# Patient Record
Sex: MALE | Race: WHITE | NOT HISPANIC OR LATINO | Employment: FULL TIME | URBAN - NONMETROPOLITAN AREA
[De-identification: names, ages, dates, MRNs, and addresses within clinical notes are randomized per-mention and may not be internally consistent; named-entity substitution may affect disease eponyms.]

---

## 2017-01-23 ENCOUNTER — OFFICE VISIT (OUTPATIENT)
Dept: GASTROENTEROLOGY | Facility: CLINIC | Age: 82
End: 2017-01-23

## 2017-01-23 VITALS
TEMPERATURE: 97.4 F | HEIGHT: 69 IN | SYSTOLIC BLOOD PRESSURE: 136 MMHG | HEART RATE: 74 BPM | WEIGHT: 220 LBS | DIASTOLIC BLOOD PRESSURE: 74 MMHG | BODY MASS INDEX: 32.58 KG/M2

## 2017-01-23 DIAGNOSIS — K26.9 MULTIPLE DUODENAL ULCERS: Primary | ICD-10-CM

## 2017-01-23 PROCEDURE — 99214 OFFICE O/P EST MOD 30 MIN: CPT | Performed by: INTERNAL MEDICINE

## 2017-01-23 NOTE — LETTER
January 26, 2017     Jeffrey Owen MD  [address] [place] [address] [address]  [place] [address]    Patient: Eron Escalante   YOB: 1934   Date of Visit: 1/23/2017       Dear Dr. Lexie MD:    Eron Escalante was in my office today. Below is a copy of my note.    If you have questions, please do not hesitate to call me. I look forward to following Eron along with you.         Sincerely,        Joshua Woodward, DO        CC: Frantz Zamora MD    Chief Complaint   Patient presents with   • GI Problem     was in hospital had endo by dr. chisholm 12-14-16 then 12-16-16 had endo by dr. woodward       Subjective     HPI    Treated at [facility] in December 2016.  Evaluated by GI for complaints of dysphagia and GERD. He was found to have duodenal ulcer with hemorrhage upon EGD 12/14/16.  Blood transfusion required while in hospital.   Repeat EGD on 12/16/16 showed duodenal ulcers that were not bleeding.    Hx of taking ASA and Coumadin (hx of Afib/CVA).  Pt continues to take ASA.  Currently prescribed Protonix.   Pt reports improvement in symptoms.  Iron transfusion last week at Dr Randolph office. Hgb currently stable.  No signs BRBPR or melena stools.  Currently denies dysphagia.        Past Medical History   Diagnosis Date   • A-fib 11/15/2016   • Anemia      gets shots every few months to build up blood. sees dr randolph.   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   • Arthritis    • Bradycardia 11/15/2016   • CAD in native artery 11/15/2016   • Chest pain 11/15/2016   • CHF (congestive heart failure)    • Chronic kidney disease    • COPD (chronic obstructive pulmonary disease)    • Heart murmur    • HTN (hypertension) 11/15/2016   • Hyperlipidemia    • Myocardial infarction    • Sleep apnea    • Stroke    • Type 2 diabetes mellitus 11/15/2016   • Ulcer of abdomen wall        Past Surgical History   Procedure Laterality Date   • Back surgery       x2   • Replacement total knee Left      2002   • Cardiac  catheterization Left      1/2010   • Coronary artery bypass graft       2/2006 w/PTCA & KIMMY    • Appendectomy     • Cholecystectomy     • Carpal tunnel release Bilateral    • Eye surgery Left      x 2   • Shoulder rotator cuff repair Bilateral    • Peripheral arterial stent graft     • Coronary stent placement     • Joint replacement     • Endoscopy N/A 12/14/2016     Procedure: ESOPHAGOGASTRODUODENOSCOPY WITH ANESTHESIA;  Surgeon: Isabel Dexter MD;  Location: [facility] ENDOSCOPY;  Service:    • Endoscopy N/A 12/16/2016     Procedure: ESOPHAGOGASTRODUODENOSCOPY WITH ANESTHESIA;  Surgeon: Joshua Rich DO;  Location: [facility] ENDOSCOPY;  Service:        Outpatient Prescriptions Marked as Taking for the 1/23/17 encounter (Office Visit) with Joshua Rich DO   Medication Sig Dispense Refill   • allopurinol (ZYLOPRIM) 300 MG tablet Take 300 mg by mouth Daily.     • aspirin 81 MG chewable tablet Chew 1 tablet Daily. 81 tablet 11   • colchicine 0.6 MG tablet Take 1 tablet by mouth 2 (Two) Times a Day As Needed for muscle/joint pain. 30 tablet 2   • doxazosin (CARDURA) 1 MG tablet Take 1 mg by mouth Every Night.     • Folic Acid-B2-B6-B12-C-Choline (FOLIC ACID XTRA PO) Take 1 capsule by mouth Daily.     • furosemide (LASIX) 40 MG tablet Take 40 mg by mouth Daily.     • glimepiride (AMARYL) 4 MG tablet 4 mg 2 (Two) Times a Day. Needs pm dose     • HYDROcodone-acetaminophen (NORCO) 7.5-325 MG per tablet 1 tablet Every 6 (Six) Hours As Needed.     • Insulin Glargine (LANTUS FOR OPTICLIK SC) Inject 15 Units under the skin Every Night. Takes only if evening blood sugar is greater than 120     • Lancets (ONETOUCH ULTRASOFT) lancets      • mupirocin (BACTROBAN) 2 % ointment Apply topically daily Indications: Apply at dressing changes Apply topically 3 times daily.     • Olmesartan-Amlodipine-HCTZ 40-10-12.5 MG tablet Take by mouth daily     • ONE TOUCH ULTRA TEST test strip      • pantoprazole (PROTONIX) 40 MG EC tablet Take  1 tablet by mouth 2 (Two) Times a Day. 60 tablet 5   • potassium chloride (K-DUR,KLOR-CON) 10 MEQ CR tablet Take 10 mEq by mouth 2 (Two) Times a Day. Has not had pm dose but potassium level is elevated.     • pravastatin (PRAVACHOL) 40 MG tablet Take 40 mg by mouth daily     • promethazine (PHENERGAN) 12.5 MG tablet Take 12.5 mg by mouth as needed for Nausea     • Pyridoxine HCl (VITAMIN B6) 200 MG tablet Take  by mouth.     • rOPINIRole (REQUIP) 2 MG tablet Take 2 mg by mouth Every Night. Needs pm dose     • sennosides-docusate sodium (SENOKOT-S) 8.6-50 MG tablet Take 2 tablets by mouth 2 (Two) Times a Day As Needed for constipation. 45 tablet 2   • vitamin C (ASCORBIC ACID) 500 MG tablet Take 500 mg by mouth Daily.         Allergies   Allergen Reactions   • Latex    • Penicillins    • Adhesive Tape Rash       Social History     Social History   • Marital status:      Spouse name: N/A   • Number of children: N/A   • Years of education: N/A     Occupational History   • Not on file.     Social History Main Topics   • Smoking status: Never Smoker   • Smokeless tobacco: Never Used   • Alcohol use No   • Drug use: No   • Sexual activity: No     Other Topics Concern   • Not on file     Social History Narrative   • No narrative on file       Family History   Problem Relation Age of Onset   • Coronary artery disease Father    • Heart disease Father    • Coronary artery disease Brother    • Heart attack Brother    • Cancer Mother    • No Known Problems Sister    • Heart disease Son    • Cancer Sister    • Cancer Sister        Review of Systems   Constitutional: Negative for fatigue, fever and unexpected weight change.   HENT: Negative for hearing loss, sore throat and voice change.    Eyes: Negative for visual disturbance.   Respiratory: Negative for cough, shortness of breath and wheezing.    Cardiovascular: Negative for chest pain and palpitations.   Gastrointestinal: Negative for abdominal pain, blood in stool and  "vomiting.   Endocrine: Negative for polydipsia and polyuria.   Genitourinary: Negative for difficulty urinating, dysuria, hematuria and urgency.   Musculoskeletal: Negative for joint swelling and myalgias.   Skin: Negative for color change, rash and wound.   Neurological: Negative for dizziness, tremors, seizures and syncope.   Hematological: Does not bruise/bleed easily.   Psychiatric/Behavioral: Negative for agitation and confusion. The patient is not nervous/anxious.        Objective     Vitals:    01/23/17 1504   BP: 136/74   Pulse: 74   Temp: 97.4 °F (36.3 °C)   Weight: 220 lb (99.8 kg)   Height: 69\" (175.3 cm)     Body mass index is 32.49 kg/(m^2).    Physical Exam   Constitutional: He is oriented to person, place, and time. He appears well-developed and well-nourished.   HENT:   Head: Normocephalic and atraumatic.   Eyes:   Pink, Nonicteric   Neck:   Global Assessment- supple. No JVD or lymphadenopathy   Cardiovascular: Normal rate, regular rhythm and normal heart sounds.  Exam reveals no gallop and no friction rub.    No murmur heard.  Pulmonary/Chest: Effort normal and breath sounds normal. No respiratory distress. He has no wheezes. He has no rales.   Inspection: Movements-Symmetrical   Abdominal: Soft. Bowel sounds are normal. He exhibits no distension and no mass. There is no tenderness. There is no rebound and no guarding.   Neurological: He is alert and oriented to person, place, and time.   General Exam-Deemed a reliable historian, able to converse without difficulty and Able to move all extremities without difficulty       Imaging Results (most recent)     None          Assessment/Plan     Eron was seen today for gi problem.    Diagnoses and all orders for this visit:    Multiple duodenal ulcers    Will discuss cessation of ASA with Dr Owen   EGD in 2-3 months after discussion with Dr Owen  Cont Protonix    * Surgery not found *    There are no Patient Instructions on file for this visit.    "

## 2017-01-23 NOTE — PROGRESS NOTES
Chief Complaint   Patient presents with   • GI Problem     was in hospital had endo by dr. chisholm 12-14-16 then 12-16-16 had endo by dr. woodward       Subjective     HPI    Treated at [facility] in December 2016.  Evaluated by GI for complaints of dysphagia and GERD. He was found to have duodenal ulcer with hemorrhage upon EGD 12/14/16.  Blood transfusion required while in hospital.   Repeat EGD on 12/16/16 showed duodenal ulcers that were not bleeding.    Hx of taking ASA and Coumadin (hx of Afib/CVA).  Pt continues to take ASA.  Currently prescribed Protonix.   Pt reports improvement in symptoms.  Iron transfusion last week at Dr Randolph office. Hgb currently stable.  No signs BRBPR or melena stools.  Currently denies dysphagia.        Past Medical History   Diagnosis Date   • A-fib 11/15/2016   • Anemia      gets shots every few months to build up blood. sees dr randolph.   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   • Arthritis    • Bradycardia 11/15/2016   • CAD in native artery 11/15/2016   • Chest pain 11/15/2016   • CHF (congestive heart failure)    • Chronic kidney disease    • COPD (chronic obstructive pulmonary disease)    • Heart murmur    • HTN (hypertension) 11/15/2016   • Hyperlipidemia    • Myocardial infarction    • Sleep apnea    • Stroke    • Type 2 diabetes mellitus 11/15/2016   • Ulcer of abdomen wall        Past Surgical History   Procedure Laterality Date   • Back surgery       x2   • Replacement total knee Left      2002   • Cardiac catheterization Left      1/2010   • Coronary artery bypass graft       2/2006 w/PTCA & KIMMY    • Appendectomy     • Cholecystectomy     • Carpal tunnel release Bilateral    • Eye surgery Left      x 2   • Shoulder rotator cuff repair Bilateral    • Peripheral arterial stent graft     • Coronary stent placement     • Joint replacement     • Endoscopy N/A 12/14/2016     Procedure: ESOPHAGOGASTRODUODENOSCOPY WITH ANESTHESIA;  Surgeon: Isabel Chisholm MD;  Location: [facility]  ENDOSCOPY;  Service:    • Endoscopy N/A 12/16/2016     Procedure: ESOPHAGOGASTRODUODENOSCOPY WITH ANESTHESIA;  Surgeon: Joshua Rich DO;  Location: [facility] ENDOSCOPY;  Service:        Outpatient Prescriptions Marked as Taking for the 1/23/17 encounter (Office Visit) with Joshua Rich DO   Medication Sig Dispense Refill   • allopurinol (ZYLOPRIM) 300 MG tablet Take 300 mg by mouth Daily.     • aspirin 81 MG chewable tablet Chew 1 tablet Daily. 81 tablet 11   • colchicine 0.6 MG tablet Take 1 tablet by mouth 2 (Two) Times a Day As Needed for muscle/joint pain. 30 tablet 2   • doxazosin (CARDURA) 1 MG tablet Take 1 mg by mouth Every Night.     • Folic Acid-B2-B6-B12-C-Choline (FOLIC ACID XTRA PO) Take 1 capsule by mouth Daily.     • furosemide (LASIX) 40 MG tablet Take 40 mg by mouth Daily.     • glimepiride (AMARYL) 4 MG tablet 4 mg 2 (Two) Times a Day. Needs pm dose     • HYDROcodone-acetaminophen (NORCO) 7.5-325 MG per tablet 1 tablet Every 6 (Six) Hours As Needed.     • Insulin Glargine (LANTUS FOR OPTICLIK SC) Inject 15 Units under the skin Every Night. Takes only if evening blood sugar is greater than 120     • Lancets (ONETOUCH ULTRASOFT) lancets      • mupirocin (BACTROBAN) 2 % ointment Apply topically daily Indications: Apply at dressing changes Apply topically 3 times daily.     • Olmesartan-Amlodipine-HCTZ 40-10-12.5 MG tablet Take by mouth daily     • ONE TOUCH ULTRA TEST test strip      • pantoprazole (PROTONIX) 40 MG EC tablet Take 1 tablet by mouth 2 (Two) Times a Day. 60 tablet 5   • potassium chloride (K-DUR,KLOR-CON) 10 MEQ CR tablet Take 10 mEq by mouth 2 (Two) Times a Day. Has not had pm dose but potassium level is elevated.     • pravastatin (PRAVACHOL) 40 MG tablet Take 40 mg by mouth daily     • promethazine (PHENERGAN) 12.5 MG tablet Take 12.5 mg by mouth as needed for Nausea     • Pyridoxine HCl (VITAMIN B6) 200 MG tablet Take  by mouth.     • rOPINIRole (REQUIP) 2 MG tablet Take 2 mg  by mouth Every Night. Needs pm dose     • sennosides-docusate sodium (SENOKOT-S) 8.6-50 MG tablet Take 2 tablets by mouth 2 (Two) Times a Day As Needed for constipation. 45 tablet 2   • vitamin C (ASCORBIC ACID) 500 MG tablet Take 500 mg by mouth Daily.         Allergies   Allergen Reactions   • Latex    • Penicillins    • Adhesive Tape Rash       Social History     Social History   • Marital status:      Spouse name: N/A   • Number of children: N/A   • Years of education: N/A     Occupational History   • Not on file.     Social History Main Topics   • Smoking status: Never Smoker   • Smokeless tobacco: Never Used   • Alcohol use No   • Drug use: No   • Sexual activity: No     Other Topics Concern   • Not on file     Social History Narrative   • No narrative on file       Family History   Problem Relation Age of Onset   • Coronary artery disease Father    • Heart disease Father    • Coronary artery disease Brother    • Heart attack Brother    • Cancer Mother    • No Known Problems Sister    • Heart disease Son    • Cancer Sister    • Cancer Sister        Review of Systems   Constitutional: Negative for fatigue, fever and unexpected weight change.   HENT: Negative for hearing loss, sore throat and voice change.    Eyes: Negative for visual disturbance.   Respiratory: Negative for cough, shortness of breath and wheezing.    Cardiovascular: Negative for chest pain and palpitations.   Gastrointestinal: Negative for abdominal pain, blood in stool and vomiting.   Endocrine: Negative for polydipsia and polyuria.   Genitourinary: Negative for difficulty urinating, dysuria, hematuria and urgency.   Musculoskeletal: Negative for joint swelling and myalgias.   Skin: Negative for color change, rash and wound.   Neurological: Negative for dizziness, tremors, seizures and syncope.   Hematological: Does not bruise/bleed easily.   Psychiatric/Behavioral: Negative for agitation and confusion. The patient is not  "nervous/anxious.        Objective     Vitals:    01/23/17 1504   BP: 136/74   Pulse: 74   Temp: 97.4 °F (36.3 °C)   Weight: 220 lb (99.8 kg)   Height: 69\" (175.3 cm)     Body mass index is 32.49 kg/(m^2).    Physical Exam   Constitutional: He is oriented to person, place, and time. He appears well-developed and well-nourished.   HENT:   Head: Normocephalic and atraumatic.   Eyes:   Pink, Nonicteric   Neck:   Global Assessment- supple. No JVD or lymphadenopathy   Cardiovascular: Normal rate, regular rhythm and normal heart sounds.  Exam reveals no gallop and no friction rub.    No murmur heard.  Pulmonary/Chest: Effort normal and breath sounds normal. No respiratory distress. He has no wheezes. He has no rales.   Inspection: Movements-Symmetrical   Abdominal: Soft. Bowel sounds are normal. He exhibits no distension and no mass. There is no tenderness. There is no rebound and no guarding.   Neurological: He is alert and oriented to person, place, and time.   General Exam-Deemed a reliable historian, able to converse without difficulty and Able to move all extremities without difficulty       Imaging Results (most recent)     None          Assessment/Plan     Eron was seen today for gi problem.    Diagnoses and all orders for this visit:    Multiple duodenal ulcers    Will discuss cessation of ASA with Dr Owen   EGD in 2-3 months after discussion with Dr Owen  Cont Protonix    * Surgery not found *    There are no Patient Instructions on file for this visit.    "

## 2017-01-23 NOTE — MR AVS SNAPSHOT
Eron Escalante   1/23/2017 2:45 PM   Office Visit    Dept Phone:  [phone]   Encounter #:  [id number]    Provider:  Joshua Rich DO   Department:  [facility] GASTROENTEROLOGY                Your Full Care Plan              Your Updated Medication List          This list is accurate as of: 1/23/17  3:38 PM.  Always use your most recent med list.                allopurinol 300 MG tablet   Commonly known as:  ZYLOPRIM       aspirin 81 MG chewable tablet   Chew 1 tablet Daily.       colchicine 0.6 MG tablet   Take 1 tablet by mouth 2 (Two) Times a Day As Needed for muscle/joint pain.       doxazosin 1 MG tablet   Commonly known as:  CARDURA       FOLIC ACID XTRA PO       furosemide 40 MG tablet   Commonly known as:  LASIX       glimepiride 4 MG tablet   Commonly known as:  AMARYL       HYDROcodone-acetaminophen 7.5-325 MG per tablet   Commonly known as:  NORCO       LANTUS FOR OPTICLIK SC       mupirocin 2 % ointment   Commonly known as:  BACTROBAN       Olmesartan-Amlodipine-HCTZ 40-10-12.5 MG tablet       ONE TOUCH ULTRA TEST test strip   Generic drug:  glucose blood       onetouch ultrasoft lancets       pantoprazole 40 MG EC tablet   Commonly known as:  PROTONIX   Take 1 tablet by mouth 2 (Two) Times a Day.       potassium chloride 10 MEQ CR tablet   Commonly known as:  K-DUR,KLOR-CON       pravastatin 40 MG tablet   Commonly known as:  PRAVACHOL       promethazine 12.5 MG tablet   Commonly known as:  PHENERGAN       rOPINIRole 2 MG tablet   Commonly known as:  REQUIP       sennosides-docusate sodium 8.6-50 MG tablet   Commonly known as:  SENOKOT-S   Take 2 tablets by mouth 2 (Two) Times a Day As Needed for constipation.       Vitamin B6 200 MG tablet       vitamin C 500 MG tablet   Commonly known as:  ASCORBIC ACID               You Were Diagnosed With        Codes Comments    Multiple duodenal ulcers    -  Primary ICD-10-CM: K29.81  ICD-9-CM: 535.61        "  Instructions     None    Patient Instructions History      Upcoming Appointments     Visit Type Date Time Department    OFFICE VISIT 2017  2:45 PM [facility] GASTRO PAD    FOLLOW UP 3/13/2017  2:30 PM [facility] PAD      MyChart Signup     [facility] [employer] allows you to send messages to your doctor, view your test results, renew your prescriptions, schedule appointments, and more. To sign up, go to [employer] and click on the Sign Up Now link in the New User? box. Enter your [employer] Activation Code exactly as it appears below along with the last four digits of your Social Security Number and your Date of Birth () to complete the sign-up process. If you do not sign up before the expiration date, you must request a new code.    [employer] Activation Code: [email or web address]  Expires: 2017  3:37 PM    If you have questions, you can email [email or web address] or call [phone] to talk to our [employer] staff. Remember, [employer] is NOT to be used for urgent needs. For medical emergencies, dial 911.               Other Info from Your Visit           Your Appointments     Mar 13, 2017  2:30 PM CDT   Follow Up with Frantz Zamora MD   [facility] HEART GROUP (--)    [address] [place] [id number] [phone]           Arrive 15 minutes prior to appointment.              Allergies     Latex      Penicillins      Adhesive Tape  Rash      Reason for Visit     GI Problem was in hospital had endo by dr. chisholm 16 then 16 had endo by dr. woodward      Vital Signs     Blood Pressure Pulse Temperature Height Weight Body Mass Index    136/74 74 97.4 °F (36.3 °C) 69\" (175.3 cm) 220 lb (99.8 kg) 32.49 kg/m2    Smoking Status                   Never Smoker           Problems and Diagnoses Noted     Multiple duodenal ulcers    -  Primary        "

## 2017-01-31 ENCOUNTER — TELEPHONE (OUTPATIENT)
Dept: CARDIOLOGY | Facility: CLINIC | Age: 82
End: 2017-01-31

## 2017-02-02 ENCOUNTER — TELEPHONE (OUTPATIENT)
Dept: GASTROENTEROLOGY | Facility: CLINIC | Age: 82
End: 2017-02-02

## 2017-02-02 DIAGNOSIS — K26.9 DUODENAL ULCER: Primary | ICD-10-CM

## 2017-02-02 NOTE — TELEPHONE ENCOUNTER
Attempt to call pt, no answer, message left  He needs to stop ASA at this time per Dr Rich  He will need EGD 2 months, order has been placed

## 2017-02-03 ENCOUNTER — TELEPHONE (OUTPATIENT)
Dept: GASTROENTEROLOGY | Facility: CLINIC | Age: 82
End: 2017-02-03

## 2017-02-03 NOTE — TELEPHONE ENCOUNTER
Spoke with pt on phone  Instructed him to stop ASA per Dr Rich order  He will need EGD 2 mo off ASA  Order has been placed  Please call pt to schedule    ty    ad

## 2017-02-07 ENCOUNTER — TELEPHONE (OUTPATIENT)
Dept: CARDIOLOGY | Facility: CLINIC | Age: 82
End: 2017-02-07

## 2017-02-26 ENCOUNTER — TELEPHONE (OUTPATIENT)
Dept: GASTROENTEROLOGY | Facility: CLINIC | Age: 82
End: 2017-02-26

## 2017-02-26 NOTE — TELEPHONE ENCOUNTER
Please confirm/schedule EGD for April 2017    I have previously spoken with him on phone and instructed him to stop ASA  EGD will need to be performed 2 month after cessation of ASA    lindsay rivas

## 2017-03-07 ENCOUNTER — TELEPHONE (OUTPATIENT)
Dept: GASTROENTEROLOGY | Facility: CLINIC | Age: 82
End: 2017-03-07

## 2017-03-07 NOTE — TELEPHONE ENCOUNTER
EGD scheduled for Monday 4/10/17  He has stopped ASA and denies use of further blood thinners  Case Request as been entered.  Please make sure all appropriate steps are completed prior to scope    ty

## 2017-03-13 ENCOUNTER — OFFICE VISIT (OUTPATIENT)
Dept: CARDIOLOGY | Facility: CLINIC | Age: 82
End: 2017-03-13

## 2017-03-13 VITALS
BODY MASS INDEX: 44.17 KG/M2 | HEIGHT: 60 IN | DIASTOLIC BLOOD PRESSURE: 64 MMHG | WEIGHT: 225 LBS | SYSTOLIC BLOOD PRESSURE: 150 MMHG | OXYGEN SATURATION: 100 % | HEART RATE: 51 BPM

## 2017-03-13 DIAGNOSIS — I10 ESSENTIAL HYPERTENSION: ICD-10-CM

## 2017-03-13 DIAGNOSIS — R00.1 BRADYCARDIA: ICD-10-CM

## 2017-03-13 DIAGNOSIS — I25.10 CAD IN NATIVE ARTERY: ICD-10-CM

## 2017-03-13 DIAGNOSIS — K26.4 DUODENAL ULCER WITH HEMORRHAGE: ICD-10-CM

## 2017-03-13 DIAGNOSIS — I48.20 CHRONIC ATRIAL FIBRILLATION (HCC): Primary | ICD-10-CM

## 2017-03-13 PROCEDURE — 99214 OFFICE O/P EST MOD 30 MIN: CPT | Performed by: INTERNAL MEDICINE

## 2017-03-13 NOTE — TELEPHONE ENCOUNTER
Have talked to patient he is still off aspirin no other changes he will have his end  4-10-17 at 6:30.

## 2017-04-07 ENCOUNTER — ANESTHESIA EVENT (OUTPATIENT)
Dept: GASTROENTEROLOGY | Facility: HOSPITAL | Age: 82
End: 2017-04-07

## 2017-04-10 ENCOUNTER — HOSPITAL ENCOUNTER (OUTPATIENT)
Facility: HOSPITAL | Age: 82
Setting detail: HOSPITAL OUTPATIENT SURGERY
Discharge: HOME | End: 2017-04-10
Attending: INTERNAL MEDICINE | Admitting: INTERNAL MEDICINE

## 2017-04-10 ENCOUNTER — ANESTHESIA (OUTPATIENT)
Dept: GASTROENTEROLOGY | Facility: HOSPITAL | Age: 82
End: 2017-04-10

## 2017-04-10 VITALS
BODY MASS INDEX: 33.03 KG/M2 | HEART RATE: 49 BPM | TEMPERATURE: 97.2 F | OXYGEN SATURATION: 100 % | RESPIRATION RATE: 17 BRPM | WEIGHT: 223 LBS | SYSTOLIC BLOOD PRESSURE: 137 MMHG | HEIGHT: 69 IN | DIASTOLIC BLOOD PRESSURE: 57 MMHG

## 2017-04-10 DIAGNOSIS — K26.9 DUODENAL ULCER: ICD-10-CM

## 2017-04-10 LAB — GLUCOSE BLDC GLUCOMTR-MCNC: 120 MG/DL (ref 70–130)

## 2017-04-10 PROCEDURE — 43239 EGD BIOPSY SINGLE/MULTIPLE: CPT | Performed by: INTERNAL MEDICINE

## 2017-04-10 PROCEDURE — 25010000002 PROPOFOL 10 MG/ML EMULSION: Performed by: NURSE ANESTHETIST, CERTIFIED REGISTERED

## 2017-04-10 PROCEDURE — 87081 CULTURE SCREEN ONLY: CPT | Performed by: INTERNAL MEDICINE

## 2017-04-10 PROCEDURE — 82962 GLUCOSE BLOOD TEST: CPT

## 2017-04-10 RX ORDER — SODIUM CHLORIDE 0.9 % (FLUSH) 0.9 %
1-10 SYRINGE (ML) INJECTION AS NEEDED
Status: DISCONTINUED | OUTPATIENT
Start: 2017-04-10 | End: 2017-04-10 | Stop reason: HOSPADM

## 2017-04-10 RX ORDER — PROPOFOL 10 MG/ML
VIAL (ML) INTRAVENOUS AS NEEDED
Status: DISCONTINUED | OUTPATIENT
Start: 2017-04-10 | End: 2017-04-10 | Stop reason: SURG

## 2017-04-10 RX ORDER — LIDOCAINE HYDROCHLORIDE 20 MG/ML
INJECTION, SOLUTION INFILTRATION; PERINEURAL AS NEEDED
Status: DISCONTINUED | OUTPATIENT
Start: 2017-04-10 | End: 2017-04-10 | Stop reason: SURG

## 2017-04-10 RX ORDER — SODIUM CHLORIDE 9 MG/ML
100 INJECTION, SOLUTION INTRAVENOUS CONTINUOUS
Status: DISCONTINUED | OUTPATIENT
Start: 2017-04-10 | End: 2017-04-10 | Stop reason: HOSPADM

## 2017-04-10 RX ADMIN — PROPOFOL 150 MG: 10 INJECTION, EMULSION INTRAVENOUS at 08:25

## 2017-04-10 RX ADMIN — LIDOCAINE HYDROCHLORIDE 0.5 ML: 10 INJECTION, SOLUTION EPIDURAL; INFILTRATION; INTRACAUDAL; PERINEURAL at 06:59

## 2017-04-10 RX ADMIN — SODIUM CHLORIDE 100 ML/HR: 9 INJECTION, SOLUTION INTRAVENOUS at 06:58

## 2017-04-10 RX ADMIN — LIDOCAINE HYDROCHLORIDE 50 MG: 20 INJECTION, SOLUTION INFILTRATION; PERINEURAL at 08:25

## 2017-04-10 RX ADMIN — SODIUM CHLORIDE: 9 INJECTION, SOLUTION INTRAVENOUS at 08:25

## 2017-04-10 NOTE — PLAN OF CARE
Problem: Patient Care Overview (Adult)  Goal: Plan of Care Review  Outcome: Outcome(s) achieved Date Met:  04/10/17    04/10/17 0847   Patient Care Overview   Progress progress toward functional goals as expected   Outcome Evaluation   Outcome Summary/Follow up Plan d/c criteria met   Coping/Psychosocial Response Interventions   Plan Of Care Reviewed With patient;spouse

## 2017-04-10 NOTE — PLAN OF CARE
Problem: GI Endoscopy (Adult)  Goal: Signs and Symptoms of Listed Potential Problems Will be Absent or Manageable (GI Endoscopy)  Outcome: Outcome(s) achieved Date Met:  04/10/17

## 2017-04-10 NOTE — H&P
[facility] Gastroenterology  Pre Procedure History & Physical    Chief Complaint:   DU    Subjective     HPI:   DU    Past Medical History:   Past Medical History:   Diagnosis Date   • A-fib 11/15/2016   • Anemia     gets shots every few months to build up blood. sees dr adam.   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   • Arthritis    • Bradycardia 11/15/2016   • CAD in native artery 11/15/2016   • Chest pain 11/15/2016   • CHF (congestive heart failure)    • Chronic kidney disease    • COPD (chronic obstructive pulmonary disease)    • Heart murmur    • HTN (hypertension) 11/15/2016   • Hyperlipidemia    • Myocardial infarction    • Sleep apnea    • Stroke    • Type 2 diabetes mellitus 11/15/2016   • Ulcer of abdomen wall        Past Surgical History:  [unfilled]    Family History:  Family History   Problem Relation Age of Onset   • Coronary artery disease Father    • Heart disease Father    • Coronary artery disease Brother    • Heart attack Brother    • Cancer Mother    • No Known Problems Sister    • Heart disease Son    • Cancer Sister    • Cancer Sister        Social History:   reports that he has never smoked. He has never used smokeless tobacco. He reports that he does not drink alcohol or use illicit drugs.    Medications:   Prior to Admission medications    Medication Sig Start Date End Date Taking? Authorizing Provider   allopurinol (ZYLOPRIM) 300 MG tablet Take 300 mg by mouth Daily. 11/20/16  Yes Historical Provider, MD   colchicine 0.6 MG tablet Take 1 tablet by mouth 2 (Two) Times a Day As Needed for muscle/joint pain. 12/22/16  Yes Jeffrey Owen MD   doxazosin (CARDURA) 1 MG tablet Take 1 mg by mouth Every Night.   Yes Historical Provider, MD   Folic Acid-B2-B6-B12-C-Choline (FOLIC ACID XTRA PO) Take 1 capsule by mouth Daily.   Yes Historical Provider, MD   furosemide (LASIX) 40 MG tablet Take 40 mg by mouth Daily.   Yes Historical Provider, MD   glimepiride (AMARYL) 4 MG tablet 4 mg 2 (Two)  "Times a Day. Needs pm dose 8/23/16  Yes Historical Provider, MD   Insulin Glargine (LANTUS FOR OPTICLIK SC) Inject 15 Units under the skin Every Night. Takes only if evening blood sugar is greater than 120   Yes Historical Provider, MD   Olmesartan-Amlodipine-HCTZ 40-10-12.5 MG tablet Take by mouth daily   Yes Historical Provider, MD   pantoprazole (PROTONIX) 40 MG EC tablet Take 1 tablet by mouth 2 (Two) Times a Day. 12/22/16  Yes Jeffrey Owen MD   potassium chloride (K-DUR,KLOR-CON) 10 MEQ CR tablet Take 10 mEq by mouth 2 (Two) Times a Day. Has not had pm dose but potassium level is elevated.   Yes Historical Provider, MD   pravastatin (PRAVACHOL) 40 MG tablet Take 40 mg by mouth daily   Yes Historical Provider, MD   promethazine (PHENERGAN) 12.5 MG tablet Take 12.5 mg by mouth as needed for Nausea   Yes Historical Provider, MD   Pyridoxine HCl (VITAMIN B6) 200 MG tablet Take  by mouth.   Yes Historical Provider, MD   sennosides-docusate sodium (SENOKOT-S) 8.6-50 MG tablet Take 2 tablets by mouth 2 (Two) Times a Day As Needed for constipation. 12/22/16  Yes Jeffrey Owen MD   vitamin C (ASCORBIC ACID) 500 MG tablet Take 500 mg by mouth Daily.   Yes Historical Provider, MD   HYDROcodone-acetaminophen (NORCO) 7.5-325 MG per tablet 1 tablet Every 6 (Six) Hours As Needed.    Historical Provider, MD   Lancets (ONETOUCH ULTRASOFT) lancets  11/28/16   Historical Provider, MD   mupirocin (BACTROBAN) 2 % ointment Apply topically daily Indications: Apply at dressing changes Apply topically 3 times daily.    Historical Provider, MD   ONE TOUCH ULTRA TEST test strip  11/28/16   Historical Provider, MD   rOPINIRole (REQUIP) 2 MG tablet Take 2 mg by mouth Every Night. Needs pm dose    Historical Provider, MD       Allergies:  Penicillins and Adhesive tape    Objective     Blood pressure 141/81, pulse 54, temperature 97.2 °F (36.2 °C), temperature source Temporal Artery , resp. rate 20, height 69\" (175.3 cm), weight 223 " lb (101 kg), SpO2 100 %.    Physical Exam   Constitutional: Pt is oriented to person, place, and in no distress.   HENT: Mouth/Throat: Oropharynx is clear.   Cardiovascular: Normal rate, regular rhythm.    Pulmonary/Chest: Effort normal. No respiratory distress. No  wheezes.   Abdominal: Soft. Non-distended.  Skin: Skin is warm and dry.   Psychiatric: Mood, memory, affect and judgment appear normal.     Assessment/Plan     Diagnosis:  Du    Anticipated Surgical Procedure:  EGD    The risks, benefits, and alternatives of this procedure have been discussed with the patient or the responsible party- the patient understands and agrees to proceed.

## 2017-04-10 NOTE — ANESTHESIA PREPROCEDURE EVALUATION
Anesthesia Evaluation     Patient summary reviewed and Nursing notes reviewed   no history of anesthetic complications:  NPO Status: > 8 hours   Airway   Mallampati: I  TM distance: <3 FB  Neck ROM: full  no difficulty expected  Dental - normal exam     Pulmonary - normal exam   (+) COPD, sleep apnea on CPAP,   Cardiovascular - normal exam    (+) hypertension, valvular problems/murmurs (mild as) MS, past MI , CAD, CABG, cardiac stents more than 12 months ago dysrhythmias Atrial Fib, CHF,     ROS comment: Echo 12/2016- ef 60-65%, mild as    Neuro/Psych  (+) CVA,    GI/Hepatic/Renal/Endo    (+)  GERD, chronic renal disease CRI, diabetes mellitus type 2,   (-) hepatitis, liver disease    Musculoskeletal     Abdominal  - normal exam    Bowel sounds: normal.   Substance History      OB/GYN          Other   (+) arthritis                                 Anesthesia Plan    ASA 3     general     intravenous induction   Anesthetic plan and risks discussed with patient.

## 2017-04-10 NOTE — ANESTHESIA POSTPROCEDURE EVALUATION
Patient: Eron Escalante    Procedure Summary     Date Anesthesia Start Anesthesia Stop Room / Location    04/10/17 0825 0835 [facility] ENDOSCOPY 5 / [facility] ENDOSCOPY       Procedure Diagnosis Surgeon Provider    ESOPHAGOGASTRODUODENOSCOPY WITH ANESTHESIA (N/A Esophagus) Duodenal ulcer  (Duodenal ulcer [K26.9]) DO Elpidio Lewis CRNA          Anesthesia Type: general  Last vitals  BP      Temp      Pulse     Resp      SpO2        Post Anesthesia Care and Evaluation    Patient location during evaluation: PACU  Patient participation: complete - patient participated  Level of consciousness: awake and awake and alert  Pain score: 0  Pain management: adequate  Airway patency: patent  Anesthetic complications: No anesthetic complications    Cardiovascular status: acceptable and stable  Respiratory status: acceptable and unassisted  Hydration status: acceptable

## 2017-04-10 NOTE — PLAN OF CARE
Problem: Patient Care Overview (Adult)  Goal: Plan of Care Review    04/10/17 0836   Patient Care Overview   Progress improving   Outcome Evaluation   Outcome Summary/Follow up Plan tolerated procedure well         Problem: GI Endoscopy (Adult)  Goal: Signs and Symptoms of Listed Potential Problems Will be Absent or Manageable (GI Endoscopy)  Outcome: Ongoing (interventions implemented as appropriate)    04/10/17 0836   GI Endoscopy   Problems Assessed (GI Endoscopy) all   Problems Present (GI Endoscopy) none

## 2017-04-11 LAB — UREASE TISS QL: NEGATIVE

## 2017-04-28 ENCOUNTER — HOSPITAL ENCOUNTER (EMERGENCY)
Facility: HOSPITAL | Age: 82
Discharge: HOME | End: 2017-04-28
Attending: EMERGENCY MEDICINE | Admitting: EMERGENCY MEDICINE

## 2017-04-28 VITALS
TEMPERATURE: 98.2 F | HEART RATE: 60 BPM | HEIGHT: 69 IN | RESPIRATION RATE: 16 BRPM | BODY MASS INDEX: 33.18 KG/M2 | SYSTOLIC BLOOD PRESSURE: 131 MMHG | DIASTOLIC BLOOD PRESSURE: 57 MMHG | OXYGEN SATURATION: 99 % | WEIGHT: 224 LBS

## 2017-04-28 DIAGNOSIS — S01.21XA NASAL LACERATION, INITIAL ENCOUNTER: Primary | ICD-10-CM

## 2017-04-28 PROCEDURE — 99283 EMERGENCY DEPT VISIT LOW MDM: CPT

## 2017-04-28 RX ADMIN — Medication 0.5 ML: at 06:58

## 2017-05-26 ENCOUNTER — TELEPHONE (OUTPATIENT)
Dept: GASTROENTEROLOGY | Facility: CLINIC | Age: 82
End: 2017-05-26

## 2017-06-16 ENCOUNTER — TELEPHONE (OUTPATIENT)
Dept: CARDIOLOGY | Facility: CLINIC | Age: 82
End: 2017-06-16

## 2017-06-19 NOTE — TELEPHONE ENCOUNTER
He is low to intermediate risk for surgery from a cardiac standpoint.  He had a recent negative nuclear stress test in 2016.  No further cardiac testing is indicated prior to surgery.

## 2017-06-26 ENCOUNTER — APPOINTMENT (OUTPATIENT)
Dept: GENERAL RADIOLOGY | Facility: HOSPITAL | Age: 82
End: 2017-06-26

## 2017-06-26 ENCOUNTER — HOSPITAL ENCOUNTER (INPATIENT)
Facility: HOSPITAL | Age: 82
LOS: 3 days | Discharge: HOME | End: 2017-06-29
Attending: EMERGENCY MEDICINE | Admitting: FAMILY MEDICINE

## 2017-06-26 ENCOUNTER — APPOINTMENT (OUTPATIENT)
Dept: CT IMAGING | Facility: HOSPITAL | Age: 82
End: 2017-06-26

## 2017-06-26 ENCOUNTER — APPOINTMENT (OUTPATIENT)
Dept: ULTRASOUND IMAGING | Facility: HOSPITAL | Age: 82
End: 2017-06-26

## 2017-06-26 DIAGNOSIS — M87.00 AVN (AVASCULAR NECROSIS OF BONE) (HCC): ICD-10-CM

## 2017-06-26 DIAGNOSIS — Z74.09 IMPAIRED MOBILITY: ICD-10-CM

## 2017-06-26 DIAGNOSIS — R29.898 WEAKNESS OF EXTREMITY: Primary | ICD-10-CM

## 2017-06-26 DIAGNOSIS — R51.9 HEADACHE, UNSPECIFIED HEADACHE TYPE: ICD-10-CM

## 2017-06-26 LAB
ALBUMIN SERPL-MCNC: 3.9 G/DL (ref 3.5–5)
ALBUMIN/GLOB SERPL: 1.3 G/DL (ref 1.1–2.5)
ALP SERPL-CCNC: 77 U/L (ref 24–120)
ALT SERPL W P-5'-P-CCNC: 41 U/L (ref 0–54)
ANION GAP SERPL CALCULATED.3IONS-SCNC: 11 MMOL/L (ref 4–13)
APTT PPP: 29.5 SECONDS (ref 24.1–34.8)
AST SERPL-CCNC: 32 U/L (ref 7–45)
BASOPHILS # BLD AUTO: 0.02 10*3/MM3 (ref 0–0.2)
BASOPHILS NFR BLD AUTO: 0.3 % (ref 0–2)
BILIRUB SERPL-MCNC: 0.7 MG/DL (ref 0.1–1)
BUN BLD-MCNC: 47 MG/DL (ref 5–21)
BUN/CREAT SERPL: 28.5 (ref 7–25)
CALCIUM SPEC-SCNC: 9.9 MG/DL (ref 8.4–10.4)
CHLORIDE SERPL-SCNC: 105 MMOL/L (ref 98–110)
CO2 SERPL-SCNC: 22 MMOL/L (ref 24–31)
CREAT BLD-MCNC: 1.65 MG/DL (ref 0.5–1.4)
CRP SERPL-MCNC: 1.9 MG/DL (ref 0–0.99)
DEPRECATED RDW RBC AUTO: 49.3 FL (ref 40–54)
EOSINOPHIL # BLD AUTO: 0.21 10*3/MM3 (ref 0–0.7)
EOSINOPHIL NFR BLD AUTO: 3.3 % (ref 0–4)
ERYTHROCYTE [DISTWIDTH] IN BLOOD BY AUTOMATED COUNT: 13.6 % (ref 12–15)
ERYTHROCYTE [SEDIMENTATION RATE] IN BLOOD: 23 MM/HR (ref 0–15)
GFR SERPL CREATININE-BSD FRML MDRD: 40 ML/MIN/1.73
GLOBULIN UR ELPH-MCNC: 2.9 GM/DL
GLUCOSE BLD-MCNC: 80 MG/DL (ref 70–100)
GLUCOSE BLDC GLUCOMTR-MCNC: 170 MG/DL (ref 70–130)
HCT VFR BLD AUTO: 33.4 % (ref 40–52)
HGB BLD-MCNC: 11.1 G/DL (ref 14–18)
IMM GRANULOCYTES # BLD: 0.01 10*3/MM3 (ref 0–0.03)
IMM GRANULOCYTES NFR BLD: 0.2 % (ref 0–5)
INR PPP: 0.98 (ref 0.91–1.09)
LYMPHOCYTES # BLD AUTO: 1.65 10*3/MM3 (ref 0.72–4.86)
LYMPHOCYTES NFR BLD AUTO: 26.2 % (ref 15–45)
MCH RBC QN AUTO: 33 PG (ref 28–32)
MCHC RBC AUTO-ENTMCNC: 33.2 G/DL (ref 33–36)
MCV RBC AUTO: 99.4 FL (ref 82–95)
MONOCYTES # BLD AUTO: 0.7 10*3/MM3 (ref 0.19–1.3)
MONOCYTES NFR BLD AUTO: 11.1 % (ref 4–12)
NEUTROPHILS # BLD AUTO: 3.71 10*3/MM3 (ref 1.87–8.4)
NEUTROPHILS NFR BLD AUTO: 58.9 % (ref 39–78)
PLATELET # BLD AUTO: 190 10*3/MM3 (ref 130–400)
PMV BLD AUTO: 10.6 FL (ref 6–12)
POTASSIUM BLD-SCNC: 4.6 MMOL/L (ref 3.5–5.3)
PROT SERPL-MCNC: 6.8 G/DL (ref 6.3–8.7)
PROTHROMBIN TIME: 13.3 SECONDS (ref 11.9–14.6)
RBC # BLD AUTO: 3.36 10*6/MM3 (ref 4.8–5.9)
SODIUM BLD-SCNC: 138 MMOL/L (ref 135–145)
WBC NRBC COR # BLD: 6.3 10*3/MM3 (ref 4.8–10.8)

## 2017-06-26 PROCEDURE — 70450 CT HEAD/BRAIN W/O DYE: CPT

## 2017-06-26 PROCEDURE — 85730 THROMBOPLASTIN TIME PARTIAL: CPT | Performed by: EMERGENCY MEDICINE

## 2017-06-26 PROCEDURE — 25010000002 HYDROMORPHONE PER 4 MG: Performed by: EMERGENCY MEDICINE

## 2017-06-26 PROCEDURE — 73502 X-RAY EXAM HIP UNI 2-3 VIEWS: CPT

## 2017-06-26 PROCEDURE — G0378 HOSPITAL OBSERVATION PER HR: HCPCS

## 2017-06-26 PROCEDURE — 93971 EXTREMITY STUDY: CPT | Performed by: SURGERY

## 2017-06-26 PROCEDURE — 86140 C-REACTIVE PROTEIN: CPT | Performed by: EMERGENCY MEDICINE

## 2017-06-26 PROCEDURE — 25010000002 METHYLPREDNISOLONE PER 125 MG: Performed by: NURSE PRACTITIONER

## 2017-06-26 PROCEDURE — 85025 COMPLETE CBC W/AUTO DIFF WBC: CPT | Performed by: EMERGENCY MEDICINE

## 2017-06-26 PROCEDURE — 63710000001 INSULIN DETEMIR PER 5 UNITS: Performed by: NURSE PRACTITIONER

## 2017-06-26 PROCEDURE — 85651 RBC SED RATE NONAUTOMATED: CPT | Performed by: EMERGENCY MEDICINE

## 2017-06-26 PROCEDURE — 25010000002 ENOXAPARIN PER 10 MG: Performed by: NURSE PRACTITIONER

## 2017-06-26 PROCEDURE — 94799 UNLISTED PULMONARY SVC/PX: CPT

## 2017-06-26 PROCEDURE — 80053 COMPREHEN METABOLIC PANEL: CPT | Performed by: EMERGENCY MEDICINE

## 2017-06-26 PROCEDURE — 99285 EMERGENCY DEPT VISIT HI MDM: CPT

## 2017-06-26 PROCEDURE — 85610 PROTHROMBIN TIME: CPT | Performed by: EMERGENCY MEDICINE

## 2017-06-26 PROCEDURE — 82962 GLUCOSE BLOOD TEST: CPT

## 2017-06-26 PROCEDURE — 25010000002 ONDANSETRON PER 1 MG: Performed by: EMERGENCY MEDICINE

## 2017-06-26 PROCEDURE — 93971 EXTREMITY STUDY: CPT

## 2017-06-26 RX ORDER — GABAPENTIN 100 MG/1
100 CAPSULE ORAL EVERY 12 HOURS SCHEDULED
Status: DISCONTINUED | OUTPATIENT
Start: 2017-06-26 | End: 2017-06-28

## 2017-06-26 RX ORDER — TRAMADOL HYDROCHLORIDE 50 MG/1
50 TABLET ORAL 3 TIMES DAILY PRN
COMMUNITY
End: 2017-06-29 | Stop reason: HOSPADM

## 2017-06-26 RX ORDER — COLCHICINE 0.6 MG/1
0.6 TABLET ORAL DAILY PRN
COMMUNITY

## 2017-06-26 RX ORDER — ROPINIROLE 1 MG/1
1 TABLET, FILM COATED ORAL NIGHTLY
Status: DISCONTINUED | OUTPATIENT
Start: 2017-06-26 | End: 2017-06-29 | Stop reason: HOSPADM

## 2017-06-26 RX ORDER — ONDANSETRON 2 MG/ML
4 INJECTION INTRAMUSCULAR; INTRAVENOUS ONCE
Status: COMPLETED | OUTPATIENT
Start: 2017-06-26 | End: 2017-06-26

## 2017-06-26 RX ORDER — SODIUM CHLORIDE 0.9 % (FLUSH) 0.9 %
1-10 SYRINGE (ML) INJECTION AS NEEDED
Status: DISCONTINUED | OUTPATIENT
Start: 2017-06-26 | End: 2017-06-29 | Stop reason: HOSPADM

## 2017-06-26 RX ORDER — GLIPIZIDE 10 MG/1
10 TABLET ORAL
Status: DISCONTINUED | OUTPATIENT
Start: 2017-06-27 | End: 2017-06-26 | Stop reason: SDUPTHER

## 2017-06-26 RX ORDER — PRAVASTATIN SODIUM 40 MG
40 TABLET ORAL NIGHTLY
Status: ON HOLD | COMMUNITY
End: 2020-01-01

## 2017-06-26 RX ORDER — DOCUSATE SODIUM 100 MG/1
100 CAPSULE, LIQUID FILLED ORAL 2 TIMES DAILY
Status: DISCONTINUED | OUTPATIENT
Start: 2017-06-26 | End: 2017-06-29 | Stop reason: HOSPADM

## 2017-06-26 RX ORDER — ASPIRIN 81 MG/1
81 TABLET, CHEWABLE ORAL DAILY
Status: DISCONTINUED | OUTPATIENT
Start: 2017-06-26 | End: 2017-06-29 | Stop reason: HOSPADM

## 2017-06-26 RX ORDER — HYDROCODONE BITARTRATE AND ACETAMINOPHEN 7.5; 325 MG/1; MG/1
1 TABLET ORAL EVERY 4 HOURS PRN
Status: DISCONTINUED | OUTPATIENT
Start: 2017-06-26 | End: 2017-06-28

## 2017-06-26 RX ORDER — PROMETHAZINE HYDROCHLORIDE 12.5 MG/1
12.5 TABLET ORAL EVERY 6 HOURS PRN
COMMUNITY
End: 2017-06-29 | Stop reason: HOSPADM

## 2017-06-26 RX ORDER — METHYLPREDNISOLONE SODIUM SUCCINATE 125 MG/2ML
60 INJECTION, POWDER, LYOPHILIZED, FOR SOLUTION INTRAMUSCULAR; INTRAVENOUS EVERY 8 HOURS
Status: DISCONTINUED | OUTPATIENT
Start: 2017-06-26 | End: 2017-06-27

## 2017-06-26 RX ORDER — ATORVASTATIN CALCIUM 10 MG/1
10 TABLET, FILM COATED ORAL NIGHTLY
Status: DISCONTINUED | OUTPATIENT
Start: 2017-06-26 | End: 2017-06-29 | Stop reason: HOSPADM

## 2017-06-26 RX ORDER — ONDANSETRON 4 MG/1
4 TABLET, FILM COATED ORAL EVERY 6 HOURS PRN
Status: DISCONTINUED | OUTPATIENT
Start: 2017-06-26 | End: 2017-06-29 | Stop reason: HOSPADM

## 2017-06-26 RX ORDER — SENNA AND DOCUSATE SODIUM 50; 8.6 MG/1; MG/1
2 TABLET, FILM COATED ORAL 2 TIMES DAILY PRN
Status: DISCONTINUED | OUTPATIENT
Start: 2017-06-26 | End: 2017-06-29 | Stop reason: HOSPADM

## 2017-06-26 RX ORDER — AMLODIPINE BESYLATE 10 MG/1
10 TABLET ORAL
Status: DISCONTINUED | OUTPATIENT
Start: 2017-06-26 | End: 2017-06-29 | Stop reason: HOSPADM

## 2017-06-26 RX ORDER — PREDNISOLONE ACETATE 10 MG/ML
1 SUSPENSION/ DROPS OPHTHALMIC
Status: ON HOLD | COMMUNITY
End: 2020-01-01

## 2017-06-26 RX ORDER — ALLOPURINOL 300 MG/1
300 TABLET ORAL DAILY
Status: DISCONTINUED | OUTPATIENT
Start: 2017-06-26 | End: 2017-06-29 | Stop reason: HOSPADM

## 2017-06-26 RX ORDER — CARVEDILOL 6.25 MG/1
12.5 TABLET ORAL 2 TIMES DAILY WITH MEALS
Status: DISCONTINUED | OUTPATIENT
Start: 2017-06-26 | End: 2017-06-29 | Stop reason: HOSPADM

## 2017-06-26 RX ORDER — ONDANSETRON 2 MG/ML
4 INJECTION INTRAMUSCULAR; INTRAVENOUS EVERY 6 HOURS PRN
Status: DISCONTINUED | OUTPATIENT
Start: 2017-06-26 | End: 2017-06-29 | Stop reason: HOSPADM

## 2017-06-26 RX ORDER — ONDANSETRON 4 MG/1
4 TABLET, ORALLY DISINTEGRATING ORAL EVERY 6 HOURS PRN
Status: DISCONTINUED | OUTPATIENT
Start: 2017-06-26 | End: 2017-06-29 | Stop reason: HOSPADM

## 2017-06-26 RX ORDER — ASPIRIN 81 MG/1
81 TABLET, CHEWABLE ORAL DAILY
COMMUNITY

## 2017-06-26 RX ORDER — GLIMEPIRIDE 2 MG/1
4 TABLET ORAL 2 TIMES DAILY WITH MEALS
Status: DISCONTINUED | OUTPATIENT
Start: 2017-06-26 | End: 2017-06-26

## 2017-06-26 RX ORDER — CARVEDILOL 12.5 MG/1
12.5 TABLET ORAL 2 TIMES DAILY WITH MEALS
Status: ON HOLD | COMMUNITY
End: 2020-01-01

## 2017-06-26 RX ORDER — SODIUM CHLORIDE 9 MG/ML
75 INJECTION, SOLUTION INTRAVENOUS CONTINUOUS
Status: DISCONTINUED | OUTPATIENT
Start: 2017-06-26 | End: 2017-06-29

## 2017-06-26 RX ORDER — PANTOPRAZOLE SODIUM 40 MG/1
40 TABLET, DELAYED RELEASE ORAL EVERY 12 HOURS SCHEDULED
Status: DISCONTINUED | OUTPATIENT
Start: 2017-06-26 | End: 2017-06-29 | Stop reason: HOSPADM

## 2017-06-26 RX ORDER — ROPINIROLE 1 MG/1
1 TABLET, FILM COATED ORAL NIGHTLY
COMMUNITY

## 2017-06-26 RX ORDER — OLMESARTAN MEDOXOMIL, AMLODIPINE AND HYDROCHLOROTHIAZIDE TABLET 40/10/25 MG 40; 10; 25 MG/1; MG/1; MG/1
1 TABLET ORAL DAILY
COMMUNITY
End: 2017-06-29 | Stop reason: HOSPADM

## 2017-06-26 RX ORDER — HYDRALAZINE HYDROCHLORIDE 20 MG/ML
10 INJECTION INTRAMUSCULAR; INTRAVENOUS EVERY 6 HOURS PRN
Status: DISCONTINUED | OUTPATIENT
Start: 2017-06-26 | End: 2017-06-29 | Stop reason: HOSPADM

## 2017-06-26 RX ORDER — TRAMADOL HYDROCHLORIDE 50 MG/1
50 TABLET ORAL 3 TIMES DAILY PRN
Status: DISCONTINUED | OUTPATIENT
Start: 2017-06-26 | End: 2017-06-28

## 2017-06-26 RX ORDER — PANTOPRAZOLE SODIUM 40 MG/1
40 TABLET, DELAYED RELEASE ORAL EVERY 12 HOURS
COMMUNITY

## 2017-06-26 RX ADMIN — ONDANSETRON 4 MG: 2 INJECTION INTRAMUSCULAR; INTRAVENOUS at 10:59

## 2017-06-26 RX ADMIN — ROPINIROLE 1 MG: 1 TABLET, FILM COATED ORAL at 21:17

## 2017-06-26 RX ADMIN — INSULIN DETEMIR 15 UNITS: 100 INJECTION, SOLUTION SUBCUTANEOUS at 21:17

## 2017-06-26 RX ADMIN — ENOXAPARIN SODIUM 30 MG: 30 INJECTION SUBCUTANEOUS at 15:11

## 2017-06-26 RX ADMIN — ALLOPURINOL 300 MG: 300 TABLET ORAL at 16:21

## 2017-06-26 RX ADMIN — ASPIRIN 81 MG 81 MG: 81 TABLET ORAL at 15:10

## 2017-06-26 RX ADMIN — HYDROMORPHONE HYDROCHLORIDE 0.5 MG: 1 INJECTION, SOLUTION INTRAMUSCULAR; INTRAVENOUS; SUBCUTANEOUS at 10:59

## 2017-06-26 RX ADMIN — CARVEDILOL 12.5 MG: 6.25 TABLET, FILM COATED ORAL at 16:22

## 2017-06-26 RX ADMIN — AMLODIPINE BESYLATE 10 MG: 10 TABLET ORAL at 17:55

## 2017-06-26 RX ADMIN — ATORVASTATIN CALCIUM 10 MG: 10 TABLET, FILM COATED ORAL at 21:18

## 2017-06-26 RX ADMIN — SODIUM CHLORIDE 75 ML/HR: 9 INJECTION, SOLUTION INTRAVENOUS at 15:11

## 2017-06-26 RX ADMIN — SODIUM CHLORIDE 75 ML/HR: 9 INJECTION, SOLUTION INTRAVENOUS at 15:10

## 2017-06-26 RX ADMIN — GABAPENTIN 100 MG: 100 CAPSULE ORAL at 21:18

## 2017-06-26 RX ADMIN — METHYLPREDNISOLONE SODIUM SUCCINATE 60 MG: 125 INJECTION, POWDER, FOR SOLUTION INTRAMUSCULAR; INTRAVENOUS at 15:11

## 2017-06-26 RX ADMIN — DOCUSATE SODIUM 100 MG: 100 CAPSULE ORAL at 16:23

## 2017-06-26 RX ADMIN — METHYLPREDNISOLONE SODIUM SUCCINATE 60 MG: 125 INJECTION, POWDER, FOR SOLUTION INTRAMUSCULAR; INTRAVENOUS at 23:11

## 2017-06-26 RX ADMIN — SODIUM CHLORIDE 75 ML/HR: 9 INJECTION, SOLUTION INTRAVENOUS at 17:58

## 2017-06-26 RX ADMIN — HYDROCODONE BITARTRATE AND ACETAMINOPHEN 1 TABLET: 7.5; 325 TABLET ORAL at 15:11

## 2017-06-26 RX ADMIN — PANTOPRAZOLE SODIUM 40 MG: 40 TABLET, DELAYED RELEASE ORAL at 21:18

## 2017-06-27 PROBLEM — M51.36 DDD (DEGENERATIVE DISC DISEASE), LUMBAR: Status: ACTIVE | Noted: 2017-06-27

## 2017-06-27 PROBLEM — M48.062 LUMBAR STENOSIS WITH NEUROGENIC CLAUDICATION: Status: ACTIVE | Noted: 2017-06-27

## 2017-06-27 LAB
ANION GAP SERPL CALCULATED.3IONS-SCNC: 9 MMOL/L (ref 4–13)
BASOPHILS # BLD AUTO: 0 10*3/MM3 (ref 0–0.2)
BASOPHILS NFR BLD AUTO: 0 % (ref 0–2)
BUN BLD-MCNC: 43 MG/DL (ref 5–21)
BUN/CREAT SERPL: 28.7 (ref 7–25)
CALCIUM SPEC-SCNC: 9.6 MG/DL (ref 8.4–10.4)
CHLORIDE SERPL-SCNC: 106 MMOL/L (ref 98–110)
CO2 SERPL-SCNC: 24 MMOL/L (ref 24–31)
CREAT BLD-MCNC: 1.5 MG/DL (ref 0.5–1.4)
DEPRECATED RDW RBC AUTO: 48.1 FL (ref 40–54)
EOSINOPHIL # BLD AUTO: 0 10*3/MM3 (ref 0–0.7)
EOSINOPHIL NFR BLD AUTO: 0 % (ref 0–4)
ERYTHROCYTE [DISTWIDTH] IN BLOOD BY AUTOMATED COUNT: 13.3 % (ref 12–15)
GFR SERPL CREATININE-BSD FRML MDRD: 45 ML/MIN/1.73
GLUCOSE BLD-MCNC: 266 MG/DL (ref 70–100)
GLUCOSE BLDC GLUCOMTR-MCNC: 238 MG/DL (ref 70–130)
GLUCOSE BLDC GLUCOMTR-MCNC: 239 MG/DL (ref 70–130)
GLUCOSE BLDC GLUCOMTR-MCNC: 252 MG/DL (ref 70–130)
GLUCOSE BLDC GLUCOMTR-MCNC: 267 MG/DL (ref 70–130)
HCT VFR BLD AUTO: 34.7 % (ref 40–52)
HGB BLD-MCNC: 11.5 G/DL (ref 14–18)
IMM GRANULOCYTES # BLD: 0.01 10*3/MM3 (ref 0–0.03)
IMM GRANULOCYTES NFR BLD: 0.2 % (ref 0–5)
LYMPHOCYTES # BLD AUTO: 0.54 10*3/MM3 (ref 0.72–4.86)
LYMPHOCYTES NFR BLD AUTO: 8.7 % (ref 15–45)
MCH RBC QN AUTO: 33 PG (ref 28–32)
MCHC RBC AUTO-ENTMCNC: 33.1 G/DL (ref 33–36)
MCV RBC AUTO: 99.4 FL (ref 82–95)
MONOCYTES # BLD AUTO: 0.03 10*3/MM3 (ref 0.19–1.3)
MONOCYTES NFR BLD AUTO: 0.5 % (ref 4–12)
NEUTROPHILS # BLD AUTO: 5.62 10*3/MM3 (ref 1.87–8.4)
NEUTROPHILS NFR BLD AUTO: 90.6 % (ref 39–78)
PLATELET # BLD AUTO: 195 10*3/MM3 (ref 130–400)
PMV BLD AUTO: 10.9 FL (ref 6–12)
POTASSIUM BLD-SCNC: 5.2 MMOL/L (ref 3.5–5.3)
RBC # BLD AUTO: 3.49 10*6/MM3 (ref 4.8–5.9)
SODIUM BLD-SCNC: 139 MMOL/L (ref 135–145)
WBC NRBC COR # BLD: 6.2 10*3/MM3 (ref 4.8–10.8)

## 2017-06-27 PROCEDURE — G0378 HOSPITAL OBSERVATION PER HR: HCPCS

## 2017-06-27 PROCEDURE — 97116 GAIT TRAINING THERAPY: CPT

## 2017-06-27 PROCEDURE — 82962 GLUCOSE BLOOD TEST: CPT

## 2017-06-27 PROCEDURE — 97162 PT EVAL MOD COMPLEX 30 MIN: CPT

## 2017-06-27 PROCEDURE — 25010000002 ENOXAPARIN PER 10 MG: Performed by: INTERNAL MEDICINE

## 2017-06-27 PROCEDURE — 80048 BASIC METABOLIC PNL TOTAL CA: CPT | Performed by: NURSE PRACTITIONER

## 2017-06-27 PROCEDURE — 25010000002 METHYLPREDNISOLONE PER 125 MG: Performed by: NURSE PRACTITIONER

## 2017-06-27 PROCEDURE — 25010000002 METHYLPREDNISOLONE PER 40 MG: Performed by: INTERNAL MEDICINE

## 2017-06-27 PROCEDURE — 85025 COMPLETE CBC W/AUTO DIFF WBC: CPT | Performed by: NURSE PRACTITIONER

## 2017-06-27 PROCEDURE — G8978 MOBILITY CURRENT STATUS: HCPCS

## 2017-06-27 PROCEDURE — G8979 MOBILITY GOAL STATUS: HCPCS

## 2017-06-27 PROCEDURE — 63710000001 INSULIN LISPRO (HUMAN) PER 5 UNITS: Performed by: INTERNAL MEDICINE

## 2017-06-27 RX ORDER — METHYLPREDNISOLONE SODIUM SUCCINATE 40 MG/ML
40 INJECTION, POWDER, LYOPHILIZED, FOR SOLUTION INTRAMUSCULAR; INTRAVENOUS EVERY 8 HOURS
Status: DISCONTINUED | OUTPATIENT
Start: 2017-06-27 | End: 2017-06-29

## 2017-06-27 RX ORDER — NICOTINE POLACRILEX 4 MG
15 LOZENGE BUCCAL
Status: DISCONTINUED | OUTPATIENT
Start: 2017-06-27 | End: 2017-06-29 | Stop reason: HOSPADM

## 2017-06-27 RX ORDER — DEXTROSE MONOHYDRATE 25 G/50ML
25 INJECTION, SOLUTION INTRAVENOUS
Status: DISCONTINUED | OUTPATIENT
Start: 2017-06-27 | End: 2017-06-29 | Stop reason: HOSPADM

## 2017-06-27 RX ADMIN — METHYLPREDNISOLONE SODIUM SUCCINATE 40 MG: 125 INJECTION, POWDER, FOR SOLUTION INTRAMUSCULAR; INTRAVENOUS at 16:20

## 2017-06-27 RX ADMIN — ENOXAPARIN SODIUM 40 MG: 40 INJECTION SUBCUTANEOUS at 16:02

## 2017-06-27 RX ADMIN — DOCUSATE SODIUM 100 MG: 100 CAPSULE ORAL at 08:31

## 2017-06-27 RX ADMIN — TRAMADOL HYDROCHLORIDE 50 MG: 50 TABLET, COATED ORAL at 06:48

## 2017-06-27 RX ADMIN — HYDROCODONE BITARTRATE AND ACETAMINOPHEN 1 TABLET: 7.5; 325 TABLET ORAL at 16:00

## 2017-06-27 RX ADMIN — INSULIN DETEMIR 15 UNITS: 100 INJECTION, SOLUTION SUBCUTANEOUS at 22:37

## 2017-06-27 RX ADMIN — ROPINIROLE 1 MG: 1 TABLET, FILM COATED ORAL at 21:29

## 2017-06-27 RX ADMIN — ALLOPURINOL 300 MG: 300 TABLET ORAL at 08:31

## 2017-06-27 RX ADMIN — INSULIN LISPRO 6 UNITS: 100 INJECTION, SOLUTION INTRAVENOUS; SUBCUTANEOUS at 17:34

## 2017-06-27 RX ADMIN — INSULIN LISPRO 4 UNITS: 100 INJECTION, SOLUTION INTRAVENOUS; SUBCUTANEOUS at 21:50

## 2017-06-27 RX ADMIN — SODIUM CHLORIDE 75 ML/HR: 9 INJECTION, SOLUTION INTRAVENOUS at 15:59

## 2017-06-27 RX ADMIN — GABAPENTIN 100 MG: 100 CAPSULE ORAL at 21:29

## 2017-06-27 RX ADMIN — AMLODIPINE BESYLATE 10 MG: 10 TABLET ORAL at 08:31

## 2017-06-27 RX ADMIN — INSULIN LISPRO 4 UNITS: 100 INJECTION, SOLUTION INTRAVENOUS; SUBCUTANEOUS at 09:33

## 2017-06-27 RX ADMIN — PANTOPRAZOLE SODIUM 40 MG: 40 TABLET, DELAYED RELEASE ORAL at 08:31

## 2017-06-27 RX ADMIN — DOCUSATE SODIUM 100 MG: 100 CAPSULE ORAL at 17:34

## 2017-06-27 RX ADMIN — CARVEDILOL 12.5 MG: 6.25 TABLET, FILM COATED ORAL at 17:34

## 2017-06-27 RX ADMIN — GABAPENTIN 100 MG: 100 CAPSULE ORAL at 08:31

## 2017-06-27 RX ADMIN — CARVEDILOL 12.5 MG: 6.25 TABLET, FILM COATED ORAL at 08:31

## 2017-06-27 RX ADMIN — ATORVASTATIN CALCIUM 10 MG: 10 TABLET, FILM COATED ORAL at 21:29

## 2017-06-27 RX ADMIN — SODIUM CHLORIDE 75 ML/HR: 9 INJECTION, SOLUTION INTRAVENOUS at 02:55

## 2017-06-27 RX ADMIN — INSULIN LISPRO 6 UNITS: 100 INJECTION, SOLUTION INTRAVENOUS; SUBCUTANEOUS at 13:20

## 2017-06-27 RX ADMIN — PANTOPRAZOLE SODIUM 40 MG: 40 TABLET, DELAYED RELEASE ORAL at 21:29

## 2017-06-27 RX ADMIN — ASPIRIN 81 MG 81 MG: 81 TABLET ORAL at 08:31

## 2017-06-27 RX ADMIN — METHYLPREDNISOLONE SODIUM SUCCINATE 60 MG: 125 INJECTION, POWDER, FOR SOLUTION INTRAMUSCULAR; INTRAVENOUS at 08:31

## 2017-06-28 LAB
ANION GAP SERPL CALCULATED.3IONS-SCNC: 9 MMOL/L (ref 4–13)
BACTERIA UR QL AUTO: ABNORMAL /HPF
BILIRUB UR QL STRIP: NEGATIVE
BUN BLD-MCNC: 46 MG/DL (ref 5–21)
BUN/CREAT SERPL: 29.9 (ref 7–25)
CALCIUM SPEC-SCNC: 9.4 MG/DL (ref 8.4–10.4)
CHLORIDE SERPL-SCNC: 107 MMOL/L (ref 98–110)
CLARITY UR: CLEAR
CO2 SERPL-SCNC: 22 MMOL/L (ref 24–31)
COLOR UR: YELLOW
CREAT BLD-MCNC: 1.54 MG/DL (ref 0.5–1.4)
GFR SERPL CREATININE-BSD FRML MDRD: 43 ML/MIN/1.73
GLUCOSE BLD-MCNC: 198 MG/DL (ref 70–100)
GLUCOSE BLDC GLUCOMTR-MCNC: 205 MG/DL (ref 70–130)
GLUCOSE BLDC GLUCOMTR-MCNC: 220 MG/DL (ref 70–130)
GLUCOSE BLDC GLUCOMTR-MCNC: 266 MG/DL (ref 70–130)
GLUCOSE BLDC GLUCOMTR-MCNC: 328 MG/DL (ref 70–130)
GLUCOSE UR STRIP-MCNC: ABNORMAL MG/DL
HGB UR QL STRIP.AUTO: NEGATIVE
HYALINE CASTS UR QL AUTO: ABNORMAL /LPF
KETONES UR QL STRIP: NEGATIVE
LEUKOCYTE ESTERASE UR QL STRIP.AUTO: ABNORMAL
NITRITE UR QL STRIP: NEGATIVE
PH UR STRIP.AUTO: <=5 [PH] (ref 5–8)
POTASSIUM BLD-SCNC: 4.7 MMOL/L (ref 3.5–5.3)
PROT UR QL STRIP: NEGATIVE
RBC # UR: ABNORMAL /HPF
REF LAB TEST METHOD: ABNORMAL
SODIUM BLD-SCNC: 138 MMOL/L (ref 135–145)
SP GR UR STRIP: 1.02 (ref 1–1.03)
SQUAMOUS #/AREA URNS HPF: ABNORMAL /HPF
UROBILINOGEN UR QL STRIP: ABNORMAL
WBC UR QL AUTO: ABNORMAL /HPF

## 2017-06-28 PROCEDURE — 81001 URINALYSIS AUTO W/SCOPE: CPT | Performed by: FAMILY MEDICINE

## 2017-06-28 PROCEDURE — G0378 HOSPITAL OBSERVATION PER HR: HCPCS

## 2017-06-28 PROCEDURE — 97110 THERAPEUTIC EXERCISES: CPT

## 2017-06-28 PROCEDURE — 25010000002 METHYLPREDNISOLONE PER 40 MG: Performed by: INTERNAL MEDICINE

## 2017-06-28 PROCEDURE — 80048 BASIC METABOLIC PNL TOTAL CA: CPT | Performed by: INTERNAL MEDICINE

## 2017-06-28 PROCEDURE — 97116 GAIT TRAINING THERAPY: CPT

## 2017-06-28 PROCEDURE — 25010000002 METHYLPREDNISOLONE PER 125 MG: Performed by: INTERNAL MEDICINE

## 2017-06-28 PROCEDURE — 82962 GLUCOSE BLOOD TEST: CPT

## 2017-06-28 PROCEDURE — 25010000002 ENOXAPARIN PER 10 MG: Performed by: INTERNAL MEDICINE

## 2017-06-28 RX ORDER — GABAPENTIN 100 MG/1
200 CAPSULE ORAL EVERY 12 HOURS SCHEDULED
Status: DISCONTINUED | OUTPATIENT
Start: 2017-06-28 | End: 2017-06-29 | Stop reason: HOSPADM

## 2017-06-28 RX ORDER — OXYCODONE AND ACETAMINOPHEN 7.5; 325 MG/1; MG/1
1 TABLET ORAL EVERY 4 HOURS PRN
Status: DISCONTINUED | OUTPATIENT
Start: 2017-06-28 | End: 2017-06-29 | Stop reason: HOSPADM

## 2017-06-28 RX ORDER — TAMSULOSIN HYDROCHLORIDE 0.4 MG/1
0.4 CAPSULE ORAL DAILY
Status: DISCONTINUED | OUTPATIENT
Start: 2017-06-28 | End: 2017-06-29 | Stop reason: HOSPADM

## 2017-06-28 RX ADMIN — ENOXAPARIN SODIUM 40 MG: 40 INJECTION SUBCUTANEOUS at 15:24

## 2017-06-28 RX ADMIN — ASPIRIN 81 MG 81 MG: 81 TABLET ORAL at 09:11

## 2017-06-28 RX ADMIN — OXYCODONE HYDROCHLORIDE AND ACETAMINOPHEN 1 TABLET: 7.5; 325 TABLET ORAL at 15:24

## 2017-06-28 RX ADMIN — SODIUM CHLORIDE 75 ML/HR: 9 INJECTION, SOLUTION INTRAVENOUS at 18:18

## 2017-06-28 RX ADMIN — PANTOPRAZOLE SODIUM 40 MG: 40 TABLET, DELAYED RELEASE ORAL at 09:11

## 2017-06-28 RX ADMIN — INSULIN LISPRO 4 UNITS: 100 INJECTION, SOLUTION INTRAVENOUS; SUBCUTANEOUS at 21:15

## 2017-06-28 RX ADMIN — INSULIN LISPRO 6 UNITS: 100 INJECTION, SOLUTION INTRAVENOUS; SUBCUTANEOUS at 18:01

## 2017-06-28 RX ADMIN — INSULIN DETEMIR 15 UNITS: 100 INJECTION, SOLUTION SUBCUTANEOUS at 21:17

## 2017-06-28 RX ADMIN — PANTOPRAZOLE SODIUM 40 MG: 40 TABLET, DELAYED RELEASE ORAL at 21:11

## 2017-06-28 RX ADMIN — DOCUSATE SODIUM -SENNOSIDES 2 TABLET: 50; 8.6 TABLET, COATED ORAL at 22:16

## 2017-06-28 RX ADMIN — METHYLPREDNISOLONE SODIUM SUCCINATE 40 MG: 125 INJECTION, POWDER, FOR SOLUTION INTRAMUSCULAR; INTRAVENOUS at 18:01

## 2017-06-28 RX ADMIN — SODIUM CHLORIDE 75 ML/HR: 9 INJECTION, SOLUTION INTRAVENOUS at 04:45

## 2017-06-28 RX ADMIN — INSULIN LISPRO 7 UNITS: 100 INJECTION, SOLUTION INTRAVENOUS; SUBCUTANEOUS at 13:38

## 2017-06-28 RX ADMIN — CARVEDILOL 12.5 MG: 6.25 TABLET, FILM COATED ORAL at 09:11

## 2017-06-28 RX ADMIN — ROPINIROLE 1 MG: 1 TABLET, FILM COATED ORAL at 21:11

## 2017-06-28 RX ADMIN — ATORVASTATIN CALCIUM 10 MG: 10 TABLET, FILM COATED ORAL at 21:11

## 2017-06-28 RX ADMIN — INSULIN LISPRO 4 UNITS: 100 INJECTION, SOLUTION INTRAVENOUS; SUBCUTANEOUS at 09:11

## 2017-06-28 RX ADMIN — ALLOPURINOL 300 MG: 300 TABLET ORAL at 09:11

## 2017-06-28 RX ADMIN — AMLODIPINE BESYLATE 10 MG: 10 TABLET ORAL at 09:11

## 2017-06-28 RX ADMIN — METHYLPREDNISOLONE SODIUM SUCCINATE 40 MG: 125 INJECTION, POWDER, FOR SOLUTION INTRAMUSCULAR; INTRAVENOUS at 09:11

## 2017-06-28 RX ADMIN — METHYLPREDNISOLONE SODIUM SUCCINATE 40 MG: 125 INJECTION, POWDER, FOR SOLUTION INTRAMUSCULAR; INTRAVENOUS at 02:27

## 2017-06-28 RX ADMIN — DOCUSATE SODIUM 100 MG: 100 CAPSULE ORAL at 18:01

## 2017-06-28 RX ADMIN — DOCUSATE SODIUM 100 MG: 100 CAPSULE ORAL at 09:11

## 2017-06-28 RX ADMIN — TAMSULOSIN HYDROCHLORIDE 0.4 MG: 0.4 CAPSULE ORAL at 18:01

## 2017-06-29 VITALS
WEIGHT: 209.2 LBS | SYSTOLIC BLOOD PRESSURE: 168 MMHG | DIASTOLIC BLOOD PRESSURE: 52 MMHG | OXYGEN SATURATION: 98 % | RESPIRATION RATE: 19 BRPM | HEIGHT: 66 IN | BODY MASS INDEX: 33.62 KG/M2 | HEART RATE: 55 BPM | TEMPERATURE: 98 F

## 2017-06-29 LAB
ANION GAP SERPL CALCULATED.3IONS-SCNC: 9 MMOL/L (ref 4–13)
BUN BLD-MCNC: 43 MG/DL (ref 5–21)
BUN/CREAT SERPL: 29.7 (ref 7–25)
CALCIUM SPEC-SCNC: 9.4 MG/DL (ref 8.4–10.4)
CHLORIDE SERPL-SCNC: 109 MMOL/L (ref 98–110)
CO2 SERPL-SCNC: 21 MMOL/L (ref 24–31)
CREAT BLD-MCNC: 1.45 MG/DL (ref 0.5–1.4)
GFR SERPL CREATININE-BSD FRML MDRD: 47 ML/MIN/1.73
GLUCOSE BLD-MCNC: 227 MG/DL (ref 70–100)
GLUCOSE BLDC GLUCOMTR-MCNC: 184 MG/DL (ref 70–130)
GLUCOSE BLDC GLUCOMTR-MCNC: 252 MG/DL (ref 70–130)
POTASSIUM BLD-SCNC: 4.5 MMOL/L (ref 3.5–5.3)
SODIUM BLD-SCNC: 139 MMOL/L (ref 135–145)

## 2017-06-29 PROCEDURE — 97116 GAIT TRAINING THERAPY: CPT

## 2017-06-29 PROCEDURE — 25010000002 METHYLPREDNISOLONE PER 40 MG: Performed by: INTERNAL MEDICINE

## 2017-06-29 PROCEDURE — 80048 BASIC METABOLIC PNL TOTAL CA: CPT | Performed by: FAMILY MEDICINE

## 2017-06-29 PROCEDURE — 25010000002 ONDANSETRON PER 1 MG: Performed by: NURSE PRACTITIONER

## 2017-06-29 PROCEDURE — 82962 GLUCOSE BLOOD TEST: CPT

## 2017-06-29 RX ORDER — PREDNISONE 10 MG/1
10 TABLET ORAL DAILY
Qty: 30 TABLET | Refills: 0 | Status: SHIPPED | OUTPATIENT
Start: 2017-06-29 | End: 2019-03-20

## 2017-06-29 RX ORDER — AMLODIPINE BESYLATE 10 MG/1
10 TABLET ORAL
Qty: 30 TABLET | Refills: 1 | Status: ON HOLD | OUTPATIENT
Start: 2017-06-29 | End: 2020-01-01

## 2017-06-29 RX ORDER — ONDANSETRON 4 MG/1
4 TABLET, FILM COATED ORAL EVERY 8 HOURS PRN
Qty: 15 TABLET | Refills: 0 | Status: ON HOLD | OUTPATIENT
Start: 2017-06-29 | End: 2020-01-01

## 2017-06-29 RX ORDER — OXYCODONE AND ACETAMINOPHEN 7.5; 325 MG/1; MG/1
1 TABLET ORAL EVERY 4 HOURS PRN
Qty: 20 TABLET | Refills: 0 | Status: SHIPPED | OUTPATIENT
Start: 2017-06-29 | End: 2017-07-08

## 2017-06-29 RX ORDER — FAMOTIDINE 20 MG/1
20 TABLET, FILM COATED ORAL DAILY
Status: DISCONTINUED | OUTPATIENT
Start: 2017-06-29 | End: 2017-06-29 | Stop reason: HOSPADM

## 2017-06-29 RX ORDER — GABAPENTIN 100 MG/1
200 CAPSULE ORAL EVERY 12 HOURS SCHEDULED
Qty: 40 CAPSULE | Refills: 0 | Status: ON HOLD | OUTPATIENT
Start: 2017-06-29 | End: 2020-01-01

## 2017-06-29 RX ORDER — PREDNISONE 20 MG/1
20 TABLET ORAL 2 TIMES DAILY WITH MEALS
Status: DISCONTINUED | OUTPATIENT
Start: 2017-06-29 | End: 2017-06-29 | Stop reason: HOSPADM

## 2017-06-29 RX ORDER — TAMSULOSIN HYDROCHLORIDE 0.4 MG/1
0.4 CAPSULE ORAL DAILY
Qty: 30 CAPSULE | Refills: 0 | Status: SHIPPED | OUTPATIENT
Start: 2017-06-29 | End: 2017-06-29 | Stop reason: HOSPADM

## 2017-06-29 RX ADMIN — OXYCODONE HYDROCHLORIDE AND ACETAMINOPHEN 1 TABLET: 7.5; 325 TABLET ORAL at 12:24

## 2017-06-29 RX ADMIN — INSULIN LISPRO 2 UNITS: 100 INJECTION, SOLUTION INTRAVENOUS; SUBCUTANEOUS at 08:24

## 2017-06-29 RX ADMIN — METHYLPREDNISOLONE SODIUM SUCCINATE 40 MG: 125 INJECTION, POWDER, FOR SOLUTION INTRAMUSCULAR; INTRAVENOUS at 02:17

## 2017-06-29 RX ADMIN — CARVEDILOL 12.5 MG: 6.25 TABLET, FILM COATED ORAL at 11:45

## 2017-06-29 RX ADMIN — PANTOPRAZOLE SODIUM 40 MG: 40 TABLET, DELAYED RELEASE ORAL at 08:23

## 2017-06-29 RX ADMIN — FAMOTIDINE 20 MG: 20 TABLET, FILM COATED ORAL at 13:42

## 2017-06-29 RX ADMIN — ONDANSETRON 4 MG: 2 INJECTION INTRAMUSCULAR; INTRAVENOUS at 10:16

## 2017-06-29 RX ADMIN — GABAPENTIN 200 MG: 100 CAPSULE ORAL at 08:23

## 2017-06-29 RX ADMIN — TAMSULOSIN HYDROCHLORIDE 0.4 MG: 0.4 CAPSULE ORAL at 08:24

## 2017-06-29 RX ADMIN — METHYLPREDNISOLONE SODIUM SUCCINATE 40 MG: 125 INJECTION, POWDER, FOR SOLUTION INTRAMUSCULAR; INTRAVENOUS at 08:58

## 2017-06-29 RX ADMIN — DOCUSATE SODIUM 100 MG: 100 CAPSULE ORAL at 10:16

## 2017-06-29 RX ADMIN — ALLOPURINOL 300 MG: 300 TABLET ORAL at 10:15

## 2017-06-29 RX ADMIN — INSULIN LISPRO 6 UNITS: 100 INJECTION, SOLUTION INTRAVENOUS; SUBCUTANEOUS at 11:45

## 2017-06-29 RX ADMIN — SODIUM CHLORIDE 75 ML/HR: 9 INJECTION, SOLUTION INTRAVENOUS at 08:20

## 2017-06-29 RX ADMIN — AMLODIPINE BESYLATE 10 MG: 10 TABLET ORAL at 10:15

## 2017-06-29 RX ADMIN — DOCUSATE SODIUM -SENNOSIDES 2 TABLET: 50; 8.6 TABLET, COATED ORAL at 13:42

## 2017-06-29 RX ADMIN — ASPIRIN 81 MG 81 MG: 81 TABLET ORAL at 10:15

## 2017-06-29 RX ADMIN — OXYCODONE HYDROCHLORIDE AND ACETAMINOPHEN 1 TABLET: 7.5; 325 TABLET ORAL at 08:19

## 2017-07-01 ENCOUNTER — HOSPITAL ENCOUNTER (EMERGENCY)
Facility: HOSPITAL | Age: 82
Discharge: HOME | End: 2017-07-01
Attending: EMERGENCY MEDICINE | Admitting: EMERGENCY MEDICINE

## 2017-07-01 ENCOUNTER — APPOINTMENT (OUTPATIENT)
Dept: GENERAL RADIOLOGY | Facility: HOSPITAL | Age: 82
End: 2017-07-01

## 2017-07-01 VITALS
RESPIRATION RATE: 16 BRPM | DIASTOLIC BLOOD PRESSURE: 65 MMHG | SYSTOLIC BLOOD PRESSURE: 170 MMHG | WEIGHT: 215 LBS | OXYGEN SATURATION: 98 % | HEIGHT: 70 IN | TEMPERATURE: 98 F | HEART RATE: 65 BPM | BODY MASS INDEX: 30.78 KG/M2

## 2017-07-01 DIAGNOSIS — K59.00 CONSTIPATION, UNSPECIFIED CONSTIPATION TYPE: Primary | ICD-10-CM

## 2017-07-01 LAB
ALBUMIN SERPL-MCNC: 3.3 G/DL (ref 3.5–5)
ALBUMIN/GLOB SERPL: 1.2 G/DL (ref 1.1–2.5)
ALP SERPL-CCNC: 67 U/L (ref 24–120)
ALT SERPL W P-5'-P-CCNC: 50 U/L (ref 0–54)
AMYLASE SERPL-CCNC: 78 U/L (ref 30–110)
ANION GAP SERPL CALCULATED.3IONS-SCNC: 8 MMOL/L (ref 4–13)
AST SERPL-CCNC: 38 U/L (ref 7–45)
BASOPHILS # BLD AUTO: 0.01 10*3/MM3 (ref 0–0.2)
BASOPHILS NFR BLD AUTO: 0.1 % (ref 0–2)
BILIRUB SERPL-MCNC: 0.5 MG/DL (ref 0.1–1)
BUN BLD-MCNC: 49 MG/DL (ref 5–21)
BUN/CREAT SERPL: 29.9 (ref 7–25)
CALCIUM SPEC-SCNC: 9.4 MG/DL (ref 8.4–10.4)
CHLORIDE SERPL-SCNC: 110 MMOL/L (ref 98–110)
CO2 SERPL-SCNC: 24 MMOL/L (ref 24–31)
CREAT BLD-MCNC: 1.64 MG/DL (ref 0.5–1.4)
DEPRECATED RDW RBC AUTO: 49 FL (ref 40–54)
EOSINOPHIL # BLD AUTO: 0.06 10*3/MM3 (ref 0–0.7)
EOSINOPHIL NFR BLD AUTO: 0.7 % (ref 0–4)
ERYTHROCYTE [DISTWIDTH] IN BLOOD BY AUTOMATED COUNT: 13.5 % (ref 12–15)
GFR SERPL CREATININE-BSD FRML MDRD: 40 ML/MIN/1.73
GLOBULIN UR ELPH-MCNC: 2.7 GM/DL
GLUCOSE BLD-MCNC: 146 MG/DL (ref 70–100)
HCT VFR BLD AUTO: 32.8 % (ref 40–52)
HGB BLD-MCNC: 10.7 G/DL (ref 14–18)
HOLD SPECIMEN: NORMAL
HOLD SPECIMEN: NORMAL
IMM GRANULOCYTES # BLD: 0.05 10*3/MM3 (ref 0–0.03)
IMM GRANULOCYTES NFR BLD: 0.6 % (ref 0–5)
LIPASE SERPL-CCNC: 110 U/L (ref 23–203)
LYMPHOCYTES # BLD AUTO: 2.43 10*3/MM3 (ref 0.72–4.86)
LYMPHOCYTES NFR BLD AUTO: 30 % (ref 15–45)
MCH RBC QN AUTO: 32.5 PG (ref 28–32)
MCHC RBC AUTO-ENTMCNC: 32.6 G/DL (ref 33–36)
MCV RBC AUTO: 99.7 FL (ref 82–95)
MONOCYTES # BLD AUTO: 0.87 10*3/MM3 (ref 0.19–1.3)
MONOCYTES NFR BLD AUTO: 10.7 % (ref 4–12)
NEUTROPHILS # BLD AUTO: 4.68 10*3/MM3 (ref 1.87–8.4)
NEUTROPHILS NFR BLD AUTO: 57.9 % (ref 39–78)
PLATELET # BLD AUTO: 172 10*3/MM3 (ref 130–400)
PMV BLD AUTO: 11 FL (ref 6–12)
POTASSIUM BLD-SCNC: 4.4 MMOL/L (ref 3.5–5.3)
PROT SERPL-MCNC: 6 G/DL (ref 6.3–8.7)
RBC # BLD AUTO: 3.29 10*6/MM3 (ref 4.8–5.9)
SODIUM BLD-SCNC: 142 MMOL/L (ref 135–145)
WBC NRBC COR # BLD: 8.1 10*3/MM3 (ref 4.8–10.8)
WHOLE BLOOD HOLD SPECIMEN: NORMAL
WHOLE BLOOD HOLD SPECIMEN: NORMAL

## 2017-07-01 PROCEDURE — 80053 COMPREHEN METABOLIC PANEL: CPT | Performed by: EMERGENCY MEDICINE

## 2017-07-01 PROCEDURE — 74020 HC XR ABDOMEN FLAT & UPRIGHT: CPT

## 2017-07-01 PROCEDURE — 83690 ASSAY OF LIPASE: CPT | Performed by: EMERGENCY MEDICINE

## 2017-07-01 PROCEDURE — 99283 EMERGENCY DEPT VISIT LOW MDM: CPT

## 2017-07-01 PROCEDURE — 82150 ASSAY OF AMYLASE: CPT | Performed by: EMERGENCY MEDICINE

## 2017-07-01 PROCEDURE — 85025 COMPLETE CBC W/AUTO DIFF WBC: CPT | Performed by: EMERGENCY MEDICINE

## 2017-07-01 RX ORDER — SODIUM CHLORIDE 0.9 % (FLUSH) 0.9 %
10 SYRINGE (ML) INJECTION AS NEEDED
Status: DISCONTINUED | OUTPATIENT
Start: 2017-07-01 | End: 2017-07-01 | Stop reason: HOSPADM

## 2017-07-01 RX ORDER — SODIUM PHOSPHATE, DIBASIC AND SODIUM PHOSPHATE, MONOBASIC 7; 19 G/133ML; G/133ML
1 ENEMA RECTAL ONCE
Status: COMPLETED | OUTPATIENT
Start: 2017-07-01 | End: 2017-07-01

## 2017-07-01 RX ADMIN — SODIUM PHOSPHATE, DIBASIC AND SODIUM PHOSPHATE, MONOBASIC 1 ENEMA: 7; 19 ENEMA RECTAL at 03:15

## 2017-07-01 NOTE — ED PROVIDER NOTES
Subjective   HPI Comments: Patient complaining of constipation.  He is admitted here to the hospital.  Since that time he states that he has been told to have a bowel movement.  His attempted use over-the-counter medications.  He has had no relief from those medicines.  He has not had any nausea or vomiting associated with the constipation.  He does confirm sharp abdominal pain.      History provided by:  Patient      Review of Systems   Constitutional: Negative for activity change, appetite change, chills, diaphoresis, fatigue and fever.   HENT: Negative for congestion, ear pain, nosebleeds, postnasal drip and sinus pressure.    Eyes: Negative for photophobia and pain.   Respiratory: Negative for cough, chest tightness, shortness of breath and wheezing.    Cardiovascular: Negative for chest pain.   Gastrointestinal: Positive for abdominal pain and constipation. Negative for blood in stool, diarrhea, nausea and vomiting.   Endocrine: Negative for cold intolerance and heat intolerance.   Genitourinary: Negative for difficulty urinating, dysuria and flank pain.   Musculoskeletal: Negative for arthralgias, back pain, neck pain and neck stiffness.   Skin: Negative for color change and rash.   Neurological: Negative for dizziness, weakness and headaches.   Hematological: Negative for adenopathy. Does not bruise/bleed easily.   Psychiatric/Behavioral: Negative for confusion and sleep disturbance. The patient is not nervous/anxious.        Past Medical History:   Diagnosis Date   • A-fib 11/15/2016   • Anemia     gets shots every few months to build up blood. sees dr adam.   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   • Arthritis    • Bradycardia 11/15/2016   • CAD in native artery 11/15/2016   • Chest pain 11/15/2016   • CHF (congestive heart failure)    • Chronic kidney disease    • COPD (chronic obstructive pulmonary disease)    • DDD (degenerative disc disease), lumbar 6/27/2017   • Heart murmur    • HTN  (hypertension) 11/15/2016   • Hyperlipidemia    • Lumbar stenosis with neurogenic claudication 6/27/2017   • Myocardial infarction    • Sleep apnea    • Stroke    • Type 2 diabetes mellitus 11/15/2016   • Ulcer of abdomen wall        Allergies   Allergen Reactions   • Penicillins Swelling   • Adhesive Tape Rash       Past Surgical History:   Procedure Laterality Date   • APPENDECTOMY     • BACK SURGERY      x2   • CARDIAC CATHETERIZATION Left     1/2010   • CARPAL TUNNEL RELEASE Bilateral    • CHOLECYSTECTOMY     • CORONARY ARTERY BYPASS GRAFT      2/2006 w/PTCA & KIMMY    • CORONARY STENT PLACEMENT     • ENDOSCOPY N/A 12/14/2016    Procedure: ESOPHAGOGASTRODUODENOSCOPY WITH ANESTHESIA;  Surgeon: Isabel Dexter MD;  Location: [facility] ENDOSCOPY;  Service:    • ENDOSCOPY N/A 12/16/2016    Procedure: ESOPHAGOGASTRODUODENOSCOPY WITH ANESTHESIA;  Surgeon: Joshua Rich DO;  Location: [facility] ENDOSCOPY;  Service:    • ENDOSCOPY N/A 4/10/2017    Procedure: ESOPHAGOGASTRODUODENOSCOPY WITH ANESTHESIA;  Surgeon: Joshua Rich DO;  Location: [facility] ENDOSCOPY;  Service:    • EYE SURGERY Left     x 2   • JOINT REPLACEMENT     • PERIPHERAL ARTERIAL STENT GRAFT     • REPLACEMENT TOTAL KNEE Left     2002   • SHOULDER ROTATOR CUFF REPAIR Bilateral        Family History   Problem Relation Age of Onset   • Coronary artery disease Father    • Heart disease Father    • Coronary artery disease Brother    • Heart attack Brother    • Cancer Mother    • No Known Problems Sister    • Heart disease Son    • Cancer Sister    • Cancer Sister        Social History     Social History   • Marital status:      Spouse name: N/A   • Number of children: N/A   • Years of education: N/A     Social History Main Topics   • Smoking status: Never Smoker   • Smokeless tobacco: Never Used   • Alcohol use No   • Drug use: No   • Sexual activity: No     Other Topics Concern   • None     Social History Narrative           Objective   Physical Exam    Constitutional: He is oriented to person, place, and time. He appears well-developed and well-nourished. No distress.   HENT:   Head: Normocephalic and atraumatic.   Mouth/Throat: Oropharynx is clear and moist. No oropharyngeal exudate.   Eyes: Conjunctivae and EOM are normal. Pupils are equal, round, and reactive to light.   Neck: Normal range of motion. Neck supple. No JVD present.   Cardiovascular: Normal rate, regular rhythm and normal heart sounds.  Exam reveals no friction rub.    No murmur heard.  Pulmonary/Chest: Effort normal and breath sounds normal. He has no wheezes. He has no rales.   Abdominal: Soft. Bowel sounds are normal. He exhibits no distension. There is no tenderness. There is no rebound and no guarding.   Musculoskeletal: Normal range of motion. He exhibits no edema or tenderness.   Neurological: He is alert and oriented to person, place, and time. No cranial nerve deficit.   Skin: Skin is warm and dry. No rash noted.   Psychiatric: He has a normal mood and affect. His behavior is normal. Judgment and thought content normal.   Nursing note and vitals reviewed.      Procedures         ED Course  ED Course                  MDM  Number of Diagnoses or Management Options  Constipation, unspecified constipation type: new and requires workup  Diagnosis management comments: It was given a fleets enema which has helped with the constipation.  I will give him a prescription for GoLYTELY as well.  He was told to follow up with his PCP.  Discussed the lab work with him.  His renal function is essentially baseline when compared to his prior visit.  Patient instructed to return to the ED if he has any further issues or new complaints.       Amount and/or Complexity of Data Reviewed  Clinical lab tests: ordered and reviewed  Independent visualization of images, tracings, or specimens: yes    Risk of Complications, Morbidity, and/or Mortality  Presenting problems: moderate  Diagnostic procedures:  moderate  Management options: moderate    Patient Progress  Patient progress: stable      Final diagnoses:   Constipation, unspecified constipation type            Iain Herrera MD  07/01/17 0354

## 2017-07-01 NOTE — ED NOTES
After sitting on bedside commode pt was able to have better results. Abd pain improved. Awaiting d/c instructions.      Guillaume Noble RN  07/01/17 7875

## 2017-07-06 ENCOUNTER — HOSPITAL ENCOUNTER (OUTPATIENT)
Dept: PREADMISSION TESTING | Age: 82
Discharge: HOME | End: 2017-07-06
Payer: COMMERCIAL

## 2017-07-06 ENCOUNTER — HOSPITAL ENCOUNTER (OUTPATIENT)
Dept: GENERAL RADIOLOGY | Age: 82
Discharge: HOME | End: 2017-07-06
Payer: COMMERCIAL

## 2017-07-06 VITALS — HEIGHT: 70 IN | BODY MASS INDEX: 28.92 KG/M2 | WEIGHT: 202 LBS

## 2017-07-06 LAB
ALBUMIN SERPL-MCNC: 3.6 G/DL (ref 3.5–5.2)
ALP BLD-CCNC: 63 U/L (ref 40–130)
ALT SERPL-CCNC: 28 U/L (ref 5–41)
ANION GAP SERPL CALCULATED.3IONS-SCNC: 12 MMOL/L (ref 7–19)
APTT: 25.7 SEC (ref 26–36.2)
AST SERPL-CCNC: 21 U/L (ref 5–40)
BACTERIA: NEGATIVE /HPF
BASOPHILS ABSOLUTE: 0 K/UL (ref 0–0.2)
BASOPHILS RELATIVE PERCENT: 0 % (ref 0–1)
BILIRUB SERPL-MCNC: 0.4 MG/DL (ref 0.2–1.2)
BILIRUBIN URINE: NEGATIVE
BLOOD, URINE: NEGATIVE
BUN BLDV-MCNC: 42 MG/DL (ref 8–23)
CALCIUM SERPL-MCNC: 9.4 MG/DL (ref 8.8–10.2)
CHLORIDE BLD-SCNC: 100 MMOL/L (ref 98–111)
CLARITY: CLEAR
CO2: 24 MMOL/L (ref 22–29)
COLOR: YELLOW
CREAT SERPL-MCNC: 1.5 MG/DL (ref 0.5–1.2)
EOSINOPHILS ABSOLUTE: 0.1 K/UL (ref 0–0.6)
EOSINOPHILS RELATIVE PERCENT: 1.5 % (ref 0–5)
EPITHELIAL CELLS, UA: 1 /HPF (ref 0–5)
GFR NON-AFRICAN AMERICAN: 45
GLUCOSE BLD-MCNC: 173 MG/DL (ref 74–109)
GLUCOSE URINE: NEGATIVE MG/DL
HCT VFR BLD CALC: 34 % (ref 42–52)
HEMOGLOBIN: 11.3 G/DL (ref 14–18)
HYALINE CASTS: 6 /HPF (ref 0–8)
INR BLD: 1.06 (ref 0.88–1.18)
KETONES, URINE: NEGATIVE MG/DL
LEUKOCYTE ESTERASE, URINE: ABNORMAL
LYMPHOCYTES ABSOLUTE: 2.6 K/UL (ref 1.1–4.5)
LYMPHOCYTES RELATIVE PERCENT: 31.1 % (ref 20–40)
MCH RBC QN AUTO: 33.7 PG (ref 27–31)
MCHC RBC AUTO-ENTMCNC: 33.2 G/DL (ref 33–37)
MCV RBC AUTO: 101.5 FL (ref 80–94)
MONOCYTES ABSOLUTE: 0.8 K/UL (ref 0–0.9)
MONOCYTES RELATIVE PERCENT: 9.9 % (ref 0–10)
NEUTROPHILS ABSOLUTE: 4.8 K/UL (ref 1.5–7.5)
NEUTROPHILS RELATIVE PERCENT: 57.1 % (ref 50–65)
NITRITE, URINE: NEGATIVE
PDW BLD-RTO: 12.9 % (ref 11.5–14.5)
PH UA: 5
PLATELET # BLD: 158 K/UL (ref 130–400)
PMV BLD AUTO: 11 FL (ref 9.4–12.4)
POTASSIUM SERPL-SCNC: 4 MMOL/L (ref 3.5–5)
PROTEIN UA: NEGATIVE MG/DL
PROTHROMBIN TIME: 13.7 SEC (ref 12–14.6)
RBC # BLD: 3.35 M/UL (ref 4.7–6.1)
RBC UA: 0 /HPF (ref 0–4)
SODIUM BLD-SCNC: 136 MMOL/L (ref 136–145)
SPECIFIC GRAVITY UA: 1.01
TOTAL PROTEIN: 6.3 G/DL (ref 6.6–8.7)
UROBILINOGEN, URINE: 0.2 E.U./DL
WBC # BLD: 8.5 K/UL (ref 4.8–10.8)
WBC UA: 5 /HPF (ref 0–5)

## 2017-07-06 PROCEDURE — 80053 COMPREHEN METABOLIC PANEL: CPT

## 2017-07-06 PROCEDURE — 85730 THROMBOPLASTIN TIME PARTIAL: CPT

## 2017-07-06 PROCEDURE — 71020 XR CHEST STANDARD TWO VW: CPT

## 2017-07-06 PROCEDURE — 85025 COMPLETE CBC W/AUTO DIFF WBC: CPT

## 2017-07-06 PROCEDURE — 87086 URINE CULTURE/COLONY COUNT: CPT

## 2017-07-06 PROCEDURE — 81001 URINALYSIS AUTO W/SCOPE: CPT

## 2017-07-06 PROCEDURE — 85610 PROTHROMBIN TIME: CPT

## 2017-07-06 PROCEDURE — 93005 ELECTROCARDIOGRAM TRACING: CPT

## 2017-07-08 LAB — URINE CULTURE, ROUTINE: NORMAL

## 2017-07-10 LAB
EKG P AXIS: 27 DEGREES
EKG P-R INTERVAL: 178 MS
EKG Q-T INTERVAL: 458 MS
EKG QRS DURATION: 98 MS
EKG QTC CALCULATION (BAZETT): 436 MS
EKG T AXIS: 50 DEGREES

## 2017-07-11 ENCOUNTER — ANESTHESIA EVENT (OUTPATIENT)
Dept: OPERATING ROOM | Age: 82
End: 2017-07-11
Payer: COMMERCIAL

## 2017-07-11 ENCOUNTER — ANESTHESIA (OUTPATIENT)
Dept: OPERATING ROOM | Age: 82
End: 2017-07-11
Payer: COMMERCIAL

## 2017-07-11 ENCOUNTER — HOSPITAL ENCOUNTER (OUTPATIENT)
Age: 82
Setting detail: OBSERVATION
Discharge: NURSING, REHAB OR LONG-TERM CARE | End: 2017-07-13
Payer: COMMERCIAL

## 2017-07-11 ENCOUNTER — APPOINTMENT (OUTPATIENT)
Dept: GENERAL RADIOLOGY | Age: 82
End: 2017-07-11
Payer: COMMERCIAL

## 2017-07-11 VITALS
RESPIRATION RATE: 15 BRPM | SYSTOLIC BLOOD PRESSURE: 139 MMHG | TEMPERATURE: 97 F | OXYGEN SATURATION: 100 % | DIASTOLIC BLOOD PRESSURE: 53 MMHG

## 2017-07-11 PROBLEM — M51.36 DDD (DEGENERATIVE DISC DISEASE), LUMBAR: Status: ACTIVE | Noted: 2017-07-11

## 2017-07-11 PROBLEM — M48.062 LUMBAR STENOSIS WITH NEUROGENIC CLAUDICATION: Status: ACTIVE | Noted: 2017-07-11

## 2017-07-11 LAB
ABO/RH: NORMAL
ANTIBODY SCREEN: NORMAL
GLUCOSE BLD-MCNC: 107 MG/DL (ref 70–99)
GLUCOSE BLD-MCNC: 172 MG/DL (ref 70–99)
PERFORMED ON: ABNORMAL
PERFORMED ON: ABNORMAL

## 2017-07-11 PROCEDURE — 2500000003 HC RX 250 WO HCPCS

## 2017-07-11 PROCEDURE — 86850 RBC ANTIBODY SCREEN: CPT

## 2017-07-11 PROCEDURE — 3600000015 HC SURGERY LEVEL 5 ADDTL 15MIN

## 2017-07-11 PROCEDURE — 6360000002 HC RX W HCPCS: Performed by: PHYSICIAN ASSISTANT

## 2017-07-11 PROCEDURE — 94762 N-INVAS EAR/PLS OXIMTRY CONT: CPT

## 2017-07-11 PROCEDURE — 2580000003 HC RX 258: Performed by: ANESTHESIOLOGY

## 2017-07-11 PROCEDURE — 6370000000 HC RX 637 (ALT 250 FOR IP): Performed by: FAMILY MEDICINE

## 2017-07-11 PROCEDURE — 96375 TX/PRO/DX INJ NEW DRUG ADDON: CPT

## 2017-07-11 PROCEDURE — 86900 BLOOD TYPING SEROLOGIC ABO: CPT

## 2017-07-11 PROCEDURE — 3700000001 HC ADD 15 MINUTES (ANESTHESIA)

## 2017-07-11 PROCEDURE — 1210000000 HC MED SURG R&B

## 2017-07-11 PROCEDURE — 82948 REAGENT STRIP/BLOOD GLUCOSE: CPT

## 2017-07-11 PROCEDURE — 6360000002 HC RX W HCPCS

## 2017-07-11 PROCEDURE — G0378 HOSPITAL OBSERVATION PER HR: HCPCS

## 2017-07-11 PROCEDURE — 7100000001 HC PACU RECOVERY - ADDTL 15 MIN

## 2017-07-11 PROCEDURE — 96365 THER/PROPH/DIAG IV INF INIT: CPT

## 2017-07-11 PROCEDURE — 3209999900 FLUORO FOR SURGICAL PROCEDURES

## 2017-07-11 PROCEDURE — 3600000005 HC SURGERY LEVEL 5 BASE

## 2017-07-11 PROCEDURE — 2580000003 HC RX 258

## 2017-07-11 PROCEDURE — 94664 DEMO&/EVAL PT USE INHALER: CPT

## 2017-07-11 PROCEDURE — 2720000001 HC MISC SURG SUPPLY STERILE $51-500

## 2017-07-11 PROCEDURE — C1729 CATH, DRAINAGE: HCPCS

## 2017-07-11 PROCEDURE — C1713 ANCHOR/SCREW BN/BN,TIS/BN: HCPCS

## 2017-07-11 PROCEDURE — 36415 COLL VENOUS BLD VENIPUNCTURE: CPT

## 2017-07-11 PROCEDURE — 2700000000 HC OXYGEN THERAPY PER DAY

## 2017-07-11 PROCEDURE — 96376 TX/PRO/DX INJ SAME DRUG ADON: CPT

## 2017-07-11 PROCEDURE — 7100000000 HC PACU RECOVERY - FIRST 15 MIN

## 2017-07-11 PROCEDURE — 2500000003 HC RX 250 WO HCPCS: Performed by: NURSE ANESTHETIST, CERTIFIED REGISTERED

## 2017-07-11 PROCEDURE — 3700000000 HC ANESTHESIA ATTENDED CARE

## 2017-07-11 PROCEDURE — 2500000003 HC RX 250 WO HCPCS: Performed by: PHYSICIAN ASSISTANT

## 2017-07-11 PROCEDURE — 2580000003 HC RX 258: Performed by: PHYSICIAN ASSISTANT

## 2017-07-11 PROCEDURE — 6360000002 HC RX W HCPCS: Performed by: NURSE ANESTHETIST, CERTIFIED REGISTERED

## 2017-07-11 PROCEDURE — 6370000000 HC RX 637 (ALT 250 FOR IP): Performed by: PHYSICIAN ASSISTANT

## 2017-07-11 PROCEDURE — 86901 BLOOD TYPING SEROLOGIC RH(D): CPT

## 2017-07-11 DEVICE — DEMINERALIZED BONE MATRIX (DBM) IN A LIPID CARRIER
Type: IMPLANTABLE DEVICE | Site: SPINE LUMBAR | Status: FUNCTIONAL
Brand: STAGRAFT DBM PUTTY

## 2017-07-11 DEVICE — AGENT HEMSTAT 8ML FLX TIP MTRX + DISP SURGIFLO: Type: IMPLANTABLE DEVICE | Status: FUNCTIONAL

## 2017-07-11 RX ORDER — DEXTROSE MONOHYDRATE 50 MG/ML
100 INJECTION, SOLUTION INTRAVENOUS PRN
Status: DISCONTINUED | OUTPATIENT
Start: 2017-07-11 | End: 2017-07-13 | Stop reason: HOSPADM

## 2017-07-11 RX ORDER — LABETALOL HYDROCHLORIDE 5 MG/ML
5 INJECTION, SOLUTION INTRAVENOUS EVERY 10 MIN PRN
Status: DISCONTINUED | OUTPATIENT
Start: 2017-07-11 | End: 2017-07-11 | Stop reason: HOSPADM

## 2017-07-11 RX ORDER — ENALAPRILAT 2.5 MG/2ML
1.25 INJECTION INTRAVENOUS
Status: DISCONTINUED | OUTPATIENT
Start: 2017-07-11 | End: 2017-07-11 | Stop reason: HOSPADM

## 2017-07-11 RX ORDER — SODIUM CHLORIDE 0.9 % (FLUSH) 0.9 %
10 SYRINGE (ML) INJECTION PRN
Status: DISCONTINUED | OUTPATIENT
Start: 2017-07-11 | End: 2017-07-11 | Stop reason: HOSPADM

## 2017-07-11 RX ORDER — SODIUM CHLORIDE, SODIUM LACTATE, POTASSIUM CHLORIDE, CALCIUM CHLORIDE 600; 310; 30; 20 MG/100ML; MG/100ML; MG/100ML; MG/100ML
INJECTION, SOLUTION INTRAVENOUS CONTINUOUS
Status: DISCONTINUED | OUTPATIENT
Start: 2017-07-11 | End: 2017-07-11

## 2017-07-11 RX ORDER — LIDOCAINE HYDROCHLORIDE 10 MG/ML
1 INJECTION, SOLUTION EPIDURAL; INFILTRATION; INTRACAUDAL; PERINEURAL
Status: DISCONTINUED | OUTPATIENT
Start: 2017-07-11 | End: 2017-07-11 | Stop reason: HOSPADM

## 2017-07-11 RX ORDER — ONDANSETRON 4 MG/1
4 TABLET, ORALLY DISINTEGRATING ORAL EVERY 8 HOURS PRN
COMMUNITY

## 2017-07-11 RX ORDER — LIDOCAINE HYDROCHLORIDE 10 MG/ML
1 INJECTION, SOLUTION EPIDURAL; INFILTRATION; INTRACAUDAL; PERINEURAL ONCE
Status: COMPLETED | OUTPATIENT
Start: 2017-07-11 | End: 2017-07-11

## 2017-07-11 RX ORDER — PANTOPRAZOLE SODIUM 40 MG/1
40 TABLET, DELAYED RELEASE ORAL DAILY
Status: DISCONTINUED | OUTPATIENT
Start: 2017-07-11 | End: 2017-07-13 | Stop reason: HOSPADM

## 2017-07-11 RX ORDER — METOCLOPRAMIDE HYDROCHLORIDE 5 MG/ML
10 INJECTION INTRAMUSCULAR; INTRAVENOUS
Status: DISCONTINUED | OUTPATIENT
Start: 2017-07-11 | End: 2017-07-11 | Stop reason: HOSPADM

## 2017-07-11 RX ORDER — ALLOPURINOL 300 MG/1
600 TABLET ORAL DAILY
COMMUNITY

## 2017-07-11 RX ORDER — GABAPENTIN 100 MG/1
100 CAPSULE ORAL DAILY
Status: ON HOLD | COMMUNITY
End: 2017-07-21 | Stop reason: HOSPADM

## 2017-07-11 RX ORDER — ONDANSETRON 2 MG/ML
INJECTION INTRAMUSCULAR; INTRAVENOUS PRN
Status: DISCONTINUED | OUTPATIENT
Start: 2017-07-11 | End: 2017-07-11 | Stop reason: SDUPTHER

## 2017-07-11 RX ORDER — FENTANYL CITRATE 50 UG/ML
50 INJECTION, SOLUTION INTRAMUSCULAR; INTRAVENOUS
Status: DISCONTINUED | OUTPATIENT
Start: 2017-07-11 | End: 2017-07-11 | Stop reason: HOSPADM

## 2017-07-11 RX ORDER — PREDNISONE 10 MG/1
10 TABLET ORAL DAILY
Status: ON HOLD | COMMUNITY
End: 2017-07-21 | Stop reason: HOSPADM

## 2017-07-11 RX ORDER — MORPHINE SULFATE 4 MG/ML
2 INJECTION, SOLUTION INTRAMUSCULAR; INTRAVENOUS EVERY 5 MIN PRN
Status: DISCONTINUED | OUTPATIENT
Start: 2017-07-11 | End: 2017-07-11 | Stop reason: HOSPADM

## 2017-07-11 RX ORDER — DIPHENOXYLATE HYDROCHLORIDE AND ATROPINE SULFATE 2.5; .025 MG/1; MG/1
1 TABLET ORAL 4 TIMES DAILY PRN
COMMUNITY

## 2017-07-11 RX ORDER — SUCCINYLCHOLINE CHLORIDE 20 MG/ML
INJECTION INTRAMUSCULAR; INTRAVENOUS PRN
Status: DISCONTINUED | OUTPATIENT
Start: 2017-07-11 | End: 2017-07-11 | Stop reason: SDUPTHER

## 2017-07-11 RX ORDER — FUROSEMIDE 40 MG/1
40 TABLET ORAL 2 TIMES DAILY
Status: DISCONTINUED | OUTPATIENT
Start: 2017-07-11 | End: 2017-07-13 | Stop reason: HOSPADM

## 2017-07-11 RX ORDER — MORPHINE SULFATE 4 MG/ML
4 INJECTION, SOLUTION INTRAMUSCULAR; INTRAVENOUS EVERY 5 MIN PRN
Status: DISCONTINUED | OUTPATIENT
Start: 2017-07-11 | End: 2017-07-11 | Stop reason: HOSPADM

## 2017-07-11 RX ORDER — SODIUM CHLORIDE 9 MG/ML
INJECTION, SOLUTION INTRAVENOUS CONTINUOUS
Status: DISCONTINUED | OUTPATIENT
Start: 2017-07-11 | End: 2017-07-13 | Stop reason: HOSPADM

## 2017-07-11 RX ORDER — DEXTROSE MONOHYDRATE 25 G/50ML
12.5 INJECTION, SOLUTION INTRAVENOUS PRN
Status: DISCONTINUED | OUTPATIENT
Start: 2017-07-11 | End: 2017-07-13 | Stop reason: HOSPADM

## 2017-07-11 RX ORDER — PRAVASTATIN SODIUM 20 MG
40 TABLET ORAL DAILY
Status: DISCONTINUED | OUTPATIENT
Start: 2017-07-11 | End: 2017-07-13 | Stop reason: HOSPADM

## 2017-07-11 RX ORDER — INSULIN GLARGINE 100 [IU]/ML
15 INJECTION, SOLUTION SUBCUTANEOUS NIGHTLY
Status: DISCONTINUED | OUTPATIENT
Start: 2017-07-11 | End: 2017-07-13 | Stop reason: HOSPADM

## 2017-07-11 RX ORDER — OXYCODONE HYDROCHLORIDE AND ACETAMINOPHEN 5; 325 MG/1; MG/1
2 TABLET ORAL EVERY 4 HOURS PRN
Status: DISCONTINUED | OUTPATIENT
Start: 2017-07-11 | End: 2017-07-13 | Stop reason: HOSPADM

## 2017-07-11 RX ORDER — SODIUM CHLORIDE 0.9 % (FLUSH) 0.9 %
10 SYRINGE (ML) INJECTION PRN
Status: DISCONTINUED | OUTPATIENT
Start: 2017-07-11 | End: 2017-07-13 | Stop reason: HOSPADM

## 2017-07-11 RX ORDER — LIDOCAINE HYDROCHLORIDE 10 MG/ML
INJECTION, SOLUTION INFILTRATION; PERINEURAL PRN
Status: DISCONTINUED | OUTPATIENT
Start: 2017-07-11 | End: 2017-07-11 | Stop reason: SDUPTHER

## 2017-07-11 RX ORDER — GLIMEPIRIDE 2 MG/1
4 TABLET ORAL 2 TIMES DAILY WITH MEALS
Status: DISCONTINUED | OUTPATIENT
Start: 2017-07-11 | End: 2017-07-13 | Stop reason: HOSPADM

## 2017-07-11 RX ORDER — SUFENTANIL CITRATE 50 UG/ML
INJECTION EPIDURAL; INTRAVENOUS PRN
Status: DISCONTINUED | OUTPATIENT
Start: 2017-07-11 | End: 2017-07-11 | Stop reason: SDUPTHER

## 2017-07-11 RX ORDER — DOCUSATE SODIUM 100 MG/1
100 CAPSULE, LIQUID FILLED ORAL DAILY
Status: DISCONTINUED | OUTPATIENT
Start: 2017-07-11 | End: 2017-07-13 | Stop reason: HOSPADM

## 2017-07-11 RX ORDER — LOSARTAN POTASSIUM 100 MG/1
100 TABLET ORAL DAILY
Status: DISCONTINUED | OUTPATIENT
Start: 2017-07-11 | End: 2017-07-13 | Stop reason: HOSPADM

## 2017-07-11 RX ORDER — PROBENECID 500 MG/1
500 TABLET, FILM COATED ORAL 2 TIMES DAILY
COMMUNITY
End: 2018-10-20

## 2017-07-11 RX ORDER — CARVEDILOL 6.25 MG/1
12.5 TABLET ORAL 2 TIMES DAILY WITH MEALS
Status: DISCONTINUED | OUTPATIENT
Start: 2017-07-11 | End: 2017-07-13 | Stop reason: HOSPADM

## 2017-07-11 RX ORDER — TAMSULOSIN HYDROCHLORIDE 0.4 MG/1
0.4 CAPSULE ORAL DAILY
Status: ON HOLD | COMMUNITY
End: 2017-07-21 | Stop reason: HOSPADM

## 2017-07-11 RX ORDER — HYDROCHLOROTHIAZIDE 25 MG/1
12.5 TABLET ORAL DAILY
Status: DISCONTINUED | OUTPATIENT
Start: 2017-07-11 | End: 2017-07-13 | Stop reason: HOSPADM

## 2017-07-11 RX ORDER — ONDANSETRON 2 MG/ML
4 INJECTION INTRAMUSCULAR; INTRAVENOUS EVERY 6 HOURS PRN
Status: DISCONTINUED | OUTPATIENT
Start: 2017-07-11 | End: 2017-07-13 | Stop reason: HOSPADM

## 2017-07-11 RX ORDER — SODIUM CHLORIDE 0.9 % (FLUSH) 0.9 %
10 SYRINGE (ML) INJECTION EVERY 12 HOURS SCHEDULED
Status: DISCONTINUED | OUTPATIENT
Start: 2017-07-11 | End: 2017-07-11 | Stop reason: HOSPADM

## 2017-07-11 RX ORDER — POLYETHYLENE GLYCOL 3350 17 G/17G
17 POWDER, FOR SOLUTION ORAL DAILY PRN
Status: DISCONTINUED | OUTPATIENT
Start: 2017-07-11 | End: 2017-07-13 | Stop reason: HOSPADM

## 2017-07-11 RX ORDER — PROPOFOL 10 MG/ML
INJECTION, EMULSION INTRAVENOUS PRN
Status: DISCONTINUED | OUTPATIENT
Start: 2017-07-11 | End: 2017-07-11 | Stop reason: SDUPTHER

## 2017-07-11 RX ORDER — PREDNISOLONE ACETATE 10 MG/ML
1 SUSPENSION/ DROPS OPHTHALMIC 2 TIMES DAILY
COMMUNITY

## 2017-07-11 RX ORDER — OXYCODONE HYDROCHLORIDE AND ACETAMINOPHEN 5; 325 MG/1; MG/1
1 TABLET ORAL EVERY 4 HOURS PRN
Status: DISCONTINUED | OUTPATIENT
Start: 2017-07-11 | End: 2017-07-13 | Stop reason: HOSPADM

## 2017-07-11 RX ORDER — OLMESARTAN MEDOXOMIL / AMLODIPINE BESYLATE / HYDROCHLOROTHIAZIDE 40; 10; 12.5 MG/1; MG/1; MG/1
1 TABLET, FILM COATED ORAL DAILY
Status: DISCONTINUED | OUTPATIENT
Start: 2017-07-11 | End: 2017-07-11

## 2017-07-11 RX ORDER — PROMETHAZINE HYDROCHLORIDE 25 MG/ML
6.25 INJECTION, SOLUTION INTRAMUSCULAR; INTRAVENOUS
Status: DISCONTINUED | OUTPATIENT
Start: 2017-07-11 | End: 2017-07-11 | Stop reason: HOSPADM

## 2017-07-11 RX ORDER — MIDAZOLAM HYDROCHLORIDE 1 MG/ML
2 INJECTION INTRAMUSCULAR; INTRAVENOUS
Status: DISCONTINUED | OUTPATIENT
Start: 2017-07-11 | End: 2017-07-11 | Stop reason: HOSPADM

## 2017-07-11 RX ORDER — SODIUM CHLORIDE 0.9 % (FLUSH) 0.9 %
10 SYRINGE (ML) INJECTION ONCE
Status: DISCONTINUED | OUTPATIENT
Start: 2017-07-11 | End: 2017-07-11

## 2017-07-11 RX ORDER — PROMETHAZINE HYDROCHLORIDE 12.5 MG/1
12.5 TABLET ORAL PRN
Status: DISCONTINUED | OUTPATIENT
Start: 2017-07-11 | End: 2017-07-13 | Stop reason: HOSPADM

## 2017-07-11 RX ORDER — AMLODIPINE BESYLATE 10 MG/1
10 TABLET ORAL DAILY
Status: ON HOLD | COMMUNITY
End: 2017-07-21 | Stop reason: HOSPADM

## 2017-07-11 RX ORDER — AMLODIPINE BESYLATE 10 MG/1
10 TABLET ORAL DAILY
Status: DISCONTINUED | OUTPATIENT
Start: 2017-07-11 | End: 2017-07-13 | Stop reason: HOSPADM

## 2017-07-11 RX ORDER — SENNA AND DOCUSATE SODIUM 50; 8.6 MG/1; MG/1
2 TABLET, FILM COATED ORAL DAILY
COMMUNITY

## 2017-07-11 RX ORDER — FENTANYL CITRATE 50 UG/ML
25 INJECTION, SOLUTION INTRAMUSCULAR; INTRAVENOUS
Status: DISCONTINUED | OUTPATIENT
Start: 2017-07-11 | End: 2017-07-11 | Stop reason: HOSPADM

## 2017-07-11 RX ORDER — DIPHENHYDRAMINE HYDROCHLORIDE 50 MG/ML
12.5 INJECTION INTRAMUSCULAR; INTRAVENOUS
Status: DISCONTINUED | OUTPATIENT
Start: 2017-07-11 | End: 2017-07-11 | Stop reason: HOSPADM

## 2017-07-11 RX ORDER — ACETAMINOPHEN 325 MG/1
650 TABLET ORAL EVERY 4 HOURS PRN
Status: DISCONTINUED | OUTPATIENT
Start: 2017-07-11 | End: 2017-07-13 | Stop reason: HOSPADM

## 2017-07-11 RX ORDER — DOXAZOSIN 2 MG/1
1 TABLET ORAL NIGHTLY
Status: DISCONTINUED | OUTPATIENT
Start: 2017-07-11 | End: 2017-07-13 | Stop reason: HOSPADM

## 2017-07-11 RX ORDER — POTASSIUM CHLORIDE 20 MEQ/1
20 TABLET, EXTENDED RELEASE ORAL 2 TIMES DAILY
Status: DISCONTINUED | OUTPATIENT
Start: 2017-07-11 | End: 2017-07-12

## 2017-07-11 RX ORDER — MEPERIDINE HYDROCHLORIDE 50 MG/ML
12.5 INJECTION INTRAMUSCULAR; INTRAVENOUS; SUBCUTANEOUS EVERY 5 MIN PRN
Status: DISCONTINUED | OUTPATIENT
Start: 2017-07-11 | End: 2017-07-11 | Stop reason: HOSPADM

## 2017-07-11 RX ORDER — CLINDAMYCIN PHOSPHATE 900 MG/50ML
900 INJECTION INTRAVENOUS EVERY 8 HOURS
Status: COMPLETED | OUTPATIENT
Start: 2017-07-11 | End: 2017-07-12

## 2017-07-11 RX ORDER — HYDRALAZINE HYDROCHLORIDE 20 MG/ML
5 INJECTION INTRAMUSCULAR; INTRAVENOUS EVERY 10 MIN PRN
Status: DISCONTINUED | OUTPATIENT
Start: 2017-07-11 | End: 2017-07-11 | Stop reason: HOSPADM

## 2017-07-11 RX ORDER — SODIUM CHLORIDE 0.9 % (FLUSH) 0.9 %
10 SYRINGE (ML) INJECTION EVERY 12 HOURS SCHEDULED
Status: DISCONTINUED | OUTPATIENT
Start: 2017-07-11 | End: 2017-07-13 | Stop reason: HOSPADM

## 2017-07-11 RX ORDER — EPHEDRINE SULFATE 50 MG/ML
INJECTION, SOLUTION INTRAVENOUS PRN
Status: DISCONTINUED | OUTPATIENT
Start: 2017-07-11 | End: 2017-07-11 | Stop reason: SDUPTHER

## 2017-07-11 RX ORDER — CLINDAMYCIN PHOSPHATE 900 MG/50ML
900 INJECTION INTRAVENOUS EVERY 8 HOURS
Status: DISCONTINUED | OUTPATIENT
Start: 2017-07-11 | End: 2017-07-11

## 2017-07-11 RX ORDER — NICOTINE POLACRILEX 4 MG
15 LOZENGE BUCCAL PRN
Status: DISCONTINUED | OUTPATIENT
Start: 2017-07-11 | End: 2017-07-13 | Stop reason: HOSPADM

## 2017-07-11 RX ORDER — MECLIZINE HYDROCHLORIDE 25 MG/1
25 TABLET ORAL 4 TIMES DAILY PRN
COMMUNITY

## 2017-07-11 RX ADMIN — ONDANSETRON HYDROCHLORIDE 4 MG: 2 INJECTION, SOLUTION INTRAVENOUS at 11:44

## 2017-07-11 RX ADMIN — SODIUM CHLORIDE, SODIUM LACTATE, POTASSIUM CHLORIDE, AND CALCIUM CHLORIDE: 600; 310; 30; 20 INJECTION, SOLUTION INTRAVENOUS at 12:07

## 2017-07-11 RX ADMIN — EPHEDRINE SULFATE 10 MG: 50 INJECTION, SOLUTION INTRAMUSCULAR; INTRAVENOUS; SUBCUTANEOUS at 12:00

## 2017-07-11 RX ADMIN — GLIMEPIRIDE 4 MG: 2 TABLET ORAL at 18:05

## 2017-07-11 RX ADMIN — SODIUM CHLORIDE, SODIUM LACTATE, POTASSIUM CHLORIDE, AND CALCIUM CHLORIDE: 600; 310; 30; 20 INJECTION, SOLUTION INTRAVENOUS at 08:29

## 2017-07-11 RX ADMIN — LIDOCAINE HYDROCHLORIDE 1 ML: 10 INJECTION, SOLUTION EPIDURAL; INFILTRATION; INTRACAUDAL; PERINEURAL at 08:29

## 2017-07-11 RX ADMIN — HYDROMORPHONE HYDROCHLORIDE 0.5 MG: 1 INJECTION, SOLUTION INTRAMUSCULAR; INTRAVENOUS; SUBCUTANEOUS at 17:58

## 2017-07-11 RX ADMIN — Medication 10 ML: at 21:07

## 2017-07-11 RX ADMIN — PROPOFOL 70 MG: 10 INJECTION, EMULSION INTRAVENOUS at 11:28

## 2017-07-11 RX ADMIN — SODIUM CHLORIDE, SODIUM LACTATE, POTASSIUM CHLORIDE, AND CALCIUM CHLORIDE: 600; 310; 30; 20 INJECTION, SOLUTION INTRAVENOUS at 11:25

## 2017-07-11 RX ADMIN — PRAVASTATIN SODIUM 40 MG: 20 TABLET ORAL at 18:06

## 2017-07-11 RX ADMIN — PANTOPRAZOLE SODIUM 40 MG: 40 TABLET, DELAYED RELEASE ORAL at 18:05

## 2017-07-11 RX ADMIN — POTASSIUM CHLORIDE 20 MEQ: 20 TABLET, EXTENDED RELEASE ORAL at 20:24

## 2017-07-11 RX ADMIN — LIDOCAINE HYDROCHLORIDE 50 MG: 10 INJECTION, SOLUTION INFILTRATION; PERINEURAL at 11:28

## 2017-07-11 RX ADMIN — SUFENTANIL CITRATE 30 MCG: 50 INJECTION EPIDURAL; INTRAVENOUS at 11:28

## 2017-07-11 RX ADMIN — SUCCINYLCHOLINE CHLORIDE 100 MG: 20 INJECTION, SOLUTION INTRAMUSCULAR; INTRAVENOUS; PARENTERAL at 11:28

## 2017-07-11 RX ADMIN — EPHEDRINE SULFATE 10 MG: 50 INJECTION, SOLUTION INTRAMUSCULAR; INTRAVENOUS; SUBCUTANEOUS at 11:45

## 2017-07-11 RX ADMIN — INSULIN GLARGINE 15 UNITS: 100 INJECTION, SOLUTION SUBCUTANEOUS at 21:11

## 2017-07-11 RX ADMIN — CLINDAMYCIN PHOSPHATE 900 MG: 900 INJECTION INTRAVENOUS at 11:34

## 2017-07-11 RX ADMIN — DOXAZOSIN 1 MG: 2 TABLET ORAL at 20:25

## 2017-07-11 RX ADMIN — HYDROMORPHONE HYDROCHLORIDE 0.5 MG: 1 INJECTION, SOLUTION INTRAMUSCULAR; INTRAVENOUS; SUBCUTANEOUS at 14:25

## 2017-07-11 RX ADMIN — OXYCODONE HYDROCHLORIDE AND ACETAMINOPHEN 2 TABLET: 5; 325 TABLET ORAL at 20:24

## 2017-07-11 RX ADMIN — CLINDAMYCIN PHOSPHATE 900 MG: 18 INJECTION, SOLUTION INTRAVENOUS at 21:05

## 2017-07-11 ASSESSMENT — PAIN SCALES - GENERAL
PAINLEVEL_OUTOF10: 7
PAINLEVEL_OUTOF10: 0
PAINLEVEL_OUTOF10: 3
PAINLEVEL_OUTOF10: 0
PAINLEVEL_OUTOF10: 8
PAINLEVEL_OUTOF10: 0
PAINLEVEL_OUTOF10: 0
PAINLEVEL_OUTOF10: 7
PAINLEVEL_OUTOF10: 0
PAINLEVEL_OUTOF10: 3

## 2017-07-12 LAB
ANION GAP SERPL CALCULATED.3IONS-SCNC: 10 MMOL/L (ref 7–19)
BUN BLDV-MCNC: 23 MG/DL (ref 8–23)
CALCIUM SERPL-MCNC: 8.7 MG/DL (ref 8.8–10.2)
CHLORIDE BLD-SCNC: 103 MMOL/L (ref 98–111)
CO2: 24 MMOL/L (ref 22–29)
CREAT SERPL-MCNC: 1.5 MG/DL (ref 0.5–1.2)
GFR NON-AFRICAN AMERICAN: 45
GLUCOSE BLD-MCNC: 110 MG/DL (ref 70–99)
GLUCOSE BLD-MCNC: 153 MG/DL (ref 70–99)
GLUCOSE BLD-MCNC: 166 MG/DL (ref 74–109)
GLUCOSE BLD-MCNC: 190 MG/DL (ref 70–99)
GLUCOSE BLD-MCNC: 230 MG/DL (ref 70–99)
HCT VFR BLD CALC: 28.7 % (ref 42–52)
HEMOGLOBIN: 9.7 G/DL (ref 14–18)
MCH RBC QN AUTO: 34.8 PG (ref 27–31)
MCHC RBC AUTO-ENTMCNC: 33.8 G/DL (ref 33–37)
MCV RBC AUTO: 102.9 FL (ref 80–94)
PDW BLD-RTO: 13 % (ref 11.5–14.5)
PERFORMED ON: ABNORMAL
PLATELET # BLD: 133 K/UL (ref 130–400)
PMV BLD AUTO: 10.8 FL (ref 9.4–12.4)
POTASSIUM SERPL-SCNC: 5.3 MMOL/L (ref 3.5–5)
RBC # BLD: 2.79 M/UL (ref 4.7–6.1)
SODIUM BLD-SCNC: 137 MMOL/L (ref 136–145)
WBC # BLD: 7.8 K/UL (ref 4.8–10.8)

## 2017-07-12 PROCEDURE — 2580000003 HC RX 258: Performed by: PHYSICIAN ASSISTANT

## 2017-07-12 PROCEDURE — 36415 COLL VENOUS BLD VENIPUNCTURE: CPT

## 2017-07-12 PROCEDURE — 6370000000 HC RX 637 (ALT 250 FOR IP): Performed by: PHYSICIAN ASSISTANT

## 2017-07-12 PROCEDURE — G0378 HOSPITAL OBSERVATION PER HR: HCPCS

## 2017-07-12 PROCEDURE — 94762 N-INVAS EAR/PLS OXIMTRY CONT: CPT

## 2017-07-12 PROCEDURE — 2500000003 HC RX 250 WO HCPCS: Performed by: PHYSICIAN ASSISTANT

## 2017-07-12 PROCEDURE — G8987 SELF CARE CURRENT STATUS: HCPCS

## 2017-07-12 PROCEDURE — 97166 OT EVAL MOD COMPLEX 45 MIN: CPT

## 2017-07-12 PROCEDURE — 80048 BASIC METABOLIC PNL TOTAL CA: CPT

## 2017-07-12 PROCEDURE — G8978 MOBILITY CURRENT STATUS: HCPCS

## 2017-07-12 PROCEDURE — 6370000000 HC RX 637 (ALT 250 FOR IP)

## 2017-07-12 PROCEDURE — 2700000000 HC OXYGEN THERAPY PER DAY

## 2017-07-12 PROCEDURE — G8979 MOBILITY GOAL STATUS: HCPCS

## 2017-07-12 PROCEDURE — 82948 REAGENT STRIP/BLOOD GLUCOSE: CPT

## 2017-07-12 PROCEDURE — 85027 COMPLETE CBC AUTOMATED: CPT

## 2017-07-12 PROCEDURE — G8988 SELF CARE GOAL STATUS: HCPCS

## 2017-07-12 PROCEDURE — 96366 THER/PROPH/DIAG IV INF ADDON: CPT

## 2017-07-12 PROCEDURE — 97161 PT EVAL LOW COMPLEX 20 MIN: CPT

## 2017-07-12 PROCEDURE — 6370000000 HC RX 637 (ALT 250 FOR IP): Performed by: FAMILY MEDICINE

## 2017-07-12 RX ORDER — POTASSIUM CHLORIDE 20 MEQ/1
20 TABLET, EXTENDED RELEASE ORAL DAILY
Status: DISCONTINUED | OUTPATIENT
Start: 2017-07-12 | End: 2017-07-13 | Stop reason: HOSPADM

## 2017-07-12 RX ADMIN — CLINDAMYCIN PHOSPHATE 900 MG: 18 INJECTION, SOLUTION INTRAVENOUS at 04:12

## 2017-07-12 RX ADMIN — OXYCODONE HYDROCHLORIDE AND ACETAMINOPHEN 2 TABLET: 5; 325 TABLET ORAL at 02:35

## 2017-07-12 RX ADMIN — DOCUSATE SODIUM 100 MG: 100 CAPSULE, LIQUID FILLED ORAL at 09:24

## 2017-07-12 RX ADMIN — GLIMEPIRIDE 4 MG: 2 TABLET ORAL at 09:23

## 2017-07-12 RX ADMIN — SODIUM CHLORIDE: 9 INJECTION, SOLUTION INTRAVENOUS at 21:52

## 2017-07-12 RX ADMIN — FUROSEMIDE 40 MG: 40 TABLET ORAL at 09:22

## 2017-07-12 RX ADMIN — POLYETHYLENE GLYCOL 3350 17 G: 17 POWDER, FOR SOLUTION ORAL at 17:48

## 2017-07-12 RX ADMIN — GLIMEPIRIDE 4 MG: 2 TABLET ORAL at 17:15

## 2017-07-12 RX ADMIN — CARVEDILOL 12.5 MG: 6.25 TABLET, FILM COATED ORAL at 17:15

## 2017-07-12 RX ADMIN — PRAVASTATIN SODIUM 40 MG: 20 TABLET ORAL at 09:23

## 2017-07-12 RX ADMIN — POTASSIUM CHLORIDE 20 MEQ: 20 TABLET, EXTENDED RELEASE ORAL at 09:23

## 2017-07-12 RX ADMIN — CARVEDILOL 12.5 MG: 6.25 TABLET, FILM COATED ORAL at 09:22

## 2017-07-12 RX ADMIN — HYDROCHLOROTHIAZIDE 12.5 MG: 25 TABLET ORAL at 09:23

## 2017-07-12 RX ADMIN — Medication 10 ML: at 09:27

## 2017-07-12 RX ADMIN — LOSARTAN POTASSIUM 100 MG: 100 TABLET ORAL at 09:22

## 2017-07-12 RX ADMIN — FUROSEMIDE 40 MG: 40 TABLET ORAL at 17:15

## 2017-07-12 RX ADMIN — MUPIROCIN: 20 OINTMENT TOPICAL at 09:26

## 2017-07-12 RX ADMIN — AMLODIPINE BESYLATE 10 MG: 10 TABLET ORAL at 09:22

## 2017-07-12 RX ADMIN — PANTOPRAZOLE SODIUM 40 MG: 40 TABLET, DELAYED RELEASE ORAL at 09:23

## 2017-07-12 RX ADMIN — DOXAZOSIN 1 MG: 2 TABLET ORAL at 21:53

## 2017-07-12 ASSESSMENT — PAIN DESCRIPTION - PAIN TYPE: TYPE: SURGICAL PAIN

## 2017-07-12 ASSESSMENT — PAIN SCALES - GENERAL
PAINLEVEL_OUTOF10: 4
PAINLEVEL_OUTOF10: 7

## 2017-07-12 ASSESSMENT — PAIN DESCRIPTION - LOCATION: LOCATION: BACK

## 2017-07-13 ENCOUNTER — HOSPITAL ENCOUNTER (INPATIENT)
Age: 82
LOS: 9 days | Discharge: HOME HEALTH | DRG: 949 | End: 2017-07-22
Attending: PSYCHIATRY & NEUROLOGY | Admitting: PSYCHIATRY & NEUROLOGY
Payer: COMMERCIAL

## 2017-07-13 VITALS
RESPIRATION RATE: 18 BRPM | TEMPERATURE: 98.3 F | DIASTOLIC BLOOD PRESSURE: 67 MMHG | SYSTOLIC BLOOD PRESSURE: 108 MMHG | OXYGEN SATURATION: 99 % | HEART RATE: 50 BPM

## 2017-07-13 DIAGNOSIS — M48.062 LUMBAR STENOSIS WITH NEUROGENIC CLAUDICATION: ICD-10-CM

## 2017-07-13 DIAGNOSIS — L97.522 NON-PRESSURE CHRONIC ULCER OF OTHER PART OF LEFT FOOT WITH FAT LAYER EXPOSED (HCC): ICD-10-CM

## 2017-07-13 DIAGNOSIS — E11.51 TYPE II DIABETES MELLITUS WITH PERIPHERAL CIRCULATORY DISORDER (HCC): ICD-10-CM

## 2017-07-13 DIAGNOSIS — R53.1 WEAKNESS: Primary | ICD-10-CM

## 2017-07-13 PROBLEM — Z98.890 S/P LAMINECTOMY: Status: ACTIVE | Noted: 2017-07-13

## 2017-07-13 PROBLEM — G47.33 OBSTRUCTIVE SLEEP APNEA SYNDROME: Status: ACTIVE | Noted: 2017-07-13

## 2017-07-13 LAB
ALBUMIN SERPL-MCNC: 2.7 G/DL (ref 3.5–5.2)
ALP BLD-CCNC: 57 U/L (ref 40–130)
ALT SERPL-CCNC: 28 U/L (ref 5–41)
ANION GAP SERPL CALCULATED.3IONS-SCNC: 11 MMOL/L (ref 7–19)
ANION GAP SERPL CALCULATED.3IONS-SCNC: 13 MMOL/L (ref 7–19)
AST SERPL-CCNC: 32 U/L (ref 5–40)
BASOPHILS ABSOLUTE: 0 K/UL (ref 0–0.2)
BASOPHILS RELATIVE PERCENT: 0.1 % (ref 0–1)
BILIRUB SERPL-MCNC: 0.5 MG/DL (ref 0.2–1.2)
BILIRUBIN URINE: NEGATIVE
BLOOD, URINE: NEGATIVE
BUN BLDV-MCNC: 25 MG/DL (ref 8–23)
BUN BLDV-MCNC: 26 MG/DL (ref 8–23)
CALCIUM SERPL-MCNC: 8.2 MG/DL (ref 8.8–10.2)
CALCIUM SERPL-MCNC: 8.6 MG/DL (ref 8.8–10.2)
CHLORIDE BLD-SCNC: 103 MMOL/L (ref 98–111)
CHLORIDE BLD-SCNC: 105 MMOL/L (ref 98–111)
CLARITY: CLEAR
CO2: 20 MMOL/L (ref 22–29)
CO2: 21 MMOL/L (ref 22–29)
COLOR: YELLOW
CREAT SERPL-MCNC: 1.5 MG/DL (ref 0.5–1.2)
CREAT SERPL-MCNC: 1.6 MG/DL (ref 0.5–1.2)
EOSINOPHILS ABSOLUTE: 0.1 K/UL (ref 0–0.6)
EOSINOPHILS RELATIVE PERCENT: 0.8 % (ref 0–5)
FERRITIN: 835.2 NG/ML (ref 30–400)
FOLATE: >20 NG/ML (ref 4.5–32.2)
GFR NON-AFRICAN AMERICAN: 42
GFR NON-AFRICAN AMERICAN: 45
GLUCOSE BLD-MCNC: 134 MG/DL (ref 70–99)
GLUCOSE BLD-MCNC: 136 MG/DL (ref 70–99)
GLUCOSE BLD-MCNC: 138 MG/DL (ref 74–109)
GLUCOSE BLD-MCNC: 165 MG/DL (ref 70–99)
GLUCOSE BLD-MCNC: 175 MG/DL (ref 74–109)
GLUCOSE BLD-MCNC: 197 MG/DL (ref 70–99)
GLUCOSE URINE: NEGATIVE MG/DL
HCT VFR BLD CALC: 27.1 % (ref 42–52)
HEMOGLOBIN: 8.7 G/DL (ref 14–18)
IRON SATURATION: 6 % (ref 14–50)
IRON: 9 UG/DL (ref 59–158)
KETONES, URINE: NEGATIVE MG/DL
LEUKOCYTE ESTERASE, URINE: NEGATIVE
LYMPHOCYTES ABSOLUTE: 1 K/UL (ref 1.1–4.5)
LYMPHOCYTES RELATIVE PERCENT: 10.8 % (ref 20–40)
MCH RBC QN AUTO: 34 PG (ref 27–31)
MCHC RBC AUTO-ENTMCNC: 32.1 G/DL (ref 33–37)
MCV RBC AUTO: 105.9 FL (ref 80–94)
MONOCYTES ABSOLUTE: 1 K/UL (ref 0–0.9)
MONOCYTES RELATIVE PERCENT: 10.8 % (ref 0–10)
NEUTROPHILS ABSOLUTE: 6.9 K/UL (ref 1.5–7.5)
NEUTROPHILS RELATIVE PERCENT: 76.9 % (ref 50–65)
NITRITE, URINE: NEGATIVE
PDW BLD-RTO: 13 % (ref 11.5–14.5)
PERFORMED ON: ABNORMAL
PH UA: 5
PLATELET # BLD: 125 K/UL (ref 130–400)
PMV BLD AUTO: 10.9 FL (ref 9.4–12.4)
POTASSIUM SERPL-SCNC: 4.4 MMOL/L (ref 3.5–5)
POTASSIUM SERPL-SCNC: 4.7 MMOL/L (ref 3.5–5)
PREALBUMIN: 15 MG/DL (ref 20–40)
PROTEIN UA: NEGATIVE MG/DL
RBC # BLD: 2.56 M/UL (ref 4.7–6.1)
SODIUM BLD-SCNC: 136 MMOL/L (ref 136–145)
SODIUM BLD-SCNC: 137 MMOL/L (ref 136–145)
SPECIFIC GRAVITY UA: 1.01
T4 FREE: 1 NG/ML (ref 0.9–1.7)
TOTAL IRON BINDING CAPACITY: 158 UG/DL (ref 250–400)
TOTAL PROTEIN: 5.3 G/DL (ref 6.6–8.7)
TSH SERPL DL<=0.05 MIU/L-ACNC: 0.6 UIU/ML (ref 0.27–4.2)
UROBILINOGEN, URINE: 1 E.U./DL
VITAMIN B-12: 572 PG/ML (ref 211–946)
WBC # BLD: 8.9 K/UL (ref 4.8–10.8)

## 2017-07-13 PROCEDURE — 81003 URINALYSIS AUTO W/O SCOPE: CPT

## 2017-07-13 PROCEDURE — 6370000000 HC RX 637 (ALT 250 FOR IP)

## 2017-07-13 PROCEDURE — 80053 COMPREHEN METABOLIC PANEL: CPT

## 2017-07-13 PROCEDURE — 94762 N-INVAS EAR/PLS OXIMTRY CONT: CPT

## 2017-07-13 PROCEDURE — 82607 VITAMIN B-12: CPT

## 2017-07-13 PROCEDURE — 83540 ASSAY OF IRON: CPT

## 2017-07-13 PROCEDURE — G0378 HOSPITAL OBSERVATION PER HR: HCPCS

## 2017-07-13 PROCEDURE — 6370000000 HC RX 637 (ALT 250 FOR IP): Performed by: PHYSICIAN ASSISTANT

## 2017-07-13 PROCEDURE — 1180000000 HC REHAB R&B

## 2017-07-13 PROCEDURE — 84134 ASSAY OF PREALBUMIN: CPT

## 2017-07-13 PROCEDURE — 6370000000 HC RX 637 (ALT 250 FOR IP): Performed by: FAMILY MEDICINE

## 2017-07-13 PROCEDURE — 84439 ASSAY OF FREE THYROXINE: CPT

## 2017-07-13 PROCEDURE — 82746 ASSAY OF FOLIC ACID SERUM: CPT

## 2017-07-13 PROCEDURE — 83550 IRON BINDING TEST: CPT

## 2017-07-13 PROCEDURE — 85025 COMPLETE CBC W/AUTO DIFF WBC: CPT

## 2017-07-13 PROCEDURE — 82728 ASSAY OF FERRITIN: CPT

## 2017-07-13 PROCEDURE — 87086 URINE CULTURE/COLONY COUNT: CPT

## 2017-07-13 PROCEDURE — 82948 REAGENT STRIP/BLOOD GLUCOSE: CPT

## 2017-07-13 PROCEDURE — 94664 DEMO&/EVAL PT USE INHALER: CPT

## 2017-07-13 PROCEDURE — 84443 ASSAY THYROID STIM HORMONE: CPT

## 2017-07-13 PROCEDURE — 36415 COLL VENOUS BLD VENIPUNCTURE: CPT

## 2017-07-13 RX ORDER — POLYETHYLENE GLYCOL 3350 17 G/17G
17 POWDER, FOR SOLUTION ORAL DAILY PRN
Status: DISCONTINUED | OUTPATIENT
Start: 2017-07-13 | End: 2017-07-22 | Stop reason: HOSPADM

## 2017-07-13 RX ORDER — ACETAMINOPHEN 325 MG/1
650 TABLET ORAL EVERY 4 HOURS PRN
Status: DISCONTINUED | OUTPATIENT
Start: 2017-07-13 | End: 2017-07-13 | Stop reason: SDUPTHER

## 2017-07-13 RX ORDER — OXYCODONE HYDROCHLORIDE AND ACETAMINOPHEN 5; 325 MG/1; MG/1
1 TABLET ORAL EVERY 4 HOURS PRN
Status: DISCONTINUED | OUTPATIENT
Start: 2017-07-13 | End: 2017-07-22 | Stop reason: HOSPADM

## 2017-07-13 RX ORDER — PRAVASTATIN SODIUM 20 MG
40 TABLET ORAL DAILY
Status: DISCONTINUED | OUTPATIENT
Start: 2017-07-14 | End: 2017-07-22 | Stop reason: HOSPADM

## 2017-07-13 RX ORDER — HYDROCHLOROTHIAZIDE 25 MG/1
12.5 TABLET ORAL DAILY
Status: CANCELLED | OUTPATIENT
Start: 2017-07-14

## 2017-07-13 RX ORDER — CARVEDILOL 6.25 MG/1
12.5 TABLET ORAL 2 TIMES DAILY WITH MEALS
Status: CANCELLED | OUTPATIENT
Start: 2017-07-13

## 2017-07-13 RX ORDER — OXYCODONE HYDROCHLORIDE AND ACETAMINOPHEN 5; 325 MG/1; MG/1
2 TABLET ORAL EVERY 4 HOURS PRN
Status: DISCONTINUED | OUTPATIENT
Start: 2017-07-13 | End: 2017-07-22 | Stop reason: HOSPADM

## 2017-07-13 RX ORDER — PRAVASTATIN SODIUM 20 MG
40 TABLET ORAL DAILY
Status: CANCELLED | OUTPATIENT
Start: 2017-07-14

## 2017-07-13 RX ORDER — NICOTINE POLACRILEX 4 MG
15 LOZENGE BUCCAL PRN
Status: DISCONTINUED | OUTPATIENT
Start: 2017-07-13 | End: 2017-07-22 | Stop reason: HOSPADM

## 2017-07-13 RX ORDER — ACETAMINOPHEN 325 MG/1
650 TABLET ORAL EVERY 4 HOURS PRN
Status: CANCELLED | OUTPATIENT
Start: 2017-07-13

## 2017-07-13 RX ORDER — DOXAZOSIN 2 MG/1
1 TABLET ORAL NIGHTLY
Status: CANCELLED | OUTPATIENT
Start: 2017-07-13

## 2017-07-13 RX ORDER — INSULIN GLARGINE 100 [IU]/ML
15 INJECTION, SOLUTION SUBCUTANEOUS NIGHTLY
Status: CANCELLED | OUTPATIENT
Start: 2017-07-13

## 2017-07-13 RX ORDER — DEXTROSE MONOHYDRATE 50 MG/ML
100 INJECTION, SOLUTION INTRAVENOUS PRN
Status: DISCONTINUED | OUTPATIENT
Start: 2017-07-13 | End: 2017-07-22 | Stop reason: HOSPADM

## 2017-07-13 RX ORDER — POTASSIUM CHLORIDE 20 MEQ/1
20 TABLET, EXTENDED RELEASE ORAL DAILY
Status: CANCELLED | OUTPATIENT
Start: 2017-07-14

## 2017-07-13 RX ORDER — OXYCODONE HYDROCHLORIDE AND ACETAMINOPHEN 5; 325 MG/1; MG/1
2 TABLET ORAL EVERY 4 HOURS PRN
Status: CANCELLED | OUTPATIENT
Start: 2017-07-13

## 2017-07-13 RX ORDER — DEXTROSE MONOHYDRATE 25 G/50ML
12.5 INJECTION, SOLUTION INTRAVENOUS PRN
Status: CANCELLED | OUTPATIENT
Start: 2017-07-13

## 2017-07-13 RX ORDER — PANTOPRAZOLE SODIUM 40 MG/1
40 TABLET, DELAYED RELEASE ORAL DAILY
Status: DISCONTINUED | OUTPATIENT
Start: 2017-07-14 | End: 2017-07-22 | Stop reason: HOSPADM

## 2017-07-13 RX ORDER — LOSARTAN POTASSIUM 100 MG/1
100 TABLET ORAL DAILY
Status: CANCELLED | OUTPATIENT
Start: 2017-07-14

## 2017-07-13 RX ORDER — PROMETHAZINE HYDROCHLORIDE 12.5 MG/1
12.5 TABLET ORAL PRN
Status: CANCELLED | OUTPATIENT
Start: 2017-07-13

## 2017-07-13 RX ORDER — DOCUSATE SODIUM 100 MG/1
100 CAPSULE, LIQUID FILLED ORAL 2 TIMES DAILY PRN
Status: DISCONTINUED | OUTPATIENT
Start: 2017-07-13 | End: 2017-07-22 | Stop reason: HOSPADM

## 2017-07-13 RX ORDER — DEXTROSE MONOHYDRATE 25 G/50ML
12.5 INJECTION, SOLUTION INTRAVENOUS PRN
Status: DISCONTINUED | OUTPATIENT
Start: 2017-07-13 | End: 2017-07-22 | Stop reason: HOSPADM

## 2017-07-13 RX ORDER — INSULIN GLARGINE 100 [IU]/ML
15 INJECTION, SOLUTION SUBCUTANEOUS NIGHTLY
Status: DISCONTINUED | OUTPATIENT
Start: 2017-07-13 | End: 2017-07-17

## 2017-07-13 RX ORDER — ASCORBIC ACID 500 MG
500 TABLET ORAL DAILY
Status: DISCONTINUED | OUTPATIENT
Start: 2017-07-13 | End: 2017-07-17

## 2017-07-13 RX ORDER — POLYETHYLENE GLYCOL 3350 17 G/17G
17 POWDER, FOR SOLUTION ORAL DAILY PRN
Status: CANCELLED | OUTPATIENT
Start: 2017-07-13

## 2017-07-13 RX ORDER — ACETAMINOPHEN 325 MG/1
650 TABLET ORAL EVERY 4 HOURS PRN
Status: DISCONTINUED | OUTPATIENT
Start: 2017-07-13 | End: 2017-07-22 | Stop reason: HOSPADM

## 2017-07-13 RX ORDER — DOCUSATE SODIUM 100 MG/1
100 CAPSULE, LIQUID FILLED ORAL DAILY
Status: DISCONTINUED | OUTPATIENT
Start: 2017-07-14 | End: 2017-07-14

## 2017-07-13 RX ORDER — FUROSEMIDE 40 MG/1
40 TABLET ORAL 2 TIMES DAILY
Status: CANCELLED | OUTPATIENT
Start: 2017-07-13

## 2017-07-13 RX ORDER — HYDROCHLOROTHIAZIDE 12.5 MG/1
12.5 CAPSULE, GELATIN COATED ORAL DAILY
Status: DISCONTINUED | OUTPATIENT
Start: 2017-07-14 | End: 2017-07-22 | Stop reason: HOSPADM

## 2017-07-13 RX ORDER — POTASSIUM CHLORIDE 20 MEQ/1
20 TABLET, EXTENDED RELEASE ORAL DAILY
Status: DISCONTINUED | OUTPATIENT
Start: 2017-07-14 | End: 2017-07-20

## 2017-07-13 RX ORDER — AMLODIPINE BESYLATE 10 MG/1
10 TABLET ORAL DAILY
Status: CANCELLED | OUTPATIENT
Start: 2017-07-14

## 2017-07-13 RX ORDER — AMLODIPINE BESYLATE 10 MG/1
10 TABLET ORAL DAILY
Status: DISCONTINUED | OUTPATIENT
Start: 2017-07-14 | End: 2017-07-22 | Stop reason: HOSPADM

## 2017-07-13 RX ORDER — CARVEDILOL 12.5 MG/1
12.5 TABLET ORAL 2 TIMES DAILY WITH MEALS
Status: DISCONTINUED | OUTPATIENT
Start: 2017-07-13 | End: 2017-07-22 | Stop reason: HOSPADM

## 2017-07-13 RX ORDER — LOSARTAN POTASSIUM 100 MG/1
100 TABLET ORAL DAILY
Status: DISCONTINUED | OUTPATIENT
Start: 2017-07-14 | End: 2017-07-22 | Stop reason: HOSPADM

## 2017-07-13 RX ORDER — OXYCODONE HYDROCHLORIDE AND ACETAMINOPHEN 5; 325 MG/1; MG/1
1 TABLET ORAL EVERY 4 HOURS PRN
Status: CANCELLED | OUTPATIENT
Start: 2017-07-13

## 2017-07-13 RX ORDER — DOXAZOSIN MESYLATE 1 MG/1
1 TABLET ORAL NIGHTLY
Status: DISCONTINUED | OUTPATIENT
Start: 2017-07-13 | End: 2017-07-22 | Stop reason: HOSPADM

## 2017-07-13 RX ORDER — DEXTROSE MONOHYDRATE 50 MG/ML
100 INJECTION, SOLUTION INTRAVENOUS PRN
Status: CANCELLED | OUTPATIENT
Start: 2017-07-13

## 2017-07-13 RX ORDER — PROMETHAZINE HYDROCHLORIDE 12.5 MG/1
12.5 TABLET ORAL PRN
Status: DISCONTINUED | OUTPATIENT
Start: 2017-07-13 | End: 2017-07-22 | Stop reason: HOSPADM

## 2017-07-13 RX ORDER — FUROSEMIDE 40 MG/1
40 TABLET ORAL 2 TIMES DAILY
Status: DISCONTINUED | OUTPATIENT
Start: 2017-07-13 | End: 2017-07-20

## 2017-07-13 RX ORDER — DOCUSATE SODIUM 100 MG/1
100 CAPSULE, LIQUID FILLED ORAL DAILY
Status: CANCELLED | OUTPATIENT
Start: 2017-07-14

## 2017-07-13 RX ORDER — PANTOPRAZOLE SODIUM 40 MG/1
40 TABLET, DELAYED RELEASE ORAL DAILY
Status: CANCELLED | OUTPATIENT
Start: 2017-07-14

## 2017-07-13 RX ORDER — NICOTINE POLACRILEX 4 MG
15 LOZENGE BUCCAL PRN
Status: CANCELLED | OUTPATIENT
Start: 2017-07-13

## 2017-07-13 RX ORDER — ASCORBIC ACID 500 MG
500 TABLET ORAL DAILY
Status: CANCELLED | OUTPATIENT
Start: 2017-07-13

## 2017-07-13 RX ORDER — BISACODYL 10 MG
10 SUPPOSITORY, RECTAL RECTAL DAILY PRN
Status: DISCONTINUED | OUTPATIENT
Start: 2017-07-13 | End: 2017-07-22 | Stop reason: HOSPADM

## 2017-07-13 RX ORDER — GLIMEPIRIDE 2 MG/1
4 TABLET ORAL 2 TIMES DAILY WITH MEALS
Status: CANCELLED | OUTPATIENT
Start: 2017-07-13

## 2017-07-13 RX ORDER — GLIMEPIRIDE 4 MG/1
4 TABLET ORAL 2 TIMES DAILY WITH MEALS
Status: DISCONTINUED | OUTPATIENT
Start: 2017-07-13 | End: 2017-07-22 | Stop reason: HOSPADM

## 2017-07-13 RX ADMIN — PANTOPRAZOLE SODIUM 40 MG: 40 TABLET, DELAYED RELEASE ORAL at 08:04

## 2017-07-13 RX ADMIN — GLIMEPIRIDE 4 MG: 4 TABLET ORAL at 18:11

## 2017-07-13 RX ADMIN — PRAVASTATIN SODIUM 40 MG: 20 TABLET ORAL at 08:03

## 2017-07-13 RX ADMIN — CARVEDILOL 12.5 MG: 12.5 TABLET, FILM COATED ORAL at 18:11

## 2017-07-13 RX ADMIN — GLIMEPIRIDE 4 MG: 2 TABLET ORAL at 08:04

## 2017-07-13 RX ADMIN — INSULIN GLARGINE 15 UNITS: 100 INJECTION, SOLUTION SUBCUTANEOUS at 20:07

## 2017-07-13 RX ADMIN — HYDROCHLOROTHIAZIDE 12.5 MG: 25 TABLET ORAL at 08:03

## 2017-07-13 RX ADMIN — CARVEDILOL 12.5 MG: 6.25 TABLET, FILM COATED ORAL at 08:03

## 2017-07-13 RX ADMIN — DOCUSATE SODIUM 100 MG: 100 CAPSULE, LIQUID FILLED ORAL at 08:03

## 2017-07-13 RX ADMIN — OXYCODONE HYDROCHLORIDE AND ACETAMINOPHEN 500 MG: 500 TABLET ORAL at 15:46

## 2017-07-13 RX ADMIN — DOXAZOSIN 1 MG: 1 TABLET ORAL at 20:07

## 2017-07-13 RX ADMIN — FUROSEMIDE 40 MG: 40 TABLET ORAL at 08:03

## 2017-07-13 RX ADMIN — AMLODIPINE BESYLATE 10 MG: 10 TABLET ORAL at 08:04

## 2017-07-13 RX ADMIN — POTASSIUM CHLORIDE 20 MEQ: 20 TABLET, EXTENDED RELEASE ORAL at 08:03

## 2017-07-13 RX ADMIN — LOSARTAN POTASSIUM 100 MG: 100 TABLET ORAL at 08:04

## 2017-07-13 RX ADMIN — MUPIROCIN: 20 OINTMENT TOPICAL at 08:22

## 2017-07-14 LAB
GLUCOSE BLD-MCNC: 100 MG/DL (ref 70–99)
GLUCOSE BLD-MCNC: 159 MG/DL (ref 70–99)
GLUCOSE BLD-MCNC: 184 MG/DL (ref 70–99)
GLUCOSE BLD-MCNC: 215 MG/DL (ref 70–99)
PERFORMED ON: ABNORMAL

## 2017-07-14 PROCEDURE — 6370000000 HC RX 637 (ALT 250 FOR IP): Performed by: FAMILY MEDICINE

## 2017-07-14 PROCEDURE — 97535 SELF CARE MNGMENT TRAINING: CPT

## 2017-07-14 PROCEDURE — 6370000000 HC RX 637 (ALT 250 FOR IP): Performed by: PHYSICIAN ASSISTANT

## 2017-07-14 PROCEDURE — 82948 REAGENT STRIP/BLOOD GLUCOSE: CPT

## 2017-07-14 PROCEDURE — 1180000000 HC REHAB R&B

## 2017-07-14 PROCEDURE — 97116 GAIT TRAINING THERAPY: CPT

## 2017-07-14 PROCEDURE — 99223 1ST HOSP IP/OBS HIGH 75: CPT | Performed by: PSYCHIATRY & NEUROLOGY

## 2017-07-14 PROCEDURE — 97166 OT EVAL MOD COMPLEX 45 MIN: CPT

## 2017-07-14 PROCEDURE — 97161 PT EVAL LOW COMPLEX 20 MIN: CPT

## 2017-07-14 PROCEDURE — 97110 THERAPEUTIC EXERCISES: CPT

## 2017-07-14 PROCEDURE — 6360000002 HC RX W HCPCS: Performed by: PHYSICIAN ASSISTANT

## 2017-07-14 PROCEDURE — 97530 THERAPEUTIC ACTIVITIES: CPT

## 2017-07-14 RX ORDER — SENNA AND DOCUSATE SODIUM 50; 8.6 MG/1; MG/1
2 TABLET, FILM COATED ORAL 2 TIMES DAILY
Status: DISCONTINUED | OUTPATIENT
Start: 2017-07-14 | End: 2017-07-22 | Stop reason: HOSPADM

## 2017-07-14 RX ADMIN — INSULIN GLARGINE 15 UNITS: 100 INJECTION, SOLUTION SUBCUTANEOUS at 20:19

## 2017-07-14 RX ADMIN — GLIMEPIRIDE 4 MG: 4 TABLET ORAL at 08:01

## 2017-07-14 RX ADMIN — PRAVASTATIN SODIUM 40 MG: 20 TABLET ORAL at 08:02

## 2017-07-14 RX ADMIN — OXYCODONE HYDROCHLORIDE AND ACETAMINOPHEN 500 MG: 500 TABLET ORAL at 08:02

## 2017-07-14 RX ADMIN — AMLODIPINE BESYLATE 10 MG: 10 TABLET ORAL at 08:02

## 2017-07-14 RX ADMIN — GLIMEPIRIDE 4 MG: 4 TABLET ORAL at 17:10

## 2017-07-14 RX ADMIN — POTASSIUM CHLORIDE 20 MEQ: 20 TABLET, EXTENDED RELEASE ORAL at 08:02

## 2017-07-14 RX ADMIN — ENOXAPARIN SODIUM 30 MG: 30 INJECTION SUBCUTANEOUS at 08:02

## 2017-07-14 RX ADMIN — PANTOPRAZOLE SODIUM 40 MG: 40 TABLET, DELAYED RELEASE ORAL at 08:02

## 2017-07-14 RX ADMIN — CARVEDILOL 12.5 MG: 12.5 TABLET, FILM COATED ORAL at 08:01

## 2017-07-14 RX ADMIN — STANDARDIZED SENNA CONCENTRATE AND DOCUSATE SODIUM 2 TABLET: 8.6; 5 TABLET, FILM COATED ORAL at 17:10

## 2017-07-14 RX ADMIN — MUPIROCIN: 20 OINTMENT TOPICAL at 08:02

## 2017-07-14 RX ADMIN — OXYCODONE HYDROCHLORIDE AND ACETAMINOPHEN 1 TABLET: 5; 325 TABLET ORAL at 13:29

## 2017-07-14 RX ADMIN — DOXAZOSIN 1 MG: 1 TABLET ORAL at 20:19

## 2017-07-14 RX ADMIN — STANDARDIZED SENNA CONCENTRATE AND DOCUSATE SODIUM 2 TABLET: 8.6; 5 TABLET, FILM COATED ORAL at 08:48

## 2017-07-14 RX ADMIN — HYDROCHLOROTHIAZIDE 12.5 MG: 12.5 CAPSULE ORAL at 08:01

## 2017-07-14 RX ADMIN — FUROSEMIDE 40 MG: 40 TABLET ORAL at 08:02

## 2017-07-14 RX ADMIN — CARVEDILOL 12.5 MG: 12.5 TABLET, FILM COATED ORAL at 17:10

## 2017-07-14 RX ADMIN — DOCUSATE SODIUM 100 MG: 100 CAPSULE, LIQUID FILLED ORAL at 08:02

## 2017-07-14 RX ADMIN — LOSARTAN POTASSIUM 100 MG: 100 TABLET ORAL at 08:02

## 2017-07-14 ASSESSMENT — PAIN DESCRIPTION - FREQUENCY
FREQUENCY: CONTINUOUS

## 2017-07-14 ASSESSMENT — PAIN SCALES - GENERAL
PAINLEVEL_OUTOF10: 6
PAINLEVEL_OUTOF10: 6
PAINLEVEL_OUTOF10: 8
PAINLEVEL_OUTOF10: 6
PAINLEVEL_OUTOF10: 7

## 2017-07-14 ASSESSMENT — PAIN SCALES - WONG BAKER: WONGBAKER_NUMERICALRESPONSE: 0

## 2017-07-14 ASSESSMENT — PAIN DESCRIPTION - LOCATION
LOCATION: BACK

## 2017-07-14 ASSESSMENT — PAIN DESCRIPTION - PAIN TYPE
TYPE: SURGICAL PAIN

## 2017-07-14 ASSESSMENT — PAIN DESCRIPTION - DESCRIPTORS
DESCRIPTORS: THROBBING

## 2017-07-14 ASSESSMENT — PAIN DESCRIPTION - ORIENTATION
ORIENTATION: LOWER;MID

## 2017-07-15 LAB
GLUCOSE BLD-MCNC: 165 MG/DL (ref 70–99)
GLUCOSE BLD-MCNC: 180 MG/DL (ref 70–99)
GLUCOSE BLD-MCNC: 239 MG/DL (ref 70–99)
GLUCOSE BLD-MCNC: 70 MG/DL (ref 70–99)
PERFORMED ON: ABNORMAL
PERFORMED ON: NORMAL
URINE CULTURE, ROUTINE: NORMAL

## 2017-07-15 PROCEDURE — 6360000002 HC RX W HCPCS: Performed by: PHYSICIAN ASSISTANT

## 2017-07-15 PROCEDURE — 82948 REAGENT STRIP/BLOOD GLUCOSE: CPT

## 2017-07-15 PROCEDURE — 97116 GAIT TRAINING THERAPY: CPT

## 2017-07-15 PROCEDURE — 6370000000 HC RX 637 (ALT 250 FOR IP): Performed by: PHYSICIAN ASSISTANT

## 2017-07-15 PROCEDURE — 97530 THERAPEUTIC ACTIVITIES: CPT

## 2017-07-15 PROCEDURE — 1180000000 HC REHAB R&B

## 2017-07-15 PROCEDURE — 6370000000 HC RX 637 (ALT 250 FOR IP): Performed by: FAMILY MEDICINE

## 2017-07-15 PROCEDURE — 99232 SBSQ HOSP IP/OBS MODERATE 35: CPT | Performed by: PSYCHIATRY & NEUROLOGY

## 2017-07-15 PROCEDURE — 97535 SELF CARE MNGMENT TRAINING: CPT

## 2017-07-15 PROCEDURE — 97110 THERAPEUTIC EXERCISES: CPT

## 2017-07-15 RX ADMIN — ENOXAPARIN SODIUM 30 MG: 30 INJECTION SUBCUTANEOUS at 08:33

## 2017-07-15 RX ADMIN — CARVEDILOL 12.5 MG: 12.5 TABLET, FILM COATED ORAL at 08:27

## 2017-07-15 RX ADMIN — OXYCODONE HYDROCHLORIDE AND ACETAMINOPHEN 500 MG: 500 TABLET ORAL at 08:28

## 2017-07-15 RX ADMIN — OXYCODONE HYDROCHLORIDE AND ACETAMINOPHEN 1 TABLET: 5; 325 TABLET ORAL at 06:18

## 2017-07-15 RX ADMIN — AMLODIPINE BESYLATE 10 MG: 10 TABLET ORAL at 08:28

## 2017-07-15 RX ADMIN — INSULIN GLARGINE 15 UNITS: 100 INJECTION, SOLUTION SUBCUTANEOUS at 21:12

## 2017-07-15 RX ADMIN — DOXAZOSIN 1 MG: 1 TABLET ORAL at 21:11

## 2017-07-15 RX ADMIN — PANTOPRAZOLE SODIUM 40 MG: 40 TABLET, DELAYED RELEASE ORAL at 08:27

## 2017-07-15 RX ADMIN — FUROSEMIDE 40 MG: 40 TABLET ORAL at 17:10

## 2017-07-15 RX ADMIN — STANDARDIZED SENNA CONCENTRATE AND DOCUSATE SODIUM 2 TABLET: 8.6; 5 TABLET, FILM COATED ORAL at 08:28

## 2017-07-15 RX ADMIN — FUROSEMIDE 40 MG: 40 TABLET ORAL at 08:27

## 2017-07-15 RX ADMIN — CARVEDILOL 12.5 MG: 12.5 TABLET, FILM COATED ORAL at 17:10

## 2017-07-15 RX ADMIN — POTASSIUM CHLORIDE 20 MEQ: 20 TABLET, EXTENDED RELEASE ORAL at 08:28

## 2017-07-15 RX ADMIN — GLIMEPIRIDE 4 MG: 4 TABLET ORAL at 17:13

## 2017-07-15 RX ADMIN — LOSARTAN POTASSIUM 100 MG: 100 TABLET ORAL at 08:28

## 2017-07-15 RX ADMIN — PRAVASTATIN SODIUM 40 MG: 20 TABLET ORAL at 08:28

## 2017-07-15 RX ADMIN — STANDARDIZED SENNA CONCENTRATE AND DOCUSATE SODIUM 2 TABLET: 8.6; 5 TABLET, FILM COATED ORAL at 17:10

## 2017-07-15 RX ADMIN — GLIMEPIRIDE 4 MG: 4 TABLET ORAL at 08:27

## 2017-07-15 RX ADMIN — OXYCODONE HYDROCHLORIDE AND ACETAMINOPHEN 1 TABLET: 5; 325 TABLET ORAL at 13:18

## 2017-07-15 RX ADMIN — MUPIROCIN: 20 OINTMENT TOPICAL at 08:41

## 2017-07-15 RX ADMIN — HYDROCHLOROTHIAZIDE 12.5 MG: 12.5 CAPSULE ORAL at 08:28

## 2017-07-15 ASSESSMENT — PAIN DESCRIPTION - FREQUENCY: FREQUENCY: CONTINUOUS

## 2017-07-15 ASSESSMENT — PAIN DESCRIPTION - PAIN TYPE: TYPE: SURGICAL PAIN

## 2017-07-15 ASSESSMENT — PAIN DESCRIPTION - DESCRIPTORS: DESCRIPTORS: SORE

## 2017-07-15 ASSESSMENT — PAIN DESCRIPTION - LOCATION: LOCATION: BACK

## 2017-07-15 ASSESSMENT — PAIN SCALES - GENERAL
PAINLEVEL_OUTOF10: 6
PAINLEVEL_OUTOF10: 8
PAINLEVEL_OUTOF10: 0
PAINLEVEL_OUTOF10: 7
PAINLEVEL_OUTOF10: 0
PAINLEVEL_OUTOF10: 9

## 2017-07-15 ASSESSMENT — PAIN DESCRIPTION - ORIENTATION: ORIENTATION: LOWER;MID

## 2017-07-16 LAB
GLUCOSE BLD-MCNC: 160 MG/DL (ref 70–99)
GLUCOSE BLD-MCNC: 173 MG/DL (ref 70–99)
GLUCOSE BLD-MCNC: 183 MG/DL (ref 70–99)
GLUCOSE BLD-MCNC: 82 MG/DL (ref 70–99)
PERFORMED ON: ABNORMAL
PERFORMED ON: NORMAL

## 2017-07-16 PROCEDURE — 6370000000 HC RX 637 (ALT 250 FOR IP): Performed by: FAMILY MEDICINE

## 2017-07-16 PROCEDURE — 99232 SBSQ HOSP IP/OBS MODERATE 35: CPT | Performed by: PSYCHIATRY & NEUROLOGY

## 2017-07-16 PROCEDURE — 6360000002 HC RX W HCPCS: Performed by: PHYSICIAN ASSISTANT

## 2017-07-16 PROCEDURE — 82948 REAGENT STRIP/BLOOD GLUCOSE: CPT

## 2017-07-16 PROCEDURE — 6370000000 HC RX 637 (ALT 250 FOR IP): Performed by: PHYSICIAN ASSISTANT

## 2017-07-16 PROCEDURE — 1180000000 HC REHAB R&B

## 2017-07-16 RX ADMIN — STANDARDIZED SENNA CONCENTRATE AND DOCUSATE SODIUM 2 TABLET: 8.6; 5 TABLET, FILM COATED ORAL at 09:07

## 2017-07-16 RX ADMIN — OXYCODONE HYDROCHLORIDE AND ACETAMINOPHEN 500 MG: 500 TABLET ORAL at 09:05

## 2017-07-16 RX ADMIN — GLIMEPIRIDE 4 MG: 4 TABLET ORAL at 17:40

## 2017-07-16 RX ADMIN — HYDROCHLOROTHIAZIDE 12.5 MG: 12.5 CAPSULE ORAL at 09:03

## 2017-07-16 RX ADMIN — GLIMEPIRIDE 4 MG: 4 TABLET ORAL at 09:07

## 2017-07-16 RX ADMIN — OXYCODONE HYDROCHLORIDE AND ACETAMINOPHEN 1 TABLET: 5; 325 TABLET ORAL at 19:06

## 2017-07-16 RX ADMIN — FUROSEMIDE 40 MG: 40 TABLET ORAL at 09:07

## 2017-07-16 RX ADMIN — POTASSIUM CHLORIDE 20 MEQ: 20 TABLET, EXTENDED RELEASE ORAL at 09:07

## 2017-07-16 RX ADMIN — STANDARDIZED SENNA CONCENTRATE AND DOCUSATE SODIUM 2 TABLET: 8.6; 5 TABLET, FILM COATED ORAL at 17:40

## 2017-07-16 RX ADMIN — AMLODIPINE BESYLATE 10 MG: 10 TABLET ORAL at 09:05

## 2017-07-16 RX ADMIN — PRAVASTATIN SODIUM 40 MG: 20 TABLET ORAL at 09:07

## 2017-07-16 RX ADMIN — LOSARTAN POTASSIUM 100 MG: 100 TABLET ORAL at 09:07

## 2017-07-16 RX ADMIN — FUROSEMIDE 40 MG: 40 TABLET ORAL at 17:40

## 2017-07-16 RX ADMIN — PANTOPRAZOLE SODIUM 40 MG: 40 TABLET, DELAYED RELEASE ORAL at 09:07

## 2017-07-16 RX ADMIN — MUPIROCIN: 20 OINTMENT TOPICAL at 09:06

## 2017-07-16 RX ADMIN — DOXAZOSIN 1 MG: 1 TABLET ORAL at 21:09

## 2017-07-16 RX ADMIN — ENOXAPARIN SODIUM 30 MG: 30 INJECTION SUBCUTANEOUS at 09:07

## 2017-07-16 RX ADMIN — CARVEDILOL 12.5 MG: 12.5 TABLET, FILM COATED ORAL at 09:05

## 2017-07-16 RX ADMIN — CARVEDILOL 12.5 MG: 12.5 TABLET, FILM COATED ORAL at 17:40

## 2017-07-16 RX ADMIN — OXYCODONE HYDROCHLORIDE AND ACETAMINOPHEN 1 TABLET: 5; 325 TABLET ORAL at 09:05

## 2017-07-16 RX ADMIN — INSULIN GLARGINE 15 UNITS: 100 INJECTION, SOLUTION SUBCUTANEOUS at 21:10

## 2017-07-16 ASSESSMENT — PAIN SCALES - GENERAL
PAINLEVEL_OUTOF10: 8
PAINLEVEL_OUTOF10: 9
PAINLEVEL_OUTOF10: 0
PAINLEVEL_OUTOF10: 6

## 2017-07-17 LAB
ALBUMIN SERPL-MCNC: 2.7 G/DL (ref 3.5–5.2)
ALP BLD-CCNC: 52 U/L (ref 40–130)
ALT SERPL-CCNC: 21 U/L (ref 5–41)
ANION GAP SERPL CALCULATED.3IONS-SCNC: 10 MMOL/L (ref 7–19)
AST SERPL-CCNC: 18 U/L (ref 5–40)
BASOPHILS ABSOLUTE: 0 K/UL (ref 0–0.2)
BASOPHILS RELATIVE PERCENT: 0.2 % (ref 0–1)
BILIRUB SERPL-MCNC: 0.3 MG/DL (ref 0.2–1.2)
BUN BLDV-MCNC: 43 MG/DL (ref 8–23)
CALCIUM SERPL-MCNC: 9.1 MG/DL (ref 8.8–10.2)
CHLORIDE BLD-SCNC: 97 MMOL/L (ref 98–111)
CO2: 29 MMOL/L (ref 22–29)
CREAT SERPL-MCNC: 1.4 MG/DL (ref 0.5–1.2)
EOSINOPHILS ABSOLUTE: 0.2 K/UL (ref 0–0.6)
EOSINOPHILS RELATIVE PERCENT: 4.5 % (ref 0–5)
GFR NON-AFRICAN AMERICAN: 48
GLUCOSE BLD-MCNC: 157 MG/DL (ref 70–99)
GLUCOSE BLD-MCNC: 166 MG/DL (ref 70–99)
GLUCOSE BLD-MCNC: 254 MG/DL (ref 70–99)
GLUCOSE BLD-MCNC: 52 MG/DL (ref 70–99)
GLUCOSE BLD-MCNC: 57 MG/DL (ref 74–109)
GLUCOSE BLD-MCNC: 98 MG/DL (ref 70–99)
HCT VFR BLD CALC: 26.7 % (ref 42–52)
HEMOGLOBIN: 8.8 G/DL (ref 14–18)
LYMPHOCYTES ABSOLUTE: 1.3 K/UL (ref 1.1–4.5)
LYMPHOCYTES RELATIVE PERCENT: 27.7 % (ref 20–40)
MCH RBC QN AUTO: 33.7 PG (ref 27–31)
MCHC RBC AUTO-ENTMCNC: 33 G/DL (ref 33–37)
MCV RBC AUTO: 102.3 FL (ref 80–94)
MONOCYTES ABSOLUTE: 0.6 K/UL (ref 0–0.9)
MONOCYTES RELATIVE PERCENT: 12.2 % (ref 0–10)
NEUTROPHILS ABSOLUTE: 2.6 K/UL (ref 1.5–7.5)
NEUTROPHILS RELATIVE PERCENT: 55 % (ref 50–65)
PDW BLD-RTO: 12.3 % (ref 11.5–14.5)
PERFORMED ON: ABNORMAL
PERFORMED ON: NORMAL
PLATELET # BLD: 199 K/UL (ref 130–400)
PMV BLD AUTO: 10.3 FL (ref 9.4–12.4)
POTASSIUM SERPL-SCNC: 4.3 MMOL/L (ref 3.5–5)
RBC # BLD: 2.61 M/UL (ref 4.7–6.1)
SODIUM BLD-SCNC: 136 MMOL/L (ref 136–145)
TOTAL PROTEIN: 5.7 G/DL (ref 6.6–8.7)
WBC # BLD: 4.7 K/UL (ref 4.8–10.8)

## 2017-07-17 PROCEDURE — 80053 COMPREHEN METABOLIC PANEL: CPT

## 2017-07-17 PROCEDURE — 97110 THERAPEUTIC EXERCISES: CPT

## 2017-07-17 PROCEDURE — 97535 SELF CARE MNGMENT TRAINING: CPT

## 2017-07-17 PROCEDURE — 6370000000 HC RX 637 (ALT 250 FOR IP): Performed by: PSYCHIATRY & NEUROLOGY

## 2017-07-17 PROCEDURE — 85025 COMPLETE CBC W/AUTO DIFF WBC: CPT

## 2017-07-17 PROCEDURE — 99232 SBSQ HOSP IP/OBS MODERATE 35: CPT | Performed by: PSYCHIATRY & NEUROLOGY

## 2017-07-17 PROCEDURE — 6370000000 HC RX 637 (ALT 250 FOR IP): Performed by: FAMILY MEDICINE

## 2017-07-17 PROCEDURE — 6360000002 HC RX W HCPCS

## 2017-07-17 PROCEDURE — 1180000000 HC REHAB R&B

## 2017-07-17 PROCEDURE — 97530 THERAPEUTIC ACTIVITIES: CPT

## 2017-07-17 PROCEDURE — 36415 COLL VENOUS BLD VENIPUNCTURE: CPT

## 2017-07-17 PROCEDURE — 97116 GAIT TRAINING THERAPY: CPT

## 2017-07-17 PROCEDURE — 82948 REAGENT STRIP/BLOOD GLUCOSE: CPT

## 2017-07-17 PROCEDURE — 6370000000 HC RX 637 (ALT 250 FOR IP): Performed by: PHYSICIAN ASSISTANT

## 2017-07-17 RX ORDER — INSULIN GLARGINE 100 [IU]/ML
10 INJECTION, SOLUTION SUBCUTANEOUS NIGHTLY
Status: DISCONTINUED | OUTPATIENT
Start: 2017-07-17 | End: 2017-07-22 | Stop reason: HOSPADM

## 2017-07-17 RX ADMIN — PRAVASTATIN SODIUM 40 MG: 20 TABLET ORAL at 08:02

## 2017-07-17 RX ADMIN — Medication 1 TABLET: at 10:41

## 2017-07-17 RX ADMIN — GLIMEPIRIDE 4 MG: 4 TABLET ORAL at 17:16

## 2017-07-17 RX ADMIN — GLIMEPIRIDE 4 MG: 4 TABLET ORAL at 08:02

## 2017-07-17 RX ADMIN — INSULIN GLARGINE 10 UNITS: 100 INJECTION, SOLUTION SUBCUTANEOUS at 20:33

## 2017-07-17 RX ADMIN — PANTOPRAZOLE SODIUM 40 MG: 40 TABLET, DELAYED RELEASE ORAL at 08:02

## 2017-07-17 RX ADMIN — MUPIROCIN: 20 OINTMENT TOPICAL at 08:05

## 2017-07-17 RX ADMIN — CARVEDILOL 12.5 MG: 12.5 TABLET, FILM COATED ORAL at 17:16

## 2017-07-17 RX ADMIN — STANDARDIZED SENNA CONCENTRATE AND DOCUSATE SODIUM 2 TABLET: 8.6; 5 TABLET, FILM COATED ORAL at 16:16

## 2017-07-17 RX ADMIN — HYDROCHLOROTHIAZIDE 12.5 MG: 12.5 CAPSULE ORAL at 08:02

## 2017-07-17 RX ADMIN — LOSARTAN POTASSIUM 100 MG: 100 TABLET ORAL at 08:02

## 2017-07-17 RX ADMIN — DOXAZOSIN 1 MG: 1 TABLET ORAL at 20:03

## 2017-07-17 RX ADMIN — STANDARDIZED SENNA CONCENTRATE AND DOCUSATE SODIUM 2 TABLET: 8.6; 5 TABLET, FILM COATED ORAL at 08:02

## 2017-07-17 RX ADMIN — FUROSEMIDE 40 MG: 40 TABLET ORAL at 08:02

## 2017-07-17 RX ADMIN — OXYCODONE HYDROCHLORIDE AND ACETAMINOPHEN 1 TABLET: 5; 325 TABLET ORAL at 08:09

## 2017-07-17 RX ADMIN — AMLODIPINE BESYLATE 10 MG: 10 TABLET ORAL at 08:02

## 2017-07-17 RX ADMIN — POTASSIUM CHLORIDE 20 MEQ: 20 TABLET, EXTENDED RELEASE ORAL at 08:03

## 2017-07-17 RX ADMIN — FUROSEMIDE 40 MG: 40 TABLET ORAL at 17:16

## 2017-07-17 RX ADMIN — ACETAMINOPHEN 650 MG: 325 TABLET, FILM COATED ORAL at 06:06

## 2017-07-17 RX ADMIN — CARVEDILOL 12.5 MG: 12.5 TABLET, FILM COATED ORAL at 08:03

## 2017-07-17 RX ADMIN — OXYCODONE HYDROCHLORIDE AND ACETAMINOPHEN 1 TABLET: 5; 325 TABLET ORAL at 14:30

## 2017-07-17 RX ADMIN — ENOXAPARIN SODIUM 40 MG: 40 INJECTION SUBCUTANEOUS at 08:03

## 2017-07-17 ASSESSMENT — PAIN SCALES - GENERAL
PAINLEVEL_OUTOF10: 8
PAINLEVEL_OUTOF10: 8
PAINLEVEL_OUTOF10: 7
PAINLEVEL_OUTOF10: 8
PAINLEVEL_OUTOF10: 6
PAINLEVEL_OUTOF10: 0
PAINLEVEL_OUTOF10: 8
PAINLEVEL_OUTOF10: 0

## 2017-07-17 ASSESSMENT — PAIN DESCRIPTION - DESCRIPTORS
DESCRIPTORS: SORE
DESCRIPTORS: ACHING

## 2017-07-17 ASSESSMENT — PAIN DESCRIPTION - LOCATION
LOCATION: BACK
LOCATION: BACK
LOCATION: ABDOMEN;BACK
LOCATION: BACK

## 2017-07-17 ASSESSMENT — PAIN DESCRIPTION - FREQUENCY
FREQUENCY: CONTINUOUS
FREQUENCY: CONTINUOUS

## 2017-07-17 ASSESSMENT — PAIN DESCRIPTION - ORIENTATION
ORIENTATION: LOWER
ORIENTATION: LOWER
ORIENTATION: LOWER;MID

## 2017-07-17 ASSESSMENT — PAIN DESCRIPTION - PAIN TYPE
TYPE: SURGICAL PAIN
TYPE: SURGICAL PAIN

## 2017-07-18 LAB
GLUCOSE BLD-MCNC: 131 MG/DL (ref 70–99)
GLUCOSE BLD-MCNC: 148 MG/DL (ref 70–99)
GLUCOSE BLD-MCNC: 192 MG/DL (ref 70–99)
GLUCOSE BLD-MCNC: 73 MG/DL (ref 70–99)
PERFORMED ON: ABNORMAL
PERFORMED ON: NORMAL

## 2017-07-18 PROCEDURE — 97116 GAIT TRAINING THERAPY: CPT

## 2017-07-18 PROCEDURE — 1180000000 HC REHAB R&B

## 2017-07-18 PROCEDURE — 6370000000 HC RX 637 (ALT 250 FOR IP): Performed by: FAMILY MEDICINE

## 2017-07-18 PROCEDURE — 82948 REAGENT STRIP/BLOOD GLUCOSE: CPT

## 2017-07-18 PROCEDURE — 99233 SBSQ HOSP IP/OBS HIGH 50: CPT | Performed by: PSYCHIATRY & NEUROLOGY

## 2017-07-18 PROCEDURE — 6360000002 HC RX W HCPCS

## 2017-07-18 PROCEDURE — 6370000000 HC RX 637 (ALT 250 FOR IP): Performed by: PHYSICIAN ASSISTANT

## 2017-07-18 PROCEDURE — 97110 THERAPEUTIC EXERCISES: CPT

## 2017-07-18 PROCEDURE — 97535 SELF CARE MNGMENT TRAINING: CPT

## 2017-07-18 RX ADMIN — PANTOPRAZOLE SODIUM 40 MG: 40 TABLET, DELAYED RELEASE ORAL at 07:29

## 2017-07-18 RX ADMIN — CARVEDILOL 12.5 MG: 12.5 TABLET, FILM COATED ORAL at 07:29

## 2017-07-18 RX ADMIN — MUPIROCIN: 20 OINTMENT TOPICAL at 07:27

## 2017-07-18 RX ADMIN — STANDARDIZED SENNA CONCENTRATE AND DOCUSATE SODIUM 2 TABLET: 8.6; 5 TABLET, FILM COATED ORAL at 07:28

## 2017-07-18 RX ADMIN — CARVEDILOL 12.5 MG: 12.5 TABLET, FILM COATED ORAL at 17:37

## 2017-07-18 RX ADMIN — PRAVASTATIN SODIUM 40 MG: 20 TABLET ORAL at 07:28

## 2017-07-18 RX ADMIN — Medication 1 TABLET: at 07:28

## 2017-07-18 RX ADMIN — POTASSIUM CHLORIDE 20 MEQ: 20 TABLET, EXTENDED RELEASE ORAL at 07:29

## 2017-07-18 RX ADMIN — GLIMEPIRIDE 4 MG: 4 TABLET ORAL at 17:38

## 2017-07-18 RX ADMIN — FUROSEMIDE 40 MG: 40 TABLET ORAL at 07:28

## 2017-07-18 RX ADMIN — OXYCODONE HYDROCHLORIDE AND ACETAMINOPHEN 2 TABLET: 5; 325 TABLET ORAL at 20:24

## 2017-07-18 RX ADMIN — LOSARTAN POTASSIUM 100 MG: 100 TABLET ORAL at 07:29

## 2017-07-18 RX ADMIN — AMLODIPINE BESYLATE 10 MG: 10 TABLET ORAL at 07:29

## 2017-07-18 RX ADMIN — DOXAZOSIN 1 MG: 1 TABLET ORAL at 20:24

## 2017-07-18 RX ADMIN — OXYCODONE HYDROCHLORIDE AND ACETAMINOPHEN 1 TABLET: 5; 325 TABLET ORAL at 07:27

## 2017-07-18 RX ADMIN — HYDROCHLOROTHIAZIDE 12.5 MG: 12.5 CAPSULE ORAL at 07:29

## 2017-07-18 RX ADMIN — GLIMEPIRIDE 4 MG: 4 TABLET ORAL at 07:29

## 2017-07-18 RX ADMIN — INSULIN GLARGINE 10 UNITS: 100 INJECTION, SOLUTION SUBCUTANEOUS at 20:25

## 2017-07-18 RX ADMIN — OXYCODONE HYDROCHLORIDE AND ACETAMINOPHEN 1 TABLET: 5; 325 TABLET ORAL at 13:06

## 2017-07-18 RX ADMIN — ENOXAPARIN SODIUM 40 MG: 40 INJECTION SUBCUTANEOUS at 07:27

## 2017-07-18 RX ADMIN — STANDARDIZED SENNA CONCENTRATE AND DOCUSATE SODIUM 2 TABLET: 8.6; 5 TABLET, FILM COATED ORAL at 17:37

## 2017-07-18 ASSESSMENT — PAIN SCALES - GENERAL
PAINLEVEL_OUTOF10: 8
PAINLEVEL_OUTOF10: 1
PAINLEVEL_OUTOF10: 2
PAINLEVEL_OUTOF10: 7
PAINLEVEL_OUTOF10: 8

## 2017-07-18 ASSESSMENT — PAIN DESCRIPTION - ORIENTATION: ORIENTATION: LOWER

## 2017-07-18 ASSESSMENT — PAIN DESCRIPTION - DESCRIPTORS: DESCRIPTORS: ACHING

## 2017-07-18 ASSESSMENT — PAIN DESCRIPTION - PAIN TYPE: TYPE: SURGICAL PAIN

## 2017-07-18 ASSESSMENT — PAIN DESCRIPTION - LOCATION
LOCATION: BACK

## 2017-07-18 ASSESSMENT — PAIN DESCRIPTION - FREQUENCY: FREQUENCY: CONTINUOUS

## 2017-07-19 LAB
GLUCOSE BLD-MCNC: 112 MG/DL (ref 70–99)
GLUCOSE BLD-MCNC: 121 MG/DL (ref 70–99)
GLUCOSE BLD-MCNC: 217 MG/DL (ref 70–99)
GLUCOSE BLD-MCNC: 78 MG/DL (ref 70–99)
PERFORMED ON: ABNORMAL
PERFORMED ON: NORMAL

## 2017-07-19 PROCEDURE — 97116 GAIT TRAINING THERAPY: CPT

## 2017-07-19 PROCEDURE — 99232 SBSQ HOSP IP/OBS MODERATE 35: CPT | Performed by: PSYCHIATRY & NEUROLOGY

## 2017-07-19 PROCEDURE — 97110 THERAPEUTIC EXERCISES: CPT

## 2017-07-19 PROCEDURE — 97530 THERAPEUTIC ACTIVITIES: CPT

## 2017-07-19 PROCEDURE — 6370000000 HC RX 637 (ALT 250 FOR IP): Performed by: PHYSICIAN ASSISTANT

## 2017-07-19 PROCEDURE — 1180000000 HC REHAB R&B

## 2017-07-19 PROCEDURE — 6360000002 HC RX W HCPCS

## 2017-07-19 PROCEDURE — 6370000000 HC RX 637 (ALT 250 FOR IP): Performed by: FAMILY MEDICINE

## 2017-07-19 PROCEDURE — 6360000002 HC RX W HCPCS: Performed by: PHYSICIAN ASSISTANT

## 2017-07-19 PROCEDURE — 82948 REAGENT STRIP/BLOOD GLUCOSE: CPT

## 2017-07-19 PROCEDURE — 97535 SELF CARE MNGMENT TRAINING: CPT

## 2017-07-19 RX ADMIN — CARVEDILOL 12.5 MG: 12.5 TABLET, FILM COATED ORAL at 17:49

## 2017-07-19 RX ADMIN — Medication 1 TABLET: at 07:17

## 2017-07-19 RX ADMIN — FUROSEMIDE 40 MG: 40 TABLET ORAL at 07:17

## 2017-07-19 RX ADMIN — PRAVASTATIN SODIUM 40 MG: 20 TABLET ORAL at 07:13

## 2017-07-19 RX ADMIN — ENOXAPARIN SODIUM 40 MG: 40 INJECTION SUBCUTANEOUS at 07:12

## 2017-07-19 RX ADMIN — STANDARDIZED SENNA CONCENTRATE AND DOCUSATE SODIUM 2 TABLET: 8.6; 5 TABLET, FILM COATED ORAL at 07:13

## 2017-07-19 RX ADMIN — CARVEDILOL 12.5 MG: 12.5 TABLET, FILM COATED ORAL at 07:17

## 2017-07-19 RX ADMIN — POTASSIUM CHLORIDE 20 MEQ: 20 TABLET, EXTENDED RELEASE ORAL at 07:15

## 2017-07-19 RX ADMIN — MUPIROCIN: 20 OINTMENT TOPICAL at 07:17

## 2017-07-19 RX ADMIN — STANDARDIZED SENNA CONCENTRATE AND DOCUSATE SODIUM 2 TABLET: 8.6; 5 TABLET, FILM COATED ORAL at 17:49

## 2017-07-19 RX ADMIN — PANTOPRAZOLE SODIUM 40 MG: 40 TABLET, DELAYED RELEASE ORAL at 07:15

## 2017-07-19 RX ADMIN — AMLODIPINE BESYLATE 10 MG: 10 TABLET ORAL at 07:15

## 2017-07-19 RX ADMIN — PROMETHAZINE HYDROCHLORIDE 12.5 MG: 12.5 TABLET ORAL at 09:49

## 2017-07-19 RX ADMIN — FUROSEMIDE 40 MG: 40 TABLET ORAL at 17:49

## 2017-07-19 RX ADMIN — OXYCODONE HYDROCHLORIDE AND ACETAMINOPHEN 1 TABLET: 5; 325 TABLET ORAL at 19:27

## 2017-07-19 RX ADMIN — HYDROCHLOROTHIAZIDE 12.5 MG: 12.5 CAPSULE ORAL at 07:13

## 2017-07-19 RX ADMIN — INSULIN GLARGINE 10 UNITS: 100 INJECTION, SOLUTION SUBCUTANEOUS at 20:25

## 2017-07-19 RX ADMIN — DOXAZOSIN 1 MG: 1 TABLET ORAL at 20:25

## 2017-07-19 RX ADMIN — GLIMEPIRIDE 4 MG: 4 TABLET ORAL at 17:49

## 2017-07-19 RX ADMIN — GLIMEPIRIDE 4 MG: 4 TABLET ORAL at 07:17

## 2017-07-19 RX ADMIN — LOSARTAN POTASSIUM 100 MG: 100 TABLET ORAL at 07:15

## 2017-07-19 RX ADMIN — OXYCODONE HYDROCHLORIDE AND ACETAMINOPHEN 1 TABLET: 5; 325 TABLET ORAL at 08:40

## 2017-07-19 ASSESSMENT — PAIN SCALES - GENERAL
PAINLEVEL_OUTOF10: 5
PAINLEVEL_OUTOF10: 10
PAINLEVEL_OUTOF10: 0
PAINLEVEL_OUTOF10: 1
PAINLEVEL_OUTOF10: 9

## 2017-07-19 ASSESSMENT — PAIN DESCRIPTION - ORIENTATION: ORIENTATION: LOWER

## 2017-07-19 ASSESSMENT — PAIN DESCRIPTION - LOCATION: LOCATION: BACK

## 2017-07-20 LAB
ALBUMIN SERPL-MCNC: 2.9 G/DL (ref 3.5–5.2)
ALP BLD-CCNC: 58 U/L (ref 40–130)
ALT SERPL-CCNC: 26 U/L (ref 5–41)
ANION GAP SERPL CALCULATED.3IONS-SCNC: 12 MMOL/L (ref 7–19)
AST SERPL-CCNC: 25 U/L (ref 5–40)
BASOPHILS ABSOLUTE: 0 K/UL (ref 0–0.2)
BASOPHILS RELATIVE PERCENT: 0.4 % (ref 0–1)
BILIRUB SERPL-MCNC: 0.3 MG/DL (ref 0.2–1.2)
BUN BLDV-MCNC: 64 MG/DL (ref 8–23)
CALCIUM SERPL-MCNC: 9.5 MG/DL (ref 8.8–10.2)
CHLORIDE BLD-SCNC: 98 MMOL/L (ref 98–111)
CO2: 28 MMOL/L (ref 22–29)
CREAT SERPL-MCNC: 1.8 MG/DL (ref 0.5–1.2)
EOSINOPHILS ABSOLUTE: 0.2 K/UL (ref 0–0.6)
EOSINOPHILS RELATIVE PERCENT: 5 % (ref 0–5)
GFR NON-AFRICAN AMERICAN: 36
GLUCOSE BLD-MCNC: 164 MG/DL (ref 70–99)
GLUCOSE BLD-MCNC: 72 MG/DL (ref 70–99)
GLUCOSE BLD-MCNC: 83 MG/DL (ref 74–109)
HCT VFR BLD CALC: 28.1 % (ref 42–52)
HEMOGLOBIN: 9.4 G/DL (ref 14–18)
LYMPHOCYTES ABSOLUTE: 1.7 K/UL (ref 1.1–4.5)
LYMPHOCYTES RELATIVE PERCENT: 36.2 % (ref 20–40)
MCH RBC QN AUTO: 34.3 PG (ref 27–31)
MCHC RBC AUTO-ENTMCNC: 33.5 G/DL (ref 33–37)
MCV RBC AUTO: 102.6 FL (ref 80–94)
MONOCYTES ABSOLUTE: 0.6 K/UL (ref 0–0.9)
MONOCYTES RELATIVE PERCENT: 12.8 % (ref 0–10)
NEUTROPHILS ABSOLUTE: 2.2 K/UL (ref 1.5–7.5)
NEUTROPHILS RELATIVE PERCENT: 45.2 % (ref 50–65)
PDW BLD-RTO: 12.3 % (ref 11.5–14.5)
PERFORMED ON: ABNORMAL
PERFORMED ON: NORMAL
PLATELET # BLD: 335 K/UL (ref 130–400)
PMV BLD AUTO: 10.1 FL (ref 9.4–12.4)
POTASSIUM SERPL-SCNC: 5.3 MMOL/L (ref 3.5–5)
RBC # BLD: 2.74 M/UL (ref 4.7–6.1)
SODIUM BLD-SCNC: 138 MMOL/L (ref 136–145)
TOTAL PROTEIN: 6.2 G/DL (ref 6.6–8.7)
WBC # BLD: 4.8 K/UL (ref 4.8–10.8)

## 2017-07-20 PROCEDURE — 82948 REAGENT STRIP/BLOOD GLUCOSE: CPT

## 2017-07-20 PROCEDURE — 6370000000 HC RX 637 (ALT 250 FOR IP): Performed by: FAMILY MEDICINE

## 2017-07-20 PROCEDURE — 80053 COMPREHEN METABOLIC PANEL: CPT

## 2017-07-20 PROCEDURE — 97116 GAIT TRAINING THERAPY: CPT

## 2017-07-20 PROCEDURE — 85025 COMPLETE CBC W/AUTO DIFF WBC: CPT

## 2017-07-20 PROCEDURE — 99232 SBSQ HOSP IP/OBS MODERATE 35: CPT | Performed by: PSYCHIATRY & NEUROLOGY

## 2017-07-20 PROCEDURE — 6370000000 HC RX 637 (ALT 250 FOR IP): Performed by: PHYSICIAN ASSISTANT

## 2017-07-20 PROCEDURE — 1180000000 HC REHAB R&B

## 2017-07-20 PROCEDURE — 36415 COLL VENOUS BLD VENIPUNCTURE: CPT

## 2017-07-20 PROCEDURE — 6370000000 HC RX 637 (ALT 250 FOR IP): Performed by: PSYCHIATRY & NEUROLOGY

## 2017-07-20 PROCEDURE — 6360000002 HC RX W HCPCS

## 2017-07-20 PROCEDURE — 97530 THERAPEUTIC ACTIVITIES: CPT

## 2017-07-20 RX ORDER — FUROSEMIDE 40 MG/1
40 TABLET ORAL DAILY
Status: DISCONTINUED | OUTPATIENT
Start: 2017-07-21 | End: 2017-07-21

## 2017-07-20 RX ADMIN — PRAVASTATIN SODIUM 40 MG: 20 TABLET ORAL at 08:04

## 2017-07-20 RX ADMIN — INSULIN GLARGINE 10 UNITS: 100 INJECTION, SOLUTION SUBCUTANEOUS at 20:33

## 2017-07-20 RX ADMIN — HYDROCHLOROTHIAZIDE 12.5 MG: 12.5 CAPSULE ORAL at 08:05

## 2017-07-20 RX ADMIN — ACETAMINOPHEN 650 MG: 325 TABLET, FILM COATED ORAL at 11:11

## 2017-07-20 RX ADMIN — LOSARTAN POTASSIUM 100 MG: 100 TABLET ORAL at 08:05

## 2017-07-20 RX ADMIN — POTASSIUM CHLORIDE 20 MEQ: 20 TABLET, EXTENDED RELEASE ORAL at 08:05

## 2017-07-20 RX ADMIN — MAGESIUM CITRATE 296 ML: 1.75 LIQUID ORAL at 20:33

## 2017-07-20 RX ADMIN — AMLODIPINE BESYLATE 10 MG: 10 TABLET ORAL at 08:04

## 2017-07-20 RX ADMIN — MUPIROCIN: 20 OINTMENT TOPICAL at 11:05

## 2017-07-20 RX ADMIN — CARVEDILOL 12.5 MG: 12.5 TABLET, FILM COATED ORAL at 08:05

## 2017-07-20 RX ADMIN — ENOXAPARIN SODIUM 40 MG: 40 INJECTION SUBCUTANEOUS at 08:04

## 2017-07-20 RX ADMIN — CARVEDILOL 12.5 MG: 12.5 TABLET, FILM COATED ORAL at 20:32

## 2017-07-20 RX ADMIN — OXYCODONE HYDROCHLORIDE AND ACETAMINOPHEN 2 TABLET: 5; 325 TABLET ORAL at 07:50

## 2017-07-20 RX ADMIN — PANTOPRAZOLE SODIUM 40 MG: 40 TABLET, DELAYED RELEASE ORAL at 08:05

## 2017-07-20 RX ADMIN — STANDARDIZED SENNA CONCENTRATE AND DOCUSATE SODIUM 2 TABLET: 8.6; 5 TABLET, FILM COATED ORAL at 08:05

## 2017-07-20 RX ADMIN — GLIMEPIRIDE 4 MG: 4 TABLET ORAL at 20:32

## 2017-07-20 RX ADMIN — FUROSEMIDE 40 MG: 40 TABLET ORAL at 08:04

## 2017-07-20 RX ADMIN — Medication 1 TABLET: at 08:05

## 2017-07-20 RX ADMIN — GLIMEPIRIDE 4 MG: 4 TABLET ORAL at 08:05

## 2017-07-20 RX ADMIN — OXYCODONE HYDROCHLORIDE AND ACETAMINOPHEN 1 TABLET: 5; 325 TABLET ORAL at 20:32

## 2017-07-20 RX ADMIN — DOXAZOSIN 1 MG: 1 TABLET ORAL at 20:32

## 2017-07-20 ASSESSMENT — PAIN SCALES - GENERAL
PAINLEVEL_OUTOF10: 7
PAINLEVEL_OUTOF10: 6
PAINLEVEL_OUTOF10: 6
PAINLEVEL_OUTOF10: 9

## 2017-07-21 LAB
ANION GAP SERPL CALCULATED.3IONS-SCNC: 10 MMOL/L (ref 7–19)
BUN BLDV-MCNC: 67 MG/DL (ref 8–23)
CALCIUM SERPL-MCNC: 9.5 MG/DL (ref 8.8–10.2)
CHLORIDE BLD-SCNC: 98 MMOL/L (ref 98–111)
CO2: 28 MMOL/L (ref 22–29)
CREAT SERPL-MCNC: 2 MG/DL (ref 0.5–1.2)
GFR NON-AFRICAN AMERICAN: 32
GLUCOSE BLD-MCNC: 103 MG/DL (ref 70–99)
GLUCOSE BLD-MCNC: 132 MG/DL (ref 70–99)
GLUCOSE BLD-MCNC: 141 MG/DL (ref 70–99)
GLUCOSE BLD-MCNC: 152 MG/DL (ref 70–99)
GLUCOSE BLD-MCNC: 40 MG/DL (ref 70–99)
GLUCOSE BLD-MCNC: 40 MG/DL (ref 74–109)
PERFORMED ON: ABNORMAL
POTASSIUM SERPL-SCNC: 5 MMOL/L (ref 3.5–5)
SODIUM BLD-SCNC: 136 MMOL/L (ref 136–145)

## 2017-07-21 PROCEDURE — 97116 GAIT TRAINING THERAPY: CPT

## 2017-07-21 PROCEDURE — 1180000000 HC REHAB R&B

## 2017-07-21 PROCEDURE — 97530 THERAPEUTIC ACTIVITIES: CPT

## 2017-07-21 PROCEDURE — 97110 THERAPEUTIC EXERCISES: CPT

## 2017-07-21 PROCEDURE — 6370000000 HC RX 637 (ALT 250 FOR IP): Performed by: FAMILY MEDICINE

## 2017-07-21 PROCEDURE — 6360000002 HC RX W HCPCS

## 2017-07-21 PROCEDURE — 99232 SBSQ HOSP IP/OBS MODERATE 35: CPT | Performed by: PSYCHIATRY & NEUROLOGY

## 2017-07-21 PROCEDURE — 80048 BASIC METABOLIC PNL TOTAL CA: CPT

## 2017-07-21 PROCEDURE — 36415 COLL VENOUS BLD VENIPUNCTURE: CPT

## 2017-07-21 PROCEDURE — 97535 SELF CARE MNGMENT TRAINING: CPT

## 2017-07-21 PROCEDURE — 2580000003 HC RX 258: Performed by: FAMILY MEDICINE

## 2017-07-21 PROCEDURE — 6370000000 HC RX 637 (ALT 250 FOR IP): Performed by: PHYSICIAN ASSISTANT

## 2017-07-21 PROCEDURE — 82948 REAGENT STRIP/BLOOD GLUCOSE: CPT

## 2017-07-21 RX ORDER — OXYCODONE HYDROCHLORIDE AND ACETAMINOPHEN 5; 325 MG/1; MG/1
1 TABLET ORAL EVERY 4 HOURS PRN
Qty: 180 TABLET | Refills: 0 | Status: SHIPPED | OUTPATIENT
Start: 2017-07-21 | End: 2017-08-20

## 2017-07-21 RX ORDER — SODIUM CHLORIDE 9 MG/ML
INJECTION, SOLUTION INTRAVENOUS CONTINUOUS
Status: ACTIVE | OUTPATIENT
Start: 2017-07-21 | End: 2017-07-21

## 2017-07-21 RX ADMIN — INSULIN GLARGINE 10 UNITS: 100 INJECTION, SOLUTION SUBCUTANEOUS at 20:59

## 2017-07-21 RX ADMIN — AMLODIPINE BESYLATE 10 MG: 10 TABLET ORAL at 07:19

## 2017-07-21 RX ADMIN — LOSARTAN POTASSIUM 100 MG: 100 TABLET ORAL at 07:19

## 2017-07-21 RX ADMIN — Medication 1 TABLET: at 07:19

## 2017-07-21 RX ADMIN — CARVEDILOL 12.5 MG: 12.5 TABLET, FILM COATED ORAL at 17:00

## 2017-07-21 RX ADMIN — MUPIROCIN: 20 OINTMENT TOPICAL at 07:18

## 2017-07-21 RX ADMIN — STANDARDIZED SENNA CONCENTRATE AND DOCUSATE SODIUM 2 TABLET: 8.6; 5 TABLET, FILM COATED ORAL at 07:18

## 2017-07-21 RX ADMIN — HYDROCHLOROTHIAZIDE 12.5 MG: 12.5 CAPSULE ORAL at 07:19

## 2017-07-21 RX ADMIN — ENOXAPARIN SODIUM 40 MG: 40 INJECTION SUBCUTANEOUS at 07:18

## 2017-07-21 RX ADMIN — CARVEDILOL 12.5 MG: 12.5 TABLET, FILM COATED ORAL at 07:19

## 2017-07-21 RX ADMIN — GLIMEPIRIDE 4 MG: 4 TABLET ORAL at 07:19

## 2017-07-21 RX ADMIN — DOXAZOSIN 1 MG: 1 TABLET ORAL at 21:00

## 2017-07-21 RX ADMIN — GLIMEPIRIDE 4 MG: 4 TABLET ORAL at 17:00

## 2017-07-21 RX ADMIN — PANTOPRAZOLE SODIUM 40 MG: 40 TABLET, DELAYED RELEASE ORAL at 07:19

## 2017-07-21 RX ADMIN — STANDARDIZED SENNA CONCENTRATE AND DOCUSATE SODIUM 2 TABLET: 8.6; 5 TABLET, FILM COATED ORAL at 16:59

## 2017-07-21 RX ADMIN — MAGESIUM CITRATE 296 ML: 1.75 LIQUID ORAL at 16:59

## 2017-07-21 RX ADMIN — SODIUM CHLORIDE: 9 INJECTION, SOLUTION INTRAVENOUS at 09:49

## 2017-07-21 RX ADMIN — OXYCODONE HYDROCHLORIDE AND ACETAMINOPHEN 1 TABLET: 5; 325 TABLET ORAL at 14:51

## 2017-07-21 RX ADMIN — OXYCODONE HYDROCHLORIDE AND ACETAMINOPHEN 1 TABLET: 5; 325 TABLET ORAL at 21:00

## 2017-07-21 RX ADMIN — PRAVASTATIN SODIUM 40 MG: 20 TABLET ORAL at 07:19

## 2017-07-21 ASSESSMENT — PAIN DESCRIPTION - LOCATION
LOCATION: HIP
LOCATION: BACK
LOCATION: BACK

## 2017-07-21 ASSESSMENT — PAIN SCALES - GENERAL
PAINLEVEL_OUTOF10: 0
PAINLEVEL_OUTOF10: 4
PAINLEVEL_OUTOF10: 3
PAINLEVEL_OUTOF10: 4
PAINLEVEL_OUTOF10: 7
PAINLEVEL_OUTOF10: 3
PAINLEVEL_OUTOF10: 4
PAINLEVEL_OUTOF10: 0
PAINLEVEL_OUTOF10: 0
PAINLEVEL_OUTOF10: 4
PAINLEVEL_OUTOF10: 5

## 2017-07-21 ASSESSMENT — PAIN DESCRIPTION - ORIENTATION
ORIENTATION: MID
ORIENTATION: MID
ORIENTATION: RIGHT

## 2017-07-21 ASSESSMENT — PAIN DESCRIPTION - PAIN TYPE
TYPE: ACUTE PAIN

## 2017-07-21 ASSESSMENT — PAIN DESCRIPTION - DESCRIPTORS
DESCRIPTORS: ACHING

## 2017-07-21 ASSESSMENT — PAIN DESCRIPTION - FREQUENCY
FREQUENCY: INTERMITTENT
FREQUENCY: INTERMITTENT

## 2017-07-22 VITALS
WEIGHT: 208.5 LBS | HEIGHT: 70 IN | TEMPERATURE: 98 F | DIASTOLIC BLOOD PRESSURE: 60 MMHG | OXYGEN SATURATION: 97 % | BODY MASS INDEX: 29.85 KG/M2 | RESPIRATION RATE: 16 BRPM | HEART RATE: 58 BPM | SYSTOLIC BLOOD PRESSURE: 130 MMHG

## 2017-07-22 LAB
ANION GAP SERPL CALCULATED.3IONS-SCNC: 9 MMOL/L (ref 7–19)
BASOPHILS ABSOLUTE: 0 K/UL (ref 0–0.2)
BASOPHILS RELATIVE PERCENT: 0.2 % (ref 0–1)
BUN BLDV-MCNC: 59 MG/DL (ref 8–23)
CALCIUM SERPL-MCNC: 9.5 MG/DL (ref 8.8–10.2)
CHLORIDE BLD-SCNC: 100 MMOL/L (ref 98–111)
CO2: 29 MMOL/L (ref 22–29)
CREAT SERPL-MCNC: 1.7 MG/DL (ref 0.5–1.2)
EOSINOPHILS ABSOLUTE: 0.3 K/UL (ref 0–0.6)
EOSINOPHILS RELATIVE PERCENT: 8.1 % (ref 0–5)
GFR NON-AFRICAN AMERICAN: 39
GLUCOSE BLD-MCNC: 46 MG/DL (ref 70–99)
GLUCOSE BLD-MCNC: 48 MG/DL (ref 74–109)
GLUCOSE BLD-MCNC: 91 MG/DL (ref 70–99)
HCT VFR BLD CALC: 27.4 % (ref 42–52)
HEMOGLOBIN: 8.9 G/DL (ref 14–18)
LYMPHOCYTES ABSOLUTE: 1.5 K/UL (ref 1.1–4.5)
LYMPHOCYTES RELATIVE PERCENT: 36.4 % (ref 20–40)
MCH RBC QN AUTO: 33.6 PG (ref 27–31)
MCHC RBC AUTO-ENTMCNC: 32.5 G/DL (ref 33–37)
MCV RBC AUTO: 103.4 FL (ref 80–94)
MONOCYTES ABSOLUTE: 0.7 K/UL (ref 0–0.9)
MONOCYTES RELATIVE PERCENT: 16 % (ref 0–10)
NEUTROPHILS ABSOLUTE: 1.6 K/UL (ref 1.5–7.5)
NEUTROPHILS RELATIVE PERCENT: 39.1 % (ref 50–65)
PDW BLD-RTO: 12.4 % (ref 11.5–14.5)
PERFORMED ON: ABNORMAL
PERFORMED ON: NORMAL
PLATELET # BLD: 334 K/UL (ref 130–400)
PMV BLD AUTO: 9.5 FL (ref 9.4–12.4)
POTASSIUM SERPL-SCNC: 5.7 MMOL/L (ref 3.5–5)
RBC # BLD: 2.65 M/UL (ref 4.7–6.1)
SODIUM BLD-SCNC: 138 MMOL/L (ref 136–145)
WBC # BLD: 4.2 K/UL (ref 4.8–10.8)

## 2017-07-22 PROCEDURE — 36415 COLL VENOUS BLD VENIPUNCTURE: CPT

## 2017-07-22 PROCEDURE — 6370000000 HC RX 637 (ALT 250 FOR IP): Performed by: PSYCHIATRY & NEUROLOGY

## 2017-07-22 PROCEDURE — 6370000000 HC RX 637 (ALT 250 FOR IP): Performed by: PHYSICIAN ASSISTANT

## 2017-07-22 PROCEDURE — 85025 COMPLETE CBC W/AUTO DIFF WBC: CPT

## 2017-07-22 PROCEDURE — 6360000002 HC RX W HCPCS

## 2017-07-22 PROCEDURE — 82948 REAGENT STRIP/BLOOD GLUCOSE: CPT

## 2017-07-22 PROCEDURE — 6370000000 HC RX 637 (ALT 250 FOR IP): Performed by: FAMILY MEDICINE

## 2017-07-22 PROCEDURE — 80048 BASIC METABOLIC PNL TOTAL CA: CPT

## 2017-07-22 RX ORDER — SODIUM POLYSTYRENE SULFONATE 15 G/60ML
15 SUSPENSION ORAL; RECTAL ONCE
Status: COMPLETED | OUTPATIENT
Start: 2017-07-22 | End: 2017-07-22

## 2017-07-22 RX ADMIN — STANDARDIZED SENNA CONCENTRATE AND DOCUSATE SODIUM 2 TABLET: 8.6; 5 TABLET, FILM COATED ORAL at 07:28

## 2017-07-22 RX ADMIN — Medication 1 TABLET: at 07:29

## 2017-07-22 RX ADMIN — CARVEDILOL 12.5 MG: 12.5 TABLET, FILM COATED ORAL at 07:29

## 2017-07-22 RX ADMIN — HYDROCHLOROTHIAZIDE 12.5 MG: 12.5 CAPSULE ORAL at 07:28

## 2017-07-22 RX ADMIN — PRAVASTATIN SODIUM 40 MG: 20 TABLET ORAL at 07:28

## 2017-07-22 RX ADMIN — GLIMEPIRIDE 4 MG: 4 TABLET ORAL at 07:29

## 2017-07-22 RX ADMIN — LOSARTAN POTASSIUM 100 MG: 100 TABLET ORAL at 07:29

## 2017-07-22 RX ADMIN — AMLODIPINE BESYLATE 10 MG: 10 TABLET ORAL at 07:29

## 2017-07-22 RX ADMIN — PANTOPRAZOLE SODIUM 40 MG: 40 TABLET, DELAYED RELEASE ORAL at 07:29

## 2017-07-22 RX ADMIN — ENOXAPARIN SODIUM 40 MG: 40 INJECTION SUBCUTANEOUS at 07:28

## 2017-07-22 RX ADMIN — MUPIROCIN: 20 OINTMENT TOPICAL at 07:28

## 2017-07-22 RX ADMIN — SODIUM POLYSTYRENE SULFONATE 15 G: 15 SUSPENSION ORAL; RECTAL at 07:57

## 2017-07-22 ASSESSMENT — PAIN SCALES - GENERAL
PAINLEVEL_OUTOF10: 0
PAINLEVEL_OUTOF10: 0

## 2017-07-27 ENCOUNTER — OFFICE VISIT (OUTPATIENT)
Dept: GASTROENTEROLOGY | Facility: CLINIC | Age: 82
End: 2017-07-27

## 2017-07-27 VITALS
WEIGHT: 202 LBS | BODY MASS INDEX: 28.92 KG/M2 | HEIGHT: 70 IN | HEART RATE: 76 BPM | DIASTOLIC BLOOD PRESSURE: 66 MMHG | SYSTOLIC BLOOD PRESSURE: 160 MMHG | TEMPERATURE: 97.6 F

## 2017-07-27 DIAGNOSIS — R19.4 CHANGE IN BOWEL HABIT: Primary | ICD-10-CM

## 2017-07-27 DIAGNOSIS — K59.00 CONSTIPATION, UNSPECIFIED CONSTIPATION TYPE: ICD-10-CM

## 2017-07-27 DIAGNOSIS — R13.10 DYSPHAGIA, UNSPECIFIED TYPE: ICD-10-CM

## 2017-07-27 PROCEDURE — 99214 OFFICE O/P EST MOD 30 MIN: CPT | Performed by: NURSE PRACTITIONER

## 2017-07-27 NOTE — PROGRESS NOTES
Chief Complaint   Patient presents with   • Constipation     constipated bad has been in hospital had back releaaed last saturday been a week since last bm       Subjective     HPI    Pt hospitalized at [facility]  2 weeks ago for back surgery.  He presents to office today c/o no BM x 1 week.  This is a change for patient.  He has not taken anything to relieve bowels. Prior to hospitalization bowels described as moving without difficulty.  No n/v. No BRBPR.  He has lower abdominal cramping that started in the past week.  No alleviating factors.  He continues to experience difficulty swallowing.  He is only able to swallow small amounts of solid food at a time and tolerate small amounts of liquid at a time.  This has been occurring for many months according to pt.     CScope (Dr Rich) 2013 with diverticulosis noted  Endoscopy (Dr Rich) 4/2017 healed duodenal ulcer, EGD prn      Past Medical History:   Diagnosis Date   • A-fib 11/15/2016   • Anemia     gets shots every few months to build up blood. sees dr adam.   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   • Arthritis    • Bradycardia 11/15/2016   • CAD in native artery 11/15/2016   • Chest pain 11/15/2016   • CHF (congestive heart failure)    • Chronic kidney disease    • COPD (chronic obstructive pulmonary disease)    • DDD (degenerative disc disease), lumbar 6/27/2017   • Heart murmur    • HTN (hypertension) 11/15/2016   • Hyperlipidemia    • Lumbar stenosis with neurogenic claudication 6/27/2017   • Myocardial infarction    • Sleep apnea    • Stroke    • Type 2 diabetes mellitus 11/15/2016   • Ulcer of abdomen wall        Past Surgical History:   Procedure Laterality Date   • APPENDECTOMY     • BACK SURGERY      x2   • CARDIAC CATHETERIZATION Left     1/2010   • CARPAL TUNNEL RELEASE Bilateral    • CHOLECYSTECTOMY     • CORONARY ARTERY BYPASS GRAFT      2/2006 w/PTCA & KIMMY    • CORONARY STENT PLACEMENT     • ENDOSCOPY N/A 12/14/2016    Procedure:  ESOPHAGOGASTRODUODENOSCOPY WITH ANESTHESIA;  Surgeon: Isabel Dexter MD;  Location: [facility] ENDOSCOPY;  Service:    • ENDOSCOPY N/A 12/16/2016    Procedure: ESOPHAGOGASTRODUODENOSCOPY WITH ANESTHESIA;  Surgeon: Joshua Rich DO;  Location: [facility] ENDOSCOPY;  Service:    • ENDOSCOPY N/A 4/10/2017    Procedure: ESOPHAGOGASTRODUODENOSCOPY WITH ANESTHESIA;  Surgeon: Joshua Rich DO;  Location: [facility] ENDOSCOPY;  Service:    • EYE SURGERY Left     x 2   • JOINT REPLACEMENT     • PERIPHERAL ARTERIAL STENT GRAFT     • REPLACEMENT TOTAL KNEE Left     2002   • SHOULDER ROTATOR CUFF REPAIR Bilateral        Outpatient Prescriptions Marked as Taking for the 7/27/17 encounter (Office Visit) with ANJANA Wyatt   Medication Sig Dispense Refill   • allopurinol (ZYLOPRIM) 300 MG tablet Take 300 mg by mouth Daily.     • amLODIPine (NORVASC) 10 MG tablet Take 1 tablet by mouth Daily. 30 tablet 1   • aspirin 81 MG chewable tablet Chew 81 mg Daily.     • carvedilol (COREG) 12.5 MG tablet Take 12.5 mg by mouth 2 (Two) Times a Day With Meals.     • colchicine 0.6 MG tablet Take 0.6 mg by mouth Daily As Needed (gout flare-up).     • doxazosin (CARDURA) 1 MG tablet Take 1 mg by mouth Every Night.     • gabapentin (NEURONTIN) 100 MG capsule Take 2 capsules by mouth Every 12 (Twelve) Hours. 40 capsule 0   • glimepiride (AMARYL) 4 MG tablet Take 4 mg by mouth Every 12 (Twelve) Hours. Needs pm dose     • Insulin Glargine (LANTUS SOLOSTAR) 100 UNIT/ML injection pen Inject 15 Units under the skin At Night As Needed (BG >120).     • ondansetron (ZOFRAN) 4 MG tablet Take 1 tablet by mouth Every 8 (Eight) Hours As Needed for Nausea or Vomiting. 15 tablet 0   • pantoprazole (PROTONIX) 40 MG EC tablet Take 40 mg by mouth Every 12 (Twelve) Hours.     • potassium chloride (K-DUR,KLOR-CON) 10 MEQ CR tablet Take 10 mEq by mouth Every 12 (Twelve) Hours.     • pravastatin (PRAVACHOL) 40 MG tablet Take 40 mg by mouth Every Night.     •  prednisoLONE acetate (PRED FORTE) 1 % ophthalmic suspension Administer 1 drop into the left eye Every 2 (Two) Hours While Awake.     • predniSONE (DELTASONE) 10 MG tablet Take 1 tablet by mouth Daily. Take 4 tabs daily for 3 days, 3 tabs for 3 days, 2 tabs for 3 days and 1 tab for 3 days then stop 30 tablet 0   • Pyridoxine HCl (VITAMIN B6) 200 MG tablet Take 1 tablet by mouth Daily.     • rOPINIRole (REQUIP) 1 MG tablet Take 1 mg by mouth Every Night. Take 1 hour before bedtime.     • sennosides-docusate sodium (SENOKOT-S) 8.6-50 MG tablet Take 2 tablets by mouth 2 (Two) Times a Day As Needed for constipation. 45 tablet 2   • vitamin C (ASCORBIC ACID) 500 MG tablet Take 500 mg by mouth Daily.         Allergies   Allergen Reactions   • Penicillins Swelling   • Adhesive Tape Rash       Social History     Social History   • Marital status:      Spouse name: N/A   • Number of children: N/A   • Years of education: N/A     Occupational History   • Not on file.     Social History Main Topics   • Smoking status: Never Smoker   • Smokeless tobacco: Never Used   • Alcohol use No   • Drug use: No   • Sexual activity: No     Other Topics Concern   • Not on file     Social History Narrative       Family History   Problem Relation Age of Onset   • Coronary artery disease Father    • Heart disease Father    • Coronary artery disease Brother    • Heart attack Brother    • Cancer Mother    • No Known Problems Sister    • Heart disease Son    • Cancer Sister    • Cancer Sister        Review of Systems   Constitutional: Negative for fatigue, fever and unexpected weight change.   HENT: Negative for hearing loss, sore throat and voice change.    Eyes: Negative for visual disturbance.   Respiratory: Negative for cough, shortness of breath and wheezing.    Cardiovascular: Negative for chest pain and palpitations.   Gastrointestinal: Positive for constipation. Negative for abdominal pain, blood in stool and vomiting.   Endocrine:  "Negative for polydipsia and polyuria.   Genitourinary: Negative for difficulty urinating, dysuria, hematuria and urgency.   Musculoskeletal: Negative for joint swelling and myalgias.   Skin: Negative for color change, rash and wound.   Neurological: Negative for dizziness, tremors, seizures and syncope.   Hematological: Does not bruise/bleed easily.   Psychiatric/Behavioral: Negative for agitation and confusion. The patient is not nervous/anxious.        Objective     Vitals:    07/27/17 0952   BP: 160/66   Pulse: 76   Temp: 97.6 °F (36.4 °C)   Weight: 202 lb (91.6 kg)   Height: 70\" (177.8 cm)     Body mass index is 28.98 kg/(m^2).    Physical Exam   Constitutional: He is oriented to person, place, and time. He appears well-developed and well-nourished.   HENT:   Head: Normocephalic and atraumatic.   Eyes:   Pink, Nonicteric   Neck:   Global Assessment- supple. No JVD or lymphadenopathy   Cardiovascular: Normal rate, regular rhythm and normal heart sounds.  Exam reveals no gallop and no friction rub.    No murmur heard.  Pulmonary/Chest: Effort normal and breath sounds normal. No respiratory distress. He has no wheezes. He has no rales.   Inspection: Movements-Symmetrical   Abdominal: Soft. Bowel sounds are normal. He exhibits no distension and no mass. There is no tenderness. There is no rebound and no guarding.   Neurological: He is alert and oriented to person, place, and time.   General Exam-Deemed a reliable historian, able to converse without difficulty and Able to move all extremities without difficulty       Imaging Results (most recent)     None          Assessment/Plan     Eron was seen today for constipation.    Diagnoses and all orders for this visit:    Change in bowel habit  -     Case Request; Standing  -     Implement Anesthesia Orders Day of Procedure; Standing  -     Obtain Informed Consent; Standing  -     polyethylene glycol (GoLYTELY) 236 G solution; Take 3,785 mL by mouth 1 (One) Time for 1 " dose. Take as directed  -     Case Request    Constipation, unspecified constipation type      COLONOSCOPY WITH ANESTHESIA (N/A)     Increase water consumption, Miralax 2-3 times daily  Explained constipation could be r/t decrease physical activity/recent surgery  No plans on EGD at this time as he had c/o dysphagia during previous EGD, previous duodenal ulcer has healed, EGD prn per report from 4/2017    All risks, benefits, alternatives, and indications of colonoscopy procedure have been discussed with the patient. Risks to include perforation of the colon requiring possible surgery or colostomy, risk of bleeding from biopsies or removal of colon tissue, possibility of missing a colon polyp or cancer, or adverse drug reaction.  Benefits to include the diagnosis and management of disease of the colon and rectum. Alternatives to include barium enema, radiographic evaluation, lab testing or no intervention. Pt verbalizes understanding and agrees to proceed with procedure.      There are no Patient Instructions on file for this visit.

## 2017-07-28 ENCOUNTER — TELEPHONE (OUTPATIENT)
Dept: GASTROENTEROLOGY | Facility: CLINIC | Age: 82
End: 2017-07-28

## 2017-07-31 ENCOUNTER — TELEPHONE (OUTPATIENT)
Dept: GASTROENTEROLOGY | Facility: CLINIC | Age: 82
End: 2017-07-31

## 2017-07-31 DIAGNOSIS — R19.4 CHANGE IN BOWEL HABITS: Primary | ICD-10-CM

## 2017-07-31 NOTE — TELEPHONE ENCOUNTER
Spoke with pt on phone  He continues to experience difficulty with constipation  Miralax has not provided relief  3 bottles of Mag Citrate without relief.  He denies significant abdominal pain, no N/V    I have him scheduled for colon at this time

## 2017-07-31 NOTE — TELEPHONE ENCOUNTER
Called pt informed him that Dr Rich wanted him to have some x rays done in the am and then have an office visit with rob at 12:30 pt voiced understanding and stated that was fine

## 2017-08-01 ENCOUNTER — HOSPITAL ENCOUNTER (OUTPATIENT)
Dept: GENERAL RADIOLOGY | Facility: HOSPITAL | Age: 82
Discharge: HOME | End: 2017-08-01
Attending: INTERNAL MEDICINE | Admitting: INTERNAL MEDICINE

## 2017-08-01 ENCOUNTER — OFFICE VISIT (OUTPATIENT)
Dept: GASTROENTEROLOGY | Facility: CLINIC | Age: 82
End: 2017-08-01

## 2017-08-01 VITALS
SYSTOLIC BLOOD PRESSURE: 155 MMHG | BODY MASS INDEX: 28.92 KG/M2 | HEIGHT: 70 IN | HEART RATE: 62 BPM | WEIGHT: 202 LBS | TEMPERATURE: 98 F | DIASTOLIC BLOOD PRESSURE: 68 MMHG

## 2017-08-01 DIAGNOSIS — R19.4 CHANGE IN BOWEL HABITS: ICD-10-CM

## 2017-08-01 DIAGNOSIS — K59.00 CONSTIPATION, UNSPECIFIED CONSTIPATION TYPE: Primary | ICD-10-CM

## 2017-08-01 PROCEDURE — 74022 RADEX COMPL AQT ABD SERIES: CPT

## 2017-08-01 PROCEDURE — 99213 OFFICE O/P EST LOW 20 MIN: CPT | Performed by: NURSE PRACTITIONER

## 2017-08-01 NOTE — PROGRESS NOTES
Chief Complaint   Patient presents with   • Constipation     bad constipation       Subjective     HPI    Not having adequate BM despite use of Miralax or Mag Citrate.  No bleeding.  Currently recovering from back surgery apx 3 weeks ago.  Continues to experience some lower abdominal pain.  No N/V    Past Medical History:   Diagnosis Date   • A-fib 11/15/2016   • Anemia     gets shots every few months to build up blood. sees dr adam.   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   • Arthritis    • Bradycardia 11/15/2016   • CAD in native artery 11/15/2016   • Chest pain 11/15/2016   • CHF (congestive heart failure)    • Chronic kidney disease    • COPD (chronic obstructive pulmonary disease)    • DDD (degenerative disc disease), lumbar 6/27/2017   • Heart murmur    • HTN (hypertension) 11/15/2016   • Hyperlipidemia    • Lumbar stenosis with neurogenic claudication 6/27/2017   • Myocardial infarction    • Sleep apnea    • Stroke    • Type 2 diabetes mellitus 11/15/2016   • Ulcer of abdomen wall        Past Surgical History:   Procedure Laterality Date   • APPENDECTOMY     • BACK SURGERY      x2   • CARDIAC CATHETERIZATION Left     1/2010   • CARPAL TUNNEL RELEASE Bilateral    • CHOLECYSTECTOMY     • CORONARY ARTERY BYPASS GRAFT      2/2006 w/PTCA & KIMMY    • CORONARY STENT PLACEMENT     • ENDOSCOPY N/A 12/14/2016    Procedure: ESOPHAGOGASTRODUODENOSCOPY WITH ANESTHESIA;  Surgeon: Isabel Dexter MD;  Location: [facility] ENDOSCOPY;  Service:    • ENDOSCOPY N/A 12/16/2016    Procedure: ESOPHAGOGASTRODUODENOSCOPY WITH ANESTHESIA;  Surgeon: Joshua Rich DO;  Location: [facility] ENDOSCOPY;  Service:    • ENDOSCOPY N/A 4/10/2017    Procedure: ESOPHAGOGASTRODUODENOSCOPY WITH ANESTHESIA;  Surgeon: Joshua Rich DO;  Location: [facility] ENDOSCOPY;  Service:    • EYE SURGERY Left     x 2   • JOINT REPLACEMENT     • PERIPHERAL ARTERIAL STENT GRAFT     • REPLACEMENT TOTAL KNEE Left     2002   • SHOULDER ROTATOR CUFF REPAIR  Bilateral        Outpatient Prescriptions Marked as Taking for the 8/1/17 encounter (Office Visit) with ANJANA Wyatt   Medication Sig Dispense Refill   • allopurinol (ZYLOPRIM) 300 MG tablet Take 300 mg by mouth Daily.     • amLODIPine (NORVASC) 10 MG tablet Take 1 tablet by mouth Daily. 30 tablet 1   • aspirin 81 MG chewable tablet Chew 81 mg Daily.     • carvedilol (COREG) 12.5 MG tablet Take 12.5 mg by mouth 2 (Two) Times a Day With Meals.     • colchicine 0.6 MG tablet Take 0.6 mg by mouth Daily As Needed (gout flare-up).     • doxazosin (CARDURA) 1 MG tablet Take 1 mg by mouth Every Night.     • gabapentin (NEURONTIN) 100 MG capsule Take 2 capsules by mouth Every 12 (Twelve) Hours. 40 capsule 0   • glimepiride (AMARYL) 4 MG tablet Take 4 mg by mouth Every 12 (Twelve) Hours. Needs pm dose     • Insulin Glargine (LANTUS SOLOSTAR) 100 UNIT/ML injection pen Inject 15 Units under the skin At Night As Needed (BG >120).     • ondansetron (ZOFRAN) 4 MG tablet Take 1 tablet by mouth Every 8 (Eight) Hours As Needed for Nausea or Vomiting. 15 tablet 0   • pantoprazole (PROTONIX) 40 MG EC tablet Take 40 mg by mouth Every 12 (Twelve) Hours.     • potassium chloride (K-DUR,KLOR-CON) 10 MEQ CR tablet Take 10 mEq by mouth Every 12 (Twelve) Hours.     • pravastatin (PRAVACHOL) 40 MG tablet Take 40 mg by mouth Every Night.     • prednisoLONE acetate (PRED FORTE) 1 % ophthalmic suspension Administer 1 drop into the left eye Every 2 (Two) Hours While Awake.     • Pyridoxine HCl (VITAMIN B6) 200 MG tablet Take 1 tablet by mouth Daily.     • rOPINIRole (REQUIP) 1 MG tablet Take 1 mg by mouth Every Night. Take 1 hour before bedtime.     • sennosides-docusate sodium (SENOKOT-S) 8.6-50 MG tablet Take 2 tablets by mouth 2 (Two) Times a Day As Needed for constipation. 45 tablet 2   • vitamin C (ASCORBIC ACID) 500 MG tablet Take 500 mg by mouth Daily.         Allergies   Allergen Reactions   • Penicillins Swelling   •  "Adhesive Tape Rash       Social History     Social History   • Marital status:      Spouse name: N/A   • Number of children: N/A   • Years of education: N/A     Occupational History   • Not on file.     Social History Main Topics   • Smoking status: Never Smoker   • Smokeless tobacco: Never Used   • Alcohol use No   • Drug use: No   • Sexual activity: No     Other Topics Concern   • Not on file     Social History Narrative       Family History   Problem Relation Age of Onset   • Coronary artery disease Father    • Heart disease Father    • Coronary artery disease Brother    • Heart attack Brother    • Cancer Mother    • No Known Problems Sister    • Heart disease Son    • Cancer Sister    • Cancer Sister        Review of Systems   Constitutional: Negative for fatigue, fever and unexpected weight change.   HENT: Negative for hearing loss, sore throat and voice change.    Eyes: Negative for visual disturbance.   Respiratory: Negative for cough, shortness of breath and wheezing.    Cardiovascular: Negative for chest pain and palpitations.   Gastrointestinal: Positive for abdominal pain and constipation. Negative for blood in stool and vomiting.   Endocrine: Negative for polydipsia and polyuria.   Genitourinary: Negative for difficulty urinating, dysuria, hematuria and urgency.   Musculoskeletal: Negative for joint swelling and myalgias.   Skin: Negative for color change, rash and wound.   Neurological: Negative for dizziness, tremors, seizures and syncope.   Hematological: Does not bruise/bleed easily.   Psychiatric/Behavioral: Negative for agitation and confusion. The patient is not nervous/anxious.        Objective     Vitals:    08/01/17 1233   BP: 155/68   Pulse: 62   Temp: 98 °F (36.7 °C)   Weight: 202 lb (91.6 kg)   Height: 70\" (177.8 cm)     Body mass index is 28.98 kg/(m^2).    Physical Exam   Constitutional: He is oriented to person, place, and time. He appears well-developed and well-nourished.   HENT: "   Head: Normocephalic and atraumatic.   Eyes:   Pink, Nonicteric   Neck:   Global Assessment- supple. No JVD or lymphadenopathy   Cardiovascular: Normal rate, regular rhythm and normal heart sounds.  Exam reveals no gallop and no friction rub.    No murmur heard.  Pulmonary/Chest: Effort normal and breath sounds normal. No respiratory distress. He has no wheezes. He has no rales.   Inspection: Movements-Symmetrical   Abdominal: Soft. Bowel sounds are normal. He exhibits no distension and no mass. There is no tenderness. There is no rebound and no guarding.   Neurological: He is alert and oriented to person, place, and time.   General Exam-Deemed a reliable historian, able to converse without difficulty and Able to move all extremities without difficulty       Imaging Results (most recent)     None          Assessment/Plan     Eron was seen today for constipation.    Diagnoses and all orders for this visit:    Constipation, unspecified constipation type    Miralax until bowels start moving  No colonoscopy at this time, will plan on colonoscopy once healed from back surgery  Xray film reviewed with pt    * Surgery not found *    There are no Patient Instructions on file for this visit.

## 2017-09-05 PROBLEM — R19.4 CHANGE IN BOWEL HABIT: Status: ACTIVE | Noted: 2017-09-05

## 2017-09-07 ENCOUNTER — ANESTHESIA (OUTPATIENT)
Dept: GASTROENTEROLOGY | Facility: HOSPITAL | Age: 82
End: 2017-09-07

## 2017-09-07 ENCOUNTER — HOSPITAL ENCOUNTER (OUTPATIENT)
Facility: HOSPITAL | Age: 82
Setting detail: HOSPITAL OUTPATIENT SURGERY
Discharge: HOME | End: 2017-09-07
Attending: INTERNAL MEDICINE | Admitting: INTERNAL MEDICINE

## 2017-09-07 ENCOUNTER — ANESTHESIA EVENT (OUTPATIENT)
Dept: GASTROENTEROLOGY | Facility: HOSPITAL | Age: 82
End: 2017-09-07

## 2017-09-07 VITALS
DIASTOLIC BLOOD PRESSURE: 54 MMHG | HEART RATE: 63 BPM | HEIGHT: 67 IN | SYSTOLIC BLOOD PRESSURE: 141 MMHG | TEMPERATURE: 97.1 F | OXYGEN SATURATION: 100 % | RESPIRATION RATE: 16 BRPM

## 2017-09-07 DIAGNOSIS — R19.4 CHANGE IN BOWEL HABIT: ICD-10-CM

## 2017-09-07 LAB — GLUCOSE BLDC GLUCOMTR-MCNC: 136 MG/DL (ref 70–130)

## 2017-09-07 PROCEDURE — 45378 DIAGNOSTIC COLONOSCOPY: CPT | Performed by: INTERNAL MEDICINE

## 2017-09-07 PROCEDURE — 82962 GLUCOSE BLOOD TEST: CPT

## 2017-09-07 PROCEDURE — 25010000002 PROPOFOL 10 MG/ML EMULSION: Performed by: NURSE ANESTHETIST, CERTIFIED REGISTERED

## 2017-09-07 RX ORDER — METOPROLOL TARTRATE 5 MG/5ML
2 INJECTION INTRAVENOUS ONCE AS NEEDED
Status: COMPLETED | OUTPATIENT
Start: 2017-09-07 | End: 2017-09-07

## 2017-09-07 RX ORDER — SODIUM CHLORIDE 0.9 % (FLUSH) 0.9 %
3 SYRINGE (ML) INJECTION AS NEEDED
Status: DISCONTINUED | OUTPATIENT
Start: 2017-09-07 | End: 2017-09-07 | Stop reason: HOSPADM

## 2017-09-07 RX ORDER — SODIUM CHLORIDE 9 MG/ML
500 INJECTION, SOLUTION INTRAVENOUS CONTINUOUS PRN
Status: DISCONTINUED | OUTPATIENT
Start: 2017-09-07 | End: 2017-09-07 | Stop reason: HOSPADM

## 2017-09-07 RX ORDER — LIDOCAINE HYDROCHLORIDE 20 MG/ML
INJECTION, SOLUTION INFILTRATION; PERINEURAL AS NEEDED
Status: DISCONTINUED | OUTPATIENT
Start: 2017-09-07 | End: 2017-09-07 | Stop reason: SURG

## 2017-09-07 RX ORDER — PROPOFOL 10 MG/ML
VIAL (ML) INTRAVENOUS AS NEEDED
Status: DISCONTINUED | OUTPATIENT
Start: 2017-09-07 | End: 2017-09-07 | Stop reason: SURG

## 2017-09-07 RX ADMIN — LIDOCAINE HYDROCHLORIDE 0.5 ML: 10 INJECTION, SOLUTION EPIDURAL; INFILTRATION; INTRACAUDAL; PERINEURAL at 07:58

## 2017-09-07 RX ADMIN — PROPOFOL 50 MG: 10 INJECTION, EMULSION INTRAVENOUS at 09:20

## 2017-09-07 RX ADMIN — PROPOFOL 100 MG: 10 INJECTION, EMULSION INTRAVENOUS at 09:11

## 2017-09-07 RX ADMIN — PROPOFOL 50 MG: 10 INJECTION, EMULSION INTRAVENOUS at 09:23

## 2017-09-07 RX ADMIN — PROPOFOL 50 MG: 10 INJECTION, EMULSION INTRAVENOUS at 09:18

## 2017-09-07 RX ADMIN — PROPOFOL 50 MG: 10 INJECTION, EMULSION INTRAVENOUS at 09:15

## 2017-09-07 RX ADMIN — LIDOCAINE HYDROCHLORIDE 20 MG: 20 INJECTION, SOLUTION INFILTRATION; PERINEURAL at 09:11

## 2017-09-07 RX ADMIN — SODIUM CHLORIDE 500 ML: 9 INJECTION, SOLUTION INTRAVENOUS at 07:58

## 2017-09-07 RX ADMIN — METOPROLOL TARTRATE 2 MG: 5 INJECTION, SOLUTION INTRAVENOUS at 08:16

## 2017-09-07 NOTE — PLAN OF CARE
Problem: Patient Care Overview (Adult)  Goal: Plan of Care Review  Outcome: Ongoing (interventions implemented as appropriate)    09/07/17 0915   Patient Care Overview   Progress improving   Outcome Evaluation   Outcome Summary/Follow up Plan pt tolerating procedure well         Problem: GI Endoscopy (Adult)  Goal: Signs and Symptoms of Listed Potential Problems Will be Absent or Manageable (GI Endoscopy)  Outcome: Ongoing (interventions implemented as appropriate)    09/07/17 0915   GI Endoscopy   Problems Assessed (GI Endoscopy) all   Problems Present (GI Endoscopy) none

## 2017-09-07 NOTE — ANESTHESIA PREPROCEDURE EVALUATION
Anesthesia Evaluation     Patient summary reviewed and Nursing notes reviewed   no history of anesthetic complications:         Airway   Mallampati: I  TM distance: <3 FB  Neck ROM: full  no difficulty expected  Dental - normal exam         Pulmonary - normal exam   (+) COPD, sleep apnea on CPAP,   Cardiovascular - normal exam    Patient on routine beta blocker and Beta blocker not taken-may be given intraoperatively    (+) hypertension, valvular problems/murmurs (mild as) MS, past MI , CAD, CABG (20 years ago), cardiac stents more than 12 months ago dysrhythmias Atrial Fib, CHF,     ROS comment: Echo 12/2016- ef 60-65%, mild as    Will give IV metoprolol prior to OR    Neuro/Psych  (+) CVA,    GI/Hepatic/Renal/Endo    (+)  GERD, renal disease CRI, diabetes mellitus type 2,   (-) hepatitis, liver disease    Musculoskeletal     Abdominal    Substance History      OB/GYN          Other   (+) arthritis                                       Anesthesia Plan    ASA 3     general   total IV anesthesia  intravenous induction   Anesthetic plan and risks discussed with patient.

## 2017-09-07 NOTE — PLAN OF CARE
Problem: Patient Care Overview (Adult)  Goal: Adult Individualization and Mutuality  Outcome: Ongoing (interventions implemented as appropriate)    09/07/17 0731   Individualization   Patient Specific Preferences none   Patient Specific Goals none   Patient Specific Interventions none   Mutuality/Individual Preferences   What Anxieties, Fears or Concerns Do You Have About Your Health or Care? none   What Questions Do You Have About Your Health or Care? none   What Information Would Help Us Give You More Personalized Care? none

## 2017-09-07 NOTE — PLAN OF CARE
Problem: GI Endoscopy (Adult)  Goal: Signs and Symptoms of Listed Potential Problems Will be Absent or Manageable (GI Endoscopy)  Outcome: Outcome(s) achieved Date Met:  09/07/17

## 2017-09-07 NOTE — H&P
[facility] Gastroenterology  Pre Procedure History & Physical    Chief Complaint:   Constipation    Subjective     HPI:   Change in bowel habits    Past Medical History:   Past Medical History:   Diagnosis Date   • A-fib 11/15/2016   • Anemia     gets shots every few months to build up blood. sees dr adam.   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   • Arthritis    • Bradycardia 11/15/2016   • CAD in native artery 11/15/2016   • Chest pain 11/15/2016   • CHF (congestive heart failure)    • Chronic kidney disease    • COPD (chronic obstructive pulmonary disease)    • DDD (degenerative disc disease), lumbar 6/27/2017   • Heart murmur    • HTN (hypertension) 11/15/2016   • Hyperlipidemia    • Lumbar stenosis with neurogenic claudication 6/27/2017   • Myocardial infarction    • Sleep apnea    • Stroke    • Type 2 diabetes mellitus 11/15/2016   • Ulcer of abdomen wall        Past Surgical History:  [unfilled]    Family History:  Family History   Problem Relation Age of Onset   • Coronary artery disease Father    • Heart disease Father    • Coronary artery disease Brother    • Heart attack Brother    • Cancer Mother    • No Known Problems Sister    • Heart disease Son    • Cancer Sister    • Cancer Sister        Social History:   reports that he has never smoked. He has never used smokeless tobacco. He reports that he does not drink alcohol or use illicit drugs.    Medications:   Prior to Admission medications    Medication Sig Start Date End Date Taking? Authorizing Provider   allopurinol (ZYLOPRIM) 300 MG tablet Take 300 mg by mouth Daily. 11/20/16  Yes Historical Provider, MD   amLODIPine (NORVASC) 10 MG tablet Take 1 tablet by mouth Daily. 6/29/17  Yes Maryam Medina MD   aspirin 81 MG chewable tablet Chew 81 mg Daily.   Yes Historical Provider, MD   carvedilol (COREG) 12.5 MG tablet Take 12.5 mg by mouth 2 (Two) Times a Day With Meals.   Yes Historical Provider, MD   colchicine 0.6 MG tablet Take 0.6 mg by  mouth Daily As Needed (gout flare-up).   Yes Historical Provider, MD   doxazosin (CARDURA) 1 MG tablet Take 1 mg by mouth Every Night.   Yes Historical Provider, MD   glimepiride (AMARYL) 4 MG tablet Take 4 mg by mouth Every 12 (Twelve) Hours. Needs pm dose 8/23/16  Yes Historical Provider, MD   ondansetron (ZOFRAN) 4 MG tablet Take 1 tablet by mouth Every 8 (Eight) Hours As Needed for Nausea or Vomiting. 6/29/17  Yes Mayram Medina MD   pantoprazole (PROTONIX) 40 MG EC tablet Take 40 mg by mouth Every 12 (Twelve) Hours.   Yes Historical Provider, MD   potassium chloride (K-DUR,KLOR-CON) 10 MEQ CR tablet Take 10 mEq by mouth Every 12 (Twelve) Hours.   Yes Historical Provider, MD   pravastatin (PRAVACHOL) 40 MG tablet Take 40 mg by mouth Every Night.   Yes Historical Provider, MD   prednisoLONE acetate (PRED FORTE) 1 % ophthalmic suspension Administer 1 drop into the left eye Every 2 (Two) Hours While Awake.   Yes Historical Provider, MD   Pyridoxine HCl (VITAMIN B6) 200 MG tablet Take 1 tablet by mouth Daily.   Yes Historical Provider, MD   rOPINIRole (REQUIP) 1 MG tablet Take 1 mg by mouth Every Night. Take 1 hour before bedtime.   Yes Historical Provider, MD   sennosides-docusate sodium (SENOKOT-S) 8.6-50 MG tablet Take 2 tablets by mouth 2 (Two) Times a Day As Needed for constipation. 12/22/16  Yes Jeffrey Owen MD   vitamin C (ASCORBIC ACID) 500 MG tablet Take 500 mg by mouth Daily.   Yes Historical Provider, MD   gabapentin (NEURONTIN) 100 MG capsule Take 2 capsules by mouth Every 12 (Twelve) Hours. 6/29/17   Maryam Medina MD   Insulin Glargine (LANTUS SOLOSTAR) 100 UNIT/ML injection pen Inject 15 Units under the skin At Night As Needed (BG >120).    Historical Provider, MD   predniSONE (DELTASONE) 10 MG tablet Take 1 tablet by mouth Daily. Take 4 tabs daily for 3 days, 3 tabs for 3 days, 2 tabs for 3 days and 1 tab for 3 days then stop 6/29/17   Maryam Medina MD       Allergies:  Penicillins  "and Adhesive tape    Objective     Blood pressure 151/67, pulse 54, temperature 97.1 °F (36.2 °C), temperature source Temporal Artery , resp. rate 16, height 67\" (170.2 cm), SpO2 99 %.    Physical Exam   Constitutional: Pt is oriented to person, place, and in no distress.   HENT: Mouth/Throat: Oropharynx is clear.   Cardiovascular: Normal rate, regular rhythm.    Pulmonary/Chest: Effort normal. No respiratory distress. No  wheezes.   Abdominal: Soft. Non-distended.  Skin: Skin is warm and dry.   Psychiatric: Mood, memory, affect and judgment appear normal.     Assessment/Plan     Diagnosis:  constipation    Anticipated Surgical Procedure:  C-scope    The risks, benefits, and alternatives of this procedure have been discussed with the patient or the responsible party- the patient understands and agrees to proceed.        "

## 2017-09-07 NOTE — PLAN OF CARE
Problem: Patient Care Overview (Adult)  Goal: Plan of Care Review  Outcome: Outcome(s) achieved Date Met:  09/07/17 09/07/17 0930   Patient Care Overview   Progress improving   Outcome Evaluation   Outcome Summary/Follow up Plan D/C CRITERIA MET   Coping/Psychosocial Response Interventions   Plan Of Care Reviewed With patient

## 2017-09-07 NOTE — ANESTHESIA POSTPROCEDURE EVALUATION
"Patient: Eron Escalante    Procedure Summary     Date Anesthesia Start Anesthesia Stop Room / Location    09/07/17 0908 0924  [facility] / [facility]       Procedure Diagnosis Surgeon Provider    COLONOSCOPY WITH ANESTHESIA (N/A ) Change in bowel habit  (Change in bowel habit [R19.4]) DO Bia Lewis CRNA          Anesthesia Type: general  Last vitals  BP        Temp        Pulse       Resp        SpO2          Post Anesthesia Care and Evaluation    Patient location during evaluation: PHASE II  Patient participation: complete - patient participated  Level of consciousness: awake and alert  Pain score: 0  Pain management: adequate  Airway patency: patent  Anesthetic complications: No anesthetic complications  PONV Status: none  Cardiovascular status: acceptable, hemodynamically stable and stable  Respiratory status: acceptable and room air  Hydration status: acceptable    Comments: Blood pressure 151/67, pulse 54, temperature 97.1 °F (36.2 °C), temperature source Temporal Artery , resp. rate 16, height 67\" (170.2 cm), SpO2 99 %.        "

## 2017-10-03 PROBLEM — R19.8 ALTERED BOWEL FUNCTION: Status: ACTIVE | Noted: 2017-09-05

## 2017-10-22 ENCOUNTER — APPOINTMENT (OUTPATIENT)
Dept: CT IMAGING | Age: 82
End: 2017-10-22
Payer: COMMERCIAL

## 2017-10-22 ENCOUNTER — APPOINTMENT (OUTPATIENT)
Dept: GENERAL RADIOLOGY | Age: 82
End: 2017-10-22
Payer: COMMERCIAL

## 2017-10-22 ENCOUNTER — HOSPITAL ENCOUNTER (EMERGENCY)
Age: 82
Discharge: HOME | End: 2017-10-22
Attending: EMERGENCY MEDICINE
Payer: COMMERCIAL

## 2017-10-22 VITALS
BODY MASS INDEX: 29.62 KG/M2 | WEIGHT: 200 LBS | HEIGHT: 69 IN | SYSTOLIC BLOOD PRESSURE: 155 MMHG | HEART RATE: 64 BPM | DIASTOLIC BLOOD PRESSURE: 67 MMHG | OXYGEN SATURATION: 97 % | RESPIRATION RATE: 14 BRPM | TEMPERATURE: 98.3 F

## 2017-10-22 DIAGNOSIS — S20.211A CONTUSION OF RIB ON RIGHT SIDE, INITIAL ENCOUNTER: ICD-10-CM

## 2017-10-22 DIAGNOSIS — S30.0XXA CONTUSION OF PELVIS, INITIAL ENCOUNTER: ICD-10-CM

## 2017-10-22 DIAGNOSIS — S20.229A CONTUSION OF BACK, UNSPECIFIED LATERALITY, INITIAL ENCOUNTER: ICD-10-CM
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
## 2017-10-22 PROCEDURE — 99283 EMERGENCY DEPT VISIT LOW MDM: CPT | Performed by: EMERGENCY MEDICINE

## 2017-10-22 PROCEDURE — 6370000000 HC RX 637 (ALT 250 FOR IP): Performed by: EMERGENCY MEDICINE

## 2017-10-22 PROCEDURE — 71101 X-RAY EXAM UNILAT RIBS/CHEST: CPT

## 2017-10-22 PROCEDURE — 72128 CT CHEST SPINE W/O DYE: CPT

## 2017-10-22 PROCEDURE — 72170 X-RAY EXAM OF PELVIS: CPT

## 2017-10-22 PROCEDURE — 99283 EMERGENCY DEPT VISIT LOW MDM: CPT

## 2017-10-22 RX ORDER — ACETAMINOPHEN 325 MG/1
650 TABLET ORAL ONCE
Status: COMPLETED | OUTPATIENT
Start: 2017-10-22 | End: 2017-10-22

## 2017-10-22 RX ORDER — CLONIDINE HYDROCHLORIDE 0.1 MG/1
0.2 TABLET ORAL ONCE
Status: COMPLETED | OUTPATIENT
Start: 2017-10-22 | End: 2017-10-22

## 2017-10-22 RX ADMIN — CLONIDINE HYDROCHLORIDE 0.2 MG: 0.1 TABLET ORAL at 19:05

## 2017-10-22 RX ADMIN — ACETAMINOPHEN 650 MG: 325 TABLET, FILM COATED ORAL at 19:05

## 2017-10-22 ASSESSMENT — PAIN SCALES - GENERAL
PAINLEVEL_OUTOF10: 4
PAINLEVEL_OUTOF10: 10

## 2017-10-22 ASSESSMENT — PAIN DESCRIPTION - ORIENTATION: ORIENTATION: RIGHT

## 2017-10-22 ASSESSMENT — PAIN DESCRIPTION - DESCRIPTORS: DESCRIPTORS: ACHING;SHARP

## 2017-10-22 ASSESSMENT — PAIN DESCRIPTION - LOCATION: LOCATION: HIP;CHEST;BACK

## 2017-10-22 NOTE — ED NOTES
Bed: 06  Expected date:   Expected time:   Means of arrival:   Comments:     Misa Laird, FERNANDO  97/87/78 [id number]

## 2017-10-22 NOTE — ED PROVIDER NOTES
DOESN'T USE MACHINE    TIA (transient ischemic attack)          SURGICAL HISTORY       Past Surgical History:   Procedure Laterality Date    APPENDECTOMY      BACK SURGERY  1980    x 3    CARDIAC SURGERY  1990    Bypass x 3    CARPAL TUNNEL RELEASE Bilateral 1980    wrist and elbows    CHOLECYSTECTOMY  2000    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 2    JOINT REPLACEMENT Left 1990    JOINT REPLACEMENT Right 1995    LAMINECTOMY N/A 7/11/2017    LUMBAR LAMINECTOMY POSTERIOR  L23 L34 performed by Ramírez Mcgill MD at [address] Left 1990    ROTATOR CUFF REPAIR Right 2010    TUMOR REMOVAL Left 1960    foot    TURP  2000    VASCULAR SURGERY Left 7/15/2015 [facility] [address]    Aortoiliofemoral a'gram with bilateral lower extremity runoff; select views 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; a'plasty of left anterior tibial artery and dorsalis pedis artery with 2.5x300 vascutrak balloon and then with 3x220 nati balloon; completion a'gram         CURRENT MEDICATIONS       Discharge Medication List as of 10/23/2017 10:24 AM      CONTINUE these medications which have NOT CHANGED    Details   ferrous sulfate-C-folic acid (FOLITAB) 028-876-8.0 MG TBCR per extended release tablet Take 1 tablet by mouth daily, Disp-30 tablet, R-0Normal      prednisoLONE acetate (PRED FORTE) 1 % ophthalmic suspension Place 1 drop into the left eye every hour as needed (for dry eye)Historical Med      probenecid (BENEMID) 500 MG tablet Take 500 mg by mouth 2 times dailyHistorical Med      allopurinol (ZYLOPRIM) 300 MG tablet Take 300 mg by mouth dailyHistorical Med      sennosides-docusate sodium (SENOKOT-S) 8.6-50 MG tablet Take 2 tablets by mouth dailyHistorical Med      insulin glargine (LANTUS SOLOSTAR) 100 UNIT/ML injection pen Inject 15 Units into the skin nightlyHistorical Med      lomustine (CEENU) 10 MG capsule Take by mouth SCREENINGS             PHYSICAL EXAM    (up to 7 for level 4, 8 or more for level 5)     ED Triage Vitals [10/22/17 1743]   BP Temp Temp Source Pulse Resp SpO2 Height Weight   (!) 193/74 98.3 °F (36.8 °C) Tympanic (!) 49 14 99 % 5' 9\" (1.753 m) 200 lb (90.7 kg)       Physical Exam   Constitutional: He is oriented to person, place, and time. He appears well-developed and well-nourished. No distress. HENT:   Head: Normocephalic and atraumatic. Mouth/Throat: Oropharynx is clear and moist.   Neck: Normal range of motion. C-spine NT   Cardiovascular: Normal rate, regular rhythm and normal heart sounds. Pulmonary/Chest: Effort normal and breath sounds normal.   Abdominal: Soft. There is no tenderness. Musculoskeletal:   Tmod right mid chest wall, no bruising, no crepitus. Tmod ~ T6-T10. Decreased ROM, ? Chronic. Neurological: He is alert and oriented to person, place, and time. No cranial nerve deficit. Skin: Skin is warm and dry. He is not diaphoretic. Psychiatric: He has a normal mood and affect. Nursing note and vitals reviewed. DIAGNOSTIC RESULTS     EKG: All EKG's are interpreted by the Emergency Department Physician who either signs or Co-signs this chart in the absence of a cardiologist.        RADIOLOGY:   Non-plain film images such as CT, Ultrasound and MRI are read by the radiologist. Plain radiographic images are visualized and preliminarily interpreted by the emergency physician with the below findings:        Interpretation per the Radiologist below, if available at the time of this note:    XR RIBS RIGHT INCLUDE CHEST (MIN 3 VIEWS)   Final Result   1. No acute cardiopulmonary process. 2. No right rib fracture. Signed by Dr Yehuda Jeter on 10/22/2017 8:12 PM      XR Pelvis Limited   Final Result   1. No fracture/acute bony abnormality. Signed by Dr Yehuda Jeter on 10/22/2017 8:10 PM      CT Thoracic Spine WO Contrast   Final Result   1.  No CT evidence of acute bony injury to the thoracic spine. Signed by Dr Andrey Lira on 10/22/2017 8:09 PM            ED BEDSIDE ULTRASOUND:   Performed by ED Physician - none    LABS:  Labs Reviewed - No data to display    All other labs were within normal range or not returned as of this dictation. EMERGENCY DEPARTMENT COURSE and DIFFERENTIAL DIAGNOSIS/MDM:   Vitals:    Vitals:    10/22/17 1903 10/22/17 2000 10/22/17 2029 10/22/17 2032   BP: (!) 189/83 (!) 160/61  (!) 155/67   Pulse:       Resp:       Temp:       TempSrc:       SpO2: 99% 97% 97%    Weight:       Height:               MDM    CRITICAL CARE TIME   Total Critical Care time was 0 minutes, excluding separately reportable procedures. There was a high probability of clinically significant/life threatening deterioration in the patient's condition which required my urgent intervention. CONSULTS:  None    PROCEDURES:  Unless otherwise noted below, none     Procedures    FINAL IMPRESSION      1. Fall from ladder, initial encounter    2. Contusion of rib on right side, initial encounter    3. Contusion of back, unspecified laterality, initial encounter    4.  Contusion of pelvis, initial encounter          DISPOSITION/PLAN   DISPOSITION     PATIENT REFERRED TO:  Cas Torres MD  [address] [place] [address] [id number]  [address] [employer] [address] [id number]            DISCHARGE MEDICATIONS:  Discharge Medication List as of 10/23/2017 10:24 AM             (Please note that portions of this note were completed with a voice recognition program.  Efforts were made to edit the dictations but occasionally words are mis-transcribed.)    Connie Kaminski MD (electronically signed)  Attending Emergency Physician        Connie Kaminski MD  10/23/17 [id number]

## 2017-10-23 NOTE — ED PROVIDER NOTES
[facility] EMERGENCY DEPT  eMERGENCY dEPARTMENT eNCOUnter      Pt Name: Eri Cruz  MRN: [id number]  [place] 1934  Date of evaluation: 10/22/2017  Provider: Xiomara Escalante MD    CHIEF COMPLAINT       Chief Complaint   Patient presents with    Fall     from 10 ft ladder. states right rib pain and right sided mid back pain.  Hip Injury     right         PHYSICAL EXAM    (up to 7 for level 4, 8 or more for level 5)     ED Triage Vitals [10/22/17 1743]   BP Temp Temp Source Pulse Resp SpO2 Height Weight   (!) 193/74 98.3 °F (36.8 °C) Tympanic (!) 49 14 99 % 5' 9\" (1.753 m) 200 lb (90.7 kg)       Physical Exam    DIAGNOSTIC RESULTS     EKG: All EKG's are interpreted by the Emergency Department Physician who either signs or Co-signs this chart in the absence of a cardiologist.        RADIOLOGY:   Non-plain film images such as CT, Ultrasound and MRI are read by the radiologist. Plain radiographic images are visualized and preliminarily interpreted by the emergency physician with the below findings:    I reviewed the x-rays and x-ray reports. No obvious fractures. XR RIBS RIGHT INCLUDE CHEST (MIN 3 VIEWS)   Final Result   1. No acute cardiopulmonary process. 2. No right rib fracture. Signed by Dr John Mendoza on 10/22/2017 8:12 PM      XR Pelvis Limited   Final Result   1. No fracture/acute bony abnormality. Signed by Dr John Mendoza on 10/22/2017 8:10 PM      CT Thoracic Spine WO Contrast   Final Result   1. No CT evidence of acute bony injury to the thoracic spine. Signed by Dr John Mendoza on 10/22/2017 8:09 PM              LABS:  Labs Reviewed - No data to display    All other labs were within normal range or not returned as of this dictation.     EMERGENCY DEPARTMENT COURSE and DIFFERENTIAL DIAGNOSIS/MDM:   Vitals:    Vitals:    10/22/17 1903 10/22/17 2000 10/22/17 2029 10/22/17 2032   BP: (!) 189/83 (!) 160/61  (!) 155/67   Pulse:       Resp:       Temp:       TempSrc:       SpO2: 99% 97% 97%

## 2017-10-23 NOTE — ED NOTES
Patient resting comfortably at this time with no s/s of distress. Pain level is at an 8 at time of tylenol administration. Patient's call light is in reach, awaiting scans.      [facility], [address] [place] [address]  10/22/17 1375

## 2017-11-20 LAB
ALBUMIN SERPL-MCNC: 3.5 G/DL (ref 3.5–5.2)
ANION GAP SERPL CALCULATED.3IONS-SCNC: 12 MMOL/L (ref 7–19)
BASOPHILS ABSOLUTE: 0 K/UL (ref 0–0.2)
BASOPHILS RELATIVE PERCENT: 0.3 % (ref 0–1)
BUN BLDV-MCNC: 31 MG/DL (ref 8–23)
CALCIUM SERPL-MCNC: 9.4 MG/DL (ref 8.8–10.2)
CHLORIDE BLD-SCNC: 102 MMOL/L (ref 98–111)
CO2: 28 MMOL/L (ref 22–29)
CREAT SERPL-MCNC: 1.7 MG/DL (ref 0.5–1.2)
CREATININE URINE: 42.8 MG/DL (ref 4.2–622)
EOSINOPHILS ABSOLUTE: 0.3 K/UL (ref 0–0.6)
EOSINOPHILS RELATIVE PERCENT: 4.5 % (ref 0–5)
GFR NON-AFRICAN AMERICAN: 39
GLUCOSE BLD-MCNC: 168 MG/DL (ref 74–109)
HCT VFR BLD CALC: 34.1 % (ref 42–52)
HEMOGLOBIN: 11 G/DL (ref 14–18)
LYMPHOCYTES ABSOLUTE: 1.2 K/UL (ref 1.1–4.5)
LYMPHOCYTES RELATIVE PERCENT: 20.5 % (ref 20–40)
MCH RBC QN AUTO: 32.7 PG (ref 27–31)
MCHC RBC AUTO-ENTMCNC: 32.3 G/DL (ref 33–37)
MCV RBC AUTO: 101.5 FL (ref 80–94)
MONOCYTES ABSOLUTE: 0.6 K/UL (ref 0–0.9)
MONOCYTES RELATIVE PERCENT: 9.9 % (ref 0–10)
NEUTROPHILS ABSOLUTE: 3.9 K/UL (ref 1.5–7.5)
NEUTROPHILS RELATIVE PERCENT: 64.6 % (ref 50–65)
PARATHYROID HORMONE INTACT: 90.2 PG/ML (ref 15–65)
PDW BLD-RTO: 13.1 % (ref 11.5–14.5)
PHOSPHORUS: 3.2 MG/DL (ref 2.5–4.5)
PLATELET # BLD: 186 K/UL (ref 130–400)
PMV BLD AUTO: 11 FL (ref 9.4–12.4)
POTASSIUM SERPL-SCNC: 4.7 MMOL/L (ref 3.5–5)
PROTEIN PROTEIN: 8 MG/DL (ref 15–45)
RBC # BLD: 3.36 M/UL (ref 4.7–6.1)
SODIUM BLD-SCNC: 142 MMOL/L (ref 136–145)
URIC ACID, SERUM: 5.4 MG/DL (ref 3.4–7)
WBC # BLD: 6 K/UL (ref 4.8–10.8)

## 2017-11-25 ENCOUNTER — APPOINTMENT (OUTPATIENT)
Dept: GENERAL RADIOLOGY | Age: 82
End: 2017-11-25
Payer: COMMERCIAL

## 2017-11-25 ENCOUNTER — APPOINTMENT (OUTPATIENT)
Dept: CT IMAGING | Age: 82
End: 2017-11-25
Payer: COMMERCIAL

## 2017-11-25 ENCOUNTER — HOSPITAL ENCOUNTER (EMERGENCY)
Age: 82
Discharge: HOME | End: 2017-11-25
Payer: COMMERCIAL

## 2017-11-25 VITALS
OXYGEN SATURATION: 94 % | SYSTOLIC BLOOD PRESSURE: 170 MMHG | BODY MASS INDEX: 30.01 KG/M2 | HEIGHT: 68 IN | HEART RATE: 50 BPM | DIASTOLIC BLOOD PRESSURE: 70 MMHG | RESPIRATION RATE: 16 BRPM | TEMPERATURE: 98.2 F | WEIGHT: 198 LBS

## 2017-11-25 DIAGNOSIS — I10 ESSENTIAL HYPERTENSION: ICD-10-CM

## 2017-11-25 DIAGNOSIS — R53.83 OTHER FATIGUE: ICD-10-CM

## 2017-11-25 DIAGNOSIS — R04.2 HEMOPTYSIS: Primary | ICD-10-CM

## 2017-11-25 LAB
ALBUMIN SERPL-MCNC: 3.3 G/DL (ref 3.5–5.2)
ALP BLD-CCNC: 81 U/L (ref 40–130)
ALT SERPL-CCNC: 13 U/L (ref 5–41)
ANION GAP SERPL CALCULATED.3IONS-SCNC: 11 MMOL/L (ref 7–19)
AST SERPL-CCNC: 17 U/L (ref 5–40)
BACTERIA: NEGATIVE /HPF
BASOPHILS ABSOLUTE: 0 K/UL (ref 0–0.2)
BASOPHILS RELATIVE PERCENT: 0.8 % (ref 0–1)
BILIRUB SERPL-MCNC: 0.3 MG/DL (ref 0.2–1.2)
BILIRUBIN URINE: NEGATIVE
BLOOD, URINE: NEGATIVE
BUN BLDV-MCNC: 38 MG/DL (ref 8–23)
CALCIUM SERPL-MCNC: 9.2 MG/DL (ref 8.8–10.2)
CHLORIDE BLD-SCNC: 107 MMOL/L (ref 98–111)
CLARITY: CLEAR
CO2: 25 MMOL/L (ref 22–29)
COLOR: YELLOW
CREAT SERPL-MCNC: 1.4 MG/DL (ref 0.5–1.2)
D DIMER: 2.16 UG/ML FEU (ref 0–0.48)
EKG P AXIS: 18 DEGREES
EKG P AXIS: 26 DEGREES
EKG P-R INTERVAL: 166 MS
EKG P-R INTERVAL: 192 MS
EKG Q-T INTERVAL: 462 MS
EKG Q-T INTERVAL: 508 MS
EKG QRS DURATION: 100 MS
EKG QRS DURATION: 102 MS
EKG QTC CALCULATION (BAZETT): 447 MS
EKG QTC CALCULATION (BAZETT): 501 MS
EKG T AXIS: 43 DEGREES
EKG T AXIS: 52 DEGREES
EOSINOPHILS ABSOLUTE: 0.2 K/UL (ref 0–0.6)
EOSINOPHILS RELATIVE PERCENT: 4.4 % (ref 0–5)
EPITHELIAL CELLS, UA: 3 /HPF (ref 0–5)
GFR NON-AFRICAN AMERICAN: 48
GLUCOSE BLD-MCNC: 104 MG/DL (ref 74–109)
GLUCOSE URINE: NEGATIVE MG/DL
HCT VFR BLD CALC: 31.2 % (ref 42–52)
HEMOGLOBIN: 10.3 G/DL (ref 14–18)
HYALINE CASTS: 2 /HPF (ref 0–8)
INR BLD: 1.11 (ref 0.88–1.18)
KETONES, URINE: NEGATIVE MG/DL
LEUKOCYTE ESTERASE, URINE: ABNORMAL
LYMPHOCYTES ABSOLUTE: 1.5 K/UL (ref 1.1–4.5)
LYMPHOCYTES RELATIVE PERCENT: 27.9 % (ref 20–40)
MCH RBC QN AUTO: 32.5 PG (ref 27–31)
MCHC RBC AUTO-ENTMCNC: 33 G/DL (ref 33–37)
MCV RBC AUTO: 98.4 FL (ref 80–94)
MONOCYTES ABSOLUTE: 0.7 K/UL (ref 0–0.9)
MONOCYTES RELATIVE PERCENT: 13.1 % (ref 0–10)
NEUTROPHILS ABSOLUTE: 2.8 K/UL (ref 1.5–7.5)
NEUTROPHILS RELATIVE PERCENT: 53.8 % (ref 50–65)
NITRITE, URINE: NEGATIVE
PDW BLD-RTO: 13.1 % (ref 11.5–14.5)
PERFORMED ON: NORMAL
PH UA: 6.5
PLATELET # BLD: 167 K/UL (ref 130–400)
PMV BLD AUTO: 10 FL (ref 9.4–12.4)
POC TROPONIN I: 0.01 NG/ML (ref 0–0.08)
POTASSIUM SERPL-SCNC: 4 MMOL/L (ref 3.5–5)
PROTEIN UA: ABNORMAL MG/DL
PROTHROMBIN TIME: 14.2 SEC (ref 12–14.6)
RBC # BLD: 3.17 M/UL (ref 4.7–6.1)
RBC UA: 0 /HPF (ref 0–4)
SODIUM BLD-SCNC: 143 MMOL/L (ref 136–145)
SPECIFIC GRAVITY UA: 1.02
TOTAL PROTEIN: 5.8 G/DL (ref 6.6–8.7)
TROPONIN: 0.06 NG/ML (ref 0–0.03)
UROBILINOGEN, URINE: 0.2 E.U./DL
WBC # BLD: 5.3 K/UL (ref 4.8–10.8)
WBC UA: 3 /HPF (ref 0–5)

## 2017-11-25 PROCEDURE — C9113 INJ PANTOPRAZOLE SODIUM, VIA: HCPCS | Performed by: PHYSICIAN ASSISTANT

## 2017-11-25 PROCEDURE — 36415 COLL VENOUS BLD VENIPUNCTURE: CPT

## 2017-11-25 PROCEDURE — 81001 URINALYSIS AUTO W/SCOPE: CPT

## 2017-11-25 PROCEDURE — 70450 CT HEAD/BRAIN W/O DYE: CPT

## 2017-11-25 PROCEDURE — 84484 ASSAY OF TROPONIN QUANT: CPT

## 2017-11-25 PROCEDURE — 6360000002 HC RX W HCPCS: Performed by: PHYSICIAN ASSISTANT

## 2017-11-25 PROCEDURE — 85025 COMPLETE CBC W/AUTO DIFF WBC: CPT

## 2017-11-25 PROCEDURE — 99283 EMERGENCY DEPT VISIT LOW MDM: CPT | Performed by: PHYSICIAN ASSISTANT

## 2017-11-25 PROCEDURE — 93005 ELECTROCARDIOGRAM TRACING: CPT

## 2017-11-25 PROCEDURE — 80053 COMPREHEN METABOLIC PANEL: CPT

## 2017-11-25 PROCEDURE — 71010 XR CHEST PORTABLE: CPT

## 2017-11-25 PROCEDURE — 85379 FIBRIN DEGRADATION QUANT: CPT

## 2017-11-25 PROCEDURE — 87086 URINE CULTURE/COLONY COUNT: CPT

## 2017-11-25 PROCEDURE — 71275 CT ANGIOGRAPHY CHEST: CPT

## 2017-11-25 PROCEDURE — 6360000004 HC RX CONTRAST MEDICATION: Performed by: PHYSICIAN ASSISTANT

## 2017-11-25 PROCEDURE — 2580000003 HC RX 258: Performed by: PHYSICIAN ASSISTANT

## 2017-11-25 PROCEDURE — 85610 PROTHROMBIN TIME: CPT

## 2017-11-25 PROCEDURE — 96374 THER/PROPH/DIAG INJ IV PUSH: CPT

## 2017-11-25 PROCEDURE — 99284 EMERGENCY DEPT VISIT MOD MDM: CPT

## 2017-11-25 RX ORDER — 0.9 % SODIUM CHLORIDE 0.9 %
1000 INTRAVENOUS SOLUTION INTRAVENOUS ONCE
Status: COMPLETED | OUTPATIENT
Start: 2017-11-25 | End: 2017-11-25

## 2017-11-25 RX ORDER — PANTOPRAZOLE SODIUM 40 MG/10ML
80 INJECTION, POWDER, LYOPHILIZED, FOR SOLUTION INTRAVENOUS ONCE
Status: COMPLETED | OUTPATIENT
Start: 2017-11-25 | End: 2017-11-25

## 2017-11-25 RX ORDER — PANTOPRAZOLE SODIUM 40 MG/1
40 TABLET, DELAYED RELEASE ORAL DAILY
Qty: 30 TABLET | Refills: 0 | Status: ON HOLD | OUTPATIENT
Start: 2017-11-25 | End: 2018-06-01

## 2017-11-25 RX ADMIN — IOPAMIDOL 90 ML: 755 INJECTION, SOLUTION INTRAVENOUS at 15:07

## 2017-11-25 RX ADMIN — SODIUM CHLORIDE 1000 ML: 9 INJECTION, SOLUTION INTRAVENOUS at 13:24

## 2017-11-25 RX ADMIN — PANTOPRAZOLE SODIUM 80 MG: 40 INJECTION, POWDER, FOR SOLUTION INTRAVENOUS at 13:24

## 2017-11-25 NOTE — ED PROVIDER NOTES
(transient ischemic attack)          SURGICAL HISTORY       Past Surgical History:   Procedure Laterality Date    APPENDECTOMY      BACK SURGERY  1980    x 3    CARDIAC SURGERY  1990    Bypass x 3    CARPAL TUNNEL RELEASE Bilateral 1980    wrist and elbows    CHOLECYSTECTOMY  2000    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 2    JOINT REPLACEMENT Left 1990    JOINT REPLACEMENT Right 1995    LAMINECTOMY N/A 7/11/2017    LUMBAR LAMINECTOMY POSTERIOR  L23 L34 performed by Mariaelena Watters MD at [address] Left 1990    ROTATOR CUFF REPAIR Right 2010    TUMOR REMOVAL Left 1960    foot    TURP  2000    VASCULAR SURGERY Left 7/15/2015 [facility] & [address]    Aortoiliofemoral a'gram with bilateral lower extremity runoff; select views 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; a'plasty of left anterior tibial artery and dorsalis pedis artery with 2.5x300 vascutrak balloon and then with 3x220 nati balloon; completion a'gram         CURRENT MEDICATIONS       Discharge Medication List as of 11/25/2017  4:57 PM      CONTINUE these medications which have NOT CHANGED    Details   ferrous sulfate-C-folic acid (FOLITAB) 036770-6.6 MG TBCR per extended release tablet Take 1 tablet by mouth daily, Disp-30 tablet, R-0Normal      prednisoLONE acetate (PRED FORTE) 1 % ophthalmic suspension Place 1 drop into the left eye every hour as needed (for dry eye)Historical Med      probenecid (BENEMID) 500 MG tablet Take 500 mg by mouth 2 times dailyHistorical Med      allopurinol (ZYLOPRIM) 300 MG tablet Take 300 mg by mouth dailyHistorical Med      sennosides-docusate sodium (SENOKOT-S) 8.6-50 MG tablet Take 2 tablets by mouth dailyHistorical Med      insulin glargine (LANTUS SOLOSTAR) 100 UNIT/ML injection pen Inject 15 Units into the skin nightlyHistorical Med      lomustine (CEENU) 10 MG capsule Take by mouth onceHistorical Med LABS:  Labs Reviewed   CBC WITH AUTO DIFFERENTIAL - Abnormal; Notable for the following:        Result Value    RBC 3.17 (*)     Hemoglobin 10.3 (*)     Hematocrit 31.2 (*)     MCV 98.4 (*)     MCH 32.5 (*)     Monocytes % 13.1 (*)     All other components within normal limits   COMPREHENSIVE METABOLIC PANEL - Abnormal; Notable for the following:     BUN 38 (*)     CREATININE 1.4 (*)     GFR Non- 48 (*)     Total Protein 5.8 (*)     Alb 3.3 (*)     All other components within normal limits   URINALYSIS - Abnormal; Notable for the following:     Protein, UA TRACE (*)     Leukocyte Esterase, Urine TRACE (*)     All other components within normal limits   D-DIMER, QUANTITATIVE - Abnormal; Notable for the following:     D-Dimer, Quant 2.16 (*)     All other components within normal limits   TROPONIN - Abnormal; Notable for the following:     Troponin 0.06 (*)     All other components within normal limits   URINE CULTURE    Narrative:     ORDER#: [id number]                          ORDERED BY: Jacky Maurer  SOURCE: Urine Clean Catch                  COLLECTED:  11/25/17 13:38  ANTIBIOTICS AT JIA.:                      RECEIVED :  11/25/17 13:41   PROTIME-INR   MICROSCOPIC URINALYSIS   POCT TROPONIN   POCT VENOUS   POCT TROPONIN       All other labs were within normal range or not returned as of this dictation. EMERGENCY DEPARTMENT COURSE and DIFFERENTIAL DIAGNOSIS/MDM:   Vitals:    Vitals:    11/25/17 1403 11/25/17 1433 11/25/17 1502 11/25/17 1625   BP: (!) 178/69 (!) 164/57 (!) 196/71 (!) 170/70   Pulse: (!) 47 50     Resp:    16   Temp:    98.2 °F (36.8 °C)   TempSrc:    Oral   SpO2: 98% (!) 85% 94% 94%   Weight:       Height:               MDM  Number of Diagnoses or Management Options  Essential hypertension:   Hemoptysis:   Other fatigue:   Diagnosis management comments: Discussed patient's case with Dr. Earl Miller who interpreted EKG ventricular bigeminy, rate of 64.   CTA of the chest was

## 2017-11-27 LAB — URINE CULTURE, ROUTINE: NORMAL

## 2018-03-04 ENCOUNTER — HOSPITAL ENCOUNTER (INPATIENT)
Age: 83
LOS: 4 days | Discharge: HOME | DRG: 293 | End: 2018-03-09
Attending: FAMILY MEDICINE | Admitting: FAMILY MEDICINE
Payer: COMMERCIAL

## 2018-03-04 ENCOUNTER — APPOINTMENT (OUTPATIENT)
Dept: CT IMAGING | Age: 83
DRG: 293 | End: 2018-03-04
Payer: COMMERCIAL

## 2018-03-04 DIAGNOSIS — I50.9 ACUTE ON CHRONIC CONGESTIVE HEART FAILURE, UNSPECIFIED CONGESTIVE HEART FAILURE TYPE: Primary | ICD-10-CM

## 2018-03-04 DIAGNOSIS — R10.30 LOWER ABDOMINAL PAIN: ICD-10-CM

## 2018-03-04 DIAGNOSIS — I10 ESSENTIAL HYPERTENSION: ICD-10-CM

## 2018-03-04 LAB
BACTERIA: NEGATIVE /HPF
BASOPHILS ABSOLUTE: 0 K/UL (ref 0–0.2)
BASOPHILS RELATIVE PERCENT: 0.6 % (ref 0–1)
BILIRUBIN URINE: NEGATIVE
BLOOD, URINE: ABNORMAL
CLARITY: CLEAR
COLOR: YELLOW
EOSINOPHILS ABSOLUTE: 0.1 K/UL (ref 0–0.6)
EOSINOPHILS RELATIVE PERCENT: 2.4 % (ref 0–5)
EPITHELIAL CELLS, UA: 3 /HPF (ref 0–5)
GLUCOSE URINE: NEGATIVE MG/DL
HCT VFR BLD CALC: 29.8 % (ref 42–52)
HEMOGLOBIN: 9.8 G/DL (ref 14–18)
HYALINE CASTS: 5 /HPF (ref 0–8)
KETONES, URINE: NEGATIVE MG/DL
LEUKOCYTE ESTERASE, URINE: NEGATIVE
LYMPHOCYTES ABSOLUTE: 1.3 K/UL (ref 1.1–4.5)
LYMPHOCYTES RELATIVE PERCENT: 23.2 % (ref 20–40)
MCH RBC QN AUTO: 33.4 PG (ref 27–31)
MCHC RBC AUTO-ENTMCNC: 32.9 G/DL (ref 33–37)
MCV RBC AUTO: 101.7 FL (ref 80–94)
MONOCYTES ABSOLUTE: 0.5 K/UL (ref 0–0.9)
MONOCYTES RELATIVE PERCENT: 9.2 % (ref 0–10)
NEUTROPHILS ABSOLUTE: 3.5 K/UL (ref 1.5–7.5)
NEUTROPHILS RELATIVE PERCENT: 64.4 % (ref 50–65)
NITRITE, URINE: NEGATIVE
PDW BLD-RTO: 14.2 % (ref 11.5–14.5)
PH UA: 6
PLATELET # BLD: 176 K/UL (ref 130–400)
PMV BLD AUTO: 9.7 FL (ref 9.4–12.4)
PROTEIN UA: 100 MG/DL
RBC # BLD: 2.93 M/UL (ref 4.7–6.1)
RBC UA: 1 /HPF (ref 0–4)
SPECIFIC GRAVITY UA: 1.02
URINE REFLEX TO CULTURE: ABNORMAL
UROBILINOGEN, URINE: 1 E.U./DL
WBC # BLD: 5.4 K/UL (ref 4.8–10.8)
WBC UA: 9 /HPF (ref 0–5)

## 2018-03-04 PROCEDURE — 99285 EMERGENCY DEPT VISIT HI MDM: CPT

## 2018-03-04 PROCEDURE — 81001 URINALYSIS AUTO W/SCOPE: CPT

## 2018-03-04 PROCEDURE — 74176 CT ABD & PELVIS W/O CONTRAST: CPT

## 2018-03-05 ENCOUNTER — APPOINTMENT (OUTPATIENT)
Dept: GENERAL RADIOLOGY | Age: 83
DRG: 293 | End: 2018-03-05
Payer: COMMERCIAL

## 2018-03-05 PROBLEM — I50.43 CHF (CONGESTIVE HEART FAILURE), NYHA CLASS I, ACUTE ON CHRONIC, COMBINED (HCC): Status: ACTIVE | Noted: 2018-03-05

## 2018-03-05 LAB
ALBUMIN SERPL-MCNC: 3.6 G/DL (ref 3.5–5.2)
ALP BLD-CCNC: 84 U/L (ref 40–130)
ALT SERPL-CCNC: 25 U/L (ref 5–41)
AMYLASE: 51 U/L (ref 28–100)
ANION GAP SERPL CALCULATED.3IONS-SCNC: 11 MMOL/L (ref 7–19)
ANION GAP SERPL CALCULATED.3IONS-SCNC: 13 MMOL/L (ref 7–19)
AST SERPL-CCNC: 28 U/L (ref 5–40)
BILIRUB SERPL-MCNC: 0.4 MG/DL (ref 0.2–1.2)
BUN BLDV-MCNC: 27 MG/DL (ref 8–23)
BUN BLDV-MCNC: 29 MG/DL (ref 8–23)
CALCIUM SERPL-MCNC: 8.7 MG/DL (ref 8.8–10.2)
CALCIUM SERPL-MCNC: 9.2 MG/DL (ref 8.8–10.2)
CHLORIDE BLD-SCNC: 104 MMOL/L (ref 98–111)
CHLORIDE BLD-SCNC: 106 MMOL/L (ref 98–111)
CO2: 24 MMOL/L (ref 22–29)
CO2: 26 MMOL/L (ref 22–29)
CREAT SERPL-MCNC: 1.2 MG/DL (ref 0.5–1.2)
CREAT SERPL-MCNC: 1.2 MG/DL (ref 0.5–1.2)
GFR NON-AFRICAN AMERICAN: 58
GFR NON-AFRICAN AMERICAN: 58
GLUCOSE BLD-MCNC: 126 MG/DL (ref 70–99)
GLUCOSE BLD-MCNC: 154 MG/DL (ref 74–109)
GLUCOSE BLD-MCNC: 161 MG/DL (ref 74–109)
LIPASE: 30 U/L (ref 13–60)
LV EF: 45 %
LVEF MODALITY: NORMAL
MAGNESIUM: 1.9 MG/DL (ref 1.6–2.4)
PERFORMED ON: ABNORMAL
POTASSIUM SERPL-SCNC: 3.7 MMOL/L (ref 3.5–5)
POTASSIUM SERPL-SCNC: 3.7 MMOL/L (ref 3.5–5)
PRO-BNP: 6542 PG/ML (ref 0–1800)
SODIUM BLD-SCNC: 141 MMOL/L (ref 136–145)
SODIUM BLD-SCNC: 143 MMOL/L (ref 136–145)
TOTAL PROTEIN: 6.1 G/DL (ref 6.6–8.7)

## 2018-03-05 PROCEDURE — 1210000000 HC MED SURG R&B

## 2018-03-05 PROCEDURE — 83690 ASSAY OF LIPASE: CPT

## 2018-03-05 PROCEDURE — 6370000000 HC RX 637 (ALT 250 FOR IP): Performed by: FAMILY MEDICINE

## 2018-03-05 PROCEDURE — 71045 X-RAY EXAM CHEST 1 VIEW: CPT

## 2018-03-05 PROCEDURE — 96374 THER/PROPH/DIAG INJ IV PUSH: CPT

## 2018-03-05 PROCEDURE — 85025 COMPLETE CBC W/AUTO DIFF WBC: CPT

## 2018-03-05 PROCEDURE — 80053 COMPREHEN METABOLIC PANEL: CPT

## 2018-03-05 PROCEDURE — 6360000002 HC RX W HCPCS: Performed by: FAMILY MEDICINE

## 2018-03-05 PROCEDURE — 99285 EMERGENCY DEPT VISIT HI MDM: CPT | Performed by: FAMILY MEDICINE

## 2018-03-05 PROCEDURE — 93306 TTE W/DOPPLER COMPLETE: CPT

## 2018-03-05 PROCEDURE — 82948 REAGENT STRIP/BLOOD GLUCOSE: CPT

## 2018-03-05 PROCEDURE — 36415 COLL VENOUS BLD VENIPUNCTURE: CPT

## 2018-03-05 PROCEDURE — 83880 ASSAY OF NATRIURETIC PEPTIDE: CPT

## 2018-03-05 PROCEDURE — 82150 ASSAY OF AMYLASE: CPT

## 2018-03-05 PROCEDURE — 83735 ASSAY OF MAGNESIUM: CPT

## 2018-03-05 PROCEDURE — 2580000003 HC RX 258: Performed by: FAMILY MEDICINE

## 2018-03-05 RX ORDER — MECLIZINE HYDROCHLORIDE 25 MG/1
25 TABLET ORAL EVERY 6 HOURS SCHEDULED
Status: DISCONTINUED | OUTPATIENT
Start: 2018-03-05 | End: 2018-03-09 | Stop reason: HOSPADM

## 2018-03-05 RX ORDER — PROBENECID 500 MG/1
500 TABLET, FILM COATED ORAL 2 TIMES DAILY
Status: DISCONTINUED | OUTPATIENT
Start: 2018-03-05 | End: 2018-03-09 | Stop reason: HOSPADM

## 2018-03-05 RX ORDER — DEXTROSE MONOHYDRATE 25 G/50ML
12.5 INJECTION, SOLUTION INTRAVENOUS PRN
Status: DISCONTINUED | OUTPATIENT
Start: 2018-03-05 | End: 2018-03-09 | Stop reason: HOSPADM

## 2018-03-05 RX ORDER — ONDANSETRON 4 MG/1
4 TABLET, ORALLY DISINTEGRATING ORAL EVERY 8 HOURS PRN
Status: DISCONTINUED | OUTPATIENT
Start: 2018-03-05 | End: 2018-03-09 | Stop reason: HOSPADM

## 2018-03-05 RX ORDER — DIPHENOXYLATE HYDROCHLORIDE AND ATROPINE SULFATE 2.5; .025 MG/1; MG/1
1 TABLET ORAL 4 TIMES DAILY PRN
Status: DISCONTINUED | OUTPATIENT
Start: 2018-03-05 | End: 2018-03-09 | Stop reason: HOSPADM

## 2018-03-05 RX ORDER — GLIMEPIRIDE 1 MG/1
4 TABLET ORAL 2 TIMES DAILY
Status: DISCONTINUED | OUTPATIENT
Start: 2018-03-05 | End: 2018-03-08

## 2018-03-05 RX ORDER — NICOTINE POLACRILEX 4 MG
15 LOZENGE BUCCAL PRN
Status: DISCONTINUED | OUTPATIENT
Start: 2018-03-05 | End: 2018-03-09 | Stop reason: HOSPADM

## 2018-03-05 RX ORDER — ALLOPURINOL 300 MG/1
300 TABLET ORAL DAILY
Status: DISCONTINUED | OUTPATIENT
Start: 2018-03-05 | End: 2018-03-09 | Stop reason: HOSPADM

## 2018-03-05 RX ORDER — FUROSEMIDE 10 MG/ML
40 INJECTION INTRAMUSCULAR; INTRAVENOUS 2 TIMES DAILY
Status: DISCONTINUED | OUTPATIENT
Start: 2018-03-05 | End: 2018-03-07

## 2018-03-05 RX ORDER — ACETAMINOPHEN 325 MG/1
650 TABLET ORAL EVERY 4 HOURS PRN
Status: DISCONTINUED | OUTPATIENT
Start: 2018-03-05 | End: 2018-03-09 | Stop reason: HOSPADM

## 2018-03-05 RX ORDER — INSULIN GLARGINE 100 [IU]/ML
15 INJECTION, SOLUTION SUBCUTANEOUS NIGHTLY
Status: DISCONTINUED | OUTPATIENT
Start: 2018-03-05 | End: 2018-03-08

## 2018-03-05 RX ORDER — LISINOPRIL 20 MG/1
20 TABLET ORAL DAILY
Status: DISCONTINUED | OUTPATIENT
Start: 2018-03-05 | End: 2018-03-06

## 2018-03-05 RX ORDER — PANTOPRAZOLE SODIUM 40 MG/1
40 TABLET, DELAYED RELEASE ORAL DAILY
Status: DISCONTINUED | OUTPATIENT
Start: 2018-03-05 | End: 2018-03-09 | Stop reason: HOSPADM

## 2018-03-05 RX ORDER — CLONIDINE HYDROCHLORIDE 0.1 MG/1
0.1 TABLET ORAL EVERY 6 HOURS PRN
Status: DISCONTINUED | OUTPATIENT
Start: 2018-03-05 | End: 2018-03-09 | Stop reason: HOSPADM

## 2018-03-05 RX ORDER — PREDNISOLONE ACETATE 10 MG/ML
1 SUSPENSION/ DROPS OPHTHALMIC
Status: DISCONTINUED | OUTPATIENT
Start: 2018-03-05 | End: 2018-03-09 | Stop reason: HOSPADM

## 2018-03-05 RX ORDER — SODIUM CHLORIDE 0.9 % (FLUSH) 0.9 %
10 SYRINGE (ML) INJECTION PRN
Status: DISCONTINUED | OUTPATIENT
Start: 2018-03-05 | End: 2018-03-09 | Stop reason: HOSPADM

## 2018-03-05 RX ORDER — CARVEDILOL 12.5 MG/1
12.5 TABLET ORAL 2 TIMES DAILY WITH MEALS
Status: DISCONTINUED | OUTPATIENT
Start: 2018-03-05 | End: 2018-03-07

## 2018-03-05 RX ORDER — DEXTROSE MONOHYDRATE 50 MG/ML
100 INJECTION, SOLUTION INTRAVENOUS PRN
Status: DISCONTINUED | OUTPATIENT
Start: 2018-03-05 | End: 2018-03-09 | Stop reason: HOSPADM

## 2018-03-05 RX ORDER — SODIUM CHLORIDE 0.9 % (FLUSH) 0.9 %
10 SYRINGE (ML) INJECTION EVERY 12 HOURS SCHEDULED
Status: DISCONTINUED | OUTPATIENT
Start: 2018-03-05 | End: 2018-03-09 | Stop reason: HOSPADM

## 2018-03-05 RX ORDER — DOXAZOSIN MESYLATE 1 MG/1
1 TABLET ORAL NIGHTLY
Status: DISCONTINUED | OUTPATIENT
Start: 2018-03-05 | End: 2018-03-07

## 2018-03-05 RX ORDER — PRAVASTATIN SODIUM 20 MG
40 TABLET ORAL DAILY
Status: DISCONTINUED | OUTPATIENT
Start: 2018-03-05 | End: 2018-03-09 | Stop reason: HOSPADM

## 2018-03-05 RX ORDER — FUROSEMIDE 10 MG/ML
40 INJECTION INTRAMUSCULAR; INTRAVENOUS ONCE
Status: COMPLETED | OUTPATIENT
Start: 2018-03-05 | End: 2018-03-05

## 2018-03-05 RX ORDER — SENNA AND DOCUSATE SODIUM 50; 8.6 MG/1; MG/1
2 TABLET, FILM COATED ORAL DAILY
Status: DISCONTINUED | OUTPATIENT
Start: 2018-03-05 | End: 2018-03-09 | Stop reason: HOSPADM

## 2018-03-05 RX ORDER — LISINOPRIL 5 MG/1
5 TABLET ORAL DAILY
Status: DISCONTINUED | OUTPATIENT
Start: 2018-03-05 | End: 2018-03-05

## 2018-03-05 RX ADMIN — DOXAZOSIN 1 MG: 1 TABLET ORAL at 21:21

## 2018-03-05 RX ADMIN — DOCUSATE SODIUM AND SENNOSIDES 2 TABLET: 8.6; 5 TABLET, FILM COATED ORAL at 08:02

## 2018-03-05 RX ADMIN — ENOXAPARIN SODIUM 40 MG: 100 INJECTION SUBCUTANEOUS at 08:01

## 2018-03-05 RX ADMIN — MECLIZINE HYDROCHLORIDE 25 MG: 25 TABLET ORAL at 18:26

## 2018-03-05 RX ADMIN — ALLOPURINOL 300 MG: 300 TABLET ORAL at 08:02

## 2018-03-05 RX ADMIN — LISINOPRIL 20 MG: 20 TABLET ORAL at 08:02

## 2018-03-05 RX ADMIN — CARVEDILOL 12.5 MG: 12.5 TABLET, FILM COATED ORAL at 18:26

## 2018-03-05 RX ADMIN — PRAVASTATIN SODIUM 40 MG: 20 TABLET ORAL at 08:02

## 2018-03-05 RX ADMIN — FUROSEMIDE 40 MG: 10 INJECTION, SOLUTION INTRAMUSCULAR; INTRAVENOUS at 08:03

## 2018-03-05 RX ADMIN — CHOLECALCIFEROL CAP 125 MCG (5000 UNIT) 5000 UNITS: 125 CAP at 08:02

## 2018-03-05 RX ADMIN — CHOLECALCIFEROL CAP 125 MCG (5000 UNIT) 5000 UNITS: 125 CAP at 21:21

## 2018-03-05 RX ADMIN — MECLIZINE HYDROCHLORIDE 25 MG: 25 TABLET ORAL at 08:10

## 2018-03-05 RX ADMIN — MECLIZINE HYDROCHLORIDE 25 MG: 25 TABLET ORAL at 13:59

## 2018-03-05 RX ADMIN — FUROSEMIDE 40 MG: 10 INJECTION, SOLUTION INTRAMUSCULAR; INTRAVENOUS at 00:29

## 2018-03-05 RX ADMIN — FUROSEMIDE 40 MG: 10 INJECTION, SOLUTION INTRAMUSCULAR; INTRAVENOUS at 18:26

## 2018-03-05 RX ADMIN — ACETAMINOPHEN 650 MG: 325 TABLET, FILM COATED ORAL at 08:03

## 2018-03-05 RX ADMIN — PANTOPRAZOLE SODIUM 40 MG: 40 TABLET, DELAYED RELEASE ORAL at 08:02

## 2018-03-05 RX ADMIN — GLIMEPIRIDE 4 MG: 1 TABLET ORAL at 21:21

## 2018-03-05 RX ADMIN — Medication 10 ML: at 08:10

## 2018-03-05 RX ADMIN — CARVEDILOL 12.5 MG: 12.5 TABLET, FILM COATED ORAL at 08:02

## 2018-03-05 RX ADMIN — GLIMEPIRIDE 4 MG: 1 TABLET ORAL at 08:01

## 2018-03-05 ASSESSMENT — ENCOUNTER SYMPTOMS
BACK PAIN: 0
WHEEZING: 0
SHORTNESS OF BREATH: 1
ABDOMINAL DISTENTION: 0
CONSTIPATION: 0
DIARRHEA: 0
NAUSEA: 0
SORE THROAT: 0
ABDOMINAL PAIN: 0
COUGH: 0
PHOTOPHOBIA: 0

## 2018-03-05 ASSESSMENT — PAIN SCALES - GENERAL
PAINLEVEL_OUTOF10: 7
PAINLEVEL_OUTOF10: 2
PAINLEVEL_OUTOF10: 0

## 2018-03-05 ASSESSMENT — PAIN DESCRIPTION - LOCATION: LOCATION: OTHER (COMMENT)

## 2018-03-05 ASSESSMENT — PAIN DESCRIPTION - PAIN TYPE: TYPE: CHRONIC PAIN

## 2018-03-05 NOTE — ED PROVIDER NOTES
tobacco: Never Used    Alcohol use No    Drug use: No    Sexual activity: Not Currently     Other Topics Concern    None     Social History Narrative    None       SCREENINGS             PHYSICAL EXAM    (up to 7 for level 4, 8 or more for level 5)     ED Triage Vitals [03/04/18 2150]   BP Temp Temp Source Pulse Resp SpO2 Height Weight   (!) 214/75 97.9 °F (36.6 °C) Oral 102 -- 97 % 5' 10\" (1.778 m) 200 lb (90.7 kg)       Physical Exam   Constitutional: He is oriented to person, place, and time. He appears well-developed and well-nourished. HENT:   Head: Normocephalic. Right Ear: External ear normal.   Eyes: Pupils are equal, round, and reactive to light. Neck: Normal range of motion. Neck supple. Cardiovascular: Normal rate and normal heart sounds. Pulmonary/Chest: Effort normal. He has no wheezes. He has rales. Abdominal: Soft. Bowel sounds are normal. He exhibits no distension. There is tenderness in the suprapubic area. There is no rebound and no guarding. Musculoskeletal: Normal range of motion. He exhibits no edema or tenderness. Right foot: There is swelling. Left foot: There is swelling. Neurological: He is alert and oriented to person, place, and time. Skin: Skin is warm and dry. No rash noted. No erythema. No pallor.    Psychiatric: His behavior is normal.       DIAGNOSTIC RESULTS     EKG: All EKG's are interpreted by the Emergency Department Physician who either signs or Co-signs this chart in the absence of a cardiologist.        RADIOLOGY:   Non-plain film images such as CT, Ultrasound and MRI are read by the radiologist. Plain radiographic images are visualized and preliminarily interpreted by the emergency physician with the below findings:      I see no acute disease on the CT of the abdomen specifically the bladder is decompressed    CT ABDOMEN PELVIS WO CONTRAST Additional Contrast? None    (Results Pending)   XR CHEST PORTABLE    (Results Pending)

## 2018-03-05 NOTE — H&P
started. CT of the abdomen and pelvis did not apparently  show anything significant. I do not have the actual report in the chart  yet. Chest x-ray did show some chronic inflammatory lung changes. ASSESSMENT:  1. Acute on chronic systolic congestive heart failure. 2.  Hypertension with poor control of hypertension at this point. 3. H/O peptic Ulcer Disease with GI bleeding. No current symptoms or signs of bleeding. 3.  Diabetes with neuropathy and nephropathy. 4.  Ischemic heart disease. PLAN: At this point, we will diurese him, we will get a 2-D echo and I  will follow up on the report of the CAT scan of the abdomen.  I will carefully use the low dose lovenox for DVT prophylaxis, as he has bled from the PUD in the past.        Bradly Ingram MD    D: 03/05/2018 8:38:46       T: 03/05/2018 8:43:15     [email or web address]  Job#: [id number]     Doc#: [id number]    CC:

## 2018-03-06 LAB
ANION GAP SERPL CALCULATED.3IONS-SCNC: 14 MMOL/L (ref 7–19)
BUN BLDV-MCNC: 25 MG/DL (ref 8–23)
CALCIUM SERPL-MCNC: 8.6 MG/DL (ref 8.8–10.2)
CHLORIDE BLD-SCNC: 103 MMOL/L (ref 98–111)
CO2: 26 MMOL/L (ref 22–29)
CREAT SERPL-MCNC: 1.3 MG/DL (ref 0.5–1.2)
GFR NON-AFRICAN AMERICAN: 53
GLUCOSE BLD-MCNC: 88 MG/DL (ref 70–99)
GLUCOSE BLD-MCNC: 94 MG/DL (ref 74–109)
MAGNESIUM: 2 MG/DL (ref 1.6–2.4)
PERFORMED ON: NORMAL
POTASSIUM SERPL-SCNC: 3.8 MMOL/L (ref 3.5–5)
PRO-BNP: 5032 PG/ML (ref 0–1800)
SODIUM BLD-SCNC: 143 MMOL/L (ref 136–145)

## 2018-03-06 PROCEDURE — 83735 ASSAY OF MAGNESIUM: CPT

## 2018-03-06 PROCEDURE — 83880 ASSAY OF NATRIURETIC PEPTIDE: CPT

## 2018-03-06 PROCEDURE — 6370000000 HC RX 637 (ALT 250 FOR IP): Performed by: FAMILY MEDICINE

## 2018-03-06 PROCEDURE — 36415 COLL VENOUS BLD VENIPUNCTURE: CPT

## 2018-03-06 PROCEDURE — 6360000002 HC RX W HCPCS: Performed by: FAMILY MEDICINE

## 2018-03-06 PROCEDURE — 82948 REAGENT STRIP/BLOOD GLUCOSE: CPT

## 2018-03-06 PROCEDURE — 1210000000 HC MED SURG R&B

## 2018-03-06 PROCEDURE — 2580000003 HC RX 258: Performed by: FAMILY MEDICINE

## 2018-03-06 PROCEDURE — 80048 BASIC METABOLIC PNL TOTAL CA: CPT

## 2018-03-06 PROCEDURE — 93971 EXTREMITY STUDY: CPT

## 2018-03-06 RX ORDER — HYDROCODONE BITARTRATE AND ACETAMINOPHEN 7.5; 325 MG/1; MG/1
1 TABLET ORAL EVERY 6 HOURS PRN
Status: DISCONTINUED | OUTPATIENT
Start: 2018-03-06 | End: 2018-03-09 | Stop reason: HOSPADM

## 2018-03-06 RX ORDER — LISINOPRIL 20 MG/1
20 TABLET ORAL 2 TIMES DAILY
Status: DISCONTINUED | OUTPATIENT
Start: 2018-03-06 | End: 2018-03-07

## 2018-03-06 RX ADMIN — ALLOPURINOL 300 MG: 300 TABLET ORAL at 10:33

## 2018-03-06 RX ADMIN — CARVEDILOL 12.5 MG: 12.5 TABLET, FILM COATED ORAL at 10:33

## 2018-03-06 RX ADMIN — DOCUSATE SODIUM AND SENNOSIDES 2 TABLET: 8.6; 5 TABLET, FILM COATED ORAL at 10:33

## 2018-03-06 RX ADMIN — CARVEDILOL 12.5 MG: 12.5 TABLET, FILM COATED ORAL at 17:50

## 2018-03-06 RX ADMIN — FUROSEMIDE 40 MG: 10 INJECTION, SOLUTION INTRAMUSCULAR; INTRAVENOUS at 17:50

## 2018-03-06 RX ADMIN — Medication 10 ML: at 10:35

## 2018-03-06 RX ADMIN — DOXAZOSIN 1 MG: 1 TABLET ORAL at 21:04

## 2018-03-06 RX ADMIN — LISINOPRIL 20 MG: 20 TABLET ORAL at 10:33

## 2018-03-06 RX ADMIN — PANTOPRAZOLE SODIUM 40 MG: 40 TABLET, DELAYED RELEASE ORAL at 10:33

## 2018-03-06 RX ADMIN — CHOLECALCIFEROL CAP 125 MCG (5000 UNIT) 5000 UNITS: 125 CAP at 10:38

## 2018-03-06 RX ADMIN — GLIMEPIRIDE 4 MG: 1 TABLET ORAL at 10:34

## 2018-03-06 RX ADMIN — LISINOPRIL 20 MG: 20 TABLET ORAL at 21:04

## 2018-03-06 RX ADMIN — FUROSEMIDE 40 MG: 10 INJECTION, SOLUTION INTRAMUSCULAR; INTRAVENOUS at 10:34

## 2018-03-06 RX ADMIN — Medication 10 ML: at 21:09

## 2018-03-06 RX ADMIN — PRAVASTATIN SODIUM 40 MG: 20 TABLET ORAL at 10:44

## 2018-03-06 RX ADMIN — MECLIZINE HYDROCHLORIDE 25 MG: 25 TABLET ORAL at 10:33

## 2018-03-06 RX ADMIN — ENOXAPARIN SODIUM 40 MG: 100 INJECTION SUBCUTANEOUS at 10:32

## 2018-03-06 RX ADMIN — MECLIZINE HYDROCHLORIDE 25 MG: 25 TABLET ORAL at 01:07

## 2018-03-06 RX ADMIN — MECLIZINE HYDROCHLORIDE 25 MG: 25 TABLET ORAL at 05:39

## 2018-03-06 RX ADMIN — CHOLECALCIFEROL CAP 125 MCG (5000 UNIT) 5000 UNITS: 125 CAP at 21:04

## 2018-03-06 RX ADMIN — MECLIZINE HYDROCHLORIDE 25 MG: 25 TABLET ORAL at 17:50

## 2018-03-06 ASSESSMENT — PAIN SCALES - GENERAL
PAINLEVEL_OUTOF10: 0
PAINLEVEL_OUTOF10: 0

## 2018-03-06 NOTE — PROGRESS NOTES
Vascular lab preliminary. Right leg venous scan completed. No evidence for DVT, SVT, or reflux noted at this time. Final report pending.

## 2018-03-07 PROBLEM — I25.10 CAD (CORONARY ARTERY DISEASE): Status: ACTIVE | Noted: 2018-03-07

## 2018-03-07 LAB
ANION GAP SERPL CALCULATED.3IONS-SCNC: 8 MMOL/L (ref 7–19)
BASOPHILS ABSOLUTE: 0 K/UL (ref 0–0.2)
BASOPHILS RELATIVE PERCENT: 0.4 % (ref 0–1)
BUN BLDV-MCNC: 26 MG/DL (ref 8–23)
CALCIUM SERPL-MCNC: 9.1 MG/DL (ref 8.8–10.2)
CHLORIDE BLD-SCNC: 103 MMOL/L (ref 98–111)
CO2: 31 MMOL/L (ref 22–29)
CREAT SERPL-MCNC: 1.6 MG/DL (ref 0.5–1.2)
EOSINOPHILS ABSOLUTE: 0.3 K/UL (ref 0–0.6)
EOSINOPHILS RELATIVE PERCENT: 5.2 % (ref 0–5)
GFR NON-AFRICAN AMERICAN: 41
GLUCOSE BLD-MCNC: 101 MG/DL (ref 70–99)
GLUCOSE BLD-MCNC: 121 MG/DL (ref 70–99)
GLUCOSE BLD-MCNC: 189 MG/DL (ref 70–99)
GLUCOSE BLD-MCNC: 193 MG/DL (ref 70–99)
GLUCOSE BLD-MCNC: 84 MG/DL (ref 74–109)
GLUCOSE BLD-MCNC: 89 MG/DL (ref 70–99)
HCT VFR BLD CALC: 29.5 % (ref 42–52)
HEMOGLOBIN: 9.5 G/DL (ref 14–18)
LYMPHOCYTES ABSOLUTE: 1.3 K/UL (ref 1.1–4.5)
LYMPHOCYTES RELATIVE PERCENT: 23.8 % (ref 20–40)
MAGNESIUM: 2.2 MG/DL (ref 1.6–2.4)
MCH RBC QN AUTO: 32.6 PG (ref 27–31)
MCHC RBC AUTO-ENTMCNC: 32.2 G/DL (ref 33–37)
MCV RBC AUTO: 101.4 FL (ref 80–94)
MONOCYTES ABSOLUTE: 0.8 K/UL (ref 0–0.9)
MONOCYTES RELATIVE PERCENT: 13.8 % (ref 0–10)
NEUTROPHILS ABSOLUTE: 3.1 K/UL (ref 1.5–7.5)
NEUTROPHILS RELATIVE PERCENT: 56.6 % (ref 50–65)
PDW BLD-RTO: 13.9 % (ref 11.5–14.5)
PERFORMED ON: ABNORMAL
PERFORMED ON: NORMAL
PLATELET # BLD: 194 K/UL (ref 130–400)
PMV BLD AUTO: 10.6 FL (ref 9.4–12.4)
POTASSIUM SERPL-SCNC: 4 MMOL/L (ref 3.5–5)
RBC # BLD: 2.91 M/UL (ref 4.7–6.1)
SODIUM BLD-SCNC: 142 MMOL/L (ref 136–145)
WBC # BLD: 5.4 K/UL (ref 4.8–10.8)

## 2018-03-07 PROCEDURE — 83735 ASSAY OF MAGNESIUM: CPT

## 2018-03-07 PROCEDURE — 1210000000 HC MED SURG R&B

## 2018-03-07 PROCEDURE — 36415 COLL VENOUS BLD VENIPUNCTURE: CPT

## 2018-03-07 PROCEDURE — 82948 REAGENT STRIP/BLOOD GLUCOSE: CPT

## 2018-03-07 PROCEDURE — 99223 1ST HOSP IP/OBS HIGH 75: CPT | Performed by: INTERNAL MEDICINE

## 2018-03-07 PROCEDURE — 2580000003 HC RX 258: Performed by: FAMILY MEDICINE

## 2018-03-07 PROCEDURE — 6360000002 HC RX W HCPCS: Performed by: FAMILY MEDICINE

## 2018-03-07 PROCEDURE — 6370000000 HC RX 637 (ALT 250 FOR IP): Performed by: FAMILY MEDICINE

## 2018-03-07 PROCEDURE — 80048 BASIC METABOLIC PNL TOTAL CA: CPT

## 2018-03-07 PROCEDURE — 85025 COMPLETE CBC W/AUTO DIFF WBC: CPT

## 2018-03-07 RX ORDER — FUROSEMIDE 40 MG/1
40 TABLET ORAL DAILY
Status: DISCONTINUED | OUTPATIENT
Start: 2018-03-07 | End: 2018-03-07

## 2018-03-07 RX ORDER — CARVEDILOL 6.25 MG/1
6.25 TABLET ORAL 2 TIMES DAILY WITH MEALS
Status: DISCONTINUED | OUTPATIENT
Start: 2018-03-08 | End: 2018-03-09 | Stop reason: HOSPADM

## 2018-03-07 RX ORDER — AMLODIPINE BESYLATE 5 MG/1
5 TABLET ORAL DAILY
Status: DISCONTINUED | OUTPATIENT
Start: 2018-03-07 | End: 2018-03-09 | Stop reason: HOSPADM

## 2018-03-07 RX ORDER — SPIRONOLACTONE 25 MG/1
25 TABLET ORAL DAILY
Status: DISCONTINUED | OUTPATIENT
Start: 2018-03-07 | End: 2018-03-07

## 2018-03-07 RX ORDER — DOXAZOSIN 2 MG/1
2 TABLET ORAL EVERY 12 HOURS SCHEDULED
Status: DISCONTINUED | OUTPATIENT
Start: 2018-03-07 | End: 2018-03-09 | Stop reason: HOSPADM

## 2018-03-07 RX ORDER — DOXAZOSIN MESYLATE 1 MG/1
1 TABLET ORAL EVERY 12 HOURS SCHEDULED
Status: DISCONTINUED | OUTPATIENT
Start: 2018-03-07 | End: 2018-03-07

## 2018-03-07 RX ORDER — LISINOPRIL 20 MG/1
20 TABLET ORAL 2 TIMES DAILY
Status: DISCONTINUED | OUTPATIENT
Start: 2018-03-07 | End: 2018-03-09 | Stop reason: HOSPADM

## 2018-03-07 RX ADMIN — CARVEDILOL 12.5 MG: 12.5 TABLET, FILM COATED ORAL at 08:26

## 2018-03-07 RX ADMIN — PRAVASTATIN SODIUM 40 MG: 20 TABLET ORAL at 08:26

## 2018-03-07 RX ADMIN — FUROSEMIDE 40 MG: 40 TABLET ORAL at 08:41

## 2018-03-07 RX ADMIN — DOCUSATE SODIUM AND SENNOSIDES 2 TABLET: 8.6; 5 TABLET, FILM COATED ORAL at 08:26

## 2018-03-07 RX ADMIN — GLIMEPIRIDE 4 MG: 1 TABLET ORAL at 21:22

## 2018-03-07 RX ADMIN — DOXAZOSIN 1 MG: 1 TABLET ORAL at 08:27

## 2018-03-07 RX ADMIN — Medication 10 ML: at 21:22

## 2018-03-07 RX ADMIN — CHOLECALCIFEROL CAP 125 MCG (5000 UNIT) 5000 UNITS: 125 CAP at 08:28

## 2018-03-07 RX ADMIN — LISINOPRIL 20 MG: 20 TABLET ORAL at 21:23

## 2018-03-07 RX ADMIN — MECLIZINE HYDROCHLORIDE 25 MG: 25 TABLET ORAL at 08:27

## 2018-03-07 RX ADMIN — CHOLECALCIFEROL CAP 125 MCG (5000 UNIT) 5000 UNITS: 125 CAP at 21:23

## 2018-03-07 RX ADMIN — ALLOPURINOL 300 MG: 300 TABLET ORAL at 08:25

## 2018-03-07 RX ADMIN — GLIMEPIRIDE 4 MG: 1 TABLET ORAL at 08:28

## 2018-03-07 RX ADMIN — AMLODIPINE BESYLATE 5 MG: 5 TABLET ORAL at 18:15

## 2018-03-07 RX ADMIN — MECLIZINE HYDROCHLORIDE 25 MG: 25 TABLET ORAL at 00:25

## 2018-03-07 RX ADMIN — LISINOPRIL 20 MG: 20 TABLET ORAL at 08:27

## 2018-03-07 RX ADMIN — PANTOPRAZOLE SODIUM 40 MG: 40 TABLET, DELAYED RELEASE ORAL at 08:27

## 2018-03-07 RX ADMIN — CARVEDILOL 12.5 MG: 12.5 TABLET, FILM COATED ORAL at 17:35

## 2018-03-07 RX ADMIN — CLONIDINE HYDROCHLORIDE 0.1 MG: 0.1 TABLET ORAL at 12:18

## 2018-03-07 RX ADMIN — Medication 10 ML: at 08:29

## 2018-03-07 RX ADMIN — ENOXAPARIN SODIUM 40 MG: 100 INJECTION SUBCUTANEOUS at 08:28

## 2018-03-07 RX ADMIN — SPIRONOLACTONE 25 MG: 25 TABLET, FILM COATED ORAL at 08:27

## 2018-03-07 RX ADMIN — DOXAZOSIN 2 MG: 2 TABLET ORAL at 21:23

## 2018-03-07 RX ADMIN — INSULIN GLARGINE 15 UNITS: 100 INJECTION, SOLUTION SUBCUTANEOUS at 21:24

## 2018-03-07 NOTE — PROGRESS NOTES
Tele called at 1130 stated pt heart rate dropped in the 40's. Pt was sleeping, woke up and talked to him HR in mid 50's. Ambulated pt to restroom, when finished he stated he was lightheaded and having lower abdominal pain. Tele called again, HR dropped to 39 but came back up quickly to low 50's. He is resting in bed at this time, states he is not dizzy anymore. He is running sinus tanya at 50. /62, clonidine given, will continue to monitor. Dr. Calvin Daugherty notified, spoke with Barber Conroy.  Electronically signed by Joseline Ray RN on 3/7/2018 at 12:31 PM

## 2018-03-08 PROBLEM — R93.41 ABNORMAL CT SCAN, BLADDER: Status: ACTIVE | Noted: 2018-03-08

## 2018-03-08 PROBLEM — N28.1 BILATERAL RENAL CYSTS: Status: ACTIVE | Noted: 2018-03-08

## 2018-03-08 PROBLEM — R33.9 RETENTION, URINE: Status: ACTIVE | Noted: 2018-03-08

## 2018-03-08 LAB
ANION GAP SERPL CALCULATED.3IONS-SCNC: 12 MMOL/L (ref 7–19)
BUN BLDV-MCNC: 30 MG/DL (ref 8–23)
CALCIUM SERPL-MCNC: 9 MG/DL (ref 8.8–10.2)
CHLORIDE BLD-SCNC: 102 MMOL/L (ref 98–111)
CO2: 28 MMOL/L (ref 22–29)
CREAT SERPL-MCNC: 1.4 MG/DL (ref 0.5–1.2)
GFR NON-AFRICAN AMERICAN: 48
GLUCOSE BLD-MCNC: 134 MG/DL (ref 70–99)
GLUCOSE BLD-MCNC: 137 MG/DL (ref 70–99)
GLUCOSE BLD-MCNC: 147 MG/DL (ref 70–99)
GLUCOSE BLD-MCNC: 42 MG/DL (ref 70–99)
GLUCOSE BLD-MCNC: 46 MG/DL (ref 74–109)
GLUCOSE BLD-MCNC: 84 MG/DL (ref 70–99)
MAGNESIUM: 2.1 MG/DL (ref 1.6–2.4)
PERFORMED ON: ABNORMAL
PERFORMED ON: NORMAL
POTASSIUM SERPL-SCNC: 4.1 MMOL/L (ref 3.5–5)
PRO-BNP: 2005 PG/ML (ref 0–1800)
SODIUM BLD-SCNC: 142 MMOL/L (ref 136–145)

## 2018-03-08 PROCEDURE — 83735 ASSAY OF MAGNESIUM: CPT

## 2018-03-08 PROCEDURE — 82948 REAGENT STRIP/BLOOD GLUCOSE: CPT

## 2018-03-08 PROCEDURE — 99232 SBSQ HOSP IP/OBS MODERATE 35: CPT | Performed by: INTERNAL MEDICINE

## 2018-03-08 PROCEDURE — 1210000000 HC MED SURG R&B

## 2018-03-08 PROCEDURE — 83880 ASSAY OF NATRIURETIC PEPTIDE: CPT

## 2018-03-08 PROCEDURE — 36415 COLL VENOUS BLD VENIPUNCTURE: CPT

## 2018-03-08 PROCEDURE — 6360000002 HC RX W HCPCS: Performed by: FAMILY MEDICINE

## 2018-03-08 PROCEDURE — 6370000000 HC RX 637 (ALT 250 FOR IP): Performed by: FAMILY MEDICINE

## 2018-03-08 PROCEDURE — 2580000003 HC RX 258: Performed by: FAMILY MEDICINE

## 2018-03-08 PROCEDURE — 80048 BASIC METABOLIC PNL TOTAL CA: CPT

## 2018-03-08 PROCEDURE — 99221 1ST HOSP IP/OBS SF/LOW 40: CPT | Performed by: PHYSICIAN ASSISTANT

## 2018-03-08 RX ORDER — INSULIN GLARGINE 100 [IU]/ML
10 INJECTION, SOLUTION SUBCUTANEOUS NIGHTLY
Status: DISCONTINUED | OUTPATIENT
Start: 2018-03-08 | End: 2018-03-09

## 2018-03-08 RX ADMIN — PRAVASTATIN SODIUM 40 MG: 20 TABLET ORAL at 09:00

## 2018-03-08 RX ADMIN — CARVEDILOL 6.25 MG: 6.25 TABLET, FILM COATED ORAL at 18:05

## 2018-03-08 RX ADMIN — LISINOPRIL 20 MG: 20 TABLET ORAL at 20:33

## 2018-03-08 RX ADMIN — PANTOPRAZOLE SODIUM 40 MG: 40 TABLET, DELAYED RELEASE ORAL at 09:00

## 2018-03-08 RX ADMIN — LISINOPRIL 20 MG: 20 TABLET ORAL at 09:00

## 2018-03-08 RX ADMIN — DOXAZOSIN 2 MG: 2 TABLET ORAL at 21:42

## 2018-03-08 RX ADMIN — CLONIDINE HYDROCHLORIDE 0.1 MG: 0.1 TABLET ORAL at 01:46

## 2018-03-08 RX ADMIN — MECLIZINE HYDROCHLORIDE 25 MG: 25 TABLET ORAL at 18:06

## 2018-03-08 RX ADMIN — CHOLECALCIFEROL CAP 125 MCG (5000 UNIT) 5000 UNITS: 125 CAP at 20:33

## 2018-03-08 RX ADMIN — MECLIZINE HYDROCHLORIDE 25 MG: 25 TABLET ORAL at 00:33

## 2018-03-08 RX ADMIN — Medication 10 ML: at 09:00

## 2018-03-08 RX ADMIN — CHOLECALCIFEROL CAP 125 MCG (5000 UNIT) 5000 UNITS: 125 CAP at 09:00

## 2018-03-08 RX ADMIN — MECLIZINE HYDROCHLORIDE 25 MG: 25 TABLET ORAL at 05:40

## 2018-03-08 RX ADMIN — ENOXAPARIN SODIUM 40 MG: 100 INJECTION SUBCUTANEOUS at 09:00

## 2018-03-08 RX ADMIN — DOXAZOSIN 2 MG: 2 TABLET ORAL at 09:00

## 2018-03-08 RX ADMIN — MECLIZINE HYDROCHLORIDE 25 MG: 25 TABLET ORAL at 12:00

## 2018-03-08 RX ADMIN — DOCUSATE SODIUM AND SENNOSIDES 2 TABLET: 8.6; 5 TABLET, FILM COATED ORAL at 09:00

## 2018-03-08 RX ADMIN — AMLODIPINE BESYLATE 5 MG: 5 TABLET ORAL at 09:00

## 2018-03-08 RX ADMIN — ALLOPURINOL 300 MG: 300 TABLET ORAL at 10:13

## 2018-03-08 RX ADMIN — CARVEDILOL 6.25 MG: 6.25 TABLET, FILM COATED ORAL at 09:00

## 2018-03-08 RX ADMIN — INSULIN GLARGINE 10 UNITS: 100 INJECTION, SOLUTION SUBCUTANEOUS at 20:35

## 2018-03-08 ASSESSMENT — PAIN SCALES - GENERAL
PAINLEVEL_OUTOF10: 2
PAINLEVEL_OUTOF10: 0

## 2018-03-08 NOTE — CONSULTS
Inpatient consult to Urology  Consult performed by: Christi Ignacio ordered by: Si Distance  Reason for consult: retention/fluid density in bladder. Department of Urology  Physician Assistant Consult Note  Concetta Kessler PA-C      Reason for Consult:  Abnormal bladder findings on CT and suprapubic pain. Requesting Physician:  Dr. Randall Colon:  Abnormal findings on the bladder and lower abdominal pain. History Obtained From:  patient, electronic medical record    HISTORY OF PRESENT ILLNESS:                The patient is a 80 y.o. male who presented [facility] ER on 03/04/2018 with inability urinate. He was also complaining about SOB and lower abdominal pain. Also stated his urine was dark and concentrated although urinalysis in the ER revealed just small rbc,s and was not infected. He had catheter placed at the ER and was admitted for acute on chronic congestive heart failure, HTN, lower abdominal pain. CT scan was done which revealed no kidney stones and no obstruction. There are bilateral cysts. Also there is high density fluid density in the bladder which radiologist feels could be hematoma. He has never seen or had gross hematuria. His catheter was removed and he is voiding fine. He has previous history of TURP a few years ago. The patient thinks this was done by DR. Fernando Ruth. He is on no urologic medications and denies any problems voiding or gross hematuria. He state he has been having persistent suprapubic discomfort is described as mild to sometimes sever at times. , We were consulted because of the abnormal CT scan lower abdominal pain.   Past Medical History:        Diagnosis Date    CAD (coronary artery disease)     STENTS X 3    CAD (coronary artery disease)     CABG    [facility]est[facility] failure) [facility]     DDD (degenerative disc disease), lumbar 7/11/2017    Diabetes mellitus ([facility] [address]     GERD (gastroesophageal reflux disease)     Hx of blood clots     Right leg  Hyperlipidemia     Hypertension     Lumbar stenosis with neurogenic claudication 7/11/2017    MI, old     X 2    Palliative care encounter     Sleep apnea     DOESN'T USE MACHINE    TIA (transient ischemic attack)      Past Surgical History:        Procedure Laterality Date    APPENDECTOMY      BACK SURGERY  1980    x 3    CARDIAC SURGERY  1990    Bypass x 3    CARPAL TUNNEL RELEASE Bilateral 1980    wrist and elbows    CHOLECYSTECTOMY  2000    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 2    JOINT REPLACEMENT Left 1990    JOINT REPLACEMENT Right 1995    LAMINECTOMY N/A 7/11/2017    LUMBAR LAMINECTOMY POSTERIOR  L23 L34 performed by Cleve Roes MD at [address] Left [address]    ROTATOR CUFF REPAIR Right 2010    TUMOR REMOVAL Left 1960    foot    TURP  2000    VASCULAR SURGERY Left 7/15/2015 [facility] & [address]    Aortoiliofemoral a'gram with bilateral lower extremity runoff; select views 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; a'plasty of left anterior tibial artery and dorsalis pedis artery with 2.5x300 vascutrak balloon and then with 3x220 nati balloon; completion a'gram     Current Medications:   Current Facility-Administered Medications: lisinopril (PRINIVIL;ZESTRIL) tablet 20 mg, 20 mg, Oral, BID  carvedilol (COREG) tablet 6.25 mg, 6.25 mg, Oral, BID WC  doxazosin (CARDURA) tablet 2 mg, 2 mg, Oral, 2 times per day  amLODIPine (NORVASC) tablet 5 mg, 5 mg, Oral, Daily  HYDROcodone-acetaminophen (NORCO) 7.5-325 MG per tablet 1 tablet, 1 tablet, Oral, Q6H PRN  sodium chloride flush 0.9 % injection 10 mL, 10 mL, Intravenous, 2 times per day  sodium chloride flush 0.9 % injection 10 mL, 10 mL, Intravenous, PRN  acetaminophen (TYLENOL) tablet 650 mg, 650 mg, Oral, Q4H PRN  enoxaparin (LOVENOX) injection 40 mg, 40 mg, Subcutaneous, Daily  ondansetron (ZOFRAN-ODT) disintegrating tablet 4 mg, 4 mg, Oral, apparent distress, and appears stated age  LUNGS:  No increased work of breathing, good air exchange, clear to auscultation bilaterally, no crackles or wheezing  ABDOMEN:  tenderness noted in the lower abdomen. GENITAL/URINARY:  digital rectal exam:  prostate normal  SKIN:  no rashes    No acute abnormality of the abdomen or pelvis noted. The small bibasilar pleural effusion and adjacent atelectatic   changes/consolidation in the lower lung bilaterally. Generalized anasarca. Bilateral low density renal nodules/masses probably represent renal   cysts. Further evaluation with sonography may be obtained. There is a high density fluid in the urinary bladder which may   represent hemorrhage or hematoma. The Garcia catheter is coiled in the   urinary bladder and the bulb of the Garcia is not in the area of the   bladder outlet. This may need repositioning. The diverticulosis of the colon. No evidence for diverticulitis. The above study was initially reviewed and reported by stat [facility]. I do   not find any significant discrepancies. DATA:  CBC:   Lab Results   Component Value Date    WBC 5.4 03/07/2018    RBC 2.91 03/07/2018    HGB 9.5 03/07/2018    HCT 29.5 03/07/2018    .4 03/07/2018    MCH 32.6 03/07/2018    MCHC 32.2 03/07/2018    RDW 13.9 03/07/2018     03/07/2018    MPV 10.6 03/07/2018     CMP:    Lab Results   Component Value Date     03/08/2018    K 4.1 03/08/2018     03/08/2018    CO2 28 03/08/2018    BUN 30 03/08/2018    CREATININE 1.4 03/08/2018    LABGLOM 48 03/08/2018    GLUCOSE 46 03/08/2018    PROT 6.1 03/04/2018    LABALBU 3.6 03/04/2018    CALCIUM 9.0 03/08/2018    BILITOT 0.4 03/04/2018    ALKPHOS 84 03/04/2018    AST 28 03/04/2018    ALT 25 03/04/2018     IMPRESSION/RECOMMENDATION:  1. Acute on chronic systolic CHF: Continue the diuresis. Labs are pending. Change to po and increase the activity. 2. HTN: the meds will be adjusted.  He is on lovenox for DVT prophylaxis. Increase activity. DM: Continue as is. ASCVD: No evidence of ongoing ischemiaH/O PUD with bleed in the past: I will             watch for si/sx of GI bleeding. 1. Urinary retention when patient went to ER. Catheter was placed catheter now removed and patient voiding well. No prior recent history of urinary retention. May have been related to accumulation of fluid from edema. Now voiding fine. No gross hematuria. 2. Fluid density in the bladder on CT scan. Patient has not had any gross hematuria and his urine is not infected. Odell not look like a bladder mass. 3. Lower abdominal pain. Not able clearly explain this pain from a urologic standpoint. 4. Bilateral renal cysts. 5. BPH and prior TURP several years ago. Patient thinks it was done by DR. Homa Cloud. ELA was benign. 6. Patient may need outpatient cystoscopy and repeat CT scan to further evaluate the bladder.

## 2018-03-08 NOTE — CONSULTS
(LANTUS SOLOSTAR) 100 UNIT/ML injection pen Inject 15 Units into the skin nightly   Yes Historical Provider, MD   diphenoxylate-atropine (LOMOTIL) 2.5-0.025 MG per tablet Take 1 tablet by mouth 4 times daily as needed for Diarrhea   Yes Historical Provider, MD   meclizine (ANTIVERT) 25 MG tablet Take 25 mg by mouth 4 times daily as needed   Yes Historical Provider, MD   ondansetron (ZOFRAN-ODT) 4 MG disintegrating tablet Take 4 mg by mouth every 8 hours as needed for Nausea or Vomiting   Yes Historical Provider, MD   pravastatin (PRAVACHOL) 40 MG tablet Take 40 mg by mouth daily   Yes Historical Provider, MD   promethazine (PHENERGAN) 12.5 MG tablet Take 12.5 mg by mouth as needed for Nausea   Yes Historical Provider, MD   doxazosin (CARDURA) 1 MG tablet Take 1 mg by mouth nightly   Yes Historical Provider, MD   glimepiride (AMARYL) 4 MG tablet Take 4 mg by mouth 2 times daily   Yes Historical Provider, MD   Ascorbic Acid (VITAMIN C) 500 MG tablet Take 500 mg by mouth daily   Yes Historical Provider, MD   b complex vitamins capsule Take 1 capsule by mouth daily   Yes Historical Provider, MD   folic acid (FOLVITE) 002 MCG tablet Take 400 mcg by mouth daily   Yes Historical Provider, MD   ferrous sulfate-C-folic acid (FOLITAB) 155-273-9.3 MG TBCR per extended release tablet Take 1 tablet by mouth daily 7/21/17   Lucinda Moritz, MD   probenecid (BENEMID) 500 MG tablet Take 500 mg by mouth 2 times daily    Historical Provider, MD   allopurinol (ZYLOPRIM) 300 MG tablet Take 300 mg by mouth daily    Historical Provider, MD   lomustine (CEENU) 10 MG capsule Take by mouth once    Historical Provider, MD   carvedilol (COREG) 12.5 MG tablet Take 12.5 mg by mouth 2 times daily (with meals)    Historical Provider, MD   mupirocin (BACTROBAN) 2 % ointment Apply topically daily Indications: Apply at dressing changes Apply topically 3 times daily.     Historical Provider, MD   Cholecalciferol (VITAMIN D3) 5000 UNITS TABS Take by WBCUA 9 03/04/2018    RBCUA 1 03/04/2018    BACTERIA NEGATIVE 03/04/2018    CLARITYU Clear 03/04/2018    SPECGRAV 1.022 03/04/2018    LEUKOCYTESUR Negative 03/04/2018    UROBILINOGEN 1.0 03/04/2018    BILIRUBINUR Negative 03/04/2018    BLOODU SMALL 03/04/2018    GLUCOSEU Negative 03/04/2018             ALL THE CARDIOLOGY PROBLEMS ARE LISTED ABOVE; HOWEVER, THE FOLLOWING SPECIFIC CARDIAC PROBLEMS WERE ADDRESSED AND TREATED DURING THE HOSPITAL VISIT TODAY:                                                                                                                                                                                                                                            MEDICAL DECISION MAKING             Cardiac Specific Problem / Diagnosis  Discussion and Data Reviewed Diagnostic Procedures Ordered Management Options Selected           1. Presenting problem / symptom  Progressive shortness of air  are worsening   Findings are consistent with an ischemic cardiomyopathy No Continue current medications:     Yes:            2. Prior cardiac history of CAD and echo consistent with an ischemic cardiomyopathy Initial presentation during this evaluation   Review and summation of records:    1990  CABG  Several stents placed  3/6/2018  Echo  EF 45%       No Continue current medications:    Yes: will back off the lasix a little since the creatinine is going up           3. Systemic arterial hypertension Initial presentation during this evaluation Systolic (28ZSN), VKK:054 , Min:150 , BYU:067    Diastolic (45TGB), CIL:61, Min:60, Max:80   No Continue current medications:       yes           4. Elevated BNP  BNP (brain natriuretic peptide) was initially identified in extracts of pig brain. It is released predominately from the atrial, but also the ventricles, in response to high increased volume and wall stress. Teteologically, it has a diuretic, natriuretic and hypotensive effect by decreasing afterload.

## 2018-03-08 NOTE — PROGRESS NOTES
LAB REPORTED A GLUCOSE OF 46. ACCUCHEK RETAKEN WAS 42. PATIENT IS ALERT AND ORIENTED. HE REQUESTED JUICE, WITH PEANUT BUTTER AND CRACKERS.

## 2018-03-08 NOTE — PROGRESS NOTES
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 ([facility] 12/17/2015     Current Facility-Administered Medications   Medication Dose Route Frequency Provider Last Rate Last Dose    insulin glargine (LANTUS) injection vial 10 Units  10 Units Subcutaneous Nightly Edelmira Bustos MD        lisinopril (PRINIVIL;ZESTRIL) tablet 20 mg  20 mg Oral BID Edelmira Bustos MD   20 mg at 03/08/18 0900    carvedilol (COREG) tablet 6.25 mg  6.25 mg Oral BID WC Edelmira Bustos MD   6.25 mg at 03/08/18 0900    doxazosin (CARDURA) tablet 2 mg  2 mg Oral 2 times per day Edelmira Bustos MD   2 mg at 03/08/18 0900    amLODIPine (NORVASC) tablet 5 mg  5 mg Oral Daily Edelmira Bustos MD   5 mg at 03/08/18 0900    HYDROcodone-acetaminophen (NORCO) 7.5-325 MG per tablet 1 tablet  1 tablet Oral Q6H PRN Edelmira Bustos MD        sodium chloride flush 0.9 % injection 10 mL  10 mL Intravenous 2 times per day Edelmira Bustos MD   10 mL at 03/08/18 0900    sodium chloride flush 0.9 % injection 10 mL  10 mL Intravenous PRN Edelmira Bustos MD        acetaminophen (TYLENOL) tablet 650 mg  650 mg Oral Q4H PRN Edelmira Bustos MD   650 mg at 03/05/18 0803    enoxaparin (LOVENOX) injection 40 mg  40 mg Subcutaneous Daily Edelmira Bustos MD   40 mg at 03/08/18 0900    ondansetron (ZOFRAN-ODT) disintegrating tablet 4 mg  4 mg Oral Q8H PRN Edelmira Bustos MD        vitamin D (CHOLECALCIFEROL) capsule 5,000 Units  5,000 Units Oral BID Edelmira Bustos MD   5,000 Units at 03/08/18 0900    allopurinol (ZYLOPRIM) tablet 300 mg  300 mg Oral Daily Edelmira Bustos MD   300 mg at 03/08/18 1013    diphenoxylate-atropine (LOMOTIL) 2.5-0.025 MG per tablet 1 tablet  1 tablet Oral 4x Daily PRN Edelmira Bustos MD        lomustine (CEENU) capsule 10 mg  10 mg Oral Once Edemlira Bustos MD        meclizine (ANTIVERT) tablet 25 mg  25 mg Oral 4 times per day Zee Rock MD   25 mg at 03/08/18 1200    ondansetron (ZOFRAN-ODT) disintegrating tablet 4 mg  4 mg Oral Q8H PRN Zee Rock MD        pantoprazole (PROTONIX) tablet 40 mg  40 mg Oral Daily Zee Rock MD   40 mg at 03/08/18 0900    pravastatin (PRAVACHOL) tablet 40 mg  40 mg Oral Daily Zee Rock MD   40 mg at 03/08/18 0900    prednisoLONE acetate (PRED FORTE) 1 % ophthalmic suspension 1 drop  1 drop Left Eye Q1H PRN Zee Rock MD        probenecid (BENEMID) tablet 500 mg  500 mg Oral BID Zee Rock MD        sennosides-docusate sodium (SENOKOT-S) 8.6-50 MG tablet 2 tablet  2 tablet Oral Daily Zee Rock MD   2 tablet at 03/08/18 0900    glucose (GLUTOSE) 40 % oral gel 15 g  15 g Oral PRN Zee Rock MD        dextrose 50 % solution 12.5 g  12.5 g Intravenous PRN Zee Rock MD        glucagon (rDNA) injection 1 mg  1 mg Intramuscular PRN Zee Rock MD        dextrose 5 % solution  100 mL/hr Intravenous PRN Zee Rock MD        cloNIDine (CATAPRES) tablet 0.1 mg  0.1 mg Oral Q6H PRN Zee Rock MD   0.1 mg at 03/08/18 0146     Allergies: Pcn [penicillins] and Adhesive tape  Past Medical History:   Diagnosis Date    CAD (coronary artery disease)     STENTS X 3    CAD (coronary artery disease)     CABG    [facility]est[facility]     DDD (degenerative disc disease), lumbar 7/11/2017    Diabetes mellitus ([facility]     GERD (gastroesophageal reflux disease)     Hx of blood clots     Right leg    Hyperlipidemia     Hypertension     Lumbar stenosis with neurogenic claudication 7/11/2017    MI, old     X 2    Palliative care encounter     Sleep apnea     DOESN'T USE MACHINE    TIA (transient ischemic attack)      Past Surgical History:   Procedure Laterality Date    APPENDECTOMY      BACK SURGERY  1980    x 3    CARDIAC SURGERY  1990    Bypass x 3    CARPAL TUNNEL RELEASE flat  HEENT   PERRLA, Hearing appears normal, conjunctiva and lids are normal, ears and nose appear normal  NECK - no thyromegaly, no JVD, trachea is in the midline  CARDIOVASCULAR  PMI is in the left mid line clavicular position, Normal S1 and S2 with a grade 1/6 systolic murmur. No S3 or S4    PULMONARY  No respiratory distress. scattered wheezes and rales. Breath sounds in both  lung fields are Decreased  ABDOMEN   soft, non tender, no rebound, no hepatomegaly or splenomegaly  MUSCULOSKELETAL   Prone/Supine, digitals and nails are without clubbing or cyanosis  EXTREMITIES - trace edema  NEUROLOGIC - cranial nerves, II-XII, are normal  SKIN - turgor is normal, no rash  PSYCHIATRIC - normal mood and affect, alert and orientated x 3, judgement and insight appear appropriate    ASSESSMENT:    ALL THE CARDIOLOGY PROBLEMS ARE LISTED ABOVE; HOWEVER, THE FOLLOWING SPECIFIC CARDIAC PROBLEMS / CONDITIONS WERE ADDRESSED AND TREATED DURING THE OFFICE VISIT TODAY:                                                                                            MEDICAL DECISION MAKING                    Cardiac Specific Problem / Diagnosis   Discussion and Data Reviewed Diagnostic Procedures Ordered Management Options Selected                 1. Presenting problem / symptom  Progressive shortness of air  are worsening    Findings are consistent with an ischemic cardiomyopathy No Continue current medications:      Yes:                  2. Prior cardiac history of CAD and echo consistent with an ischemic cardiomyopathy Initial presentation during this evaluation    Review and summation of records:     1990  CABG  Several stents placed  3/6/2018  Echo  EF 45%          No Continue current medications:     Yes:                  3.  Systemic arterial hypertension Initial presentation during this evaluation The blood pressure for the lastr 36 hours has been:  Systolic (02DHN), CMR:469 , Min:142 , CHELSEY:512    Diastolic (60OGD), XFF:52, Min:57, Max:65    No Continue current medications:        yes                 4. Elevated BNP   BNP (brain natriuretic peptide) was initially identified in extracts of pig brain. It is released predominately from the atrial, but also the ventricles, in response to high increased volume and wall stress. Teteologically, it has a diuretic, natriuretic and hypotensive effect by decreasing afterload. Normal values increase with age, and in females and with renal insufficiency.                PLAN:    1. Continue present medications except for changes as noted above  2. Continue to monitor rhythm  3. Further orders per clinical course. Discussed with patient and nursing.     Electronically signed by Ino Mandujano MD on 3/8/18        [facility] [place]

## 2018-03-08 NOTE — PROGRESS NOTES
Progress Note  3/8/2018 7:36 AM  Subjective:   Admit Date: 3/4/2018  PCP: Michelle Kelley MD    Interval History: He states he is breathing better this am.    DIET LOW SODIUM 2 GM; Carb Control: 4 carbs/meal (approximate 1800 kcals/day)    Intake/Output Summary (Last 24 hours) at 03/08/18 0736  Last data filed at 03/08/18 0153   Gross per 24 hour   Intake              240 ml   Output             1375 ml   Net            -1135 ml     Medications:      dextrose        lisinopril  20 mg Oral BID    carvedilol  6.25 mg Oral BID WC    doxazosin  2 mg Oral 2 times per day    amLODIPine  5 mg Oral Daily    sodium chloride flush  10 mL Intravenous 2 times per day    enoxaparin  40 mg Subcutaneous Daily    vitamin D  5,000 Units Oral BID    allopurinol  300 mg Oral Daily    glimepiride  4 mg Oral BID    insulin glargine  15 Units Subcutaneous Nightly    lomustine  10 mg Oral Once    meclizine  25 mg Oral 4 times per day    pantoprazole  40 mg Oral Daily    pravastatin  40 mg Oral Daily    probenecid  500 mg Oral BID    sennosides-docusate sodium  2 tablet Oral Daily     Recent Labs      03/07/18   0608   WBC  5.4   HGB  9.5*   PLT  194     Recent Labs      03/06/18   0425  03/07/18   0608  03/08/18   0457   NA  143  142  142   K  3.8  4.0  4.1   CL  103  103  102   CO2  26  31*  28   BUN  25*  26*  30*   CREATININE  1.3*  1.6*  1.4*   GLUCOSE  94  84  46*     No results for input(s): AST, ALT, ALB, BILITOT, ALKPHOS in the last 72 hours.     Objective:   Vitals: BP (!) 158/64   Pulse 53   Temp 97.6 °F (36.4 °C)   Resp 16   Ht 5' 10\" (1.778 m)   Wt 192 lb 8 oz (87.3 kg)   SpO2 95%   BMI 27.62 kg/m²   General appearance: alert and cooperative with exam  Lungs: clear to auscultation bilaterally  Heart: regular rate and rhythm, S1, S2 normal, no murmur, click, rub or gallop  Abdomen: soft, non-tender; bowel sounds normal; no masses,  no organomegaly  Extremities: Less edema in the legs, but the right calf

## 2018-03-08 NOTE — CARE COORDINATION
SW visited with Pt re: d/c planning;    Pt indicates he lives home with wife; No HH or DME; no known needs.     Electronically signed by GALI Barajas on 3/8/2018 at 3:46 PM

## 2018-03-09 VITALS
OXYGEN SATURATION: 93 % | SYSTOLIC BLOOD PRESSURE: 154 MMHG | BODY MASS INDEX: 27.92 KG/M2 | DIASTOLIC BLOOD PRESSURE: 57 MMHG | RESPIRATION RATE: 18 BRPM | HEART RATE: 52 BPM | HEIGHT: 70 IN | WEIGHT: 195 LBS | TEMPERATURE: 97.5 F

## 2018-03-09 PROBLEM — N40.1 BENIGN PROSTATIC HYPERPLASIA WITH LOWER URINARY TRACT SYMPTOMS: Status: ACTIVE | Noted: 2018-03-09

## 2018-03-09 LAB
ANION GAP SERPL CALCULATED.3IONS-SCNC: 8 MMOL/L (ref 7–19)
BUN BLDV-MCNC: 28 MG/DL (ref 8–23)
CALCIUM SERPL-MCNC: 9.1 MG/DL (ref 8.8–10.2)
CHLORIDE BLD-SCNC: 104 MMOL/L (ref 98–111)
CO2: 30 MMOL/L (ref 22–29)
CREAT SERPL-MCNC: 1 MG/DL (ref 0.5–1.2)
GFR NON-AFRICAN AMERICAN: >60
GLUCOSE BLD-MCNC: 179 MG/DL (ref 70–99)
GLUCOSE BLD-MCNC: 44 MG/DL (ref 70–99)
GLUCOSE BLD-MCNC: 56 MG/DL (ref 70–99)
GLUCOSE BLD-MCNC: 81 MG/DL (ref 74–109)
PERFORMED ON: ABNORMAL
POTASSIUM SERPL-SCNC: 4.4 MMOL/L (ref 3.5–5)
SODIUM BLD-SCNC: 142 MMOL/L (ref 136–145)

## 2018-03-09 PROCEDURE — 36415 COLL VENOUS BLD VENIPUNCTURE: CPT

## 2018-03-09 PROCEDURE — 99232 SBSQ HOSP IP/OBS MODERATE 35: CPT | Performed by: PHYSICIAN ASSISTANT

## 2018-03-09 PROCEDURE — 80048 BASIC METABOLIC PNL TOTAL CA: CPT

## 2018-03-09 PROCEDURE — 2580000003 HC RX 258: Performed by: FAMILY MEDICINE

## 2018-03-09 PROCEDURE — 6370000000 HC RX 637 (ALT 250 FOR IP): Performed by: FAMILY MEDICINE

## 2018-03-09 PROCEDURE — 82948 REAGENT STRIP/BLOOD GLUCOSE: CPT

## 2018-03-09 RX ORDER — CARVEDILOL 12.5 MG/1
6.25 TABLET ORAL 2 TIMES DAILY WITH MEALS
Qty: 60 TABLET | Refills: 3 | Status: SHIPPED | OUTPATIENT
Start: 2018-03-09 | End: 2018-10-20

## 2018-03-09 RX ORDER — DOXAZOSIN 2 MG/1
2 TABLET ORAL EVERY 12 HOURS SCHEDULED
Qty: 60 TABLET | Refills: 5 | Status: SHIPPED | OUTPATIENT
Start: 2018-03-09 | End: 2018-10-20

## 2018-03-09 RX ORDER — AMLODIPINE BESYLATE 5 MG/1
5 TABLET ORAL DAILY
Qty: 30 TABLET | Refills: 3 | Status: ON HOLD | OUTPATIENT
Start: 2018-03-09 | End: 2018-06-01

## 2018-03-09 RX ADMIN — PRAVASTATIN SODIUM 40 MG: 20 TABLET ORAL at 08:08

## 2018-03-09 RX ADMIN — LISINOPRIL 20 MG: 20 TABLET ORAL at 08:08

## 2018-03-09 RX ADMIN — DOXAZOSIN 2 MG: 2 TABLET ORAL at 08:08

## 2018-03-09 RX ADMIN — DOCUSATE SODIUM AND SENNOSIDES 2 TABLET: 8.6; 5 TABLET, FILM COATED ORAL at 08:08

## 2018-03-09 RX ADMIN — MECLIZINE HYDROCHLORIDE 25 MG: 25 TABLET ORAL at 05:54

## 2018-03-09 RX ADMIN — MECLIZINE HYDROCHLORIDE 25 MG: 25 TABLET ORAL at 00:30

## 2018-03-09 RX ADMIN — AMLODIPINE BESYLATE 5 MG: 5 TABLET ORAL at 08:10

## 2018-03-09 RX ADMIN — PANTOPRAZOLE SODIUM 40 MG: 40 TABLET, DELAYED RELEASE ORAL at 08:10

## 2018-03-09 RX ADMIN — CHOLECALCIFEROL CAP 125 MCG (5000 UNIT) 5000 UNITS: 125 CAP at 08:10

## 2018-03-09 RX ADMIN — ALLOPURINOL 300 MG: 300 TABLET ORAL at 08:09

## 2018-03-09 RX ADMIN — MECLIZINE HYDROCHLORIDE 25 MG: 25 TABLET ORAL at 13:11

## 2018-03-09 RX ADMIN — CARVEDILOL 6.25 MG: 6.25 TABLET, FILM COATED ORAL at 08:09

## 2018-03-09 RX ADMIN — Medication 10 ML: at 08:10

## 2018-03-09 ASSESSMENT — PAIN SCALES - GENERAL
PAINLEVEL_OUTOF10: 0
PAINLEVEL_OUTOF10: 0

## 2018-03-09 NOTE — PROGRESS NOTES
Progress Note  3/9/2018 6:30 AM  Subjective:   Admit Date: 3/4/2018  PCP: Yossi Parada MD    Interval History: He states he is breathing better this am. He had another low BS this am.    DIET LOW SODIUM 2 GM; Carb Control: 4 carbs/meal (approximate 1800 kcals/day)    Intake/Output Summary (Last 24 hours) at 03/09/18 0630  Last data filed at 03/08/18 2040   Gross per 24 hour   Intake             1200 ml   Output             1175 ml   Net               25 ml     Medications:      dextrose        insulin glargine  10 Units Subcutaneous Nightly    lisinopril  20 mg Oral BID    carvedilol  6.25 mg Oral BID WC    doxazosin  2 mg Oral 2 times per day    amLODIPine  5 mg Oral Daily    sodium chloride flush  10 mL Intravenous 2 times per day    enoxaparin  40 mg Subcutaneous Daily    vitamin D  5,000 Units Oral BID    allopurinol  300 mg Oral Daily    lomustine  10 mg Oral Once    meclizine  25 mg Oral 4 times per day    pantoprazole  40 mg Oral Daily    pravastatin  40 mg Oral Daily    probenecid  500 mg Oral BID    sennosides-docusate sodium  2 tablet Oral Daily     Recent Labs      03/07/18   0608   WBC  5.4   HGB  9.5*   PLT  194     Recent Labs      03/07/18   0608  03/08/18   0457  03/09/18   0402   NA  142  142  142   K  4.0  4.1  4.4   CL  103  102  104   CO2  31*  28  30*   BUN  26*  30*  28*   CREATININE  1.6*  1.4*  1.0   GLUCOSE  84  46*  81     No results for input(s): AST, ALT, ALB, BILITOT, ALKPHOS in the last 72 hours.     Objective:   Vitals: BP (!) 148/62   Pulse 52   Temp 98.9 °F (37.2 °C) (Temporal)   Resp 16   Ht 5' 10\" (1.778 m)   Wt 195 lb (88.5 kg)   SpO2 95%   BMI 27.98 kg/m²   General appearance: alert and cooperative with exam  Lungs: clear to auscultation bilaterally  Heart: regular rate and rhythm, S1, S2 normal, no murmur, click, rub or gallop  Abdomen: soft, non-tender; bowel sounds normal; no masses,  no organomegaly  Extremities: Less edema in the legs, but the right calf is tender to palpation. Neurologic: No obvious focal neurologic deficits. Assessment and Plan:   1. Acute on chronic systolic CHF: Continue the diuresis. BNP is better. 2. Bradycardia: Cardiology has been consulted. Increase activity as able. 3. HTN: This is better. 4. DVT prophylaxis. Increase activity. He is on lovenox. 5. Abd pain with abnormal CT: Urology consulted. 6. Urine issues: His catheter is out and he is urinating w/o problems. 7. DM: His BS is low. I will stop his lantus and he can go home later today if no further problems. 8. ASCVD: No evidence of ongoing ischemia. 9. H/O PUD with bleed in the past: I will watch for si/sx of GI bleeding. Advance Directive: Full Code  DVT prophylaxis with lovenox. Discharge planning: Home    Active Problems:    CHF (congestive heart failure), NYHA class I, acute on chronic, combined (HCC)    CAD (coronary artery disease)    Abnormal CT scan, bladder    Retention, urine    Bilateral renal cysts    Acute on chronic congestive heart failure (HCC)    Ischemic cardiomyopathy  Resolved Problems:    * No resolved hospital problems.  Lester Carlin MD

## 2018-03-26 ENCOUNTER — PROCEDURE VISIT (OUTPATIENT)
Dept: UROLOGY | Age: 83
End: 2018-03-26
Payer: COMMERCIAL

## 2018-03-26 VITALS — HEIGHT: 72 IN | WEIGHT: 195 LBS | BODY MASS INDEX: 26.41 KG/M2 | TEMPERATURE: 97.3 F

## 2018-03-26 DIAGNOSIS — R33.9 RETENTION, URINE: ICD-10-CM

## 2018-03-26 DIAGNOSIS — R93.41 ABNORMAL CT SCAN, BLADDER: Primary | ICD-10-CM

## 2018-03-26 LAB
APPEARANCE FLUID: NORMAL
BILIRUBIN, POC: NORMAL
BLOOD URINE, POC: NORMAL
CLARITY, POC: CLEAR
COLOR, POC: YELLOW
GLUCOSE URINE, POC: NORMAL
KETONES, POC: NORMAL
LEUKOCYTE EST, POC: NORMAL
NITRITE, POC: NORMAL
PH, POC: 5.5
PROTEIN, POC: NORMAL
SPECIFIC GRAVITY, POC: 1.02
UROBILINOGEN, POC: 0.2

## 2018-03-26 PROCEDURE — 51798 US URINE CAPACITY MEASURE: CPT | Performed by: UROLOGY

## 2018-03-26 PROCEDURE — 52000 CYSTOURETHROSCOPY: CPT | Performed by: UROLOGY

## 2018-03-26 PROCEDURE — 81003 URINALYSIS AUTO W/O SCOPE: CPT | Performed by: UROLOGY

## 2018-03-26 PROCEDURE — 99999 PR OFFICE/OUTPT VISIT,PROCEDURE ONLY: CPT | Performed by: UROLOGY

## 2018-05-02 LAB
ALBUMIN SERPL-MCNC: 3.6 G/DL (ref 3.5–5.2)
ANION GAP SERPL CALCULATED.3IONS-SCNC: 15 MMOL/L (ref 7–19)
BILIRUBIN URINE: NEGATIVE
BLOOD, URINE: NEGATIVE
BUN BLDV-MCNC: 43 MG/DL (ref 8–23)
CALCIUM SERPL-MCNC: 9.2 MG/DL (ref 8.8–10.2)
CHLORIDE BLD-SCNC: 101 MMOL/L (ref 98–111)
CLARITY: CLEAR
CO2: 24 MMOL/L (ref 22–29)
COLOR: YELLOW
CREAT SERPL-MCNC: 1.1 MG/DL (ref 0.5–1.2)
GFR NON-AFRICAN AMERICAN: >60
GLUCOSE BLD-MCNC: 173 MG/DL (ref 74–109)
GLUCOSE URINE: NEGATIVE MG/DL
KETONES, URINE: NEGATIVE MG/DL
LEUKOCYTE ESTERASE, URINE: NEGATIVE
NITRITE, URINE: NEGATIVE
PARATHYROID HORMONE INTACT: 102.5 PG/ML (ref 15–65)
PH UA: 5.5
PHOSPHORUS: 3.2 MG/DL (ref 2.5–4.5)
POTASSIUM SERPL-SCNC: 4 MMOL/L (ref 3.5–5)
PROTEIN UA: NEGATIVE MG/DL
SODIUM BLD-SCNC: 140 MMOL/L (ref 136–145)
SPECIFIC GRAVITY UA: 1.01
URIC ACID, SERUM: 7.6 MG/DL (ref 3.4–7)
UROBILINOGEN, URINE: 0.2 E.U./DL

## 2018-05-27 ENCOUNTER — APPOINTMENT (OUTPATIENT)
Dept: CT IMAGING | Age: 83
DRG: 286 | End: 2018-05-27
Payer: COMMERCIAL

## 2018-05-27 ENCOUNTER — HOSPITAL ENCOUNTER (INPATIENT)
Age: 83
LOS: 5 days | Discharge: HOME | DRG: 286 | End: 2018-06-01
Attending: EMERGENCY MEDICINE | Admitting: FAMILY MEDICINE
Payer: COMMERCIAL

## 2018-05-27 ENCOUNTER — APPOINTMENT (OUTPATIENT)
Dept: GENERAL RADIOLOGY | Age: 83
DRG: 286 | End: 2018-05-27
Payer: COMMERCIAL

## 2018-05-27 DIAGNOSIS — R06.00 ACUTE DYSPNEA: ICD-10-CM

## 2018-05-27 DIAGNOSIS — R07.9 ACUTE CHEST PAIN: Primary | ICD-10-CM

## 2018-05-27 LAB
ALBUMIN SERPL-MCNC: 3.6 G/DL (ref 3.5–5.2)
ALP BLD-CCNC: 80 U/L (ref 40–130)
ALT SERPL-CCNC: 21 U/L (ref 5–41)
ANION GAP SERPL CALCULATED.3IONS-SCNC: 12 MMOL/L (ref 7–19)
APTT: 30.9 SEC (ref 26–36.2)
AST SERPL-CCNC: 22 U/L (ref 5–40)
BASOPHILS ABSOLUTE: 0 K/UL (ref 0–0.2)
BASOPHILS RELATIVE PERCENT: 0.6 % (ref 0–1)
BILIRUB SERPL-MCNC: <0.2 MG/DL (ref 0.2–1.2)
BUN BLDV-MCNC: 37 MG/DL (ref 8–23)
CALCIUM SERPL-MCNC: 9 MG/DL (ref 8.8–10.2)
CHLORIDE BLD-SCNC: 107 MMOL/L (ref 98–111)
CO2: 25 MMOL/L (ref 22–29)
CREAT SERPL-MCNC: 1 MG/DL (ref 0.5–1.2)
EOSINOPHILS ABSOLUTE: 0.2 K/UL (ref 0–0.6)
EOSINOPHILS RELATIVE PERCENT: 3.1 % (ref 0–5)
GFR NON-AFRICAN AMERICAN: >60
GLUCOSE BLD-MCNC: 103 MG/DL (ref 74–109)
HCT VFR BLD CALC: 33.1 % (ref 42–52)
HEMOGLOBIN: 10.4 G/DL (ref 14–18)
INR BLD: 1.12 (ref 0.88–1.18)
LYMPHOCYTES ABSOLUTE: 1.2 K/UL (ref 1.1–4.5)
LYMPHOCYTES RELATIVE PERCENT: 24.3 % (ref 20–40)
MCH RBC QN AUTO: 32.1 PG (ref 27–31)
MCHC RBC AUTO-ENTMCNC: 31.4 G/DL (ref 33–37)
MCV RBC AUTO: 102.2 FL (ref 80–94)
MONOCYTES ABSOLUTE: 0.7 K/UL (ref 0–0.9)
MONOCYTES RELATIVE PERCENT: 13.9 % (ref 0–10)
NEUTROPHILS ABSOLUTE: 2.8 K/UL (ref 1.5–7.5)
NEUTROPHILS RELATIVE PERCENT: 57.9 % (ref 50–65)
PDW BLD-RTO: 13.4 % (ref 11.5–14.5)
PLATELET # BLD: 193 K/UL (ref 130–400)
PMV BLD AUTO: 10 FL (ref 9.4–12.4)
POTASSIUM SERPL-SCNC: 4.2 MMOL/L (ref 3.5–5)
PRO-BNP: 1941 PG/ML (ref 0–1800)
PROTHROMBIN TIME: 14.3 SEC (ref 12–14.6)
RBC # BLD: 3.24 M/UL (ref 4.7–6.1)
SODIUM BLD-SCNC: 144 MMOL/L (ref 136–145)
TOTAL PROTEIN: 6.4 G/DL (ref 6.6–8.7)
TROPONIN: 0.05 NG/ML (ref 0–0.03)
TROPONIN: 0.07 NG/ML (ref 0–0.03)
TROPONIN: 0.08 NG/ML (ref 0–0.03)
WBC # BLD: 4.8 K/UL (ref 4.8–10.8)

## 2018-05-27 PROCEDURE — 6370000000 HC RX 637 (ALT 250 FOR IP): Performed by: NURSE PRACTITIONER

## 2018-05-27 PROCEDURE — 71045 X-RAY EXAM CHEST 1 VIEW: CPT

## 2018-05-27 PROCEDURE — 99285 EMERGENCY DEPT VISIT HI MDM: CPT | Performed by: EMERGENCY MEDICINE

## 2018-05-27 PROCEDURE — 85025 COMPLETE CBC W/AUTO DIFF WBC: CPT

## 2018-05-27 PROCEDURE — 93005 ELECTROCARDIOGRAM TRACING: CPT

## 2018-05-27 PROCEDURE — 99285 EMERGENCY DEPT VISIT HI MDM: CPT

## 2018-05-27 PROCEDURE — 6360000002 HC RX W HCPCS: Performed by: FAMILY MEDICINE

## 2018-05-27 PROCEDURE — 6360000004 HC RX CONTRAST MEDICATION: Performed by: NURSE PRACTITIONER

## 2018-05-27 PROCEDURE — 84484 ASSAY OF TROPONIN QUANT: CPT

## 2018-05-27 PROCEDURE — 80053 COMPREHEN METABOLIC PANEL: CPT

## 2018-05-27 PROCEDURE — 6370000000 HC RX 637 (ALT 250 FOR IP): Performed by: EMERGENCY MEDICINE

## 2018-05-27 PROCEDURE — 71275 CT ANGIOGRAPHY CHEST: CPT

## 2018-05-27 PROCEDURE — 85730 THROMBOPLASTIN TIME PARTIAL: CPT

## 2018-05-27 PROCEDURE — 96374 THER/PROPH/DIAG INJ IV PUSH: CPT

## 2018-05-27 PROCEDURE — 2140000000 HC CCU INTERMEDIATE R&B

## 2018-05-27 PROCEDURE — 85610 PROTHROMBIN TIME: CPT

## 2018-05-27 PROCEDURE — 93971 EXTREMITY STUDY: CPT

## 2018-05-27 PROCEDURE — 6360000002 HC RX W HCPCS: Performed by: NURSE PRACTITIONER

## 2018-05-27 PROCEDURE — 36415 COLL VENOUS BLD VENIPUNCTURE: CPT

## 2018-05-27 PROCEDURE — 6370000000 HC RX 637 (ALT 250 FOR IP): Performed by: FAMILY MEDICINE

## 2018-05-27 PROCEDURE — 2580000003 HC RX 258: Performed by: NURSE PRACTITIONER

## 2018-05-27 PROCEDURE — 83880 ASSAY OF NATRIURETIC PEPTIDE: CPT

## 2018-05-27 PROCEDURE — 2580000003 HC RX 258: Performed by: FAMILY MEDICINE

## 2018-05-27 RX ORDER — SODIUM CHLORIDE 0.9 % (FLUSH) 0.9 %
10 SYRINGE (ML) INJECTION PRN
Status: DISCONTINUED | OUTPATIENT
Start: 2018-05-27 | End: 2018-06-01 | Stop reason: HOSPADM

## 2018-05-27 RX ORDER — FUROSEMIDE 10 MG/ML
60 INJECTION INTRAMUSCULAR; INTRAVENOUS ONCE
Status: COMPLETED | OUTPATIENT
Start: 2018-05-27 | End: 2018-05-27

## 2018-05-27 RX ORDER — SODIUM CHLORIDE 9 MG/ML
INJECTION, SOLUTION INTRAVENOUS CONTINUOUS
Status: DISCONTINUED | OUTPATIENT
Start: 2018-05-27 | End: 2018-05-30 | Stop reason: SDUPTHER

## 2018-05-27 RX ORDER — SENNA AND DOCUSATE SODIUM 50; 8.6 MG/1; MG/1
2 TABLET, FILM COATED ORAL DAILY
Status: DISCONTINUED | OUTPATIENT
Start: 2018-05-27 | End: 2018-06-01 | Stop reason: HOSPADM

## 2018-05-27 RX ORDER — FUROSEMIDE 10 MG/ML
20 INJECTION INTRAMUSCULAR; INTRAVENOUS 2 TIMES DAILY
Status: DISCONTINUED | OUTPATIENT
Start: 2018-05-27 | End: 2018-05-29

## 2018-05-27 RX ORDER — SODIUM CHLORIDE 0.9 % (FLUSH) 0.9 %
10 SYRINGE (ML) INJECTION EVERY 12 HOURS SCHEDULED
Status: DISCONTINUED | OUTPATIENT
Start: 2018-05-27 | End: 2018-06-01 | Stop reason: HOSPADM

## 2018-05-27 RX ORDER — DOXAZOSIN 2 MG/1
2 TABLET ORAL EVERY 12 HOURS SCHEDULED
Status: DISCONTINUED | OUTPATIENT
Start: 2018-05-27 | End: 2018-06-01 | Stop reason: HOSPADM

## 2018-05-27 RX ORDER — CLONIDINE HYDROCHLORIDE 0.1 MG/1
0.1 TABLET ORAL EVERY 4 HOURS PRN
Status: DISCONTINUED | OUTPATIENT
Start: 2018-05-27 | End: 2018-06-01 | Stop reason: HOSPADM

## 2018-05-27 RX ORDER — PROBENECID 500 MG/1
500 TABLET, FILM COATED ORAL 2 TIMES DAILY
Status: DISCONTINUED | OUTPATIENT
Start: 2018-05-27 | End: 2018-06-01 | Stop reason: HOSPADM

## 2018-05-27 RX ORDER — AMLODIPINE BESYLATE 5 MG/1
5 TABLET ORAL DAILY
Status: DISCONTINUED | OUTPATIENT
Start: 2018-05-27 | End: 2018-05-30

## 2018-05-27 RX ORDER — ASPIRIN 81 MG/1
81 TABLET, CHEWABLE ORAL DAILY
Status: DISCONTINUED | OUTPATIENT
Start: 2018-05-27 | End: 2018-06-01 | Stop reason: HOSPADM

## 2018-05-27 RX ORDER — PANTOPRAZOLE SODIUM 40 MG/1
40 TABLET, DELAYED RELEASE ORAL DAILY
Status: DISCONTINUED | OUTPATIENT
Start: 2018-05-27 | End: 2018-05-28

## 2018-05-27 RX ORDER — PRAVASTATIN SODIUM 20 MG
40 TABLET ORAL DAILY
Status: DISCONTINUED | OUTPATIENT
Start: 2018-05-27 | End: 2018-06-01 | Stop reason: HOSPADM

## 2018-05-27 RX ORDER — ASPIRIN 325 MG
325 TABLET ORAL ONCE
Status: COMPLETED | OUTPATIENT
Start: 2018-05-27 | End: 2018-05-27

## 2018-05-27 RX ORDER — ONDANSETRON 2 MG/ML
4 INJECTION INTRAMUSCULAR; INTRAVENOUS EVERY 6 HOURS PRN
Status: DISCONTINUED | OUTPATIENT
Start: 2018-05-27 | End: 2018-06-01 | Stop reason: HOSPADM

## 2018-05-27 RX ORDER — DIPHENOXYLATE HYDROCHLORIDE AND ATROPINE SULFATE 2.5; .025 MG/1; MG/1
1 TABLET ORAL 4 TIMES DAILY PRN
Status: DISCONTINUED | OUTPATIENT
Start: 2018-05-27 | End: 2018-06-01 | Stop reason: HOSPADM

## 2018-05-27 RX ORDER — MECLIZINE HYDROCHLORIDE 25 MG/1
25 TABLET ORAL EVERY 6 HOURS SCHEDULED
Status: DISCONTINUED | OUTPATIENT
Start: 2018-05-27 | End: 2018-06-01 | Stop reason: HOSPADM

## 2018-05-27 RX ORDER — CLONIDINE HYDROCHLORIDE 0.1 MG/1
0.1 TABLET ORAL ONCE
Status: COMPLETED | OUTPATIENT
Start: 2018-05-27 | End: 2018-05-27

## 2018-05-27 RX ORDER — DEXTROSE MONOHYDRATE 50 MG/ML
100 INJECTION, SOLUTION INTRAVENOUS PRN
Status: DISCONTINUED | OUTPATIENT
Start: 2018-05-27 | End: 2018-06-01 | Stop reason: HOSPADM

## 2018-05-27 RX ORDER — CARVEDILOL 6.25 MG/1
6.25 TABLET ORAL 2 TIMES DAILY WITH MEALS
Status: DISCONTINUED | OUTPATIENT
Start: 2018-05-27 | End: 2018-06-01 | Stop reason: HOSPADM

## 2018-05-27 RX ORDER — ONDANSETRON 4 MG/1
4 TABLET, ORALLY DISINTEGRATING ORAL EVERY 8 HOURS PRN
Status: DISCONTINUED | OUTPATIENT
Start: 2018-05-27 | End: 2018-06-01 | Stop reason: HOSPADM

## 2018-05-27 RX ORDER — DEXTROSE MONOHYDRATE 25 G/50ML
12.5 INJECTION, SOLUTION INTRAVENOUS PRN
Status: DISCONTINUED | OUTPATIENT
Start: 2018-05-27 | End: 2018-06-01 | Stop reason: HOSPADM

## 2018-05-27 RX ORDER — PROMETHAZINE HYDROCHLORIDE 12.5 MG/1
12.5 TABLET ORAL PRN
Status: DISCONTINUED | OUTPATIENT
Start: 2018-05-27 | End: 2018-06-01 | Stop reason: HOSPADM

## 2018-05-27 RX ORDER — NICOTINE POLACRILEX 4 MG
15 LOZENGE BUCCAL PRN
Status: DISCONTINUED | OUTPATIENT
Start: 2018-05-27 | End: 2018-06-01 | Stop reason: HOSPADM

## 2018-05-27 RX ORDER — PREDNISOLONE ACETATE 10 MG/ML
1 SUSPENSION/ DROPS OPHTHALMIC
Status: DISCONTINUED | OUTPATIENT
Start: 2018-05-27 | End: 2018-06-01 | Stop reason: HOSPADM

## 2018-05-27 RX ORDER — ALLOPURINOL 300 MG/1
300 TABLET ORAL DAILY
Status: DISCONTINUED | OUTPATIENT
Start: 2018-05-27 | End: 2018-06-01 | Stop reason: HOSPADM

## 2018-05-27 RX ORDER — 0.9 % SODIUM CHLORIDE 0.9 %
1000 INTRAVENOUS SOLUTION INTRAVENOUS ONCE
Status: COMPLETED | OUTPATIENT
Start: 2018-05-27 | End: 2018-05-27

## 2018-05-27 RX ADMIN — ALLOPURINOL 300 MG: 300 TABLET ORAL at 21:22

## 2018-05-27 RX ADMIN — IOPAMIDOL 90 ML: 755 INJECTION, SOLUTION INTRAVENOUS at 15:37

## 2018-05-27 RX ADMIN — ENOXAPARIN SODIUM 40 MG: 100 INJECTION SUBCUTANEOUS at 21:22

## 2018-05-27 RX ADMIN — PANTOPRAZOLE SODIUM 40 MG: 40 TABLET, DELAYED RELEASE ORAL at 21:24

## 2018-05-27 RX ADMIN — CARVEDILOL 6.25 MG: 6.25 TABLET, FILM COATED ORAL at 21:23

## 2018-05-27 RX ADMIN — PRAVASTATIN SODIUM 40 MG: 20 TABLET ORAL at 21:24

## 2018-05-27 RX ADMIN — FUROSEMIDE 60 MG: 10 INJECTION INTRAMUSCULAR; INTRAVENOUS at 16:52

## 2018-05-27 RX ADMIN — ASPIRIN 81 MG CHEWABLE TABLET 81 MG: 81 TABLET CHEWABLE at 21:24

## 2018-05-27 RX ADMIN — AMLODIPINE BESYLATE 5 MG: 5 TABLET ORAL at 21:24

## 2018-05-27 RX ADMIN — DOCUSATE SODIUM AND SENNOSIDES 2 TABLET: 8.6; 5 TABLET, FILM COATED ORAL at 21:23

## 2018-05-27 RX ADMIN — CLONIDINE HYDROCHLORIDE 0.1 MG: 0.1 TABLET ORAL at 18:11

## 2018-05-27 RX ADMIN — DOXAZOSIN 2 MG: 2 TABLET ORAL at 21:23

## 2018-05-27 RX ADMIN — ASPIRIN 325 MG ORAL TABLET 325 MG: 325 PILL ORAL at 15:52

## 2018-05-27 RX ADMIN — SODIUM CHLORIDE 1000 ML: 9 INJECTION, SOLUTION INTRAVENOUS at 15:52

## 2018-05-27 RX ADMIN — SODIUM CHLORIDE: 9 INJECTION, SOLUTION INTRAVENOUS at 21:25

## 2018-05-27 RX ADMIN — FUROSEMIDE 20 MG: 10 INJECTION INTRAMUSCULAR; INTRAVENOUS at 21:22

## 2018-05-27 ASSESSMENT — ENCOUNTER SYMPTOMS
VOMITING: 0
ABDOMINAL PAIN: 0
DIARRHEA: 0
NAUSEA: 0
SHORTNESS OF BREATH: 1
EYE DISCHARGE: 0
SORE THROAT: 0
COUGH: 0
BACK PAIN: 0
WHEEZING: 0

## 2018-05-27 ASSESSMENT — PAIN DESCRIPTION - PAIN TYPE: TYPE: ACUTE PAIN

## 2018-05-27 ASSESSMENT — PAIN DESCRIPTION - ORIENTATION: ORIENTATION: RIGHT

## 2018-05-27 ASSESSMENT — PAIN DESCRIPTION - LOCATION: LOCATION: LEG

## 2018-05-27 ASSESSMENT — PAIN SCALES - GENERAL: PAINLEVEL_OUTOF10: 10

## 2018-05-28 LAB
ANION GAP SERPL CALCULATED.3IONS-SCNC: 11 MMOL/L (ref 7–19)
BASOPHILS ABSOLUTE: 0 K/UL (ref 0–0.2)
BASOPHILS RELATIVE PERCENT: 0.5 % (ref 0–1)
BUN BLDV-MCNC: 35 MG/DL (ref 8–23)
CALCIUM SERPL-MCNC: 8.6 MG/DL (ref 8.8–10.2)
CHLORIDE BLD-SCNC: 104 MMOL/L (ref 98–111)
CHOLESTEROL, TOTAL: 128 MG/DL (ref 160–199)
CO2: 27 MMOL/L (ref 22–29)
CREAT SERPL-MCNC: 1 MG/DL (ref 0.5–1.2)
EOSINOPHILS ABSOLUTE: 0.2 K/UL (ref 0–0.6)
EOSINOPHILS RELATIVE PERCENT: 4.5 % (ref 0–5)
GFR NON-AFRICAN AMERICAN: >60
GLUCOSE BLD-MCNC: 106 MG/DL (ref 74–109)
GLUCOSE BLD-MCNC: 222 MG/DL (ref 70–99)
GLUCOSE BLD-MCNC: 83 MG/DL (ref 70–99)
GLUCOSE BLD-MCNC: 88 MG/DL (ref 70–99)
GLUCOSE BLD-MCNC: 95 MG/DL (ref 70–99)
HCT VFR BLD CALC: 29.6 % (ref 42–52)
HDLC SERPL-MCNC: 41 MG/DL (ref 55–121)
HEMOGLOBIN: 9.5 G/DL (ref 14–18)
INR BLD: 1.22 (ref 0.88–1.18)
LDL CHOLESTEROL CALCULATED: 76 MG/DL
LYMPHOCYTES ABSOLUTE: 1.3 K/UL (ref 1.1–4.5)
LYMPHOCYTES RELATIVE PERCENT: 30.8 % (ref 20–40)
MCH RBC QN AUTO: 32.1 PG (ref 27–31)
MCHC RBC AUTO-ENTMCNC: 32.1 G/DL (ref 33–37)
MCV RBC AUTO: 100 FL (ref 80–94)
MONOCYTES ABSOLUTE: 0.7 K/UL (ref 0–0.9)
MONOCYTES RELATIVE PERCENT: 16.2 % (ref 0–10)
NEUTROPHILS ABSOLUTE: 2 K/UL (ref 1.5–7.5)
NEUTROPHILS RELATIVE PERCENT: 47.8 % (ref 50–65)
PDW BLD-RTO: 13.2 % (ref 11.5–14.5)
PERFORMED ON: ABNORMAL
PERFORMED ON: NORMAL
PLATELET # BLD: 178 K/UL (ref 130–400)
PMV BLD AUTO: 9.9 FL (ref 9.4–12.4)
POTASSIUM SERPL-SCNC: 3.4 MMOL/L (ref 3.5–5)
PRO-BNP: 1973 PG/ML (ref 0–1800)
PROTHROMBIN TIME: 15.3 SEC (ref 12–14.6)
RBC # BLD: 2.96 M/UL (ref 4.7–6.1)
SODIUM BLD-SCNC: 142 MMOL/L (ref 136–145)
TRIGL SERPL-MCNC: 53 MG/DL (ref 0–149)
TROPONIN: 0.07 NG/ML (ref 0–0.03)
TROPONIN: 0.08 NG/ML (ref 0–0.03)
WBC # BLD: 4.3 K/UL (ref 4.8–10.8)

## 2018-05-28 PROCEDURE — 82948 REAGENT STRIP/BLOOD GLUCOSE: CPT

## 2018-05-28 PROCEDURE — 2140000000 HC CCU INTERMEDIATE R&B

## 2018-05-28 PROCEDURE — 85610 PROTHROMBIN TIME: CPT

## 2018-05-28 PROCEDURE — 36415 COLL VENOUS BLD VENIPUNCTURE: CPT

## 2018-05-28 PROCEDURE — 85025 COMPLETE CBC W/AUTO DIFF WBC: CPT

## 2018-05-28 PROCEDURE — 6360000002 HC RX W HCPCS: Performed by: FAMILY MEDICINE

## 2018-05-28 PROCEDURE — 99223 1ST HOSP IP/OBS HIGH 75: CPT | Performed by: INTERNAL MEDICINE

## 2018-05-28 PROCEDURE — 6370000000 HC RX 637 (ALT 250 FOR IP): Performed by: FAMILY MEDICINE

## 2018-05-28 PROCEDURE — 2580000003 HC RX 258: Performed by: FAMILY MEDICINE

## 2018-05-28 PROCEDURE — 83880 ASSAY OF NATRIURETIC PEPTIDE: CPT

## 2018-05-28 PROCEDURE — 80061 LIPID PANEL: CPT

## 2018-05-28 PROCEDURE — 84484 ASSAY OF TROPONIN QUANT: CPT

## 2018-05-28 PROCEDURE — 80048 BASIC METABOLIC PNL TOTAL CA: CPT

## 2018-05-28 RX ORDER — PANTOPRAZOLE SODIUM 40 MG/1
40 TABLET, DELAYED RELEASE ORAL
Status: DISCONTINUED | OUTPATIENT
Start: 2018-05-28 | End: 2018-06-01 | Stop reason: HOSPADM

## 2018-05-28 RX ORDER — POTASSIUM CHLORIDE 20 MEQ/1
20 TABLET, EXTENDED RELEASE ORAL 2 TIMES DAILY
Status: DISCONTINUED | OUTPATIENT
Start: 2018-05-28 | End: 2018-05-30

## 2018-05-28 RX ORDER — VALSARTAN 80 MG/1
80 TABLET ORAL 2 TIMES DAILY
Status: DISCONTINUED | OUTPATIENT
Start: 2018-05-28 | End: 2018-05-31

## 2018-05-28 RX ADMIN — PREDNISOLONE ACETATE 1 DROP: 10 SUSPENSION/ DROPS OPHTHALMIC at 14:00

## 2018-05-28 RX ADMIN — VALSARTAN 80 MG: 80 TABLET ORAL at 11:04

## 2018-05-28 RX ADMIN — PREDNISOLONE ACETATE 1 DROP: 10 SUSPENSION/ DROPS OPHTHALMIC at 07:08

## 2018-05-28 RX ADMIN — POTASSIUM CHLORIDE 20 MEQ: 20 TABLET, EXTENDED RELEASE ORAL at 20:26

## 2018-05-28 RX ADMIN — ENOXAPARIN SODIUM 40 MG: 100 INJECTION SUBCUTANEOUS at 20:26

## 2018-05-28 RX ADMIN — FUROSEMIDE 20 MG: 10 INJECTION INTRAMUSCULAR; INTRAVENOUS at 08:48

## 2018-05-28 RX ADMIN — Medication 10 ML: at 08:48

## 2018-05-28 RX ADMIN — DOXAZOSIN 2 MG: 2 TABLET ORAL at 08:47

## 2018-05-28 RX ADMIN — FUROSEMIDE 20 MG: 10 INJECTION INTRAMUSCULAR; INTRAVENOUS at 17:13

## 2018-05-28 RX ADMIN — DOCUSATE SODIUM AND SENNOSIDES 2 TABLET: 8.6; 5 TABLET, FILM COATED ORAL at 08:47

## 2018-05-28 RX ADMIN — INSULIN LISPRO 4 UNITS: 100 INJECTION, SOLUTION INTRAVENOUS; SUBCUTANEOUS at 17:14

## 2018-05-28 RX ADMIN — AMLODIPINE BESYLATE 5 MG: 5 TABLET ORAL at 08:47

## 2018-05-28 RX ADMIN — POTASSIUM CHLORIDE 20 MEQ: 20 TABLET, EXTENDED RELEASE ORAL at 11:04

## 2018-05-28 RX ADMIN — DOXAZOSIN 2 MG: 2 TABLET ORAL at 20:26

## 2018-05-28 RX ADMIN — PANTOPRAZOLE SODIUM 40 MG: 40 TABLET, DELAYED RELEASE ORAL at 17:13

## 2018-05-28 RX ADMIN — ALLOPURINOL 300 MG: 300 TABLET ORAL at 08:47

## 2018-05-28 RX ADMIN — PANTOPRAZOLE SODIUM 40 MG: 40 TABLET, DELAYED RELEASE ORAL at 08:50

## 2018-05-28 RX ADMIN — PREDNISOLONE ACETATE 1 DROP: 10 SUSPENSION/ DROPS OPHTHALMIC at 20:26

## 2018-05-28 RX ADMIN — MUPIROCIN: 20 OINTMENT TOPICAL at 08:48

## 2018-05-28 RX ADMIN — PREDNISOLONE ACETATE 1 DROP: 10 SUSPENSION/ DROPS OPHTHALMIC at 11:04

## 2018-05-28 RX ADMIN — PREDNISOLONE ACETATE 1 DROP: 10 SUSPENSION/ DROPS OPHTHALMIC at 17:16

## 2018-05-28 RX ADMIN — ASPIRIN 81 MG CHEWABLE TABLET 81 MG: 81 TABLET CHEWABLE at 08:47

## 2018-05-28 RX ADMIN — VALSARTAN 80 MG: 80 TABLET ORAL at 20:26

## 2018-05-28 RX ADMIN — PRAVASTATIN SODIUM 40 MG: 20 TABLET ORAL at 11:04

## 2018-05-28 RX ADMIN — CARVEDILOL 6.25 MG: 6.25 TABLET, FILM COATED ORAL at 17:13

## 2018-05-28 ASSESSMENT — PAIN SCALES - GENERAL: PAINLEVEL_OUTOF10: 2

## 2018-05-29 LAB
ANION GAP SERPL CALCULATED.3IONS-SCNC: 11 MMOL/L (ref 7–19)
BUN BLDV-MCNC: 30 MG/DL (ref 8–23)
CALCIUM SERPL-MCNC: 8.6 MG/DL (ref 8.8–10.2)
CHLORIDE BLD-SCNC: 105 MMOL/L (ref 98–111)
CO2: 27 MMOL/L (ref 22–29)
CREAT SERPL-MCNC: 1.1 MG/DL (ref 0.5–1.2)
GFR NON-AFRICAN AMERICAN: >60
GLUCOSE BLD-MCNC: 109 MG/DL (ref 70–99)
GLUCOSE BLD-MCNC: 165 MG/DL (ref 70–99)
GLUCOSE BLD-MCNC: 208 MG/DL (ref 70–99)
GLUCOSE BLD-MCNC: 69 MG/DL (ref 70–99)
GLUCOSE BLD-MCNC: 93 MG/DL (ref 74–109)
PERFORMED ON: ABNORMAL
POTASSIUM SERPL-SCNC: 4 MMOL/L (ref 3.5–5)
SODIUM BLD-SCNC: 143 MMOL/L (ref 136–145)

## 2018-05-29 PROCEDURE — 82948 REAGENT STRIP/BLOOD GLUCOSE: CPT

## 2018-05-29 PROCEDURE — 6360000002 HC RX W HCPCS: Performed by: FAMILY MEDICINE

## 2018-05-29 PROCEDURE — 99232 SBSQ HOSP IP/OBS MODERATE 35: CPT | Performed by: INTERNAL MEDICINE

## 2018-05-29 PROCEDURE — 6370000000 HC RX 637 (ALT 250 FOR IP): Performed by: FAMILY MEDICINE

## 2018-05-29 PROCEDURE — 2580000003 HC RX 258: Performed by: FAMILY MEDICINE

## 2018-05-29 PROCEDURE — 2140000000 HC CCU INTERMEDIATE R&B

## 2018-05-29 PROCEDURE — 80048 BASIC METABOLIC PNL TOTAL CA: CPT

## 2018-05-29 PROCEDURE — 36415 COLL VENOUS BLD VENIPUNCTURE: CPT

## 2018-05-29 RX ORDER — FUROSEMIDE 10 MG/ML
40 INJECTION INTRAMUSCULAR; INTRAVENOUS 2 TIMES DAILY
Status: DISCONTINUED | OUTPATIENT
Start: 2018-05-29 | End: 2018-05-30

## 2018-05-29 RX ADMIN — MECLIZINE HYDROCHLORIDE 25 MG: 25 TABLET ORAL at 17:19

## 2018-05-29 RX ADMIN — CARVEDILOL 6.25 MG: 6.25 TABLET, FILM COATED ORAL at 17:19

## 2018-05-29 RX ADMIN — MECLIZINE HYDROCHLORIDE 25 MG: 25 TABLET ORAL at 13:07

## 2018-05-29 RX ADMIN — DOXAZOSIN 2 MG: 2 TABLET ORAL at 08:31

## 2018-05-29 RX ADMIN — VALSARTAN 80 MG: 80 TABLET ORAL at 20:37

## 2018-05-29 RX ADMIN — DOCUSATE SODIUM AND SENNOSIDES 2 TABLET: 8.6; 5 TABLET, FILM COATED ORAL at 08:32

## 2018-05-29 RX ADMIN — CARVEDILOL 6.25 MG: 6.25 TABLET, FILM COATED ORAL at 08:31

## 2018-05-29 RX ADMIN — VALSARTAN 80 MG: 80 TABLET ORAL at 08:32

## 2018-05-29 RX ADMIN — ENOXAPARIN SODIUM 40 MG: 100 INJECTION SUBCUTANEOUS at 20:32

## 2018-05-29 RX ADMIN — POTASSIUM CHLORIDE 20 MEQ: 20 TABLET, EXTENDED RELEASE ORAL at 08:31

## 2018-05-29 RX ADMIN — AMLODIPINE BESYLATE 5 MG: 5 TABLET ORAL at 08:31

## 2018-05-29 RX ADMIN — Medication 10 ML: at 08:36

## 2018-05-29 RX ADMIN — PREDNISOLONE ACETATE 1 DROP: 10 SUSPENSION/ DROPS OPHTHALMIC at 08:36

## 2018-05-29 RX ADMIN — POTASSIUM CHLORIDE 20 MEQ: 20 TABLET, EXTENDED RELEASE ORAL at 20:32

## 2018-05-29 RX ADMIN — Medication 10 ML: at 20:41

## 2018-05-29 RX ADMIN — INSULIN LISPRO 4 UNITS: 100 INJECTION, SOLUTION INTRAVENOUS; SUBCUTANEOUS at 13:08

## 2018-05-29 RX ADMIN — FUROSEMIDE 40 MG: 10 INJECTION, SOLUTION INTRAMUSCULAR; INTRAVENOUS at 17:19

## 2018-05-29 RX ADMIN — DOXAZOSIN 2 MG: 2 TABLET ORAL at 20:38

## 2018-05-29 RX ADMIN — PANTOPRAZOLE SODIUM 40 MG: 40 TABLET, DELAYED RELEASE ORAL at 17:19

## 2018-05-29 RX ADMIN — PREDNISOLONE ACETATE 1 DROP: 10 SUSPENSION/ DROPS OPHTHALMIC at 13:21

## 2018-05-29 RX ADMIN — PREDNISOLONE ACETATE 1 DROP: 10 SUSPENSION/ DROPS OPHTHALMIC at 20:40

## 2018-05-29 RX ADMIN — CLONIDINE HYDROCHLORIDE 0.1 MG: 0.1 TABLET ORAL at 00:34

## 2018-05-29 RX ADMIN — FUROSEMIDE 40 MG: 10 INJECTION, SOLUTION INTRAMUSCULAR; INTRAVENOUS at 10:15

## 2018-05-29 RX ADMIN — ALLOPURINOL 300 MG: 300 TABLET ORAL at 08:31

## 2018-05-29 RX ADMIN — PRAVASTATIN SODIUM 40 MG: 20 TABLET ORAL at 08:31

## 2018-05-29 RX ADMIN — ASPIRIN 81 MG CHEWABLE TABLET 81 MG: 81 TABLET CHEWABLE at 08:31

## 2018-05-29 RX ADMIN — PREDNISOLONE ACETATE 1 DROP: 10 SUSPENSION/ DROPS OPHTHALMIC at 17:21

## 2018-05-29 ASSESSMENT — PAIN SCALES - GENERAL
PAINLEVEL_OUTOF10: 0
PAINLEVEL_OUTOF10: 0

## 2018-05-30 LAB
ANION GAP SERPL CALCULATED.3IONS-SCNC: 11 MMOL/L (ref 7–19)
BUN BLDV-MCNC: 33 MG/DL (ref 8–23)
CALCIUM SERPL-MCNC: 8.7 MG/DL (ref 8.8–10.2)
CHLORIDE BLD-SCNC: 104 MMOL/L (ref 98–111)
CO2: 28 MMOL/L (ref 22–29)
CREAT SERPL-MCNC: 1.6 MG/DL (ref 0.5–1.2)
EKG P AXIS: 29 DEGREES
EKG P AXIS: 63 DEGREES
EKG P-R INTERVAL: 202 MS
EKG P-R INTERVAL: 214 MS
EKG Q-T INTERVAL: 476 MS
EKG Q-T INTERVAL: 486 MS
EKG QRS DURATION: 100 MS
EKG QRS DURATION: 104 MS
EKG QTC CALCULATION (BAZETT): 459 MS
EKG QTC CALCULATION (BAZETT): 465 MS
EKG T AXIS: 49 DEGREES
EKG T AXIS: 49 DEGREES
GFR NON-AFRICAN AMERICAN: 41
GLUCOSE BLD-MCNC: 117 MG/DL (ref 70–99)
GLUCOSE BLD-MCNC: 133 MG/DL (ref 70–99)
GLUCOSE BLD-MCNC: 166 MG/DL (ref 74–109)
GLUCOSE BLD-MCNC: 180 MG/DL (ref 70–99)
GLUCOSE BLD-MCNC: 90 MG/DL (ref 70–99)
PERFORMED ON: ABNORMAL
PERFORMED ON: NORMAL
POTASSIUM SERPL-SCNC: 4.3 MMOL/L (ref 3.5–5)
PRO-BNP: 763 PG/ML (ref 0–1800)
SODIUM BLD-SCNC: 143 MMOL/L (ref 136–145)

## 2018-05-30 PROCEDURE — 36415 COLL VENOUS BLD VENIPUNCTURE: CPT

## 2018-05-30 PROCEDURE — 83880 ASSAY OF NATRIURETIC PEPTIDE: CPT

## 2018-05-30 PROCEDURE — 6360000002 HC RX W HCPCS: Performed by: FAMILY MEDICINE

## 2018-05-30 PROCEDURE — 82948 REAGENT STRIP/BLOOD GLUCOSE: CPT

## 2018-05-30 PROCEDURE — 2580000003 HC RX 258: Performed by: FAMILY MEDICINE

## 2018-05-30 PROCEDURE — 80048 BASIC METABOLIC PNL TOTAL CA: CPT

## 2018-05-30 PROCEDURE — 2580000003 HC RX 258: Performed by: INTERNAL MEDICINE

## 2018-05-30 PROCEDURE — 99233 SBSQ HOSP IP/OBS HIGH 50: CPT | Performed by: INTERNAL MEDICINE

## 2018-05-30 PROCEDURE — 2140000000 HC CCU INTERMEDIATE R&B

## 2018-05-30 PROCEDURE — 6370000000 HC RX 637 (ALT 250 FOR IP): Performed by: FAMILY MEDICINE

## 2018-05-30 RX ORDER — SODIUM CHLORIDE 0.9 % (FLUSH) 0.9 %
10 SYRINGE (ML) INJECTION PRN
Status: DISCONTINUED | OUTPATIENT
Start: 2018-05-30 | End: 2018-06-01 | Stop reason: HOSPADM

## 2018-05-30 RX ORDER — AMLODIPINE BESYLATE 10 MG/1
10 TABLET ORAL DAILY
Status: DISCONTINUED | OUTPATIENT
Start: 2018-05-31 | End: 2018-06-01 | Stop reason: HOSPADM

## 2018-05-30 RX ORDER — HYDRALAZINE HYDROCHLORIDE 25 MG/1
25 TABLET, FILM COATED ORAL EVERY 8 HOURS SCHEDULED
Status: DISCONTINUED | OUTPATIENT
Start: 2018-05-30 | End: 2018-05-31

## 2018-05-30 RX ORDER — SODIUM CHLORIDE 9 MG/ML
INJECTION, SOLUTION INTRAVENOUS CONTINUOUS
Status: DISCONTINUED | OUTPATIENT
Start: 2018-05-30 | End: 2018-06-01 | Stop reason: HOSPADM

## 2018-05-30 RX ORDER — SODIUM CHLORIDE 9 MG/ML
INJECTION, SOLUTION INTRAVENOUS CONTINUOUS
Status: DISCONTINUED | OUTPATIENT
Start: 2018-05-30 | End: 2018-05-30

## 2018-05-30 RX ORDER — SODIUM CHLORIDE 9 MG/ML
INJECTION, SOLUTION INTRAVENOUS CONTINUOUS
Status: ACTIVE | OUTPATIENT
Start: 2018-05-30 | End: 2018-05-30

## 2018-05-30 RX ORDER — SODIUM CHLORIDE 0.9 % (FLUSH) 0.9 %
10 SYRINGE (ML) INJECTION EVERY 12 HOURS SCHEDULED
Status: DISCONTINUED | OUTPATIENT
Start: 2018-05-30 | End: 2018-06-01 | Stop reason: HOSPADM

## 2018-05-30 RX ADMIN — AMLODIPINE BESYLATE 5 MG: 5 TABLET ORAL at 08:34

## 2018-05-30 RX ADMIN — PANTOPRAZOLE SODIUM 40 MG: 40 TABLET, DELAYED RELEASE ORAL at 16:49

## 2018-05-30 RX ADMIN — MECLIZINE HYDROCHLORIDE 25 MG: 25 TABLET ORAL at 00:39

## 2018-05-30 RX ADMIN — ENOXAPARIN SODIUM 40 MG: 100 INJECTION SUBCUTANEOUS at 21:08

## 2018-05-30 RX ADMIN — MECLIZINE HYDROCHLORIDE 25 MG: 25 TABLET ORAL at 16:49

## 2018-05-30 RX ADMIN — Medication 10 ML: at 21:12

## 2018-05-30 RX ADMIN — PRAVASTATIN SODIUM 40 MG: 20 TABLET ORAL at 08:34

## 2018-05-30 RX ADMIN — PANTOPRAZOLE SODIUM 40 MG: 40 TABLET, DELAYED RELEASE ORAL at 06:19

## 2018-05-30 RX ADMIN — HYDRALAZINE HYDROCHLORIDE 25 MG: 25 TABLET, FILM COATED ORAL at 21:08

## 2018-05-30 RX ADMIN — MUPIROCIN: 20 OINTMENT TOPICAL at 08:34

## 2018-05-30 RX ADMIN — Medication 10 ML: at 08:46

## 2018-05-30 RX ADMIN — Medication 10 ML: at 21:10

## 2018-05-30 RX ADMIN — HYDRALAZINE HYDROCHLORIDE 25 MG: 25 TABLET, FILM COATED ORAL at 08:45

## 2018-05-30 RX ADMIN — DOXAZOSIN 2 MG: 2 TABLET ORAL at 08:34

## 2018-05-30 RX ADMIN — ALLOPURINOL 300 MG: 300 TABLET ORAL at 08:33

## 2018-05-30 RX ADMIN — CARVEDILOL 6.25 MG: 6.25 TABLET, FILM COATED ORAL at 16:49

## 2018-05-30 RX ADMIN — VALSARTAN 80 MG: 80 TABLET ORAL at 08:33

## 2018-05-30 RX ADMIN — CARVEDILOL 6.25 MG: 6.25 TABLET, FILM COATED ORAL at 08:33

## 2018-05-30 RX ADMIN — MECLIZINE HYDROCHLORIDE 25 MG: 25 TABLET ORAL at 06:22

## 2018-05-30 RX ADMIN — DOXAZOSIN 2 MG: 2 TABLET ORAL at 21:08

## 2018-05-30 RX ADMIN — PREDNISOLONE ACETATE 1 DROP: 10 SUSPENSION/ DROPS OPHTHALMIC at 08:34

## 2018-05-30 RX ADMIN — VALSARTAN 80 MG: 80 TABLET ORAL at 21:08

## 2018-05-30 RX ADMIN — ASPIRIN 81 MG CHEWABLE TABLET 81 MG: 81 TABLET CHEWABLE at 08:34

## 2018-05-30 RX ADMIN — HYDRALAZINE HYDROCHLORIDE 25 MG: 25 TABLET, FILM COATED ORAL at 16:49

## 2018-05-30 RX ADMIN — PREDNISOLONE ACETATE 1 DROP: 10 SUSPENSION/ DROPS OPHTHALMIC at 21:09

## 2018-05-30 ASSESSMENT — PAIN SCALES - GENERAL
PAINLEVEL_OUTOF10: 0

## 2018-05-31 LAB
ALBUMIN SERPL-MCNC: 3 G/DL (ref 3.5–5.2)
ALP BLD-CCNC: 71 U/L (ref 40–130)
ALT SERPL-CCNC: 21 U/L (ref 5–41)
ANION GAP SERPL CALCULATED.3IONS-SCNC: 10 MMOL/L (ref 7–19)
AST SERPL-CCNC: 22 U/L (ref 5–40)
BASOPHILS ABSOLUTE: 0 K/UL (ref 0–0.2)
BASOPHILS RELATIVE PERCENT: 0.6 % (ref 0–1)
BILIRUB SERPL-MCNC: <0.2 MG/DL (ref 0.2–1.2)
BUN BLDV-MCNC: 27 MG/DL (ref 8–23)
CALCIUM SERPL-MCNC: 8.7 MG/DL (ref 8.8–10.2)
CHLORIDE BLD-SCNC: 106 MMOL/L (ref 98–111)
CO2: 26 MMOL/L (ref 22–29)
CREAT SERPL-MCNC: 1.2 MG/DL (ref 0.5–1.2)
EKG P AXIS: 44 DEGREES
EKG P-R INTERVAL: 216 MS
EKG Q-T INTERVAL: 468 MS
EKG QRS DURATION: 100 MS
EKG QTC CALCULATION (BAZETT): 458 MS
EKG T AXIS: 64 DEGREES
EOSINOPHILS ABSOLUTE: 0.3 K/UL (ref 0–0.6)
EOSINOPHILS RELATIVE PERCENT: 5.6 % (ref 0–5)
GFR NON-AFRICAN AMERICAN: 58
GLUCOSE BLD-MCNC: 241 MG/DL (ref 70–99)
GLUCOSE BLD-MCNC: 89 MG/DL (ref 74–109)
HCT VFR BLD CALC: 31.1 % (ref 42–52)
HEMOGLOBIN: 9.8 G/DL (ref 14–18)
LYMPHOCYTES ABSOLUTE: 2.1 K/UL (ref 1.1–4.5)
LYMPHOCYTES RELATIVE PERCENT: 41.7 % (ref 20–40)
MCH RBC QN AUTO: 31.9 PG (ref 27–31)
MCHC RBC AUTO-ENTMCNC: 31.5 G/DL (ref 33–37)
MCV RBC AUTO: 101.3 FL (ref 80–94)
MONOCYTES ABSOLUTE: 0.8 K/UL (ref 0–0.9)
MONOCYTES RELATIVE PERCENT: 15.2 % (ref 0–10)
NEUTROPHILS ABSOLUTE: 1.8 K/UL (ref 1.5–7.5)
NEUTROPHILS RELATIVE PERCENT: 36.7 % (ref 50–65)
PDW BLD-RTO: 13.3 % (ref 11.5–14.5)
PERFORMED ON: ABNORMAL
PLATELET # BLD: 193 K/UL (ref 130–400)
PMV BLD AUTO: 10.1 FL (ref 9.4–12.4)
POTASSIUM SERPL-SCNC: 4.7 MMOL/L (ref 3.5–5)
RBC # BLD: 3.07 M/UL (ref 4.7–6.1)
SODIUM BLD-SCNC: 142 MMOL/L (ref 136–145)
TOTAL PROTEIN: 5 G/DL (ref 6.6–8.7)
WBC # BLD: 5 K/UL (ref 4.8–10.8)

## 2018-05-31 PROCEDURE — 93459 L HRT ART/GRFT ANGIO: CPT | Performed by: INTERNAL MEDICINE

## 2018-05-31 PROCEDURE — B218YZZ FLUOROSCOPY OF LEFT INTERNAL MAMMARY BYPASS GRAFT USING OTHER CONTRAST: ICD-10-PCS | Performed by: INTERNAL MEDICINE

## 2018-05-31 PROCEDURE — 93005 ELECTROCARDIOGRAM TRACING: CPT

## 2018-05-31 PROCEDURE — 6360000002 HC RX W HCPCS

## 2018-05-31 PROCEDURE — B215YZZ FLUOROSCOPY OF LEFT HEART USING OTHER CONTRAST: ICD-10-PCS | Performed by: INTERNAL MEDICINE

## 2018-05-31 PROCEDURE — C1894 INTRO/SHEATH, NON-LASER: HCPCS

## 2018-05-31 PROCEDURE — 6370000000 HC RX 637 (ALT 250 FOR IP): Performed by: FAMILY MEDICINE

## 2018-05-31 PROCEDURE — 99024 POSTOP FOLLOW-UP VISIT: CPT | Performed by: INTERNAL MEDICINE

## 2018-05-31 PROCEDURE — 2580000003 HC RX 258: Performed by: FAMILY MEDICINE

## 2018-05-31 PROCEDURE — 82948 REAGENT STRIP/BLOOD GLUCOSE: CPT

## 2018-05-31 PROCEDURE — B310YZZ FLUOROSCOPY OF THORACIC AORTA USING OTHER CONTRAST: ICD-10-PCS | Performed by: INTERNAL MEDICINE

## 2018-05-31 PROCEDURE — 85025 COMPLETE CBC W/AUTO DIFF WBC: CPT

## 2018-05-31 PROCEDURE — C1769 GUIDE WIRE: HCPCS

## 2018-05-31 PROCEDURE — 2580000003 HC RX 258: Performed by: INTERNAL MEDICINE

## 2018-05-31 PROCEDURE — 2140000000 HC CCU INTERMEDIATE R&B

## 2018-05-31 PROCEDURE — 2709999900 HC NON-CHARGEABLE SUPPLY

## 2018-05-31 PROCEDURE — 6370000000 HC RX 637 (ALT 250 FOR IP): Performed by: INTERNAL MEDICINE

## 2018-05-31 PROCEDURE — 6360000002 HC RX W HCPCS: Performed by: FAMILY MEDICINE

## 2018-05-31 PROCEDURE — 4A023N7 MEASUREMENT OF CARDIAC SAMPLING AND PRESSURE, LEFT HEART, PERCUTANEOUS APPROACH: ICD-10-PCS | Performed by: INTERNAL MEDICINE

## 2018-05-31 PROCEDURE — 80053 COMPREHEN METABOLIC PANEL: CPT

## 2018-05-31 PROCEDURE — B211YZZ FLUOROSCOPY OF MULTIPLE CORONARY ARTERIES USING OTHER CONTRAST: ICD-10-PCS | Performed by: INTERNAL MEDICINE

## 2018-05-31 PROCEDURE — C1760 CLOSURE DEV, VASC: HCPCS

## 2018-05-31 PROCEDURE — 36415 COLL VENOUS BLD VENIPUNCTURE: CPT

## 2018-05-31 RX ORDER — HYDRALAZINE HYDROCHLORIDE 50 MG/1
50 TABLET, FILM COATED ORAL EVERY 8 HOURS SCHEDULED
Status: DISCONTINUED | OUTPATIENT
Start: 2018-05-31 | End: 2018-06-01 | Stop reason: HOSPADM

## 2018-05-31 RX ORDER — VALSARTAN 40 MG/1
160 TABLET ORAL 2 TIMES DAILY
Status: DISCONTINUED | OUTPATIENT
Start: 2018-05-31 | End: 2018-06-01 | Stop reason: HOSPADM

## 2018-05-31 RX ADMIN — DOXAZOSIN 2 MG: 2 TABLET ORAL at 08:12

## 2018-05-31 RX ADMIN — AMLODIPINE BESYLATE 10 MG: 10 TABLET ORAL at 08:12

## 2018-05-31 RX ADMIN — Medication 10 ML: at 08:16

## 2018-05-31 RX ADMIN — MECLIZINE HYDROCHLORIDE 25 MG: 25 TABLET ORAL at 14:27

## 2018-05-31 RX ADMIN — HYDRALAZINE HYDROCHLORIDE 50 MG: 50 TABLET, FILM COATED ORAL at 14:27

## 2018-05-31 RX ADMIN — PREDNISOLONE ACETATE 1 DROP: 10 SUSPENSION/ DROPS OPHTHALMIC at 18:00

## 2018-05-31 RX ADMIN — VALSARTAN 160 MG: 40 TABLET ORAL at 21:46

## 2018-05-31 RX ADMIN — DOCUSATE SODIUM AND SENNOSIDES 2 TABLET: 8.6; 5 TABLET, FILM COATED ORAL at 14:27

## 2018-05-31 RX ADMIN — MECLIZINE HYDROCHLORIDE 25 MG: 25 TABLET ORAL at 06:14

## 2018-05-31 RX ADMIN — CARVEDILOL 6.25 MG: 6.25 TABLET, FILM COATED ORAL at 08:12

## 2018-05-31 RX ADMIN — INSULIN LISPRO 2 UNITS: 100 INJECTION, SOLUTION INTRAVENOUS; SUBCUTANEOUS at 21:36

## 2018-05-31 RX ADMIN — PANTOPRAZOLE SODIUM 40 MG: 40 TABLET, DELAYED RELEASE ORAL at 06:14

## 2018-05-31 RX ADMIN — Medication 10 ML: at 21:47

## 2018-05-31 RX ADMIN — HYDRALAZINE HYDROCHLORIDE 25 MG: 25 TABLET, FILM COATED ORAL at 06:14

## 2018-05-31 RX ADMIN — PREDNISOLONE ACETATE 1 DROP: 10 SUSPENSION/ DROPS OPHTHALMIC at 14:29

## 2018-05-31 RX ADMIN — PANTOPRAZOLE SODIUM 40 MG: 40 TABLET, DELAYED RELEASE ORAL at 17:51

## 2018-05-31 RX ADMIN — PREDNISOLONE ACETATE 1 DROP: 10 SUSPENSION/ DROPS OPHTHALMIC at 08:15

## 2018-05-31 RX ADMIN — PREDNISOLONE ACETATE 1 DROP: 10 SUSPENSION/ DROPS OPHTHALMIC at 21:46

## 2018-05-31 RX ADMIN — ASPIRIN 81 MG CHEWABLE TABLET 81 MG: 81 TABLET CHEWABLE at 08:12

## 2018-05-31 RX ADMIN — MECLIZINE HYDROCHLORIDE 25 MG: 25 TABLET ORAL at 00:53

## 2018-05-31 RX ADMIN — PRAVASTATIN SODIUM 40 MG: 20 TABLET ORAL at 14:27

## 2018-05-31 RX ADMIN — VALSARTAN 160 MG: 40 TABLET ORAL at 08:12

## 2018-05-31 RX ADMIN — CARVEDILOL 6.25 MG: 6.25 TABLET, FILM COATED ORAL at 17:50

## 2018-05-31 RX ADMIN — ENOXAPARIN SODIUM 40 MG: 100 INJECTION SUBCUTANEOUS at 21:47

## 2018-05-31 RX ADMIN — MUPIROCIN: 20 OINTMENT TOPICAL at 08:15

## 2018-05-31 RX ADMIN — DOXAZOSIN 2 MG: 2 TABLET ORAL at 21:47

## 2018-05-31 RX ADMIN — HYDRALAZINE HYDROCHLORIDE 50 MG: 50 TABLET, FILM COATED ORAL at 21:47

## 2018-05-31 RX ADMIN — PREDNISOLONE ACETATE 1 DROP: 10 SUSPENSION/ DROPS OPHTHALMIC at 06:14

## 2018-05-31 RX ADMIN — ALLOPURINOL 300 MG: 300 TABLET ORAL at 14:27

## 2018-05-31 ASSESSMENT — PAIN SCALES - GENERAL
PAINLEVEL_OUTOF10: 0
PAINLEVEL_OUTOF10: 0

## 2018-06-01 VITALS
SYSTOLIC BLOOD PRESSURE: 113 MMHG | TEMPERATURE: 97.9 F | WEIGHT: 178.8 LBS | HEIGHT: 68 IN | RESPIRATION RATE: 18 BRPM | HEART RATE: 62 BPM | OXYGEN SATURATION: 100 % | BODY MASS INDEX: 27.1 KG/M2 | DIASTOLIC BLOOD PRESSURE: 59 MMHG

## 2018-06-01 LAB
ALBUMIN SERPL-MCNC: 2.9 G/DL (ref 3.5–5.2)
ALP BLD-CCNC: 69 U/L (ref 40–130)
ALT SERPL-CCNC: 24 U/L (ref 5–41)
ANION GAP SERPL CALCULATED.3IONS-SCNC: 9 MMOL/L (ref 7–19)
AST SERPL-CCNC: 23 U/L (ref 5–40)
BILIRUB SERPL-MCNC: <0.2 MG/DL (ref 0.2–1.2)
BUN BLDV-MCNC: 26 MG/DL (ref 8–23)
CALCIUM SERPL-MCNC: 8.6 MG/DL (ref 8.8–10.2)
CHLORIDE BLD-SCNC: 107 MMOL/L (ref 98–111)
CO2: 26 MMOL/L (ref 22–29)
CREAT SERPL-MCNC: 1.1 MG/DL (ref 0.5–1.2)
GFR NON-AFRICAN AMERICAN: >60
GLUCOSE BLD-MCNC: 105 MG/DL (ref 74–109)
GLUCOSE BLD-MCNC: 109 MG/DL (ref 70–99)
HCT VFR BLD CALC: 30.3 % (ref 42–52)
HEMOGLOBIN: 9.6 G/DL (ref 14–18)
MCH RBC QN AUTO: 32.1 PG (ref 27–31)
MCHC RBC AUTO-ENTMCNC: 31.7 G/DL (ref 33–37)
MCV RBC AUTO: 101.3 FL (ref 80–94)
PDW BLD-RTO: 13.2 % (ref 11.5–14.5)
PERFORMED ON: ABNORMAL
PLATELET # BLD: 183 K/UL (ref 130–400)
PMV BLD AUTO: 10 FL (ref 9.4–12.4)
POTASSIUM SERPL-SCNC: 4.3 MMOL/L (ref 3.5–5)
RBC # BLD: 2.99 M/UL (ref 4.7–6.1)
SODIUM BLD-SCNC: 142 MMOL/L (ref 136–145)
TOTAL PROTEIN: 5.2 G/DL (ref 6.6–8.7)
WBC # BLD: 5.2 K/UL (ref 4.8–10.8)

## 2018-06-01 PROCEDURE — 36415 COLL VENOUS BLD VENIPUNCTURE: CPT

## 2018-06-01 PROCEDURE — 6370000000 HC RX 637 (ALT 250 FOR IP): Performed by: FAMILY MEDICINE

## 2018-06-01 PROCEDURE — 82948 REAGENT STRIP/BLOOD GLUCOSE: CPT

## 2018-06-01 PROCEDURE — 85027 COMPLETE CBC AUTOMATED: CPT

## 2018-06-01 PROCEDURE — 80053 COMPREHEN METABOLIC PANEL: CPT

## 2018-06-01 RX ORDER — AMLODIPINE BESYLATE 10 MG/1
10 TABLET ORAL DAILY
Qty: 30 TABLET | Refills: 5 | Status: SHIPPED | OUTPATIENT
Start: 2018-06-01 | End: 2019-02-25 | Stop reason: DRUGHIGH

## 2018-06-01 RX ORDER — PANTOPRAZOLE SODIUM 40 MG/1
40 TABLET, DELAYED RELEASE ORAL 2 TIMES DAILY
Qty: 60 TABLET | Refills: 5 | Status: SHIPPED | OUTPATIENT
Start: 2018-06-01

## 2018-06-01 RX ORDER — HYDRALAZINE HYDROCHLORIDE 50 MG/1
50 TABLET, FILM COATED ORAL EVERY 8 HOURS SCHEDULED
Qty: 90 TABLET | Refills: 3 | Status: ON HOLD | OUTPATIENT
Start: 2018-06-01 | End: 2019-12-04 | Stop reason: SDUPTHER

## 2018-06-01 RX ORDER — VALSARTAN 160 MG/1
160 TABLET ORAL 2 TIMES DAILY
Qty: 60 TABLET | Refills: 3 | Status: SHIPPED | OUTPATIENT
Start: 2018-06-01 | End: 2018-10-20

## 2018-06-01 RX ORDER — ASPIRIN 81 MG/1
81 TABLET, CHEWABLE ORAL DAILY
Qty: 30 TABLET | Refills: 3 | Status: SHIPPED | OUTPATIENT
Start: 2018-06-01

## 2018-06-01 RX ADMIN — DOCUSATE SODIUM AND SENNOSIDES 2 TABLET: 8.6; 5 TABLET, FILM COATED ORAL at 09:30

## 2018-06-01 RX ADMIN — PREDNISOLONE ACETATE 1 DROP: 10 SUSPENSION/ DROPS OPHTHALMIC at 06:05

## 2018-06-01 RX ADMIN — PRAVASTATIN SODIUM 40 MG: 20 TABLET ORAL at 09:30

## 2018-06-01 RX ADMIN — DOXAZOSIN 2 MG: 2 TABLET ORAL at 09:30

## 2018-06-01 RX ADMIN — HYDRALAZINE HYDROCHLORIDE 50 MG: 50 TABLET, FILM COATED ORAL at 06:04

## 2018-06-01 RX ADMIN — MECLIZINE HYDROCHLORIDE 25 MG: 25 TABLET ORAL at 06:04

## 2018-06-01 RX ADMIN — PANTOPRAZOLE SODIUM 40 MG: 40 TABLET, DELAYED RELEASE ORAL at 06:04

## 2018-06-01 RX ADMIN — ASPIRIN 81 MG CHEWABLE TABLET 81 MG: 81 TABLET CHEWABLE at 09:30

## 2018-06-01 ASSESSMENT — PAIN SCALES - GENERAL: PAINLEVEL_OUTOF10: 0

## 2018-06-29 ENCOUNTER — OFFICE VISIT (OUTPATIENT)
Dept: CARDIOLOGY | Age: 83
End: 2018-06-29
Payer: COMMERCIAL

## 2018-06-29 VITALS
DIASTOLIC BLOOD PRESSURE: 78 MMHG | SYSTOLIC BLOOD PRESSURE: 118 MMHG | WEIGHT: 195 LBS | BODY MASS INDEX: 29.55 KG/M2 | HEART RATE: 56 BPM | HEIGHT: 68 IN

## 2018-06-29 DIAGNOSIS — I50.810 RIGHT HEART FAILURE (HCC): ICD-10-CM

## 2018-06-29 DIAGNOSIS — R00.1 BRADYCARDIA: ICD-10-CM

## 2018-06-29 DIAGNOSIS — I50.22 CHRONIC SYSTOLIC CONGESTIVE HEART FAILURE (HCC): Primary | ICD-10-CM

## 2018-06-29 DIAGNOSIS — I25.10 CORONARY ARTERY DISEASE INVOLVING NATIVE CORONARY ARTERY OF NATIVE HEART WITHOUT ANGINA PECTORIS: ICD-10-CM

## 2018-06-29 PROCEDURE — 99214 OFFICE O/P EST MOD 30 MIN: CPT | Performed by: CLINICAL NURSE SPECIALIST

## 2018-06-29 PROCEDURE — 1123F ACP DISCUSS/DSCN MKR DOCD: CPT | Performed by: CLINICAL NURSE SPECIALIST

## 2018-06-29 PROCEDURE — 93000 ELECTROCARDIOGRAM COMPLETE: CPT | Performed by: CLINICAL NURSE SPECIALIST

## 2018-06-29 PROCEDURE — 1036F TOBACCO NON-USER: CPT | Performed by: CLINICAL NURSE SPECIALIST

## 2018-06-29 PROCEDURE — G8598 ASA/ANTIPLAT THER USED: HCPCS | Performed by: CLINICAL NURSE SPECIALIST

## 2018-06-29 PROCEDURE — G8419 CALC BMI OUT NRM PARAM NOF/U: HCPCS | Performed by: CLINICAL NURSE SPECIALIST

## 2018-06-29 PROCEDURE — 4040F PNEUMOC VAC/ADMIN/RCVD: CPT | Performed by: CLINICAL NURSE SPECIALIST

## 2018-06-29 PROCEDURE — 1111F DSCHRG MED/CURRENT MED MERGE: CPT | Performed by: CLINICAL NURSE SPECIALIST

## 2018-06-29 PROCEDURE — G8427 DOCREV CUR MEDS BY ELIG CLIN: HCPCS | Performed by: CLINICAL NURSE SPECIALIST

## 2018-06-29 RX ORDER — SPIRONOLACTONE 25 MG/1
12.5 TABLET ORAL DAILY
Qty: 15 TABLET | Refills: 5 | Status: SHIPPED | OUTPATIENT
Start: 2018-06-29 | End: 2018-10-20

## 2018-06-29 RX ORDER — FUROSEMIDE 40 MG/1
40 TABLET ORAL 2 TIMES DAILY
COMMUNITY
End: 2019-09-30

## 2018-06-29 ASSESSMENT — ENCOUNTER SYMPTOMS
NAUSEA: 0
COUGH: 0
ORTHOPNEA: 0
BLURRED VISION: 0
BLOOD IN STOOL: 0
HEARTBURN: 0
SHORTNESS OF BREATH: 1
VOMITING: 0

## 2018-10-20 ENCOUNTER — APPOINTMENT (OUTPATIENT)
Dept: CT IMAGING | Age: 83
End: 2018-10-20
Payer: COMMERCIAL

## 2018-10-20 ENCOUNTER — HOSPITAL ENCOUNTER (EMERGENCY)
Age: 83
Discharge: HOME | End: 2018-10-21
Attending: EMERGENCY MEDICINE
Payer: COMMERCIAL

## 2018-10-20 DIAGNOSIS — E86.0 DEHYDRATION: ICD-10-CM

## 2018-10-20 DIAGNOSIS — I10 ESSENTIAL HYPERTENSION: Primary | ICD-10-CM

## 2018-10-20 LAB
ALBUMIN SERPL-MCNC: 3.6 G/DL (ref 3.5–5.2)
ALP BLD-CCNC: 77 U/L (ref 40–130)
ALT SERPL-CCNC: 28 U/L (ref 5–41)
ANION GAP SERPL CALCULATED.3IONS-SCNC: 11 MMOL/L (ref 7–19)
AST SERPL-CCNC: 29 U/L (ref 5–40)
BASOPHILS ABSOLUTE: 0 K/UL (ref 0–0.2)
BASOPHILS RELATIVE PERCENT: 0.3 % (ref 0–1)
BILIRUB SERPL-MCNC: <0.2 MG/DL (ref 0.2–1.2)
BUN BLDV-MCNC: 64 MG/DL (ref 8–23)
CALCIUM SERPL-MCNC: 9.9 MG/DL (ref 8.8–10.2)
CHLORIDE BLD-SCNC: 107 MMOL/L (ref 98–111)
CO2: 24 MMOL/L (ref 22–29)
CREAT SERPL-MCNC: 1.7 MG/DL (ref 0.5–1.2)
EOSINOPHILS ABSOLUTE: 0.2 K/UL (ref 0–0.6)
EOSINOPHILS RELATIVE PERCENT: 3.5 % (ref 0–5)
GFR NON-AFRICAN AMERICAN: 39
GLUCOSE BLD-MCNC: 68 MG/DL (ref 74–109)
HCT VFR BLD CALC: 30.4 % (ref 42–52)
HEMOGLOBIN: 9.8 G/DL (ref 14–18)
LYMPHOCYTES ABSOLUTE: 2.2 K/UL (ref 1.1–4.5)
LYMPHOCYTES RELATIVE PERCENT: 32.6 % (ref 20–40)
MCH RBC QN AUTO: 31.9 PG (ref 27–31)
MCHC RBC AUTO-ENTMCNC: 32.2 G/DL (ref 33–37)
MCV RBC AUTO: 99 FL (ref 80–94)
MONOCYTES ABSOLUTE: 0.8 K/UL (ref 0–0.9)
MONOCYTES RELATIVE PERCENT: 11 % (ref 0–10)
NEUTROPHILS ABSOLUTE: 3.6 K/UL (ref 1.5–7.5)
NEUTROPHILS RELATIVE PERCENT: 52.5 % (ref 50–65)
PDW BLD-RTO: 13.4 % (ref 11.5–14.5)
PLATELET # BLD: 182 K/UL (ref 130–400)
PMV BLD AUTO: 9.7 FL (ref 9.4–12.4)
POTASSIUM SERPL-SCNC: 4.2 MMOL/L (ref 3.5–5)
RBC # BLD: 3.07 M/UL (ref 4.7–6.1)
SODIUM BLD-SCNC: 142 MMOL/L (ref 136–145)
TOTAL PROTEIN: 6.3 G/DL (ref 6.6–8.7)
WBC # BLD: 6.8 K/UL (ref 4.8–10.8)

## 2018-10-20 PROCEDURE — 80053 COMPREHEN METABOLIC PANEL: CPT

## 2018-10-20 PROCEDURE — 93005 ELECTROCARDIOGRAM TRACING: CPT

## 2018-10-20 PROCEDURE — 36415 COLL VENOUS BLD VENIPUNCTURE: CPT

## 2018-10-20 PROCEDURE — 85025 COMPLETE CBC W/AUTO DIFF WBC: CPT

## 2018-10-20 PROCEDURE — 70450 CT HEAD/BRAIN W/O DYE: CPT

## 2018-10-20 PROCEDURE — 2580000003 HC RX 258: Performed by: EMERGENCY MEDICINE

## 2018-10-20 PROCEDURE — 96374 THER/PROPH/DIAG INJ IV PUSH: CPT

## 2018-10-20 PROCEDURE — 6360000002 HC RX W HCPCS: Performed by: EMERGENCY MEDICINE

## 2018-10-20 PROCEDURE — 6370000000 HC RX 637 (ALT 250 FOR IP): Performed by: EMERGENCY MEDICINE

## 2018-10-20 PROCEDURE — 96361 HYDRATE IV INFUSION ADD-ON: CPT

## 2018-10-20 PROCEDURE — 99284 EMERGENCY DEPT VISIT MOD MDM: CPT

## 2018-10-20 RX ORDER — 0.9 % SODIUM CHLORIDE 0.9 %
500 INTRAVENOUS SOLUTION INTRAVENOUS ONCE
Status: COMPLETED | OUTPATIENT
Start: 2018-10-20 | End: 2018-10-21

## 2018-10-20 RX ORDER — ROPINIROLE 1 MG/1
1 TABLET, FILM COATED ORAL NIGHTLY
Status: ON HOLD | COMMUNITY
End: 2020-10-26 | Stop reason: HOSPADM

## 2018-10-20 RX ORDER — TAMSULOSIN HYDROCHLORIDE 0.4 MG/1
0.4 CAPSULE ORAL DAILY
Status: ON HOLD | COMMUNITY
End: 2019-03-30 | Stop reason: HOSPADM

## 2018-10-20 RX ORDER — GABAPENTIN 100 MG/1
100 CAPSULE ORAL 2 TIMES DAILY
Status: ON HOLD | COMMUNITY
End: 2020-10-26 | Stop reason: HOSPADM

## 2018-10-20 RX ORDER — HYDROCODONE BITARTRATE AND ACETAMINOPHEN 7.5; 325 MG/1; MG/1
1 TABLET ORAL EVERY 12 HOURS PRN
COMMUNITY
End: 2018-12-23 | Stop reason: ALTCHOICE

## 2018-10-20 RX ORDER — LOSARTAN POTASSIUM 50 MG/1
50 TABLET ORAL 2 TIMES DAILY
Status: ON HOLD | COMMUNITY
End: 2020-09-10 | Stop reason: SDUPTHER

## 2018-10-20 RX ORDER — BACLOFEN 10 MG/1
10 TABLET ORAL DAILY PRN
Status: ON HOLD | COMMUNITY
End: 2020-10-26 | Stop reason: HOSPADM

## 2018-10-20 RX ORDER — CYCLOBENZAPRINE HCL 10 MG
10 TABLET ORAL PRN
Status: ON HOLD | COMMUNITY
End: 2018-12-22 | Stop reason: HOSPADM

## 2018-10-20 RX ORDER — GLIMEPIRIDE 4 MG/1
4 TABLET ORAL 2 TIMES DAILY
Status: ON HOLD | COMMUNITY
End: 2020-10-26 | Stop reason: HOSPADM

## 2018-10-20 RX ORDER — COLCHICINE 0.6 MG/1
0.6 TABLET ORAL PRN
COMMUNITY

## 2018-10-20 RX ORDER — CLONIDINE HYDROCHLORIDE 0.1 MG/1
0.1 TABLET ORAL ONCE
Status: COMPLETED | OUTPATIENT
Start: 2018-10-20 | End: 2018-10-20

## 2018-10-20 RX ORDER — POTASSIUM CHLORIDE 1.5 G/1.77G
10 POWDER, FOR SOLUTION ORAL 2 TIMES DAILY
Status: ON HOLD | COMMUNITY
End: 2019-12-04 | Stop reason: HOSPADM

## 2018-10-20 RX ORDER — THIAMINE MONONITRATE (VIT B1) 100 MG
100 TABLET ORAL DAILY
Status: ON HOLD | COMMUNITY
End: 2019-12-04 | Stop reason: HOSPADM

## 2018-10-20 RX ORDER — ONDANSETRON 2 MG/ML
4 INJECTION INTRAMUSCULAR; INTRAVENOUS ONCE
Status: COMPLETED | OUTPATIENT
Start: 2018-10-20 | End: 2018-10-20

## 2018-10-20 RX ORDER — TRAMADOL HYDROCHLORIDE 50 MG/1
50 TABLET ORAL EVERY 6 HOURS PRN
Status: ON HOLD | COMMUNITY
End: 2018-12-22 | Stop reason: HOSPADM

## 2018-10-20 RX ADMIN — ONDANSETRON 4 MG: 2 INJECTION INTRAMUSCULAR; INTRAVENOUS at 22:43

## 2018-10-20 RX ADMIN — CLONIDINE HYDROCHLORIDE 0.1 MG: 0.1 TABLET ORAL at 22:44

## 2018-10-20 RX ADMIN — SODIUM CHLORIDE 500 ML: 9 INJECTION, SOLUTION INTRAVENOUS at 22:43

## 2018-10-21 VITALS
DIASTOLIC BLOOD PRESSURE: 58 MMHG | HEART RATE: 52 BPM | RESPIRATION RATE: 18 BRPM | TEMPERATURE: 98.2 F | HEIGHT: 70 IN | WEIGHT: 185 LBS | OXYGEN SATURATION: 97 % | BODY MASS INDEX: 26.48 KG/M2 | SYSTOLIC BLOOD PRESSURE: 138 MMHG

## 2018-10-21 PROCEDURE — 99284 EMERGENCY DEPT VISIT MOD MDM: CPT | Performed by: EMERGENCY MEDICINE

## 2018-10-21 NOTE — ED NOTES
Patient placed on cardiac monitor, continuous pulse oximeter, and NIBP monitor.  Monitor alarms on.       Teresa Pike RN  10/20/18 6337

## 2018-10-21 NOTE — ED PROVIDER NOTES
(KLOR-CON) 20 MEQ packet Take 20 mEq by mouth 2 times dailyHistorical Med      glimepiride (AMARYL) 4 MG tablet Take 4 mg by mouth 2 times dailyHistorical Med      furosemide (LASIX) 40 MG tablet Take 40 mg by mouth 2 times dailyHistorical Med      aspirin 81 MG chewable tablet Take 1 tablet by mouth daily, Disp-30 tablet, R-3Normal      hydrALAZINE (APRESOLINE) 50 MG tablet Take 1 tablet by mouth every 8 hours, Disp-90 tablet, R-3Normal      amLODIPine (NORVASC) 10 MG tablet Take 1 tablet by mouth daily, Disp-30 tablet, R-5Normal      pantoprazole (PROTONIX) 40 MG tablet Take 1 tablet by mouth 2 times daily, Disp-60 tablet, R-5Normal      ferrous sulfate-C-folic acid (FOLITAB) 599-892-5.9 MG TBCR per extended release tablet Take 1 tablet by mouth daily, Disp-30 tablet, R-0Normal      prednisoLONE acetate (PRED FORTE) 1 % ophthalmic suspension Place 1 drop into the left eye every hour as needed (for dry eye)Historical Med      allopurinol (ZYLOPRIM) 300 MG tablet Take 600 mg by mouth daily Historical Med      sennosides-docusate sodium (SENOKOT-S) 8.6-50 MG tablet Take 2 tablets by mouth dailyHistorical Med      pravastatin (PRAVACHOL) 40 MG tablet Take 40 mg by mouth dailyHistorical Med      Ascorbic Acid (VITAMIN C) 500 MG tablet Take 1,000 mg by mouth daily Historical Med      b complex vitamins capsule Take 1 capsule by mouth dailyHistorical Med      folic acid (FOLVITE) 057 MCG tablet Take 400 mcg by mouth dailyHistorical Med      colchicine (COLCRYS) 0.6 MG tablet Take 0.6 mg by mouth as needed for PainHistorical Med      traMADol (ULTRAM) 50 MG tablet Take 50 mg by mouth every 6 hours as needed for Pain. Wil Chu Historical Med      baclofen (LIORESAL) 10 MG tablet Take 10 mg by mouth as neededHistorical Med      cyclobenzaprine (FLEXERIL) 10 MG tablet Take 10 mg by mouth as needed for Muscle spasmsHistorical Med      HYDROcodone-acetaminophen (NORCO) 7.5-325 MG per tablet Take 1 tablet by mouth every 12 hours as normal. No stridor. No respiratory distress. Abdominal: Soft. He exhibits no distension. There is no tenderness. There is no guarding. Neurological: He is alert and oriented to person, place, and time. He has normal strength. Skin: Capillary refill takes less than 2 seconds. No rash noted. No pallor. Nursing note and vitals reviewed. DIAGNOSTIC RESULTS     EKG: All EKG's areinterpreted by the Emergency Department Physician who either signs or Co-signs this chart in the absence of a cardiologist.    Normal sinus rhythm at 50, no ST or T-wave changes are noted. RADIOLOGY:  Non-plain film images such as CT, Ultrasound and MRI are read by the radiologist. Plain radiographic images are visualized and preliminarily interpreted bythe emergency physician with the below findings:        RADIOLOGY REPORT   Final Result      CT Head WO Contrast   Final Result   1. No acute intracranial abnormalities. 2.  Chronic changes as described above. Signed by Dr Juan Francisco Hicks on 10/21/2018 8:09 AM            LABS:  Labs Reviewed   COMPREHENSIVE METABOLIC PANEL - Abnormal; Notable for the following:        Result Value    Glucose 68 (*)     BUN 64 (*)     CREATININE 1.7 (*)     GFR Non- 39 (*)     Total Protein 6.3 (*)     All other components within normal limits   CBC WITH AUTO DIFFERENTIAL - Abnormal; Notable for the following:     RBC 3.07 (*)     Hemoglobin 9.8 (*)     Hematocrit 30.4 (*)     MCV 99.0 (*)     MCH 31.9 (*)     MCHC 32.2 (*)     Monocytes % 11.0 (*)     All other components within normal limits       All other labs were within normal range or not returned as of this dictation.     EMERGENCY DEPARTMENT COURSE and DIFFERENTIAL DIAGNOSIS/MDM:   Vitals:    Vitals:    10/20/18 2333 10/21/18 0003 10/21/18 0103 10/21/18 0133   BP: (!) 156/54 (!) 136/51 (!) 128/47 (!) 138/58   Pulse: 51 (!) 46 (!) 46 52   Resp: 18 15 19 18   Temp:    98.2 °F (36.8 °C)   TempSrc:    Oral   SpO2: 98% 96% 95%

## 2018-10-22 LAB
EKG P AXIS: 13 DEGREES
EKG P-R INTERVAL: 220 MS
EKG Q-T INTERVAL: 442 MS
EKG QRS DURATION: 100 MS
EKG QTC CALCULATION (BAZETT): 425 MS
EKG T AXIS: 61 DEGREES

## 2018-11-09 ENCOUNTER — OFFICE VISIT (OUTPATIENT)
Dept: CARDIOLOGY | Age: 83
End: 2018-11-09
Payer: COMMERCIAL

## 2018-11-09 VITALS
HEIGHT: 70 IN | DIASTOLIC BLOOD PRESSURE: 70 MMHG | BODY MASS INDEX: 27.92 KG/M2 | SYSTOLIC BLOOD PRESSURE: 144 MMHG | WEIGHT: 195 LBS | HEART RATE: 52 BPM

## 2018-11-09 DIAGNOSIS — I50.812 CHRONIC RIGHT-SIDED HEART FAILURE (HCC): ICD-10-CM

## 2018-11-09 DIAGNOSIS — I25.5 ISCHEMIC CARDIOMYOPATHY: ICD-10-CM

## 2018-11-09 DIAGNOSIS — I25.10 CORONARY ARTERY DISEASE INVOLVING NATIVE CORONARY ARTERY OF NATIVE HEART WITHOUT ANGINA PECTORIS: Primary | ICD-10-CM

## 2018-11-09 PROCEDURE — G8427 DOCREV CUR MEDS BY ELIG CLIN: HCPCS | Performed by: CLINICAL NURSE SPECIALIST

## 2018-11-09 PROCEDURE — 1123F ACP DISCUSS/DSCN MKR DOCD: CPT | Performed by: CLINICAL NURSE SPECIALIST

## 2018-11-09 PROCEDURE — 4040F PNEUMOC VAC/ADMIN/RCVD: CPT | Performed by: CLINICAL NURSE SPECIALIST

## 2018-11-09 PROCEDURE — G8484 FLU IMMUNIZE NO ADMIN: HCPCS | Performed by: CLINICAL NURSE SPECIALIST

## 2018-11-09 PROCEDURE — 1036F TOBACCO NON-USER: CPT | Performed by: CLINICAL NURSE SPECIALIST

## 2018-11-09 PROCEDURE — G8419 CALC BMI OUT NRM PARAM NOF/U: HCPCS | Performed by: CLINICAL NURSE SPECIALIST

## 2018-11-09 PROCEDURE — 1101F PT FALLS ASSESS-DOCD LE1/YR: CPT | Performed by: CLINICAL NURSE SPECIALIST

## 2018-11-09 PROCEDURE — 99213 OFFICE O/P EST LOW 20 MIN: CPT | Performed by: CLINICAL NURSE SPECIALIST

## 2018-11-09 PROCEDURE — G8598 ASA/ANTIPLAT THER USED: HCPCS | Performed by: CLINICAL NURSE SPECIALIST

## 2018-11-23 ASSESSMENT — ENCOUNTER SYMPTOMS
ABDOMINAL PAIN: 0
SHORTNESS OF BREATH: 0

## 2018-12-16 ENCOUNTER — APPOINTMENT (OUTPATIENT)
Dept: CT IMAGING | Age: 83
End: 2018-12-16
Payer: COMMERCIAL

## 2018-12-16 ENCOUNTER — HOSPITAL ENCOUNTER (EMERGENCY)
Age: 83
Discharge: HOME | End: 2018-12-16
Attending: EMERGENCY MEDICINE
Payer: COMMERCIAL

## 2018-12-16 VITALS
SYSTOLIC BLOOD PRESSURE: 184 MMHG | TEMPERATURE: 97.8 F | HEIGHT: 70 IN | WEIGHT: 210 LBS | HEART RATE: 60 BPM | OXYGEN SATURATION: 97 % | BODY MASS INDEX: 30.06 KG/M2 | RESPIRATION RATE: 17 BRPM | DIASTOLIC BLOOD PRESSURE: 68 MMHG

## 2018-12-16 DIAGNOSIS — I16.0 HYPERTENSIVE URGENCY: ICD-10-CM

## 2018-12-16 DIAGNOSIS — M54.50 LUMBAR BACK PAIN: Primary | ICD-10-CM

## 2018-12-16 LAB
ALBUMIN SERPL-MCNC: 3.4 G/DL (ref 3.5–5.2)
ALP BLD-CCNC: 75 U/L (ref 40–130)
ALT SERPL-CCNC: 24 U/L (ref 5–41)
ANION GAP SERPL CALCULATED.3IONS-SCNC: 10 MMOL/L (ref 7–19)
AST SERPL-CCNC: 21 U/L (ref 5–40)
BACTERIA: NEGATIVE /HPF
BASOPHILS ABSOLUTE: 0 K/UL (ref 0–0.2)
BASOPHILS RELATIVE PERCENT: 0.4 % (ref 0–1)
BILIRUB SERPL-MCNC: 0.3 MG/DL (ref 0.2–1.2)
BILIRUBIN URINE: NEGATIVE
BLOOD, URINE: NEGATIVE
BUN BLDV-MCNC: 34 MG/DL (ref 8–23)
CALCIUM SERPL-MCNC: 9.4 MG/DL (ref 8.8–10.2)
CHLORIDE BLD-SCNC: 106 MMOL/L (ref 98–111)
CLARITY: CLEAR
CO2: 26 MMOL/L (ref 22–29)
COLOR: YELLOW
CREAT SERPL-MCNC: 1 MG/DL (ref 0.5–1.2)
EOSINOPHILS ABSOLUTE: 0.2 K/UL (ref 0–0.6)
EOSINOPHILS RELATIVE PERCENT: 3.5 % (ref 0–5)
EPITHELIAL CELLS, UA: 2 /HPF (ref 0–5)
GFR NON-AFRICAN AMERICAN: >60
GLUCOSE BLD-MCNC: 121 MG/DL (ref 74–109)
GLUCOSE URINE: NEGATIVE MG/DL
HCT VFR BLD CALC: 32.6 % (ref 42–52)
HEMOGLOBIN: 10.5 G/DL (ref 14–18)
HYALINE CASTS: 2 /HPF (ref 0–8)
KETONES, URINE: NEGATIVE MG/DL
LEUKOCYTE ESTERASE, URINE: NEGATIVE
LYMPHOCYTES ABSOLUTE: 1.3 K/UL (ref 1.1–4.5)
LYMPHOCYTES RELATIVE PERCENT: 27.8 % (ref 20–40)
MCH RBC QN AUTO: 32.8 PG (ref 27–31)
MCHC RBC AUTO-ENTMCNC: 32.2 G/DL (ref 33–37)
MCV RBC AUTO: 101.9 FL (ref 80–94)
MONOCYTES ABSOLUTE: 0.6 K/UL (ref 0–0.9)
MONOCYTES RELATIVE PERCENT: 11.6 % (ref 0–10)
NEUTROPHILS ABSOLUTE: 2.7 K/UL (ref 1.5–7.5)
NEUTROPHILS RELATIVE PERCENT: 56.7 % (ref 50–65)
NITRITE, URINE: NEGATIVE
PDW BLD-RTO: 13.3 % (ref 11.5–14.5)
PH UA: 6.5
PLATELET # BLD: 198 K/UL (ref 130–400)
PMV BLD AUTO: 9.9 FL (ref 9.4–12.4)
POTASSIUM SERPL-SCNC: 4.3 MMOL/L (ref 3.5–5)
PROTEIN UA: 100 MG/DL
RBC # BLD: 3.2 M/UL (ref 4.7–6.1)
RBC UA: 1 /HPF (ref 0–4)
SODIUM BLD-SCNC: 142 MMOL/L (ref 136–145)
SPECIFIC GRAVITY UA: 1.02
TOTAL PROTEIN: 6.1 G/DL (ref 6.6–8.7)
URINE REFLEX TO CULTURE: ABNORMAL
UROBILINOGEN, URINE: 1 E.U./DL
WBC # BLD: 4.8 K/UL (ref 4.8–10.8)
WBC UA: 3 /HPF (ref 0–5)

## 2018-12-16 PROCEDURE — 99284 EMERGENCY DEPT VISIT MOD MDM: CPT | Performed by: EMERGENCY MEDICINE

## 2018-12-16 PROCEDURE — 85025 COMPLETE CBC W/AUTO DIFF WBC: CPT

## 2018-12-16 PROCEDURE — 80053 COMPREHEN METABOLIC PANEL: CPT

## 2018-12-16 PROCEDURE — 6360000004 HC RX CONTRAST MEDICATION: Performed by: EMERGENCY MEDICINE

## 2018-12-16 PROCEDURE — 6370000000 HC RX 637 (ALT 250 FOR IP): Performed by: EMERGENCY MEDICINE

## 2018-12-16 PROCEDURE — 2580000003 HC RX 258: Performed by: EMERGENCY MEDICINE

## 2018-12-16 PROCEDURE — 99284 EMERGENCY DEPT VISIT MOD MDM: CPT

## 2018-12-16 PROCEDURE — 36415 COLL VENOUS BLD VENIPUNCTURE: CPT

## 2018-12-16 PROCEDURE — 74177 CT ABD & PELVIS W/CONTRAST: CPT

## 2018-12-16 PROCEDURE — 6360000002 HC RX W HCPCS: Performed by: EMERGENCY MEDICINE

## 2018-12-16 PROCEDURE — 81001 URINALYSIS AUTO W/SCOPE: CPT

## 2018-12-16 PROCEDURE — 72128 CT CHEST SPINE W/O DYE: CPT

## 2018-12-16 PROCEDURE — 72131 CT LUMBAR SPINE W/O DYE: CPT

## 2018-12-16 PROCEDURE — 96375 TX/PRO/DX INJ NEW DRUG ADDON: CPT

## 2018-12-16 PROCEDURE — 96374 THER/PROPH/DIAG INJ IV PUSH: CPT

## 2018-12-16 RX ORDER — LIDOCAINE 50 MG/G
1 PATCH TOPICAL DAILY
Qty: 30 PATCH | Refills: 0 | Status: SHIPPED | OUTPATIENT
Start: 2018-12-16

## 2018-12-16 RX ORDER — LIDOCAINE 4 G/G
1 PATCH TOPICAL ONCE
Status: DISCONTINUED | OUTPATIENT
Start: 2018-12-16 | End: 2018-12-16 | Stop reason: HOSPADM

## 2018-12-16 RX ORDER — HYDRALAZINE HYDROCHLORIDE 20 MG/ML
10 INJECTION INTRAMUSCULAR; INTRAVENOUS ONCE
Status: COMPLETED | OUTPATIENT
Start: 2018-12-16 | End: 2018-12-16

## 2018-12-16 RX ORDER — MORPHINE SULFATE/0.9% NACL/PF 1 MG/ML
2 SYRINGE (ML) INJECTION ONCE
Status: COMPLETED | OUTPATIENT
Start: 2018-12-16 | End: 2018-12-16

## 2018-12-16 RX ORDER — 0.9 % SODIUM CHLORIDE 0.9 %
500 INTRAVENOUS SOLUTION INTRAVENOUS ONCE
Status: COMPLETED | OUTPATIENT
Start: 2018-12-16 | End: 2018-12-16

## 2018-12-16 RX ORDER — METHYLPREDNISOLONE 4 MG/1
TABLET ORAL
Qty: 1 KIT | Refills: 0 | Status: SHIPPED | OUTPATIENT
Start: 2018-12-16 | End: 2018-12-20

## 2018-12-16 RX ADMIN — HYDRALAZINE HYDROCHLORIDE 20 MG: 20 INJECTION INTRAMUSCULAR; INTRAVENOUS at 08:32

## 2018-12-16 RX ADMIN — IOPAMIDOL 90 ML: 755 INJECTION, SOLUTION INTRAVENOUS at 08:06

## 2018-12-16 RX ADMIN — SODIUM CHLORIDE 500 ML: 9 INJECTION, SOLUTION INTRAVENOUS at 07:29

## 2018-12-16 RX ADMIN — Medication 2 MG: at 07:29

## 2018-12-16 ASSESSMENT — PAIN SCALES - GENERAL
PAINLEVEL_OUTOF10: 4
PAINLEVEL_OUTOF10: 10

## 2018-12-16 ASSESSMENT — ENCOUNTER SYMPTOMS
ABDOMINAL PAIN: 0
NAUSEA: 0
RHINORRHEA: 0
SORE THROAT: 0
DIARRHEA: 0
VOMITING: 0
BACK PAIN: 1
COUGH: 0
SHORTNESS OF BREATH: 0

## 2018-12-16 NOTE — ED PROVIDER NOTES
[facility]  eMERGENCY dEPARTMENT eNCOUnter      Pt Name: Emmett Ha  MRN: [id number]  [place] 1934  Date of evaluation: 12/16/2018  Provider: Francisco Calderon MD    [address] [place] [address]       Chief Complaint   Patient presents with    Back Pain     presents with co of mid left sided back pain onset during night         HISTORY OF PRESENT ILLNESS   (Location/Symptom, Timing/Onset,Context/Setting, Quality, Duration, Modifying Factors, Severity)  Note limiting factors. Emmett Ha is a 80 y.o. male who presents to the emergency department For concern of left-sided thoracic or lumbar back pain. Patient states that he was okay when he went to bed last night. His pain woke him from sleep approximately 1 or 2 AM.  Denies any urinary symptoms except for frequency but states this is a chronic problem. Denies any difficulty ambulating focal weakness sensation changes in bowel or bladder incontinence. Denies any trauma. No chest pain or shortness of breath. HPI    NursingNotes were reviewed. REVIEW OF SYSTEMS    (2-9 systems for level 4, 10 or more for level 5)     Review of Systems   Constitutional: Negative for chills and fever. HENT: Negative for rhinorrhea and sore throat. Respiratory: Negative for cough and shortness of breath. Cardiovascular: Negative for chest pain and leg swelling. Gastrointestinal: Negative for abdominal pain, diarrhea, nausea and vomiting. Genitourinary: Negative for dysuria and frequency. Musculoskeletal: Positive for back pain. Negative for gait problem and neck pain. Neurological: Negative for dizziness, syncope and headaches. All other systems reviewed and are negative.            PAST MEDICALHISTORY     Past Medical History:   Diagnosis Date    CAD (coronary artery disease)     STENTS X 1    CAD (coronary artery disease)     CABG    [facility]     DDD (degenerative disc disease), lumbar 7/11/2017    [facility]  GERD (gastroesophageal reflux disease)     Hx of blood clots     Right leg    Hyperlipidemia     Hypertension     Lumbar stenosis with neurogenic claudication 7/11/2017    MI, old     X 2    Palliative care encounter 03/06/2018    Right heart failure ([address] 6/29/2018    Sleep apnea     DOESN'T USE MACHINE    TIA (transient ischemic attack)          SURGICAL HISTORY       Past Surgical History:   Procedure Laterality Date    APPENDECTOMY      BACK SURGERY  1980    x 3    CARDIAC SURGERY  1990    Bypass x 3    CARPAL TUNNEL RELEASE Bilateral 1980    wrist and elbows    CHOLECYSTECTOMY  2000    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 2    JOINT REPLACEMENT Left 1990    JOINT REPLACEMENT Right 1995    LAMINECTOMY N/A 7/11/2017    LUMBAR LAMINECTOMY POSTERIOR  L23 L34 performed by Nisha Tomas MD at [facility] Left 1990    [address] Right 2010    TUMOR REMOVAL Left 1960    foot    TURP  2000    VASCULAR SURGERY Left 7/15/2015 [facility] & [address]    Aortoiliofemoral a'gram with bilateral lower extremity runoff; select views 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; a'plasty of left anterior tibial artery and dorsalis pedis artery with 2.5x300 vascutrak balloon and then with 3x220 nati balloon; completion a'gram         CURRENT MEDICATIONS     Previous Medications    ALLOPURINOL (ZYLOPRIM) 300 MG TABLET    Take 600 mg by mouth daily     AMLODIPINE (NORVASC) 10 MG TABLET    Take 1 tablet by mouth daily    ASCORBIC ACID (VITAMIN C) 500 MG TABLET    Take 1,000 mg by mouth daily     ASPIRIN 81 MG CHEWABLE TABLET    Take 1 tablet by mouth daily    B COMPLEX VITAMINS CAPSULE    Take 1 capsule by mouth daily    BACLOFEN (LIORESAL) 10 MG TABLET    Take 10 mg by mouth as needed    COLCHICINE (COLCRYS) 0.6 MG TABLET    Take 0.6 mg by mouth as needed for Pain    CYCLOBENZAPRINE (FLEXERIL) 10 MG closely monitor 4167    Imaging reviewed, multiple chronic findings, suspect likely related to his pain, read noted about facet fracture suspect this is likely old, does have multiple level stenosis, he is neurovascularly intact, he is diabetic but I'm going to go ahead and place him on some steroids as well as Lidoderm patches and have him follow up closely with his primary care physician, blood pressure now 615Y systolic, discussed return precautions      CONSULTS:  None    PROCEDURES:  Unless otherwise noted below, none     Procedures    FINAL IMPRESSION      1. Lumbar back pain    2. Hypertensive urgency          DISPOSITION/PLAN   DISPOSITION Decision To Discharge 12/16/2018 09:45:25 AM      PATIENT REFERRED TO:  Ryan Wu MD  [address] [place] [address] [id number]  [facility] [address] [id number]  [phone]    Schedule an appointment as soon as possible for a visit in 2 days        DISCHARGE MEDICATIONS:  New Prescriptions    LIDOCAINE (LIDODERM) 5 %    Place 1 patch onto the skin daily 12 hours on, 12 hours off. METHYLPREDNISOLONE (MEDROL, MONICA,) 4 MG TABLET    Take by mouth.           (Please note that portions of this note were completed with a voice recognition program.  Efforts were made to edit thedictations but occasionally words are mis-transcribed.)    Eneida Ivy MD (electronically signed)  Attending Emergency Physician        Miriam Dominguez MD  12/16/18 1002

## 2018-12-20 ENCOUNTER — HOSPITAL ENCOUNTER (INPATIENT)
Age: 83
LOS: 2 days | Discharge: HOME | DRG: 287 | End: 2018-12-22
Attending: EMERGENCY MEDICINE | Admitting: FAMILY MEDICINE
Payer: COMMERCIAL

## 2018-12-20 ENCOUNTER — APPOINTMENT (OUTPATIENT)
Dept: CT IMAGING | Age: 83
DRG: 287 | End: 2018-12-20
Payer: COMMERCIAL

## 2018-12-20 ENCOUNTER — TELEPHONE (OUTPATIENT)
Dept: CARDIOLOGY | Age: 83
End: 2018-12-20

## 2018-12-20 ENCOUNTER — APPOINTMENT (OUTPATIENT)
Dept: GENERAL RADIOLOGY | Age: 83
DRG: 287 | End: 2018-12-20
Payer: COMMERCIAL

## 2018-12-20 DIAGNOSIS — I16.0 HYPERTENSIVE URGENCY: ICD-10-CM

## 2018-12-20 DIAGNOSIS — Z95.1 HX OF CABG: ICD-10-CM

## 2018-12-20 DIAGNOSIS — I20.0 UNSTABLE ANGINA PECTORIS (HCC): Primary | ICD-10-CM

## 2018-12-20 DIAGNOSIS — J90 PLEURAL EFFUSION: ICD-10-CM

## 2018-12-20 DIAGNOSIS — R77.8 ELEVATED TROPONIN: ICD-10-CM

## 2018-12-20 DIAGNOSIS — I50.33 ACUTE ON CHRONIC DIASTOLIC CONGESTIVE HEART FAILURE (HCC): ICD-10-CM

## 2018-12-20 PROBLEM — R79.89 ELEVATED TROPONIN: Status: ACTIVE | Noted: 2018-12-20

## 2018-12-20 LAB
ALBUMIN SERPL-MCNC: 3.5 G/DL (ref 3.5–5.2)
ALP BLD-CCNC: 82 U/L (ref 40–130)
ALT SERPL-CCNC: 23 U/L (ref 5–41)
ANION GAP SERPL CALCULATED.3IONS-SCNC: 7 MMOL/L (ref 7–19)
AST SERPL-CCNC: 21 U/L (ref 5–40)
BACTERIA: NEGATIVE /HPF
BASOPHILS ABSOLUTE: 0 K/UL (ref 0–0.2)
BASOPHILS RELATIVE PERCENT: 0.6 % (ref 0–1)
BILIRUB SERPL-MCNC: 0.3 MG/DL (ref 0.2–1.2)
BILIRUBIN URINE: NEGATIVE
BLOOD, URINE: NEGATIVE
BUN BLDV-MCNC: 35 MG/DL (ref 8–23)
CALCIUM SERPL-MCNC: 9.5 MG/DL (ref 8.8–10.2)
CHLORIDE BLD-SCNC: 104 MMOL/L (ref 98–111)
CLARITY: CLEAR
CO2: 27 MMOL/L (ref 22–29)
COLOR: YELLOW
CREAT SERPL-MCNC: 1 MG/DL (ref 0.5–1.2)
D DIMER: 1.18 UG/ML FEU (ref 0–0.48)
EOSINOPHILS ABSOLUTE: 0.1 K/UL (ref 0–0.6)
EOSINOPHILS RELATIVE PERCENT: 2.6 % (ref 0–5)
EPITHELIAL CELLS, UA: 0 /HPF (ref 0–5)
GFR NON-AFRICAN AMERICAN: >60
GLUCOSE BLD-MCNC: 131 MG/DL (ref 74–109)
GLUCOSE BLD-MCNC: 158 MG/DL (ref 70–99)
GLUCOSE BLD-MCNC: 94 MG/DL (ref 70–99)
GLUCOSE URINE: NEGATIVE MG/DL
HCT VFR BLD CALC: 32.6 % (ref 42–52)
HEMOGLOBIN: 10.4 G/DL (ref 14–18)
HYALINE CASTS: 0 /HPF (ref 0–8)
INR BLD: 1.09 (ref 0.88–1.18)
KETONES, URINE: NEGATIVE MG/DL
LEUKOCYTE ESTERASE, URINE: NEGATIVE
LIPASE: 29 U/L (ref 13–60)
LYMPHOCYTES ABSOLUTE: 1.6 K/UL (ref 1.1–4.5)
LYMPHOCYTES RELATIVE PERCENT: 29.5 % (ref 20–40)
MCH RBC QN AUTO: 32.4 PG (ref 27–31)
MCHC RBC AUTO-ENTMCNC: 31.9 G/DL (ref 33–37)
MCV RBC AUTO: 101.6 FL (ref 80–94)
MONOCYTES ABSOLUTE: 0.5 K/UL (ref 0–0.9)
MONOCYTES RELATIVE PERCENT: 9.2 % (ref 0–10)
NEUTROPHILS ABSOLUTE: 3.2 K/UL (ref 1.5–7.5)
NEUTROPHILS RELATIVE PERCENT: 57.9 % (ref 50–65)
NITRITE, URINE: NEGATIVE
PDW BLD-RTO: 13.2 % (ref 11.5–14.5)
PERFORMED ON: ABNORMAL
PERFORMED ON: NORMAL
PH UA: 6
PLATELET # BLD: 206 K/UL (ref 130–400)
PMV BLD AUTO: 9.8 FL (ref 9.4–12.4)
POTASSIUM SERPL-SCNC: 4.4 MMOL/L (ref 3.5–5)
PRO-BNP: 2264 PG/ML (ref 0–1800)
PROTEIN UA: 100 MG/DL
PROTHROMBIN TIME: 13.5 SEC (ref 12–14.6)
RBC # BLD: 3.21 M/UL (ref 4.7–6.1)
RBC UA: 1 /HPF (ref 0–4)
SODIUM BLD-SCNC: 138 MMOL/L (ref 136–145)
SPECIFIC GRAVITY UA: 1.01
TOTAL PROTEIN: 6 G/DL (ref 6.6–8.7)
TROPONIN: 0.05 NG/ML (ref 0–0.03)
TROPONIN: 0.08 NG/ML (ref 0–0.03)
URINE REFLEX TO CULTURE: ABNORMAL
UROBILINOGEN, URINE: 0.2 E.U./DL
WBC # BLD: 5.5 K/UL (ref 4.8–10.8)
WBC UA: 1 /HPF (ref 0–5)

## 2018-12-20 PROCEDURE — 99255 IP/OBS CONSLTJ NEW/EST HI 80: CPT | Performed by: INTERNAL MEDICINE

## 2018-12-20 PROCEDURE — 6360000002 HC RX W HCPCS: Performed by: EMERGENCY MEDICINE

## 2018-12-20 PROCEDURE — 96374 THER/PROPH/DIAG INJ IV PUSH: CPT

## 2018-12-20 PROCEDURE — 6370000000 HC RX 637 (ALT 250 FOR IP): Performed by: EMERGENCY MEDICINE

## 2018-12-20 PROCEDURE — 71046 X-RAY EXAM CHEST 2 VIEWS: CPT

## 2018-12-20 PROCEDURE — 85379 FIBRIN DEGRADATION QUANT: CPT

## 2018-12-20 PROCEDURE — 6360000002 HC RX W HCPCS: Performed by: FAMILY MEDICINE

## 2018-12-20 PROCEDURE — 80053 COMPREHEN METABOLIC PANEL: CPT

## 2018-12-20 PROCEDURE — 36415 COLL VENOUS BLD VENIPUNCTURE: CPT

## 2018-12-20 PROCEDURE — 2500000003 HC RX 250 WO HCPCS: Performed by: EMERGENCY MEDICINE

## 2018-12-20 PROCEDURE — 99291 CRITICAL CARE FIRST HOUR: CPT | Performed by: EMERGENCY MEDICINE

## 2018-12-20 PROCEDURE — 83690 ASSAY OF LIPASE: CPT

## 2018-12-20 PROCEDURE — 81001 URINALYSIS AUTO W/SCOPE: CPT

## 2018-12-20 PROCEDURE — 6360000004 HC RX CONTRAST MEDICATION: Performed by: EMERGENCY MEDICINE

## 2018-12-20 PROCEDURE — 85610 PROTHROMBIN TIME: CPT

## 2018-12-20 PROCEDURE — 85025 COMPLETE CBC W/AUTO DIFF WBC: CPT

## 2018-12-20 PROCEDURE — 99291 CRITICAL CARE FIRST HOUR: CPT

## 2018-12-20 PROCEDURE — 93005 ELECTROCARDIOGRAM TRACING: CPT

## 2018-12-20 PROCEDURE — 83880 ASSAY OF NATRIURETIC PEPTIDE: CPT

## 2018-12-20 PROCEDURE — 71275 CT ANGIOGRAPHY CHEST: CPT

## 2018-12-20 PROCEDURE — 82948 REAGENT STRIP/BLOOD GLUCOSE: CPT

## 2018-12-20 PROCEDURE — 6370000000 HC RX 637 (ALT 250 FOR IP): Performed by: FAMILY MEDICINE

## 2018-12-20 PROCEDURE — 84484 ASSAY OF TROPONIN QUANT: CPT

## 2018-12-20 PROCEDURE — 2140000000 HC CCU INTERMEDIATE R&B

## 2018-12-20 PROCEDURE — 2580000003 HC RX 258: Performed by: FAMILY MEDICINE

## 2018-12-20 PROCEDURE — 96375 TX/PRO/DX INJ NEW DRUG ADDON: CPT

## 2018-12-20 RX ORDER — NICOTINE POLACRILEX 4 MG
15 LOZENGE BUCCAL PRN
Status: DISCONTINUED | OUTPATIENT
Start: 2018-12-20 | End: 2018-12-22 | Stop reason: HOSPADM

## 2018-12-20 RX ORDER — ONDANSETRON 2 MG/ML
4 INJECTION INTRAMUSCULAR; INTRAVENOUS EVERY 6 HOURS PRN
Status: DISCONTINUED | OUTPATIENT
Start: 2018-12-20 | End: 2018-12-22 | Stop reason: HOSPADM

## 2018-12-20 RX ORDER — PANTOPRAZOLE SODIUM 40 MG/1
40 TABLET, DELAYED RELEASE ORAL 2 TIMES DAILY
Status: DISCONTINUED | OUTPATIENT
Start: 2018-12-20 | End: 2018-12-22 | Stop reason: HOSPADM

## 2018-12-20 RX ORDER — BACLOFEN 10 MG/1
10 TABLET ORAL 4 TIMES DAILY PRN
Status: DISCONTINUED | OUTPATIENT
Start: 2018-12-20 | End: 2018-12-22 | Stop reason: HOSPADM

## 2018-12-20 RX ORDER — POTASSIUM CHLORIDE 750 MG/1
10 TABLET, EXTENDED RELEASE ORAL 2 TIMES DAILY
Status: DISCONTINUED | OUTPATIENT
Start: 2018-12-20 | End: 2018-12-22 | Stop reason: HOSPADM

## 2018-12-20 RX ORDER — NITROGLYCERIN 20 MG/100ML
5 INJECTION INTRAVENOUS CONTINUOUS
Status: DISCONTINUED | OUTPATIENT
Start: 2018-12-20 | End: 2018-12-22 | Stop reason: HOSPADM

## 2018-12-20 RX ORDER — TAMSULOSIN HYDROCHLORIDE 0.4 MG/1
0.4 CAPSULE ORAL DAILY
Status: DISCONTINUED | OUTPATIENT
Start: 2018-12-20 | End: 2018-12-22 | Stop reason: HOSPADM

## 2018-12-20 RX ORDER — HYDROCODONE BITARTRATE AND ACETAMINOPHEN 7.5; 325 MG/1; MG/1
1 TABLET ORAL EVERY 12 HOURS PRN
Status: DISCONTINUED | OUTPATIENT
Start: 2018-12-20 | End: 2018-12-22 | Stop reason: HOSPADM

## 2018-12-20 RX ORDER — MECLIZINE HYDROCHLORIDE 25 MG/1
25 TABLET ORAL 4 TIMES DAILY PRN
Status: DISCONTINUED | OUTPATIENT
Start: 2018-12-20 | End: 2018-12-22 | Stop reason: HOSPADM

## 2018-12-20 RX ORDER — HYDRALAZINE HYDROCHLORIDE 20 MG/ML
10 INJECTION INTRAMUSCULAR; INTRAVENOUS ONCE
Status: COMPLETED | OUTPATIENT
Start: 2018-12-20 | End: 2018-12-20

## 2018-12-20 RX ORDER — ASPIRIN 81 MG/1
81 TABLET, CHEWABLE ORAL DAILY
Status: DISCONTINUED | OUTPATIENT
Start: 2018-12-20 | End: 2018-12-22 | Stop reason: HOSPADM

## 2018-12-20 RX ORDER — HYDRALAZINE HYDROCHLORIDE 50 MG/1
50 TABLET, FILM COATED ORAL EVERY 8 HOURS SCHEDULED
Status: DISCONTINUED | OUTPATIENT
Start: 2018-12-20 | End: 2018-12-22 | Stop reason: HOSPADM

## 2018-12-20 RX ORDER — LIDOCAINE 4 G/G
1 PATCH TOPICAL DAILY
Status: DISCONTINUED | OUTPATIENT
Start: 2018-12-20 | End: 2018-12-22 | Stop reason: HOSPADM

## 2018-12-20 RX ORDER — LORAZEPAM 2 MG/ML
INJECTION INTRAMUSCULAR
Status: DISCONTINUED
Start: 2018-12-20 | End: 2018-12-20

## 2018-12-20 RX ORDER — SENNA AND DOCUSATE SODIUM 50; 8.6 MG/1; MG/1
2 TABLET, FILM COATED ORAL DAILY
Status: DISCONTINUED | OUTPATIENT
Start: 2018-12-20 | End: 2018-12-22 | Stop reason: HOSPADM

## 2018-12-20 RX ORDER — MORPHINE SULFATE/0.9% NACL/PF 1 MG/ML
4 SYRINGE (ML) INJECTION ONCE
Status: COMPLETED | OUTPATIENT
Start: 2018-12-20 | End: 2018-12-20

## 2018-12-20 RX ORDER — DEXTROSE MONOHYDRATE 25 G/50ML
12.5 INJECTION, SOLUTION INTRAVENOUS PRN
Status: DISCONTINUED | OUTPATIENT
Start: 2018-12-20 | End: 2018-12-22 | Stop reason: HOSPADM

## 2018-12-20 RX ORDER — COLCHICINE 0.6 MG/1
0.6 TABLET ORAL PRN
Status: DISCONTINUED | OUTPATIENT
Start: 2018-12-20 | End: 2018-12-22 | Stop reason: HOSPADM

## 2018-12-20 RX ORDER — LOSARTAN POTASSIUM 50 MG/1
50 TABLET ORAL 2 TIMES DAILY
Status: DISCONTINUED | OUTPATIENT
Start: 2018-12-20 | End: 2018-12-22 | Stop reason: HOSPADM

## 2018-12-20 RX ORDER — SODIUM CHLORIDE 0.9 % (FLUSH) 0.9 %
10 SYRINGE (ML) INJECTION PRN
Status: DISCONTINUED | OUTPATIENT
Start: 2018-12-20 | End: 2018-12-22 | Stop reason: HOSPADM

## 2018-12-20 RX ORDER — ALLOPURINOL 300 MG/1
600 TABLET ORAL DAILY
Status: DISCONTINUED | OUTPATIENT
Start: 2018-12-20 | End: 2018-12-22 | Stop reason: HOSPADM

## 2018-12-20 RX ORDER — PRAVASTATIN SODIUM 20 MG
40 TABLET ORAL DAILY
Status: DISCONTINUED | OUTPATIENT
Start: 2018-12-20 | End: 2018-12-22 | Stop reason: HOSPADM

## 2018-12-20 RX ORDER — GLIMEPIRIDE 4 MG/1
4 TABLET ORAL 2 TIMES DAILY
Status: DISCONTINUED | OUTPATIENT
Start: 2018-12-20 | End: 2018-12-22 | Stop reason: HOSPADM

## 2018-12-20 RX ORDER — ROPINIROLE 1 MG/1
1 TABLET, FILM COATED ORAL NIGHTLY
Status: DISCONTINUED | OUTPATIENT
Start: 2018-12-20 | End: 2018-12-22 | Stop reason: HOSPADM

## 2018-12-20 RX ORDER — ASCORBIC ACID 500 MG
1000 TABLET ORAL DAILY
Status: DISCONTINUED | OUTPATIENT
Start: 2018-12-20 | End: 2018-12-22 | Stop reason: HOSPADM

## 2018-12-20 RX ORDER — FUROSEMIDE 10 MG/ML
40 INJECTION INTRAMUSCULAR; INTRAVENOUS ONCE
Status: COMPLETED | OUTPATIENT
Start: 2018-12-20 | End: 2018-12-20

## 2018-12-20 RX ORDER — FUROSEMIDE 10 MG/ML
40 INJECTION INTRAMUSCULAR; INTRAVENOUS EVERY 6 HOURS
Status: DISCONTINUED | OUTPATIENT
Start: 2018-12-20 | End: 2018-12-21

## 2018-12-20 RX ORDER — DEXTROSE MONOHYDRATE 50 MG/ML
100 INJECTION, SOLUTION INTRAVENOUS PRN
Status: DISCONTINUED | OUTPATIENT
Start: 2018-12-20 | End: 2018-12-22 | Stop reason: HOSPADM

## 2018-12-20 RX ORDER — GABAPENTIN 100 MG/1
100 CAPSULE ORAL 2 TIMES DAILY
Status: DISCONTINUED | OUTPATIENT
Start: 2018-12-20 | End: 2018-12-22 | Stop reason: HOSPADM

## 2018-12-20 RX ORDER — THIAMINE MONONITRATE (VIT B1) 100 MG
100 TABLET ORAL DAILY
Status: DISCONTINUED | OUTPATIENT
Start: 2018-12-20 | End: 2018-12-22 | Stop reason: HOSPADM

## 2018-12-20 RX ORDER — PREDNISOLONE ACETATE 10 MG/ML
1 SUSPENSION/ DROPS OPHTHALMIC
Status: DISCONTINUED | OUTPATIENT
Start: 2018-12-20 | End: 2018-12-22 | Stop reason: HOSPADM

## 2018-12-20 RX ORDER — AMLODIPINE BESYLATE 10 MG/1
10 TABLET ORAL DAILY
Status: DISCONTINUED | OUTPATIENT
Start: 2018-12-20 | End: 2018-12-22 | Stop reason: HOSPADM

## 2018-12-20 RX ORDER — NITROGLYCERIN 0.4 MG/1
0.4 TABLET SUBLINGUAL EVERY 5 MIN PRN
Status: DISCONTINUED | OUTPATIENT
Start: 2018-12-20 | End: 2018-12-22 | Stop reason: HOSPADM

## 2018-12-20 RX ORDER — SODIUM CHLORIDE 0.9 % (FLUSH) 0.9 %
10 SYRINGE (ML) INJECTION EVERY 12 HOURS SCHEDULED
Status: DISCONTINUED | OUTPATIENT
Start: 2018-12-20 | End: 2018-12-22 | Stop reason: HOSPADM

## 2018-12-20 RX ORDER — HYDRALAZINE HYDROCHLORIDE 20 MG/ML
10 INJECTION INTRAMUSCULAR; INTRAVENOUS EVERY 4 HOURS PRN
Status: DISCONTINUED | OUTPATIENT
Start: 2018-12-20 | End: 2018-12-22 | Stop reason: HOSPADM

## 2018-12-20 RX ORDER — ONDANSETRON 2 MG/ML
4 INJECTION INTRAMUSCULAR; INTRAVENOUS ONCE
Status: COMPLETED | OUTPATIENT
Start: 2018-12-20 | End: 2018-12-20

## 2018-12-20 RX ORDER — ONDANSETRON 4 MG/1
4 TABLET, ORALLY DISINTEGRATING ORAL EVERY 8 HOURS PRN
Status: DISCONTINUED | OUTPATIENT
Start: 2018-12-20 | End: 2018-12-22 | Stop reason: HOSPADM

## 2018-12-20 RX ADMIN — ALLOPURINOL 600 MG: 300 TABLET ORAL at 20:34

## 2018-12-20 RX ADMIN — VITAMIN D, TAB 1000IU (100/BT) 1000 UNITS: 25 TAB at 20:34

## 2018-12-20 RX ADMIN — INSULIN LISPRO 1 UNITS: 100 INJECTION, SOLUTION INTRAVENOUS; SUBCUTANEOUS at 20:36

## 2018-12-20 RX ADMIN — PANTOPRAZOLE SODIUM 40 MG: 40 TABLET, DELAYED RELEASE ORAL at 20:35

## 2018-12-20 RX ADMIN — TAMSULOSIN HYDROCHLORIDE 0.4 MG: 0.4 CAPSULE ORAL at 20:35

## 2018-12-20 RX ADMIN — NITROGLYCERIN 0.4 MG: 0.4 TABLET, ORALLY DISINTEGRATING SUBLINGUAL at 13:31

## 2018-12-20 RX ADMIN — FUROSEMIDE 40 MG: 10 INJECTION, SOLUTION INTRAMUSCULAR; INTRAVENOUS at 15:13

## 2018-12-20 RX ADMIN — HYDRALAZINE HYDROCHLORIDE 10 MG: 20 INJECTION INTRAMUSCULAR; INTRAVENOUS at 15:13

## 2018-12-20 RX ADMIN — AMLODIPINE BESYLATE 10 MG: 10 TABLET ORAL at 20:33

## 2018-12-20 RX ADMIN — FUROSEMIDE 40 MG: 10 INJECTION, SOLUTION INTRAMUSCULAR; INTRAVENOUS at 17:42

## 2018-12-20 RX ADMIN — HYDRALAZINE HYDROCHLORIDE 50 MG: 50 TABLET, FILM COATED ORAL at 20:34

## 2018-12-20 RX ADMIN — GLIMEPIRIDE 4 MG: 4 TABLET ORAL at 20:34

## 2018-12-20 RX ADMIN — ASPIRIN 81 MG 81 MG: 81 TABLET ORAL at 17:42

## 2018-12-20 RX ADMIN — PRAVASTATIN SODIUM 40 MG: 20 TABLET ORAL at 20:35

## 2018-12-20 RX ADMIN — FUROSEMIDE 40 MG: 10 INJECTION, SOLUTION INTRAMUSCULAR; INTRAVENOUS at 22:08

## 2018-12-20 RX ADMIN — POTASSIUM CHLORIDE 10 MEQ: 10 TABLET, EXTENDED RELEASE ORAL at 20:35

## 2018-12-20 RX ADMIN — GABAPENTIN 100 MG: 100 CAPSULE ORAL at 20:34

## 2018-12-20 RX ADMIN — Medication 4 MG: at 10:17

## 2018-12-20 RX ADMIN — SENNOSIDES AND DOCUSATE SODIUM 2 TABLET: 8.6; 5 TABLET ORAL at 20:34

## 2018-12-20 RX ADMIN — IOPAMIDOL 90 ML: 755 INJECTION, SOLUTION INTRAVENOUS at 14:01

## 2018-12-20 RX ADMIN — Medication 10 ML: at 20:39

## 2018-12-20 RX ADMIN — ONDANSETRON HYDROCHLORIDE 4 MG: 2 INJECTION, SOLUTION INTRAMUSCULAR; INTRAVENOUS at 10:17

## 2018-12-20 RX ADMIN — OXYCODONE HYDROCHLORIDE AND ACETAMINOPHEN 1000 MG: 500 TABLET ORAL at 20:33

## 2018-12-20 RX ADMIN — ROPINIROLE HYDROCHLORIDE 1 MG: 1 TABLET, FILM COATED ORAL at 20:35

## 2018-12-20 RX ADMIN — ASPIRIN 325 MG: 325 TABLET, COATED ORAL at 10:17

## 2018-12-20 RX ADMIN — PREDNISOLONE ACETATE 1 DROP: 1.2 SUSPENSION/ DROPS OPHTHALMIC at 20:35

## 2018-12-20 RX ADMIN — ENOXAPARIN SODIUM 40 MG: 40 INJECTION SUBCUTANEOUS at 17:42

## 2018-12-20 RX ADMIN — LOSARTAN POTASSIUM 50 MG: 50 TABLET ORAL at 20:33

## 2018-12-20 RX ADMIN — Medication 100 MG: at 20:33

## 2018-12-20 ASSESSMENT — PAIN SCALES - GENERAL
PAINLEVEL_OUTOF10: 0
PAINLEVEL_OUTOF10: 0
PAINLEVEL_OUTOF10: 8
PAINLEVEL_OUTOF10: 0
PAINLEVEL_OUTOF10: 6
PAINLEVEL_OUTOF10: 10

## 2018-12-20 ASSESSMENT — PAIN DESCRIPTION - DIRECTION: RADIATING_TOWARDS: TO THE BACK

## 2018-12-20 ASSESSMENT — PAIN DESCRIPTION - LOCATION: LOCATION: ARM

## 2018-12-20 ASSESSMENT — ENCOUNTER SYMPTOMS
SHORTNESS OF BREATH: 1
COUGH: 0
BACK PAIN: 1
ABDOMINAL PAIN: 0
VOMITING: 0

## 2018-12-20 ASSESSMENT — PAIN DESCRIPTION - DESCRIPTORS: DESCRIPTORS: SHARP

## 2018-12-20 ASSESSMENT — PAIN DESCRIPTION - FREQUENCY: FREQUENCY: CONTINUOUS

## 2018-12-20 ASSESSMENT — PAIN SCALES - WONG BAKER: WONGBAKER_NUMERICALRESPONSE: 0

## 2018-12-20 ASSESSMENT — PAIN DESCRIPTION - ORIENTATION: ORIENTATION: LEFT

## 2018-12-20 ASSESSMENT — PAIN DESCRIPTION - PAIN TYPE: TYPE: ACUTE PAIN

## 2018-12-20 NOTE — ED PROVIDER NOTES
shortness of breath. Negative for cough. Cardiovascular: Positive for chest pain and leg swelling. Negative for palpitations. Gastrointestinal: Negative for abdominal pain and vomiting. Genitourinary: Positive for dysuria and flank pain. Negative for hematuria and testicular pain. Musculoskeletal: Positive for back pain. Skin: Negative for pallor and rash. Neurological: Negative for seizures and syncope. Psychiatric/Behavioral: Negative for confusion. A complete review of systems was performed and is negative except as noted above in the HPI.        PAST MEDICAL HISTORY     Past Medical History:   Diagnosis Date    CAD (coronary artery disease)     STENTS X 1    CAD (coronary artery disease)     CABG    [facility] heart failure) [facility])     DDD (degenerative disc disease), lumbar 7/11/2017    Diabetes mellitus ([address]     GERD (gastroesophageal reflux disease)     Hx of blood clots     Right leg    Hyperlipidemia     Hypertension     Lumbar stenosis with neurogenic claudication 7/11/2017    MI, old     X 2    Palliative care encounter 03/06/2018    Right heart failure ([facility] 6/29/2018    Sleep apnea     DOESN'T USE MACHINE    TIA (transient ischemic attack)          SURGICAL HISTORY       Past Surgical History:   Procedure Laterality Date    APPENDECTOMY      BACK SURGERY  1980    x 3    CARDIAC SURGERY  1990    Bypass x 3    CARPAL TUNNEL RELEASE Bilateral 1980    wrist and elbows    CHOLECYSTECTOMY  2000    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 2    JOINT REPLACEMENT Left 1990    JOINT REPLACEMENT Right 1995    LAMINECTOMY N/A 7/11/2017    LUMBAR LAMINECTOMY POSTERIOR  L23 L34 performed by Rebecca Will MD at [address] Left 1990    ROTATOR CUFF REPAIR Right 2010    TUMOR REMOVAL Left 1960    foot    TURP  2000    VASCULAR SURGERY Left 7/15/2015 [facility] & [address]    Aortoiliofemoral a'gram with bilateral lower extremity runoff; select views of the left superficial requires workup  Elevated troponin: new and requires workup  Hx of CABG: new and requires workup  Hypertensive urgency: new and requires workup  Pleural effusion: new and requires workup  Unstable angina pectoris [facility]): new and requires workup  Diagnosis management comments: Pt with L back pain and CP. Likely pain in L flank from effusion based on work up otherwise neg, no PE, had hx of dvt and elevated ddimer so CTA chest neg, stable aneurysm. Trops slightly eleavated, ekg with LVH, Needs htn control and diuresis. Discussed and seen with Dr Abdias Gonzales, no cath just had one earlier in year. Aggressive diuresis and BP control. Admit to Dr Maciel Grullon. Pt is pain free on my multiple repeat assessments on nitro gttp. 2pm, 330pm    He is in no distress. Full CODE. Admit as above. Amount and/or Complexity of Data Reviewed  Clinical lab tests: ordered and reviewed  Tests in the radiology section of CPT®: ordered and reviewed  Decide to obtain previous medical records or to obtain history from someone other than the patient: yes  Discuss the patient with other providers: yes  Independent visualization of images, tracings, or specimens: yes    Risk of Complications, Morbidity, and/or Mortality  Presenting problems: high    Critical Care  Total time providing critical care: 30-74 minutes    Patient Progress  Patient progress: stable    CRITICAL CARE TIME   Total Critical Care time was 35 minutes, excluding separately reportable procedures. There was a high probability of clinically significant/life threatening deterioration in the patient's condition which required my urgent intervention. CONSULTS:  IP CONSULT TO CARDIOLOGY  Seen with Dr Abdias Gonzales. PROCEDURES:  Unless otherwise notedbelow, none     Procedures    FINAL IMPRESSION     1. Unstable angina pectoris (HCC)    2. Elevated troponin    3. Hypertensive urgency    4. Pleural effusion    5.  Acute on chronic diastolic congestive heart failure

## 2018-12-21 ENCOUNTER — APPOINTMENT (OUTPATIENT)
Dept: NUCLEAR MEDICINE | Age: 83
DRG: 287 | End: 2018-12-21
Payer: COMMERCIAL

## 2018-12-21 LAB
ANION GAP SERPL CALCULATED.3IONS-SCNC: 11 MMOL/L (ref 7–19)
BUN BLDV-MCNC: 31 MG/DL (ref 8–23)
CALCIUM SERPL-MCNC: 9.5 MG/DL (ref 8.8–10.2)
CHLORIDE BLD-SCNC: 104 MMOL/L (ref 98–111)
CHOLESTEROL, TOTAL: 155 MG/DL (ref 160–199)
CO2: 26 MMOL/L (ref 22–29)
CREAT SERPL-MCNC: 1.4 MG/DL (ref 0.5–1.2)
EKG P AXIS: 24 DEGREES
EKG P AXIS: 25 DEGREES
EKG P AXIS: 67 DEGREES
EKG P-R INTERVAL: 212 MS
EKG P-R INTERVAL: 214 MS
EKG P-R INTERVAL: 218 MS
EKG Q-T INTERVAL: 452 MS
EKG Q-T INTERVAL: 468 MS
EKG Q-T INTERVAL: 490 MS
EKG QRS DURATION: 100 MS
EKG QRS DURATION: 96 MS
EKG QRS DURATION: 98 MS
EKG QTC CALCULATION (BAZETT): 449 MS
EKG QTC CALCULATION (BAZETT): 456 MS
EKG QTC CALCULATION (BAZETT): 466 MS
EKG T AXIS: 64 DEGREES
EKG T AXIS: 72 DEGREES
EKG T AXIS: 73 DEGREES
GFR NON-AFRICAN AMERICAN: 48
GLUCOSE BLD-MCNC: 116 MG/DL (ref 70–99)
GLUCOSE BLD-MCNC: 121 MG/DL (ref 70–99)
GLUCOSE BLD-MCNC: 125 MG/DL (ref 74–109)
GLUCOSE BLD-MCNC: 126 MG/DL (ref 70–99)
GLUCOSE BLD-MCNC: 144 MG/DL (ref 70–99)
HCT VFR BLD CALC: 32.7 % (ref 42–52)
HDLC SERPL-MCNC: 45 MG/DL (ref 55–121)
HEMOGLOBIN: 10.4 G/DL (ref 14–18)
LDL CHOLESTEROL CALCULATED: 95 MG/DL
MCH RBC QN AUTO: 32.2 PG (ref 27–31)
MCHC RBC AUTO-ENTMCNC: 31.8 G/DL (ref 33–37)
MCV RBC AUTO: 101.2 FL (ref 80–94)
PDW BLD-RTO: 13.3 % (ref 11.5–14.5)
PERFORMED ON: ABNORMAL
PLATELET # BLD: 219 K/UL (ref 130–400)
PMV BLD AUTO: 10.2 FL (ref 9.4–12.4)
POTASSIUM SERPL-SCNC: 4.3 MMOL/L (ref 3.5–5)
PRO-BNP: 2059 PG/ML (ref 0–1800)
RBC # BLD: 3.23 M/UL (ref 4.7–6.1)
SODIUM BLD-SCNC: 141 MMOL/L (ref 136–145)
TRIGL SERPL-MCNC: 73 MG/DL (ref 0–149)
TROPONIN: 0.08 NG/ML (ref 0–0.03)
TROPONIN: 0.08 NG/ML (ref 0–0.03)
WBC # BLD: 6.2 K/UL (ref 4.8–10.8)

## 2018-12-21 PROCEDURE — 6360000002 HC RX W HCPCS: Performed by: INTERNAL MEDICINE

## 2018-12-21 PROCEDURE — C1760 CLOSURE DEV, VASC: HCPCS

## 2018-12-21 PROCEDURE — 4A023N7 MEASUREMENT OF CARDIAC SAMPLING AND PRESSURE, LEFT HEART, PERCUTANEOUS APPROACH: ICD-10-PCS | Performed by: INTERNAL MEDICINE

## 2018-12-21 PROCEDURE — B2111ZZ FLUOROSCOPY OF MULTIPLE CORONARY ARTERIES USING LOW OSMOLAR CONTRAST: ICD-10-PCS | Performed by: INTERNAL MEDICINE

## 2018-12-21 PROCEDURE — 2580000003 HC RX 258: Performed by: FAMILY MEDICINE

## 2018-12-21 PROCEDURE — C1894 INTRO/SHEATH, NON-LASER: HCPCS

## 2018-12-21 PROCEDURE — 2709999900 HC NON-CHARGEABLE SUPPLY

## 2018-12-21 PROCEDURE — 82948 REAGENT STRIP/BLOOD GLUCOSE: CPT

## 2018-12-21 PROCEDURE — 80061 LIPID PANEL: CPT

## 2018-12-21 PROCEDURE — 2140000000 HC CCU INTERMEDIATE R&B

## 2018-12-21 PROCEDURE — 6370000000 HC RX 637 (ALT 250 FOR IP): Performed by: FAMILY MEDICINE

## 2018-12-21 PROCEDURE — 6360000002 HC RX W HCPCS

## 2018-12-21 PROCEDURE — 80048 BASIC METABOLIC PNL TOTAL CA: CPT

## 2018-12-21 PROCEDURE — B3101ZZ FLUOROSCOPY OF THORACIC AORTA USING LOW OSMOLAR CONTRAST: ICD-10-PCS | Performed by: INTERNAL MEDICINE

## 2018-12-21 PROCEDURE — 3430000000 HC RX DIAGNOSTIC RADIOPHARMACEUTICAL: Performed by: INTERNAL MEDICINE

## 2018-12-21 PROCEDURE — 6360000002 HC RX W HCPCS: Performed by: FAMILY MEDICINE

## 2018-12-21 PROCEDURE — A9500 TC99M SESTAMIBI: HCPCS | Performed by: INTERNAL MEDICINE

## 2018-12-21 PROCEDURE — 93005 ELECTROCARDIOGRAM TRACING: CPT

## 2018-12-21 PROCEDURE — 83880 ASSAY OF NATRIURETIC PEPTIDE: CPT

## 2018-12-21 PROCEDURE — 93017 CV STRESS TEST TRACING ONLY: CPT

## 2018-12-21 PROCEDURE — B2181ZZ FLUOROSCOPY OF LEFT INTERNAL MAMMARY BYPASS GRAFT USING LOW OSMOLAR CONTRAST: ICD-10-PCS | Performed by: INTERNAL MEDICINE

## 2018-12-21 PROCEDURE — B2151ZZ FLUOROSCOPY OF LEFT HEART USING LOW OSMOLAR CONTRAST: ICD-10-PCS | Performed by: INTERNAL MEDICINE

## 2018-12-21 PROCEDURE — 99152 MOD SED SAME PHYS/QHP 5/>YRS: CPT | Performed by: INTERNAL MEDICINE

## 2018-12-21 PROCEDURE — 36415 COLL VENOUS BLD VENIPUNCTURE: CPT

## 2018-12-21 PROCEDURE — 84484 ASSAY OF TROPONIN QUANT: CPT

## 2018-12-21 PROCEDURE — 93459 L HRT ART/GRFT ANGIO: CPT | Performed by: INTERNAL MEDICINE

## 2018-12-21 PROCEDURE — 85027 COMPLETE CBC AUTOMATED: CPT

## 2018-12-21 PROCEDURE — 99233 SBSQ HOSP IP/OBS HIGH 50: CPT | Performed by: INTERNAL MEDICINE

## 2018-12-21 PROCEDURE — 78452 HT MUSCLE IMAGE SPECT MULT: CPT

## 2018-12-21 RX ORDER — FUROSEMIDE 10 MG/ML
40 INJECTION INTRAMUSCULAR; INTRAVENOUS 2 TIMES DAILY
Status: DISCONTINUED | OUTPATIENT
Start: 2018-12-21 | End: 2018-12-22 | Stop reason: HOSPADM

## 2018-12-21 RX ADMIN — Medication 10 ML: at 22:46

## 2018-12-21 RX ADMIN — PREDNISOLONE ACETATE 1 DROP: 1.2 SUSPENSION/ DROPS OPHTHALMIC at 13:09

## 2018-12-21 RX ADMIN — HYDRALAZINE HYDROCHLORIDE 50 MG: 50 TABLET, FILM COATED ORAL at 22:41

## 2018-12-21 RX ADMIN — POTASSIUM CHLORIDE 10 MEQ: 10 TABLET, EXTENDED RELEASE ORAL at 08:32

## 2018-12-21 RX ADMIN — PRAVASTATIN SODIUM 40 MG: 20 TABLET ORAL at 08:32

## 2018-12-21 RX ADMIN — ALLOPURINOL 600 MG: 300 TABLET ORAL at 08:41

## 2018-12-21 RX ADMIN — HYDRALAZINE HYDROCHLORIDE 50 MG: 50 TABLET, FILM COATED ORAL at 05:41

## 2018-12-21 RX ADMIN — GABAPENTIN 100 MG: 100 CAPSULE ORAL at 22:40

## 2018-12-21 RX ADMIN — ROPINIROLE HYDROCHLORIDE 1 MG: 1 TABLET, FILM COATED ORAL at 22:40

## 2018-12-21 RX ADMIN — REGADENOSON 0.4 MG: 0.08 INJECTION, SOLUTION INTRAVENOUS at 14:56

## 2018-12-21 RX ADMIN — GLIMEPIRIDE 4 MG: 4 TABLET ORAL at 22:45

## 2018-12-21 RX ADMIN — LOSARTAN POTASSIUM 50 MG: 50 TABLET ORAL at 08:32

## 2018-12-21 RX ADMIN — HYDRALAZINE HYDROCHLORIDE 50 MG: 50 TABLET, FILM COATED ORAL at 13:09

## 2018-12-21 RX ADMIN — GLIMEPIRIDE 4 MG: 4 TABLET ORAL at 08:32

## 2018-12-21 RX ADMIN — OXYCODONE HYDROCHLORIDE AND ACETAMINOPHEN 1000 MG: 500 TABLET ORAL at 08:41

## 2018-12-21 RX ADMIN — TAMSULOSIN HYDROCHLORIDE 0.4 MG: 0.4 CAPSULE ORAL at 08:32

## 2018-12-21 RX ADMIN — VITAMIN D, TAB 1000IU (100/BT) 1000 UNITS: 25 TAB at 08:33

## 2018-12-21 RX ADMIN — FUROSEMIDE 40 MG: 10 INJECTION, SOLUTION INTRAMUSCULAR; INTRAVENOUS at 05:41

## 2018-12-21 RX ADMIN — POTASSIUM CHLORIDE 10 MEQ: 10 TABLET, EXTENDED RELEASE ORAL at 22:41

## 2018-12-21 RX ADMIN — PREDNISOLONE ACETATE 1 DROP: 1.2 SUSPENSION/ DROPS OPHTHALMIC at 22:42

## 2018-12-21 RX ADMIN — SENNOSIDES AND DOCUSATE SODIUM 2 TABLET: 8.6; 5 TABLET ORAL at 08:41

## 2018-12-21 RX ADMIN — PANTOPRAZOLE SODIUM 40 MG: 40 TABLET, DELAYED RELEASE ORAL at 22:41

## 2018-12-21 RX ADMIN — FUROSEMIDE 40 MG: 10 INJECTION, SOLUTION INTRAMUSCULAR; INTRAVENOUS at 17:04

## 2018-12-21 RX ADMIN — TETRAKIS(2-METHOXYISOBUTYLISOCYANIDE)COPPER(I) TETRAFLUOROBORATE 10 MILLICURIE: 1 INJECTION, POWDER, LYOPHILIZED, FOR SOLUTION INTRAVENOUS at 14:56

## 2018-12-21 RX ADMIN — LOSARTAN POTASSIUM 50 MG: 50 TABLET ORAL at 22:42

## 2018-12-21 RX ADMIN — PREDNISOLONE ACETATE 1 DROP: 1.2 SUSPENSION/ DROPS OPHTHALMIC at 05:41

## 2018-12-21 RX ADMIN — AMLODIPINE BESYLATE 10 MG: 10 TABLET ORAL at 08:32

## 2018-12-21 RX ADMIN — Medication 100 MG: at 08:41

## 2018-12-21 RX ADMIN — PREDNISOLONE ACETATE 1 DROP: 1.2 SUSPENSION/ DROPS OPHTHALMIC at 17:04

## 2018-12-21 RX ADMIN — PANTOPRAZOLE SODIUM 40 MG: 40 TABLET, DELAYED RELEASE ORAL at 08:33

## 2018-12-21 RX ADMIN — ASPIRIN 81 MG 81 MG: 81 TABLET ORAL at 08:32

## 2018-12-21 RX ADMIN — GABAPENTIN 100 MG: 100 CAPSULE ORAL at 08:33

## 2018-12-21 RX ADMIN — TETRAKIS(2-METHOXYISOBUTYLISOCYANIDE)COPPER(I) TETRAFLUOROBORATE 30 MILLICURIE: 1 INJECTION, POWDER, LYOPHILIZED, FOR SOLUTION INTRAVENOUS at 14:57

## 2018-12-21 ASSESSMENT — PAIN SCALES - GENERAL
PAINLEVEL_OUTOF10: 0

## 2018-12-21 NOTE — CONSULTS
Intravenous Q6H    allopurinol  600 mg Oral Daily    amLODIPine  10 mg Oral Daily    vitamin C  1,000 mg Oral Daily    gabapentin  100 mg Oral BID    glimepiride  4 mg Oral BID    hydrALAZINE  50 mg Oral 3 times per day    lidocaine  1 patch Transdermal Daily    losartan  50 mg Oral BID    pantoprazole  40 mg Oral BID    potassium chloride  10 mEq Oral BID    pravastatin  40 mg Oral Daily    prednisoLONE acetate  1 drop Left Eye Q4H While awake    rOPINIRole  1 mg Oral Nightly    sennosides-docusate sodium  2 tablet Oral Daily    tamsulosin  0.4 mg Oral Daily    vitamin B-1  100 mg Oral Daily    vitamin D  1,000 Units Oral Daily    [START ON 12/21/2018] insulin lispro  0-12 Units Subcutaneous TID WC    insulin lispro  0-6 Units Subcutaneous Nightly       Allergies:  Pcn [penicillins] and Adhesive tape     Social History:       Social History     Social History    Marital status:      Spouse name: N/A    Number of children: N/A    Years of education: N/A     Occupational History    Not on file.      Social History Main Topics    Smoking status: Never Smoker    Smokeless tobacco: Never Used    Alcohol use No    Drug use: No    Sexual activity: Not Currently     Other Topics Concern    Not on file     Social History Narrative    No narrative on file       Family History:     Family History   Problem Relation Age of Onset    Heart Disease Father          REVIEW OF SYSTEMS:     Except as noted in the HPI, all other systems are negative        PHYSICAL EXAMINATION:     BP (!) 117/56   Pulse 60   Temp 98.5 °F (36.9 °C) (Temporal)   Resp 18   Ht 5' 10\" (1.778 m)   Wt 196 lb 6.4 oz (89.1 kg)   SpO2 96%   BMI 28.18 kg/m²     GENERAL - well developed and well nourished, in no amount of generalized distress; is an active participant in this examination  HEENT -  PERRLA, Hearing appears normal, conjunctiva and lids are normal, ears and nose appear normal  NECK - no thyromegaly, no JVD,

## 2018-12-21 NOTE — PROGRESS NOTES
Notified Dr. Abdias Gonzales pt refused lexiscan per charge nurse. Pt will be scheduled for heart cath today per Dr. Abdias Gonzales. Consent signed.

## 2018-12-22 VITALS
HEART RATE: 60 BPM | OXYGEN SATURATION: 97 % | DIASTOLIC BLOOD PRESSURE: 58 MMHG | BODY MASS INDEX: 26.98 KG/M2 | RESPIRATION RATE: 18 BRPM | SYSTOLIC BLOOD PRESSURE: 131 MMHG | TEMPERATURE: 97.9 F | HEIGHT: 70 IN | WEIGHT: 188.44 LBS

## 2018-12-22 LAB
ANION GAP SERPL CALCULATED.3IONS-SCNC: 10 MMOL/L (ref 7–19)
BUN BLDV-MCNC: 36 MG/DL (ref 8–23)
CALCIUM SERPL-MCNC: 9.3 MG/DL (ref 8.8–10.2)
CHLORIDE BLD-SCNC: 105 MMOL/L (ref 98–111)
CO2: 28 MMOL/L (ref 22–29)
CREAT SERPL-MCNC: 1.4 MG/DL (ref 0.5–1.2)
GFR NON-AFRICAN AMERICAN: 48
GLUCOSE BLD-MCNC: 102 MG/DL (ref 70–99)
GLUCOSE BLD-MCNC: 133 MG/DL (ref 70–99)
GLUCOSE BLD-MCNC: 178 MG/DL (ref 74–109)
PERFORMED ON: ABNORMAL
PERFORMED ON: ABNORMAL
POTASSIUM SERPL-SCNC: 4.3 MMOL/L (ref 3.5–5)
SODIUM BLD-SCNC: 143 MMOL/L (ref 136–145)

## 2018-12-22 PROCEDURE — 6370000000 HC RX 637 (ALT 250 FOR IP): Performed by: FAMILY MEDICINE

## 2018-12-22 PROCEDURE — 6360000002 HC RX W HCPCS: Performed by: FAMILY MEDICINE

## 2018-12-22 PROCEDURE — 2580000003 HC RX 258: Performed by: FAMILY MEDICINE

## 2018-12-22 PROCEDURE — 80048 BASIC METABOLIC PNL TOTAL CA: CPT

## 2018-12-22 PROCEDURE — 36415 COLL VENOUS BLD VENIPUNCTURE: CPT

## 2018-12-22 PROCEDURE — 99231 SBSQ HOSP IP/OBS SF/LOW 25: CPT | Performed by: INTERNAL MEDICINE

## 2018-12-22 PROCEDURE — 82948 REAGENT STRIP/BLOOD GLUCOSE: CPT

## 2018-12-22 RX ORDER — NITROGLYCERIN 0.4 MG/1
TABLET SUBLINGUAL
Qty: 25 TABLET | Refills: 3 | Status: SHIPPED | OUTPATIENT
Start: 2018-12-22

## 2018-12-22 RX ADMIN — PREDNISOLONE ACETATE 1 DROP: 1.2 SUSPENSION/ DROPS OPHTHALMIC at 09:07

## 2018-12-22 RX ADMIN — ASPIRIN 81 MG 81 MG: 81 TABLET ORAL at 09:06

## 2018-12-22 RX ADMIN — GLIMEPIRIDE 4 MG: 4 TABLET ORAL at 09:06

## 2018-12-22 RX ADMIN — AMLODIPINE BESYLATE 10 MG: 10 TABLET ORAL at 09:06

## 2018-12-22 RX ADMIN — POTASSIUM CHLORIDE 10 MEQ: 10 TABLET, EXTENDED RELEASE ORAL at 09:06

## 2018-12-22 RX ADMIN — PANTOPRAZOLE SODIUM 40 MG: 40 TABLET, DELAYED RELEASE ORAL at 09:06

## 2018-12-22 RX ADMIN — LOSARTAN POTASSIUM 50 MG: 50 TABLET ORAL at 09:06

## 2018-12-22 RX ADMIN — TAMSULOSIN HYDROCHLORIDE 0.4 MG: 0.4 CAPSULE ORAL at 09:06

## 2018-12-22 RX ADMIN — FUROSEMIDE 40 MG: 10 INJECTION, SOLUTION INTRAMUSCULAR; INTRAVENOUS at 09:06

## 2018-12-22 RX ADMIN — HYDRALAZINE HYDROCHLORIDE 50 MG: 50 TABLET, FILM COATED ORAL at 06:00

## 2018-12-22 RX ADMIN — PRAVASTATIN SODIUM 40 MG: 20 TABLET ORAL at 09:06

## 2018-12-22 RX ADMIN — PREDNISOLONE ACETATE 1 DROP: 1.2 SUSPENSION/ DROPS OPHTHALMIC at 06:00

## 2018-12-22 RX ADMIN — OXYCODONE HYDROCHLORIDE AND ACETAMINOPHEN 1000 MG: 500 TABLET ORAL at 09:06

## 2018-12-22 RX ADMIN — GABAPENTIN 100 MG: 100 CAPSULE ORAL at 09:06

## 2018-12-22 RX ADMIN — VITAMIN D, TAB 1000IU (100/BT) 1000 UNITS: 25 TAB at 09:06

## 2018-12-22 RX ADMIN — ALLOPURINOL 600 MG: 300 TABLET ORAL at 09:06

## 2018-12-22 RX ADMIN — SENNOSIDES AND DOCUSATE SODIUM 2 TABLET: 8.6; 5 TABLET ORAL at 09:06

## 2018-12-22 RX ADMIN — Medication 100 MG: at 09:06

## 2018-12-22 RX ADMIN — Medication 10 ML: at 09:07

## 2018-12-22 NOTE — PROGRESS NOTES
injection 4 mg  4 mg Intravenous Q6H PRN Stephenie Young MD        aspirin chewable tablet 81 mg  81 mg Oral Daily Stephenie Young MD   81 mg at 12/21/18 2115    enoxaparin (LOVENOX) injection 40 mg  40 mg Subcutaneous Daily Stephenie Young MD   40 mg at 12/20/18 1742    hydrALAZINE (APRESOLINE) injection 10 mg  10 mg Intravenous Q4H PRN Stephenie Young MD        allopurinol (ZYLOPRIM) tablet 600 mg  600 mg Oral Daily Stephenie Young MD   600 mg at 12/21/18 0841    amLODIPine (NORVASC) tablet 10 mg  10 mg Oral Daily Stephenie Young MD   10 mg at 12/21/18 8025    vitamin C (ASCORBIC ACID) tablet 1,000 mg  1,000 mg Oral Daily Stephenie Young MD   1,000 mg at 12/21/18 3054    baclofen (LIORESAL) tablet 10 mg  10 mg Oral 4x Daily PRN Stephenie Young MD        colchicine (COLCRYS) tablet 0.6 mg  0.6 mg Oral PRN Stephenie Young MD        gabapentin (NEURONTIN) capsule 100 mg  100 mg Oral BID Stephenie Young MD   100 mg at 12/21/18 7266    glimepiride (AMARYL) tablet 4 mg  4 mg Oral BID Stephenie Young MD   4 mg at 12/21/18 0832    HYDROcodone-acetaminophen (NORCO) 7.5-325 MG per tablet 1 tablet  1 tablet Oral Q12H PRN Stephenie Young MD        hydrALAZINE (APRESOLINE) tablet 50 mg  50 mg Oral 3 times per day Stephenie Young MD   50 mg at 12/21/18 1309    lidocaine 4 % external patch 1 patch  1 patch Transdermal Daily Stephenie Young MD   1 patch at 12/21/18 0841    losartan (COZAAR) tablet 50 mg  50 mg Oral BID Stephenie Young MD   50 mg at 12/21/18 5901    meclizine (ANTIVERT) tablet 25 mg  25 mg Oral 4x Daily PRN Stephenie Young MD        ondansetron (ZOFRAN-ODT) disintegrating tablet 4 mg  4 mg Oral Q8H PRN Stephenie Young MD        pantoprazole (PROTONIX) tablet 40 mg  40 mg Oral BID Stephenie Young MD   40 mg at 12/21/18 8237    potassium chloride (KLOR-CON M) extended release tablet 10 mEq  10 mEq Oral BID Stephenie Young MD   10 mEq at 12/21/18 0502    pravastatin (PRAVACHOL)  Sleep apnea     DOESN'T USE MACHINE    TIA (transient ischemic attack)      Past Surgical History:   Procedure Laterality Date    APPENDECTOMY      BACK SURGERY  1980    x 3    CARDIAC SURGERY  1990    Bypass x 3    CARPAL TUNNEL RELEASE Bilateral 1980    wrist and elbows    CHOLECYSTECTOMY  2000    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 2    JOINT REPLACEMENT Left 1990    JOINT REPLACEMENT Right 1995    LAMINECTOMY N/A 7/11/2017    LUMBAR LAMINECTOMY POSTERIOR  L23 L34 performed by Jose Luis Wilkes MD at [facility] Left 1990    ROTATOR CUFF REPAIR Right 2010    TUMOR REMOVAL Left 1960    foot    TURP  2000    VASCULAR SURGERY Left 7/15/2015 [facility] & [address]    Aortoiliofemoral a'gram with bilateral lower extremity runoff; select views 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; a'plasty of left anterior tibial artery and dorsalis pedis artery with 2.5x300 vascutrak balloon and then with 3x220 nati balloon; completion a'gram     Family History   Problem Relation Age of Onset    Heart Disease Father      Social History   Substance Use Topics    Smoking status: Never Smoker    Smokeless tobacco: Never Used    Alcohol use No          Review of Systems:    General:      Complaint / Symptom Yes / No / Description if Yes       Fatigue Yes:  chronic   Weight gain NA   Insomnia NA       Respiratory:        Complaint / Symptom Yes / No / Description if Yes       Cough No   Horseness NA       Cardiovascular:    Complaint / Symptom Yes / No / Description if Yes       Chest Pain No   Shortness of Air / Orthopnea Yes: chronic and stable   Presyncope / Syncope No   Palpitations No         Objective:    BP (!) 122/51   Pulse 64   Temp 96.3 °F (35.7 °C) (Temporal)   Resp 16   Ht 5' 10\" (1.778 m)   Wt 186 lb (84.4 kg)   SpO2 96%   BMI 26.69 kg/m² ,   Intake/Output Summary (Last 24 hours) at 12/21/18

## 2018-12-22 NOTE — PROGRESS NOTES
[address]    Daily HOSPITAL Progress Note                            Date:  12/22/18  Patient: Madhuri Dougherty  Admission:  12/20/2018  9:46 AM  Admit DX: Elevated troponin [R74.8]  Age:  80 y.o., 1934     LOS: 2 days           I personally saw the patient and rounded with:  Julio Srivastava RN on 12/22/18 prior to discharge      The observations documented in this note, including the assessment and plan are mine         Reason for initial evaluation or the patient's initial complaint    Chest discomfort      SUBJECTIVE:      12/20/2018    Chief Complaint / Reason for the Visit   Follow up of:  Chest discomfort and CAD and systemic arterial hypertension    Family present and in room during examination:  No      Specialty Problems        Cardiology Problems    Type II diabetes mellitus with peripheral circulatory disorder (HCC)        CHF (congestive heart failure), NYHA class I, acute on chronic, combined ([address]        Acute on chronic congestive heart failure ([address]        Ischemic cardiomyopathy        Right heart failure ([address]        CAD (coronary artery disease)              Current Status Today According to the patient:  \"ok\"    Subjective:  Mr. Madhuri Dougherty is generally feeling unchanged. Cath results reviewed    Mr. Madhuri Dougherty has the following cardiac complaints / symptoms today:    1. Chest discomfort, none today    2. CAD, stable coronary anatomy    3.  Hypertension    The blood pressure for the lastr 36 hours has been:  Systolic (07JHN), ISO:042 , Min:122 , TFT:357    Diastolic (95ACX), ZXQ:59, Min:49, Yukonluz Vega is a 80 y.o. male with the following history as recorded in [facility]:    Patient Active Problem List    Diagnosis Date Noted    Elevated troponin 12/20/2018     Priority: Low    Bradycardia 06/29/2018     Priority: Low    Right heart failure ([address] 06/29/2018     Priority: Low    Acute chest pain 05/27/2018     Priority: Low    Abnormal CT scan, bladder 2018     Priority: Low    Retention, urine 2018     Priority: Low    Bilateral renal cysts 2018     Priority: Low    Acute on chronic congestive heart failure (HCC)      Priority: Low    Ischemic cardiomyopathy      Priority: Low    CHF (congestive heart failure), NYHA class I, acute on chronic, combined ([facility] 2018     Priority: Low    S/P laminectomy 2017     Priority: Low    Obstructive sleep apnea syndrome 2017     Priority: Low    Lumbar stenosis with neurogenic claudication 2017     Priority: Low    DDD (degenerative disc disease), lumbar 2017     Priority: Low    Type [facility] 2015     Priority: Low    Diabetic ulcer of left foot associated with type 2 diabetes mellitus ([facility] 2015     Priority: Low    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 ([facility] 2015     Priority: Low    CAD (coronary artery disease) 2018     Current Facility-Administered Medications   Medication Dose Route Frequency Provider Last Rate Last Dose    furosemide (LASIX) injection 40 mg  40 mg Intravenous BID Mirna Barnes MD   40 mg at 18 0906    nitroGLYCERIN (NITROSTAT) SL tablet 0.4 mg  0.4 mg Sublingual Q5 Min PRN Mohammed Apgar, MD   0.4 mg at 18 1331    nitroGLYCERIN 50 mg in dextrose 5% 250 mL infusion  5 mcg/min Intravenous Continuous Mohammed Apgar, MD 3 mL/hr at 18 1331 10 mcg/min at 18 1331    sodium chloride flush 0.9 % injection 10 mL  10 mL Intravenous 2 times per day Mirna Barnes MD   10 mL at 18 0907    sodium chloride flush 0.9 % injection 10 mL  10 mL Intravenous PRN Mirna Barnes MD        magnesium hydroxide (MILK OF MAGNESIA) 400 MG/5ML suspension 30 mL  30 mL Oral Daily PRN Mirna Barnes MD        ondansetron [facility]) injection 4 mg  4 mg Intravenous Q6H PRN MD Anay Hurtado aspirin chewable tablet 81 mg  81 mg Oral Daily Yaquelin Jeter MD   81 mg at 12/22/18 0906    enoxaparin (LOVENOX) injection 40 mg  40 mg Subcutaneous Daily Yaquelin Jeter MD   40 mg at 12/20/18 1742    hydrALAZINE (APRESOLINE) injection 10 mg  10 mg Intravenous Q4H PRN Yaquelin Jeter MD        allopurinol (ZYLOPRIM) tablet 600 mg  600 mg Oral Daily Yaquelin Jeter MD   600 mg at 12/22/18 7460    amLODIPine (NORVASC) tablet 10 mg  10 mg Oral Daily Yaquelin Jeter MD   10 mg at 12/22/18 7722    vitamin C (ASCORBIC ACID) tablet 1,000 mg  1,000 mg Oral Daily Yaquelin Jeter MD   1,000 mg at 12/22/18 9825    baclofen (LIORESAL) tablet 10 mg  10 mg Oral 4x Daily PRN Yaquelin Jeter MD        colchicine (COLCRYS) tablet 0.6 mg  0.6 mg Oral PRN Yaquelin Jeter MD        gabapentin (NEURONTIN) capsule 100 mg  100 mg Oral BID Yaquelin Jeter MD   100 mg at 12/22/18 0906    glimepiride (AMARYL) tablet 4 mg  4 mg Oral BID Yaquelin Jeter MD   4 mg at 12/22/18 0906    HYDROcodone-acetaminophen (NORCO) 7.5-325 MG per tablet 1 tablet  1 tablet Oral Q12H PRN Yaquelin Jeter MD        hydrALAZINE (APRESOLINE) tablet 50 mg  50 mg Oral 3 times per day Yaquelin Jeter MD   50 mg at 12/22/18 0600    lidocaine 4 % external patch 1 patch  1 patch Transdermal Daily Yaquelin Jeter MD   1 patch at 12/22/18 0905    losartan (COZAAR) tablet 50 mg  50 mg Oral BID Yaquelin Jeter MD   50 mg at 12/22/18 2683    meclizine (ANTIVERT) tablet 25 mg  25 mg Oral 4x Daily PRN Yaquelin Jeter MD        ondansetron (ZOFRAN-ODT) disintegrating tablet 4 mg  4 mg Oral Q8H PRN Yaquelin Jeter MD        pantoprazole (PROTONIX) tablet 40 mg  40 mg Oral BID Yaquelin Jeter MD   40 mg at 12/22/18 4478    potassium chloride (KLOR-CON M) extended release tablet 10 mEq  10 mEq Oral BID Yaquelin Jeter MD   10 mEq at 12/22/18 4070    pravastatin (PRAVACHOL) tablet 40 mg  40 mg Oral Daily Yaquelin Jeter MD   40 mg at 12/22/18

## 2018-12-23 ENCOUNTER — APPOINTMENT (OUTPATIENT)
Dept: GENERAL RADIOLOGY | Age: 83
End: 2018-12-23
Payer: COMMERCIAL

## 2018-12-23 ENCOUNTER — APPOINTMENT (OUTPATIENT)
Dept: CT IMAGING | Age: 83
End: 2018-12-23
Payer: COMMERCIAL

## 2018-12-23 ENCOUNTER — HOSPITAL ENCOUNTER (EMERGENCY)
Age: 83
Discharge: HOME | End: 2018-12-23
Attending: EMERGENCY MEDICINE
Payer: COMMERCIAL

## 2018-12-23 VITALS
HEART RATE: 63 BPM | WEIGHT: 210 LBS | RESPIRATION RATE: 22 BRPM | BODY MASS INDEX: 30.13 KG/M2 | SYSTOLIC BLOOD PRESSURE: 112 MMHG | TEMPERATURE: 98 F | DIASTOLIC BLOOD PRESSURE: 47 MMHG | OXYGEN SATURATION: 93 %

## 2018-12-23 DIAGNOSIS — R07.9 CHEST PAIN, UNSPECIFIED TYPE: Primary | ICD-10-CM

## 2018-12-23 LAB
ALBUMIN SERPL-MCNC: 3.6 G/DL (ref 3.5–5.2)
ALP BLD-CCNC: 82 U/L (ref 40–130)
ALT SERPL-CCNC: 23 U/L (ref 5–41)
ANION GAP SERPL CALCULATED.3IONS-SCNC: 14 MMOL/L (ref 7–19)
APTT: 27.5 SEC (ref 26–36.2)
AST SERPL-CCNC: 21 U/L (ref 5–40)
BASOPHILS ABSOLUTE: 0 K/UL (ref 0–0.2)
BASOPHILS RELATIVE PERCENT: 0.3 % (ref 0–1)
BILIRUB SERPL-MCNC: <0.2 MG/DL (ref 0.2–1.2)
BUN BLDV-MCNC: 46 MG/DL (ref 8–23)
CALCIUM SERPL-MCNC: 9.7 MG/DL (ref 8.8–10.2)
CHLORIDE BLD-SCNC: 104 MMOL/L (ref 98–111)
CO2: 24 MMOL/L (ref 22–29)
CREAT SERPL-MCNC: 1.8 MG/DL (ref 0.5–1.2)
EOSINOPHILS ABSOLUTE: 0.1 K/UL (ref 0–0.6)
EOSINOPHILS RELATIVE PERCENT: 1.4 % (ref 0–5)
GFR NON-AFRICAN AMERICAN: 36
GLUCOSE BLD-MCNC: 97 MG/DL (ref 74–109)
HCT VFR BLD CALC: 36.8 % (ref 42–52)
HEMOGLOBIN: 11.8 G/DL (ref 14–18)
INR BLD: 1.1 (ref 0.88–1.18)
LYMPHOCYTES ABSOLUTE: 1.7 K/UL (ref 1.1–4.5)
LYMPHOCYTES RELATIVE PERCENT: 19.6 % (ref 20–40)
MCH RBC QN AUTO: 32.1 PG (ref 27–31)
MCHC RBC AUTO-ENTMCNC: 32.1 G/DL (ref 33–37)
MCV RBC AUTO: 100 FL (ref 80–94)
MONOCYTES ABSOLUTE: 0.7 K/UL (ref 0–0.9)
MONOCYTES RELATIVE PERCENT: 8.5 % (ref 0–10)
NEUTROPHILS ABSOLUTE: 6.1 K/UL (ref 1.5–7.5)
NEUTROPHILS RELATIVE PERCENT: 70 % (ref 50–65)
PDW BLD-RTO: 13.3 % (ref 11.5–14.5)
PERFORMED ON: NORMAL
PERFORMED ON: NORMAL
PLATELET # BLD: 247 K/UL (ref 130–400)
PMV BLD AUTO: 10 FL (ref 9.4–12.4)
POC TROPONIN I: 0.02 NG/ML (ref 0–0.08)
POC TROPONIN I: 0.04 NG/ML (ref 0–0.08)
POTASSIUM SERPL-SCNC: 4.6 MMOL/L (ref 3.5–5)
PRO-BNP: 376 PG/ML (ref 0–1800)
PROTHROMBIN TIME: 13.6 SEC (ref 12–14.6)
RBC # BLD: 3.68 M/UL (ref 4.7–6.1)
SODIUM BLD-SCNC: 142 MMOL/L (ref 136–145)
TOTAL PROTEIN: 6.2 G/DL (ref 6.6–8.7)
WBC # BLD: 8.7 K/UL (ref 4.8–10.8)

## 2018-12-23 PROCEDURE — 85610 PROTHROMBIN TIME: CPT

## 2018-12-23 PROCEDURE — 99285 EMERGENCY DEPT VISIT HI MDM: CPT

## 2018-12-23 PROCEDURE — 85730 THROMBOPLASTIN TIME PARTIAL: CPT

## 2018-12-23 PROCEDURE — 96375 TX/PRO/DX INJ NEW DRUG ADDON: CPT

## 2018-12-23 PROCEDURE — 6370000000 HC RX 637 (ALT 250 FOR IP): Performed by: INTERNAL MEDICINE

## 2018-12-23 PROCEDURE — 85025 COMPLETE CBC W/AUTO DIFF WBC: CPT

## 2018-12-23 PROCEDURE — 36415 COLL VENOUS BLD VENIPUNCTURE: CPT

## 2018-12-23 PROCEDURE — 99245 OFF/OP CONSLTJ NEW/EST HI 55: CPT | Performed by: INTERNAL MEDICINE

## 2018-12-23 PROCEDURE — 6360000002 HC RX W HCPCS: Performed by: EMERGENCY MEDICINE

## 2018-12-23 PROCEDURE — 80053 COMPREHEN METABOLIC PANEL: CPT

## 2018-12-23 PROCEDURE — 93005 ELECTROCARDIOGRAM TRACING: CPT

## 2018-12-23 PROCEDURE — 6370000000 HC RX 637 (ALT 250 FOR IP): Performed by: EMERGENCY MEDICINE

## 2018-12-23 PROCEDURE — 70450 CT HEAD/BRAIN W/O DYE: CPT

## 2018-12-23 PROCEDURE — 99284 EMERGENCY DEPT VISIT MOD MDM: CPT | Performed by: EMERGENCY MEDICINE

## 2018-12-23 PROCEDURE — 83880 ASSAY OF NATRIURETIC PEPTIDE: CPT

## 2018-12-23 PROCEDURE — 71045 X-RAY EXAM CHEST 1 VIEW: CPT

## 2018-12-23 PROCEDURE — 96374 THER/PROPH/DIAG INJ IV PUSH: CPT

## 2018-12-23 PROCEDURE — 84484 ASSAY OF TROPONIN QUANT: CPT

## 2018-12-23 RX ORDER — ISOSORBIDE MONONITRATE 30 MG/1
30 TABLET, EXTENDED RELEASE ORAL DAILY
Qty: 30 TABLET | Refills: 0 | Status: SHIPPED | OUTPATIENT
Start: 2018-12-23 | End: 2019-02-11 | Stop reason: DRUGHIGH

## 2018-12-23 RX ORDER — METOPROLOL SUCCINATE 25 MG/1
25 TABLET, EXTENDED RELEASE ORAL DAILY
Status: DISCONTINUED | OUTPATIENT
Start: 2018-12-23 | End: 2018-12-23 | Stop reason: HOSPADM

## 2018-12-23 RX ORDER — NITROGLYCERIN 0.4 MG/1
0.4 TABLET SUBLINGUAL EVERY 5 MIN PRN
Status: DISCONTINUED | OUTPATIENT
Start: 2018-12-23 | End: 2018-12-23 | Stop reason: HOSPADM

## 2018-12-23 RX ORDER — ASPIRIN 325 MG
325 TABLET ORAL ONCE
Status: COMPLETED | OUTPATIENT
Start: 2018-12-23 | End: 2018-12-23

## 2018-12-23 RX ORDER — MORPHINE SULFATE/0.9% NACL/PF 1 MG/ML
4 SYRINGE (ML) INJECTION ONCE
Status: COMPLETED | OUTPATIENT
Start: 2018-12-23 | End: 2018-12-23

## 2018-12-23 RX ORDER — METOPROLOL SUCCINATE 25 MG/1
25 TABLET, EXTENDED RELEASE ORAL DAILY
Qty: 30 TABLET | Refills: 0 | Status: ON HOLD | OUTPATIENT
Start: 2018-12-23 | End: 2019-12-12 | Stop reason: HOSPADM

## 2018-12-23 RX ORDER — ISOSORBIDE MONONITRATE 30 MG/1
30 TABLET, EXTENDED RELEASE ORAL DAILY
Status: DISCONTINUED | OUTPATIENT
Start: 2018-12-23 | End: 2018-12-23 | Stop reason: HOSPADM

## 2018-12-23 RX ORDER — ONDANSETRON 2 MG/ML
4 INJECTION INTRAMUSCULAR; INTRAVENOUS ONCE
Status: COMPLETED | OUTPATIENT
Start: 2018-12-23 | End: 2018-12-23

## 2018-12-23 RX ADMIN — ONDANSETRON HYDROCHLORIDE 4 MG: 2 INJECTION, SOLUTION INTRAMUSCULAR; INTRAVENOUS at 13:57

## 2018-12-23 RX ADMIN — Medication 4 MG: at 13:57

## 2018-12-23 RX ADMIN — NITROGLYCERIN 0.4 MG: 0.4 TABLET, ORALLY DISINTEGRATING SUBLINGUAL at 13:57

## 2018-12-23 RX ADMIN — ISOSORBIDE MONONITRATE 30 MG: 30 TABLET, EXTENDED RELEASE ORAL at 16:23

## 2018-12-23 RX ADMIN — ASPIRIN 325 MG ORAL TABLET 325 MG: 325 PILL ORAL at 13:24

## 2018-12-23 RX ADMIN — METOPROLOL SUCCINATE 25 MG: 25 TABLET, EXTENDED RELEASE ORAL at 16:23

## 2018-12-23 ASSESSMENT — ENCOUNTER SYMPTOMS
ABDOMINAL PAIN: 0
VOMITING: 0
RHINORRHEA: 0
BACK PAIN: 0
DIARRHEA: 0
NAUSEA: 1
SORE THROAT: 0
SHORTNESS OF BREATH: 1

## 2018-12-23 ASSESSMENT — PAIN SCALES - GENERAL
PAINLEVEL_OUTOF10: 10
PAINLEVEL_OUTOF10: 2

## 2018-12-23 NOTE — CONSULTS
[id number] [facility]       Cardiology Consultation          I personally saw the patient and rounded with:  ED RN, on  12/23/18      The observations documented in this note, including the assessment and plan are mine              Date of Admission:  12/23/2018  1:03 PM    Date of Initially Being Seen / Consultation:  12/23/18    Cardiologist:  Yuliana Parsons MD     Cardiology Attending: [facility] Attending: ED     PCP:  Randa Rebolledo MD    Reason for Consultation or Admission / Chief Complaint:  Shortness of air    SUBJECTIVE AND HISTORY OF PRESENT ILLNESS:    Source of the history:  Patient, family, previous inpatient and outpatient records in [address] [place], [place] [address]  is a 80 y.o. male who presents to [facility] ED # 6 with symptoms / signs / problem or diagnosis of shortness of air and vague chest discomfort. He was walking out of the [employer] this am when he gradually became more short of air. He also had vague chest discomfort. He was then taken to his home where his grand daughter thought he was better. She stepped away for a minute and he tong \"911\" and they brought him to the ED. He sleeps with his cpap and his sons do not think he is using it well. He is quite sleepy. When being examined this afternoon in the ED, he has had no symptoms of exertional chest discomfort, unusual or change in shortness of air, presyncope or syncope.        Family present:  Yes: two sons      CARDIAC RISK PROFILE:    Risk Factor Yes / No / Unknown       Gender Male   Cigarette Use No   Family History of Cardiovascular Disease Yes: heart disease   Diabetes Mellitus no   Hypercholesteremia no   Hypertension no          Cardiac Specific Problems:    Specialty Problems        Cardiology Problems    Type II diabetes mellitus with peripheral circulatory disorder (HCC)        CHF (congestive heart failure), NYHA class I, acute on chronic, combined (HCC)        Acute on chronic congestive heart

## 2018-12-23 NOTE — ED NOTES
Bed: 11  Expected date:   Expected time:   Means of arrival:   Comments:  1 Gary Bagley RN  12/23/18 9647

## 2018-12-23 NOTE — ED NOTES
Patient lying in bed at this time, talking quietly with family members and friends. He states he is feeling much better at this time. Awaiting repeat troponin and EKG due at 1505 and consult from cardiology. Will continue to monitor, call light in reach.      [address] [place] [address]  12/23/18 9283

## 2018-12-23 NOTE — DISCHARGE SUMMARY
FEMI amiando OF [facility] [place] [address], [facility]                               DISCHARGE SUMMARY    PATIENT NAME: Celestina Bhatt                    :        1934  MED REC NO:   [id number]                              ROOM:       [facility]  ACCOUNT NO:   [de-identified]                           ADMIT DATE: 2018  PROVIDER:     Mervat Coffman MD                   [facility] DATE: 2018    DIAGNOSES:  1. Acute on chronic combined congestive heart failure with pleural  effusion on the chest x-ray felt due to congestive heart failure. 2.  Ischemic cardiomyopathy with a decreased ejection fraction of 45%  with a heart cath being performed yesterday showing a single-vessel  disease with recommendations of medical management. 3.  Right heart failure secondary to pulmonary hypertension. 4.  Pulmonary hypertension. 5.  Ischemic heart disease. 6.  Type 2 diabetes with peripheral vascular disease and neuropathy as  complications of the diabetes. 7.  Hypertension, under much better control. HOSPITAL COURSE:  The patient is improved. He is urinated this morning. He feels better. He is denying any chest pains or shortness of breath. His lab work showed good renal function, probably back to his baseline  creatinine 1.4 with GFR of 42. He is improved. He feels better. He  feels like he is ready for discharge home. During this admission, he  had procedures, which included a left heart cath, which showed that the  left YORDAN was grafted to the LAD and it had some luminal irregularities. No focal stenosis seen. Right internal mammary artery angiography was  widely patent and ungrafted. Left ventriculogram showed some mild  inferior hypokinesis with an EF of about 45%, which is what the echo  showed. Right coronary artery looked like a moderate-sized vessel  rising from the right sinus of Valsalva.   There is mild diffuse disease  throughout the entire length of the vessel. Left circumflex is moderate  sized with several marginal branches. There is moderate diffuse disease  between 50% and 70% throughout the entire length of the vessel. Left  anterior descending is moderate-sized vessel with several diagonal  branches. There is 100% stenosis in the midportion and _____ grafted  past this blockage to the mid LAD and that is intact. Left main  coronary looks like a large vessel rising from the left sinus of  Valsalva. It divides to the left anterior coronary and left circumflex. There is mild diffuse disease throughout that vessel as well. There was  nothing that needed to be angioplastied or no surgical intervention  required and Dr. Agustina Najera recommended ongoing medical treatment and risk  factor modification. The patient felt stable for discharge home. He is  improved. His vitals are stable. Blood pressure 144/57 this morning  with a good pulse of 61, respirations 16, he is afebrile, O2 sat is 97%  on room air. I will see him next Thursday or Friday, 12/27/2018 or  12/28/2018. He will call on 12/26/2018, and get an appointment for one  of those two days and we will follow him closely on chronic care  management regimen of office to try to help manage his fluid and blood  pressure. His medicines on discharge will be mostly as they were on  admission. He will go home on the Norco as on admission, aspirin 81 mg  daily. He will be prescribed some nitroglycerin sublingual as needed. He will continue the gabapentin 100 mg twice daily, glimepiride 4 mg  twice a day. He will take the Lomotil as needed as on admission, the  meclizine as needed as on admission, the Zofran as needed as on  admission, pravastatin 40 mg daily, hydralazine 50 mg three times daily,  losartan 50 mg b.i.d., ropinirole 1 mg daily at night for a restless  leg.   He will continue the amlodipine 10 mg daily, Lidoderm patches as  on admission daily, Lasix 40 mg twice daily, allopurinol 600 mg

## 2018-12-28 LAB
EKG P AXIS: 45 DEGREES
EKG P AXIS: 47 DEGREES
EKG P-R INTERVAL: 186 MS
EKG P-R INTERVAL: 208 MS
EKG Q-T INTERVAL: 428 MS
EKG Q-T INTERVAL: 440 MS
EKG QRS DURATION: 98 MS
EKG QRS DURATION: 98 MS
EKG QTC CALCULATION (BAZETT): 436 MS
EKG QTC CALCULATION (BAZETT): 452 MS
EKG T AXIS: 64 DEGREES
EKG T AXIS: 67 DEGREES

## 2019-01-08 ENCOUNTER — TRANSCRIBE ORDERS (OUTPATIENT)
Dept: ADMINISTRATIVE | Facility: HOSPITAL | Age: 84
End: 2019-01-08

## 2019-01-08 DIAGNOSIS — G47.33 OBSTRUCTIVE SLEEP APNEA: Primary | ICD-10-CM

## 2019-01-19 PROBLEM — R79.89 ELEVATED TROPONIN: Status: RESOLVED | Noted: 2018-12-20 | Resolved: 2019-01-19

## 2019-01-19 PROBLEM — R77.8 ELEVATED TROPONIN: Status: RESOLVED | Noted: 2018-12-20 | Resolved: 2019-01-19

## 2019-02-06 ENCOUNTER — HOSPITAL ENCOUNTER (OUTPATIENT)
Dept: SLEEP MEDICINE | Facility: HOSPITAL | Age: 84
Discharge: HOME | End: 2019-02-06
Admitting: FAMILY MEDICINE

## 2019-02-06 VITALS
OXYGEN SATURATION: 99 % | HEIGHT: 68 IN | DIASTOLIC BLOOD PRESSURE: 77 MMHG | HEART RATE: 60 BPM | BODY MASS INDEX: 30.31 KG/M2 | SYSTOLIC BLOOD PRESSURE: 162 MMHG | WEIGHT: 200 LBS | RESPIRATION RATE: 14 BRPM

## 2019-02-06 DIAGNOSIS — G47.33 OBSTRUCTIVE SLEEP APNEA: ICD-10-CM

## 2019-02-06 PROCEDURE — 95811 POLYSOM 6/>YRS CPAP 4/> PARM: CPT

## 2019-02-06 PROCEDURE — 95811 POLYSOM 6/>YRS CPAP 4/> PARM: CPT | Performed by: PSYCHIATRY & NEUROLOGY

## 2019-02-11 ENCOUNTER — OFFICE VISIT (OUTPATIENT)
Dept: CARDIOLOGY | Age: 84
End: 2019-02-11
Payer: COMMERCIAL

## 2019-02-11 VITALS
HEART RATE: 60 BPM | BODY MASS INDEX: 30.77 KG/M2 | HEIGHT: 68 IN | SYSTOLIC BLOOD PRESSURE: 172 MMHG | DIASTOLIC BLOOD PRESSURE: 80 MMHG | WEIGHT: 203 LBS

## 2019-02-11 DIAGNOSIS — I25.10 CORONARY ARTERY DISEASE INVOLVING NATIVE CORONARY ARTERY OF NATIVE HEART, ANGINA PRESENCE UNSPECIFIED: Primary | ICD-10-CM

## 2019-02-11 DIAGNOSIS — I25.5 ISCHEMIC CARDIOMYOPATHY: ICD-10-CM

## 2019-02-11 DIAGNOSIS — I20.8 ANGINA OF EFFORT (HCC): ICD-10-CM

## 2019-02-11 DIAGNOSIS — I10 ESSENTIAL HYPERTENSION: ICD-10-CM

## 2019-02-11 PROBLEM — I20.89 ANGINA OF EFFORT: Status: ACTIVE | Noted: 2019-02-11

## 2019-02-11 PROCEDURE — 1123F ACP DISCUSS/DSCN MKR DOCD: CPT | Performed by: NURSE PRACTITIONER

## 2019-02-11 PROCEDURE — 1101F PT FALLS ASSESS-DOCD LE1/YR: CPT | Performed by: NURSE PRACTITIONER

## 2019-02-11 PROCEDURE — G8417 CALC BMI ABV UP PARAM F/U: HCPCS | Performed by: NURSE PRACTITIONER

## 2019-02-11 PROCEDURE — 99214 OFFICE O/P EST MOD 30 MIN: CPT | Performed by: NURSE PRACTITIONER

## 2019-02-11 PROCEDURE — 1036F TOBACCO NON-USER: CPT | Performed by: NURSE PRACTITIONER

## 2019-02-11 PROCEDURE — G8484 FLU IMMUNIZE NO ADMIN: HCPCS | Performed by: NURSE PRACTITIONER

## 2019-02-11 PROCEDURE — 4040F PNEUMOC VAC/ADMIN/RCVD: CPT | Performed by: NURSE PRACTITIONER

## 2019-02-11 PROCEDURE — G8599 NO ASA/ANTIPLAT THER USE RNG: HCPCS | Performed by: NURSE PRACTITIONER

## 2019-02-11 PROCEDURE — G8427 DOCREV CUR MEDS BY ELIG CLIN: HCPCS | Performed by: NURSE PRACTITIONER

## 2019-02-11 RX ORDER — ISOSORBIDE MONONITRATE 60 MG/1
60 TABLET, EXTENDED RELEASE ORAL DAILY
Qty: 30 TABLET | Refills: 5 | Status: ON HOLD | OUTPATIENT
Start: 2019-02-11 | End: 2019-10-12 | Stop reason: SDUPTHER

## 2019-02-20 ENCOUNTER — TELEPHONE (OUTPATIENT)
Dept: CARDIOLOGY | Age: 84
End: 2019-02-20

## 2019-02-20 ENCOUNTER — HOSPITAL ENCOUNTER (EMERGENCY)
Age: 84
Discharge: HOME | End: 2019-02-20
Attending: EMERGENCY MEDICINE
Payer: COMMERCIAL

## 2019-02-20 VITALS
BODY MASS INDEX: 30.31 KG/M2 | DIASTOLIC BLOOD PRESSURE: 58 MMHG | RESPIRATION RATE: 14 BRPM | HEIGHT: 68 IN | TEMPERATURE: 98.2 F | HEART RATE: 62 BPM | OXYGEN SATURATION: 97 % | SYSTOLIC BLOOD PRESSURE: 148 MMHG | WEIGHT: 200 LBS

## 2019-02-20 DIAGNOSIS — I16.0 ASYMPTOMATIC HYPERTENSIVE URGENCY: Primary | ICD-10-CM

## 2019-02-20 LAB
ALBUMIN SERPL-MCNC: 3.7 G/DL (ref 3.5–5.2)
ALP BLD-CCNC: 82 U/L (ref 40–130)
ALT SERPL-CCNC: 31 U/L (ref 5–41)
ANION GAP SERPL CALCULATED.3IONS-SCNC: 9 MMOL/L (ref 7–19)
AST SERPL-CCNC: 27 U/L (ref 5–40)
BASOPHILS ABSOLUTE: 0 K/UL (ref 0–0.2)
BASOPHILS RELATIVE PERCENT: 0.3 % (ref 0–1)
BILIRUB SERPL-MCNC: <0.2 MG/DL (ref 0.2–1.2)
BUN BLDV-MCNC: 51 MG/DL (ref 8–23)
CALCIUM SERPL-MCNC: 9.2 MG/DL (ref 8.8–10.2)
CHLORIDE BLD-SCNC: 109 MMOL/L (ref 98–111)
CO2: 24 MMOL/L (ref 22–29)
CREAT SERPL-MCNC: 1.4 MG/DL (ref 0.5–1.2)
EOSINOPHILS ABSOLUTE: 0.2 K/UL (ref 0–0.6)
EOSINOPHILS RELATIVE PERCENT: 3.1 % (ref 0–5)
GFR NON-AFRICAN AMERICAN: 48
GLUCOSE BLD-MCNC: 146 MG/DL (ref 74–109)
HCT VFR BLD CALC: 31.2 % (ref 42–52)
HEMOGLOBIN: 10 G/DL (ref 14–18)
LYMPHOCYTES ABSOLUTE: 1.5 K/UL (ref 1.1–4.5)
LYMPHOCYTES RELATIVE PERCENT: 26.4 % (ref 20–40)
MCH RBC QN AUTO: 32.2 PG (ref 27–31)
MCHC RBC AUTO-ENTMCNC: 32.1 G/DL (ref 33–37)
MCV RBC AUTO: 100.3 FL (ref 80–94)
MONOCYTES ABSOLUTE: 0.6 K/UL (ref 0–0.9)
MONOCYTES RELATIVE PERCENT: 10.3 % (ref 0–10)
NEUTROPHILS ABSOLUTE: 3.4 K/UL (ref 1.5–7.5)
NEUTROPHILS RELATIVE PERCENT: 59.7 % (ref 50–65)
PDW BLD-RTO: 14.1 % (ref 11.5–14.5)
PLATELET # BLD: 202 K/UL (ref 130–400)
PMV BLD AUTO: 9.1 FL (ref 9.4–12.4)
POTASSIUM SERPL-SCNC: 4.8 MMOL/L (ref 3.5–5)
RBC # BLD: 3.11 M/UL (ref 4.7–6.1)
SODIUM BLD-SCNC: 142 MMOL/L (ref 136–145)
TOTAL PROTEIN: 6.5 G/DL (ref 6.6–8.7)
WBC # BLD: 5.7 K/UL (ref 4.8–10.8)

## 2019-02-20 PROCEDURE — 80053 COMPREHEN METABOLIC PANEL: CPT

## 2019-02-20 PROCEDURE — 93005 ELECTROCARDIOGRAM TRACING: CPT

## 2019-02-20 PROCEDURE — 85025 COMPLETE CBC W/AUTO DIFF WBC: CPT

## 2019-02-20 PROCEDURE — 36415 COLL VENOUS BLD VENIPUNCTURE: CPT

## 2019-02-20 PROCEDURE — 99283 EMERGENCY DEPT VISIT LOW MDM: CPT | Performed by: EMERGENCY MEDICINE

## 2019-02-20 PROCEDURE — 99283 EMERGENCY DEPT VISIT LOW MDM: CPT

## 2019-02-20 ASSESSMENT — ENCOUNTER SYMPTOMS
SHORTNESS OF BREATH: 0
RHINORRHEA: 0
SORE THROAT: 0
BACK PAIN: 0
COUGH: 0
ABDOMINAL PAIN: 0
DIARRHEA: 0
NAUSEA: 0
VOMITING: 0

## 2019-02-22 LAB
EKG P AXIS: 35 DEGREES
EKG P-R INTERVAL: 220 MS
EKG Q-T INTERVAL: 434 MS
EKG QRS DURATION: 100 MS
EKG QTC CALCULATION (BAZETT): 435 MS
EKG T AXIS: 49 DEGREES

## 2019-02-25 ENCOUNTER — OFFICE VISIT (OUTPATIENT)
Dept: CARDIOLOGY | Age: 84
End: 2019-02-25
Payer: COMMERCIAL

## 2019-02-25 VITALS
SYSTOLIC BLOOD PRESSURE: 172 MMHG | HEART RATE: 64 BPM | BODY MASS INDEX: 30.62 KG/M2 | HEIGHT: 68 IN | WEIGHT: 202 LBS | DIASTOLIC BLOOD PRESSURE: 70 MMHG

## 2019-02-25 DIAGNOSIS — Z95.1 HX OF CABG: ICD-10-CM

## 2019-02-25 DIAGNOSIS — I25.10 CORONARY ARTERY DISEASE INVOLVING NATIVE CORONARY ARTERY OF NATIVE HEART WITHOUT ANGINA PECTORIS: ICD-10-CM

## 2019-02-25 DIAGNOSIS — I25.5 ISCHEMIC CARDIOMYOPATHY: ICD-10-CM

## 2019-02-25 DIAGNOSIS — I10 ESSENTIAL HYPERTENSION: Primary | ICD-10-CM

## 2019-02-25 PROCEDURE — G8417 CALC BMI ABV UP PARAM F/U: HCPCS | Performed by: NURSE PRACTITIONER

## 2019-02-25 PROCEDURE — 1036F TOBACCO NON-USER: CPT | Performed by: NURSE PRACTITIONER

## 2019-02-25 PROCEDURE — 99213 OFFICE O/P EST LOW 20 MIN: CPT | Performed by: NURSE PRACTITIONER

## 2019-02-25 PROCEDURE — G8427 DOCREV CUR MEDS BY ELIG CLIN: HCPCS | Performed by: NURSE PRACTITIONER

## 2019-02-25 PROCEDURE — 1101F PT FALLS ASSESS-DOCD LE1/YR: CPT | Performed by: NURSE PRACTITIONER

## 2019-02-25 PROCEDURE — G8484 FLU IMMUNIZE NO ADMIN: HCPCS | Performed by: NURSE PRACTITIONER

## 2019-02-25 PROCEDURE — 1123F ACP DISCUSS/DSCN MKR DOCD: CPT | Performed by: NURSE PRACTITIONER

## 2019-02-25 PROCEDURE — 4040F PNEUMOC VAC/ADMIN/RCVD: CPT | Performed by: NURSE PRACTITIONER

## 2019-02-25 PROCEDURE — G8599 NO ASA/ANTIPLAT THER USE RNG: HCPCS | Performed by: NURSE PRACTITIONER

## 2019-02-25 RX ORDER — AMLODIPINE BESYLATE 10 MG/1
10 TABLET ORAL 2 TIMES DAILY
Qty: 60 TABLET | Refills: 5 | Status: ON HOLD | OUTPATIENT
Start: 2019-02-25 | End: 2019-12-04 | Stop reason: SDUPTHER

## 2019-03-04 ENCOUNTER — OFFICE VISIT (OUTPATIENT)
Dept: CARDIOLOGY | Age: 84
End: 2019-03-04
Payer: COMMERCIAL

## 2019-03-04 VITALS
HEIGHT: 68 IN | WEIGHT: 207 LBS | HEART RATE: 52 BPM | DIASTOLIC BLOOD PRESSURE: 72 MMHG | BODY MASS INDEX: 31.37 KG/M2 | SYSTOLIC BLOOD PRESSURE: 142 MMHG

## 2019-03-04 DIAGNOSIS — I10 ESSENTIAL HYPERTENSION: Primary | ICD-10-CM

## 2019-03-04 DIAGNOSIS — I25.10 CORONARY ARTERY DISEASE INVOLVING NATIVE CORONARY ARTERY OF NATIVE HEART WITHOUT ANGINA PECTORIS: ICD-10-CM

## 2019-03-04 DIAGNOSIS — I25.5 ISCHEMIC CARDIOMYOPATHY: ICD-10-CM

## 2019-03-04 PROCEDURE — G8484 FLU IMMUNIZE NO ADMIN: HCPCS | Performed by: NURSE PRACTITIONER

## 2019-03-04 PROCEDURE — 99213 OFFICE O/P EST LOW 20 MIN: CPT | Performed by: NURSE PRACTITIONER

## 2019-03-04 PROCEDURE — 1101F PT FALLS ASSESS-DOCD LE1/YR: CPT | Performed by: NURSE PRACTITIONER

## 2019-03-04 PROCEDURE — G8427 DOCREV CUR MEDS BY ELIG CLIN: HCPCS | Performed by: NURSE PRACTITIONER

## 2019-03-04 PROCEDURE — 1036F TOBACCO NON-USER: CPT | Performed by: NURSE PRACTITIONER

## 2019-03-04 PROCEDURE — 4040F PNEUMOC VAC/ADMIN/RCVD: CPT | Performed by: NURSE PRACTITIONER

## 2019-03-04 PROCEDURE — G8599 NO ASA/ANTIPLAT THER USE RNG: HCPCS | Performed by: NURSE PRACTITIONER

## 2019-03-04 PROCEDURE — 1123F ACP DISCUSS/DSCN MKR DOCD: CPT | Performed by: NURSE PRACTITIONER

## 2019-03-04 PROCEDURE — G8417 CALC BMI ABV UP PARAM F/U: HCPCS | Performed by: NURSE PRACTITIONER

## 2019-03-05 ENCOUNTER — TELEPHONE (OUTPATIENT)
Dept: CARDIOLOGY | Age: 84
End: 2019-03-05

## 2019-03-05 RX ORDER — CLONIDINE HYDROCHLORIDE 0.1 MG/1
0.1 TABLET ORAL 2 TIMES DAILY
Status: ON HOLD | COMMUNITY
End: 2019-03-26 | Stop reason: HOSPADM

## 2019-03-20 ENCOUNTER — APPOINTMENT (OUTPATIENT)
Dept: CT IMAGING | Age: 84
End: 2019-03-20
Payer: COMMERCIAL

## 2019-03-20 ENCOUNTER — OFFICE VISIT (OUTPATIENT)
Dept: NEUROLOGY | Facility: CLINIC | Age: 84
End: 2019-03-20

## 2019-03-20 ENCOUNTER — HOSPITAL ENCOUNTER (OUTPATIENT)
Age: 84
Setting detail: OBSERVATION
Discharge: HOME | End: 2019-03-26
Attending: EMERGENCY MEDICINE | Admitting: FAMILY MEDICINE
Payer: COMMERCIAL

## 2019-03-20 ENCOUNTER — APPOINTMENT (OUTPATIENT)
Dept: GENERAL RADIOLOGY | Age: 84
End: 2019-03-20
Payer: COMMERCIAL

## 2019-03-20 VITALS
SYSTOLIC BLOOD PRESSURE: 140 MMHG | HEART RATE: 62 BPM | WEIGHT: 208 LBS | BODY MASS INDEX: 31.52 KG/M2 | DIASTOLIC BLOOD PRESSURE: 68 MMHG | HEIGHT: 68 IN

## 2019-03-20 DIAGNOSIS — R07.9 CHEST PAIN, UNSPECIFIED TYPE: Primary | ICD-10-CM

## 2019-03-20 DIAGNOSIS — E11.8 TYPE 2 DIABETES MELLITUS WITH COMPLICATION, WITHOUT LONG-TERM CURRENT USE OF INSULIN (HCC): ICD-10-CM

## 2019-03-20 DIAGNOSIS — R77.8 ELEVATED TROPONIN: ICD-10-CM

## 2019-03-20 DIAGNOSIS — G47.33 OSA (OBSTRUCTIVE SLEEP APNEA): Primary | ICD-10-CM

## 2019-03-20 DIAGNOSIS — I10 ESSENTIAL HYPERTENSION: ICD-10-CM

## 2019-03-20 DIAGNOSIS — I25.119 CORONARY ARTERY DISEASE INVOLVING NATIVE HEART WITH ANGINA PECTORIS, UNSPECIFIED VESSEL OR LESION TYPE (HCC): ICD-10-CM

## 2019-03-20 DIAGNOSIS — I16.0 HYPERTENSIVE URGENCY: ICD-10-CM

## 2019-03-20 DIAGNOSIS — R51.9 ACUTE NONINTRACTABLE HEADACHE, UNSPECIFIED HEADACHE TYPE: ICD-10-CM

## 2019-03-20 DIAGNOSIS — N18.9 CHRONIC KIDNEY DISEASE, UNSPECIFIED CKD STAGE: ICD-10-CM

## 2019-03-20 DIAGNOSIS — R00.1 BRADYCARDIA: ICD-10-CM

## 2019-03-20 DIAGNOSIS — Z95.1 HX OF CABG: ICD-10-CM

## 2019-03-20 DIAGNOSIS — I25.10 CAD IN NATIVE ARTERY: ICD-10-CM

## 2019-03-20 LAB
ALBUMIN SERPL-MCNC: 3.8 G/DL (ref 3.5–5.2)
ALP BLD-CCNC: 82 U/L (ref 40–130)
ALT SERPL-CCNC: 40 U/L (ref 5–41)
ANION GAP SERPL CALCULATED.3IONS-SCNC: 13 MMOL/L (ref 7–19)
AST SERPL-CCNC: 41 U/L (ref 5–40)
BASOPHILS ABSOLUTE: 0 K/UL (ref 0–0.2)
BASOPHILS RELATIVE PERCENT: 0.5 % (ref 0–1)
BILIRUB SERPL-MCNC: <0.2 MG/DL (ref 0.2–1.2)
BUN BLDV-MCNC: 63 MG/DL (ref 8–23)
CALCIUM SERPL-MCNC: 9.2 MG/DL (ref 8.8–10.2)
CHLORIDE BLD-SCNC: 104 MMOL/L (ref 98–111)
CO2: 23 MMOL/L (ref 22–29)
CREAT SERPL-MCNC: 1.8 MG/DL (ref 0.5–1.2)
EOSINOPHILS ABSOLUTE: 0.3 K/UL (ref 0–0.6)
EOSINOPHILS RELATIVE PERCENT: 4.2 % (ref 0–5)
GFR NON-AFRICAN AMERICAN: 36
GLUCOSE BLD-MCNC: 139 MG/DL (ref 74–109)
HCT VFR BLD CALC: 30 % (ref 42–52)
HEMOGLOBIN: 9.6 G/DL (ref 14–18)
LYMPHOCYTES ABSOLUTE: 1.9 K/UL (ref 1.1–4.5)
LYMPHOCYTES RELATIVE PERCENT: 29.8 % (ref 20–40)
MCH RBC QN AUTO: 32.1 PG (ref 27–31)
MCHC RBC AUTO-ENTMCNC: 32 G/DL (ref 33–37)
MCV RBC AUTO: 100.3 FL (ref 80–94)
MONOCYTES ABSOLUTE: 0.7 K/UL (ref 0–0.9)
MONOCYTES RELATIVE PERCENT: 11 % (ref 0–10)
NEUTROPHILS ABSOLUTE: 3.5 K/UL (ref 1.5–7.5)
NEUTROPHILS RELATIVE PERCENT: 54.2 % (ref 50–65)
PDW BLD-RTO: 14.3 % (ref 11.5–14.5)
PLATELET # BLD: 240 K/UL (ref 130–400)
PMV BLD AUTO: 10.2 FL (ref 9.4–12.4)
POTASSIUM SERPL-SCNC: 4.1 MMOL/L (ref 3.5–5)
RBC # BLD: 2.99 M/UL (ref 4.7–6.1)
SODIUM BLD-SCNC: 140 MMOL/L (ref 136–145)
TOTAL PROTEIN: 6.8 G/DL (ref 6.6–8.7)
TROPONIN: 0.07 NG/ML (ref 0–0.03)
WBC # BLD: 6.5 K/UL (ref 4.8–10.8)

## 2019-03-20 PROCEDURE — 36415 COLL VENOUS BLD VENIPUNCTURE: CPT

## 2019-03-20 PROCEDURE — 85025 COMPLETE CBC W/AUTO DIFF WBC: CPT

## 2019-03-20 PROCEDURE — 84484 ASSAY OF TROPONIN QUANT: CPT

## 2019-03-20 PROCEDURE — 99213 OFFICE O/P EST LOW 20 MIN: CPT | Performed by: NURSE PRACTITIONER

## 2019-03-20 PROCEDURE — 6370000000 HC RX 637 (ALT 250 FOR IP): Performed by: EMERGENCY MEDICINE

## 2019-03-20 PROCEDURE — 71045 X-RAY EXAM CHEST 1 VIEW: CPT

## 2019-03-20 PROCEDURE — 80053 COMPREHEN METABOLIC PANEL: CPT

## 2019-03-20 PROCEDURE — 99285 EMERGENCY DEPT VISIT HI MDM: CPT

## 2019-03-20 PROCEDURE — 93005 ELECTROCARDIOGRAM TRACING: CPT

## 2019-03-20 PROCEDURE — 96374 THER/PROPH/DIAG INJ IV PUSH: CPT

## 2019-03-20 PROCEDURE — 70450 CT HEAD/BRAIN W/O DYE: CPT

## 2019-03-20 RX ORDER — ONDANSETRON 2 MG/ML
4 INJECTION INTRAMUSCULAR; INTRAVENOUS ONCE
Status: DISCONTINUED | OUTPATIENT
Start: 2019-03-20 | End: 2019-03-21 | Stop reason: HOSPADM

## 2019-03-20 RX ORDER — HYDRALAZINE HYDROCHLORIDE 25 MG/1
25 TABLET, FILM COATED ORAL ONCE
Status: COMPLETED | OUTPATIENT
Start: 2019-03-21 | End: 2019-03-21

## 2019-03-20 RX ORDER — CLONIDINE HYDROCHLORIDE 0.1 MG/1
0.1 TABLET ORAL ONCE
Status: COMPLETED | OUTPATIENT
Start: 2019-03-21 | End: 2019-03-21

## 2019-03-20 RX ORDER — NITROGLYCERIN 0.4 MG/1
0.4 TABLET SUBLINGUAL
COMMUNITY
Start: 2018-12-22

## 2019-03-20 RX ORDER — NITROGLYCERIN 0.4 MG/1
0.4 TABLET SUBLINGUAL EVERY 5 MIN PRN
Status: DISCONTINUED | OUTPATIENT
Start: 2019-03-20 | End: 2019-03-21

## 2019-03-20 RX ORDER — MORPHINE SULFATE 4 MG/ML
4 INJECTION, SOLUTION INTRAMUSCULAR; INTRAVENOUS ONCE
Status: DISCONTINUED | OUTPATIENT
Start: 2019-03-20 | End: 2019-03-21 | Stop reason: HOSPADM

## 2019-03-20 RX ORDER — CLONIDINE HYDROCHLORIDE 0.1 MG/1
0.1 TABLET ORAL
Status: ON HOLD | COMMUNITY
End: 2020-01-01

## 2019-03-20 RX ORDER — LANOLIN ALCOHOL/MO/W.PET/CERES
400 CREAM (GRAM) TOPICAL
COMMUNITY

## 2019-03-20 RX ORDER — MECLIZINE HYDROCHLORIDE 25 MG/1
25 TABLET ORAL
Status: ON HOLD | COMMUNITY
End: 2020-01-01

## 2019-03-20 RX ORDER — FUROSEMIDE 40 MG/1
40 TABLET ORAL
Status: ON HOLD | COMMUNITY
End: 2020-01-01

## 2019-03-20 RX ORDER — ASPIRIN 325 MG
325 TABLET ORAL ONCE
Status: COMPLETED | OUTPATIENT
Start: 2019-03-20 | End: 2019-03-20

## 2019-03-20 RX ORDER — THIAMINE MONONITRATE (VIT B1) 100 MG
100 TABLET ORAL
COMMUNITY

## 2019-03-20 RX ORDER — ISOSORBIDE MONONITRATE 30 MG/1
30 TABLET, EXTENDED RELEASE ORAL DAILY
Status: ON HOLD | COMMUNITY
Start: 2019-01-28 | End: 2020-01-01

## 2019-03-20 RX ORDER — METOPROLOL SUCCINATE 25 MG/1
25 TABLET, EXTENDED RELEASE ORAL
COMMUNITY
Start: 2018-12-23

## 2019-03-20 RX ADMIN — NITROGLYCERIN 0.4 MG: 0.4 TABLET, ORALLY DISINTEGRATING SUBLINGUAL at 23:18

## 2019-03-20 RX ADMIN — ASPIRIN 325 MG ORAL TABLET 325 MG: 325 PILL ORAL at 23:18

## 2019-03-20 ASSESSMENT — ENCOUNTER SYMPTOMS
DIARRHEA: 0
SORE THROAT: 0
BACK PAIN: 0
RHINORRHEA: 0
SHORTNESS OF BREATH: 1
ABDOMINAL PAIN: 0
VOMITING: 1
NAUSEA: 1

## 2019-03-20 ASSESSMENT — PAIN SCALES - GENERAL: PAINLEVEL_OUTOF10: 8

## 2019-03-20 ASSESSMENT — PAIN DESCRIPTION - LOCATION: LOCATION: HEAD

## 2019-03-20 ASSESSMENT — PAIN DESCRIPTION - FREQUENCY: FREQUENCY: CONTINUOUS

## 2019-03-20 ASSESSMENT — PAIN DESCRIPTION - PAIN TYPE: TYPE: ACUTE PAIN

## 2019-03-20 NOTE — PATIENT INSTRUCTIONS
Sleep Apnea  Sleep apnea is a condition in which breathing pauses or becomes shallow during sleep. Episodes of sleep apnea usually last 10 seconds or longer, and they may occur as many as 20 times an hour. Sleep apnea disrupts your sleep and keeps your body from getting the rest that it needs. This condition can increase your risk of certain health problems, including:  · Heart attack.  · Stroke.  · Obesity.  · Diabetes.  · Heart failure.  · Irregular heartbeat.    There are three kinds of sleep apnea:  · Obstructive sleep apnea. This kind is caused by a blocked or collapsed airway.  · Central sleep apnea. This kind happens when the part of the brain that controls breathing does not send the correct signals to the muscles that control breathing.  · Mixed sleep apnea. This is a combination of obstructive and central sleep apnea.    What are the causes?  The most common cause of this condition is a collapsed or blocked airway. An airway can collapse or become blocked if:  · Your throat muscles are abnormally relaxed.  · Your tongue and tonsils are larger than normal.  · You are overweight.  · Your airway is smaller than normal.    What increases the risk?  This condition is more likely to develop in people who:  · Are overweight.  · Smoke.  · Have a smaller than normal airway.  · Are elderly.  · Are male.  · Drink alcohol.  · Take sedatives or tranquilizers.  · Have a family history of sleep apnea.    What are the signs or symptoms?  Symptoms of this condition include:  · Trouble staying asleep.  · Daytime sleepiness and tiredness.  · Irritability.  · Loud snoring.  · Morning headaches.  · Trouble concentrating.  · Forgetfulness.  · Decreased interest in sex.  · Unexplained sleepiness.  · Mood swings.  · Personality changes.  · Feelings of depression.  · Waking up often during the night to urinate.  · Dry mouth.  · Sore throat.    How is this diagnosed?  This condition may be diagnosed with:  · A medical history.  · A  physical exam.  · A series of tests that are done while you are sleeping (sleep study). These tests are usually done in a sleep lab, but they may also be done at home.    How is this treated?  Treatment for this condition aims to restore normal breathing and to ease symptoms during sleep. It may involve managing health issues that can affect breathing, such as high blood pressure or obesity. Treatment may include:  · Sleeping on your side.  · Using a decongestant if you have nasal congestion.  · Avoiding the use of depressants, including alcohol, sedatives, and narcotics.  · Losing weight if you are overweight.  · Making changes to your diet.  · Quitting smoking.  · Using a device to open your airway while you sleep, such as:  ? An oral appliance. This is a custom-made mouthpiece that shifts your lower jaw forward.  ? A continuous positive airway pressure (CPAP) device. This device delivers oxygen to your airway through a mask.  ? A nasal expiratory positive airway pressure (EPAP) device. This device has valves that you put into each nostril.  ? A bi-level positive airway pressure (BPAP) device. This device delivers oxygen to your airway through a mask.  · Surgery if other treatments do not work. During surgery, excess tissue is removed to create a wider airway.    It is important to get treatment for sleep apnea. Without treatment, this condition can lead to:  · High blood pressure.  · Coronary artery disease.  · (Men) An inability to achieve or maintain an erection (impotence).  · Reduced thinking abilities.    Follow these instructions at home:  · Make any lifestyle changes that your health care provider recommends.  · Eat a healthy, well-balanced diet.  · Take over-the-counter and prescription medicines only as told by your health care provider.  · Avoid using depressants, including alcohol, sedatives, and narcotics.  · Take steps to lose weight if you are overweight.  · If you were given a device to open your  airway while you sleep, use it only as told by your health care provider.  · Do not use any tobacco products, such as cigarettes, chewing tobacco, and e-cigarettes. If you need help quitting, ask your health care provider.  · Keep all follow-up visits as told by your health care provider. This is important.  Contact a health care provider if:  · The device that you received to open your airway during sleep is uncomfortable or does not seem to be working.  · Your symptoms do not improve.  · Your symptoms get worse.  Get help right away if:  · You develop chest pain.  · You develop shortness of breath.  · You develop discomfort in your back, arms, or stomach.  · You have trouble speaking.  · You have weakness on one side of your body.  · You have drooping in your face.  These symptoms may represent a serious problem that is an emergency. Do not wait to see if the symptoms will go away. Get medical help right away. Call your local emergency services (911 in the [place] Do not drive yourself to the hospital.  This information is not intended to replace advice given to you by your health care provider. Make sure you discuss any questions you have with your health care provider.  Document Released: 12/08/2003 Document Revised: 07/16/2018 Document Reviewed: 09/26/2016  Elsevier Interactive Patient Education © 2019 [employer]

## 2019-03-20 NOTE — PROGRESS NOTES
Subjective     Chief Complaint   Patient presents with   • Sleep Apnea     Not used for over a year       Eron Escalante is a 84 y.o. male presents today for follow-up of sleep apnea.  He is new to the office and presents today alone.  He states that he has not heard results of his recent sleep study and he is not set up on therapy.  He underwent polysomnography testing in February 2019 and was diagnosed with obstructive sleep apnea.  He was placed on CPAP of 10 cm water pressure with heated humidification.  He did answer on the questionnaire that he had some weakness and drowsiness with emotional events, he denies that today.  He was diagnosed with sleep apnea many years ago. He does not recall when. He wore the machine for several years and stopped using it about a year ago. He states he stopped wearing it due to frequent urination and inability to take his mask on and off. He was scheduled for a repeat titration by hi PCP due to 100 lb intentional weight loss for evaluation of pressure settings. He continues to have some fatigue and he works two part-time jobs as a  and at the [employer]. He states that his fatigue does not effect his ability to work. He wakes himself of snoring at times. He does have chronic back pain that does effect his sleep. He continues to drive and denies falling asleep driving. He denies any sleep paralysis or catalectic events. He states that he does have history of TIA. He falls asleep easily. He averages about 5-7 hours of sleep nightly. He states that he has a history of irregular heart rhythm and follows cardiology. He has history of open heart surgery and 2 MI. He has a PMH of heart failure. He denies any current chest pain or palpitations. He has followed with them recently and has had some adjustments in his BP medication. He is also following with Dr. Owen for this.  He denies any alcohol, tobacco use, or illicit drug use.                              "                          [place] Sleepiness Scale    Situation Chance of Dozing or Sleeping   • Sitting and reading 1 - slight chance of dosing or sleeping   • Watching TV 2 - moderate chance of dosing or sleeping   • Sitting inactive in a public place 0 - would never dose or sleep   • Being a passenger in a motor vehicle for an hour or more 0 - would never dose or sleep   • Lying down in the afternoon 0 - would never dose or sleep   • Sitting and talking to someone 0 - would never dose or sleep   • Sitting quietly after lunch (no alcohol) 0 - would never dose or sleep   • Stopped for a few minutes in traffic while driving 0 - would never dose or sleep   Total score (add the scores up) 2         Does the patient SNORE? Yes    Does the patient feel TIRED, fatigued or sleepy during the day? Yes    Has anyone OBSERVED the stop breathing or cough/gasp during sleep? No    Does the patient have high blood PRESSURE? Yes    Is the patient's BMI greater than 35? No    Is the patient’s AGE over 50 years old? Yes    Is the patient's NECK size greater than 17 in for a male and 16 in for a female? No    Is the patient’s GENDER male? Yes      0-2 \"Yes\" Responses = Low Risk of RICARDO  3-4 \"Yes\" Responses = Intermediate Risk of RICARDO  5-8 \"Yes\" Responses = High Risk RICARDO    Adapted from STOP-BANG Questionnaire  Gregorio F et al. Anesthesiology 2008;108:812-21.      History of Present Illness     Current Outpatient Medications   Medication Sig Dispense Refill   • allopurinol (ZYLOPRIM) 300 MG tablet Take 300 mg by mouth Daily.     • amLODIPine (NORVASC) 10 MG tablet Take 1 tablet by mouth Daily. 30 tablet 1   • aspirin 81 MG chewable tablet Chew 81 mg Daily.     • carvedilol (COREG) 12.5 MG tablet Take 12.5 mg by mouth 2 (Two) Times a Day With Meals.     • Cholecalciferol (VITAMIN D) 1000 units tablet Take 1,000 Units by mouth.     • CloNIDine (CATAPRES) 0.1 MG tablet Take 0.1 mg by mouth.     • colchicine 0.6 MG tablet Take 0.6 mg by mouth " Daily As Needed (gout flare-up).     • doxazosin (CARDURA) 1 MG tablet Take 1 mg by mouth Every Night.     • folic acid (FOLVITE) 400 MCG tablet Take 400 mcg by mouth.     • furosemide (LASIX) 40 MG tablet Take 40 mg by mouth.     • gabapentin (NEURONTIN) 100 MG capsule Take 2 capsules by mouth Every 12 (Twelve) Hours. 40 capsule 0   • glimepiride (AMARYL) 4 MG tablet Take 4 mg by mouth Every 12 (Twelve) Hours. Needs pm dose     • Insulin Glargine (LANTUS SOLOSTAR) 100 UNIT/ML injection pen Inject 15 Units under the skin At Night As Needed (BG >120).     • isosorbide mononitrate (IMDUR) 30 MG 24 hr tablet      • meclizine (ANTIVERT) 25 MG tablet Take 25 mg by mouth.     • metoprolol succinate XL (TOPROL XL) 25 MG 24 hr tablet Take 25 mg by mouth.     • nitroglycerin (NITROSTAT) 0.4 MG SL tablet      • ondansetron (ZOFRAN) 4 MG tablet Take 1 tablet by mouth Every 8 (Eight) Hours As Needed for Nausea or Vomiting. 15 tablet 0   • pantoprazole (PROTONIX) 40 MG EC tablet Take 40 mg by mouth Every 12 (Twelve) Hours.     • potassium chloride (K-DUR,KLOR-CON) 10 MEQ CR tablet Take 10 mEq by mouth Every 12 (Twelve) Hours.     • pravastatin (PRAVACHOL) 40 MG tablet Take 40 mg by mouth Every Night.     • prednisoLONE acetate (PRED FORTE) 1 % ophthalmic suspension Administer 1 drop into the left eye Every 2 (Two) Hours While Awake.     • Pyridoxine HCl (VITAMIN B6) 200 MG tablet Take 1 tablet by mouth Daily.     • rOPINIRole (REQUIP) 1 MG tablet Take 1 mg by mouth Every Night. Take 1 hour before bedtime.     • sennosides-docusate sodium (SENOKOT-S) 8.6-50 MG tablet Take 2 tablets by mouth 2 (Two) Times a Day As Needed for constipation. 45 tablet 2   • thiamine (VITAMIN B-1) 100 MG tablet Take 100 mg by mouth.     • vitamin C (ASCORBIC ACID) 500 MG tablet Take 500 mg by mouth Daily.       No current facility-administered medications for this visit.        Past Medical History:   Diagnosis Date   • A-fib (CMS/HCC) 11/15/2016   •  Anemia     gets shots every few months to build up blood. sees dr adam.   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)    • Chronic kidney disease    • COPD (chronic obstructive pulmonary disease) (CMS/[facility])    • DDD (degenerative disc disease), lumbar 6/27/2017   • Heart murmur    • HTN (hypertension) 11/15/2016   • Hyperlipidemia    • Lumbar stenosis with neurogenic claudication 6/27/2017   • Myocardial infarction (CMS/[facility])    • Sleep apnea    • Stroke (CMS/[facility])    • Type 2 diabetes mellitus (CMS/[facility]) 11/15/2016   • Ulcer of abdomen wall (CMS/[facility])        Past Surgical History:   Procedure Laterality Date   • APPENDECTOMY     • BACK SURGERY      x2   • CARDIAC CATHETERIZATION Left     1/2010   • CARPAL TUNNEL RELEASE Bilateral    • CHOLECYSTECTOMY     • COLONOSCOPY N/A 9/7/2017    Procedure: COLONOSCOPY WITH ANESTHESIA;  Surgeon: Joshua Rich DO;  Location: [facility] ENDOSCOPY;  Service:    • CORONARY ARTERY BYPASS GRAFT      2/2006 w/PTCA & KIMMY    • CORONARY STENT PLACEMENT     • ENDOSCOPY N/A 12/14/2016    Procedure: ESOPHAGOGASTRODUODENOSCOPY WITH ANESTHESIA;  Surgeon: Isabel Dexter MD;  Location: [facility] ENDOSCOPY;  Service:    • ENDOSCOPY N/A 12/16/2016    Procedure: ESOPHAGOGASTRODUODENOSCOPY WITH ANESTHESIA;  Surgeon: Joshua Rich DO;  Location: [facility] ENDOSCOPY;  Service:    • ENDOSCOPY N/A 4/10/2017    Procedure: ESOPHAGOGASTRODUODENOSCOPY WITH ANESTHESIA;  Surgeon: Joshua Rich DO;  Location: [facility] ENDOSCOPY;  Service:    • EYE SURGERY Left     x 2   • JOINT REPLACEMENT     • PERIPHERAL ARTERIAL STENT GRAFT     • REPLACEMENT TOTAL KNEE Left     2002   • SHOULDER ROTATOR CUFF REPAIR Bilateral        family history includes Cancer in his mother, sister, and sister; Coronary artery disease in his brother and father; Heart attack in his brother; Heart  "disease in his father and son; No Known Problems in his sister.    Social History     Tobacco Use   • Smoking status: Never Smoker   • Smokeless tobacco: Never Used   Substance Use Topics   • Alcohol use: No   • Drug use: No       Review of Systems   Constitutional: Positive for fatigue.   HENT: Negative.    Eyes: Negative.    Respiratory: Negative.  Negative for shortness of breath.         Snoring   Cardiovascular: Negative.  Negative for chest pain, palpitations and leg swelling.   Gastrointestinal: Negative.    Endocrine: Negative.    Genitourinary: Negative.    Musculoskeletal: Positive for arthralgias and gait problem.   Skin: Negative.    Allergic/Immunologic: Negative.    Neurological: Negative for headache.   Hematological: Negative.    Psychiatric/Behavioral: Negative.    All other systems reviewed and are negative.      Objective     /68 (BP Location: Left arm, Patient Position: Sitting)   Pulse 62   Ht 172.7 cm (68\")   Wt 94.3 kg (208 lb)   BMI 31.63 kg/m² , Body mass index is 31.63 kg/m².    Physical Exam   Constitutional: He is oriented to person, place, and time. He appears well-developed and well-nourished.   HENT:   Head: Normocephalic and atraumatic.   Right Ear: Hearing normal.   Left Ear: Hearing normal. An impacted cerumen is present.  Mallampati class II   Eyes: EOM are normal. Pupils are equal, round, and reactive to light.   Neck: Normal range of motion. Neck supple.   Cardiovascular: Normal rate, normal heart sounds and intact distal pulses.   Pulmonary/Chest: Effort normal and breath sounds normal.   Abdominal: Soft.   Musculoskeletal: Normal range of motion.   Neurological: He is alert and oriented to person, place, and time. He has normal strength and normal reflexes. He displays a negative Romberg sign. Gait abnormal.   Skin: Skin is warm and dry. Capillary refill takes less than 2 seconds.   Psychiatric: He has a normal mood and affect. His speech is normal and behavior is " normal. Cognition and memory are normal.   Nursing note and vitals reviewed.        Results for orders placed or performed during the hospital encounter of 17   POC Glucose Fingerstick   Result Value Ref Range    Glucose 136 (H) 70 - 130 mg/dL        ASSESSMENT/PLAN    Eron was seen today for sleep apnea.    Diagnoses and all orders for this visit:    RICARDO (obstructive sleep apnea)    Essential hypertension    Bradycardia    CAD in native artery    Type 2 diabetes mellitus with complication, without long-term current use of insulin (CMS/[facility])          Allergies and all known medications/prescriptions have been reviewed using resources available on this encounter.    Patient's Body mass index is 31.63 kg/m². BMI is above normal parameters. Recommendations include: referral to primary care.    Return in about 4 weeks (around 2019).    MEDICAL DECISION MAKIN.  Reviewed polysomnography testing from 2019 with patient today.  He was titrated to a pressure setting at CPAP of 10 cm H2O.  His AHI on that therapy while in lab was 2.8.  There were some extrasystoles and questionable PVCs noted on testing.  Patient states that he is followed up with his cardiologist and had a heart cath since that time and now follows with primary care in regards to blood pressure issues.  He denies any palpitations, chest pain.  I will forward this test to his primary care doctor for further evaluation to see if any additional testing is needed.  He is yet to be set up on his CPAP therapy.  I did speak with the sleep lab in regards to this and they are faxing order for CPAP therapy over to [facility] to get patient set up.  2.  BP to be managed per PCP.  3.  Continue to follow with cardiology as scheduled.  4.  Counseled on multimodal treatment of sleep apnea including but not limited to sleep hygiene, diet, compliance with Pap therapy.  Discussed risk of untreated sleep apnea including but not limited to  increased risk of hypertension, heart attack, stroke, cardiac arrhythmia.  5.  Patient was advised to seek medical attention immediately with any chest pain or abnormal heart rhythm.  6.  We will follow-up with patient again in 4 weeks at this point he should be established on therapy and  used it for a few days.  We will obtain a compliance download and evaluate his AHI at that time.        ANJANA Hyman

## 2019-03-21 PROBLEM — R07.9 CHEST PAIN: Status: ACTIVE | Noted: 2019-03-21

## 2019-03-21 LAB
BILIRUBIN URINE: NEGATIVE
BLOOD, URINE: NEGATIVE
CLARITY: CLEAR
COLOR: YELLOW
EKG P AXIS: 55 DEGREES
EKG P AXIS: 66 DEGREES
EKG P-R INTERVAL: 212 MS
EKG P-R INTERVAL: 212 MS
EKG Q-T INTERVAL: 444 MS
EKG Q-T INTERVAL: 460 MS
EKG QRS DURATION: 104 MS
EKG QRS DURATION: 104 MS
EKG QTC CALCULATION (BAZETT): 443 MS
EKG QTC CALCULATION (BAZETT): 452 MS
EKG T AXIS: 66 DEGREES
EKG T AXIS: 73 DEGREES
GLUCOSE BLD-MCNC: 117 MG/DL (ref 70–99)
GLUCOSE URINE: NEGATIVE MG/DL
KETONES, URINE: NEGATIVE MG/DL
LEUKOCYTE ESTERASE, URINE: NEGATIVE
NITRITE, URINE: NEGATIVE
PERFORMED ON: ABNORMAL
PH UA: 6.5 (ref 5–8)
PROTEIN UA: NEGATIVE MG/DL
SPECIFIC GRAVITY UA: 1.02 (ref 1–1.03)
TROPONIN: 0.08 NG/ML (ref 0–0.03)
TROPONIN: 0.09 NG/ML (ref 0–0.03)
URINE REFLEX TO CULTURE: NORMAL
UROBILINOGEN, URINE: 0.2 E.U./DL

## 2019-03-21 PROCEDURE — 6370000000 HC RX 637 (ALT 250 FOR IP): Performed by: EMERGENCY MEDICINE

## 2019-03-21 PROCEDURE — 81003 URINALYSIS AUTO W/O SCOPE: CPT

## 2019-03-21 PROCEDURE — G0378 HOSPITAL OBSERVATION PER HR: HCPCS

## 2019-03-21 PROCEDURE — 6360000002 HC RX W HCPCS: Performed by: FAMILY MEDICINE

## 2019-03-21 PROCEDURE — 6370000000 HC RX 637 (ALT 250 FOR IP): Performed by: FAMILY MEDICINE

## 2019-03-21 PROCEDURE — 99255 IP/OBS CONSLTJ NEW/EST HI 80: CPT | Performed by: INTERNAL MEDICINE

## 2019-03-21 PROCEDURE — 82948 REAGENT STRIP/BLOOD GLUCOSE: CPT

## 2019-03-21 PROCEDURE — 84484 ASSAY OF TROPONIN QUANT: CPT

## 2019-03-21 PROCEDURE — 99285 EMERGENCY DEPT VISIT HI MDM: CPT | Performed by: EMERGENCY MEDICINE

## 2019-03-21 PROCEDURE — 96372 THER/PROPH/DIAG INJ SC/IM: CPT

## 2019-03-21 PROCEDURE — 2580000003 HC RX 258: Performed by: FAMILY MEDICINE

## 2019-03-21 PROCEDURE — 93005 ELECTROCARDIOGRAM TRACING: CPT

## 2019-03-21 PROCEDURE — 36415 COLL VENOUS BLD VENIPUNCTURE: CPT

## 2019-03-21 RX ORDER — THIAMINE MONONITRATE (VIT B1) 100 MG
100 TABLET ORAL DAILY
Status: DISCONTINUED | OUTPATIENT
Start: 2019-03-21 | End: 2019-03-26 | Stop reason: HOSPADM

## 2019-03-21 RX ORDER — PANTOPRAZOLE SODIUM 40 MG/1
40 TABLET, DELAYED RELEASE ORAL 2 TIMES DAILY
Status: DISCONTINUED | OUTPATIENT
Start: 2019-03-21 | End: 2019-03-26 | Stop reason: HOSPADM

## 2019-03-21 RX ORDER — PRAVASTATIN SODIUM 20 MG
40 TABLET ORAL DAILY
Status: DISCONTINUED | OUTPATIENT
Start: 2019-03-21 | End: 2019-03-26 | Stop reason: HOSPADM

## 2019-03-21 RX ORDER — PREDNISOLONE ACETATE 10 MG/ML
1 SUSPENSION/ DROPS OPHTHALMIC
Status: DISCONTINUED | OUTPATIENT
Start: 2019-03-21 | End: 2019-03-26 | Stop reason: HOSPADM

## 2019-03-21 RX ORDER — ISOSORBIDE MONONITRATE 60 MG/1
60 TABLET, EXTENDED RELEASE ORAL DAILY
Status: DISCONTINUED | OUTPATIENT
Start: 2019-03-21 | End: 2019-03-26 | Stop reason: HOSPADM

## 2019-03-21 RX ORDER — COLCHICINE 0.6 MG/1
0.6 TABLET ORAL PRN
Status: DISCONTINUED | OUTPATIENT
Start: 2019-03-21 | End: 2019-03-26 | Stop reason: HOSPADM

## 2019-03-21 RX ORDER — LIDOCAINE 4 G/G
1 PATCH TOPICAL DAILY
Status: DISCONTINUED | OUTPATIENT
Start: 2019-03-21 | End: 2019-03-26 | Stop reason: HOSPADM

## 2019-03-21 RX ORDER — SODIUM CHLORIDE 9 MG/ML
INJECTION, SOLUTION INTRAVENOUS CONTINUOUS
Status: DISCONTINUED | OUTPATIENT
Start: 2019-03-21 | End: 2019-03-22

## 2019-03-21 RX ORDER — ALLOPURINOL 300 MG/1
600 TABLET ORAL DAILY
Status: DISCONTINUED | OUTPATIENT
Start: 2019-03-21 | End: 2019-03-26 | Stop reason: HOSPADM

## 2019-03-21 RX ORDER — ONDANSETRON 2 MG/ML
4 INJECTION INTRAMUSCULAR; INTRAVENOUS EVERY 6 HOURS PRN
Status: DISCONTINUED | OUTPATIENT
Start: 2019-03-21 | End: 2019-03-26 | Stop reason: HOSPADM

## 2019-03-21 RX ORDER — SODIUM CHLORIDE 0.9 % (FLUSH) 0.9 %
10 SYRINGE (ML) INJECTION PRN
Status: DISCONTINUED | OUTPATIENT
Start: 2019-03-21 | End: 2019-03-26 | Stop reason: HOSPADM

## 2019-03-21 RX ORDER — TAMSULOSIN HYDROCHLORIDE 0.4 MG/1
0.4 CAPSULE ORAL DAILY
Status: DISCONTINUED | OUTPATIENT
Start: 2019-03-21 | End: 2019-03-26 | Stop reason: HOSPADM

## 2019-03-21 RX ORDER — ONDANSETRON 4 MG/1
4 TABLET, ORALLY DISINTEGRATING ORAL EVERY 8 HOURS PRN
Status: DISCONTINUED | OUTPATIENT
Start: 2019-03-21 | End: 2019-03-21

## 2019-03-21 RX ORDER — ASPIRIN 81 MG/1
81 TABLET, CHEWABLE ORAL DAILY
Status: DISCONTINUED | OUTPATIENT
Start: 2019-03-21 | End: 2019-03-21

## 2019-03-21 RX ORDER — HYDRALAZINE HYDROCHLORIDE 50 MG/1
50 TABLET, FILM COATED ORAL EVERY 8 HOURS SCHEDULED
Status: DISCONTINUED | OUTPATIENT
Start: 2019-03-21 | End: 2019-03-26 | Stop reason: HOSPADM

## 2019-03-21 RX ORDER — AMLODIPINE BESYLATE 10 MG/1
10 TABLET ORAL DAILY
Status: DISCONTINUED | OUTPATIENT
Start: 2019-03-21 | End: 2019-03-26 | Stop reason: HOSPADM

## 2019-03-21 RX ORDER — LOSARTAN POTASSIUM 50 MG/1
50 TABLET ORAL 2 TIMES DAILY
Status: DISCONTINUED | OUTPATIENT
Start: 2019-03-21 | End: 2019-03-26 | Stop reason: HOSPADM

## 2019-03-21 RX ORDER — NITROGLYCERIN 0.4 MG/1
0.4 TABLET SUBLINGUAL EVERY 5 MIN PRN
Status: DISCONTINUED | OUTPATIENT
Start: 2019-03-21 | End: 2019-03-26 | Stop reason: HOSPADM

## 2019-03-21 RX ORDER — SODIUM CHLORIDE 0.9 % (FLUSH) 0.9 %
10 SYRINGE (ML) INJECTION EVERY 12 HOURS SCHEDULED
Status: DISCONTINUED | OUTPATIENT
Start: 2019-03-21 | End: 2019-03-26 | Stop reason: HOSPADM

## 2019-03-21 RX ORDER — CLONIDINE HYDROCHLORIDE 0.1 MG/1
0.1 TABLET ORAL 2 TIMES DAILY
Status: DISCONTINUED | OUTPATIENT
Start: 2019-03-21 | End: 2019-03-26 | Stop reason: HOSPADM

## 2019-03-21 RX ORDER — METOPROLOL SUCCINATE 25 MG/1
25 TABLET, EXTENDED RELEASE ORAL DAILY
Status: DISCONTINUED | OUTPATIENT
Start: 2019-03-21 | End: 2019-03-26 | Stop reason: HOSPADM

## 2019-03-21 RX ORDER — GABAPENTIN 100 MG/1
100 CAPSULE ORAL 2 TIMES DAILY
Status: DISCONTINUED | OUTPATIENT
Start: 2019-03-21 | End: 2019-03-26 | Stop reason: HOSPADM

## 2019-03-21 RX ORDER — LANOLIN ALCOHOL/MO/W.PET/CERES
400 CREAM (GRAM) TOPICAL DAILY
Status: DISCONTINUED | OUTPATIENT
Start: 2019-03-21 | End: 2019-03-21

## 2019-03-21 RX ORDER — SENNA AND DOCUSATE SODIUM 50; 8.6 MG/1; MG/1
2 TABLET, FILM COATED ORAL DAILY
Status: DISCONTINUED | OUTPATIENT
Start: 2019-03-21 | End: 2019-03-26 | Stop reason: HOSPADM

## 2019-03-21 RX ORDER — ASPIRIN 81 MG/1
81 TABLET, CHEWABLE ORAL DAILY
Status: DISCONTINUED | OUTPATIENT
Start: 2019-03-21 | End: 2019-03-26 | Stop reason: HOSPADM

## 2019-03-21 RX ORDER — ROPINIROLE 1 MG/1
1 TABLET, FILM COATED ORAL NIGHTLY
Status: DISCONTINUED | OUTPATIENT
Start: 2019-03-21 | End: 2019-03-26 | Stop reason: HOSPADM

## 2019-03-21 RX ADMIN — AMLODIPINE BESYLATE 10 MG: 10 TABLET ORAL at 08:48

## 2019-03-21 RX ADMIN — ISOSORBIDE MONONITRATE 60 MG: 60 TABLET, EXTENDED RELEASE ORAL at 08:48

## 2019-03-21 RX ADMIN — CLONIDINE HYDROCHLORIDE 0.1 MG: 0.1 TABLET ORAL at 08:47

## 2019-03-21 RX ADMIN — PANTOPRAZOLE SODIUM 40 MG: 40 TABLET, DELAYED RELEASE ORAL at 08:47

## 2019-03-21 RX ADMIN — HYDRALAZINE HYDROCHLORIDE 25 MG: 25 TABLET, FILM COATED ORAL at 00:24

## 2019-03-21 RX ADMIN — Medication 10 ML: at 08:49

## 2019-03-21 RX ADMIN — ALLOPURINOL 600 MG: 300 TABLET ORAL at 08:48

## 2019-03-21 RX ADMIN — LOSARTAN POTASSIUM 50 MG: 50 TABLET ORAL at 08:47

## 2019-03-21 RX ADMIN — PREDNISOLONE ACETATE 1 DROP: 10 SUSPENSION/ DROPS OPHTHALMIC at 11:53

## 2019-03-21 RX ADMIN — ASPIRIN 81 MG 81 MG: 81 TABLET ORAL at 08:48

## 2019-03-21 RX ADMIN — PANTOPRAZOLE SODIUM 40 MG: 40 TABLET, DELAYED RELEASE ORAL at 21:52

## 2019-03-21 RX ADMIN — HYDRALAZINE HYDROCHLORIDE 50 MG: 50 TABLET, FILM COATED ORAL at 15:16

## 2019-03-21 RX ADMIN — ENOXAPARIN SODIUM 30 MG: 30 INJECTION SUBCUTANEOUS at 08:48

## 2019-03-21 RX ADMIN — HYDRALAZINE HYDROCHLORIDE 50 MG: 50 TABLET, FILM COATED ORAL at 21:52

## 2019-03-21 RX ADMIN — CLONIDINE HYDROCHLORIDE 0.1 MG: 0.1 TABLET ORAL at 21:52

## 2019-03-21 RX ADMIN — GABAPENTIN 100 MG: 100 CAPSULE ORAL at 08:47

## 2019-03-21 RX ADMIN — SENNOSIDES AND DOCUSATE SODIUM 2 TABLET: 8.6; 5 TABLET ORAL at 08:47

## 2019-03-21 RX ADMIN — PREDNISOLONE ACETATE 1 DROP: 10 SUSPENSION/ DROPS OPHTHALMIC at 21:55

## 2019-03-21 RX ADMIN — CLONIDINE HYDROCHLORIDE 0.1 MG: 0.1 TABLET ORAL at 00:24

## 2019-03-21 RX ADMIN — PREDNISOLONE ACETATE 1 DROP: 10 SUSPENSION/ DROPS OPHTHALMIC at 17:28

## 2019-03-21 RX ADMIN — HYDRALAZINE HYDROCHLORIDE 50 MG: 50 TABLET, FILM COATED ORAL at 08:51

## 2019-03-21 RX ADMIN — METOPROLOL SUCCINATE 25 MG: 25 TABLET, EXTENDED RELEASE ORAL at 08:48

## 2019-03-21 RX ADMIN — ROPINIROLE HYDROCHLORIDE 1 MG: 1 TABLET, FILM COATED ORAL at 21:52

## 2019-03-21 RX ADMIN — LOSARTAN POTASSIUM 50 MG: 50 TABLET ORAL at 21:52

## 2019-03-21 RX ADMIN — PREDNISOLONE ACETATE 1 DROP: 10 SUSPENSION/ DROPS OPHTHALMIC at 15:16

## 2019-03-21 RX ADMIN — Medication 100 MG: at 08:47

## 2019-03-21 RX ADMIN — SODIUM CHLORIDE: 9 INJECTION, SOLUTION INTRAVENOUS at 21:52

## 2019-03-21 RX ADMIN — GABAPENTIN 100 MG: 100 CAPSULE ORAL at 21:52

## 2019-03-21 RX ADMIN — PRAVASTATIN SODIUM 40 MG: 20 TABLET ORAL at 08:47

## 2019-03-21 RX ADMIN — SODIUM CHLORIDE: 9 INJECTION, SOLUTION INTRAVENOUS at 09:25

## 2019-03-21 RX ADMIN — VITAMIN D, TAB 1000IU (100/BT) 1000 UNITS: 25 TAB at 08:47

## 2019-03-21 RX ADMIN — TAMSULOSIN HYDROCHLORIDE 0.4 MG: 0.4 CAPSULE ORAL at 08:47

## 2019-03-21 ASSESSMENT — PAIN SCALES - GENERAL
PAINLEVEL_OUTOF10: 0
PAINLEVEL_OUTOF10: 6
PAINLEVEL_OUTOF10: 0
PAINLEVEL_OUTOF10: 4
PAINLEVEL_OUTOF10: 6

## 2019-03-21 ASSESSMENT — PAIN DESCRIPTION - LOCATION
LOCATION: HEAD

## 2019-03-21 ASSESSMENT — PAIN DESCRIPTION - PAIN TYPE
TYPE: ACUTE PAIN

## 2019-03-22 ENCOUNTER — APPOINTMENT (OUTPATIENT)
Dept: NUCLEAR MEDICINE | Age: 84
End: 2019-03-22
Payer: COMMERCIAL

## 2019-03-22 PROBLEM — Z51.5 PALLIATIVE CARE PATIENT: Status: ACTIVE | Noted: 2018-03-06

## 2019-03-22 LAB
ANION GAP SERPL CALCULATED.3IONS-SCNC: 9 MMOL/L (ref 7–19)
BUN BLDV-MCNC: 44 MG/DL (ref 8–23)
CALCIUM SERPL-MCNC: 8.6 MG/DL (ref 8.8–10.2)
CHLORIDE BLD-SCNC: 111 MMOL/L (ref 98–111)
CHOLESTEROL, TOTAL: 131 MG/DL (ref 160–199)
CO2: 23 MMOL/L (ref 22–29)
CREAT SERPL-MCNC: 1.3 MG/DL (ref 0.5–1.2)
GFR NON-AFRICAN AMERICAN: 53
GLUCOSE BLD-MCNC: 118 MG/DL (ref 74–109)
HCT VFR BLD CALC: 27.6 % (ref 42–52)
HDLC SERPL-MCNC: 41 MG/DL (ref 55–121)
HEMOGLOBIN: 8.9 G/DL (ref 14–18)
LDL CHOLESTEROL CALCULATED: 77 MG/DL
MCH RBC QN AUTO: 33 PG (ref 27–31)
MCHC RBC AUTO-ENTMCNC: 32.2 G/DL (ref 33–37)
MCV RBC AUTO: 102.2 FL (ref 80–94)
PDW BLD-RTO: 14.3 % (ref 11.5–14.5)
PLATELET # BLD: 196 K/UL (ref 130–400)
PMV BLD AUTO: 9.9 FL (ref 9.4–12.4)
POTASSIUM SERPL-SCNC: 4 MMOL/L (ref 3.5–5)
RBC # BLD: 2.7 M/UL (ref 4.7–6.1)
SODIUM BLD-SCNC: 143 MMOL/L (ref 136–145)
TRIGL SERPL-MCNC: 63 MG/DL (ref 0–149)
WBC # BLD: 4.6 K/UL (ref 4.8–10.8)

## 2019-03-22 PROCEDURE — G0378 HOSPITAL OBSERVATION PER HR: HCPCS

## 2019-03-22 PROCEDURE — 80061 LIPID PANEL: CPT

## 2019-03-22 PROCEDURE — 6360000002 HC RX W HCPCS: Performed by: FAMILY MEDICINE

## 2019-03-22 PROCEDURE — 85027 COMPLETE CBC AUTOMATED: CPT

## 2019-03-22 PROCEDURE — 96372 THER/PROPH/DIAG INJ SC/IM: CPT

## 2019-03-22 PROCEDURE — 2580000003 HC RX 258: Performed by: FAMILY MEDICINE

## 2019-03-22 PROCEDURE — A9500 TC99M SESTAMIBI: HCPCS | Performed by: INTERNAL MEDICINE

## 2019-03-22 PROCEDURE — 6360000002 HC RX W HCPCS: Performed by: INTERNAL MEDICINE

## 2019-03-22 PROCEDURE — 80048 BASIC METABOLIC PNL TOTAL CA: CPT

## 2019-03-22 PROCEDURE — 93017 CV STRESS TEST TRACING ONLY: CPT

## 2019-03-22 PROCEDURE — 96374 THER/PROPH/DIAG INJ IV PUSH: CPT

## 2019-03-22 PROCEDURE — 99232 SBSQ HOSP IP/OBS MODERATE 35: CPT | Performed by: INTERNAL MEDICINE

## 2019-03-22 PROCEDURE — 3430000000 HC RX DIAGNOSTIC RADIOPHARMACEUTICAL: Performed by: INTERNAL MEDICINE

## 2019-03-22 PROCEDURE — 78452 HT MUSCLE IMAGE SPECT MULT: CPT

## 2019-03-22 PROCEDURE — 6370000000 HC RX 637 (ALT 250 FOR IP): Performed by: FAMILY MEDICINE

## 2019-03-22 RX ORDER — POTASSIUM CHLORIDE 750 MG/1
10 TABLET, EXTENDED RELEASE ORAL 2 TIMES DAILY
Status: DISCONTINUED | OUTPATIENT
Start: 2019-03-22 | End: 2019-03-24

## 2019-03-22 RX ORDER — FUROSEMIDE 10 MG/ML
20 INJECTION INTRAMUSCULAR; INTRAVENOUS ONCE
Status: COMPLETED | OUTPATIENT
Start: 2019-03-22 | End: 2019-03-22

## 2019-03-22 RX ORDER — FUROSEMIDE 40 MG/1
40 TABLET ORAL DAILY
Status: DISCONTINUED | OUTPATIENT
Start: 2019-03-22 | End: 2019-03-26 | Stop reason: HOSPADM

## 2019-03-22 RX ADMIN — ENOXAPARIN SODIUM 40 MG: 40 INJECTION SUBCUTANEOUS at 11:55

## 2019-03-22 RX ADMIN — TETRAKIS(2-METHOXYISOBUTYLISOCYANIDE)COPPER(I) TETRAFLUOROBORATE 30 MILLICURIE: 1 INJECTION, POWDER, LYOPHILIZED, FOR SOLUTION INTRAVENOUS at 11:26

## 2019-03-22 RX ADMIN — LOSARTAN POTASSIUM 50 MG: 50 TABLET ORAL at 20:08

## 2019-03-22 RX ADMIN — PANTOPRAZOLE SODIUM 40 MG: 40 TABLET, DELAYED RELEASE ORAL at 11:39

## 2019-03-22 RX ADMIN — PREDNISOLONE ACETATE 1 DROP: 10 SUSPENSION/ DROPS OPHTHALMIC at 11:52

## 2019-03-22 RX ADMIN — Medication 10 ML: at 11:50

## 2019-03-22 RX ADMIN — ALLOPURINOL 600 MG: 300 TABLET ORAL at 11:37

## 2019-03-22 RX ADMIN — ASPIRIN 81 MG 81 MG: 81 TABLET ORAL at 11:38

## 2019-03-22 RX ADMIN — Medication 10 ML: at 20:08

## 2019-03-22 RX ADMIN — METOPROLOL SUCCINATE 25 MG: 25 TABLET, EXTENDED RELEASE ORAL at 11:39

## 2019-03-22 RX ADMIN — REGADENOSON 0.4 MG: 0.08 INJECTION, SOLUTION INTRAVENOUS at 09:28

## 2019-03-22 RX ADMIN — ROPINIROLE HYDROCHLORIDE 1 MG: 1 TABLET, FILM COATED ORAL at 20:07

## 2019-03-22 RX ADMIN — CLONIDINE HYDROCHLORIDE 0.1 MG: 0.1 TABLET ORAL at 20:08

## 2019-03-22 RX ADMIN — PREDNISOLONE ACETATE 1 DROP: 10 SUSPENSION/ DROPS OPHTHALMIC at 22:30

## 2019-03-22 RX ADMIN — PANTOPRAZOLE SODIUM 40 MG: 40 TABLET, DELAYED RELEASE ORAL at 20:08

## 2019-03-22 RX ADMIN — HYDRALAZINE HYDROCHLORIDE 50 MG: 50 TABLET, FILM COATED ORAL at 22:30

## 2019-03-22 RX ADMIN — TAMSULOSIN HYDROCHLORIDE 0.4 MG: 0.4 CAPSULE ORAL at 11:37

## 2019-03-22 RX ADMIN — GABAPENTIN 100 MG: 100 CAPSULE ORAL at 20:08

## 2019-03-22 RX ADMIN — HYDRALAZINE HYDROCHLORIDE 50 MG: 50 TABLET, FILM COATED ORAL at 14:50

## 2019-03-22 RX ADMIN — GABAPENTIN 100 MG: 100 CAPSULE ORAL at 11:38

## 2019-03-22 RX ADMIN — PRAVASTATIN SODIUM 40 MG: 20 TABLET ORAL at 11:37

## 2019-03-22 RX ADMIN — POTASSIUM CHLORIDE 10 MEQ: 10 TABLET, EXTENDED RELEASE ORAL at 11:39

## 2019-03-22 RX ADMIN — FUROSEMIDE 40 MG: 40 TABLET ORAL at 11:39

## 2019-03-22 RX ADMIN — VITAMIN D, TAB 1000IU (100/BT) 1000 UNITS: 25 TAB at 11:34

## 2019-03-22 RX ADMIN — ISOSORBIDE MONONITRATE 60 MG: 60 TABLET, EXTENDED RELEASE ORAL at 11:38

## 2019-03-22 RX ADMIN — POTASSIUM CHLORIDE 10 MEQ: 10 TABLET, EXTENDED RELEASE ORAL at 20:08

## 2019-03-22 RX ADMIN — SENNOSIDES AND DOCUSATE SODIUM 2 TABLET: 8.6; 5 TABLET ORAL at 11:37

## 2019-03-22 RX ADMIN — TETRAKIS(2-METHOXYISOBUTYLISOCYANIDE)COPPER(I) TETRAFLUOROBORATE 10 MILLICURIE: 1 INJECTION, POWDER, LYOPHILIZED, FOR SOLUTION INTRAVENOUS at 11:26

## 2019-03-22 RX ADMIN — HYDRALAZINE HYDROCHLORIDE 50 MG: 50 TABLET, FILM COATED ORAL at 06:12

## 2019-03-22 RX ADMIN — PREDNISOLONE ACETATE 1 DROP: 10 SUSPENSION/ DROPS OPHTHALMIC at 06:13

## 2019-03-22 RX ADMIN — LOSARTAN POTASSIUM 50 MG: 50 TABLET ORAL at 11:39

## 2019-03-22 RX ADMIN — FUROSEMIDE 20 MG: 10 INJECTION, SOLUTION INTRAMUSCULAR; INTRAVENOUS at 11:40

## 2019-03-22 RX ADMIN — Medication 100 MG: at 11:36

## 2019-03-22 RX ADMIN — AMLODIPINE BESYLATE 10 MG: 10 TABLET ORAL at 11:38

## 2019-03-22 RX ADMIN — CLONIDINE HYDROCHLORIDE 0.1 MG: 0.1 TABLET ORAL at 11:39

## 2019-03-22 ASSESSMENT — PAIN SCALES - GENERAL
PAINLEVEL_OUTOF10: 0

## 2019-03-23 ENCOUNTER — APPOINTMENT (OUTPATIENT)
Dept: GENERAL RADIOLOGY | Age: 84
End: 2019-03-23
Payer: COMMERCIAL

## 2019-03-23 LAB
ANION GAP SERPL CALCULATED.3IONS-SCNC: 7 MMOL/L (ref 7–19)
BUN BLDV-MCNC: 39 MG/DL (ref 8–23)
CALCIUM SERPL-MCNC: 8.6 MG/DL (ref 8.8–10.2)
CHLORIDE BLD-SCNC: 108 MMOL/L (ref 98–111)
CO2: 24 MMOL/L (ref 22–29)
CREAT SERPL-MCNC: 1.2 MG/DL (ref 0.5–1.2)
GFR NON-AFRICAN AMERICAN: 58
GLUCOSE BLD-MCNC: 112 MG/DL (ref 70–99)
GLUCOSE BLD-MCNC: 114 MG/DL (ref 74–109)
GLUCOSE BLD-MCNC: 119 MG/DL (ref 70–99)
GLUCOSE BLD-MCNC: 133 MG/DL (ref 70–99)
GLUCOSE BLD-MCNC: 142 MG/DL (ref 70–99)
PERFORMED ON: ABNORMAL
POTASSIUM SERPL-SCNC: 4.5 MMOL/L (ref 3.5–5)
SODIUM BLD-SCNC: 139 MMOL/L (ref 136–145)

## 2019-03-23 PROCEDURE — 2580000003 HC RX 258: Performed by: FAMILY MEDICINE

## 2019-03-23 PROCEDURE — 6370000000 HC RX 637 (ALT 250 FOR IP): Performed by: FAMILY MEDICINE

## 2019-03-23 PROCEDURE — 71046 X-RAY EXAM CHEST 2 VIEWS: CPT

## 2019-03-23 PROCEDURE — 6360000002 HC RX W HCPCS: Performed by: FAMILY MEDICINE

## 2019-03-23 PROCEDURE — 80048 BASIC METABOLIC PNL TOTAL CA: CPT

## 2019-03-23 PROCEDURE — 82948 REAGENT STRIP/BLOOD GLUCOSE: CPT

## 2019-03-23 PROCEDURE — G0378 HOSPITAL OBSERVATION PER HR: HCPCS

## 2019-03-23 PROCEDURE — 96372 THER/PROPH/DIAG INJ SC/IM: CPT

## 2019-03-23 PROCEDURE — 36415 COLL VENOUS BLD VENIPUNCTURE: CPT

## 2019-03-23 PROCEDURE — 99232 SBSQ HOSP IP/OBS MODERATE 35: CPT | Performed by: INTERNAL MEDICINE

## 2019-03-23 RX ORDER — AZITHROMYCIN 250 MG/1
500 TABLET, FILM COATED ORAL ONCE
Status: COMPLETED | OUTPATIENT
Start: 2019-03-23 | End: 2019-03-23

## 2019-03-23 RX ORDER — AZITHROMYCIN 250 MG/1
250 TABLET, FILM COATED ORAL DAILY
Status: DISCONTINUED | OUTPATIENT
Start: 2019-03-24 | End: 2019-03-26 | Stop reason: HOSPADM

## 2019-03-23 RX ORDER — DEXTROSE MONOHYDRATE 25 G/50ML
12.5 INJECTION, SOLUTION INTRAVENOUS PRN
Status: DISCONTINUED | OUTPATIENT
Start: 2019-03-23 | End: 2019-03-26 | Stop reason: HOSPADM

## 2019-03-23 RX ORDER — DEXTROSE MONOHYDRATE 50 MG/ML
100 INJECTION, SOLUTION INTRAVENOUS PRN
Status: DISCONTINUED | OUTPATIENT
Start: 2019-03-23 | End: 2019-03-26 | Stop reason: HOSPADM

## 2019-03-23 RX ORDER — NICOTINE POLACRILEX 4 MG
15 LOZENGE BUCCAL PRN
Status: DISCONTINUED | OUTPATIENT
Start: 2019-03-23 | End: 2019-03-26 | Stop reason: HOSPADM

## 2019-03-23 RX ORDER — GUAIFENESIN 600 MG/1
600 TABLET, EXTENDED RELEASE ORAL 2 TIMES DAILY
Status: DISCONTINUED | OUTPATIENT
Start: 2019-03-23 | End: 2019-03-26 | Stop reason: HOSPADM

## 2019-03-23 RX ADMIN — GUAIFENESIN 600 MG: 600 TABLET, EXTENDED RELEASE ORAL at 09:03

## 2019-03-23 RX ADMIN — ALLOPURINOL 600 MG: 300 TABLET ORAL at 08:51

## 2019-03-23 RX ADMIN — ENOXAPARIN SODIUM 40 MG: 40 INJECTION SUBCUTANEOUS at 08:51

## 2019-03-23 RX ADMIN — Medication 10 ML: at 21:47

## 2019-03-23 RX ADMIN — Medication 100 MG: at 08:51

## 2019-03-23 RX ADMIN — AZITHROMYCIN 500 MG: 250 TABLET, FILM COATED ORAL at 08:50

## 2019-03-23 RX ADMIN — POTASSIUM CHLORIDE 10 MEQ: 10 TABLET, EXTENDED RELEASE ORAL at 08:51

## 2019-03-23 RX ADMIN — PREDNISOLONE ACETATE 1 DROP: 10 SUSPENSION/ DROPS OPHTHALMIC at 21:48

## 2019-03-23 RX ADMIN — LOSARTAN POTASSIUM 50 MG: 50 TABLET ORAL at 08:51

## 2019-03-23 RX ADMIN — LOSARTAN POTASSIUM 50 MG: 50 TABLET ORAL at 21:45

## 2019-03-23 RX ADMIN — SENNOSIDES AND DOCUSATE SODIUM 2 TABLET: 8.6; 5 TABLET ORAL at 08:50

## 2019-03-23 RX ADMIN — METOPROLOL SUCCINATE 25 MG: 25 TABLET, EXTENDED RELEASE ORAL at 08:51

## 2019-03-23 RX ADMIN — HYDRALAZINE HYDROCHLORIDE 50 MG: 50 TABLET, FILM COATED ORAL at 21:45

## 2019-03-23 RX ADMIN — ASPIRIN 81 MG 81 MG: 81 TABLET ORAL at 06:30

## 2019-03-23 RX ADMIN — HYDRALAZINE HYDROCHLORIDE 50 MG: 50 TABLET, FILM COATED ORAL at 13:31

## 2019-03-23 RX ADMIN — PREDNISOLONE ACETATE 1 DROP: 10 SUSPENSION/ DROPS OPHTHALMIC at 18:15

## 2019-03-23 RX ADMIN — GABAPENTIN 100 MG: 100 CAPSULE ORAL at 21:45

## 2019-03-23 RX ADMIN — PREDNISOLONE ACETATE 1 DROP: 10 SUSPENSION/ DROPS OPHTHALMIC at 14:38

## 2019-03-23 RX ADMIN — Medication 10 ML: at 08:51

## 2019-03-23 RX ADMIN — PREDNISOLONE ACETATE 1 DROP: 10 SUSPENSION/ DROPS OPHTHALMIC at 10:36

## 2019-03-23 RX ADMIN — CLONIDINE HYDROCHLORIDE 0.1 MG: 0.1 TABLET ORAL at 21:45

## 2019-03-23 RX ADMIN — GABAPENTIN 100 MG: 100 CAPSULE ORAL at 08:50

## 2019-03-23 RX ADMIN — CLONIDINE HYDROCHLORIDE 0.1 MG: 0.1 TABLET ORAL at 08:50

## 2019-03-23 RX ADMIN — GUAIFENESIN 600 MG: 600 TABLET, EXTENDED RELEASE ORAL at 21:45

## 2019-03-23 RX ADMIN — PREDNISOLONE ACETATE 1 DROP: 10 SUSPENSION/ DROPS OPHTHALMIC at 06:31

## 2019-03-23 RX ADMIN — TAMSULOSIN HYDROCHLORIDE 0.4 MG: 0.4 CAPSULE ORAL at 08:51

## 2019-03-23 RX ADMIN — HYDRALAZINE HYDROCHLORIDE 50 MG: 50 TABLET, FILM COATED ORAL at 06:29

## 2019-03-23 RX ADMIN — AMLODIPINE BESYLATE 10 MG: 10 TABLET ORAL at 08:50

## 2019-03-23 RX ADMIN — PRAVASTATIN SODIUM 40 MG: 20 TABLET ORAL at 08:51

## 2019-03-23 RX ADMIN — INSULIN LISPRO 2 UNITS: 100 INJECTION, SOLUTION INTRAVENOUS; SUBCUTANEOUS at 18:15

## 2019-03-23 RX ADMIN — ROPINIROLE HYDROCHLORIDE 1 MG: 1 TABLET, FILM COATED ORAL at 21:45

## 2019-03-23 RX ADMIN — PANTOPRAZOLE SODIUM 40 MG: 40 TABLET, DELAYED RELEASE ORAL at 21:45

## 2019-03-23 RX ADMIN — PANTOPRAZOLE SODIUM 40 MG: 40 TABLET, DELAYED RELEASE ORAL at 08:51

## 2019-03-23 RX ADMIN — ISOSORBIDE MONONITRATE 60 MG: 60 TABLET, EXTENDED RELEASE ORAL at 08:51

## 2019-03-23 RX ADMIN — VITAMIN D, TAB 1000IU (100/BT) 1000 UNITS: 25 TAB at 08:51

## 2019-03-23 RX ADMIN — FUROSEMIDE 40 MG: 40 TABLET ORAL at 08:50

## 2019-03-23 RX ADMIN — POTASSIUM CHLORIDE 10 MEQ: 10 TABLET, EXTENDED RELEASE ORAL at 21:45

## 2019-03-24 LAB
ANION GAP SERPL CALCULATED.3IONS-SCNC: 8 MMOL/L (ref 7–19)
BUN BLDV-MCNC: 37 MG/DL (ref 8–23)
CALCIUM SERPL-MCNC: 9.2 MG/DL (ref 8.8–10.2)
CHLORIDE BLD-SCNC: 108 MMOL/L (ref 98–111)
CO2: 25 MMOL/L (ref 22–29)
CREAT SERPL-MCNC: 1.3 MG/DL (ref 0.5–1.2)
GFR NON-AFRICAN AMERICAN: 53
GLUCOSE BLD-MCNC: 107 MG/DL (ref 74–109)
GLUCOSE BLD-MCNC: 121 MG/DL (ref 70–99)
GLUCOSE BLD-MCNC: 168 MG/DL (ref 70–99)
GLUCOSE BLD-MCNC: 210 MG/DL (ref 70–99)
GLUCOSE BLD-MCNC: 68 MG/DL (ref 70–99)
PERFORMED ON: ABNORMAL
POTASSIUM SERPL-SCNC: 5 MMOL/L (ref 3.5–5)
SODIUM BLD-SCNC: 141 MMOL/L (ref 136–145)

## 2019-03-24 PROCEDURE — G0378 HOSPITAL OBSERVATION PER HR: HCPCS

## 2019-03-24 PROCEDURE — 6360000002 HC RX W HCPCS: Performed by: FAMILY MEDICINE

## 2019-03-24 PROCEDURE — 93880 EXTRACRANIAL BILAT STUDY: CPT

## 2019-03-24 PROCEDURE — 96372 THER/PROPH/DIAG INJ SC/IM: CPT

## 2019-03-24 PROCEDURE — 80048 BASIC METABOLIC PNL TOTAL CA: CPT

## 2019-03-24 PROCEDURE — 6370000000 HC RX 637 (ALT 250 FOR IP): Performed by: FAMILY MEDICINE

## 2019-03-24 PROCEDURE — 82948 REAGENT STRIP/BLOOD GLUCOSE: CPT

## 2019-03-24 PROCEDURE — 99231 SBSQ HOSP IP/OBS SF/LOW 25: CPT | Performed by: INTERNAL MEDICINE

## 2019-03-24 PROCEDURE — 36415 COLL VENOUS BLD VENIPUNCTURE: CPT

## 2019-03-24 PROCEDURE — 2580000003 HC RX 258: Performed by: FAMILY MEDICINE

## 2019-03-24 RX ADMIN — GABAPENTIN 100 MG: 100 CAPSULE ORAL at 22:03

## 2019-03-24 RX ADMIN — LOSARTAN POTASSIUM 50 MG: 50 TABLET ORAL at 22:03

## 2019-03-24 RX ADMIN — Medication 10 ML: at 22:15

## 2019-03-24 RX ADMIN — VITAMIN D, TAB 1000IU (100/BT) 1000 UNITS: 25 TAB at 08:37

## 2019-03-24 RX ADMIN — GUAIFENESIN 600 MG: 600 TABLET, EXTENDED RELEASE ORAL at 22:03

## 2019-03-24 RX ADMIN — GUAIFENESIN 600 MG: 600 TABLET, EXTENDED RELEASE ORAL at 08:36

## 2019-03-24 RX ADMIN — LOSARTAN POTASSIUM 50 MG: 50 TABLET ORAL at 08:37

## 2019-03-24 RX ADMIN — ENOXAPARIN SODIUM 40 MG: 40 INJECTION SUBCUTANEOUS at 08:38

## 2019-03-24 RX ADMIN — ASPIRIN 81 MG 81 MG: 81 TABLET ORAL at 08:37

## 2019-03-24 RX ADMIN — HYDRALAZINE HYDROCHLORIDE 50 MG: 50 TABLET, FILM COATED ORAL at 22:03

## 2019-03-24 RX ADMIN — CLONIDINE HYDROCHLORIDE 0.1 MG: 0.1 TABLET ORAL at 22:02

## 2019-03-24 RX ADMIN — HYDRALAZINE HYDROCHLORIDE 50 MG: 50 TABLET, FILM COATED ORAL at 15:07

## 2019-03-24 RX ADMIN — ALLOPURINOL 600 MG: 300 TABLET ORAL at 08:37

## 2019-03-24 RX ADMIN — FUROSEMIDE 40 MG: 40 TABLET ORAL at 08:38

## 2019-03-24 RX ADMIN — PANTOPRAZOLE SODIUM 40 MG: 40 TABLET, DELAYED RELEASE ORAL at 08:38

## 2019-03-24 RX ADMIN — METOPROLOL SUCCINATE 25 MG: 25 TABLET, EXTENDED RELEASE ORAL at 08:37

## 2019-03-24 RX ADMIN — PREDNISOLONE ACETATE 1 DROP: 10 SUSPENSION/ DROPS OPHTHALMIC at 05:48

## 2019-03-24 RX ADMIN — CLONIDINE HYDROCHLORIDE 0.1 MG: 0.1 TABLET ORAL at 08:37

## 2019-03-24 RX ADMIN — PANTOPRAZOLE SODIUM 40 MG: 40 TABLET, DELAYED RELEASE ORAL at 22:03

## 2019-03-24 RX ADMIN — ROPINIROLE HYDROCHLORIDE 1 MG: 1 TABLET, FILM COATED ORAL at 22:03

## 2019-03-24 RX ADMIN — GABAPENTIN 100 MG: 100 CAPSULE ORAL at 08:37

## 2019-03-24 RX ADMIN — Medication 100 MG: at 08:37

## 2019-03-24 RX ADMIN — TAMSULOSIN HYDROCHLORIDE 0.4 MG: 0.4 CAPSULE ORAL at 08:38

## 2019-03-24 RX ADMIN — PREDNISOLONE ACETATE 1 DROP: 10 SUSPENSION/ DROPS OPHTHALMIC at 10:39

## 2019-03-24 RX ADMIN — AMLODIPINE BESYLATE 10 MG: 10 TABLET ORAL at 08:37

## 2019-03-24 RX ADMIN — PREDNISOLONE ACETATE 1 DROP: 10 SUSPENSION/ DROPS OPHTHALMIC at 18:32

## 2019-03-24 RX ADMIN — INSULIN LISPRO 4 UNITS: 100 INJECTION, SOLUTION INTRAVENOUS; SUBCUTANEOUS at 12:23

## 2019-03-24 RX ADMIN — PREDNISOLONE ACETATE 1 DROP: 10 SUSPENSION/ DROPS OPHTHALMIC at 22:10

## 2019-03-24 RX ADMIN — PRAVASTATIN SODIUM 40 MG: 20 TABLET ORAL at 08:37

## 2019-03-24 RX ADMIN — PREDNISOLONE ACETATE 1 DROP: 10 SUSPENSION/ DROPS OPHTHALMIC at 15:07

## 2019-03-24 RX ADMIN — AZITHROMYCIN 250 MG: 250 TABLET, FILM COATED ORAL at 08:36

## 2019-03-24 RX ADMIN — HYDRALAZINE HYDROCHLORIDE 50 MG: 50 TABLET, FILM COATED ORAL at 05:48

## 2019-03-24 RX ADMIN — SENNOSIDES AND DOCUSATE SODIUM 2 TABLET: 8.6; 5 TABLET ORAL at 08:37

## 2019-03-24 RX ADMIN — Medication 10 ML: at 08:38

## 2019-03-24 RX ADMIN — ISOSORBIDE MONONITRATE 60 MG: 60 TABLET, EXTENDED RELEASE ORAL at 08:37

## 2019-03-24 RX ADMIN — POTASSIUM CHLORIDE 10 MEQ: 10 TABLET, EXTENDED RELEASE ORAL at 08:37

## 2019-03-24 ASSESSMENT — PAIN SCALES - GENERAL
PAINLEVEL_OUTOF10: 0
PAINLEVEL_OUTOF10: 2
PAINLEVEL_OUTOF10: 0

## 2019-03-24 ASSESSMENT — PAIN DESCRIPTION - LOCATION: LOCATION: HEAD

## 2019-03-24 ASSESSMENT — PAIN DESCRIPTION - PAIN TYPE: TYPE: ACUTE PAIN

## 2019-03-25 LAB
ANION GAP SERPL CALCULATED.3IONS-SCNC: 10 MMOL/L (ref 7–19)
BASOPHILS ABSOLUTE: 0 K/UL (ref 0–0.2)
BASOPHILS RELATIVE PERCENT: 0.2 % (ref 0–1)
BUN BLDV-MCNC: 36 MG/DL (ref 8–23)
CALCIUM SERPL-MCNC: 9.4 MG/DL (ref 8.8–10.2)
CHLORIDE BLD-SCNC: 106 MMOL/L (ref 98–111)
CO2: 23 MMOL/L (ref 22–29)
CREAT SERPL-MCNC: 1.4 MG/DL (ref 0.5–1.2)
EOSINOPHILS ABSOLUTE: 0.3 K/UL (ref 0–0.6)
EOSINOPHILS RELATIVE PERCENT: 4.4 % (ref 0–5)
GFR NON-AFRICAN AMERICAN: 48
GLUCOSE BLD-MCNC: 108 MG/DL (ref 70–99)
GLUCOSE BLD-MCNC: 113 MG/DL (ref 70–99)
GLUCOSE BLD-MCNC: 125 MG/DL (ref 74–109)
GLUCOSE BLD-MCNC: 173 MG/DL (ref 70–99)
GLUCOSE BLD-MCNC: 194 MG/DL (ref 70–99)
HCT VFR BLD CALC: 29.7 % (ref 42–52)
HEMOGLOBIN: 9.6 G/DL (ref 14–18)
LV EF: 47 %
LVEF MODALITY: NORMAL
LYMPHOCYTES ABSOLUTE: 1.8 K/UL (ref 1.1–4.5)
LYMPHOCYTES RELATIVE PERCENT: 31.9 % (ref 20–40)
MCH RBC QN AUTO: 32.8 PG (ref 27–31)
MCHC RBC AUTO-ENTMCNC: 32.3 G/DL (ref 33–37)
MCV RBC AUTO: 101.4 FL (ref 80–94)
MONOCYTES ABSOLUTE: 0.5 K/UL (ref 0–0.9)
MONOCYTES RELATIVE PERCENT: 9.5 % (ref 0–10)
NEUTROPHILS ABSOLUTE: 3 K/UL (ref 1.5–7.5)
NEUTROPHILS RELATIVE PERCENT: 53.6 % (ref 50–65)
PDW BLD-RTO: 14.2 % (ref 11.5–14.5)
PERFORMED ON: ABNORMAL
PLATELET # BLD: 227 K/UL (ref 130–400)
PMV BLD AUTO: 10.5 FL (ref 9.4–12.4)
POTASSIUM SERPL-SCNC: 4.4 MMOL/L (ref 3.5–5)
RBC # BLD: 2.93 M/UL (ref 4.7–6.1)
SODIUM BLD-SCNC: 139 MMOL/L (ref 136–145)
WBC # BLD: 5.7 K/UL (ref 4.8–10.8)

## 2019-03-25 PROCEDURE — 6360000004 HC RX CONTRAST MEDICATION: Performed by: INTERNAL MEDICINE

## 2019-03-25 PROCEDURE — 6360000002 HC RX W HCPCS

## 2019-03-25 PROCEDURE — 6370000000 HC RX 637 (ALT 250 FOR IP): Performed by: FAMILY MEDICINE

## 2019-03-25 PROCEDURE — G0378 HOSPITAL OBSERVATION PER HR: HCPCS

## 2019-03-25 PROCEDURE — 99233 SBSQ HOSP IP/OBS HIGH 50: CPT | Performed by: INTERNAL MEDICINE

## 2019-03-25 PROCEDURE — C1894 INTRO/SHEATH, NON-LASER: HCPCS

## 2019-03-25 PROCEDURE — 93459 L HRT ART/GRFT ANGIO: CPT | Performed by: INTERNAL MEDICINE

## 2019-03-25 PROCEDURE — 36415 COLL VENOUS BLD VENIPUNCTURE: CPT

## 2019-03-25 PROCEDURE — 82948 REAGENT STRIP/BLOOD GLUCOSE: CPT

## 2019-03-25 PROCEDURE — 2580000003 HC RX 258: Performed by: FAMILY MEDICINE

## 2019-03-25 PROCEDURE — 2709999900 HC NON-CHARGEABLE SUPPLY

## 2019-03-25 PROCEDURE — 99152 MOD SED SAME PHYS/QHP 5/>YRS: CPT | Performed by: INTERNAL MEDICINE

## 2019-03-25 PROCEDURE — 80048 BASIC METABOLIC PNL TOTAL CA: CPT

## 2019-03-25 PROCEDURE — 85025 COMPLETE CBC W/AUTO DIFF WBC: CPT

## 2019-03-25 PROCEDURE — 2580000003 HC RX 258: Performed by: INTERNAL MEDICINE

## 2019-03-25 RX ORDER — IODIXANOL 320 MG/ML
150 INJECTION, SOLUTION INTRAVASCULAR
Status: COMPLETED | OUTPATIENT
Start: 2019-03-25 | End: 2019-03-25

## 2019-03-25 RX ORDER — SODIUM CHLORIDE 0.9 % (FLUSH) 0.9 %
10 SYRINGE (ML) INJECTION EVERY 12 HOURS SCHEDULED
Status: DISCONTINUED | OUTPATIENT
Start: 2019-03-25 | End: 2019-03-26 | Stop reason: HOSPADM

## 2019-03-25 RX ORDER — SODIUM CHLORIDE 0.9 % (FLUSH) 0.9 %
10 SYRINGE (ML) INJECTION PRN
Status: DISCONTINUED | OUTPATIENT
Start: 2019-03-25 | End: 2019-03-26 | Stop reason: HOSPADM

## 2019-03-25 RX ORDER — ACETAMINOPHEN 325 MG/1
650 TABLET ORAL EVERY 4 HOURS PRN
Status: DISCONTINUED | OUTPATIENT
Start: 2019-03-25 | End: 2019-03-26 | Stop reason: HOSPADM

## 2019-03-25 RX ADMIN — FUROSEMIDE 40 MG: 40 TABLET ORAL at 08:38

## 2019-03-25 RX ADMIN — SENNOSIDES AND DOCUSATE SODIUM 2 TABLET: 8.6; 5 TABLET ORAL at 08:39

## 2019-03-25 RX ADMIN — ROPINIROLE HYDROCHLORIDE 1 MG: 1 TABLET, FILM COATED ORAL at 22:01

## 2019-03-25 RX ADMIN — PANTOPRAZOLE SODIUM 40 MG: 40 TABLET, DELAYED RELEASE ORAL at 22:00

## 2019-03-25 RX ADMIN — PREDNISOLONE ACETATE 1 DROP: 10 SUSPENSION/ DROPS OPHTHALMIC at 22:09

## 2019-03-25 RX ADMIN — ACETAMINOPHEN 650 MG: 325 TABLET ORAL at 18:15

## 2019-03-25 RX ADMIN — AZITHROMYCIN 250 MG: 250 TABLET, FILM COATED ORAL at 08:39

## 2019-03-25 RX ADMIN — LOSARTAN POTASSIUM 50 MG: 50 TABLET ORAL at 08:39

## 2019-03-25 RX ADMIN — ALLOPURINOL 600 MG: 300 TABLET ORAL at 08:39

## 2019-03-25 RX ADMIN — GABAPENTIN 100 MG: 100 CAPSULE ORAL at 22:00

## 2019-03-25 RX ADMIN — INSULIN LISPRO 1 UNITS: 100 INJECTION, SOLUTION INTRAVENOUS; SUBCUTANEOUS at 23:25

## 2019-03-25 RX ADMIN — GABAPENTIN 100 MG: 100 CAPSULE ORAL at 08:39

## 2019-03-25 RX ADMIN — ASPIRIN 81 MG 81 MG: 81 TABLET ORAL at 08:39

## 2019-03-25 RX ADMIN — ACETAMINOPHEN 650 MG: 325 TABLET ORAL at 08:40

## 2019-03-25 RX ADMIN — TAMSULOSIN HYDROCHLORIDE 0.4 MG: 0.4 CAPSULE ORAL at 08:40

## 2019-03-25 RX ADMIN — ACETAMINOPHEN 650 MG: 325 TABLET ORAL at 14:09

## 2019-03-25 RX ADMIN — CLONIDINE HYDROCHLORIDE 0.1 MG: 0.1 TABLET ORAL at 08:40

## 2019-03-25 RX ADMIN — METOPROLOL SUCCINATE 25 MG: 25 TABLET, EXTENDED RELEASE ORAL at 08:40

## 2019-03-25 RX ADMIN — ISOSORBIDE MONONITRATE 60 MG: 60 TABLET, EXTENDED RELEASE ORAL at 08:40

## 2019-03-25 RX ADMIN — GUAIFENESIN 600 MG: 600 TABLET, EXTENDED RELEASE ORAL at 22:01

## 2019-03-25 RX ADMIN — PREDNISOLONE ACETATE 1 DROP: 10 SUSPENSION/ DROPS OPHTHALMIC at 05:43

## 2019-03-25 RX ADMIN — Medication 10 ML: at 08:37

## 2019-03-25 RX ADMIN — PREDNISOLONE ACETATE 1 DROP: 10 SUSPENSION/ DROPS OPHTHALMIC at 08:38

## 2019-03-25 RX ADMIN — Medication 10 ML: at 22:02

## 2019-03-25 RX ADMIN — IODIXANOL 126 ML: 320 INJECTION, SOLUTION INTRAVASCULAR at 21:26

## 2019-03-25 RX ADMIN — LOSARTAN POTASSIUM 50 MG: 50 TABLET ORAL at 22:01

## 2019-03-25 RX ADMIN — CLONIDINE HYDROCHLORIDE 0.1 MG: 0.1 TABLET ORAL at 22:01

## 2019-03-25 RX ADMIN — VITAMIN D, TAB 1000IU (100/BT) 1000 UNITS: 25 TAB at 08:39

## 2019-03-25 RX ADMIN — AMLODIPINE BESYLATE 10 MG: 10 TABLET ORAL at 08:40

## 2019-03-25 RX ADMIN — PANTOPRAZOLE SODIUM 40 MG: 40 TABLET, DELAYED RELEASE ORAL at 08:40

## 2019-03-25 RX ADMIN — HYDRALAZINE HYDROCHLORIDE 50 MG: 50 TABLET, FILM COATED ORAL at 14:09

## 2019-03-25 RX ADMIN — HYDRALAZINE HYDROCHLORIDE 50 MG: 50 TABLET, FILM COATED ORAL at 05:43

## 2019-03-25 RX ADMIN — GUAIFENESIN 600 MG: 600 TABLET, EXTENDED RELEASE ORAL at 08:39

## 2019-03-25 RX ADMIN — PRAVASTATIN SODIUM 40 MG: 20 TABLET ORAL at 08:39

## 2019-03-25 RX ADMIN — Medication 100 MG: at 08:40

## 2019-03-25 RX ADMIN — HYDRALAZINE HYDROCHLORIDE 50 MG: 50 TABLET, FILM COATED ORAL at 22:01

## 2019-03-25 ASSESSMENT — PAIN SCALES - GENERAL
PAINLEVEL_OUTOF10: 4
PAINLEVEL_OUTOF10: 8
PAINLEVEL_OUTOF10: 0
PAINLEVEL_OUTOF10: 9
PAINLEVEL_OUTOF10: 0

## 2019-03-25 ASSESSMENT — PAIN SCALES - WONG BAKER: WONGBAKER_NUMERICALRESPONSE: 0

## 2019-03-25 ASSESSMENT — PAIN DESCRIPTION - PAIN TYPE
TYPE: ACUTE PAIN
TYPE: ACUTE PAIN

## 2019-03-25 ASSESSMENT — PAIN DESCRIPTION - LOCATION
LOCATION: HEAD
LOCATION: HEAD

## 2019-03-26 VITALS
RESPIRATION RATE: 16 BRPM | HEART RATE: 45 BPM | OXYGEN SATURATION: 100 % | HEIGHT: 68 IN | BODY MASS INDEX: 31.43 KG/M2 | WEIGHT: 207.4 LBS | SYSTOLIC BLOOD PRESSURE: 100 MMHG | TEMPERATURE: 97.2 F | DIASTOLIC BLOOD PRESSURE: 58 MMHG

## 2019-03-26 LAB
GLUCOSE BLD-MCNC: 108 MG/DL (ref 70–99)
LV EF: 47 %
LVEF MODALITY: NORMAL
PERFORMED ON: ABNORMAL

## 2019-03-26 PROCEDURE — G0378 HOSPITAL OBSERVATION PER HR: HCPCS

## 2019-03-26 PROCEDURE — 6370000000 HC RX 637 (ALT 250 FOR IP): Performed by: FAMILY MEDICINE

## 2019-03-26 PROCEDURE — 82948 REAGENT STRIP/BLOOD GLUCOSE: CPT

## 2019-03-26 RX ORDER — CLONIDINE HYDROCHLORIDE 0.1 MG/1
0.1 TABLET ORAL 2 TIMES DAILY
Qty: 60 TABLET | Refills: 3 | Status: ON HOLD | OUTPATIENT
Start: 2019-03-26 | End: 2019-12-04 | Stop reason: HOSPADM

## 2019-03-26 RX ORDER — AZITHROMYCIN 250 MG/1
250 TABLET, FILM COATED ORAL DAILY
Qty: 4 TABLET | Refills: 0 | Status: SHIPPED | OUTPATIENT
Start: 2019-03-26 | End: 2019-03-28

## 2019-03-26 RX ADMIN — METOPROLOL SUCCINATE 25 MG: 25 TABLET, EXTENDED RELEASE ORAL at 08:14

## 2019-03-26 RX ADMIN — FUROSEMIDE 40 MG: 40 TABLET ORAL at 08:11

## 2019-03-26 RX ADMIN — PREDNISOLONE ACETATE 1 DROP: 10 SUSPENSION/ DROPS OPHTHALMIC at 05:56

## 2019-03-26 RX ADMIN — PRAVASTATIN SODIUM 40 MG: 20 TABLET ORAL at 08:11

## 2019-03-26 RX ADMIN — LOSARTAN POTASSIUM 50 MG: 50 TABLET ORAL at 08:11

## 2019-03-26 RX ADMIN — TAMSULOSIN HYDROCHLORIDE 0.4 MG: 0.4 CAPSULE ORAL at 08:14

## 2019-03-26 RX ADMIN — ALLOPURINOL 600 MG: 300 TABLET ORAL at 08:11

## 2019-03-26 RX ADMIN — GABAPENTIN 100 MG: 100 CAPSULE ORAL at 08:11

## 2019-03-26 RX ADMIN — ASPIRIN 81 MG 81 MG: 81 TABLET ORAL at 08:11

## 2019-03-26 RX ADMIN — PANTOPRAZOLE SODIUM 40 MG: 40 TABLET, DELAYED RELEASE ORAL at 08:11

## 2019-03-26 RX ADMIN — Medication 100 MG: at 08:11

## 2019-03-26 RX ADMIN — AZITHROMYCIN 250 MG: 250 TABLET, FILM COATED ORAL at 08:14

## 2019-03-26 RX ADMIN — AMLODIPINE BESYLATE 10 MG: 10 TABLET ORAL at 08:14

## 2019-03-26 RX ADMIN — SENNOSIDES AND DOCUSATE SODIUM 1 TABLET: 8.6; 5 TABLET ORAL at 08:11

## 2019-03-26 RX ADMIN — HYDRALAZINE HYDROCHLORIDE 50 MG: 50 TABLET, FILM COATED ORAL at 05:56

## 2019-03-26 RX ADMIN — GUAIFENESIN 600 MG: 600 TABLET, EXTENDED RELEASE ORAL at 08:14

## 2019-03-26 RX ADMIN — ISOSORBIDE MONONITRATE 60 MG: 60 TABLET, EXTENDED RELEASE ORAL at 08:11

## 2019-03-26 RX ADMIN — VITAMIN D, TAB 1000IU (100/BT) 1000 UNITS: 25 TAB at 08:11

## 2019-03-26 RX ADMIN — CLONIDINE HYDROCHLORIDE 0.1 MG: 0.1 TABLET ORAL at 08:11

## 2019-03-28 ENCOUNTER — APPOINTMENT (OUTPATIENT)
Dept: ULTRASOUND IMAGING | Age: 84
End: 2019-03-28
Payer: COMMERCIAL

## 2019-03-28 ENCOUNTER — HOSPITAL ENCOUNTER (OUTPATIENT)
Age: 84
Setting detail: OBSERVATION
Discharge: HOME | End: 2019-03-30
Attending: EMERGENCY MEDICINE | Admitting: FAMILY MEDICINE
Payer: COMMERCIAL

## 2019-03-28 DIAGNOSIS — N17.9 AKI (ACUTE KIDNEY INJURY) (HCC): Primary | ICD-10-CM

## 2019-03-28 LAB
ALBUMIN SERPL-MCNC: 3.6 G/DL (ref 3.5–5.2)
ALP BLD-CCNC: 90 U/L (ref 40–130)
ALT SERPL-CCNC: 63 U/L (ref 5–41)
ANION GAP SERPL CALCULATED.3IONS-SCNC: 11 MMOL/L (ref 7–19)
AST SERPL-CCNC: 45 U/L (ref 5–40)
BACTERIA: NEGATIVE /HPF
BASOPHILS ABSOLUTE: 0 K/UL (ref 0–0.2)
BASOPHILS RELATIVE PERCENT: 0.3 % (ref 0–1)
BILIRUB SERPL-MCNC: <0.2 MG/DL (ref 0.2–1.2)
BILIRUBIN URINE: NEGATIVE
BILIRUBIN URINE: NEGATIVE
BLOOD, URINE: NEGATIVE
BLOOD, URINE: NEGATIVE
BUN BLDV-MCNC: 60 MG/DL (ref 8–23)
CALCIUM SERPL-MCNC: 9.2 MG/DL (ref 8.8–10.2)
CHLORIDE BLD-SCNC: 106 MMOL/L (ref 98–111)
CLARITY: CLEAR
CLARITY: CLEAR
CO2: 23 MMOL/L (ref 22–29)
COLOR: YELLOW
COLOR: YELLOW
CREAT SERPL-MCNC: 2.4 MG/DL (ref 0.5–1.2)
CREATININE URINE: 52.7 MG/DL (ref 4.2–622)
EOSINOPHILS ABSOLUTE: 0.2 K/UL (ref 0–0.6)
EOSINOPHILS RELATIVE PERCENT: 2.8 % (ref 0–5)
EPITHELIAL CELLS, UA: 1 /HPF (ref 0–5)
GFR NON-AFRICAN AMERICAN: 26
GLUCOSE BLD-MCNC: 102 MG/DL (ref 74–109)
GLUCOSE BLD-MCNC: 143 MG/DL (ref 70–99)
GLUCOSE BLD-MCNC: 186 MG/DL (ref 70–99)
GLUCOSE BLD-MCNC: 86 MG/DL (ref 70–99)
GLUCOSE BLD-MCNC: 90 MG/DL (ref 70–99)
GLUCOSE URINE: NEGATIVE MG/DL
GLUCOSE URINE: NEGATIVE MG/DL
HCT VFR BLD CALC: 27.9 % (ref 42–52)
HEMOGLOBIN: 9.1 G/DL (ref 14–18)
HYALINE CASTS: 1 /HPF (ref 0–8)
KETONES, URINE: NEGATIVE MG/DL
KETONES, URINE: NEGATIVE MG/DL
LEUKOCYTE ESTERASE, URINE: ABNORMAL
LEUKOCYTE ESTERASE, URINE: NEGATIVE
LYMPHOCYTES ABSOLUTE: 1.9 K/UL (ref 1.1–4.5)
LYMPHOCYTES RELATIVE PERCENT: 26.8 % (ref 20–40)
MCH RBC QN AUTO: 33 PG (ref 27–31)
MCHC RBC AUTO-ENTMCNC: 32.6 G/DL (ref 33–37)
MCV RBC AUTO: 101.1 FL (ref 80–94)
MICROALBUMIN UR-MCNC: 5.5 MG/DL (ref 0–19)
MICROALBUMIN/CREAT UR-RTO: 104.4 MG/G
MONOCYTES ABSOLUTE: 0.9 K/UL (ref 0–0.9)
MONOCYTES RELATIVE PERCENT: 13.2 % (ref 0–10)
NEUTROPHILS ABSOLUTE: 4 K/UL (ref 1.5–7.5)
NEUTROPHILS RELATIVE PERCENT: 56.6 % (ref 50–65)
NITRITE, URINE: NEGATIVE
NITRITE, URINE: NEGATIVE
PARATHYROID HORMONE INTACT: 94.1 PG/ML (ref 15–65)
PDW BLD-RTO: 14.6 % (ref 11.5–14.5)
PERFORMED ON: ABNORMAL
PERFORMED ON: ABNORMAL
PERFORMED ON: NORMAL
PERFORMED ON: NORMAL
PH UA: 5.5 (ref 5–8)
PH UA: 6 (ref 5–8)
PLATELET # BLD: 208 K/UL (ref 130–400)
PMV BLD AUTO: 9.6 FL (ref 9.4–12.4)
POTASSIUM SERPL-SCNC: 4.5 MMOL/L (ref 3.5–5)
PROTEIN UA: NEGATIVE MG/DL
PROTEIN UA: NEGATIVE MG/DL
RBC # BLD: 2.76 M/UL (ref 4.7–6.1)
RBC UA: 1 /HPF (ref 0–4)
SODIUM BLD-SCNC: 140 MMOL/L (ref 136–145)
SODIUM URINE: 43 MMOL/L
SPECIFIC GRAVITY UA: 1.01 (ref 1–1.03)
SPECIFIC GRAVITY UA: 1.02 (ref 1–1.03)
TOTAL PROTEIN: 6.4 G/DL (ref 6.6–8.7)
URINE REFLEX TO CULTURE: NORMAL
UROBILINOGEN, URINE: 0.2 E.U./DL
UROBILINOGEN, URINE: 0.2 E.U./DL
VITAMIN D 25-HYDROXY: 48.1 NG/ML
WBC # BLD: 7.1 K/UL (ref 4.8–10.8)
WBC UA: 5 /HPF (ref 0–5)

## 2019-03-28 PROCEDURE — 81001 URINALYSIS AUTO W/SCOPE: CPT

## 2019-03-28 PROCEDURE — 81003 URINALYSIS AUTO W/O SCOPE: CPT

## 2019-03-28 PROCEDURE — 84300 ASSAY OF URINE SODIUM: CPT

## 2019-03-28 PROCEDURE — 80053 COMPREHEN METABOLIC PANEL: CPT

## 2019-03-28 PROCEDURE — 6370000000 HC RX 637 (ALT 250 FOR IP)

## 2019-03-28 PROCEDURE — 99285 EMERGENCY DEPT VISIT HI MDM: CPT | Performed by: EMERGENCY MEDICINE

## 2019-03-28 PROCEDURE — 36415 COLL VENOUS BLD VENIPUNCTURE: CPT

## 2019-03-28 PROCEDURE — 99285 EMERGENCY DEPT VISIT HI MDM: CPT

## 2019-03-28 PROCEDURE — 82948 REAGENT STRIP/BLOOD GLUCOSE: CPT

## 2019-03-28 PROCEDURE — 6360000002 HC RX W HCPCS: Performed by: FAMILY MEDICINE

## 2019-03-28 PROCEDURE — 6370000000 HC RX 637 (ALT 250 FOR IP): Performed by: FAMILY MEDICINE

## 2019-03-28 PROCEDURE — G0378 HOSPITAL OBSERVATION PER HR: HCPCS

## 2019-03-28 PROCEDURE — 2580000003 HC RX 258: Performed by: INTERNAL MEDICINE

## 2019-03-28 PROCEDURE — 82306 VITAMIN D 25 HYDROXY: CPT

## 2019-03-28 PROCEDURE — 96372 THER/PROPH/DIAG INJ SC/IM: CPT

## 2019-03-28 PROCEDURE — 82570 ASSAY OF URINE CREATININE: CPT

## 2019-03-28 PROCEDURE — 82043 UR ALBUMIN QUANTITATIVE: CPT

## 2019-03-28 PROCEDURE — 76770 US EXAM ABDO BACK WALL COMP: CPT

## 2019-03-28 PROCEDURE — 83970 ASSAY OF PARATHORMONE: CPT

## 2019-03-28 PROCEDURE — 2580000003 HC RX 258: Performed by: EMERGENCY MEDICINE

## 2019-03-28 PROCEDURE — 85025 COMPLETE CBC W/AUTO DIFF WBC: CPT

## 2019-03-28 RX ORDER — HYDRALAZINE HYDROCHLORIDE 25 MG/1
50 TABLET, FILM COATED ORAL EVERY 8 HOURS SCHEDULED
Status: DISCONTINUED | OUTPATIENT
Start: 2019-03-28 | End: 2019-03-30 | Stop reason: HOSPADM

## 2019-03-28 RX ORDER — THIAMINE MONONITRATE (VIT B1) 100 MG
100 TABLET ORAL DAILY
Status: DISCONTINUED | OUTPATIENT
Start: 2019-03-28 | End: 2019-03-30 | Stop reason: HOSPADM

## 2019-03-28 RX ORDER — ASPIRIN 81 MG/1
81 TABLET, CHEWABLE ORAL DAILY
Status: DISCONTINUED | OUTPATIENT
Start: 2019-03-28 | End: 2019-03-30 | Stop reason: HOSPADM

## 2019-03-28 RX ORDER — 0.9 % SODIUM CHLORIDE 0.9 %
500 INTRAVENOUS SOLUTION INTRAVENOUS ONCE
Status: COMPLETED | OUTPATIENT
Start: 2019-03-28 | End: 2019-03-28

## 2019-03-28 RX ORDER — LIDOCAINE 4 G/G
1 PATCH TOPICAL DAILY
Status: DISCONTINUED | OUTPATIENT
Start: 2019-03-28 | End: 2019-03-30 | Stop reason: HOSPADM

## 2019-03-28 RX ORDER — COLCHICINE 0.6 MG/1
0.6 TABLET ORAL PRN
Status: DISCONTINUED | OUTPATIENT
Start: 2019-03-28 | End: 2019-03-30 | Stop reason: HOSPADM

## 2019-03-28 RX ORDER — ROPINIROLE 1 MG/1
1 TABLET, FILM COATED ORAL NIGHTLY
Status: DISCONTINUED | OUTPATIENT
Start: 2019-03-28 | End: 2019-03-30 | Stop reason: HOSPADM

## 2019-03-28 RX ORDER — METOPROLOL SUCCINATE 25 MG/1
25 TABLET, EXTENDED RELEASE ORAL DAILY
Status: DISCONTINUED | OUTPATIENT
Start: 2019-03-28 | End: 2019-03-30 | Stop reason: HOSPADM

## 2019-03-28 RX ORDER — ISOSORBIDE MONONITRATE 60 MG/1
60 TABLET, EXTENDED RELEASE ORAL DAILY
Status: DISCONTINUED | OUTPATIENT
Start: 2019-03-28 | End: 2019-03-30 | Stop reason: HOSPADM

## 2019-03-28 RX ORDER — AMLODIPINE BESYLATE 5 MG/1
10 TABLET ORAL 2 TIMES DAILY
Status: DISCONTINUED | OUTPATIENT
Start: 2019-03-28 | End: 2019-03-30 | Stop reason: HOSPADM

## 2019-03-28 RX ORDER — GABAPENTIN 100 MG/1
100 CAPSULE ORAL 2 TIMES DAILY
Status: DISCONTINUED | OUTPATIENT
Start: 2019-03-28 | End: 2019-03-30 | Stop reason: HOSPADM

## 2019-03-28 RX ORDER — SODIUM CHLORIDE 0.9 % (FLUSH) 0.9 %
10 SYRINGE (ML) INJECTION EVERY 12 HOURS SCHEDULED
Status: DISCONTINUED | OUTPATIENT
Start: 2019-03-28 | End: 2019-03-30 | Stop reason: HOSPADM

## 2019-03-28 RX ORDER — SODIUM CHLORIDE 9 MG/ML
INJECTION, SOLUTION INTRAVENOUS CONTINUOUS
Status: DISCONTINUED | OUTPATIENT
Start: 2019-03-28 | End: 2019-03-30 | Stop reason: HOSPADM

## 2019-03-28 RX ORDER — DEXTROSE MONOHYDRATE 50 MG/ML
100 INJECTION, SOLUTION INTRAVENOUS PRN
Status: DISCONTINUED | OUTPATIENT
Start: 2019-03-28 | End: 2019-03-30 | Stop reason: HOSPADM

## 2019-03-28 RX ORDER — TAMSULOSIN HYDROCHLORIDE 0.4 MG/1
0.8 CAPSULE ORAL DAILY
Status: DISCONTINUED | OUTPATIENT
Start: 2019-03-28 | End: 2019-03-30 | Stop reason: HOSPADM

## 2019-03-28 RX ORDER — PRAVASTATIN SODIUM 20 MG
40 TABLET ORAL DAILY
Status: DISCONTINUED | OUTPATIENT
Start: 2019-03-28 | End: 2019-03-30 | Stop reason: HOSPADM

## 2019-03-28 RX ORDER — LOSARTAN POTASSIUM 50 MG/1
50 TABLET ORAL 2 TIMES DAILY
Status: DISCONTINUED | OUTPATIENT
Start: 2019-03-28 | End: 2019-03-30 | Stop reason: HOSPADM

## 2019-03-28 RX ORDER — ALLOPURINOL 300 MG/1
600 TABLET ORAL DAILY
Status: DISCONTINUED | OUTPATIENT
Start: 2019-03-28 | End: 2019-03-30 | Stop reason: HOSPADM

## 2019-03-28 RX ORDER — NICOTINE POLACRILEX 4 MG
15 LOZENGE BUCCAL PRN
Status: DISCONTINUED | OUTPATIENT
Start: 2019-03-28 | End: 2019-03-30 | Stop reason: HOSPADM

## 2019-03-28 RX ORDER — ACETAMINOPHEN 325 MG/1
650 TABLET ORAL EVERY 4 HOURS PRN
Status: DISCONTINUED | OUTPATIENT
Start: 2019-03-28 | End: 2019-03-30 | Stop reason: HOSPADM

## 2019-03-28 RX ORDER — FINASTERIDE 5 MG/1
5 TABLET, FILM COATED ORAL DAILY
Status: DISCONTINUED | OUTPATIENT
Start: 2019-03-28 | End: 2019-03-30 | Stop reason: HOSPADM

## 2019-03-28 RX ORDER — PANTOPRAZOLE SODIUM 40 MG/1
40 TABLET, DELAYED RELEASE ORAL 2 TIMES DAILY
Status: DISCONTINUED | OUTPATIENT
Start: 2019-03-28 | End: 2019-03-30 | Stop reason: HOSPADM

## 2019-03-28 RX ORDER — ACETAMINOPHEN 500 MG
1000 TABLET ORAL ONCE
Status: COMPLETED | OUTPATIENT
Start: 2019-03-28 | End: 2019-03-28

## 2019-03-28 RX ORDER — ACETAMINOPHEN 500 MG
TABLET ORAL
Status: COMPLETED
Start: 2019-03-28 | End: 2019-03-28

## 2019-03-28 RX ORDER — NITROGLYCERIN 0.4 MG/1
0.4 TABLET SUBLINGUAL EVERY 5 MIN PRN
Status: DISCONTINUED | OUTPATIENT
Start: 2019-03-28 | End: 2019-03-30 | Stop reason: HOSPADM

## 2019-03-28 RX ORDER — SENNA AND DOCUSATE SODIUM 50; 8.6 MG/1; MG/1
2 TABLET, FILM COATED ORAL DAILY
Status: DISCONTINUED | OUTPATIENT
Start: 2019-03-28 | End: 2019-03-30 | Stop reason: HOSPADM

## 2019-03-28 RX ORDER — DEXTROSE MONOHYDRATE 25 G/50ML
12.5 INJECTION, SOLUTION INTRAVENOUS PRN
Status: DISCONTINUED | OUTPATIENT
Start: 2019-03-28 | End: 2019-03-30 | Stop reason: HOSPADM

## 2019-03-28 RX ORDER — ONDANSETRON 4 MG/1
4 TABLET, ORALLY DISINTEGRATING ORAL EVERY 8 HOURS PRN
Status: DISCONTINUED | OUTPATIENT
Start: 2019-03-28 | End: 2019-03-30 | Stop reason: HOSPADM

## 2019-03-28 RX ORDER — CLONIDINE HYDROCHLORIDE 0.1 MG/1
0.1 TABLET ORAL 2 TIMES DAILY
Status: DISCONTINUED | OUTPATIENT
Start: 2019-03-28 | End: 2019-03-30 | Stop reason: HOSPADM

## 2019-03-28 RX ORDER — SODIUM CHLORIDE 0.9 % (FLUSH) 0.9 %
10 SYRINGE (ML) INJECTION PRN
Status: DISCONTINUED | OUTPATIENT
Start: 2019-03-28 | End: 2019-03-30 | Stop reason: HOSPADM

## 2019-03-28 RX ORDER — PREDNISOLONE ACETATE 10 MG/ML
1 SUSPENSION/ DROPS OPHTHALMIC
Status: DISCONTINUED | OUTPATIENT
Start: 2019-03-28 | End: 2019-03-30 | Stop reason: HOSPADM

## 2019-03-28 RX ADMIN — VITAMIN D, TAB 1000IU (100/BT) 1000 UNITS: 25 TAB at 09:04

## 2019-03-28 RX ADMIN — CLONIDINE HYDROCHLORIDE 0.1 MG: 0.1 TABLET ORAL at 09:04

## 2019-03-28 RX ADMIN — INSULIN LISPRO 2 UNITS: 100 INJECTION, SOLUTION INTRAVENOUS; SUBCUTANEOUS at 12:31

## 2019-03-28 RX ADMIN — HYDRALAZINE HYDROCHLORIDE 50 MG: 25 TABLET, FILM COATED ORAL at 20:25

## 2019-03-28 RX ADMIN — LOSARTAN POTASSIUM 50 MG: 50 TABLET ORAL at 20:23

## 2019-03-28 RX ADMIN — ALLOPURINOL 600 MG: 300 TABLET ORAL at 09:04

## 2019-03-28 RX ADMIN — PREDNISOLONE ACETATE 1 DROP: 10 SUSPENSION/ DROPS OPHTHALMIC at 14:54

## 2019-03-28 RX ADMIN — SENNOSIDES AND DOCUSATE SODIUM 2 TABLET: 8.6; 5 TABLET ORAL at 09:04

## 2019-03-28 RX ADMIN — METOPROLOL SUCCINATE 25 MG: 25 TABLET, EXTENDED RELEASE ORAL at 09:04

## 2019-03-28 RX ADMIN — LOSARTAN POTASSIUM 50 MG: 50 TABLET ORAL at 09:04

## 2019-03-28 RX ADMIN — SODIUM CHLORIDE: 9 INJECTION, SOLUTION INTRAVENOUS at 04:26

## 2019-03-28 RX ADMIN — AMLODIPINE BESYLATE 10 MG: 5 TABLET ORAL at 20:23

## 2019-03-28 RX ADMIN — PANTOPRAZOLE SODIUM 40 MG: 40 TABLET, DELAYED RELEASE ORAL at 09:04

## 2019-03-28 RX ADMIN — GABAPENTIN 100 MG: 100 CAPSULE ORAL at 09:04

## 2019-03-28 RX ADMIN — PRAVASTATIN SODIUM 40 MG: 20 TABLET ORAL at 09:04

## 2019-03-28 RX ADMIN — ACETAMINOPHEN 1000 MG: 500 TABLET, FILM COATED ORAL at 04:26

## 2019-03-28 RX ADMIN — PREDNISOLONE ACETATE 1 DROP: 10 SUSPENSION/ DROPS OPHTHALMIC at 20:25

## 2019-03-28 RX ADMIN — SODIUM CHLORIDE: 9 INJECTION, SOLUTION INTRAVENOUS at 13:02

## 2019-03-28 RX ADMIN — FINASTERIDE 5 MG: 5 TABLET, FILM COATED ORAL at 09:04

## 2019-03-28 RX ADMIN — PREDNISOLONE ACETATE 1 DROP: 10 SUSPENSION/ DROPS OPHTHALMIC at 18:06

## 2019-03-28 RX ADMIN — LIDOCAINE HYDROCHLORIDE: 20 JELLY TOPICAL at 00:37

## 2019-03-28 RX ADMIN — PANTOPRAZOLE SODIUM 40 MG: 40 TABLET, DELAYED RELEASE ORAL at 20:23

## 2019-03-28 RX ADMIN — CLONIDINE HYDROCHLORIDE 0.1 MG: 0.1 TABLET ORAL at 20:23

## 2019-03-28 RX ADMIN — ISOSORBIDE MONONITRATE 60 MG: 60 TABLET, EXTENDED RELEASE ORAL at 09:04

## 2019-03-28 RX ADMIN — HYDRALAZINE HYDROCHLORIDE 50 MG: 25 TABLET, FILM COATED ORAL at 09:11

## 2019-03-28 RX ADMIN — ROPINIROLE HYDROCHLORIDE 1 MG: 1 TABLET, FILM COATED ORAL at 20:23

## 2019-03-28 RX ADMIN — GABAPENTIN 100 MG: 100 CAPSULE ORAL at 20:23

## 2019-03-28 RX ADMIN — TAMSULOSIN HYDROCHLORIDE 0.8 MG: 0.4 CAPSULE ORAL at 09:04

## 2019-03-28 RX ADMIN — Medication 1000 MG: at 04:26

## 2019-03-28 RX ADMIN — ASPIRIN 81 MG 81 MG: 81 TABLET ORAL at 09:04

## 2019-03-28 RX ADMIN — ENOXAPARIN SODIUM 30 MG: 30 INJECTION SUBCUTANEOUS at 06:26

## 2019-03-28 RX ADMIN — PREDNISOLONE ACETATE 1 DROP: 10 SUSPENSION/ DROPS OPHTHALMIC at 09:05

## 2019-03-28 RX ADMIN — AMLODIPINE BESYLATE 10 MG: 5 TABLET ORAL at 09:04

## 2019-03-28 RX ADMIN — Medication 100 MG: at 09:04

## 2019-03-28 RX ADMIN — SODIUM CHLORIDE 500 ML: 9 INJECTION, SOLUTION INTRAVENOUS at 04:25

## 2019-03-28 RX ADMIN — HYDRALAZINE HYDROCHLORIDE 50 MG: 25 TABLET, FILM COATED ORAL at 15:29

## 2019-03-28 ASSESSMENT — ENCOUNTER SYMPTOMS
SHORTNESS OF BREATH: 0
ABDOMINAL DISTENTION: 0
NAUSEA: 0
VOMITING: 0
ABDOMINAL PAIN: 0
CHEST TIGHTNESS: 0

## 2019-03-28 ASSESSMENT — PAIN SCALES - GENERAL
PAINLEVEL_OUTOF10: 8
PAINLEVEL_OUTOF10: 8

## 2019-03-28 NOTE — PROGRESS NOTES
Irasema Canelias arrived to room # 329. Presented with: INOCENCIO  Mental Status: Patient is oriented, alert, coherent, logical, thought processes intact and able to concentrate and follow conversation. Vitals:    03/28/19 0506   BP: (!) 174/73   Pulse: 63   Resp: 18   Temp: 98.4 °F (36.9 °C)   SpO2: 98%     Patient safety contract and falls prevention contract reviewed with patient Yes. Oriented Patient to room. Call light within reach. Yes.   Needs, issues or concerns expressed at this time: no.      Electronically signed by Belinda Padilla RN on 3/28/2019 at 5:46 AM

## 2019-03-28 NOTE — H&P
vessels, in 1990, previous back surgery, and previous appendectomy. SOCIAL HISTORY:  He is . He is a never smoker, never used  alcohol. FAMILY HISTORY:  Positive for heart disease. MEDICATIONS:  His medicines on admission, clonidine 0.1 b.i.d., Norvasc  10 b.i.d., Imdur 60 once daily, metoprolol XL 25 daily, nitroglycerin  sublingual as needed, lidocaine patch to his low back, losartan 50  b.i.d., tamsulosin 0.4 daily, ropinirole 1 mg nightly, gabapentin 100  b.i.d., potassium 20 twice daily, glimepiride 4 b.i.d., colchicine 0.6  daily as needed, furosemide 40 mg twice daily, aspirin 81 daily,  hydralazine 50 three times daily, Protonix 40 twice daily, iron sulfate  with C and folic acid one tablet daily, uses prednisolone drops to his  left eye every 4 hours while awake, allopurinol 600 daily, Senokot-S two  tabs once daily, pravastatin 40 daily, he takes several vitamins and  supplements. ALLERGIES:  He is allergic to PENICILLIN and ADHESIVE TAPE. PHYSICAL EXAMINATION:  VITAL SIGNS:  His vital signs, blood pressure is 148/73, pulse 62,  respirations 16, temperature 98.5, and O2 sats 97% on room air. HEENT:  Normocephalic, atraumatic. NECK:  Supple. CHEST:  Sounds clear. HEART:  Regular. ABDOMEN:  Benign. EXTREMITIES:  No clubbing, cyanosis and only trace edema, which is  really at his baseline or even better. LABORATORY DATA:  His laboratory reveals sodium 140, potassium 4.5,  chloride is 106, CO2 is 23, BUN 60, creatinine is 2.4, GFR 26, and  glucose 102. LFTs: ALT was 63, AST was 45, bilirubin less than 0.2, and  alk phos was normal at 90. White count 7.1, hemoglobin 9.1 and  platelets were 920. Urinalysis was completely normal.    ASSESSMENT:  1. Acute kidney injury. 2.  Urine retention. 3.  Recent admission for noncardiac chest pain with recent heart cath. 4.  Hypertension. 5.  Diabetes. 6.  Hyperlipidemia. 7.  Chronic back problems.     PLAN:  At this point, we will get Renal to see him. I will increase his  Flomax and add some Proscar. We are going to hydrate him and follow him  closely.         Jose Clarke MD    D: 03/28/2019 8:31:33      T: 03/28/2019 10:01:18     [id number]  Job#: [id number]     Doc#: [id number]    CC:

## 2019-03-28 NOTE — ED PROVIDER NOTES
[address] [facility]  eMERGENCY dEPARTMENT eNCOUnter      Pt Name: Anastasia Rizvi  MRN: [id number]  [place] 1934  Date of evaluation: 3/28/2019  Provider: Deana Turner MD    CHIEF COMPLAINT       Chief Complaint   Patient presents with    Urinary Retention         HISTORY OF PRESENT ILLNESS   (Location/Symptom, Timing/Onset,Context/Setting, Quality, Duration, Modifying Factors, Severity)  Note limiting factors. Anastasia Rizvi is a 80 y.o. male who presents to the emergency department for evaluation of decreased urine output. He reports that he has not really been able to urinate well since this morning. States that he does not have any prior history of prostate issues and no prior history of urinary retention. Denies any new medications. States that he began to feel the urge to urinate this evening however he could not produce a urine stream. Patient reports he was recently discharged from the hospital. States that he does have a prior history of some baseline kidney disease and follows as an outpatient with nephrology. He is not on hemodialysis. In review of his previous records he was just discharged from the hospital on 3/26. At that time he was hospitalized secondary to episode of chest pain. He underwent a cardiac catheterization at that time. Patient is maintained on a diuretic medication. HPI    NursingNotes were reviewed. REVIEW OF SYSTEMS    (2-9 systems for level 4, 10 or more for level 5)     Review of Systems   Constitutional: Negative for chills and fever. Respiratory: Negative for chest tightness and shortness of breath. Cardiovascular: Negative for chest pain and palpitations. Gastrointestinal: Negative for abdominal distention, abdominal pain, nausea and vomiting. Genitourinary: Positive for decreased urine volume and difficulty urinating. Negative for flank pain and testicular pain. All other systems reviewed and are negative.            PAST MEDICALHISTORY     Past Medical History:   Diagnosis Date    CAD (coronary artery disease)     STENTS X 1    CAD (coronary artery disease)     CABG    [facility]     DDD (degenerative disc disease), lumbar 7/11/2017    Diabetes mellitus ([facility]     GERD (gastroesophageal reflux disease)     Hx of blood clots     Right leg    Hyperlipidemia     Hypertension     Lumbar stenosis with neurogenic claudication 7/11/2017    MI, old     X 2    Palliative care patient 03/06/2018    Right heart failure ([facility] 6/29/2018    Sleep apnea     DOESN'T USE MACHINE    TIA (transient ischemic attack)          SURGICAL HISTORY       Past Surgical History:   Procedure Laterality Date    APPENDECTOMY      BACK SURGERY  1980    x 3    CARDIAC SURGERY  1990    Bypass x 3    CARPAL TUNNEL RELEASE Bilateral 1980    wrist and elbows    CHOLECYSTECTOMY  2000    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 2    JOINT REPLACEMENT Left 1990    JOINT REPLACEMENT Right 1995    LAMINECTOMY N/A 7/11/2017    LUMBAR LAMINECTOMY POSTERIOR  L23 L34 performed by Mirza Burrows MD at [address] Left 1990    ROTATOR CUFF REPAIR Right 2010    TUMOR REMOVAL Left 1960    foot    TURP  2000    VASCULAR SURGERY Left 7/15/2015 [facility] & [address]    Aortoiliofemoral a'gram with bilateral lower extremity runoff; select views 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; a'plasty of left anterior tibial artery and dorsalis pedis artery with 2.5x300 vascutrak balloon and then with 3x220 nati balloon; completion a'gram         CURRENT MEDICATIONS     Previous Medications    ALLOPURINOL (ZYLOPRIM) 300 MG TABLET    Take 600 mg by mouth daily     AMLODIPINE (NORVASC) 10 MG TABLET    Take 1 tablet by mouth 2 times daily    ASCORBIC ACID (VITAMIN C) 500 MG TABLET    Take 1,000 mg by mouth daily     ASPIRIN 81 MG CHEWABLE TABLET    Take 1 tablet by mouth MG TABLET    Take 40 mg by mouth daily    PREDNISOLONE ACETATE (PRED FORTE) 1 % OPHTHALMIC SUSPENSION    Place 1 drop into the left eye every hour as needed (for dry eye)    PROMETHAZINE (PHENERGAN) 12.5 MG TABLET    Take 12.5 mg by mouth as needed for Nausea    ROPINIROLE (REQUIP) 1 MG TABLET    Take 1 mg by mouth nightly    SENNOSIDES-DOCUSATE SODIUM (SENOKOT-S) 8.6-50 MG TABLET    Take 2 tablets by mouth daily    TAMSULOSIN (FLOMAX) 0.4 MG CAPSULE    Take 0.4 mg by mouth daily    VITAMIN B-1 (THIAMINE) 100 MG TABLET    Take 100 mg by mouth daily    VITAMIN D (CHOLECALCIFEROL) 1000 UNIT TABS TABLET    Take 1,000 Units by mouth daily       ALLERGIES     Pcn [penicillins] and Adhesive tape    FAMILY HISTORY       Family History   Problem Relation Age of Onset    Heart Disease Father           SOCIAL HISTORY       Social History     Socioeconomic History    Marital status:      Spouse name: None    Number of children: None    Years of education: None    Highest education level: None   Occupational History    None   Social Needs    Financial resource strain: None    Food insecurity:     Worry: None     Inability: None    Transportation needs:     Medical: None     Non-medical: None   Tobacco Use    Smoking status: Never Smoker    Smokeless tobacco: Never Used   Substance and Sexual Activity    Alcohol use: No    Drug use: No    Sexual activity: Not Currently   Lifestyle    Physical activity:     Days per week: None     Minutes per session: None    Stress: None   Relationships    Social connections:     Talks on phone: None     Gets together: None     Attends Scientologist service: None     Active member of club or organization: None     Attends meetings of clubs or organizations: None     Relationship status: None    Intimate partner violence:     Fear of current or ex partner: None     Emotionally abused: None     Physically abused: None     Forced sexual activity: None   Other Topics Concern    None   Social History Narrative    None       SCREENINGS    Lisa Coma Scale  Eye Opening: Spontaneous  Best Verbal Response: Oriented  Best Motor Response: Obeys commands  Conway Coma Scale Score: 15        PHYSICAL EXAM    (up to 7 for level 4, 8 or more for level 5)     ED Triage Vitals [03/28/19 0034]   BP Temp Temp Source Pulse Resp SpO2 Height Weight   (!) 158/65 98 °F (36.7 °C) Oral 66 20 94 % 5' 8\" (1.727 m) 220 lb (99.8 kg)       Physical Exam   Constitutional: He is oriented to person, place, and time. He is cooperative. HENT:   Head: Atraumatic. Mouth/Throat: Oropharynx is clear and moist. Mucous membranes are not dry. No posterior oropharyngeal erythema. Eyes: Pupils are equal, round, and reactive to light. No scleral icterus. Neck: No tracheal deviation present. Cardiovascular: Normal rate, regular rhythm, normal heart sounds and intact distal pulses. No murmur heard. Pulmonary/Chest: Effort normal and breath sounds normal. No stridor. No respiratory distress. Abdominal: Soft. He exhibits no distension. There is no tenderness. There is no guarding. Musculoskeletal: He exhibits edema (1+, bilateral LE). Neurological: He is alert and oriented to person, place, and time. He has normal strength. Skin: Capillary refill takes less than 2 seconds. No rash noted. No pallor. Nursing note and vitals reviewed.       DIAGNOSTIC RESULTS       LABS:  Labs Reviewed   COMPREHENSIVE METABOLIC PANEL - Abnormal; Notable for the following components:       Result Value    BUN 60 (*)     CREATININE 2.4 (*)     GFR Non- 26 (*)     Total Protein 6.4 (*)     ALT 63 (*)     AST 45 (*)     All other components within normal limits   CBC WITH AUTO DIFFERENTIAL - Abnormal; Notable for the following components:    RBC 2.76 (*)     Hemoglobin 9.1 (*)     Hematocrit 27.9 (*)     .1 (*)     MCH 33.0 (*)     MCHC 32.6 (*)     RDW 14.6 (*)     Monocytes % 13.2 (*)     All other components within normal limits   URINE RT REFLEX TO CULTURE       All other labs were within normal range or not returned as of this dictation. EMERGENCY DEPARTMENT COURSE and DIFFERENTIAL DIAGNOSIS/MDM:   Vitals:    Vitals:    03/28/19 0203 03/28/19 0232 03/28/19 0302 03/28/19 0432   BP: 138/69 136/60 134/60 (!) 161/66   Pulse: 61 59 65 64   Resp: 18 17 16 19   Temp:       TempSrc:       SpO2: 94% 93% 92% 96%   Weight:       Height:           MDM      CONSULTS:    Case was discussed with Dr. Lyssa Cerrato regarding observation admission for IV fluids and laboratory monitoring. PROCEDURES:  Unless otherwise noted below, none     Procedures    FINAL IMPRESSION      1.  INOCENCIO (acute kidney injury) [facility])          [address] Admitted 03/28/2019 04:40:56 AM    (Please note that portions of this note were completed with a voice recognition program.  Efforts were made to edit thedictations but occasionally words are mis-transcribed.)    Eugenie Szymanski MD (electronically signed)  Attending Emergency Physician         Eugenie Szymanski MD  03/28/19 1529

## 2019-03-28 NOTE — CONSULTS
mg, 10 mg, Oral, BID, Cris Beatty MD, 10 mg at 03/28/19 0886  aspirin chewable tablet 81 mg, 81 mg, Oral, Daily, Cris Beatty MD, 81 mg at 03/28/19 1810  cloNIDine (CATAPRES) tablet 0.1 mg, 0.1 mg, Oral, BID, Cris Beatty MD, 0.1 mg at 03/28/19 1112  colchicine (COLCRYS) tablet 0.6 mg, 0.6 mg, Oral, PRN, Cris Beatty MD  gabapentin (NEURONTIN) capsule 100 mg, 100 mg, Oral, BID, Cris Beatty MD, 100 mg at 03/28/19 9363  hydrALAZINE (APRESOLINE) tablet 50 mg, 50 mg, Oral, 3 times per day, Cris Beatty MD, 50 mg at 03/28/19 0911  isosorbide mononitrate (IMDUR) extended release tablet 60 mg, 60 mg, Oral, Daily, Cris Beatty MD, 60 mg at 03/28/19 0904  lidocaine 4 % external patch 1 patch, 1 patch, Transdermal, Daily, Cris Beatty MD, 1 patch at 03/28/19 0905  losartan (COZAAR) tablet 50 mg, 50 mg, Oral, BID, Cris Beatty MD, 50 mg at 03/28/19 5554  metoprolol succinate (TOPROL XL) extended release tablet 25 mg, 25 mg, Oral, Daily, Cris Beatty MD, 25 mg at 03/28/19 0904  nitroGLYCERIN (NITROSTAT) SL tablet 0.4 mg, 0.4 mg, Sublingual, Q5 Min PRN, Cris Betaty MD  ondansetron (ZOFRAN-ODT) disintegrating tablet 4 mg, 4 mg, Oral, Q8H PRN, Cris Beatty MD  pantoprazole (PROTONIX) tablet 40 mg, 40 mg, Oral, BID, Cris Beatty MD, 40 mg at 03/28/19 0904  pravastatin (PRAVACHOL) tablet 40 mg, 40 mg, Oral, Daily, Cris Beatty MD, 40 mg at 03/28/19 0904  prednisoLONE acetate (PRED FORTE) 1 % ophthalmic suspension 1 drop, 1 drop, Left Eye, Q4H While awake, Cris Beatty MD, 1 drop at 03/28/19 0905  rOPINIRole (REQUIP) tablet 1 mg, 1 mg, Oral, Nightly, Cris Beatty MD  sennosides-docusate sodium (SENOKOT-S) 8.6-50 MG tablet 2 tablet, 2 tablet, Oral, Daily, Cris Beatty MD, 2 tablet at 03/28/19 0152  tamsulosin (FLOMAX) capsule 0.8 mg, 0.8 mg, Oral, Daily, Cris Beatty MD, 0.8 mg at 03/28/19 0377  vitamin B-1 (THIAMINE) tablet 100 mg, 100 mg, Oral, Daily, [facility] Danya Barker MD, 100 mg at 03/28/19 3432  vitamin D (CHOLECALCIFEROL) tablet 1,000 Units, 1,000 Units, Oral, Daily, Avinash Leary MD, 1,000 Units at 03/28/19 0904  finasteride (PROSCAR) tablet 5 mg, 5 mg, Oral, Daily, Avinash Leary MD, 5 mg at 03/28/19 0904  (START ON 3/29/2019) enoxaparin (LOVENOX) injection 30 mg, 30 mg, Subcutaneous, Daily, Avinash Leary MD  insulin lispro (HUMALOG) injection vial 0-12 Units, 0-12 Units, Subcutaneous, TID WC, Avinash Leary MD  insulin lispro (HUMALOG) injection vial 0-6 Units, 0-6 Units, Subcutaneous, Nightly, Avinash Leary MD  glucose (GLUTOSE) 40 % oral gel 15 g, 15 g, Oral, PRN, Avinash Leary MD  dextrose 50 % solution 12.5 g, 12.5 g, Intravenous, PRN, Avinash Leary MD   glucagon (rDNA) injection 1 mg, 1 mg, Intramuscular, PRN, Avinash Leary MD  dextrose 5 % solution, 100 mL/hr, Intravenous, PRN, Avinash Leary MD        Past Medical History:  Past Medical History:  No date: CAD (coronary artery disease)      Comment:  STENTS X 3  No date: CAD (coronary artery disease)      Comment:  CABG  No date: [facility]  7/11/2017: DDD (degenerative disc disease), lumbar  No date: Diabetes mellitus ([facility] [address]  No date: GERD (gastroesophageal reflux disease)  No date: Hx of blood clots      Comment:  Right leg  No date: Hyperlipidemia  No date: Hypertension  7/11/2017: Lumbar stenosis with neurogenic claudication  No date: MI, old      Comment:  X 2  03/06/2018: Palliative care patient  6/29/2018: Right heart failure (HCC)  No date: Sleep apnea      Comment:  DOESN'T USE MACHINE  No date: TIA (transient ischemic attack)    Past Surgical History:  Past Surgical History:  No date: APPENDECTOMY  1980: BACK SURGERY      Comment:  x 3  1990: CARDIAC SURGERY      Comment:  Bypass x 3  1980: CARPAL TUNNEL RELEASE;  Bilateral      Comment:  wrist and elbows  2000: CHOLECYSTECTOMY  2000: CORNEAL TRANSPLANT      Comment:  x 2  1990: JOINT REPLACEMENT; Left  1995: JOINT REPLACEMENT; Right  7/11/2017: LAMINECTOMY; N/A      Comment:  LUMBAR LAMINECTOMY POSTERIOR  L23 L34 performed by Sudha Simpson MD at [address]: [address]; Left  2010: [address]; Right  1960: TUMOR REMOVAL; Left      Comment:  foot  2000: TURP  7/15/2015 [facility]: VASCULAR SURGERY;  Left      Comment:  Aortoiliofemoral a'gram with bilateral lower extremity                runoff; select views of the left superficial femoral                artery and the left dorsalis pedis artery; crossing of                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;                a'plasty of left anterior tibial artery and dorsalis                pedis artery with 2.5x300 vascutrak balloon and then with               3x220 nati balloon; completion a'gram    Family History  Review of patient's family history indicates:  Problem: Heart Disease      Relation: Father          Age of Onset: (Not Specified)      Social History  Social History    Socioeconomic History      Marital status:       Spouse name: Not on file      Number of children: Not on file      Years of education: Not on file      Highest education level: Not on file    Occupational History      Not on file    Social Needs      Financial resource strain: Not on file      Food insecurity:        Worry: Not on file        Inability: Not on file      Transportation needs:        Medical: Not on file        Non-medical: Not on file    Tobacco Use      Smoking status: Never Smoker      Smokeless tobacco: Never Used    Substance and Sexual Activity      Alcohol use: No      Drug use: No      Sexual activity: Not Currently    Lifestyle      Physical activity:        Days per week: Not on file        Minutes per session: Not on file      Stress: Not on file    Relationships      Social connections:        Talks on phone: Not on file        Gets together: Not on file        Attends Zoroastrianism service: Not on file        Active member of club or organization: Not on file        Attends meetings of clubs or organizations: Not on file        Relationship status: Not on file      Intimate partner violence:        Fear of current or ex partner: Not on file        Emotionally abused: Not on file        Physically abused: Not on file        Forced sexual activity: Not on file    Other Topics      Concerns:        Not on file    Social History Narrative      Not on file        Review of Systems:  History obtained from chart review and the patient  General ROS: No fever or chills  Respiratory ROS: No cough, shortness of breath, wheezing  Cardiovascular ROS: No chest pain or palpitations  Gastrointestinal ROS: No abdominal pain or melena  Genito-Urinary ROS: Unable to urinate needing Garcia's catheter  Musculoskeletal ROS: No joint pain or swelling   14 point ROS reviewed with the patient and negative except as noted above and in the HPI unless unable to obtain.     Objective:  Patient Vitals in the past 24 hrs:  03/28/19 0945, BP:(!) 146/63, Temp:98.2 °F (36.8 °C), Temp src:Temporal, Pulse:66, Resp:16, SpO2:96 %  03/28/19 0646, BP:(!) 148/73, Temp:98.5 °F (36.9 °C), Temp src:Temporal, Pulse:62, Resp:16, SpO2:97 %  03/28/19 0506, BP:(!) 174/73, Temp:98.4 °F (36.9 °C), Pulse:63, Resp:18, SpO2:98 %, Weight:206 lb 4 oz (93.6 kg)  03/28/19 0432, BP:(!) 161/66, Temp:97.9 °F (36.6 °C), Pulse:64, Resp:19, SpO2:96 %  03/28/19 0403, BP:(!) 153/60, Pulse:67, Resp:17, SpO2:94 %  03/28/19 0332, BP:(!) 144/69, Pulse:62, Resp:16, SpO2:97 %  03/28/19 0302, BP:134/60, Pulse:65, Resp:16, SpO2:92 %  03/28/19 0232, BP:136/60, Pulse:59, Resp:17, SpO2:93 %  03/28/19 0203, BP:138/69, Pulse:61, Resp:18, SpO2:94 %  03/28/19 0132, BP:(!) 145/56, Pulse:62, Resp:18, SpO2:95 %  03/28/19 0034, BP:(!) 158/65, Temp:98 °F (36.7 °C), Temp src:Oral, Pulse:66, Resp:20, SpO2:94 %, Height:5' 8\" (1.727 m), Weight:220 lb (99.8 kg)  Intake/Output Summary (Last 24 hours) at 03/28/19 1027  Last data filed at 03/28/19 0843          Gross per 24 hour  Intake:              360 ml  Output:             1200 ml  Net   :             -840 ml  General: awake/alert   HEENT: Normocephalic atraumatic head  Neck: Supple with no JVD or carotid bruits. Chest:  clear to auscultation bilaterally without respiratory distress  CVS: regular rate and rhythm  Abdominal: soft, nontender, normal bowel sounds  Extremities: no cyanosis or edema  Skin: warm and dry without rash      Labs:  BMP:                 03/28/19 0140          NA           140           K            4.5           CL           106           CO2          23            BUN          60*           CREATININE   2.4*          CALCIUM      9.2           CBC:                 03/28/19 0140          WBC          7.1           HGB          9.1*          HCT          27.9*         MCV          101.1*        PLT          208           LIVER PROFILE:                 03/28/19 0140          AST          45*           ALT          63*           BILITOT      <0.2          ALKPHOS      90            PT/INR: No results for input(s): PROTIME, INR in the last 72 hours. APTT: No results for input(s): APTT in the last 72 hours. BNP:  No results for input(s): BNP in the last 72 hours. Ionized Calcium:No results for input(s): IONCA in the last 72 hours. Magnesium:No results for input(s): MG in the last 72 hours. Phosphorus:No results for input(s): PHOS in the last 72 hours. HgbA1C: No results for input(s): LABA1C in the last 72 hours. Hepatic:                 03/28/19 0140          ALKPHOS      90            ALT          63*           AST          45*           PROT         6.4*          BILITOT      <0.2          LABALBU      3.6           Lactic Acid: No results for input(s): LACTA in the last 72 hours.   Troponin: No results for input(s): CKTOTAL, CKMB, TROPONINT in the last 72 hours. ABGs: No results for input(s): PH, PCO2, PO2, HCO3, O2SAT in the last 72 hours. CRP:  No results for input(s): CRP in the last 72 hours. Sed Rate:  No results for input(s): SEDRATE in the last 72 hours. Cultures:   No results for input(s): CULTURE in the last 72 hours. No results for input(s): BC, Delsie Radhika in the last 72 hours. No results for input(s): CXSURG in the last 72 hours. Radiology reports as per the Radiologist  Radiology: No results found. Assessment  1. Acute kidney injury/outlet obstruction  2. Benign prostatic hypertrophy  3. Stage III chronic kidney disease baseline. 4. Diabetic nephropathy. 5. Previous history of TURP  6. Anemia of chronic kidney disease. Plan:  1. Keep Garcia's catheter  2. Slow hydration. 3. Urinary electrolytes. 4. Renal ultrasound. 5. Baseline iron studies. Thank you for the consult, we appreciate the opportunity to provide care to your patients. Feel free to contact me if I can be of any further assistance.       Yonas Figueroa MD  03/28/19  10:27 AM

## 2019-03-28 NOTE — PROGRESS NOTES
4 Eyes Skin Assessment    Christophe Woods is being assessed upon: Admission    I agree that Luis Benton, along with Dario Richmond RN   (either 2 RN's or 1 LPN and 1 RN) have performed a thorough Head to Toe Skin Assessment on the patient. ALL assessment sites listed below have been assessed. Areas assessed by both nurses:     [x]   Head, Face, and Ears   [x]   Shoulders, Back, and Chest  [x]   Arms, Elbows, and Hands   [x]   Coccyx, Sacrum, and Ischium  [x]   Legs, Feet, and Heels    Does the Patient have Skin Breakdown? Yes, wound(s) noted upon assessment. It is the responsibility of the Primary Nurse to assure that the following documentation, preventions, orders, and consults are complete on the above noted wound(s): Wound LDA initiated. LDA Flowsheet Documentation includes the Daisy-wound, Wound Assessment, Measurements, Dressing Treatment, Drainage, and Color.     Delbert Prevention initiated: No  Wound Care Orders initiated: No    WOC nurse consulted for Pressure Injury (Stage 3,4, Unstageable, DTI, NWPT, and Complex wounds) and New or Established Ostomies: No        Primary Nurse eSignature: Latonya Qureshi RN on 3/28/2019 at 5:44 AM      Co-Signer eSignature: Electronically signed by Dario Richmond RN on 3/28/19 at 5:46 AM

## 2019-03-29 LAB
ALBUMIN SERPL-MCNC: 3 G/DL (ref 3.5–5.2)
ALP BLD-CCNC: 74 U/L (ref 40–130)
ALT SERPL-CCNC: 48 U/L (ref 5–41)
ANION GAP SERPL CALCULATED.3IONS-SCNC: 10 MMOL/L (ref 7–19)
AST SERPL-CCNC: 30 U/L (ref 5–40)
BASOPHILS ABSOLUTE: 0 K/UL (ref 0–0.2)
BASOPHILS RELATIVE PERCENT: 0.6 % (ref 0–1)
BILIRUB SERPL-MCNC: <0.2 MG/DL (ref 0.2–1.2)
BUN BLDV-MCNC: 44 MG/DL (ref 8–23)
CALCIUM SERPL-MCNC: 8.6 MG/DL (ref 8.8–10.2)
CHLORIDE BLD-SCNC: 109 MMOL/L (ref 98–111)
CO2: 21 MMOL/L (ref 22–29)
CREAT SERPL-MCNC: 1.5 MG/DL (ref 0.5–1.2)
EOSINOPHILS ABSOLUTE: 0.2 K/UL (ref 0–0.6)
EOSINOPHILS RELATIVE PERCENT: 4.6 % (ref 0–5)
GFR NON-AFRICAN AMERICAN: 45
GLUCOSE BLD-MCNC: 104 MG/DL (ref 74–109)
GLUCOSE BLD-MCNC: 106 MG/DL (ref 70–99)
GLUCOSE BLD-MCNC: 132 MG/DL (ref 70–99)
GLUCOSE BLD-MCNC: 144 MG/DL (ref 70–99)
GLUCOSE BLD-MCNC: 174 MG/DL (ref 70–99)
HCT VFR BLD CALC: 26.8 % (ref 42–52)
HEMOGLOBIN: 8.7 G/DL (ref 14–18)
LYMPHOCYTES ABSOLUTE: 1.6 K/UL (ref 1.1–4.5)
LYMPHOCYTES RELATIVE PERCENT: 30.2 % (ref 20–40)
MCH RBC QN AUTO: 33 PG (ref 27–31)
MCHC RBC AUTO-ENTMCNC: 32.5 G/DL (ref 33–37)
MCV RBC AUTO: 101.5 FL (ref 80–94)
MONOCYTES ABSOLUTE: 0.7 K/UL (ref 0–0.9)
MONOCYTES RELATIVE PERCENT: 13 % (ref 0–10)
NEUTROPHILS ABSOLUTE: 2.7 K/UL (ref 1.5–7.5)
NEUTROPHILS RELATIVE PERCENT: 51.2 % (ref 50–65)
PDW BLD-RTO: 14.7 % (ref 11.5–14.5)
PERFORMED ON: ABNORMAL
PLATELET # BLD: 193 K/UL (ref 130–400)
PMV BLD AUTO: 10.6 FL (ref 9.4–12.4)
POTASSIUM SERPL-SCNC: 4.6 MMOL/L (ref 3.5–5)
RBC # BLD: 2.64 M/UL (ref 4.7–6.1)
SODIUM BLD-SCNC: 140 MMOL/L (ref 136–145)
TOTAL PROTEIN: 5.4 G/DL (ref 6.6–8.7)
WBC # BLD: 5.2 K/UL (ref 4.8–10.8)

## 2019-03-29 PROCEDURE — 6370000000 HC RX 637 (ALT 250 FOR IP): Performed by: FAMILY MEDICINE

## 2019-03-29 PROCEDURE — 36415 COLL VENOUS BLD VENIPUNCTURE: CPT

## 2019-03-29 PROCEDURE — 80053 COMPREHEN METABOLIC PANEL: CPT

## 2019-03-29 PROCEDURE — 2580000003 HC RX 258: Performed by: INTERNAL MEDICINE

## 2019-03-29 PROCEDURE — 85025 COMPLETE CBC W/AUTO DIFF WBC: CPT

## 2019-03-29 PROCEDURE — G0378 HOSPITAL OBSERVATION PER HR: HCPCS

## 2019-03-29 PROCEDURE — 6360000002 HC RX W HCPCS: Performed by: FAMILY MEDICINE

## 2019-03-29 PROCEDURE — 51798 US URINE CAPACITY MEASURE: CPT

## 2019-03-29 PROCEDURE — 96372 THER/PROPH/DIAG INJ SC/IM: CPT

## 2019-03-29 PROCEDURE — 82948 REAGENT STRIP/BLOOD GLUCOSE: CPT

## 2019-03-29 RX ORDER — MECLIZINE HYDROCHLORIDE 25 MG/1
25 TABLET ORAL 4 TIMES DAILY PRN
Status: DISCONTINUED | OUTPATIENT
Start: 2019-03-29 | End: 2019-03-30 | Stop reason: HOSPADM

## 2019-03-29 RX ORDER — BISACODYL 10 MG
10 SUPPOSITORY, RECTAL RECTAL DAILY PRN
Status: DISCONTINUED | OUTPATIENT
Start: 2019-03-29 | End: 2019-03-30 | Stop reason: HOSPADM

## 2019-03-29 RX ORDER — SODIUM PHOSPHATE, DIBASIC AND SODIUM PHOSPHATE, MONOBASIC 7; 19 G/133ML; G/133ML
1 ENEMA RECTAL
Status: DISPENSED | OUTPATIENT
Start: 2019-03-29 | End: 2019-03-29

## 2019-03-29 RX ADMIN — INSULIN LISPRO 2 UNITS: 100 INJECTION, SOLUTION INTRAVENOUS; SUBCUTANEOUS at 18:12

## 2019-03-29 RX ADMIN — TAMSULOSIN HYDROCHLORIDE 0.8 MG: 0.4 CAPSULE ORAL at 08:45

## 2019-03-29 RX ADMIN — GABAPENTIN 100 MG: 100 CAPSULE ORAL at 08:46

## 2019-03-29 RX ADMIN — GABAPENTIN 100 MG: 100 CAPSULE ORAL at 21:48

## 2019-03-29 RX ADMIN — CLONIDINE HYDROCHLORIDE 0.1 MG: 0.1 TABLET ORAL at 21:47

## 2019-03-29 RX ADMIN — PANTOPRAZOLE SODIUM 40 MG: 40 TABLET, DELAYED RELEASE ORAL at 08:46

## 2019-03-29 RX ADMIN — FINASTERIDE 5 MG: 5 TABLET, FILM COATED ORAL at 08:45

## 2019-03-29 RX ADMIN — LOSARTAN POTASSIUM 50 MG: 50 TABLET ORAL at 21:48

## 2019-03-29 RX ADMIN — HYDRALAZINE HYDROCHLORIDE 50 MG: 25 TABLET, FILM COATED ORAL at 14:31

## 2019-03-29 RX ADMIN — ROPINIROLE HYDROCHLORIDE 1 MG: 1 TABLET, FILM COATED ORAL at 21:48

## 2019-03-29 RX ADMIN — METOPROLOL SUCCINATE 25 MG: 25 TABLET, EXTENDED RELEASE ORAL at 08:46

## 2019-03-29 RX ADMIN — SODIUM CHLORIDE: 9 INJECTION, SOLUTION INTRAVENOUS at 16:30

## 2019-03-29 RX ADMIN — INSULIN LISPRO 2 UNITS: 100 INJECTION, SOLUTION INTRAVENOUS; SUBCUTANEOUS at 12:54

## 2019-03-29 RX ADMIN — PREDNISOLONE ACETATE 1 DROP: 10 SUSPENSION/ DROPS OPHTHALMIC at 14:31

## 2019-03-29 RX ADMIN — ISOSORBIDE MONONITRATE 60 MG: 60 TABLET, EXTENDED RELEASE ORAL at 08:46

## 2019-03-29 RX ADMIN — ALLOPURINOL 600 MG: 300 TABLET ORAL at 08:46

## 2019-03-29 RX ADMIN — AMLODIPINE BESYLATE 10 MG: 5 TABLET ORAL at 08:45

## 2019-03-29 RX ADMIN — PRAVASTATIN SODIUM 40 MG: 20 TABLET ORAL at 08:45

## 2019-03-29 RX ADMIN — PANTOPRAZOLE SODIUM 40 MG: 40 TABLET, DELAYED RELEASE ORAL at 21:48

## 2019-03-29 RX ADMIN — Medication 100 MG: at 08:45

## 2019-03-29 RX ADMIN — AMLODIPINE BESYLATE 10 MG: 5 TABLET ORAL at 21:47

## 2019-03-29 RX ADMIN — PREDNISOLONE ACETATE 1 DROP: 10 SUSPENSION/ DROPS OPHTHALMIC at 05:31

## 2019-03-29 RX ADMIN — HYDRALAZINE HYDROCHLORIDE 50 MG: 25 TABLET, FILM COATED ORAL at 05:31

## 2019-03-29 RX ADMIN — CLONIDINE HYDROCHLORIDE 0.1 MG: 0.1 TABLET ORAL at 08:46

## 2019-03-29 RX ADMIN — PREDNISOLONE ACETATE 1 DROP: 10 SUSPENSION/ DROPS OPHTHALMIC at 18:11

## 2019-03-29 RX ADMIN — PREDNISOLONE ACETATE 1 DROP: 10 SUSPENSION/ DROPS OPHTHALMIC at 21:47

## 2019-03-29 RX ADMIN — HYDRALAZINE HYDROCHLORIDE 50 MG: 25 TABLET, FILM COATED ORAL at 21:48

## 2019-03-29 RX ADMIN — SENNOSIDES AND DOCUSATE SODIUM 2 TABLET: 8.6; 5 TABLET ORAL at 08:46

## 2019-03-29 RX ADMIN — BISACODYL 10 MG: 10 SUPPOSITORY RECTAL at 15:20

## 2019-03-29 RX ADMIN — VITAMIN D, TAB 1000IU (100/BT) 1000 UNITS: 25 TAB at 08:45

## 2019-03-29 RX ADMIN — SODIUM CHLORIDE: 9 INJECTION, SOLUTION INTRAVENOUS at 03:14

## 2019-03-29 RX ADMIN — PREDNISOLONE ACETATE 1 DROP: 10 SUSPENSION/ DROPS OPHTHALMIC at 10:58

## 2019-03-29 RX ADMIN — ASPIRIN 81 MG 81 MG: 81 TABLET ORAL at 08:45

## 2019-03-29 RX ADMIN — ENOXAPARIN SODIUM 30 MG: 30 INJECTION SUBCUTANEOUS at 08:46

## 2019-03-29 RX ADMIN — LOSARTAN POTASSIUM 50 MG: 50 TABLET ORAL at 08:45

## 2019-03-29 ASSESSMENT — PAIN SCALES - WONG BAKER: WONGBAKER_NUMERICALRESPONSE: 0

## 2019-03-29 ASSESSMENT — PAIN SCALES - GENERAL: PAINLEVEL_OUTOF10: 0

## 2019-03-29 NOTE — PROGRESS NOTES
Progress Note  3/29/2019 6:19 AM  Subjective:   Admit Date: 3/28/2019  PCP: Jaylon Najera MD    Interval History:     DIET CARDIAC;     Intake/Output Summary (Last 24 hours) at 3/29/2019 0619  Last data filed at 3/29/2019 0445  Gross per 24 hour   Intake 600 ml   Output 5375 ml   Net -4775 ml     Medications:      sodium chloride 100 mL/hr at 03/29/19 0314    dextrose        sodium chloride flush  10 mL Intravenous 2 times per day    allopurinol  600 mg Oral Daily    amLODIPine  10 mg Oral BID    aspirin  81 mg Oral Daily    cloNIDine  0.1 mg Oral BID    gabapentin  100 mg Oral BID    hydrALAZINE  50 mg Oral 3 times per day    isosorbide mononitrate  60 mg Oral Daily    lidocaine  1 patch Transdermal Daily    losartan  50 mg Oral BID    metoprolol succinate  25 mg Oral Daily    pantoprazole  40 mg Oral BID    pravastatin  40 mg Oral Daily    prednisoLONE acetate  1 drop Left Eye Q4H While awake    rOPINIRole  1 mg Oral Nightly    sennosides-docusate sodium  2 tablet Oral Daily    tamsulosin  0.8 mg Oral Daily    vitamin B-1  100 mg Oral Daily    vitamin D  1,000 Units Oral Daily    finasteride  5 mg Oral Daily    enoxaparin  30 mg Subcutaneous Daily    insulin lispro  0-12 Units Subcutaneous TID WC    insulin lispro  0-6 Units Subcutaneous Nightly     Recent Labs     03/28/19  0140 03/29/19  0432   WBC 7.1 5.2   HGB 9.1* 8.7*    193     Recent Labs     03/28/19  0140 03/29/19  0432    140   K 4.5 4.6    109   CO2 23 21*   BUN 60* 44*   CREATININE 2.4* 1.5*   GLUCOSE 102 104     Recent Labs     03/28/19  0140 03/29/19  0432   AST 45* 30   ALT 63* 48*   BILITOT <0.2 <0.2   ALKPHOS 90 74       Objective:   Vitals: BP (!) 152/60   Pulse 59   Temp 98.4 °F (36.9 °C)   Resp 20   Ht 5' 8\" (1.727 m)   Wt 206 lb 4 oz (93.6 kg)   SpO2 97%   BMI 31.36 kg/m²   General appearance: alert and cooperative with exam  Lungs: clear to auscultation bilaterally  Heart: regular rate and rhythm, S1, S2 normal, no murmur, click, rub or gallop  Abdomen: soft, non-tender; bowel sounds normal; no masses,  no organomegaly  Extremities: extremities normal, atraumatic, no cyanosis or edema  Neurologic: No obvious focal neurologic deficits. Assessment and Plan:   1. INOCENCIO/outlet obstruction: Remove devlin and do bladder scan with straight cath as needed. ? Home later if he is able to empty his bladder. I will await Dr Savanah Edmonds input. 2. Urine retention: Check bladder scan and do In and out cath as needed. 3. HTN: Continue to monitor. 4. DM: Continue as is. Advance Directive: Full Code  DVT prophylaxis with enoxaparin 40 mg sub-Q daily. Discharge planning: Active Problems:    INOCENCIO (acute kidney injury) ([address]  Resolved Problems:    * No resolved hospital problems.  Bev Shetty MD

## 2019-03-29 NOTE — PLAN OF CARE
Problem: Falls - Risk of:  Goal: Will remain free from falls  Description  Will remain free from falls  3/29/2019 0359 by Lita Leon RN  Outcome: Ongoing  3/28/2019 1555 by Halie Ramos RN  Outcome: Ongoing  Goal: Absence of physical injury  Description  Absence of physical injury  3/29/2019 0359 by Lita Leon RN  Outcome: Ongoing  3/28/2019 1555 by Halie Ramos RN  Outcome: Ongoing   Electronically signed by Lita Leon RN on 3/29/19 at 3:59 AM

## 2019-03-29 NOTE — PROGRESS NOTES
Nephrology ([facility]) Consult Note      Patient:  Rachael Lion  YOB: 1934  Date of Service: 3/29/2019  MRN: [id number]   Acct: [de-identified]   Primary Care Physician: Emy Cruz MD  Advance Directive: Full Code  Admit Date: 3/28/2019       Hospital Day: 0  Referring Provider: Emy Cruz MD    Patient independently seen and examined, Chart, Consults, Notes, Operative notes, Labs, Cardiology, and Radiology studies reviewed as able. Chief complaint: Abnormal labs. Subjective:  Rachael Lion is a 80 y.o. male  whom we were consulted for acute kidney injury/chronic kidney disease. Patient has stage III chronic kidney disease baseline. He presented with chief complaint of unable to urinate. Patient has history of benign prostatic hypertrophy and has TURP about 10 years ago. After insertion of Garcia's catheter he has large urine output and renal function has significantly improved back to the baseline. This morning his  catheter was removed and he was able to urinate.     Allergies:  Pcn [penicillins] and Adhesive tape    Medicines:  Current Facility-Administered Medications   Medication Dose Route Frequency Provider Last Rate Last Dose    0.9 % sodium chloride infusion   Intravenous Continuous Alia Griggs  mL/hr at 03/29/19 0314      sodium chloride flush 0.9 % injection 10 mL  10 mL Intravenous 2 times per day Emy Cruz MD        sodium chloride flush 0.9 % injection 10 mL  10 mL Intravenous PRN Emy Cruz MD        acetaminophen (TYLENOL) tablet 650 mg  650 mg Oral Q4H PRN Emy Cruz MD        allopurinol (ZYLOPRIM) tablet 600 mg  600 mg Oral Daily Emy Cruz MD   600 mg at 03/29/19 0846    amLODIPine (NORVASC) tablet 10 mg  10 mg Oral BID Emy Cruz MD   10 mg at 03/29/19 0845    aspirin chewable tablet 81 mg  81 mg Oral Daily Emy Cruz MD   81 mg at 03/29/19 0845    cloNIDine (CATAPRES) tablet 0.1 mg REPAIR Right 2010    TUMOR REMOVAL Left 1960    foot    TURP  2000    VASCULAR SURGERY Left 7/15/2015 [facility]    Aortoiliofemoral a'gram with bilateral lower extremity runoff; select views 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; a'plasty of left anterior tibial artery and dorsalis pedis artery with 2.5x300 vascutrak balloon and then with 3x220 nati balloon; completion a'gram       Family History  Family History   Problem Relation Age of Onset    Heart Disease Father        Social History  Social History     Socioeconomic History    Marital status:      Spouse name: Not on file    Number of children: Not on file    Years of education: Not on file    Highest education level: Not on file   Occupational History    Not on file   Social Needs    Financial resource strain: Not on file    Food insecurity:     Worry: Not on file     Inability: Not on file    Transportation needs:     Medical: Not on file     Non-medical: Not on file   Tobacco Use    Smoking status: Never Smoker    Smokeless tobacco: Never Used   Substance and Sexual Activity    Alcohol use: No    Drug use: No    Sexual activity: Not Currently   Lifestyle    Physical activity:     Days per week: Not on file     Minutes per session: Not on file    Stress: Not on file   Relationships    Social connections:     Talks on phone: Not on file     Gets together: Not on file     Attends Bahai service: Not on file     Active member of club or organization: Not on file     Attends meetings of clubs or organizations: Not on file     Relationship status: Not on file    Intimate partner violence:     Fear of current or ex partner: Not on file     Emotionally abused: Not on file     Physically abused: Not on file     Forced sexual activity: Not on file   Other Topics Concern    Not on file   Social History Narrative    Not on file Review of Systems:  History obtained from chart review and the patient  General ROS: No fever or chills  Respiratory ROS: No cough, shortness of breath, wheezing  Cardiovascular ROS: No chest pain or palpitations  Gastrointestinal ROS: No abdominal pain or melena  Genito-Urinary ROS: Difficulty voiding  Musculoskeletal ROS: No joint pain or swelling   14 point ROS reviewed with the patient and negative except as noted above and in the HPI unless unable to obtain. Objective:  Patient Vitals for the past 24 hrs:   BP Temp Temp src Pulse Resp SpO2   03/29/19 0629 136/63 98 °F (36.7 °C) Temporal 56 22 99 %   03/29/19 0515 (!) 152/60 -- -- 59 -- --   03/28/19 1945 -- 98.4 °F (36.9 °C) -- 58 20 --   03/28/19 1944 (!) 130/53 98.4 °F (36.9 °C) -- 58 20 97 %       Intake/Output Summary (Last 24 hours) at 3/29/2019 1008  Last data filed at 3/29/2019 9723  Gross per 24 hour   Intake 2002 ml   Output 6325 ml   Net -4323 ml     General: awake/alert   HEENT: Normocephalic and metastatic head  Neck: Supple with no JVD or carotid bruits. Chest:  clear to auscultation bilaterally without respiratory distress  CVS: regular rate and rhythm  Abdominal: soft, nontender, normal bowel sounds  Extremities: no cyanosis or edema  Skin: warm and dry without rash      Labs:  BMP:   Recent Labs     03/28/19 0140 03/29/19 0432    140   K 4.5 4.6    109   CO2 23 21*   BUN 60* 44*   CREATININE 2.4* 1.5*   CALCIUM 9.2 8.6*     CBC:   Recent Labs     03/28/19 0140 03/29/19 0432   WBC 7.1 5.2   HGB 9.1* 8.7*   HCT 27.9* 26.8*   .1* 101.5*    193     LIVER PROFILE:   Recent Labs     03/28/19 0140 03/29/19 0432   AST 45* 30   ALT 63* 48*   BILITOT <0.2 <0.2   ALKPHOS 90 74     PT/INR: No results for input(s): PROTIME, INR in the last 72 hours. APTT: No results for input(s): APTT in the last 72 hours. BNP:  No results for input(s): BNP in the last 72 hours.   Ionized Calcium:No results for input(s): IONCA in Benign prostatic hypertrophy  3. Previous history of TURP. 4. Stage III chronic kidney disease baseline. 5. Diabetic nephropathy. 6. Abdominal obesity    Plan:  1. Patient has significant improvement of renal function after insertion of Garcia's. 2. Patient is currently on Proscar.   3. He may need urology consultation    Lakshmi Tao MD  03/29/19  10:08 AM

## 2019-03-30 VITALS
OXYGEN SATURATION: 96 % | BODY MASS INDEX: 31.26 KG/M2 | SYSTOLIC BLOOD PRESSURE: 147 MMHG | HEART RATE: 56 BPM | RESPIRATION RATE: 18 BRPM | HEIGHT: 68 IN | TEMPERATURE: 98.7 F | WEIGHT: 206.25 LBS | DIASTOLIC BLOOD PRESSURE: 58 MMHG

## 2019-03-30 LAB
ALBUMIN SERPL-MCNC: 2.9 G/DL (ref 3.5–5.2)
ALP BLD-CCNC: 77 U/L (ref 40–130)
ALT SERPL-CCNC: 41 U/L (ref 5–41)
ANION GAP SERPL CALCULATED.3IONS-SCNC: 9 MMOL/L (ref 7–19)
AST SERPL-CCNC: 26 U/L (ref 5–40)
BILIRUB SERPL-MCNC: <0.2 MG/DL (ref 0.2–1.2)
BUN BLDV-MCNC: 46 MG/DL (ref 8–23)
CALCIUM SERPL-MCNC: 8.7 MG/DL (ref 8.8–10.2)
CHLORIDE BLD-SCNC: 112 MMOL/L (ref 98–111)
CO2: 20 MMOL/L (ref 22–29)
CREAT SERPL-MCNC: 1.4 MG/DL (ref 0.5–1.2)
GFR NON-AFRICAN AMERICAN: 48
GLUCOSE BLD-MCNC: 110 MG/DL (ref 70–99)
GLUCOSE BLD-MCNC: 126 MG/DL (ref 74–109)
GLUCOSE BLD-MCNC: 178 MG/DL (ref 70–99)
HCT VFR BLD CALC: 26.4 % (ref 42–52)
HEMOGLOBIN: 8.3 G/DL (ref 14–18)
MCH RBC QN AUTO: 32.8 PG (ref 27–31)
MCHC RBC AUTO-ENTMCNC: 31.4 G/DL (ref 33–37)
MCV RBC AUTO: 104.3 FL (ref 80–94)
PDW BLD-RTO: 14.6 % (ref 11.5–14.5)
PERFORMED ON: ABNORMAL
PERFORMED ON: ABNORMAL
PLATELET # BLD: 201 K/UL (ref 130–400)
PMV BLD AUTO: 10.9 FL (ref 9.4–12.4)
POTASSIUM SERPL-SCNC: 4.9 MMOL/L (ref 3.5–5)
RBC # BLD: 2.53 M/UL (ref 4.7–6.1)
SODIUM BLD-SCNC: 141 MMOL/L (ref 136–145)
TOTAL PROTEIN: 5.5 G/DL (ref 6.6–8.7)
WBC # BLD: 4.2 K/UL (ref 4.8–10.8)

## 2019-03-30 PROCEDURE — 80053 COMPREHEN METABOLIC PANEL: CPT

## 2019-03-30 PROCEDURE — 2580000003 HC RX 258: Performed by: FAMILY MEDICINE

## 2019-03-30 PROCEDURE — 51798 US URINE CAPACITY MEASURE: CPT

## 2019-03-30 PROCEDURE — 36415 COLL VENOUS BLD VENIPUNCTURE: CPT

## 2019-03-30 PROCEDURE — 6360000002 HC RX W HCPCS: Performed by: FAMILY MEDICINE

## 2019-03-30 PROCEDURE — G0378 HOSPITAL OBSERVATION PER HR: HCPCS

## 2019-03-30 PROCEDURE — 82948 REAGENT STRIP/BLOOD GLUCOSE: CPT

## 2019-03-30 PROCEDURE — 96372 THER/PROPH/DIAG INJ SC/IM: CPT

## 2019-03-30 PROCEDURE — 6370000000 HC RX 637 (ALT 250 FOR IP): Performed by: FAMILY MEDICINE

## 2019-03-30 PROCEDURE — 85027 COMPLETE CBC AUTOMATED: CPT

## 2019-03-30 RX ORDER — TAMSULOSIN HYDROCHLORIDE 0.4 MG/1
0.8 CAPSULE ORAL DAILY
Qty: 30 CAPSULE | Refills: 3 | Status: SHIPPED | OUTPATIENT
Start: 2019-03-31

## 2019-03-30 RX ORDER — FINASTERIDE 5 MG/1
5 TABLET, FILM COATED ORAL DAILY
Qty: 30 TABLET | Refills: 3 | Status: SHIPPED | OUTPATIENT
Start: 2019-03-31

## 2019-03-30 RX ADMIN — PREDNISOLONE ACETATE 1 DROP: 10 SUSPENSION/ DROPS OPHTHALMIC at 06:21

## 2019-03-30 RX ADMIN — GABAPENTIN 100 MG: 100 CAPSULE ORAL at 09:42

## 2019-03-30 RX ADMIN — ALLOPURINOL 600 MG: 300 TABLET ORAL at 09:42

## 2019-03-30 RX ADMIN — AMLODIPINE BESYLATE 10 MG: 5 TABLET ORAL at 09:44

## 2019-03-30 RX ADMIN — METOPROLOL SUCCINATE 25 MG: 25 TABLET, EXTENDED RELEASE ORAL at 09:41

## 2019-03-30 RX ADMIN — PREDNISOLONE ACETATE 1 DROP: 10 SUSPENSION/ DROPS OPHTHALMIC at 09:41

## 2019-03-30 RX ADMIN — PANTOPRAZOLE SODIUM 40 MG: 40 TABLET, DELAYED RELEASE ORAL at 09:41

## 2019-03-30 RX ADMIN — VITAMIN D, TAB 1000IU (100/BT) 1000 UNITS: 25 TAB at 09:42

## 2019-03-30 RX ADMIN — TAMSULOSIN HYDROCHLORIDE 0.8 MG: 0.4 CAPSULE ORAL at 09:41

## 2019-03-30 RX ADMIN — LOSARTAN POTASSIUM 50 MG: 50 TABLET ORAL at 09:41

## 2019-03-30 RX ADMIN — CLONIDINE HYDROCHLORIDE 0.1 MG: 0.1 TABLET ORAL at 09:41

## 2019-03-30 RX ADMIN — Medication 100 MG: at 09:42

## 2019-03-30 RX ADMIN — ASPIRIN 81 MG 81 MG: 81 TABLET ORAL at 09:42

## 2019-03-30 RX ADMIN — ISOSORBIDE MONONITRATE 60 MG: 60 TABLET, EXTENDED RELEASE ORAL at 09:42

## 2019-03-30 RX ADMIN — SENNOSIDES AND DOCUSATE SODIUM 2 TABLET: 8.6; 5 TABLET ORAL at 09:42

## 2019-03-30 RX ADMIN — Medication 10 ML: at 09:43

## 2019-03-30 RX ADMIN — ENOXAPARIN SODIUM 30 MG: 30 INJECTION SUBCUTANEOUS at 09:41

## 2019-03-30 RX ADMIN — PRAVASTATIN SODIUM 40 MG: 20 TABLET ORAL at 09:42

## 2019-03-30 RX ADMIN — FINASTERIDE 5 MG: 5 TABLET, FILM COATED ORAL at 09:42

## 2019-03-30 RX ADMIN — HYDRALAZINE HYDROCHLORIDE 50 MG: 25 TABLET, FILM COATED ORAL at 06:21

## 2019-03-30 NOTE — DISCHARGE INSTR - COC
Continuity of Care Form    Patient Name: Sana Nunez   :  1934  MRN:  [id number]    Admit date:  3/28/2019  Discharge date:  ***    Code Status Order: Full Code   Advance Directives:   Advance Care Flowsheet Documentation     Date/Time Healthcare Directive Type of Healthcare Directive Copy in [address] Agent's Name Healthcare Agent's Phone Number    19 1440  Yes, patient has an advance directive for healthcare treatment  Living will  No, copy requested from family  --  --  --    19 0558  Yes, patient has an advance directive for healthcare treatment  Living will  No, copy requested from medical records  Healthcare power of   Tamar Haque  [phone]          Admitting Physician:  Cathryn Sykes MD  PCP: Cathryn Sykes MD    Discharging Nurse: [facility] Unit/Room#: 200/65-12  Discharging Unit Phone Number: ***    Emergency Contact:   Extended Emergency Contact Information  Primary Emergency Contact: [place], [facility] [address] Phone: [phone]  Mobile Phone: [phone]  Relation: Grandchild  Secondary Emergency Contact: Mary Charles  Address: [address] [place] [address], [facility] [address] Phone: [phone]  Relation: Spouse    Past Surgical History:  Past Surgical History:   Procedure Laterality Date    APPENDECTOMY     [address]    x 3    CARDIAC SURGERY  1990    Bypass x 3    CARPAL TUNNEL RELEASE Bilateral 1980    wrist and elbows    CHOLECYSTECTOMY  2000    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 2    JOINT REPLACEMENT Left     JOINT REPLACEMENT Right     LAMINECTOMY N/A 2017    LUMBAR LAMINECTOMY POSTERIOR  L23 L34 performed by Nitza Cordova MD at [address] Right 2010    TUMOR REMOVAL Left 1960    foot    TURP  2000    VASCULAR SURGERY Left 7/15/2015 [facility] [address] Aortoiliofemoral a'gram with bilateral lower extremity runoff; select views 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; a'plasty of left anterior tibial artery and dorsalis pedis artery with 2.5x300 vascutrak balloon and then with 3x220 nati balloon; completion a'gram       Immunization History: There is no immunization history on file for this patient.     Active Problems:  Patient Active Problem List   Diagnosis Code    Type II diabetes mellitus with peripheral circulatory disorder ([facility]) E11.51    Diabetic ulcer of left foot associated with type 2 diabetes mellitus ([facility]) E11.621, L97.529    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 ([facility] L97.522    Lumbar stenosis with neurogenic claudication M48.062    DDD (degenerative disc disease), lumbar M51.36    S/P laminectomy Z98.890    Obstructive sleep apnea syndrome G47.33    CHF (congestive heart failure), NYHA class I, acute on chronic, combined ([facility]) I50.43    Coronary artery disease involving native coronary artery of native heart I25.10    Abnormal CT scan, bladder R93.41    Retention, urine R33.9    Bilateral renal cysts N28.1    Acute on chronic congestive heart failure ([facility]) I50.9    Ischemic cardiomyopathy I25.5    Acute chest pain R07.9    Bradycardia R00.1    Right heart failure ([facility]) I50.810    Essential hypertension I10    Angina of effort ([facility]) I20.8    Hx of CABG Z95.1    Chest pain R07.9    Palliative care patient Z51.5    [facility] N17.9       Isolation/Infection:   Isolation          No Isolation            Nurse Assessment:  Last Vital Signs: BP (!) 147/58   Pulse 56   Temp 98.7 °F (37.1 °C) (Temporal)   Resp 18   Ht 5' 8\" (1.727 m)   Wt 206 lb 4 oz (93.6 kg)   SpO2 96%   BMI 31.36 kg/m²     Last documented pain score (0-10 scale): Pain Level: 0  Last Weight: Wt Readings from Last 1 Encounters:   19 206 lb 4 oz (93.6 kg)     Mental Status:  {IP PT MENTAL STATUS:42217}    IV Access:  { TURNER IV ACCESS:845885292}    Nursing Mobility/ADLs:  Walking   {CHP DME ODMJ:447954096}  Transfer  {CHP DME MIWO:866496422}  Bathing  {CHP DME HCCS:336247631}  Dressing  {CHP DME JZPK:535773331}  Toileting  {CHP DME VZNY:427716390}  Feeding  {CHP DME QGID:085448323}  Med Admin  {CHP DME HSFO:596636403}  Med Delivery   { TURNER MED Delivery:485436983}    Wound Care Documentation and Therapy:        Elimination:  Continence:   · Bowel: {YES / DD:19072}  · Bladder: {YES / XO:54582}  Urinary Catheter: {Urinary Catheter:252946800}   Colostomy/Ileostomy/Ileal Conduit: {YES / BV:98859}       Date of Last BM: ***    Intake/Output Summary (Last 24 hours) at 3/30/2019 1231  Last data filed at 3/30/2019 0648  Gross per 24 hour   Intake 1293.39 ml   Output 400 ml   Net 893.39 ml     I/O last 3 completed shifts: In: 3055.4 [P.O.:520;  I.V.:2535.4]  Out: 800 [Urine:800]    Safety Concerns:     [address] [employer] Safety Concerns:829169662}    Impairments/Disabilities:      [address] [employer] Impairments/Disabilities:521829464}    Nutrition Therapy:  Current Nutrition Therapy:   [address] [employer] Diet List:774850869}    Routes of Feeding: {P DME Other Feedings:969978300}  Liquids: {Slp liquid thickness:77951}  Daily Fluid Restriction: {CHP DME Yes amt example:951064591}  Last Modified Barium Swallow with Video (Video Swallowing Test): {Done Not Done EEVO:241834036}    Treatments at the Time of Hospital Discharge:   Respiratory Treatments: ***  Oxygen Therapy:  {Therapy; copd oxygen:43338}  Ventilator:    { CC Vent DTPR:528979152}    Rehab Therapies: {THERAPEUTIC INTERVENTION:3396701961}  Weight Bearing Status/Restrictions: Stanley LOMAS Weight Bearin}  Other Medical Equipment (for information only, NOT a DME order):  {EQUIPMENT:374127126}  Other Treatments: ***    Patient's personal belongings (please select all that are sent with patient):  {CHP DME Belongings:030252148}    RN SIGNATURE:  {Esignature:580172230}    CASE MANAGEMENT/SOCIAL WORK SECTION    Inpatient Status Date: ***    Readmission Risk Assessment Score:  Readmission Risk              Risk of Unplanned Readmission:        40           Discharging to Facility/ Agency   · Name:   · Address:  · Phone:  · Fax:    Dialysis Facility (if applicable)   · Name:  · Address:  · Dialysis Schedule:  · Phone:  · Fax:    / signature: {Esignature:521672319}    PHYSICIAN SECTION    Prognosis: {Prognosis:2643608803}    Condition at Discharge: [facility] Patient Condition:311829249}    Rehab Potential (if transferring to Rehab): {Prognosis:0073988843}    Recommended Labs or Other Treatments After Discharge: ***    Physician Certification: I certify the above information and transfer of Prasad Benjamin  is necessary for the continuing treatment of the diagnosis listed and that he requires {Admit to Appropriate Level of Care:46595} for {GREATER/LESS:918121012} 30 days.      Update Admission H&P: {CHP DME Changes in EVJNR:016703426}    PHYSICIAN SIGNATURE:  {Esignature:712188899}

## 2019-03-30 NOTE — DISCHARGE SUMMARY
[employer] [facility] [address], [facility]                               DISCHARGE SUMMARY    PATIENT NAME: Breezy Alonzo                    :        1934  MED REC NO:   [id number]                              ROOM:       [facility]  ACCOUNT NO:   [de-identified]                           ADMIT DATE: 2019  PROVIDER:     Crista Pierre MD                   [facility] DATE: 2019    DIAGNOSES:  Acute kidney injury, urine retention. HOSPITAL COURSE:  The patient has been seen by Dr. Frannie Santamaria and his acute  kidney injury has resolved. We have started him on Proscar and  increased his Flomax dose and he is urinating well, has not required any  straight cath or in and out catheterizations. He has had no significant  post-void residual.  He is improved and his condition is stable and he  is felt for discharge home. I will see him in the office in about 10  days. During this admission, he had a renal ultrasound which showed  left nephrogenic cyst, otherwise negative bilateral renal ultrasound. His medicines on discharge will be aspirin 81 mg daily; Imdur 60 mg  daily; nitroglycerin sublingual as needed; gabapentin 100 mg twice  daily; glimepiride 4 mg twice daily; Lomotil as needed for diarrhea;  pravastatin 40 mg daily; clonidine 0.1 mg twice daily; losartan 50 mg  twice daily; ropinirole 1 mg nightly; metoprolol XL 25 mg daily;  amlodipine 10 mg twice daily; Lidoderm patch just to his back as needed,  on 12 hours and off 12 hours; Lasix 40 mg twice daily; allopurinol 600  mg daily; Colcrys 0.6 mg daily as needed; Senokot-S two tabs daily as  needed; potassium 10 mEq twice daily; Flomax has been increased to 0.4  mg two tablets daily; prednisolone forte for the eye one drop every 3 to  4 hours as needed; Protonix 40 mg twice daily; vitamin D 1000 units  daily. He will, as I said, see me in 10 days; Dr. Frannie Santamaria at his  discretion.         Aparna Cerna, MD    D: 03/30/2019 11:52:58      T: 03/30/2019 13:00:54     [email or web address]  Job#: [id number]     Doc#: [id number]    CC:

## 2019-03-30 NOTE — PROGRESS NOTES
Pt wants to try some Prune juice before taking the enema. Pt given two small containers and pt took with medications. Pt has gas movements but no bowel movement yet. Will continue to monitor.

## 2019-04-19 ENCOUNTER — OFFICE VISIT (OUTPATIENT)
Dept: NEUROLOGY | Facility: CLINIC | Age: 84
End: 2019-04-19

## 2019-04-19 VITALS
RESPIRATION RATE: 18 BRPM | DIASTOLIC BLOOD PRESSURE: 78 MMHG | HEIGHT: 68 IN | HEART RATE: 80 BPM | SYSTOLIC BLOOD PRESSURE: 128 MMHG | WEIGHT: 196 LBS | BODY MASS INDEX: 29.7 KG/M2

## 2019-04-19 DIAGNOSIS — G47.33 OSA (OBSTRUCTIVE SLEEP APNEA): Primary | ICD-10-CM

## 2019-04-19 DIAGNOSIS — I10 ESSENTIAL HYPERTENSION: ICD-10-CM

## 2019-04-19 DIAGNOSIS — I25.10 CAD IN NATIVE ARTERY: ICD-10-CM

## 2019-04-19 DIAGNOSIS — R01.1 MURMUR: ICD-10-CM

## 2019-04-19 DIAGNOSIS — R00.1 BRADYCARDIA: ICD-10-CM

## 2019-04-19 DIAGNOSIS — E11.8 TYPE 2 DIABETES MELLITUS WITH COMPLICATION, WITHOUT LONG-TERM CURRENT USE OF INSULIN (HCC): ICD-10-CM

## 2019-04-19 PROCEDURE — 99213 OFFICE O/P EST LOW 20 MIN: CPT | Performed by: NURSE PRACTITIONER

## 2019-04-19 NOTE — PROGRESS NOTES
Subjective     Chief Complaint   Patient presents with   • Sleep Apnea       Eron Escalante is a 84 y.o. male follow-up of sleep apnea.   He has still not gotten his machine. He states that he has not heard from [facility] to set up therapy. He underwent polysomnography testing in February 2019 and was diagnosed with obstructive sleep apnea.  He was placed on CPAP of 10 cm water pressure with heated humidification.  He did answer on the questionnaire that he had some weakness and drowsiness with emotional events, he denies that today.  He was diagnosed with sleep apnea many years ago. He does not recall when. He wore the machine for several years and stopped using it about a year ago. He states he stopped wearing it due to frequent urination and inability to take his mask on and off. He was scheduled for a repeat titration by hi PCP due to 100 lb intentional weight loss for evaluation of pressure settings. He continues to have some fatigue and he works two part-time jobs as a  and at the [employer]. He states that his fatigue does not effect his ability to work. He wakes himself of snoring at times. He does have chronic back pain that does effect his sleep. He continues to drive and denies falling asleep driving. He denies any sleep paralysis or catalectic events. He states that he does have history of TIA. He falls asleep easily. He averages about 5-7 hours of sleep nightly. He states that he has a history of irregular heart rhythm and follows cardiology. He has history of open heart surgery and 2 MI as well as a heart cath 2 weeks ago. He has a PMH of heart failure. He denies any current chest pain or palpitations, SOB, edema, orthopnea.He denies any alcohol, tobacco use, or illicit drug use.                                                         [place] Sleepiness Scale    Situation Chance of Dozing or Sleeping   • Sitting and reading 0 - would never dose or sleep   • Watching TV  "1 - slight chance of dosing or sleeping   • Sitting inactive in a public place 1 - slight chance of dosing or sleeping   • Being a passenger in a motor vehicle for an hour or more 0 - would never dose or sleep   • Lying down in the afternoon 1 - slight chance of dosing or sleeping   • Sitting and talking to someone 0 - would never dose or sleep   • Sitting quietly after lunch (no alcohol) 1 - slight chance of dosing or sleeping   • Stopped for a few minutes in traffic while driving 0 - would never dose or sleep   Total score (add the scores up) 4        Does the patient SNORE? Yes    Does the patient feel TIRED, fatigued or sleepy during the day? Yes    Has anyone OBSERVED the stop breathing or cough/gasp during sleep? No    Does the patient have high blood PRESSURE? Yes    Is the patient's BMI greater than 35? No    Is the patient’s AGE over 50 years old? Yes    Is the patient's NECK size greater than 17 in for a male and 16 in for a female? No    Is the patient’s GENDER male? Yes       0-2 \"Yes\" Responses = Low Risk of RICARDO  3-4 \"Yes\" Responses = Intermediate Risk of RCIARDO  5-8 \"Yes\" Responses = High Risk RICARDO     Adapted from STOP-BANG Questionnaire  Gregorio F et al. Anesthesiology 2008;108:812-21.      Neurologic Problem   Primary symptoms comment: ricardo. This is a chronic problem. The current episode started more than 1 year ago. The neurological problem developed gradually. The problem is unchanged. Associated symptoms include fatigue. Pertinent negatives include no chest pain, palpitations or shortness of breath. (Daytime fatigue. Snoring, apnea, somnolence) Treatments tried: cpap.        Current Outpatient Medications   Medication Sig Dispense Refill   • allopurinol (ZYLOPRIM) 300 MG tablet Take 300 mg by mouth Daily.     • amLODIPine (NORVASC) 10 MG tablet Take 1 tablet by mouth Daily. 30 tablet 1   • aspirin 81 MG chewable tablet Chew 81 mg Daily.     • carvedilol (COREG) 12.5 MG tablet Take 12.5 mg by mouth 2 " (Two) Times a Day With Meals.     • Cholecalciferol (VITAMIN D) 1000 units tablet Take 1,000 Units by mouth.     • CloNIDine (CATAPRES) 0.1 MG tablet Take 0.1 mg by mouth.     • colchicine 0.6 MG tablet Take 0.6 mg by mouth Daily As Needed (gout flare-up).     • doxazosin (CARDURA) 1 MG tablet Take 1 mg by mouth Every Night.     • folic acid (FOLVITE) 400 MCG tablet Take 400 mcg by mouth.     • furosemide (LASIX) 40 MG tablet Take 40 mg by mouth.     • gabapentin (NEURONTIN) 100 MG capsule Take 2 capsules by mouth Every 12 (Twelve) Hours. 40 capsule 0   • glimepiride (AMARYL) 4 MG tablet Take 4 mg by mouth Every 12 (Twelve) Hours. Needs pm dose     • Insulin Glargine (LANTUS SOLOSTAR) 100 UNIT/ML injection pen Inject 15 Units under the skin At Night As Needed (BG >120).     • isosorbide mononitrate (IMDUR) 30 MG 24 hr tablet      • meclizine (ANTIVERT) 25 MG tablet Take 25 mg by mouth.     • metoprolol succinate XL (TOPROL XL) 25 MG 24 hr tablet Take 25 mg by mouth.     • nitroglycerin (NITROSTAT) 0.4 MG SL tablet      • ondansetron (ZOFRAN) 4 MG tablet Take 1 tablet by mouth Every 8 (Eight) Hours As Needed for Nausea or Vomiting. 15 tablet 0   • pantoprazole (PROTONIX) 40 MG EC tablet Take 40 mg by mouth Every 12 (Twelve) Hours.     • potassium chloride (K-DUR,KLOR-CON) 10 MEQ CR tablet Take 10 mEq by mouth Every 12 (Twelve) Hours.     • pravastatin (PRAVACHOL) 40 MG tablet Take 40 mg by mouth Every Night.     • prednisoLONE acetate (PRED FORTE) 1 % ophthalmic suspension Administer 1 drop into the left eye Every 2 (Two) Hours While Awake.     • Pyridoxine HCl (VITAMIN B6) 200 MG tablet Take 1 tablet by mouth Daily.     • rOPINIRole (REQUIP) 1 MG tablet Take 1 mg by mouth Every Night. Take 1 hour before bedtime.     • sennosides-docusate sodium (SENOKOT-S) 8.6-50 MG tablet Take 2 tablets by mouth 2 (Two) Times a Day As Needed for constipation. 45 tablet 2   • thiamine (VITAMIN B-1) 100 MG tablet Take 100 mg by  mouth.     • vitamin C (ASCORBIC ACID) 500 MG tablet Take 500 mg by mouth Daily.       No current facility-administered medications for this visit.        Past Medical History:   Diagnosis Date   • A-fib (CMS/[facility]) 11/15/2016   • Anemia     gets shots every few months to build up blood. sees dr adam.   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   • Arthritis    • Bradycardia 11/15/2016   • CAD in native artery 11/15/2016   • Chest pain 11/15/2016   • CHF (congestive heart failure) (CMS/[facility])    • Chronic kidney disease    • COPD (chronic obstructive pulmonary disease) (CMS/[facility])    • DDD (degenerative disc disease), lumbar 6/27/2017   • Heart murmur    • HTN (hypertension) 11/15/2016   • Hyperlipidemia    • Lumbar stenosis with neurogenic claudication 6/27/2017   • Murmur 4/19/2019   • Myocardial infarction (CMS/[facility])    • Sleep apnea    • Stroke (CMS/[facility])    • Type 2 diabetes mellitus (CMS/[facility]) 11/15/2016   • Ulcer of abdomen wall (CMS/[facility])        Past Surgical History:   Procedure Laterality Date   • APPENDECTOMY     • BACK SURGERY      x2   • CARDIAC CATHETERIZATION Left     1/2010   • CARPAL TUNNEL RELEASE Bilateral    • CHOLECYSTECTOMY     • COLONOSCOPY N/A 9/7/2017    Procedure: COLONOSCOPY WITH ANESTHESIA;  Surgeon: Joshua Rich DO;  Location: [facility] ENDOSCOPY;  Service:    • CORONARY ARTERY BYPASS GRAFT      2/2006 w/PTCA & KIMMY    • CORONARY STENT PLACEMENT     • ENDOSCOPY N/A 12/14/2016    Procedure: ESOPHAGOGASTRODUODENOSCOPY WITH ANESTHESIA;  Surgeon: Isabel Dexter MD;  Location: [facility] ENDOSCOPY;  Service:    • ENDOSCOPY N/A 12/16/2016    Procedure: ESOPHAGOGASTRODUODENOSCOPY WITH ANESTHESIA;  Surgeon: Joshua Rich DO;  Location: [facility] ENDOSCOPY;  Service:    • ENDOSCOPY N/A 4/10/2017    Procedure: ESOPHAGOGASTRODUODENOSCOPY WITH ANESTHESIA;  Surgeon: Joshua Rich DO;  Location: [facility] ENDOSCOPY;  Service:    • EYE SURGERY Left     x 2   • JOINT REPLACEMENT     • PERIPHERAL ARTERIAL STENT  "GRAFT     • REPLACEMENT TOTAL KNEE Left     2002   • SHOULDER ROTATOR CUFF REPAIR Bilateral        family history includes Cancer in his mother, sister, and sister; Coronary artery disease in his brother and father; Heart attack in his brother; Heart disease in his father and son; No Known Problems in his sister.    Social History     Tobacco Use   • Smoking status: Never Smoker   • Smokeless tobacco: Never Used   Substance Use Topics   • Alcohol use: No   • Drug use: No       Review of Systems   Constitutional: Positive for fatigue.   HENT: Negative.    Eyes: Negative.    Respiratory: Positive for apnea. Negative for shortness of breath.    Cardiovascular: Negative.  Negative for chest pain, palpitations and leg swelling.   Gastrointestinal: Negative.    Endocrine: Negative.    Genitourinary: Negative.    Musculoskeletal: Negative.    Skin: Negative.    Allergic/Immunologic: Negative.    Neurological: Negative.    Hematological: Negative.    Psychiatric/Behavioral: Negative.    All other systems reviewed and are negative.      Objective     /78 (BP Location: Left arm, Patient Position: Sitting)   Pulse 80   Resp 18   Ht 172.7 cm (68\")   Wt 88.9 kg (196 lb)   BMI 29.80 kg/m² , Body mass index is 29.8 kg/m².    Physical Exam   Constitutional: He is oriented to person, place, and time. He appears well-developed and well-nourished.   HENT:   Head: Normocephalic and atraumatic.   Nose: Nose normal.   Mouth/Throat: Uvula is midline, oropharynx is clear and moist and mucous membranes are normal. Tonsils are 0 on the right. Tonsils are 0 on the left. No tonsillar exudate.   Mallampati class I   Eyes: EOM are normal. Pupils are equal, round, and reactive to light.   Neck: Normal range of motion. Neck supple.   Cardiovascular: Normal rate and intact distal pulses.   Murmur heard.  Pulmonary/Chest: Effort normal and breath sounds normal.   Abdominal: Soft.   Musculoskeletal: Normal range of motion.   Neurological: " He is alert and oriented to person, place, and time. He has normal reflexes.   Awake, alert. No aphasia, no dysarthria  Completes simple and complex commands    CN II:  Visual fields full.  Pupils equally reactive to light  CN III, IV, VI:  Extraocular Muscles full with no signs of nystagmus  CN V:  Facial sensory is symmetric with no asymetries.  CN VII:  Facial motor symmetric  CN VIII:  Gross hearing intact bilaterally  CN IX:  Palate elevates symmetrically  CN X:  Palate elevates symmetrically  CN XI:  Shoulder shrug symmetric  CN XII:  Tongue is midline on protrusion    Full and symmetric strength bilateral upper and lower extremities.   Skin: Skin is warm and dry. Capillary refill takes less than 2 seconds.   Psychiatric: He has a normal mood and affect. His speech is normal and behavior is normal. Cognition and memory are normal.   Nursing note and vitals reviewed.        Results for orders placed or performed during the hospital encounter of 17   POC Glucose Fingerstick   Result Value Ref Range    Glucose 136 (H) 70 - 130 mg/dL        ASSESSMENT/PLAN    Eron was seen today for sleep apnea.    Diagnoses and all orders for this visit:    RICARDO (obstructive sleep apnea)  -     Overnight Sleep Oximetry Study; Future    CAD in native artery    Essential hypertension    Bradycardia    Murmur    Type 2 diabetes mellitus with complication, without long-term current use of insulin (CMS/[facility])          Allergies and all known medications/prescriptions have been reviewed using resources available on this encounter.    Patient's Body mass index is 29.8 kg/m². BMI is above normal parameters. Recommendations include: referral to primary care.    Return in about 4 weeks (around 2019).    MEDICAL DECISION MAKIN.  It has yet to be set up on therapy.  We did call over to [facility] and they have received the order but never received polysomnography testing.  I have printed this out and given this the patient  as well as address to [facility] for him to go over there today to get set up on his therapy.  I have also placed orders for an overnight pulse oximetry to be performed on CPAP once patient is set up.  2.  Murmur noted on exam.  Patient states this is a finding since childhood.  Continue to follow-up with cardiology as scheduled.  He denies any current chest pain, palpitations, shortness of breath, orthopnea.  3.  BP to be managed per PCP and cardiology.  4.  Counseled on multimodal treatment approach of sleep apnea including but not limited to diet, exercise, positive Pap therapy, sleep hygiene.  Risk of untreated sleep apnea were discussed with patient to include but not limited to increased risk of hypertension, heart attack, stroke, cardiac arrhythmia such as atrial fibrillation.  5.  Patient was advised should he not be set up with his therapy within 1 week to give our office a call.  Again we did send patient directly to [facility] for set up of equipment today.        Analia Denton, APRN

## 2019-05-07 ENCOUNTER — TELEPHONE (OUTPATIENT)
Dept: CARDIOLOGY | Age: 84
End: 2019-05-07

## 2019-05-09 ENCOUNTER — TELEPHONE (OUTPATIENT)
Dept: CARDIOLOGY | Age: 84
End: 2019-05-09

## 2019-05-17 ENCOUNTER — OFFICE VISIT (OUTPATIENT)
Dept: NEUROLOGY | Facility: CLINIC | Age: 84
End: 2019-05-17

## 2019-05-17 VITALS
HEART RATE: 80 BPM | BODY MASS INDEX: 31.52 KG/M2 | SYSTOLIC BLOOD PRESSURE: 132 MMHG | WEIGHT: 208 LBS | DIASTOLIC BLOOD PRESSURE: 64 MMHG | RESPIRATION RATE: 18 BRPM | HEIGHT: 68 IN

## 2019-05-17 DIAGNOSIS — G47.33 OSA (OBSTRUCTIVE SLEEP APNEA): Primary | ICD-10-CM

## 2019-05-17 PROCEDURE — 99213 OFFICE O/P EST LOW 20 MIN: CPT | Performed by: NURSE PRACTITIONER

## 2019-05-17 NOTE — PROGRESS NOTES
Subjective     Chief Complaint   Patient presents with   • Sleep Apnea       Eron Escalante is a 84 y.o. male who presents today for follow-up of sleep apnea.  He is present today alone.    History of Present Illness  He underwent polysomnography testing in February 2019 after he had lost 100 pounds intentionally and was diagnosed with obstructive sleep apnea.  He had been diagnosed with sleep apnea many years ago but stopped using his machine approximately a year prior to that.  This was due to frequent nocturnal urination and frustration with taking his mask on and off. He was placed on CPAP of 10 cm water pressure with heated humidification.  He has only had his machine for 5 days.  He is wearing a full facemask.  He states overall he is tolerating pressures but is concerned about the quietness of the machine.  He denies gasping for air or chest pain with therapy.  He is working on becoming compliant with therapy.He denies any chest pain or palpitations. He denies orthopnea, SOB. He denies morning headaches.  He does have history of heart failure.  He also has renal disease.  He follows with cardiology and nephrology for these.  He also follows closely along with his primary care physician in regards to blood pressure.  He denies falling asleep while driving.  He denies any sleep paralysis or cataplectic events.  He continues to work 2 part-time jobs as a  and at the [employer].  He had answered yes to the question on his sleep questionnaire that he had some weakness and drowsiness with highly emotional events but states that he misunderstood that question and denies the symptoms.  He denies alcohol, tobacco use, illicit drug use.  His [place] today in the office is 4, STOP-BANG high.    Compliance report discussed in office today.  He is only has his machine for 5 days.  Out of those 5 days he has worn his machine greater than or equal to 4 hours 4 days.  He is currently set at CPAP  10 cm H2O.  His AHI on therapy is 12.7, incidentally there was 21 minutes per night of Cheyne-Mccloud respirations noted.        Current Outpatient Medications   Medication Sig Dispense Refill   • allopurinol (ZYLOPRIM) 300 MG tablet Take 300 mg by mouth Daily.     • amLODIPine (NORVASC) 10 MG tablet Take 1 tablet by mouth Daily. 30 tablet 1   • aspirin 81 MG chewable tablet Chew 81 mg Daily.     • carvedilol (COREG) 12.5 MG tablet Take 12.5 mg by mouth 2 (Two) Times a Day With Meals.     • Cholecalciferol (VITAMIN D) 1000 units tablet Take 1,000 Units by mouth.     • CloNIDine (CATAPRES) 0.1 MG tablet Take 0.1 mg by mouth.     • colchicine 0.6 MG tablet Take 0.6 mg by mouth Daily As Needed (gout flare-up).     • doxazosin (CARDURA) 1 MG tablet Take 1 mg by mouth Every Night.     • folic acid (FOLVITE) 400 MCG tablet Take 400 mcg by mouth.     • furosemide (LASIX) 40 MG tablet Take 40 mg by mouth.     • gabapentin (NEURONTIN) 100 MG capsule Take 2 capsules by mouth Every 12 (Twelve) Hours. 40 capsule 0   • glimepiride (AMARYL) 4 MG tablet Take 4 mg by mouth Every 12 (Twelve) Hours. Needs pm dose     • Insulin Glargine (LANTUS SOLOSTAR) 100 UNIT/ML injection pen Inject 15 Units under the skin At Night As Needed (BG >120).     • isosorbide mononitrate (IMDUR) 30 MG 24 hr tablet      • meclizine (ANTIVERT) 25 MG tablet Take 25 mg by mouth.     • metoprolol succinate XL (TOPROL XL) 25 MG 24 hr tablet Take 25 mg by mouth.     • nitroglycerin (NITROSTAT) 0.4 MG SL tablet      • ondansetron (ZOFRAN) 4 MG tablet Take 1 tablet by mouth Every 8 (Eight) Hours As Needed for Nausea or Vomiting. 15 tablet 0   • pantoprazole (PROTONIX) 40 MG EC tablet Take 40 mg by mouth Every 12 (Twelve) Hours.     • potassium chloride (K-DUR,KLOR-CON) 10 MEQ CR tablet Take 10 mEq by mouth Every 12 (Twelve) Hours.     • pravastatin (PRAVACHOL) 40 MG tablet Take 40 mg by mouth Every Night.     • prednisoLONE acetate (PRED FORTE) 1 % ophthalmic  suspension Administer 1 drop into the left eye Every 2 (Two) Hours While Awake.     • Pyridoxine HCl (VITAMIN B6) 200 MG tablet Take 1 tablet by mouth Daily.     • rOPINIRole (REQUIP) 1 MG tablet Take 1 mg by mouth Every Night. Take 1 hour before bedtime.     • sennosides-docusate sodium (SENOKOT-S) 8.6-50 MG tablet Take 2 tablets by mouth 2 (Two) Times a Day As Needed for constipation. 45 tablet 2   • thiamine (VITAMIN B-1) 100 MG tablet Take 100 mg by mouth.     • vitamin C (ASCORBIC ACID) 500 MG tablet Take 500 mg by mouth Daily.       No current facility-administered medications for this visit.        Past Medical History:   Diagnosis Date   • A-fib (CMS/[facility]) 11/15/2016   • Anemia     gets shots every few months to build up blood. sees dr adam.   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   • Arthritis    • Bradycardia 11/15/2016   • CAD in native artery 11/15/2016   • Chest pain 11/15/2016   • CHF (congestive heart failure) (CMS/[facility])    • Chronic kidney disease    • COPD (chronic obstructive pulmonary disease) (CMS/[facility])    • DDD (degenerative disc disease), lumbar 6/27/2017   • Heart murmur    • HTN (hypertension) 11/15/2016   • Hyperlipidemia    • Lumbar stenosis with neurogenic claudication 6/27/2017   • Murmur 4/19/2019   • Myocardial infarction (CMS/[facility])    • Sleep apnea    • Stroke (CMS/[facility])    • Type 2 diabetes mellitus (CMS/[facility]) 11/15/2016   • Ulcer of abdomen wall (CMS/[facility])        Past Surgical History:   Procedure Laterality Date   • APPENDECTOMY     • BACK SURGERY      x2   • CARDIAC CATHETERIZATION Left     1/2010   • CARPAL TUNNEL RELEASE Bilateral    • CHOLECYSTECTOMY     • COLONOSCOPY N/A 9/7/2017    Procedure: COLONOSCOPY WITH ANESTHESIA;  Surgeon: Joshua Rich DO;  Location: [facility] ENDOSCOPY;  Service:    • CORONARY ARTERY BYPASS GRAFT      2/2006 w/PTCA & KIMMY    • CORONARY STENT PLACEMENT     • ENDOSCOPY N/A 12/14/2016    Procedure: ESOPHAGOGASTRODUODENOSCOPY WITH ANESTHESIA;  Surgeon:  "Isabel Dexter MD;  Location: [facility] ENDOSCOPY;  Service:    • ENDOSCOPY N/A 12/16/2016    Procedure: ESOPHAGOGASTRODUODENOSCOPY WITH ANESTHESIA;  Surgeon: Joshua Rich DO;  Location: [facility] ENDOSCOPY;  Service:    • ENDOSCOPY N/A 4/10/2017    Procedure: ESOPHAGOGASTRODUODENOSCOPY WITH ANESTHESIA;  Surgeon: Joshua Rich DO;  Location: [facility] ENDOSCOPY;  Service:    • EYE SURGERY Left     x 2   • JOINT REPLACEMENT     • PERIPHERAL ARTERIAL STENT GRAFT     • REPLACEMENT TOTAL KNEE Left     2002   • SHOULDER ROTATOR CUFF REPAIR Bilateral        family history includes Cancer in his mother, sister, and sister; Coronary artery disease in his brother and father; Heart attack in his brother; Heart disease in his father and son; No Known Problems in his sister.    Social History     Tobacco Use   • Smoking status: Never Smoker   • Smokeless tobacco: Never Used   Substance Use Topics   • Alcohol use: No   • Drug use: No       Review of Systems   Constitutional: Positive for fatigue.   HENT: Negative.    Eyes: Negative.    Respiratory: Negative.  Negative for apnea, chest tightness and shortness of breath.    Cardiovascular: Negative for chest pain and palpitations.   Gastrointestinal: Negative.    Endocrine: Negative.    Genitourinary: Positive for nocturia.   Musculoskeletal: Positive for arthralgias and gait problem.   Skin: Negative.    Allergic/Immunologic: Negative.    Hematological: Negative.    Psychiatric/Behavioral: Negative.    All other systems reviewed and are negative.      Objective     /64 (BP Location: Left arm, Patient Position: Sitting)   Pulse 80   Resp 18   Ht 172.7 cm (68\")   Wt 94.3 kg (208 lb)   BMI 31.63 kg/m² , Body mass index is 31.63 kg/m².    Physical Exam   Constitutional: He is oriented to person, place, and time. He appears well-developed and well-nourished.   HENT:   Head: Normocephalic and atraumatic.   Mallampati class IV   Eyes: EOM are normal. Pupils are equal, round, " and reactive to light.   Neck: Normal range of motion. Neck supple.   Cardiovascular: Normal rate.   Pulmonary/Chest: Effort normal and breath sounds normal.   Neurological: He is alert and oriented to person, place, and time.   Skin: Skin is warm and dry.   Psychiatric: He has a normal mood and affect. His behavior is normal.         ASSESSMENT/PLAN    Eron was seen today for sleep apnea.    Diagnoses and all orders for this visit:    RICARDO (obstructive sleep apnea)  -     Overnight Sleep Oximetry Study; Future          Allergies and all known medications/prescriptions have been reviewed using resources available on this encounter.    Patient's Body mass index is 31.63 kg/m². BMI is above normal parameters. Recommendations include: referral to primary care.    Return in about 4 weeks (around 2019).    MEDICAL DECISION MAKIN.  Compliance report discussed in office.  Patient's only had his machine for 5 days.  His AHI is elevated on this recent compliance download at 12.7.  There is also approximately 21 minutes of Cheyne-Mccloud respiration noted.  This could be transitional changes from introductory phase to CPAP therapy however I would like to perform an overnight pulse oximetry as soon as possible to evaluate for  nocturnal hypoxia.  Will also pull another compliance download in the next week or so as this will give him 2 weeks on therapy.  AHI should have improved within that time frame.  If patient has nocturnal hypoxia noted and AHI continues to be elevated will schedule patient for a titration study to see if BiPAP would be better suited for him given his past medical history of heart failure as well as oxygen titration study in lab.    2. Counseled on multimodal approach to treatment of sleep apnea to include but not limited to diet, exercise, sleep hygiene, compliance with pap therapy. Encouraged lateral sleeping position and to avoid sedatives or alcohol close to bedtime. Risks of untreated sleep  apnea were discussed to include but not limited to HTN, heart disease, stroke, cardiac arrhythmia such as AFIB, and dementia.   3.  Patient denies any chest pain, orthopnea, palpitations, shortness of breath.  Patient was advised to seek medical attention immediately should any of these occur.  He is to keep close follow-up with cardiologist, nephrologist, PCP.       ANJANA Hyman

## 2019-05-30 DIAGNOSIS — G47.33 OSA (OBSTRUCTIVE SLEEP APNEA): ICD-10-CM

## 2019-06-03 ENCOUNTER — TELEPHONE (OUTPATIENT)
Dept: NEUROLOGY | Facility: CLINIC | Age: 84
End: 2019-06-03

## 2019-06-03 NOTE — TELEPHONE ENCOUNTER
Unable to add to original message. Patient was notified via Rylee Ruiz today of overnight result and to follow up with cardiologist in regards to bradycardia noted. Results letter was also mailed today.     ----- Message from Rylee Ruiz LPN sent at 6/3/2019 10:45 AM CDT -----  Eron called today, I gave him the overnight results.

## 2019-06-07 ENCOUNTER — HOSPITAL ENCOUNTER (EMERGENCY)
Age: 84
Discharge: HOME | End: 2019-06-07
Attending: EMERGENCY MEDICINE
Payer: COMMERCIAL

## 2019-06-07 ENCOUNTER — APPOINTMENT (OUTPATIENT)
Dept: GENERAL RADIOLOGY | Age: 84
End: 2019-06-07
Payer: COMMERCIAL

## 2019-06-07 ENCOUNTER — APPOINTMENT (OUTPATIENT)
Dept: CT IMAGING | Age: 84
End: 2019-06-07
Payer: COMMERCIAL

## 2019-06-07 VITALS
BODY MASS INDEX: 28.63 KG/M2 | RESPIRATION RATE: 17 BRPM | SYSTOLIC BLOOD PRESSURE: 210 MMHG | DIASTOLIC BLOOD PRESSURE: 72 MMHG | OXYGEN SATURATION: 100 % | WEIGHT: 200 LBS | TEMPERATURE: 98.1 F | HEART RATE: 59 BPM | HEIGHT: 70 IN

## 2019-06-07 DIAGNOSIS — I10 ESSENTIAL HYPERTENSION: ICD-10-CM

## 2019-06-07 DIAGNOSIS — K59.00 CONSTIPATION, UNSPECIFIED CONSTIPATION TYPE: Primary | ICD-10-CM

## 2019-06-07 DIAGNOSIS — F41.1 ANXIETY STATE: ICD-10-CM

## 2019-06-07 LAB
ALBUMIN SERPL-MCNC: 3.7 G/DL (ref 3.5–5.2)
ALP BLD-CCNC: 112 U/L (ref 40–130)
ALT SERPL-CCNC: 19 U/L (ref 5–41)
ANION GAP SERPL CALCULATED.3IONS-SCNC: 11 MMOL/L (ref 7–19)
AST SERPL-CCNC: 22 U/L (ref 5–40)
BASOPHILS ABSOLUTE: 0 K/UL (ref 0–0.2)
BASOPHILS RELATIVE PERCENT: 0.6 % (ref 0–1)
BILIRUB SERPL-MCNC: <0.2 MG/DL (ref 0.2–1.2)
BILIRUBIN URINE: NEGATIVE
BLOOD, URINE: NEGATIVE
BUN BLDV-MCNC: 39 MG/DL (ref 8–23)
CALCIUM SERPL-MCNC: 9.8 MG/DL (ref 8.8–10.2)
CHLORIDE BLD-SCNC: 107 MMOL/L (ref 98–111)
CLARITY: CLEAR
CO2: 21 MMOL/L (ref 22–29)
COLOR: YELLOW
CREAT SERPL-MCNC: 1.2 MG/DL (ref 0.5–1.2)
EOSINOPHILS ABSOLUTE: 0.2 K/UL (ref 0–0.6)
EOSINOPHILS RELATIVE PERCENT: 3.9 % (ref 0–5)
GFR NON-AFRICAN AMERICAN: 58
GLUCOSE BLD-MCNC: 99 MG/DL (ref 74–109)
GLUCOSE URINE: NEGATIVE MG/DL
HCT VFR BLD CALC: 31 % (ref 42–52)
HEMOGLOBIN: 10.3 G/DL (ref 14–18)
KETONES, URINE: NEGATIVE MG/DL
LEUKOCYTE ESTERASE, URINE: NEGATIVE
LYMPHOCYTES ABSOLUTE: 1.8 K/UL (ref 1.1–4.5)
LYMPHOCYTES RELATIVE PERCENT: 32.4 % (ref 20–40)
MCH RBC QN AUTO: 33.1 PG (ref 27–31)
MCHC RBC AUTO-ENTMCNC: 33.2 G/DL (ref 33–37)
MCV RBC AUTO: 99.7 FL (ref 80–94)
MONOCYTES ABSOLUTE: 0.7 K/UL (ref 0–0.9)
MONOCYTES RELATIVE PERCENT: 12.9 % (ref 0–10)
NEUTROPHILS ABSOLUTE: 2.7 K/UL (ref 1.5–7.5)
NEUTROPHILS RELATIVE PERCENT: 50 % (ref 50–65)
NITRITE, URINE: NEGATIVE
PDW BLD-RTO: 13.2 % (ref 11.5–14.5)
PH UA: 5.5 (ref 5–8)
PLATELET # BLD: 195 K/UL (ref 130–400)
PMV BLD AUTO: 9.7 FL (ref 9.4–12.4)
POTASSIUM SERPL-SCNC: 4.4 MMOL/L (ref 3.5–5)
PROTEIN UA: ABNORMAL MG/DL
RBC # BLD: 3.11 M/UL (ref 4.7–6.1)
SODIUM BLD-SCNC: 139 MMOL/L (ref 136–145)
SPECIFIC GRAVITY UA: 1.01 (ref 1–1.03)
TOTAL PROTEIN: 6.2 G/DL (ref 6.6–8.7)
URINE REFLEX TO CULTURE: ABNORMAL
UROBILINOGEN, URINE: 0.2 E.U./DL
WBC # BLD: 5.4 K/UL (ref 4.8–10.8)

## 2019-06-07 PROCEDURE — 99284 EMERGENCY DEPT VISIT MOD MDM: CPT

## 2019-06-07 PROCEDURE — 6370000000 HC RX 637 (ALT 250 FOR IP): Performed by: EMERGENCY MEDICINE

## 2019-06-07 PROCEDURE — 36415 COLL VENOUS BLD VENIPUNCTURE: CPT

## 2019-06-07 PROCEDURE — 99284 EMERGENCY DEPT VISIT MOD MDM: CPT | Performed by: EMERGENCY MEDICINE

## 2019-06-07 PROCEDURE — 85025 COMPLETE CBC W/AUTO DIFF WBC: CPT

## 2019-06-07 PROCEDURE — 80053 COMPREHEN METABOLIC PANEL: CPT

## 2019-06-07 PROCEDURE — 74176 CT ABD & PELVIS W/O CONTRAST: CPT

## 2019-06-07 PROCEDURE — 74022 RADEX COMPL AQT ABD SERIES: CPT

## 2019-06-07 PROCEDURE — 81003 URINALYSIS AUTO W/O SCOPE: CPT

## 2019-06-07 RX ORDER — CLONIDINE HYDROCHLORIDE 0.1 MG/1
0.1 TABLET ORAL ONCE
Status: DISCONTINUED | OUTPATIENT
Start: 2019-06-07 | End: 2019-06-07 | Stop reason: HOSPADM

## 2019-06-07 RX ORDER — DOCUSATE SODIUM 100 MG/1
100 CAPSULE, LIQUID FILLED ORAL 2 TIMES DAILY
Qty: 60 CAPSULE | Refills: 0 | Status: SHIPPED | OUTPATIENT
Start: 2019-06-07

## 2019-06-07 RX ORDER — SODIUM PHOSPHATE, DIBASIC AND SODIUM PHOSPHATE, MONOBASIC 7; 19 G/133ML; G/133ML
1 ENEMA RECTAL ONCE
Status: COMPLETED | OUTPATIENT
Start: 2019-06-07 | End: 2019-06-07

## 2019-06-07 RX ORDER — MAGNESIUM CARB/ALUMINUM HYDROX 105-160MG
296 TABLET,CHEWABLE ORAL ONCE
Qty: 1 BOTTLE | Refills: 0 | Status: SHIPPED | OUTPATIENT
Start: 2019-06-07 | End: 2019-06-07

## 2019-06-07 RX ORDER — POLYETHYLENE GLYCOL 3350 17 G/17G
17 POWDER, FOR SOLUTION ORAL DAILY PRN
Qty: 527 G | Refills: 1 | Status: SHIPPED | OUTPATIENT
Start: 2019-06-07 | End: 2019-07-07

## 2019-06-07 RX ADMIN — SODIUM PHOSPHATE 1 ENEMA: 7; 19 ENEMA RECTAL at 19:17

## 2019-06-07 ASSESSMENT — PAIN SCALES - GENERAL: PAINLEVEL_OUTOF10: 5

## 2019-06-07 NOTE — ED NOTES
Report given and care transferred to Diego Castelan RN at bedside       Whitney Combs, [place]  06/07/19 [id number]

## 2019-06-08 ASSESSMENT — ENCOUNTER SYMPTOMS
CONSTIPATION: 1
SHORTNESS OF BREATH: 0

## 2019-06-08 NOTE — ED NOTES
Fleet enema administered, PT in the bathroom attempting to have a BM.       Mila Dominguez, [facility], [facility]  06/07/19 [id number]

## 2019-06-08 NOTE — ED PROVIDER NOTES
claudication 7/11/2017    MI, old     X 2    Palliative care patient 03/06/2018    Right heart failure ([address] 6/29/2018    Sleep apnea     DOESN'T USE MACHINE    TIA (transient ischemic attack)          SURGICAL HISTORY       Past Surgical History:   Procedure Laterality Date    APPENDECTOMY      BACK SURGERY  1980    x 3    CARDIAC SURGERY  1990    Bypass x 3    CARPAL TUNNEL RELEASE Bilateral 1980    wrist and elbows    CHOLECYSTECTOMY  2000    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 2    JOINT REPLACEMENT Left 1990    JOINT REPLACEMENT Right 1995    LAMINECTOMY N/A 7/11/2017    LUMBAR LAMINECTOMY POSTERIOR  L23 L34 performed by Randolph Duran MD at [address] Left 1990    ROTATOR CUFF REPAIR Right 2010    TUMOR REMOVAL Left 1960    foot    TURP  2000    VASCULAR SURGERY Left 7/15/2015 [facility] & [address]    Aortoiliofemoral a'gram with bilateral lower extremity runoff; select views 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; a'plasty of left anterior tibial artery and dorsalis pedis artery with 2.5x300 vascutrak balloon and then with 3x220 nati balloon; completion a'gram         CURRENT MEDICATIONS       Discharge Medication List as of 6/7/2019  8:09 PM      CONTINUE these medications which have NOT CHANGED    Details   tamsulosin (FLOMAX) 0.4 MG capsule Take 2 capsules by mouth daily, Disp-30 capsule, R-3Normal      finasteride (PROSCAR) 5 MG tablet Take 1 tablet by mouth daily, Disp-30 tablet, R-3Normal      cloNIDine (CATAPRES) 0.1 MG tablet Take 1 tablet by mouth 2 times daily, Disp-60 tablet, R-3Normal      amLODIPine (NORVASC) 10 MG tablet Take 1 tablet by mouth 2 times daily, Disp-60 tablet, R-5Normal      isosorbide mononitrate (IMDUR) 60 MG extended release tablet Take 1 tablet by mouth daily, Disp-30 tablet, R-5Dose increaseNormal      metoprolol succinate (TOPROL XL) 25 MG extended release tablet Take 1 tablet by mouth daily, Disp-30 tablet, R-0Print      nitroGLYCERIN (NITROSTAT) 0.4 MG SL tablet up to max of 3 total doses. If no relief after 1 dose, call 911., Disp-25 tablet, R-3Normal      Multiple Vitamins-Minerals (EYE VITAMINS PO) Take by mouthHistorical Med      lidocaine (LIDODERM) 5 % Place 1 patch onto the skin daily 12 hours on, 12 hours off., Disp-30 patch, R-0Print      losartan (COZAAR) 50 MG tablet Take 50 mg by mouth 2 times dailyHistorical Med      vitamin B-1 (THIAMINE) 100 MG tablet Take 100 mg by mouth dailyHistorical Med      vitamin D (CHOLECALCIFEROL) 1000 UNIT TABS tablet Take 1,000 Units by mouth dailyHistorical Med      rOPINIRole (REQUIP) 1 MG tablet Take 1 mg by mouth nightlyHistorical Med      gabapentin (NEURONTIN) 100 MG capsule Take 100 mg by mouth 2 times daily. Stephenie Lao Historical Med      potassium chloride (KLOR-CON) 20 MEQ packet Take 10 mEq by mouth 2 times daily Pt takes half tab in morning & half tab at night. Historical Med      glimepiride (AMARYL) 4 MG tablet Take 4 mg by mouth 2 times dailyHistorical Med      colchicine (COLCRYS) 0.6 MG tablet Take 0.6 mg by mouth as needed for PainHistorical Med      baclofen (LIORESAL) 10 MG tablet Take 10 mg by mouth as neededHistorical Med      furosemide (LASIX) 40 MG tablet Take 40 mg by mouth 2 times daily Pt takes 2 at once instead of twice a day. Historical Med      aspirin 81 MG chewable tablet Take 1 tablet by mouth daily, Disp-30 tablet, R-3Normal      hydrALAZINE (APRESOLINE) 50 MG tablet Take 1 tablet by mouth every 8 hours, Disp-90 tablet, R-3Normal      pantoprazole (PROTONIX) 40 MG tablet Take 1 tablet by mouth 2 times daily, Disp-60 tablet, R-5Normal      ferrous sulfate-C-folic acid (FOLITAB) 723161-6.3 MG TBCR per extended release tablet Take 1 tablet by mouth daily, Disp-30 tablet, R-0Normal      prednisoLONE acetate (PRED FORTE) 1 % ophthalmic suspension Place 1 drop into the left eye every hour as needed (for dry eye)Historical Med      allopurinol (ZYLOPRIM) 300 MG tablet Take 600 mg by mouth daily Historical Med      sennosides-docusate sodium (SENOKOT-S) 8.6-50 MG tablet Take 2 tablets by mouth dailyHistorical Med      diphenoxylate-atropine (LOMOTIL) 2.5-0.025 MG per tablet Take 1 tablet by mouth 4 times daily as needed for DiarrheaHistorical Med      meclizine (ANTIVERT) 25 MG tablet Take 25 mg by mouth 4 times daily as neededHistorical Med      ondansetron (ZOFRAN-ODT) 4 MG disintegrating tablet Take 4 mg by mouth every 8 hours as needed for Nausea or VomitingHistorical Med      pravastatin (PRAVACHOL) 40 MG tablet Take 40 mg by mouth dailyHistorical Med      promethazine (PHENERGAN) 12.5 MG tablet Take 12.5 mg by mouth as needed for NauseaHistorical Med      Ascorbic Acid (VITAMIN C) 500 MG tablet Take 1,000 mg by mouth daily Historical Med      b complex vitamins capsule Take 1 capsule by mouth dailyHistorical Med      folic acid (FOLVITE) 286 MCG tablet Take 400 mcg by mouth dailyHistorical Med             ALLERGIES     Pcn [penicillins] and Adhesive tape    FAMILY HISTORY       Family History   Problem Relation Age of Onset    Heart Disease Father           SOCIAL HISTORY       Social History     Socioeconomic History    Marital status:      Spouse name: None    Number of children: None    Years of education: None    Highest education level: None   Occupational History    None   Social Needs    Financial resource strain: None    Food insecurity:     Worry: None     Inability: None    Transportation needs:     Medical: None     Non-medical: None   Tobacco Use    Smoking status: Never Smoker    Smokeless tobacco: Never Used   Substance and Sexual Activity    Alcohol use: No    Drug use: No    Sexual activity: Not Currently   Lifestyle    Physical activity:     Days per week: None     Minutes per session: None    Stress: None   Relationships    Social connections:     Talks on phone: None Diagnoses or Management Options  Anxiety state:   Constipation, unspecified constipation type:   Essential hypertension:   Diagnosis management comments: Patient with abdominal pain constipation. There is no bleeding. I checked labs make sure there was no renal failure per his prior charts. The patient had an x-ray done and then did a CT because he was so anxious. We gave an enema he had a brown large bowel movement he seemed to feel better. He still very anxious. I try to contact Dr. Eunice Bobby. I do think the patient just needs to be cleaned out with a bowel regimen. His no obstruction. He stable for discharge. Amount and/or Complexity of Data Reviewed  Clinical lab tests: reviewed and ordered  Tests in the radiology section of CPT®: ordered and reviewed          CONSULTS:  None    PROCEDURES:  Unless otherwise notedbelow, none     Procedures    FINAL IMPRESSION     1. Constipation, unspecified constipation type    2. Anxiety state    3.  Essential hypertension          DISPOSITION/PLAN   DISPOSITION Decision To Discharge 06/07/2019 08:45:09 PM      PATIENT REFERRED TO:  Alda Vance MD  [address] [place] [address] [id number]  [facility] [address]  [phone]    Schedule an appointment as soon as possible for a visit in 3 days        DISCHARGE MEDICATIONS:  Discharge Medication List as of 6/7/2019  8:09 PM      START taking these medications    Details   Magnesium Citrate 1.745 GM/30ML solution Take 296 mLs by mouth once for 1 dose, Disp-1 Bottle, R-0Print      polyethylene glycol (MIRALAX) packet Take 17 g by mouth daily as needed for Constipation (take with large glass of water), Disp-527 g, R-1Print      docusate sodium (COLACE) 100 MG capsule Take 1 capsule by mouth 2 times daily, Disp-60 capsule, R-0Print                (Please note that portions of this note were completed with a voice recognition program.  Efforts were made to edit the dictations butoccasionally words are mis-transcribed.)    Ruben Flowers MD (electronically signed)  Manny Donald MD  06/08/19 1889

## 2019-06-08 NOTE — ED NOTES
PT had significantly large BM following enema.       Jarvis Ledezma, [facility]  06/07/19 2013

## 2019-06-14 ENCOUNTER — OFFICE VISIT (OUTPATIENT)
Dept: NEUROLOGY | Facility: CLINIC | Age: 84
End: 2019-06-14

## 2019-06-14 VITALS
HEART RATE: 64 BPM | HEIGHT: 68 IN | WEIGHT: 194 LBS | DIASTOLIC BLOOD PRESSURE: 72 MMHG | RESPIRATION RATE: 18 BRPM | BODY MASS INDEX: 29.4 KG/M2 | SYSTOLIC BLOOD PRESSURE: 114 MMHG

## 2019-06-14 DIAGNOSIS — G47.33 OSA (OBSTRUCTIVE SLEEP APNEA): ICD-10-CM

## 2019-06-14 DIAGNOSIS — R00.1 BRADYCARDIA: Primary | ICD-10-CM

## 2019-06-14 PROCEDURE — 99213 OFFICE O/P EST LOW 20 MIN: CPT | Performed by: NURSE PRACTITIONER

## 2019-06-14 NOTE — PROGRESS NOTES
Subjective     Chief Complaint   Patient presents with   • Sleep Apnea       Eron Escalante is a 84 y.o. male who presents today for follow-up of sleep apnea.    History of Present Illness  Mr. Escalante is a 84-year-old man who presents today for evaluation and follow-up of sleep apnea.  He is present today alone.  He does present today in a surgical shoe.  He states he is just left [facility] [place] where he was evaluated by podiatry and given medication for gout flare.  He is attempting to use his machine at night but relates that he feels like his pressure settings are not enough.  He is wearing a full facemask he utilizes [employer] for [employer].  He will take the mask off in the middle the night without knowing it and states at times he feels like he is not getting enough air.  He denies any chest pain or palpitations.  He denies any increasing shortness of breath.  He did have bradycardia noted on most recent overnight pulse oximetry and was notified of this result via letter as well as telephone.  He was advised to follow-up with his cardiologist in regards to this and monitor for symptoms of bradycardia.  The result was also fax over to cardiology.  He has not followed up with his cardiologist.  He is established patient at [facility] cardiology and sees Dr. Whitehead or ANJANA.  He was last evaluated in March 2019.  He denies any issues of dizziness or persistent fatigue.  He continues to try to utilize his machine.  He states he is feels that he would be compliant with therapy if his pressure setting felt better.  He denies snoring, waking up coughing or gasping for air.  He denies any sleep paralysis or cataplectic events.  His [place] today in the office is 2, STOP-BANG is high.  He denies any use of alcohol, tobacco, illicit drugs.  He continues to work 2 part-time jobs as a  at the [employer] and [employer] [place].  He states that his daughter is now living at home with  him.    Compliance report discussed in office.  He has worn his machine greater than equal to 4 hours 47% the time.  His average usage is 4 hours.  He said at CPAP 10 cm H2O.  His AHI on therapy is 9.1.  There were 6 minutes of Cheyne-Mccloud respiration noted.    As you recall,He underwent polysomnography testing in February 2019 after he had lost 100 pounds intentionally and was diagnosed with obstructive sleep apnea.  He had been diagnosed with sleep apnea many years ago but stopped using his machine approximately a year prior to that.         Current Outpatient Medications   Medication Sig Dispense Refill   • allopurinol (ZYLOPRIM) 300 MG tablet Take 300 mg by mouth Daily.     • amLODIPine (NORVASC) 10 MG tablet Take 1 tablet by mouth Daily. 30 tablet 1   • aspirin 81 MG chewable tablet Chew 81 mg Daily.     • carvedilol (COREG) 12.5 MG tablet Take 12.5 mg by mouth 2 (Two) Times a Day With Meals.     • Cholecalciferol (VITAMIN D) 1000 units tablet Take 1,000 Units by mouth.     • CloNIDine (CATAPRES) 0.1 MG tablet Take 0.1 mg by mouth.     • colchicine 0.6 MG tablet Take 0.6 mg by mouth Daily As Needed (gout flare-up).     • doxazosin (CARDURA) 1 MG tablet Take 1 mg by mouth Every Night.     • folic acid (FOLVITE) 400 MCG tablet Take 400 mcg by mouth.     • furosemide (LASIX) 40 MG tablet Take 40 mg by mouth.     • gabapentin (NEURONTIN) 100 MG capsule Take 2 capsules by mouth Every 12 (Twelve) Hours. 40 capsule 0   • glimepiride (AMARYL) 4 MG tablet Take 4 mg by mouth Every 12 (Twelve) Hours. Needs pm dose     • Insulin Glargine (LANTUS SOLOSTAR) 100 UNIT/ML injection pen Inject 15 Units under the skin At Night As Needed (BG >120).     • isosorbide mononitrate (IMDUR) 30 MG 24 hr tablet      • meclizine (ANTIVERT) 25 MG tablet Take 25 mg by mouth.     • metoprolol succinate XL (TOPROL XL) 25 MG 24 hr tablet Take 25 mg by mouth.     • nitroglycerin (NITROSTAT) 0.4 MG SL tablet      • ondansetron (ZOFRAN) 4 MG  tablet Take 1 tablet by mouth Every 8 (Eight) Hours As Needed for Nausea or Vomiting. 15 tablet 0   • pantoprazole (PROTONIX) 40 MG EC tablet Take 40 mg by mouth Every 12 (Twelve) Hours.     • potassium chloride (K-DUR,KLOR-CON) 10 MEQ CR tablet Take 10 mEq by mouth Every 12 (Twelve) Hours.     • pravastatin (PRAVACHOL) 40 MG tablet Take 40 mg by mouth Every Night.     • prednisoLONE acetate (PRED FORTE) 1 % ophthalmic suspension Administer 1 drop into the left eye Every 2 (Two) Hours While Awake.     • Pyridoxine HCl (VITAMIN B6) 200 MG tablet Take 1 tablet by mouth Daily.     • rOPINIRole (REQUIP) 1 MG tablet Take 1 mg by mouth Every Night. Take 1 hour before bedtime.     • sennosides-docusate sodium (SENOKOT-S) 8.6-50 MG tablet Take 2 tablets by mouth 2 (Two) Times a Day As Needed for constipation. 45 tablet 2   • thiamine (VITAMIN B-1) 100 MG tablet Take 100 mg by mouth.     • vitamin C (ASCORBIC ACID) 500 MG tablet Take 500 mg by mouth Daily.       No current facility-administered medications for this visit.        Past Medical History:   Diagnosis Date   • A-fib (CMS/[facility]) 11/15/2016   • Anemia     gets shots every few months to build up blood. sees dr adam.   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   • Arthritis    • Bradycardia 11/15/2016   • CAD in native artery 11/15/2016   • Chest pain 11/15/2016   • CHF (congestive heart failure) (CMS/[facility])    • Chronic kidney disease    • COPD (chronic obstructive pulmonary disease) (CMS/[facility])    • DDD (degenerative disc disease), lumbar 6/27/2017   • Heart murmur    • HTN (hypertension) 11/15/2016   • Hyperlipidemia    • Lumbar stenosis with neurogenic claudication 6/27/2017   • Murmur 4/19/2019   • Myocardial infarction (CMS/[facility])    • Sleep apnea    • Stroke (CMS/[facility])    • Type 2 diabetes mellitus (CMS/[facility]) 11/15/2016   • Ulcer of abdomen wall (CMS/[facility])        Past Surgical History:   Procedure Laterality Date   • APPENDECTOMY     • BACK SURGERY      x2   • CARDIAC  CATHETERIZATION Left     1/2010   • CARPAL TUNNEL RELEASE Bilateral    • CHOLECYSTECTOMY     • COLONOSCOPY N/A 9/7/2017    Procedure: COLONOSCOPY WITH ANESTHESIA;  Surgeon: Joshua Rich DO;  Location: [facility] ENDOSCOPY;  Service:    • CORONARY ARTERY BYPASS GRAFT      2/2006 w/PTCA & KIMMY    • CORONARY STENT PLACEMENT     • ENDOSCOPY N/A 12/14/2016    Procedure: ESOPHAGOGASTRODUODENOSCOPY WITH ANESTHESIA;  Surgeon: Isabel Dexter MD;  Location: [facility] ENDOSCOPY;  Service:    • ENDOSCOPY N/A 12/16/2016    Procedure: ESOPHAGOGASTRODUODENOSCOPY WITH ANESTHESIA;  Surgeon: Joshua Rich DO;  Location: [facility] ENDOSCOPY;  Service:    • ENDOSCOPY N/A 4/10/2017    Procedure: ESOPHAGOGASTRODUODENOSCOPY WITH ANESTHESIA;  Surgeon: Joshua Rich DO;  Location: [facility] ENDOSCOPY;  Service:    • EYE SURGERY Left     x 2   • JOINT REPLACEMENT     • PERIPHERAL ARTERIAL STENT GRAFT     • REPLACEMENT TOTAL KNEE Left     2002   • SHOULDER ROTATOR CUFF REPAIR Bilateral        family history includes Cancer in his mother, sister, and sister; Coronary artery disease in his brother and father; Heart attack in his brother; Heart disease in his father and son; No Known Problems in his sister.    Social History     Tobacco Use   • Smoking status: Never Smoker   • Smokeless tobacco: Never Used   Substance Use Topics   • Alcohol use: No   • Drug use: No       Review of Systems  Constitutional: Positive for fatigue.   HENT: Negative.    Eyes: Negative.    Respiratory: Negative.  Negative for apnea, chest tightness and shortness of breath.    Cardiovascular: Negative for chest pain and palpitations.   Gastrointestinal: Negative.    Endocrine: Negative.    Genitourinary: Positive for nocturia.   Musculoskeletal: Positive for arthralgias and gait problem.   Skin: Negative.    Allergic/Immunologic: Negative.    Hematological: Negative.    Psychiatric/Behavioral: Negative.    All other systems reviewed and are negativ        Objective  "    /72 (BP Location: Left arm, Patient Position: Sitting)   Pulse 64   Resp 18   Ht 172.7 cm (68\")   Wt 88 kg (194 lb)   BMI 29.50 kg/m² , Body mass index is 29.5 kg/m².    Physical Exam   Constitutional: He is oriented to person, place, and time. He appears well-developed and well-nourished.   HENT:   Head: Normocephalic and atraumatic.   Mallamapti class II anatomy   Eyes: EOM are normal. Pupils are equal, round, and reactive to light.   Neck: Normal range of motion. Neck supple.   Cardiovascular: Normal rate.   Murmur heard.  Pulmonary/Chest: Effort normal and breath sounds normal.   Abdominal: Soft.   Musculoskeletal: Normal range of motion. He exhibits tenderness.   Surgical shoe present to RLE. Antalgic gait secondary to gout.   Neurological: He is alert and oriented to person, place, and time.   Skin: Skin is warm and dry.   Psychiatric: He has a normal mood and affect. His behavior is normal.         ASSESSMENT/PLAN    Eron was seen today for sleep apnea.    Diagnoses and all orders for this visit:    Bradycardia  -     Ambulatory Referral to Cardiology    RICARDO (obstructive sleep apnea)  -     Polysomnography 4 or More Parameters With CPAP; Future          Allergies and all known medications/prescriptions have been reviewed using resources available on this encounter.    Patient's Body mass index is 29.5 kg/m². BMI is within normal parameters. No follow-up required..    Return in about 4 weeks (around 2019).    MEDICAL DECISION MAKIN.  Compliance report reviewed in office today.  Patient is currently not able to utilize therapy secondary to feel like his pressure settings are not enough.  His AHI continues to be mildly elevated although improved from last office visit.  He has been reduced to an AHI of 12 down to 9 on most recent compliance report.  I still feel patient may benefit from another titration study to see if BiPAP may be a better option for him or an increase in CPAP " pressure.  He is agreeable with this.  I will notify the sleep lab to place him on a cancellation list and get him titrated quickly.  He will continue therapy until then.  2. Counseled on multimodal approach to treatment of sleep apnea to include but not limited to diet, exercise, sleep hygiene, compliance with pap therapy. Encouraged lateral sleeping position and to avoid sedatives or alcohol close to bedtime. Risks of untreated sleep apnea were discussed to include but not limited to HTN, heart disease, stroke, cardiac arrhythmia such as AFIB, and dementia.   3.  Bradycardia noted on most recent overnight pulse oximetry.  Patient was advised of this report via telephone as well as letter.  He has not followed up with cardiology.  He denies any presyncope, chest pain, palpitations, prolonged arrhythmia, increased shortness of breath.  I have initiated a referral to Dr. Whitehead's office for them to review overnight pulse oximetry and contact patient with follow-up appointment.  Currently I do not know that he has a follow-up scheduled and patient is unaware of this as well.  Did advise him should he experience any chest pain, shortness of breath that is increasing, symptoms of syncope, palpitations or prolonged arrhythmia he should seek medical attention immediately.  He will keep track of his blood pressure and heart rate as well.      ANJANA Hyman

## 2019-06-19 ENCOUNTER — TELEPHONE (OUTPATIENT)
Dept: CARDIOLOGY | Age: 84
End: 2019-06-19

## 2019-06-19 NOTE — TELEPHONE ENCOUNTER
Pt had sleep study and was found to have bradycardia. Yulisa Alves called wanting us to know. They are treating with c-pap.

## 2019-06-21 ENCOUNTER — HOSPITAL ENCOUNTER (OUTPATIENT)
Dept: SLEEP MEDICINE | Facility: HOSPITAL | Age: 84
Discharge: HOME | End: 2019-06-21
Admitting: NURSE PRACTITIONER

## 2019-06-21 VITALS
SYSTOLIC BLOOD PRESSURE: 152 MMHG | DIASTOLIC BLOOD PRESSURE: 79 MMHG | HEART RATE: 63 BPM | OXYGEN SATURATION: 100 % | WEIGHT: 194 LBS | BODY MASS INDEX: 29.4 KG/M2 | HEIGHT: 68 IN | RESPIRATION RATE: 18 BRPM

## 2019-06-21 DIAGNOSIS — G47.33 OSA (OBSTRUCTIVE SLEEP APNEA): ICD-10-CM

## 2019-06-21 PROCEDURE — 95811 POLYSOM 6/>YRS CPAP 4/> PARM: CPT

## 2019-06-21 PROCEDURE — 95811 POLYSOM 6/>YRS CPAP 4/> PARM: CPT | Performed by: PSYCHIATRY & NEUROLOGY

## 2019-07-11 ENCOUNTER — HOSPITAL ENCOUNTER (OUTPATIENT)
Dept: WOUND CARE | Age: 84
Discharge: HOME | End: 2019-07-11
Payer: COMMERCIAL

## 2019-07-11 VITALS
HEART RATE: 56 BPM | SYSTOLIC BLOOD PRESSURE: 134 MMHG | HEIGHT: 68 IN | DIASTOLIC BLOOD PRESSURE: 62 MMHG | WEIGHT: 195 LBS | BODY MASS INDEX: 29.55 KG/M2 | TEMPERATURE: 97.4 F | RESPIRATION RATE: 18 BRPM

## 2019-07-11 DIAGNOSIS — I50.43 CHF (CONGESTIVE HEART FAILURE), NYHA CLASS I, ACUTE ON CHRONIC, COMBINED (HCC): Chronic | ICD-10-CM

## 2019-07-11 DIAGNOSIS — E11.51 TYPE II DIABETES MELLITUS WITH PERIPHERAL CIRCULATORY DISORDER (HCC): Primary | Chronic | ICD-10-CM

## 2019-07-11 DIAGNOSIS — G47.33 OBSTRUCTIVE SLEEP APNEA SYNDROME: Chronic | ICD-10-CM

## 2019-07-11 DIAGNOSIS — L97.412 ULCER OF RIGHT MIDFOOT WITH FAT LAYER EXPOSED (HCC): Chronic | ICD-10-CM

## 2019-07-11 PROCEDURE — 97597 DBRDMT OPN WND 1ST 20 CM/<: CPT

## 2019-07-11 PROCEDURE — 97597 DBRDMT OPN WND 1ST 20 CM/<: CPT | Performed by: SURGERY

## 2019-07-11 PROCEDURE — 99203 OFFICE O/P NEW LOW 30 MIN: CPT | Performed by: SURGERY

## 2019-07-11 PROCEDURE — 99213 OFFICE O/P EST LOW 20 MIN: CPT

## 2019-07-11 ASSESSMENT — PAIN DESCRIPTION - PAIN TYPE: TYPE: ACUTE PAIN

## 2019-07-11 ASSESSMENT — PAIN SCALES - GENERAL: PAINLEVEL_OUTOF10: 10

## 2019-07-11 ASSESSMENT — PAIN DESCRIPTION - DESCRIPTORS: DESCRIPTORS: NUMBNESS;PINS AND NEEDLES;THROBBING

## 2019-07-11 ASSESSMENT — PAIN DESCRIPTION - LOCATION: LOCATION: TOE (COMMENT WHICH ONE)

## 2019-07-11 ASSESSMENT — PAIN DESCRIPTION - FREQUENCY: FREQUENCY: INTERMITTENT

## 2019-07-11 ASSESSMENT — PAIN DESCRIPTION - ONSET: ONSET: ON-GOING

## 2019-07-11 NOTE — PLAN OF CARE
Problem: Pain:  Description  Pain management should include both nonpharmacologic and pharmacologic interventions.   Goal: Pain level will decrease  Description  Pain level will decrease  Outcome: Ongoing  Goal: Control of acute pain  Description  Control of acute pain  Outcome: Ongoing  Goal: Control of chronic pain  Description  Control of chronic pain  Outcome: Ongoing     Problem: Wound:  Goal: Will show signs of wound healing; wound closure and no evidence of infection  Description  Will show signs of wound healing; wound closure and no evidence of infection  Outcome: Ongoing

## 2019-07-11 NOTE — PROGRESS NOTES
and recorded in [facility]:  Patient Active Problem List    Diagnosis Date Noted    Ulcer of right midfoot with fat layer exposed ([address] 07/11/2019    INOCENCIO (acute kidney injury) ([address] 03/28/2019    Chest pain 03/21/2019    Hx of CABG 02/25/2019    Essential hypertension 02/11/2019    Angina of effort ([address] 02/11/2019    Bradycardia 06/29/2018    Right heart failure ([address] 06/29/2018    Acute chest pain 05/27/2018    Abnormal CT scan, bladder 03/08/2018    Retention, urine 03/08/2018    Bilateral renal cysts 03/08/2018    Acute on chronic congestive heart failure ([address]     Ischemic cardiomyopathy     Coronary artery disease involving native coronary artery of native heart 03/07/2018 1990  CABG x 1 LIMA-LAD  Several stents placed  3/6/2018  Echo  EF 45%  5/28/2018  ACS BHARAT RISK Score 5 (angina, + troponin, CAD>50%, age>65, diabetes, hypertension, hypercholesteremia ), AUC indication 3, AUC score 9   5/31/18  Cath  Moderate diffuse CAD, 100% mid LAD, patent LIMA-LAD, inferior hypo, EF 45%  12/20/2018  ACS BHARAT RISK Score 6 (chest discomfort, + troponin, CAD>50, age>65, ASA, diabetes, hypertension, hypercholesteremia), AUC indication 3, AUC score 9   12/21/18  lexiscan Positive for inferior MI, could not access redistribution, EF, or degree of ischemia since he could not cooperate with the stress images  12/21/18  Cath  Moderate diffuse CAD, 100% mid LAD, patent LIMA-LAD, inferior hypo, EF 45%  3/22/2019  lexiscan . Positive for inferior MI + myocardial ischemia, EF 47%, 5% ischemic myocardium on stress, intermediate risk findings, AUC indication 16, AUC score 7  3/25/19 Cath  Moderate diffuse CAD, 100% mid LAD, patent LIMA-LAD, normal LVFX      Palliative care patient 03/06/2018    CHF (congestive heart failure), NYHA class I, acute on chronic, combined ([address] 03/05/2018    S/P laminectomy 07/13/2017    Obstructive sleep apnea syndrome 07/13/2017    Lumbar stenosis with neurogenic claudication daily Pt takes 2 at once instead of twice a day.  aspirin 81 MG chewable tablet Take 1 tablet by mouth daily 30 tablet 3    hydrALAZINE (APRESOLINE) 50 MG tablet Take 1 tablet by mouth every 8 hours 90 tablet 3    pantoprazole (PROTONIX) 40 MG tablet Take 1 tablet by mouth 2 times daily 60 tablet 5    ferrous sulfate-C-folic acid (FOLITAB) 767-975-9.3 MG TBCR per extended release tablet Take 1 tablet by mouth daily 30 tablet 0    prednisoLONE acetate (PRED FORTE) 1 % ophthalmic suspension Place 1 drop into the left eye every hour as needed (for dry eye)      allopurinol (ZYLOPRIM) 300 MG tablet Take 600 mg by mouth daily       sennosides-docusate sodium (SENOKOT-S) 8.6-50 MG tablet Take 2 tablets by mouth daily      diphenoxylate-atropine (LOMOTIL) 2.5-0.025 MG per tablet Take 1 tablet by mouth 4 times daily as needed for Diarrhea      pravastatin (PRAVACHOL) 40 MG tablet Take 40 mg by mouth daily      Ascorbic Acid (VITAMIN C) 500 MG tablet Take 1,000 mg by mouth daily       b complex vitamins capsule Take 1 capsule by mouth daily      folic acid (FOLVITE) 934 MCG tablet Take 400 mcg by mouth daily      nitroGLYCERIN (NITROSTAT) 0.4 MG SL tablet up to max of 3 total doses. If no relief after 1 dose, call 911. 25 tablet 3    lidocaine (LIDODERM) 5 % Place 1 patch onto the skin daily 12 hours on, 12 hours off. 30 patch 0    meclizine (ANTIVERT) 25 MG tablet Take 25 mg by mouth 4 times daily as needed      ondansetron (ZOFRAN-ODT) 4 MG disintegrating tablet Take 4 mg by mouth every 8 hours as needed for Nausea or Vomiting      promethazine (PHENERGAN) 12.5 MG tablet Take 12.5 mg by mouth as needed for Nausea       No current facility-administered medications for this encounter.       Allergies: Pcn [penicillins] and Adhesive tape  Past Medical History:   Diagnosis Date    Blood circulation, collateral     CAD (coronary artery disease)     STENTS X 3    CAD (coronary artery disease)     CABG    bilaterally without bruit. Extremities - Radial and brachial pulses are 2+ to palpation bilaterally. Femoral pulses: present 2+bilaterally;Popliteal pulses: present 2+bilaterally Right DP: present 2+; Right PT present 2+; Left DP: present 1+; Left PT: present 1+  No cyanosis, clubbing. min edema. No signs atheroembolic event. Pulmonary - effort appears normal.  No respiratory distress. Lungs - Breath sounds normal. No wheezes or rales. GI - Abdomen - soft, non tender, bowel sounds X 4 quadrants. No guarding or rebound tenderness. No distension or palpable mass. Genitourinary - deferred. Musculoskeletal - ROM appears normal. min edema. Neurologic - alert and oriented X 3. Physiologic. Psychiatric - mood, affect, and behavior appear normal.  Judgment and thought processes appear normal.  Wound - right plantar foot    Wound Picture:            Assessment     right plantar surface wound    1. Type II diabetes mellitus with peripheral circulatory disorder (HCC)    2. Ulcer of right midfoot with fat layer exposed ([address]    3. CHF (congestive heart failure), NYHA class I, acute on chronic, combined ([address]    4. Obstructive sleep apnea syndrome          Procedure Note:    Debridement: After risks benefits and expected outcomes were discussed with the patient an Non-excisional Debridement was performed on 1 . Anesthetic: lidocaine gel    Using curette the wound was sharply debrided    down through and including the removal of viable and non-viable epidermis and dermis. Devitalized Tissue Debrided:  fibrin, biofilm, slough, exudate and callusand epidermis and dermis. Percent of Wound Debrided: 100%    Total Surface Area Debrided:  0.04 sq cm   Post Debridement Measurements and Assessment:     Wound 07/11/19 Toe (Comment  which one) Right;Medial Wound 1.   Right medial toe (Active)   Wound Image   7/11/2019 11:02 AM   Wound Diabetic Bennett 1 7/11/2019 11:02 AM   Wound Cleansed Rinsed/Irrigated with

## 2019-07-18 ENCOUNTER — HOSPITAL ENCOUNTER (OUTPATIENT)
Dept: WOUND CARE | Age: 84
Discharge: HOME | End: 2019-07-18
Payer: COMMERCIAL

## 2019-07-18 ENCOUNTER — HOSPITAL ENCOUNTER (OUTPATIENT)
Dept: NON INVASIVE DIAGNOSTICS | Age: 84
Discharge: HOME | End: 2019-07-18
Payer: COMMERCIAL

## 2019-07-18 VITALS
HEART RATE: 80 BPM | BODY MASS INDEX: 29.55 KG/M2 | TEMPERATURE: 97.3 F | DIASTOLIC BLOOD PRESSURE: 78 MMHG | WEIGHT: 195 LBS | RESPIRATION RATE: 16 BRPM | SYSTOLIC BLOOD PRESSURE: 140 MMHG | HEIGHT: 68 IN

## 2019-07-18 DIAGNOSIS — E11.51 TYPE II DIABETES MELLITUS WITH PERIPHERAL CIRCULATORY DISORDER (HCC): Primary | Chronic | ICD-10-CM

## 2019-07-18 DIAGNOSIS — E11.51 TYPE II DIABETES MELLITUS WITH PERIPHERAL CIRCULATORY DISORDER (HCC): Chronic | ICD-10-CM

## 2019-07-18 DIAGNOSIS — I50.43 CHF (CONGESTIVE HEART FAILURE), NYHA CLASS I, ACUTE ON CHRONIC, COMBINED (HCC): Chronic | ICD-10-CM

## 2019-07-18 DIAGNOSIS — L97.412 ULCER OF RIGHT MIDFOOT WITH FAT LAYER EXPOSED (HCC): Chronic | ICD-10-CM

## 2019-07-18 DIAGNOSIS — L97.412 ULCER OF RIGHT MIDFOOT WITH FAT LAYER EXPOSED (HCC): ICD-10-CM

## 2019-07-18 DIAGNOSIS — S91.101D OPEN WOUND OF RIGHT GREAT TOE, SUBSEQUENT ENCOUNTER: Chronic | ICD-10-CM

## 2019-07-18 PROBLEM — S91.101A OPEN WOUND OF RIGHT GREAT TOE: Chronic | Status: ACTIVE | Noted: 2019-07-18

## 2019-07-18 PROCEDURE — 93923 UPR/LXTR ART STDY 3+ LVLS: CPT

## 2019-07-18 PROCEDURE — 97597 DBRDMT OPN WND 1ST 20 CM/<: CPT

## 2019-07-18 PROCEDURE — 97597 DBRDMT OPN WND 1ST 20 CM/<: CPT | Performed by: SURGERY

## 2019-07-18 ASSESSMENT — PAIN SCALES - GENERAL: PAINLEVEL_OUTOF10: 0

## 2019-07-18 NOTE — PROGRESS NOTES
mouth 4 times daily as needed      ondansetron (ZOFRAN-ODT) 4 MG disintegrating tablet Take 4 mg by mouth every 8 hours as needed for Nausea or Vomiting      pravastatin (PRAVACHOL) 40 MG tablet Take 40 mg by mouth daily      promethazine (PHENERGAN) 12.5 MG tablet Take 12.5 mg by mouth as needed for Nausea      Ascorbic Acid (VITAMIN C) 500 MG tablet Take 1,000 mg by mouth daily       b complex vitamins capsule Take 1 capsule by mouth daily      folic acid (FOLVITE) 340 MCG tablet Take 400 mcg by mouth daily       No current facility-administered medications on file prior to encounter. REVIEW OF SYSTEMS    Pertinent items are noted in HPI.     Objective:      BP (!) 140/78   Pulse 80   Temp 97.3 °F (36.3 °C) (Temporal)   Resp 16   Ht 5' 8\" (1.727 m)   Wt 195 lb (88.5 kg)   BMI 29.65 kg/m²     Wt Readings from Last 3 Encounters:   07/18/19 195 lb (88.5 kg)   07/11/19 195 lb (88.5 kg)   06/07/19 200 lb (90.7 kg)       PHYSICAL EXAM    General Appearance: alert and oriented to person, place and time, well developed and well- nourished, in no acute distress  Skin: warm and dry, no rash or erythema  Head: normocephalic and atraumatic  Eyes: pupils equal, round, and reactive to light, extraocular eye movements intact, conjunctivae normal  ENT: tympanic membrane, external ear and ear canal normal bilaterally, nose without deformity, nasal mucosa and turbinates normal without polyps  Neck: supple and non-tender without mass, no thyromegaly or thyroid nodules, no cervical lymphadenopathy  Pulmonary/Chest: clear to auscultation bilaterally- no wheezes, rales or rhonchi, normal air movement, no respiratory distress  Cardiovascular: normal rate, regular rhythm, normal S1 and S2, no murmurs, rubs, clicks, or gallops, distal pulses intact, no carotid bruits  Abdomen: soft, non-tender, non-distended, normal bowel sounds, no masses or organomegaly  Extremities: no cyanosis, clubbing or edema  Musculoskeletal:

## 2019-07-29 ENCOUNTER — HOSPITAL ENCOUNTER (OUTPATIENT)
Dept: WOUND CARE | Age: 84
Discharge: HOME | End: 2019-07-29
Payer: COMMERCIAL

## 2019-07-29 VITALS
DIASTOLIC BLOOD PRESSURE: 64 MMHG | HEART RATE: 51 BPM | TEMPERATURE: 98.2 F | RESPIRATION RATE: 16 BRPM | WEIGHT: 195 LBS | BODY MASS INDEX: 29.55 KG/M2 | SYSTOLIC BLOOD PRESSURE: 135 MMHG | HEIGHT: 68 IN

## 2019-07-29 DIAGNOSIS — L97.412 ULCER OF RIGHT MIDFOOT WITH FAT LAYER EXPOSED (HCC): ICD-10-CM

## 2019-07-29 DIAGNOSIS — I50.43 CHF (CONGESTIVE HEART FAILURE), NYHA CLASS I, ACUTE ON CHRONIC, COMBINED (HCC): Primary | Chronic | ICD-10-CM

## 2019-07-29 DIAGNOSIS — E11.51 TYPE II DIABETES MELLITUS WITH PERIPHERAL CIRCULATORY DISORDER (HCC): Chronic | ICD-10-CM

## 2019-07-29 DIAGNOSIS — G47.33 OBSTRUCTIVE SLEEP APNEA SYNDROME: Chronic | ICD-10-CM

## 2019-07-29 DIAGNOSIS — S91.101A OPEN WOUND OF RIGHT GREAT TOE, INITIAL ENCOUNTER: ICD-10-CM

## 2019-07-29 PROCEDURE — 97597 DBRDMT OPN WND 1ST 20 CM/<: CPT

## 2019-07-29 PROCEDURE — 97597 DBRDMT OPN WND 1ST 20 CM/<: CPT | Performed by: SURGERY

## 2019-07-29 ASSESSMENT — PAIN SCALES - GENERAL: PAINLEVEL_OUTOF10: 0

## 2019-08-06 ENCOUNTER — OFFICE VISIT (OUTPATIENT)
Dept: NEUROLOGY | Facility: CLINIC | Age: 84
End: 2019-08-06

## 2019-08-06 VITALS
HEART RATE: 52 BPM | DIASTOLIC BLOOD PRESSURE: 60 MMHG | HEIGHT: 68 IN | WEIGHT: 200 LBS | OXYGEN SATURATION: 100 % | SYSTOLIC BLOOD PRESSURE: 110 MMHG | BODY MASS INDEX: 30.31 KG/M2

## 2019-08-06 DIAGNOSIS — G47.61 PERIODIC LIMB MOVEMENT DISORDER (PLMD): ICD-10-CM

## 2019-08-06 DIAGNOSIS — R00.1 BRADYCARDIA: ICD-10-CM

## 2019-08-06 DIAGNOSIS — I50.9 CONGESTIVE HEART FAILURE, UNSPECIFIED HF CHRONICITY, UNSPECIFIED HEART FAILURE TYPE (HCC): ICD-10-CM

## 2019-08-06 DIAGNOSIS — G47.33 OSA TREATED WITH BIPAP: Primary | ICD-10-CM

## 2019-08-06 PROCEDURE — 99213 OFFICE O/P EST LOW 20 MIN: CPT | Performed by: NURSE PRACTITIONER

## 2019-08-06 NOTE — PROGRESS NOTES
Subjective     Chief Complaint   Patient presents with   • Sleep Apnea       Eron Escalante is a 84 y.o. male who presents today for follow-up of sleep apnea.    History of Present Illness  Mr. Escalante is a pleasant 84-year-old male who presents today for follow-up of sleep apnea.  He is underwent titration study since he was last evaluated by myself.  At last office visit he was on CPAP 10 cm H2O and his AHI was 9 on therapy with 6 minutes of Cheyne-Mccloud respiration noted.  He does have a history of congestive heart failure as well as some bradycardia noted on overnight pulse oximetry.  He does follow-up with cardiology, Dr. Whitehead or ANJANA with [facility] cardiology.  He was sent for another titration study given elevated AHI on CPAP machine.  He did undergo titration study in June, 2019 at which point he was titrated to BiPAP at 13/9 centimeters H2O.  No significant hypoxia was noted on that titration study. He has been on BIPAP for 22 days and is tolerating this well. He denies any chest pain or bloating with therapy. He states that he feels his sleep is more restored with BIPAP use. 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 He will then wake up at 500 am for his job with the [employer] department. He denies any issues with restless legs. He denies any chest pain, palpitations, worsening SOB, or orthopnea.  He does utilize a full facemask and [employer] for DME supplies.  Follow-up sleep scale questionnaire reviewed in office today.  He denies any significant issues tolerating his mask.  His [place] scale is 0.He denies use of tobacco, alcohol, illicit drugs.     Compliance report discussed in office today.  He has had his machine for a total of 22 days.  He has worn his machine greater than or equal to 4 hours 50% of that time.  His average nightly usage is 4 hours and 19 minutes.  He is currently set  on BiPAP 13/9 cm H2O.  His AHI is 6 on therapy.      Current Outpatient Medications   Medication Sig Dispense Refill   • allopurinol (ZYLOPRIM) 300 MG tablet Take 300 mg by mouth Daily.     • amLODIPine (NORVASC) 10 MG tablet Take 1 tablet by mouth Daily. 30 tablet 1   • aspirin 81 MG chewable tablet Chew 81 mg Daily.     • carvedilol (COREG) 12.5 MG tablet Take 12.5 mg by mouth 2 (Two) Times a Day With Meals.     • Cholecalciferol (VITAMIN D) 1000 units tablet Take 1,000 Units by mouth.     • CloNIDine (CATAPRES) 0.1 MG tablet Take 0.1 mg by mouth.     • colchicine 0.6 MG tablet Take 0.6 mg by mouth Daily As Needed (gout flare-up).     • doxazosin (CARDURA) 1 MG tablet Take 1 mg by mouth Every Night.     • folic acid (FOLVITE) 400 MCG tablet Take 400 mcg by mouth.     • furosemide (LASIX) 40 MG tablet Take 40 mg by mouth.     • gabapentin (NEURONTIN) 100 MG capsule Take 2 capsules by mouth Every 12 (Twelve) Hours. 40 capsule 0   • glimepiride (AMARYL) 4 MG tablet Take 4 mg by mouth Every 12 (Twelve) Hours. Needs pm dose     • Insulin Glargine (LANTUS SOLOSTAR) 100 UNIT/ML injection pen Inject 15 Units under the skin At Night As Needed (BG >120).     • isosorbide mononitrate (IMDUR) 30 MG 24 hr tablet      • meclizine (ANTIVERT) 25 MG tablet Take 25 mg by mouth.     • metoprolol succinate XL (TOPROL XL) 25 MG 24 hr tablet Take 25 mg by mouth.     • nitroglycerin (NITROSTAT) 0.4 MG SL tablet      • ondansetron (ZOFRAN) 4 MG tablet Take 1 tablet by mouth Every 8 (Eight) Hours As Needed for Nausea or Vomiting. 15 tablet 0   • pantoprazole (PROTONIX) 40 MG EC tablet Take 40 mg by mouth Every 12 (Twelve) Hours.     • potassium chloride (K-DUR,KLOR-CON) 10 MEQ CR tablet Take 10 mEq by mouth Every 12 (Twelve) Hours.     • pravastatin (PRAVACHOL) 40 MG tablet Take 40 mg by mouth Every Night.     • prednisoLONE acetate (PRED FORTE) 1 % ophthalmic suspension Administer 1 drop into the left eye Every 2 (Two) Hours While Awake.      • Pyridoxine HCl (VITAMIN B6) 200 MG tablet Take 1 tablet by mouth Daily.     • rOPINIRole (REQUIP) 1 MG tablet Take 1 mg by mouth Every Night. Take 1 hour before bedtime.     • sennosides-docusate sodium (SENOKOT-S) 8.6-50 MG tablet Take 2 tablets by mouth 2 (Two) Times a Day As Needed for constipation. 45 tablet 2   • thiamine (VITAMIN B-1) 100 MG tablet Take 100 mg by mouth.     • vitamin C (ASCORBIC ACID) 500 MG tablet Take 500 mg by mouth Daily.       No current facility-administered medications for this visit.        Past Medical History:   Diagnosis Date   • A-fib (CMS/[facility]) 11/15/2016   • Anemia     gets shots every few months to build up blood. sees dr adam.   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   • Arthritis    • Bradycardia 11/15/2016   • CAD in native artery 11/15/2016   • Chest pain 11/15/2016   • CHF (congestive heart failure) (CMS/[facility])    • Chronic kidney disease    • COPD (chronic obstructive pulmonary disease) (CMS/[facility]ly [facility])    • DDD (degenerative disc disease), lumbar 6/27/2017   • Heart murmur    • HTN (hypertension) 11/15/2016   • Hyperlipidemia    • Lumbar stenosis with neurogenic claudication 6/27/2017   • Murmur 4/19/2019   • Myocardial infarction (CMS/[facility])    • Sleep apnea    • Stroke (CMS/[facility])    • Type 2 diabetes mellitus (CMS/[facility]) 11/15/2016   • Ulcer of abdomen wall (CMS/[facility])        Past Surgical History:   Procedure Laterality Date   • APPENDECTOMY     • BACK SURGERY      x2   • CARDIAC CATHETERIZATION Left     1/2010   • CARPAL TUNNEL RELEASE Bilateral    • CHOLECYSTECTOMY     • COLONOSCOPY N/A 9/7/2017    Procedure: COLONOSCOPY WITH ANESTHESIA;  Surgeon: Joshua Rich DO;  Location: [facility] ENDOSCOPY;  Service:    • CORONARY ARTERY BYPASS GRAFT      2/2006 w/PTCA & KIMMY    • CORONARY STENT PLACEMENT     • ENDOSCOPY N/A 12/14/2016    Procedure: ESOPHAGOGASTRODUODENOSCOPY WITH ANESTHESIA;  Surgeon: Isabel Dexter MD;  Location: [facility] ENDOSCOPY;  Service:    • ENDOSCOPY N/A  "12/16/2016    Procedure: ESOPHAGOGASTRODUODENOSCOPY WITH ANESTHESIA;  Surgeon: Joshua Rich DO;  Location: [facility] ENDOSCOPY;  Service:    • ENDOSCOPY N/A 4/10/2017    Procedure: ESOPHAGOGASTRODUODENOSCOPY WITH ANESTHESIA;  Surgeon: Joshua Rich DO;  Location: [facility] ENDOSCOPY;  Service:    • EYE SURGERY Left     x 2   • JOINT REPLACEMENT     • PERIPHERAL ARTERIAL STENT GRAFT     • REPLACEMENT TOTAL KNEE Left     2002   • SHOULDER ROTATOR CUFF REPAIR Bilateral        family history includes Cancer in his mother, sister, and sister; Coronary artery disease in his brother and father; Heart attack in his brother; Heart disease in his father and son; No Known Problems in his sister.    Social History     Tobacco Use   • Smoking status: Never Smoker   • Smokeless tobacco: Never Used   Substance Use Topics   • Alcohol use: No   • Drug use: No       Review of Systems   Constitutional: Negative.    HENT: Negative.    Eyes: Negative.    Respiratory: Positive for shortness of breath.    Cardiovascular: Negative.  Negative for chest pain, palpitations and leg swelling.   Gastrointestinal: Negative.    Endocrine: Negative.    Genitourinary: Negative.    Musculoskeletal: Negative.    Skin: Negative.    Allergic/Immunologic: Negative.    Neurological: Negative.    Hematological: Negative.    Psychiatric/Behavioral: Positive for sleep disturbance.   All other systems reviewed and are negative.      Objective     /60   Pulse 52   Ht 172.7 cm (68\")   Wt 90.7 kg (200 lb)   SpO2 100%   BMI 30.41 kg/m² , Body mass index is 30.41 kg/m².    Physical Exam   Constitutional: He is oriented to person, place, and time. He appears well-developed and well-nourished.   HENT:   Head: Normocephalic and atraumatic.   Eyes: EOM are normal. Pupils are equal, round, and reactive to light.   Wears eyeglasses   Neck: Normal range of motion. Neck supple.   Cardiovascular: Normal rate.   Murmur heard.  Pulmonary/Chest: Effort normal " and breath sounds normal.   Abdominal: Soft.   Musculoskeletal: Normal range of motion.   Neurological: He is alert and oriented to person, place, and time.   Skin: Skin is warm and dry.   Psychiatric: He has a normal mood and affect. His behavior is normal.         ASSESSMENT/PLAN    Eron was seen today for sleep apnea.    Diagnoses and all orders for this visit:    RICARDO treated with BiPAP    Periodic limb movement disorder (PLMD)    Bradycardia    Congestive heart failure, unspecified HF chronicity, unspecified heart failure type (CMS/HCC)          Allergies and all known medications/prescriptions have been reviewed using resources available on this encounter.    Patient's Body mass index is 30.41 kg/m². BMI is above normal parameters. Recommendations include: referral to primary care.    Return in about 2 months (around 10/6/2019).    MEDICAL DECISION MAKIN.  Compliance report discussed in office today.  He is only has his machine 22 days that seems to be doing very well.  He has no complaints at all with therapy and feels like he is getting more pressure that is suitable for him with BiPAP use.  He denies any chest pain or bloating with therapy.  He is currently not compliant per insurance guidelines as he is only worn his machine greater than or equal to 4 hours 50% of the 22 days.  We did talk about means of him increasing his usage at night.  He does continue to be very active and works 2 jobs, 1 of those he does not get home until 12:30 in the morning.  He will then wake up at 5 AM to go to his second job.  He seems to enjoy doing this and is doing quite well with continued to be active.  He will work on increasing his compliance and I will see him back in the office in 2 months for reevaluation.  Should he reach compliance at that time we will consider 6-month or yearly follow-up as patient's AHI has improved on BiPAP therapy and [place] scale is now 0 when questioned in office today..  2. Counseled  on multimodal approach to treatment of sleep apnea to include but not limited to diet, exercise, sleep hygiene, compliance with pap therapy. Encouraged lateral sleeping position and to avoid sedatives or alcohol close to bedtime. Risks of untreated sleep apnea were discussed to include but not limited to HTN, heart disease, stroke, cardiac arrhythmia such as AFIB, and dementia.   3.  Bradycardia.  Patient denies any dizziness, fatigue, increasing shortness of breath, orthopnea, chest pain, palpitations.  He is under the care of cardiology and they have been notified of findings in the past in regards to bradycardia on overnight pulse oximetry.  He will continue routine follow-up with cardiology unless specified otherwise.  He is to monitor blood pressure and heart rate and seek medical attention immediately with any chest pain, palpitations, worsening shortness of breath, edema.  4.  BP to be monitored per PCP.      Analia Denton, APRN

## 2019-08-19 ENCOUNTER — HOSPITAL ENCOUNTER (OUTPATIENT)
Dept: WOUND CARE | Age: 84
Discharge: HOME | End: 2019-08-19
Payer: COMMERCIAL

## 2019-08-19 VITALS
HEART RATE: 58 BPM | DIASTOLIC BLOOD PRESSURE: 58 MMHG | BODY MASS INDEX: 29.55 KG/M2 | SYSTOLIC BLOOD PRESSURE: 142 MMHG | RESPIRATION RATE: 16 BRPM | HEIGHT: 68 IN | TEMPERATURE: 99 F | WEIGHT: 195 LBS

## 2019-08-19 DIAGNOSIS — L97.412 ULCER OF RIGHT MIDFOOT WITH FAT LAYER EXPOSED (HCC): ICD-10-CM

## 2019-08-19 DIAGNOSIS — S91.101D OPEN WOUND OF RIGHT GREAT TOE, SUBSEQUENT ENCOUNTER: ICD-10-CM

## 2019-08-19 DIAGNOSIS — E11.51 TYPE II DIABETES MELLITUS WITH PERIPHERAL CIRCULATORY DISORDER (HCC): Primary | Chronic | ICD-10-CM

## 2019-08-19 DIAGNOSIS — L97.512 SKIN ULCER OF GREAT TOE, RIGHT, WITH FAT LAYER EXPOSED (HCC): Chronic | ICD-10-CM

## 2019-08-19 PROBLEM — S91.101A OPEN WOUND OF RIGHT GREAT TOE: Status: ACTIVE | Noted: 2019-07-18

## 2019-08-19 PROCEDURE — 97597 DBRDMT OPN WND 1ST 20 CM/<: CPT | Performed by: SURGERY

## 2019-08-19 PROCEDURE — 97597 DBRDMT OPN WND 1ST 20 CM/<: CPT

## 2019-08-19 ASSESSMENT — PAIN SCALES - GENERAL: PAINLEVEL_OUTOF10: 0

## 2019-08-20 NOTE — PROGRESS NOTES
suspension Place 1 drop into the left eye every hour as needed (for dry eye)      sennosides-docusate sodium (SENOKOT-S) 8.6-50 MG tablet Take 2 tablets by mouth daily      meclizine (ANTIVERT) 25 MG tablet Take 25 mg by mouth 4 times daily as needed      ondansetron (ZOFRAN-ODT) 4 MG disintegrating tablet Take 4 mg by mouth every 8 hours as needed for Nausea or Vomiting      pravastatin (PRAVACHOL) 40 MG tablet Take 40 mg by mouth daily      promethazine (PHENERGAN) 12.5 MG tablet Take 12.5 mg by mouth as needed for Nausea      folic acid (FOLVITE) 187 MCG tablet Take 400 mcg by mouth daily       No current facility-administered medications on file prior to encounter. REVIEW OF SYSTEMS    A comprehensive review of systems was negative.     Objective:      BP (!) 142/58   Pulse 58   Temp 99 °F (37.2 °C) (Temporal)   Resp 16   Ht 5' 8\" (1.727 m)   Wt 195 lb (88.5 kg)   BMI 29.65 kg/m²     Wt Readings from Last 3 Encounters:   08/19/19 195 lb (88.5 kg)   07/29/19 195 lb (88.5 kg)   07/18/19 195 lb (88.5 kg)       PHYSICAL EXAM    General Appearance: alert and oriented to person, place and time, well developed and well- nourished, in no acute distress  Skin: warm and dry, no rash or erythema  Head: normocephalic and atraumatic  Eyes: pupils equal, round, and reactive to light, extraocular eye movements intact, conjunctivae normal  ENT: tympanic membrane, external ear and ear canal normal bilaterally, nose without deformity, nasal mucosa and turbinates normal without polyps, lips teeth and gums normal  Neck: supple and non-tender without mass, no thyromegaly or thyroid nodules, no cervical lymphadenopathy  Pulmonary/Chest: clear to auscultation bilaterally- no wheezes, rales or rhonchi, normal air movement, no respiratory distress  Cardiovascular: normal rate, regular rhythm, normal S1 and S2, no murmurs, rubs, clicks, or gallops, distal pulses intact, no carotid bruits  Abdomen: soft, non-tender, please see attached Discharge Instructions    In my professional opinion this patient would benefit from HBO Therapy: No    Written patient dismissal instructions given to patient and signed by patient or POA. Discharge Instructions       Visit Discharge/Physician Orders    Discharge condition: Stable    Discharge to: Home    Left via:Private automobile    Accompanied by: Self     ECF/HHA: None     Dressing Orders: Right Great Toe  Xeroform to open area, Cover with dry 2x2 Secure with medipore tape  Change once daily, Wear Diabetic shoes with modified insert    [facility] followup visit _____________1 week________________  (Please note your next appointment above and if you are unable to keep, kindly give a 24 hour notice. Thank you.)    If you experience any of the following, please call the [facility] during business hours:    * Increase in Pain  * Temperature over 101  * Increase in drainage from your wound  * Drainage with a foul odor  * Bleeding  * Increase in swelling  * Need for compression bandage changes due to slippage, breakthrough drainage. If you need medical attention outside of the business hours of the [facility] please contact your PCP or go to the nearest emergency room.         Electronically signed by Alie Alarcon MD on 8/19/2019 at 7:00 PM

## 2019-08-22 ENCOUNTER — TELEPHONE (OUTPATIENT)
Dept: CARDIOLOGY | Age: 84
End: 2019-08-22

## 2019-09-10 ENCOUNTER — HOSPITAL ENCOUNTER (OUTPATIENT)
Dept: WOUND CARE | Age: 84
Discharge: HOME | End: 2019-09-10
Payer: COMMERCIAL

## 2019-09-10 VITALS
SYSTOLIC BLOOD PRESSURE: 148 MMHG | BODY MASS INDEX: 29.55 KG/M2 | HEART RATE: 81 BPM | RESPIRATION RATE: 16 BRPM | TEMPERATURE: 98 F | DIASTOLIC BLOOD PRESSURE: 61 MMHG | WEIGHT: 195 LBS | HEIGHT: 68 IN

## 2019-09-10 DIAGNOSIS — L97.512 SKIN ULCER OF GREAT TOE, RIGHT, WITH FAT LAYER EXPOSED (HCC): ICD-10-CM

## 2019-09-10 DIAGNOSIS — S91.101A OPEN WOUND OF RIGHT GREAT TOE, INITIAL ENCOUNTER: ICD-10-CM

## 2019-09-10 DIAGNOSIS — L97.412 ULCER OF RIGHT MIDFOOT WITH FAT LAYER EXPOSED (HCC): ICD-10-CM

## 2019-09-10 PROCEDURE — 97597 DBRDMT OPN WND 1ST 20 CM/<: CPT

## 2019-09-10 PROCEDURE — 11042 DBRDMT SUBQ TIS 1ST 20SQCM/<: CPT

## 2019-09-10 PROCEDURE — 97597 DBRDMT OPN WND 1ST 20 CM/<: CPT | Performed by: SURGERY

## 2019-09-10 ASSESSMENT — PAIN SCALES - GENERAL: PAINLEVEL_OUTOF10: 0

## 2019-09-10 NOTE — PROGRESS NOTES
organomegaly  Extremities: no cyanosis, clubbing or edema  Musculoskeletal: normal range of motion, no joint swelling, deformity or tenderness  Neurologic: reflexes normal and symmetric, no cranial nerve deficit, gait, coordination and speech normal, sensation of skin normal      Assessment:      Problem List Items Addressed This Visit     * (Principal) Skin ulcer of great toe, right, with fat layer exposed ([address] (Chronic)    Ulcer of right midfoot with fat layer exposed ([address]    Open wound of right great toe           Procedure Note  Indications:  Based on my examination of this patient's wound(s)/ulcer(s) today, debridement is required to promote healing and evaluate the wound base. Performed by: Adonis Matta MD    Consent obtained:  Yes    Time out taken:  Yes    Pain Control: Anesthetic  Anesthetic: None       Debridement:Non-excisional Debridement    Using curette the wound(s)/ulcer(s) was/were sharply debrided down through and including the removal of epidermis, dermis and subcutaneous tissue. Devitalized Tissue Debrided:  fibrin, biofilm, slough, exudate and callus      Pre Debridement Measurements:  Are located in the Wound/Ulcer Documentation Flow Sheet    Wound/Ulcer #: 1    Percent of Wound(s)/Ulcer(s) Debrided: 100%    Total Surface Area Debrided:  0.09 sq cm       Diabetic/Pressure/Non Pressure Ulcers only:  Ulcer: Diabetic ulcer, fat layer exposed         Post Debridement Measurements:    Wound/Ulcer Descriptions are Pre Debridement --EXCEPT MEASUREMENTS    Wound 07/11/19 Toe (Comment  which one) Right;Medial Wound 1.   Right medial toe (Active)   Wound Image   8/19/2019  4:00 PM   Wound Diabetic Bennett 1 9/10/2019  9:58 AM   Dressing Status Old drainage 9/10/2019  9:58 AM   Dressing Changed Changed/New 9/10/2019 10:05 AM   Dressing/Treatment Wound gel 9/10/2019 10:05 AM   Wound Cleansed Rinsed/Irrigated with saline 9/10/2019  9:58 AM   Wound Length (cm) 0.3 cm 9/10/2019  9:58 AM   Wound

## 2019-09-20 ENCOUNTER — HOSPITAL ENCOUNTER (OUTPATIENT)
Dept: WOUND CARE | Age: 84
Discharge: HOME | End: 2019-09-20
Payer: COMMERCIAL

## 2019-09-20 VITALS
WEIGHT: 195 LBS | SYSTOLIC BLOOD PRESSURE: 129 MMHG | RESPIRATION RATE: 18 BRPM | TEMPERATURE: 97 F | BODY MASS INDEX: 29.55 KG/M2 | DIASTOLIC BLOOD PRESSURE: 60 MMHG | HEIGHT: 68 IN | HEART RATE: 48 BPM

## 2019-09-20 DIAGNOSIS — L97.412 ULCER OF RIGHT MIDFOOT WITH FAT LAYER EXPOSED (HCC): ICD-10-CM

## 2019-09-20 DIAGNOSIS — L97.512 SKIN ULCER OF GREAT TOE, RIGHT, WITH FAT LAYER EXPOSED (HCC): ICD-10-CM

## 2019-09-20 DIAGNOSIS — S91.101A OPEN WOUND OF RIGHT GREAT TOE, INITIAL ENCOUNTER: ICD-10-CM

## 2019-09-20 DIAGNOSIS — E11.51 TYPE II DIABETES MELLITUS WITH PERIPHERAL CIRCULATORY DISORDER (HCC): Primary | Chronic | ICD-10-CM

## 2019-09-20 PROCEDURE — 99212 OFFICE O/P EST SF 10 MIN: CPT

## 2019-09-20 PROCEDURE — 99212 OFFICE O/P EST SF 10 MIN: CPT | Performed by: SURGERY

## 2019-09-20 ASSESSMENT — PAIN SCALES - GENERAL: PAINLEVEL_OUTOF10: 0

## 2019-09-20 NOTE — PROGRESS NOTES
as needed for Diarrhea      meclizine (ANTIVERT) 25 MG tablet Take 25 mg by mouth 4 times daily as needed      ondansetron (ZOFRAN-ODT) 4 MG disintegrating tablet Take 4 mg by mouth every 8 hours as needed for Nausea or Vomiting      pravastatin (PRAVACHOL) 40 MG tablet Take 40 mg by mouth daily      promethazine (PHENERGAN) 12.5 MG tablet Take 12.5 mg by mouth as needed for Nausea      Ascorbic Acid (VITAMIN C) 500 MG tablet Take 1,000 mg by mouth daily       b complex vitamins capsule Take 1 capsule by mouth daily      folic acid (FOLVITE) 372 MCG tablet Take 400 mcg by mouth daily      prednisoLONE acetate (PRED FORTE) 1 % ophthalmic suspension Place 1 drop into the left eye every hour as needed (for dry eye)       No current facility-administered medications on file prior to encounter. REVIEW OF SYSTEMS    A comprehensive review of systems was negative.     Objective:      /60   Pulse (!) 48   Temp 97 °F (36.1 °C) (Temporal)   Resp 18   Ht 5' 8\" (1.727 m)   Wt 195 lb (88.5 kg)   BMI 29.65 kg/m²     Wt Readings from Last 3 Encounters:   09/20/19 195 lb (88.5 kg)   09/10/19 195 lb (88.5 kg)   08/19/19 195 lb (88.5 kg)       PHYSICAL EXAM    General Appearance: alert and oriented to person, place and time, well developed and well- nourished, in no acute distress  Skin: warm and dry, no rash or erythema  Head: normocephalic and atraumatic  Eyes: pupils equal, round, and reactive to light, extraocular eye movements intact, conjunctivae normal  ENT: tympanic membrane, external ear and ear canal normal bilaterally, nose without deformity, nasal mucosa and turbinates normal without polyps, lips teeth and gums normal  Neck: supple and non-tender without mass, no thyromegaly or thyroid nodules, no cervical lymphadenopathy  Pulmonary/Chest: clear to auscultation bilaterally- no wheezes, rales or rhonchi, normal air movement, no respiratory distress  Cardiovascular: normal rate, regular rhythm,

## 2019-09-30 ENCOUNTER — OFFICE VISIT (OUTPATIENT)
Dept: CARDIOLOGY | Age: 84
End: 2019-09-30
Payer: COMMERCIAL

## 2019-09-30 VITALS
DIASTOLIC BLOOD PRESSURE: 62 MMHG | HEART RATE: 58 BPM | SYSTOLIC BLOOD PRESSURE: 126 MMHG | HEIGHT: 68 IN | BODY MASS INDEX: 29.4 KG/M2 | WEIGHT: 194 LBS

## 2019-09-30 DIAGNOSIS — R06.02 SHORTNESS OF BREATH: ICD-10-CM

## 2019-09-30 DIAGNOSIS — R06.02 SHORTNESS OF BREATH: Primary | ICD-10-CM

## 2019-09-30 DIAGNOSIS — I25.10 CORONARY ARTERY DISEASE INVOLVING NATIVE CORONARY ARTERY OF NATIVE HEART WITHOUT ANGINA PECTORIS: ICD-10-CM

## 2019-09-30 DIAGNOSIS — I50.23 ACUTE ON CHRONIC SYSTOLIC (CONGESTIVE) HEART FAILURE (HCC): ICD-10-CM

## 2019-09-30 DIAGNOSIS — I10 ESSENTIAL HYPERTENSION: ICD-10-CM

## 2019-09-30 LAB
ANION GAP SERPL CALCULATED.3IONS-SCNC: 7 MMOL/L (ref 7–19)
BUN BLDV-MCNC: 32 MG/DL (ref 8–23)
CALCIUM SERPL-MCNC: 9.3 MG/DL (ref 8.8–10.2)
CHLORIDE BLD-SCNC: 110 MMOL/L (ref 98–111)
CO2: 23 MMOL/L (ref 22–29)
CREAT SERPL-MCNC: 1.4 MG/DL (ref 0.5–1.2)
GFR NON-AFRICAN AMERICAN: 48
GLUCOSE BLD-MCNC: 132 MG/DL (ref 74–109)
POTASSIUM SERPL-SCNC: 4.2 MMOL/L (ref 3.5–5)
PRO-BNP: 3964 PG/ML (ref 0–1800)
SODIUM BLD-SCNC: 140 MMOL/L (ref 136–145)

## 2019-09-30 PROCEDURE — 1036F TOBACCO NON-USER: CPT | Performed by: CLINICAL NURSE SPECIALIST

## 2019-09-30 PROCEDURE — G8417 CALC BMI ABV UP PARAM F/U: HCPCS | Performed by: CLINICAL NURSE SPECIALIST

## 2019-09-30 PROCEDURE — 1123F ACP DISCUSS/DSCN MKR DOCD: CPT | Performed by: CLINICAL NURSE SPECIALIST

## 2019-09-30 PROCEDURE — G8427 DOCREV CUR MEDS BY ELIG CLIN: HCPCS | Performed by: CLINICAL NURSE SPECIALIST

## 2019-09-30 PROCEDURE — 4040F PNEUMOC VAC/ADMIN/RCVD: CPT | Performed by: CLINICAL NURSE SPECIALIST

## 2019-09-30 PROCEDURE — 99214 OFFICE O/P EST MOD 30 MIN: CPT | Performed by: CLINICAL NURSE SPECIALIST

## 2019-09-30 PROCEDURE — G8598 ASA/ANTIPLAT THER USED: HCPCS | Performed by: CLINICAL NURSE SPECIALIST

## 2019-09-30 RX ORDER — BUMETANIDE 1 MG/1
1 TABLET ORAL DAILY
Qty: 60 TABLET | Refills: 5 | Status: ON HOLD | OUTPATIENT
Start: 2019-09-30 | End: 2019-12-04 | Stop reason: HOSPADM

## 2019-09-30 ASSESSMENT — ENCOUNTER SYMPTOMS
CHEST TIGHTNESS: 0
SHORTNESS OF BREATH: 1
EYE REDNESS: 0
WHEEZING: 0
COUGH: 0
FACIAL SWELLING: 0
NAUSEA: 0
ABDOMINAL PAIN: 0
VOMITING: 0

## 2019-10-03 ENCOUNTER — APPOINTMENT (OUTPATIENT)
Dept: MRI IMAGING | Age: 84
DRG: 149 | End: 2019-10-03
Payer: COMMERCIAL

## 2019-10-03 ENCOUNTER — HOSPITAL ENCOUNTER (INPATIENT)
Age: 84
LOS: 1 days | Discharge: HOME | DRG: 149 | End: 2019-10-04
Attending: EMERGENCY MEDICINE | Admitting: INTERNAL MEDICINE
Payer: COMMERCIAL

## 2019-10-03 ENCOUNTER — APPOINTMENT (OUTPATIENT)
Dept: GENERAL RADIOLOGY | Age: 84
DRG: 149 | End: 2019-10-03
Payer: COMMERCIAL

## 2019-10-03 ENCOUNTER — APPOINTMENT (OUTPATIENT)
Dept: CT IMAGING | Age: 84
DRG: 149 | End: 2019-10-03
Payer: COMMERCIAL

## 2019-10-03 DIAGNOSIS — Z86.79 HISTORY OF ISCHEMIC CARDIOMYOPATHY: ICD-10-CM

## 2019-10-03 DIAGNOSIS — R42 DIZZINESS: Primary | ICD-10-CM

## 2019-10-03 DIAGNOSIS — R77.8 ELEVATED TROPONIN: ICD-10-CM

## 2019-10-03 DIAGNOSIS — I10 HYPERTENSION, UNSPECIFIED TYPE: ICD-10-CM

## 2019-10-03 PROBLEM — R79.89 ELEVATED TROPONIN: Status: ACTIVE | Noted: 2019-10-03

## 2019-10-03 LAB
ALBUMIN SERPL-MCNC: 3.9 G/DL (ref 3.5–5.2)
ALP BLD-CCNC: 107 U/L (ref 40–130)
ALT SERPL-CCNC: 29 U/L (ref 5–41)
ANION GAP SERPL CALCULATED.3IONS-SCNC: 12 MMOL/L (ref 7–19)
AST SERPL-CCNC: 25 U/L (ref 5–40)
BASOPHILS ABSOLUTE: 0 K/UL (ref 0–0.2)
BASOPHILS RELATIVE PERCENT: 0.5 % (ref 0–1)
BILIRUB SERPL-MCNC: <0.2 MG/DL (ref 0.2–1.2)
BILIRUBIN URINE: NEGATIVE
BLOOD, URINE: NEGATIVE
BUN BLDV-MCNC: 40 MG/DL (ref 8–23)
CALCIUM SERPL-MCNC: 9.7 MG/DL (ref 8.8–10.2)
CHLORIDE BLD-SCNC: 105 MMOL/L (ref 98–111)
CLARITY: CLEAR
CO2: 27 MMOL/L (ref 22–29)
COLOR: YELLOW
CREAT SERPL-MCNC: 1.2 MG/DL (ref 0.5–1.2)
EOSINOPHILS ABSOLUTE: 0.2 K/UL (ref 0–0.6)
EOSINOPHILS RELATIVE PERCENT: 3 % (ref 0–5)
GFR NON-AFRICAN AMERICAN: 58
GLUCOSE BLD-MCNC: 100 MG/DL (ref 70–99)
GLUCOSE BLD-MCNC: 148 MG/DL (ref 70–99)
GLUCOSE BLD-MCNC: 153 MG/DL (ref 74–109)
GLUCOSE BLD-MCNC: 63 MG/DL (ref 70–99)
GLUCOSE URINE: NEGATIVE MG/DL
HCT VFR BLD CALC: 32.3 % (ref 42–52)
HEMOGLOBIN: 10.6 G/DL (ref 14–18)
IMMATURE GRANULOCYTES #: 0 K/UL
KETONES, URINE: NEGATIVE MG/DL
LEUKOCYTE ESTERASE, URINE: NEGATIVE
LYMPHOCYTES ABSOLUTE: 1.4 K/UL (ref 1.1–4.5)
LYMPHOCYTES RELATIVE PERCENT: 23.8 % (ref 20–40)
MCH RBC QN AUTO: 33.1 PG (ref 27–31)
MCHC RBC AUTO-ENTMCNC: 32.8 G/DL (ref 33–37)
MCV RBC AUTO: 100.9 FL (ref 80–94)
MONOCYTES ABSOLUTE: 0.5 K/UL (ref 0–0.9)
MONOCYTES RELATIVE PERCENT: 8.9 % (ref 0–10)
NEUTROPHILS ABSOLUTE: 3.9 K/UL (ref 1.5–7.5)
NEUTROPHILS RELATIVE PERCENT: 63.6 % (ref 50–65)
NITRITE, URINE: NEGATIVE
PDW BLD-RTO: 13.9 % (ref 11.5–14.5)
PERFORMED ON: ABNORMAL
PH UA: 5.5 (ref 5–8)
PLATELET # BLD: 185 K/UL (ref 130–400)
PMV BLD AUTO: 10.1 FL (ref 9.4–12.4)
POTASSIUM SERPL-SCNC: 3.9 MMOL/L (ref 3.5–5)
PRO-BNP: 1869 PG/ML (ref 0–1800)
PROTEIN UA: ABNORMAL MG/DL
RBC # BLD: 3.2 M/UL (ref 4.7–6.1)
SODIUM BLD-SCNC: 144 MMOL/L (ref 136–145)
SPECIFIC GRAVITY UA: 1.01 (ref 1–1.03)
TOTAL PROTEIN: 6.9 G/DL (ref 6.6–8.7)
TROPONIN: 0.06 NG/ML (ref 0–0.03)
TROPONIN: 0.08 NG/ML (ref 0–0.03)
URINE REFLEX TO CULTURE: ABNORMAL
UROBILINOGEN, URINE: 0.2 E.U./DL
WBC # BLD: 6.1 K/UL (ref 4.8–10.8)

## 2019-10-03 PROCEDURE — G0378 HOSPITAL OBSERVATION PER HR: HCPCS

## 2019-10-03 PROCEDURE — 2580000003 HC RX 258: Performed by: INTERNAL MEDICINE

## 2019-10-03 PROCEDURE — 36415 COLL VENOUS BLD VENIPUNCTURE: CPT

## 2019-10-03 PROCEDURE — 71046 X-RAY EXAM CHEST 2 VIEWS: CPT

## 2019-10-03 PROCEDURE — 82948 REAGENT STRIP/BLOOD GLUCOSE: CPT

## 2019-10-03 PROCEDURE — 99222 1ST HOSP IP/OBS MODERATE 55: CPT | Performed by: PSYCHIATRY & NEUROLOGY

## 2019-10-03 PROCEDURE — 84484 ASSAY OF TROPONIN QUANT: CPT

## 2019-10-03 PROCEDURE — 6370000000 HC RX 637 (ALT 250 FOR IP): Performed by: EMERGENCY MEDICINE

## 2019-10-03 PROCEDURE — 83880 ASSAY OF NATRIURETIC PEPTIDE: CPT

## 2019-10-03 PROCEDURE — 81003 URINALYSIS AUTO W/O SCOPE: CPT

## 2019-10-03 PROCEDURE — 6360000002 HC RX W HCPCS: Performed by: INTERNAL MEDICINE

## 2019-10-03 PROCEDURE — 70551 MRI BRAIN STEM W/O DYE: CPT

## 2019-10-03 PROCEDURE — 6370000000 HC RX 637 (ALT 250 FOR IP): Performed by: NURSE PRACTITIONER

## 2019-10-03 PROCEDURE — 2140000000 HC CCU INTERMEDIATE R&B

## 2019-10-03 PROCEDURE — 80053 COMPREHEN METABOLIC PANEL: CPT

## 2019-10-03 PROCEDURE — 72125 CT NECK SPINE W/O DYE: CPT

## 2019-10-03 PROCEDURE — 70450 CT HEAD/BRAIN W/O DYE: CPT

## 2019-10-03 PROCEDURE — 85025 COMPLETE CBC W/AUTO DIFF WBC: CPT

## 2019-10-03 PROCEDURE — 93005 ELECTROCARDIOGRAM TRACING: CPT | Performed by: NURSE PRACTITIONER

## 2019-10-03 PROCEDURE — 99285 EMERGENCY DEPT VISIT HI MDM: CPT

## 2019-10-03 PROCEDURE — 96372 THER/PROPH/DIAG INJ SC/IM: CPT

## 2019-10-03 PROCEDURE — 73030 X-RAY EXAM OF SHOULDER: CPT

## 2019-10-03 RX ORDER — MECLIZINE HCL 12.5 MG/1
12.5 TABLET ORAL ONCE
Status: COMPLETED | OUTPATIENT
Start: 2019-10-03 | End: 2019-10-03

## 2019-10-03 RX ORDER — ASPIRIN 81 MG/1
81 TABLET, CHEWABLE ORAL DAILY
Status: DISCONTINUED | OUTPATIENT
Start: 2019-10-03 | End: 2019-10-04 | Stop reason: HOSPADM

## 2019-10-03 RX ORDER — CLONIDINE HYDROCHLORIDE 0.1 MG/1
0.1 TABLET ORAL ONCE
Status: COMPLETED | OUTPATIENT
Start: 2019-10-03 | End: 2019-10-03

## 2019-10-03 RX ORDER — SODIUM CHLORIDE 0.9 % (FLUSH) 0.9 %
10 SYRINGE (ML) INJECTION PRN
Status: DISCONTINUED | OUTPATIENT
Start: 2019-10-03 | End: 2019-10-04 | Stop reason: HOSPADM

## 2019-10-03 RX ORDER — SODIUM CHLORIDE 0.9 % (FLUSH) 0.9 %
10 SYRINGE (ML) INJECTION EVERY 12 HOURS SCHEDULED
Status: DISCONTINUED | OUTPATIENT
Start: 2019-10-03 | End: 2019-10-04 | Stop reason: HOSPADM

## 2019-10-03 RX ORDER — ONDANSETRON 2 MG/ML
4 INJECTION INTRAMUSCULAR; INTRAVENOUS EVERY 6 HOURS PRN
Status: DISCONTINUED | OUTPATIENT
Start: 2019-10-03 | End: 2019-10-04 | Stop reason: HOSPADM

## 2019-10-03 RX ORDER — ASPIRIN 325 MG
325 TABLET ORAL ONCE
Status: COMPLETED | OUTPATIENT
Start: 2019-10-03 | End: 2019-10-03

## 2019-10-03 RX ADMIN — ENOXAPARIN SODIUM 40 MG: 40 INJECTION SUBCUTANEOUS at 23:13

## 2019-10-03 RX ADMIN — Medication 10 ML: at 23:13

## 2019-10-03 RX ADMIN — ASPIRIN 325 MG: 325 TABLET, FILM COATED ORAL at 14:37

## 2019-10-03 RX ADMIN — MECLIZINE 12.5 MG: 12.5 TABLET ORAL at 09:40

## 2019-10-03 RX ADMIN — CLONIDINE HYDROCHLORIDE 0.1 MG: 0.1 TABLET ORAL at 12:40

## 2019-10-03 ASSESSMENT — PAIN SCALES - GENERAL
PAINLEVEL_OUTOF10: 0

## 2019-10-03 ASSESSMENT — ENCOUNTER SYMPTOMS
ABDOMINAL PAIN: 0
SHORTNESS OF BREATH: 0

## 2019-10-04 ENCOUNTER — APPOINTMENT (OUTPATIENT)
Dept: CT IMAGING | Age: 84
End: 2019-10-04
Payer: COMMERCIAL

## 2019-10-04 ENCOUNTER — HOSPITAL ENCOUNTER (EMERGENCY)
Age: 84
Discharge: HOME | End: 2019-10-04
Attending: EMERGENCY MEDICINE
Payer: COMMERCIAL

## 2019-10-04 VITALS
TEMPERATURE: 97.5 F | RESPIRATION RATE: 17 BRPM | DIASTOLIC BLOOD PRESSURE: 75 MMHG | SYSTOLIC BLOOD PRESSURE: 190 MMHG | OXYGEN SATURATION: 97 % | WEIGHT: 196 LBS | BODY MASS INDEX: 29.8 KG/M2 | HEART RATE: 66 BPM

## 2019-10-04 VITALS
BODY MASS INDEX: 29.31 KG/M2 | DIASTOLIC BLOOD PRESSURE: 61 MMHG | HEART RATE: 43 BPM | WEIGHT: 193.4 LBS | TEMPERATURE: 97.3 F | RESPIRATION RATE: 18 BRPM | HEIGHT: 68 IN | SYSTOLIC BLOOD PRESSURE: 185 MMHG | OXYGEN SATURATION: 97 %

## 2019-10-04 DIAGNOSIS — H81.10 BENIGN PAROXYSMAL POSITIONAL VERTIGO, UNSPECIFIED LATERALITY: Primary | ICD-10-CM

## 2019-10-04 LAB
ALBUMIN SERPL-MCNC: 3.7 G/DL (ref 3.5–5.2)
ALP BLD-CCNC: 103 U/L (ref 40–130)
ALT SERPL-CCNC: 27 U/L (ref 5–41)
ANION GAP SERPL CALCULATED.3IONS-SCNC: 9 MMOL/L (ref 7–19)
AST SERPL-CCNC: 26 U/L (ref 5–40)
BASOPHILS ABSOLUTE: 0 K/UL (ref 0–0.2)
BASOPHILS RELATIVE PERCENT: 0.5 % (ref 0–1)
BILIRUB SERPL-MCNC: <0.2 MG/DL (ref 0.2–1.2)
BUN BLDV-MCNC: 32 MG/DL (ref 8–23)
CALCIUM SERPL-MCNC: 9.9 MG/DL (ref 8.8–10.2)
CHLORIDE BLD-SCNC: 107 MMOL/L (ref 98–111)
CHOLESTEROL, TOTAL: 133 MG/DL (ref 160–199)
CO2: 27 MMOL/L (ref 22–29)
CREAT SERPL-MCNC: 1.2 MG/DL (ref 0.5–1.2)
EKG P AXIS: 45 DEGREES
EKG P AXIS: 48 DEGREES
EKG P-R INTERVAL: 202 MS
EKG P-R INTERVAL: 208 MS
EKG Q-T INTERVAL: 450 MS
EKG Q-T INTERVAL: 514 MS
EKG QRS DURATION: 102 MS
EKG QRS DURATION: 104 MS
EKG QTC CALCULATION (BAZETT): 442 MS
EKG QTC CALCULATION (BAZETT): 486 MS
EKG T AXIS: 55 DEGREES
EKG T AXIS: 65 DEGREES
EOSINOPHILS ABSOLUTE: 0.2 K/UL (ref 0–0.6)
EOSINOPHILS RELATIVE PERCENT: 4.2 % (ref 0–5)
GFR NON-AFRICAN AMERICAN: 58
GLUCOSE BLD-MCNC: 121 MG/DL (ref 70–99)
GLUCOSE BLD-MCNC: 131 MG/DL (ref 74–109)
GLUCOSE BLD-MCNC: 132 MG/DL (ref 70–99)
GLUCOSE BLD-MCNC: 174 MG/DL
GLUCOSE BLD-MCNC: 174 MG/DL (ref 70–99)
HCT VFR BLD CALC: 29.3 % (ref 42–52)
HCT VFR BLD CALC: 35.8 % (ref 42–52)
HDLC SERPL-MCNC: 43 MG/DL (ref 55–121)
HEMOGLOBIN: 11.6 G/DL (ref 14–18)
HEMOGLOBIN: 9.7 G/DL (ref 14–18)
IMMATURE GRANULOCYTES #: 0 K/UL
LDL CHOLESTEROL CALCULATED: 82 MG/DL
LYMPHOCYTES ABSOLUTE: 1.5 K/UL (ref 1.1–4.5)
LYMPHOCYTES RELATIVE PERCENT: 26.3 % (ref 20–40)
MCH RBC QN AUTO: 33 PG (ref 27–31)
MCH RBC QN AUTO: 33.3 PG (ref 27–31)
MCHC RBC AUTO-ENTMCNC: 32.4 G/DL (ref 33–37)
MCHC RBC AUTO-ENTMCNC: 33.1 G/DL (ref 33–37)
MCV RBC AUTO: 100.7 FL (ref 80–94)
MCV RBC AUTO: 102 FL (ref 80–94)
MONOCYTES ABSOLUTE: 0.7 K/UL (ref 0–0.9)
MONOCYTES RELATIVE PERCENT: 11.8 % (ref 0–10)
NEUTROPHILS ABSOLUTE: 3.3 K/UL (ref 1.5–7.5)
NEUTROPHILS RELATIVE PERCENT: 57 % (ref 50–65)
PDW BLD-RTO: 13.8 % (ref 11.5–14.5)
PDW BLD-RTO: 14.2 % (ref 11.5–14.5)
PERFORMED ON: ABNORMAL
PLATELET # BLD: 173 K/UL (ref 130–400)
PLATELET # BLD: 195 K/UL (ref 130–400)
PMV BLD AUTO: 10.8 FL (ref 9.4–12.4)
PMV BLD AUTO: 9.6 FL (ref 9.4–12.4)
POTASSIUM SERPL-SCNC: 4.5 MMOL/L (ref 3.5–5)
RBC # BLD: 2.91 M/UL (ref 4.7–6.1)
RBC # BLD: 3.51 M/UL (ref 4.7–6.1)
SODIUM BLD-SCNC: 143 MMOL/L (ref 136–145)
TOTAL PROTEIN: 7 G/DL (ref 6.6–8.7)
TRIGL SERPL-MCNC: 42 MG/DL (ref 0–149)
TROPONIN: 0.06 NG/ML (ref 0–0.03)
TROPONIN: 0.07 NG/ML (ref 0–0.03)
WBC # BLD: 4.6 K/UL (ref 4.8–10.8)
WBC # BLD: 5.7 K/UL (ref 4.8–10.8)

## 2019-10-04 PROCEDURE — 93005 ELECTROCARDIOGRAM TRACING: CPT | Performed by: INTERNAL MEDICINE

## 2019-10-04 PROCEDURE — 84484 ASSAY OF TROPONIN QUANT: CPT

## 2019-10-04 PROCEDURE — 70450 CT HEAD/BRAIN W/O DYE: CPT

## 2019-10-04 PROCEDURE — 80053 COMPREHEN METABOLIC PANEL: CPT

## 2019-10-04 PROCEDURE — 6370000000 HC RX 637 (ALT 250 FOR IP): Performed by: INTERNAL MEDICINE

## 2019-10-04 PROCEDURE — 99284 EMERGENCY DEPT VISIT MOD MDM: CPT

## 2019-10-04 PROCEDURE — 99999 PR OFFICE/OUTPT VISIT,PROCEDURE ONLY: CPT | Performed by: INTERNAL MEDICINE

## 2019-10-04 PROCEDURE — 85025 COMPLETE CBC W/AUTO DIFF WBC: CPT

## 2019-10-04 PROCEDURE — 99232 SBSQ HOSP IP/OBS MODERATE 35: CPT | Performed by: PSYCHIATRY & NEUROLOGY

## 2019-10-04 PROCEDURE — 93010 ELECTROCARDIOGRAM REPORT: CPT | Performed by: INTERNAL MEDICINE

## 2019-10-04 PROCEDURE — 93005 ELECTROCARDIOGRAM TRACING: CPT | Performed by: EMERGENCY MEDICINE

## 2019-10-04 PROCEDURE — 6370000000 HC RX 637 (ALT 250 FOR IP): Performed by: NURSE PRACTITIONER

## 2019-10-04 PROCEDURE — 99223 1ST HOSP IP/OBS HIGH 75: CPT | Performed by: INTERNAL MEDICINE

## 2019-10-04 PROCEDURE — 80061 LIPID PANEL: CPT

## 2019-10-04 PROCEDURE — G0378 HOSPITAL OBSERVATION PER HR: HCPCS

## 2019-10-04 PROCEDURE — 82948 REAGENT STRIP/BLOOD GLUCOSE: CPT

## 2019-10-04 PROCEDURE — 36415 COLL VENOUS BLD VENIPUNCTURE: CPT

## 2019-10-04 PROCEDURE — 2580000003 HC RX 258: Performed by: INTERNAL MEDICINE

## 2019-10-04 PROCEDURE — 85027 COMPLETE CBC AUTOMATED: CPT

## 2019-10-04 RX ORDER — BUMETANIDE 1 MG/1
1 TABLET ORAL DAILY
Status: DISCONTINUED | OUTPATIENT
Start: 2019-10-04 | End: 2019-10-04 | Stop reason: HOSPADM

## 2019-10-04 RX ORDER — LISINOPRIL 20 MG/1
20 TABLET ORAL 2 TIMES DAILY
Status: DISCONTINUED | OUTPATIENT
Start: 2019-10-04 | End: 2019-10-04 | Stop reason: HOSPADM

## 2019-10-04 RX ORDER — MECLIZINE HCL 12.5 MG/1
25 TABLET ORAL ONCE
Status: COMPLETED | OUTPATIENT
Start: 2019-10-04 | End: 2019-10-04

## 2019-10-04 RX ORDER — AMLODIPINE BESYLATE 5 MG/1
10 TABLET ORAL DAILY
Status: DISCONTINUED | OUTPATIENT
Start: 2019-10-04 | End: 2019-10-04 | Stop reason: HOSPADM

## 2019-10-04 RX ORDER — CLONIDINE HYDROCHLORIDE 0.2 MG/1
0.2 TABLET ORAL ONCE
Status: COMPLETED | OUTPATIENT
Start: 2019-10-04 | End: 2019-10-04

## 2019-10-04 RX ADMIN — CLONIDINE HYDROCHLORIDE 0.2 MG: 0.2 TABLET ORAL at 00:32

## 2019-10-04 RX ADMIN — BUMETANIDE 1 MG: 1 TABLET ORAL at 18:37

## 2019-10-04 RX ADMIN — AMLODIPINE BESYLATE 10 MG: 5 TABLET ORAL at 18:04

## 2019-10-04 RX ADMIN — Medication 10 ML: at 08:53

## 2019-10-04 RX ADMIN — ASPIRIN 81 MG 81 MG: 81 TABLET ORAL at 08:53

## 2019-10-04 RX ADMIN — MECLIZINE 25 MG: 12.5 TABLET ORAL at 17:30

## 2019-10-04 ASSESSMENT — ENCOUNTER SYMPTOMS
SHORTNESS OF BREATH: 0
SORE THROAT: 0
ABDOMINAL PAIN: 0

## 2019-10-04 ASSESSMENT — PAIN SCALES - GENERAL
PAINLEVEL_OUTOF10: 0
PAINLEVEL_OUTOF10: 0

## 2019-10-06 LAB
EKG P AXIS: 34 DEGREES
EKG P-R INTERVAL: 202 MS
EKG Q-T INTERVAL: 446 MS
EKG QRS DURATION: 104 MS
EKG QTC CALCULATION (BAZETT): 446 MS
EKG T AXIS: 49 DEGREES

## 2019-10-06 PROCEDURE — 93010 ELECTROCARDIOGRAM REPORT: CPT | Performed by: INTERNAL MEDICINE

## 2019-10-07 ENCOUNTER — TELEPHONE (OUTPATIENT)
Dept: CARDIOLOGY | Age: 84
End: 2019-10-07

## 2019-10-09 ENCOUNTER — TELEPHONE (OUTPATIENT)
Dept: CARDIOLOGY | Age: 84
End: 2019-10-09

## 2019-10-11 ENCOUNTER — HOSPITAL ENCOUNTER (OUTPATIENT)
Age: 84
Setting detail: OBSERVATION
Discharge: HOME | End: 2019-10-12
Attending: EMERGENCY MEDICINE | Admitting: HOSPITALIST
Payer: COMMERCIAL

## 2019-10-11 ENCOUNTER — HOSPITAL ENCOUNTER (OUTPATIENT)
Dept: NON INVASIVE DIAGNOSTICS | Age: 84
Discharge: HOME | End: 2019-10-11
Payer: COMMERCIAL

## 2019-10-11 ENCOUNTER — APPOINTMENT (OUTPATIENT)
Dept: GENERAL RADIOLOGY | Age: 84
End: 2019-10-11
Payer: COMMERCIAL

## 2019-10-11 VITALS
DIASTOLIC BLOOD PRESSURE: 67 MMHG | OXYGEN SATURATION: 100 % | SYSTOLIC BLOOD PRESSURE: 180 MMHG | RESPIRATION RATE: 16 BRPM | TEMPERATURE: 97 F | HEART RATE: 90 BPM

## 2019-10-11 DIAGNOSIS — I25.9 CHEST PAIN DUE TO MYOCARDIAL ISCHEMIA, UNSPECIFIED ISCHEMIC CHEST PAIN TYPE: Primary | ICD-10-CM

## 2019-10-11 DIAGNOSIS — R06.02 SHORTNESS OF BREATH: ICD-10-CM

## 2019-10-11 LAB
ANION GAP SERPL CALCULATED.3IONS-SCNC: 13 MMOL/L (ref 7–19)
BUN BLDV-MCNC: 43 MG/DL (ref 8–23)
CALCIUM SERPL-MCNC: 9.6 MG/DL (ref 8.8–10.2)
CHLORIDE BLD-SCNC: 104 MMOL/L (ref 98–111)
CO2: 24 MMOL/L (ref 22–29)
CREAT SERPL-MCNC: 1.5 MG/DL (ref 0.5–1.2)
GFR NON-AFRICAN AMERICAN: 44
GLUCOSE BLD-MCNC: 106 MG/DL (ref 70–99)
GLUCOSE BLD-MCNC: 193 MG/DL (ref 74–109)
GLUCOSE BLD-MCNC: 57 MG/DL (ref 70–99)
HCT VFR BLD CALC: 32.5 % (ref 42–52)
HEMOGLOBIN: 10.6 G/DL (ref 14–18)
LV EF: 48 %
LVEF MODALITY: NORMAL
MCH RBC QN AUTO: 33.2 PG (ref 27–31)
MCHC RBC AUTO-ENTMCNC: 32.6 G/DL (ref 33–37)
MCV RBC AUTO: 101.9 FL (ref 80–94)
PDW BLD-RTO: 14 % (ref 11.5–14.5)
PERFORMED ON: ABNORMAL
PERFORMED ON: ABNORMAL
PLATELET # BLD: 229 K/UL (ref 130–400)
PMV BLD AUTO: 10.7 FL (ref 9.4–12.4)
POTASSIUM REFLEX MAGNESIUM: 4.6 MMOL/L (ref 3.5–5)
RBC # BLD: 3.19 M/UL (ref 4.7–6.1)
SODIUM BLD-SCNC: 141 MMOL/L (ref 136–145)
TROPONIN: 0.06 NG/ML (ref 0–0.03)
TROPONIN: 0.06 NG/ML (ref 0–0.03)
TROPONIN: 0.07 NG/ML (ref 0–0.03)
TROPONIN: 0.07 NG/ML (ref 0–0.03)
WBC # BLD: 7 K/UL (ref 4.8–10.8)

## 2019-10-11 PROCEDURE — 6370000000 HC RX 637 (ALT 250 FOR IP): Performed by: EMERGENCY MEDICINE

## 2019-10-11 PROCEDURE — 71045 X-RAY EXAM CHEST 1 VIEW: CPT

## 2019-10-11 PROCEDURE — G0378 HOSPITAL OBSERVATION PER HR: HCPCS

## 2019-10-11 PROCEDURE — 6370000000 HC RX 637 (ALT 250 FOR IP): Performed by: HOSPITALIST

## 2019-10-11 PROCEDURE — 84484 ASSAY OF TROPONIN QUANT: CPT

## 2019-10-11 PROCEDURE — 99285 EMERGENCY DEPT VISIT HI MDM: CPT

## 2019-10-11 PROCEDURE — 99245 OFF/OP CONSLTJ NEW/EST HI 55: CPT | Performed by: INTERNAL MEDICINE

## 2019-10-11 PROCEDURE — 93005 ELECTROCARDIOGRAM TRACING: CPT | Performed by: EMERGENCY MEDICINE

## 2019-10-11 PROCEDURE — 96372 THER/PROPH/DIAG INJ SC/IM: CPT

## 2019-10-11 PROCEDURE — 99999 PR OFFICE/OUTPT VISIT,PROCEDURE ONLY: CPT | Performed by: EMERGENCY MEDICINE

## 2019-10-11 PROCEDURE — 93005 ELECTROCARDIOGRAM TRACING: CPT | Performed by: HOSPITALIST

## 2019-10-11 PROCEDURE — 85027 COMPLETE CBC AUTOMATED: CPT

## 2019-10-11 PROCEDURE — 6360000002 HC RX W HCPCS: Performed by: HOSPITALIST

## 2019-10-11 PROCEDURE — 80048 BASIC METABOLIC PNL TOTAL CA: CPT

## 2019-10-11 PROCEDURE — 36415 COLL VENOUS BLD VENIPUNCTURE: CPT

## 2019-10-11 PROCEDURE — 93306 TTE W/DOPPLER COMPLETE: CPT

## 2019-10-11 PROCEDURE — 82948 REAGENT STRIP/BLOOD GLUCOSE: CPT

## 2019-10-11 RX ORDER — VITAMIN C
1 TAB ORAL DAILY
Status: DISCONTINUED | OUTPATIENT
Start: 2019-10-11 | End: 2019-10-12 | Stop reason: HOSPADM

## 2019-10-11 RX ORDER — NITROGLYCERIN 0.4 MG/1
0.4 TABLET SUBLINGUAL EVERY 5 MIN PRN
Status: DISCONTINUED | OUTPATIENT
Start: 2019-10-11 | End: 2019-10-11 | Stop reason: SDUPTHER

## 2019-10-11 RX ORDER — DIPHENOXYLATE HYDROCHLORIDE AND ATROPINE SULFATE 2.5; .025 MG/1; MG/1
1 TABLET ORAL 4 TIMES DAILY PRN
Status: DISCONTINUED | OUTPATIENT
Start: 2019-10-11 | End: 2019-10-12 | Stop reason: HOSPADM

## 2019-10-11 RX ORDER — THIAMINE MONONITRATE (VIT B1) 100 MG
100 TABLET ORAL DAILY
Status: DISCONTINUED | OUTPATIENT
Start: 2019-10-11 | End: 2019-10-12 | Stop reason: HOSPADM

## 2019-10-11 RX ORDER — AMINOPHYLLINE DIHYDRATE 25 MG/ML
50 INJECTION, SOLUTION INTRAVENOUS PRN
Status: CANCELLED | OUTPATIENT
Start: 2019-10-11 | End: 2019-10-12

## 2019-10-11 RX ORDER — LIDOCAINE 4 G/G
1 PATCH TOPICAL DAILY
Status: DISCONTINUED | OUTPATIENT
Start: 2019-10-11 | End: 2019-10-12 | Stop reason: HOSPADM

## 2019-10-11 RX ORDER — SENNA AND DOCUSATE SODIUM 50; 8.6 MG/1; MG/1
2 TABLET, FILM COATED ORAL DAILY
Status: DISCONTINUED | OUTPATIENT
Start: 2019-10-11 | End: 2019-10-12 | Stop reason: HOSPADM

## 2019-10-11 RX ORDER — ASPIRIN 81 MG/1
324 TABLET, CHEWABLE ORAL ONCE
Status: COMPLETED | OUTPATIENT
Start: 2019-10-11 | End: 2019-10-11

## 2019-10-11 RX ORDER — SODIUM CHLORIDE 0.9 % (FLUSH) 0.9 %
10 SYRINGE (ML) INJECTION PRN
Status: CANCELLED | OUTPATIENT
Start: 2019-10-11 | End: 2019-10-12

## 2019-10-11 RX ORDER — ROPINIROLE 1 MG/1
1 TABLET, FILM COATED ORAL NIGHTLY
Status: DISCONTINUED | OUTPATIENT
Start: 2019-10-11 | End: 2019-10-12 | Stop reason: HOSPADM

## 2019-10-11 RX ORDER — ASPIRIN 81 MG/1
81 TABLET, CHEWABLE ORAL DAILY
Status: DISCONTINUED | OUTPATIENT
Start: 2019-10-12 | End: 2019-10-12 | Stop reason: HOSPADM

## 2019-10-11 RX ORDER — SODIUM CHLORIDE 9 MG/ML
500 INJECTION, SOLUTION INTRAVENOUS CONTINUOUS PRN
Status: CANCELLED | OUTPATIENT
Start: 2019-10-11 | End: 2019-10-12

## 2019-10-11 RX ORDER — ATORVASTATIN CALCIUM 40 MG/1
40 TABLET, FILM COATED ORAL NIGHTLY
Status: DISCONTINUED | OUTPATIENT
Start: 2019-10-11 | End: 2019-10-11 | Stop reason: ALTCHOICE

## 2019-10-11 RX ORDER — AMLODIPINE BESYLATE 10 MG/1
10 TABLET ORAL 2 TIMES DAILY
Status: DISCONTINUED | OUTPATIENT
Start: 2019-10-11 | End: 2019-10-12 | Stop reason: HOSPADM

## 2019-10-11 RX ORDER — NITROGLYCERIN 0.4 MG/1
0.4 TABLET SUBLINGUAL EVERY 5 MIN PRN
Status: DISCONTINUED | OUTPATIENT
Start: 2019-10-11 | End: 2019-10-12 | Stop reason: HOSPADM

## 2019-10-11 RX ORDER — PRAVASTATIN SODIUM 20 MG
40 TABLET ORAL DAILY
Status: DISCONTINUED | OUTPATIENT
Start: 2019-10-11 | End: 2019-10-12 | Stop reason: HOSPADM

## 2019-10-11 RX ORDER — ALLOPURINOL 300 MG/1
600 TABLET ORAL DAILY
Status: DISCONTINUED | OUTPATIENT
Start: 2019-10-11 | End: 2019-10-12 | Stop reason: HOSPADM

## 2019-10-11 RX ORDER — ASPIRIN 81 MG/1
81 TABLET, CHEWABLE ORAL DAILY
Status: DISCONTINUED | OUTPATIENT
Start: 2019-10-12 | End: 2019-10-11 | Stop reason: SDUPTHER

## 2019-10-11 RX ORDER — METOPROLOL SUCCINATE 25 MG/1
25 TABLET, EXTENDED RELEASE ORAL DAILY
Status: DISCONTINUED | OUTPATIENT
Start: 2019-10-11 | End: 2019-10-12 | Stop reason: HOSPADM

## 2019-10-11 RX ORDER — METOPROLOL TARTRATE 5 MG/5ML
5 INJECTION INTRAVENOUS EVERY 5 MIN PRN
Status: CANCELLED | OUTPATIENT
Start: 2019-10-11 | End: 2019-10-12

## 2019-10-11 RX ORDER — LANOLIN ALCOHOL/MO/W.PET/CERES
400 CREAM (GRAM) TOPICAL DAILY
Status: DISCONTINUED | OUTPATIENT
Start: 2019-10-11 | End: 2019-10-12 | Stop reason: HOSPADM

## 2019-10-11 RX ORDER — GLIMEPIRIDE 4 MG/1
4 TABLET ORAL 2 TIMES DAILY
Status: DISCONTINUED | OUTPATIENT
Start: 2019-10-11 | End: 2019-10-12 | Stop reason: HOSPADM

## 2019-10-11 RX ORDER — LOSARTAN POTASSIUM 50 MG/1
50 TABLET ORAL 2 TIMES DAILY
Status: DISCONTINUED | OUTPATIENT
Start: 2019-10-11 | End: 2019-10-12 | Stop reason: HOSPADM

## 2019-10-11 RX ORDER — DOCUSATE SODIUM 100 MG/1
100 CAPSULE, LIQUID FILLED ORAL 2 TIMES DAILY
Status: DISCONTINUED | OUTPATIENT
Start: 2019-10-11 | End: 2019-10-12 | Stop reason: HOSPADM

## 2019-10-11 RX ORDER — SODIUM CHLORIDE 0.9 % (FLUSH) 0.9 %
10 SYRINGE (ML) INJECTION PRN
Status: DISCONTINUED | OUTPATIENT
Start: 2019-10-11 | End: 2019-10-12 | Stop reason: HOSPADM

## 2019-10-11 RX ORDER — SENNA PLUS 8.6 MG/1
1 TABLET ORAL DAILY PRN
Status: DISCONTINUED | OUTPATIENT
Start: 2019-10-11 | End: 2019-10-12 | Stop reason: HOSPADM

## 2019-10-11 RX ORDER — ISOSORBIDE MONONITRATE 60 MG/1
60 TABLET, EXTENDED RELEASE ORAL DAILY
Status: DISCONTINUED | OUTPATIENT
Start: 2019-10-11 | End: 2019-10-12 | Stop reason: HOSPADM

## 2019-10-11 RX ORDER — PREDNISOLONE ACETATE 10 MG/ML
1 SUSPENSION/ DROPS OPHTHALMIC 2 TIMES DAILY
Status: DISCONTINUED | OUTPATIENT
Start: 2019-10-11 | End: 2019-10-12 | Stop reason: HOSPADM

## 2019-10-11 RX ORDER — TAMSULOSIN HYDROCHLORIDE 0.4 MG/1
0.8 CAPSULE ORAL DAILY
Status: DISCONTINUED | OUTPATIENT
Start: 2019-10-11 | End: 2019-10-12 | Stop reason: HOSPADM

## 2019-10-11 RX ORDER — HYDRALAZINE HYDROCHLORIDE 50 MG/1
50 TABLET, FILM COATED ORAL EVERY 8 HOURS SCHEDULED
Status: DISCONTINUED | OUTPATIENT
Start: 2019-10-11 | End: 2019-10-12 | Stop reason: HOSPADM

## 2019-10-11 RX ORDER — ALBUTEROL SULFATE 90 UG/1
2 AEROSOL, METERED RESPIRATORY (INHALATION) PRN
Status: CANCELLED | OUTPATIENT
Start: 2019-10-11 | End: 2019-10-12

## 2019-10-11 RX ORDER — FINASTERIDE 5 MG/1
5 TABLET, FILM COATED ORAL DAILY
Status: DISCONTINUED | OUTPATIENT
Start: 2019-10-11 | End: 2019-10-12 | Stop reason: HOSPADM

## 2019-10-11 RX ORDER — BUMETANIDE 1 MG/1
1 TABLET ORAL DAILY
Status: DISCONTINUED | OUTPATIENT
Start: 2019-10-11 | End: 2019-10-12 | Stop reason: HOSPADM

## 2019-10-11 RX ORDER — ONDANSETRON 2 MG/ML
4 INJECTION INTRAMUSCULAR; INTRAVENOUS EVERY 6 HOURS PRN
Status: DISCONTINUED | OUTPATIENT
Start: 2019-10-11 | End: 2019-10-12 | Stop reason: HOSPADM

## 2019-10-11 RX ORDER — NITROGLYCERIN 0.4 MG/1
0.4 TABLET SUBLINGUAL EVERY 5 MIN PRN
Status: CANCELLED | OUTPATIENT
Start: 2019-10-11 | End: 2019-10-12

## 2019-10-11 RX ORDER — PANTOPRAZOLE SODIUM 40 MG/1
40 TABLET, DELAYED RELEASE ORAL 2 TIMES DAILY
Status: DISCONTINUED | OUTPATIENT
Start: 2019-10-11 | End: 2019-10-12 | Stop reason: HOSPADM

## 2019-10-11 RX ORDER — POTASSIUM CHLORIDE 750 MG/1
10 TABLET, EXTENDED RELEASE ORAL 2 TIMES DAILY
Status: DISCONTINUED | OUTPATIENT
Start: 2019-10-11 | End: 2019-10-12 | Stop reason: HOSPADM

## 2019-10-11 RX ORDER — SODIUM CHLORIDE 0.9 % (FLUSH) 0.9 %
10 SYRINGE (ML) INJECTION EVERY 12 HOURS SCHEDULED
Status: DISCONTINUED | OUTPATIENT
Start: 2019-10-11 | End: 2019-10-12 | Stop reason: HOSPADM

## 2019-10-11 RX ORDER — GABAPENTIN 100 MG/1
100 CAPSULE ORAL 2 TIMES DAILY
Status: DISCONTINUED | OUTPATIENT
Start: 2019-10-11 | End: 2019-10-12 | Stop reason: HOSPADM

## 2019-10-11 RX ORDER — BACLOFEN 10 MG/1
10 TABLET ORAL DAILY PRN
Status: DISCONTINUED | OUTPATIENT
Start: 2019-10-11 | End: 2019-10-12 | Stop reason: HOSPADM

## 2019-10-11 RX ORDER — ONDANSETRON 4 MG/1
4 TABLET, ORALLY DISINTEGRATING ORAL EVERY 8 HOURS PRN
Status: DISCONTINUED | OUTPATIENT
Start: 2019-10-11 | End: 2019-10-12 | Stop reason: HOSPADM

## 2019-10-11 RX ORDER — ATROPINE SULFATE 0.1 MG/ML
0.5 INJECTION INTRAVENOUS EVERY 5 MIN PRN
Status: CANCELLED | OUTPATIENT
Start: 2019-10-11 | End: 2019-10-12

## 2019-10-11 RX ORDER — CLONIDINE HYDROCHLORIDE 0.1 MG/1
0.1 TABLET ORAL 2 TIMES DAILY
Status: DISCONTINUED | OUTPATIENT
Start: 2019-10-11 | End: 2019-10-12 | Stop reason: HOSPADM

## 2019-10-11 RX ORDER — ASCORBIC ACID 500 MG
1000 TABLET ORAL DAILY
Status: DISCONTINUED | OUTPATIENT
Start: 2019-10-11 | End: 2019-10-12 | Stop reason: HOSPADM

## 2019-10-11 RX ORDER — MECLIZINE HYDROCHLORIDE 25 MG/1
25 TABLET ORAL 4 TIMES DAILY PRN
Status: DISCONTINUED | OUTPATIENT
Start: 2019-10-11 | End: 2019-10-12 | Stop reason: HOSPADM

## 2019-10-11 RX ORDER — COLCHICINE 0.6 MG/1
0.6 TABLET ORAL PRN
Status: DISCONTINUED | OUTPATIENT
Start: 2019-10-11 | End: 2019-10-12 | Stop reason: HOSPADM

## 2019-10-11 RX ADMIN — SENNOSIDES AND DOCUSATE SODIUM 2 TABLET: 8.6; 5 TABLET ORAL at 17:21

## 2019-10-11 RX ADMIN — FINASTERIDE 5 MG: 5 TABLET, FILM COATED ORAL at 17:22

## 2019-10-11 RX ADMIN — TAMSULOSIN HYDROCHLORIDE 0.8 MG: 0.4 CAPSULE ORAL at 17:21

## 2019-10-11 RX ADMIN — Medication 400 MCG: at 17:22

## 2019-10-11 RX ADMIN — ISOSORBIDE MONONITRATE 60 MG: 60 TABLET, EXTENDED RELEASE ORAL at 17:22

## 2019-10-11 RX ADMIN — OXYCODONE HYDROCHLORIDE AND ACETAMINOPHEN 1000 MG: 500 TABLET ORAL at 17:22

## 2019-10-11 RX ADMIN — VITAMIN C 1 TABLET: TAB at 17:22

## 2019-10-11 RX ADMIN — HYDRALAZINE HYDROCHLORIDE 50 MG: 50 TABLET, FILM COATED ORAL at 17:21

## 2019-10-11 RX ADMIN — ENOXAPARIN SODIUM 40 MG: 40 INJECTION SUBCUTANEOUS at 17:23

## 2019-10-11 RX ADMIN — METOPROLOL SUCCINATE 25 MG: 25 TABLET, EXTENDED RELEASE ORAL at 17:22

## 2019-10-11 RX ADMIN — VITAMIN D, TAB 1000IU (100/BT) 1000 UNITS: 25 TAB at 17:21

## 2019-10-11 RX ADMIN — ALLOPURINOL 600 MG: 300 TABLET ORAL at 17:21

## 2019-10-11 RX ADMIN — ASPIRIN 81 MG 324 MG: 81 TABLET ORAL at 11:15

## 2019-10-11 RX ADMIN — Medication 100 MG: at 17:22

## 2019-10-11 RX ADMIN — BUMETANIDE 1 MG: 1 TABLET ORAL at 17:21

## 2019-10-11 ASSESSMENT — PAIN SCALES - GENERAL: PAINLEVEL_OUTOF10: 10

## 2019-10-12 ENCOUNTER — APPOINTMENT (OUTPATIENT)
Dept: NUCLEAR MEDICINE | Age: 84
End: 2019-10-12
Payer: COMMERCIAL

## 2019-10-12 VITALS
TEMPERATURE: 97 F | WEIGHT: 189.8 LBS | OXYGEN SATURATION: 98 % | HEIGHT: 68 IN | BODY MASS INDEX: 28.76 KG/M2 | SYSTOLIC BLOOD PRESSURE: 99 MMHG | HEART RATE: 52 BPM | DIASTOLIC BLOOD PRESSURE: 49 MMHG | RESPIRATION RATE: 16 BRPM

## 2019-10-12 LAB
GLUCOSE BLD-MCNC: 105 MG/DL (ref 70–99)
GLUCOSE BLD-MCNC: 138 MG/DL (ref 70–99)
GLUCOSE BLD-MCNC: 195 MG/DL (ref 70–99)
HCT VFR BLD CALC: 29.7 % (ref 42–52)
HEMOGLOBIN: 9.6 G/DL (ref 14–18)
MCH RBC QN AUTO: 33.2 PG (ref 27–31)
MCHC RBC AUTO-ENTMCNC: 32.3 G/DL (ref 33–37)
MCV RBC AUTO: 102.8 FL (ref 80–94)
PDW BLD-RTO: 13.9 % (ref 11.5–14.5)
PERFORMED ON: ABNORMAL
PLATELET # BLD: 211 K/UL (ref 130–400)
PMV BLD AUTO: 10.6 FL (ref 9.4–12.4)
RBC # BLD: 2.89 M/UL (ref 4.7–6.1)
WBC # BLD: 5.3 K/UL (ref 4.8–10.8)

## 2019-10-12 PROCEDURE — 96374 THER/PROPH/DIAG INJ IV PUSH: CPT

## 2019-10-12 PROCEDURE — 78452 HT MUSCLE IMAGE SPECT MULT: CPT

## 2019-10-12 PROCEDURE — 93017 CV STRESS TEST TRACING ONLY: CPT

## 2019-10-12 PROCEDURE — 82948 REAGENT STRIP/BLOOD GLUCOSE: CPT

## 2019-10-12 PROCEDURE — 6360000002 HC RX W HCPCS: Performed by: INTERNAL MEDICINE

## 2019-10-12 PROCEDURE — 2580000003 HC RX 258: Performed by: HOSPITALIST

## 2019-10-12 PROCEDURE — 6370000000 HC RX 637 (ALT 250 FOR IP): Performed by: HOSPITALIST

## 2019-10-12 PROCEDURE — 85027 COMPLETE CBC AUTOMATED: CPT

## 2019-10-12 PROCEDURE — 99214 OFFICE O/P EST MOD 30 MIN: CPT | Performed by: INTERNAL MEDICINE

## 2019-10-12 PROCEDURE — 3430000000 HC RX DIAGNOSTIC RADIOPHARMACEUTICAL: Performed by: INTERNAL MEDICINE

## 2019-10-12 PROCEDURE — G0378 HOSPITAL OBSERVATION PER HR: HCPCS

## 2019-10-12 PROCEDURE — 36415 COLL VENOUS BLD VENIPUNCTURE: CPT

## 2019-10-12 PROCEDURE — A9500 TC99M SESTAMIBI: HCPCS | Performed by: INTERNAL MEDICINE

## 2019-10-12 PROCEDURE — 93005 ELECTROCARDIOGRAM TRACING: CPT | Performed by: EMERGENCY MEDICINE

## 2019-10-12 RX ORDER — ISOSORBIDE MONONITRATE 60 MG/1
120 TABLET, EXTENDED RELEASE ORAL DAILY
Qty: 30 TABLET | Refills: 5 | Status: SHIPPED | OUTPATIENT
Start: 2019-10-12

## 2019-10-12 RX ORDER — MORPHINE SULFATE 4 MG/ML
2 INJECTION, SOLUTION INTRAMUSCULAR; INTRAVENOUS ONCE
Status: COMPLETED | OUTPATIENT
Start: 2019-10-12 | End: 2019-10-12

## 2019-10-12 RX ORDER — DEXTROSE MONOHYDRATE 25 G/50ML
12.5 INJECTION, SOLUTION INTRAVENOUS PRN
Status: DISCONTINUED | OUTPATIENT
Start: 2019-10-12 | End: 2019-10-12 | Stop reason: HOSPADM

## 2019-10-12 RX ORDER — DEXTROSE MONOHYDRATE 50 MG/ML
100 INJECTION, SOLUTION INTRAVENOUS PRN
Status: DISCONTINUED | OUTPATIENT
Start: 2019-10-12 | End: 2019-10-12 | Stop reason: HOSPADM

## 2019-10-12 RX ORDER — NICOTINE POLACRILEX 4 MG
15 LOZENGE BUCCAL PRN
Status: DISCONTINUED | OUTPATIENT
Start: 2019-10-12 | End: 2019-10-12 | Stop reason: HOSPADM

## 2019-10-12 RX ADMIN — VITAMIN C 1 TABLET: TAB at 09:26

## 2019-10-12 RX ADMIN — AMLODIPINE BESYLATE 10 MG: 10 TABLET ORAL at 09:27

## 2019-10-12 RX ADMIN — SENNOSIDES AND DOCUSATE SODIUM 2 TABLET: 8.6; 5 TABLET ORAL at 09:27

## 2019-10-12 RX ADMIN — PRAVASTATIN SODIUM 40 MG: 20 TABLET ORAL at 09:26

## 2019-10-12 RX ADMIN — POTASSIUM CHLORIDE 10 MEQ: 750 TABLET, EXTENDED RELEASE ORAL at 09:26

## 2019-10-12 RX ADMIN — TAMSULOSIN HYDROCHLORIDE 0.8 MG: 0.4 CAPSULE ORAL at 09:27

## 2019-10-12 RX ADMIN — BUMETANIDE 1 MG: 1 TABLET ORAL at 09:27

## 2019-10-12 RX ADMIN — DOCUSATE SODIUM 100 MG: 100 CAPSULE, LIQUID FILLED ORAL at 09:27

## 2019-10-12 RX ADMIN — OXYCODONE HYDROCHLORIDE AND ACETAMINOPHEN 1000 MG: 500 TABLET ORAL at 09:25

## 2019-10-12 RX ADMIN — METOPROLOL SUCCINATE 25 MG: 25 TABLET, EXTENDED RELEASE ORAL at 09:27

## 2019-10-12 RX ADMIN — TETRAKIS(2-METHOXYISOBUTYLISOCYANIDE)COPPER(I) TETRAFLUOROBORATE 10 MILLICURIE: 1 INJECTION, POWDER, LYOPHILIZED, FOR SOLUTION INTRAVENOUS at 11:22

## 2019-10-12 RX ADMIN — GABAPENTIN 100 MG: 100 CAPSULE ORAL at 09:28

## 2019-10-12 RX ADMIN — ASPIRIN 81 MG 81 MG: 81 TABLET ORAL at 09:27

## 2019-10-12 RX ADMIN — PANTOPRAZOLE SODIUM 40 MG: 40 TABLET, DELAYED RELEASE ORAL at 09:27

## 2019-10-12 RX ADMIN — REGADENOSON 0.4 MG: 0.08 INJECTION, SOLUTION INTRAVENOUS at 10:30

## 2019-10-12 RX ADMIN — ISOSORBIDE MONONITRATE 60 MG: 60 TABLET, EXTENDED RELEASE ORAL at 09:27

## 2019-10-12 RX ADMIN — TETRAKIS(2-METHOXYISOBUTYLISOCYANIDE)COPPER(I) TETRAFLUOROBORATE 30 MILLICURIE: 1 INJECTION, POWDER, LYOPHILIZED, FOR SOLUTION INTRAVENOUS at 11:23

## 2019-10-12 RX ADMIN — MORPHINE SULFATE 2 MG: 4 INJECTION INTRAVENOUS at 00:23

## 2019-10-12 RX ADMIN — Medication 10 ML: at 09:28

## 2019-10-12 RX ADMIN — Medication 400 MCG: at 09:27

## 2019-10-12 RX ADMIN — CLONIDINE HYDROCHLORIDE 0.1 MG: 0.1 TABLET ORAL at 09:27

## 2019-10-12 RX ADMIN — LOSARTAN POTASSIUM 50 MG: 50 TABLET ORAL at 09:27

## 2019-10-12 RX ADMIN — INSULIN LISPRO 2 UNITS: 100 INJECTION, SOLUTION INTRAVENOUS; SUBCUTANEOUS at 16:18

## 2019-10-12 RX ADMIN — VITAMIN D, TAB 1000IU (100/BT) 1000 UNITS: 25 TAB at 09:27

## 2019-10-12 RX ADMIN — GLIMEPIRIDE 4 MG: 4 TABLET ORAL at 09:27

## 2019-10-12 RX ADMIN — FINASTERIDE 5 MG: 5 TABLET, FILM COATED ORAL at 09:27

## 2019-10-12 RX ADMIN — ALLOPURINOL 600 MG: 300 TABLET ORAL at 09:27

## 2019-10-12 RX ADMIN — Medication 100 MG: at 09:27

## 2019-10-12 ASSESSMENT — PAIN SCALES - GENERAL: PAINLEVEL_OUTOF10: 5

## 2019-10-13 LAB
EKG P AXIS: 14 DEGREES
EKG P AXIS: 66 DEGREES
EKG P AXIS: 74 DEGREES
EKG P AXIS: 76 DEGREES
EKG P AXIS: NORMAL DEGREES
EKG P-R INTERVAL: 210 MS
EKG P-R INTERVAL: 214 MS
EKG P-R INTERVAL: 216 MS
EKG P-R INTERVAL: 236 MS
EKG P-R INTERVAL: NORMAL MS
EKG Q-T INTERVAL: 434 MS
EKG Q-T INTERVAL: 438 MS
EKG Q-T INTERVAL: 438 MS
EKG Q-T INTERVAL: 446 MS
EKG Q-T INTERVAL: 462 MS
EKG QRS DURATION: 100 MS
EKG QRS DURATION: 100 MS
EKG QRS DURATION: 102 MS
EKG QRS DURATION: 104 MS
EKG QRS DURATION: 90 MS
EKG QTC CALCULATION (BAZETT): 438 MS
EKG QTC CALCULATION (BAZETT): 439 MS
EKG QTC CALCULATION (BAZETT): 439 MS
EKG QTC CALCULATION (BAZETT): 441 MS
EKG QTC CALCULATION (BAZETT): 457 MS
EKG T AXIS: 108 DEGREES
EKG T AXIS: 57 DEGREES
EKG T AXIS: 76 DEGREES
EKG T AXIS: 77 DEGREES
EKG T AXIS: 79 DEGREES

## 2019-10-13 PROCEDURE — 93010 ELECTROCARDIOGRAM REPORT: CPT | Performed by: INTERNAL MEDICINE

## 2019-10-14 LAB
LV EF: 50 %
LVEF MODALITY: NORMAL

## 2019-10-15 ENCOUNTER — TELEPHONE (OUTPATIENT)
Dept: CARDIOLOGY | Age: 84
End: 2019-10-15

## 2019-10-18 ENCOUNTER — OFFICE VISIT (OUTPATIENT)
Dept: CARDIOLOGY | Age: 84
End: 2019-10-18
Payer: COMMERCIAL

## 2019-10-18 VITALS
BODY MASS INDEX: 28.95 KG/M2 | HEART RATE: 60 BPM | DIASTOLIC BLOOD PRESSURE: 72 MMHG | WEIGHT: 191 LBS | HEIGHT: 68 IN | SYSTOLIC BLOOD PRESSURE: 134 MMHG

## 2019-10-18 DIAGNOSIS — I25.10 CORONARY ARTERY DISEASE INVOLVING NATIVE CORONARY ARTERY OF NATIVE HEART WITHOUT ANGINA PECTORIS: Primary | ICD-10-CM

## 2019-10-18 DIAGNOSIS — R19.7 DIARRHEA, UNSPECIFIED TYPE: ICD-10-CM

## 2019-10-18 DIAGNOSIS — I50.43 CHF (CONGESTIVE HEART FAILURE), NYHA CLASS I, ACUTE ON CHRONIC, COMBINED (HCC): Chronic | ICD-10-CM

## 2019-10-18 DIAGNOSIS — I38 VALVULAR HEART DISEASE: ICD-10-CM

## 2019-10-18 DIAGNOSIS — I25.5 ISCHEMIC CARDIOMYOPATHY: ICD-10-CM

## 2019-10-18 DIAGNOSIS — I10 ESSENTIAL HYPERTENSION: ICD-10-CM

## 2019-10-18 PROCEDURE — 99214 OFFICE O/P EST MOD 30 MIN: CPT | Performed by: CLINICAL NURSE SPECIALIST

## 2019-10-18 PROCEDURE — G8484 FLU IMMUNIZE NO ADMIN: HCPCS | Performed by: CLINICAL NURSE SPECIALIST

## 2019-10-18 PROCEDURE — G8428 CUR MEDS NOT DOCUMENT: HCPCS | Performed by: CLINICAL NURSE SPECIALIST

## 2019-10-18 PROCEDURE — 1123F ACP DISCUSS/DSCN MKR DOCD: CPT | Performed by: CLINICAL NURSE SPECIALIST

## 2019-10-18 PROCEDURE — 1111F DSCHRG MED/CURRENT MED MERGE: CPT | Performed by: CLINICAL NURSE SPECIALIST

## 2019-10-18 PROCEDURE — G8598 ASA/ANTIPLAT THER USED: HCPCS | Performed by: CLINICAL NURSE SPECIALIST

## 2019-10-18 PROCEDURE — 4040F PNEUMOC VAC/ADMIN/RCVD: CPT | Performed by: CLINICAL NURSE SPECIALIST

## 2019-10-18 PROCEDURE — 1036F TOBACCO NON-USER: CPT | Performed by: CLINICAL NURSE SPECIALIST

## 2019-10-18 PROCEDURE — G8417 CALC BMI ABV UP PARAM F/U: HCPCS | Performed by: CLINICAL NURSE SPECIALIST

## 2019-10-18 ASSESSMENT — ENCOUNTER SYMPTOMS
FACIAL SWELLING: 0
COUGH: 0
WHEEZING: 0
ABDOMINAL PAIN: 0
DIARRHEA: 1
EYE REDNESS: 0
NAUSEA: 0
SHORTNESS OF BREATH: 1
VOMITING: 0
CHEST TIGHTNESS: 0

## 2019-11-02 PROBLEM — R77.8 ELEVATED TROPONIN: Status: RESOLVED | Noted: 2019-10-03 | Resolved: 2019-11-02

## 2019-11-02 PROBLEM — R79.89 ELEVATED TROPONIN: Status: RESOLVED | Noted: 2019-10-03 | Resolved: 2019-11-02

## 2019-11-28 ENCOUNTER — HOSPITAL ENCOUNTER (OUTPATIENT)
Age: 84
Setting detail: OBSERVATION
Discharge: HOME | End: 2019-12-04
Attending: EMERGENCY MEDICINE | Admitting: FAMILY MEDICINE
Payer: COMMERCIAL

## 2019-11-28 ENCOUNTER — APPOINTMENT (OUTPATIENT)
Dept: CT IMAGING | Age: 84
End: 2019-11-28
Payer: COMMERCIAL

## 2019-11-28 ENCOUNTER — APPOINTMENT (OUTPATIENT)
Dept: GENERAL RADIOLOGY | Age: 84
End: 2019-11-28
Payer: COMMERCIAL

## 2019-11-28 DIAGNOSIS — R26.2 AMBULATORY DYSFUNCTION: ICD-10-CM

## 2019-11-28 DIAGNOSIS — N17.9 AKI (ACUTE KIDNEY INJURY) (HCC): Primary | ICD-10-CM
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
## 2019-11-28 DIAGNOSIS — S09.90XA CLOSED HEAD INJURY, INITIAL ENCOUNTER: ICD-10-CM

## 2019-11-28 DIAGNOSIS — R53.1 GENERALIZED WEAKNESS: ICD-10-CM

## 2019-11-28 DIAGNOSIS — N28.9 ACUTE ON CHRONIC RENAL INSUFFICIENCY: ICD-10-CM

## 2019-11-28 DIAGNOSIS — S01.81XA FOREHEAD LACERATION, INITIAL ENCOUNTER: ICD-10-CM

## 2019-11-28 DIAGNOSIS — N18.9 ACUTE ON CHRONIC RENAL INSUFFICIENCY: ICD-10-CM

## 2019-11-28 LAB
ALBUMIN SERPL-MCNC: 4 G/DL (ref 3.5–5.2)
ALP BLD-CCNC: 93 U/L (ref 40–130)
ALT SERPL-CCNC: 21 U/L (ref 5–41)
ANION GAP SERPL CALCULATED.3IONS-SCNC: 14 MMOL/L (ref 7–19)
APTT: 27.5 SEC (ref 26–36.2)
AST SERPL-CCNC: 22 U/L (ref 5–40)
BASOPHILS ABSOLUTE: 0 K/UL (ref 0–0.2)
BASOPHILS RELATIVE PERCENT: 0.4 % (ref 0–1)
BILIRUB SERPL-MCNC: <0.2 MG/DL (ref 0.2–1.2)
BILIRUBIN URINE: NEGATIVE
BLOOD, URINE: NEGATIVE
BUN BLDV-MCNC: 68 MG/DL (ref 8–23)
CALCIUM SERPL-MCNC: 9.4 MG/DL (ref 8.8–10.2)
CHLORIDE BLD-SCNC: 104 MMOL/L (ref 98–111)
CLARITY: CLEAR
CO2: 23 MMOL/L (ref 22–29)
COLOR: YELLOW
CREAT SERPL-MCNC: 2 MG/DL (ref 0.5–1.2)
EOSINOPHILS ABSOLUTE: 0.2 K/UL (ref 0–0.6)
EOSINOPHILS RELATIVE PERCENT: 3 % (ref 0–5)
GFR NON-AFRICAN AMERICAN: 32
GLUCOSE BLD-MCNC: 128 MG/DL (ref 74–109)
GLUCOSE BLD-MCNC: 153 MG/DL (ref 70–99)
GLUCOSE URINE: NEGATIVE MG/DL
HCT VFR BLD CALC: 32.5 % (ref 42–52)
HEMOGLOBIN: 10.7 G/DL (ref 14–18)
IMMATURE GRANULOCYTES #: 0 K/UL
INR BLD: 1.02 (ref 0.88–1.18)
KETONES, URINE: NEGATIVE MG/DL
LEUKOCYTE ESTERASE, URINE: NEGATIVE
LYMPHOCYTES ABSOLUTE: 1.4 K/UL (ref 1.1–4.5)
LYMPHOCYTES RELATIVE PERCENT: 25.5 % (ref 20–40)
MCH RBC QN AUTO: 32.9 PG (ref 27–31)
MCHC RBC AUTO-ENTMCNC: 32.9 G/DL (ref 33–37)
MCV RBC AUTO: 100 FL (ref 80–94)
MONOCYTES ABSOLUTE: 0.5 K/UL (ref 0–0.9)
MONOCYTES RELATIVE PERCENT: 9 % (ref 0–10)
NEUTROPHILS ABSOLUTE: 3.4 K/UL (ref 1.5–7.5)
NEUTROPHILS RELATIVE PERCENT: 61.9 % (ref 50–65)
NITRITE, URINE: NEGATIVE
PDW BLD-RTO: 13.2 % (ref 11.5–14.5)
PERFORMED ON: ABNORMAL
PH UA: 5 (ref 5–8)
PLATELET # BLD: 211 K/UL (ref 130–400)
PMV BLD AUTO: 9.9 FL (ref 9.4–12.4)
POTASSIUM REFLEX MAGNESIUM: 4.5 MMOL/L (ref 3.5–5)
PROTEIN UA: NEGATIVE MG/DL
PROTHROMBIN TIME: 12.8 SEC (ref 12–14.6)
RBC # BLD: 3.25 M/UL (ref 4.7–6.1)
SODIUM BLD-SCNC: 141 MMOL/L (ref 136–145)
SPECIFIC GRAVITY UA: 1.01 (ref 1–1.03)
TOTAL PROTEIN: 7 G/DL (ref 6.6–8.7)
URINE REFLEX TO CULTURE: NORMAL
UROBILINOGEN, URINE: 0.2 E.U./DL
WBC # BLD: 5.4 K/UL (ref 4.8–10.8)

## 2019-11-28 PROCEDURE — G0378 HOSPITAL OBSERVATION PER HR: HCPCS

## 2019-11-28 PROCEDURE — 99285 EMERGENCY DEPT VISIT HI MDM: CPT

## 2019-11-28 PROCEDURE — 85610 PROTHROMBIN TIME: CPT

## 2019-11-28 PROCEDURE — 70450 CT HEAD/BRAIN W/O DYE: CPT

## 2019-11-28 PROCEDURE — 2580000003 HC RX 258: Performed by: NURSE PRACTITIONER

## 2019-11-28 PROCEDURE — 6370000000 HC RX 637 (ALT 250 FOR IP): Performed by: NURSE PRACTITIONER

## 2019-11-28 PROCEDURE — 36415 COLL VENOUS BLD VENIPUNCTURE: CPT

## 2019-11-28 PROCEDURE — 81003 URINALYSIS AUTO W/O SCOPE: CPT

## 2019-11-28 PROCEDURE — 85025 COMPLETE CBC W/AUTO DIFF WBC: CPT

## 2019-11-28 PROCEDURE — 72125 CT NECK SPINE W/O DYE: CPT

## 2019-11-28 PROCEDURE — 90471 IMMUNIZATION ADMIN: CPT | Performed by: NURSE PRACTITIONER

## 2019-11-28 PROCEDURE — 85730 THROMBOPLASTIN TIME PARTIAL: CPT

## 2019-11-28 PROCEDURE — 73521 X-RAY EXAM HIPS BI 2 VIEWS: CPT

## 2019-11-28 PROCEDURE — 2580000003 HC RX 258: Performed by: FAMILY MEDICINE

## 2019-11-28 PROCEDURE — 93005 ELECTROCARDIOGRAM TRACING: CPT

## 2019-11-28 PROCEDURE — 6360000002 HC RX W HCPCS: Performed by: NURSE PRACTITIONER

## 2019-11-28 PROCEDURE — 90715 TDAP VACCINE 7 YRS/> IM: CPT | Performed by: NURSE PRACTITIONER

## 2019-11-28 PROCEDURE — 73030 X-RAY EXAM OF SHOULDER: CPT

## 2019-11-28 PROCEDURE — 96361 HYDRATE IV INFUSION ADD-ON: CPT

## 2019-11-28 PROCEDURE — 80053 COMPREHEN METABOLIC PANEL: CPT

## 2019-11-28 PROCEDURE — 6370000000 HC RX 637 (ALT 250 FOR IP): Performed by: FAMILY MEDICINE

## 2019-11-28 PROCEDURE — 71046 X-RAY EXAM CHEST 2 VIEWS: CPT

## 2019-11-28 RX ORDER — DEXTROSE MONOHYDRATE 50 MG/ML
100 INJECTION, SOLUTION INTRAVENOUS PRN
Status: DISCONTINUED | OUTPATIENT
Start: 2019-11-28 | End: 2019-12-04 | Stop reason: HOSPADM

## 2019-11-28 RX ORDER — 0.9 % SODIUM CHLORIDE 0.9 %
1000 INTRAVENOUS SOLUTION INTRAVENOUS ONCE
Status: DISCONTINUED | OUTPATIENT
Start: 2019-11-28 | End: 2019-11-28

## 2019-11-28 RX ORDER — ONDANSETRON 2 MG/ML
4 INJECTION INTRAMUSCULAR; INTRAVENOUS EVERY 8 HOURS PRN
Status: DISCONTINUED | OUTPATIENT
Start: 2019-11-28 | End: 2019-12-04 | Stop reason: HOSPADM

## 2019-11-28 RX ORDER — TAMSULOSIN HYDROCHLORIDE 0.4 MG/1
0.8 CAPSULE ORAL DAILY
Status: DISCONTINUED | OUTPATIENT
Start: 2019-11-29 | End: 2019-12-04 | Stop reason: HOSPADM

## 2019-11-28 RX ORDER — 0.9 % SODIUM CHLORIDE 0.9 %
500 INTRAVENOUS SOLUTION INTRAVENOUS ONCE
Status: COMPLETED | OUTPATIENT
Start: 2019-11-28 | End: 2019-11-28

## 2019-11-28 RX ORDER — LIDOCAINE HYDROCHLORIDE 10 MG/ML
10 INJECTION, SOLUTION EPIDURAL; INFILTRATION; INTRACAUDAL; PERINEURAL ONCE
Status: DISCONTINUED | OUTPATIENT
Start: 2019-11-28 | End: 2019-11-28

## 2019-11-28 RX ORDER — NICOTINE POLACRILEX 4 MG
15 LOZENGE BUCCAL PRN
Status: DISCONTINUED | OUTPATIENT
Start: 2019-11-28 | End: 2019-12-04 | Stop reason: HOSPADM

## 2019-11-28 RX ORDER — HYDRALAZINE HYDROCHLORIDE 50 MG/1
50 TABLET, FILM COATED ORAL EVERY 8 HOURS SCHEDULED
Status: DISCONTINUED | OUTPATIENT
Start: 2019-11-28 | End: 2019-12-02

## 2019-11-28 RX ORDER — ROPINIROLE 1 MG/1
1 TABLET, FILM COATED ORAL NIGHTLY
Status: DISCONTINUED | OUTPATIENT
Start: 2019-11-28 | End: 2019-12-04 | Stop reason: HOSPADM

## 2019-11-28 RX ORDER — PROMETHAZINE HYDROCHLORIDE 25 MG/1
12.5 TABLET ORAL PRN
Status: DISCONTINUED | OUTPATIENT
Start: 2019-11-28 | End: 2019-12-04 | Stop reason: HOSPADM

## 2019-11-28 RX ORDER — HYDROCODONE BITARTRATE AND ACETAMINOPHEN 5; 325 MG/1; MG/1
1 TABLET ORAL ONCE
Status: COMPLETED | OUTPATIENT
Start: 2019-11-28 | End: 2019-11-28

## 2019-11-28 RX ORDER — AMLODIPINE BESYLATE 10 MG/1
10 TABLET ORAL DAILY
Status: DISCONTINUED | OUTPATIENT
Start: 2019-11-29 | End: 2019-12-04 | Stop reason: HOSPADM

## 2019-11-28 RX ORDER — NITROGLYCERIN 0.4 MG/1
0.4 TABLET SUBLINGUAL EVERY 5 MIN PRN
Status: DISCONTINUED | OUTPATIENT
Start: 2019-11-28 | End: 2019-12-04 | Stop reason: HOSPADM

## 2019-11-28 RX ORDER — SODIUM CHLORIDE 0.9 % (FLUSH) 0.9 %
10 SYRINGE (ML) INJECTION PRN
Status: DISCONTINUED | OUTPATIENT
Start: 2019-11-28 | End: 2019-12-04 | Stop reason: HOSPADM

## 2019-11-28 RX ORDER — LIDOCAINE 4 G/G
1 PATCH TOPICAL DAILY
Status: DISCONTINUED | OUTPATIENT
Start: 2019-11-29 | End: 2019-12-04 | Stop reason: HOSPADM

## 2019-11-28 RX ORDER — LOSARTAN POTASSIUM 50 MG/1
50 TABLET ORAL 2 TIMES DAILY
Status: DISCONTINUED | OUTPATIENT
Start: 2019-11-28 | End: 2019-12-04 | Stop reason: HOSPADM

## 2019-11-28 RX ORDER — SODIUM CHLORIDE 0.9 % (FLUSH) 0.9 %
10 SYRINGE (ML) INJECTION EVERY 12 HOURS SCHEDULED
Status: DISCONTINUED | OUTPATIENT
Start: 2019-11-28 | End: 2019-12-04 | Stop reason: HOSPADM

## 2019-11-28 RX ORDER — FINASTERIDE 5 MG/1
5 TABLET, FILM COATED ORAL DAILY
Status: DISCONTINUED | OUTPATIENT
Start: 2019-11-29 | End: 2019-12-04 | Stop reason: HOSPADM

## 2019-11-28 RX ORDER — PRAVASTATIN SODIUM 20 MG
40 TABLET ORAL DAILY
Status: DISCONTINUED | OUTPATIENT
Start: 2019-11-29 | End: 2019-12-04 | Stop reason: HOSPADM

## 2019-11-28 RX ORDER — DEXTROSE MONOHYDRATE 25 G/50ML
12.5 INJECTION, SOLUTION INTRAVENOUS PRN
Status: DISCONTINUED | OUTPATIENT
Start: 2019-11-28 | End: 2019-12-04 | Stop reason: HOSPADM

## 2019-11-28 RX ORDER — GABAPENTIN 100 MG/1
100 CAPSULE ORAL 2 TIMES DAILY
Status: DISCONTINUED | OUTPATIENT
Start: 2019-11-28 | End: 2019-12-04 | Stop reason: HOSPADM

## 2019-11-28 RX ORDER — POTASSIUM CHLORIDE 750 MG/1
5 TABLET, EXTENDED RELEASE ORAL 2 TIMES DAILY
Status: DISCONTINUED | OUTPATIENT
Start: 2019-11-28 | End: 2019-12-03

## 2019-11-28 RX ORDER — HYDROCODONE BITARTRATE AND ACETAMINOPHEN 5; 325 MG/1; MG/1
2 TABLET ORAL EVERY 4 HOURS PRN
Status: DISCONTINUED | OUTPATIENT
Start: 2019-11-28 | End: 2019-12-04 | Stop reason: HOSPADM

## 2019-11-28 RX ORDER — SODIUM CHLORIDE 9 MG/ML
INJECTION, SOLUTION INTRAVENOUS CONTINUOUS
Status: DISCONTINUED | OUTPATIENT
Start: 2019-11-28 | End: 2019-12-01

## 2019-11-28 RX ORDER — ISOSORBIDE MONONITRATE 60 MG/1
120 TABLET, EXTENDED RELEASE ORAL DAILY
Status: DISCONTINUED | OUTPATIENT
Start: 2019-11-29 | End: 2019-12-03

## 2019-11-28 RX ORDER — HYDROCODONE BITARTRATE AND ACETAMINOPHEN 5; 325 MG/1; MG/1
1 TABLET ORAL EVERY 4 HOURS PRN
Status: DISCONTINUED | OUTPATIENT
Start: 2019-11-28 | End: 2019-12-04 | Stop reason: HOSPADM

## 2019-11-28 RX ORDER — MECLIZINE HYDROCHLORIDE 25 MG/1
25 TABLET ORAL 4 TIMES DAILY PRN
Status: DISCONTINUED | OUTPATIENT
Start: 2019-11-28 | End: 2019-12-04 | Stop reason: HOSPADM

## 2019-11-28 RX ORDER — CLONIDINE HYDROCHLORIDE 0.1 MG/1
0.1 TABLET ORAL 2 TIMES DAILY
Status: DISCONTINUED | OUTPATIENT
Start: 2019-11-28 | End: 2019-12-02

## 2019-11-28 RX ORDER — PREDNISOLONE ACETATE 10 MG/ML
1 SUSPENSION/ DROPS OPHTHALMIC 2 TIMES DAILY
Status: DISCONTINUED | OUTPATIENT
Start: 2019-11-28 | End: 2019-12-04 | Stop reason: HOSPADM

## 2019-11-28 RX ORDER — DIPHENOXYLATE HYDROCHLORIDE AND ATROPINE SULFATE 2.5; .025 MG/1; MG/1
1 TABLET ORAL 4 TIMES DAILY PRN
Status: DISCONTINUED | OUTPATIENT
Start: 2019-11-28 | End: 2019-12-04 | Stop reason: HOSPADM

## 2019-11-28 RX ORDER — ASPIRIN 81 MG/1
81 TABLET, CHEWABLE ORAL DAILY
Status: DISCONTINUED | OUTPATIENT
Start: 2019-11-29 | End: 2019-12-04 | Stop reason: HOSPADM

## 2019-11-28 RX ORDER — METOPROLOL SUCCINATE 25 MG/1
25 TABLET, EXTENDED RELEASE ORAL DAILY
Status: DISCONTINUED | OUTPATIENT
Start: 2019-11-29 | End: 2019-12-04 | Stop reason: HOSPADM

## 2019-11-28 RX ORDER — PANTOPRAZOLE SODIUM 40 MG/1
40 TABLET, DELAYED RELEASE ORAL 2 TIMES DAILY
Status: DISCONTINUED | OUTPATIENT
Start: 2019-11-28 | End: 2019-12-04 | Stop reason: HOSPADM

## 2019-11-28 RX ORDER — ACETAMINOPHEN 325 MG/1
650 TABLET ORAL EVERY 4 HOURS PRN
Status: DISCONTINUED | OUTPATIENT
Start: 2019-11-28 | End: 2019-12-04 | Stop reason: HOSPADM

## 2019-11-28 RX ORDER — ALLOPURINOL 300 MG/1
300 TABLET ORAL 2 TIMES DAILY
Status: DISCONTINUED | OUTPATIENT
Start: 2019-11-28 | End: 2019-12-04 | Stop reason: HOSPADM

## 2019-11-28 RX ADMIN — CLONIDINE HYDROCHLORIDE 0.1 MG: 0.1 TABLET ORAL at 23:42

## 2019-11-28 RX ADMIN — PREDNISOLONE ACETATE 1 DROP: 10 SUSPENSION/ DROPS OPHTHALMIC at 23:41

## 2019-11-28 RX ADMIN — GABAPENTIN 100 MG: 100 CAPSULE ORAL at 23:41

## 2019-11-28 RX ADMIN — HYDROCODONE BITARTRATE AND ACETAMINOPHEN 1 TABLET: 5; 325 TABLET ORAL at 19:51

## 2019-11-28 RX ADMIN — LOSARTAN POTASSIUM 50 MG: 50 TABLET, FILM COATED ORAL at 23:41

## 2019-11-28 RX ADMIN — PANTOPRAZOLE SODIUM 40 MG: 40 TABLET, DELAYED RELEASE ORAL at 23:41

## 2019-11-28 RX ADMIN — ALLOPURINOL 300 MG: 300 TABLET ORAL at 23:42

## 2019-11-28 RX ADMIN — ROPINIROLE HYDROCHLORIDE 1 MG: 1 TABLET, FILM COATED ORAL at 23:41

## 2019-11-28 RX ADMIN — MUPIROCIN: 20 OINTMENT TOPICAL at 23:41

## 2019-11-28 RX ADMIN — Medication 10 ML: at 23:41

## 2019-11-28 RX ADMIN — SODIUM CHLORIDE 500 ML: 9 INJECTION, SOLUTION INTRAVENOUS at 22:15

## 2019-11-28 RX ADMIN — TETANUS TOXOID, REDUCED DIPHTHERIA TOXOID AND ACELLULAR PERTUSSIS VACCINE, ADSORBED 0.5 ML: 5; 2.5; 8; 8; 2.5 SUSPENSION INTRAMUSCULAR at 19:50

## 2019-11-28 RX ADMIN — POTASSIUM CHLORIDE 5 MEQ: 750 TABLET, EXTENDED RELEASE ORAL at 23:41

## 2019-11-28 RX ADMIN — SODIUM CHLORIDE: 9 INJECTION, SOLUTION INTRAVENOUS at 23:49

## 2019-11-28 RX ADMIN — HYDRALAZINE HYDROCHLORIDE 50 MG: 50 TABLET, FILM COATED ORAL at 23:41

## 2019-11-28 ASSESSMENT — ENCOUNTER SYMPTOMS
STRIDOR: 0
CONSTIPATION: 0
ABDOMINAL PAIN: 0
DIARRHEA: 0
WHEEZING: 0
SHORTNESS OF BREATH: 0
SINUS PAIN: 0
EYE PAIN: 0
RECTAL PAIN: 0
COLOR CHANGE: 0
NAUSEA: 0
PHOTOPHOBIA: 0
BLOOD IN STOOL: 0
EYE REDNESS: 0
CHEST TIGHTNESS: 0
APNEA: 0
EYE DISCHARGE: 0
BACK PAIN: 0
SORE THROAT: 0
VOMITING: 0
ABDOMINAL DISTENTION: 0

## 2019-11-28 ASSESSMENT — PAIN SCALES - GENERAL
PAINLEVEL_OUTOF10: 10
PAINLEVEL_OUTOF10: 7

## 2019-11-29 LAB
ANION GAP SERPL CALCULATED.3IONS-SCNC: 11 MMOL/L (ref 7–19)
BASOPHILS ABSOLUTE: 0 K/UL (ref 0–0.2)
BASOPHILS RELATIVE PERCENT: 0.3 % (ref 0–1)
BUN BLDV-MCNC: 62 MG/DL (ref 8–23)
CALCIUM SERPL-MCNC: 9.3 MG/DL (ref 8.8–10.2)
CHLORIDE BLD-SCNC: 112 MMOL/L (ref 98–111)
CO2: 21 MMOL/L (ref 22–29)
CREAT SERPL-MCNC: 1.7 MG/DL (ref 0.5–1.2)
EOSINOPHILS ABSOLUTE: 0.2 K/UL (ref 0–0.6)
EOSINOPHILS RELATIVE PERCENT: 3.2 % (ref 0–5)
GFR NON-AFRICAN AMERICAN: 38
GLUCOSE BLD-MCNC: 112 MG/DL (ref 70–99)
GLUCOSE BLD-MCNC: 118 MG/DL (ref 70–99)
GLUCOSE BLD-MCNC: 119 MG/DL (ref 74–109)
GLUCOSE BLD-MCNC: 127 MG/DL (ref 70–99)
GLUCOSE BLD-MCNC: 156 MG/DL (ref 70–99)
GLUCOSE BLD-MCNC: 49 MG/DL (ref 70–99)
HCT VFR BLD CALC: 27.7 % (ref 42–52)
HEMOGLOBIN: 9.2 G/DL (ref 14–18)
IMMATURE GRANULOCYTES #: 0 K/UL
LYMPHOCYTES ABSOLUTE: 1.4 K/UL (ref 1.1–4.5)
LYMPHOCYTES RELATIVE PERCENT: 23.4 % (ref 20–40)
MCH RBC QN AUTO: 33.3 PG (ref 27–31)
MCHC RBC AUTO-ENTMCNC: 33.2 G/DL (ref 33–37)
MCV RBC AUTO: 100.4 FL (ref 80–94)
MONOCYTES ABSOLUTE: 0.6 K/UL (ref 0–0.9)
MONOCYTES RELATIVE PERCENT: 10.6 % (ref 0–10)
NEUTROPHILS ABSOLUTE: 3.7 K/UL (ref 1.5–7.5)
NEUTROPHILS RELATIVE PERCENT: 62.3 % (ref 50–65)
PDW BLD-RTO: 13.4 % (ref 11.5–14.5)
PERFORMED ON: ABNORMAL
PLATELET # BLD: 180 K/UL (ref 130–400)
PMV BLD AUTO: 10.6 FL (ref 9.4–12.4)
POTASSIUM SERPL-SCNC: 4.4 MMOL/L (ref 3.5–5)
RBC # BLD: 2.76 M/UL (ref 4.7–6.1)
SODIUM BLD-SCNC: 144 MMOL/L (ref 136–145)
WBC # BLD: 5.9 K/UL (ref 4.8–10.8)

## 2019-11-29 PROCEDURE — 6370000000 HC RX 637 (ALT 250 FOR IP): Performed by: FAMILY MEDICINE

## 2019-11-29 PROCEDURE — 80048 BASIC METABOLIC PNL TOTAL CA: CPT

## 2019-11-29 PROCEDURE — 36415 COLL VENOUS BLD VENIPUNCTURE: CPT

## 2019-11-29 PROCEDURE — 6360000002 HC RX W HCPCS: Performed by: FAMILY MEDICINE

## 2019-11-29 PROCEDURE — 2580000003 HC RX 258: Performed by: FAMILY MEDICINE

## 2019-11-29 PROCEDURE — 85025 COMPLETE CBC W/AUTO DIFF WBC: CPT

## 2019-11-29 PROCEDURE — 82948 REAGENT STRIP/BLOOD GLUCOSE: CPT

## 2019-11-29 PROCEDURE — 96372 THER/PROPH/DIAG INJ SC/IM: CPT

## 2019-11-29 PROCEDURE — G0378 HOSPITAL OBSERVATION PER HR: HCPCS

## 2019-11-29 RX ADMIN — MUPIROCIN: 20 OINTMENT TOPICAL at 21:15

## 2019-11-29 RX ADMIN — POTASSIUM CHLORIDE 5 MEQ: 750 TABLET, EXTENDED RELEASE ORAL at 09:23

## 2019-11-29 RX ADMIN — ALLOPURINOL 300 MG: 300 TABLET ORAL at 09:26

## 2019-11-29 RX ADMIN — ENOXAPARIN SODIUM 40 MG: 40 INJECTION SUBCUTANEOUS at 09:20

## 2019-11-29 RX ADMIN — Medication 10 ML: at 20:00

## 2019-11-29 RX ADMIN — Medication 10 ML: at 09:26

## 2019-11-29 RX ADMIN — PREDNISOLONE ACETATE 1 DROP: 10 SUSPENSION/ DROPS OPHTHALMIC at 20:00

## 2019-11-29 RX ADMIN — HYDRALAZINE HYDROCHLORIDE 50 MG: 50 TABLET, FILM COATED ORAL at 13:49

## 2019-11-29 RX ADMIN — POTASSIUM CHLORIDE 5 MEQ: 750 TABLET, EXTENDED RELEASE ORAL at 19:59

## 2019-11-29 RX ADMIN — PANTOPRAZOLE SODIUM 40 MG: 40 TABLET, DELAYED RELEASE ORAL at 09:25

## 2019-11-29 RX ADMIN — FINASTERIDE 5 MG: 5 TABLET, FILM COATED ORAL at 09:24

## 2019-11-29 RX ADMIN — ACETAMINOPHEN 650 MG: 325 TABLET ORAL at 20:34

## 2019-11-29 RX ADMIN — HYDRALAZINE HYDROCHLORIDE 50 MG: 50 TABLET, FILM COATED ORAL at 05:34

## 2019-11-29 RX ADMIN — GABAPENTIN 100 MG: 100 CAPSULE ORAL at 09:26

## 2019-11-29 RX ADMIN — PANTOPRAZOLE SODIUM 40 MG: 40 TABLET, DELAYED RELEASE ORAL at 20:00

## 2019-11-29 RX ADMIN — METOPROLOL SUCCINATE 25 MG: 25 TABLET, EXTENDED RELEASE ORAL at 09:25

## 2019-11-29 RX ADMIN — INSULIN LISPRO 2 UNITS: 100 INJECTION, SOLUTION INTRAVENOUS; SUBCUTANEOUS at 17:00

## 2019-11-29 RX ADMIN — AMLODIPINE BESYLATE 10 MG: 10 TABLET ORAL at 09:23

## 2019-11-29 RX ADMIN — TAMSULOSIN HYDROCHLORIDE 0.8 MG: 0.4 CAPSULE ORAL at 09:22

## 2019-11-29 RX ADMIN — ROPINIROLE HYDROCHLORIDE 1 MG: 1 TABLET, FILM COATED ORAL at 19:59

## 2019-11-29 RX ADMIN — GABAPENTIN 100 MG: 100 CAPSULE ORAL at 20:00

## 2019-11-29 RX ADMIN — ISOSORBIDE MONONITRATE 120 MG: 60 TABLET, EXTENDED RELEASE ORAL at 09:21

## 2019-11-29 RX ADMIN — LOSARTAN POTASSIUM 50 MG: 50 TABLET, FILM COATED ORAL at 09:25

## 2019-11-29 RX ADMIN — MUPIROCIN: 20 OINTMENT TOPICAL at 09:20

## 2019-11-29 RX ADMIN — ALLOPURINOL 300 MG: 300 TABLET ORAL at 20:33

## 2019-11-29 RX ADMIN — PREDNISOLONE ACETATE 1 DROP: 10 SUSPENSION/ DROPS OPHTHALMIC at 09:20

## 2019-11-29 RX ADMIN — PRAVASTATIN SODIUM 40 MG: 20 TABLET ORAL at 09:24

## 2019-11-29 RX ADMIN — MECLIZINE HYDROCHLORIDE 25 MG: 25 TABLET ORAL at 09:22

## 2019-11-29 RX ADMIN — CLONIDINE HYDROCHLORIDE 0.1 MG: 0.1 TABLET ORAL at 09:25

## 2019-11-29 RX ADMIN — ASPIRIN 81 MG 81 MG: 81 TABLET ORAL at 09:25

## 2019-11-29 ASSESSMENT — PAIN SCALES - GENERAL
PAINLEVEL_OUTOF10: 8
PAINLEVEL_OUTOF10: 3

## 2019-11-30 LAB
ANION GAP SERPL CALCULATED.3IONS-SCNC: 10 MMOL/L (ref 7–19)
BASOPHILS ABSOLUTE: 0 K/UL (ref 0–0.2)
BASOPHILS RELATIVE PERCENT: 0.4 % (ref 0–1)
BUN BLDV-MCNC: 46 MG/DL (ref 8–23)
CALCIUM SERPL-MCNC: 8.8 MG/DL (ref 8.8–10.2)
CHLORIDE BLD-SCNC: 110 MMOL/L (ref 98–111)
CO2: 20 MMOL/L (ref 22–29)
CREAT SERPL-MCNC: 1.3 MG/DL (ref 0.5–1.2)
EOSINOPHILS ABSOLUTE: 0.2 K/UL (ref 0–0.6)
EOSINOPHILS RELATIVE PERCENT: 4.7 % (ref 0–5)
FERRITIN: 311 NG/ML (ref 30–400)
GFR NON-AFRICAN AMERICAN: 52
GLUCOSE BLD-MCNC: 121 MG/DL (ref 74–109)
GLUCOSE BLD-MCNC: 125 MG/DL (ref 70–99)
GLUCOSE BLD-MCNC: 132 MG/DL (ref 70–99)
GLUCOSE BLD-MCNC: 146 MG/DL (ref 70–99)
GLUCOSE BLD-MCNC: 152 MG/DL (ref 70–99)
GLUCOSE BLD-MCNC: 74 MG/DL (ref 70–99)
HCT VFR BLD CALC: 28 % (ref 42–52)
HEMOGLOBIN: 9.1 G/DL (ref 14–18)
IMMATURE GRANULOCYTES #: 0 K/UL
IRON SATURATION: 24 % (ref 14–50)
IRON: 45 UG/DL (ref 59–158)
LYMPHOCYTES ABSOLUTE: 1.4 K/UL (ref 1.1–4.5)
LYMPHOCYTES RELATIVE PERCENT: 29.1 % (ref 20–40)
MCH RBC QN AUTO: 32.7 PG (ref 27–31)
MCHC RBC AUTO-ENTMCNC: 32.5 G/DL (ref 33–37)
MCV RBC AUTO: 100.7 FL (ref 80–94)
MONOCYTES ABSOLUTE: 0.6 K/UL (ref 0–0.9)
MONOCYTES RELATIVE PERCENT: 12.2 % (ref 0–10)
NEUTROPHILS ABSOLUTE: 2.5 K/UL (ref 1.5–7.5)
NEUTROPHILS RELATIVE PERCENT: 53.4 % (ref 50–65)
OCCULT BLOOD QC: NORMAL
OCCULT BLOOD SCREENING: NORMAL
PDW BLD-RTO: 13.2 % (ref 11.5–14.5)
PERFORMED ON: ABNORMAL
PERFORMED ON: NORMAL
PLATELET # BLD: 175 K/UL (ref 130–400)
PMV BLD AUTO: 10.4 FL (ref 9.4–12.4)
POTASSIUM SERPL-SCNC: 4.8 MMOL/L (ref 3.5–5)
RBC # BLD: 2.78 M/UL (ref 4.7–6.1)
SODIUM BLD-SCNC: 140 MMOL/L (ref 136–145)
TOTAL IRON BINDING CAPACITY: 189 UG/DL (ref 250–400)
TSH REFLEX FT4: 3.21 UIU/ML (ref 0.35–5.5)
WBC # BLD: 4.7 K/UL (ref 4.8–10.8)

## 2019-11-30 PROCEDURE — 36415 COLL VENOUS BLD VENIPUNCTURE: CPT

## 2019-11-30 PROCEDURE — 85025 COMPLETE CBC W/AUTO DIFF WBC: CPT

## 2019-11-30 PROCEDURE — 82668 ASSAY OF ERYTHROPOIETIN: CPT

## 2019-11-30 PROCEDURE — 82948 REAGENT STRIP/BLOOD GLUCOSE: CPT

## 2019-11-30 PROCEDURE — G0328 FECAL BLOOD SCRN IMMUNOASSAY: HCPCS

## 2019-11-30 PROCEDURE — 97161 PT EVAL LOW COMPLEX 20 MIN: CPT

## 2019-11-30 PROCEDURE — G0378 HOSPITAL OBSERVATION PER HR: HCPCS

## 2019-11-30 PROCEDURE — 2580000003 HC RX 258: Performed by: FAMILY MEDICINE

## 2019-11-30 PROCEDURE — 84443 ASSAY THYROID STIM HORMONE: CPT

## 2019-11-30 PROCEDURE — 83540 ASSAY OF IRON: CPT

## 2019-11-30 PROCEDURE — 80048 BASIC METABOLIC PNL TOTAL CA: CPT

## 2019-11-30 PROCEDURE — 83550 IRON BINDING TEST: CPT

## 2019-11-30 PROCEDURE — 6360000002 HC RX W HCPCS: Performed by: FAMILY MEDICINE

## 2019-11-30 PROCEDURE — 97116 GAIT TRAINING THERAPY: CPT

## 2019-11-30 PROCEDURE — 96374 THER/PROPH/DIAG INJ IV PUSH: CPT

## 2019-11-30 PROCEDURE — 82728 ASSAY OF FERRITIN: CPT

## 2019-11-30 PROCEDURE — 6370000000 HC RX 637 (ALT 250 FOR IP): Performed by: FAMILY MEDICINE

## 2019-11-30 RX ADMIN — IRON SUCROSE 200 MG: 20 INJECTION, SOLUTION INTRAVENOUS at 09:37

## 2019-11-30 RX ADMIN — SODIUM CHLORIDE: 9 INJECTION, SOLUTION INTRAVENOUS at 19:38

## 2019-11-30 RX ADMIN — METOPROLOL SUCCINATE 25 MG: 25 TABLET, EXTENDED RELEASE ORAL at 09:38

## 2019-11-30 RX ADMIN — CLONIDINE HYDROCHLORIDE 0.1 MG: 0.1 TABLET ORAL at 20:47

## 2019-11-30 RX ADMIN — ASPIRIN 81 MG 81 MG: 81 TABLET ORAL at 09:38

## 2019-11-30 RX ADMIN — FINASTERIDE 5 MG: 5 TABLET, FILM COATED ORAL at 09:38

## 2019-11-30 RX ADMIN — PANTOPRAZOLE SODIUM 40 MG: 40 TABLET, DELAYED RELEASE ORAL at 20:47

## 2019-11-30 RX ADMIN — POTASSIUM CHLORIDE 5 MEQ: 750 TABLET, EXTENDED RELEASE ORAL at 20:46

## 2019-11-30 RX ADMIN — ROPINIROLE HYDROCHLORIDE 1 MG: 1 TABLET, FILM COATED ORAL at 20:47

## 2019-11-30 RX ADMIN — ALLOPURINOL 300 MG: 300 TABLET ORAL at 20:47

## 2019-11-30 RX ADMIN — MUPIROCIN: 20 OINTMENT TOPICAL at 22:07

## 2019-11-30 RX ADMIN — TAMSULOSIN HYDROCHLORIDE 0.8 MG: 0.4 CAPSULE ORAL at 09:38

## 2019-11-30 RX ADMIN — HYDRALAZINE HYDROCHLORIDE 50 MG: 50 TABLET, FILM COATED ORAL at 20:47

## 2019-11-30 RX ADMIN — ISOSORBIDE MONONITRATE 120 MG: 60 TABLET, EXTENDED RELEASE ORAL at 09:38

## 2019-11-30 RX ADMIN — PANTOPRAZOLE SODIUM 40 MG: 40 TABLET, DELAYED RELEASE ORAL at 09:37

## 2019-11-30 RX ADMIN — GABAPENTIN 100 MG: 100 CAPSULE ORAL at 20:47

## 2019-11-30 RX ADMIN — MECLIZINE HYDROCHLORIDE 25 MG: 25 TABLET ORAL at 18:07

## 2019-11-30 RX ADMIN — HYDRALAZINE HYDROCHLORIDE 50 MG: 50 TABLET, FILM COATED ORAL at 07:16

## 2019-11-30 RX ADMIN — AMLODIPINE BESYLATE 10 MG: 10 TABLET ORAL at 09:38

## 2019-11-30 RX ADMIN — CLONIDINE HYDROCHLORIDE 0.1 MG: 0.1 TABLET ORAL at 09:38

## 2019-11-30 RX ADMIN — Medication 10 ML: at 09:39

## 2019-11-30 RX ADMIN — PREDNISOLONE ACETATE 1 DROP: 10 SUSPENSION/ DROPS OPHTHALMIC at 22:07

## 2019-11-30 RX ADMIN — MUPIROCIN: 20 OINTMENT TOPICAL at 09:39

## 2019-11-30 RX ADMIN — LOSARTAN POTASSIUM 50 MG: 50 TABLET, FILM COATED ORAL at 09:38

## 2019-11-30 RX ADMIN — INSULIN LISPRO 2 UNITS: 100 INJECTION, SOLUTION INTRAVENOUS; SUBCUTANEOUS at 12:42

## 2019-11-30 RX ADMIN — PREDNISOLONE ACETATE 1 DROP: 10 SUSPENSION/ DROPS OPHTHALMIC at 09:39

## 2019-11-30 RX ADMIN — ACETAMINOPHEN 650 MG: 325 TABLET ORAL at 18:07

## 2019-11-30 RX ADMIN — POTASSIUM CHLORIDE 5 MEQ: 750 TABLET, EXTENDED RELEASE ORAL at 09:38

## 2019-11-30 RX ADMIN — ALLOPURINOL 300 MG: 300 TABLET ORAL at 09:37

## 2019-11-30 RX ADMIN — LOSARTAN POTASSIUM 50 MG: 50 TABLET, FILM COATED ORAL at 20:47

## 2019-11-30 RX ADMIN — GABAPENTIN 100 MG: 100 CAPSULE ORAL at 09:38

## 2019-11-30 RX ADMIN — PRAVASTATIN SODIUM 40 MG: 20 TABLET ORAL at 09:38

## 2019-11-30 ASSESSMENT — PAIN DESCRIPTION - PAIN TYPE: TYPE: ACUTE PAIN

## 2019-11-30 ASSESSMENT — PAIN SCALES - GENERAL
PAINLEVEL_OUTOF10: 3
PAINLEVEL_OUTOF10: 0
PAINLEVEL_OUTOF10: 0

## 2019-11-30 ASSESSMENT — PAIN - FUNCTIONAL ASSESSMENT: PAIN_FUNCTIONAL_ASSESSMENT: ACTIVITIES ARE NOT PREVENTED

## 2019-11-30 ASSESSMENT — PAIN DESCRIPTION - LOCATION: LOCATION: HEAD

## 2019-11-30 ASSESSMENT — PAIN DESCRIPTION - ONSET: ONSET: PROGRESSIVE

## 2019-11-30 ASSESSMENT — PAIN DESCRIPTION - ORIENTATION: ORIENTATION: MID

## 2019-11-30 ASSESSMENT — PAIN DESCRIPTION - DESCRIPTORS: DESCRIPTORS: ACHING

## 2019-11-30 ASSESSMENT — PAIN DESCRIPTION - FREQUENCY: FREQUENCY: CONTINUOUS

## 2019-11-30 ASSESSMENT — PAIN DESCRIPTION - PROGRESSION: CLINICAL_PROGRESSION: GRADUALLY WORSENING

## 2019-12-01 LAB
ANION GAP SERPL CALCULATED.3IONS-SCNC: 8 MMOL/L (ref 7–19)
BASOPHILS ABSOLUTE: 0 K/UL (ref 0–0.2)
BASOPHILS RELATIVE PERCENT: 0.6 % (ref 0–1)
BUN BLDV-MCNC: 36 MG/DL (ref 8–23)
CALCIUM SERPL-MCNC: 9 MG/DL (ref 8.8–10.2)
CHLORIDE BLD-SCNC: 111 MMOL/L (ref 98–111)
CO2: 21 MMOL/L (ref 22–29)
CREAT SERPL-MCNC: 1.3 MG/DL (ref 0.5–1.2)
EOSINOPHILS ABSOLUTE: 0.3 K/UL (ref 0–0.6)
EOSINOPHILS RELATIVE PERCENT: 5.6 % (ref 0–5)
ERYTHROPOIETIN: 14 MU/ML (ref 4–27)
GFR NON-AFRICAN AMERICAN: 52
GLUCOSE BLD-MCNC: 116 MG/DL (ref 74–109)
GLUCOSE BLD-MCNC: 117 MG/DL (ref 70–99)
GLUCOSE BLD-MCNC: 120 MG/DL (ref 70–99)
GLUCOSE BLD-MCNC: 157 MG/DL (ref 70–99)
GLUCOSE BLD-MCNC: 157 MG/DL (ref 70–99)
HCT VFR BLD CALC: 27 % (ref 42–52)
HEMOGLOBIN: 9.1 G/DL (ref 14–18)
IMMATURE GRANULOCYTES #: 0 K/UL
LYMPHOCYTES ABSOLUTE: 1.2 K/UL (ref 1.1–4.5)
LYMPHOCYTES RELATIVE PERCENT: 25.4 % (ref 20–40)
MCH RBC QN AUTO: 33.8 PG (ref 27–31)
MCHC RBC AUTO-ENTMCNC: 33.7 G/DL (ref 33–37)
MCV RBC AUTO: 100.4 FL (ref 80–94)
MONOCYTES ABSOLUTE: 0.6 K/UL (ref 0–0.9)
MONOCYTES RELATIVE PERCENT: 12 % (ref 0–10)
NEUTROPHILS ABSOLUTE: 2.6 K/UL (ref 1.5–7.5)
NEUTROPHILS RELATIVE PERCENT: 56.2 % (ref 50–65)
PDW BLD-RTO: 13.2 % (ref 11.5–14.5)
PERFORMED ON: ABNORMAL
PLATELET # BLD: 183 K/UL (ref 130–400)
PMV BLD AUTO: 10.5 FL (ref 9.4–12.4)
POTASSIUM SERPL-SCNC: 4.9 MMOL/L (ref 3.5–5)
RBC # BLD: 2.69 M/UL (ref 4.7–6.1)
SODIUM BLD-SCNC: 140 MMOL/L (ref 136–145)
WBC # BLD: 4.7 K/UL (ref 4.8–10.8)

## 2019-12-01 PROCEDURE — 36415 COLL VENOUS BLD VENIPUNCTURE: CPT

## 2019-12-01 PROCEDURE — 82948 REAGENT STRIP/BLOOD GLUCOSE: CPT

## 2019-12-01 PROCEDURE — 6360000002 HC RX W HCPCS: Performed by: FAMILY MEDICINE

## 2019-12-01 PROCEDURE — 85025 COMPLETE CBC W/AUTO DIFF WBC: CPT

## 2019-12-01 PROCEDURE — G0378 HOSPITAL OBSERVATION PER HR: HCPCS

## 2019-12-01 PROCEDURE — 80048 BASIC METABOLIC PNL TOTAL CA: CPT

## 2019-12-01 PROCEDURE — 96376 TX/PRO/DX INJ SAME DRUG ADON: CPT

## 2019-12-01 PROCEDURE — 6370000000 HC RX 637 (ALT 250 FOR IP): Performed by: FAMILY MEDICINE

## 2019-12-01 PROCEDURE — 97116 GAIT TRAINING THERAPY: CPT

## 2019-12-01 PROCEDURE — 97110 THERAPEUTIC EXERCISES: CPT

## 2019-12-01 PROCEDURE — 2580000003 HC RX 258: Performed by: FAMILY MEDICINE

## 2019-12-01 RX ADMIN — MECLIZINE HYDROCHLORIDE 25 MG: 25 TABLET ORAL at 14:40

## 2019-12-01 RX ADMIN — PREDNISOLONE ACETATE 1 DROP: 10 SUSPENSION/ DROPS OPHTHALMIC at 08:06

## 2019-12-01 RX ADMIN — AMLODIPINE BESYLATE 10 MG: 10 TABLET ORAL at 07:54

## 2019-12-01 RX ADMIN — PREDNISOLONE ACETATE 1 DROP: 10 SUSPENSION/ DROPS OPHTHALMIC at 20:30

## 2019-12-01 RX ADMIN — ROPINIROLE HYDROCHLORIDE 1 MG: 1 TABLET, FILM COATED ORAL at 20:30

## 2019-12-01 RX ADMIN — ISOSORBIDE MONONITRATE 120 MG: 60 TABLET, EXTENDED RELEASE ORAL at 07:55

## 2019-12-01 RX ADMIN — HYDRALAZINE HYDROCHLORIDE 50 MG: 50 TABLET, FILM COATED ORAL at 20:30

## 2019-12-01 RX ADMIN — ALLOPURINOL 300 MG: 300 TABLET ORAL at 20:30

## 2019-12-01 RX ADMIN — PANTOPRAZOLE SODIUM 40 MG: 40 TABLET, DELAYED RELEASE ORAL at 07:55

## 2019-12-01 RX ADMIN — PRAVASTATIN SODIUM 40 MG: 20 TABLET ORAL at 07:55

## 2019-12-01 RX ADMIN — POTASSIUM CHLORIDE 5 MEQ: 750 TABLET, EXTENDED RELEASE ORAL at 07:55

## 2019-12-01 RX ADMIN — ASPIRIN 81 MG 81 MG: 81 TABLET ORAL at 07:55

## 2019-12-01 RX ADMIN — LOSARTAN POTASSIUM 50 MG: 50 TABLET, FILM COATED ORAL at 07:54

## 2019-12-01 RX ADMIN — INSULIN LISPRO 2 UNITS: 100 INJECTION, SOLUTION INTRAVENOUS; SUBCUTANEOUS at 12:16

## 2019-12-01 RX ADMIN — METOPROLOL SUCCINATE 25 MG: 25 TABLET, EXTENDED RELEASE ORAL at 07:54

## 2019-12-01 RX ADMIN — LOSARTAN POTASSIUM 50 MG: 50 TABLET, FILM COATED ORAL at 20:30

## 2019-12-01 RX ADMIN — MUPIROCIN: 20 OINTMENT TOPICAL at 20:30

## 2019-12-01 RX ADMIN — TAMSULOSIN HYDROCHLORIDE 0.8 MG: 0.4 CAPSULE ORAL at 07:55

## 2019-12-01 RX ADMIN — Medication 10 ML: at 07:54

## 2019-12-01 RX ADMIN — GABAPENTIN 100 MG: 100 CAPSULE ORAL at 20:30

## 2019-12-01 RX ADMIN — GABAPENTIN 100 MG: 100 CAPSULE ORAL at 07:54

## 2019-12-01 RX ADMIN — IRON SUCROSE 200 MG: 20 INJECTION, SOLUTION INTRAVENOUS at 07:54

## 2019-12-01 RX ADMIN — ALLOPURINOL 300 MG: 300 TABLET ORAL at 07:55

## 2019-12-01 RX ADMIN — MUPIROCIN: 20 OINTMENT TOPICAL at 08:06

## 2019-12-01 RX ADMIN — CLONIDINE HYDROCHLORIDE 0.1 MG: 0.1 TABLET ORAL at 07:55

## 2019-12-01 RX ADMIN — HYDRALAZINE HYDROCHLORIDE 50 MG: 50 TABLET, FILM COATED ORAL at 06:26

## 2019-12-01 RX ADMIN — POTASSIUM CHLORIDE 5 MEQ: 750 TABLET, EXTENDED RELEASE ORAL at 20:30

## 2019-12-01 RX ADMIN — PANTOPRAZOLE SODIUM 40 MG: 40 TABLET, DELAYED RELEASE ORAL at 20:30

## 2019-12-01 RX ADMIN — FINASTERIDE 5 MG: 5 TABLET, FILM COATED ORAL at 07:55

## 2019-12-01 ASSESSMENT — PAIN SCALES - GENERAL
PAINLEVEL_OUTOF10: 0
PAINLEVEL_OUTOF10: 0

## 2019-12-02 LAB
ANION GAP SERPL CALCULATED.3IONS-SCNC: 12 MMOL/L (ref 7–19)
BUN BLDV-MCNC: 35 MG/DL (ref 8–23)
CALCIUM SERPL-MCNC: 9.4 MG/DL (ref 8.8–10.2)
CHLORIDE BLD-SCNC: 109 MMOL/L (ref 98–111)
CO2: 19 MMOL/L (ref 22–29)
CREAT SERPL-MCNC: 1.5 MG/DL (ref 0.5–1.2)
GFR NON-AFRICAN AMERICAN: 44
GLUCOSE BLD-MCNC: 120 MG/DL (ref 70–99)
GLUCOSE BLD-MCNC: 130 MG/DL (ref 70–99)
GLUCOSE BLD-MCNC: 135 MG/DL (ref 74–109)
GLUCOSE BLD-MCNC: 143 MG/DL (ref 70–99)
GLUCOSE BLD-MCNC: 156 MG/DL (ref 70–99)
PERFORMED ON: ABNORMAL
POTASSIUM SERPL-SCNC: 4.6 MMOL/L (ref 3.5–5)
SODIUM BLD-SCNC: 140 MMOL/L (ref 136–145)

## 2019-12-02 PROCEDURE — 6360000002 HC RX W HCPCS: Performed by: FAMILY MEDICINE

## 2019-12-02 PROCEDURE — 2580000003 HC RX 258: Performed by: FAMILY MEDICINE

## 2019-12-02 PROCEDURE — 97110 THERAPEUTIC EXERCISES: CPT

## 2019-12-02 PROCEDURE — 80048 BASIC METABOLIC PNL TOTAL CA: CPT

## 2019-12-02 PROCEDURE — 96361 HYDRATE IV INFUSION ADD-ON: CPT

## 2019-12-02 PROCEDURE — 36415 COLL VENOUS BLD VENIPUNCTURE: CPT

## 2019-12-02 PROCEDURE — G0378 HOSPITAL OBSERVATION PER HR: HCPCS

## 2019-12-02 PROCEDURE — 96376 TX/PRO/DX INJ SAME DRUG ADON: CPT

## 2019-12-02 PROCEDURE — 97116 GAIT TRAINING THERAPY: CPT

## 2019-12-02 PROCEDURE — 6370000000 HC RX 637 (ALT 250 FOR IP): Performed by: FAMILY MEDICINE

## 2019-12-02 PROCEDURE — 82948 REAGENT STRIP/BLOOD GLUCOSE: CPT

## 2019-12-02 RX ORDER — HYDRALAZINE HYDROCHLORIDE 25 MG/1
25 TABLET, FILM COATED ORAL EVERY 8 HOURS SCHEDULED
Status: DISCONTINUED | OUTPATIENT
Start: 2019-12-02 | End: 2019-12-03

## 2019-12-02 RX ORDER — 0.9 % SODIUM CHLORIDE 0.9 %
500 INTRAVENOUS SOLUTION INTRAVENOUS ONCE
Status: COMPLETED | OUTPATIENT
Start: 2019-12-02 | End: 2019-12-02

## 2019-12-02 RX ADMIN — Medication 10 ML: at 22:18

## 2019-12-02 RX ADMIN — SODIUM CHLORIDE 500 ML: 9 INJECTION, SOLUTION INTRAVENOUS at 14:23

## 2019-12-02 RX ADMIN — ASPIRIN 81 MG 81 MG: 81 TABLET ORAL at 08:51

## 2019-12-02 RX ADMIN — PREDNISOLONE ACETATE 1 DROP: 10 SUSPENSION/ DROPS OPHTHALMIC at 08:53

## 2019-12-02 RX ADMIN — ROPINIROLE HYDROCHLORIDE 1 MG: 1 TABLET, FILM COATED ORAL at 22:08

## 2019-12-02 RX ADMIN — MUPIROCIN: 20 OINTMENT TOPICAL at 08:53

## 2019-12-02 RX ADMIN — ALLOPURINOL 300 MG: 300 TABLET ORAL at 22:08

## 2019-12-02 RX ADMIN — HYDRALAZINE HYDROCHLORIDE 25 MG: 25 TABLET, FILM COATED ORAL at 22:07

## 2019-12-02 RX ADMIN — ISOSORBIDE MONONITRATE 120 MG: 60 TABLET, EXTENDED RELEASE ORAL at 08:51

## 2019-12-02 RX ADMIN — IRON SUCROSE 200 MG: 20 INJECTION, SOLUTION INTRAVENOUS at 06:54

## 2019-12-02 RX ADMIN — PANTOPRAZOLE SODIUM 40 MG: 40 TABLET, DELAYED RELEASE ORAL at 08:51

## 2019-12-02 RX ADMIN — TAMSULOSIN HYDROCHLORIDE 0.8 MG: 0.4 CAPSULE ORAL at 08:51

## 2019-12-02 RX ADMIN — GABAPENTIN 100 MG: 100 CAPSULE ORAL at 22:07

## 2019-12-02 RX ADMIN — PRAVASTATIN SODIUM 40 MG: 20 TABLET ORAL at 08:51

## 2019-12-02 RX ADMIN — PREDNISOLONE ACETATE 1 DROP: 10 SUSPENSION/ DROPS OPHTHALMIC at 22:09

## 2019-12-02 RX ADMIN — POTASSIUM CHLORIDE 5 MEQ: 750 TABLET, EXTENDED RELEASE ORAL at 08:51

## 2019-12-02 RX ADMIN — GABAPENTIN 100 MG: 100 CAPSULE ORAL at 08:51

## 2019-12-02 RX ADMIN — AMLODIPINE BESYLATE 10 MG: 10 TABLET ORAL at 08:51

## 2019-12-02 RX ADMIN — PANTOPRAZOLE SODIUM 40 MG: 40 TABLET, DELAYED RELEASE ORAL at 22:08

## 2019-12-02 RX ADMIN — ALLOPURINOL 300 MG: 300 TABLET ORAL at 08:51

## 2019-12-02 RX ADMIN — INSULIN LISPRO 2 UNITS: 100 INJECTION, SOLUTION INTRAVENOUS; SUBCUTANEOUS at 12:47

## 2019-12-02 RX ADMIN — LOSARTAN POTASSIUM 50 MG: 50 TABLET, FILM COATED ORAL at 08:51

## 2019-12-02 RX ADMIN — MUPIROCIN: 20 OINTMENT TOPICAL at 22:09

## 2019-12-02 RX ADMIN — POTASSIUM CHLORIDE 5 MEQ: 750 TABLET, EXTENDED RELEASE ORAL at 22:07

## 2019-12-02 RX ADMIN — FINASTERIDE 5 MG: 5 TABLET, FILM COATED ORAL at 08:51

## 2019-12-02 RX ADMIN — LOSARTAN POTASSIUM 50 MG: 50 TABLET, FILM COATED ORAL at 22:08

## 2019-12-02 RX ADMIN — Medication 10 ML: at 08:51

## 2019-12-02 RX ADMIN — METOPROLOL SUCCINATE 25 MG: 25 TABLET, EXTENDED RELEASE ORAL at 08:51

## 2019-12-02 ASSESSMENT — PAIN SCALES - GENERAL
PAINLEVEL_OUTOF10: 10
PAINLEVEL_OUTOF10: 0

## 2019-12-02 ASSESSMENT — PAIN DESCRIPTION - ORIENTATION: ORIENTATION: RIGHT

## 2019-12-02 ASSESSMENT — PAIN DESCRIPTION - LOCATION: LOCATION: HIP

## 2019-12-02 ASSESSMENT — PAIN DESCRIPTION - PAIN TYPE: TYPE: CHRONIC PAIN

## 2019-12-03 LAB
ANION GAP SERPL CALCULATED.3IONS-SCNC: 10 MMOL/L (ref 7–19)
BASOPHILS ABSOLUTE: 0 K/UL (ref 0–0.2)
BASOPHILS RELATIVE PERCENT: 0.4 % (ref 0–1)
BUN BLDV-MCNC: 35 MG/DL (ref 8–23)
CALCIUM SERPL-MCNC: 9.4 MG/DL (ref 8.8–10.2)
CHLORIDE BLD-SCNC: 108 MMOL/L (ref 98–111)
CO2: 20 MMOL/L (ref 22–29)
CREAT SERPL-MCNC: 1.6 MG/DL (ref 0.5–1.2)
EKG P AXIS: 48 DEGREES
EKG P-R INTERVAL: 218 MS
EKG Q-T INTERVAL: 434 MS
EKG QRS DURATION: 106 MS
EKG QTC CALCULATION (BAZETT): 434 MS
EKG T AXIS: 51 DEGREES
EOSINOPHILS ABSOLUTE: 0.2 K/UL (ref 0–0.6)
EOSINOPHILS RELATIVE PERCENT: 3.9 % (ref 0–5)
FOLATE: >20 NG/ML (ref 4.5–32.2)
GFR NON-AFRICAN AMERICAN: 41
GLUCOSE BLD-MCNC: 109 MG/DL (ref 70–99)
GLUCOSE BLD-MCNC: 112 MG/DL (ref 70–99)
GLUCOSE BLD-MCNC: 120 MG/DL (ref 74–109)
GLUCOSE BLD-MCNC: 151 MG/DL (ref 70–99)
GLUCOSE BLD-MCNC: 185 MG/DL (ref 70–99)
GLUCOSE BLD-MCNC: 336 MG/DL (ref 70–99)
GLUCOSE BLD-MCNC: 55 MG/DL (ref 70–99)
HCT VFR BLD CALC: 27.7 % (ref 42–52)
HEMOGLOBIN: 9 G/DL (ref 14–18)
IMMATURE GRANULOCYTES #: 0 K/UL
LYMPHOCYTES ABSOLUTE: 1.5 K/UL (ref 1.1–4.5)
LYMPHOCYTES RELATIVE PERCENT: 26.8 % (ref 20–40)
MCH RBC QN AUTO: 33.1 PG (ref 27–31)
MCHC RBC AUTO-ENTMCNC: 32.5 G/DL (ref 33–37)
MCV RBC AUTO: 101.8 FL (ref 80–94)
MONOCYTES ABSOLUTE: 0.7 K/UL (ref 0–0.9)
MONOCYTES RELATIVE PERCENT: 13 % (ref 0–10)
NEUTROPHILS ABSOLUTE: 3 K/UL (ref 1.5–7.5)
NEUTROPHILS RELATIVE PERCENT: 55.7 % (ref 50–65)
PDW BLD-RTO: 13.4 % (ref 11.5–14.5)
PERFORMED ON: ABNORMAL
PLATELET # BLD: 189 K/UL (ref 130–400)
PMV BLD AUTO: 10.2 FL (ref 9.4–12.4)
POTASSIUM SERPL-SCNC: 5.2 MMOL/L (ref 3.5–5)
RBC # BLD: 2.72 M/UL (ref 4.7–6.1)
SODIUM BLD-SCNC: 138 MMOL/L (ref 136–145)
VITAMIN B-12: 346 PG/ML (ref 211–946)
WBC # BLD: 5.5 K/UL (ref 4.8–10.8)

## 2019-12-03 PROCEDURE — 82948 REAGENT STRIP/BLOOD GLUCOSE: CPT

## 2019-12-03 PROCEDURE — 2580000003 HC RX 258: Performed by: FAMILY MEDICINE

## 2019-12-03 PROCEDURE — 97116 GAIT TRAINING THERAPY: CPT

## 2019-12-03 PROCEDURE — G0378 HOSPITAL OBSERVATION PER HR: HCPCS

## 2019-12-03 PROCEDURE — 36415 COLL VENOUS BLD VENIPUNCTURE: CPT

## 2019-12-03 PROCEDURE — 82607 VITAMIN B-12: CPT

## 2019-12-03 PROCEDURE — 85025 COMPLETE CBC W/AUTO DIFF WBC: CPT

## 2019-12-03 PROCEDURE — 82746 ASSAY OF FOLIC ACID SERUM: CPT

## 2019-12-03 PROCEDURE — 80048 BASIC METABOLIC PNL TOTAL CA: CPT

## 2019-12-03 PROCEDURE — 6370000000 HC RX 637 (ALT 250 FOR IP): Performed by: FAMILY MEDICINE

## 2019-12-03 PROCEDURE — 97110 THERAPEUTIC EXERCISES: CPT

## 2019-12-03 RX ORDER — HYDRALAZINE HYDROCHLORIDE 25 MG/1
25 TABLET, FILM COATED ORAL NIGHTLY
Status: DISCONTINUED | OUTPATIENT
Start: 2019-12-03 | End: 2019-12-04 | Stop reason: HOSPADM

## 2019-12-03 RX ORDER — ISOSORBIDE MONONITRATE 60 MG/1
120 TABLET, EXTENDED RELEASE ORAL NIGHTLY
Status: DISCONTINUED | OUTPATIENT
Start: 2019-12-03 | End: 2019-12-04 | Stop reason: HOSPADM

## 2019-12-03 RX ADMIN — Medication 10 ML: at 09:46

## 2019-12-03 RX ADMIN — ASPIRIN 81 MG 81 MG: 81 TABLET ORAL at 09:44

## 2019-12-03 RX ADMIN — ROPINIROLE HYDROCHLORIDE 1 MG: 1 TABLET, FILM COATED ORAL at 20:49

## 2019-12-03 RX ADMIN — FINASTERIDE 5 MG: 5 TABLET, FILM COATED ORAL at 09:45

## 2019-12-03 RX ADMIN — TAMSULOSIN HYDROCHLORIDE 0.8 MG: 0.4 CAPSULE ORAL at 09:43

## 2019-12-03 RX ADMIN — PRAVASTATIN SODIUM 40 MG: 20 TABLET ORAL at 09:44

## 2019-12-03 RX ADMIN — Medication 10 ML: at 21:00

## 2019-12-03 RX ADMIN — AMLODIPINE BESYLATE 10 MG: 10 TABLET ORAL at 09:44

## 2019-12-03 RX ADMIN — GABAPENTIN 100 MG: 100 CAPSULE ORAL at 20:49

## 2019-12-03 RX ADMIN — PANTOPRAZOLE SODIUM 40 MG: 40 TABLET, DELAYED RELEASE ORAL at 20:50

## 2019-12-03 RX ADMIN — GABAPENTIN 100 MG: 100 CAPSULE ORAL at 09:45

## 2019-12-03 RX ADMIN — METOPROLOL SUCCINATE 25 MG: 25 TABLET, EXTENDED RELEASE ORAL at 09:45

## 2019-12-03 RX ADMIN — HYDROCODONE BITARTRATE AND ACETAMINOPHEN 1 TABLET: 5; 325 TABLET ORAL at 20:51

## 2019-12-03 RX ADMIN — HYDROCODONE BITARTRATE AND ACETAMINOPHEN 1 TABLET: 5; 325 TABLET ORAL at 07:00

## 2019-12-03 RX ADMIN — MUPIROCIN: 20 OINTMENT TOPICAL at 20:52

## 2019-12-03 RX ADMIN — LOSARTAN POTASSIUM 50 MG: 50 TABLET, FILM COATED ORAL at 09:44

## 2019-12-03 RX ADMIN — MUPIROCIN: 20 OINTMENT TOPICAL at 09:46

## 2019-12-03 RX ADMIN — ISOSORBIDE MONONITRATE 120 MG: 60 TABLET, EXTENDED RELEASE ORAL at 20:49

## 2019-12-03 RX ADMIN — PREDNISOLONE ACETATE 1 DROP: 10 SUSPENSION/ DROPS OPHTHALMIC at 09:46

## 2019-12-03 RX ADMIN — LOSARTAN POTASSIUM 50 MG: 50 TABLET, FILM COATED ORAL at 20:50

## 2019-12-03 RX ADMIN — HYDRALAZINE HYDROCHLORIDE 25 MG: 25 TABLET, FILM COATED ORAL at 20:49

## 2019-12-03 RX ADMIN — PANTOPRAZOLE SODIUM 40 MG: 40 TABLET, DELAYED RELEASE ORAL at 09:44

## 2019-12-03 RX ADMIN — INSULIN LISPRO 8 UNITS: 100 INJECTION, SOLUTION INTRAVENOUS; SUBCUTANEOUS at 12:41

## 2019-12-03 RX ADMIN — ALLOPURINOL 300 MG: 300 TABLET ORAL at 20:49

## 2019-12-03 RX ADMIN — ALLOPURINOL 300 MG: 300 TABLET ORAL at 09:45

## 2019-12-03 RX ADMIN — PREDNISOLONE ACETATE 1 DROP: 10 SUSPENSION/ DROPS OPHTHALMIC at 20:52

## 2019-12-03 ASSESSMENT — PAIN DESCRIPTION - ORIENTATION: ORIENTATION: RIGHT

## 2019-12-03 ASSESSMENT — PAIN SCALES - GENERAL
PAINLEVEL_OUTOF10: 10
PAINLEVEL_OUTOF10: 10
PAINLEVEL_OUTOF10: 8

## 2019-12-03 ASSESSMENT — PAIN DESCRIPTION - LOCATION: LOCATION: HIP

## 2019-12-03 ASSESSMENT — PAIN DESCRIPTION - PAIN TYPE: TYPE: ACUTE PAIN

## 2019-12-04 VITALS
HEART RATE: 61 BPM | WEIGHT: 200 LBS | OXYGEN SATURATION: 96 % | RESPIRATION RATE: 18 BRPM | HEIGHT: 70 IN | BODY MASS INDEX: 28.63 KG/M2 | SYSTOLIC BLOOD PRESSURE: 141 MMHG | DIASTOLIC BLOOD PRESSURE: 65 MMHG | TEMPERATURE: 98 F

## 2019-12-04 LAB
ANION GAP SERPL CALCULATED.3IONS-SCNC: 8 MMOL/L (ref 7–19)
BASOPHILS ABSOLUTE: 0 K/UL (ref 0–0.2)
BASOPHILS RELATIVE PERCENT: 0.5 % (ref 0–1)
BUN BLDV-MCNC: 41 MG/DL (ref 8–23)
CALCIUM SERPL-MCNC: 9.7 MG/DL (ref 8.8–10.2)
CHLORIDE BLD-SCNC: 105 MMOL/L (ref 98–111)
CO2: 23 MMOL/L (ref 22–29)
CREAT SERPL-MCNC: 1.5 MG/DL (ref 0.5–1.2)
EOSINOPHILS ABSOLUTE: 0.2 K/UL (ref 0–0.6)
EOSINOPHILS RELATIVE PERCENT: 4.3 % (ref 0–5)
GFR NON-AFRICAN AMERICAN: 44
GLUCOSE BLD-MCNC: 137 MG/DL (ref 70–99)
GLUCOSE BLD-MCNC: 139 MG/DL (ref 74–109)
GLUCOSE BLD-MCNC: 189 MG/DL (ref 70–99)
HCT VFR BLD CALC: 28.7 % (ref 42–52)
HEMOGLOBIN: 9.4 G/DL (ref 14–18)
IMMATURE GRANULOCYTES #: 0 K/UL
LYMPHOCYTES ABSOLUTE: 1.6 K/UL (ref 1.1–4.5)
LYMPHOCYTES RELATIVE PERCENT: 28.6 % (ref 20–40)
MCH RBC QN AUTO: 33.2 PG (ref 27–31)
MCHC RBC AUTO-ENTMCNC: 32.8 G/DL (ref 33–37)
MCV RBC AUTO: 101.4 FL (ref 80–94)
MONOCYTES ABSOLUTE: 0.6 K/UL (ref 0–0.9)
MONOCYTES RELATIVE PERCENT: 10.5 % (ref 0–10)
NEUTROPHILS ABSOLUTE: 3.1 K/UL (ref 1.5–7.5)
NEUTROPHILS RELATIVE PERCENT: 55.9 % (ref 50–65)
PDW BLD-RTO: 13.3 % (ref 11.5–14.5)
PERFORMED ON: ABNORMAL
PERFORMED ON: ABNORMAL
PLATELET # BLD: 209 K/UL (ref 130–400)
PMV BLD AUTO: 10.6 FL (ref 9.4–12.4)
POTASSIUM SERPL-SCNC: 5.1 MMOL/L (ref 3.5–5)
RBC # BLD: 2.83 M/UL (ref 4.7–6.1)
SODIUM BLD-SCNC: 136 MMOL/L (ref 136–145)
WBC # BLD: 5.5 K/UL (ref 4.8–10.8)

## 2019-12-04 PROCEDURE — G0378 HOSPITAL OBSERVATION PER HR: HCPCS

## 2019-12-04 PROCEDURE — 80048 BASIC METABOLIC PNL TOTAL CA: CPT

## 2019-12-04 PROCEDURE — 6370000000 HC RX 637 (ALT 250 FOR IP): Performed by: FAMILY MEDICINE

## 2019-12-04 PROCEDURE — 85025 COMPLETE CBC W/AUTO DIFF WBC: CPT

## 2019-12-04 PROCEDURE — 2580000003 HC RX 258: Performed by: FAMILY MEDICINE

## 2019-12-04 PROCEDURE — 36415 COLL VENOUS BLD VENIPUNCTURE: CPT

## 2019-12-04 PROCEDURE — 82948 REAGENT STRIP/BLOOD GLUCOSE: CPT

## 2019-12-04 RX ORDER — HYDRALAZINE HYDROCHLORIDE 50 MG/1
25 TABLET, FILM COATED ORAL NIGHTLY
Qty: 15 TABLET | Refills: 0 | Status: ON HOLD
Start: 2019-12-04 | End: 2019-12-12 | Stop reason: HOSPADM

## 2019-12-04 RX ORDER — AMLODIPINE BESYLATE 10 MG/1
10 TABLET ORAL DAILY
Qty: 30 TABLET | Refills: 0 | Status: ON HOLD
Start: 2019-12-04 | End: 2020-09-10 | Stop reason: HOSPADM

## 2019-12-04 RX ADMIN — FINASTERIDE 5 MG: 5 TABLET, FILM COATED ORAL at 08:38

## 2019-12-04 RX ADMIN — GABAPENTIN 100 MG: 100 CAPSULE ORAL at 08:37

## 2019-12-04 RX ADMIN — METOPROLOL SUCCINATE 25 MG: 25 TABLET, EXTENDED RELEASE ORAL at 08:36

## 2019-12-04 RX ADMIN — MUPIROCIN: 20 OINTMENT TOPICAL at 08:42

## 2019-12-04 RX ADMIN — LOSARTAN POTASSIUM 50 MG: 50 TABLET, FILM COATED ORAL at 08:36

## 2019-12-04 RX ADMIN — ALLOPURINOL 300 MG: 300 TABLET ORAL at 08:38

## 2019-12-04 RX ADMIN — PANTOPRAZOLE SODIUM 40 MG: 40 TABLET, DELAYED RELEASE ORAL at 08:37

## 2019-12-04 RX ADMIN — TAMSULOSIN HYDROCHLORIDE 0.8 MG: 0.4 CAPSULE ORAL at 08:36

## 2019-12-04 RX ADMIN — PREDNISOLONE ACETATE 1 DROP: 10 SUSPENSION/ DROPS OPHTHALMIC at 08:42

## 2019-12-04 RX ADMIN — AMLODIPINE BESYLATE 10 MG: 10 TABLET ORAL at 08:36

## 2019-12-04 RX ADMIN — ASPIRIN 81 MG 81 MG: 81 TABLET ORAL at 08:36

## 2019-12-04 RX ADMIN — Medication 10 ML: at 08:42

## 2019-12-04 RX ADMIN — PRAVASTATIN SODIUM 40 MG: 20 TABLET ORAL at 08:36

## 2019-12-08 ENCOUNTER — APPOINTMENT (OUTPATIENT)
Dept: CT IMAGING | Age: 84
DRG: 683 | End: 2019-12-08
Payer: COMMERCIAL

## 2019-12-08 ENCOUNTER — APPOINTMENT (OUTPATIENT)
Dept: GENERAL RADIOLOGY | Age: 84
DRG: 683 | End: 2019-12-08
Payer: COMMERCIAL

## 2019-12-08 ENCOUNTER — NURSE TRIAGE (OUTPATIENT)
Dept: CALL CENTER | Facility: HOSPITAL | Age: 84
End: 2019-12-08

## 2019-12-08 ENCOUNTER — HOSPITAL ENCOUNTER (INPATIENT)
Age: 84
LOS: 2 days | Discharge: HOME | DRG: 683 | End: 2019-12-12
Attending: EMERGENCY MEDICINE | Admitting: FAMILY MEDICINE
Payer: COMMERCIAL

## 2019-12-08 DIAGNOSIS — R06.00 DYSPNEA AND RESPIRATORY ABNORMALITIES: ICD-10-CM

## 2019-12-08 DIAGNOSIS — N17.9 AKI (ACUTE KIDNEY INJURY) (HCC): ICD-10-CM

## 2019-12-08 DIAGNOSIS — R77.8 ELEVATED TROPONIN: ICD-10-CM

## 2019-12-08 DIAGNOSIS — R06.89 DYSPNEA AND RESPIRATORY ABNORMALITIES: ICD-10-CM

## 2019-12-08 DIAGNOSIS — R55 SYNCOPE AND COLLAPSE: Primary | ICD-10-CM

## 2019-12-08 LAB
ALBUMIN SERPL-MCNC: 3.7 G/DL (ref 3.5–5.2)
ALP BLD-CCNC: 95 U/L (ref 40–130)
ALT SERPL-CCNC: 33 U/L (ref 5–41)
ANION GAP SERPL CALCULATED.3IONS-SCNC: 15 MMOL/L (ref 7–19)
APTT: 29.6 SEC (ref 26–36.2)
AST SERPL-CCNC: 28 U/L (ref 5–40)
BASOPHILS ABSOLUTE: 0 K/UL (ref 0–0.2)
BASOPHILS RELATIVE PERCENT: 0.5 % (ref 0–1)
BILIRUB SERPL-MCNC: <0.2 MG/DL (ref 0.2–1.2)
BILIRUBIN URINE: NEGATIVE
BLOOD, URINE: NEGATIVE
BUN BLDV-MCNC: 74 MG/DL (ref 8–23)
CALCIUM SERPL-MCNC: 9.1 MG/DL (ref 8.8–10.2)
CHLORIDE BLD-SCNC: 102 MMOL/L (ref 98–111)
CLARITY: CLEAR
CO2: 21 MMOL/L (ref 22–29)
COLOR: YELLOW
CREAT SERPL-MCNC: 1.7 MG/DL (ref 0.5–1.2)
EOSINOPHILS ABSOLUTE: 0.2 K/UL (ref 0–0.6)
EOSINOPHILS RELATIVE PERCENT: 3.9 % (ref 0–5)
GFR NON-AFRICAN AMERICAN: 38
GLUCOSE BLD-MCNC: 131 MG/DL (ref 74–109)
GLUCOSE BLD-MCNC: 186 MG/DL (ref 70–99)
GLUCOSE URINE: NEGATIVE MG/DL
HCT VFR BLD CALC: 29.4 % (ref 42–52)
HEMOGLOBIN: 9.8 G/DL (ref 14–18)
IMMATURE GRANULOCYTES #: 0 K/UL
INR BLD: 1.1 (ref 0.88–1.18)
KETONES, URINE: NEGATIVE MG/DL
LEUKOCYTE ESTERASE, URINE: NEGATIVE
LYMPHOCYTES ABSOLUTE: 1.4 K/UL (ref 1.1–4.5)
LYMPHOCYTES RELATIVE PERCENT: 25.3 % (ref 20–40)
MCH RBC QN AUTO: 33.6 PG (ref 27–31)
MCHC RBC AUTO-ENTMCNC: 33.3 G/DL (ref 33–37)
MCV RBC AUTO: 100.7 FL (ref 80–94)
MONOCYTES ABSOLUTE: 0.6 K/UL (ref 0–0.9)
MONOCYTES RELATIVE PERCENT: 10.7 % (ref 0–10)
NEUTROPHILS ABSOLUTE: 3.4 K/UL (ref 1.5–7.5)
NEUTROPHILS RELATIVE PERCENT: 59.4 % (ref 50–65)
NITRITE, URINE: NEGATIVE
PDW BLD-RTO: 13.7 % (ref 11.5–14.5)
PERFORMED ON: ABNORMAL
PH UA: 6 (ref 5–8)
PLATELET # BLD: 225 K/UL (ref 130–400)
PMV BLD AUTO: 9.9 FL (ref 9.4–12.4)
POTASSIUM SERPL-SCNC: 4.1 MMOL/L (ref 3.5–5)
PRO-BNP: 790 PG/ML (ref 0–1800)
PROTEIN UA: NEGATIVE MG/DL
PROTHROMBIN TIME: 13.6 SEC (ref 12–14.6)
RAPID INFLUENZA  B AGN: NEGATIVE
RAPID INFLUENZA A AGN: NEGATIVE
RBC # BLD: 2.92 M/UL (ref 4.7–6.1)
SODIUM BLD-SCNC: 138 MMOL/L (ref 136–145)
SPECIFIC GRAVITY UA: 1.01 (ref 1–1.03)
TOTAL PROTEIN: 6.5 G/DL (ref 6.6–8.7)
TROPONIN: 0.08 NG/ML (ref 0–0.03)
TROPONIN: 0.08 NG/ML (ref 0–0.03)
TROPONIN: 0.09 NG/ML (ref 0–0.03)
TROPONIN: 0.09 NG/ML (ref 0–0.03)
URINE REFLEX TO CULTURE: NORMAL
UROBILINOGEN, URINE: 0.2 E.U./DL
WBC # BLD: 5.7 K/UL (ref 4.8–10.8)

## 2019-12-08 PROCEDURE — 80053 COMPREHEN METABOLIC PANEL: CPT

## 2019-12-08 PROCEDURE — 84484 ASSAY OF TROPONIN QUANT: CPT

## 2019-12-08 PROCEDURE — 85610 PROTHROMBIN TIME: CPT

## 2019-12-08 PROCEDURE — 85025 COMPLETE CBC W/AUTO DIFF WBC: CPT

## 2019-12-08 PROCEDURE — 99285 EMERGENCY DEPT VISIT HI MDM: CPT

## 2019-12-08 PROCEDURE — G0378 HOSPITAL OBSERVATION PER HR: HCPCS

## 2019-12-08 PROCEDURE — 6360000002 HC RX W HCPCS: Performed by: FAMILY MEDICINE

## 2019-12-08 PROCEDURE — 93005 ELECTROCARDIOGRAM TRACING: CPT | Performed by: EMERGENCY MEDICINE

## 2019-12-08 PROCEDURE — 2580000003 HC RX 258: Performed by: FAMILY MEDICINE

## 2019-12-08 PROCEDURE — 71045 X-RAY EXAM CHEST 1 VIEW: CPT

## 2019-12-08 PROCEDURE — 87804 INFLUENZA ASSAY W/OPTIC: CPT

## 2019-12-08 PROCEDURE — 36415 COLL VENOUS BLD VENIPUNCTURE: CPT

## 2019-12-08 PROCEDURE — 85730 THROMBOPLASTIN TIME PARTIAL: CPT

## 2019-12-08 PROCEDURE — 70450 CT HEAD/BRAIN W/O DYE: CPT

## 2019-12-08 PROCEDURE — 2580000003 HC RX 258: Performed by: EMERGENCY MEDICINE

## 2019-12-08 PROCEDURE — 81003 URINALYSIS AUTO W/O SCOPE: CPT

## 2019-12-08 PROCEDURE — 83880 ASSAY OF NATRIURETIC PEPTIDE: CPT

## 2019-12-08 PROCEDURE — 82948 REAGENT STRIP/BLOOD GLUCOSE: CPT

## 2019-12-08 PROCEDURE — 6370000000 HC RX 637 (ALT 250 FOR IP): Performed by: FAMILY MEDICINE

## 2019-12-08 PROCEDURE — 96372 THER/PROPH/DIAG INJ SC/IM: CPT

## 2019-12-08 RX ORDER — SODIUM CHLORIDE 0.9 % (FLUSH) 0.9 %
10 SYRINGE (ML) INJECTION EVERY 12 HOURS SCHEDULED
Status: DISCONTINUED | OUTPATIENT
Start: 2019-12-08 | End: 2019-12-12 | Stop reason: HOSPADM

## 2019-12-08 RX ORDER — SODIUM CHLORIDE 9 MG/ML
INJECTION, SOLUTION INTRAVENOUS CONTINUOUS
Status: DISCONTINUED | OUTPATIENT
Start: 2019-12-08 | End: 2019-12-12 | Stop reason: HOSPADM

## 2019-12-08 RX ORDER — AMLODIPINE BESYLATE 10 MG/1
10 TABLET ORAL DAILY
Status: DISCONTINUED | OUTPATIENT
Start: 2019-12-08 | End: 2019-12-12 | Stop reason: HOSPADM

## 2019-12-08 RX ORDER — FINASTERIDE 5 MG/1
5 TABLET, FILM COATED ORAL DAILY
Status: DISCONTINUED | OUTPATIENT
Start: 2019-12-08 | End: 2019-12-12 | Stop reason: HOSPADM

## 2019-12-08 RX ORDER — NITROGLYCERIN 0.4 MG/1
0.4 TABLET SUBLINGUAL EVERY 5 MIN PRN
Status: DISCONTINUED | OUTPATIENT
Start: 2019-12-08 | End: 2019-12-12 | Stop reason: HOSPADM

## 2019-12-08 RX ORDER — LIDOCAINE 4 G/G
1 PATCH TOPICAL DAILY
Status: DISCONTINUED | OUTPATIENT
Start: 2019-12-08 | End: 2019-12-12 | Stop reason: HOSPADM

## 2019-12-08 RX ORDER — 0.9 % SODIUM CHLORIDE 0.9 %
1000 INTRAVENOUS SOLUTION INTRAVENOUS ONCE
Status: COMPLETED | OUTPATIENT
Start: 2019-12-08 | End: 2019-12-08

## 2019-12-08 RX ORDER — MECLIZINE HYDROCHLORIDE 25 MG/1
25 TABLET ORAL 3 TIMES DAILY PRN
Status: DISCONTINUED | OUTPATIENT
Start: 2019-12-08 | End: 2019-12-12 | Stop reason: HOSPADM

## 2019-12-08 RX ORDER — ASPIRIN 81 MG/1
81 TABLET, CHEWABLE ORAL DAILY
Status: DISCONTINUED | OUTPATIENT
Start: 2019-12-08 | End: 2019-12-12 | Stop reason: HOSPADM

## 2019-12-08 RX ORDER — ONDANSETRON 4 MG/1
4 TABLET, ORALLY DISINTEGRATING ORAL EVERY 8 HOURS PRN
Status: DISCONTINUED | OUTPATIENT
Start: 2019-12-08 | End: 2019-12-12 | Stop reason: HOSPADM

## 2019-12-08 RX ORDER — PANTOPRAZOLE SODIUM 40 MG/1
40 TABLET, DELAYED RELEASE ORAL 2 TIMES DAILY
Status: DISCONTINUED | OUTPATIENT
Start: 2019-12-08 | End: 2019-12-12 | Stop reason: HOSPADM

## 2019-12-08 RX ORDER — DOCUSATE SODIUM 100 MG/1
100 CAPSULE, LIQUID FILLED ORAL 2 TIMES DAILY
Status: DISCONTINUED | OUTPATIENT
Start: 2019-12-08 | End: 2019-12-12 | Stop reason: HOSPADM

## 2019-12-08 RX ORDER — ACETAMINOPHEN 325 MG/1
650 TABLET ORAL EVERY 4 HOURS PRN
Status: DISCONTINUED | OUTPATIENT
Start: 2019-12-08 | End: 2019-12-12 | Stop reason: HOSPADM

## 2019-12-08 RX ORDER — ROPINIROLE 1 MG/1
1 TABLET, FILM COATED ORAL NIGHTLY
Status: DISCONTINUED | OUTPATIENT
Start: 2019-12-08 | End: 2019-12-12 | Stop reason: HOSPADM

## 2019-12-08 RX ORDER — LOSARTAN POTASSIUM 50 MG/1
50 TABLET ORAL 2 TIMES DAILY
Status: DISCONTINUED | OUTPATIENT
Start: 2019-12-08 | End: 2019-12-12 | Stop reason: HOSPADM

## 2019-12-08 RX ORDER — ALLOPURINOL 300 MG/1
600 TABLET ORAL DAILY
Status: DISCONTINUED | OUTPATIENT
Start: 2019-12-08 | End: 2019-12-12 | Stop reason: HOSPADM

## 2019-12-08 RX ORDER — GABAPENTIN 100 MG/1
100 CAPSULE ORAL 2 TIMES DAILY
Status: DISCONTINUED | OUTPATIENT
Start: 2019-12-08 | End: 2019-12-12 | Stop reason: HOSPADM

## 2019-12-08 RX ORDER — GLIMEPIRIDE 4 MG/1
4 TABLET ORAL 2 TIMES DAILY
Status: DISCONTINUED | OUTPATIENT
Start: 2019-12-08 | End: 2019-12-12 | Stop reason: HOSPADM

## 2019-12-08 RX ORDER — PREDNISOLONE ACETATE 10 MG/ML
1 SUSPENSION/ DROPS OPHTHALMIC 2 TIMES DAILY
Status: DISCONTINUED | OUTPATIENT
Start: 2019-12-08 | End: 2019-12-12 | Stop reason: HOSPADM

## 2019-12-08 RX ORDER — NICOTINE POLACRILEX 4 MG
15 LOZENGE BUCCAL PRN
Status: DISCONTINUED | OUTPATIENT
Start: 2019-12-08 | End: 2019-12-12 | Stop reason: HOSPADM

## 2019-12-08 RX ORDER — DEXTROSE MONOHYDRATE 50 MG/ML
100 INJECTION, SOLUTION INTRAVENOUS PRN
Status: DISCONTINUED | OUTPATIENT
Start: 2019-12-08 | End: 2019-12-12 | Stop reason: HOSPADM

## 2019-12-08 RX ORDER — HYDRALAZINE HYDROCHLORIDE 25 MG/1
25 TABLET, FILM COATED ORAL NIGHTLY
Status: DISCONTINUED | OUTPATIENT
Start: 2019-12-08 | End: 2019-12-11

## 2019-12-08 RX ORDER — DEXTROSE MONOHYDRATE 25 G/50ML
12.5 INJECTION, SOLUTION INTRAVENOUS PRN
Status: DISCONTINUED | OUTPATIENT
Start: 2019-12-08 | End: 2019-12-12 | Stop reason: HOSPADM

## 2019-12-08 RX ORDER — SENNA AND DOCUSATE SODIUM 50; 8.6 MG/1; MG/1
2 TABLET, FILM COATED ORAL DAILY
Status: DISCONTINUED | OUTPATIENT
Start: 2019-12-08 | End: 2019-12-12 | Stop reason: HOSPADM

## 2019-12-08 RX ORDER — SODIUM CHLORIDE 0.9 % (FLUSH) 0.9 %
10 SYRINGE (ML) INJECTION PRN
Status: DISCONTINUED | OUTPATIENT
Start: 2019-12-08 | End: 2019-12-12 | Stop reason: HOSPADM

## 2019-12-08 RX ORDER — METOPROLOL SUCCINATE 25 MG/1
25 TABLET, EXTENDED RELEASE ORAL DAILY
Status: DISCONTINUED | OUTPATIENT
Start: 2019-12-08 | End: 2019-12-09

## 2019-12-08 RX ORDER — ISOSORBIDE MONONITRATE 60 MG/1
120 TABLET, EXTENDED RELEASE ORAL DAILY
Status: DISCONTINUED | OUTPATIENT
Start: 2019-12-08 | End: 2019-12-12 | Stop reason: HOSPADM

## 2019-12-08 RX ORDER — TAMSULOSIN HYDROCHLORIDE 0.4 MG/1
0.8 CAPSULE ORAL DAILY
Status: DISCONTINUED | OUTPATIENT
Start: 2019-12-08 | End: 2019-12-12 | Stop reason: HOSPADM

## 2019-12-08 RX ORDER — PRAVASTATIN SODIUM 20 MG
40 TABLET ORAL DAILY
Status: DISCONTINUED | OUTPATIENT
Start: 2019-12-08 | End: 2019-12-12 | Stop reason: HOSPADM

## 2019-12-08 RX ADMIN — HYDRALAZINE HYDROCHLORIDE 25 MG: 25 TABLET, FILM COATED ORAL at 21:32

## 2019-12-08 RX ADMIN — GABAPENTIN 100 MG: 100 CAPSULE ORAL at 21:32

## 2019-12-08 RX ADMIN — DOCUSATE SODIUM 100 MG: 100 CAPSULE, LIQUID FILLED ORAL at 21:39

## 2019-12-08 RX ADMIN — SODIUM CHLORIDE, PRESERVATIVE FREE 10 ML: 5 INJECTION INTRAVENOUS at 21:39

## 2019-12-08 RX ADMIN — SODIUM CHLORIDE 1000 ML: 9 INJECTION, SOLUTION INTRAVENOUS at 15:40

## 2019-12-08 RX ADMIN — LOSARTAN POTASSIUM 50 MG: 50 TABLET, FILM COATED ORAL at 21:39

## 2019-12-08 RX ADMIN — PREDNISOLONE ACETATE 1 DROP: 10 SUSPENSION/ DROPS OPHTHALMIC at 21:00

## 2019-12-08 RX ADMIN — SODIUM CHLORIDE: 9 INJECTION, SOLUTION INTRAVENOUS at 21:58

## 2019-12-08 RX ADMIN — ENOXAPARIN SODIUM 40 MG: 40 INJECTION SUBCUTANEOUS at 18:06

## 2019-12-08 RX ADMIN — ROPINIROLE HYDROCHLORIDE 1 MG: 1 TABLET, FILM COATED ORAL at 21:33

## 2019-12-08 RX ADMIN — PANTOPRAZOLE SODIUM 40 MG: 40 TABLET, DELAYED RELEASE ORAL at 21:39

## 2019-12-08 RX ADMIN — GLIMEPIRIDE 4 MG: 4 TABLET ORAL at 21:39

## 2019-12-08 ASSESSMENT — ENCOUNTER SYMPTOMS
SORE THROAT: 0
DIARRHEA: 0
BACK PAIN: 0
ABDOMINAL PAIN: 0
NAUSEA: 0
RHINORRHEA: 0
SHORTNESS OF BREATH: 1
VOMITING: 0

## 2019-12-08 ASSESSMENT — PAIN DESCRIPTION - PAIN TYPE: TYPE: ACUTE PAIN

## 2019-12-08 ASSESSMENT — PAIN DESCRIPTION - LOCATION: LOCATION: GENERALIZED

## 2019-12-08 ASSESSMENT — PAIN SCALES - GENERAL
PAINLEVEL_OUTOF10: 0
PAINLEVEL_OUTOF10: 8

## 2019-12-08 NOTE — TELEPHONE ENCOUNTER
"He has been having syncope episodes and loosing consciousness, had 4 episodes today just lasts for minute or so, no injury today, did fall Thanksgiving and hit his head was in Hospital at [facility] for 7 days then had sutures in head then. He is alert and oriented now, just wanted to know what to do , told him needs to go back to ER.     Reason for Disposition  • Fainted 2 times in one day    Additional Information  • Negative: Still unconscious  • Negative: Difficult to awaken or acting confused (e.g., disoriented, slurred speech)  • Negative: Shock suspected (e.g., cold/pale/clammy skin, too weak to stand, low BP, rapid pulse)  • Negative: Difficulty breathing  • Negative: Bluish (or gray) lips or face now  • Negative: Chest pain  • Negative: Extra heart beats or heart is beating fast  (i.e.,\"palpitations\")  • Negative: Bleeding (e.g., vomiting blood, rectal bleeding or tarry stools, severe vaginal bleeding)(Exception: fainted from sight of small amount of blood; small cut or abrasion)  • Negative: Fainted suddenly after medicine, allergic food or bee sting  • Negative: Age > 50 years (Exception: occurred > 1 hour ago AND now feels completely fine)  • Negative: History of heart problems (e.g., congestive heart failure, heart attack)  • Negative: [1] Fainted > 15 minutes ago AND [2] still feels too weak or dizzy to stand  • Negative: Sounds like a life-threatening emergency to the triager  • Negative: [1] Has diabetes (diabetes mellitus) AND [2] fainting from low blood sugar (i.e., < 70 mg/dl or 3.9 mmol/l)  • Negative: Seizure suspected (e.g., muscle jerking or shaking followed by confusion)  • Negative: Heat exhaustion suspected  • Negative: [1] Fainted > 15 minutes ago AND [2] still looks pale (pale skin, pallor)  • Negative: [1] Fainted > 15 minutes ago AND [2] still feels weak or dizzy  • Negative: Occurred during exercise  • Negative: Any head or face injury  • Negative: Pregnant or possibly pregnant    Answer " "Assessment - Initial Assessment Questions  1. ONSET: \"How long were you unconscious?\" (minutes) \"When did it happen?\"      Just minute or two  2. CONTENT: \"What happened during period of unconsciousness?\" (e.g., seizure activity)       Just passes out   3. MENTAL STATUS: \"Alert and oriented now?\" (oriented x 3 = name, month, location)       Alert and oriented   4. TRIGGER: \"What do you think caused the fainting?\" \"What were you doing just before you fainted?\"  (e.g., exercise, sudden standing up, prolonged standing)      Nothing in partcular  5. RECURRENT SYMPTOM: \"Have you ever passed out before?\" If so, ask: \"When was the last time?\" and \"What happened that time?\"       Passed out last week hit head was in hospital  6. INJURY: \"Did you sustain any injury during the fall?\"       Not today did Thanksgiving hit head  7. CARDIAC SYMPTOMS: \"Have you had any of the following symptoms: chest pain, difficulty breathing, palpitations?\"      no  8. NEUROLOGIC SYMPTOMS: \"Have you had any of the following symptoms: headache, numbness, vertigo, weakness?\"      no  9. GI SYMPTOMS: \"Have you had any of the following symptoms: abdominal pain, vomiting, diarrhea, blood in stools?\"    no  10. OTHER SYMPTOMS: \"Do you have any other symptoms?\"      no  11. PREGNANCY: \"Is there any chance you are pregnant?\" \"When was your last menstrual period?\"     no    Protocols used: FAINTING-ADULT-      "

## 2019-12-09 LAB
ANION GAP SERPL CALCULATED.3IONS-SCNC: 13 MMOL/L (ref 7–19)
BASOPHILS ABSOLUTE: 0 K/UL (ref 0–0.2)
BASOPHILS RELATIVE PERCENT: 0.4 % (ref 0–1)
BUN BLDV-MCNC: 61 MG/DL (ref 8–23)
CALCIUM SERPL-MCNC: 8.8 MG/DL (ref 8.8–10.2)
CHLORIDE BLD-SCNC: 107 MMOL/L (ref 98–111)
CO2: 23 MMOL/L (ref 22–29)
CREAT SERPL-MCNC: 1.6 MG/DL (ref 0.5–1.2)
EKG P AXIS: 46 DEGREES
EKG P-R INTERVAL: 236 MS
EKG Q-T INTERVAL: 450 MS
EKG QRS DURATION: 106 MS
EKG QTC CALCULATION (BAZETT): 436 MS
EKG T AXIS: 65 DEGREES
EOSINOPHILS ABSOLUTE: 0.2 K/UL (ref 0–0.6)
EOSINOPHILS RELATIVE PERCENT: 4.6 % (ref 0–5)
GFR NON-AFRICAN AMERICAN: 41
GLUCOSE BLD-MCNC: 146 MG/DL (ref 70–99)
GLUCOSE BLD-MCNC: 290 MG/DL (ref 70–99)
GLUCOSE BLD-MCNC: 63 MG/DL (ref 70–99)
GLUCOSE BLD-MCNC: 69 MG/DL (ref 74–109)
GLUCOSE BLD-MCNC: 81 MG/DL (ref 70–99)
GLUCOSE BLD-MCNC: 95 MG/DL (ref 70–99)
HCT VFR BLD CALC: 26.9 % (ref 42–52)
HEMOGLOBIN: 8.6 G/DL (ref 14–18)
IMMATURE GRANULOCYTES #: 0 K/UL
LYMPHOCYTES ABSOLUTE: 1.7 K/UL (ref 1.1–4.5)
LYMPHOCYTES RELATIVE PERCENT: 34.5 % (ref 20–40)
MCH RBC QN AUTO: 32.5 PG (ref 27–31)
MCHC RBC AUTO-ENTMCNC: 32 G/DL (ref 33–37)
MCV RBC AUTO: 101.5 FL (ref 80–94)
MONOCYTES ABSOLUTE: 0.7 K/UL (ref 0–0.9)
MONOCYTES RELATIVE PERCENT: 13.5 % (ref 0–10)
NEUTROPHILS ABSOLUTE: 2.3 K/UL (ref 1.5–7.5)
NEUTROPHILS RELATIVE PERCENT: 46.8 % (ref 50–65)
PDW BLD-RTO: 13.9 % (ref 11.5–14.5)
PERFORMED ON: ABNORMAL
PERFORMED ON: NORMAL
PERFORMED ON: NORMAL
PLATELET # BLD: 205 K/UL (ref 130–400)
PMV BLD AUTO: 10.4 FL (ref 9.4–12.4)
POTASSIUM SERPL-SCNC: 4 MMOL/L (ref 3.5–5)
RBC # BLD: 2.65 M/UL (ref 4.7–6.1)
SODIUM BLD-SCNC: 143 MMOL/L (ref 136–145)
WBC # BLD: 4.8 K/UL (ref 4.8–10.8)

## 2019-12-09 PROCEDURE — 96372 THER/PROPH/DIAG INJ SC/IM: CPT

## 2019-12-09 PROCEDURE — 82948 REAGENT STRIP/BLOOD GLUCOSE: CPT

## 2019-12-09 PROCEDURE — G0378 HOSPITAL OBSERVATION PER HR: HCPCS

## 2019-12-09 PROCEDURE — 80048 BASIC METABOLIC PNL TOTAL CA: CPT

## 2019-12-09 PROCEDURE — 93010 ELECTROCARDIOGRAM REPORT: CPT | Performed by: INTERNAL MEDICINE

## 2019-12-09 PROCEDURE — 6360000002 HC RX W HCPCS: Performed by: FAMILY MEDICINE

## 2019-12-09 PROCEDURE — 36415 COLL VENOUS BLD VENIPUNCTURE: CPT

## 2019-12-09 PROCEDURE — 2580000003 HC RX 258: Performed by: FAMILY MEDICINE

## 2019-12-09 PROCEDURE — 93005 ELECTROCARDIOGRAM TRACING: CPT | Performed by: EMERGENCY MEDICINE

## 2019-12-09 PROCEDURE — 99245 OFF/OP CONSLTJ NEW/EST HI 55: CPT | Performed by: INTERNAL MEDICINE

## 2019-12-09 PROCEDURE — 85025 COMPLETE CBC W/AUTO DIFF WBC: CPT

## 2019-12-09 PROCEDURE — 6370000000 HC RX 637 (ALT 250 FOR IP): Performed by: FAMILY MEDICINE

## 2019-12-09 RX ADMIN — PANTOPRAZOLE SODIUM 40 MG: 40 TABLET, DELAYED RELEASE ORAL at 20:46

## 2019-12-09 RX ADMIN — PRAVASTATIN SODIUM 40 MG: 20 TABLET ORAL at 08:23

## 2019-12-09 RX ADMIN — LOSARTAN POTASSIUM 50 MG: 50 TABLET, FILM COATED ORAL at 20:46

## 2019-12-09 RX ADMIN — GLIMEPIRIDE 4 MG: 4 TABLET ORAL at 08:23

## 2019-12-09 RX ADMIN — NITROGLYCERIN 0.4 MG: 0.4 TABLET, ORALLY DISINTEGRATING SUBLINGUAL at 22:08

## 2019-12-09 RX ADMIN — FINASTERIDE 5 MG: 5 TABLET, FILM COATED ORAL at 08:23

## 2019-12-09 RX ADMIN — AMLODIPINE BESYLATE 10 MG: 10 TABLET ORAL at 08:23

## 2019-12-09 RX ADMIN — SODIUM CHLORIDE, PRESERVATIVE FREE 10 ML: 5 INJECTION INTRAVENOUS at 20:46

## 2019-12-09 RX ADMIN — GLIMEPIRIDE 4 MG: 4 TABLET ORAL at 20:46

## 2019-12-09 RX ADMIN — PANTOPRAZOLE SODIUM 40 MG: 40 TABLET, DELAYED RELEASE ORAL at 08:23

## 2019-12-09 RX ADMIN — PREDNISOLONE ACETATE 1 DROP: 10 SUSPENSION/ DROPS OPHTHALMIC at 20:46

## 2019-12-09 RX ADMIN — LOSARTAN POTASSIUM 50 MG: 50 TABLET, FILM COATED ORAL at 08:23

## 2019-12-09 RX ADMIN — DOCUSATE SODIUM 100 MG: 100 CAPSULE, LIQUID FILLED ORAL at 20:46

## 2019-12-09 RX ADMIN — ACETAMINOPHEN 650 MG: 325 TABLET ORAL at 22:02

## 2019-12-09 RX ADMIN — SENNOSIDES AND DOCUSATE SODIUM 2 TABLET: 8.6; 5 TABLET ORAL at 08:22

## 2019-12-09 RX ADMIN — ISOSORBIDE MONONITRATE 120 MG: 60 TABLET, EXTENDED RELEASE ORAL at 08:23

## 2019-12-09 RX ADMIN — DOCUSATE SODIUM 100 MG: 100 CAPSULE, LIQUID FILLED ORAL at 08:22

## 2019-12-09 RX ADMIN — GABAPENTIN 100 MG: 100 CAPSULE ORAL at 08:23

## 2019-12-09 RX ADMIN — NITROGLYCERIN 0.4 MG: 0.4 TABLET, ORALLY DISINTEGRATING SUBLINGUAL at 22:02

## 2019-12-09 RX ADMIN — INSULIN LISPRO 3 UNITS: 100 INJECTION, SOLUTION INTRAVENOUS; SUBCUTANEOUS at 13:20

## 2019-12-09 RX ADMIN — HYDRALAZINE HYDROCHLORIDE 25 MG: 25 TABLET, FILM COATED ORAL at 20:46

## 2019-12-09 RX ADMIN — ALLOPURINOL 600 MG: 300 TABLET ORAL at 08:22

## 2019-12-09 RX ADMIN — TAMSULOSIN HYDROCHLORIDE 0.8 MG: 0.4 CAPSULE ORAL at 08:23

## 2019-12-09 RX ADMIN — ENOXAPARIN SODIUM 40 MG: 40 INJECTION SUBCUTANEOUS at 17:13

## 2019-12-09 RX ADMIN — GABAPENTIN 100 MG: 100 CAPSULE ORAL at 20:46

## 2019-12-09 RX ADMIN — ASPIRIN 81 MG 81 MG: 81 TABLET ORAL at 08:23

## 2019-12-09 RX ADMIN — PREDNISOLONE ACETATE 1 DROP: 10 SUSPENSION/ DROPS OPHTHALMIC at 08:21

## 2019-12-09 RX ADMIN — SODIUM CHLORIDE: 9 INJECTION, SOLUTION INTRAVENOUS at 17:13

## 2019-12-09 RX ADMIN — ROPINIROLE HYDROCHLORIDE 1 MG: 1 TABLET, FILM COATED ORAL at 20:46

## 2019-12-09 ASSESSMENT — PAIN DESCRIPTION - LOCATION
LOCATION: CHEST
LOCATION: CHEST;HEAD;NECK
LOCATION: CHEST;HEAD

## 2019-12-09 ASSESSMENT — PAIN DESCRIPTION - ORIENTATION: ORIENTATION: MID

## 2019-12-09 ASSESSMENT — PAIN SCALES - GENERAL
PAINLEVEL_OUTOF10: 10
PAINLEVEL_OUTOF10: 10
PAINLEVEL_OUTOF10: 8

## 2019-12-09 ASSESSMENT — PAIN DESCRIPTION - PAIN TYPE: TYPE: ACUTE PAIN

## 2019-12-09 ASSESSMENT — PAIN DESCRIPTION - DESCRIPTORS
DESCRIPTORS: TIGHTNESS
DESCRIPTORS: SHARP

## 2019-12-10 ENCOUNTER — APPOINTMENT (OUTPATIENT)
Dept: ULTRASOUND IMAGING | Age: 84
DRG: 683 | End: 2019-12-10
Payer: COMMERCIAL

## 2019-12-10 LAB
ANION GAP SERPL CALCULATED.3IONS-SCNC: 8 MMOL/L (ref 7–19)
BACTERIA: NEGATIVE /HPF
BASOPHILS ABSOLUTE: 0 K/UL (ref 0–0.2)
BASOPHILS RELATIVE PERCENT: 0.6 % (ref 0–1)
BILIRUBIN URINE: NEGATIVE
BLOOD, URINE: NEGATIVE
BUN BLDV-MCNC: 51 MG/DL (ref 8–23)
CALCIUM SERPL-MCNC: 8.7 MG/DL (ref 8.8–10.2)
CHLORIDE BLD-SCNC: 107 MMOL/L (ref 98–111)
CLARITY: CLEAR
CO2: 24 MMOL/L (ref 22–29)
COLOR: YELLOW
CREAT SERPL-MCNC: 1.9 MG/DL (ref 0.5–1.2)
CREATININE URINE: 68.8 MG/DL (ref 4.2–622)
EKG P AXIS: 1 DEGREES
EKG P-R INTERVAL: 226 MS
EKG Q-T INTERVAL: 426 MS
EKG QRS DURATION: 98 MS
EKG QTC CALCULATION (BAZETT): 427 MS
EKG T AXIS: 51 DEGREES
EOSINOPHIL,URINE: NORMAL
EOSINOPHILS ABSOLUTE: 0.3 K/UL (ref 0–0.6)
EOSINOPHILS RELATIVE PERCENT: 5 % (ref 0–5)
EPITHELIAL CELLS, UA: 2 /HPF (ref 0–5)
GFR NON-AFRICAN AMERICAN: 34
GLUCOSE BLD-MCNC: 120 MG/DL (ref 70–99)
GLUCOSE BLD-MCNC: 217 MG/DL (ref 70–99)
GLUCOSE BLD-MCNC: 306 MG/DL (ref 70–99)
GLUCOSE BLD-MCNC: 67 MG/DL (ref 70–99)
GLUCOSE BLD-MCNC: 70 MG/DL (ref 74–109)
GLUCOSE BLD-MCNC: 91 MG/DL (ref 70–99)
GLUCOSE BLD-MCNC: 92 MG/DL (ref 70–99)
GLUCOSE URINE: NEGATIVE MG/DL
HCT VFR BLD CALC: 26.4 % (ref 42–52)
HEMOGLOBIN: 8.5 G/DL (ref 14–18)
HYALINE CASTS: 0 /HPF (ref 0–8)
IMMATURE GRANULOCYTES #: 0 K/UL
KETONES, URINE: NEGATIVE MG/DL
LEUKOCYTE ESTERASE, URINE: ABNORMAL
LV EF: 58 %
LVEF MODALITY: NORMAL
LYMPHOCYTES ABSOLUTE: 1.6 K/UL (ref 1.1–4.5)
LYMPHOCYTES RELATIVE PERCENT: 30.4 % (ref 20–40)
MAGNESIUM: 2.3 MG/DL (ref 1.6–2.4)
MCH RBC QN AUTO: 32.9 PG (ref 27–31)
MCHC RBC AUTO-ENTMCNC: 32.2 G/DL (ref 33–37)
MCV RBC AUTO: 102.3 FL (ref 80–94)
MONOCYTES ABSOLUTE: 0.6 K/UL (ref 0–0.9)
MONOCYTES RELATIVE PERCENT: 11.8 % (ref 0–10)
NEUTROPHILS ABSOLUTE: 2.7 K/UL (ref 1.5–7.5)
NEUTROPHILS RELATIVE PERCENT: 52 % (ref 50–65)
NITRITE, URINE: NEGATIVE
PARATHYROID HORMONE INTACT: 109.1 PG/ML (ref 15–65)
PDW BLD-RTO: 13.8 % (ref 11.5–14.5)
PERFORMED ON: ABNORMAL
PERFORMED ON: NORMAL
PERFORMED ON: NORMAL
PH UA: 7 (ref 5–8)
PHOSPHORUS: 3.3 MG/DL (ref 2.5–4.5)
PLATELET # BLD: 200 K/UL (ref 130–400)
PMV BLD AUTO: 10.4 FL (ref 9.4–12.4)
POTASSIUM SERPL-SCNC: 4.2 MMOL/L (ref 3.5–5)
PROTEIN PROTEIN: 8 MG/DL (ref 15–45)
PROTEIN UA: NEGATIVE MG/DL
RBC # BLD: 2.58 M/UL (ref 4.7–6.1)
RBC UA: 0 /HPF (ref 0–4)
SODIUM BLD-SCNC: 139 MMOL/L (ref 136–145)
SODIUM URINE: 63 MMOL/L
SPECIFIC GRAVITY UA: 1.01 (ref 1–1.03)
UREA NITROGEN, UR: 679 MG/DL
URIC ACID, SERUM: 5.9 MG/DL (ref 3.4–7)
UROBILINOGEN, URINE: 0.2 E.U./DL
VITAMIN D 25-HYDROXY: 42.4 NG/ML
WBC # BLD: 5.2 K/UL (ref 4.8–10.8)
WBC UA: 26 /HPF (ref 0–5)

## 2019-12-10 PROCEDURE — 82306 VITAMIN D 25 HYDROXY: CPT

## 2019-12-10 PROCEDURE — 87205 SMEAR GRAM STAIN: CPT

## 2019-12-10 PROCEDURE — 84540 ASSAY OF URINE/UREA-N: CPT

## 2019-12-10 PROCEDURE — 76770 US EXAM ABDO BACK WALL COMP: CPT

## 2019-12-10 PROCEDURE — 6370000000 HC RX 637 (ALT 250 FOR IP): Performed by: FAMILY MEDICINE

## 2019-12-10 PROCEDURE — 84156 ASSAY OF PROTEIN URINE: CPT

## 2019-12-10 PROCEDURE — 36415 COLL VENOUS BLD VENIPUNCTURE: CPT

## 2019-12-10 PROCEDURE — 84550 ASSAY OF BLOOD/URIC ACID: CPT

## 2019-12-10 PROCEDURE — 96372 THER/PROPH/DIAG INJ SC/IM: CPT

## 2019-12-10 PROCEDURE — 84100 ASSAY OF PHOSPHORUS: CPT

## 2019-12-10 PROCEDURE — 99232 SBSQ HOSP IP/OBS MODERATE 35: CPT | Performed by: INTERNAL MEDICINE

## 2019-12-10 PROCEDURE — 2140000000 HC CCU INTERMEDIATE R&B

## 2019-12-10 PROCEDURE — 83970 ASSAY OF PARATHORMONE: CPT

## 2019-12-10 PROCEDURE — 93308 TTE F-UP OR LMTD: CPT

## 2019-12-10 PROCEDURE — 93010 ELECTROCARDIOGRAM REPORT: CPT | Performed by: INTERNAL MEDICINE

## 2019-12-10 PROCEDURE — 82948 REAGENT STRIP/BLOOD GLUCOSE: CPT

## 2019-12-10 PROCEDURE — 82570 ASSAY OF URINE CREATININE: CPT

## 2019-12-10 PROCEDURE — 85025 COMPLETE CBC W/AUTO DIFF WBC: CPT

## 2019-12-10 PROCEDURE — 84300 ASSAY OF URINE SODIUM: CPT

## 2019-12-10 PROCEDURE — 2580000003 HC RX 258: Performed by: FAMILY MEDICINE

## 2019-12-10 PROCEDURE — 6360000002 HC RX W HCPCS: Performed by: FAMILY MEDICINE

## 2019-12-10 PROCEDURE — 83735 ASSAY OF MAGNESIUM: CPT

## 2019-12-10 PROCEDURE — 81001 URINALYSIS AUTO W/SCOPE: CPT

## 2019-12-10 PROCEDURE — 80048 BASIC METABOLIC PNL TOTAL CA: CPT

## 2019-12-10 RX ADMIN — ALLOPURINOL 600 MG: 300 TABLET ORAL at 08:30

## 2019-12-10 RX ADMIN — TAMSULOSIN HYDROCHLORIDE 0.8 MG: 0.4 CAPSULE ORAL at 08:31

## 2019-12-10 RX ADMIN — FINASTERIDE 5 MG: 5 TABLET, FILM COATED ORAL at 08:30

## 2019-12-10 RX ADMIN — ENOXAPARIN SODIUM 40 MG: 40 INJECTION SUBCUTANEOUS at 17:00

## 2019-12-10 RX ADMIN — DOCUSATE SODIUM 100 MG: 100 CAPSULE, LIQUID FILLED ORAL at 08:30

## 2019-12-10 RX ADMIN — GABAPENTIN 100 MG: 100 CAPSULE ORAL at 20:15

## 2019-12-10 RX ADMIN — HYDRALAZINE HYDROCHLORIDE 25 MG: 25 TABLET, FILM COATED ORAL at 20:15

## 2019-12-10 RX ADMIN — SENNOSIDES AND DOCUSATE SODIUM 2 TABLET: 8.6; 5 TABLET ORAL at 08:31

## 2019-12-10 RX ADMIN — DOCUSATE SODIUM 100 MG: 100 CAPSULE, LIQUID FILLED ORAL at 20:15

## 2019-12-10 RX ADMIN — GLIMEPIRIDE 4 MG: 4 TABLET ORAL at 20:14

## 2019-12-10 RX ADMIN — PREDNISOLONE ACETATE 1 DROP: 10 SUSPENSION/ DROPS OPHTHALMIC at 08:29

## 2019-12-10 RX ADMIN — LOSARTAN POTASSIUM 50 MG: 50 TABLET, FILM COATED ORAL at 08:31

## 2019-12-10 RX ADMIN — SODIUM CHLORIDE, PRESERVATIVE FREE 10 ML: 5 INJECTION INTRAVENOUS at 20:16

## 2019-12-10 RX ADMIN — AMLODIPINE BESYLATE 10 MG: 10 TABLET ORAL at 08:30

## 2019-12-10 RX ADMIN — INSULIN LISPRO 4 UNITS: 100 INJECTION, SOLUTION INTRAVENOUS; SUBCUTANEOUS at 12:37

## 2019-12-10 RX ADMIN — PRAVASTATIN SODIUM 40 MG: 20 TABLET ORAL at 08:31

## 2019-12-10 RX ADMIN — PANTOPRAZOLE SODIUM 40 MG: 40 TABLET, DELAYED RELEASE ORAL at 08:30

## 2019-12-10 RX ADMIN — PANTOPRAZOLE SODIUM 40 MG: 40 TABLET, DELAYED RELEASE ORAL at 20:15

## 2019-12-10 RX ADMIN — GLIMEPIRIDE 4 MG: 4 TABLET ORAL at 08:30

## 2019-12-10 RX ADMIN — LOSARTAN POTASSIUM 50 MG: 50 TABLET, FILM COATED ORAL at 20:15

## 2019-12-10 RX ADMIN — ROPINIROLE HYDROCHLORIDE 1 MG: 1 TABLET, FILM COATED ORAL at 20:15

## 2019-12-10 RX ADMIN — ASPIRIN 81 MG 81 MG: 81 TABLET ORAL at 08:31

## 2019-12-10 RX ADMIN — ISOSORBIDE MONONITRATE 120 MG: 60 TABLET, EXTENDED RELEASE ORAL at 08:30

## 2019-12-10 RX ADMIN — INSULIN LISPRO 1 UNITS: 100 INJECTION, SOLUTION INTRAVENOUS; SUBCUTANEOUS at 20:15

## 2019-12-10 RX ADMIN — SODIUM CHLORIDE, PRESERVATIVE FREE 10 ML: 5 INJECTION INTRAVENOUS at 08:31

## 2019-12-10 RX ADMIN — GABAPENTIN 100 MG: 100 CAPSULE ORAL at 08:31

## 2019-12-10 RX ADMIN — PREDNISOLONE ACETATE 1 DROP: 10 SUSPENSION/ DROPS OPHTHALMIC at 20:15

## 2019-12-11 LAB
ANION GAP SERPL CALCULATED.3IONS-SCNC: 9 MMOL/L (ref 7–19)
BUN BLDV-MCNC: 40 MG/DL (ref 8–23)
C-REACTIVE PROTEIN: 0.22 MG/DL (ref 0–0.5)
CALCIUM SERPL-MCNC: 8.9 MG/DL (ref 8.8–10.2)
CHLORIDE BLD-SCNC: 110 MMOL/L (ref 98–111)
CO2: 22 MMOL/L (ref 22–29)
CREAT SERPL-MCNC: 1.4 MG/DL (ref 0.5–1.2)
GFR NON-AFRICAN AMERICAN: 48
GLUCOSE BLD-MCNC: 150 MG/DL (ref 70–99)
GLUCOSE BLD-MCNC: 160 MG/DL (ref 70–99)
GLUCOSE BLD-MCNC: 176 MG/DL (ref 70–99)
GLUCOSE BLD-MCNC: 60 MG/DL (ref 70–99)
GLUCOSE BLD-MCNC: 64 MG/DL (ref 70–99)
GLUCOSE BLD-MCNC: 67 MG/DL (ref 74–109)
HAPTOGLOBIN: 135 MG/DL (ref 30–200)
LACTATE DEHYDROGENASE: 174 U/L (ref 91–215)
PERFORMED ON: ABNORMAL
POTASSIUM SERPL-SCNC: 4.5 MMOL/L (ref 3.5–5)
SEDIMENTATION RATE, ERYTHROCYTE: 38 MM/HR (ref 0–15)
SODIUM BLD-SCNC: 141 MMOL/L (ref 136–145)

## 2019-12-11 PROCEDURE — 36415 COLL VENOUS BLD VENIPUNCTURE: CPT

## 2019-12-11 PROCEDURE — 86140 C-REACTIVE PROTEIN: CPT

## 2019-12-11 PROCEDURE — 85652 RBC SED RATE AUTOMATED: CPT

## 2019-12-11 PROCEDURE — 2580000003 HC RX 258: Performed by: FAMILY MEDICINE

## 2019-12-11 PROCEDURE — 82525 ASSAY OF COPPER: CPT

## 2019-12-11 PROCEDURE — 80048 BASIC METABOLIC PNL TOTAL CA: CPT

## 2019-12-11 PROCEDURE — 83010 ASSAY OF HAPTOGLOBIN QUANT: CPT

## 2019-12-11 PROCEDURE — 82948 REAGENT STRIP/BLOOD GLUCOSE: CPT

## 2019-12-11 PROCEDURE — 6370000000 HC RX 637 (ALT 250 FOR IP): Performed by: FAMILY MEDICINE

## 2019-12-11 PROCEDURE — 99223 1ST HOSP IP/OBS HIGH 75: CPT | Performed by: INTERNAL MEDICINE

## 2019-12-11 PROCEDURE — 6360000002 HC RX W HCPCS: Performed by: FAMILY MEDICINE

## 2019-12-11 PROCEDURE — 83615 LACTATE (LD) (LDH) ENZYME: CPT

## 2019-12-11 PROCEDURE — 99232 SBSQ HOSP IP/OBS MODERATE 35: CPT | Performed by: INTERNAL MEDICINE

## 2019-12-11 PROCEDURE — 2140000000 HC CCU INTERMEDIATE R&B

## 2019-12-11 RX ADMIN — DOCUSATE SODIUM 100 MG: 100 CAPSULE, LIQUID FILLED ORAL at 21:50

## 2019-12-11 RX ADMIN — ALLOPURINOL 600 MG: 300 TABLET ORAL at 08:44

## 2019-12-11 RX ADMIN — AMLODIPINE BESYLATE 10 MG: 10 TABLET ORAL at 08:44

## 2019-12-11 RX ADMIN — PANTOPRAZOLE SODIUM 40 MG: 40 TABLET, DELAYED RELEASE ORAL at 08:44

## 2019-12-11 RX ADMIN — DOCUSATE SODIUM 100 MG: 100 CAPSULE, LIQUID FILLED ORAL at 08:44

## 2019-12-11 RX ADMIN — PREDNISOLONE ACETATE 1 DROP: 10 SUSPENSION/ DROPS OPHTHALMIC at 08:43

## 2019-12-11 RX ADMIN — GABAPENTIN 100 MG: 100 CAPSULE ORAL at 08:44

## 2019-12-11 RX ADMIN — ROPINIROLE HYDROCHLORIDE 1 MG: 1 TABLET, FILM COATED ORAL at 21:50

## 2019-12-11 RX ADMIN — FINASTERIDE 5 MG: 5 TABLET, FILM COATED ORAL at 08:44

## 2019-12-11 RX ADMIN — GLIMEPIRIDE 4 MG: 4 TABLET ORAL at 21:50

## 2019-12-11 RX ADMIN — GLIMEPIRIDE 4 MG: 4 TABLET ORAL at 08:44

## 2019-12-11 RX ADMIN — SODIUM CHLORIDE, PRESERVATIVE FREE 10 ML: 5 INJECTION INTRAVENOUS at 21:50

## 2019-12-11 RX ADMIN — SENNOSIDES AND DOCUSATE SODIUM 2 TABLET: 8.6; 5 TABLET ORAL at 08:44

## 2019-12-11 RX ADMIN — PANTOPRAZOLE SODIUM 40 MG: 40 TABLET, DELAYED RELEASE ORAL at 21:50

## 2019-12-11 RX ADMIN — INSULIN LISPRO 1 UNITS: 100 INJECTION, SOLUTION INTRAVENOUS; SUBCUTANEOUS at 17:20

## 2019-12-11 RX ADMIN — PRAVASTATIN SODIUM 40 MG: 20 TABLET ORAL at 08:45

## 2019-12-11 RX ADMIN — PREDNISOLONE ACETATE 1 DROP: 10 SUSPENSION/ DROPS OPHTHALMIC at 21:51

## 2019-12-11 RX ADMIN — GABAPENTIN 100 MG: 100 CAPSULE ORAL at 21:50

## 2019-12-11 RX ADMIN — TAMSULOSIN HYDROCHLORIDE 0.8 MG: 0.4 CAPSULE ORAL at 08:44

## 2019-12-11 RX ADMIN — LOSARTAN POTASSIUM 50 MG: 50 TABLET, FILM COATED ORAL at 21:50

## 2019-12-11 RX ADMIN — ENOXAPARIN SODIUM 40 MG: 40 INJECTION SUBCUTANEOUS at 17:19

## 2019-12-11 RX ADMIN — ISOSORBIDE MONONITRATE 120 MG: 60 TABLET, EXTENDED RELEASE ORAL at 08:44

## 2019-12-11 RX ADMIN — LOSARTAN POTASSIUM 50 MG: 50 TABLET, FILM COATED ORAL at 08:44

## 2019-12-11 RX ADMIN — ASPIRIN 81 MG 81 MG: 81 TABLET ORAL at 08:44

## 2019-12-12 ENCOUNTER — TELEPHONE (OUTPATIENT)
Dept: HEMATOLOGY | Age: 84
End: 2019-12-12

## 2019-12-12 VITALS
SYSTOLIC BLOOD PRESSURE: 181 MMHG | OXYGEN SATURATION: 99 % | BODY MASS INDEX: 28.27 KG/M2 | TEMPERATURE: 98.5 F | RESPIRATION RATE: 8 BRPM | HEART RATE: 65 BPM | HEIGHT: 70 IN | WEIGHT: 197.5 LBS | DIASTOLIC BLOOD PRESSURE: 64 MMHG

## 2019-12-12 LAB
ANION GAP SERPL CALCULATED.3IONS-SCNC: 10 MMOL/L (ref 7–19)
BASOPHILS ABSOLUTE: 0 K/UL (ref 0–0.2)
BASOPHILS RELATIVE PERCENT: 0.6 % (ref 0–1)
BUN BLDV-MCNC: 35 MG/DL (ref 8–23)
CALCIUM SERPL-MCNC: 9.4 MG/DL (ref 8.8–10.2)
CHLORIDE BLD-SCNC: 109 MMOL/L (ref 98–111)
CO2: 21 MMOL/L (ref 22–29)
CREAT SERPL-MCNC: 1.4 MG/DL (ref 0.5–1.2)
EOSINOPHILS ABSOLUTE: 0.3 K/UL (ref 0–0.6)
EOSINOPHILS RELATIVE PERCENT: 5.7 % (ref 0–5)
GFR NON-AFRICAN AMERICAN: 48
GLUCOSE BLD-MCNC: 84 MG/DL (ref 70–99)
GLUCOSE BLD-MCNC: 87 MG/DL (ref 74–109)
HCT VFR BLD CALC: 26.9 % (ref 42–52)
HEMOGLOBIN: 8.8 G/DL (ref 14–18)
IMMATURE GRANULOCYTES #: 0 K/UL
LYMPHOCYTES ABSOLUTE: 1.3 K/UL (ref 1.1–4.5)
LYMPHOCYTES RELATIVE PERCENT: 27.5 % (ref 20–40)
MCH RBC QN AUTO: 33.7 PG (ref 27–31)
MCHC RBC AUTO-ENTMCNC: 32.7 G/DL (ref 33–37)
MCV RBC AUTO: 103.1 FL (ref 80–94)
MONOCYTES ABSOLUTE: 0.6 K/UL (ref 0–0.9)
MONOCYTES RELATIVE PERCENT: 12.1 % (ref 0–10)
NEUTROPHILS ABSOLUTE: 2.6 K/UL (ref 1.5–7.5)
NEUTROPHILS RELATIVE PERCENT: 53.9 % (ref 50–65)
PDW BLD-RTO: 13.8 % (ref 11.5–14.5)
PERFORMED ON: NORMAL
PLATELET # BLD: 195 K/UL (ref 130–400)
PMV BLD AUTO: 10.4 FL (ref 9.4–12.4)
POTASSIUM SERPL-SCNC: 4.8 MMOL/L (ref 3.5–5)
RBC # BLD: 2.61 M/UL (ref 4.7–6.1)
SODIUM BLD-SCNC: 140 MMOL/L (ref 136–145)
WBC # BLD: 4.9 K/UL (ref 4.8–10.8)

## 2019-12-12 PROCEDURE — 99231 SBSQ HOSP IP/OBS SF/LOW 25: CPT | Performed by: INTERNAL MEDICINE

## 2019-12-12 PROCEDURE — 85025 COMPLETE CBC W/AUTO DIFF WBC: CPT

## 2019-12-12 PROCEDURE — 6370000000 HC RX 637 (ALT 250 FOR IP): Performed by: FAMILY MEDICINE

## 2019-12-12 PROCEDURE — 80048 BASIC METABOLIC PNL TOTAL CA: CPT

## 2019-12-12 PROCEDURE — 93229 REMOTE 30 DAY ECG TECH SUPP: CPT

## 2019-12-12 PROCEDURE — 36415 COLL VENOUS BLD VENIPUNCTURE: CPT

## 2019-12-12 PROCEDURE — 82948 REAGENT STRIP/BLOOD GLUCOSE: CPT

## 2019-12-12 RX ADMIN — PRAVASTATIN SODIUM 40 MG: 20 TABLET ORAL at 08:17

## 2019-12-12 RX ADMIN — PANTOPRAZOLE SODIUM 40 MG: 40 TABLET, DELAYED RELEASE ORAL at 08:17

## 2019-12-12 RX ADMIN — ISOSORBIDE MONONITRATE 120 MG: 60 TABLET, EXTENDED RELEASE ORAL at 08:18

## 2019-12-12 RX ADMIN — SENNOSIDES AND DOCUSATE SODIUM 2 TABLET: 8.6; 5 TABLET ORAL at 08:17

## 2019-12-12 RX ADMIN — GABAPENTIN 100 MG: 100 CAPSULE ORAL at 08:17

## 2019-12-12 RX ADMIN — PREDNISOLONE ACETATE 1 DROP: 10 SUSPENSION/ DROPS OPHTHALMIC at 08:16

## 2019-12-12 RX ADMIN — AMLODIPINE BESYLATE 10 MG: 10 TABLET ORAL at 08:17

## 2019-12-12 RX ADMIN — GLIMEPIRIDE 4 MG: 4 TABLET ORAL at 08:17

## 2019-12-12 RX ADMIN — ALLOPURINOL 600 MG: 300 TABLET ORAL at 08:17

## 2019-12-12 RX ADMIN — TAMSULOSIN HYDROCHLORIDE 0.8 MG: 0.4 CAPSULE ORAL at 08:17

## 2019-12-12 RX ADMIN — ASPIRIN 81 MG 81 MG: 81 TABLET ORAL at 08:17

## 2019-12-12 RX ADMIN — LOSARTAN POTASSIUM 50 MG: 50 TABLET, FILM COATED ORAL at 08:17

## 2019-12-12 RX ADMIN — DOCUSATE SODIUM 100 MG: 100 CAPSULE, LIQUID FILLED ORAL at 08:21

## 2019-12-12 RX ADMIN — FINASTERIDE 5 MG: 5 TABLET, FILM COATED ORAL at 08:17

## 2019-12-12 ASSESSMENT — PAIN SCALES - GENERAL: PAINLEVEL_OUTOF10: 0

## 2019-12-14 LAB — COPPER: 108.2 UG/DL (ref 70–140)

## 2019-12-30 DIAGNOSIS — D53.9 MACROCYTIC ANEMIA: Primary | ICD-10-CM

## 2020-01-01 ENCOUNTER — APPOINTMENT (OUTPATIENT)
Dept: ULTRASOUND IMAGING | Facility: HOSPITAL | Age: 85
End: 2020-01-01

## 2020-01-01 ENCOUNTER — ANESTHESIA (OUTPATIENT)
Dept: PERIOP | Facility: HOSPITAL | Age: 85
End: 2020-01-01

## 2020-01-01 ENCOUNTER — READMISSION MANAGEMENT (OUTPATIENT)
Dept: CALL CENTER | Facility: HOSPITAL | Age: 85
End: 2020-01-01

## 2020-01-01 ENCOUNTER — APPOINTMENT (OUTPATIENT)
Dept: GENERAL RADIOLOGY | Facility: HOSPITAL | Age: 85
End: 2020-01-01

## 2020-01-01 ENCOUNTER — APPOINTMENT (OUTPATIENT)
Dept: CT IMAGING | Facility: HOSPITAL | Age: 85
End: 2020-01-01

## 2020-01-01 ENCOUNTER — APPOINTMENT (OUTPATIENT)
Dept: CARDIOLOGY | Facility: HOSPITAL | Age: 85
End: 2020-01-01

## 2020-01-01 ENCOUNTER — LAB REQUISITION (OUTPATIENT)
Dept: LAB | Facility: HOSPITAL | Age: 85
End: 2020-01-01

## 2020-01-01 ENCOUNTER — HOSPITAL ENCOUNTER (INPATIENT)
Facility: HOSPITAL | Age: 85
LOS: 5 days | Discharge: HOME HEALTH | End: 2020-12-15
Attending: EMERGENCY MEDICINE | Admitting: FAMILY MEDICINE

## 2020-01-01 ENCOUNTER — HOSPITAL ENCOUNTER (INPATIENT)
Facility: HOSPITAL | Age: 85
LOS: 2 days | Discharge: HOME HEALTH | End: 2020-11-17
Attending: FAMILY MEDICINE | Admitting: INTERNAL MEDICINE

## 2020-01-01 ENCOUNTER — TRANSCRIBE ORDERS (OUTPATIENT)
Dept: ADMINISTRATIVE | Facility: HOSPITAL | Age: 85
End: 2020-01-01

## 2020-01-01 ENCOUNTER — APPOINTMENT (OUTPATIENT)
Dept: MRI IMAGING | Facility: HOSPITAL | Age: 85
End: 2020-01-01

## 2020-01-01 ENCOUNTER — HOSPITAL ENCOUNTER (INPATIENT)
Facility: HOSPITAL | Age: 85
LOS: 6 days | Discharge: HOME HEALTH | End: 2020-12-27
Attending: INTERNAL MEDICINE | Admitting: ORTHOPAEDIC SURGERY

## 2020-01-01 ENCOUNTER — TELEPHONE (OUTPATIENT)
Dept: EMERGENCY DEPT | Facility: HOSPITAL | Age: 85
End: 2020-01-01

## 2020-01-01 ENCOUNTER — ANESTHESIA EVENT (OUTPATIENT)
Dept: GASTROENTEROLOGY | Facility: HOSPITAL | Age: 85
End: 2020-01-01

## 2020-01-01 ENCOUNTER — HOSPITAL ENCOUNTER (INPATIENT)
Facility: HOSPITAL | Age: 85
LOS: 15 days | Discharge: HOME HEALTH | End: 2020-11-10
Attending: INTERNAL MEDICINE | Admitting: FAMILY MEDICINE

## 2020-01-01 ENCOUNTER — ANESTHESIA (OUTPATIENT)
Dept: GASTROENTEROLOGY | Facility: HOSPITAL | Age: 85
End: 2020-01-01

## 2020-01-01 ENCOUNTER — ANESTHESIA EVENT (OUTPATIENT)
Dept: PERIOP | Facility: HOSPITAL | Age: 85
End: 2020-01-01

## 2020-01-01 ENCOUNTER — HOSPITAL ENCOUNTER (EMERGENCY)
Facility: HOSPITAL | Age: 85
Discharge: HOME | End: 2020-05-13
Attending: EMERGENCY MEDICINE | Admitting: EMERGENCY MEDICINE

## 2020-01-01 VITALS
HEART RATE: 64 BPM | SYSTOLIC BLOOD PRESSURE: 132 MMHG | RESPIRATION RATE: 18 BRPM | DIASTOLIC BLOOD PRESSURE: 72 MMHG | WEIGHT: 213.8 LBS | HEIGHT: 72 IN | OXYGEN SATURATION: 96 % | TEMPERATURE: 98.8 F | BODY MASS INDEX: 28.96 KG/M2

## 2020-01-01 VITALS
DIASTOLIC BLOOD PRESSURE: 74 MMHG | HEART RATE: 64 BPM | SYSTOLIC BLOOD PRESSURE: 131 MMHG | OXYGEN SATURATION: 97 % | TEMPERATURE: 97.8 F | HEIGHT: 70 IN | WEIGHT: 170 LBS | RESPIRATION RATE: 18 BRPM | BODY MASS INDEX: 24.34 KG/M2

## 2020-01-01 VITALS
DIASTOLIC BLOOD PRESSURE: 59 MMHG | OXYGEN SATURATION: 100 % | WEIGHT: 200 LBS | BODY MASS INDEX: 28.63 KG/M2 | HEART RATE: 61 BPM | HEIGHT: 70 IN | SYSTOLIC BLOOD PRESSURE: 122 MMHG | TEMPERATURE: 98 F | RESPIRATION RATE: 16 BRPM

## 2020-01-01 VITALS
RESPIRATION RATE: 18 BRPM | HEIGHT: 70 IN | HEART RATE: 70 BPM | DIASTOLIC BLOOD PRESSURE: 50 MMHG | BODY MASS INDEX: 29.32 KG/M2 | SYSTOLIC BLOOD PRESSURE: 147 MMHG | WEIGHT: 204.81 LBS | OXYGEN SATURATION: 98 % | TEMPERATURE: 97.9 F

## 2020-01-01 VITALS
RESPIRATION RATE: 18 BRPM | HEART RATE: 52 BPM | BODY MASS INDEX: 31.77 KG/M2 | DIASTOLIC BLOOD PRESSURE: 55 MMHG | SYSTOLIC BLOOD PRESSURE: 132 MMHG | OXYGEN SATURATION: 94 % | WEIGHT: 172.62 LBS | TEMPERATURE: 97.8 F | HEIGHT: 62 IN

## 2020-01-01 DIAGNOSIS — S01.81XA FACIAL LACERATION, INITIAL ENCOUNTER: Primary | ICD-10-CM

## 2020-01-01 DIAGNOSIS — E87.5 HYPERKALEMIA: ICD-10-CM

## 2020-01-01 DIAGNOSIS — I26.94 MULTIPLE SUBSEGMENTAL PULMONARY EMBOLI WITHOUT ACUTE COR PULMONALE (HCC): ICD-10-CM

## 2020-01-01 DIAGNOSIS — E16.2 HYPOGLYCEMIA: Primary | ICD-10-CM

## 2020-01-01 DIAGNOSIS — R77.8 ELEVATED TROPONIN: ICD-10-CM

## 2020-01-01 DIAGNOSIS — N17.9 ACUTE RENAL FAILURE SUPERIMPOSED ON STAGE 3A CHRONIC KIDNEY DISEASE, UNSPECIFIED ACUTE RENAL FAILURE TYPE (HCC): ICD-10-CM

## 2020-01-01 DIAGNOSIS — Z74.09 IMPAIRED MOBILITY: ICD-10-CM

## 2020-01-01 DIAGNOSIS — R13.12 OROPHARYNGEAL DYSPHAGIA: ICD-10-CM

## 2020-01-01 DIAGNOSIS — S72.002A CLOSED FRACTURE OF LEFT HIP, INITIAL ENCOUNTER (HCC): Primary | ICD-10-CM

## 2020-01-01 DIAGNOSIS — G93.41 METABOLIC ENCEPHALOPATHY: ICD-10-CM

## 2020-01-01 DIAGNOSIS — Z74.09 IMPAIRED MOBILITY AND ADLS: ICD-10-CM

## 2020-01-01 DIAGNOSIS — Z78.9 DECREASED ACTIVITIES OF DAILY LIVING (ADL): ICD-10-CM

## 2020-01-01 DIAGNOSIS — I50.43 CHF (CONGESTIVE HEART FAILURE), NYHA CLASS I, ACUTE ON CHRONIC, COMBINED (HCC): ICD-10-CM

## 2020-01-01 DIAGNOSIS — R19.5 HEME POSITIVE STOOL: ICD-10-CM

## 2020-01-01 DIAGNOSIS — M25.561 RIGHT KNEE PAIN, UNSPECIFIED CHRONICITY: ICD-10-CM

## 2020-01-01 DIAGNOSIS — R29.898 WEAKNESS OF EXTREMITY: ICD-10-CM

## 2020-01-01 DIAGNOSIS — M79.641 PAIN IN RIGHT HAND: Primary | ICD-10-CM

## 2020-01-01 DIAGNOSIS — N18.31 ACUTE RENAL FAILURE SUPERIMPOSED ON STAGE 3A CHRONIC KIDNEY DISEASE, UNSPECIFIED ACUTE RENAL FAILURE TYPE (HCC): ICD-10-CM

## 2020-01-01 DIAGNOSIS — E11.21 TYPE 2 DIABETES MELLITUS WITH DIABETIC NEPHROPATHY, WITHOUT LONG-TERM CURRENT USE OF INSULIN (HCC): ICD-10-CM

## 2020-01-01 DIAGNOSIS — J90 PLEURAL EFFUSION: ICD-10-CM

## 2020-01-01 DIAGNOSIS — R53.1 WEAKNESS: ICD-10-CM

## 2020-01-01 DIAGNOSIS — I50.32 CHRONIC DIASTOLIC CONGESTIVE HEART FAILURE (HCC): ICD-10-CM

## 2020-01-01 DIAGNOSIS — I26.09 OTHER ACUTE PULMONARY EMBOLISM WITH ACUTE COR PULMONALE (HCC): Primary | ICD-10-CM

## 2020-01-01 DIAGNOSIS — R26.9 GAIT ABNORMALITY: ICD-10-CM

## 2020-01-01 DIAGNOSIS — B96.20 E. COLI UTI (URINARY TRACT INFECTION): ICD-10-CM

## 2020-01-01 DIAGNOSIS — R13.12 OROPHARYNGEAL DYSPHAGIA: Primary | ICD-10-CM

## 2020-01-01 DIAGNOSIS — D64.9 ANEMIA, UNSPECIFIED TYPE: ICD-10-CM

## 2020-01-01 DIAGNOSIS — I48.0 PAROXYSMAL ATRIAL FIBRILLATION (HCC): ICD-10-CM

## 2020-01-01 DIAGNOSIS — I95.9 HYPOTENSION, UNSPECIFIED HYPOTENSION TYPE: ICD-10-CM

## 2020-01-01 DIAGNOSIS — N39.0 E. COLI UTI (URINARY TRACT INFECTION): ICD-10-CM

## 2020-01-01 DIAGNOSIS — S40.019A CONTUSION OF SHOULDER, UNSPECIFIED LATERALITY, INITIAL ENCOUNTER: ICD-10-CM

## 2020-01-01 DIAGNOSIS — S30.1XXA CONTUSION OF ABDOMINAL WALL, INITIAL ENCOUNTER: ICD-10-CM

## 2020-01-01 DIAGNOSIS — K44.9 HIATAL HERNIA: ICD-10-CM

## 2020-01-01 DIAGNOSIS — Z00.00 ENCOUNTER FOR GENERAL ADULT MEDICAL EXAMINATION WITHOUT ABNORMAL FINDINGS: ICD-10-CM

## 2020-01-01 DIAGNOSIS — D50.9 IRON DEFICIENCY ANEMIA, UNSPECIFIED IRON DEFICIENCY ANEMIA TYPE: ICD-10-CM

## 2020-01-01 DIAGNOSIS — I10 ESSENTIAL HYPERTENSION: ICD-10-CM

## 2020-01-01 DIAGNOSIS — I48.20 CHRONIC ATRIAL FIBRILLATION (HCC): ICD-10-CM

## 2020-01-01 DIAGNOSIS — R19.7 DIARRHEA, UNSPECIFIED TYPE: Primary | ICD-10-CM

## 2020-01-01 DIAGNOSIS — Z78.9 IMPAIRED MOBILITY AND ADLS: ICD-10-CM

## 2020-01-01 LAB
ABO GROUP BLD: NORMAL
ALBUMIN SERPL-MCNC: 2 G/DL (ref 3.5–5.2)
ALBUMIN SERPL-MCNC: 2.1 G/DL (ref 3.5–5.2)
ALBUMIN SERPL-MCNC: 2.2 G/DL (ref 3.5–5.2)
ALBUMIN SERPL-MCNC: 2.2 G/DL (ref 3.5–5.2)
ALBUMIN SERPL-MCNC: 2.4 G/DL (ref 3.5–5.2)
ALBUMIN SERPL-MCNC: 2.8 G/DL (ref 3.5–5.2)
ALBUMIN SERPL-MCNC: 3.1 G/DL (ref 3.5–5.2)
ALBUMIN SERPL-MCNC: 3.6 G/DL (ref 3.5–5.2)
ALBUMIN/GLOB SERPL: 0.7 G/DL
ALBUMIN/GLOB SERPL: 0.8 G/DL
ALBUMIN/GLOB SERPL: 0.8 G/DL
ALBUMIN/GLOB SERPL: 0.9 G/DL
ALBUMIN/GLOB SERPL: 0.9 G/DL
ALBUMIN/GLOB SERPL: 1 G/DL
ALBUMIN/GLOB SERPL: 1.2 G/DL
ALBUMIN/GLOB SERPL: 1.3 G/DL
ALP SERPL-CCNC: 105 U/L (ref 39–117)
ALP SERPL-CCNC: 113 U/L (ref 39–117)
ALP SERPL-CCNC: 60 U/L (ref 39–117)
ALP SERPL-CCNC: 66 U/L (ref 39–117)
ALP SERPL-CCNC: 70 U/L (ref 39–117)
ALP SERPL-CCNC: 78 U/L (ref 39–117)
ALP SERPL-CCNC: 78 U/L (ref 39–117)
ALP SERPL-CCNC: 79 U/L (ref 39–117)
ALP SERPL-CCNC: 86 U/L (ref 39–117)
ALP SERPL-CCNC: 93 U/L (ref 39–117)
ALP SERPL-CCNC: 95 U/L (ref 39–117)
ALT SERPL W P-5'-P-CCNC: 19 U/L (ref 1–41)
ALT SERPL W P-5'-P-CCNC: 20 U/L (ref 1–41)
ALT SERPL W P-5'-P-CCNC: 21 U/L (ref 1–41)
ALT SERPL W P-5'-P-CCNC: 26 U/L (ref 1–41)
ALT SERPL W P-5'-P-CCNC: 28 U/L (ref 1–41)
ALT SERPL W P-5'-P-CCNC: 29 U/L (ref 1–41)
ALT SERPL W P-5'-P-CCNC: 31 U/L (ref 1–41)
ANION GAP SERPL CALCULATED.3IONS-SCNC: 10 MMOL/L (ref 5–15)
ANION GAP SERPL CALCULATED.3IONS-SCNC: 10 MMOL/L (ref 5–15)
ANION GAP SERPL CALCULATED.3IONS-SCNC: 13 MMOL/L (ref 5–15)
ANION GAP SERPL CALCULATED.3IONS-SCNC: 3 MMOL/L (ref 5–15)
ANION GAP SERPL CALCULATED.3IONS-SCNC: 4 MMOL/L (ref 5–15)
ANION GAP SERPL CALCULATED.3IONS-SCNC: 5 MMOL/L (ref 5–15)
ANION GAP SERPL CALCULATED.3IONS-SCNC: 6 MMOL/L (ref 5–15)
ANION GAP SERPL CALCULATED.3IONS-SCNC: 7 MMOL/L (ref 5–15)
ANION GAP SERPL CALCULATED.3IONS-SCNC: 8 MMOL/L (ref 5–15)
ANION GAP SERPL CALCULATED.3IONS-SCNC: 8 MMOL/L (ref 5–15)
ANION GAP SERPL CALCULATED.3IONS-SCNC: 9 MMOL/L (ref 5–15)
ANION GAP SERPL CALCULATED.3IONS-SCNC: 9 MMOL/L (ref 5–15)
ANISOCYTOSIS BLD QL: ABNORMAL
ANISOCYTOSIS BLD QL: ABNORMAL
APTT PPP: 23.8 SECONDS (ref 24.1–35)
APTT PPP: 28.1 SECONDS (ref 24.1–35)
APTT PPP: 30.1 SECONDS (ref 24.1–35)
APTT PPP: <20 SECONDS (ref 24.1–35)
ARTERIAL PATENCY WRIST A: POSITIVE
ARTERIAL PATENCY WRIST A: POSITIVE
AST SERPL-CCNC: 18 U/L (ref 1–40)
AST SERPL-CCNC: 20 U/L (ref 1–40)
AST SERPL-CCNC: 22 U/L (ref 1–40)
AST SERPL-CCNC: 23 U/L (ref 1–40)
AST SERPL-CCNC: 26 U/L (ref 1–40)
AST SERPL-CCNC: 30 U/L (ref 1–40)
AST SERPL-CCNC: 31 U/L (ref 1–40)
AST SERPL-CCNC: 35 U/L (ref 1–40)
AST SERPL-CCNC: 38 U/L (ref 1–40)
AST SERPL-CCNC: 40 U/L (ref 1–40)
AST SERPL-CCNC: 45 U/L (ref 1–40)
ATMOSPHERIC PRESS: 750 MMHG
ATMOSPHERIC PRESS: 755 MMHG
BACTERIA SPEC AEROBE CULT: ABNORMAL
BACTERIA SPEC AEROBE CULT: ABNORMAL
BACTERIA SPEC AEROBE CULT: NO GROWTH
BACTERIA SPEC AEROBE CULT: NORMAL
BACTERIA UR QL AUTO: ABNORMAL /HPF
BASE EXCESS BLDA CALC-SCNC: -2 MMOL/L (ref 0–2)
BASE EXCESS BLDA CALC-SCNC: 4.1 MMOL/L (ref 0–2)
BASOPHILS # BLD AUTO: 0.01 10*3/MM3 (ref 0–0.2)
BASOPHILS # BLD AUTO: 0.02 10*3/MM3 (ref 0–0.2)
BASOPHILS NFR BLD AUTO: 0.1 % (ref 0–1.5)
BASOPHILS NFR BLD AUTO: 0.2 % (ref 0–1.5)
BASOPHILS NFR BLD AUTO: 0.3 % (ref 0–1.5)
BASOPHILS NFR BLD AUTO: 0.3 % (ref 0–1.5)
BDY SITE: ABNORMAL
BDY SITE: ABNORMAL
BH BB BLOOD EXPIRATION DATE: NORMAL
BH BB BLOOD TYPE BARCODE: 7300
BH BB DISPENSE STATUS: NORMAL
BH BB PRODUCT CODE: NORMAL
BH BB UNIT NUMBER: NORMAL
BH CV ECHO MEAS - AO MAX PG (FULL): 36.2 MMHG
BH CV ECHO MEAS - AO MAX PG: 43.6 MMHG
BH CV ECHO MEAS - AO MEAN PG (FULL): 16.7 MMHG
BH CV ECHO MEAS - AO MEAN PG: 24 MMHG
BH CV ECHO MEAS - AO ROOT AREA (BSA CORRECTED): 1.9
BH CV ECHO MEAS - AO ROOT AREA: 9.1 CM^2
BH CV ECHO MEAS - AO ROOT DIAM: 3.4 CM
BH CV ECHO MEAS - AO V2 MAX: 330 CM/SEC
BH CV ECHO MEAS - AO V2 MEAN: 217.3 CM/SEC
BH CV ECHO MEAS - AO V2 VTI: 73.2 CM
BH CV ECHO MEAS - AVA(I,A): 1.6 CM^2
BH CV ECHO MEAS - AVA(I,D): 1.3 CM^2
BH CV ECHO MEAS - AVA(V,A): 1.3 CM^2
BH CV ECHO MEAS - AVA(V,D): 1.3 CM^2
BH CV ECHO MEAS - BSA(HAYCOCK): 1.9 M^2
BH CV ECHO MEAS - BSA: 1.8 M^2
BH CV ECHO MEAS - BZI_BMI: 31.5 KILOGRAMS/M^2
BH CV ECHO MEAS - BZI_METRIC_HEIGHT: 157.5 CM
BH CV ECHO MEAS - BZI_METRIC_WEIGHT: 78 KG
BH CV ECHO MEAS - EDV(CUBED): 150.6 ML
BH CV ECHO MEAS - EDV(MOD-SP4): 97.2 ML
BH CV ECHO MEAS - EDV(TEICH): 136.5 ML
BH CV ECHO MEAS - EF(CUBED): 73.4 %
BH CV ECHO MEAS - EF(MOD-SP4): 54.1 %
BH CV ECHO MEAS - EF(TEICH): 64.8 %
BH CV ECHO MEAS - ESV(CUBED): 40 ML
BH CV ECHO MEAS - ESV(MOD-SP4): 44.6 ML
BH CV ECHO MEAS - ESV(TEICH): 48.1 ML
BH CV ECHO MEAS - FS: 35.7 %
BH CV ECHO MEAS - IVS/LVPW: 0.94
BH CV ECHO MEAS - IVSD: 1.2 CM
BH CV ECHO MEAS - LA DIMENSION: 4.8 CM
BH CV ECHO MEAS - LA/AO: 1.4
BH CV ECHO MEAS - LAT PEAK E' VEL: 10.1 CM/SEC
BH CV ECHO MEAS - LV DIASTOLIC VOL/BSA (35-75): 54.2 ML/M^2
BH CV ECHO MEAS - LV MASS(C)D: 253.7 GRAMS
BH CV ECHO MEAS - LV MASS(C)DI: 141.5 GRAMS/M^2
BH CV ECHO MEAS - LV MAX PG: 7.3 MMHG
BH CV ECHO MEAS - LV MEAN PG: 5 MMHG
BH CV ECHO MEAS - LV SYSTOLIC VOL/BSA (12-30): 24.9 ML/M^2
BH CV ECHO MEAS - LV V1 MAX: 135.3 CM/SEC
BH CV ECHO MEAS - LV V1 MEAN: 107 CM/SEC
BH CV ECHO MEAS - LV V1 VTI: 36.4 CM
BH CV ECHO MEAS - LVIDD: 5.3 CM
BH CV ECHO MEAS - LVIDS: 3.4 CM
BH CV ECHO MEAS - LVLD AP4: 8.9 CM
BH CV ECHO MEAS - LVLS AP4: 7.5 CM
BH CV ECHO MEAS - LVOT AREA (M): 3.1 CM^2
BH CV ECHO MEAS - LVOT AREA: 3.1 CM^2
BH CV ECHO MEAS - LVOT DIAM: 2 CM
BH CV ECHO MEAS - LVPWD: 1.2 CM
BH CV ECHO MEAS - MED PEAK E' VEL: 4.9 CM/SEC
BH CV ECHO MEAS - MV A MAX VEL: 156 CM/SEC
BH CV ECHO MEAS - MV DEC SLOPE: 199 CM/SEC^2
BH CV ECHO MEAS - MV DEC TIME: 0.41 SEC
BH CV ECHO MEAS - MV E MAX VEL: 78.5 CM/SEC
BH CV ECHO MEAS - MV E/A: 0.5
BH CV ECHO MEAS - MV MAX PG: 12.7 MMHG
BH CV ECHO MEAS - MV MEAN PG: 4 MMHG
BH CV ECHO MEAS - MV P1/2T MAX VEL: 93.8 CM/SEC
BH CV ECHO MEAS - MV P1/2T: 138.1 MSEC
BH CV ECHO MEAS - MV V2 MAX: 178 CM/SEC
BH CV ECHO MEAS - MV V2 MEAN: 89.8 CM/SEC
BH CV ECHO MEAS - MV V2 VTI: 51 CM
BH CV ECHO MEAS - MVA P1/2T LCG: 2.3 CM^2
BH CV ECHO MEAS - MVA(P1/2T): 1.6 CM^2
BH CV ECHO MEAS - MVA(VTI): 2.2 CM^2
BH CV ECHO MEAS - PA MAX PG: 4.8 MMHG
BH CV ECHO MEAS - PA V2 MAX: 109 CM/SEC
BH CV ECHO MEAS - PI END-D VEL: 121 CM/SEC
BH CV ECHO MEAS - RAP SYSTOLE: 5 MMHG
BH CV ECHO MEAS - RVSP: 36.1 MMHG
BH CV ECHO MEAS - SI(AO): 370.9 ML/M^2
BH CV ECHO MEAS - SI(CUBED): 61.7 ML/M^2
BH CV ECHO MEAS - SI(LVOT): 63.8 ML/M^2
BH CV ECHO MEAS - SI(MOD-SP4): 29.3 ML/M^2
BH CV ECHO MEAS - SI(TEICH): 49.3 ML/M^2
BH CV ECHO MEAS - SV(AO): 664.9 ML
BH CV ECHO MEAS - SV(CUBED): 110.6 ML
BH CV ECHO MEAS - SV(LVOT): 114.4 ML
BH CV ECHO MEAS - SV(MOD-SP4): 52.6 ML
BH CV ECHO MEAS - SV(TEICH): 88.4 ML
BH CV ECHO MEAS - TR MAX VEL: 279 CM/SEC
BH CV ECHO MEASUREMENTS AVERAGE E/E' RATIO: 10.47
BILIRUB SERPL-MCNC: 0.2 MG/DL (ref 0.2–1.2)
BILIRUB SERPL-MCNC: 0.2 MG/DL (ref 0–1.2)
BILIRUB SERPL-MCNC: 0.3 MG/DL (ref 0–1.2)
BILIRUB SERPL-MCNC: 0.4 MG/DL (ref 0–1.2)
BILIRUB UR QL STRIP: NEGATIVE
BLD GP AB SCN SERPL QL: NEGATIVE
BODY TEMPERATURE: 37 C
BODY TEMPERATURE: 37 C
BUN BLD-MCNC: 71 MG/DL (ref 8–23)
BUN SERPL-MCNC: 13 MG/DL (ref 8–23)
BUN SERPL-MCNC: 16 MG/DL (ref 8–23)
BUN SERPL-MCNC: 16 MG/DL (ref 8–23)
BUN SERPL-MCNC: 21 MG/DL (ref 8–23)
BUN SERPL-MCNC: 24 MG/DL (ref 8–23)
BUN SERPL-MCNC: 24 MG/DL (ref 8–23)
BUN SERPL-MCNC: 25 MG/DL (ref 8–23)
BUN SERPL-MCNC: 29 MG/DL (ref 8–23)
BUN SERPL-MCNC: 30 MG/DL (ref 8–23)
BUN SERPL-MCNC: 31 MG/DL (ref 8–23)
BUN SERPL-MCNC: 33 MG/DL (ref 8–23)
BUN SERPL-MCNC: 34 MG/DL (ref 8–23)
BUN SERPL-MCNC: 35 MG/DL (ref 8–23)
BUN SERPL-MCNC: 36 MG/DL (ref 8–23)
BUN SERPL-MCNC: 37 MG/DL (ref 8–23)
BUN SERPL-MCNC: 38 MG/DL (ref 8–23)
BUN SERPL-MCNC: 40 MG/DL (ref 8–23)
BUN SERPL-MCNC: 43 MG/DL (ref 8–23)
BUN SERPL-MCNC: 53 MG/DL (ref 8–23)
BUN SERPL-MCNC: 59 MG/DL (ref 8–23)
BUN SERPL-MCNC: 60 MG/DL (ref 8–23)
BUN SERPL-MCNC: 65 MG/DL (ref 8–23)
BUN SERPL-MCNC: 69 MG/DL (ref 8–23)
BUN SERPL-MCNC: 80 MG/DL (ref 8–23)
BUN SERPL-MCNC: 82 MG/DL (ref 8–23)
BUN SERPL-MCNC: 84 MG/DL (ref 8–23)
BUN SERPL-MCNC: 84 MG/DL (ref 8–23)
BUN/CREAT SERPL: 14.9 (ref 7–25)
BUN/CREAT SERPL: 16.8 (ref 7–25)
BUN/CREAT SERPL: 17.6 (ref 7–25)
BUN/CREAT SERPL: 19 (ref 7–25)
BUN/CREAT SERPL: 21.4 (ref 7–25)
BUN/CREAT SERPL: 21.9 (ref 7–25)
BUN/CREAT SERPL: 22.3 (ref 7–25)
BUN/CREAT SERPL: 22.6 (ref 7–25)
BUN/CREAT SERPL: 23.7 (ref 7–25)
BUN/CREAT SERPL: 24 (ref 7–25)
BUN/CREAT SERPL: 24.6 (ref 7–25)
BUN/CREAT SERPL: 25.9 (ref 7–25)
BUN/CREAT SERPL: 26.6 (ref 7–25)
BUN/CREAT SERPL: 27.1 (ref 7–25)
BUN/CREAT SERPL: 27.3 (ref 7–25)
BUN/CREAT SERPL: 27.6 (ref 7–25)
BUN/CREAT SERPL: 29.9 (ref 7–25)
BUN/CREAT SERPL: 31.8 (ref 7–25)
BUN/CREAT SERPL: 33 (ref 7–25)
BUN/CREAT SERPL: 34.6 (ref 7–25)
BUN/CREAT SERPL: 36.3 (ref 7–25)
BUN/CREAT SERPL: 36.9 (ref 7–25)
BUN/CREAT SERPL: 37.1 (ref 7–25)
BUN/CREAT SERPL: 38.3 (ref 7–25)
BUN/CREAT SERPL: 39.3 (ref 7–25)
BUN/CREAT SERPL: 39.3 (ref 7–25)
BUN/CREAT SERPL: 42.3 (ref 7–25)
BUN/CREAT SERPL: 42.6 (ref 7–25)
BUN/CREAT SERPL: 43.7 (ref 7–25)
BUN/CREAT SERPL: 44.2 (ref 7–25)
BUN/CREAT SERPL: 44.4 (ref 7–25)
BUN/CREAT SERPL: 49.2 (ref 7–25)
CALCIUM SPEC-SCNC: 10 MG/DL (ref 8.6–10.5)
CALCIUM SPEC-SCNC: 10.1 MG/DL (ref 8.6–10.5)
CALCIUM SPEC-SCNC: 10.2 MG/DL (ref 8.6–10.5)
CALCIUM SPEC-SCNC: 10.4 MG/DL (ref 8.6–10.5)
CALCIUM SPEC-SCNC: 10.6 MG/DL (ref 8.6–10.5)
CALCIUM SPEC-SCNC: 10.6 MG/DL (ref 8.6–10.5)
CALCIUM SPEC-SCNC: 10.7 MG/DL (ref 8.6–10.5)
CALCIUM SPEC-SCNC: 11 MG/DL (ref 8.6–10.5)
CALCIUM SPEC-SCNC: 8.6 MG/DL (ref 8.6–10.5)
CALCIUM SPEC-SCNC: 8.6 MG/DL (ref 8.6–10.5)
CALCIUM SPEC-SCNC: 8.7 MG/DL (ref 8.6–10.5)
CALCIUM SPEC-SCNC: 8.8 MG/DL (ref 8.6–10.5)
CALCIUM SPEC-SCNC: 8.8 MG/DL (ref 8.6–10.5)
CALCIUM SPEC-SCNC: 8.9 MG/DL (ref 8.6–10.5)
CALCIUM SPEC-SCNC: 8.9 MG/DL (ref 8.6–10.5)
CALCIUM SPEC-SCNC: 9 MG/DL (ref 8.6–10.5)
CALCIUM SPEC-SCNC: 9.4 MG/DL (ref 8.6–10.5)
CALCIUM SPEC-SCNC: 9.5 MG/DL (ref 8.6–10.5)
CALCIUM SPEC-SCNC: 9.6 MG/DL (ref 8.6–10.5)
CALCIUM SPEC-SCNC: 9.7 MG/DL (ref 8.6–10.5)
CALCIUM SPEC-SCNC: 9.7 MG/DL (ref 8.6–10.5)
CALCIUM SPEC-SCNC: 9.8 MG/DL (ref 8.6–10.5)
CALCIUM SPEC-SCNC: 9.9 MG/DL (ref 8.6–10.5)
CHLORIDE SERPL-SCNC: 100 MMOL/L (ref 98–107)
CHLORIDE SERPL-SCNC: 100 MMOL/L (ref 98–107)
CHLORIDE SERPL-SCNC: 104 MMOL/L (ref 98–107)
CHLORIDE SERPL-SCNC: 106 MMOL/L (ref 98–107)
CHLORIDE SERPL-SCNC: 108 MMOL/L (ref 98–107)
CHLORIDE SERPL-SCNC: 109 MMOL/L (ref 98–107)
CHLORIDE SERPL-SCNC: 110 MMOL/L (ref 98–107)
CHLORIDE SERPL-SCNC: 111 MMOL/L (ref 98–107)
CHLORIDE SERPL-SCNC: 112 MMOL/L (ref 98–107)
CHLORIDE SERPL-SCNC: 113 MMOL/L (ref 98–107)
CHLORIDE SERPL-SCNC: 114 MMOL/L (ref 98–107)
CHLORIDE SERPL-SCNC: 115 MMOL/L (ref 98–107)
CHLORIDE SERPL-SCNC: 117 MMOL/L (ref 98–107)
CHLORIDE SERPL-SCNC: 117 MMOL/L (ref 98–107)
CHLORIDE SERPL-SCNC: 119 MMOL/L (ref 98–107)
CHLORIDE SERPL-SCNC: 120 MMOL/L (ref 98–107)
CHLORIDE SERPL-SCNC: 121 MMOL/L (ref 98–107)
CHLORIDE SERPL-SCNC: 122 MMOL/L (ref 98–107)
CHLORIDE SERPL-SCNC: 123 MMOL/L (ref 98–107)
CHLORIDE SERPL-SCNC: 123 MMOL/L (ref 98–107)
CHOLEST SERPL-MCNC: 94 MG/DL (ref 0–200)
CK SERPL-CCNC: 50 U/L (ref 20–200)
CK SERPL-CCNC: 54 U/L (ref 20–200)
CLARITY UR: ABNORMAL
CLARITY UR: CLEAR
CLARITY UR: CLEAR
CO2 SERPL-SCNC: 21 MMOL/L (ref 22–29)
CO2 SERPL-SCNC: 22 MMOL/L (ref 22–29)
CO2 SERPL-SCNC: 23 MMOL/L (ref 22–29)
CO2 SERPL-SCNC: 24 MMOL/L (ref 22–29)
CO2 SERPL-SCNC: 25 MMOL/L (ref 22–29)
CO2 SERPL-SCNC: 26 MMOL/L (ref 22–29)
CO2 SERPL-SCNC: 26 MMOL/L (ref 22–29)
CO2 SERPL-SCNC: 27 MMOL/L (ref 22–29)
CO2 SERPL-SCNC: 27 MMOL/L (ref 22–29)
CO2 SERPL-SCNC: 30 MMOL/L (ref 22–29)
COLOR UR: ABNORMAL
COLOR UR: YELLOW
COLOR UR: YELLOW
CREAT BLD-MCNC: 1.68 MG/DL (ref 0.76–1.27)
CREAT SERPL-MCNC: 0.77 MG/DL (ref 0.76–1.27)
CREAT SERPL-MCNC: 0.8 MG/DL (ref 0.76–1.27)
CREAT SERPL-MCNC: 0.84 MG/DL (ref 0.76–1.27)
CREAT SERPL-MCNC: 0.86 MG/DL (ref 0.76–1.27)
CREAT SERPL-MCNC: 0.87 MG/DL (ref 0.76–1.27)
CREAT SERPL-MCNC: 0.88 MG/DL (ref 0.76–1.27)
CREAT SERPL-MCNC: 0.89 MG/DL (ref 0.76–1.27)
CREAT SERPL-MCNC: 0.9 MG/DL (ref 0.76–1.27)
CREAT SERPL-MCNC: 0.95 MG/DL (ref 0.76–1.27)
CREAT SERPL-MCNC: 1 MG/DL (ref 0.76–1.27)
CREAT SERPL-MCNC: 1.01 MG/DL (ref 0.76–1.27)
CREAT SERPL-MCNC: 1.17 MG/DL (ref 0.76–1.27)
CREAT SERPL-MCNC: 1.22 MG/DL (ref 0.76–1.27)
CREAT SERPL-MCNC: 1.27 MG/DL (ref 0.76–1.27)
CREAT SERPL-MCNC: 1.3 MG/DL (ref 0.76–1.27)
CREAT SERPL-MCNC: 1.33 MG/DL (ref 0.76–1.27)
CREAT SERPL-MCNC: 1.35 MG/DL (ref 0.76–1.27)
CREAT SERPL-MCNC: 1.36 MG/DL (ref 0.76–1.27)
CREAT SERPL-MCNC: 1.37 MG/DL (ref 0.76–1.27)
CREAT SERPL-MCNC: 1.37 MG/DL (ref 0.76–1.27)
CREAT SERPL-MCNC: 1.38 MG/DL (ref 0.76–1.27)
CREAT SERPL-MCNC: 1.39 MG/DL (ref 0.76–1.27)
CREAT SERPL-MCNC: 1.39 MG/DL (ref 0.76–1.27)
CREAT SERPL-MCNC: 1.75 MG/DL (ref 0.76–1.27)
CREAT SERPL-MCNC: 1.8 MG/DL (ref 0.76–1.27)
CREAT SERPL-MCNC: 2.17 MG/DL (ref 0.76–1.27)
CREAT SERPL-MCNC: 2.43 MG/DL (ref 0.76–1.27)
CREAT SERPL-MCNC: 2.64 MG/DL (ref 0.76–1.27)
CREAT SERPL-MCNC: 2.74 MG/DL (ref 0.76–1.27)
CREAT UR-MCNC: 78.4 MG/DL
CROSSMATCH INTERPRETATION: NORMAL
CRP SERPL-MCNC: 3.31 MG/DL (ref 0–0.5)
D DIMER PPP FEU-MCNC: 2.48 MG/L (FEU) (ref 0–0.5)
D-LACTATE SERPL-SCNC: 0.8 MMOL/L (ref 0.5–2)
D-LACTATE SERPL-SCNC: 0.9 MMOL/L (ref 0.5–2)
D-LACTATE SERPL-SCNC: 1.4 MMOL/L (ref 0.5–2)
DEPRECATED RDW RBC AUTO: 50.7 FL (ref 37–54)
DEPRECATED RDW RBC AUTO: 56.5 FL (ref 37–54)
DEPRECATED RDW RBC AUTO: 58.6 FL (ref 37–54)
DEPRECATED RDW RBC AUTO: 58.7 FL (ref 37–54)
DEPRECATED RDW RBC AUTO: 59.7 FL (ref 37–54)
DEPRECATED RDW RBC AUTO: 59.8 FL (ref 37–54)
DEPRECATED RDW RBC AUTO: 60.6 FL (ref 37–54)
DEPRECATED RDW RBC AUTO: 61.9 FL (ref 37–54)
DEPRECATED RDW RBC AUTO: 62.7 FL (ref 37–54)
DEPRECATED RDW RBC AUTO: 62.9 FL (ref 37–54)
DEPRECATED RDW RBC AUTO: 64.3 FL (ref 37–54)
DEPRECATED RDW RBC AUTO: 64.6 FL (ref 37–54)
DEPRECATED RDW RBC AUTO: 64.6 FL (ref 37–54)
DEPRECATED RDW RBC AUTO: 64.7 FL (ref 37–54)
DEPRECATED RDW RBC AUTO: 65.1 FL (ref 37–54)
DEPRECATED RDW RBC AUTO: 65.3 FL (ref 37–54)
DEPRECATED RDW RBC AUTO: 65.4 FL (ref 37–54)
DEPRECATED RDW RBC AUTO: 65.6 FL (ref 37–54)
DEPRECATED RDW RBC AUTO: 65.9 FL (ref 37–54)
DEPRECATED RDW RBC AUTO: 65.9 FL (ref 37–54)
DEPRECATED RDW RBC AUTO: 66.6 FL (ref 37–54)
DEPRECATED RDW RBC AUTO: 67.7 FL (ref 37–54)
DEPRECATED RDW RBC AUTO: 67.8 FL (ref 37–54)
DEPRECATED RDW RBC AUTO: 70.4 FL (ref 37–54)
DEPRECATED RDW RBC AUTO: 71.7 FL (ref 37–54)
DEPRECATED RDW RBC AUTO: 71.7 FL (ref 37–54)
DEPRECATED RDW RBC AUTO: 74 FL (ref 37–54)
DEPRECATED RDW RBC AUTO: 74.3 FL (ref 37–54)
DEPRECATED RDW RBC AUTO: 76.4 FL (ref 37–54)
DEVELOPER EXPIRATION DATE: NORMAL
DEVELOPER LOT NUMBER: 185
EOSINOPHIL # BLD AUTO: 0.01 10*3/MM3 (ref 0–0.4)
EOSINOPHIL # BLD AUTO: 0.05 10*3/MM3 (ref 0–0.4)
EOSINOPHIL # BLD AUTO: 0.05 10*3/MM3 (ref 0–0.4)
EOSINOPHIL # BLD AUTO: 0.07 10*3/MM3 (ref 0–0.4)
EOSINOPHIL # BLD AUTO: 0.15 10*3/MM3 (ref 0–0.4)
EOSINOPHIL # BLD AUTO: 0.19 10*3/MM3 (ref 0–0.4)
EOSINOPHIL # BLD AUTO: 0.19 10*3/MM3 (ref 0–0.4)
EOSINOPHIL # BLD AUTO: 0.22 10*3/MM3 (ref 0–0.4)
EOSINOPHIL # BLD AUTO: 0.22 10*3/MM3 (ref 0–0.4)
EOSINOPHIL # BLD AUTO: 0.23 10*3/MM3 (ref 0–0.4)
EOSINOPHIL # BLD AUTO: 0.41 10*3/MM3 (ref 0–0.4)
EOSINOPHIL # BLD MANUAL: 0.58 10*3/MM3 (ref 0–0.4)
EOSINOPHIL NFR BLD AUTO: 0.1 % (ref 0.3–6.2)
EOSINOPHIL NFR BLD AUTO: 0.3 % (ref 0.3–6.2)
EOSINOPHIL NFR BLD AUTO: 0.6 % (ref 0.3–6.2)
EOSINOPHIL NFR BLD AUTO: 0.7 % (ref 0.3–6.2)
EOSINOPHIL NFR BLD AUTO: 1.8 % (ref 0.3–6.2)
EOSINOPHIL NFR BLD AUTO: 2.3 % (ref 0.3–6.2)
EOSINOPHIL NFR BLD AUTO: 2.5 % (ref 0.3–6.2)
EOSINOPHIL NFR BLD AUTO: 2.5 % (ref 0.3–6.2)
EOSINOPHIL NFR BLD AUTO: 2.6 % (ref 0.3–6.2)
EOSINOPHIL NFR BLD AUTO: 3 % (ref 0.3–6.2)
EOSINOPHIL NFR BLD AUTO: 4.7 % (ref 0.3–6.2)
EOSINOPHIL NFR BLD MANUAL: 7.1 % (ref 0.3–6.2)
ERYTHROCYTE [DISTWIDTH] IN BLOOD BY AUTOMATED COUNT: 14.6 % (ref 12.3–15.4)
ERYTHROCYTE [DISTWIDTH] IN BLOOD BY AUTOMATED COUNT: 15.9 % (ref 12.3–15.4)
ERYTHROCYTE [DISTWIDTH] IN BLOOD BY AUTOMATED COUNT: 16 % (ref 12.3–15.4)
ERYTHROCYTE [DISTWIDTH] IN BLOOD BY AUTOMATED COUNT: 16.9 % (ref 12.3–15.4)
ERYTHROCYTE [DISTWIDTH] IN BLOOD BY AUTOMATED COUNT: 17.2 % (ref 12.3–15.4)
ERYTHROCYTE [DISTWIDTH] IN BLOOD BY AUTOMATED COUNT: 17.2 % (ref 12.3–15.4)
ERYTHROCYTE [DISTWIDTH] IN BLOOD BY AUTOMATED COUNT: 17.8 % (ref 12.3–15.4)
ERYTHROCYTE [DISTWIDTH] IN BLOOD BY AUTOMATED COUNT: 17.8 % (ref 12.3–15.4)
ERYTHROCYTE [DISTWIDTH] IN BLOOD BY AUTOMATED COUNT: 18.2 % (ref 12.3–15.4)
ERYTHROCYTE [DISTWIDTH] IN BLOOD BY AUTOMATED COUNT: 18.6 % (ref 12.3–15.4)
ERYTHROCYTE [DISTWIDTH] IN BLOOD BY AUTOMATED COUNT: 18.7 % (ref 12.3–15.4)
ERYTHROCYTE [DISTWIDTH] IN BLOOD BY AUTOMATED COUNT: 18.9 % (ref 12.3–15.4)
ERYTHROCYTE [DISTWIDTH] IN BLOOD BY AUTOMATED COUNT: 19 % (ref 12.3–15.4)
ERYTHROCYTE [DISTWIDTH] IN BLOOD BY AUTOMATED COUNT: 19.1 % (ref 12.3–15.4)
ERYTHROCYTE [DISTWIDTH] IN BLOOD BY AUTOMATED COUNT: 19.1 % (ref 12.3–15.4)
ERYTHROCYTE [DISTWIDTH] IN BLOOD BY AUTOMATED COUNT: 19.2 % (ref 12.3–15.4)
ERYTHROCYTE [DISTWIDTH] IN BLOOD BY AUTOMATED COUNT: 19.3 % (ref 12.3–15.4)
ERYTHROCYTE [DISTWIDTH] IN BLOOD BY AUTOMATED COUNT: 19.8 % (ref 12.3–15.4)
ERYTHROCYTE [DISTWIDTH] IN BLOOD BY AUTOMATED COUNT: 20 % (ref 12.3–15.4)
ERYTHROCYTE [DISTWIDTH] IN BLOOD BY AUTOMATED COUNT: 21.2 % (ref 12.3–15.4)
ERYTHROCYTE [DISTWIDTH] IN BLOOD BY AUTOMATED COUNT: 21.8 % (ref 12.3–15.4)
ERYTHROCYTE [DISTWIDTH] IN BLOOD BY AUTOMATED COUNT: 22 % (ref 12.3–15.4)
ERYTHROCYTE [DISTWIDTH] IN BLOOD BY AUTOMATED COUNT: 22.7 % (ref 12.3–15.4)
ERYTHROCYTE [DISTWIDTH] IN BLOOD BY AUTOMATED COUNT: 23.9 % (ref 12.3–15.4)
ERYTHROCYTE [DISTWIDTH] IN BLOOD BY AUTOMATED COUNT: 24.7 % (ref 12.3–15.4)
ERYTHROCYTE [SEDIMENTATION RATE] IN BLOOD: 9 MM/HR (ref 0–15)
EXPIRATION DATE: NORMAL
FECAL OCCULT BLOOD SCREEN, POC: NEGATIVE
FERRITIN SERPL-MCNC: 492.7 NG/ML (ref 30–400)
FOLATE SERPL-MCNC: 19.5 NG/ML (ref 4.78–24.2)
GFR SERPL CREATININE-BSD FRML MDRD: 22 ML/MIN/1.73
GFR SERPL CREATININE-BSD FRML MDRD: 23 ML/MIN/1.73
GFR SERPL CREATININE-BSD FRML MDRD: 25 ML/MIN/1.73
GFR SERPL CREATININE-BSD FRML MDRD: 29 ML/MIN/1.73
GFR SERPL CREATININE-BSD FRML MDRD: 36 ML/MIN/1.73
GFR SERPL CREATININE-BSD FRML MDRD: 37 ML/MIN/1.73
GFR SERPL CREATININE-BSD FRML MDRD: 39 ML/MIN/1.73
GFR SERPL CREATININE-BSD FRML MDRD: 48 ML/MIN/1.73
GFR SERPL CREATININE-BSD FRML MDRD: 48 ML/MIN/1.73
GFR SERPL CREATININE-BSD FRML MDRD: 49 ML/MIN/1.73
GFR SERPL CREATININE-BSD FRML MDRD: 50 ML/MIN/1.73
GFR SERPL CREATININE-BSD FRML MDRD: 51 ML/MIN/1.73
GFR SERPL CREATININE-BSD FRML MDRD: 52 ML/MIN/1.73
GFR SERPL CREATININE-BSD FRML MDRD: 54 ML/MIN/1.73
GFR SERPL CREATININE-BSD FRML MDRD: 56 ML/MIN/1.73
GFR SERPL CREATININE-BSD FRML MDRD: 59 ML/MIN/1.73
GFR SERPL CREATININE-BSD FRML MDRD: 70 ML/MIN/1.73
GFR SERPL CREATININE-BSD FRML MDRD: 71 ML/MIN/1.73
GFR SERPL CREATININE-BSD FRML MDRD: 76 ML/MIN/1.73
GFR SERPL CREATININE-BSD FRML MDRD: 80 ML/MIN/1.73
GFR SERPL CREATININE-BSD FRML MDRD: 81 ML/MIN/1.73
GFR SERPL CREATININE-BSD FRML MDRD: 82 ML/MIN/1.73
GFR SERPL CREATININE-BSD FRML MDRD: 83 ML/MIN/1.73
GFR SERPL CREATININE-BSD FRML MDRD: 84 ML/MIN/1.73
GFR SERPL CREATININE-BSD FRML MDRD: 87 ML/MIN/1.73
GFR SERPL CREATININE-BSD FRML MDRD: 92 ML/MIN/1.73
GFR SERPL CREATININE-BSD FRML MDRD: 92 ML/MIN/1.73
GFR SERPL CREATININE-BSD FRML MDRD: 96 ML/MIN/1.73
GLOBULIN UR ELPH-MCNC: 2 GM/DL
GLOBULIN UR ELPH-MCNC: 2.2 GM/DL
GLOBULIN UR ELPH-MCNC: 2.3 GM/DL
GLOBULIN UR ELPH-MCNC: 2.4 GM/DL
GLOBULIN UR ELPH-MCNC: 2.7 GM/DL
GLOBULIN UR ELPH-MCNC: 2.7 GM/DL
GLOBULIN UR ELPH-MCNC: 2.8 GM/DL
GLOBULIN UR ELPH-MCNC: 2.8 GM/DL
GLOBULIN UR ELPH-MCNC: 3.1 GM/DL
GLOBULIN UR ELPH-MCNC: 3.3 GM/DL
GLOBULIN UR ELPH-MCNC: 3.4 GM/DL
GLUCOSE BLD-MCNC: 136 MG/DL (ref 65–99)
GLUCOSE BLDC GLUCOMTR-MCNC: 101 MG/DL (ref 70–130)
GLUCOSE BLDC GLUCOMTR-MCNC: 101 MG/DL (ref 70–130)
GLUCOSE BLDC GLUCOMTR-MCNC: 107 MG/DL (ref 70–130)
GLUCOSE BLDC GLUCOMTR-MCNC: 113 MG/DL (ref 70–130)
GLUCOSE BLDC GLUCOMTR-MCNC: 116 MG/DL (ref 70–130)
GLUCOSE BLDC GLUCOMTR-MCNC: 118 MG/DL (ref 70–130)
GLUCOSE BLDC GLUCOMTR-MCNC: 118 MG/DL (ref 70–130)
GLUCOSE BLDC GLUCOMTR-MCNC: 120 MG/DL (ref 70–130)
GLUCOSE BLDC GLUCOMTR-MCNC: 121 MG/DL (ref 70–130)
GLUCOSE BLDC GLUCOMTR-MCNC: 122 MG/DL (ref 70–130)
GLUCOSE BLDC GLUCOMTR-MCNC: 126 MG/DL (ref 70–130)
GLUCOSE BLDC GLUCOMTR-MCNC: 126 MG/DL (ref 70–130)
GLUCOSE BLDC GLUCOMTR-MCNC: 127 MG/DL (ref 70–130)
GLUCOSE BLDC GLUCOMTR-MCNC: 128 MG/DL (ref 70–130)
GLUCOSE BLDC GLUCOMTR-MCNC: 131 MG/DL (ref 70–130)
GLUCOSE BLDC GLUCOMTR-MCNC: 131 MG/DL (ref 70–130)
GLUCOSE BLDC GLUCOMTR-MCNC: 132 MG/DL (ref 70–130)
GLUCOSE BLDC GLUCOMTR-MCNC: 134 MG/DL (ref 70–130)
GLUCOSE BLDC GLUCOMTR-MCNC: 136 MG/DL (ref 70–130)
GLUCOSE BLDC GLUCOMTR-MCNC: 137 MG/DL (ref 70–130)
GLUCOSE BLDC GLUCOMTR-MCNC: 137 MG/DL (ref 70–130)
GLUCOSE BLDC GLUCOMTR-MCNC: 139 MG/DL (ref 70–130)
GLUCOSE BLDC GLUCOMTR-MCNC: 142 MG/DL (ref 70–130)
GLUCOSE BLDC GLUCOMTR-MCNC: 143 MG/DL (ref 70–130)
GLUCOSE BLDC GLUCOMTR-MCNC: 144 MG/DL (ref 70–130)
GLUCOSE BLDC GLUCOMTR-MCNC: 145 MG/DL (ref 70–130)
GLUCOSE BLDC GLUCOMTR-MCNC: 147 MG/DL (ref 70–130)
GLUCOSE BLDC GLUCOMTR-MCNC: 147 MG/DL (ref 70–130)
GLUCOSE BLDC GLUCOMTR-MCNC: 148 MG/DL (ref 70–130)
GLUCOSE BLDC GLUCOMTR-MCNC: 148 MG/DL (ref 70–130)
GLUCOSE BLDC GLUCOMTR-MCNC: 149 MG/DL (ref 70–130)
GLUCOSE BLDC GLUCOMTR-MCNC: 149 MG/DL (ref 70–130)
GLUCOSE BLDC GLUCOMTR-MCNC: 151 MG/DL (ref 70–130)
GLUCOSE BLDC GLUCOMTR-MCNC: 152 MG/DL (ref 70–130)
GLUCOSE BLDC GLUCOMTR-MCNC: 152 MG/DL (ref 70–130)
GLUCOSE BLDC GLUCOMTR-MCNC: 153 MG/DL (ref 70–130)
GLUCOSE BLDC GLUCOMTR-MCNC: 154 MG/DL (ref 70–130)
GLUCOSE BLDC GLUCOMTR-MCNC: 155 MG/DL (ref 70–130)
GLUCOSE BLDC GLUCOMTR-MCNC: 155 MG/DL (ref 70–130)
GLUCOSE BLDC GLUCOMTR-MCNC: 156 MG/DL (ref 70–130)
GLUCOSE BLDC GLUCOMTR-MCNC: 157 MG/DL (ref 70–130)
GLUCOSE BLDC GLUCOMTR-MCNC: 157 MG/DL (ref 70–130)
GLUCOSE BLDC GLUCOMTR-MCNC: 158 MG/DL (ref 70–130)
GLUCOSE BLDC GLUCOMTR-MCNC: 158 MG/DL (ref 70–130)
GLUCOSE BLDC GLUCOMTR-MCNC: 159 MG/DL (ref 70–130)
GLUCOSE BLDC GLUCOMTR-MCNC: 160 MG/DL (ref 70–130)
GLUCOSE BLDC GLUCOMTR-MCNC: 161 MG/DL (ref 70–130)
GLUCOSE BLDC GLUCOMTR-MCNC: 162 MG/DL (ref 70–130)
GLUCOSE BLDC GLUCOMTR-MCNC: 162 MG/DL (ref 70–130)
GLUCOSE BLDC GLUCOMTR-MCNC: 163 MG/DL (ref 70–130)
GLUCOSE BLDC GLUCOMTR-MCNC: 165 MG/DL (ref 70–130)
GLUCOSE BLDC GLUCOMTR-MCNC: 166 MG/DL (ref 70–130)
GLUCOSE BLDC GLUCOMTR-MCNC: 167 MG/DL (ref 70–130)
GLUCOSE BLDC GLUCOMTR-MCNC: 168 MG/DL (ref 70–130)
GLUCOSE BLDC GLUCOMTR-MCNC: 171 MG/DL (ref 70–130)
GLUCOSE BLDC GLUCOMTR-MCNC: 171 MG/DL (ref 70–130)
GLUCOSE BLDC GLUCOMTR-MCNC: 172 MG/DL (ref 70–130)
GLUCOSE BLDC GLUCOMTR-MCNC: 173 MG/DL (ref 70–130)
GLUCOSE BLDC GLUCOMTR-MCNC: 173 MG/DL (ref 70–130)
GLUCOSE BLDC GLUCOMTR-MCNC: 174 MG/DL (ref 70–130)
GLUCOSE BLDC GLUCOMTR-MCNC: 175 MG/DL (ref 70–130)
GLUCOSE BLDC GLUCOMTR-MCNC: 178 MG/DL (ref 70–130)
GLUCOSE BLDC GLUCOMTR-MCNC: 181 MG/DL (ref 70–130)
GLUCOSE BLDC GLUCOMTR-MCNC: 183 MG/DL (ref 70–130)
GLUCOSE BLDC GLUCOMTR-MCNC: 185 MG/DL (ref 70–130)
GLUCOSE BLDC GLUCOMTR-MCNC: 187 MG/DL (ref 70–130)
GLUCOSE BLDC GLUCOMTR-MCNC: 188 MG/DL (ref 70–130)
GLUCOSE BLDC GLUCOMTR-MCNC: 189 MG/DL (ref 70–130)
GLUCOSE BLDC GLUCOMTR-MCNC: 197 MG/DL (ref 70–130)
GLUCOSE BLDC GLUCOMTR-MCNC: 201 MG/DL (ref 70–130)
GLUCOSE BLDC GLUCOMTR-MCNC: 206 MG/DL (ref 70–130)
GLUCOSE BLDC GLUCOMTR-MCNC: 209 MG/DL (ref 70–130)
GLUCOSE BLDC GLUCOMTR-MCNC: 222 MG/DL (ref 70–130)
GLUCOSE BLDC GLUCOMTR-MCNC: 241 MG/DL (ref 70–130)
GLUCOSE BLDC GLUCOMTR-MCNC: 42 MG/DL (ref 70–130)
GLUCOSE BLDC GLUCOMTR-MCNC: 43 MG/DL (ref 70–130)
GLUCOSE BLDC GLUCOMTR-MCNC: 47 MG/DL (ref 70–130)
GLUCOSE BLDC GLUCOMTR-MCNC: 58 MG/DL (ref 70–130)
GLUCOSE BLDC GLUCOMTR-MCNC: 82 MG/DL (ref 70–130)
GLUCOSE BLDC GLUCOMTR-MCNC: 85 MG/DL (ref 70–130)
GLUCOSE BLDC GLUCOMTR-MCNC: 88 MG/DL (ref 70–130)
GLUCOSE BLDC GLUCOMTR-MCNC: 89 MG/DL (ref 70–130)
GLUCOSE BLDC GLUCOMTR-MCNC: 90 MG/DL (ref 70–130)
GLUCOSE BLDC GLUCOMTR-MCNC: 96 MG/DL (ref 70–130)
GLUCOSE BLDC GLUCOMTR-MCNC: 97 MG/DL (ref 70–130)
GLUCOSE SERPL-MCNC: 115 MG/DL (ref 65–99)
GLUCOSE SERPL-MCNC: 115 MG/DL (ref 65–99)
GLUCOSE SERPL-MCNC: 118 MG/DL (ref 65–99)
GLUCOSE SERPL-MCNC: 118 MG/DL (ref 65–99)
GLUCOSE SERPL-MCNC: 119 MG/DL (ref 65–99)
GLUCOSE SERPL-MCNC: 125 MG/DL (ref 65–99)
GLUCOSE SERPL-MCNC: 127 MG/DL (ref 65–99)
GLUCOSE SERPL-MCNC: 128 MG/DL (ref 65–99)
GLUCOSE SERPL-MCNC: 130 MG/DL (ref 65–99)
GLUCOSE SERPL-MCNC: 135 MG/DL (ref 65–99)
GLUCOSE SERPL-MCNC: 138 MG/DL (ref 65–99)
GLUCOSE SERPL-MCNC: 142 MG/DL (ref 65–99)
GLUCOSE SERPL-MCNC: 143 MG/DL (ref 65–99)
GLUCOSE SERPL-MCNC: 148 MG/DL (ref 65–99)
GLUCOSE SERPL-MCNC: 149 MG/DL (ref 65–99)
GLUCOSE SERPL-MCNC: 152 MG/DL (ref 65–99)
GLUCOSE SERPL-MCNC: 153 MG/DL (ref 65–99)
GLUCOSE SERPL-MCNC: 155 MG/DL (ref 65–99)
GLUCOSE SERPL-MCNC: 157 MG/DL (ref 65–99)
GLUCOSE SERPL-MCNC: 162 MG/DL (ref 65–99)
GLUCOSE SERPL-MCNC: 164 MG/DL (ref 65–99)
GLUCOSE SERPL-MCNC: 166 MG/DL (ref 65–99)
GLUCOSE SERPL-MCNC: 166 MG/DL (ref 65–99)
GLUCOSE SERPL-MCNC: 169 MG/DL (ref 65–99)
GLUCOSE SERPL-MCNC: 170 MG/DL (ref 65–99)
GLUCOSE SERPL-MCNC: 208 MG/DL (ref 65–99)
GLUCOSE SERPL-MCNC: 227 MG/DL (ref 65–99)
GLUCOSE SERPL-MCNC: 91 MG/DL (ref 65–99)
GLUCOSE SERPL-MCNC: 92 MG/DL (ref 65–99)
GLUCOSE UR STRIP-MCNC: ABNORMAL MG/DL
GLUCOSE UR STRIP-MCNC: NEGATIVE MG/DL
HBA1C MFR BLD: 6.1 % (ref 4.8–5.6)
HCO3 BLDA-SCNC: 22.8 MMOL/L (ref 20–26)
HCO3 BLDA-SCNC: 27.8 MMOL/L (ref 20–26)
HCT VFR BLD AUTO: 19.2 % (ref 37.5–51)
HCT VFR BLD AUTO: 19.8 % (ref 37.5–51)
HCT VFR BLD AUTO: 20.8 % (ref 37.5–51)
HCT VFR BLD AUTO: 21.4 % (ref 37.5–51)
HCT VFR BLD AUTO: 21.5 % (ref 37.5–51)
HCT VFR BLD AUTO: 22.9 % (ref 37.5–51)
HCT VFR BLD AUTO: 23 % (ref 37.5–51)
HCT VFR BLD AUTO: 23.2 % (ref 37.5–51)
HCT VFR BLD AUTO: 24.6 % (ref 37.5–51)
HCT VFR BLD AUTO: 24.7 % (ref 37.5–51)
HCT VFR BLD AUTO: 24.9 % (ref 37.5–51)
HCT VFR BLD AUTO: 25 % (ref 37.5–51)
HCT VFR BLD AUTO: 25.1 % (ref 37.5–51)
HCT VFR BLD AUTO: 25.4 % (ref 37.5–51)
HCT VFR BLD AUTO: 25.5 % (ref 37.5–51)
HCT VFR BLD AUTO: 25.6 % (ref 37.5–51)
HCT VFR BLD AUTO: 25.7 % (ref 37.5–51)
HCT VFR BLD AUTO: 25.8 % (ref 37.5–51)
HCT VFR BLD AUTO: 25.9 % (ref 37.5–51)
HCT VFR BLD AUTO: 26 % (ref 37.5–51)
HCT VFR BLD AUTO: 26.1 % (ref 37.5–51)
HCT VFR BLD AUTO: 26.3 % (ref 37.5–51)
HCT VFR BLD AUTO: 26.3 % (ref 37.5–51)
HCT VFR BLD AUTO: 26.4 % (ref 37.5–51)
HCT VFR BLD AUTO: 26.8 % (ref 37.5–51)
HCT VFR BLD AUTO: 27 % (ref 37.5–51)
HCT VFR BLD AUTO: 27.5 % (ref 37.5–51)
HCT VFR BLD AUTO: 27.7 % (ref 37.5–51)
HCT VFR BLD AUTO: 27.7 % (ref 37.5–51)
HCT VFR BLD AUTO: 28.1 % (ref 37.5–51)
HCT VFR BLD AUTO: 28.4 % (ref 37.5–51)
HCT VFR BLD AUTO: 31.4 % (ref 37.5–51)
HDLC SERPL-MCNC: 34 MG/DL (ref 40–60)
HEMOCCULT STL QL: POSITIVE
HGB BLD-MCNC: 10 G/DL (ref 13–17.7)
HGB BLD-MCNC: 6.1 G/DL (ref 13–17.7)
HGB BLD-MCNC: 6.5 G/DL (ref 13–17.7)
HGB BLD-MCNC: 6.8 G/DL (ref 13–17.7)
HGB BLD-MCNC: 7 G/DL (ref 13–17.7)
HGB BLD-MCNC: 7.1 G/DL (ref 13–17.7)
HGB BLD-MCNC: 7.3 G/DL (ref 13–17.7)
HGB BLD-MCNC: 7.5 G/DL (ref 13–17.7)
HGB BLD-MCNC: 8 G/DL (ref 13–17.7)
HGB BLD-MCNC: 8.1 G/DL (ref 13–17.7)
HGB BLD-MCNC: 8.3 G/DL (ref 13–17.7)
HGB BLD-MCNC: 8.4 G/DL (ref 13–17.7)
HGB BLD-MCNC: 8.5 G/DL (ref 13–17.7)
HGB BLD-MCNC: 8.6 G/DL (ref 13–17.7)
HGB BLD-MCNC: 8.7 G/DL (ref 13–17.7)
HGB BLD-MCNC: 8.9 G/DL (ref 13–17.7)
HGB BLD-MCNC: 9 G/DL (ref 13–17.7)
HGB BLD-MCNC: 9 G/DL (ref 13–17.7)
HGB BLD-MCNC: 9.1 G/DL (ref 13–17.7)
HGB BLD-MCNC: 9.2 G/DL (ref 13–17.7)
HGB BLD-MCNC: 9.2 G/DL (ref 13–17.7)
HGB UR QL STRIP.AUTO: ABNORMAL
HGB UR QL STRIP.AUTO: NEGATIVE
HOLD SPECIMEN: NORMAL
HYALINE CASTS UR QL AUTO: ABNORMAL /LPF
IMM GRANULOCYTES # BLD AUTO: 0.02 10*3/MM3 (ref 0–0.05)
IMM GRANULOCYTES # BLD AUTO: 0.03 10*3/MM3 (ref 0–0.05)
IMM GRANULOCYTES # BLD AUTO: 0.04 10*3/MM3 (ref 0–0.05)
IMM GRANULOCYTES # BLD AUTO: 0.04 10*3/MM3 (ref 0–0.05)
IMM GRANULOCYTES # BLD AUTO: 0.05 10*3/MM3 (ref 0–0.05)
IMM GRANULOCYTES # BLD AUTO: 0.11 10*3/MM3 (ref 0–0.05)
IMM GRANULOCYTES # BLD AUTO: 0.11 10*3/MM3 (ref 0–0.05)
IMM GRANULOCYTES # BLD AUTO: 0.13 10*3/MM3 (ref 0–0.05)
IMM GRANULOCYTES # BLD AUTO: 0.15 10*3/MM3 (ref 0–0.05)
IMM GRANULOCYTES NFR BLD AUTO: 0.2 % (ref 0–0.5)
IMM GRANULOCYTES NFR BLD AUTO: 0.4 % (ref 0–0.5)
IMM GRANULOCYTES NFR BLD AUTO: 0.4 % (ref 0–0.5)
IMM GRANULOCYTES NFR BLD AUTO: 0.5 % (ref 0–0.5)
IMM GRANULOCYTES NFR BLD AUTO: 0.6 % (ref 0–0.5)
IMM GRANULOCYTES NFR BLD AUTO: 0.9 % (ref 0–0.5)
IMM GRANULOCYTES NFR BLD AUTO: 0.9 % (ref 0–0.5)
IMM GRANULOCYTES NFR BLD AUTO: 1.1 % (ref 0–0.5)
IMM GRANULOCYTES NFR BLD AUTO: 1.5 % (ref 0–0.5)
INHALED O2 CONCENTRATION: 21 %
INR PPP: 1.06 (ref 0.91–1.09)
INR PPP: 1.09 (ref 0.91–1.09)
INR PPP: 1.24 (ref 0.91–1.09)
INR PPP: 1.41 (ref 0.91–1.09)
IRON 24H UR-MRATE: 12 MCG/DL (ref 59–158)
IRON 24H UR-MRATE: 32 MCG/DL (ref 59–158)
IRON SATN MFR SERPL: 10 % (ref 20–50)
IRON SATN MFR SERPL: 19 % (ref 20–50)
KETONES UR QL STRIP: NEGATIVE
LDLC SERPL CALC-MCNC: 42 MG/DL (ref 0–100)
LDLC/HDLC SERPL: 1.24 {RATIO}
LEFT ATRIUM VOLUME INDEX: 62 ML/M2
LEFT ATRIUM VOLUME: 111 CM3
LEUKOCYTE ESTERASE UR QL STRIP.AUTO: ABNORMAL
LEUKOCYTE ESTERASE UR QL STRIP.AUTO: NEGATIVE
LIPASE SERPL-CCNC: 12 U/L (ref 13–60)
LIPASE SERPL-CCNC: 30 U/L (ref 13–60)
LYMPHOCYTES # BLD AUTO: 0.4 10*3/MM3 (ref 0.7–3.1)
LYMPHOCYTES # BLD AUTO: 0.84 10*3/MM3 (ref 0.7–3.1)
LYMPHOCYTES # BLD AUTO: 0.87 10*3/MM3 (ref 0.7–3.1)
LYMPHOCYTES # BLD AUTO: 0.88 10*3/MM3 (ref 0.7–3.1)
LYMPHOCYTES # BLD AUTO: 1.06 10*3/MM3 (ref 0.7–3.1)
LYMPHOCYTES # BLD AUTO: 1.1 10*3/MM3 (ref 0.7–3.1)
LYMPHOCYTES # BLD AUTO: 1.14 10*3/MM3 (ref 0.7–3.1)
LYMPHOCYTES # BLD AUTO: 1.23 10*3/MM3 (ref 0.7–3.1)
LYMPHOCYTES # BLD AUTO: 1.58 10*3/MM3 (ref 0.7–3.1)
LYMPHOCYTES # BLD AUTO: 1.59 10*3/MM3 (ref 0.7–3.1)
LYMPHOCYTES # BLD AUTO: 1.67 10*3/MM3 (ref 0.7–3.1)
LYMPHOCYTES # BLD MANUAL: 0.45 10*3/MM3 (ref 0.7–3.1)
LYMPHOCYTES # BLD MANUAL: 0.5 10*3/MM3 (ref 0.7–3.1)
LYMPHOCYTES NFR BLD AUTO: 11.1 % (ref 19.6–45.3)
LYMPHOCYTES NFR BLD AUTO: 11.2 % (ref 19.6–45.3)
LYMPHOCYTES NFR BLD AUTO: 13.6 % (ref 19.6–45.3)
LYMPHOCYTES NFR BLD AUTO: 18.5 % (ref 19.6–45.3)
LYMPHOCYTES NFR BLD AUTO: 18.8 % (ref 19.6–45.3)
LYMPHOCYTES NFR BLD AUTO: 21.2 % (ref 19.6–45.3)
LYMPHOCYTES NFR BLD AUTO: 24.8 % (ref 19.6–45.3)
LYMPHOCYTES NFR BLD AUTO: 5.3 % (ref 19.6–45.3)
LYMPHOCYTES NFR BLD AUTO: 5.6 % (ref 19.6–45.3)
LYMPHOCYTES NFR BLD AUTO: 7.1 % (ref 19.6–45.3)
LYMPHOCYTES NFR BLD AUTO: 9.6 % (ref 19.6–45.3)
LYMPHOCYTES NFR BLD MANUAL: 3 % (ref 19.6–45.3)
LYMPHOCYTES NFR BLD MANUAL: 4 % (ref 5–12)
LYMPHOCYTES NFR BLD MANUAL: 6.1 % (ref 19.6–45.3)
Lab: 185
Lab: ABNORMAL
Lab: ABNORMAL
MACROCYTES BLD QL SMEAR: ABNORMAL
MACROCYTES BLD QL SMEAR: ABNORMAL
MAGNESIUM SERPL-MCNC: 1.7 MG/DL (ref 1.6–2.4)
MAGNESIUM SERPL-MCNC: 1.8 MG/DL (ref 1.6–2.4)
MAGNESIUM SERPL-MCNC: 2 MG/DL (ref 1.6–2.4)
MAGNESIUM SERPL-MCNC: 2.7 MG/DL (ref 1.6–2.4)
MAXIMAL PREDICTED HEART RATE: 134 BPM
MCH RBC QN AUTO: 28.6 PG (ref 26.6–33)
MCH RBC QN AUTO: 28.9 PG (ref 26.6–33)
MCH RBC QN AUTO: 28.9 PG (ref 26.6–33)
MCH RBC QN AUTO: 29.1 PG (ref 26.6–33)
MCH RBC QN AUTO: 29.3 PG (ref 26.6–33)
MCH RBC QN AUTO: 29.3 PG (ref 26.6–33)
MCH RBC QN AUTO: 29.4 PG (ref 26.6–33)
MCH RBC QN AUTO: 29.6 PG (ref 26.6–33)
MCH RBC QN AUTO: 29.8 PG (ref 26.6–33)
MCH RBC QN AUTO: 30.2 PG (ref 26.6–33)
MCH RBC QN AUTO: 30.2 PG (ref 26.6–33)
MCH RBC QN AUTO: 30.5 PG (ref 26.6–33)
MCH RBC QN AUTO: 30.6 PG (ref 26.6–33)
MCH RBC QN AUTO: 30.7 PG (ref 26.6–33)
MCH RBC QN AUTO: 30.9 PG (ref 26.6–33)
MCH RBC QN AUTO: 31 PG (ref 26.6–33)
MCH RBC QN AUTO: 31 PG (ref 26.6–33)
MCH RBC QN AUTO: 31.5 PG (ref 26.6–33)
MCH RBC QN AUTO: 31.6 PG (ref 26.6–33)
MCH RBC QN AUTO: 32 PG (ref 26.6–33)
MCH RBC QN AUTO: 32 PG (ref 26.6–33)
MCH RBC QN AUTO: 32.4 PG (ref 26.6–33)
MCH RBC QN AUTO: 32.6 PG (ref 26.6–33)
MCH RBC QN AUTO: 32.7 PG (ref 26.6–33)
MCH RBC QN AUTO: 32.8 PG (ref 26.6–33)
MCH RBC QN AUTO: 33 PG (ref 26.6–33)
MCHC RBC AUTO-ENTMCNC: 31.3 G/DL (ref 31.5–35.7)
MCHC RBC AUTO-ENTMCNC: 31.4 G/DL (ref 31.5–35.7)
MCHC RBC AUTO-ENTMCNC: 31.6 G/DL (ref 31.5–35.7)
MCHC RBC AUTO-ENTMCNC: 31.7 G/DL (ref 31.5–35.7)
MCHC RBC AUTO-ENTMCNC: 31.7 G/DL (ref 31.5–35.7)
MCHC RBC AUTO-ENTMCNC: 31.8 G/DL (ref 31.5–35.7)
MCHC RBC AUTO-ENTMCNC: 31.8 G/DL (ref 31.5–35.7)
MCHC RBC AUTO-ENTMCNC: 31.9 G/DL (ref 31.5–35.7)
MCHC RBC AUTO-ENTMCNC: 32.2 G/DL (ref 31.5–35.7)
MCHC RBC AUTO-ENTMCNC: 32.3 G/DL (ref 31.5–35.7)
MCHC RBC AUTO-ENTMCNC: 32.4 G/DL (ref 31.5–35.7)
MCHC RBC AUTO-ENTMCNC: 32.6 G/DL (ref 31.5–35.7)
MCHC RBC AUTO-ENTMCNC: 32.7 G/DL (ref 31.5–35.7)
MCHC RBC AUTO-ENTMCNC: 32.7 G/DL (ref 31.5–35.7)
MCHC RBC AUTO-ENTMCNC: 32.8 G/DL (ref 31.5–35.7)
MCHC RBC AUTO-ENTMCNC: 32.8 G/DL (ref 31.5–35.7)
MCHC RBC AUTO-ENTMCNC: 32.9 G/DL (ref 31.5–35.7)
MCHC RBC AUTO-ENTMCNC: 33 G/DL (ref 31.5–35.7)
MCHC RBC AUTO-ENTMCNC: 33.1 G/DL (ref 31.5–35.7)
MCHC RBC AUTO-ENTMCNC: 33.1 G/DL (ref 31.5–35.7)
MCHC RBC AUTO-ENTMCNC: 33.2 G/DL (ref 31.5–35.7)
MCHC RBC AUTO-ENTMCNC: 33.6 G/DL (ref 31.5–35.7)
MCHC RBC AUTO-ENTMCNC: 33.7 G/DL (ref 31.5–35.7)
MCHC RBC AUTO-ENTMCNC: 34 G/DL (ref 31.5–35.7)
MCHC RBC AUTO-ENTMCNC: 34.1 G/DL (ref 31.5–35.7)
MCHC RBC AUTO-ENTMCNC: 34.1 G/DL (ref 31.5–35.7)
MCHC RBC AUTO-ENTMCNC: 34.5 G/DL (ref 31.5–35.7)
MCV RBC AUTO: 100 FL (ref 79–97)
MCV RBC AUTO: 100.5 FL (ref 79–97)
MCV RBC AUTO: 100.9 FL (ref 79–97)
MCV RBC AUTO: 103.2 FL (ref 79–97)
MCV RBC AUTO: 88.6 FL (ref 79–97)
MCV RBC AUTO: 89.8 FL (ref 79–97)
MCV RBC AUTO: 89.8 FL (ref 79–97)
MCV RBC AUTO: 91 FL (ref 79–97)
MCV RBC AUTO: 91.2 FL (ref 79–97)
MCV RBC AUTO: 91.8 FL (ref 79–97)
MCV RBC AUTO: 91.9 FL (ref 79–97)
MCV RBC AUTO: 92 FL (ref 79–97)
MCV RBC AUTO: 92.2 FL (ref 79–97)
MCV RBC AUTO: 92.5 FL (ref 79–97)
MCV RBC AUTO: 92.8 FL (ref 79–97)
MCV RBC AUTO: 92.8 FL (ref 79–97)
MCV RBC AUTO: 92.9 FL (ref 79–97)
MCV RBC AUTO: 93 FL (ref 79–97)
MCV RBC AUTO: 93.2 FL (ref 79–97)
MCV RBC AUTO: 93.4 FL (ref 79–97)
MCV RBC AUTO: 93.5 FL (ref 79–97)
MCV RBC AUTO: 93.5 FL (ref 79–97)
MCV RBC AUTO: 93.8 FL (ref 79–97)
MCV RBC AUTO: 94.3 FL (ref 79–97)
MCV RBC AUTO: 96.1 FL (ref 79–97)
MCV RBC AUTO: 98.4 FL (ref 79–97)
MCV RBC AUTO: 99 FL (ref 79–97)
MODALITY: ABNORMAL
MODALITY: ABNORMAL
MONOCYTES # BLD AUTO: 0.31 10*3/MM3 (ref 0.1–0.9)
MONOCYTES # BLD AUTO: 0.33 10*3/MM3 (ref 0.1–0.9)
MONOCYTES # BLD AUTO: 0.46 10*3/MM3 (ref 0.1–0.9)
MONOCYTES # BLD AUTO: 0.47 10*3/MM3 (ref 0.1–0.9)
MONOCYTES # BLD AUTO: 0.49 10*3/MM3 (ref 0.1–0.9)
MONOCYTES # BLD AUTO: 0.49 10*3/MM3 (ref 0.1–0.9)
MONOCYTES # BLD AUTO: 0.57 10*3/MM3 (ref 0.1–0.9)
MONOCYTES # BLD AUTO: 0.68 10*3/MM3 (ref 0.1–0.9)
MONOCYTES # BLD AUTO: 0.71 10*3/MM3 (ref 0.1–0.9)
MONOCYTES # BLD AUTO: 0.74 10*3/MM3 (ref 0.1–0.9)
MONOCYTES # BLD AUTO: 0.92 10*3/MM3 (ref 0.1–0.9)
MONOCYTES # BLD AUTO: 1.05 10*3/MM3 (ref 0.1–0.9)
MONOCYTES NFR BLD AUTO: 10.2 % (ref 5–12)
MONOCYTES NFR BLD AUTO: 10.7 % (ref 5–12)
MONOCYTES NFR BLD AUTO: 11.6 % (ref 5–12)
MONOCYTES NFR BLD AUTO: 2.9 % (ref 5–12)
MONOCYTES NFR BLD AUTO: 3.8 % (ref 5–12)
MONOCYTES NFR BLD AUTO: 4.3 % (ref 5–12)
MONOCYTES NFR BLD AUTO: 5.6 % (ref 5–12)
MONOCYTES NFR BLD AUTO: 6 % (ref 5–12)
MONOCYTES NFR BLD AUTO: 7.5 % (ref 5–12)
MONOCYTES NFR BLD AUTO: 7.6 % (ref 5–12)
MONOCYTES NFR BLD AUTO: 9.5 % (ref 5–12)
NEGATIVE CONTROL: NEGATIVE
NEUTROPHILS # BLD AUTO: 14.5 10*3/MM3 (ref 1.7–7)
NEUTROPHILS # BLD AUTO: 3.82 10*3/MM3 (ref 1.7–7)
NEUTROPHILS # BLD AUTO: 6.78 10*3/MM3 (ref 1.7–7)
NEUTROPHILS NFR BLD AUTO: 10.6 10*3/MM3 (ref 1.7–7)
NEUTROPHILS NFR BLD AUTO: 15.15 10*3/MM3 (ref 1.7–7)
NEUTROPHILS NFR BLD AUTO: 4.29 10*3/MM3 (ref 1.7–7)
NEUTROPHILS NFR BLD AUTO: 4.94 10*3/MM3 (ref 1.7–7)
NEUTROPHILS NFR BLD AUTO: 59.8 % (ref 42.7–76)
NEUTROPHILS NFR BLD AUTO: 6.12 10*3/MM3 (ref 1.7–7)
NEUTROPHILS NFR BLD AUTO: 6.37 10*3/MM3 (ref 1.7–7)
NEUTROPHILS NFR BLD AUTO: 6.9 10*3/MM3 (ref 1.7–7)
NEUTROPHILS NFR BLD AUTO: 66 % (ref 42.7–76)
NEUTROPHILS NFR BLD AUTO: 67.9 % (ref 42.7–76)
NEUTROPHILS NFR BLD AUTO: 7.03 10*3/MM3 (ref 1.7–7)
NEUTROPHILS NFR BLD AUTO: 7.51 10*3/MM3 (ref 1.7–7)
NEUTROPHILS NFR BLD AUTO: 7.67 10*3/MM3 (ref 1.7–7)
NEUTROPHILS NFR BLD AUTO: 70.4 % (ref 42.7–76)
NEUTROPHILS NFR BLD AUTO: 76.2 % (ref 42.7–76)
NEUTROPHILS NFR BLD AUTO: 77.8 % (ref 42.7–76)
NEUTROPHILS NFR BLD AUTO: 78.5 % (ref 42.7–76)
NEUTROPHILS NFR BLD AUTO: 80.2 % (ref 42.7–76)
NEUTROPHILS NFR BLD AUTO: 86.3 % (ref 42.7–76)
NEUTROPHILS NFR BLD AUTO: 89.5 % (ref 42.7–76)
NEUTROPHILS NFR BLD AUTO: 90.5 % (ref 42.7–76)
NEUTROPHILS NFR BLD MANUAL: 82.8 % (ref 42.7–76)
NEUTROPHILS NFR BLD MANUAL: 93 % (ref 42.7–76)
NEUTS BAND NFR BLD MANUAL: 4 % (ref 0–5)
NEUTS VAC BLD QL SMEAR: ABNORMAL
NITRITE UR QL STRIP: NEGATIVE
NITRITE UR QL STRIP: POSITIVE
NRBC BLD AUTO-RTO: 0 /100 WBC (ref 0–0.2)
NRBC BLD AUTO-RTO: 0.2 /100 WBC (ref 0–0.2)
NRBC BLD AUTO-RTO: 0.4 /100 WBC (ref 0–0.2)
NRBC SPEC MANUAL: 2 /100 WBC (ref 0–0.2)
NT-PROBNP SERPL-MCNC: 1142 PG/ML (ref 0–1800)
NT-PROBNP SERPL-MCNC: 1445 PG/ML (ref 0–1800)
NT-PROBNP SERPL-MCNC: 1599 PG/ML (ref 0–1800)
NT-PROBNP SERPL-MCNC: 8060 PG/ML (ref 0–1800)
PCO2 BLDA: 36.7 MM HG (ref 35–45)
PCO2 BLDA: 38.2 MM HG (ref 35–45)
PCO2 TEMP ADJ BLD: 36.7 MM HG (ref 35–45)
PCO2 TEMP ADJ BLD: 38.2 MM HG (ref 35–45)
PH BLDA: 7.38 PH UNITS (ref 7.35–7.45)
PH BLDA: 7.49 PH UNITS (ref 7.35–7.45)
PH UR STRIP.AUTO: 5.5 [PH] (ref 5–8)
PH UR STRIP.AUTO: 5.5 [PH] (ref 5–8)
PH UR STRIP.AUTO: <=5 [PH] (ref 5–8)
PH, TEMP CORRECTED: 7.38 PH UNITS (ref 7.35–7.45)
PH, TEMP CORRECTED: 7.49 PH UNITS (ref 7.35–7.45)
PHOSPHATE SERPL-MCNC: 2.9 MG/DL (ref 2.5–4.5)
PLAT MORPH BLD: NORMAL
PLAT MORPH BLD: NORMAL
PLATELET # BLD AUTO: 146 10*3/MM3 (ref 140–450)
PLATELET # BLD AUTO: 165 10*3/MM3 (ref 140–450)
PLATELET # BLD AUTO: 168 10*3/MM3 (ref 140–450)
PLATELET # BLD AUTO: 176 10*3/MM3 (ref 140–450)
PLATELET # BLD AUTO: 178 10*3/MM3 (ref 140–450)
PLATELET # BLD AUTO: 180 10*3/MM3 (ref 140–450)
PLATELET # BLD AUTO: 183 10*3/MM3 (ref 140–450)
PLATELET # BLD AUTO: 187 10*3/MM3 (ref 140–450)
PLATELET # BLD AUTO: 191 10*3/MM3 (ref 140–450)
PLATELET # BLD AUTO: 192 10*3/MM3 (ref 140–450)
PLATELET # BLD AUTO: 196 10*3/MM3 (ref 140–450)
PLATELET # BLD AUTO: 198 10*3/MM3 (ref 140–450)
PLATELET # BLD AUTO: 202 10*3/MM3 (ref 140–450)
PLATELET # BLD AUTO: 207 10*3/MM3 (ref 140–450)
PLATELET # BLD AUTO: 208 10*3/MM3 (ref 140–450)
PLATELET # BLD AUTO: 209 10*3/MM3 (ref 140–450)
PLATELET # BLD AUTO: 214 10*3/MM3 (ref 140–450)
PLATELET # BLD AUTO: 215 10*3/MM3 (ref 140–450)
PLATELET # BLD AUTO: 221 10*3/MM3 (ref 140–450)
PLATELET # BLD AUTO: 221 10*3/MM3 (ref 140–450)
PLATELET # BLD AUTO: 222 10*3/MM3 (ref 140–450)
PLATELET # BLD AUTO: 225 10*3/MM3 (ref 140–450)
PLATELET # BLD AUTO: 229 10*3/MM3 (ref 140–450)
PLATELET # BLD AUTO: 231 10*3/MM3 (ref 140–450)
PLATELET # BLD AUTO: 244 10*3/MM3 (ref 140–450)
PLATELET # BLD AUTO: 288 10*3/MM3 (ref 140–450)
PLATELET # BLD AUTO: 291 10*3/MM3 (ref 140–450)
PLATELET # BLD AUTO: 311 10*3/MM3 (ref 140–450)
PLATELET # BLD AUTO: 363 10*3/MM3 (ref 140–450)
PMV BLD AUTO: 10.1 FL (ref 6–12)
PMV BLD AUTO: 10.2 FL (ref 6–12)
PMV BLD AUTO: 10.2 FL (ref 6–12)
PMV BLD AUTO: 10.3 FL (ref 6–12)
PMV BLD AUTO: 10.6 FL (ref 6–12)
PMV BLD AUTO: 10.7 FL (ref 6–12)
PMV BLD AUTO: 10.7 FL (ref 6–12)
PMV BLD AUTO: 10.8 FL (ref 6–12)
PMV BLD AUTO: 10.9 FL (ref 6–12)
PMV BLD AUTO: 10.9 FL (ref 6–12)
PMV BLD AUTO: 11 FL (ref 6–12)
PMV BLD AUTO: 11 FL (ref 6–12)
PMV BLD AUTO: 11.3 FL (ref 6–12)
PMV BLD AUTO: 11.5 FL (ref 6–12)
PMV BLD AUTO: 11.5 FL (ref 6–12)
PMV BLD AUTO: 11.6 FL (ref 6–12)
PMV BLD AUTO: 11.6 FL (ref 6–12)
PMV BLD AUTO: 11.7 FL (ref 6–12)
PMV BLD AUTO: 11.9 FL (ref 6–12)
PMV BLD AUTO: 12.3 FL (ref 6–12)
PMV BLD AUTO: 9.9 FL (ref 6–12)
PMV BLD AUTO: 9.9 FL (ref 6–12)
PO2 BLDA: 66.1 MM HG (ref 83–108)
PO2 BLDA: 95.2 MM HG (ref 83–108)
PO2 TEMP ADJ BLD: 66.1 MM HG (ref 83–108)
PO2 TEMP ADJ BLD: 95.2 MM HG (ref 83–108)
POIKILOCYTOSIS BLD QL SMEAR: ABNORMAL
POLYCHROMASIA BLD QL SMEAR: ABNORMAL
POSITIVE CONTROL: POSITIVE
POTASSIUM BLD-SCNC: 4.3 MMOL/L (ref 3.5–5.2)
POTASSIUM SERPL-SCNC: 3.7 MMOL/L (ref 3.5–5.2)
POTASSIUM SERPL-SCNC: 3.7 MMOL/L (ref 3.5–5.2)
POTASSIUM SERPL-SCNC: 3.9 MMOL/L (ref 3.5–5.2)
POTASSIUM SERPL-SCNC: 3.9 MMOL/L (ref 3.5–5.2)
POTASSIUM SERPL-SCNC: 4 MMOL/L (ref 3.5–5.2)
POTASSIUM SERPL-SCNC: 4.1 MMOL/L (ref 3.5–5.2)
POTASSIUM SERPL-SCNC: 4.2 MMOL/L (ref 3.5–5.2)
POTASSIUM SERPL-SCNC: 4.2 MMOL/L (ref 3.5–5.2)
POTASSIUM SERPL-SCNC: 4.3 MMOL/L (ref 3.5–5.2)
POTASSIUM SERPL-SCNC: 4.4 MMOL/L (ref 3.5–5.2)
POTASSIUM SERPL-SCNC: 4.5 MMOL/L (ref 3.5–5.2)
POTASSIUM SERPL-SCNC: 4.7 MMOL/L (ref 3.5–5.2)
POTASSIUM SERPL-SCNC: 4.9 MMOL/L (ref 3.5–5.2)
POTASSIUM SERPL-SCNC: 5 MMOL/L (ref 3.5–5.2)
POTASSIUM SERPL-SCNC: 5 MMOL/L (ref 3.5–5.2)
POTASSIUM SERPL-SCNC: 5.5 MMOL/L (ref 3.5–5.2)
POTASSIUM SERPL-SCNC: 5.7 MMOL/L (ref 3.5–5.2)
POTASSIUM SERPL-SCNC: 6 MMOL/L (ref 3.5–5.2)
POTASSIUM SERPL-SCNC: 6.2 MMOL/L (ref 3.5–5.2)
POTASSIUM SERPL-SCNC: 6.2 MMOL/L (ref 3.5–5.2)
POTASSIUM SERPL-SCNC: 6.5 MMOL/L (ref 3.5–5.2)
PROCALCITONIN SERPL-MCNC: 0.14 NG/ML (ref 0–0.25)
PROCALCITONIN SERPL-MCNC: 0.15 NG/ML (ref 0–0.25)
PROCALCITONIN SERPL-MCNC: 7.53 NG/ML (ref 0–0.25)
PROT SERPL-MCNC: 4.4 G/DL (ref 6–8.5)
PROT SERPL-MCNC: 4.6 G/DL (ref 6–8.5)
PROT SERPL-MCNC: 4.7 G/DL (ref 6–8.5)
PROT SERPL-MCNC: 5.6 G/DL (ref 6–8.5)
PROT SERPL-MCNC: 5.6 G/DL (ref 6–8.5)
PROT SERPL-MCNC: 6.1 G/DL (ref 6–8.5)
PROT SERPL-MCNC: 6.2 G/DL (ref 6–8.5)
PROT SERPL-MCNC: 6.2 G/DL (ref 6–8.5)
PROT SERPL-MCNC: 6.3 G/DL (ref 6–8.5)
PROT UR QL STRIP: ABNORMAL
PROT UR QL STRIP: NEGATIVE
PROT UR QL STRIP: NEGATIVE
PROTHROMBIN TIME: 13.4 SECONDS (ref 11.9–14.6)
PROTHROMBIN TIME: 13.7 SECONDS (ref 11.9–14.6)
PROTHROMBIN TIME: 15.2 SECONDS (ref 11.9–14.6)
PROTHROMBIN TIME: 16.9 SECONDS (ref 11.9–14.6)
QT INTERVAL: 388 MS
QT INTERVAL: 420 MS
QT INTERVAL: 452 MS
QT INTERVAL: 528 MS
QTC INTERVAL: 388 MS
QTC INTERVAL: 398 MS
QTC INTERVAL: 467 MS
QTC INTERVAL: 480 MS
RBC # BLD AUTO: 1.86 10*6/MM3 (ref 4.14–5.8)
RBC # BLD AUTO: 1.97 10*6/MM3 (ref 4.14–5.8)
RBC # BLD AUTO: 2.1 10*6/MM3 (ref 4.14–5.8)
RBC # BLD AUTO: 2.28 10*6/MM3 (ref 4.14–5.8)
RBC # BLD AUTO: 2.28 10*6/MM3 (ref 4.14–5.8)
RBC # BLD AUTO: 2.33 10*6/MM3 (ref 4.14–5.8)
RBC # BLD AUTO: 2.5 10*6/MM3 (ref 4.14–5.8)
RBC # BLD AUTO: 2.58 10*6/MM3 (ref 4.14–5.8)
RBC # BLD AUTO: 2.63 10*6/MM3 (ref 4.14–5.8)
RBC # BLD AUTO: 2.65 10*6/MM3 (ref 4.14–5.8)
RBC # BLD AUTO: 2.67 10*6/MM3 (ref 4.14–5.8)
RBC # BLD AUTO: 2.7 10*6/MM3 (ref 4.14–5.8)
RBC # BLD AUTO: 2.72 10*6/MM3 (ref 4.14–5.8)
RBC # BLD AUTO: 2.75 10*6/MM3 (ref 4.14–5.8)
RBC # BLD AUTO: 2.77 10*6/MM3 (ref 4.14–5.8)
RBC # BLD AUTO: 2.8 10*6/MM3 (ref 4.14–5.8)
RBC # BLD AUTO: 2.81 10*6/MM3 (ref 4.14–5.8)
RBC # BLD AUTO: 2.81 10*6/MM3 (ref 4.14–5.8)
RBC # BLD AUTO: 2.82 10*6/MM3 (ref 4.14–5.8)
RBC # BLD AUTO: 2.85 10*6/MM3 (ref 4.14–5.8)
RBC # BLD AUTO: 2.88 10*6/MM3 (ref 4.14–5.8)
RBC # BLD AUTO: 2.9 10*6/MM3 (ref 4.14–5.8)
RBC # BLD AUTO: 2.94 10*6/MM3 (ref 4.14–5.8)
RBC # BLD AUTO: 2.94 10*6/MM3 (ref 4.14–5.8)
RBC # BLD AUTO: 2.97 10*6/MM3 (ref 4.14–5.8)
RBC # BLD AUTO: 3.01 10*6/MM3 (ref 4.14–5.8)
RBC # BLD AUTO: 3.04 10*6/MM3 (ref 4.14–5.8)
RBC # BLD AUTO: 3.13 10*6/MM3 (ref 4.14–5.8)
RBC # BLD AUTO: 3.38 10*6/MM3 (ref 4.14–5.8)
RBC # UR: ABNORMAL /HPF
REF LAB TEST METHOD: ABNORMAL
RETICS # AUTO: 0.06 10*6/MM3 (ref 0.02–0.13)
RETICS/RBC NFR AUTO: 3.63 % (ref 0.7–1.9)
RH BLD: POSITIVE
ROULEAUX BLD QL SMEAR: ABNORMAL
SAO2 % BLDCOA: 95.4 % (ref 94–99)
SAO2 % BLDCOA: 98.4 % (ref 94–99)
SARS-COV-2 RNA PNL SPEC NAA+PROBE: NOT DETECTED
SARS-COV-2 RNA RESP QL NAA+PROBE: NOT DETECTED
SODIUM BLD-SCNC: 142 MMOL/L (ref 136–145)
SODIUM SERPL-SCNC: 128 MMOL/L (ref 136–145)
SODIUM SERPL-SCNC: 129 MMOL/L (ref 136–145)
SODIUM SERPL-SCNC: 133 MMOL/L (ref 136–145)
SODIUM SERPL-SCNC: 136 MMOL/L (ref 136–145)
SODIUM SERPL-SCNC: 137 MMOL/L (ref 136–145)
SODIUM SERPL-SCNC: 138 MMOL/L (ref 136–145)
SODIUM SERPL-SCNC: 139 MMOL/L (ref 136–145)
SODIUM SERPL-SCNC: 139 MMOL/L (ref 136–145)
SODIUM SERPL-SCNC: 141 MMOL/L (ref 136–145)
SODIUM SERPL-SCNC: 142 MMOL/L (ref 136–145)
SODIUM SERPL-SCNC: 143 MMOL/L (ref 136–145)
SODIUM SERPL-SCNC: 143 MMOL/L (ref 136–145)
SODIUM SERPL-SCNC: 144 MMOL/L (ref 136–145)
SODIUM SERPL-SCNC: 145 MMOL/L (ref 136–145)
SODIUM SERPL-SCNC: 146 MMOL/L (ref 136–145)
SODIUM SERPL-SCNC: 150 MMOL/L (ref 136–145)
SODIUM SERPL-SCNC: 150 MMOL/L (ref 136–145)
SODIUM SERPL-SCNC: 152 MMOL/L (ref 136–145)
SODIUM SERPL-SCNC: 153 MMOL/L (ref 136–145)
SODIUM SERPL-SCNC: 154 MMOL/L (ref 136–145)
SODIUM SERPL-SCNC: 154 MMOL/L (ref 136–145)
SODIUM SERPL-SCNC: 155 MMOL/L (ref 136–145)
SODIUM SERPL-SCNC: 157 MMOL/L (ref 136–145)
SODIUM UR-SCNC: <20 MMOL/L
SODIUM UR-SCNC: <20 MMOL/L
SP GR UR STRIP: 1.01 (ref 1–1.03)
SP GR UR STRIP: 1.02 (ref 1–1.03)
SQUAMOUS #/AREA URNS HPF: ABNORMAL /HPF
STRESS TARGET HR: 114 BPM
T&S EXPIRATION DATE: NORMAL
T4 FREE SERPL-MCNC: 0.99 NG/DL (ref 0.93–1.7)
TIBC SERPL-MCNC: 121 MCG/DL (ref 298–536)
TIBC SERPL-MCNC: 165 MCG/DL (ref 298–536)
TOXIC GRANULATION: ABNORMAL
TRANS CELLS #/AREA URNS HPF: ABNORMAL /HPF
TRANSFERRIN SERPL-MCNC: 111 MG/DL (ref 200–360)
TRANSFERRIN SERPL-MCNC: 81 MG/DL (ref 200–360)
TRIGL SERPL-MCNC: 89 MG/DL (ref 0–150)
TROPONIN T SERPL-MCNC: 0.09 NG/ML (ref 0–0.03)
TROPONIN T SERPL-MCNC: 0.1 NG/ML (ref 0–0.03)
TROPONIN T SERPL-MCNC: 0.11 NG/ML (ref 0–0.03)
TROPONIN T SERPL-MCNC: 0.15 NG/ML (ref 0–0.03)
TROPONIN T SERPL-MCNC: 0.16 NG/ML (ref 0–0.03)
TSH SERPL DL<=0.05 MIU/L-ACNC: 2.43 UIU/ML (ref 0.27–4.2)
UNIT  ABO: NORMAL
UNIT  RH: NORMAL
URATE SERPL-MCNC: 6.5 MG/DL (ref 3.4–7)
URATE SERPL-MCNC: 7.5 MG/DL (ref 3.4–7)
UREASE TISS QL: NEGATIVE
UROBILINOGEN UR QL STRIP: ABNORMAL
VENTILATOR MODE: ABNORMAL
VENTILATOR MODE: ABNORMAL
VIT B12 BLD-MCNC: 1029 PG/ML (ref 211–946)
VLDLC SERPL-MCNC: 18 MG/DL (ref 5–40)
WBC # BLD AUTO: 12.28 10*3/MM3 (ref 3.4–10.8)
WBC # BLD AUTO: 14.95 10*3/MM3 (ref 3.4–10.8)
WBC # BLD AUTO: 16.74 10*3/MM3 (ref 3.4–10.8)
WBC # BLD AUTO: 17.67 10*3/MM3 (ref 3.4–10.8)
WBC # BLD AUTO: 5.69 10*3/MM3 (ref 3.4–10.8)
WBC # BLD AUTO: 5.72 10*3/MM3 (ref 3.4–10.8)
WBC # BLD AUTO: 6.08 10*3/MM3 (ref 3.4–10.8)
WBC # BLD AUTO: 6.21 10*3/MM3 (ref 3.4–10.8)
WBC # BLD AUTO: 6.3 10*3/MM3 (ref 3.4–10.8)
WBC # BLD AUTO: 6.95 10*3/MM3 (ref 3.4–10.8)
WBC # BLD AUTO: 6.97 10*3/MM3 (ref 3.4–10.8)
WBC # BLD AUTO: 7.13 10*3/MM3 (ref 3.4–10.8)
WBC # BLD AUTO: 7.29 10*3/MM3 (ref 3.4–10.8)
WBC # BLD AUTO: 7.49 10*3/MM3 (ref 3.4–10.8)
WBC # BLD AUTO: 7.53 10*3/MM3 (ref 3.4–10.8)
WBC # BLD AUTO: 7.61 10*3/MM3 (ref 3.4–10.8)
WBC # BLD AUTO: 7.62 10*3/MM3 (ref 3.4–10.8)
WBC # BLD AUTO: 7.79 10*3/MM3 (ref 3.4–10.8)
WBC # BLD AUTO: 7.92 10*3/MM3 (ref 3.4–10.8)
WBC # BLD AUTO: 8.19 10*3/MM3 (ref 3.4–10.8)
WBC # BLD AUTO: 8.31 10*3/MM3 (ref 3.4–10.8)
WBC # BLD AUTO: 8.42 10*3/MM3 (ref 3.4–10.8)
WBC # BLD AUTO: 8.65 10*3/MM3 (ref 3.4–10.8)
WBC # BLD AUTO: 8.78 10*3/MM3 (ref 3.4–10.8)
WBC # BLD AUTO: 9.01 10*3/MM3 (ref 3.4–10.8)
WBC # BLD AUTO: 9.04 10*3/MM3 (ref 3.4–10.8)
WBC # BLD AUTO: 9.56 10*3/MM3 (ref 3.4–10.8)
WBC # BLD AUTO: 9.85 10*3/MM3 (ref 3.4–10.8)
WBC MORPH BLD: NORMAL
WBC NRBC COR # BLD: 6.38 10*3/MM3 (ref 3.4–10.8)
WBC UR QL AUTO: ABNORMAL /HPF
WHOLE BLOOD HOLD SPECIMEN: NORMAL
YEAST URNS QL MICRO: ABNORMAL /HPF

## 2020-01-01 PROCEDURE — 99232 SBSQ HOSP IP/OBS MODERATE 35: CPT | Performed by: PSYCHIATRY & NEUROLOGY

## 2020-01-01 PROCEDURE — 83605 ASSAY OF LACTIC ACID: CPT | Performed by: INTERNAL MEDICINE

## 2020-01-01 PROCEDURE — 84145 PROCALCITONIN (PCT): CPT | Performed by: INTERNAL MEDICINE

## 2020-01-01 PROCEDURE — 97530 THERAPEUTIC ACTIVITIES: CPT

## 2020-01-01 PROCEDURE — 86901 BLOOD TYPING SEROLOGIC RH(D): CPT | Performed by: NURSE PRACTITIONER

## 2020-01-01 PROCEDURE — C1713 ANCHOR/SCREW BN/BN,TIS/BN: HCPCS | Performed by: ORTHOPAEDIC SURGERY

## 2020-01-01 PROCEDURE — 85025 COMPLETE CBC W/AUTO DIFF WBC: CPT | Performed by: NURSE PRACTITIONER

## 2020-01-01 PROCEDURE — 80053 COMPREHEN METABOLIC PANEL: CPT | Performed by: INTERNAL MEDICINE

## 2020-01-01 PROCEDURE — 99255 IP/OBS CONSLTJ NEW/EST HI 80: CPT | Performed by: PSYCHIATRY & NEUROLOGY

## 2020-01-01 PROCEDURE — 73090 X-RAY EXAM OF FOREARM: CPT

## 2020-01-01 PROCEDURE — 82962 GLUCOSE BLOOD TEST: CPT

## 2020-01-01 PROCEDURE — 73502 X-RAY EXAM HIP UNI 2-3 VIEWS: CPT

## 2020-01-01 PROCEDURE — 63710000001 INSULIN LISPRO (HUMAN) PER 5 UNITS: Performed by: INTERNAL MEDICINE

## 2020-01-01 PROCEDURE — 76000 FLUOROSCOPY <1 HR PHYS/QHP: CPT

## 2020-01-01 PROCEDURE — 99232 SBSQ HOSP IP/OBS MODERATE 35: CPT | Performed by: CLINICAL NURSE SPECIALIST

## 2020-01-01 PROCEDURE — 86900 BLOOD TYPING SEROLOGIC ABO: CPT

## 2020-01-01 PROCEDURE — 97164 PT RE-EVAL EST PLAN CARE: CPT

## 2020-01-01 PROCEDURE — 97110 THERAPEUTIC EXERCISES: CPT

## 2020-01-01 PROCEDURE — 97162 PT EVAL MOD COMPLEX 30 MIN: CPT | Performed by: PHYSICAL THERAPIST

## 2020-01-01 PROCEDURE — 25010000002 IRON SUCROSE PER 1 MG: Performed by: INTERNAL MEDICINE

## 2020-01-01 PROCEDURE — 25010000002 MEROPENEM PER 100 MG: Performed by: INTERNAL MEDICINE

## 2020-01-01 PROCEDURE — 80048 BASIC METABOLIC PNL TOTAL CA: CPT | Performed by: INTERNAL MEDICINE

## 2020-01-01 PROCEDURE — 83690 ASSAY OF LIPASE: CPT | Performed by: EMERGENCY MEDICINE

## 2020-01-01 PROCEDURE — 84466 ASSAY OF TRANSFERRIN: CPT | Performed by: INTERNAL MEDICINE

## 2020-01-01 PROCEDURE — 81001 URINALYSIS AUTO W/SCOPE: CPT | Performed by: EMERGENCY MEDICINE

## 2020-01-01 PROCEDURE — 92526 ORAL FUNCTION THERAPY: CPT

## 2020-01-01 PROCEDURE — 73060 X-RAY EXAM OF HUMERUS: CPT

## 2020-01-01 PROCEDURE — 87635 SARS-COV-2 COVID-19 AMP PRB: CPT | Performed by: FAMILY MEDICINE

## 2020-01-01 PROCEDURE — 84300 ASSAY OF URINE SODIUM: CPT | Performed by: INTERNAL MEDICINE

## 2020-01-01 PROCEDURE — 80053 COMPREHEN METABOLIC PANEL: CPT | Performed by: NURSE PRACTITIONER

## 2020-01-01 PROCEDURE — 72125 CT NECK SPINE W/O DYE: CPT

## 2020-01-01 PROCEDURE — 83735 ASSAY OF MAGNESIUM: CPT | Performed by: INTERNAL MEDICINE

## 2020-01-01 PROCEDURE — 36600 WITHDRAWAL OF ARTERIAL BLOOD: CPT

## 2020-01-01 PROCEDURE — 82570 ASSAY OF URINE CREATININE: CPT | Performed by: INTERNAL MEDICINE

## 2020-01-01 PROCEDURE — 76775 US EXAM ABDO BACK WALL LIM: CPT

## 2020-01-01 PROCEDURE — 83880 ASSAY OF NATRIURETIC PEPTIDE: CPT | Performed by: FAMILY MEDICINE

## 2020-01-01 PROCEDURE — 73560 X-RAY EXAM OF KNEE 1 OR 2: CPT

## 2020-01-01 PROCEDURE — 85025 COMPLETE CBC W/AUTO DIFF WBC: CPT | Performed by: FAMILY MEDICINE

## 2020-01-01 PROCEDURE — 83880 ASSAY OF NATRIURETIC PEPTIDE: CPT | Performed by: INTERNAL MEDICINE

## 2020-01-01 PROCEDURE — 97535 SELF CARE MNGMENT TRAINING: CPT

## 2020-01-01 PROCEDURE — 93005 ELECTROCARDIOGRAM TRACING: CPT | Performed by: INTERNAL MEDICINE

## 2020-01-01 PROCEDURE — 87088 URINE BACTERIA CULTURE: CPT | Performed by: INTERNAL MEDICINE

## 2020-01-01 PROCEDURE — 80048 BASIC METABOLIC PNL TOTAL CA: CPT | Performed by: NURSE PRACTITIONER

## 2020-01-01 PROCEDURE — 83880 ASSAY OF NATRIURETIC PEPTIDE: CPT | Performed by: EMERGENCY MEDICINE

## 2020-01-01 PROCEDURE — 70450 CT HEAD/BRAIN W/O DYE: CPT

## 2020-01-01 PROCEDURE — 86900 BLOOD TYPING SEROLOGIC ABO: CPT | Performed by: NURSE PRACTITIONER

## 2020-01-01 PROCEDURE — 25010000002 CEFTRIAXONE PER 250 MG: Performed by: FAMILY MEDICINE

## 2020-01-01 PROCEDURE — 85025 COMPLETE CBC W/AUTO DIFF WBC: CPT | Performed by: INTERNAL MEDICINE

## 2020-01-01 PROCEDURE — P9612 CATHETERIZE FOR URINE SPEC: HCPCS

## 2020-01-01 PROCEDURE — 87635 SARS-COV-2 COVID-19 AMP PRB: CPT | Performed by: NURSE PRACTITIONER

## 2020-01-01 PROCEDURE — 85730 THROMBOPLASTIN TIME PARTIAL: CPT | Performed by: EMERGENCY MEDICINE

## 2020-01-01 PROCEDURE — 82270 OCCULT BLOOD FECES: CPT | Performed by: FAMILY MEDICINE

## 2020-01-01 PROCEDURE — 85027 COMPLETE CBC AUTOMATED: CPT | Performed by: FAMILY MEDICINE

## 2020-01-01 PROCEDURE — 83735 ASSAY OF MAGNESIUM: CPT | Performed by: FAMILY MEDICINE

## 2020-01-01 PROCEDURE — 96366 THER/PROPH/DIAG IV INF ADDON: CPT

## 2020-01-01 PROCEDURE — 83036 HEMOGLOBIN GLYCOSYLATED A1C: CPT | Performed by: INTERNAL MEDICINE

## 2020-01-01 PROCEDURE — 86923 COMPATIBILITY TEST ELECTRIC: CPT

## 2020-01-01 PROCEDURE — G0378 HOSPITAL OBSERVATION PER HR: HCPCS

## 2020-01-01 PROCEDURE — 83540 ASSAY OF IRON: CPT | Performed by: INTERNAL MEDICINE

## 2020-01-01 PROCEDURE — 25010000002 HYDRALAZINE PER 20 MG: Performed by: INTERNAL MEDICINE

## 2020-01-01 PROCEDURE — 85610 PROTHROMBIN TIME: CPT | Performed by: NURSE PRACTITIONER

## 2020-01-01 PROCEDURE — 85027 COMPLETE CBC AUTOMATED: CPT | Performed by: INTERNAL MEDICINE

## 2020-01-01 PROCEDURE — 25010000002 ENOXAPARIN PER 10 MG: Performed by: FAMILY MEDICINE

## 2020-01-01 PROCEDURE — 82803 BLOOD GASES ANY COMBINATION: CPT

## 2020-01-01 PROCEDURE — 85730 THROMBOPLASTIN TIME PARTIAL: CPT | Performed by: INTERNAL MEDICINE

## 2020-01-01 PROCEDURE — P9016 RBC LEUKOCYTES REDUCED: HCPCS

## 2020-01-01 PROCEDURE — 25010000002 PROPOFOL 10 MG/ML EMULSION: Performed by: NURSE ANESTHETIST, CERTIFIED REGISTERED

## 2020-01-01 PROCEDURE — 87081 CULTURE SCREEN ONLY: CPT | Performed by: INTERNAL MEDICINE

## 2020-01-01 PROCEDURE — 85018 HEMOGLOBIN: CPT | Performed by: INTERNAL MEDICINE

## 2020-01-01 PROCEDURE — 63710000001 INSULIN REGULAR HUMAN PER 5 UNITS: Performed by: INTERNAL MEDICINE

## 2020-01-01 PROCEDURE — 71275 CT ANGIOGRAPHY CHEST: CPT

## 2020-01-01 PROCEDURE — 25010000002 CALCIUM GLUCONATE PER 10 ML: Performed by: INTERNAL MEDICINE

## 2020-01-01 PROCEDURE — 92526 ORAL FUNCTION THERAPY: CPT | Performed by: SPEECH-LANGUAGE PATHOLOGIST

## 2020-01-01 PROCEDURE — 84443 ASSAY THYROID STIM HORMONE: CPT | Performed by: INTERNAL MEDICINE

## 2020-01-01 PROCEDURE — 83605 ASSAY OF LACTIC ACID: CPT | Performed by: EMERGENCY MEDICINE

## 2020-01-01 PROCEDURE — 99285 EMERGENCY DEPT VISIT HI MDM: CPT

## 2020-01-01 PROCEDURE — 36430 TRANSFUSION BLD/BLD COMPNT: CPT

## 2020-01-01 PROCEDURE — 85610 PROTHROMBIN TIME: CPT | Performed by: INTERNAL MEDICINE

## 2020-01-01 PROCEDURE — 87186 SC STD MICRODIL/AGAR DIL: CPT | Performed by: INTERNAL MEDICINE

## 2020-01-01 PROCEDURE — 84484 ASSAY OF TROPONIN QUANT: CPT | Performed by: INTERNAL MEDICINE

## 2020-01-01 PROCEDURE — 93880 EXTRACRANIAL BILAT STUDY: CPT

## 2020-01-01 PROCEDURE — 63710000001 INSULIN REGULAR HUMAN PER 5 UNITS: Performed by: NURSE PRACTITIONER

## 2020-01-01 PROCEDURE — 74230 X-RAY XM SWLNG FUNCJ C+: CPT

## 2020-01-01 PROCEDURE — 85045 AUTOMATED RETICULOCYTE COUNT: CPT | Performed by: INTERNAL MEDICINE

## 2020-01-01 PROCEDURE — 87635 SARS-COV-2 COVID-19 AMP PRB: CPT | Performed by: INTERNAL MEDICINE

## 2020-01-01 PROCEDURE — 93010 ELECTROCARDIOGRAM REPORT: CPT | Performed by: INTERNAL MEDICINE

## 2020-01-01 PROCEDURE — 82550 ASSAY OF CK (CPK): CPT | Performed by: INTERNAL MEDICINE

## 2020-01-01 PROCEDURE — 86901 BLOOD TYPING SEROLOGIC RH(D): CPT

## 2020-01-01 PROCEDURE — 82272 OCCULT BLD FECES 1-3 TESTS: CPT | Performed by: INTERNAL MEDICINE

## 2020-01-01 PROCEDURE — 71045 X-RAY EXAM CHEST 1 VIEW: CPT

## 2020-01-01 PROCEDURE — 80048 BASIC METABOLIC PNL TOTAL CA: CPT | Performed by: FAMILY MEDICINE

## 2020-01-01 PROCEDURE — 85027 COMPLETE CBC AUTOMATED: CPT | Performed by: NURSE PRACTITIONER

## 2020-01-01 PROCEDURE — 97116 GAIT TRAINING THERAPY: CPT

## 2020-01-01 PROCEDURE — 87086 URINE CULTURE/COLONY COUNT: CPT | Performed by: EMERGENCY MEDICINE

## 2020-01-01 PROCEDURE — 25010000002 IRON SUCROSE PER 1 MG: Performed by: NURSE PRACTITIONER

## 2020-01-01 PROCEDURE — 85025 COMPLETE CBC W/AUTO DIFF WBC: CPT | Performed by: PHYSICIAN ASSISTANT

## 2020-01-01 PROCEDURE — 92610 EVALUATE SWALLOWING FUNCTION: CPT | Performed by: SPEECH-LANGUAGE PATHOLOGIST

## 2020-01-01 PROCEDURE — 80053 COMPREHEN METABOLIC PANEL: CPT | Performed by: FAMILY MEDICINE

## 2020-01-01 PROCEDURE — 80053 COMPREHEN METABOLIC PANEL: CPT | Performed by: EMERGENCY MEDICINE

## 2020-01-01 PROCEDURE — 82728 ASSAY OF FERRITIN: CPT | Performed by: INTERNAL MEDICINE

## 2020-01-01 PROCEDURE — 85018 HEMOGLOBIN: CPT | Performed by: ORTHOPAEDIC SURGERY

## 2020-01-01 PROCEDURE — 86900 BLOOD TYPING SEROLOGIC ABO: CPT | Performed by: INTERNAL MEDICINE

## 2020-01-01 PROCEDURE — 87086 URINE CULTURE/COLONY COUNT: CPT | Performed by: INTERNAL MEDICINE

## 2020-01-01 PROCEDURE — 84550 ASSAY OF BLOOD/URIC ACID: CPT | Performed by: INTERNAL MEDICINE

## 2020-01-01 PROCEDURE — 84484 ASSAY OF TROPONIN QUANT: CPT | Performed by: NURSE PRACTITIONER

## 2020-01-01 PROCEDURE — 25010000002 ENOXAPARIN PER 10 MG: Performed by: INTERNAL MEDICINE

## 2020-01-01 PROCEDURE — 85730 THROMBOPLASTIN TIME PARTIAL: CPT | Performed by: FAMILY MEDICINE

## 2020-01-01 PROCEDURE — 25010000002 POTASSIUM CHLORIDE PER 2 MEQ OF POTASSIUM: Performed by: NURSE PRACTITIONER

## 2020-01-01 PROCEDURE — 25010000002 MORPHINE SULFATE (PF) 2 MG/ML SOLUTION

## 2020-01-01 PROCEDURE — 93306 TTE W/DOPPLER COMPLETE: CPT | Performed by: INTERNAL MEDICINE

## 2020-01-01 PROCEDURE — 80048 BASIC METABOLIC PNL TOTAL CA: CPT | Performed by: ORTHOPAEDIC SURGERY

## 2020-01-01 PROCEDURE — 84439 ASSAY OF FREE THYROXINE: CPT | Performed by: INTERNAL MEDICINE

## 2020-01-01 PROCEDURE — 93970 EXTREMITY STUDY: CPT

## 2020-01-01 PROCEDURE — 0DJ08ZZ INSPECTION OF UPPER INTESTINAL TRACT, VIA NATURAL OR ARTIFICIAL OPENING ENDOSCOPIC: ICD-10-PCS | Performed by: INTERNAL MEDICINE

## 2020-01-01 PROCEDURE — 99283 EMERGENCY DEPT VISIT LOW MDM: CPT

## 2020-01-01 PROCEDURE — 25010000002 HALOPERIDOL LACTATE PER 5 MG: Performed by: FAMILY MEDICINE

## 2020-01-01 PROCEDURE — 25010000002 CALCIUM GLUCONATE PER 10 ML: Performed by: NURSE PRACTITIONER

## 2020-01-01 PROCEDURE — 85007 BL SMEAR W/DIFF WBC COUNT: CPT | Performed by: INTERNAL MEDICINE

## 2020-01-01 PROCEDURE — 71260 CT THORAX DX C+: CPT

## 2020-01-01 PROCEDURE — 25010000003 CEFAZOLIN PER 500 MG: Performed by: NURSE ANESTHETIST, CERTIFIED REGISTERED

## 2020-01-01 PROCEDURE — 99253 IP/OBS CNSLTJ NEW/EST LOW 45: CPT | Performed by: SURGERY

## 2020-01-01 PROCEDURE — 87040 BLOOD CULTURE FOR BACTERIA: CPT | Performed by: EMERGENCY MEDICINE

## 2020-01-01 PROCEDURE — 99222 1ST HOSP IP/OBS MODERATE 55: CPT | Performed by: INTERNAL MEDICINE

## 2020-01-01 PROCEDURE — 63710000001 INSULIN LISPRO (HUMAN) PER 5 UNITS: Performed by: ORTHOPAEDIC SURGERY

## 2020-01-01 PROCEDURE — 25010000002 ONDANSETRON PER 1 MG: Performed by: INTERNAL MEDICINE

## 2020-01-01 PROCEDURE — 96376 TX/PRO/DX INJ SAME DRUG ADON: CPT

## 2020-01-01 PROCEDURE — 25010000002 POTASSIUM CHLORIDE PER 2 MEQ OF POTASSIUM: Performed by: INTERNAL MEDICINE

## 2020-01-01 PROCEDURE — 93005 ELECTROCARDIOGRAM TRACING: CPT | Performed by: NURSE PRACTITIONER

## 2020-01-01 PROCEDURE — 82550 ASSAY OF CK (CPK): CPT | Performed by: EMERGENCY MEDICINE

## 2020-01-01 PROCEDURE — 25010000003 LIDOCAINE 1 % SOLUTION: Performed by: PHYSICIAN ASSISTANT

## 2020-01-01 PROCEDURE — 86140 C-REACTIVE PROTEIN: CPT | Performed by: INTERNAL MEDICINE

## 2020-01-01 PROCEDURE — 25010000002 CEFEPIME PER 500 MG: Performed by: INTERNAL MEDICINE

## 2020-01-01 PROCEDURE — 81001 URINALYSIS AUTO W/SCOPE: CPT | Performed by: FAMILY MEDICINE

## 2020-01-01 PROCEDURE — C1769 GUIDE WIRE: HCPCS | Performed by: ORTHOPAEDIC SURGERY

## 2020-01-01 PROCEDURE — 86850 RBC ANTIBODY SCREEN: CPT | Performed by: NURSE PRACTITIONER

## 2020-01-01 PROCEDURE — 0QS736Z REPOSITION LEFT UPPER FEMUR WITH INTRAMEDULLARY INTERNAL FIXATION DEVICE, PERCUTANEOUS APPROACH: ICD-10-PCS | Performed by: ORTHOPAEDIC SURGERY

## 2020-01-01 PROCEDURE — 73030 X-RAY EXAM OF SHOULDER: CPT

## 2020-01-01 PROCEDURE — 80048 BASIC METABOLIC PNL TOTAL CA: CPT | Performed by: EMERGENCY MEDICINE

## 2020-01-01 PROCEDURE — 25010000002 FENTANYL CITRATE (PF) 100 MCG/2ML SOLUTION: Performed by: NURSE ANESTHETIST, CERTIFIED REGISTERED

## 2020-01-01 PROCEDURE — 85025 COMPLETE CBC W/AUTO DIFF WBC: CPT | Performed by: EMERGENCY MEDICINE

## 2020-01-01 PROCEDURE — 86850 RBC ANTIBODY SCREEN: CPT | Performed by: INTERNAL MEDICINE

## 2020-01-01 PROCEDURE — 80061 LIPID PANEL: CPT | Performed by: INTERNAL MEDICINE

## 2020-01-01 PROCEDURE — 99284 EMERGENCY DEPT VISIT MOD MDM: CPT

## 2020-01-01 PROCEDURE — 25010000002 HYDROMORPHONE PER 4 MG: Performed by: INTERNAL MEDICINE

## 2020-01-01 PROCEDURE — 74176 CT ABD & PELVIS W/O CONTRAST: CPT

## 2020-01-01 PROCEDURE — 85018 HEMOGLOBIN: CPT | Performed by: NURSE PRACTITIONER

## 2020-01-01 PROCEDURE — 87040 BLOOD CULTURE FOR BACTERIA: CPT | Performed by: FAMILY MEDICINE

## 2020-01-01 PROCEDURE — 84484 ASSAY OF TROPONIN QUANT: CPT | Performed by: EMERGENCY MEDICINE

## 2020-01-01 PROCEDURE — 82607 VITAMIN B-12: CPT | Performed by: INTERNAL MEDICINE

## 2020-01-01 PROCEDURE — 86901 BLOOD TYPING SEROLOGIC RH(D): CPT | Performed by: INTERNAL MEDICINE

## 2020-01-01 PROCEDURE — 25010000002 FUROSEMIDE PER 20 MG: Performed by: INTERNAL MEDICINE

## 2020-01-01 PROCEDURE — 85651 RBC SED RATE NONAUTOMATED: CPT | Performed by: INTERNAL MEDICINE

## 2020-01-01 PROCEDURE — 96375 TX/PRO/DX INJ NEW DRUG ADDON: CPT

## 2020-01-01 PROCEDURE — 25010000002 ROPIVACAINE PER 1 MG: Performed by: ANESTHESIOLOGY

## 2020-01-01 PROCEDURE — 99232 SBSQ HOSP IP/OBS MODERATE 35: CPT | Performed by: INTERNAL MEDICINE

## 2020-01-01 PROCEDURE — 83880 ASSAY OF NATRIURETIC PEPTIDE: CPT | Performed by: NURSE PRACTITIONER

## 2020-01-01 PROCEDURE — 25010000002 TDAP 5-2.5-18.5 LF-MCG/0.5 SUSPENSION: Performed by: NURSE PRACTITIONER

## 2020-01-01 PROCEDURE — 99232 SBSQ HOSP IP/OBS MODERATE 35: CPT | Performed by: SURGERY

## 2020-01-01 PROCEDURE — 63710000001 INSULIN REGULAR HUMAN PER 5 UNITS: Performed by: EMERGENCY MEDICINE

## 2020-01-01 PROCEDURE — 97535 SELF CARE MNGMENT TRAINING: CPT | Performed by: OCCUPATIONAL THERAPIST

## 2020-01-01 PROCEDURE — 87635 SARS-COV-2 COVID-19 AMP PRB: CPT | Performed by: EMERGENCY MEDICINE

## 2020-01-01 PROCEDURE — 73130 X-RAY EXAM OF HAND: CPT

## 2020-01-01 PROCEDURE — 92611 MOTION FLUOROSCOPY/SWALLOW: CPT

## 2020-01-01 PROCEDURE — 83605 ASSAY OF LACTIC ACID: CPT | Performed by: FAMILY MEDICINE

## 2020-01-01 PROCEDURE — 85610 PROTHROMBIN TIME: CPT | Performed by: FAMILY MEDICINE

## 2020-01-01 PROCEDURE — 25010000002 IOPAMIDOL 61 % SOLUTION: Performed by: INTERNAL MEDICINE

## 2020-01-01 PROCEDURE — 97168 OT RE-EVAL EST PLAN CARE: CPT | Performed by: OCCUPATIONAL THERAPIST

## 2020-01-01 PROCEDURE — 84145 PROCALCITONIN (PCT): CPT | Performed by: EMERGENCY MEDICINE

## 2020-01-01 PROCEDURE — 71046 X-RAY EXAM CHEST 2 VIEWS: CPT

## 2020-01-01 PROCEDURE — 85379 FIBRIN DEGRADATION QUANT: CPT | Performed by: EMERGENCY MEDICINE

## 2020-01-01 PROCEDURE — 93306 TTE W/DOPPLER COMPLETE: CPT

## 2020-01-01 PROCEDURE — 97162 PT EVAL MOD COMPLEX 30 MIN: CPT

## 2020-01-01 PROCEDURE — 0 IOPAMIDOL PER 1 ML: Performed by: EMERGENCY MEDICINE

## 2020-01-01 PROCEDURE — 85610 PROTHROMBIN TIME: CPT | Performed by: EMERGENCY MEDICINE

## 2020-01-01 PROCEDURE — 96365 THER/PROPH/DIAG IV INF INIT: CPT

## 2020-01-01 PROCEDURE — 90471 IMMUNIZATION ADMIN: CPT | Performed by: NURSE PRACTITIONER

## 2020-01-01 PROCEDURE — 85014 HEMATOCRIT: CPT | Performed by: INTERNAL MEDICINE

## 2020-01-01 PROCEDURE — 73600 X-RAY EXAM OF ANKLE: CPT

## 2020-01-01 PROCEDURE — 97165 OT EVAL LOW COMPLEX 30 MIN: CPT

## 2020-01-01 PROCEDURE — 25010000002 CALCIUM GLUCONATE PER 10 ML: Performed by: EMERGENCY MEDICINE

## 2020-01-01 PROCEDURE — 81001 URINALYSIS AUTO W/SCOPE: CPT | Performed by: INTERNAL MEDICINE

## 2020-01-01 PROCEDURE — 70551 MRI BRAIN STEM W/O DYE: CPT

## 2020-01-01 PROCEDURE — 99231 SBSQ HOSP IP/OBS SF/LOW 25: CPT | Performed by: INTERNAL MEDICINE

## 2020-01-01 PROCEDURE — 43235 EGD DIAGNOSTIC BRUSH WASH: CPT | Performed by: INTERNAL MEDICINE

## 2020-01-01 PROCEDURE — 97166 OT EVAL MOD COMPLEX 45 MIN: CPT | Performed by: OCCUPATIONAL THERAPIST

## 2020-01-01 PROCEDURE — 25010000002 ENOXAPARIN PER 10 MG: Performed by: EMERGENCY MEDICINE

## 2020-01-01 PROCEDURE — 87077 CULTURE AEROBIC IDENTIFY: CPT | Performed by: EMERGENCY MEDICINE

## 2020-01-01 PROCEDURE — 87186 SC STD MICRODIL/AGAR DIL: CPT | Performed by: EMERGENCY MEDICINE

## 2020-01-01 PROCEDURE — 85014 HEMATOCRIT: CPT | Performed by: NURSE PRACTITIONER

## 2020-01-01 PROCEDURE — 93880 EXTRACRANIAL BILAT STUDY: CPT | Performed by: SURGERY

## 2020-01-01 PROCEDURE — 84132 ASSAY OF SERUM POTASSIUM: CPT | Performed by: FAMILY MEDICINE

## 2020-01-01 PROCEDURE — 36415 COLL VENOUS BLD VENIPUNCTURE: CPT

## 2020-01-01 PROCEDURE — 85730 THROMBOPLASTIN TIME PARTIAL: CPT | Performed by: NURSE PRACTITIONER

## 2020-01-01 PROCEDURE — 93005 ELECTROCARDIOGRAM TRACING: CPT | Performed by: EMERGENCY MEDICINE

## 2020-01-01 PROCEDURE — 84100 ASSAY OF PHOSPHORUS: CPT | Performed by: INTERNAL MEDICINE

## 2020-01-01 PROCEDURE — 93970 EXTREMITY STUDY: CPT | Performed by: SURGERY

## 2020-01-01 PROCEDURE — 90715 TDAP VACCINE 7 YRS/> IM: CPT | Performed by: NURSE PRACTITIONER

## 2020-01-01 PROCEDURE — 82746 ASSAY OF FOLIC ACID SERUM: CPT | Performed by: INTERNAL MEDICINE

## 2020-01-01 PROCEDURE — 81001 URINALYSIS AUTO W/SCOPE: CPT | Performed by: ORTHOPAEDIC SURGERY

## 2020-01-01 PROCEDURE — 83690 ASSAY OF LIPASE: CPT | Performed by: FAMILY MEDICINE

## 2020-01-01 PROCEDURE — 85014 HEMATOCRIT: CPT | Performed by: ORTHOPAEDIC SURGERY

## 2020-01-01 DEVICE — BLD FEM FIX HELI TFN ADV PERF 95MM STRL: Type: IMPLANTABLE DEVICE | Site: HIP | Status: FUNCTIONAL

## 2020-01-01 DEVICE — SCRW LK STRDRV TI 5X38M STRL: Type: IMPLANTABLE DEVICE | Site: HIP | Status: FUNCTIONAL

## 2020-01-01 DEVICE — NAIL FEM TFN ADV PROX 130D 11X235MM LT STRL: Type: IMPLANTABLE DEVICE | Site: HIP | Status: FUNCTIONAL

## 2020-01-01 RX ORDER — AMOXICILLIN 250 MG
1 CAPSULE ORAL DAILY
Status: ON HOLD | COMMUNITY
End: 2020-01-01 | Stop reason: SDUPTHER

## 2020-01-01 RX ORDER — COLCHICINE 0.6 MG/1
0.6 TABLET ORAL DAILY PRN
Status: DISCONTINUED | OUTPATIENT
Start: 2020-01-01 | End: 2020-01-01 | Stop reason: HOSPADM

## 2020-01-01 RX ORDER — HYDROCODONE BITARTRATE AND ACETAMINOPHEN 7.5; 325 MG/1; MG/1
1 TABLET ORAL EVERY 4 HOURS PRN
Status: DISCONTINUED | OUTPATIENT
Start: 2020-01-01 | End: 2020-01-01 | Stop reason: SDUPTHER

## 2020-01-01 RX ORDER — LABETALOL HYDROCHLORIDE 5 MG/ML
5 INJECTION, SOLUTION INTRAVENOUS
Status: DISCONTINUED | OUTPATIENT
Start: 2020-01-01 | End: 2020-01-01 | Stop reason: HOSPADM

## 2020-01-01 RX ORDER — ALLOPURINOL 300 MG/1
300 TABLET ORAL DAILY
Qty: 30 TABLET | Refills: 2 | Status: SHIPPED | OUTPATIENT
Start: 2020-01-01

## 2020-01-01 RX ORDER — LOSARTAN POTASSIUM 50 MG/1
50 TABLET ORAL
Status: DISCONTINUED | OUTPATIENT
Start: 2020-01-01 | End: 2020-01-01

## 2020-01-01 RX ORDER — MULTIVIT WITH MINERALS/LUTEIN
500 TABLET ORAL DAILY
COMMUNITY

## 2020-01-01 RX ORDER — DEXTROSE MONOHYDRATE 25 G/50ML
25 INJECTION, SOLUTION INTRAVENOUS
Status: DISCONTINUED | OUTPATIENT
Start: 2020-01-01 | End: 2020-01-01 | Stop reason: SDUPTHER

## 2020-01-01 RX ORDER — VITAMIN B COMPLEX
1 CAPSULE ORAL DAILY
COMMUNITY

## 2020-01-01 RX ORDER — TAMSULOSIN HYDROCHLORIDE 0.4 MG/1
1 CAPSULE ORAL DAILY
Status: ON HOLD | COMMUNITY
End: 2020-01-01 | Stop reason: SDUPTHER

## 2020-01-01 RX ORDER — PANTOPRAZOLE SODIUM 40 MG/1
40 TABLET, DELAYED RELEASE ORAL DAILY
Status: ON HOLD | COMMUNITY
End: 2020-01-01 | Stop reason: SDUPTHER

## 2020-01-01 RX ORDER — ROCURONIUM BROMIDE 10 MG/ML
INJECTION, SOLUTION INTRAVENOUS AS NEEDED
Status: DISCONTINUED | OUTPATIENT
Start: 2020-01-01 | End: 2020-01-01 | Stop reason: SURG

## 2020-01-01 RX ORDER — PREDNISOLONE ACETATE 10 MG/ML
1 SUSPENSION/ DROPS OPHTHALMIC 4 TIMES DAILY
Status: DISCONTINUED | OUTPATIENT
Start: 2020-01-01 | End: 2020-01-01 | Stop reason: HOSPADM

## 2020-01-01 RX ORDER — LANOLIN ALCOHOL/MO/W.PET/CERES
400 CREAM (GRAM) TOPICAL DAILY
Status: DISCONTINUED | OUTPATIENT
Start: 2020-01-01 | End: 2020-01-01 | Stop reason: SDUPTHER

## 2020-01-01 RX ORDER — PANTOPRAZOLE SODIUM 40 MG/1
40 TABLET, DELAYED RELEASE ORAL EVERY 12 HOURS SCHEDULED
Status: DISCONTINUED | OUTPATIENT
Start: 2020-01-01 | End: 2020-01-01 | Stop reason: SDUPTHER

## 2020-01-01 RX ORDER — CEFDINIR 300 MG/1
300 CAPSULE ORAL 2 TIMES DAILY
Start: 2020-01-01 | End: 2020-01-01 | Stop reason: HOSPADM

## 2020-01-01 RX ORDER — SODIUM CHLORIDE 0.9 % (FLUSH) 0.9 %
10 SYRINGE (ML) INJECTION AS NEEDED
Status: DISCONTINUED | OUTPATIENT
Start: 2020-01-01 | End: 2020-01-01 | Stop reason: HOSPADM

## 2020-01-01 RX ORDER — ISOSORBIDE MONONITRATE 30 MG/1
30 TABLET, EXTENDED RELEASE ORAL DAILY
Status: DISCONTINUED | OUTPATIENT
Start: 2020-01-01 | End: 2020-01-01 | Stop reason: HOSPADM

## 2020-01-01 RX ORDER — OMEGA-3S/DHA/EPA/FISH OIL/D3 300MG-1000
400 CAPSULE ORAL DAILY
Status: ON HOLD | COMMUNITY
End: 2020-01-01 | Stop reason: SDUPTHER

## 2020-01-01 RX ORDER — SODIUM POLYSTYRENE SULFONATE 15 G/60ML
30 SUSPENSION ORAL; RECTAL ONCE
Status: COMPLETED | OUTPATIENT
Start: 2020-01-01 | End: 2020-01-01

## 2020-01-01 RX ORDER — LIDOCAINE HYDROCHLORIDE 10 MG/ML
10 INJECTION, SOLUTION INFILTRATION; PERINEURAL ONCE
Status: COMPLETED | OUTPATIENT
Start: 2020-01-01 | End: 2020-01-01

## 2020-01-01 RX ORDER — NALOXONE HCL 0.4 MG/ML
0.4 VIAL (ML) INJECTION AS NEEDED
Status: DISCONTINUED | OUTPATIENT
Start: 2020-01-01 | End: 2020-01-01 | Stop reason: HOSPADM

## 2020-01-01 RX ORDER — SULFAMETHOXAZOLE AND TRIMETHOPRIM 800; 160 MG/1; MG/1
1 TABLET ORAL EVERY 12 HOURS SCHEDULED
Status: DISCONTINUED | OUTPATIENT
Start: 2020-01-01 | End: 2020-01-01

## 2020-01-01 RX ORDER — MAGNESIUM HYDROXIDE 1200 MG/15ML
LIQUID ORAL AS NEEDED
Status: DISCONTINUED | OUTPATIENT
Start: 2020-01-01 | End: 2020-01-01 | Stop reason: HOSPADM

## 2020-01-01 RX ORDER — METOPROLOL SUCCINATE 25 MG/1
25 TABLET, EXTENDED RELEASE ORAL DAILY
Status: ON HOLD | COMMUNITY
End: 2020-01-01 | Stop reason: SDUPTHER

## 2020-01-01 RX ORDER — NITROGLYCERIN 0.4 MG/1
0.4 TABLET SUBLINGUAL
Status: DISCONTINUED | OUTPATIENT
Start: 2020-01-01 | End: 2020-01-01 | Stop reason: HOSPADM

## 2020-01-01 RX ORDER — TAMSULOSIN HYDROCHLORIDE 0.4 MG/1
0.4 CAPSULE ORAL DAILY
Status: DISCONTINUED | OUTPATIENT
Start: 2020-01-01 | End: 2020-01-01 | Stop reason: SDUPTHER

## 2020-01-01 RX ORDER — MORPHINE SULFATE 2 MG/ML
1 INJECTION, SOLUTION INTRAMUSCULAR; INTRAVENOUS EVERY 4 HOURS PRN
Status: DISCONTINUED | OUTPATIENT
Start: 2020-01-01 | End: 2020-01-01 | Stop reason: HOSPADM

## 2020-01-01 RX ORDER — FUROSEMIDE 20 MG/1
20 TABLET ORAL DAILY PRN
Qty: 30 TABLET | Refills: 2 | Status: SHIPPED | OUTPATIENT
Start: 2020-01-01 | End: 2020-01-01 | Stop reason: SDUPTHER

## 2020-01-01 RX ORDER — PANTOPRAZOLE SODIUM 40 MG/1
40 TABLET, DELAYED RELEASE ORAL
Status: DISCONTINUED | OUTPATIENT
Start: 2020-01-01 | End: 2020-01-01 | Stop reason: SDUPTHER

## 2020-01-01 RX ORDER — FINASTERIDE 5 MG/1
5 TABLET, FILM COATED ORAL DAILY
Status: DISCONTINUED | OUTPATIENT
Start: 2020-01-01 | End: 2020-01-01 | Stop reason: SDUPTHER

## 2020-01-01 RX ORDER — ASPIRIN 81 MG/1
81 TABLET, CHEWABLE ORAL DAILY
Status: ON HOLD | COMMUNITY
End: 2020-01-01 | Stop reason: SDUPTHER

## 2020-01-01 RX ORDER — ACETAMINOPHEN 325 MG/1
650 TABLET ORAL ONCE
Status: COMPLETED | OUTPATIENT
Start: 2020-01-01 | End: 2020-01-01

## 2020-01-01 RX ORDER — FUROSEMIDE 20 MG/1
20 TABLET ORAL
Status: DISCONTINUED | OUTPATIENT
Start: 2020-01-01 | End: 2020-01-01 | Stop reason: HOSPADM

## 2020-01-01 RX ORDER — LANSOPRAZOLE
30 KIT
Status: DISCONTINUED | OUTPATIENT
Start: 2020-01-01 | End: 2020-01-01 | Stop reason: HOSPADM

## 2020-01-01 RX ORDER — ONDANSETRON 4 MG/1
4 TABLET, FILM COATED ORAL EVERY 6 HOURS PRN
Qty: 20 TABLET | Refills: 1 | Status: SHIPPED | OUTPATIENT
Start: 2020-01-01

## 2020-01-01 RX ORDER — ONDANSETRON 4 MG/1
4 TABLET, FILM COATED ORAL EVERY 8 HOURS PRN
Status: DISCONTINUED | OUTPATIENT
Start: 2020-01-01 | End: 2020-01-01 | Stop reason: SDUPTHER

## 2020-01-01 RX ORDER — AMLODIPINE BESYLATE 5 MG/1
5 TABLET ORAL DAILY
COMMUNITY
End: 2020-01-01

## 2020-01-01 RX ORDER — DEXTROSE MONOHYDRATE 25 G/50ML
50 INJECTION, SOLUTION INTRAVENOUS ONCE
Status: COMPLETED | OUTPATIENT
Start: 2020-01-01 | End: 2020-01-01

## 2020-01-01 RX ORDER — TAMSULOSIN HYDROCHLORIDE 0.4 MG/1
0.4 CAPSULE ORAL DAILY
Status: DISCONTINUED | OUTPATIENT
Start: 2020-01-01 | End: 2020-01-01 | Stop reason: HOSPADM

## 2020-01-01 RX ORDER — AMOXICILLIN 250 MG
2 CAPSULE ORAL 2 TIMES DAILY PRN
Status: DISCONTINUED | OUTPATIENT
Start: 2020-01-01 | End: 2020-01-01 | Stop reason: SDUPTHER

## 2020-01-01 RX ORDER — NICOTINE POLACRILEX 4 MG
15 LOZENGE BUCCAL
Status: DISCONTINUED | OUTPATIENT
Start: 2020-01-01 | End: 2020-01-01 | Stop reason: SDUPTHER

## 2020-01-01 RX ORDER — DEXTROSE MONOHYDRATE 25 G/50ML
25 INJECTION, SOLUTION INTRAVENOUS
Status: DISCONTINUED | OUTPATIENT
Start: 2020-01-01 | End: 2020-01-01 | Stop reason: HOSPADM

## 2020-01-01 RX ORDER — SODIUM CHLORIDE 9 MG/ML
50 INJECTION, SOLUTION INTRAVENOUS CONTINUOUS
Status: DISCONTINUED | OUTPATIENT
Start: 2020-01-01 | End: 2020-01-01

## 2020-01-01 RX ORDER — ONDANSETRON 2 MG/ML
4 INJECTION INTRAMUSCULAR; INTRAVENOUS EVERY 6 HOURS PRN
Status: DISCONTINUED | OUTPATIENT
Start: 2020-01-01 | End: 2020-01-01 | Stop reason: SDUPTHER

## 2020-01-01 RX ORDER — ACETAMINOPHEN 325 MG/1
650 TABLET ORAL EVERY 4 HOURS PRN
Status: DISCONTINUED | OUTPATIENT
Start: 2020-01-01 | End: 2020-01-01 | Stop reason: HOSPADM

## 2020-01-01 RX ORDER — LANOLIN ALCOHOL/MO/W.PET/CERES
200 CREAM (GRAM) TOPICAL DAILY
Status: DISCONTINUED | OUTPATIENT
Start: 2020-01-01 | End: 2020-01-01 | Stop reason: HOSPADM

## 2020-01-01 RX ORDER — ISOSORBIDE DINITRATE 10 MG/1
10 TABLET ORAL
Status: DISCONTINUED | OUTPATIENT
Start: 2020-01-01 | End: 2020-01-01 | Stop reason: HOSPADM

## 2020-01-01 RX ORDER — COLCHICINE 0.6 MG/1
0.6 TABLET ORAL DAILY
Status: ON HOLD | COMMUNITY
End: 2020-01-01 | Stop reason: SDUPTHER

## 2020-01-01 RX ORDER — DEXTROSE MONOHYDRATE 100 MG/ML
100 INJECTION, SOLUTION INTRAVENOUS CONTINUOUS
Status: DISCONTINUED | OUTPATIENT
Start: 2020-01-01 | End: 2020-01-01

## 2020-01-01 RX ORDER — LOSARTAN POTASSIUM 50 MG/1
100 TABLET ORAL
Status: DISCONTINUED | OUTPATIENT
Start: 2020-01-01 | End: 2020-01-01 | Stop reason: HOSPADM

## 2020-01-01 RX ORDER — PROPOFOL 10 MG/ML
VIAL (ML) INTRAVENOUS AS NEEDED
Status: DISCONTINUED | OUTPATIENT
Start: 2020-01-01 | End: 2020-01-01 | Stop reason: SURG

## 2020-01-01 RX ORDER — NICOTINE POLACRILEX 4 MG
15 LOZENGE BUCCAL
Status: DISCONTINUED | OUTPATIENT
Start: 2020-01-01 | End: 2020-01-01 | Stop reason: HOSPADM

## 2020-01-01 RX ORDER — HYDRALAZINE HYDROCHLORIDE 10 MG/1
10 TABLET, FILM COATED ORAL ONCE
Status: COMPLETED | OUTPATIENT
Start: 2020-01-01 | End: 2020-01-01

## 2020-01-01 RX ORDER — NEOMYCIN SULFATE, POLYMYXIN B SULFATE AND HYDROCORTISONE 10; 3.5; 1 MG/ML; MG/ML; [USP'U]/ML
4 SUSPENSION/ DROPS AURICULAR (OTIC) 3 TIMES DAILY
Status: ON HOLD | COMMUNITY
End: 2020-01-01

## 2020-01-01 RX ORDER — PRAVASTATIN SODIUM 20 MG
40 TABLET ORAL NIGHTLY
Status: DISCONTINUED | OUTPATIENT
Start: 2020-01-01 | End: 2020-01-01 | Stop reason: HOSPADM

## 2020-01-01 RX ORDER — PRAVASTATIN SODIUM 20 MG
40 TABLET ORAL NIGHTLY
Status: DISCONTINUED | OUTPATIENT
Start: 2020-01-01 | End: 2020-01-01 | Stop reason: SDUPTHER

## 2020-01-01 RX ORDER — OXYCODONE HYDROCHLORIDE AND ACETAMINOPHEN 5; 325 MG/1; MG/1
1 TABLET ORAL ONCE AS NEEDED
Status: DISCONTINUED | OUTPATIENT
Start: 2020-01-01 | End: 2020-01-01 | Stop reason: HOSPADM

## 2020-01-01 RX ORDER — LEVOFLOXACIN 750 MG/1
750 TABLET ORAL EVERY OTHER DAY
Status: DISCONTINUED | OUTPATIENT
Start: 2020-01-01 | End: 2020-01-01 | Stop reason: HOSPADM

## 2020-01-01 RX ORDER — CARVEDILOL 6.25 MG/1
12.5 TABLET ORAL 2 TIMES DAILY WITH MEALS
Status: DISCONTINUED | OUTPATIENT
Start: 2020-01-01 | End: 2020-01-01

## 2020-01-01 RX ORDER — HALOPERIDOL 5 MG/ML
2 INJECTION INTRAMUSCULAR ONCE
Status: COMPLETED | OUTPATIENT
Start: 2020-01-01 | End: 2020-01-01

## 2020-01-01 RX ORDER — FUROSEMIDE 20 MG/1
20 TABLET ORAL 2 TIMES DAILY
Status: ON HOLD | COMMUNITY
End: 2020-01-01 | Stop reason: SDUPTHER

## 2020-01-01 RX ORDER — FENTANYL CITRATE 50 UG/ML
INJECTION, SOLUTION INTRAMUSCULAR; INTRAVENOUS AS NEEDED
Status: DISCONTINUED | OUTPATIENT
Start: 2020-01-01 | End: 2020-01-01 | Stop reason: SURG

## 2020-01-01 RX ORDER — FLUMAZENIL 0.1 MG/ML
0.2 INJECTION INTRAVENOUS AS NEEDED
Status: DISCONTINUED | OUTPATIENT
Start: 2020-01-01 | End: 2020-01-01 | Stop reason: HOSPADM

## 2020-01-01 RX ORDER — MECLIZINE HYDROCHLORIDE 25 MG/1
25 TABLET ORAL 3 TIMES DAILY PRN
Status: DISCONTINUED | OUTPATIENT
Start: 2020-01-01 | End: 2020-01-01 | Stop reason: SDUPTHER

## 2020-01-01 RX ORDER — ISOSORBIDE MONONITRATE 30 MG/1
30 TABLET, EXTENDED RELEASE ORAL
Status: DISCONTINUED | OUTPATIENT
Start: 2020-01-01 | End: 2020-01-01 | Stop reason: HOSPADM

## 2020-01-01 RX ORDER — THIAMINE MONONITRATE (VIT B1) 100 MG
100 TABLET ORAL DAILY
Status: DISCONTINUED | OUTPATIENT
Start: 2020-01-01 | End: 2020-01-01 | Stop reason: HOSPADM

## 2020-01-01 RX ORDER — ONDANSETRON 2 MG/ML
4 INJECTION INTRAMUSCULAR; INTRAVENOUS EVERY 6 HOURS PRN
Status: DISCONTINUED | OUTPATIENT
Start: 2020-01-01 | End: 2020-01-01 | Stop reason: HOSPADM

## 2020-01-01 RX ORDER — SODIUM CHLORIDE 0.9 % (FLUSH) 0.9 %
10 SYRINGE (ML) INJECTION EVERY 12 HOURS SCHEDULED
Status: DISCONTINUED | OUTPATIENT
Start: 2020-01-01 | End: 2020-01-01 | Stop reason: HOSPADM

## 2020-01-01 RX ORDER — LANOLIN ALCOHOL/MO/W.PET/CERES
50 CREAM (GRAM) TOPICAL DAILY
Status: DISCONTINUED | OUTPATIENT
Start: 2020-01-01 | End: 2020-01-01 | Stop reason: HOSPADM

## 2020-01-01 RX ORDER — ASPIRIN 81 MG/1
81 TABLET, CHEWABLE ORAL DAILY
Status: DISCONTINUED | OUTPATIENT
Start: 2020-01-01 | End: 2020-01-01 | Stop reason: HOSPADM

## 2020-01-01 RX ORDER — LOSARTAN POTASSIUM 50 MG/1
25 TABLET ORAL DAILY
Status: DISCONTINUED | OUTPATIENT
Start: 2020-01-01 | End: 2020-01-01 | Stop reason: SDUPTHER

## 2020-01-01 RX ORDER — ACETAMINOPHEN 650 MG/1
650 SUPPOSITORY RECTAL EVERY 4 HOURS PRN
Status: DISCONTINUED | OUTPATIENT
Start: 2020-01-01 | End: 2020-01-01 | Stop reason: SDUPTHER

## 2020-01-01 RX ORDER — CLINDAMYCIN PHOSPHATE 900 MG/50ML
900 INJECTION INTRAVENOUS ONCE
Status: DISCONTINUED | OUTPATIENT
Start: 2020-01-01 | End: 2020-01-01 | Stop reason: HOSPADM

## 2020-01-01 RX ORDER — ACETAMINOPHEN 650 MG/1
650 SUPPOSITORY RECTAL ONCE
Status: COMPLETED | OUTPATIENT
Start: 2020-01-01 | End: 2020-01-01

## 2020-01-01 RX ORDER — LEVOFLOXACIN 500 MG/1
500 TABLET, FILM COATED ORAL
Status: DISCONTINUED | OUTPATIENT
Start: 2020-01-01 | End: 2020-01-01

## 2020-01-01 RX ORDER — ROPINIROLE 1 MG/1
1 TABLET, FILM COATED ORAL NIGHTLY
Status: DISCONTINUED | OUTPATIENT
Start: 2020-01-01 | End: 2020-01-01 | Stop reason: HOSPADM

## 2020-01-01 RX ORDER — PRAVASTATIN SODIUM 20 MG
40 TABLET ORAL DAILY
Status: DISCONTINUED | OUTPATIENT
Start: 2020-01-01 | End: 2020-01-01 | Stop reason: HOSPADM

## 2020-01-01 RX ORDER — ONDANSETRON 4 MG/1
4 TABLET, FILM COATED ORAL EVERY 8 HOURS PRN
Status: ON HOLD | COMMUNITY
End: 2020-01-01 | Stop reason: SDUPTHER

## 2020-01-01 RX ORDER — FINASTERIDE 5 MG/1
5 TABLET, FILM COATED ORAL DAILY
Status: DISCONTINUED | OUTPATIENT
Start: 2020-01-01 | End: 2020-01-01 | Stop reason: HOSPADM

## 2020-01-01 RX ORDER — LOSARTAN POTASSIUM 50 MG/1
25 TABLET ORAL DAILY
Status: ON HOLD | COMMUNITY
End: 2020-01-01 | Stop reason: SDUPTHER

## 2020-01-01 RX ORDER — LANOLIN ALCOHOL/MO/W.PET/CERES
400 CREAM (GRAM) TOPICAL DAILY
Status: DISCONTINUED | OUTPATIENT
Start: 2020-01-01 | End: 2020-01-01 | Stop reason: HOSPADM

## 2020-01-01 RX ORDER — PREDNISOLONE ACETATE 10 MG/ML
1 SUSPENSION/ DROPS OPHTHALMIC 4 TIMES DAILY
Status: ON HOLD
Start: 2020-01-01 | End: 2020-01-01

## 2020-01-01 RX ORDER — MECLIZINE HYDROCHLORIDE 25 MG/1
25 TABLET ORAL 3 TIMES DAILY PRN
Status: DISCONTINUED | OUTPATIENT
Start: 2020-01-01 | End: 2020-01-01 | Stop reason: HOSPADM

## 2020-01-01 RX ORDER — LEVOFLOXACIN 500 MG/1
500 TABLET, FILM COATED ORAL DAILY
Qty: 3 TABLET | Refills: 0 | Status: SHIPPED | OUTPATIENT
Start: 2020-01-01 | End: 2020-01-01

## 2020-01-01 RX ORDER — ROPINIROLE 1 MG/1
1 TABLET, FILM COATED ORAL NIGHTLY
Status: DISCONTINUED | OUTPATIENT
Start: 2020-01-01 | End: 2020-01-01 | Stop reason: SDUPTHER

## 2020-01-01 RX ORDER — LORAZEPAM 2 MG/ML
0.25 INJECTION INTRAMUSCULAR ONCE
Status: DISCONTINUED | OUTPATIENT
Start: 2020-01-01 | End: 2020-01-01

## 2020-01-01 RX ORDER — AMOXICILLIN 250 MG
1 CAPSULE ORAL DAILY
Status: DISCONTINUED | OUTPATIENT
Start: 2020-01-01 | End: 2020-01-01 | Stop reason: HOSPADM

## 2020-01-01 RX ORDER — PANTOPRAZOLE SODIUM 40 MG/10ML
40 INJECTION, POWDER, LYOPHILIZED, FOR SOLUTION INTRAVENOUS
Status: DISCONTINUED | OUTPATIENT
Start: 2020-01-01 | End: 2020-01-01

## 2020-01-01 RX ORDER — OXYCODONE HYDROCHLORIDE AND ACETAMINOPHEN 5; 325 MG/1; MG/1
1 TABLET ORAL EVERY 4 HOURS PRN
Qty: 50 TABLET | Refills: 0 | Status: SHIPPED | OUTPATIENT
Start: 2020-01-01

## 2020-01-01 RX ORDER — AMLODIPINE BESYLATE 5 MG/1
5 TABLET ORAL DAILY
Status: DISCONTINUED | OUTPATIENT
Start: 2020-01-01 | End: 2020-01-01 | Stop reason: HOSPADM

## 2020-01-01 RX ORDER — QUETIAPINE FUMARATE 25 MG/1
25 TABLET, FILM COATED ORAL NIGHTLY
Status: DISCONTINUED | OUTPATIENT
Start: 2020-01-01 | End: 2020-01-01 | Stop reason: HOSPADM

## 2020-01-01 RX ORDER — THIAMINE MONONITRATE (VIT B1) 100 MG
100 TABLET ORAL DAILY
Status: DISCONTINUED | OUTPATIENT
Start: 2020-01-01 | End: 2020-01-01 | Stop reason: SDUPTHER

## 2020-01-01 RX ORDER — PREDNISOLONE ACETATE 10 MG/ML
1 SUSPENSION/ DROPS OPHTHALMIC
Status: DISCONTINUED | OUTPATIENT
Start: 2020-01-01 | End: 2020-01-01 | Stop reason: HOSPADM

## 2020-01-01 RX ORDER — LANOLIN ALCOHOL/MO/W.PET/CERES
400 CREAM (GRAM) TOPICAL DAILY
Status: ON HOLD | COMMUNITY
End: 2020-01-01 | Stop reason: SDUPTHER

## 2020-01-01 RX ORDER — METOPROLOL SUCCINATE 25 MG/1
25 TABLET, EXTENDED RELEASE ORAL NIGHTLY
Status: DISCONTINUED | OUTPATIENT
Start: 2020-01-01 | End: 2020-01-01 | Stop reason: HOSPADM

## 2020-01-01 RX ORDER — ASCORBIC ACID 500 MG
500 TABLET ORAL DAILY
Status: DISCONTINUED | OUTPATIENT
Start: 2020-01-01 | End: 2020-01-01 | Stop reason: HOSPADM

## 2020-01-01 RX ORDER — ACETAMINOPHEN 650 MG/1
650 SUPPOSITORY RECTAL EVERY 4 HOURS PRN
Status: DISCONTINUED | OUTPATIENT
Start: 2020-01-01 | End: 2020-01-01 | Stop reason: HOSPADM

## 2020-01-01 RX ORDER — SODIUM CHLORIDE 450 MG/100ML
125 INJECTION, SOLUTION INTRAVENOUS CONTINUOUS
Status: DISCONTINUED | OUTPATIENT
Start: 2020-01-01 | End: 2020-01-01

## 2020-01-01 RX ORDER — AMLODIPINE BESYLATE 10 MG/1
10 TABLET ORAL
Status: DISCONTINUED | OUTPATIENT
Start: 2020-01-01 | End: 2020-01-01

## 2020-01-01 RX ORDER — FENTANYL CITRATE 50 UG/ML
25 INJECTION, SOLUTION INTRAMUSCULAR; INTRAVENOUS
Status: DISCONTINUED | OUTPATIENT
Start: 2020-01-01 | End: 2020-01-01 | Stop reason: HOSPADM

## 2020-01-01 RX ORDER — MELATONIN
1000 DAILY
Status: DISCONTINUED | OUTPATIENT
Start: 2020-01-01 | End: 2020-01-01 | Stop reason: HOSPADM

## 2020-01-01 RX ORDER — FINASTERIDE 5 MG/1
5 TABLET, FILM COATED ORAL DAILY
Status: ON HOLD | COMMUNITY
End: 2020-01-01 | Stop reason: SDUPTHER

## 2020-01-01 RX ORDER — ALLOPURINOL 300 MG/1
300 TABLET ORAL DAILY
Start: 2020-01-01 | End: 2020-01-01

## 2020-01-01 RX ORDER — LEVOFLOXACIN 750 MG/1
750 TABLET ORAL
Status: DISCONTINUED | OUTPATIENT
Start: 2020-01-01 | End: 2020-01-01

## 2020-01-01 RX ORDER — ASPIRIN 81 MG/1
81 TABLET, CHEWABLE ORAL DAILY
Status: DISCONTINUED | OUTPATIENT
Start: 2020-01-01 | End: 2020-01-01

## 2020-01-01 RX ORDER — METOPROLOL SUCCINATE 25 MG/1
25 TABLET, EXTENDED RELEASE ORAL
Status: DISCONTINUED | OUTPATIENT
Start: 2020-01-01 | End: 2020-01-01

## 2020-01-01 RX ORDER — LIDOCAINE HYDROCHLORIDE 10 MG/ML
0.5 INJECTION, SOLUTION EPIDURAL; INFILTRATION; INTRACAUDAL; PERINEURAL ONCE AS NEEDED
Status: DISCONTINUED | OUTPATIENT
Start: 2020-01-01 | End: 2020-01-01 | Stop reason: HOSPADM

## 2020-01-01 RX ORDER — OMEGA-3S/DHA/EPA/FISH OIL/D3 300MG-1000
400 CAPSULE ORAL DAILY
Status: DISCONTINUED | OUTPATIENT
Start: 2020-01-01 | End: 2020-01-01 | Stop reason: HOSPADM

## 2020-01-01 RX ORDER — NITROGLYCERIN 0.4 MG/1
0.4 TABLET SUBLINGUAL
Status: DISCONTINUED | OUTPATIENT
Start: 2020-01-01 | End: 2020-01-01 | Stop reason: SDUPTHER

## 2020-01-01 RX ORDER — PANTOPRAZOLE SODIUM 40 MG/1
40 TABLET, DELAYED RELEASE ORAL DAILY
Status: DISCONTINUED | OUTPATIENT
Start: 2020-01-01 | End: 2020-01-01 | Stop reason: HOSPADM

## 2020-01-01 RX ORDER — CEFDINIR 300 MG/1
300 CAPSULE ORAL EVERY 12 HOURS SCHEDULED
Status: COMPLETED | OUTPATIENT
Start: 2020-01-01 | End: 2020-01-01

## 2020-01-01 RX ORDER — SODIUM CHLORIDE, SODIUM LACTATE, POTASSIUM CHLORIDE, CALCIUM CHLORIDE 600; 310; 30; 20 MG/100ML; MG/100ML; MG/100ML; MG/100ML
100 INJECTION, SOLUTION INTRAVENOUS CONTINUOUS
Status: DISCONTINUED | OUTPATIENT
Start: 2020-01-01 | End: 2020-01-01

## 2020-01-01 RX ORDER — PRAVASTATIN SODIUM 40 MG
40 TABLET ORAL DAILY
Status: ON HOLD | COMMUNITY
End: 2020-01-01 | Stop reason: SDUPTHER

## 2020-01-01 RX ORDER — ALLOPURINOL 300 MG/1
300 TABLET ORAL DAILY
Status: DISCONTINUED | OUTPATIENT
Start: 2020-01-01 | End: 2020-01-01 | Stop reason: HOSPADM

## 2020-01-01 RX ORDER — MELATONIN
1000 DAILY
Status: DISCONTINUED | OUTPATIENT
Start: 2020-01-01 | End: 2020-01-01 | Stop reason: SDUPTHER

## 2020-01-01 RX ORDER — CEFAZOLIN SODIUM 1 G/3ML
INJECTION, POWDER, FOR SOLUTION INTRAMUSCULAR; INTRAVENOUS AS NEEDED
Status: DISCONTINUED | OUTPATIENT
Start: 2020-01-01 | End: 2020-01-01 | Stop reason: SURG

## 2020-01-01 RX ORDER — ASCORBIC ACID 500 MG
500 TABLET ORAL DAILY
Status: DISCONTINUED | OUTPATIENT
Start: 2020-01-01 | End: 2020-01-01 | Stop reason: SDUPTHER

## 2020-01-01 RX ORDER — MORPHINE SULFATE 2 MG/ML
1 INJECTION, SOLUTION INTRAMUSCULAR; INTRAVENOUS EVERY 4 HOURS PRN
Status: DISCONTINUED | OUTPATIENT
Start: 2020-01-01 | End: 2020-01-01

## 2020-01-01 RX ORDER — ISOSORBIDE MONONITRATE 30 MG/1
30 TABLET, EXTENDED RELEASE ORAL
Start: 2020-01-01 | End: 2020-01-01

## 2020-01-01 RX ORDER — FUROSEMIDE 20 MG/1
20 TABLET ORAL DAILY PRN
Qty: 30 TABLET | Refills: 2 | Status: SHIPPED | OUTPATIENT
Start: 2020-01-01

## 2020-01-01 RX ORDER — ONDANSETRON 4 MG/1
4 TABLET, FILM COATED ORAL EVERY 6 HOURS PRN
Start: 2020-01-01 | End: 2020-01-01

## 2020-01-01 RX ORDER — ASPIRIN 81 MG/1
81 TABLET, CHEWABLE ORAL DAILY
Status: ACTIVE | OUTPATIENT
Start: 2020-01-01

## 2020-01-01 RX ORDER — AMLODIPINE BESYLATE 10 MG/1
10 TABLET ORAL
Status: DISCONTINUED | OUTPATIENT
Start: 2020-01-01 | End: 2020-01-01 | Stop reason: HOSPADM

## 2020-01-01 RX ORDER — CALCIUM GLUCONATE 94 MG/ML
1 INJECTION, SOLUTION INTRAVENOUS ONCE
Status: DISCONTINUED | OUTPATIENT
Start: 2020-01-01 | End: 2020-01-01 | Stop reason: SDUPTHER

## 2020-01-01 RX ORDER — POTASSIUM CHLORIDE 750 MG/1
10 CAPSULE, EXTENDED RELEASE ORAL 2 TIMES DAILY WITH MEALS
Status: DISCONTINUED | OUTPATIENT
Start: 2020-01-01 | End: 2020-01-01 | Stop reason: HOSPADM

## 2020-01-01 RX ORDER — TERAZOSIN 1 MG/1
1 CAPSULE ORAL NIGHTLY
Status: DISCONTINUED | OUTPATIENT
Start: 2020-01-01 | End: 2020-01-01 | Stop reason: HOSPADM

## 2020-01-01 RX ORDER — SODIUM POLYSTYRENE SULFONATE 15 G/60ML
15 SUSPENSION ORAL; RECTAL ONCE
Status: DISCONTINUED | OUTPATIENT
Start: 2020-01-01 | End: 2020-01-01

## 2020-01-01 RX ORDER — OXYCODONE HYDROCHLORIDE AND ACETAMINOPHEN 5; 325 MG/1; MG/1
1 TABLET ORAL EVERY 4 HOURS PRN
Status: DISCONTINUED | OUTPATIENT
Start: 2020-01-01 | End: 2020-01-01 | Stop reason: HOSPADM

## 2020-01-01 RX ORDER — COLCHICINE 0.6 MG/1
0.6 TABLET ORAL DAILY PRN
Status: DISCONTINUED | OUTPATIENT
Start: 2020-01-01 | End: 2020-01-01 | Stop reason: SDUPTHER

## 2020-01-01 RX ORDER — PANTOPRAZOLE SODIUM 40 MG/1
40 TABLET, DELAYED RELEASE ORAL
Status: DISCONTINUED | OUTPATIENT
Start: 2020-01-01 | End: 2020-01-01

## 2020-01-01 RX ORDER — METOPROLOL SUCCINATE 25 MG/1
25 TABLET, EXTENDED RELEASE ORAL
Status: DISCONTINUED | OUTPATIENT
Start: 2020-01-01 | End: 2020-01-01 | Stop reason: SDUPTHER

## 2020-01-01 RX ORDER — SODIUM CHLORIDE 450 MG/100ML
75 INJECTION, SOLUTION INTRAVENOUS CONTINUOUS
Status: DISCONTINUED | OUTPATIENT
Start: 2020-01-01 | End: 2020-01-01

## 2020-01-01 RX ORDER — TAMSULOSIN HYDROCHLORIDE 0.4 MG/1
2 CAPSULE ORAL DAILY
Qty: 60 CAPSULE | Refills: 3 | Status: SHIPPED | OUTPATIENT
Start: 2020-01-01

## 2020-01-01 RX ORDER — DEXTROSE MONOHYDRATE 50 MG/ML
75 INJECTION, SOLUTION INTRAVENOUS CONTINUOUS
Status: DISCONTINUED | OUTPATIENT
Start: 2020-01-01 | End: 2020-01-01

## 2020-01-01 RX ORDER — OXYCODONE AND ACETAMINOPHEN 7.5; 325 MG/1; MG/1
1 TABLET ORAL EVERY 4 HOURS PRN
Status: DISCONTINUED | OUTPATIENT
Start: 2020-01-01 | End: 2020-01-01

## 2020-01-01 RX ORDER — ACETAMINOPHEN 325 MG/1
650 TABLET ORAL EVERY 4 HOURS PRN
Status: DISCONTINUED | OUTPATIENT
Start: 2020-01-01 | End: 2020-01-01 | Stop reason: SDUPTHER

## 2020-01-01 RX ORDER — NEOMYCIN SULFATE, POLYMYXIN B SULFATE AND HYDROCORTISONE 10; 3.5; 1 MG/ML; MG/ML; [USP'U]/ML
4 SUSPENSION/ DROPS AURICULAR (OTIC) 3 TIMES DAILY
Status: DISCONTINUED | OUTPATIENT
Start: 2020-01-01 | End: 2020-01-01 | Stop reason: HOSPADM

## 2020-01-01 RX ORDER — FUROSEMIDE 20 MG/1
20 TABLET ORAL DAILY
Qty: 30 TABLET | Refills: 2 | Status: ON HOLD | OUTPATIENT
Start: 2020-01-01 | End: 2020-01-01 | Stop reason: SDUPTHER

## 2020-01-01 RX ORDER — ACETAMINOPHEN 160 MG/5ML
650 SOLUTION ORAL EVERY 4 HOURS PRN
Status: DISCONTINUED | OUTPATIENT
Start: 2020-01-01 | End: 2020-01-01 | Stop reason: HOSPADM

## 2020-01-01 RX ORDER — FINASTERIDE 5 MG/1
5 TABLET, FILM COATED ORAL DAILY
COMMUNITY

## 2020-01-01 RX ORDER — PREDNISOLONE ACETATE 10 MG/ML
1 SUSPENSION/ DROPS OPHTHALMIC 3 TIMES DAILY
Status: ON HOLD | COMMUNITY
End: 2020-01-01 | Stop reason: SDUPTHER

## 2020-01-01 RX ORDER — ONDANSETRON 4 MG/1
4 TABLET, FILM COATED ORAL EVERY 6 HOURS PRN
Status: DISCONTINUED | OUTPATIENT
Start: 2020-01-01 | End: 2020-01-01 | Stop reason: HOSPADM

## 2020-01-01 RX ORDER — ROPINIROLE 1 MG/1
1 TABLET, FILM COATED ORAL NIGHTLY
Status: ON HOLD | COMMUNITY
End: 2020-01-01 | Stop reason: SDUPTHER

## 2020-01-01 RX ORDER — NALOXONE HCL 0.4 MG/ML
0.4 VIAL (ML) INJECTION
Status: DISCONTINUED | OUTPATIENT
Start: 2020-01-01 | End: 2020-01-01 | Stop reason: HOSPADM

## 2020-01-01 RX ORDER — MECLIZINE HYDROCHLORIDE 25 MG/1
25 TABLET ORAL 3 TIMES DAILY PRN
Status: ON HOLD | COMMUNITY
End: 2020-01-01 | Stop reason: SDUPTHER

## 2020-01-01 RX ORDER — SODIUM CHLORIDE 9 MG/ML
125 INJECTION, SOLUTION INTRAVENOUS CONTINUOUS
Status: DISCONTINUED | OUTPATIENT
Start: 2020-01-01 | End: 2020-01-01

## 2020-01-01 RX ORDER — AMOXICILLIN 250 MG
2 CAPSULE ORAL 2 TIMES DAILY PRN
Status: DISCONTINUED | OUTPATIENT
Start: 2020-01-01 | End: 2020-01-01 | Stop reason: HOSPADM

## 2020-01-01 RX ORDER — FUROSEMIDE 20 MG/1
20 TABLET ORAL DAILY
Status: DISCONTINUED | OUTPATIENT
Start: 2020-01-01 | End: 2020-01-01 | Stop reason: HOSPADM

## 2020-01-01 RX ORDER — DOCUSATE SODIUM 100 MG/1
100 CAPSULE, LIQUID FILLED ORAL 2 TIMES DAILY
Qty: 60 CAPSULE | Refills: 1 | Status: SHIPPED | OUTPATIENT
Start: 2020-01-01

## 2020-01-01 RX ORDER — LANOLIN ALCOHOL/MO/W.PET/CERES
200 CREAM (GRAM) TOPICAL DAILY
Status: ACTIVE | OUTPATIENT
Start: 2020-01-01

## 2020-01-01 RX ORDER — SODIUM CHLORIDE 9 MG/ML
100 INJECTION, SOLUTION INTRAVENOUS CONTINUOUS
Status: CANCELLED | OUTPATIENT
Start: 2020-01-01

## 2020-01-01 RX ORDER — PANTOPRAZOLE SODIUM 40 MG/1
40 TABLET, DELAYED RELEASE ORAL
Status: DISCONTINUED | OUTPATIENT
Start: 2020-01-01 | End: 2020-01-01 | Stop reason: HOSPADM

## 2020-01-01 RX ORDER — SODIUM CHLORIDE 0.9 % (FLUSH) 0.9 %
3-10 SYRINGE (ML) INJECTION AS NEEDED
Status: DISCONTINUED | OUTPATIENT
Start: 2020-01-01 | End: 2020-01-01 | Stop reason: HOSPADM

## 2020-01-01 RX ORDER — ROPIVACAINE HYDROCHLORIDE 2 MG/ML
INJECTION, SOLUTION EPIDURAL; INFILTRATION; PERINEURAL AS NEEDED
Status: DISCONTINUED | OUTPATIENT
Start: 2020-01-01 | End: 2020-01-01 | Stop reason: SURG

## 2020-01-01 RX ORDER — DEXTROSE AND SODIUM CHLORIDE 5; .9 G/100ML; G/100ML
100 INJECTION, SOLUTION INTRAVENOUS CONTINUOUS
Status: DISCONTINUED | OUTPATIENT
Start: 2020-01-01 | End: 2020-01-01

## 2020-01-01 RX ORDER — THIAMINE MONONITRATE (VIT B1) 100 MG
100 TABLET ORAL DAILY
Status: ON HOLD | COMMUNITY
End: 2020-01-01 | Stop reason: SDUPTHER

## 2020-01-01 RX ORDER — DEXTROSE MONOHYDRATE 25 G/50ML
25 INJECTION, SOLUTION INTRAVENOUS ONCE
Status: COMPLETED | OUTPATIENT
Start: 2020-01-01 | End: 2020-01-01

## 2020-01-01 RX ORDER — HYDRALAZINE HYDROCHLORIDE 10 MG/1
10 TABLET, FILM COATED ORAL EVERY 6 HOURS PRN
Status: DISCONTINUED | OUTPATIENT
Start: 2020-01-01 | End: 2020-01-01

## 2020-01-01 RX ORDER — HYDRALAZINE HYDROCHLORIDE 20 MG/ML
10 INJECTION INTRAMUSCULAR; INTRAVENOUS EVERY 4 HOURS PRN
Status: DISCONTINUED | OUTPATIENT
Start: 2020-01-01 | End: 2020-01-01 | Stop reason: HOSPADM

## 2020-01-01 RX ORDER — COLCHICINE 0.6 MG/1
0.6 TABLET ORAL DAILY
Status: DISCONTINUED | OUTPATIENT
Start: 2020-01-01 | End: 2020-01-01 | Stop reason: HOSPADM

## 2020-01-01 RX ORDER — IBUPROFEN 600 MG/1
600 TABLET ORAL ONCE AS NEEDED
Status: DISCONTINUED | OUTPATIENT
Start: 2020-01-01 | End: 2020-01-01 | Stop reason: HOSPADM

## 2020-01-01 RX ORDER — LOSARTAN POTASSIUM 50 MG/1
50 TABLET ORAL DAILY
Status: ON HOLD | COMMUNITY
End: 2020-01-01

## 2020-01-01 RX ORDER — AMLODIPINE BESYLATE 5 MG/1
5 TABLET ORAL
Status: DISCONTINUED | OUTPATIENT
Start: 2020-01-01 | End: 2020-01-01

## 2020-01-01 RX ORDER — PRAVASTATIN SODIUM 40 MG
40 TABLET ORAL NIGHTLY
Start: 2020-01-01

## 2020-01-01 RX ORDER — TAMSULOSIN HYDROCHLORIDE 0.4 MG/1
0.4 CAPSULE ORAL DAILY
Status: DISCONTINUED | OUTPATIENT
Start: 2020-01-01 | End: 2020-01-01

## 2020-01-01 RX ORDER — ISOSORBIDE MONONITRATE 30 MG/1
30 TABLET, EXTENDED RELEASE ORAL
Qty: 30 TABLET | Refills: 2 | Status: SHIPPED | OUTPATIENT
Start: 2020-01-01

## 2020-01-01 RX ORDER — SODIUM CHLORIDE 0.9 % (FLUSH) 0.9 %
10 SYRINGE (ML) INJECTION AS NEEDED
Status: CANCELLED | OUTPATIENT
Start: 2020-01-01

## 2020-01-01 RX ORDER — ONDANSETRON 4 MG/1
4 TABLET, FILM COATED ORAL EVERY 6 HOURS PRN
Qty: 20 TABLET | Refills: 0 | Status: ON HOLD | OUTPATIENT
Start: 2020-01-01 | End: 2020-01-01 | Stop reason: SDUPTHER

## 2020-01-01 RX ORDER — SODIUM CHLORIDE 0.9 % (FLUSH) 0.9 %
3 SYRINGE (ML) INJECTION EVERY 12 HOURS SCHEDULED
Status: DISCONTINUED | OUTPATIENT
Start: 2020-01-01 | End: 2020-01-01 | Stop reason: HOSPADM

## 2020-01-01 RX ORDER — ACETAMINOPHEN 325 MG/1
650 TABLET ORAL EVERY 4 HOURS PRN
Start: 2020-01-01

## 2020-01-01 RX ORDER — MORPHINE SULFATE 2 MG/ML
INJECTION, SOLUTION INTRAMUSCULAR; INTRAVENOUS
Status: COMPLETED
Start: 2020-01-01 | End: 2020-01-01

## 2020-01-01 RX ORDER — HYDRALAZINE HYDROCHLORIDE 10 MG/1
10 TABLET, FILM COATED ORAL EVERY 6 HOURS PRN
Status: DISCONTINUED | OUTPATIENT
Start: 2020-01-01 | End: 2020-01-01 | Stop reason: HOSPADM

## 2020-01-01 RX ORDER — ONDANSETRON 2 MG/ML
4 INJECTION INTRAMUSCULAR; INTRAVENOUS ONCE AS NEEDED
Status: DISCONTINUED | OUTPATIENT
Start: 2020-01-01 | End: 2020-01-01 | Stop reason: HOSPADM

## 2020-01-01 RX ORDER — METOPROLOL SUCCINATE 25 MG/1
25 TABLET, EXTENDED RELEASE ORAL
Status: DISCONTINUED | OUTPATIENT
Start: 2020-01-01 | End: 2020-01-01 | Stop reason: HOSPADM

## 2020-01-01 RX ORDER — TAMSULOSIN HYDROCHLORIDE 0.4 MG/1
0.8 CAPSULE ORAL DAILY
Status: DISCONTINUED | OUTPATIENT
Start: 2020-01-01 | End: 2020-01-01 | Stop reason: HOSPADM

## 2020-01-01 RX ORDER — FUROSEMIDE 10 MG/ML
40 INJECTION INTRAMUSCULAR; INTRAVENOUS ONCE
Status: COMPLETED | OUTPATIENT
Start: 2020-01-01 | End: 2020-01-01

## 2020-01-01 RX ORDER — HYDROMORPHONE HYDROCHLORIDE 1 MG/ML
0.5 INJECTION, SOLUTION INTRAMUSCULAR; INTRAVENOUS; SUBCUTANEOUS EVERY 4 HOURS PRN
Status: DISCONTINUED | OUTPATIENT
Start: 2020-01-01 | End: 2020-01-01 | Stop reason: SDUPTHER

## 2020-01-01 RX ORDER — METOPROLOL SUCCINATE 25 MG/1
25 TABLET, EXTENDED RELEASE ORAL DAILY
Status: DISCONTINUED | OUTPATIENT
Start: 2020-01-01 | End: 2020-01-01 | Stop reason: HOSPADM

## 2020-01-01 RX ORDER — NEOSTIGMINE METHYLSULFATE 5 MG/5 ML
SYRINGE (ML) INTRAVENOUS AS NEEDED
Status: DISCONTINUED | OUTPATIENT
Start: 2020-01-01 | End: 2020-01-01 | Stop reason: SURG

## 2020-01-01 RX ORDER — ERGOCALCIFEROL (VITAMIN D2) 10 MCG
400 TABLET ORAL DAILY
COMMUNITY
End: 2020-01-01 | Stop reason: HOSPADM

## 2020-01-01 RX ORDER — GLIMEPIRIDE 4 MG/1
4 TABLET ORAL
COMMUNITY
End: 2020-01-01 | Stop reason: HOSPADM

## 2020-01-01 RX ORDER — MECLIZINE HCL 25MG 25 MG/1
25 TABLET, CHEWABLE ORAL
COMMUNITY

## 2020-01-01 RX ORDER — AMLODIPINE BESYLATE 5 MG/1
5 TABLET ORAL DAILY
Status: ON HOLD | COMMUNITY
End: 2020-01-01 | Stop reason: SDUPTHER

## 2020-01-01 RX ORDER — TAMSULOSIN HYDROCHLORIDE 0.4 MG/1
2 CAPSULE ORAL DAILY
Qty: 60 CAPSULE | Refills: 3 | Status: SHIPPED | OUTPATIENT
Start: 2020-01-01 | End: 2020-01-01 | Stop reason: SDUPTHER

## 2020-01-01 RX ORDER — ALLOPURINOL 300 MG/1
300 TABLET ORAL DAILY
Status: ON HOLD | COMMUNITY
End: 2020-01-01 | Stop reason: SDUPTHER

## 2020-01-01 RX ORDER — NITROGLYCERIN 0.4 MG/1
0.4 TABLET SUBLINGUAL
Status: ON HOLD | COMMUNITY
End: 2020-01-01 | Stop reason: SDUPTHER

## 2020-01-01 RX ORDER — SODIUM CHLORIDE 9 MG/ML
500 INJECTION, SOLUTION INTRAVENOUS CONTINUOUS PRN
Status: DISCONTINUED | OUTPATIENT
Start: 2020-01-01 | End: 2020-01-01 | Stop reason: HOSPADM

## 2020-01-01 RX ORDER — LOSARTAN POTASSIUM 50 MG/1
25 TABLET ORAL
Status: DISCONTINUED | OUTPATIENT
Start: 2020-01-01 | End: 2020-01-01

## 2020-01-01 RX ORDER — PANTOPRAZOLE SODIUM 40 MG/1
40 TABLET, DELAYED RELEASE ORAL EVERY 12 HOURS SCHEDULED
Status: DISCONTINUED | OUTPATIENT
Start: 2020-01-01 | End: 2020-01-01

## 2020-01-01 RX ORDER — ACETAMINOPHEN 160 MG/5ML
650 SOLUTION ORAL ONCE
Status: COMPLETED | OUTPATIENT
Start: 2020-01-01 | End: 2020-01-01

## 2020-01-01 RX ORDER — ROPIVACAINE HYDROCHLORIDE 5 MG/ML
INJECTION, SOLUTION EPIDURAL; INFILTRATION; PERINEURAL AS NEEDED
Status: DISCONTINUED | OUTPATIENT
Start: 2020-01-01 | End: 2020-01-01 | Stop reason: SURG

## 2020-01-01 RX ORDER — ISOSORBIDE MONONITRATE 30 MG/1
30 TABLET, EXTENDED RELEASE ORAL
Status: DISCONTINUED | OUTPATIENT
Start: 2020-01-01 | End: 2020-01-01

## 2020-01-01 RX ORDER — LOSARTAN POTASSIUM 50 MG/1
50 TABLET ORAL DAILY
Status: DISCONTINUED | OUTPATIENT
Start: 2020-01-01 | End: 2020-01-01 | Stop reason: HOSPADM

## 2020-01-01 RX ORDER — ONDANSETRON 4 MG/1
4 TABLET, FILM COATED ORAL EVERY 6 HOURS PRN
Status: DISCONTINUED | OUTPATIENT
Start: 2020-01-01 | End: 2020-01-01 | Stop reason: SDUPTHER

## 2020-01-01 RX ORDER — HYDROMORPHONE HYDROCHLORIDE 1 MG/ML
0.5 INJECTION, SOLUTION INTRAMUSCULAR; INTRAVENOUS; SUBCUTANEOUS
Status: DISCONTINUED | OUTPATIENT
Start: 2020-01-01 | End: 2020-01-01 | Stop reason: SDUPTHER

## 2020-01-01 RX ORDER — AMOXICILLIN 250 MG
1 CAPSULE ORAL 2 TIMES DAILY
COMMUNITY

## 2020-01-01 RX ORDER — PREDNISOLONE ACETATE 10 MG/ML
1 SUSPENSION/ DROPS OPHTHALMIC 3 TIMES DAILY
Status: DISCONTINUED | OUTPATIENT
Start: 2020-01-01 | End: 2020-01-01 | Stop reason: HOSPADM

## 2020-01-01 RX ORDER — ACETAMINOPHEN 325 MG/1
325 TABLET ORAL EVERY 6 HOURS PRN
Status: ON HOLD | COMMUNITY
End: 2020-01-01 | Stop reason: SDUPTHER

## 2020-01-01 RX ORDER — SODIUM CHLORIDE 0.9 % (FLUSH) 0.9 %
10 SYRINGE (ML) INJECTION EVERY 12 HOURS SCHEDULED
Status: CANCELLED | OUTPATIENT
Start: 2020-01-01

## 2020-01-01 RX ORDER — ALLOPURINOL 300 MG/1
300 TABLET ORAL DAILY
Status: DISCONTINUED | OUTPATIENT
Start: 2020-01-01 | End: 2020-01-01 | Stop reason: SDUPTHER

## 2020-01-01 RX ORDER — BUMETANIDE 0.25 MG/ML
0.5 INJECTION INTRAMUSCULAR; INTRAVENOUS ONCE
Status: COMPLETED | OUTPATIENT
Start: 2020-01-01 | End: 2020-01-01

## 2020-01-01 RX ORDER — OMEGA-3S/DHA/EPA/FISH OIL/D3 300MG-1000
400 CAPSULE ORAL DAILY
Status: DISCONTINUED | OUTPATIENT
Start: 2020-01-01 | End: 2020-01-01 | Stop reason: SDUPTHER

## 2020-01-01 RX ORDER — ACETAMINOPHEN 325 MG/1
325 TABLET ORAL EVERY 6 HOURS PRN
Status: DISCONTINUED | OUTPATIENT
Start: 2020-01-01 | End: 2020-01-01 | Stop reason: HOSPADM

## 2020-01-01 RX ORDER — POTASSIUM CHLORIDE 20 MEQ/1
20 TABLET, EXTENDED RELEASE ORAL 2 TIMES DAILY
COMMUNITY
End: 2020-01-01 | Stop reason: HOSPADM

## 2020-01-01 RX ORDER — LOSARTAN POTASSIUM 100 MG/1
100 TABLET ORAL
Qty: 30 TABLET | Refills: 2 | Status: SHIPPED | OUTPATIENT
Start: 2020-01-01 | End: 2020-01-01 | Stop reason: HOSPADM

## 2020-01-01 RX ORDER — ISOSORBIDE MONONITRATE 30 MG/1
30 TABLET, EXTENDED RELEASE ORAL DAILY
Status: ON HOLD | COMMUNITY
End: 2020-01-01 | Stop reason: SDUPTHER

## 2020-01-01 RX ORDER — NALOXONE HCL 0.4 MG/ML
0.4 VIAL (ML) INJECTION
Status: DISCONTINUED | OUTPATIENT
Start: 2020-01-01 | End: 2020-01-01 | Stop reason: SDUPTHER

## 2020-01-01 RX ADMIN — CEFDINIR 300 MG: 300 CAPSULE ORAL at 09:33

## 2020-01-01 RX ADMIN — PREDNISOLONE ACETATE 1 DROP: 10 SUSPENSION/ DROPS OPHTHALMIC at 10:39

## 2020-01-01 RX ADMIN — PREDNISOLONE ACETATE 1 DROP: 10 SUSPENSION/ DROPS OPHTHALMIC at 17:17

## 2020-01-01 RX ADMIN — APIXABAN 5 MG: 5 TABLET, FILM COATED ORAL at 21:48

## 2020-01-01 RX ADMIN — FUROSEMIDE 20 MG: 20 TABLET ORAL at 08:44

## 2020-01-01 RX ADMIN — PREDNISOLONE ACETATE 1 DROP: 10 SUSPENSION/ DROPS OPHTHALMIC at 08:15

## 2020-01-01 RX ADMIN — FINASTERIDE 5 MG: 5 TABLET, FILM COATED ORAL at 09:19

## 2020-01-01 RX ADMIN — CHOLECALCIFEROL (VITAMIN D3) 25 MCG (1,000 UNIT) TABLET 1000 UNITS: TABLET at 08:14

## 2020-01-01 RX ADMIN — METOPROLOL SUCCINATE 25 MG: 25 TABLET, EXTENDED RELEASE ORAL at 12:00

## 2020-01-01 RX ADMIN — FUROSEMIDE 20 MG: 20 TABLET ORAL at 09:54

## 2020-01-01 RX ADMIN — METFORMIN HYDROCHLORIDE 500 MG: 500 TABLET ORAL at 08:32

## 2020-01-01 RX ADMIN — MEROPENEM 1 G: 1 INJECTION, POWDER, FOR SOLUTION INTRAVENOUS at 02:40

## 2020-01-01 RX ADMIN — ASPIRIN 81 MG: 81 TABLET, CHEWABLE ORAL at 10:30

## 2020-01-01 RX ADMIN — ISOSORBIDE DINITRATE 10 MG: 10 TABLET ORAL at 20:53

## 2020-01-01 RX ADMIN — Medication 400 MCG: at 08:14

## 2020-01-01 RX ADMIN — TAMSULOSIN HYDROCHLORIDE 0.4 MG: 0.4 CAPSULE ORAL at 08:45

## 2020-01-01 RX ADMIN — LEVOFLOXACIN 750 MG: 750 TABLET, FILM COATED ORAL at 10:43

## 2020-01-01 RX ADMIN — FUROSEMIDE 20 MG: 20 TABLET ORAL at 08:41

## 2020-01-01 RX ADMIN — OXYCODONE HYDROCHLORIDE AND ACETAMINOPHEN 500 MG: 500 TABLET ORAL at 08:17

## 2020-01-01 RX ADMIN — PANTOPRAZOLE SODIUM 40 MG: 40 TABLET, DELAYED RELEASE ORAL at 08:33

## 2020-01-01 RX ADMIN — Medication 5000 UNITS: at 10:16

## 2020-01-01 RX ADMIN — CHOLECALCIFEROL (VITAMIN D3) 25 MCG (1,000 UNIT) TABLET 1000 UNITS: TABLET at 08:44

## 2020-01-01 RX ADMIN — DOCUSATE SODIUM 50 MG AND SENNOSIDES 8.6 MG 1 TABLET: 8.6; 5 TABLET, FILM COATED ORAL at 10:44

## 2020-01-01 RX ADMIN — PREDNISOLONE ACETATE 1 DROP: 10 SUSPENSION/ DROPS OPHTHALMIC at 12:14

## 2020-01-01 RX ADMIN — POTASSIUM CHLORIDE 10 MEQ: 750 CAPSULE, EXTENDED RELEASE ORAL at 09:16

## 2020-01-01 RX ADMIN — SODIUM CHLORIDE 125 ML/HR: 4.5 INJECTION, SOLUTION INTRAVENOUS at 16:24

## 2020-01-01 RX ADMIN — MEROPENEM 1 G: 1 INJECTION, POWDER, FOR SOLUTION INTRAVENOUS at 14:03

## 2020-01-01 RX ADMIN — ROPIVACAINE HYDROCHLORIDE 20 ML: 5 INJECTION, SOLUTION EPIDURAL; INFILTRATION; PERINEURAL at 16:26

## 2020-01-01 RX ADMIN — PRAVASTATIN SODIUM 40 MG: 20 TABLET ORAL at 21:58

## 2020-01-01 RX ADMIN — MEROPENEM 1 G: 1 INJECTION, POWDER, FOR SOLUTION INTRAVENOUS at 02:02

## 2020-01-01 RX ADMIN — PREDNISOLONE ACETATE 1 DROP: 10 SUSPENSION/ DROPS OPHTHALMIC at 19:51

## 2020-01-01 RX ADMIN — CEFEPIME HYDROCHLORIDE 2 G: 2 INJECTION, POWDER, FOR SOLUTION INTRAVENOUS at 15:33

## 2020-01-01 RX ADMIN — DOCUSATE SODIUM 50 MG AND SENNOSIDES 8.6 MG 1 TABLET: 8.6; 5 TABLET, FILM COATED ORAL at 08:31

## 2020-01-01 RX ADMIN — POTASSIUM CHLORIDE 10 MEQ: 750 CAPSULE, EXTENDED RELEASE ORAL at 18:21

## 2020-01-01 RX ADMIN — PREDNISOLONE ACETATE 1 DROP: 10 SUSPENSION/ DROPS OPHTHALMIC at 17:08

## 2020-01-01 RX ADMIN — PREDNISOLONE ACETATE 1 DROP: 10 SUSPENSION/ DROPS OPHTHALMIC at 20:59

## 2020-01-01 RX ADMIN — TERAZOSIN HYDROCHLORIDE 1 MG: 1 CAPSULE ORAL at 22:16

## 2020-01-01 RX ADMIN — SODIUM CHLORIDE, PRESERVATIVE FREE 10 ML: 5 INJECTION INTRAVENOUS at 20:44

## 2020-01-01 RX ADMIN — PRAVASTATIN SODIUM 40 MG: 20 TABLET ORAL at 21:11

## 2020-01-01 RX ADMIN — SODIUM CHLORIDE, PRESERVATIVE FREE 10 ML: 5 INJECTION INTRAVENOUS at 10:34

## 2020-01-01 RX ADMIN — POTASSIUM CHLORIDE 10 MEQ: 750 CAPSULE, EXTENDED RELEASE ORAL at 08:43

## 2020-01-01 RX ADMIN — ISOSORBIDE DINITRATE 10 MG: 10 TABLET ORAL at 12:00

## 2020-01-01 RX ADMIN — PREDNISOLONE ACETATE 1 DROP: 10 SUSPENSION/ DROPS OPHTHALMIC at 10:46

## 2020-01-01 RX ADMIN — NEOMYCIN SULFATE, POLYMYXIN B SULFATE AND HYDROCORTISONE 4 DROP: 10; 3.5; 1 SUSPENSION/ DROPS AURICULAR (OTIC) at 08:46

## 2020-01-01 RX ADMIN — ONDANSETRON HYDROCHLORIDE 4 MG: 2 SOLUTION INTRAMUSCULAR; INTRAVENOUS at 17:34

## 2020-01-01 RX ADMIN — PYRIDOXINE HCL TAB 50 MG 200 MG: 50 TAB at 09:17

## 2020-01-01 RX ADMIN — PREDNISOLONE ACETATE 1 DROP: 10 SUSPENSION/ DROPS OPHTHALMIC at 17:20

## 2020-01-01 RX ADMIN — THIAMINE HCL TAB 100 MG 100 MG: 100 TAB at 08:31

## 2020-01-01 RX ADMIN — ALLOPURINOL 300 MG: 300 TABLET ORAL at 08:44

## 2020-01-01 RX ADMIN — SODIUM CHLORIDE, PRESERVATIVE FREE 10 ML: 5 INJECTION INTRAVENOUS at 06:18

## 2020-01-01 RX ADMIN — LOSARTAN POTASSIUM 25 MG: 50 TABLET, FILM COATED ORAL at 18:21

## 2020-01-01 RX ADMIN — AMLODIPINE BESYLATE 5 MG: 5 TABLET ORAL at 09:33

## 2020-01-01 RX ADMIN — PYRIDOXINE HCL TAB 50 MG 200 MG: 50 TAB at 09:48

## 2020-01-01 RX ADMIN — TERAZOSIN HYDROCHLORIDE 1 MG: 1 CAPSULE ORAL at 19:45

## 2020-01-01 RX ADMIN — PYRIDOXINE HCL TAB 50 MG 200 MG: 50 TAB at 13:52

## 2020-01-01 RX ADMIN — NEOMYCIN SULFATE, POLYMYXIN B SULFATE AND HYDROCORTISONE 4 DROP: 10; 3.5; 1 SUSPENSION/ DROPS AURICULAR (OTIC) at 16:26

## 2020-01-01 RX ADMIN — NEOMYCIN SULFATE, POLYMYXIN B SULFATE AND HYDROCORTISONE 4 DROP: 10; 3.5; 1 SUSPENSION/ DROPS AURICULAR (OTIC) at 19:56

## 2020-01-01 RX ADMIN — PREDNISOLONE ACETATE 1 DROP: 10 SUSPENSION/ DROPS OPHTHALMIC at 19:44

## 2020-01-01 RX ADMIN — INSULIN LISPRO 2 UNITS: 100 INJECTION, SOLUTION INTRAVENOUS; SUBCUTANEOUS at 10:16

## 2020-01-01 RX ADMIN — ASPIRIN 81 MG: 81 TABLET, CHEWABLE ORAL at 08:28

## 2020-01-01 RX ADMIN — SODIUM CHLORIDE, PRESERVATIVE FREE 10 ML: 5 INJECTION INTRAVENOUS at 08:47

## 2020-01-01 RX ADMIN — PRAVASTATIN SODIUM 40 MG: 20 TABLET ORAL at 22:45

## 2020-01-01 RX ADMIN — PREDNISOLONE ACETATE 1 DROP: 10 SUSPENSION/ DROPS OPHTHALMIC at 12:54

## 2020-01-01 RX ADMIN — SODIUM CHLORIDE 125 ML/HR: 4.5 INJECTION, SOLUTION INTRAVENOUS at 20:23

## 2020-01-01 RX ADMIN — THIAMINE HCL TAB 100 MG 100 MG: 100 TAB at 09:15

## 2020-01-01 RX ADMIN — TERAZOSIN HYDROCHLORIDE 1 MG: 1 CAPSULE ORAL at 20:50

## 2020-01-01 RX ADMIN — AMLODIPINE BESYLATE 5 MG: 5 TABLET ORAL at 08:15

## 2020-01-01 RX ADMIN — NEOMYCIN SULFATE, POLYMYXIN B SULFATE AND HYDROCORTISONE 4 DROP: 10; 3.5; 1 SUSPENSION/ DROPS AURICULAR (OTIC) at 08:54

## 2020-01-01 RX ADMIN — TERAZOSIN HYDROCHLORIDE 1 MG: 1 CAPSULE ORAL at 22:55

## 2020-01-01 RX ADMIN — LOSARTAN POTASSIUM 100 MG: 50 TABLET, FILM COATED ORAL at 08:43

## 2020-01-01 RX ADMIN — SODIUM CHLORIDE 500 ML: 9 INJECTION, SOLUTION INTRAVENOUS at 15:13

## 2020-01-01 RX ADMIN — INSULIN LISPRO 2 UNITS: 100 INJECTION, SOLUTION INTRAVENOUS; SUBCUTANEOUS at 12:07

## 2020-01-01 RX ADMIN — PREDNISOLONE ACETATE 1 DROP: 10 SUSPENSION/ DROPS OPHTHALMIC at 08:54

## 2020-01-01 RX ADMIN — POTASSIUM CHLORIDE: 2 INJECTION, SOLUTION, CONCENTRATE INTRAVENOUS at 15:48

## 2020-01-01 RX ADMIN — PYRIDOXINE HCL TAB 50 MG 200 MG: 50 TAB at 09:33

## 2020-01-01 RX ADMIN — PRAVASTATIN SODIUM 40 MG: 20 TABLET ORAL at 21:07

## 2020-01-01 RX ADMIN — BUMETANIDE 0.5 MG: 0.25 INJECTION, SOLUTION INTRAMUSCULAR; INTRAVENOUS at 16:01

## 2020-01-01 RX ADMIN — METOPROLOL SUCCINATE 25 MG: 25 TABLET, EXTENDED RELEASE ORAL at 21:47

## 2020-01-01 RX ADMIN — INSULIN LISPRO 2 UNITS: 100 INJECTION, SOLUTION INTRAVENOUS; SUBCUTANEOUS at 11:48

## 2020-01-01 RX ADMIN — LANSOPRAZOLE 30 MG: KIT at 06:05

## 2020-01-01 RX ADMIN — ALLOPURINOL 300 MG: 300 TABLET ORAL at 08:52

## 2020-01-01 RX ADMIN — SODIUM CHLORIDE, PRESERVATIVE FREE 10 ML: 5 INJECTION INTRAVENOUS at 10:39

## 2020-01-01 RX ADMIN — PREDNISOLONE ACETATE 1 DROP: 10 SUSPENSION/ DROPS OPHTHALMIC at 10:02

## 2020-01-01 RX ADMIN — APIXABAN 5 MG: 5 TABLET, FILM COATED ORAL at 09:16

## 2020-01-01 RX ADMIN — ALLOPURINOL 300 MG: 300 TABLET ORAL at 08:14

## 2020-01-01 RX ADMIN — POTASSIUM CHLORIDE 10 MEQ: 750 CAPSULE, EXTENDED RELEASE ORAL at 08:28

## 2020-01-01 RX ADMIN — PANTOPRAZOLE SODIUM 40 MG: 40 TABLET, DELAYED RELEASE ORAL at 09:20

## 2020-01-01 RX ADMIN — LIDOCAINE HYDROCHLORIDE 10 ML: 10 INJECTION, SOLUTION INFILTRATION; PERINEURAL at 00:43

## 2020-01-01 RX ADMIN — METOPROLOL SUCCINATE 25 MG: 25 TABLET, EXTENDED RELEASE ORAL at 08:32

## 2020-01-01 RX ADMIN — HYDRALAZINE HYDROCHLORIDE 10 MG: 10 TABLET ORAL at 04:58

## 2020-01-01 RX ADMIN — ALLOPURINOL 300 MG: 300 TABLET ORAL at 10:44

## 2020-01-01 RX ADMIN — LOSARTAN POTASSIUM 100 MG: 50 TABLET, FILM COATED ORAL at 09:54

## 2020-01-01 RX ADMIN — HYDRALAZINE HYDROCHLORIDE 10 MG: 10 TABLET, FILM COATED ORAL at 06:56

## 2020-01-01 RX ADMIN — PREDNISOLONE ACETATE 1 DROP: 10 SUSPENSION/ DROPS OPHTHALMIC at 22:00

## 2020-01-01 RX ADMIN — PANTOPRAZOLE SODIUM 40 MG: 40 TABLET, DELAYED RELEASE ORAL at 08:43

## 2020-01-01 RX ADMIN — PREDNISOLONE ACETATE 1 DROP: 10 SUSPENSION/ DROPS OPHTHALMIC at 11:09

## 2020-01-01 RX ADMIN — INSULIN LISPRO 3 UNITS: 100 INJECTION, SOLUTION INTRAVENOUS; SUBCUTANEOUS at 11:54

## 2020-01-01 RX ADMIN — PREDNISOLONE ACETATE 1 DROP: 10 SUSPENSION/ DROPS OPHTHALMIC at 20:24

## 2020-01-01 RX ADMIN — Medication 400 MCG: at 09:15

## 2020-01-01 RX ADMIN — PREDNISOLONE ACETATE 1 DROP: 10 SUSPENSION/ DROPS OPHTHALMIC at 17:10

## 2020-01-01 RX ADMIN — THIAMINE HCL TAB 100 MG 100 MG: 100 TAB at 08:43

## 2020-01-01 RX ADMIN — CEFTRIAXONE SODIUM 1 G: 1 INJECTION, POWDER, FOR SOLUTION INTRAMUSCULAR; INTRAVENOUS at 17:20

## 2020-01-01 RX ADMIN — FINASTERIDE 5 MG: 5 TABLET, FILM COATED ORAL at 08:53

## 2020-01-01 RX ADMIN — ALLOPURINOL 300 MG: 300 TABLET ORAL at 08:43

## 2020-01-01 RX ADMIN — IRON SUCROSE 200 MG: 20 INJECTION, SOLUTION INTRAVENOUS at 17:02

## 2020-01-01 RX ADMIN — ASPIRIN 81 MG: 81 TABLET, CHEWABLE ORAL at 15:46

## 2020-01-01 RX ADMIN — APIXABAN 5 MG: 5 TABLET, FILM COATED ORAL at 20:59

## 2020-01-01 RX ADMIN — NEOMYCIN SULFATE, POLYMYXIN B SULFATE AND HYDROCORTISONE 4 DROP: 10; 3.5; 1 SUSPENSION/ DROPS AURICULAR (OTIC) at 17:52

## 2020-01-01 RX ADMIN — PREDNISOLONE ACETATE 1 DROP: 10 SUSPENSION/ DROPS OPHTHALMIC at 20:22

## 2020-01-01 RX ADMIN — ISOSORBIDE DINITRATE 10 MG: 10 TABLET ORAL at 12:54

## 2020-01-01 RX ADMIN — ALLOPURINOL 300 MG: 300 TABLET ORAL at 10:30

## 2020-01-01 RX ADMIN — ROCURONIUM BROMIDE 30 MG: 10 INJECTION INTRAVENOUS at 16:15

## 2020-01-01 RX ADMIN — PREDNISOLONE ACETATE 1 DROP: 10 SUSPENSION/ DROPS OPHTHALMIC at 13:14

## 2020-01-01 RX ADMIN — CEFDINIR 300 MG: 300 CAPSULE ORAL at 20:22

## 2020-01-01 RX ADMIN — SODIUM CHLORIDE 8 MG/HR: 900 INJECTION INTRAVENOUS at 07:08

## 2020-01-01 RX ADMIN — PREDNISOLONE ACETATE 1 DROP: 10 SUSPENSION/ DROPS OPHTHALMIC at 22:54

## 2020-01-01 RX ADMIN — ROPINIROLE HYDROCHLORIDE 1 MG: 1 TABLET, FILM COATED ORAL at 20:24

## 2020-01-01 RX ADMIN — AMLODIPINE BESYLATE 5 MG: 5 TABLET ORAL at 09:54

## 2020-01-01 RX ADMIN — FINASTERIDE 5 MG: 5 TABLET, FILM COATED ORAL at 09:54

## 2020-01-01 RX ADMIN — OXYCODONE HYDROCHLORIDE AND ACETAMINOPHEN 500 MG: 500 TABLET ORAL at 08:43

## 2020-01-01 RX ADMIN — FUROSEMIDE 20 MG: 20 TABLET ORAL at 18:15

## 2020-01-01 RX ADMIN — PREDNISOLONE ACETATE 1 DROP: 10 SUSPENSION/ DROPS OPHTHALMIC at 06:32

## 2020-01-01 RX ADMIN — CALCIUM GLUCONATE 1 G: 98 INJECTION, SOLUTION INTRAVENOUS at 06:18

## 2020-01-01 RX ADMIN — PREDNISOLONE ACETATE 1 DROP: 10 SUSPENSION/ DROPS OPHTHALMIC at 13:30

## 2020-01-01 RX ADMIN — PREDNISOLONE ACETATE 1 DROP: 10 SUSPENSION/ DROPS OPHTHALMIC at 22:33

## 2020-01-01 RX ADMIN — ISOSORBIDE DINITRATE 10 MG: 10 TABLET ORAL at 12:17

## 2020-01-01 RX ADMIN — APIXABAN 5 MG: 5 TABLET, FILM COATED ORAL at 10:43

## 2020-01-01 RX ADMIN — SODIUM POLYSTYRENE SULFONATE 30 G: 15 SUSPENSION ORAL; RECTAL at 01:00

## 2020-01-01 RX ADMIN — APIXABAN 5 MG: 5 TABLET, FILM COATED ORAL at 20:22

## 2020-01-01 RX ADMIN — AMLODIPINE BESYLATE 5 MG: 5 TABLET ORAL at 08:45

## 2020-01-01 RX ADMIN — PREDNISOLONE ACETATE 1 DROP: 10 SUSPENSION/ DROPS OPHTHALMIC at 06:57

## 2020-01-01 RX ADMIN — ASPIRIN 81 MG: 81 TABLET, CHEWABLE ORAL at 08:43

## 2020-01-01 RX ADMIN — SODIUM CHLORIDE, PRESERVATIVE FREE 10 ML: 5 INJECTION INTRAVENOUS at 10:45

## 2020-01-01 RX ADMIN — NEOMYCIN SULFATE, POLYMYXIN B SULFATE AND HYDROCORTISONE 4 DROP: 10; 3.5; 1 SUSPENSION/ DROPS AURICULAR (OTIC) at 16:27

## 2020-01-01 RX ADMIN — APIXABAN 5 MG: 5 TABLET, FILM COATED ORAL at 08:44

## 2020-01-01 RX ADMIN — NEOMYCIN SULFATE, POLYMYXIN B SULFATE AND HYDROCORTISONE 4 DROP: 10; 3.5; 1 SUSPENSION/ DROPS AURICULAR (OTIC) at 20:22

## 2020-01-01 RX ADMIN — INSULIN LISPRO 2 UNITS: 100 INJECTION, SOLUTION INTRAVENOUS; SUBCUTANEOUS at 17:21

## 2020-01-01 RX ADMIN — POTASSIUM CHLORIDE 10 MEQ: 750 CAPSULE, EXTENDED RELEASE ORAL at 10:30

## 2020-01-01 RX ADMIN — PANTOPRAZOLE SODIUM 40 MG: 40 INJECTION, POWDER, FOR SOLUTION INTRAVENOUS at 06:35

## 2020-01-01 RX ADMIN — INSULIN LISPRO 2 UNITS: 100 INJECTION, SOLUTION INTRAVENOUS; SUBCUTANEOUS at 12:00

## 2020-01-01 RX ADMIN — PANTOPRAZOLE SODIUM 40 MG: 40 TABLET, DELAYED RELEASE ORAL at 16:56

## 2020-01-01 RX ADMIN — CHOLECALCIFEROL (VITAMIN D3) 25 MCG (1,000 UNIT) TABLET 1000 UNITS: TABLET at 09:54

## 2020-01-01 RX ADMIN — Medication 400 MCG: at 08:43

## 2020-01-01 RX ADMIN — TAMSULOSIN HYDROCHLORIDE 0.4 MG: 0.4 CAPSULE ORAL at 08:41

## 2020-01-01 RX ADMIN — PREDNISOLONE ACETATE 1 DROP: 10 SUSPENSION/ DROPS OPHTHALMIC at 06:50

## 2020-01-01 RX ADMIN — TERAZOSIN HYDROCHLORIDE 1 MG: 1 CAPSULE ORAL at 19:51

## 2020-01-01 RX ADMIN — POTASSIUM CHLORIDE: 2 INJECTION, SOLUTION, CONCENTRATE INTRAVENOUS at 13:14

## 2020-01-01 RX ADMIN — ALLOPURINOL 300 MG: 300 TABLET ORAL at 08:12

## 2020-01-01 RX ADMIN — ISOSORBIDE MONONITRATE 30 MG: 30 TABLET, EXTENDED RELEASE ORAL at 08:44

## 2020-01-01 RX ADMIN — QUETIAPINE FUMARATE 25 MG: 25 TABLET ORAL at 20:53

## 2020-01-01 RX ADMIN — BARIUM SULFATE 20 ML: 400 PASTE ORAL at 13:50

## 2020-01-01 RX ADMIN — LOSARTAN POTASSIUM 100 MG: 50 TABLET, FILM COATED ORAL at 08:45

## 2020-01-01 RX ADMIN — GLYCOPYRROLATE 0.4 MG: 0.2 INJECTION, SOLUTION INTRAMUSCULAR; INTRAVENOUS at 17:34

## 2020-01-01 RX ADMIN — LANSOPRAZOLE 30 MG: KIT at 05:40

## 2020-01-01 RX ADMIN — LANSOPRAZOLE 30 MG: KIT at 08:53

## 2020-01-01 RX ADMIN — ENOXAPARIN SODIUM 80 MG: 80 INJECTION SUBCUTANEOUS at 06:40

## 2020-01-01 RX ADMIN — SODIUM CHLORIDE, PRESERVATIVE FREE 10 ML: 5 INJECTION INTRAVENOUS at 22:15

## 2020-01-01 RX ADMIN — PREDNISOLONE ACETATE 1 DROP: 10 SUSPENSION/ DROPS OPHTHALMIC at 20:28

## 2020-01-01 RX ADMIN — MEROPENEM 1 G: 1 INJECTION, POWDER, FOR SOLUTION INTRAVENOUS at 13:42

## 2020-01-01 RX ADMIN — TAMSULOSIN HYDROCHLORIDE 0.4 MG: 0.4 CAPSULE ORAL at 09:16

## 2020-01-01 RX ADMIN — LOSARTAN POTASSIUM 100 MG: 50 TABLET, FILM COATED ORAL at 14:24

## 2020-01-01 RX ADMIN — DEXTROSE MONOHYDRATE 50 ML/HR: 50 INJECTION, SOLUTION INTRAVENOUS at 02:54

## 2020-01-01 RX ADMIN — AMLODIPINE BESYLATE 10 MG: 10 TABLET ORAL at 08:43

## 2020-01-01 RX ADMIN — OXYCODONE HYDROCHLORIDE AND ACETAMINOPHEN 500 MG: 500 TABLET ORAL at 10:43

## 2020-01-01 RX ADMIN — PRAVASTATIN SODIUM 40 MG: 20 TABLET ORAL at 21:49

## 2020-01-01 RX ADMIN — PREDNISOLONE ACETATE 1 DROP: 10 SUSPENSION/ DROPS OPHTHALMIC at 17:57

## 2020-01-01 RX ADMIN — POTASSIUM CHLORIDE 10 MEQ: 750 CAPSULE, EXTENDED RELEASE ORAL at 08:52

## 2020-01-01 RX ADMIN — CHOLECALCIFEROL (VITAMIN D3) 25 MCG (1,000 UNIT) TABLET 1000 UNITS: TABLET at 09:32

## 2020-01-01 RX ADMIN — THIAMINE HCL TAB 100 MG 100 MG: 100 TAB at 10:17

## 2020-01-01 RX ADMIN — PRAVASTATIN SODIUM 40 MG: 20 TABLET ORAL at 19:45

## 2020-01-01 RX ADMIN — ISOSORBIDE MONONITRATE 30 MG: 30 TABLET, EXTENDED RELEASE ORAL at 08:52

## 2020-01-01 RX ADMIN — Medication 400 MCG: at 09:54

## 2020-01-01 RX ADMIN — METOPROLOL SUCCINATE 25 MG: 25 TABLET, EXTENDED RELEASE ORAL at 08:40

## 2020-01-01 RX ADMIN — LANSOPRAZOLE 30 MG: KIT at 08:46

## 2020-01-01 RX ADMIN — ALLOPURINOL 300 MG: 300 TABLET ORAL at 08:45

## 2020-01-01 RX ADMIN — QUETIAPINE FUMARATE 25 MG: 25 TABLET ORAL at 21:48

## 2020-01-01 RX ADMIN — OXYCODONE HYDROCHLORIDE AND ACETAMINOPHEN 500 MG: 500 TABLET ORAL at 09:48

## 2020-01-01 RX ADMIN — NEOMYCIN SULFATE, POLYMYXIN B SULFATE AND HYDROCORTISONE 4 DROP: 10; 3.5; 1 SUSPENSION/ DROPS AURICULAR (OTIC) at 21:48

## 2020-01-01 RX ADMIN — Medication 400 MCG: at 08:44

## 2020-01-01 RX ADMIN — FUROSEMIDE 20 MG: 20 TABLET ORAL at 08:51

## 2020-01-01 RX ADMIN — ROPINIROLE HYDROCHLORIDE 1 MG: 1 TABLET, FILM COATED ORAL at 21:48

## 2020-01-01 RX ADMIN — DEXTROSE MONOHYDRATE 75 ML/HR: 50 INJECTION, SOLUTION INTRAVENOUS at 13:08

## 2020-01-01 RX ADMIN — SODIUM CHLORIDE, PRESERVATIVE FREE 10 ML: 5 INJECTION INTRAVENOUS at 20:04

## 2020-01-01 RX ADMIN — PREDNISOLONE ACETATE 1 DROP: 10 SUSPENSION/ DROPS OPHTHALMIC at 14:24

## 2020-01-01 RX ADMIN — SODIUM CHLORIDE, PRESERVATIVE FREE 10 ML: 5 INJECTION INTRAVENOUS at 08:50

## 2020-01-01 RX ADMIN — SODIUM CHLORIDE, PRESERVATIVE FREE 10 ML: 5 INJECTION INTRAVENOUS at 20:32

## 2020-01-01 RX ADMIN — PREDNISOLONE ACETATE 1 DROP: 10 SUSPENSION/ DROPS OPHTHALMIC at 10:00

## 2020-01-01 RX ADMIN — PREDNISOLONE ACETATE 1 DROP: 10 SUSPENSION/ DROPS OPHTHALMIC at 17:07

## 2020-01-01 RX ADMIN — ISOSORBIDE MONONITRATE 30 MG: 30 TABLET ORAL at 10:29

## 2020-01-01 RX ADMIN — TERAZOSIN HYDROCHLORIDE 1 MG: 1 CAPSULE ORAL at 20:28

## 2020-01-01 RX ADMIN — INSULIN LISPRO 2 UNITS: 100 INJECTION, SOLUTION INTRAVENOUS; SUBCUTANEOUS at 13:29

## 2020-01-01 RX ADMIN — Medication 5000 UNITS: at 08:46

## 2020-01-01 RX ADMIN — NEOMYCIN SULFATE, POLYMYXIN B SULFATE AND HYDROCORTISONE 4 DROP: 10; 3.5; 1 SUSPENSION/ DROPS AURICULAR (OTIC) at 20:38

## 2020-01-01 RX ADMIN — PREDNISOLONE ACETATE 1 DROP: 10 SUSPENSION/ DROPS OPHTHALMIC at 09:17

## 2020-01-01 RX ADMIN — Medication 500 MG: at 08:46

## 2020-01-01 RX ADMIN — ROPINIROLE HYDROCHLORIDE 1 MG: 1 TABLET, FILM COATED ORAL at 22:36

## 2020-01-01 RX ADMIN — ENOXAPARIN SODIUM 80 MG: 80 INJECTION SUBCUTANEOUS at 10:30

## 2020-01-01 RX ADMIN — Medication 5000 UNITS: at 09:15

## 2020-01-01 RX ADMIN — POTASSIUM CHLORIDE 10 MEQ: 750 CAPSULE, EXTENDED RELEASE ORAL at 09:49

## 2020-01-01 RX ADMIN — PREDNISOLONE ACETATE 1 DROP: 10 SUSPENSION/ DROPS OPHTHALMIC at 12:18

## 2020-01-01 RX ADMIN — PANTOPRAZOLE SODIUM 40 MG: 40 TABLET, DELAYED RELEASE ORAL at 10:15

## 2020-01-01 RX ADMIN — PRAVASTATIN SODIUM 40 MG: 20 TABLET ORAL at 22:16

## 2020-01-01 RX ADMIN — SODIUM CHLORIDE 8 MG/HR: 900 INJECTION INTRAVENOUS at 08:00

## 2020-01-01 RX ADMIN — LOSARTAN POTASSIUM 100 MG: 50 TABLET, FILM COATED ORAL at 08:32

## 2020-01-01 RX ADMIN — SODIUM CHLORIDE 125 ML/HR: 4.5 INJECTION, SOLUTION INTRAVENOUS at 07:34

## 2020-01-01 RX ADMIN — SODIUM CHLORIDE 500 ML: 9 INJECTION, SOLUTION INTRAVENOUS at 02:05

## 2020-01-01 RX ADMIN — THIAMINE HCL TAB 100 MG 100 MG: 100 TAB at 10:30

## 2020-01-01 RX ADMIN — ALLOPURINOL 300 MG: 300 TABLET ORAL at 09:15

## 2020-01-01 RX ADMIN — APIXABAN 5 MG: 5 TABLET, FILM COATED ORAL at 21:47

## 2020-01-01 RX ADMIN — ISOSORBIDE MONONITRATE 30 MG: 30 TABLET, EXTENDED RELEASE ORAL at 08:11

## 2020-01-01 RX ADMIN — IOPAMIDOL 100 ML: 755 INJECTION, SOLUTION INTRAVENOUS at 22:03

## 2020-01-01 RX ADMIN — Medication 3 MG: at 17:34

## 2020-01-01 RX ADMIN — FINASTERIDE 5 MG: 5 TABLET, FILM COATED ORAL at 09:20

## 2020-01-01 RX ADMIN — PYRIDOXINE HCL TAB 50 MG 200 MG: 50 TAB at 08:43

## 2020-01-01 RX ADMIN — APIXABAN 5 MG: 5 TABLET, FILM COATED ORAL at 08:52

## 2020-01-01 RX ADMIN — SODIUM CHLORIDE, PRESERVATIVE FREE 10 ML: 5 INJECTION INTRAVENOUS at 09:49

## 2020-01-01 RX ADMIN — PREDNISOLONE ACETATE 1 DROP: 10 SUSPENSION/ DROPS OPHTHALMIC at 20:04

## 2020-01-01 RX ADMIN — PREDNISOLONE ACETATE 1 DROP: 10 SUSPENSION/ DROPS OPHTHALMIC at 11:15

## 2020-01-01 RX ADMIN — ASPIRIN 81 MG: 81 TABLET, CHEWABLE ORAL at 08:52

## 2020-01-01 RX ADMIN — Medication 400 MCG: at 09:48

## 2020-01-01 RX ADMIN — ROPINIROLE HYDROCHLORIDE 1 MG: 1 TABLET, FILM COATED ORAL at 20:53

## 2020-01-01 RX ADMIN — PREDNISOLONE ACETATE 1 DROP: 10 SUSPENSION/ DROPS OPHTHALMIC at 17:00

## 2020-01-01 RX ADMIN — DOCUSATE SODIUM 50 MG AND SENNOSIDES 8.6 MG 1 TABLET: 8.6; 5 TABLET, FILM COATED ORAL at 09:18

## 2020-01-01 RX ADMIN — INSULIN LISPRO 2 UNITS: 100 INJECTION, SOLUTION INTRAVENOUS; SUBCUTANEOUS at 11:39

## 2020-01-01 RX ADMIN — OXYCODONE HYDROCHLORIDE AND ACETAMINOPHEN 500 MG: 500 TABLET ORAL at 08:28

## 2020-01-01 RX ADMIN — Medication 400 MCG: at 08:16

## 2020-01-01 RX ADMIN — PREDNISOLONE ACETATE 1 DROP: 10 SUSPENSION/ DROPS OPHTHALMIC at 11:24

## 2020-01-01 RX ADMIN — ALLOPURINOL 300 MG: 300 TABLET ORAL at 08:53

## 2020-01-01 RX ADMIN — ALLOPURINOL 300 MG: 300 TABLET ORAL at 09:16

## 2020-01-01 RX ADMIN — Medication 400 MCG: at 08:45

## 2020-01-01 RX ADMIN — PREDNISOLONE ACETATE 1 DROP: 10 SUSPENSION/ DROPS OPHTHALMIC at 09:20

## 2020-01-01 RX ADMIN — PREDNISOLONE ACETATE 1 DROP: 10 SUSPENSION/ DROPS OPHTHALMIC at 08:46

## 2020-01-01 RX ADMIN — PREDNISOLONE ACETATE 1 DROP: 10 SUSPENSION/ DROPS OPHTHALMIC at 08:43

## 2020-01-01 RX ADMIN — COLCHICINE 0.6 MG: 0.6 TABLET, FILM COATED ORAL at 08:12

## 2020-01-01 RX ADMIN — PREDNISOLONE ACETATE 1 DROP: 10 SUSPENSION/ DROPS OPHTHALMIC at 17:44

## 2020-01-01 RX ADMIN — FUROSEMIDE 40 MG: 10 INJECTION, SOLUTION INTRAMUSCULAR; INTRAVENOUS at 20:28

## 2020-01-01 RX ADMIN — AMLODIPINE BESYLATE 10 MG: 10 TABLET ORAL at 12:01

## 2020-01-01 RX ADMIN — ISOSORBIDE MONONITRATE 30 MG: 30 TABLET ORAL at 08:16

## 2020-01-01 RX ADMIN — PYRIDOXINE HCL TAB 50 MG 200 MG: 50 TAB at 08:14

## 2020-01-01 RX ADMIN — CEFDINIR 300 MG: 300 CAPSULE ORAL at 10:30

## 2020-01-01 RX ADMIN — SODIUM CHLORIDE 8 MG/HR: 900 INJECTION INTRAVENOUS at 21:53

## 2020-01-01 RX ADMIN — SODIUM CHLORIDE, PRESERVATIVE FREE 10 ML: 5 INJECTION INTRAVENOUS at 08:17

## 2020-01-01 RX ADMIN — FUROSEMIDE 20 MG: 20 TABLET ORAL at 09:19

## 2020-01-01 RX ADMIN — PREDNISOLONE ACETATE 1 DROP: 10 SUSPENSION/ DROPS OPHTHALMIC at 05:42

## 2020-01-01 RX ADMIN — SODIUM CHLORIDE, PRESERVATIVE FREE 10 ML: 5 INJECTION INTRAVENOUS at 21:40

## 2020-01-01 RX ADMIN — SODIUM CHLORIDE, PRESERVATIVE FREE 10 ML: 5 INJECTION INTRAVENOUS at 20:28

## 2020-01-01 RX ADMIN — Medication 400 MCG: at 10:29

## 2020-01-01 RX ADMIN — SULFAMETHOXAZOLE AND TRIMETHOPRIM 160 MG: 800; 160 TABLET ORAL at 20:22

## 2020-01-01 RX ADMIN — ROPIVACAINE HYDROCHLORIDE 20 ML: 2 INJECTION, SOLUTION EPIDURAL; INFILTRATION at 16:26

## 2020-01-01 RX ADMIN — PREDNISOLONE ACETATE 1 DROP: 10 SUSPENSION/ DROPS OPHTHALMIC at 08:51

## 2020-01-01 RX ADMIN — ISOSORBIDE DINITRATE 10 MG: 10 TABLET ORAL at 09:49

## 2020-01-01 RX ADMIN — LEVOFLOXACIN 750 MG: 750 TABLET, FILM COATED ORAL at 08:32

## 2020-01-01 RX ADMIN — BARIUM SULFATE 20 ML: 400 SUSPENSION ORAL at 13:50

## 2020-01-01 RX ADMIN — PREDNISOLONE ACETATE 1 DROP: 10 SUSPENSION/ DROPS OPHTHALMIC at 10:16

## 2020-01-01 RX ADMIN — DOCUSATE SODIUM 50 MG AND SENNOSIDES 8.6 MG 1 TABLET: 8.6; 5 TABLET, FILM COATED ORAL at 10:16

## 2020-01-01 RX ADMIN — FINASTERIDE 5 MG: 5 TABLET, FILM COATED ORAL at 08:50

## 2020-01-01 RX ADMIN — AMLODIPINE BESYLATE 5 MG: 5 TABLET ORAL at 09:19

## 2020-01-01 RX ADMIN — DOCUSATE SODIUM 50 MG AND SENNOSIDES 8.6 MG 1 TABLET: 8.6; 5 TABLET, FILM COATED ORAL at 09:15

## 2020-01-01 RX ADMIN — FUROSEMIDE 20 MG: 20 TABLET ORAL at 08:32

## 2020-01-01 RX ADMIN — CEFDINIR 300 MG: 300 CAPSULE ORAL at 22:45

## 2020-01-01 RX ADMIN — OXYCODONE HYDROCHLORIDE AND ACETAMINOPHEN 500 MG: 500 TABLET ORAL at 09:54

## 2020-01-01 RX ADMIN — PREDNISOLONE ACETATE 1 DROP: 10 SUSPENSION/ DROPS OPHTHALMIC at 10:33

## 2020-01-01 RX ADMIN — THIAMINE HCL TAB 100 MG 100 MG: 100 TAB at 08:51

## 2020-01-01 RX ADMIN — OXYCODONE HYDROCHLORIDE AND ACETAMINOPHEN 500 MG: 500 TABLET ORAL at 08:45

## 2020-01-01 RX ADMIN — NEOMYCIN SULFATE, POLYMYXIN B SULFATE AND HYDROCORTISONE 4 DROP: 10; 3.5; 1 SUSPENSION/ DROPS AURICULAR (OTIC) at 09:16

## 2020-01-01 RX ADMIN — SODIUM CHLORIDE, PRESERVATIVE FREE 10 ML: 5 INJECTION INTRAVENOUS at 22:35

## 2020-01-01 RX ADMIN — AMLODIPINE BESYLATE 5 MG: 5 TABLET ORAL at 08:32

## 2020-01-01 RX ADMIN — Medication 400 MCG: at 08:12

## 2020-01-01 RX ADMIN — ROPINIROLE HYDROCHLORIDE 1 MG: 1 TABLET, FILM COATED ORAL at 20:25

## 2020-01-01 RX ADMIN — LIDOCAINE HYDROCHLORIDE 140 MG: 20 INJECTION, SOLUTION INTRAVENOUS at 11:50

## 2020-01-01 RX ADMIN — FINASTERIDE 5 MG: 5 TABLET, FILM COATED ORAL at 08:45

## 2020-01-01 RX ADMIN — THIAMINE HCL TAB 100 MG 100 MG: 100 TAB at 09:18

## 2020-01-01 RX ADMIN — PREDNISOLONE ACETATE 1 DROP: 10 SUSPENSION/ DROPS OPHTHALMIC at 22:15

## 2020-01-01 RX ADMIN — PREDNISOLONE ACETATE 1 DROP: 10 SUSPENSION/ DROPS OPHTHALMIC at 09:16

## 2020-01-01 RX ADMIN — PRAVASTATIN SODIUM 40 MG: 20 TABLET ORAL at 08:11

## 2020-01-01 RX ADMIN — AMLODIPINE BESYLATE 5 MG: 5 TABLET ORAL at 08:44

## 2020-01-01 RX ADMIN — LEVOFLOXACIN 750 MG: 750 TABLET, FILM COATED ORAL at 12:53

## 2020-01-01 RX ADMIN — FUROSEMIDE 40 MG: 10 INJECTION, SOLUTION INTRAMUSCULAR; INTRAVENOUS at 05:19

## 2020-01-01 RX ADMIN — Medication 400 MCG: at 08:52

## 2020-01-01 RX ADMIN — PYRIDOXINE HCL TAB 50 MG 200 MG: 50 TAB at 08:17

## 2020-01-01 RX ADMIN — OXYCODONE HYDROCHLORIDE AND ACETAMINOPHEN 500 MG: 500 TABLET ORAL at 08:14

## 2020-01-01 RX ADMIN — ALLOPURINOL 300 MG: 300 TABLET ORAL at 09:48

## 2020-01-01 RX ADMIN — TERAZOSIN HYDROCHLORIDE 1 MG: 1 CAPSULE ORAL at 21:11

## 2020-01-01 RX ADMIN — INSULIN LISPRO 2 UNITS: 100 INJECTION, SOLUTION INTRAVENOUS; SUBCUTANEOUS at 12:13

## 2020-01-01 RX ADMIN — PRAVASTATIN SODIUM 40 MG: 20 TABLET ORAL at 22:35

## 2020-01-01 RX ADMIN — DOCUSATE SODIUM 50 MG AND SENNOSIDES 8.6 MG 1 TABLET: 8.6; 5 TABLET, FILM COATED ORAL at 08:11

## 2020-01-01 RX ADMIN — NEOMYCIN SULFATE, POLYMYXIN B SULFATE AND HYDROCORTISONE 4 DROP: 10; 3.5; 1 SUSPENSION/ DROPS AURICULAR (OTIC) at 16:56

## 2020-01-01 RX ADMIN — PREDNISOLONE ACETATE 1 DROP: 10 SUSPENSION/ DROPS OPHTHALMIC at 21:45

## 2020-01-01 RX ADMIN — HALOPERIDOL LACTATE 2 MG: 5 INJECTION, SOLUTION INTRAMUSCULAR at 17:49

## 2020-01-01 RX ADMIN — HYDRALAZINE HYDROCHLORIDE 10 MG: 20 INJECTION INTRAMUSCULAR; INTRAVENOUS at 11:48

## 2020-01-01 RX ADMIN — Medication 400 MCG: at 08:28

## 2020-01-01 RX ADMIN — PREDNISOLONE ACETATE 1 DROP: 10 SUSPENSION/ DROPS OPHTHALMIC at 18:22

## 2020-01-01 RX ADMIN — TERAZOSIN HYDROCHLORIDE 1 MG: 1 CAPSULE ORAL at 22:17

## 2020-01-01 RX ADMIN — DOCUSATE SODIUM 50 MG AND SENNOSIDES 8.6 MG 1 TABLET: 8.6; 5 TABLET, FILM COATED ORAL at 08:53

## 2020-01-01 RX ADMIN — Medication 400 MCG: at 08:53

## 2020-01-01 RX ADMIN — ROPINIROLE HYDROCHLORIDE 1 MG: 1 TABLET, FILM COATED ORAL at 20:38

## 2020-01-01 RX ADMIN — APIXABAN 5 MG: 5 TABLET, FILM COATED ORAL at 08:45

## 2020-01-01 RX ADMIN — ALLOPURINOL 300 MG: 300 TABLET ORAL at 09:33

## 2020-01-01 RX ADMIN — TERAZOSIN HYDROCHLORIDE 1 MG: 1 CAPSULE ORAL at 21:07

## 2020-01-01 RX ADMIN — THIAMINE HCL TAB 100 MG 100 MG: 100 TAB at 08:55

## 2020-01-01 RX ADMIN — CEFTRIAXONE SODIUM 1 G: 1 INJECTION, POWDER, FOR SOLUTION INTRAMUSCULAR; INTRAVENOUS at 15:52

## 2020-01-01 RX ADMIN — SODIUM CHLORIDE, PRESERVATIVE FREE 10 ML: 5 INJECTION INTRAVENOUS at 10:24

## 2020-01-01 RX ADMIN — IRON SUCROSE 200 MG: 20 INJECTION, SOLUTION INTRAVENOUS at 17:03

## 2020-01-01 RX ADMIN — PREDNISOLONE ACETATE 1 DROP: 10 SUSPENSION/ DROPS OPHTHALMIC at 05:58

## 2020-01-01 RX ADMIN — INSULIN HUMAN 10 UNITS: 100 INJECTION, SOLUTION PARENTERAL at 18:25

## 2020-01-01 RX ADMIN — CHOLECALCIFEROL (VITAMIN D3) 25 MCG (1,000 UNIT) TABLET 1000 UNITS: TABLET at 09:16

## 2020-01-01 RX ADMIN — METOPROLOL SUCCINATE 25 MG: 25 TABLET, EXTENDED RELEASE ORAL at 20:24

## 2020-01-01 RX ADMIN — ALLOPURINOL 300 MG: 300 TABLET ORAL at 08:32

## 2020-01-01 RX ADMIN — PREDNISOLONE ACETATE 1 DROP: 10 SUSPENSION/ DROPS OPHTHALMIC at 15:55

## 2020-01-01 RX ADMIN — ISOSORBIDE MONONITRATE 30 MG: 30 TABLET, EXTENDED RELEASE ORAL at 08:40

## 2020-01-01 RX ADMIN — PREDNISOLONE ACETATE 1 DROP: 10 SUSPENSION/ DROPS OPHTHALMIC at 10:18

## 2020-01-01 RX ADMIN — DEXTROSE MONOHYDRATE 75 ML/HR: 50 INJECTION, SOLUTION INTRAVENOUS at 02:23

## 2020-01-01 RX ADMIN — ISOSORBIDE MONONITRATE 30 MG: 30 TABLET, EXTENDED RELEASE ORAL at 10:44

## 2020-01-01 RX ADMIN — THIAMINE HCL TAB 100 MG 100 MG: 100 TAB at 08:29

## 2020-01-01 RX ADMIN — Medication 400 MCG: at 10:44

## 2020-01-01 RX ADMIN — TAMSULOSIN HYDROCHLORIDE 0.4 MG: 0.4 CAPSULE ORAL at 08:11

## 2020-01-01 RX ADMIN — TAMSULOSIN HYDROCHLORIDE 0.4 MG: 0.4 CAPSULE ORAL at 09:54

## 2020-01-01 RX ADMIN — ASPIRIN 81 MG: 81 TABLET, CHEWABLE ORAL at 09:32

## 2020-01-01 RX ADMIN — PREDNISOLONE ACETATE 1 DROP: 10 SUSPENSION/ DROPS OPHTHALMIC at 12:00

## 2020-01-01 RX ADMIN — ISOSORBIDE MONONITRATE 30 MG: 30 TABLET ORAL at 08:14

## 2020-01-01 RX ADMIN — CEFDINIR 300 MG: 300 CAPSULE ORAL at 22:16

## 2020-01-01 RX ADMIN — METOPROLOL SUCCINATE 25 MG: 25 TABLET, EXTENDED RELEASE ORAL at 20:53

## 2020-01-01 RX ADMIN — NEOMYCIN SULFATE, POLYMYXIN B SULFATE AND HYDROCORTISONE 4 DROP: 10; 3.5; 1 SUSPENSION/ DROPS AURICULAR (OTIC) at 08:32

## 2020-01-01 RX ADMIN — POTASSIUM CHLORIDE 10 MEQ: 750 CAPSULE, EXTENDED RELEASE ORAL at 17:00

## 2020-01-01 RX ADMIN — ISOSORBIDE MONONITRATE 30 MG: 30 TABLET ORAL at 09:48

## 2020-01-01 RX ADMIN — SODIUM CHLORIDE 125 ML/HR: 4.5 INJECTION, SOLUTION INTRAVENOUS at 06:01

## 2020-01-01 RX ADMIN — THIAMINE HCL TAB 100 MG 100 MG: 100 TAB at 09:33

## 2020-01-01 RX ADMIN — Medication 400 MCG: at 10:17

## 2020-01-01 RX ADMIN — APIXABAN 5 MG: 5 TABLET, FILM COATED ORAL at 20:38

## 2020-01-01 RX ADMIN — SODIUM CHLORIDE, PRESERVATIVE FREE 10 ML: 5 INJECTION INTRAVENOUS at 08:56

## 2020-01-01 RX ADMIN — ISOSORBIDE MONONITRATE 30 MG: 30 TABLET, EXTENDED RELEASE ORAL at 10:17

## 2020-01-01 RX ADMIN — LOSARTAN POTASSIUM 50 MG: 50 TABLET, FILM COATED ORAL at 08:11

## 2020-01-01 RX ADMIN — Medication 5000 UNITS: at 08:53

## 2020-01-01 RX ADMIN — HYDRALAZINE HYDROCHLORIDE 10 MG: 10 TABLET ORAL at 01:48

## 2020-01-01 RX ADMIN — ACETAMINOPHEN 650 MG: 325 TABLET, FILM COATED ORAL at 21:46

## 2020-01-01 RX ADMIN — PREDNISOLONE ACETATE 1 DROP: 10 SUSPENSION/ DROPS OPHTHALMIC at 13:23

## 2020-01-01 RX ADMIN — FUROSEMIDE 20 MG: 20 TABLET ORAL at 08:11

## 2020-01-01 RX ADMIN — INSULIN LISPRO 2 UNITS: 100 INJECTION, SOLUTION INTRAVENOUS; SUBCUTANEOUS at 08:19

## 2020-01-01 RX ADMIN — PREDNISOLONE ACETATE 1 DROP: 10 SUSPENSION/ DROPS OPHTHALMIC at 16:05

## 2020-01-01 RX ADMIN — PREDNISOLONE ACETATE 1 DROP: 10 SUSPENSION/ DROPS OPHTHALMIC at 16:20

## 2020-01-01 RX ADMIN — Medication 500 MG: at 08:52

## 2020-01-01 RX ADMIN — TETANUS TOXOID, REDUCED DIPHTHERIA TOXOID AND ACELLULAR PERTUSSIS VACCINE, ADSORBED 0.5 ML: 5; 2.5; 8; 8; 2.5 SUSPENSION INTRAMUSCULAR at 15:11

## 2020-01-01 RX ADMIN — TAMSULOSIN HYDROCHLORIDE 0.4 MG: 0.4 CAPSULE ORAL at 09:18

## 2020-01-01 RX ADMIN — ISOSORBIDE DINITRATE 10 MG: 10 TABLET ORAL at 17:17

## 2020-01-01 RX ADMIN — ROPINIROLE HYDROCHLORIDE 1 MG: 1 TABLET, FILM COATED ORAL at 21:47

## 2020-01-01 RX ADMIN — NEOMYCIN SULFATE, POLYMYXIN B SULFATE AND HYDROCORTISONE 4 DROP: 10; 3.5; 1 SUSPENSION/ DROPS AURICULAR (OTIC) at 17:35

## 2020-01-01 RX ADMIN — PANTOPRAZOLE SODIUM 40 MG: 40 TABLET, DELAYED RELEASE ORAL at 17:27

## 2020-01-01 RX ADMIN — DOCUSATE SODIUM 50 MG AND SENNOSIDES 8.6 MG 1 TABLET: 8.6; 5 TABLET, FILM COATED ORAL at 08:45

## 2020-01-01 RX ADMIN — POTASSIUM CHLORIDE 10 MEQ: 750 CAPSULE, EXTENDED RELEASE ORAL at 17:03

## 2020-01-01 RX ADMIN — THIAMINE HCL TAB 100 MG 100 MG: 100 TAB at 10:16

## 2020-01-01 RX ADMIN — ALLOPURINOL 300 MG: 300 TABLET ORAL at 10:17

## 2020-01-01 RX ADMIN — PRAVASTATIN SODIUM 40 MG: 20 TABLET ORAL at 19:50

## 2020-01-01 RX ADMIN — Medication 50 MG: at 08:11

## 2020-01-01 RX ADMIN — INSULIN HUMAN 10 UNITS: 100 INJECTION, SOLUTION PARENTERAL at 06:17

## 2020-01-01 RX ADMIN — NEOMYCIN SULFATE, POLYMYXIN B SULFATE AND HYDROCORTISONE 4 DROP: 10; 3.5; 1 SUSPENSION/ DROPS AURICULAR (OTIC) at 08:45

## 2020-01-01 RX ADMIN — PANTOPRAZOLE SODIUM 40 MG: 40 TABLET, DELAYED RELEASE ORAL at 17:35

## 2020-01-01 RX ADMIN — PREDNISOLONE ACETATE 1 DROP: 10 SUSPENSION/ DROPS OPHTHALMIC at 22:44

## 2020-01-01 RX ADMIN — PREDNISOLONE ACETATE 1 DROP: 10 SUSPENSION/ DROPS OPHTHALMIC at 08:44

## 2020-01-01 RX ADMIN — PREDNISOLONE ACETATE 1 DROP: 10 SUSPENSION/ DROPS OPHTHALMIC at 16:45

## 2020-01-01 RX ADMIN — TAMSULOSIN HYDROCHLORIDE 0.4 MG: 0.4 CAPSULE ORAL at 08:31

## 2020-01-01 RX ADMIN — PREDNISOLONE ACETATE 1 DROP: 10 SUSPENSION/ DROPS OPHTHALMIC at 08:29

## 2020-01-01 RX ADMIN — ISOSORBIDE MONONITRATE 30 MG: 30 TABLET ORAL at 08:44

## 2020-01-01 RX ADMIN — TAMSULOSIN HYDROCHLORIDE 0.4 MG: 0.4 CAPSULE ORAL at 08:52

## 2020-01-01 RX ADMIN — CHOLECALCIFEROL TAB 10 MCG (400 UNIT) 400 UNITS: 10 TAB at 08:12

## 2020-01-01 RX ADMIN — CALCIUM GLUCONATE 1 G: 98 INJECTION, SOLUTION INTRAVENOUS at 18:29

## 2020-01-01 RX ADMIN — IOPAMIDOL 100 ML: 612 INJECTION, SOLUTION INTRAVENOUS at 11:07

## 2020-01-01 RX ADMIN — ROPINIROLE HYDROCHLORIDE 1 MG: 1 TABLET, FILM COATED ORAL at 20:59

## 2020-01-01 RX ADMIN — SODIUM CHLORIDE, PRESERVATIVE FREE 10 ML: 5 INJECTION INTRAVENOUS at 20:22

## 2020-01-01 RX ADMIN — FINASTERIDE 5 MG: 5 TABLET, FILM COATED ORAL at 10:43

## 2020-01-01 RX ADMIN — PRAVASTATIN SODIUM 40 MG: 20 TABLET ORAL at 20:43

## 2020-01-01 RX ADMIN — CHOLECALCIFEROL (VITAMIN D3) 25 MCG (1,000 UNIT) TABLET 1000 UNITS: TABLET at 10:44

## 2020-01-01 RX ADMIN — METFORMIN HYDROCHLORIDE 500 MG: 500 TABLET ORAL at 08:44

## 2020-01-01 RX ADMIN — HYDROMORPHONE HYDROCHLORIDE 0.5 MG: 1 INJECTION, SOLUTION INTRAMUSCULAR; INTRAVENOUS; SUBCUTANEOUS at 23:16

## 2020-01-01 RX ADMIN — SODIUM POLYSTYRENE SULFONATE 30 G: 15 SUSPENSION ORAL; RECTAL at 09:16

## 2020-01-01 RX ADMIN — DOCUSATE SODIUM 50 MG AND SENNOSIDES 8.6 MG 1 TABLET: 8.6; 5 TABLET, FILM COATED ORAL at 08:46

## 2020-01-01 RX ADMIN — ALLOPURINOL 300 MG: 300 TABLET ORAL at 08:46

## 2020-01-01 RX ADMIN — PREDNISOLONE ACETATE 1 DROP: 10 SUSPENSION/ DROPS OPHTHALMIC at 08:16

## 2020-01-01 RX ADMIN — CHOLECALCIFEROL (VITAMIN D3) 25 MCG (1,000 UNIT) TABLET 1000 UNITS: TABLET at 12:01

## 2020-01-01 RX ADMIN — Medication 500 MG: at 09:16

## 2020-01-01 RX ADMIN — POTASSIUM CHLORIDE 10 MEQ: 750 CAPSULE, EXTENDED RELEASE ORAL at 19:47

## 2020-01-01 RX ADMIN — CHOLECALCIFEROL (VITAMIN D3) 25 MCG (1,000 UNIT) TABLET 1000 UNITS: TABLET at 08:43

## 2020-01-01 RX ADMIN — PREDNISOLONE ACETATE 1 DROP: 10 SUSPENSION/ DROPS OPHTHALMIC at 20:43

## 2020-01-01 RX ADMIN — PROPOFOL 60 MG: 10 INJECTION, EMULSION INTRAVENOUS at 11:50

## 2020-01-01 RX ADMIN — POTASSIUM CHLORIDE 10 MEQ: 750 CAPSULE, EXTENDED RELEASE ORAL at 17:13

## 2020-01-01 RX ADMIN — DEXTROSE MONOHYDRATE 25 G: 25 INJECTION, SOLUTION INTRAVENOUS at 00:28

## 2020-01-01 RX ADMIN — PREDNISOLONE ACETATE 1 DROP: 10 SUSPENSION/ DROPS OPHTHALMIC at 08:00

## 2020-01-01 RX ADMIN — METOPROLOL SUCCINATE 25 MG: 25 TABLET, EXTENDED RELEASE ORAL at 09:54

## 2020-01-01 RX ADMIN — INSULIN LISPRO 3 UNITS: 100 INJECTION, SOLUTION INTRAVENOUS; SUBCUTANEOUS at 17:06

## 2020-01-01 RX ADMIN — SODIUM CHLORIDE 100 ML/HR: 9 INJECTION, SOLUTION INTRAVENOUS at 06:17

## 2020-01-01 RX ADMIN — TERAZOSIN HYDROCHLORIDE 1 MG: 1 CAPSULE ORAL at 20:43

## 2020-01-01 RX ADMIN — PREDNISOLONE ACETATE 1 DROP: 10 SUSPENSION/ DROPS OPHTHALMIC at 16:33

## 2020-01-01 RX ADMIN — METOPROLOL SUCCINATE 25 MG: 25 TABLET, EXTENDED RELEASE ORAL at 08:43

## 2020-01-01 RX ADMIN — PRAVASTATIN SODIUM 40 MG: 20 TABLET ORAL at 20:53

## 2020-01-01 RX ADMIN — PREDNISOLONE ACETATE 1 DROP: 10 SUSPENSION/ DROPS OPHTHALMIC at 11:20

## 2020-01-01 RX ADMIN — Medication 400 MCG: at 09:32

## 2020-01-01 RX ADMIN — FINASTERIDE 5 MG: 5 TABLET, FILM COATED ORAL at 08:13

## 2020-01-01 RX ADMIN — LOSARTAN POTASSIUM 25 MG: 50 TABLET, FILM COATED ORAL at 09:32

## 2020-01-01 RX ADMIN — PREDNISOLONE ACETATE 1 DROP: 10 SUSPENSION/ DROPS OPHTHALMIC at 13:42

## 2020-01-01 RX ADMIN — SODIUM CHLORIDE, PRESERVATIVE FREE 10 ML: 5 INJECTION INTRAVENOUS at 19:56

## 2020-01-01 RX ADMIN — POTASSIUM CHLORIDE: 2 INJECTION, SOLUTION, CONCENTRATE INTRAVENOUS at 17:22

## 2020-01-01 RX ADMIN — TERAZOSIN HYDROCHLORIDE 1 MG: 1 CAPSULE ORAL at 22:00

## 2020-01-01 RX ADMIN — SODIUM CHLORIDE, PRESERVATIVE FREE 10 ML: 5 INJECTION INTRAVENOUS at 21:43

## 2020-01-01 RX ADMIN — PRAVASTATIN SODIUM 40 MG: 20 TABLET ORAL at 20:59

## 2020-01-01 RX ADMIN — PREDNISOLONE ACETATE 1 DROP: 10 SUSPENSION/ DROPS OPHTHALMIC at 16:23

## 2020-01-01 RX ADMIN — PREDNISOLONE ACETATE 1 DROP: 10 SUSPENSION/ DROPS OPHTHALMIC at 12:15

## 2020-01-01 RX ADMIN — Medication 400 MCG: at 08:32

## 2020-01-01 RX ADMIN — CARVEDILOL 12.5 MG: 6.25 TABLET, FILM COATED ORAL at 12:02

## 2020-01-01 RX ADMIN — ISOSORBIDE MONONITRATE 30 MG: 30 TABLET ORAL at 10:00

## 2020-01-01 RX ADMIN — THIAMINE HCL TAB 100 MG 100 MG: 100 TAB at 10:00

## 2020-01-01 RX ADMIN — DEXTROSE MONOHYDRATE 25 G: 25 INJECTION, SOLUTION INTRAVENOUS at 00:16

## 2020-01-01 RX ADMIN — FUROSEMIDE 20 MG: 20 TABLET ORAL at 08:28

## 2020-01-01 RX ADMIN — PREDNISOLONE ACETATE 1 DROP: 10 SUSPENSION/ DROPS OPHTHALMIC at 14:22

## 2020-01-01 RX ADMIN — INSULIN LISPRO 2 UNITS: 100 INJECTION, SOLUTION INTRAVENOUS; SUBCUTANEOUS at 12:23

## 2020-01-01 RX ADMIN — APIXABAN 5 MG: 5 TABLET, FILM COATED ORAL at 08:31

## 2020-01-01 RX ADMIN — LOSARTAN POTASSIUM 100 MG: 50 TABLET, FILM COATED ORAL at 09:18

## 2020-01-01 RX ADMIN — POTASSIUM CHLORIDE 10 MEQ: 750 CAPSULE, EXTENDED RELEASE ORAL at 10:17

## 2020-01-01 RX ADMIN — TAMSULOSIN HYDROCHLORIDE 0.8 MG: 0.4 CAPSULE ORAL at 08:45

## 2020-01-01 RX ADMIN — PRAVASTATIN SODIUM 40 MG: 20 TABLET ORAL at 22:56

## 2020-01-01 RX ADMIN — PREDNISOLONE ACETATE 1 DROP: 10 SUSPENSION/ DROPS OPHTHALMIC at 11:39

## 2020-01-01 RX ADMIN — CHOLECALCIFEROL (VITAMIN D3) 25 MCG (1,000 UNIT) TABLET 1000 UNITS: TABLET at 09:18

## 2020-01-01 RX ADMIN — PREDNISOLONE ACETATE 1 DROP: 10 SUSPENSION/ DROPS OPHTHALMIC at 08:45

## 2020-01-01 RX ADMIN — TAMSULOSIN HYDROCHLORIDE 0.4 MG: 0.4 CAPSULE ORAL at 10:16

## 2020-01-01 RX ADMIN — ALLOPURINOL 300 MG: 300 TABLET ORAL at 08:17

## 2020-01-01 RX ADMIN — POTASSIUM CHLORIDE 10 MEQ: 750 CAPSULE, EXTENDED RELEASE ORAL at 17:10

## 2020-01-01 RX ADMIN — NEOMYCIN SULFATE, POLYMYXIN B SULFATE AND HYDROCORTISONE 4 DROP: 10; 3.5; 1 SUSPENSION/ DROPS AURICULAR (OTIC) at 20:33

## 2020-01-01 RX ADMIN — PREDNISOLONE ACETATE 1 DROP: 10 SUSPENSION/ DROPS OPHTHALMIC at 17:27

## 2020-01-01 RX ADMIN — PREDNISOLONE ACETATE 1 DROP: 10 SUSPENSION/ DROPS OPHTHALMIC at 10:34

## 2020-01-01 RX ADMIN — PRAVASTATIN SODIUM 40 MG: 20 TABLET ORAL at 20:38

## 2020-01-01 RX ADMIN — ALLOPURINOL 300 MG: 300 TABLET ORAL at 09:19

## 2020-01-01 RX ADMIN — ALLOPURINOL 300 MG: 300 TABLET ORAL at 12:01

## 2020-01-01 RX ADMIN — ISOSORBIDE DINITRATE 10 MG: 10 TABLET ORAL at 08:46

## 2020-01-01 RX ADMIN — ISOSORBIDE MONONITRATE 30 MG: 30 TABLET ORAL at 10:17

## 2020-01-01 RX ADMIN — FUROSEMIDE 20 MG: 20 TABLET ORAL at 08:43

## 2020-01-01 RX ADMIN — APIXABAN 5 MG: 5 TABLET, FILM COATED ORAL at 20:24

## 2020-01-01 RX ADMIN — DOCUSATE SODIUM 50 MG AND SENNOSIDES 8.6 MG 1 TABLET: 8.6; 5 TABLET, FILM COATED ORAL at 08:52

## 2020-01-01 RX ADMIN — SODIUM CHLORIDE, PRESERVATIVE FREE 10 ML: 5 INJECTION INTRAVENOUS at 09:16

## 2020-01-01 RX ADMIN — NEOMYCIN SULFATE, POLYMYXIN B SULFATE AND HYDROCORTISONE 4 DROP: 10; 3.5; 1 SUSPENSION/ DROPS AURICULAR (OTIC) at 10:45

## 2020-01-01 RX ADMIN — PREDNISOLONE ACETATE 1 DROP: 10 SUSPENSION/ DROPS OPHTHALMIC at 17:21

## 2020-01-01 RX ADMIN — NEOMYCIN SULFATE, POLYMYXIN B SULFATE AND HYDROCORTISONE 4 DROP: 10; 3.5; 1 SUSPENSION/ DROPS AURICULAR (OTIC) at 21:45

## 2020-01-01 RX ADMIN — OXYCODONE HYDROCHLORIDE AND ACETAMINOPHEN 500 MG: 500 TABLET ORAL at 08:12

## 2020-01-01 RX ADMIN — FUROSEMIDE 20 MG: 20 TABLET ORAL at 08:45

## 2020-01-01 RX ADMIN — FINASTERIDE 5 MG: 5 TABLET, FILM COATED ORAL at 08:46

## 2020-01-01 RX ADMIN — DEXTROSE MONOHYDRATE 50 ML: 25 INJECTION, SOLUTION INTRAVENOUS at 19:55

## 2020-01-01 RX ADMIN — CHOLECALCIFEROL (VITAMIN D3) 25 MCG (1,000 UNIT) TABLET 1000 UNITS: TABLET at 10:00

## 2020-01-01 RX ADMIN — ACETAMINOPHEN 650 MG: 325 TABLET, FILM COATED ORAL at 18:27

## 2020-01-01 RX ADMIN — TERAZOSIN HYDROCHLORIDE 1 MG: 1 CAPSULE ORAL at 20:39

## 2020-01-01 RX ADMIN — PREDNISOLONE ACETATE 1 DROP: 10 SUSPENSION/ DROPS OPHTHALMIC at 15:26

## 2020-01-01 RX ADMIN — CALCIUM GLUCONATE 1 G: 98 INJECTION, SOLUTION INTRAVENOUS at 21:14

## 2020-01-01 RX ADMIN — PREDNISOLONE ACETATE 1 DROP: 10 SUSPENSION/ DROPS OPHTHALMIC at 11:27

## 2020-01-01 RX ADMIN — SODIUM CHLORIDE, PRESERVATIVE FREE 10 ML: 5 INJECTION INTRAVENOUS at 22:45

## 2020-01-01 RX ADMIN — THIAMINE HCL TAB 100 MG 100 MG: 100 TAB at 12:00

## 2020-01-01 RX ADMIN — MEROPENEM 1 G: 1 INJECTION, POWDER, FOR SOLUTION INTRAVENOUS at 02:00

## 2020-01-01 RX ADMIN — Medication 400 MCG: at 09:19

## 2020-01-01 RX ADMIN — PANTOPRAZOLE SODIUM 40 MG: 40 INJECTION, POWDER, FOR SOLUTION INTRAVENOUS at 05:58

## 2020-01-01 RX ADMIN — PREDNISOLONE ACETATE 1 DROP: 10 SUSPENSION/ DROPS OPHTHALMIC at 22:17

## 2020-01-01 RX ADMIN — HUMAN INSULIN 10 UNITS: 100 INJECTION, SOLUTION SUBCUTANEOUS at 21:29

## 2020-01-01 RX ADMIN — PRAVASTATIN SODIUM 40 MG: 20 TABLET ORAL at 20:24

## 2020-01-01 RX ADMIN — ALLOPURINOL 300 MG: 300 TABLET ORAL at 09:53

## 2020-01-01 RX ADMIN — CHOLECALCIFEROL (VITAMIN D3) 25 MCG (1,000 UNIT) TABLET 1000 UNITS: TABLET at 08:28

## 2020-01-01 RX ADMIN — PREDNISOLONE ACETATE 1 DROP: 10 SUSPENSION/ DROPS OPHTHALMIC at 17:54

## 2020-01-01 RX ADMIN — CHOLECALCIFEROL (VITAMIN D3) 25 MCG (1,000 UNIT) TABLET 1000 UNITS: TABLET at 08:32

## 2020-01-01 RX ADMIN — INSULIN LISPRO 2 UNITS: 100 INJECTION, SOLUTION INTRAVENOUS; SUBCUTANEOUS at 12:24

## 2020-01-01 RX ADMIN — ASPIRIN 81 MG: 81 TABLET, CHEWABLE ORAL at 08:12

## 2020-01-01 RX ADMIN — POTASSIUM CHLORIDE 10 MEQ: 750 CAPSULE, EXTENDED RELEASE ORAL at 12:02

## 2020-01-01 RX ADMIN — DOCUSATE SODIUM 50 MG AND SENNOSIDES 8.6 MG 1 TABLET: 8.6; 5 TABLET, FILM COATED ORAL at 08:44

## 2020-01-01 RX ADMIN — TERAZOSIN HYDROCHLORIDE 1 MG: 1 CAPSULE ORAL at 22:44

## 2020-01-01 RX ADMIN — PREDNISOLONE ACETATE 1 DROP: 10 SUSPENSION/ DROPS OPHTHALMIC at 05:16

## 2020-01-01 RX ADMIN — APIXABAN 5 MG: 5 TABLET, FILM COATED ORAL at 09:18

## 2020-01-01 RX ADMIN — AMLODIPINE BESYLATE 5 MG: 5 TABLET ORAL at 08:12

## 2020-01-01 RX ADMIN — OXYCODONE HYDROCHLORIDE AND ACETAMINOPHEN 500 MG: 500 TABLET ORAL at 12:00

## 2020-01-01 RX ADMIN — THIAMINE HCL TAB 100 MG 100 MG: 100 TAB at 09:16

## 2020-01-01 RX ADMIN — PREDNISOLONE ACETATE 1 DROP: 10 SUSPENSION/ DROPS OPHTHALMIC at 13:20

## 2020-01-01 RX ADMIN — BARIUM SULFATE 20 ML: 0.81 POWDER, FOR SUSPENSION ORAL at 13:50

## 2020-01-01 RX ADMIN — APIXABAN 5 MG: 5 TABLET, FILM COATED ORAL at 08:53

## 2020-01-01 RX ADMIN — PREDNISOLONE ACETATE 1 DROP: 10 SUSPENSION/ DROPS OPHTHALMIC at 21:48

## 2020-01-01 RX ADMIN — PREDNISOLONE ACETATE 1 DROP: 10 SUSPENSION/ DROPS OPHTHALMIC at 21:10

## 2020-01-01 RX ADMIN — SODIUM CHLORIDE 8 MG/HR: 900 INJECTION INTRAVENOUS at 12:04

## 2020-01-01 RX ADMIN — PYRIDOXINE HCL TAB 50 MG 200 MG: 50 TAB at 10:17

## 2020-01-01 RX ADMIN — PREDNISOLONE ACETATE 1 DROP: 10 SUSPENSION/ DROPS OPHTHALMIC at 08:06

## 2020-01-01 RX ADMIN — SULFAMETHOXAZOLE AND TRIMETHOPRIM 160 MG: 800; 160 TABLET ORAL at 10:38

## 2020-01-01 RX ADMIN — Medication 400 MCG: at 10:00

## 2020-01-01 RX ADMIN — CEFDINIR 300 MG: 300 CAPSULE ORAL at 08:28

## 2020-01-01 RX ADMIN — THIAMINE HCL TAB 100 MG 100 MG: 100 TAB at 08:46

## 2020-01-01 RX ADMIN — THIAMINE HCL TAB 100 MG 100 MG: 100 TAB at 08:14

## 2020-01-01 RX ADMIN — PREDNISOLONE ACETATE 1 DROP: 10 SUSPENSION/ DROPS OPHTHALMIC at 20:50

## 2020-01-01 RX ADMIN — POTASSIUM CHLORIDE 10 MEQ: 750 CAPSULE, EXTENDED RELEASE ORAL at 17:17

## 2020-01-01 RX ADMIN — PREDNISOLONE ACETATE 1 DROP: 10 SUSPENSION/ DROPS OPHTHALMIC at 15:51

## 2020-01-01 RX ADMIN — ENOXAPARIN SODIUM 40 MG: 40 INJECTION SUBCUTANEOUS at 13:09

## 2020-01-01 RX ADMIN — POTASSIUM CHLORIDE 10 MEQ: 750 CAPSULE, EXTENDED RELEASE ORAL at 10:00

## 2020-01-01 RX ADMIN — ISOSORBIDE MONONITRATE 30 MG: 30 TABLET, EXTENDED RELEASE ORAL at 08:46

## 2020-01-01 RX ADMIN — PREDNISOLONE ACETATE 1 DROP: 10 SUSPENSION/ DROPS OPHTHALMIC at 21:40

## 2020-01-01 RX ADMIN — OXYCODONE HYDROCHLORIDE AND ACETAMINOPHEN 500 MG: 500 TABLET ORAL at 08:44

## 2020-01-01 RX ADMIN — OXYCODONE HYDROCHLORIDE AND ACETAMINOPHEN 500 MG: 500 TABLET ORAL at 10:17

## 2020-01-01 RX ADMIN — PREDNISOLONE ACETATE 1 DROP: 10 SUSPENSION/ DROPS OPHTHALMIC at 05:57

## 2020-01-01 RX ADMIN — LEVOFLOXACIN 500 MG: 500 TABLET, FILM COATED ORAL at 09:18

## 2020-01-01 RX ADMIN — IRON SUCROSE 200 MG: 20 INJECTION, SOLUTION INTRAVENOUS at 17:21

## 2020-01-01 RX ADMIN — PREDNISOLONE ACETATE 1 DROP: 10 SUSPENSION/ DROPS OPHTHALMIC at 13:31

## 2020-01-01 RX ADMIN — CHOLECALCIFEROL (VITAMIN D3) 25 MCG (1,000 UNIT) TABLET 1000 UNITS: TABLET at 10:17

## 2020-01-01 RX ADMIN — LANSOPRAZOLE 30 MG: KIT at 09:21

## 2020-01-01 RX ADMIN — PREDNISOLONE ACETATE 1 DROP: 10 SUSPENSION/ DROPS OPHTHALMIC at 09:55

## 2020-01-01 RX ADMIN — PREDNISOLONE ACETATE 1 DROP: 10 SUSPENSION/ DROPS OPHTHALMIC at 10:40

## 2020-01-01 RX ADMIN — PYRIDOXINE HCL TAB 50 MG 200 MG: 50 TAB at 10:30

## 2020-01-01 RX ADMIN — PREDNISOLONE ACETATE 1 DROP: 10 SUSPENSION/ DROPS OPHTHALMIC at 09:48

## 2020-01-01 RX ADMIN — ACETAMINOPHEN 650 MG: 650 SUPPOSITORY RECTAL at 05:20

## 2020-01-01 RX ADMIN — CEFTRIAXONE SODIUM 1 G: 1 INJECTION, POWDER, FOR SOLUTION INTRAMUSCULAR; INTRAVENOUS at 16:01

## 2020-01-01 RX ADMIN — PREDNISOLONE ACETATE 1 DROP: 10 SUSPENSION/ DROPS OPHTHALMIC at 14:13

## 2020-01-01 RX ADMIN — OXYCODONE HYDROCHLORIDE AND ACETAMINOPHEN 500 MG: 500 TABLET ORAL at 09:34

## 2020-01-01 RX ADMIN — INSULIN LISPRO 2 UNITS: 100 INJECTION, SOLUTION INTRAVENOUS; SUBCUTANEOUS at 12:17

## 2020-01-01 RX ADMIN — SODIUM CHLORIDE, PRESERVATIVE FREE 10 ML: 5 INJECTION INTRAVENOUS at 01:32

## 2020-01-01 RX ADMIN — ISOSORBIDE DINITRATE 10 MG: 10 TABLET ORAL at 08:53

## 2020-01-01 RX ADMIN — APIXABAN 5 MG: 5 TABLET, FILM COATED ORAL at 10:17

## 2020-01-01 RX ADMIN — PANTOPRAZOLE SODIUM 40 MG: 40 TABLET, DELAYED RELEASE ORAL at 09:03

## 2020-01-01 RX ADMIN — SODIUM CHLORIDE, PRESERVATIVE FREE 10 ML: 5 INJECTION INTRAVENOUS at 10:00

## 2020-01-01 RX ADMIN — POTASSIUM CHLORIDE 10 MEQ: 750 CAPSULE, EXTENDED RELEASE ORAL at 09:32

## 2020-01-01 RX ADMIN — INSULIN LISPRO 3 UNITS: 100 INJECTION, SOLUTION INTRAVENOUS; SUBCUTANEOUS at 09:21

## 2020-01-01 RX ADMIN — PREDNISOLONE ACETATE 1 DROP: 10 SUSPENSION/ DROPS OPHTHALMIC at 06:05

## 2020-01-01 RX ADMIN — ACETAMINOPHEN 325 MG: 325 TABLET, FILM COATED ORAL at 14:24

## 2020-01-01 RX ADMIN — SULFAMETHOXAZOLE AND TRIMETHOPRIM 160 MG: 800; 160 TABLET ORAL at 08:44

## 2020-01-01 RX ADMIN — AMLODIPINE BESYLATE 5 MG: 5 TABLET ORAL at 08:41

## 2020-01-01 RX ADMIN — LANSOPRAZOLE 30 MG: KIT at 05:41

## 2020-01-01 RX ADMIN — IRON SUCROSE 200 MG: 20 INJECTION, SOLUTION INTRAVENOUS at 12:15

## 2020-01-01 RX ADMIN — CHOLECALCIFEROL (VITAMIN D3) 25 MCG (1,000 UNIT) TABLET 1000 UNITS: TABLET at 10:38

## 2020-01-01 RX ADMIN — PREDNISOLONE ACETATE 1 DROP: 10 SUSPENSION/ DROPS OPHTHALMIC at 20:17

## 2020-01-01 RX ADMIN — FENTANYL CITRATE 100 MCG: 50 INJECTION, SOLUTION INTRAMUSCULAR; INTRAVENOUS at 16:15

## 2020-01-01 RX ADMIN — PREDNISOLONE ACETATE 1 DROP: 10 SUSPENSION/ DROPS OPHTHALMIC at 11:36

## 2020-01-01 RX ADMIN — DEXTROSE AND SODIUM CHLORIDE 100 ML/HR: 5; 900 INJECTION, SOLUTION INTRAVENOUS at 23:11

## 2020-01-01 RX ADMIN — PREDNISOLONE ACETATE 1 DROP: 10 SUSPENSION/ DROPS OPHTHALMIC at 10:01

## 2020-01-01 RX ADMIN — SODIUM CHLORIDE 8 MG/HR: 900 INJECTION INTRAVENOUS at 03:07

## 2020-01-01 RX ADMIN — APIXABAN 5 MG: 5 TABLET, FILM COATED ORAL at 20:53

## 2020-01-01 RX ADMIN — INSULIN LISPRO 2 UNITS: 100 INJECTION, SOLUTION INTRAVENOUS; SUBCUTANEOUS at 17:17

## 2020-01-01 RX ADMIN — SODIUM CHLORIDE 75 ML/HR: 4.5 INJECTION, SOLUTION INTRAVENOUS at 12:20

## 2020-01-01 RX ADMIN — PREDNISOLONE ACETATE 1 DROP: 10 SUSPENSION/ DROPS OPHTHALMIC at 10:31

## 2020-01-01 RX ADMIN — PREDNISOLONE ACETATE 1 DROP: 10 SUSPENSION/ DROPS OPHTHALMIC at 19:56

## 2020-01-01 RX ADMIN — PRAVASTATIN SODIUM 40 MG: 20 TABLET ORAL at 20:51

## 2020-01-01 RX ADMIN — PRAVASTATIN SODIUM 40 MG: 20 TABLET ORAL at 19:55

## 2020-01-01 RX ADMIN — METOPROLOL SUCCINATE 25 MG: 25 TABLET, EXTENDED RELEASE ORAL at 09:18

## 2020-01-01 RX ADMIN — PRAVASTATIN SODIUM 40 MG: 20 TABLET ORAL at 20:21

## 2020-01-01 RX ADMIN — LOSARTAN POTASSIUM 50 MG: 50 TABLET, FILM COATED ORAL at 10:30

## 2020-01-01 RX ADMIN — APIXABAN 5 MG: 5 TABLET, FILM COATED ORAL at 19:55

## 2020-01-01 RX ADMIN — LOSARTAN POTASSIUM 50 MG: 50 TABLET, FILM COATED ORAL at 08:41

## 2020-01-01 RX ADMIN — LANSOPRAZOLE 30 MG: KIT at 06:50

## 2020-01-01 RX ADMIN — PANTOPRAZOLE SODIUM 40 MG: 40 TABLET, DELAYED RELEASE ORAL at 20:24

## 2020-01-01 RX ADMIN — METOPROLOL SUCCINATE 25 MG: 25 TABLET, EXTENDED RELEASE ORAL at 10:44

## 2020-01-01 RX ADMIN — PRAVASTATIN SODIUM 40 MG: 20 TABLET ORAL at 20:28

## 2020-01-01 RX ADMIN — NITROGLYCERIN 0.4 MG: 0.4 TABLET SUBLINGUAL at 16:00

## 2020-01-01 RX ADMIN — FUROSEMIDE 20 MG: 20 TABLET ORAL at 13:23

## 2020-01-01 RX ADMIN — MEROPENEM 1 G: 1 INJECTION, POWDER, FOR SOLUTION INTRAVENOUS at 16:40

## 2020-01-01 RX ADMIN — DEXTROSE MONOHYDRATE 50 ML: 500 INJECTION PARENTERAL at 06:19

## 2020-01-01 RX ADMIN — PANTOPRAZOLE SODIUM 40 MG: 40 TABLET, DELAYED RELEASE ORAL at 17:11

## 2020-01-01 RX ADMIN — AMLODIPINE BESYLATE 10 MG: 10 TABLET ORAL at 08:52

## 2020-01-01 RX ADMIN — QUETIAPINE FUMARATE 25 MG: 25 TABLET ORAL at 21:47

## 2020-01-01 RX ADMIN — POTASSIUM CHLORIDE 10 MEQ: 750 CAPSULE, EXTENDED RELEASE ORAL at 17:54

## 2020-01-01 RX ADMIN — PYRIDOXINE HCL TAB 50 MG 200 MG: 50 TAB at 08:29

## 2020-01-01 RX ADMIN — OXYCODONE HYDROCHLORIDE AND ACETAMINOPHEN 500 MG: 500 TABLET ORAL at 08:54

## 2020-01-01 RX ADMIN — POTASSIUM CHLORIDE 10 MEQ: 750 CAPSULE, EXTENDED RELEASE ORAL at 17:06

## 2020-01-01 RX ADMIN — OXYCODONE HYDROCHLORIDE AND ACETAMINOPHEN 500 MG: 500 TABLET ORAL at 08:31

## 2020-01-01 RX ADMIN — SODIUM CHLORIDE 8 MG/HR: 900 INJECTION INTRAVENOUS at 01:58

## 2020-01-01 RX ADMIN — POTASSIUM CHLORIDE 10 MEQ: 750 CAPSULE, EXTENDED RELEASE ORAL at 17:21

## 2020-01-01 RX ADMIN — METFORMIN HYDROCHLORIDE 500 MG: 500 TABLET ORAL at 10:44

## 2020-01-01 RX ADMIN — ASPIRIN 81 MG: 81 TABLET, CHEWABLE ORAL at 08:16

## 2020-01-01 RX ADMIN — LANSOPRAZOLE 30 MG: KIT at 06:01

## 2020-01-01 RX ADMIN — CEFDINIR 300 MG: 300 CAPSULE ORAL at 21:06

## 2020-01-01 RX ADMIN — THIAMINE HCL TAB 100 MG 100 MG: 100 TAB at 09:49

## 2020-01-01 RX ADMIN — PREDNISOLONE ACETATE 1 DROP: 10 SUSPENSION/ DROPS OPHTHALMIC at 21:02

## 2020-01-01 RX ADMIN — NEOMYCIN SULFATE, POLYMYXIN B SULFATE AND HYDROCORTISONE 4 DROP: 10; 3.5; 1 SUSPENSION/ DROPS AURICULAR (OTIC) at 20:59

## 2020-01-01 RX ADMIN — PREDNISOLONE ACETATE 1 DROP: 10 SUSPENSION/ DROPS OPHTHALMIC at 20:40

## 2020-01-01 RX ADMIN — SODIUM CHLORIDE, PRESERVATIVE FREE 10 ML: 5 INJECTION INTRAVENOUS at 21:11

## 2020-01-01 RX ADMIN — INSULIN LISPRO 2 UNITS: 100 INJECTION, SOLUTION INTRAVENOUS; SUBCUTANEOUS at 11:43

## 2020-01-01 RX ADMIN — CEFTRIAXONE SODIUM 1 G: 1 INJECTION, POWDER, FOR SOLUTION INTRAMUSCULAR; INTRAVENOUS at 15:46

## 2020-01-01 RX ADMIN — ENOXAPARIN SODIUM 80 MG: 80 INJECTION SUBCUTANEOUS at 17:11

## 2020-01-01 RX ADMIN — PANTOPRAZOLE SODIUM 40 MG: 40 TABLET, DELAYED RELEASE ORAL at 08:46

## 2020-01-01 RX ADMIN — AMLODIPINE BESYLATE 10 MG: 10 TABLET ORAL at 08:28

## 2020-01-01 RX ADMIN — NEOMYCIN SULFATE, POLYMYXIN B SULFATE AND HYDROCORTISONE 4 DROP: 10; 3.5; 1 SUSPENSION/ DROPS AURICULAR (OTIC) at 15:35

## 2020-01-01 RX ADMIN — CHOLECALCIFEROL (VITAMIN D3) 25 MCG (1,000 UNIT) TABLET 1000 UNITS: TABLET at 09:48

## 2020-01-01 RX ADMIN — ISOSORBIDE MONONITRATE 30 MG: 30 TABLET ORAL at 08:28

## 2020-01-01 RX ADMIN — THIAMINE HCL TAB 100 MG 100 MG: 100 TAB at 08:17

## 2020-01-01 RX ADMIN — FINASTERIDE 5 MG: 5 TABLET, FILM COATED ORAL at 09:16

## 2020-01-01 RX ADMIN — PREDNISOLONE ACETATE 1 DROP: 10 SUSPENSION/ DROPS OPHTHALMIC at 17:13

## 2020-01-01 RX ADMIN — PREDNISOLONE ACETATE 1 DROP: 10 SUSPENSION/ DROPS OPHTHALMIC at 12:24

## 2020-01-01 RX ADMIN — IRON SUCROSE 200 MG: 20 INJECTION, SOLUTION INTRAVENOUS at 17:12

## 2020-01-01 RX ADMIN — NEOMYCIN SULFATE, POLYMYXIN B SULFATE AND HYDROCORTISONE 4 DROP: 10; 3.5; 1 SUSPENSION/ DROPS AURICULAR (OTIC) at 13:19

## 2020-01-01 RX ADMIN — ALLOPURINOL 300 MG: 300 TABLET ORAL at 08:28

## 2020-01-01 RX ADMIN — POTASSIUM CHLORIDE 10 MEQ: 750 CAPSULE, EXTENDED RELEASE ORAL at 08:17

## 2020-01-01 RX ADMIN — FUROSEMIDE 20 MG: 20 TABLET ORAL at 10:44

## 2020-01-01 RX ADMIN — Medication 400 MCG: at 09:16

## 2020-01-01 RX ADMIN — MEROPENEM 1 G: 1 INJECTION, POWDER, FOR SOLUTION INTRAVENOUS at 02:54

## 2020-01-01 RX ADMIN — CHOLECALCIFEROL (VITAMIN D3) 25 MCG (1,000 UNIT) TABLET 1000 UNITS: TABLET at 08:52

## 2020-01-01 RX ADMIN — THIAMINE HCL TAB 100 MG 100 MG: 100 TAB at 09:54

## 2020-01-01 RX ADMIN — DEXTROSE MONOHYDRATE 100 ML/HR: 100 INJECTION, SOLUTION INTRAVENOUS at 01:28

## 2020-01-01 RX ADMIN — PREDNISOLONE ACETATE 1 DROP: 10 SUSPENSION/ DROPS OPHTHALMIC at 17:34

## 2020-01-01 RX ADMIN — INSULIN LISPRO 2 UNITS: 100 INJECTION, SOLUTION INTRAVENOUS; SUBCUTANEOUS at 17:54

## 2020-01-01 RX ADMIN — PREDNISOLONE ACETATE 1 DROP: 10 SUSPENSION/ DROPS OPHTHALMIC at 12:06

## 2020-01-01 RX ADMIN — CHOLECALCIFEROL (VITAMIN D3) 25 MCG (1,000 UNIT) TABLET 1000 UNITS: TABLET at 08:17

## 2020-01-01 RX ADMIN — INSULIN LISPRO 2 UNITS: 100 INJECTION, SOLUTION INTRAVENOUS; SUBCUTANEOUS at 17:10

## 2020-01-01 RX ADMIN — INSULIN LISPRO 2 UNITS: 100 INJECTION, SOLUTION INTRAVENOUS; SUBCUTANEOUS at 17:20

## 2020-01-01 RX ADMIN — ISOSORBIDE MONONITRATE 30 MG: 30 TABLET ORAL at 09:33

## 2020-01-01 RX ADMIN — SODIUM CHLORIDE 8 MG/HR: 900 INJECTION INTRAVENOUS at 18:10

## 2020-01-01 RX ADMIN — OXYCODONE HYDROCHLORIDE AND ACETAMINOPHEN 500 MG: 500 TABLET ORAL at 10:30

## 2020-01-01 RX ADMIN — ENOXAPARIN SODIUM 100 MG: 100 INJECTION SUBCUTANEOUS at 22:46

## 2020-01-01 RX ADMIN — NEOMYCIN SULFATE, POLYMYXIN B SULFATE AND HYDROCORTISONE 4 DROP: 10; 3.5; 1 SUSPENSION/ DROPS AURICULAR (OTIC) at 09:20

## 2020-01-01 RX ADMIN — PREDNISOLONE ACETATE 1 DROP: 10 SUSPENSION/ DROPS OPHTHALMIC at 21:00

## 2020-01-01 RX ADMIN — LOSARTAN POTASSIUM 100 MG: 50 TABLET, FILM COATED ORAL at 08:28

## 2020-01-01 RX ADMIN — CEFAZOLIN 2 G: 330 INJECTION, POWDER, FOR SOLUTION INTRAMUSCULAR; INTRAVENOUS at 16:45

## 2020-01-01 RX ADMIN — OXYCODONE HYDROCHLORIDE AND ACETAMINOPHEN 500 MG: 500 TABLET ORAL at 09:16

## 2020-01-01 RX ADMIN — TAMSULOSIN HYDROCHLORIDE 0.8 MG: 0.4 CAPSULE ORAL at 08:53

## 2020-01-01 RX ADMIN — CEFDINIR 300 MG: 300 CAPSULE ORAL at 19:44

## 2020-01-01 RX ADMIN — SODIUM CHLORIDE, PRESERVATIVE FREE 10 ML: 5 INJECTION INTRAVENOUS at 10:23

## 2020-01-01 RX ADMIN — PROPOFOL 100 MG: 10 INJECTION, EMULSION INTRAVENOUS at 16:15

## 2020-01-01 RX ADMIN — PANTOPRAZOLE SODIUM 40 MG: 40 TABLET, DELAYED RELEASE ORAL at 10:45

## 2020-01-01 RX ADMIN — ISOSORBIDE MONONITRATE 30 MG: 30 TABLET ORAL at 08:52

## 2020-01-01 RX ADMIN — INSULIN LISPRO 2 UNITS: 100 INJECTION, SOLUTION INTRAVENOUS; SUBCUTANEOUS at 16:59

## 2020-01-01 RX ADMIN — HYDRALAZINE HYDROCHLORIDE 10 MG: 10 TABLET ORAL at 11:13

## 2020-01-01 RX ADMIN — PREDNISOLONE ACETATE 1 DROP: 10 SUSPENSION/ DROPS OPHTHALMIC at 16:12

## 2020-01-01 RX ADMIN — LANSOPRAZOLE 30 MG: KIT at 06:11

## 2020-01-01 RX ADMIN — LANSOPRAZOLE 30 MG: KIT at 06:33

## 2020-01-01 RX ADMIN — PREDNISOLONE ACETATE 1 DROP: 10 SUSPENSION/ DROPS OPHTHALMIC at 12:25

## 2020-01-01 RX ADMIN — PREDNISOLONE ACETATE 1 DROP: 10 SUSPENSION/ DROPS OPHTHALMIC at 09:45

## 2020-01-01 RX ADMIN — POTASSIUM CHLORIDE: 2 INJECTION, SOLUTION, CONCENTRATE INTRAVENOUS at 17:20

## 2020-01-01 RX ADMIN — INSULIN LISPRO 2 UNITS: 100 INJECTION, SOLUTION INTRAVENOUS; SUBCUTANEOUS at 17:44

## 2020-01-01 RX ADMIN — INSULIN LISPRO 2 UNITS: 100 INJECTION, SOLUTION INTRAVENOUS; SUBCUTANEOUS at 09:48

## 2020-01-01 RX ADMIN — INSULIN LISPRO 2 UNITS: 100 INJECTION, SOLUTION INTRAVENOUS; SUBCUTANEOUS at 12:06

## 2020-01-01 RX ADMIN — PREDNISOLONE ACETATE 1 DROP: 10 SUSPENSION/ DROPS OPHTHALMIC at 06:11

## 2020-01-01 RX ADMIN — NEOMYCIN SULFATE, POLYMYXIN B SULFATE AND HYDROCORTISONE 4 DROP: 10; 3.5; 1 SUSPENSION/ DROPS AURICULAR (OTIC) at 08:55

## 2020-01-01 RX ADMIN — MORPHINE SULFATE 1 MG: 2 INJECTION, SOLUTION INTRAMUSCULAR; INTRAVENOUS at 16:03

## 2020-01-01 RX ADMIN — PREDNISOLONE ACETATE 1 DROP: 10 SUSPENSION/ DROPS OPHTHALMIC at 09:34

## 2020-01-01 RX ADMIN — PRAVASTATIN SODIUM 40 MG: 20 TABLET ORAL at 21:46

## 2020-01-01 RX ADMIN — LANSOPRAZOLE 30 MG: KIT at 17:17

## 2020-01-01 RX ADMIN — PREDNISOLONE ACETATE 1 DROP: 10 SUSPENSION/ DROPS OPHTHALMIC at 11:54

## 2020-01-01 RX ADMIN — Medication 500 MG: at 10:17

## 2020-01-01 RX ADMIN — SODIUM CHLORIDE 1983 ML: 9 INJECTION, SOLUTION INTRAVENOUS at 21:34

## 2020-01-01 RX ADMIN — SODIUM CHLORIDE, PRESERVATIVE FREE 10 ML: 5 INJECTION INTRAVENOUS at 12:20

## 2020-01-01 RX ADMIN — PREDNISOLONE ACETATE 1 DROP: 10 SUSPENSION/ DROPS OPHTHALMIC at 14:42

## 2020-01-01 RX ADMIN — ISOSORBIDE MONONITRATE 30 MG: 30 TABLET ORAL at 12:01

## 2020-01-01 RX ADMIN — LIDOCAINE HYDROCHLORIDE 60 MG: 20 INJECTION, SOLUTION INTRAVENOUS at 16:15

## 2020-01-01 RX ADMIN — PREDNISOLONE ACETATE 1 DROP: 10 SUSPENSION/ DROPS OPHTHALMIC at 13:53

## 2020-01-01 RX ADMIN — ISOSORBIDE MONONITRATE 30 MG: 30 TABLET, EXTENDED RELEASE ORAL at 08:32

## 2020-01-01 RX ADMIN — THIAMINE HCL TAB 100 MG 100 MG: 100 TAB at 08:53

## 2020-01-01 RX ADMIN — METOPROLOL SUCCINATE 25 MG: 25 TABLET, EXTENDED RELEASE ORAL at 21:48

## 2020-01-01 RX ADMIN — AMLODIPINE BESYLATE 5 MG: 5 TABLET ORAL at 10:44

## 2020-01-01 RX ADMIN — THIAMINE HCL TAB 100 MG 100 MG: 100 TAB at 10:43

## 2020-01-01 RX ADMIN — IRON SUCROSE 200 MG: 20 INJECTION, SOLUTION INTRAVENOUS at 15:54

## 2020-01-01 RX ADMIN — FINASTERIDE 5 MG: 5 TABLET, FILM COATED ORAL at 10:17

## 2020-01-01 RX ADMIN — ROPINIROLE HYDROCHLORIDE 1 MG: 1 TABLET, FILM COATED ORAL at 19:55

## 2020-01-01 RX ADMIN — CHOLECALCIFEROL (VITAMIN D3) 25 MCG (1,000 UNIT) TABLET 1000 UNITS: TABLET at 10:30

## 2020-01-01 RX ADMIN — SODIUM CHLORIDE 500 ML: 0.9 INJECTION, SOLUTION INTRAVENOUS at 11:36

## 2020-01-01 RX ADMIN — ISOSORBIDE MONONITRATE 30 MG: 30 TABLET, EXTENDED RELEASE ORAL at 09:54

## 2020-01-01 RX ADMIN — DEXTROSE MONOHYDRATE 75 ML/HR: 50 INJECTION, SOLUTION INTRAVENOUS at 07:08

## 2020-01-01 RX ADMIN — POTASSIUM CHLORIDE 10 MEQ: 750 CAPSULE, EXTENDED RELEASE ORAL at 08:44

## 2020-01-01 RX ADMIN — OXYCODONE HYDROCHLORIDE AND ACETAMINOPHEN 500 MG: 500 TABLET ORAL at 09:18

## 2020-01-01 RX ADMIN — PYRIDOXINE HCL TAB 50 MG 200 MG: 50 TAB at 08:51

## 2020-01-01 RX ADMIN — OXYCODONE HYDROCHLORIDE AND ACETAMINOPHEN 500 MG: 500 TABLET ORAL at 10:00

## 2020-01-01 RX ADMIN — LANSOPRAZOLE 30 MG: KIT at 05:58

## 2020-01-01 RX ADMIN — PANTOPRAZOLE SODIUM 40 MG: 40 TABLET, DELAYED RELEASE ORAL at 12:00

## 2020-01-01 RX ADMIN — ASPIRIN 81 MG: 81 TABLET, CHEWABLE ORAL at 12:01

## 2020-01-01 RX ADMIN — POTASSIUM CHLORIDE 10 MEQ: 750 CAPSULE, EXTENDED RELEASE ORAL at 08:15

## 2020-01-01 RX ADMIN — NEOMYCIN SULFATE, POLYMYXIN B SULFATE AND HYDROCORTISONE 4 DROP: 10; 3.5; 1 SUSPENSION/ DROPS AURICULAR (OTIC) at 17:17

## 2020-01-01 RX ADMIN — TAMSULOSIN HYDROCHLORIDE 0.4 MG: 0.4 CAPSULE ORAL at 10:44

## 2020-01-01 RX ADMIN — INSULIN LISPRO 2 UNITS: 100 INJECTION, SOLUTION INTRAVENOUS; SUBCUTANEOUS at 10:30

## 2020-01-01 RX ADMIN — POTASSIUM CHLORIDE: 2 INJECTION, SOLUTION, CONCENTRATE INTRAVENOUS at 04:00

## 2020-01-01 RX ADMIN — DEXTROSE MONOHYDRATE 50 ML: 25 INJECTION, SOLUTION INTRAVENOUS at 21:29

## 2020-01-01 RX ADMIN — ENOXAPARIN SODIUM 40 MG: 40 INJECTION SUBCUTANEOUS at 13:07

## 2020-01-01 RX ADMIN — PRAVASTATIN SODIUM 40 MG: 20 TABLET ORAL at 21:39

## 2020-01-01 RX ADMIN — PREDNISOLONE ACETATE 1 DROP: 10 SUSPENSION/ DROPS OPHTHALMIC at 06:01

## 2020-01-01 RX ADMIN — IRON SUCROSE 200 MG: 20 INJECTION, SOLUTION INTRAVENOUS at 21:53

## 2020-01-01 RX ADMIN — LANSOPRAZOLE 30 MG: KIT at 05:04

## 2020-01-01 RX ADMIN — DOCUSATE SODIUM 50 MG AND SENNOSIDES 8.6 MG 1 TABLET: 8.6; 5 TABLET, FILM COATED ORAL at 10:33

## 2020-01-01 RX ADMIN — ISOSORBIDE MONONITRATE 30 MG: 30 TABLET ORAL at 09:16

## 2020-01-01 RX ADMIN — ISOSORBIDE MONONITRATE 30 MG: 30 TABLET ORAL at 08:43

## 2020-01-01 RX ADMIN — INSULIN LISPRO 2 UNITS: 100 INJECTION, SOLUTION INTRAVENOUS; SUBCUTANEOUS at 17:08

## 2020-01-01 RX ADMIN — PREDNISOLONE ACETATE 1 DROP: 10 SUSPENSION/ DROPS OPHTHALMIC at 16:02

## 2020-01-01 RX ADMIN — Medication 500 MG: at 08:53

## 2020-01-01 RX ADMIN — NEOMYCIN SULFATE, POLYMYXIN B SULFATE AND HYDROCORTISONE 4 DROP: 10; 3.5; 1 SUSPENSION/ DROPS AURICULAR (OTIC) at 09:55

## 2020-01-01 RX ADMIN — PREDNISOLONE ACETATE 1 DROP: 10 SUSPENSION/ DROPS OPHTHALMIC at 08:32

## 2020-01-01 RX ADMIN — PREDNISOLONE ACETATE 1 DROP: 10 SUSPENSION/ DROPS OPHTHALMIC at 06:35

## 2020-01-01 RX ADMIN — PREDNISOLONE ACETATE 1 DROP: 10 SUSPENSION/ DROPS OPHTHALMIC at 21:43

## 2020-01-01 RX ADMIN — NEOMYCIN SULFATE, POLYMYXIN B SULFATE AND HYDROCORTISONE 4 DROP: 10; 3.5; 1 SUSPENSION/ DROPS AURICULAR (OTIC) at 10:17

## 2020-01-01 RX ADMIN — PREDNISOLONE ACETATE 1 DROP: 10 SUSPENSION/ DROPS OPHTHALMIC at 13:00

## 2020-01-01 RX ADMIN — POTASSIUM CHLORIDE 10 MEQ: 750 CAPSULE, EXTENDED RELEASE ORAL at 17:44

## 2020-01-01 RX ADMIN — DEXTROSE MONOHYDRATE 50 ML: 500 INJECTION PARENTERAL at 18:25

## 2020-01-01 RX ADMIN — METOPROLOL SUCCINATE 25 MG: 25 TABLET, EXTENDED RELEASE ORAL at 08:45

## 2020-01-01 RX ADMIN — THIAMINE HCL TAB 100 MG 100 MG: 100 TAB at 12:23

## 2020-01-01 RX ADMIN — APIXABAN 5 MG: 5 TABLET, FILM COATED ORAL at 22:36

## 2020-01-01 RX ADMIN — LANSOPRAZOLE 30 MG: KIT at 06:16

## 2020-01-01 RX ADMIN — AMLODIPINE BESYLATE 10 MG: 10 TABLET ORAL at 08:42

## 2020-01-01 RX ADMIN — SODIUM CHLORIDE 50 ML/HR: 9 INJECTION, SOLUTION INTRAVENOUS at 13:42

## 2020-01-01 RX ADMIN — THIAMINE HCL TAB 100 MG 100 MG: 100 TAB at 08:11

## 2020-01-01 RX ADMIN — LOSARTAN POTASSIUM 100 MG: 50 TABLET, FILM COATED ORAL at 08:51

## 2020-01-01 RX ADMIN — TERAZOSIN HYDROCHLORIDE 1 MG: 1 CAPSULE ORAL at 21:39

## 2020-01-01 RX ADMIN — Medication 400 MCG: at 10:16

## 2020-01-01 RX ADMIN — PREDNISOLONE ACETATE 1 DROP: 10 SUSPENSION/ DROPS OPHTHALMIC at 22:36

## 2020-01-01 RX ADMIN — PRAVASTATIN SODIUM 40 MG: 20 TABLET ORAL at 20:40

## 2020-01-01 RX ADMIN — ASPIRIN 81 MG: 81 TABLET, CHEWABLE ORAL at 08:41

## 2020-01-01 RX ADMIN — ASPIRIN 81 MG: 81 TABLET, CHEWABLE ORAL at 08:14

## 2020-01-01 RX ADMIN — ALLOPURINOL 300 MG: 300 TABLET ORAL at 10:00

## 2020-01-01 RX ADMIN — METOPROLOL SUCCINATE 25 MG: 25 TABLET, EXTENDED RELEASE ORAL at 08:11

## 2020-01-01 RX ADMIN — NEOMYCIN SULFATE, POLYMYXIN B SULFATE AND HYDROCORTISONE 4 DROP: 10; 3.5; 1 SUSPENSION/ DROPS AURICULAR (OTIC) at 21:42

## 2020-01-01 RX ADMIN — ISOSORBIDE MONONITRATE 30 MG: 30 TABLET, EXTENDED RELEASE ORAL at 09:19

## 2020-01-01 RX ADMIN — Medication 400 MCG: at 12:01

## 2020-01-01 RX ADMIN — AMLODIPINE BESYLATE 5 MG: 5 TABLET ORAL at 16:01

## 2020-01-01 RX ADMIN — NEOMYCIN SULFATE, POLYMYXIN B SULFATE AND HYDROCORTISONE 4 DROP: 10; 3.5; 1 SUSPENSION/ DROPS AURICULAR (OTIC) at 17:28

## 2020-01-01 RX ADMIN — SODIUM CHLORIDE, PRESERVATIVE FREE 10 ML: 5 INJECTION INTRAVENOUS at 20:59

## 2020-01-01 RX ADMIN — AMLODIPINE BESYLATE 10 MG: 10 TABLET ORAL at 10:30

## 2020-01-01 RX ADMIN — PREDNISOLONE ACETATE 1 DROP: 10 SUSPENSION/ DROPS OPHTHALMIC at 14:00

## 2020-01-01 RX ADMIN — NEOMYCIN SULFATE, POLYMYXIN B SULFATE AND HYDROCORTISONE 4 DROP: 10; 3.5; 1 SUSPENSION/ DROPS AURICULAR (OTIC) at 22:35

## 2020-01-01 RX ADMIN — METFORMIN HYDROCHLORIDE 500 MG: 500 TABLET ORAL at 09:19

## 2020-01-01 RX ADMIN — PREDNISOLONE ACETATE 1 DROP: 10 SUSPENSION/ DROPS OPHTHALMIC at 20:38

## 2020-01-03 ENCOUNTER — HOSPITAL ENCOUNTER (OUTPATIENT)
Dept: INFUSION THERAPY | Age: 85
End: 2020-01-03
Payer: COMMERCIAL

## 2020-05-13 NOTE — ED PROVIDER NOTES
Subjective   History of Present Illness    Patient is an 85-year-old male presenting to ED after a fall.  Patient reported around 3 PM he was walking across some very flat even concrete when he fell forward.  Patient denies any preceding symptoms including chest pain, dizziness, lightheadedness, weakness, or syncope.  Patient reported that he tried to catch himself with a right outstretched hand as his left hand was holding onto his cane.  Patient reported that the lateral side of his right pinky hit the ground at the same time as the area above his right eyebrow and then he landed directly on his chest and right upper quadrant of his abdomen.  Patient reported at that time he noted a laceration above his right eyebrow however he had to get to work.  Patient reported that while at work he developed some soreness in his right hand which she cannot tell if that is from his fall or doing a lot of gardening work over the past few days.  Patient reported he is left-hand dominant.  Patient denies any problems with his neck, back, or bilateral lower extremities.  Patient reported as time went on he did notice soreness and pain to his right shoulder but he cannot ascertain if he fell landing on it.  Patient reported he takes a daily aspirin due to history of previous heart attacks as well as CABG but denies any other blood thinner use.  Patient reported he has pain around the laceration site but denies any other headaches, dizziness, feelings of off balance, confusion, or altered mental status.  Patient denies any loss of consciousness associated with his head injury today.    Patient reported he works part-time at the [employer] and lives at home with his wife, granddaughter, grandson in law, as well as other family members.  Patient reported he utilizes the cane at baseline for ambulation.    Patient denies use of cigarettes or any other tobacco products, alcohol, marijuana, or any other IV/recreational/illicit drugs.   Patient reported known allergies to penicillin as well as adhesive tape.    Records reviewed show patient was last seen by his primary care provider on 5/4/2020 at which time he was diagnosed with pain in right knee, pain in right hand, diabetes, hypertension, hypercholesterolemia, benign paroxysmal positional vertigo, moderate COPD.     Patient was last seen in the ER on 12/8/2019 at which time he was diagnosed with syncope and collapse, ALLIE, elevated troponin, and dyspnea.    Review of Systems   Constitutional: Negative.  Negative for chills, diaphoresis and fever.   HENT: Negative.  Negative for dental problem, nosebleeds and tinnitus.    Eyes: Negative.  Negative for visual disturbance (Denies decreased vision, blurry vision, or diplopia).   Respiratory: Negative.  Negative for cough and shortness of breath.    Cardiovascular: Positive for chest pain (Right lower lateral anterior chest wall).   Gastrointestinal: Positive for abdominal pain (RUQ). Negative for nausea and vomiting.   Genitourinary: Negative.  Negative for hematuria.   Musculoskeletal: Positive for arthralgias (Right medial hand, right shoulder). Negative for back pain, gait problem and neck pain.   Skin: Positive for wound (Laceration above right eyebrow).   Neurological: Negative for dizziness, syncope, weakness and light-headedness.        Reports + head injury  Denies LOC   Psychiatric/Behavioral: Negative.  Negative for confusion.   All other systems reviewed and are negative.      Past Medical History:   Diagnosis Date   • A-fib (CMS/[facility]) 11/15/2016   • Anemia     gets shots every few months to build up blood. sees dr adam.   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   • Arthritis    • Bradycardia 11/15/2016   • CAD in native artery 11/15/2016   • Chest pain 11/15/2016   • CHF (congestive heart failure) (CMS/[facility])    • Chronic kidney disease    • COPD (chronic obstructive pulmonary disease) (CMS/[facility])    • DDD (degenerative disc disease),  lumbar 6/27/2017   • Heart murmur    • HTN (hypertension) 11/15/2016   • Hyperlipidemia    • Lumbar stenosis with neurogenic claudication 6/27/2017   • Murmur 4/19/2019   • Myocardial infarction (CMS/[facility])    • Sleep apnea    • Stroke (CMS/HCC)    • Type 2 diabetes mellitus (CMS/HCC) 11/15/2016   • Ulcer of abdomen wall (CMS/[facility])        Allergies   Allergen Reactions   • Penicillins Swelling   • Adhesive Tape Rash       Past Surgical History:   Procedure Laterality Date   • APPENDECTOMY     • BACK SURGERY      x2   • CARDIAC CATHETERIZATION Left     1/2010   • CARPAL TUNNEL RELEASE Bilateral    • CHOLECYSTECTOMY     • COLONOSCOPY N/A 9/7/2017    Procedure: COLONOSCOPY WITH ANESTHESIA;  Surgeon: Joshua Rich DO;  Location: [facility] ENDOSCOPY;  Service:    • CORONARY ARTERY BYPASS GRAFT      2/2006 w/PTCA & KIMMY    • CORONARY STENT PLACEMENT     • ENDOSCOPY N/A 12/14/2016    Procedure: ESOPHAGOGASTRODUODENOSCOPY WITH ANESTHESIA;  Surgeon: Isabel Dexter MD;  Location: [facility] ENDOSCOPY;  Service:    • ENDOSCOPY N/A 12/16/2016    Procedure: ESOPHAGOGASTRODUODENOSCOPY WITH ANESTHESIA;  Surgeon: Joshua Rich DO;  Location: [facility] ENDOSCOPY;  Service:    • ENDOSCOPY N/A 4/10/2017    Procedure: ESOPHAGOGASTRODUODENOSCOPY WITH ANESTHESIA;  Surgeon: Joshua Rich DO;  Location: [facility] ENDOSCOPY;  Service:    • EYE SURGERY Left     x 2   • JOINT REPLACEMENT     • PERIPHERAL ARTERIAL STENT GRAFT     • REPLACEMENT TOTAL KNEE Left     2002   • SHOULDER ROTATOR CUFF REPAIR Bilateral        Family History   Problem Relation Age of Onset   • Coronary artery disease Father    • Heart disease Father    • Coronary artery disease Brother    • Heart attack Brother    • Cancer Mother    • No Known Problems Sister    • Heart disease Son    • Cancer Sister    • Cancer Sister        Social History     Socioeconomic History   • Marital status:      Spouse name: Not on file   • Number of children: Not on file   • Years of  education: Not on file   • Highest education level: Not on file   Tobacco Use   • Smoking status: Never Smoker   • Smokeless tobacco: Never Used   Substance and Sexual Activity   • Alcohol use: No   • Drug use: No   • Sexual activity: Never     Partners: Female           Objective   Physical Exam   Constitutional: He is oriented to person, place, and time. He appears well-developed and well-nourished. He is cooperative. He does not appear ill. No distress.   HENT:   Head: Normocephalic. Head is with laceration.       Right Ear: Tympanic membrane, external ear and ear canal normal. No hemotympanum.   Left Ear: Tympanic membrane, external ear and ear canal normal. No hemotympanum.   Nose: Nose normal. No septal deviation. No epistaxis. Right sinus exhibits no maxillary sinus tenderness and no frontal sinus tenderness. Left sinus exhibits no maxillary sinus tenderness and no frontal sinus tenderness.   Mouth/Throat: Uvula is midline, oropharynx is clear and moist and mucous membranes are normal. Normal dentition.   2 cm linear vertical laceration just superior to the right eyebrow above the middle of the right eyebrow.  Remainder of face and scalp atraumatic with no further lacerations, abrasions, contusions.    No dental injury/fractures, no jaw motion tenderness, no malocclusion.   Eyes: Pupils are equal, round, and reactive to light. Conjunctivae and EOM are normal. Right eye exhibits no discharge. Left eye exhibits no discharge. No scleral icterus.   Neck: Normal range of motion. Neck supple. No spinous process tenderness and no muscular tenderness present. Normal range of motion present.   Cardiovascular: Regular rhythm, intact distal pulses and normal pulses. Bradycardia present.   Murmur heard.  Pulses:       Radial pulses are 2+ on the right side, and 2+ on the left side.        Posterior tibial pulses are 2+ on the right side, and 2+ on the left side.   Pulmonary/Chest: Effort normal. No respiratory distress.  He has no decreased breath sounds. He has wheezes (faint, expiratory) in the right lower field and the left lower field. He exhibits tenderness. He exhibits no laceration and no crepitus.   Reproducible tenderness to palpitation of the right lower anterior chest wall with no evidence of injury including abrasions, lacerations, ecchymosis.        Abdominal: Soft. Bowel sounds are normal. He exhibits no distension. There is tenderness in the right upper quadrant. There is no CVA tenderness.       Very small area of ecchymosis noted to the right upper quadrant.  Reproducible tenderness diffusely to the right upper quadrant   Musculoskeletal:        Right shoulder: He exhibits tenderness. He exhibits normal range of motion, no laceration, normal pulse and normal strength.        Cervical back: Normal.        Thoracic back: Normal.        Lumbar back: Normal.        Hands:  Abrasion to the outer aspect on the side of the right hand just proximal to the fifth finger PIP joint.  Diffuse tenderness to the dorsal aspect of the right hand.  Right hand neurovascularly intact with cap refill less than 2 seconds, strength 5/5, and normal sensation examination.  Diffuse tenderness to the right shoulder with no evidence of skin injuries including abrasions, lacerations, bruising, or edema.  Full range of motion of right shoulder.  Normal inspection of region between right shoulder and right wrist.    Left upper extremity as well as bilateral lower extremities with normal inspection including no injuries, no joint swelling, no bony tenderness, and full range of motion.   Neurological: He is alert and oriented to person, place, and time. He has normal strength. No sensory deficit. Coordination and gait normal. GCS eye subscore is 4. GCS verbal subscore is 5. GCS motor subscore is 6.   Skin: Skin is warm and dry. Capillary refill takes less than 2 seconds. Abrasion (Right hand as described in MSK section), bruising (as described in  abdominal section) and laceration (Above right eyebrow as described in HEENT section) noted. No rash noted. He is not diaphoretic. No pallor.   Nursing note and vitals reviewed.      Laceration Repair  Date/Time: 5/13/2020 1:56 AM  Performed by: Patrick Mcduffie PA-C  Authorized by: Arpit Connolly MD     Consent:     Consent obtained:  Verbal    Consent given by:  Patient    Risks discussed:  Infection, need for additional repair, nerve damage, pain, poor cosmetic result, poor wound healing, retained foreign body, tendon damage and vascular damage    Alternatives discussed:  No treatment, delayed treatment, observation and referral  Anesthesia (see MAR for exact dosages):     Anesthesia method:  Local infiltration    Local anesthetic:  Lidocaine 1% w/o epi  Laceration details:     Location:  Face    Facial location: above right eyebrow.    Length (cm):  2  Repair type:     Repair type:  Simple  Pre-procedure details:     Preparation:  Patient was prepped and draped in usual sterile fashion and imaging obtained to evaluate for foreign bodies  Exploration:     Hemostasis achieved with:  Direct pressure    Wound exploration: wound explored through full range of motion and entire depth of wound probed and visualized      Wound extent: no underlying fracture noted    Treatment:     Area cleansed with:  Shur-Clens and saline    Amount of cleaning:  Extensive    Irrigation solution:  Sterile saline  Skin repair:     Repair method:  Sutures    Suture size:  5-0    Wound skin closure material used: ethilon.    Suture technique:  Simple interrupted    Number of sutures:  6  Approximation:     Approximation:  Close  Post-procedure details:     Dressing:  Antibiotic ointment and adhesive bandage    Patient tolerance of procedure:  Tolerated well, no immediate complications               ED Course  ED Course as of May 13 0233   Wed May 13, 2020   0028 Discussed case with Dr. Arpit Connolly who is in agreement with  treatment plan including imaging at this time.    [JS]   0140 Labs with evidence of elevated creatinine to 1.68 compared to 1.4 months ago.  Decreased bicarb to 21.  GFR decreased at 39.  Hyperglycemia at 136.  No evidence of leukocytosis or leukopenia.  CBC with evidence of decreased hemoglobin and hematocrit at 9.2/27 but increased compared to patient's baseline.  Platelets WNL at 198.    [JS]   0150 Upon reevaluation at this time patient is sleeping comfortably in bed and easily awoken.  Reviewed with patient lab and imaging findings and ability to repair laceration at this time.  Patient amenable with continued treatment plan with no further questions, concerns, or needs.    [JS]   0214 Upon completion of wound repair Dr. Villarreal at bedside to discuss with patient normal CT imaging.  Discussed with patient need to keep wound clean and dry for the next 24 hours and after that ability to gently wash over it with warm soapy water avoiding scrubbing too hard.  Discussed with patient need to follow-up with his primary care provider, any urgent care, or any emergency room within the next 5 days for wound reevaluation and suture removal.  Discussed with patient concussion precautions and importance of brain rest.  Discussed with patient ability to continue to use over-the-counter medications such as anti-inflammatories for pain control.  Cleaned abrasion on right hand and applied bacitracin as well as a bandage and discussed with patient similar wound cleaning.  Discussed with patient strict return precautions and need for immediate return to ED should he develop any new or worsening symptoms.  Patient without any further questions, concerns, or needs at this time and is stable for discharge.      [JS]      ED Course User Index  [JS] Patrick Mcduffie PA-C                                           MDM  Number of Diagnoses or Management Options  Contusion of abdominal wall, initial encounter:   Contusion of shoulder,  unspecified laterality, initial encounter:   Facial laceration, initial encounter:   Hiatal hernia:      Amount and/or Complexity of Data Reviewed  Clinical lab tests: ordered and reviewed  Tests in the radiology section of CPT®: ordered and reviewed  Decide to obtain previous medical records or to obtain history from someone other than the patient: yes  Review and summarize past medical records: yes  Discuss the patient with other providers: yes (Dr. Connolly (attending))  Independent visualization of images, tracings, or specimens: yes    Patient Progress  Patient progress: improved      Final diagnoses:   Facial laceration, initial encounter   Contusion of abdominal wall, initial encounter   Contusion of shoulder, unspecified laterality, initial encounter   Hiatal hernia            Patrick Mcduffie PA-C  05/13/20 0233

## 2020-05-13 NOTE — DISCHARGE INSTRUCTIONS
Eron,    Your sutures need to be removed in 5 days.     We noted on your lab work that your kidney function was slightly worse today than normal. Please see your family doctor or nephrologist for further evaluation of this.

## 2020-08-14 ENCOUNTER — HOSPITAL ENCOUNTER (EMERGENCY)
Age: 85
Discharge: HOME | End: 2020-08-14
Payer: COMMERCIAL

## 2020-08-14 VITALS
OXYGEN SATURATION: 100 % | WEIGHT: 195 LBS | HEART RATE: 53 BPM | HEIGHT: 70 IN | SYSTOLIC BLOOD PRESSURE: 220 MMHG | TEMPERATURE: 97.9 F | DIASTOLIC BLOOD PRESSURE: 71 MMHG | RESPIRATION RATE: 12 BRPM | BODY MASS INDEX: 27.92 KG/M2

## 2020-08-14 PROCEDURE — 99282 EMERGENCY DEPT VISIT SF MDM: CPT

## 2020-08-14 PROCEDURE — 99283 EMERGENCY DEPT VISIT LOW MDM: CPT

## 2020-08-14 ASSESSMENT — ENCOUNTER SYMPTOMS: SHORTNESS OF BREATH: 0

## 2020-08-14 NOTE — ED PROVIDER NOTES
[facility]  eMERGENCY dEPARTMENT eNCOUnter      Pt Name: Harlan Najjar  MRN: [id number]  [place] 1934  Date of evaluation: 8/14/2020  Provider: LARISA Ramírez    CHIEF COMPLAINT       Chief Complaint   Patient presents with    Hypertension     sent here from [address] [place] [address]   (Location/Symptom, Timing/Onset,Context/Setting, Quality, Duration, Modifying Factors, Severity)  Note limiting factors. Bryan Zee a 80 y.o. male who presents to the emergency department for evaluation of hypertension. Pt tells me that he has history of hypertension and that he didn't take any of his morning medications due to dental appointment. He relates that he didn't know if he should take his medication this morning so these were omitted by him. He denies headache, vision changes as well as lateralizing numbness/weakness. He denies chest pain as well as new or worsening shortness of breath. He does tells me that he has shortness of breath with walking at times relates that he has an appointment for outpatient pulmonary testing. He denies fevers as well as constitutional symptoms of illness. HPI    Nursing Notes were reviewed. REVIEW OF SYSTEMS    (2-9 systems for level 4, 10 or more for level 5)     Review of Systems   Constitutional: Negative. Respiratory: Negative for shortness of breath. Cardiovascular: Negative for chest pain. Neurological: Negative for weakness, numbness and headaches. A complete review of systems was performed and is negative except as noted above in the HPI.        PAST MEDICAL HISTORY     Past Medical History:   Diagnosis Date    Blood circulation, collateral     CAD (coronary artery disease)     STENTS X 3    CAD (coronary artery disease)     CABG    [facility]erebral artery occlusion [facility] infarction [facility]     CHF (congestive heart failure) (HCC)     Chronic kidney disease     DDD (degenerative disc disease), lumbar 7/11/2017  Diabetes mellitus [facility]     Disease of blood and blood forming organ     GERD (gastroesophageal reflux disease)     History of blood transfusion     Hx of blood clots     Right leg    Hyperlipidemia     Hypertension     Lumbar stenosis with neurogenic claudication 7/11/2017    MI, old     X 2    Other disorders of kidney and ureter in diseases classified elsewhere     Palliative care patient 03/06/2018    Right heart failure ([facility] 6/29/2018    Sleep apnea     DOESN'T USE MACHINE    TIA (transient ischemic attack)          SURGICAL HISTORY       Past Surgical History:   Procedure Laterality Date    APPENDECTOMY      BACK SURGERY  1980    x 3    CARDIAC SURGERY  1990    Bypass x 3    CARPAL TUNNEL RELEASE Bilateral 1980    wrist and elbows    CHOLECYSTECTOMY  2000    COLONOSCOPY      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 2    DILATATION, ESOPHAGUS      ENDOSCOPY, COLON, DIAGNOSTIC      EYE SURGERY      JOINT REPLACEMENT Left 1990    JOINT REPLACEMENT Right 1995    LAMINECTOMY N/A 7/11/2017    LUMBAR LAMINECTOMY POSTERIOR  L23 L34 performed by Sb Rothman MD at [address] Left [address]    ROTATOR CUFF REPAIR Right 2010    TUMOR REMOVAL Left 1960    foot    TURP  2000    VASCULAR SURGERY Left 7/15/2015 [facility] & [address]    Aortoiliofemoral a'gram with bilateral lower extremity runoff; select views 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; a'plasty of left anterior tibial artery and dorsalis pedis artery with 2.5x300 vascutrak balloon and then with 3x220 nati balloon; completion a'gram         CURRENT MEDICATIONS       Discharge Medication List as of 8/14/2020 11:01 AM      CONTINUE these medications which have NOT CHANGED    Details   amLODIPine (NORVASC) 10 MG tablet Take 1 tablet by mouth daily, Disp-30 tablet, R-0NO PRINT      isosorbide mononitrate (IMDUR) 60 MG extended release tablet Take 2 tablets by mouth daily, Disp-30 tablet, R-5Dose increaseNormal      mupirocin (BACTROBAN) 2 % ointment Apply topically 2 times daily Apply to wound BID, Topical, 2 TIMES DAILY Starting Tue 9/10/2019, Disp-1 Tube, R-3, Normal      docusate sodium (COLACE) 100 MG capsule Take 1 capsule by mouth 2 times daily, Disp-60 capsule, R-0Print      tamsulosin (FLOMAX) 0.4 MG capsule Take 2 capsules by mouth daily, Disp-30 capsule, R-3Normal      finasteride (PROSCAR) 5 MG tablet Take 1 tablet by mouth daily, Disp-30 tablet, R-3Normal      nitroGLYCERIN (NITROSTAT) 0.4 MG SL tablet up to max of 3 total doses. If no relief after 1 dose, call 911., Disp-25 tablet, R-3Normal      Multiple Vitamins-Minerals (EYE VITAMINS PO) Take by mouthHistorical Med      lidocaine (LIDODERM) 5 % Place 1 patch onto the skin daily 12 hours on, 12 hours off., Disp-30 patch, R-0Print      losartan (COZAAR) 50 MG tablet Take 50 mg by mouth 2 times dailyHistorical Med      vitamin D (CHOLECALCIFEROL) 1000 UNIT TABS tablet Take 1,000 Units by mouth dailyHistorical Med      rOPINIRole (REQUIP) 1 MG tablet Take 1 mg by mouth nightlyHistorical Med      gabapentin (NEURONTIN) 100 MG capsule Take 100 mg by mouth 2 times daily. Diane Urrutia Historical Med      glimepiride (AMARYL) 4 MG tablet Take 4 mg by mouth 2 times dailyHistorical Med      colchicine (COLCRYS) 0.6 MG tablet Take 0.6 mg by mouth as needed for PainHistorical Med      baclofen (LIORESAL) 10 MG tablet Take 10 mg by mouth daily as needed Historical Med      aspirin 81 MG chewable tablet Take 1 tablet by mouth daily, Disp-30 tablet, R-3Normal      pantoprazole (PROTONIX) 40 MG tablet Take 1 tablet by mouth 2 times daily, Disp-60 tablet, R-5Normal      ferrous sulfate-C-folic acid (FOLITAB) 416-448-4.4 MG TBCR per extended release tablet Take 1 tablet by mouth daily, Disp-30 tablet, R-0Normal      prednisoLONE acetate (PRED FORTE) 1 % ophthalmic suspension Place 1 drop into the left eye 2 times daily Historical Med      allopurinol (ZYLOPRIM) 300 MG tablet Take 600 mg by mouth daily Historical Med      sennosides-docusate sodium (SENOKOT-S) 8.6-50 MG tablet Take 2 tablets by mouth dailyHistorical Med      diphenoxylate-atropine (LOMOTIL) 2.5-0.025 MG per tablet Take 1 tablet by mouth 4 times daily as needed for DiarrheaHistorical Med      meclizine (ANTIVERT) 25 MG tablet Take 25 mg by mouth 4 times daily as neededHistorical Med      ondansetron (ZOFRAN-ODT) 4 MG disintegrating tablet Take 4 mg by mouth every 8 hours as needed for Nausea or VomitingHistorical Med      pravastatin (PRAVACHOL) 40 MG tablet Take 40 mg by mouth dailyHistorical Med      promethazine (PHENERGAN) 12.5 MG tablet Take 12.5 mg by mouth as needed for NauseaHistorical Med      Ascorbic Acid (VITAMIN C) 500 MG tablet Take 1,000 mg by mouth daily Historical Med      b complex vitamins capsule Take 1 capsule by mouth dailyHistorical Med             ALLERGIES     Pcn [penicillins] and Adhesive tape    FAMILY HISTORY       Family History   Problem Relation Age of Onset    Cancer Mother     Heart Disease Father     Heart Disease Brother     Heart Disease Brother     Heart Disease Brother     Heart Disease Brother     Heart Attack Brother     Heart Disease Brother     Heart Disease Brother           SOCIAL HISTORY       Social History     Socioeconomic History    Marital status:      Spouse name: Tammy Michaud    Number of children: 2    Years of education: 15    Highest education level: None   Occupational History    None   Social Needs    Financial resource strain: None    Food insecurity     Worry: None     Inability: None    Transportation needs     Medical: None     Non-medical: None   Tobacco Use    Smoking status: Never Smoker    Smokeless tobacco: Never Used   Substance and Sexual Activity    Alcohol use: No    Drug use: No    Sexual activity: Not Currently   Lifestyle    Physical activity     Days per week: None     Minutes per session: None    Stress: None   Relationships    Social connections     Talks on phone: None     Gets together: None     Attends Yarsani service: None     Active member of club or organization: None     Attends meetings of clubs or organizations: None     Relationship status: None    Intimate partner violence     Fear of current or ex partner: None     Emotionally abused: None     Physically abused: None     Forced sexual activity: None   Other Topics Concern    None   Social History Narrative    None       SCREENINGS             PHYSICAL EXAM    (up to 7 for level 4, 8 or more for level 5)     ED Triage Vitals [08/14/20 1000]   BP Temp Temp Source Pulse Resp SpO2 Height Weight   (!) 200/71 97.9 °F (36.6 °C) Oral 53 18 100 % 5' 10\" (1.778 m) 195 lb (88.5 kg)       Physical Exam  Vitals signs reviewed. HENT:      Right Ear: External ear normal.      Left Ear: External ear normal.      Mouth/Throat:      Mouth: Mucous membranes are moist.      Pharynx: Oropharynx is clear. Eyes:      Conjunctiva/sclera: Conjunctivae normal.      Pupils: Pupils are equal, round, and reactive to light. Cardiovascular:      Rate and Rhythm: Normal rate and regular rhythm. Pulmonary:      Effort: Pulmonary effort is normal.      Breath sounds: Normal breath sounds. Abdominal:      General: Abdomen is flat. Tenderness: There is no abdominal tenderness. Musculoskeletal: Normal range of motion. Skin:     General: Skin is warm and dry. Neurological:      General: No focal deficit present. Mental Status: He is alert and oriented to person, place, and time. Comments: No facial weakness  Normal speech  No dysmetria           DIAGNOSTIC RESULTS     EKG: All EKG's are interpreted by the Emergency Department Physician who either signs or Co-signs this chart in the absence of acardiologist.    There is a regular rate and rhythm. SB hr 51b/min Normal IN interval and normal P waves.

## 2020-08-15 ENCOUNTER — CARE COORDINATION (OUTPATIENT)
Dept: CARE COORDINATION | Age: 85
End: 2020-08-15

## 2020-08-18 ENCOUNTER — CARE COORDINATION (OUTPATIENT)
Dept: CARE COORDINATION | Age: 85
End: 2020-08-18

## 2020-08-18 NOTE — CARE COORDINATION
ACM unable speak with patient for care transitions call for ER visit on 8/14/20. Pt has not returned messages or responded to [facility] message. Will close CT Episode at this time. If pt calls back, I will complete call for care transitions.   Electronically signed by Ben Rojas RN on 8/18/2020 at 3:14 PM

## 2020-09-06 ENCOUNTER — HOSPITAL ENCOUNTER (INPATIENT)
Age: 85
LOS: 4 days | Discharge: HOME | DRG: 281 | End: 2020-09-10
Attending: EMERGENCY MEDICINE | Admitting: INTERNAL MEDICINE
Payer: COMMERCIAL

## 2020-09-06 ENCOUNTER — APPOINTMENT (OUTPATIENT)
Dept: GENERAL RADIOLOGY | Age: 85
DRG: 281 | End: 2020-09-06
Payer: COMMERCIAL

## 2020-09-06 PROBLEM — I21.4 NSTEMI (NON-ST ELEVATED MYOCARDIAL INFARCTION) (HCC): Status: ACTIVE | Noted: 2020-09-06

## 2020-09-06 LAB
ALBUMIN SERPL-MCNC: 4 G/DL (ref 3.5–5.2)
ALP BLD-CCNC: 96 U/L (ref 40–130)
ALT SERPL-CCNC: 19 U/L (ref 5–41)
ANION GAP SERPL CALCULATED.3IONS-SCNC: 12 MMOL/L (ref 7–19)
APTT: 105.8 SEC (ref 26–36.2)
APTT: 26.1 SEC (ref 26–36.2)
AST SERPL-CCNC: 21 U/L (ref 5–40)
BASOPHILS ABSOLUTE: 0 K/UL (ref 0–0.2)
BASOPHILS RELATIVE PERCENT: 0.3 % (ref 0–1)
BILIRUB SERPL-MCNC: <0.2 MG/DL (ref 0.2–1.2)
BUN BLDV-MCNC: 46 MG/DL (ref 8–23)
CALCIUM SERPL-MCNC: 10 MG/DL (ref 8.8–10.2)
CHLORIDE BLD-SCNC: 103 MMOL/L (ref 98–111)
CO2: 25 MMOL/L (ref 22–29)
CREAT SERPL-MCNC: 1.5 MG/DL (ref 0.5–1.2)
EOSINOPHILS ABSOLUTE: 0.2 K/UL (ref 0–0.6)
EOSINOPHILS RELATIVE PERCENT: 4 % (ref 0–5)
GFR AFRICAN AMERICAN: 54
GFR NON-AFRICAN AMERICAN: 44
GLUCOSE BLD-MCNC: 115 MG/DL (ref 70–99)
GLUCOSE BLD-MCNC: 158 MG/DL (ref 74–109)
HCT VFR BLD CALC: 34.6 % (ref 42–52)
HEMOGLOBIN: 11.2 G/DL (ref 14–18)
IMMATURE GRANULOCYTES #: 0 K/UL
LYMPHOCYTES ABSOLUTE: 1.9 K/UL (ref 1.1–4.5)
LYMPHOCYTES RELATIVE PERCENT: 31.1 % (ref 20–40)
MCH RBC QN AUTO: 32.3 PG (ref 27–31)
MCHC RBC AUTO-ENTMCNC: 32.4 G/DL (ref 33–37)
MCV RBC AUTO: 99.7 FL (ref 80–94)
MONOCYTES ABSOLUTE: 0.5 K/UL (ref 0–0.9)
MONOCYTES RELATIVE PERCENT: 8.2 % (ref 0–10)
NEUTROPHILS ABSOLUTE: 3.3 K/UL (ref 1.5–7.5)
NEUTROPHILS RELATIVE PERCENT: 56.2 % (ref 50–65)
PDW BLD-RTO: 13.6 % (ref 11.5–14.5)
PERFORMED ON: ABNORMAL
PLATELET # BLD: 185 K/UL (ref 130–400)
PMV BLD AUTO: 10.3 FL (ref 9.4–12.4)
POTASSIUM SERPL-SCNC: 4.5 MMOL/L (ref 3.5–5)
PRO-BNP: 973 PG/ML (ref 0–1800)
RBC # BLD: 3.47 M/UL (ref 4.7–6.1)
SODIUM BLD-SCNC: 140 MMOL/L (ref 136–145)
TOTAL PROTEIN: 7 G/DL (ref 6.6–8.7)
TROPONIN: 0.09 NG/ML (ref 0–0.03)
TROPONIN: 0.1 NG/ML (ref 0–0.03)
TROPONIN: 0.1 NG/ML (ref 0–0.03)
WBC # BLD: 6 K/UL (ref 4.8–10.8)

## 2020-09-06 PROCEDURE — 80053 COMPREHEN METABOLIC PANEL: CPT

## 2020-09-06 PROCEDURE — 71045 X-RAY EXAM CHEST 1 VIEW: CPT

## 2020-09-06 PROCEDURE — 85730 THROMBOPLASTIN TIME PARTIAL: CPT

## 2020-09-06 PROCEDURE — 96365 THER/PROPH/DIAG IV INF INIT: CPT

## 2020-09-06 PROCEDURE — 93005 ELECTROCARDIOGRAM TRACING: CPT | Performed by: EMERGENCY MEDICINE

## 2020-09-06 PROCEDURE — 99284 EMERGENCY DEPT VISIT MOD MDM: CPT

## 2020-09-06 PROCEDURE — 96368 THER/DIAG CONCURRENT INF: CPT

## 2020-09-06 PROCEDURE — 2580000003 HC RX 258: Performed by: INTERNAL MEDICINE

## 2020-09-06 PROCEDURE — 6360000002 HC RX W HCPCS

## 2020-09-06 PROCEDURE — 2000000000 HC ICU R&B

## 2020-09-06 PROCEDURE — 36415 COLL VENOUS BLD VENIPUNCTURE: CPT

## 2020-09-06 PROCEDURE — 99285 EMERGENCY DEPT VISIT HI MDM: CPT

## 2020-09-06 PROCEDURE — 85025 COMPLETE CBC W/AUTO DIFF WBC: CPT

## 2020-09-06 PROCEDURE — 82947 ASSAY GLUCOSE BLOOD QUANT: CPT

## 2020-09-06 PROCEDURE — 6360000002 HC RX W HCPCS: Performed by: EMERGENCY MEDICINE

## 2020-09-06 PROCEDURE — 6370000000 HC RX 637 (ALT 250 FOR IP): Performed by: INTERNAL MEDICINE

## 2020-09-06 PROCEDURE — 2500000003 HC RX 250 WO HCPCS: Performed by: EMERGENCY MEDICINE

## 2020-09-06 PROCEDURE — 6370000000 HC RX 637 (ALT 250 FOR IP): Performed by: EMERGENCY MEDICINE

## 2020-09-06 PROCEDURE — 96375 TX/PRO/DX INJ NEW DRUG ADDON: CPT

## 2020-09-06 PROCEDURE — 83880 ASSAY OF NATRIURETIC PEPTIDE: CPT

## 2020-09-06 PROCEDURE — 84484 ASSAY OF TROPONIN QUANT: CPT

## 2020-09-06 RX ORDER — ACETAMINOPHEN 650 MG/1
650 SUPPOSITORY RECTAL EVERY 6 HOURS PRN
Status: DISCONTINUED | OUTPATIENT
Start: 2020-09-06 | End: 2020-09-10 | Stop reason: HOSPADM

## 2020-09-06 RX ORDER — DEXTROSE MONOHYDRATE 25 G/50ML
12.5 INJECTION, SOLUTION INTRAVENOUS PRN
Status: DISCONTINUED | OUTPATIENT
Start: 2020-09-06 | End: 2020-09-10 | Stop reason: HOSPADM

## 2020-09-06 RX ORDER — DEXTROSE MONOHYDRATE 50 MG/ML
100 INJECTION, SOLUTION INTRAVENOUS PRN
Status: DISCONTINUED | OUTPATIENT
Start: 2020-09-06 | End: 2020-09-10 | Stop reason: HOSPADM

## 2020-09-06 RX ORDER — HEPARIN SODIUM 10000 [USP'U]/100ML
INJECTION, SOLUTION INTRAVENOUS
Status: COMPLETED
Start: 2020-09-06 | End: 2020-09-06

## 2020-09-06 RX ORDER — NICOTINE POLACRILEX 4 MG
15 LOZENGE BUCCAL PRN
Status: DISCONTINUED | OUTPATIENT
Start: 2020-09-06 | End: 2020-09-10 | Stop reason: HOSPADM

## 2020-09-06 RX ORDER — PANTOPRAZOLE SODIUM 40 MG/1
40 TABLET, DELAYED RELEASE ORAL 2 TIMES DAILY
Status: DISCONTINUED | OUTPATIENT
Start: 2020-09-06 | End: 2020-09-10 | Stop reason: HOSPADM

## 2020-09-06 RX ORDER — PROMETHAZINE HYDROCHLORIDE 25 MG/1
12.5 TABLET ORAL EVERY 6 HOURS PRN
Status: DISCONTINUED | OUTPATIENT
Start: 2020-09-06 | End: 2020-09-10 | Stop reason: HOSPADM

## 2020-09-06 RX ORDER — ASPIRIN 81 MG/1
81 TABLET, CHEWABLE ORAL DAILY
Status: DISCONTINUED | OUTPATIENT
Start: 2020-09-06 | End: 2020-09-06

## 2020-09-06 RX ORDER — ASPIRIN 81 MG/1
81 TABLET, CHEWABLE ORAL DAILY
Status: DISCONTINUED | OUTPATIENT
Start: 2020-09-07 | End: 2020-09-10 | Stop reason: HOSPADM

## 2020-09-06 RX ORDER — ASPIRIN 81 MG/1
324 TABLET, CHEWABLE ORAL ONCE
Status: COMPLETED | OUTPATIENT
Start: 2020-09-06 | End: 2020-09-06

## 2020-09-06 RX ORDER — AMLODIPINE BESYLATE 5 MG/1
10 TABLET ORAL DAILY
Status: DISCONTINUED | OUTPATIENT
Start: 2020-09-06 | End: 2020-09-09

## 2020-09-06 RX ORDER — ONDANSETRON 2 MG/ML
4 INJECTION INTRAMUSCULAR; INTRAVENOUS EVERY 6 HOURS PRN
Status: DISCONTINUED | OUTPATIENT
Start: 2020-09-06 | End: 2020-09-10 | Stop reason: HOSPADM

## 2020-09-06 RX ORDER — TAMSULOSIN HYDROCHLORIDE 0.4 MG/1
0.8 CAPSULE ORAL DAILY
Status: DISCONTINUED | OUTPATIENT
Start: 2020-09-06 | End: 2020-09-10 | Stop reason: HOSPADM

## 2020-09-06 RX ORDER — ACETAMINOPHEN 325 MG/1
650 TABLET ORAL EVERY 6 HOURS PRN
Status: DISCONTINUED | OUTPATIENT
Start: 2020-09-06 | End: 2020-09-10 | Stop reason: HOSPADM

## 2020-09-06 RX ORDER — SODIUM CHLORIDE 0.9 % (FLUSH) 0.9 %
10 SYRINGE (ML) INJECTION PRN
Status: DISCONTINUED | OUTPATIENT
Start: 2020-09-06 | End: 2020-09-10 | Stop reason: HOSPADM

## 2020-09-06 RX ORDER — GABAPENTIN 100 MG/1
100 CAPSULE ORAL 2 TIMES DAILY
Status: DISCONTINUED | OUTPATIENT
Start: 2020-09-06 | End: 2020-09-10 | Stop reason: HOSPADM

## 2020-09-06 RX ORDER — HEPARIN SODIUM 1000 [USP'U]/ML
4000 INJECTION, SOLUTION INTRAVENOUS; SUBCUTANEOUS ONCE
Status: COMPLETED | OUTPATIENT
Start: 2020-09-06 | End: 2020-09-06

## 2020-09-06 RX ORDER — ISOSORBIDE MONONITRATE 60 MG/1
120 TABLET, EXTENDED RELEASE ORAL DAILY
Status: DISCONTINUED | OUTPATIENT
Start: 2020-09-06 | End: 2020-09-10 | Stop reason: HOSPADM

## 2020-09-06 RX ORDER — BACLOFEN 10 MG/1
10 TABLET ORAL DAILY PRN
Status: DISCONTINUED | OUTPATIENT
Start: 2020-09-06 | End: 2020-09-10 | Stop reason: HOSPADM

## 2020-09-06 RX ORDER — PREDNISOLONE ACETATE 10 MG/ML
1 SUSPENSION/ DROPS OPHTHALMIC 2 TIMES DAILY
Status: DISCONTINUED | OUTPATIENT
Start: 2020-09-06 | End: 2020-09-10 | Stop reason: HOSPADM

## 2020-09-06 RX ORDER — SODIUM CHLORIDE 0.9 % (FLUSH) 0.9 %
10 SYRINGE (ML) INJECTION EVERY 12 HOURS SCHEDULED
Status: DISCONTINUED | OUTPATIENT
Start: 2020-09-06 | End: 2020-09-10 | Stop reason: HOSPADM

## 2020-09-06 RX ORDER — HEPARIN SODIUM 10000 [USP'U]/100ML
11.3 INJECTION, SOLUTION INTRAVENOUS CONTINUOUS
Status: DISCONTINUED | OUTPATIENT
Start: 2020-09-06 | End: 2020-09-08

## 2020-09-06 RX ORDER — NITROGLYCERIN 0.4 MG/1
0.4 TABLET SUBLINGUAL EVERY 5 MIN PRN
Status: DISCONTINUED | OUTPATIENT
Start: 2020-09-06 | End: 2020-09-10 | Stop reason: HOSPADM

## 2020-09-06 RX ORDER — NITROGLYCERIN 0.4 MG/1
0.4 TABLET SUBLINGUAL EVERY 5 MIN PRN
Status: DISCONTINUED | OUTPATIENT
Start: 2020-09-06 | End: 2020-09-06 | Stop reason: SDUPTHER

## 2020-09-06 RX ORDER — MORPHINE SULFATE 4 MG/ML
2 INJECTION, SOLUTION INTRAMUSCULAR; INTRAVENOUS ONCE
Status: COMPLETED | OUTPATIENT
Start: 2020-09-06 | End: 2020-09-06

## 2020-09-06 RX ORDER — HEPARIN SODIUM 1000 [USP'U]/ML
4000 INJECTION, SOLUTION INTRAVENOUS; SUBCUTANEOUS PRN
Status: DISCONTINUED | OUTPATIENT
Start: 2020-09-06 | End: 2020-09-08

## 2020-09-06 RX ORDER — PRAVASTATIN SODIUM 20 MG
40 TABLET ORAL DAILY
Status: DISCONTINUED | OUTPATIENT
Start: 2020-09-06 | End: 2020-09-10 | Stop reason: HOSPADM

## 2020-09-06 RX ORDER — LOSARTAN POTASSIUM 50 MG/1
50 TABLET ORAL 2 TIMES DAILY
Status: DISCONTINUED | OUTPATIENT
Start: 2020-09-06 | End: 2020-09-10 | Stop reason: HOSPADM

## 2020-09-06 RX ORDER — NITROGLYCERIN 20 MG/100ML
5 INJECTION INTRAVENOUS CONTINUOUS
Status: DISCONTINUED | OUTPATIENT
Start: 2020-09-06 | End: 2020-09-09

## 2020-09-06 RX ORDER — POLYETHYLENE GLYCOL 3350 17 G/17G
17 POWDER, FOR SOLUTION ORAL DAILY PRN
Status: DISCONTINUED | OUTPATIENT
Start: 2020-09-06 | End: 2020-09-10 | Stop reason: HOSPADM

## 2020-09-06 RX ORDER — FINASTERIDE 5 MG/1
5 TABLET, FILM COATED ORAL DAILY
Status: DISCONTINUED | OUTPATIENT
Start: 2020-09-06 | End: 2020-09-10 | Stop reason: HOSPADM

## 2020-09-06 RX ORDER — HEPARIN SODIUM 1000 [USP'U]/ML
2000 INJECTION, SOLUTION INTRAVENOUS; SUBCUTANEOUS PRN
Status: DISCONTINUED | OUTPATIENT
Start: 2020-09-06 | End: 2020-09-08

## 2020-09-06 RX ORDER — MORPHINE SULFATE 4 MG/ML
INJECTION, SOLUTION INTRAMUSCULAR; INTRAVENOUS
Status: COMPLETED
Start: 2020-09-06 | End: 2020-09-06

## 2020-09-06 RX ADMIN — ASPIRIN 324 MG: 81 TABLET, CHEWABLE ORAL at 16:43

## 2020-09-06 RX ADMIN — GABAPENTIN 100 MG: 100 CAPSULE ORAL at 20:51

## 2020-09-06 RX ADMIN — HEPARIN SODIUM 11.3 UNITS/KG/HR: 10000 INJECTION, SOLUTION INTRAVENOUS at 16:44

## 2020-09-06 RX ADMIN — PREDNISOLONE ACETATE 1 DROP: 10 SUSPENSION/ DROPS OPHTHALMIC at 20:51

## 2020-09-06 RX ADMIN — MORPHINE SULFATE 2 MG: 4 INJECTION, SOLUTION INTRAMUSCULAR; INTRAVENOUS at 16:54

## 2020-09-06 RX ADMIN — NITROGLYCERIN 5 MCG/MIN: 20 INJECTION INTRAVENOUS at 16:43

## 2020-09-06 RX ADMIN — LOSARTAN POTASSIUM 50 MG: 50 TABLET, FILM COATED ORAL at 20:51

## 2020-09-06 RX ADMIN — PANTOPRAZOLE SODIUM 40 MG: 40 TABLET, DELAYED RELEASE ORAL at 20:51

## 2020-09-06 RX ADMIN — SODIUM CHLORIDE, PRESERVATIVE FREE 10 ML: 5 INJECTION INTRAVENOUS at 21:02

## 2020-09-06 RX ADMIN — HEPARIN SODIUM 4000 UNITS: 1000 INJECTION INTRAVENOUS; SUBCUTANEOUS at 16:43

## 2020-09-06 ASSESSMENT — ENCOUNTER SYMPTOMS
VOMITING: 0
DIARRHEA: 0
NAUSEA: 0
ABDOMINAL PAIN: 0
SHORTNESS OF BREATH: 0

## 2020-09-06 ASSESSMENT — PAIN DESCRIPTION - LOCATION: LOCATION: CHEST

## 2020-09-06 ASSESSMENT — PAIN SCALES - GENERAL
PAINLEVEL_OUTOF10: 5
PAINLEVEL_OUTOF10: 8
PAINLEVEL_OUTOF10: 7

## 2020-09-06 ASSESSMENT — PAIN DESCRIPTION - PAIN TYPE: TYPE: ACUTE PAIN

## 2020-09-06 ASSESSMENT — PAIN DESCRIPTION - DIRECTION: RADIATING_TOWARDS: EAR

## 2020-09-06 NOTE — ED NOTES
Patient placed on cardiac monitor, continuous pulse oximeter, and NIBP monitor. Monitor alarms on. Patient placed in a gown  Mask and gloves worn by staff while in pt room. Pt masked during all contact with staff.        Sailaja Duong RN  09/06/20 [id number]

## 2020-09-06 NOTE — ED PROVIDER NOTES
[facility] EMERGENCY DEPT  eMERGENCY dEPARTMENT eNCOUnter      Pt Name: Eduardo Roberts  MRN: [id number]  [place] 1934  Date of evaluation: 9/6/2020  Provider: Meri Carvajal MD    [address] [place] [address]       Chief Complaint   Patient presents with    Shoulder Pain    Arm Pain    Chest Pain         HISTORY OF PRESENT ILLNESS   (Location/Symptom, Timing/Onset,Context/Setting, Quality, Duration, Modifying Factors, Severity)  Note limiting factors. Eduardo Roberts is a 80 y.o. male who presents to the emergency department for evaluation regarding complaints of anterior chest pain with some radiation to his left shoulder and left arm area. Patient states the symptoms began earlier today and have been a moderate severity. Denies any significant associated shortness of breath, nausea or vomiting. He does have a prior history of cardiac disease. He reports he had a prior history of a CABG performed in 1990, with subsequent cardiac catheterization performed. Most recently he underwent an abnormal Lexiscan performed in March 2019 which was followed by cardiac catheterization. This revealed evidence of moderate diffuse CAD. He denies any prior history of pulmonary embolism or DVT. No recent syncopal events. No prior history of GI bleeding. HPI    NursingNotes were reviewed. REVIEW OF SYSTEMS    (2-9 systems for level 4, 10 or more for level 5)     Review of Systems   Constitutional: Negative for chills and fever. Respiratory: Negative for shortness of breath. Cardiovascular: Positive for chest pain. Negative for palpitations and leg swelling. Gastrointestinal: Negative for abdominal pain, diarrhea, nausea and vomiting. Neurological: Negative for dizziness and syncope. All other systems reviewed and are negative.            PAST MEDICALHISTORY     Past Medical History:   Diagnosis Date    Blood circulation, collateral     CAD (coronary artery disease)     STENTS X 3    CAD (coronary artery disease) CABG    Cerebral artery occlusion with [facility] infarction ([facility] [facility]     CHF (congestive heart failure) (HCC)     Chronic kidney disease     DDD (degenerative disc disease), lumbar 7/11/2017    Diabetes mellitus ([facility]     Disease of blood and blood forming organ     GERD (gastroesophageal reflux disease)     History of blood transfusion     Hx of blood clots     Right leg    Hyperlipidemia     Hypertension     Lumbar stenosis with neurogenic claudication 7/11/2017    MI, old     X 2    Other disorders of kidney and ureter in diseases classified elsewhere     Palliative care patient 03/06/2018    Right heart failure ([facility] [facility] 6/29/2018    Sleep apnea     DOESN'T USE MACHINE    TIA (transient ischemic attack)          SURGICAL HISTORY       Past Surgical History:   Procedure Laterality Date    APPENDECTOMY      BACK SURGERY  1980    x 3    CARDIAC SURGERY  1990    Bypass x 3    CARPAL TUNNEL RELEASE Bilateral 1980    wrist and elbows    CHOLECYSTECTOMY  2000    COLONOSCOPY      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 2    DILATATION, ESOPHAGUS      ENDOSCOPY, COLON, DIAGNOSTIC      EYE SURGERY      JOINT REPLACEMENT Left 1990    JOINT REPLACEMENT Right 1995    LAMINECTOMY N/A 7/11/2017    LUMBAR LAMINECTOMY POSTERIOR  L23 L34 performed by Britany Guo MD at [address] Left 1990    ROTATOR CUFF REPAIR Right 2010    TUMOR REMOVAL Left 1960    foot    TURP  2000    VASCULAR SURGERY Left 7/15/2015 [facility] & [address]    Aortoiliofemoral a'gram with bilateral lower extremity runoff; select views 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; a'plasty of left anterior tibial artery and dorsalis pedis artery with 2.5x300 vascutrak balloon and then with 3x220 nati balloon; completion a'gram         CURRENT MEDICATIONS     Previous Medications    ALLOPURINOL (ZYLOPRIM) 300 MG TABLET    Take 600 mg by mouth daily     AMLODIPINE (NORVASC) 10 MG TABLET    Take 1 tablet by mouth daily    ASCORBIC ACID (VITAMIN C) 500 MG TABLET    Take 1,000 mg by mouth daily     ASPIRIN 81 MG CHEWABLE TABLET    Take 1 tablet by mouth daily    B COMPLEX VITAMINS CAPSULE    Take 1 capsule by mouth daily    BACLOFEN (LIORESAL) 10 MG TABLET    Take 10 mg by mouth daily as needed     COLCHICINE (COLCRYS) 0.6 MG TABLET    Take 0.6 mg by mouth as needed for Pain    DIPHENOXYLATE-ATROPINE (LOMOTIL) 2.5-0.025 MG PER TABLET    Take 1 tablet by mouth 4 times daily as needed for Diarrhea    DOCUSATE SODIUM (COLACE) 100 MG CAPSULE    Take 1 capsule by mouth 2 times daily    FERROUS SULFATE-C-FOLIC ACID (FOLITAB) 703-789-3.1 MG TBCR PER EXTENDED RELEASE TABLET    Take 1 tablet by mouth daily    FINASTERIDE (PROSCAR) 5 MG TABLET    Take 1 tablet by mouth daily    GABAPENTIN (NEURONTIN) 100 MG CAPSULE    Take 100 mg by mouth 2 times daily. Laurence Abelardo GLIMEPIRIDE (AMARYL) 4 MG TABLET    Take 4 mg by mouth 2 times daily    ISOSORBIDE MONONITRATE (IMDUR) 60 MG EXTENDED RELEASE TABLET    Take 2 tablets by mouth daily    LIDOCAINE (LIDODERM) 5 %    Place 1 patch onto the skin daily 12 hours on, 12 hours off. LOSARTAN (COZAAR) 50 MG TABLET    Take 50 mg by mouth 2 times daily    MECLIZINE (ANTIVERT) 25 MG TABLET    Take 25 mg by mouth 4 times daily as needed    MULTIPLE VITAMINS-MINERALS (EYE VITAMINS PO)    Take by mouth    MUPIROCIN (BACTROBAN) 2 % OINTMENT    Apply topically 2 times daily Apply to wound BID    NITROGLYCERIN (NITROSTAT) 0.4 MG SL TABLET    up to max of 3 total doses. If no relief after 1 dose, call 911.     ONDANSETRON (ZOFRAN-ODT) 4 MG DISINTEGRATING TABLET    Take 4 mg by mouth every 8 hours as needed for Nausea or Vomiting    PANTOPRAZOLE (PROTONIX) 40 MG TABLET    Take 1 tablet by mouth 2 times daily    PRAVASTATIN (PRAVACHOL) 40 MG TABLET    Take 40 mg by mouth daily    PREDNISOLONE ACETATE (PRED FORTE) 1 % OPHTHALMIC SUSPENSION    Place 1 drop into the left eye 2 times daily     PROMETHAZINE (PHENERGAN) 12.5 MG TABLET    Take 12.5 mg by mouth as needed for Nausea    ROPINIROLE (REQUIP) 1 MG TABLET    Take 1 mg by mouth nightly    SENNOSIDES-DOCUSATE SODIUM (SENOKOT-S) 8.6-50 MG TABLET    Take 2 tablets by mouth daily    TAMSULOSIN (FLOMAX) 0.4 MG CAPSULE    Take 2 capsules by mouth daily    VITAMIN D (CHOLECALCIFEROL) 1000 UNIT TABS TABLET    Take 1,000 Units by mouth daily       ALLERGIES     Pcn [penicillins] and Adhesive tape    FAMILY HISTORY       Family History   Problem Relation Age of Onset    Cancer Mother     Heart Disease Father     Heart Disease Brother     Heart Disease Brother     Heart Disease Brother     Heart Disease Brother     Heart Attack Brother     Heart Disease Brother     Heart Disease Brother           SOCIAL HISTORY       Social History     Socioeconomic History    Marital status:      Spouse name: Yarely Dia    Number of children: 2    Years of education: 15    Highest education level: None   Occupational History    None   Social Needs    Financial resource strain: None    Food insecurity     Worry: None     Inability: None    Transportation needs     Medical: None     Non-medical: None   Tobacco Use    Smoking status: Never Smoker    Smokeless tobacco: Never Used   Substance and Sexual Activity    Alcohol use: No    Drug use: No    Sexual activity: Not Currently   Lifestyle    Physical activity     Days per week: None     Minutes per session: None    Stress: None   Relationships    Social connections     Talks on phone: None     Gets together: None     Attends Congregation service: None     Active member of club or organization: None     Attends meetings of clubs or organizations: None     Relationship status: None    Intimate partner violence     Fear of current or ex partner: None     Emotionally abused: None     Physically abused: None     Forced sexual activity: None   Other Topics Concern    None   Social History Narrative    None       SCREENINGS             PHYSICAL EXAM    (up to 7 for level 4, 8 or more for level 5)     ED Triage Vitals   BP Temp Temp Source Pulse Resp SpO2 Height Weight   09/06/20 1535 09/06/20 1535 09/06/20 1535 09/06/20 1535 09/06/20 1535 09/06/20 1535 -- 09/06/20 1634   (!) 169/63 97.9 °F (36.6 °C) Oral 52 18 96 %  195 lb (88.5 kg)       Physical Exam  Vitals signs and nursing note reviewed. HENT:      Head: Atraumatic. Mouth/Throat:      Mouth: Mucous membranes are moist. Mucous membranes are not dry. Pharynx: No posterior oropharyngeal erythema. Eyes:      General: No scleral icterus. Pupils: Pupils are equal, round, and reactive to light. Neck:      Trachea: No tracheal deviation. Cardiovascular:      Rate and Rhythm: Normal rate and regular rhythm. Heart sounds: Normal heart sounds. No murmur. Pulmonary:      Effort: Pulmonary effort is normal. No respiratory distress. Breath sounds: Normal breath sounds. No stridor. Abdominal:      General: There is no distension. Palpations: Abdomen is soft. Tenderness: There is no abdominal tenderness. There is no guarding. Musculoskeletal:      Right lower leg: No edema. Left lower leg: No edema. Skin:     Capillary Refill: Capillary refill takes less than 2 seconds. Coloration: Skin is not pale. Findings: No rash. Neurological:      Mental Status: He is alert and oriented to person, place, and time. Psychiatric:         Mood and Affect: Mood normal.         Behavior: Behavior is cooperative.          DIAGNOSTIC RESULTS     EKG: All EKG's areinterpreted by the Emergency Department Physician who either signs or Co-signs this chart in the absence of a cardiologist.    1534: Sinus bradycardia @ 49, no ST elevation    RADIOLOGY:  Non-plain film images such as CT, Ultrasound and MRI are read by the radiologist. Plain radiographic images are visualized and second troponin and if this does not show a precipitous rise agreeable for admission, heparin drip and nitro infusion. Case is been discussed with Dr. Sloane Croft regarding inpatient admission to the intensive care unit. PROCEDURES:  Unless otherwise noted below, none     Procedures     CRITICAL CARE TIME   Total Critical Care time was 41 minutes, excluding separately reportable procedures. There was a high probability of clinically significant/life threatening deterioration in the patient's condition which required my urgent intervention.         FINAL IMPRESSION      1. NSTEMI (non-ST elevated myocardial infarction) [facility])          DISPOSITION/PLAN   DISPOSITION Decision To Admit 09/06/2020 04:35:32 PM      (Please note that portions of this note were completed with a voice recognition program.  Efforts were made to edit thedictations but occasionally words are mis-transcribed.)    Missael Ingram MD (electronically signed)  Attending Emergency Physician          Missael Ingram MD  09/06/20 7051

## 2020-09-06 NOTE — H&P
 BACK SURGERY  1980    x 3    CARDIAC SURGERY  1990    Bypass x 3    CARPAL TUNNEL RELEASE Bilateral 1980    wrist and elbows    CHOLECYSTECTOMY  2000    COLONOSCOPY      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 2    DILATATION, ESOPHAGUS      ENDOSCOPY, COLON, DIAGNOSTIC      EYE SURGERY      JOINT REPLACEMENT Left 1990    JOINT REPLACEMENT Right 1995    LAMINECTOMY N/A 7/11/2017    LUMBAR LAMINECTOMY POSTERIOR  L23 L34 performed by Ac Palmer MD at [address] Left 1990    ROTATOR CUFF REPAIR Right 2010    TUMOR REMOVAL Left 1960    foot    TURP  2000    VASCULAR SURGERY Left 7/15/2015 [facility] & [address]    Aortoiliofemoral a'gram with bilateral lower extremity runoff; select views 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; a'plasty of left anterior tibial artery and dorsalis pedis artery with 2.5x300 vascutrak balloon and then with 3x220 nati balloon; completion a'gram        Family History:     Family History   Problem Relation Age of Onset    Cancer Mother     Heart Disease Father     Heart Disease Brother     Heart Disease Brother     Heart Disease Brother     Heart Disease Brother     Heart Attack Brother     Heart Disease Brother     Heart Disease Brother         Social History:     Social History     Socioeconomic History    Marital status:      Spouse name: Concha Henley    Number of children: 2    Years of education: 15    Highest education level: None   Occupational History    None   Social Needs    Financial resource strain: None    Food insecurity     Worry: None     Inability: None    Transportation needs     Medical: None     Non-medical: None   Tobacco Use    Smoking status: Never Smoker    Smokeless tobacco: Never Used   Substance and Sexual Activity    Alcohol use: No    Drug use: No    Sexual activity: Not Currently   Lifestyle    Physical activity Days per week: None     Minutes per session: None    Stress: None   Relationships    Social connections     Talks on phone: None     Gets together: None     Attends Hinduism service: None     Active member of club or organization: None     Attends meetings of clubs or organizations: None     Relationship status: None    Intimate partner violence     Fear of current or ex partner: None     Emotionally abused: None     Physically abused: None     Forced sexual activity: None   Other Topics Concern    None   Social History Narrative    None       Prior to Admission Medications:   Not in a hospital admission. Allergies:      Allergies   Allergen Reactions    Pcn [Penicillins] Swelling    Adhesive Tape Rash         Physical Exam:   BP (!) 156/79   Pulse (!) 49   Temp 98.7 °F (37.1 °C) (Temporal)   Resp 20   Wt 195 lb (88.5 kg)   SpO2 97%   BMI 27.98 kg/m²     General: no acute distress  HEENT: normocephalic, atraumatic  Neck: supple, symmetrical, trachea midline   Lungs: clear to auscultation bilaterally   Cardiovascular: s1 and s2 normal  Abdomen: soft, positive bowel sounds, nondistended, nontender  Extremities: no edema or cyanosis   Neuro: no focal deficits   Skin: normal color and texture     Recent Results (from the past 72 hour(s))   Comprehensive Metabolic Panel    Collection Time: 09/06/20  3:46 PM   Result Value Ref Range    Sodium 140 136 - 145 mmol/L    Potassium 4.5 3.5 - 5.0 mmol/L    Chloride 103 98 - 111 mmol/L    CO2 25 22 - 29 mmol/L    Anion Gap 12 7 - 19 mmol/L    Glucose 158 (H) 74 - 109 mg/dL    BUN 46 (H) 8 - 23 mg/dL    CREATININE 1.5 (H) 0.5 - 1.2 mg/dL    GFR Non- 44 (A) >60    GFR  54 (L) >59    Calcium 10.0 8.8 - 10.2 mg/dL    Total Protein 7.0 6.6 - 8.7 g/dL    Alb 4.0 3.5 - 5.2 g/dL    Total Bilirubin <0.2 0.2 - 1.2 mg/dL    Alkaline Phosphatase 96 40 - 130 U/L    ALT 19 5 - 41 U/L    AST 21 5 - 40 U/L   CBC Auto Differential    Collection Time: 09/06/20  3:46 PM   Result Value Ref Range    WBC 6.0 4.8 - 10.8 K/uL    RBC 3.47 (L) 4.70 - 6.10 M/uL    Hemoglobin 11.2 (L) 14.0 - 18.0 g/dL    Hematocrit 34.6 (L) 42.0 - 52.0 %    MCV 99.7 (H) 80.0 - 94.0 fL    MCH 32.3 (H) 27.0 - 31.0 pg    MCHC 32.4 (L) 33.0 - 37.0 g/dL    RDW 13.6 11.5 - 14.5 %    Platelets 758 286 - 589 K/uL    MPV 10.3 9.4 - 12.4 fL    Neutrophils % 56.2 50.0 - 65.0 %    Lymphocytes % 31.1 20.0 - 40.0 %    Monocytes % 8.2 0.0 - 10.0 %    Eosinophils % 4.0 0.0 - 5.0 %    Basophils % 0.3 0.0 - 1.0 %    Neutrophils Absolute 3.3 1.5 - 7.5 K/uL    Immature Granulocytes # 0.0 K/uL    Lymphocytes Absolute 1.9 1.1 - 4.5 K/uL    Monocytes Absolute 0.50 0.00 - 0.90 K/uL    Eosinophils Absolute 0.20 0.00 - 0.60 K/uL    Basophils Absolute 0.00 0.00 - 0.20 K/uL   Troponin    Collection Time: 09/06/20  3:46 PM   Result Value Ref Range    Troponin 0.10 (HH) 0.00 - 0.03 ng/mL   Brain Natriuretic Peptide    Collection Time: 09/06/20  3:46 PM   Result Value Ref Range    Pro- 0 - 1,800 pg/mL   APTT    Collection Time: 09/06/20  3:46 PM   Result Value Ref Range    aPTT 26.1 26.0 - 36.2 sec   Troponin    Collection Time: 09/06/20  5:20 PM   Result Value Ref Range    Troponin 0.09 (H) 0.00 - 0.03 ng/mL       No intake/output data recorded. Xr Chest Portable    Result Date: 9/6/2020  Impression: No acute cardiopulmonary disease. Signed by Dr Karma Floyd on 9/6/2020 4:20 PM      Assessment and Plan:   1) nstemi  -h/o cad/mi s/p stenting/cabg  -most recent cath on file 3/2019: Double vessel coronary artery disease involving the mid LAD and entire RCA. Left ventricular function is normal. Patent left YORDAN to the LAD. Patent un grafted right YORDAN  -serial troponin 0.10>0.09>  -follow ekg  -cards following  -heparin gtt  -nitro gtt  -home cardiac meds  -defer potential cath in am to cards   -tte 12/10/2019:  Limited study for ejection fraction. Mild to moderate concentric left ventricular hypertrophy. Normal left ventricular size with preserved LV function and an estimated ejection fraction of approximately 55-60%.  No regional wall motion abnormalities    2) dm2  -meds on board    3) ckd  -follow renal fxn/uop/lytes  -avoid offending agents    4) dvt ppx  -as above     Total critical care time: 70 minutes  Total time spent managing the care of this patient: 70 minutes    Sudha Santos MD  9/6/2020 6:33 PM

## 2020-09-07 LAB
ANION GAP SERPL CALCULATED.3IONS-SCNC: 10 MMOL/L (ref 7–19)
APTT: 61.6 SEC (ref 26–36.2)
APTT: 68.4 SEC (ref 26–36.2)
APTT: 68.5 SEC (ref 26–36.2)
BASOPHILS ABSOLUTE: 0 K/UL (ref 0–0.2)
BASOPHILS RELATIVE PERCENT: 0.6 % (ref 0–1)
BUN BLDV-MCNC: 40 MG/DL (ref 8–23)
CALCIUM SERPL-MCNC: 9.4 MG/DL (ref 8.8–10.2)
CHLORIDE BLD-SCNC: 106 MMOL/L (ref 98–111)
CO2: 25 MMOL/L (ref 22–29)
CREAT SERPL-MCNC: 1.3 MG/DL (ref 0.5–1.2)
EOSINOPHILS ABSOLUTE: 0.3 K/UL (ref 0–0.6)
EOSINOPHILS RELATIVE PERCENT: 5.5 % (ref 0–5)
GFR AFRICAN AMERICAN: >59
GFR NON-AFRICAN AMERICAN: 52
GLUCOSE BLD-MCNC: 125 MG/DL (ref 70–99)
GLUCOSE BLD-MCNC: 133 MG/DL (ref 74–109)
GLUCOSE BLD-MCNC: 159 MG/DL (ref 70–99)
GLUCOSE BLD-MCNC: 179 MG/DL (ref 70–99)
HCT VFR BLD CALC: 28.8 % (ref 42–52)
HEMOGLOBIN: 9.4 G/DL (ref 14–18)
IMMATURE GRANULOCYTES #: 0 K/UL
LV EF: 65 %
LVEF MODALITY: NORMAL
LYMPHOCYTES ABSOLUTE: 1.7 K/UL (ref 1.1–4.5)
LYMPHOCYTES RELATIVE PERCENT: 31.7 % (ref 20–40)
MAGNESIUM: 2.3 MG/DL (ref 1.6–2.4)
MCH RBC QN AUTO: 32.5 PG (ref 27–31)
MCHC RBC AUTO-ENTMCNC: 32.6 G/DL (ref 33–37)
MCV RBC AUTO: 99.7 FL (ref 80–94)
MONOCYTES ABSOLUTE: 0.6 K/UL (ref 0–0.9)
MONOCYTES RELATIVE PERCENT: 10.6 % (ref 0–10)
NEUTROPHILS ABSOLUTE: 2.8 K/UL (ref 1.5–7.5)
NEUTROPHILS RELATIVE PERCENT: 51.4 % (ref 50–65)
PDW BLD-RTO: 13.6 % (ref 11.5–14.5)
PERFORMED ON: ABNORMAL
PLATELET # BLD: 168 K/UL (ref 130–400)
PMV BLD AUTO: 10.8 FL (ref 9.4–12.4)
POTASSIUM REFLEX MAGNESIUM: 4.3 MMOL/L (ref 3.5–5)
RBC # BLD: 2.89 M/UL (ref 4.7–6.1)
SODIUM BLD-SCNC: 141 MMOL/L (ref 136–145)
TOTAL CK: 99 U/L (ref 39–308)
TROPONIN: 0.1 NG/ML (ref 0–0.03)
TSH REFLEX FT4: 3.43 UIU/ML (ref 0.35–5.5)
WBC # BLD: 5.5 K/UL (ref 4.8–10.8)

## 2020-09-07 PROCEDURE — 6360000002 HC RX W HCPCS: Performed by: EMERGENCY MEDICINE

## 2020-09-07 PROCEDURE — 93306 TTE W/DOPPLER COMPLETE: CPT

## 2020-09-07 PROCEDURE — 36415 COLL VENOUS BLD VENIPUNCTURE: CPT

## 2020-09-07 PROCEDURE — 85730 THROMBOPLASTIN TIME PARTIAL: CPT

## 2020-09-07 PROCEDURE — 99253 IP/OBS CNSLTJ NEW/EST LOW 45: CPT | Performed by: INTERNAL MEDICINE

## 2020-09-07 PROCEDURE — 2140000000 HC CCU INTERMEDIATE R&B

## 2020-09-07 PROCEDURE — 2580000003 HC RX 258: Performed by: INTERNAL MEDICINE

## 2020-09-07 PROCEDURE — 84484 ASSAY OF TROPONIN QUANT: CPT

## 2020-09-07 PROCEDURE — 82550 ASSAY OF CK (CPK): CPT

## 2020-09-07 PROCEDURE — 6370000000 HC RX 637 (ALT 250 FOR IP): Performed by: INTERNAL MEDICINE

## 2020-09-07 PROCEDURE — 82947 ASSAY GLUCOSE BLOOD QUANT: CPT

## 2020-09-07 PROCEDURE — 83735 ASSAY OF MAGNESIUM: CPT

## 2020-09-07 PROCEDURE — 84443 ASSAY THYROID STIM HORMONE: CPT

## 2020-09-07 PROCEDURE — 80048 BASIC METABOLIC PNL TOTAL CA: CPT

## 2020-09-07 PROCEDURE — 85025 COMPLETE CBC W/AUTO DIFF WBC: CPT

## 2020-09-07 RX ADMIN — ISOSORBIDE MONONITRATE 120 MG: 60 TABLET, EXTENDED RELEASE ORAL at 08:05

## 2020-09-07 RX ADMIN — FINASTERIDE 5 MG: 5 TABLET, FILM COATED ORAL at 08:05

## 2020-09-07 RX ADMIN — PRAVASTATIN SODIUM 40 MG: 20 TABLET ORAL at 08:05

## 2020-09-07 RX ADMIN — PREDNISOLONE ACETATE 1 DROP: 10 SUSPENSION/ DROPS OPHTHALMIC at 08:06

## 2020-09-07 RX ADMIN — SODIUM CHLORIDE, PRESERVATIVE FREE 10 ML: 5 INJECTION INTRAVENOUS at 21:32

## 2020-09-07 RX ADMIN — INSULIN LISPRO 1 UNITS: 100 INJECTION, SOLUTION INTRAVENOUS; SUBCUTANEOUS at 21:31

## 2020-09-07 RX ADMIN — PANTOPRAZOLE SODIUM 40 MG: 40 TABLET, DELAYED RELEASE ORAL at 21:31

## 2020-09-07 RX ADMIN — INSULIN LISPRO 1 UNITS: 100 INJECTION, SOLUTION INTRAVENOUS; SUBCUTANEOUS at 16:45

## 2020-09-07 RX ADMIN — LOSARTAN POTASSIUM 50 MG: 50 TABLET, FILM COATED ORAL at 08:06

## 2020-09-07 RX ADMIN — SODIUM CHLORIDE, PRESERVATIVE FREE 10 ML: 5 INJECTION INTRAVENOUS at 08:06

## 2020-09-07 RX ADMIN — HEPARIN SODIUM 9.27 UNITS/KG/HR: 10000 INJECTION, SOLUTION INTRAVENOUS at 22:56

## 2020-09-07 RX ADMIN — ASPIRIN 81 MG: 81 TABLET, CHEWABLE ORAL at 08:05

## 2020-09-07 RX ADMIN — GABAPENTIN 100 MG: 100 CAPSULE ORAL at 08:05

## 2020-09-07 RX ADMIN — TAMSULOSIN HYDROCHLORIDE 0.8 MG: 0.4 CAPSULE ORAL at 08:05

## 2020-09-07 RX ADMIN — GABAPENTIN 100 MG: 100 CAPSULE ORAL at 21:31

## 2020-09-07 RX ADMIN — AMLODIPINE BESYLATE 10 MG: 5 TABLET ORAL at 08:05

## 2020-09-07 RX ADMIN — PANTOPRAZOLE SODIUM 40 MG: 40 TABLET, DELAYED RELEASE ORAL at 08:05

## 2020-09-07 ASSESSMENT — ENCOUNTER SYMPTOMS
CHEST TIGHTNESS: 1
SHORTNESS OF BREATH: 1
GASTROINTESTINAL NEGATIVE: 1

## 2020-09-07 ASSESSMENT — PAIN SCALES - GENERAL
PAINLEVEL_OUTOF10: 0

## 2020-09-07 NOTE — PROGRESS NOTES
Hospitalist Progress Note  [facility]     Patient: Shmuel Webber  : 1934  MRN: [id number]  Code Status: Full Code    Hospital Day: 1   Date of Service: 2020    Subjective:   Pt seen and examined. Resting in bed. No acute distress.     Past Medical History:   Diagnosis Date    Blood circulation, collateral     CAD (coronary artery disease)     STENTS X 3    CAD (coronary artery disease)     CABG    Cerebral artery occlusion with cerebral infarction (HCC)     CHF (congestive heart failure) (HCC)     Chronic kidney disease     DDD (degenerative disc disease), lumbar 2017    Diabetes mellitus [facility] [address]     Disease of blood and blood forming organ     GERD (gastroesophageal reflux disease)     History of blood transfusion     Hx of blood clots     Right leg    Hyperlipidemia     Hypertension     Lumbar stenosis with neurogenic claudication 2017    MI, old     X 2    Other disorders of kidney and ureter in diseases classified elsewhere     Palliative care patient 2018    Right heart failure ([facility] [address] 2018    Sleep apnea     DOESN'T USE MACHINE    TIA (transient ischemic attack)        Past Surgical History:   Procedure Laterality Date    APPENDECTOMY      BACK SURGERY  1980    x 3    CARDIAC SURGERY      Bypass x 3    CARPAL TUNNEL RELEASE Bilateral     wrist and elbows    CHOLECYSTECTOMY      COLONOSCOPY      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 2    DILATATION, ESOPHAGUS      ENDOSCOPY, COLON, DIAGNOSTIC      EYE SURGERY      JOINT REPLACEMENT Left     JOINT REPLACEMENT Right     LAMINECTOMY N/A 2017    LUMBAR LAMINECTOMY POSTERIOR  L23 L34 performed by Rosa Isela Garza MD at [address] Left     ROTATOR CUFF REPAIR Right     TUMOR REMOVAL Left 1960    foot    TURP  2000    VASCULAR SURGERY Left 7/15/2015 [facility] & [address]    Aortoiliofemoral a'gram with bilateral lower extremity runoff; select views 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; a'plasty of left anterior tibial artery and dorsalis pedis artery with 2.5x300 vascutrak balloon and then with 3x220 nati balloon; completion a'gram       Family History   Problem Relation Age of Onset    Cancer Mother     Heart Disease Father     Heart Disease Brother     Heart Disease Brother     Heart Disease Brother     Heart Disease Brother     Heart Attack Brother     Heart Disease Brother     Heart Disease Brother        Social History     Socioeconomic History    Marital status:      Spouse name: Frank Cooper    Number of children: 2    Years of education: 15    Highest education level: Not on file   Occupational History    Not on file   Social Needs    Financial resource strain: Not on file    Food insecurity     Worry: Not on file     Inability: Not on file   [employer] needs     Medical: Not on file     Non-medical: Not on file   Tobacco Use    Smoking status: Never Smoker    Smokeless tobacco: Never Used   Substance and Sexual Activity    Alcohol use: No    Drug use: No    Sexual activity: Not Currently   Lifestyle    Physical activity     Days per week: Not on file     Minutes per session: Not on file    Stress: Not on file   Relationships    Social connections     Talks on phone: Not on file     Gets together: Not on file     Attends Buddhism service: Not on file     Active member of club or organization: Not on file     Attends meetings of clubs or organizations: Not on file     Relationship status: Not on file    Intimate partner violence     Fear of current or ex partner: Not on file     Emotionally abused: Not on file     Physically abused: Not on file     Forced sexual activity: Not on file   Other Topics Concern    Not on file   Social History Narrative    Not on file       Current Facility-Administered Medications Medication Dose Route Frequency Provider Last Rate Last Dose    nitroGLYCERIN 50 mg in dextrose 5% 250 mL infusion  5 mcg/min Intravenous Continuous Charlie Andre MD 6 mL/hr at 09/06/20 2124 20 mcg/min at 09/06/20 2124    heparin (porcine) injection 4,000 Units  4,000 Units Intravenous PRN Charlie Andre MD        heparin (porcine) injection 2,000 Units  2,000 Units Intravenous PRN Charlie Andre MD        heparin 25,000 units in dextrose 5% 250 mL infusion  11.3 Units/kg/hr Intravenous Continuous Charlie Andre MD 8.2 mL/hr at 09/07/20 0041 9.3 Units/kg/hr at 09/07/20 0041    amLODIPine (NORVASC) tablet 10 mg  10 mg Oral Daily Sudha Santos MD        baclofen (LIORESAL) tablet 10 mg  10 mg Oral Daily PRN Sudha Santos MD        finasteride (PROSCAR) tablet 5 mg  5 mg Oral Daily Sudha Santos MD        gabapentin (NEURONTIN) capsule 100 mg  100 mg Oral BID Sudha Santos MD   100 mg at 09/06/20 2051    isosorbide mononitrate (IMDUR) extended release tablet 120 mg  120 mg Oral Daily Sudha Santos MD        losartan (COZAAR) tablet 50 mg  50 mg Oral BID Sudha Santos MD   50 mg at 09/06/20 2051    nitroGLYCERIN (NITROSTAT) SL tablet 0.4 mg  0.4 mg Sublingual Q5 Min PRN Sudha Santos MD        pantoprazole (PROTONIX) tablet 40 mg  40 mg Oral BID Sudha Santos MD   40 mg at 09/06/20 2051    pravastatin (PRAVACHOL) tablet 40 mg  40 mg Oral Daily Sudha Santos MD        prednisoLONE acetate (PRED FORTE) 1 % ophthalmic suspension 1 drop  1 drop Left Eye BID Sudha Santos MD   1 drop at 09/06/20 2051    tamsulosin (FLOMAX) capsule 0.8 mg  0.8 mg Oral Daily Sudha Santos MD        sodium chloride flush 0.9 % injection 10 mL  10 mL Intravenous 2 times per day Sudha Santos MD   10 mL at 09/06/20 2102    sodium chloride flush 0.9 % injection 10 mL  10 mL Intravenous PRN Sudha Santos MD        acetaminophen (TYLENOL) tablet 650 mg  650 mg Oral Q6H PRN Sudha Santos MD        Or    acetaminophen MG, PHOS in the last 72 hours. Recent Labs     09/06/20  1546   AST 21   ALT 19   BILITOT <0.2   ALKPHOS 96     No results for input(s): PH, PO2, PCO2, HCO3, BE, O2SAT in the last 72 hours. Recent Labs     09/06/20  1546 09/06/20  1720 09/06/20  2247   TROPONINI 0.10* 0.09* 0.10*     No results for input(s): INR in the last 72 hours. No results for input(s): LACTA in the last 72 hours. Intake/Output Summary (Last 24 hours) at 9/7/2020 0733  Last data filed at 9/7/2020 0600  Gross per 24 hour   Intake 178.86 ml   Output --   Net 178.86 ml       Xr Chest Portable    Result Date: 9/6/2020  Exam:   XR CHEST PORTABLE  Date:  9/6/2020 History:  Male, age  80 years; chest pain. COMPARISON:  Chest x-ray dated 12/8/2019. Findings : The heart and mediastinum are normal in size. Lungs are without focal infiltrate, mass or effusions. Chronic interstitial lung changes. The bones show no acute pathology. Median sternotomy wires appear intact. Impression: No acute cardiopulmonary disease. Signed by Dr Fabrizio Dye on 9/6/2020 4:20 PM       Assessment and Plan:   1) nstemi  -h/o cad/mi s/p stenting/cabg  -most recent cath on file 3/2019: Double vessel coronary artery disease involving the mid LAD and entire RCA. Left ventricular function is normal. Patent left YORDAN to the LAD. Patent un grafted right YORDAN  -serial troponin 0.10>0.09>0.10  -follow ekg  -cards following  -heparin gtt  -nitro gtt  -home cardiac meds  -defer further testing to cards  -tte 12/10/2019:  Limited study for ejection fraction.  Mild to moderate concentric left ventricular hypertrophy. Normal left ventricular size with preserved LV function and an estimated ejection fraction of approximately 55-60%. No regional wall motion abnormalities     2) dm2  -meds on board     3) ckd  -follow renal fxn/uop/lytes  -avoid offending agents     4) dvt ppx  -as above     Total critical care time: 32 minutes    Schuyler Alfred MD   9/7/2020 7:33 AM

## 2020-09-07 NOTE — CONSULTS
[facility] Cardiology   Consult Note   DrOrtencia Constant       Requesting MD:  Kang Zimmer MD   Admit Status:  Inpatient [101]       History obtained from:   [x] Patient  [] Other (specify):     Patient:  Mikey Valentino  [id number]     Chief Complaint:   Chief Complaint   Patient presents with    Shoulder Pain    Arm Pain    Chest Pain       HPI: Mr. Christian Aquino is a 80 y.o. male with a history of known severe coronary status post CABG long time ago with LIMA to LAD, he presented to our emergency department with sudden onset chest pain, chest pain was mostly central going to the left side and radiating to the left arm,, Pain was going on and off for weeks but worsened over the past couple of days, the pain was 8 out of 10 when he presented to the hospital.  His chest pain was accompanied by shortness of breath but no diaphoresis or dizziness or palpitation      The patient has extensive cardiac history including coronary disease status post CABG, history of diabetes, history of congestive heart failure, History of ischemic cardiomyopathy, history of hypertension and hyperlipidemia  With a previous stroke    Remarkably the patient still active and does work at the [employer]  He is non-smoker    Review of Systems:  Review of Systems   Constitutional: Negative. Respiratory: Positive for chest tightness and shortness of breath. Cardiovascular: Positive for chest pain. Gastrointestinal: Negative. Endocrine: Negative. Genitourinary: Negative. Musculoskeletal: Negative. Skin: Negative. Neurological: Negative. Hematological: Negative. All other systems reviewed and are negative.       Cardiac Specific Data:  Specialty Problems        Cardiology Problems    Type II diabetes mellitus with peripheral circulatory disorder (HCC)        CHF (congestive heart failure), NYHA class I, acute on chronic, combined (HCC)        Coronary artery disease involving native coronary artery of native heart        Acute on chronic congestive heart failure (HCC)        Ischemic cardiomyopathy        Acute chest pain        Bradycardia        Right heart failure (HCC)        Angina of effort [facility])        Essential hypertension        Chest pain        Valvular heart disease        Syncope and collapse        NSTEMI (non-ST elevated myocardial infarction) [facility])              Past Medical History:  Past Medical History:   Diagnosis Date    Blood circulation, collateral     CAD (coronary artery disease)     STENTS X 3    CAD (coronary artery disease)     CABG    Cerebral artery occlusion with cerebral infarction (HCC)     CHF (congestive heart failure) (HCC)     Chronic kidney disease     DDD (degenerative disc disease), lumbar 7/11/2017    Diabetes mellitus ([facility] [address]     Disease of blood and blood forming organ     GERD (gastroesophageal reflux disease)     History of blood transfusion     Hx of blood clots     Right leg    Hyperlipidemia     Hypertension     Lumbar stenosis with neurogenic claudication 7/11/2017    MI, old     X 2    Other disorders of kidney and ureter in diseases classified elsewhere     Palliative care patient 03/06/2018    Right heart failure ([facility] [address] 6/29/2018    Sleep apnea     DOESN'T USE MACHINE    TIA (transient ischemic attack)         Past Surgical History:  Past Surgical History:   Procedure Laterality Date    APPENDECTOMY      BACK SURGERY  1980    x 3    CARDIAC SURGERY  1990    Bypass x 3    CARPAL TUNNEL RELEASE Bilateral 1980    wrist and elbows    CHOLECYSTECTOMY  2000    COLONOSCOPY      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 2    DILATATION, ESOPHAGUS      ENDOSCOPY, COLON, DIAGNOSTIC      EYE SURGERY      JOINT REPLACEMENT Left 1990    JOINT REPLACEMENT Right 1995    LAMINECTOMY N/A 7/11/2017    LUMBAR LAMINECTOMY POSTERIOR  L23 L34 performed by Deloris Lyons MD at [address] Left 1990    ROTATOR CUFF REPAIR Right 2010    TUMOR REMOVAL Left 1960    foot    TURP file     Forced sexual activity: Not on file   Other Topics Concern    Not on file   Social History Narrative    Not on file       Allergies: Allergies   Allergen Reactions    Pcn [Penicillins] Swelling    Adhesive Tape Rash       Home Meds:  Prior to Admission medications    Medication Sig Start Date End Date Taking? Authorizing Provider   amLODIPine (NORVASC) 10 MG tablet Take 1 tablet by mouth daily 12/4/19   Jeremías Coats MD   isosorbide mononitrate (IMDUR) 60 MG extended release tablet Take 2 tablets by mouth daily 10/12/19   Vanessa Chavez MD   mupirocin (BACTROBAN) 2 % ointment Apply topically 2 times daily Apply to wound BID 9/10/19   King Schroeder MD   docusate sodium (COLACE) 100 MG capsule Take 1 capsule by mouth 2 times daily 6/7/19   Chadwick Gilman MD   tamsulosin [facility]) 0.4 MG capsule Take 2 capsules by mouth daily 3/31/19   Jeremías Coats MD   finasteride (PROSCAR) 5 MG tablet Take 1 tablet by mouth daily 3/31/19   Jeremías Coats MD   nitroGLYCERIN (NITROSTAT) 0.4 MG SL tablet up to max of 3 total doses. If no relief after 1 dose, call 911. 12/22/18   Jeremías Coats MD   Multiple Vitamins-Minerals (EYE VITAMINS PO) Take by mouth    Historical Provider, MD   lidocaine (LIDODERM) 5 % Place 1 patch onto the skin daily 12 hours on, 12 hours off. 12/16/18   Zulma Khan MD   losartan (COZAAR) 50 MG tablet Take 50 mg by mouth 2 times daily    Historical Provider, MD   vitamin D (CHOLECALCIFEROL) 1000 UNIT TABS tablet Take 1,000 Units by mouth daily    Historical Provider, MD   rOPINIRole (REQUIP) 1 MG tablet Take 1 mg by mouth nightly    Historical Provider, MD   gabapentin (NEURONTIN) 100 MG capsule Take 100 mg by mouth 2 times daily. Angélica Armstrong     Historical Provider, MD   glimepiride (AMARYL) 4 MG tablet Take 4 mg by mouth 2 times daily    Historical Provider, MD   colchicine (COLCRYS) 0.6 MG tablet Take 0.6 mg by mouth as needed for Pain    Historical Provider, MD   baclofen (LIORESAL) 10 MG tablet Take 10 mg by mouth daily as needed     Historical Provider, MD   aspirin 81 MG chewable tablet Take 1 tablet by mouth daily 6/1/18   Breanne Lovell MD   pantoprazole (PROTONIX) 40 MG tablet Take 1 tablet by mouth 2 times daily 6/1/18   Breanne Lovell MD   ferrous sulfate-C-folic acid (FOLITAB) 934-800-5.4 MG TBCR per extended release tablet Take 1 tablet by mouth daily 7/21/17   Chriss Dakin, MD   prednisoLONE acetate (PRED FORTE) 1 % ophthalmic suspension Place 1 drop into the left eye 2 times daily     Niru Gr   allopurinol (ZYLOPRIM) 300 MG tablet Take 600 mg by mouth daily     Historical Provider, MD   sennosides-docusate sodium (SENOKOT-S) 8.6-50 MG tablet Take 2 tablets by mouth daily    Historical Provider, MD   diphenoxylate-atropine (LOMOTIL) 2.5-0.025 MG per tablet Take 1 tablet by mouth 4 times daily as needed for Diarrhea    Historical Provider, MD   meclizine (ANTIVERT) 25 MG tablet Take 25 mg by mouth 4 times daily as needed    Historical Provider, MD   ondansetron (ZOFRAN-ODT) 4 MG disintegrating tablet Take 4 mg by mouth every 8 hours as needed for Nausea or Vomiting    Historical Provider, MD   pravastatin (PRAVACHOL) 40 MG tablet Take 40 mg by mouth daily    Historical Provider, MD   promethazine (PHENERGAN) 12.5 MG tablet Take 12.5 mg by mouth as needed for Nausea    Historical Provider, MD   Ascorbic Acid (VITAMIN C) 500 MG tablet Take 1,000 mg by mouth daily     Historical Provider, MD   b complex vitamins capsule Take 1 capsule by mouth daily    Historical Provider, MD       Current Meds:   amLODIPine  10 mg Oral Daily    finasteride  5 mg Oral Daily    gabapentin  100 mg Oral BID    isosorbide mononitrate  120 mg Oral Daily    losartan  50 mg Oral BID    pantoprazole  40 mg Oral BID    pravastatin  40 mg Oral Daily    prednisoLONE acetate  1 drop Left Eye BID    tamsulosin  0.8 mg Oral Daily    sodium chloride flush  10 mL Intravenous 2 times per day    aspirin  81 mg Oral Daily    insulin lispro  0-6 Units Subcutaneous TID WC    insulin lispro  0-3 Units Subcutaneous Nightly       Current Infused Meds:   nitroGLYCERIN Stopped (09/07/20 0948)    heparin (porcine) 9.3 Units/kg/hr (09/07/20 0900)    dextrose         Physical Exam:  Vitals:    09/07/20 1200   BP: (!) 109/44   Pulse: 51   Resp: 17   Temp: 97.6 °F (36.4 °C)   SpO2: 95%       Intake/Output Summary (Last 24 hours) at 9/7/2020 1216  Last data filed at 9/7/2020 1000  Gross per 24 hour   Intake 338.04 ml   Output 350 ml   Net -11.96 ml     Estimated body mass index is 25.99 kg/m² as calculated from the following:    Height as of this encounter: 5' 10\" (1.778 m). Weight as of this encounter: 181 lb 1.6 oz (82.1 kg). Sternotomy scar     Physical Exam  Vitals signs reviewed. Constitutional:       Appearance: Normal appearance. HENT:      Head: Normocephalic. Mouth/Throat:      Mouth: Mucous membranes are moist.   Cardiovascular:      Rate and Rhythm: Normal rate and regular rhythm. Pulses: Normal pulses. Heart sounds: Normal heart sounds. No murmur. Pulmonary:      Effort: Pulmonary effort is normal.      Breath sounds: Normal breath sounds. Abdominal:      General: Bowel sounds are normal.      Palpations: Abdomen is soft. Tenderness: There is no abdominal tenderness. Musculoskeletal: Normal range of motion. Right lower leg: No edema. Left lower leg: No edema. Skin:     General: Skin is warm. Neurological:      General: No focal deficit present. Mental Status: He is alert and oriented to person, place, and time.    Psychiatric:         Mood and Affect: Mood normal.         Behavior: Behavior normal.         Labs:  Recent Labs     09/06/20  1546 09/07/20  0638   WBC 6.0 5.5   HGB 11.2* 9.4*    168       Recent Labs     09/06/20  1546 09/07/20  0638    141   K 4.5 4.3    106   CO2 25 25   BUN 46* 40*   CREATININE 1.5* 1.3*   LABGLOM 44* 52*   MG  --  2.3   CALCIUM 10.0 9.4       most recent cath on file 3/2019: Double vessel coronary artery disease involving the mid LAD and entire RCA. Left ventricular function is normal. Patent left YORDAN to the LAD. Patent un grafted right YORDAN    Results for Leena Hathaway (MRN [id number]) as of 9/7/2020 21:53   Ref. Range 12/11/2019 22:17 9/6/2020 15:46 9/6/2020 17:20 9/6/2020 22:47 9/7/2020 06:38   Total CK Latest Ref Range: 39 - 308 U/L     99   CRP Latest Ref Range: 0.00 - 0.50 mg/dL 0.22       LD Latest Ref Range: 91 - 215 U/L 174       Pro-BNP Latest Ref Range: 0 - 1,800 pg/mL  973      Troponin Latest Ref Range: 0.00 - 0.03 ng/mL  0.10 (HH) 0.09 (H) 0.10 (HH) 0.10 (HH)           IMAGING:    EKG  ECHO -    September 7, 2020   Indications:Chest pain.      Conclusions      Summary   Normal left ventricular size with preserved LV function and an estimated   ejection fraction of approximately 65%. Moderate concentric left ventricular hypertrophy noted. E/A flow reversal noted. Suggestive of Grade I diastolic dysfunction. Mildly thickened mitral valve with mildly reduced leaflet mobility. Mild   mitral valve stenosis, trivial mitral regurgitation. Mitral annular calcification is present. Mildly thickened aortic valve leaflets with preserved leaflet   mobility. Aortic valve appears to be tricuspid. Mild aortic stenosis; Trace aortic regurgitation. Highest Mean Gradient   was 20 mmHg   No evidence of significant pericardial effusion is noted. Signature      ----------------------------------------------------------------   Electronically signed by Unique Raymundo MD(Interpreting   physician) on 09/07/2020 06:15 PM        Xr Chest Portable    Result Date: 9/6/2020  Impression: No acute cardiopulmonary disease. Signed by Dr Des Garrett on 9/6/2020 4:20 PM       Assessment and Plan:  1.  Patient was admitted to the ICU with the emergency department with acute coronary syndrome, non-ST elevation because of elevated troponin he has improved and chest pain severity, now he is being transferred to the cardiology floor for completing the work-up and telemetry monitoring. Given his presentation that include chest pain elevated troponin and other risk factors including previous coronary disease and CABG with previous stents, I offered him coronary angiogram with possible revascularization given his functional status and being independent and still working at this age, he understood all the risks and benefits of this, he agreed to proceed with heart catheterization tomorrow morning. Continue with medical therapy including nitroglycerin, aspirin and statin   2. Hypertension his blood pressure is well controlled  3.  Hyperlipidemia continue with statin          Electronically signed by Diego Pool MD on 9/7/2020 at 12:16 PM    Diego Pool MD, [facility]  Interventional Cardiologist, Endovascular Specialist   Medical Director, Ricardo Li

## 2020-09-07 NOTE — ACP (ADVANCE CARE PLANNING)
Advance Care Planning     Advance Care Planning Activator (Inpatient)  Conversation Note      Date of ACP Conversation: 9/7/20    [employer] with: Pt with decision making capacity. ACP Activator: Priyank Glaser    . Health Care Decision Maker:     Zoran Hernandez [phone]  Muriel Chiu, brother [phone]    Care Preferences      Ventilation:  Would the patient desire the use of ventilator (breathing machine)?: Yes      Resuscitation: \"In the event your heart stopped as a result of an underlying serious health condition, would you want attempts to be made to restart your heart (answer \"yes\" for attempt to resuscitate) or would you prefer a natural death (answer \"no\" for do not attempt to resuscitate)? \" Yes        Conversation Outcomes:  [x] ACP discussion completed  [] Existing advance directive reviewed with patient; no changes to patient's previously recorded wishesd  [] Portable Do Not Rescitate prepared for Provider review and signature  [] POLST/POST/MOLST/MOST prepared for Provider review and signature      Follow-up plan:    [] Schedule follow-up conversation to continue planning  [] Referred individual to Provider for additional questions/concerns   [] Advised patient/agent/surrogate to review completed ACP document and update if needed with changes in condition, patient preferences or care setting    [] This note routed to one or more involved healthcare providers

## 2020-09-08 LAB
ANION GAP SERPL CALCULATED.3IONS-SCNC: 8 MMOL/L (ref 7–19)
APTT: 62.4 SEC (ref 26–36.2)
APTT: 65.8 SEC (ref 26–36.2)
APTT: 65.9 SEC (ref 26–36.2)
BASOPHILS ABSOLUTE: 0 K/UL (ref 0–0.2)
BASOPHILS RELATIVE PERCENT: 0.2 % (ref 0–1)
BUN BLDV-MCNC: 38 MG/DL (ref 8–23)
CALCIUM SERPL-MCNC: 9.5 MG/DL (ref 8.8–10.2)
CHLORIDE BLD-SCNC: 106 MMOL/L (ref 98–111)
CO2: 25 MMOL/L (ref 22–29)
CREAT SERPL-MCNC: 1.4 MG/DL (ref 0.5–1.2)
EKG P AXIS: -12 DEGREES
EKG P-R INTERVAL: 222 MS
EKG Q-T INTERVAL: 474 MS
EKG QRS DURATION: 98 MS
EKG QTC CALCULATION (BAZETT): 455 MS
EKG T AXIS: 61 DEGREES
EOSINOPHILS ABSOLUTE: 0.3 K/UL (ref 0–0.6)
EOSINOPHILS RELATIVE PERCENT: 4.8 % (ref 0–5)
GFR AFRICAN AMERICAN: 58
GFR NON-AFRICAN AMERICAN: 48
GLUCOSE BLD-MCNC: 137 MG/DL (ref 70–99)
GLUCOSE BLD-MCNC: 139 MG/DL (ref 74–109)
GLUCOSE BLD-MCNC: 144 MG/DL (ref 70–99)
GLUCOSE BLD-MCNC: 147 MG/DL (ref 70–99)
GLUCOSE BLD-MCNC: 161 MG/DL (ref 70–99)
HCT VFR BLD CALC: 27.3 % (ref 42–52)
HEMOGLOBIN: 9.1 G/DL (ref 14–18)
IMMATURE GRANULOCYTES #: 0 K/UL
LYMPHOCYTES ABSOLUTE: 1.3 K/UL (ref 1.1–4.5)
LYMPHOCYTES RELATIVE PERCENT: 25.2 % (ref 20–40)
MAGNESIUM: 2.2 MG/DL (ref 1.6–2.4)
MCH RBC QN AUTO: 32.9 PG (ref 27–31)
MCHC RBC AUTO-ENTMCNC: 33.3 G/DL (ref 33–37)
MCV RBC AUTO: 98.6 FL (ref 80–94)
MONOCYTES ABSOLUTE: 0.5 K/UL (ref 0–0.9)
MONOCYTES RELATIVE PERCENT: 10.2 % (ref 0–10)
NEUTROPHILS ABSOLUTE: 3.1 K/UL (ref 1.5–7.5)
NEUTROPHILS RELATIVE PERCENT: 59.4 % (ref 50–65)
PDW BLD-RTO: 13.4 % (ref 11.5–14.5)
PERFORMED ON: ABNORMAL
PLATELET # BLD: 155 K/UL (ref 130–400)
PMV BLD AUTO: 11.1 FL (ref 9.4–12.4)
POTASSIUM SERPL-SCNC: 4.3 MMOL/L (ref 3.5–5)
RBC # BLD: 2.77 M/UL (ref 4.7–6.1)
SODIUM BLD-SCNC: 139 MMOL/L (ref 136–145)
WBC # BLD: 5.2 K/UL (ref 4.8–10.8)

## 2020-09-08 PROCEDURE — 6360000004 HC RX CONTRAST MEDICATION: Performed by: INTERNAL MEDICINE

## 2020-09-08 PROCEDURE — 82947 ASSAY GLUCOSE BLOOD QUANT: CPT

## 2020-09-08 PROCEDURE — 93459 L HRT ART/GRFT ANGIO: CPT

## 2020-09-08 PROCEDURE — C1769 GUIDE WIRE: HCPCS

## 2020-09-08 PROCEDURE — 36415 COLL VENOUS BLD VENIPUNCTURE: CPT

## 2020-09-08 PROCEDURE — 2709999900 HC NON-CHARGEABLE SUPPLY

## 2020-09-08 PROCEDURE — 6360000002 HC RX W HCPCS

## 2020-09-08 PROCEDURE — 93010 ELECTROCARDIOGRAM REPORT: CPT | Performed by: INTERNAL MEDICINE

## 2020-09-08 PROCEDURE — B2131ZZ FLUOROSCOPY OF MULTIPLE CORONARY ARTERY BYPASS GRAFTS USING LOW OSMOLAR CONTRAST: ICD-10-PCS | Performed by: INTERNAL MEDICINE

## 2020-09-08 PROCEDURE — 99153 MOD SED SAME PHYS/QHP EA: CPT

## 2020-09-08 PROCEDURE — 2580000003 HC RX 258: Performed by: INTERNAL MEDICINE

## 2020-09-08 PROCEDURE — C1894 INTRO/SHEATH, NON-LASER: HCPCS

## 2020-09-08 PROCEDURE — B2111ZZ FLUOROSCOPY OF MULTIPLE CORONARY ARTERIES USING LOW OSMOLAR CONTRAST: ICD-10-PCS | Performed by: INTERNAL MEDICINE

## 2020-09-08 PROCEDURE — 85025 COMPLETE CBC W/AUTO DIFF WBC: CPT

## 2020-09-08 PROCEDURE — 2500000003 HC RX 250 WO HCPCS

## 2020-09-08 PROCEDURE — 85730 THROMBOPLASTIN TIME PARTIAL: CPT

## 2020-09-08 PROCEDURE — 80048 BASIC METABOLIC PNL TOTAL CA: CPT

## 2020-09-08 PROCEDURE — 93459 L HRT ART/GRFT ANGIO: CPT | Performed by: INTERNAL MEDICINE

## 2020-09-08 PROCEDURE — 99152 MOD SED SAME PHYS/QHP 5/>YRS: CPT

## 2020-09-08 PROCEDURE — 2140000000 HC CCU INTERMEDIATE R&B

## 2020-09-08 PROCEDURE — 4A023N7 MEASUREMENT OF CARDIAC SAMPLING AND PRESSURE, LEFT HEART, PERCUTANEOUS APPROACH: ICD-10-PCS | Performed by: INTERNAL MEDICINE

## 2020-09-08 PROCEDURE — 83735 ASSAY OF MAGNESIUM: CPT

## 2020-09-08 PROCEDURE — 99152 MOD SED SAME PHYS/QHP 5/>YRS: CPT | Performed by: INTERNAL MEDICINE

## 2020-09-08 PROCEDURE — 6370000000 HC RX 637 (ALT 250 FOR IP): Performed by: INTERNAL MEDICINE

## 2020-09-08 RX ORDER — ACETAMINOPHEN 325 MG/1
650 TABLET ORAL EVERY 4 HOURS PRN
Status: DISCONTINUED | OUTPATIENT
Start: 2020-09-08 | End: 2020-09-10 | Stop reason: HOSPADM

## 2020-09-08 RX ORDER — SODIUM CHLORIDE 0.9 % (FLUSH) 0.9 %
10 SYRINGE (ML) INJECTION EVERY 12 HOURS SCHEDULED
Status: DISCONTINUED | OUTPATIENT
Start: 2020-09-08 | End: 2020-09-10 | Stop reason: HOSPADM

## 2020-09-08 RX ORDER — IODIXANOL 320 MG/ML
90 INJECTION, SOLUTION INTRAVASCULAR
Status: COMPLETED | OUTPATIENT
Start: 2020-09-08 | End: 2020-09-08

## 2020-09-08 RX ORDER — SODIUM CHLORIDE 0.9 % (FLUSH) 0.9 %
10 SYRINGE (ML) INJECTION PRN
Status: DISCONTINUED | OUTPATIENT
Start: 2020-09-08 | End: 2020-09-10 | Stop reason: HOSPADM

## 2020-09-08 RX ORDER — SODIUM CHLORIDE 9 MG/ML
INJECTION, SOLUTION INTRAVENOUS CONTINUOUS
Status: DISCONTINUED | OUTPATIENT
Start: 2020-09-08 | End: 2020-09-09

## 2020-09-08 RX ORDER — SODIUM CHLORIDE 9 MG/ML
INJECTION, SOLUTION INTRAVENOUS CONTINUOUS
Status: DISCONTINUED | OUTPATIENT
Start: 2020-09-08 | End: 2020-09-10 | Stop reason: HOSPADM

## 2020-09-08 RX ADMIN — GABAPENTIN 100 MG: 100 CAPSULE ORAL at 09:12

## 2020-09-08 RX ADMIN — GABAPENTIN 100 MG: 100 CAPSULE ORAL at 20:29

## 2020-09-08 RX ADMIN — SODIUM CHLORIDE, PRESERVATIVE FREE 10 ML: 5 INJECTION INTRAVENOUS at 20:30

## 2020-09-08 RX ADMIN — IODIXANOL 90 ML: 320 INJECTION, SOLUTION INTRAVASCULAR at 15:12

## 2020-09-08 RX ADMIN — AMLODIPINE BESYLATE 10 MG: 5 TABLET ORAL at 09:12

## 2020-09-08 RX ADMIN — SODIUM CHLORIDE: 9 INJECTION, SOLUTION INTRAVENOUS at 11:08

## 2020-09-08 RX ADMIN — ISOSORBIDE MONONITRATE 120 MG: 60 TABLET, EXTENDED RELEASE ORAL at 09:12

## 2020-09-08 RX ADMIN — PREDNISOLONE ACETATE 1 DROP: 10 SUSPENSION/ DROPS OPHTHALMIC at 09:12

## 2020-09-08 RX ADMIN — FINASTERIDE 5 MG: 5 TABLET, FILM COATED ORAL at 09:12

## 2020-09-08 RX ADMIN — PRAVASTATIN SODIUM 40 MG: 20 TABLET ORAL at 09:12

## 2020-09-08 RX ADMIN — LOSARTAN POTASSIUM 50 MG: 50 TABLET, FILM COATED ORAL at 20:29

## 2020-09-08 RX ADMIN — TAMSULOSIN HYDROCHLORIDE 0.8 MG: 0.4 CAPSULE ORAL at 09:12

## 2020-09-08 RX ADMIN — LOSARTAN POTASSIUM 50 MG: 50 TABLET, FILM COATED ORAL at 09:12

## 2020-09-08 RX ADMIN — INSULIN LISPRO 1 UNITS: 100 INJECTION, SOLUTION INTRAVENOUS; SUBCUTANEOUS at 20:36

## 2020-09-08 RX ADMIN — PANTOPRAZOLE SODIUM 40 MG: 40 TABLET, DELAYED RELEASE ORAL at 20:29

## 2020-09-08 RX ADMIN — SODIUM CHLORIDE: 9 INJECTION, SOLUTION INTRAVENOUS at 15:33

## 2020-09-08 RX ADMIN — PREDNISOLONE ACETATE 1 DROP: 10 SUSPENSION/ DROPS OPHTHALMIC at 20:29

## 2020-09-08 RX ADMIN — PANTOPRAZOLE SODIUM 40 MG: 40 TABLET, DELAYED RELEASE ORAL at 09:13

## 2020-09-08 ASSESSMENT — PAIN SCALES - GENERAL
PAINLEVEL_OUTOF10: 0
PAINLEVEL_OUTOF10: 0

## 2020-09-08 ASSESSMENT — ENCOUNTER SYMPTOMS
CHEST TIGHTNESS: 1
SHORTNESS OF BREATH: 1
GASTROINTESTINAL NEGATIVE: 1

## 2020-09-08 NOTE — PROGRESS NOTES
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; a'plasty of left anterior tibial artery and dorsalis pedis artery with 2.5x300 vascutrak balloon and then with 3x220 nati balloon; completion a'gram       Family History   Problem Relation Age of Onset    Cancer Mother     Heart Disease Father     Heart Disease Brother     Heart Disease Brother     Heart Disease Brother     Heart Disease Brother     Heart Attack Brother     Heart Disease Brother     Heart Disease Brother        Social History     Socioeconomic History    Marital status:      Spouse name: Salena Lisa    Number of children: 2    Years of education: 15    Highest education level: Not on file   Occupational History    Not on file   Social Needs    Financial resource strain: Not on file    Food insecurity     Worry: Not on file     Inability: Not on file   [employer] needs     Medical: Not on file     Non-medical: Not on file   Tobacco Use    Smoking status: Never Smoker    Smokeless tobacco: Never Used   Substance and Sexual Activity    Alcohol use: No    Drug use: No    Sexual activity: Not Currently   Lifestyle    Physical activity     Days per week: Not on file     Minutes per session: Not on file    Stress: Not on file   Relationships    Social connections     Talks on phone: Not on file     Gets together: Not on file     Attends Jain service: Not on file     Active member of club or organization: Not on file     Attends meetings of clubs or organizations: Not on file     Relationship status: Not on file    Intimate partner violence     Fear of current or ex partner: Not on file     Emotionally abused: Not on file     Physically abused: Not on file     Forced sexual activity: Not on file   Other Topics Concern    Not on file   Social History Narrative    Not on file       Current Facility-Administered day Lucy Robledo MD   10 mL at 09/07/20 2132    sodium chloride flush 0.9 % injection 10 mL  10 mL Intravenous PRN Lucy Robledo MD        acetaminophen (TYLENOL) tablet 650 mg  650 mg Oral Q6H PRN Lucy Robledo MD        Or    acetaminophen (TYLENOL) suppository 650 mg  650 mg Rectal Q6H PRN Lucy Robledo MD        polyethylene glycol [facility]) packet 17 g  17 g Oral Daily PRN Lucy Robledo MD        promethazine (PHENERGAN) tablet 12.5 mg  12.5 mg Oral Q6H PRN Lucy Robledo MD        Or    ondansetron [facility]) injection 4 mg  4 mg Intravenous Q6H PRN Lucy Robledo MD        aspirin chewable tablet 81 mg  81 mg Oral Daily Lucy Robledo MD   81 mg at 09/07/20 0805    insulin lispro (HUMALOG) injection vial 0-6 Units  0-6 Units Subcutaneous TID WC Lucy Robledo MD   1 Units at 09/07/20 1645    insulin lispro (HUMALOG) injection vial 0-3 Units  0-3 Units Subcutaneous Nightly Lucy Robledo MD   1 Units at 09/07/20 2131    glucose (GLUTOSE) 40 % oral gel 15 g  15 g Oral PRN Lucy Robledo MD        dextrose 50 % IV solution  12.5 g Intravenous PRN Lucy Robledo MD        glucagon (rDNA) injection 1 mg  1 mg Intramuscular PRN Lucy Robledo MD        dextrose 5 % solution  100 mL/hr Intravenous PRN Lucy Robledo MD             sodium chloride 75 mL/hr at 09/08/20 1108    sodium chloride 75 mL/hr at 09/08/20 1533    nitroGLYCERIN Stopped (09/07/20 0948)    dextrose          Objective:   BP (!) 102/51   Pulse 54   Temp 97.4 °F (36.3 °C) (Temporal)   Resp 14   Ht 5' 10\" (1.778 m)   Wt 188 lb 4 oz (85.4 kg)   SpO2 98%   BMI 27.01 kg/m²     General: no acute distress  HEENT: normocephalic, atraumatic  Neck: supple, symmetrical, trachea midline   Lungs: clear to auscultation bilaterally   Cardiovascular: s1 and s2 normal  Abdomen: soft, positive bowel sounds, nondistended, nontender  Extremities: no edema or cyanosis   Neuro: no focal deficits   Skin: normal color and texture     Recent

## 2020-09-08 NOTE — PROGRESS NOTES
Visited with pt to provide spiritual care. Pt says he is better but doesn't remember anything about yesterday afternoon, saying they gave him some medicine yesterday. Provided spiritual care with sustaining presence, nurtured hope and prayer. Pt expressed gratitude for spiritual care.     Electronically signed by Maxine Martinez on 9/8/2020 at 11:40 AM

## 2020-09-08 NOTE — CONSULTS
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; a'plasty of left anterior tibial artery and dorsalis pedis artery with 2.5x300 vascutrak balloon and then with 3x220 nati balloon; completion a'gram       Past Family History:  Family History   Problem Relation Age of Onset    Cancer Mother     Heart Disease Father     Heart Disease Brother     Heart Disease Brother     Heart Disease Brother     Heart Disease Brother     Heart Attack Brother     Heart Disease Brother     Heart Disease Brother        Past Social History:  Social History     Socioeconomic History    Marital status:      Spouse name: Mattie Jett    Number of children: 2    Years of education: 15    Highest education level: Not on file   Occupational History    Not on file   Social Needs    Financial resource strain: Not on file    Food insecurity     Worry: Not on file     Inability: Not on file   [employer] needs     Medical: Not on file     Non-medical: Not on file   Tobacco Use    Smoking status: Never Smoker    Smokeless tobacco: Never Used   Substance and Sexual Activity    Alcohol use: No    Drug use: No    Sexual activity: Not Currently   Lifestyle    Physical activity     Days per week: Not on file     Minutes per session: Not on file    Stress: Not on file   Relationships    Social connections     Talks on phone: Not on file     Gets together: Not on file     Attends Holiness service: Not on file     Active member of club or organization: Not on file     Attends meetings of clubs or organizations: Not on file     Relationship status: Not on file    Intimate partner violence     Fear of current or ex partner: Not on file     Emotionally abused: Not on file     Physically abused: Not on aspirin  81 mg Oral Daily    insulin lispro  0-6 Units Subcutaneous TID WC    insulin lispro  0-3 Units Subcutaneous Nightly       Current Infused Meds:   sodium chloride 75 mL/hr at 09/08/20 1108    nitroGLYCERIN Stopped (09/07/20 0948)    heparin (porcine) 9.266 Units/kg/hr (09/07/20 2256)    dextrose         Physical Exam:  Vitals:    09/08/20 0626   BP: (!) 149/63   Pulse: 50   Resp: 14   Temp: 97.1 °F (36.2 °C)   SpO2: 96%       Intake/Output Summary (Last 24 hours) at 9/8/2020 1227  Last data filed at 9/8/2020 1030  Gross per 24 hour   Intake 730 ml   Output 1100 ml   Net -370 ml     Estimated body mass index is 27.01 kg/m² as calculated from the following:    Height as of this encounter: 5' 10\" (1.778 m). Weight as of this encounter: 188 lb 4 oz (85.4 kg). Sternotomy scar     Physical Exam  Vitals signs reviewed. Constitutional:       Appearance: Normal appearance. HENT:      Head: Normocephalic. Mouth/Throat:      Mouth: Mucous membranes are moist.   Cardiovascular:      Rate and Rhythm: Normal rate and regular rhythm. Pulses: Normal pulses. Heart sounds: Normal heart sounds. No murmur. Pulmonary:      Effort: Pulmonary effort is normal.      Breath sounds: Normal breath sounds. Abdominal:      General: Bowel sounds are normal.      Palpations: Abdomen is soft. Tenderness: There is no abdominal tenderness. Musculoskeletal: Normal range of motion. Right lower leg: No edema. Left lower leg: No edema. Skin:     General: Skin is warm. Neurological:      General: No focal deficit present. Mental Status: He is alert and oriented to person, place, and time.    Psychiatric:         Mood and Affect: Mood normal.         Behavior: Behavior normal.         Labs:  Recent Labs     09/06/20  1546 09/07/20  0638 09/08/20  0010   WBC 6.0 5.5 5.2   HGB 11.2* 9.4* 9.1*    168 155       Recent Labs     09/06/20  1546 09/07/20  0638 09/08/20  0010    141 139   K 4.5 4.3 4.3    106 106   CO2 25 25 25   BUN 46* 40* 38*   CREATININE 1.5* 1.3* 1.4*   LABGLOM 44* 52* 48*   MG  --  2.3 2.2   CALCIUM 10.0 9.4 9.5       most recent cath on file 3/2019: Double vessel coronary artery disease involving the mid LAD and entire RCA. Left ventricular function is normal. Patent left YORDAN to the LAD. Patent un grafted right YORDAN    Results for Seena Goltz (MRN [id number]) as of 9/7/2020 21:53   Ref. Range 12/11/2019 22:17 9/6/2020 15:46 9/6/2020 17:20 9/6/2020 22:47 9/7/2020 06:38   Total CK Latest Ref Range: 39 - 308 U/L     99   CRP Latest Ref Range: 0.00 - 0.50 mg/dL 0.22       LD Latest Ref Range: 91 - 215 U/L 174       Pro-BNP Latest Ref Range: 0 - 1,800 pg/mL  973      Troponin Latest Ref Range: 0.00 - 0.03 ng/mL  0.10 (HH) 0.09 (H) 0.10 (HH) 0.10 (HH)           IMAGING:    EKG  ECHO -    September 7, 2020   Indications:Chest pain.      Conclusions      Summary   Normal left ventricular size with preserved LV function and an estimated   ejection fraction of approximately 65%. Moderate concentric left ventricular hypertrophy noted. E/A flow reversal noted. Suggestive of Grade I diastolic dysfunction. Mildly thickened mitral valve with mildly reduced leaflet mobility. Mild   mitral valve stenosis, trivial mitral regurgitation. Mitral annular calcification is present. Mildly thickened aortic valve leaflets with preserved leaflet   mobility. Aortic valve appears to be tricuspid. Mild aortic stenosis; Trace aortic regurgitation. Highest Mean Gradient   was 20 mmHg   No evidence of significant pericardial effusion is noted. Signature      ----------------------------------------------------------------   Electronically signed by Parminder Barclay MD(Interpreting   physician) on 09/07/2020 06:15 PM        Xr Chest Portable    Result Date: 9/6/2020  Impression: No acute cardiopulmonary disease.  Signed by Dr Sara Adames on 9/6/2020 4:20 PM       Assessment

## 2020-09-09 PROBLEM — N18.30 CKD (CHRONIC KIDNEY DISEASE), STAGE III (HCC): Status: ACTIVE | Noted: 2020-09-09

## 2020-09-09 LAB
ANION GAP SERPL CALCULATED.3IONS-SCNC: 7 MMOL/L (ref 7–19)
BASOPHILS ABSOLUTE: 0 K/UL (ref 0–0.2)
BASOPHILS RELATIVE PERCENT: 0.6 % (ref 0–1)
BUN BLDV-MCNC: 25 MG/DL (ref 8–23)
CALCIUM SERPL-MCNC: 9.5 MG/DL (ref 8.8–10.2)
CHLORIDE BLD-SCNC: 110 MMOL/L (ref 98–111)
CO2: 24 MMOL/L (ref 22–29)
CREAT SERPL-MCNC: 1.2 MG/DL (ref 0.5–1.2)
EOSINOPHILS ABSOLUTE: 0.3 K/UL (ref 0–0.6)
EOSINOPHILS RELATIVE PERCENT: 6.1 % (ref 0–5)
GFR AFRICAN AMERICAN: >59
GFR NON-AFRICAN AMERICAN: 57
GLUCOSE BLD-MCNC: 111 MG/DL (ref 70–99)
GLUCOSE BLD-MCNC: 136 MG/DL (ref 70–99)
GLUCOSE BLD-MCNC: 162 MG/DL (ref 70–99)
GLUCOSE BLD-MCNC: 204 MG/DL (ref 70–99)
GLUCOSE BLD-MCNC: 74 MG/DL (ref 74–109)
HCT VFR BLD CALC: 27.2 % (ref 42–52)
HEMOGLOBIN: 9 G/DL (ref 14–18)
IMMATURE GRANULOCYTES #: 0 K/UL
LYMPHOCYTES ABSOLUTE: 1.4 K/UL (ref 1.1–4.5)
LYMPHOCYTES RELATIVE PERCENT: 28.8 % (ref 20–40)
MAGNESIUM: 2.3 MG/DL (ref 1.6–2.4)
MCH RBC QN AUTO: 32.6 PG (ref 27–31)
MCHC RBC AUTO-ENTMCNC: 33.1 G/DL (ref 33–37)
MCV RBC AUTO: 98.6 FL (ref 80–94)
MONOCYTES ABSOLUTE: 0.5 K/UL (ref 0–0.9)
MONOCYTES RELATIVE PERCENT: 10 % (ref 0–10)
NEUTROPHILS ABSOLUTE: 2.6 K/UL (ref 1.5–7.5)
NEUTROPHILS RELATIVE PERCENT: 54.3 % (ref 50–65)
PDW BLD-RTO: 13.4 % (ref 11.5–14.5)
PERFORMED ON: ABNORMAL
PLATELET # BLD: 156 K/UL (ref 130–400)
PMV BLD AUTO: 11 FL (ref 9.4–12.4)
POTASSIUM SERPL-SCNC: 4.4 MMOL/L (ref 3.5–5)
RBC # BLD: 2.76 M/UL (ref 4.7–6.1)
SODIUM BLD-SCNC: 141 MMOL/L (ref 136–145)
WBC # BLD: 4.8 K/UL (ref 4.8–10.8)

## 2020-09-09 PROCEDURE — 83735 ASSAY OF MAGNESIUM: CPT

## 2020-09-09 PROCEDURE — 2580000003 HC RX 258: Performed by: INTERNAL MEDICINE

## 2020-09-09 PROCEDURE — 36415 COLL VENOUS BLD VENIPUNCTURE: CPT

## 2020-09-09 PROCEDURE — 82947 ASSAY GLUCOSE BLOOD QUANT: CPT

## 2020-09-09 PROCEDURE — 85025 COMPLETE CBC W/AUTO DIFF WBC: CPT

## 2020-09-09 PROCEDURE — 80048 BASIC METABOLIC PNL TOTAL CA: CPT

## 2020-09-09 PROCEDURE — 2140000000 HC CCU INTERMEDIATE R&B

## 2020-09-09 PROCEDURE — 6370000000 HC RX 637 (ALT 250 FOR IP): Performed by: INTERNAL MEDICINE

## 2020-09-09 RX ADMIN — SODIUM CHLORIDE, PRESERVATIVE FREE 10 ML: 5 INJECTION INTRAVENOUS at 08:01

## 2020-09-09 RX ADMIN — PRAVASTATIN SODIUM 40 MG: 20 TABLET ORAL at 08:00

## 2020-09-09 RX ADMIN — INSULIN LISPRO 2 UNITS: 100 INJECTION, SOLUTION INTRAVENOUS; SUBCUTANEOUS at 11:56

## 2020-09-09 RX ADMIN — PANTOPRAZOLE SODIUM 40 MG: 40 TABLET, DELAYED RELEASE ORAL at 08:00

## 2020-09-09 RX ADMIN — ASPIRIN 81 MG: 81 TABLET, CHEWABLE ORAL at 08:00

## 2020-09-09 RX ADMIN — SODIUM CHLORIDE, PRESERVATIVE FREE 10 ML: 5 INJECTION INTRAVENOUS at 11:56

## 2020-09-09 RX ADMIN — LOSARTAN POTASSIUM 50 MG: 50 TABLET, FILM COATED ORAL at 08:00

## 2020-09-09 RX ADMIN — AMLODIPINE BESYLATE 10 MG: 5 TABLET ORAL at 08:00

## 2020-09-09 RX ADMIN — GABAPENTIN 100 MG: 100 CAPSULE ORAL at 20:00

## 2020-09-09 RX ADMIN — TAMSULOSIN HYDROCHLORIDE 0.8 MG: 0.4 CAPSULE ORAL at 08:00

## 2020-09-09 RX ADMIN — PANTOPRAZOLE SODIUM 40 MG: 40 TABLET, DELAYED RELEASE ORAL at 20:00

## 2020-09-09 RX ADMIN — GABAPENTIN 100 MG: 100 CAPSULE ORAL at 08:00

## 2020-09-09 RX ADMIN — FINASTERIDE 5 MG: 5 TABLET, FILM COATED ORAL at 08:00

## 2020-09-09 RX ADMIN — ACETAMINOPHEN 650 MG: 325 TABLET, FILM COATED ORAL at 20:00

## 2020-09-09 RX ADMIN — PREDNISOLONE ACETATE 1 DROP: 10 SUSPENSION/ DROPS OPHTHALMIC at 11:56

## 2020-09-09 RX ADMIN — ISOSORBIDE MONONITRATE 120 MG: 60 TABLET, EXTENDED RELEASE ORAL at 08:00

## 2020-09-09 RX ADMIN — PREDNISOLONE ACETATE 1 DROP: 10 SUSPENSION/ DROPS OPHTHALMIC at 20:00

## 2020-09-09 ASSESSMENT — PAIN SCALES - GENERAL
PAINLEVEL_OUTOF10: 0
PAINLEVEL_OUTOF10: 5

## 2020-09-09 NOTE — PROGRESS NOTES
Physician Progress Note      PATIENT:               Medardo Stout  CSN #:                  [id number]  :                       1934  ADMIT DATE:       2020 3:32 PM  [address] [place] DATE:  RESPONDING  PROVIDER #:        Mel Grant MD          QUERY TEXT:    Patient admitted with NSTEMI, noted to have CKD. If possible, please document   in progress notes and discharge summary if you are evaluating and/or treating   any of the following: The medical record reflects the following:  Risk Factors: HTN  Clinical Indicators: BUN/CR/GFR 46/1.5/44  40/1.3/52  38/1.4 48  25/1.2/57  Treatment: Lab monitoring of electrolytes and renal functions, avoid   nephrotoxic meds. Options provided:  -- CKD Stage 3 GFR 30-59  -- Other - I will add my own diagnosis  -- Disagree - Not applicable / Not valid  -- Disagree - Clinically unable to determine / Unknown  -- Refer to Clinical Documentation Reviewer    PROVIDER RESPONSE TEXT:    This patient has CKD Stage 3.     Query created by: Lenin Bowman on 2020 12:14 PM      Electronically signed by:  Mel Grant MD 2020 7:16 PM

## 2020-09-09 NOTE — PROGRESS NOTES
 [Held by provider] losartan (COZAAR) tablet 50 mg  50 mg Oral BID Dre Zhong MD   50 mg at 09/09/20 0800    nitroGLYCERIN (NITROSTAT) SL tablet 0.4 mg  0.4 mg Sublingual Q5 Min PRN Dre Zhong MD        pantoprazole (PROTONIX) tablet 40 mg  40 mg Oral BID Dre Zhong MD   40 mg at 09/09/20 0800    pravastatin (PRAVACHOL) tablet 40 mg  40 mg Oral Daily Dre Zhong MD   40 mg at 09/09/20 0800    prednisoLONE acetate (PRED FORTE) 1 % ophthalmic suspension 1 drop  1 drop Left Eye BID Dre Zhong MD   1 drop at 09/09/20 1156    tamsulosin (FLOMAX) capsule 0.8 mg  0.8 mg Oral Daily Dre Zhong MD   0.8 mg at 09/09/20 0800    sodium chloride flush 0.9 % injection 10 mL  10 mL Intravenous 2 times per day Dre Zhong MD   10 mL at 09/09/20 1156    sodium chloride flush 0.9 % injection 10 mL  10 mL Intravenous PRN Dre Zhong MD        acetaminophen (TYLENOL) tablet 650 mg  650 mg Oral Q6H PRN Dre Zhong MD        Or   Terrence Filter acetaminophen (TYLENOL) suppository 650 mg  650 mg Rectal Q6H PRN Dre Zhong MD        polyethylene glycol [facility]) packet 17 g  17 g Oral Daily PRN Dre Zhong MD        promethazine (PHENERGAN) tablet 12.5 mg  12.5 mg Oral Q6H PRN Dre Zhong MD        Or    ondansetron [facility]) injection 4 mg  4 mg Intravenous Q6H PRN Dre Zhong MD        aspirin chewable tablet 81 mg  81 mg Oral Daily Dre Zhong MD   81 mg at 09/09/20 0800    insulin lispro (HUMALOG) injection vial 0-6 Units  0-6 Units Subcutaneous TID WC Dre Zhong MD   2 Units at 09/09/20 1156    insulin lispro (HUMALOG) injection vial 0-3 Units  0-3 Units Subcutaneous Nightly Dre Zhong MD   1 Units at 09/08/20 2036    glucose (GLUTOSE) 40 % oral gel 15 g  15 g Oral PRN Dre Zhong MD        dextrose 50 % IV solution  12.5 g Intravenous JAENIEN Dre Zhong MD        glucagon (rDNA) injection 1 mg  1 mg Intramuscular MARLIN Zhong MD        dextrose 5 % solution  100 mL/hr Intravenous JEANIEN Danni Macias MD            Labs:     Recent Labs     09/07/20 2010 09/08/20  0010 09/09/20  0120   WBC 5.5 5.2 4.8   RBC 2.89* 2.77* 2.76*   HGB 9.4* 9.1* 9.0*   HCT 28.8* 27.3* 27.2*   MCV 99.7* 98.6* 98.6*   MCH 32.5* 32.9* 32.6*   MCHC 32.6* 33.3 33.1    155 156     Recent Labs     09/07/20  0638 09/08/20  0010 09/09/20  0120    139 141   K 4.3 4.3 4.4   ANIONGAP 10 8 7    106 110   CO2 25 25 24   BUN 40* 38* 25*   CREATININE 1.3* 1.4* 1.2   GLUCOSE 133* 139* 74   CALCIUM 9.4 9.5 9.5     Recent Labs     09/07/20 0638 09/08/20  0010 09/09/20  0120   MG 2.3 2.2 2.3     Recent Labs     09/06/20  1546   AST 21   ALT 19   BILITOT <0.2   ALKPHOS 96     ABGs:No results for input(s): PH, PO2, PCO2, HCO3, BE, O2SAT in the last 72 hours. Troponin T:   Recent Labs     09/06/20  1720 09/06/20  2247 09/07/20  0638   TROPONINI 0.09* 0.10* 0.10*     INR: No results for input(s): INR in the last 72 hours. Lactic Acid: No results for input(s): LACTA in the last 72 hours. Objective:   Vitals: BP (!) 78/43   Pulse 56   Temp 96.5 °F (35.8 °C) (Temporal)   Resp 18   Ht 5' 10\" (1.778 m)   Wt 198 lb 14.4 oz (90.2 kg)   SpO2 99%   BMI 28.54 kg/m²   24HR INTAKE/OUTPUT:      Intake/Output Summary (Last 24 hours) at 9/9/2020 1503  Last data filed at 9/9/2020 1332  Gross per 24 hour   Intake 1090 ml   Output 850 ml   Net 240 ml     General appearance: alert and cooperative with exam  HEENT: AT/NC  Lungs: no increased work of breathing, BLAE  Heart: RRR  Abdomen: Soft, NT, BS+  Extremities: no edema, pulses+  Neurologic: Alert, gross motor and sensory function intact  Skin: warm    Assessment and Plan: Active Problems:    NSTEMI (non-ST elevated myocardial infarction) ([facility] [address]  Resolved Problems:    * No resolved hospital problems. *    NSTEMI: Aspirin/Statin. Cardiology on board. S/p C with no intervention. HTN: Currently hypotensive. Hold BP meds and monitor.     DM: Monitor BG and adjust medications PRN. Supportive management.     Advance Directive: Full Code    DVT prophylaxis: SCDs    Discharge planning: TBD - DC home tomorrow AM if BP improved      Signed:  Skyler Monroy MD 9/9/2020 3:03 PM  Rounding Hospitalist

## 2020-09-10 VITALS
RESPIRATION RATE: 16 BRPM | OXYGEN SATURATION: 95 % | SYSTOLIC BLOOD PRESSURE: 147 MMHG | TEMPERATURE: 98.1 F | HEART RATE: 60 BPM | HEIGHT: 70 IN | BODY MASS INDEX: 27.33 KG/M2 | DIASTOLIC BLOOD PRESSURE: 58 MMHG | WEIGHT: 190.9 LBS

## 2020-09-10 LAB
GLUCOSE BLD-MCNC: 137 MG/DL (ref 70–99)
PERFORMED ON: ABNORMAL

## 2020-09-10 PROCEDURE — 2580000003 HC RX 258: Performed by: INTERNAL MEDICINE

## 2020-09-10 PROCEDURE — 82947 ASSAY GLUCOSE BLOOD QUANT: CPT

## 2020-09-10 PROCEDURE — 6370000000 HC RX 637 (ALT 250 FOR IP): Performed by: INTERNAL MEDICINE

## 2020-09-10 RX ORDER — LOSARTAN POTASSIUM 50 MG/1
25 TABLET ORAL DAILY
Qty: 30 TABLET | Refills: 0 | Status: SHIPPED | OUTPATIENT
Start: 2020-09-10

## 2020-09-10 RX ADMIN — ASPIRIN 81 MG: 81 TABLET, CHEWABLE ORAL at 09:15

## 2020-09-10 RX ADMIN — TAMSULOSIN HYDROCHLORIDE 0.8 MG: 0.4 CAPSULE ORAL at 09:14

## 2020-09-10 RX ADMIN — SODIUM CHLORIDE, PRESERVATIVE FREE 10 ML: 5 INJECTION INTRAVENOUS at 09:20

## 2020-09-10 RX ADMIN — PREDNISOLONE ACETATE 1 DROP: 10 SUSPENSION/ DROPS OPHTHALMIC at 09:18

## 2020-09-10 RX ADMIN — FINASTERIDE 5 MG: 5 TABLET, FILM COATED ORAL at 09:14

## 2020-09-10 RX ADMIN — PRAVASTATIN SODIUM 40 MG: 20 TABLET ORAL at 09:15

## 2020-09-10 RX ADMIN — PANTOPRAZOLE SODIUM 40 MG: 40 TABLET, DELAYED RELEASE ORAL at 09:14

## 2020-09-10 RX ADMIN — SODIUM CHLORIDE, PRESERVATIVE FREE 10 ML: 5 INJECTION INTRAVENOUS at 09:15

## 2020-09-10 RX ADMIN — GABAPENTIN 100 MG: 100 CAPSULE ORAL at 09:15

## 2020-09-10 ASSESSMENT — PAIN SCALES - GENERAL
PAINLEVEL_OUTOF10: 0
PAINLEVEL_OUTOF10: 0

## 2020-09-10 ASSESSMENT — PAIN SCALES - WONG BAKER
WONGBAKER_NUMERICALRESPONSE: 0
WONGBAKER_NUMERICALRESPONSE: 0

## 2020-09-10 NOTE — PROGRESS NOTES
Visited with pt to provide spiritual care. Pt was preparing to be discharged saying he is supposed to go home after lunch. Provided spiritual care with sustaining presence, nurtured hope, a song he requested, and prayer. Pt expressed gratitude for spiritual care.     Electronically signed by Gin Vazquez on 9/10/2020 at 11:53 AM

## 2020-09-10 NOTE — DISCHARGE SUMMARY
Discharge Summary      [facility]  1934  MRN:  [id number]    Admit date:  2020  Discharge date:      Admitting Physician:  Teresa Tovar MD    Advance Directive: Full Code    Consults: cardiology    Primary Care Physician:  Amber Flores MD    Discharge Diagnoses:  Principal Problem:    NSTEMI (non-ST elevated myocardial infarction) [facility])  Active Problems:    Coronary artery disease involving native coronary artery of native heart    Ischemic cardiomyopathy    Essential hypertension    Hx of CABG    CKD (chronic kidney disease), stage III ([address]  Resolved Problems:    * No resolved hospital problems. *      Significant Diagnostic Studies:   Xr Chest Portable    Result Date: 2020  Exam:   XR CHEST PORTABLE  Date:  2020 History:  Male, age  80 years; chest pain. COMPARISON:  Chest x-ray dated 2019. Findings : The heart and mediastinum are normal in size. Lungs are without focal infiltrate, mass or effusions. Chronic interstitial lung changes. The bones show no acute pathology. Median sternotomy wires appear intact. Impression: No acute cardiopulmonary disease. Signed by Dr Juice Navarro on 2020 4:20 PM      Pertinent Labs:   CBC:   Recent Labs     20  0010 20  0120   WBC 5.2 4.8   HGB 9.1* 9.0*    156     BMP:    Recent Labs     20  0010 20  0120    141   K 4.3 4.4    110   CO2 25 24   BUN 38* 25*   CREATININE 1.4* 1.2   GLUCOSE 139* 74     INR: No results for input(s): INR in the last 72 hours. ABGs:No results for input(s): PH, PO2, PCO2, HCO3, BE, O2SAT in the last 72 hours. Lactic Acid:No results for input(s): LACTA in the last 72 hours. Procedures: Left heart catheterization - no intervention  Hospital Course: 26-year-old male with history of CAD presented to the hospital for chest pain found to have elevated troponin admitted for NSTEMI with cardiology consulted and patient placed in CCU.   Patient is status post left heart tablet by mouth daily             gabapentin (NEURONTIN) 100 MG capsule  Take 100 mg by mouth 2 times daily. Laurence Zhou glimepiride (AMARYL) 4 MG tablet  Take 4 mg by mouth 2 times daily             isosorbide mononitrate (IMDUR) 60 MG extended release tablet  Take 2 tablets by mouth daily             lidocaine (LIDODERM) 5 %  Place 1 patch onto the skin daily 12 hours on, 12 hours off.             losartan (COZAAR) 50 MG tablet  Take 0.5 tablets by mouth daily             meclizine (ANTIVERT) 25 MG tablet  Take 25 mg by mouth 4 times daily as needed             Multiple Vitamins-Minerals (EYE VITAMINS PO)  Take by mouth             mupirocin (BACTROBAN) 2 % ointment  Apply topically 2 times daily Apply to wound BID             nitroGLYCERIN (NITROSTAT) 0.4 MG SL tablet  up to max of 3 total doses. If no relief after 1 dose, call 911. ondansetron (ZOFRAN-ODT) 4 MG disintegrating tablet  Take 4 mg by mouth every 8 hours as needed for Nausea or Vomiting             pantoprazole (PROTONIX) 40 MG tablet  Take 1 tablet by mouth 2 times daily             pravastatin (PRAVACHOL) 40 MG tablet  Take 40 mg by mouth daily             prednisoLONE acetate (PRED FORTE) 1 % ophthalmic suspension  Place 1 drop into the left eye 2 times daily              promethazine (PHENERGAN) 12.5 MG tablet  Take 12.5 mg by mouth as needed for Nausea             rOPINIRole (REQUIP) 1 MG tablet  Take 1 mg by mouth nightly             sennosides-docusate sodium (SENOKOT-S) 8.6-50 MG tablet  Take 2 tablets by mouth daily             tamsulosin (FLOMAX) 0.4 MG capsule  Take 2 capsules by mouth daily             vitamin D (CHOLECALCIFEROL) 1000 UNIT TABS tablet  Take 1,000 Units by mouth daily                 Discharge Instructions: Follow up with Chris Robles MD in 7 days. Take medications as directed. Resume activity as tolerated. Diet: DIET CARDIAC;  Dysphagia Minced and Moist     Disposition: Patient is medically stable

## 2020-09-11 ENCOUNTER — CARE COORDINATION (OUTPATIENT)
Dept: CASE MANAGEMENT | Age: 85
End: 2020-09-11

## 2020-09-11 NOTE — CARE COORDINATION
[facility] Transitions Initial Follow Up Call    Call within 2 business days of discharge: Yes    Patient: Yovana Cifuentes Patient : 1934   MRN: [id number]  Reason for Admission: NSTEMI  Discharge Date: 9/10/20 RARS: Readmission Risk Score: 22      Last Discharge [facility]       Complaint Diagnosis Description Type Department Provider    20 Shoulder Pain; Arm Pain; Chest Pain NSTEMI (non-ST elevated myocardial infarction) [facility]) ED to Hosp-Admission (Discharged) (ADMITTED) L BRIANNA Flores MD; CLAUDIA Alvarez. Challenges to be reviewed by the provider   Additional needs identified to be addressed with provider No  none    Discussed COVID-19 related testing which was not done at this time. Test results were not done. Patient informed of results, if available? No         Method of communication with provider : none    Advance Care Planning:   Does patient have an Advance Directive:  reviewed and current. Was this a readmission? No  Patient stated reason for admission:   Patients top risk factors for readmission: medical condition    Care Transition Nurse (CTN) contacted the patient by telephone to perform post hospital discharge assessment. Verified name and  with patient as identifiers. Provided introduction to self, and explanation of the CTN role. CTN reviewed discharge instructions, medical action plan and red flags with patient who verbalized understanding. Patient given an opportunity to ask questions and does not have any further questions or concerns at this time. Were discharge instructions available to patient? Yes. Reviewed appropriate site of care based on symptoms and resources available to patient including: The patient agrees to contact the PCP office for questions related to their healthcare. Medication reconciliation was performed with patient, who verbalizes understanding of administration of home medications.    Covid Risk Education    Patient has following risk factors of: heart failure and chronic kidney disease. Education provided regarding infection prevention, and signs and symptoms of COVID-19 and when to seek medical attention with patient who verbalized understanding. Discussed exposure protocols and quarantine From CDC: Are you at higher risk for severe illness?   and given an opportunity for questions and concerns. The patient agrees to contact the COVID-19 hotline [phone] or PCP office for questions related to COVID-19. For more information on steps you can take to protect yourself, see CDC's How to Protect Yourself     Patient/family/caregiver given information for [employer] and agrees to enroll no      Discussed follow-up appointments. If no appointment was previously scheduled, appointment scheduling offered: already made. Is follow up appointment scheduled within 7 days of discharge? Yes  Non-[facility] follow up appointment(s):     Plan for follow-up call in 5-7 days based on severity of symptoms and risk factors. Plan for next call: symptom management  CTN provided contact information for future needs. Non-face-to-face services provided:  none    Care Transitions 24 Hour Call    Do you have any ongoing symptoms?:  No  Do you have a copy of your discharge instructions?:  Yes  Do you have all of your prescriptions and are they filled?:  No  Have you been contacted by a [address]?:  No  Have you scheduled your follow up appointment?:  Yes  How are you going to get to your appointment?:  Car - family or friend to transport  Were you discharged with any Home Care or Post Acute Services:  No  Do you feel like you have everything you need to keep you well at home?:  Yes  Care Transitions Interventions         Follow Up : Spoke with patient for COVID initial call after discharge from hospital. He says he is doing well, is picking up medications today. Did go over AVS, he has appts scheduled.  Says appetite is good. Says after discharge he had to go to the [employer] [facility] and worked until almost [address]. He says no s/s of chest pain, SOA today. Advised of COVID follow up calls and he is accepting. Will follow up with him at a later time.    Future Appointments   Date Time Provider Yi Boogie   10/5/2020  8:00 AM [facility] ROOM [facility]   10/23/2020 10:00 AM Te Brannon MD [facility] Cardio P-MYA Oliver RN

## 2020-09-13 ENCOUNTER — APPOINTMENT (OUTPATIENT)
Dept: GENERAL RADIOLOGY | Age: 85
End: 2020-09-13
Payer: COMMERCIAL

## 2020-09-13 ENCOUNTER — HOSPITAL ENCOUNTER (EMERGENCY)
Age: 85
Discharge: HOME | End: 2020-09-13
Attending: EMERGENCY MEDICINE
Payer: COMMERCIAL

## 2020-09-13 VITALS
DIASTOLIC BLOOD PRESSURE: 78 MMHG | RESPIRATION RATE: 14 BRPM | TEMPERATURE: 98.1 F | OXYGEN SATURATION: 100 % | SYSTOLIC BLOOD PRESSURE: 156 MMHG | HEART RATE: 61 BPM

## 2020-09-13 LAB
ADENOVIRUS BY PCR: NOT DETECTED
ALBUMIN SERPL-MCNC: 3.7 G/DL (ref 3.5–5.2)
ALP BLD-CCNC: 83 U/L (ref 40–130)
ALT SERPL-CCNC: 31 U/L (ref 5–41)
ANION GAP SERPL CALCULATED.3IONS-SCNC: 11 MMOL/L (ref 7–19)
AST SERPL-CCNC: 25 U/L (ref 5–40)
BASOPHILS ABSOLUTE: 0 K/UL (ref 0–0.2)
BASOPHILS RELATIVE PERCENT: 0.4 % (ref 0–1)
BILIRUB SERPL-MCNC: 0.3 MG/DL (ref 0.2–1.2)
BILIRUBIN URINE: NEGATIVE
BLOOD, URINE: NEGATIVE
BORDETELLA PARAPERTUSSIS BY PCR: NOT DETECTED
BORDETELLA PERTUSSIS BY PCR: NOT DETECTED
BUN BLDV-MCNC: 50 MG/DL (ref 8–23)
CALCIUM SERPL-MCNC: 10.4 MG/DL (ref 8.8–10.2)
CHLAMYDOPHILIA PNEUMONIAE BY PCR: NOT DETECTED
CHLORIDE BLD-SCNC: 102 MMOL/L (ref 98–111)
CLARITY: CLEAR
CO2: 25 MMOL/L (ref 22–29)
COLOR: YELLOW
CORONAVIRUS 229E BY PCR: NOT DETECTED
CORONAVIRUS HKU1 BY PCR: NOT DETECTED
CORONAVIRUS NL63 BY PCR: NOT DETECTED
CORONAVIRUS OC43 BY PCR: NOT DETECTED
CREAT SERPL-MCNC: 1.8 MG/DL (ref 0.5–1.2)
EOSINOPHILS ABSOLUTE: 0.2 K/UL (ref 0–0.6)
EOSINOPHILS RELATIVE PERCENT: 2.7 % (ref 0–5)
GFR AFRICAN AMERICAN: 44
GFR NON-AFRICAN AMERICAN: 36
GLUCOSE BLD-MCNC: 181 MG/DL (ref 74–109)
GLUCOSE URINE: NEGATIVE MG/DL
HCT VFR BLD CALC: 31.7 % (ref 42–52)
HEMOGLOBIN: 10.6 G/DL (ref 14–18)
HUMAN METAPNEUMOVIRUS BY PCR: NOT DETECTED
HUMAN RHINOVIRUS/ENTEROVIRUS BY PCR: NOT DETECTED
IMMATURE GRANULOCYTES #: 0 K/UL
INFLUENZA A BY PCR: NOT DETECTED
INFLUENZA B BY PCR: NOT DETECTED
KETONES, URINE: NEGATIVE MG/DL
LEUKOCYTE ESTERASE, URINE: NEGATIVE
LYMPHOCYTES ABSOLUTE: 1.4 K/UL (ref 1.1–4.5)
LYMPHOCYTES RELATIVE PERCENT: 25.2 % (ref 20–40)
MCH RBC QN AUTO: 32.9 PG (ref 27–31)
MCHC RBC AUTO-ENTMCNC: 33.4 G/DL (ref 33–37)
MCV RBC AUTO: 98.4 FL (ref 80–94)
MONOCYTES ABSOLUTE: 0.5 K/UL (ref 0–0.9)
MONOCYTES RELATIVE PERCENT: 8.9 % (ref 0–10)
MYCOPLASMA PNEUMONIAE BY PCR: NOT DETECTED
NEUTROPHILS ABSOLUTE: 3.5 K/UL (ref 1.5–7.5)
NEUTROPHILS RELATIVE PERCENT: 62.6 % (ref 50–65)
NITRITE, URINE: NEGATIVE
PARAINFLUENZA VIRUS 1 BY PCR: NOT DETECTED
PARAINFLUENZA VIRUS 2 BY PCR: NOT DETECTED
PARAINFLUENZA VIRUS 3 BY PCR: NOT DETECTED
PARAINFLUENZA VIRUS 4 BY PCR: NOT DETECTED
PDW BLD-RTO: 13.6 % (ref 11.5–14.5)
PH UA: 6 (ref 5–8)
PLATELET # BLD: 188 K/UL (ref 130–400)
PMV BLD AUTO: 10.7 FL (ref 9.4–12.4)
POTASSIUM REFLEX MAGNESIUM: 4.8 MMOL/L (ref 3.5–5)
PRO-BNP: 512 PG/ML (ref 0–1800)
PROTEIN UA: NEGATIVE MG/DL
RBC # BLD: 3.22 M/UL (ref 4.7–6.1)
RESPIRATORY SYNCYTIAL VIRUS BY PCR: NOT DETECTED
SARS-COV-2, PCR: NOT DETECTED
SODIUM BLD-SCNC: 138 MMOL/L (ref 136–145)
SPECIFIC GRAVITY UA: 1.01 (ref 1–1.03)
TOTAL PROTEIN: 6.5 G/DL (ref 6.6–8.7)
TROPONIN: 0.09 NG/ML (ref 0–0.03)
TROPONIN: 0.09 NG/ML (ref 0–0.03)
UROBILINOGEN, URINE: 0.2 E.U./DL
WBC # BLD: 5.6 K/UL (ref 4.8–10.8)

## 2020-09-13 PROCEDURE — 81003 URINALYSIS AUTO W/O SCOPE: CPT

## 2020-09-13 PROCEDURE — 80053 COMPREHEN METABOLIC PANEL: CPT

## 2020-09-13 PROCEDURE — 99282 EMERGENCY DEPT VISIT SF MDM: CPT

## 2020-09-13 PROCEDURE — 83880 ASSAY OF NATRIURETIC PEPTIDE: CPT

## 2020-09-13 PROCEDURE — 99999 PR OFFICE/OUTPT VISIT,PROCEDURE ONLY: CPT | Performed by: EMERGENCY MEDICINE

## 2020-09-13 PROCEDURE — 6370000000 HC RX 637 (ALT 250 FOR IP): Performed by: EMERGENCY MEDICINE

## 2020-09-13 PROCEDURE — 85025 COMPLETE CBC W/AUTO DIFF WBC: CPT

## 2020-09-13 PROCEDURE — 0100U HC RESPIRPTHGN MULT REV TRANS & AMP PRB TECH 21 TRGT: CPT

## 2020-09-13 PROCEDURE — 0202U NFCT DS 22 TRGT SARS-COV-2: CPT

## 2020-09-13 PROCEDURE — 36415 COLL VENOUS BLD VENIPUNCTURE: CPT

## 2020-09-13 PROCEDURE — 84484 ASSAY OF TROPONIN QUANT: CPT

## 2020-09-13 PROCEDURE — 71045 X-RAY EXAM CHEST 1 VIEW: CPT

## 2020-09-13 PROCEDURE — 2580000003 HC RX 258: Performed by: EMERGENCY MEDICINE

## 2020-09-13 RX ORDER — SODIUM CHLORIDE, SODIUM LACTATE, POTASSIUM CHLORIDE, AND CALCIUM CHLORIDE .6; .31; .03; .02 G/100ML; G/100ML; G/100ML; G/100ML
500 INJECTION, SOLUTION INTRAVENOUS ONCE
Status: COMPLETED | OUTPATIENT
Start: 2020-09-13 | End: 2020-09-13

## 2020-09-13 RX ORDER — ACETAMINOPHEN 325 MG/1
650 TABLET ORAL ONCE
Status: COMPLETED | OUTPATIENT
Start: 2020-09-13 | End: 2020-09-13

## 2020-09-13 RX ADMIN — ACETAMINOPHEN 650 MG: 325 TABLET, FILM COATED ORAL at 15:16

## 2020-09-13 RX ADMIN — SODIUM CHLORIDE, POTASSIUM CHLORIDE, SODIUM LACTATE AND CALCIUM CHLORIDE 500 ML: 600; 310; 30; 20 INJECTION, SOLUTION INTRAVENOUS at 13:53

## 2020-09-13 ASSESSMENT — ENCOUNTER SYMPTOMS
VOMITING: 0
EYE PAIN: 0
SHORTNESS OF BREATH: 1
VOICE CHANGE: 0
DIARRHEA: 0
EYE REDNESS: 0
RHINORRHEA: 0
COUGH: 1
ABDOMINAL PAIN: 0

## 2020-09-13 ASSESSMENT — PAIN SCALES - GENERAL: PAINLEVEL_OUTOF10: 6

## 2020-09-13 NOTE — ED PROVIDER NOTES
Negative for rash and wound. Neurological: Negative for weakness and headaches. Hematological: Negative. Psychiatric/Behavioral: Negative. All other systems reviewed and are negative. A complete review of systems was performed and is negative except as noted above in the HPI.        PAST MEDICAL HISTORY     Past Medical History:   Diagnosis Date    Blood circulation, collateral     CAD (coronary artery disease)     STENTS X 3    CAD (coronary artery disease)     CABG    Cerebral artery occlusion with cerebral infarction (HCC)     CHF (congestive heart failure) (HCC)     Chronic kidney disease     DDD (degenerative disc disease), lumbar 7/11/2017    Diabetes mellitus ([facility] [address]     Disease of blood and blood forming organ     GERD (gastroesophageal reflux disease)     History of blood transfusion     Hx of blood clots     Right leg    Hyperlipidemia     Hypertension     Lumbar stenosis with neurogenic claudication 7/11/2017    MI, old     X 2    Other disorders of kidney and ureter in diseases classified elsewhere     Palliative care patient 03/06/2018    Right heart failure ([facility] [address] 6/29/2018    Sleep apnea     DOESN'T USE MACHINE    TIA (transient ischemic attack)          SURGICAL HISTORY       Past Surgical History:   Procedure Laterality Date    APPENDECTOMY      BACK SURGERY  1980    x 3    CARDIAC SURGERY  1990    Bypass x 3    CARPAL TUNNEL RELEASE Bilateral 1980    wrist and elbows    CHOLECYSTECTOMY  2000    COLONOSCOPY      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 2    DILATATION, ESOPHAGUS      ENDOSCOPY, COLON, DIAGNOSTIC      EYE SURGERY      JOINT REPLACEMENT Left 1990    JOINT REPLACEMENT Right 1995    LAMINECTOMY N/A 7/11/2017    LUMBAR LAMINECTOMY POSTERIOR  L23 L34 performed by Doreen Coyle MD at [address] Left 1990    ROTATOR CUFF REPAIR Right 2010    TUMOR REMOVAL Left 1960    foot    TURP  2000    VASCULAR SURGERY Left 7/15/2015 [facility] [address] Aortoiliofemoral a'gram with bilateral lower extremity runoff; select views 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; a'plasty of left anterior tibial artery and dorsalis pedis artery with 2.5x300 vascutrak balloon and then with 3x220 nati balloon; completion a'gram         CURRENT MEDICATIONS       Previous Medications    ALLOPURINOL (ZYLOPRIM) 300 MG TABLET    Take 600 mg by mouth daily     ASCORBIC ACID (VITAMIN C) 500 MG TABLET    Take 1,000 mg by mouth daily     ASPIRIN 81 MG CHEWABLE TABLET    Take 1 tablet by mouth daily    B COMPLEX VITAMINS CAPSULE    Take 1 capsule by mouth daily    BACLOFEN (LIORESAL) 10 MG TABLET    Take 10 mg by mouth daily as needed     COLCHICINE (COLCRYS) 0.6 MG TABLET    Take 0.6 mg by mouth as needed for Pain    DIPHENOXYLATE-ATROPINE (LOMOTIL) 2.5-0.025 MG PER TABLET    Take 1 tablet by mouth 4 times daily as needed for Diarrhea    DOCUSATE SODIUM (COLACE) 100 MG CAPSULE    Take 1 capsule by mouth 2 times daily    FERROUS SULFATE-C-FOLIC ACID (FOLITAB) 570-802-9.2 MG TBCR PER EXTENDED RELEASE TABLET    Take 1 tablet by mouth daily    FINASTERIDE (PROSCAR) 5 MG TABLET    Take 1 tablet by mouth daily    GABAPENTIN (NEURONTIN) 100 MG CAPSULE    Take 100 mg by mouth 2 times daily. Collette Ruts GLIMEPIRIDE (AMARYL) 4 MG TABLET    Take 4 mg by mouth 2 times daily    ISOSORBIDE MONONITRATE (IMDUR) 60 MG EXTENDED RELEASE TABLET    Take 2 tablets by mouth daily    LIDOCAINE (LIDODERM) 5 %    Place 1 patch onto the skin daily 12 hours on, 12 hours off.     LOSARTAN (COZAAR) 50 MG TABLET    Take 0.5 tablets by mouth daily    MECLIZINE (ANTIVERT) 25 MG TABLET    Take 25 mg by mouth 4 times daily as needed    MULTIPLE VITAMINS-MINERALS (EYE VITAMINS PO)    Take by mouth    MUPIROCIN (BACTROBAN) 2 % OINTMENT    Apply topically 2 times daily Apply to wound BID    NITROGLYCERIN (NITROSTAT) 0.4 MG SL TABLET    up to max of 3 total doses. If no relief after 1 dose, call 911.     ONDANSETRON (ZOFRAN-ODT) 4 MG DISINTEGRATING TABLET    Take 4 mg by mouth every 8 hours as needed for Nausea or Vomiting    PANTOPRAZOLE (PROTONIX) 40 MG TABLET    Take 1 tablet by mouth 2 times daily    PRAVASTATIN (PRAVACHOL) 40 MG TABLET    Take 40 mg by mouth daily    PREDNISOLONE ACETATE (PRED FORTE) 1 % OPHTHALMIC SUSPENSION    Place 1 drop into the left eye 2 times daily     PROMETHAZINE (PHENERGAN) 12.5 MG TABLET    Take 12.5 mg by mouth as needed for Nausea    ROPINIROLE (REQUIP) 1 MG TABLET    Take 1 mg by mouth nightly    SENNOSIDES-DOCUSATE SODIUM (SENOKOT-S) 8.6-50 MG TABLET    Take 2 tablets by mouth daily    TAMSULOSIN (FLOMAX) 0.4 MG CAPSULE    Take 2 capsules by mouth daily    VITAMIN D (CHOLECALCIFEROL) 1000 UNIT TABS TABLET    Take 1,000 Units by mouth daily       ALLERGIES     Pcn [penicillins] and Adhesive tape    FAMILY HISTORY       Family History   Problem Relation Age of Onset    Cancer Mother     Heart Disease Father     Heart Disease Brother     Heart Disease Brother     Heart Disease Brother     Heart Disease Brother     Heart Attack Brother     Heart Disease Brother     Heart Disease Brother           SOCIAL HISTORY       Social History     Socioeconomic History    Marital status:      Spouse name: Elvis Samayoa    Number of children: 2    Years of education: 15    Highest education level: None   Occupational History    None   Social Needs    Financial resource strain: None    Food insecurity     Worry: None     Inability: None    Transportation needs     Medical: None     Non-medical: None   Tobacco Use    Smoking status: Never Smoker    Smokeless tobacco: Never Used   Substance and Sexual Activity    Alcohol use: No    Drug use: No    Sexual activity: Not Currently   Lifestyle    Physical activity     Days per week: None     Minutes per session: None    Stress: None Relationships    Social connections     Talks on phone: None     Gets together: None     Attends Jew service: None     Active member of club or organization: None     Attends meetings of clubs or organizations: None     Relationship status: None    Intimate partner violence     Fear of current or ex partner: None     Emotionally abused: None     Physically abused: None     Forced sexual activity: None   Other Topics Concern    None   Social History Narrative    None       SCREENINGS             PHYSICAL EXAM    (up to 7 for level 4, 8 or more for level 5)     ED Triage Vitals [09/13/20 1008]   BP Temp Temp src Pulse Resp SpO2 Height Weight   -- 98.4 °F (36.9 °C) -- 69 14 98 % -- --       Physical Exam  Vitals signs and nursing note reviewed. Constitutional:       General: He is not in acute distress. Appearance: He is well-developed. He is not diaphoretic. HENT:      Head: Normocephalic and atraumatic. Eyes:      General: No scleral icterus. Neck:      Vascular: No JVD. Cardiovascular:      Rate and Rhythm: Normal rate and regular rhythm. Pulses:           Radial pulses are 2+ on the right side and 2+ on the left side. Dorsalis pedis pulses are 2+ on the right side and 2+ on the left side. Heart sounds: Normal heart sounds. No murmur. No friction rub. No gallop. Pulmonary:      Effort: Pulmonary effort is normal. No accessory muscle usage or respiratory distress. Breath sounds: Normal breath sounds. No stridor. No decreased breath sounds, wheezing, rhonchi or rales. Chest:      Chest wall: No tenderness. Abdominal:      General: There is no distension. Palpations: Abdomen is soft. Tenderness: There is no abdominal tenderness. There is no guarding or rebound. Musculoskeletal: Normal range of motion. General: No deformity. Right lower leg: No edema. Left lower leg: No edema. Skin:     General: Skin is warm and dry.       Coloration: Skin is not pale. Findings: No erythema. Neurological:      Mental Status: He is alert and oriented to person, place, and time. GCS: GCS eye subscore is 4. GCS verbal subscore is 5. GCS motor subscore is 6. Cranial Nerves: No cranial nerve deficit. Motor: No abnormal muscle tone. Coordination: Coordination normal.   Psychiatric:         Behavior: Behavior normal.         Judgment: Judgment normal.         DIAGNOSTIC RESULTS     EKG: All EKG's are interpreted by the Emergency Department Physician who either signs or Co-signs this chart in the absence of a cardiologist.      RADIOLOGY:   Non-plain film images such as CT, Ultrasound and MRI are read by the radiologist. Plainradiographic images are visualized and preliminarily interpreted by the emergency physician with the below findings:    Interpretation per the Radiologist below, if available at the time of this note:    XR CHEST PORTABLE   Final Result   1. No focal infiltrate or effusion. 2. Mild bronchial wall thickening, stable.    Signed by Dr Mendoza Cross on 9/13/2020 11:30 AM            ED BEDSIDE ULTRASOUND:   Performed by ED Physician - none    LABS:  Labs Reviewed   CBC WITH AUTO DIFFERENTIAL - Abnormal; Notable for the following components:       Result Value    RBC 3.22 (*)     Hemoglobin 10.6 (*)     Hematocrit 31.7 (*)     MCV 98.4 (*)     MCH 32.9 (*)     All other components within normal limits   COMPREHENSIVE METABOLIC PANEL W/ REFLEX TO MG FOR LOW K - Abnormal; Notable for the following components:    Glucose 181 (*)     BUN 50 (*)     CREATININE 1.8 (*)     GFR Non- 36 (*)     GFR  44 (*)     Calcium 10.4 (*)     Total Protein 6.5 (*)     All other components within normal limits   TROPONIN - Abnormal; Notable for the following components:    Troponin 0.09 (*)     All other components within normal limits   TROPONIN - Abnormal; Notable for the following components:    Troponin 0.09 (*) All other components within normal limits   RESPIRATORY PANEL, MOLECULAR   BRAIN NATRIURETIC PEPTIDE   URINE RT REFLEX TO CULTURE       All other labs were within normal range or not returned as of this dictation. Medications   lactated ringers bolus (500 mLs Intravenous New Bag 9/13/20 1353)   acetaminophen (TYLENOL) tablet 650 mg (has no administration in time range)       EMERGENCY DEPARTMENT COURSE and DIFFERENTIALDIAGNOSIS/MDM:   Vitals:    Vitals:    09/13/20 1008 09/13/20 1402   BP:  (!) 154/57   Pulse: 69 52   Resp: 14 21   Temp: 98.4 °F (36.9 °C)    SpO2: 98% 100%       MDM    ED Course as of Sep 13 1513   Sun Sep 13, 2020   1244 Troponin with mild elevation to 0.09. Appears consistent with previous values over the last year and does not appear to represent acute cardiac ischemia. [ZACHARY]      ED Course User Index  [ZACHARY] Milton Farris MD     Repeat troponin unchanged. Remainder of evaluation here including chest x-ray and labs and EKG are reassuring. Patient's oxygen saturation is near 100% on room air throughout evaluation. Labs do show slight decrease in renal function compared to recent baseline. Given patient has no signs of fluid overload and recently normal ejection fraction, small, 500 cc fluid bolus was initiated. Evaluation and work-up here revealed no signs of emergent or life-threatening pathology that would necessitate admission for further work-up or management at this time. Patient is felt to be stable for discharge home with return precautions for worsening of the condition or development of new concerning symptoms. Patient was encouraged to follow-up with their primary care doctor in the appropriate timeframe. Necessary prescriptions and information have been provided for treatment at home. Patient voices understanding and agreement with the plan. CONSULTS:  None    PROCEDURES:  Unless otherwise notedbelow, none     Procedures      FINAL IMPRESSION     1.  Shortness of breath    2. Cough    3. Acute on chronic renal insufficiency    4. Chronic diastolic CHF (congestive heart failure) ([facility] [address]    5. Troponin level elevated    6.  Coronary artery disease involving native heart without angina pectoris, unspecified vessel or lesion type          DISPOSITION/PLAN   DISPOSITION        PATIENT REFERRED TO:  [facility]PT  [facility] [place]  [phone]    If symptoms worsen    Clayton May MD  [address]  [place] [address]  [phone]    Schedule an appointment as soon as possible for a visit in 2 days        DISCHARGE MEDICATIONS:  New Prescriptions    No medications on file          (Please note that portions of this note were completed with a voice recognition program.  Efforts were made to edit the dictations butoccasionally words are mis-transcribed.)    Vikas Magdaleno MD (electronically signed)  AttendingEmergency Physician          Vikas Magdaleno., MD  09/13/20 [id number]

## 2020-09-14 LAB
EKG P AXIS: 75 DEGREES
EKG P-R INTERVAL: 218 MS
EKG Q-T INTERVAL: 436 MS
EKG QRS DURATION: 100 MS
EKG QTC CALCULATION (BAZETT): 426 MS
EKG T AXIS: 70 DEGREES

## 2020-09-18 ENCOUNTER — CARE COORDINATION (OUTPATIENT)
Dept: CASE MANAGEMENT | Age: 85
End: 2020-09-18

## 2020-09-18 NOTE — CARE COORDINATION
[facility] Transitions Follow Up Call    2020    Patient: Deuce Garcia  Patient : 1934   MRN: [id number]  Reason for Admission: NSTEMI, CHF  Discharge Date: 20 RARS: Readmission Risk Score: 25         Spoke with: Anabella Davila 761 Transitions Subsequent and Final Call    Schedule Follow Up Appointment with PCP:  Completed  Subsequent and Final Calls  Do you have any ongoing symptoms?:  No  Have your medications changed?:  No  Do you have any questions related to your medications?:  No  Do you currently have any active services?:  No  Do you have any needs or concerns that I can assist you with?:  No  Identified Barriers:  None  Care Transitions Interventions  Other Interventions:          Needs to be reviewed by the provider   Additional needs identified to be addressed with provider No  none  Discussed COVID-19 related testing which was available at this time. Test results were negative. Patient informed of results, if available? Already aware. Method of communication with provider : none    Care Transition Nurse (CTN) contacted the patient by telephone to follow up after admission on 2020. Verified name and  with patient as identifiers. Addressed changes since last contact: discussed eye procedure this am  Discharged needs reviewed: none  Follow up appointment completed? Yes    Advance Care Planning:   Does patient have an Advance Directive:  reviewed and current. Discussed appropriate site of care based on symptoms and resources available to patient including: PCP, Urgent care clinics and When to call 911. The patient agrees to contact the PCP office for questions related to their healthcare.      Patients top risk factors for readmission: medical condition  Interventions to address risk factors: Education of patient/family/caregiver/guardian to support self-management-diet, daily weights, disease process management, symptoms      Plan for follow-up call in 5-7 days based on severity of symptoms and risk factors. Plan for next call: self management-disease process management, symptom management, medication management, transition needs. CTN provided contact information for future needs. Follow Up  Future Appointments   Date Time Provider Yi Colemani   10/5/2020  8:00 AM [facility] ROOM [facility]   10/23/2020 10:00 AM Octavio Klein MD [facility] [place]   Made contact with patient for a follow up call. He reported that [facility] to have a stitch removed from his eye. He said he had been having a lot of pain with it, but it is better now. He said he has some blurry vision and they want him to come back in a week or so to see the retina specialist.  He has not been having any chest pain or other cardiac issues. Denied shortness of breath, edema, etc.  Has all of his meds. Is eating and drinking well. Informed of CTC process, purpose, future calls, etc.  Ensured to have CTN contact information. Encouraged to call as needed for new issues, questions, etc.  CTN to follow up as indicated.        Esmer Jessica RN

## 2020-09-24 ENCOUNTER — CARE COORDINATION (OUTPATIENT)
Dept: CASE MANAGEMENT | Age: 85
End: 2020-09-24

## 2020-09-24 NOTE — CARE COORDINATION
of care after discharge. Discussed appropriate site of care based on symptoms and resources available to patient including: PCP, Specialist, Urgent care clinics and When to call 911. The patient agrees to contact the PCP office for questions related to their healthcare. Patients top risk factors for readmission: medical condition  Interventions to address risk factors: Education of patient/family/caregiver/guardian to support self-management-symptom management, weights, diet, medications, fall prevention, etc.    CTN provided contact information for future needs.         Follow Up  Future Appointments   Date Time Provider Yi Boogie   10/5/2020  8:00 AM [facility]   10/23/2020 10:00 AM Blane Vilchis MD [facility]       Tenisha Hall RN

## 2020-10-05 ENCOUNTER — HOSPITAL ENCOUNTER (OUTPATIENT)
Dept: PULMONOLOGY | Age: 85
Discharge: HOME | End: 2020-10-05
Payer: COMMERCIAL

## 2020-10-08 ENCOUNTER — TELEPHONE (OUTPATIENT)
Dept: RESPIRATORY THERAPY | Age: 85
End: 2020-10-08

## 2020-10-08 NOTE — TELEPHONE ENCOUNTER
Returned patient phone call regarding his appointment and Covid testing. He is aware that he needs to be quarantined till his appointment, where to get his Covid test done and where to come to register for his appointment. Patient stated his understanding to all. I explained if he had any further questions to call back.

## 2020-10-17 ENCOUNTER — APPOINTMENT (OUTPATIENT)
Dept: CT IMAGING | Age: 85
DRG: 078 | End: 2020-10-17
Payer: COMMERCIAL

## 2020-10-17 ENCOUNTER — HOSPITAL ENCOUNTER (INPATIENT)
Age: 85
LOS: 9 days | Discharge: ANOTHER HOSPITAL | DRG: 078 | End: 2020-10-26
Attending: EMERGENCY MEDICINE | Admitting: HOSPITALIST
Payer: COMMERCIAL

## 2020-10-17 PROBLEM — I67.4 HYPERTENSIVE ENCEPHALOPATHY: Status: ACTIVE | Noted: 2020-10-17

## 2020-10-17 LAB
ALBUMIN SERPL-MCNC: 3.5 G/DL (ref 3.5–5.2)
ALP BLD-CCNC: 91 U/L (ref 40–130)
ALT SERPL-CCNC: 27 U/L (ref 5–41)
ANION GAP SERPL CALCULATED.3IONS-SCNC: 6 MMOL/L (ref 7–19)
AST SERPL-CCNC: 25 U/L (ref 5–40)
BACTERIA: NEGATIVE /HPF
BASOPHILS ABSOLUTE: 0 K/UL (ref 0–0.2)
BASOPHILS RELATIVE PERCENT: 0.3 % (ref 0–1)
BILIRUB SERPL-MCNC: 0.3 MG/DL (ref 0.2–1.2)
BILIRUBIN URINE: NEGATIVE
BLOOD, URINE: NEGATIVE
BUN BLDV-MCNC: 21 MG/DL (ref 8–23)
CALCIUM SERPL-MCNC: 9.7 MG/DL (ref 8.8–10.2)
CHLORIDE BLD-SCNC: 110 MMOL/L (ref 98–111)
CHOLESTEROL, TOTAL: 157 MG/DL (ref 160–199)
CLARITY: CLEAR
CO2: 24 MMOL/L (ref 22–29)
COLOR: ABNORMAL
CREAT SERPL-MCNC: 0.9 MG/DL (ref 0.5–1.2)
CRYSTALS, UA: ABNORMAL /HPF
EOSINOPHILS ABSOLUTE: 0.2 K/UL (ref 0–0.6)
EOSINOPHILS RELATIVE PERCENT: 2.8 % (ref 0–5)
EPITHELIAL CELLS, UA: 3 /HPF (ref 0–5)
GFR AFRICAN AMERICAN: >59
GFR NON-AFRICAN AMERICAN: >60
GLUCOSE BLD-MCNC: 94 MG/DL (ref 74–109)
GLUCOSE URINE: NEGATIVE MG/DL
HBA1C MFR BLD: 6.1 % (ref 4–6)
HCT VFR BLD CALC: 31.2 % (ref 42–52)
HDLC SERPL-MCNC: 51 MG/DL (ref 55–121)
HEMOGLOBIN: 9.8 G/DL (ref 14–18)
HYALINE CASTS: 2 /HPF (ref 0–8)
IMMATURE GRANULOCYTES #: 0 K/UL
KETONES, URINE: NEGATIVE MG/DL
LDL CHOLESTEROL CALCULATED: 97 MG/DL
LEUKOCYTE ESTERASE, URINE: NEGATIVE
LYMPHOCYTES ABSOLUTE: 1.3 K/UL (ref 1.1–4.5)
LYMPHOCYTES RELATIVE PERCENT: 20.9 % (ref 20–40)
MCH RBC QN AUTO: 32.6 PG (ref 27–31)
MCHC RBC AUTO-ENTMCNC: 31.4 G/DL (ref 33–37)
MCV RBC AUTO: 103.7 FL (ref 80–94)
MONOCYTES ABSOLUTE: 0.6 K/UL (ref 0–0.9)
MONOCYTES RELATIVE PERCENT: 10.2 % (ref 0–10)
NEUTROPHILS ABSOLUTE: 3.9 K/UL (ref 1.5–7.5)
NEUTROPHILS RELATIVE PERCENT: 65.5 % (ref 50–65)
NITRITE, URINE: NEGATIVE
PDW BLD-RTO: 14.7 % (ref 11.5–14.5)
PH UA: 5 (ref 5–8)
PLATELET # BLD: 201 K/UL (ref 130–400)
PMV BLD AUTO: 10.9 FL (ref 9.4–12.4)
POTASSIUM SERPL-SCNC: 4.1 MMOL/L (ref 3.5–5)
PROTEIN UA: 300 MG/DL
RBC # BLD: 3.01 M/UL (ref 4.7–6.1)
RBC UA: 1 /HPF (ref 0–4)
SODIUM BLD-SCNC: 140 MMOL/L (ref 136–145)
SPECIFIC GRAVITY UA: 1.02 (ref 1–1.03)
TOTAL PROTEIN: 6 G/DL (ref 6.6–8.7)
TRIGL SERPL-MCNC: 47 MG/DL (ref 0–149)
TSH REFLEX FT4: 2.29 UIU/ML (ref 0.35–5.5)
UROBILINOGEN, URINE: 1 E.U./DL
WBC # BLD: 6 K/UL (ref 4.8–10.8)
WBC UA: 3 /HPF (ref 0–5)

## 2020-10-17 PROCEDURE — 6370000000 HC RX 637 (ALT 250 FOR IP): Performed by: EMERGENCY MEDICINE

## 2020-10-17 PROCEDURE — 99283 EMERGENCY DEPT VISIT LOW MDM: CPT

## 2020-10-17 PROCEDURE — 6370000000 HC RX 637 (ALT 250 FOR IP): Performed by: HOSPITALIST

## 2020-10-17 PROCEDURE — 1210000000 HC MED SURG R&B

## 2020-10-17 PROCEDURE — 70450 CT HEAD/BRAIN W/O DYE: CPT

## 2020-10-17 PROCEDURE — 81001 URINALYSIS AUTO W/SCOPE: CPT

## 2020-10-17 PROCEDURE — 99999 PR OFFICE/OUTPT VISIT,PROCEDURE ONLY: CPT | Performed by: EMERGENCY MEDICINE

## 2020-10-17 PROCEDURE — 84443 ASSAY THYROID STIM HORMONE: CPT

## 2020-10-17 PROCEDURE — 83036 HEMOGLOBIN GLYCOSYLATED A1C: CPT

## 2020-10-17 PROCEDURE — 85025 COMPLETE CBC W/AUTO DIFF WBC: CPT

## 2020-10-17 PROCEDURE — 80061 LIPID PANEL: CPT

## 2020-10-17 PROCEDURE — 36415 COLL VENOUS BLD VENIPUNCTURE: CPT

## 2020-10-17 PROCEDURE — 80053 COMPREHEN METABOLIC PANEL: CPT

## 2020-10-17 RX ORDER — DIPHENOXYLATE HYDROCHLORIDE AND ATROPINE SULFATE 2.5; .025 MG/1; MG/1
1 TABLET ORAL 4 TIMES DAILY PRN
Status: DISCONTINUED | OUTPATIENT
Start: 2020-10-17 | End: 2020-10-26 | Stop reason: HOSPADM

## 2020-10-17 RX ORDER — GLIMEPIRIDE 4 MG/1
4 TABLET ORAL 2 TIMES DAILY
Status: DISCONTINUED | OUTPATIENT
Start: 2020-10-17 | End: 2020-10-21

## 2020-10-17 RX ORDER — ROPINIROLE 1 MG/1
1 TABLET, FILM COATED ORAL NIGHTLY
Status: DISCONTINUED | OUTPATIENT
Start: 2020-10-17 | End: 2020-10-18

## 2020-10-17 RX ORDER — ALLOPURINOL 300 MG/1
600 TABLET ORAL DAILY
Status: DISCONTINUED | OUTPATIENT
Start: 2020-10-18 | End: 2020-10-21

## 2020-10-17 RX ORDER — PREDNISOLONE ACETATE 10 MG/ML
1 SUSPENSION/ DROPS OPHTHALMIC 2 TIMES DAILY
Status: DISCONTINUED | OUTPATIENT
Start: 2020-10-17 | End: 2020-10-26 | Stop reason: HOSPADM

## 2020-10-17 RX ORDER — ATORVASTATIN CALCIUM 80 MG/1
80 TABLET, FILM COATED ORAL NIGHTLY
Status: DISCONTINUED | OUTPATIENT
Start: 2020-10-18 | End: 2020-10-21

## 2020-10-17 RX ORDER — NITROGLYCERIN 0.4 MG/1
0.4 TABLET SUBLINGUAL EVERY 5 MIN PRN
Status: DISCONTINUED | OUTPATIENT
Start: 2020-10-17 | End: 2020-10-26 | Stop reason: HOSPADM

## 2020-10-17 RX ORDER — COLCHICINE 0.6 MG/1
0.6 TABLET ORAL PRN
Status: DISCONTINUED | OUTPATIENT
Start: 2020-10-17 | End: 2020-10-26 | Stop reason: HOSPADM

## 2020-10-17 RX ORDER — LIDOCAINE 4 G/G
1 PATCH TOPICAL DAILY
Status: DISCONTINUED | OUTPATIENT
Start: 2020-10-18 | End: 2020-10-26 | Stop reason: HOSPADM

## 2020-10-17 RX ORDER — LOSARTAN POTASSIUM 25 MG/1
25 TABLET ORAL DAILY
Status: DISCONTINUED | OUTPATIENT
Start: 2020-10-18 | End: 2020-10-18

## 2020-10-17 RX ORDER — DOCUSATE SODIUM 100 MG/1
100 CAPSULE, LIQUID FILLED ORAL 2 TIMES DAILY
Status: DISCONTINUED | OUTPATIENT
Start: 2020-10-17 | End: 2020-10-21

## 2020-10-17 RX ORDER — TAMSULOSIN HYDROCHLORIDE 0.4 MG/1
0.8 CAPSULE ORAL DAILY
Status: DISCONTINUED | OUTPATIENT
Start: 2020-10-18 | End: 2020-10-21

## 2020-10-17 RX ORDER — PANTOPRAZOLE SODIUM 40 MG/1
40 TABLET, DELAYED RELEASE ORAL 2 TIMES DAILY
Status: DISCONTINUED | OUTPATIENT
Start: 2020-10-17 | End: 2020-10-21

## 2020-10-17 RX ORDER — M-VIT,TX,IRON,MINS/CALC/FOLIC 27MG-0.4MG
1 TABLET ORAL DAILY
Status: DISCONTINUED | OUTPATIENT
Start: 2020-10-18 | End: 2020-10-21

## 2020-10-17 RX ORDER — ASCORBIC ACID 500 MG
1000 TABLET ORAL DAILY
Status: DISCONTINUED | OUTPATIENT
Start: 2020-10-18 | End: 2020-10-21

## 2020-10-17 RX ORDER — CLONIDINE HYDROCHLORIDE 0.1 MG/1
0.1 TABLET ORAL ONCE
Status: COMPLETED | OUTPATIENT
Start: 2020-10-17 | End: 2020-10-17

## 2020-10-17 RX ORDER — ISOSORBIDE MONONITRATE 30 MG/1
120 TABLET, EXTENDED RELEASE ORAL DAILY
Status: DISCONTINUED | OUTPATIENT
Start: 2020-10-18 | End: 2020-10-21

## 2020-10-17 RX ORDER — GABAPENTIN 100 MG/1
100 CAPSULE ORAL 2 TIMES DAILY
Status: DISCONTINUED | OUTPATIENT
Start: 2020-10-17 | End: 2020-10-18

## 2020-10-17 RX ORDER — FINASTERIDE 5 MG/1
5 TABLET, FILM COATED ORAL DAILY
Status: DISCONTINUED | OUTPATIENT
Start: 2020-10-18 | End: 2020-10-21

## 2020-10-17 RX ORDER — VITAMIN B COMPLEX
1000 TABLET ORAL DAILY
Status: DISCONTINUED | OUTPATIENT
Start: 2020-10-18 | End: 2020-10-21

## 2020-10-17 RX ORDER — BACLOFEN 10 MG/1
10 TABLET ORAL DAILY PRN
Status: DISCONTINUED | OUTPATIENT
Start: 2020-10-17 | End: 2020-10-18

## 2020-10-17 RX ORDER — MECLIZINE HYDROCHLORIDE 25 MG/1
25 TABLET ORAL 4 TIMES DAILY PRN
Status: DISCONTINUED | OUTPATIENT
Start: 2020-10-17 | End: 2020-10-26 | Stop reason: HOSPADM

## 2020-10-17 RX ORDER — SENNA AND DOCUSATE SODIUM 50; 8.6 MG/1; MG/1
2 TABLET, FILM COATED ORAL DAILY
Status: DISCONTINUED | OUTPATIENT
Start: 2020-10-18 | End: 2020-10-21

## 2020-10-17 RX ORDER — VITAMIN C
1 TAB ORAL DAILY
Status: DISCONTINUED | OUTPATIENT
Start: 2020-10-18 | End: 2020-10-21

## 2020-10-17 RX ORDER — ASPIRIN 81 MG/1
81 TABLET, CHEWABLE ORAL DAILY
Status: DISCONTINUED | OUTPATIENT
Start: 2020-10-18 | End: 2020-10-21

## 2020-10-17 RX ADMIN — CLONIDINE HYDROCHLORIDE 0.1 MG: 0.1 TABLET ORAL at 22:50

## 2020-10-17 RX ADMIN — CLONIDINE HYDROCHLORIDE 0.1 MG: 0.1 TABLET ORAL at 18:51

## 2020-10-17 ASSESSMENT — ENCOUNTER SYMPTOMS
DIARRHEA: 0
SHORTNESS OF BREATH: 0
VOMITING: 0

## 2020-10-18 ENCOUNTER — APPOINTMENT (OUTPATIENT)
Dept: MRI IMAGING | Age: 85
DRG: 078 | End: 2020-10-18
Payer: COMMERCIAL

## 2020-10-18 PROBLEM — I16.0 HYPERTENSIVE URGENCY: Status: ACTIVE | Noted: 2020-10-18

## 2020-10-18 LAB
AMMONIA: 18 UMOL/L (ref 16–60)
BASE EXCESS ARTERIAL: -1.9 MMOL/L (ref -2–2)
CARBOXYHEMOGLOBIN ARTERIAL: 1.9 % (ref 0–5)
GLUCOSE BLD-MCNC: 104 MG/DL (ref 70–99)
GLUCOSE BLD-MCNC: 114 MG/DL (ref 70–99)
GLUCOSE BLD-MCNC: 124 MG/DL (ref 70–99)
HCO3 ARTERIAL: 22.1 MMOL/L (ref 22–26)
HEMOGLOBIN, ART, EXTENDED: 10.5 G/DL (ref 14–18)
METHEMOGLOBIN ARTERIAL: 0.8 %
O2 CONTENT ARTERIAL: 14 ML/DL
O2 SAT, ARTERIAL: 94.6 %
O2 THERAPY: ABNORMAL
PCO2 ARTERIAL: 34 MMHG (ref 35–45)
PERFORMED ON: ABNORMAL
PH ARTERIAL: 7.42 (ref 7.35–7.45)
PO2 ARTERIAL: 70 MMHG (ref 80–100)
POTASSIUM, WHOLE BLOOD: 3.9
T4 TOTAL: 5.1 UG/DL (ref 4.5–11.7)
TROPONIN: 0.08 NG/ML (ref 0–0.03)
TROPONIN: 0.08 NG/ML (ref 0–0.03)
TSH REFLEX FT4: 2.42 UIU/ML (ref 0.35–5.5)
VITAMIN B-12: 487 PG/ML (ref 211–946)

## 2020-10-18 PROCEDURE — 36600 WITHDRAWAL OF ARTERIAL BLOOD: CPT

## 2020-10-18 PROCEDURE — 2580000003 HC RX 258: Performed by: INTERNAL MEDICINE

## 2020-10-18 PROCEDURE — 2580000003 HC RX 258: Performed by: HOSPITALIST

## 2020-10-18 PROCEDURE — 82607 VITAMIN B-12: CPT

## 2020-10-18 PROCEDURE — 99233 SBSQ HOSP IP/OBS HIGH 50: CPT | Performed by: PSYCHIATRY & NEUROLOGY

## 2020-10-18 PROCEDURE — 2580000003 HC RX 258: Performed by: PSYCHIATRY & NEUROLOGY

## 2020-10-18 PROCEDURE — 84436 ASSAY OF TOTAL THYROXINE: CPT

## 2020-10-18 PROCEDURE — 6370000000 HC RX 637 (ALT 250 FOR IP): Performed by: HOSPITALIST

## 2020-10-18 PROCEDURE — 82140 ASSAY OF AMMONIA: CPT

## 2020-10-18 PROCEDURE — 36415 COLL VENOUS BLD VENIPUNCTURE: CPT

## 2020-10-18 PROCEDURE — 95819 EEG AWAKE AND ASLEEP: CPT

## 2020-10-18 PROCEDURE — 82947 ASSAY GLUCOSE BLOOD QUANT: CPT

## 2020-10-18 PROCEDURE — 2500000003 HC RX 250 WO HCPCS: Performed by: PSYCHIATRY & NEUROLOGY

## 2020-10-18 PROCEDURE — 6360000002 HC RX W HCPCS: Performed by: PSYCHIATRY & NEUROLOGY

## 2020-10-18 PROCEDURE — 2700000000 HC OXYGEN THERAPY PER DAY

## 2020-10-18 PROCEDURE — 84443 ASSAY THYROID STIM HORMONE: CPT

## 2020-10-18 PROCEDURE — 51798 US URINE CAPACITY MEASURE: CPT

## 2020-10-18 PROCEDURE — 2000000000 HC ICU R&B

## 2020-10-18 PROCEDURE — 84484 ASSAY OF TROPONIN QUANT: CPT

## 2020-10-18 PROCEDURE — 6370000000 HC RX 637 (ALT 250 FOR IP): Performed by: INTERNAL MEDICINE

## 2020-10-18 PROCEDURE — 82803 BLOOD GASES ANY COMBINATION: CPT

## 2020-10-18 PROCEDURE — 6370000000 HC RX 637 (ALT 250 FOR IP): Performed by: PSYCHIATRY & NEUROLOGY

## 2020-10-18 PROCEDURE — 6360000002 HC RX W HCPCS: Performed by: HOSPITALIST

## 2020-10-18 PROCEDURE — 84132 ASSAY OF SERUM POTASSIUM: CPT

## 2020-10-18 RX ORDER — NICOTINE POLACRILEX 4 MG
15 LOZENGE BUCCAL PRN
Status: DISCONTINUED | OUTPATIENT
Start: 2020-10-18 | End: 2020-10-26 | Stop reason: HOSPADM

## 2020-10-18 RX ORDER — DEXTROSE MONOHYDRATE 50 MG/ML
100 INJECTION, SOLUTION INTRAVENOUS PRN
Status: DISCONTINUED | OUTPATIENT
Start: 2020-10-18 | End: 2020-10-26 | Stop reason: HOSPADM

## 2020-10-18 RX ORDER — HYDRALAZINE HYDROCHLORIDE 25 MG/1
25 TABLET, FILM COATED ORAL EVERY 8 HOURS SCHEDULED
Status: DISCONTINUED | OUTPATIENT
Start: 2020-10-18 | End: 2020-10-18

## 2020-10-18 RX ORDER — HYDRALAZINE HYDROCHLORIDE 25 MG/1
25 TABLET, FILM COATED ORAL EVERY 4 HOURS PRN
Status: DISCONTINUED | OUTPATIENT
Start: 2020-10-18 | End: 2020-10-26 | Stop reason: HOSPADM

## 2020-10-18 RX ORDER — HYDRALAZINE HYDROCHLORIDE 20 MG/ML
20 INJECTION INTRAMUSCULAR; INTRAVENOUS EVERY 4 HOURS PRN
Status: DISCONTINUED | OUTPATIENT
Start: 2020-10-18 | End: 2020-10-18

## 2020-10-18 RX ORDER — LABETALOL HYDROCHLORIDE 5 MG/ML
10 INJECTION, SOLUTION INTRAVENOUS EVERY 10 MIN PRN
Status: DISCONTINUED | OUTPATIENT
Start: 2020-10-18 | End: 2020-10-18

## 2020-10-18 RX ORDER — SODIUM CHLORIDE 0.9 % (FLUSH) 0.9 %
10 SYRINGE (ML) INJECTION EVERY 12 HOURS SCHEDULED
Status: DISCONTINUED | OUTPATIENT
Start: 2020-10-18 | End: 2020-10-26 | Stop reason: HOSPADM

## 2020-10-18 RX ORDER — LOSARTAN POTASSIUM 50 MG/1
50 TABLET ORAL DAILY
Status: DISCONTINUED | OUTPATIENT
Start: 2020-10-18 | End: 2020-10-18

## 2020-10-18 RX ORDER — HYDRALAZINE HYDROCHLORIDE 20 MG/ML
20 INJECTION INTRAMUSCULAR; INTRAVENOUS EVERY 4 HOURS PRN
Status: DISCONTINUED | OUTPATIENT
Start: 2020-10-18 | End: 2020-10-19

## 2020-10-18 RX ORDER — HYDRALAZINE HYDROCHLORIDE 25 MG/1
25 TABLET, FILM COATED ORAL EVERY 4 HOURS PRN
Status: DISCONTINUED | OUTPATIENT
Start: 2020-10-18 | End: 2020-10-18

## 2020-10-18 RX ORDER — FOLIC ACID 1 MG/1
1 TABLET ORAL DAILY
Status: DISCONTINUED | OUTPATIENT
Start: 2020-10-18 | End: 2020-10-21

## 2020-10-18 RX ORDER — ACETAMINOPHEN 325 MG/1
650 TABLET ORAL EVERY 6 HOURS PRN
Status: DISCONTINUED | OUTPATIENT
Start: 2020-10-18 | End: 2020-10-26 | Stop reason: HOSPADM

## 2020-10-18 RX ORDER — THIAMINE HYDROCHLORIDE 100 MG/ML
500 INJECTION, SOLUTION INTRAMUSCULAR; INTRAVENOUS DAILY
Status: DISCONTINUED | OUTPATIENT
Start: 2020-10-18 | End: 2020-10-18 | Stop reason: SDUPTHER

## 2020-10-18 RX ORDER — ONDANSETRON 4 MG/1
4 TABLET, ORALLY DISINTEGRATING ORAL EVERY 8 HOURS PRN
Status: DISCONTINUED | OUTPATIENT
Start: 2020-10-18 | End: 2020-10-26 | Stop reason: HOSPADM

## 2020-10-18 RX ORDER — HYDRALAZINE HYDROCHLORIDE 50 MG/1
100 TABLET, FILM COATED ORAL EVERY 8 HOURS SCHEDULED
Status: DISCONTINUED | OUTPATIENT
Start: 2020-10-18 | End: 2020-10-21

## 2020-10-18 RX ORDER — SODIUM CHLORIDE, SODIUM LACTATE, POTASSIUM CHLORIDE, CALCIUM CHLORIDE 600; 310; 30; 20 MG/100ML; MG/100ML; MG/100ML; MG/100ML
INJECTION, SOLUTION INTRAVENOUS ONCE
Status: COMPLETED | OUTPATIENT
Start: 2020-10-18 | End: 2020-10-18

## 2020-10-18 RX ORDER — HYDRALAZINE HYDROCHLORIDE 50 MG/1
50 TABLET, FILM COATED ORAL EVERY 8 HOURS SCHEDULED
Status: DISCONTINUED | OUTPATIENT
Start: 2020-10-18 | End: 2020-10-18

## 2020-10-18 RX ORDER — SODIUM CHLORIDE 0.9 % (FLUSH) 0.9 %
10 SYRINGE (ML) INJECTION PRN
Status: DISCONTINUED | OUTPATIENT
Start: 2020-10-18 | End: 2020-10-26 | Stop reason: HOSPADM

## 2020-10-18 RX ORDER — LORAZEPAM 2 MG/ML
2 INJECTION INTRAMUSCULAR ONCE
Status: COMPLETED | OUTPATIENT
Start: 2020-10-18 | End: 2020-10-18

## 2020-10-18 RX ORDER — LABETALOL HYDROCHLORIDE 5 MG/ML
20 INJECTION, SOLUTION INTRAVENOUS EVERY 4 HOURS PRN
Status: DISCONTINUED | OUTPATIENT
Start: 2020-10-18 | End: 2020-10-18

## 2020-10-18 RX ORDER — LOSARTAN POTASSIUM 25 MG/1
25 TABLET ORAL DAILY
Status: DISCONTINUED | OUTPATIENT
Start: 2020-10-18 | End: 2020-10-21

## 2020-10-18 RX ORDER — ONDANSETRON 2 MG/ML
4 INJECTION INTRAMUSCULAR; INTRAVENOUS EVERY 6 HOURS PRN
Status: DISCONTINUED | OUTPATIENT
Start: 2020-10-18 | End: 2020-10-26 | Stop reason: HOSPADM

## 2020-10-18 RX ORDER — ACETAMINOPHEN 650 MG/1
650 SUPPOSITORY RECTAL EVERY 6 HOURS PRN
Status: DISCONTINUED | OUTPATIENT
Start: 2020-10-18 | End: 2020-10-26 | Stop reason: HOSPADM

## 2020-10-18 RX ORDER — DEXTROSE MONOHYDRATE 25 G/50ML
12.5 INJECTION, SOLUTION INTRAVENOUS PRN
Status: DISCONTINUED | OUTPATIENT
Start: 2020-10-18 | End: 2020-10-26 | Stop reason: HOSPADM

## 2020-10-18 RX ADMIN — ISOSORBIDE MONONITRATE 120 MG: 30 TABLET, EXTENDED RELEASE ORAL at 11:25

## 2020-10-18 RX ADMIN — OXYCODONE HYDROCHLORIDE AND ACETAMINOPHEN 1000 MG: 500 TABLET ORAL at 08:51

## 2020-10-18 RX ADMIN — PANTOPRAZOLE SODIUM 40 MG: 40 TABLET, DELAYED RELEASE ORAL at 08:52

## 2020-10-18 RX ADMIN — PREDNISOLONE ACETATE 1 DROP: 10 SUSPENSION/ DROPS OPHTHALMIC at 08:54

## 2020-10-18 RX ADMIN — MUPIROCIN: 20 OINTMENT TOPICAL at 08:53

## 2020-10-18 RX ADMIN — DOCUSATE SODIUM 100 MG: 100 CAPSULE, LIQUID FILLED ORAL at 08:51

## 2020-10-18 RX ADMIN — BACLOFEN 10 MG: 10 TABLET ORAL at 10:22

## 2020-10-18 RX ADMIN — GABAPENTIN 100 MG: 100 CAPSULE ORAL at 08:51

## 2020-10-18 RX ADMIN — ASPIRIN 81 MG: 81 TABLET, CHEWABLE ORAL at 08:52

## 2020-10-18 RX ADMIN — SODIUM CHLORIDE, PRESERVATIVE FREE 10 ML: 5 INJECTION INTRAVENOUS at 21:13

## 2020-10-18 RX ADMIN — ALLOPURINOL 600 MG: 300 TABLET ORAL at 08:51

## 2020-10-18 RX ADMIN — ENOXAPARIN SODIUM 40 MG: 40 INJECTION SUBCUTANEOUS at 08:53

## 2020-10-18 RX ADMIN — SODIUM CHLORIDE, PRESERVATIVE FREE 10 ML: 5 INJECTION INTRAVENOUS at 08:52

## 2020-10-18 RX ADMIN — GLIMEPIRIDE 4 MG: 4 TABLET ORAL at 11:25

## 2020-10-18 RX ADMIN — LORAZEPAM 2 MG: 2 INJECTION INTRAMUSCULAR; INTRAVENOUS at 14:05

## 2020-10-18 RX ADMIN — LOSARTAN POTASSIUM 25 MG: 25 TABLET, FILM COATED ORAL at 08:51

## 2020-10-18 RX ADMIN — FOLIC ACID 1 MG: 1 TABLET ORAL at 10:40

## 2020-10-18 RX ADMIN — PANTOPRAZOLE SODIUM 40 MG: 40 TABLET, DELAYED RELEASE ORAL at 21:12

## 2020-10-18 RX ADMIN — FINASTERIDE 5 MG: 5 TABLET, FILM COATED ORAL at 08:52

## 2020-10-18 RX ADMIN — TAMSULOSIN HYDROCHLORIDE 0.8 MG: 0.4 CAPSULE ORAL at 08:52

## 2020-10-18 RX ADMIN — HYDRALAZINE HYDROCHLORIDE 20 MG: 20 INJECTION INTRAMUSCULAR; INTRAVENOUS at 07:37

## 2020-10-18 RX ADMIN — THIAMINE HYDROCHLORIDE: 100 INJECTION, SOLUTION INTRAMUSCULAR; INTRAVENOUS at 15:19

## 2020-10-18 RX ADMIN — HYDRALAZINE HYDROCHLORIDE 100 MG: 50 TABLET, FILM COATED ORAL at 21:12

## 2020-10-18 RX ADMIN — SODIUM CHLORIDE, POTASSIUM CHLORIDE, SODIUM LACTATE AND CALCIUM CHLORIDE: 600; 310; 30; 20 INJECTION, SOLUTION INTRAVENOUS at 17:48

## 2020-10-18 RX ADMIN — MUPIROCIN: 20 OINTMENT TOPICAL at 21:13

## 2020-10-18 RX ADMIN — HYDRALAZINE HYDROCHLORIDE 50 MG: 50 TABLET, FILM COATED ORAL at 06:18

## 2020-10-18 RX ADMIN — VITAMIN C 1 TABLET: TAB at 08:52

## 2020-10-18 RX ADMIN — PREDNISOLONE ACETATE 1 DROP: 10 SUSPENSION/ DROPS OPHTHALMIC at 21:13

## 2020-10-18 RX ADMIN — FOLIC ACID: 5 INJECTION, SOLUTION INTRAMUSCULAR; INTRAVENOUS; SUBCUTANEOUS at 11:30

## 2020-10-18 RX ADMIN — HYDRALAZINE HYDROCHLORIDE 20 MG: 20 INJECTION INTRAMUSCULAR; INTRAVENOUS at 00:52

## 2020-10-18 RX ADMIN — ATORVASTATIN CALCIUM 80 MG: 80 TABLET, FILM COATED ORAL at 21:12

## 2020-10-18 RX ADMIN — GLIMEPIRIDE 4 MG: 4 TABLET ORAL at 21:12

## 2020-10-18 RX ADMIN — DOCUSATE SODIUM 100 MG: 100 CAPSULE, LIQUID FILLED ORAL at 21:12

## 2020-10-18 RX ADMIN — MULTIPLE VITAMINS W/ MINERALS TAB 1 TABLET: TAB at 08:51

## 2020-10-18 RX ADMIN — DOCUSATE SODIUM 50 MG AND SENNOSIDES 8.6 MG 2 TABLET: 8.6; 5 TABLET, FILM COATED ORAL at 08:52

## 2020-10-18 RX ADMIN — Medication 1000 UNITS: at 08:51

## 2020-10-18 ASSESSMENT — ENCOUNTER SYMPTOMS
COUGH: 0
CHEST TIGHTNESS: 0
NAUSEA: 0
VOMITING: 0

## 2020-10-18 NOTE — PROGRESS NOTES
Ref Range & Units  Status  Collected  Lab     pH, Arterial  7.420  7.350 - 7.450  Final  10/18/2020 10:16 AM  [facility]    pCO2, Arterial  34. 0Low    35.0 - 45.0 mmHg  Final  10/18/2020 10:16 AM  [facility]    pO2, Arterial  70. 0Low    80.0 - 100.0 mmHg  Final  10/18/2020 10:16 AM  [facility]    HCO3, Arterial  22.1  22.0 - 26.0 mmol/L  Final  10/18/2020 10:16 AM  [facility] Excess, Arterial  -1.9  -2.0 - 2.0 mmol/L  Final  10/18/2020 10:16 AM  [facility]    Hemoglobin, Art, Extended  10.5Low    14.0 - 18.0 g/dL  Final  10/18/2020 10:16 AM  [facility]    O2 Sat, Arterial  94.6  >92 %  Final  10/18/2020 10:16 AM  [facility]    Carboxyhgb, Arterial  1.9  0.0 - 5.0 %  Final  10/18/2020 10:16 AM  [facility]         0.0-1.5   (Smokers 1.5-5.0)    Methemoglobin, Arterial  0.8  <1.5 %  Final  10/18/2020 10:16 AM  [facility]    O2 Content, Arterial  14.0  Not Established mL/dL  Final  10/18/2020 10:16 AM  [facility]    O2 Therapy  Unknown   Final  10/18/2020 10:16 AM  [facility] Yi Howell Performed By     [address]  Name  Director  Address  Valid Date Range    [address] [facility]  Bridget Faye MD  [address]   [address] [address]  08/30/17 0733-Present    Lab and Collection     Blood Gas, Arterial - 10/18/2020   Result History     Blood Gas, Arterial on 10/18/2020    Result Information     Flag: AbnormalAbnormal     Status: Final result (Collected: 10/18/2020 10:16)  Provider Status: Ordered    Click to Print Result    View Purchasing Platform     Blood Gas, Arterial (Order #[id number]) on 10/18/20    Order Report     Order Details   ANNMARIE HARRIS

## 2020-10-18 NOTE — PROGRESS NOTES
Spoke with pts granddaughter Donavan Stovall and Pts Son Juan Braun. They state they do not want pts wife or daughter to visit or receive any information on the pt. They state they put a lot of stress on the pt and at this time wish that the pt get as much rest as possible. They state that will not happen if his wife and daughter are here.  Electronically signed by Fernandez Goodwin RN on 10/18/2020 at 4:41 PM

## 2020-10-18 NOTE — PROGRESS NOTES
Hospitalist Progress Note  [facility]     Patient: Mercedes Salgado  : 1934  MRN: [id number]  Code Status: Full Code    Hospital Day: 1   Date of Service: 10/18/2020    Subjective:   Pt seen and examined. Slightly confused. No acute distress.     Past Medical History:   Diagnosis Date    Blood circulation, collateral     CAD (coronary artery disease)     STENTS X 3    CAD (coronary artery disease)     CABG    Cerebral artery occlusion with cerebral infarction (HCC)     CHF (congestive heart failure) (HCC)     Chronic kidney disease     DDD (degenerative disc disease), lumbar 2017    Diabetes mellitus ([address]     Disease of blood and blood forming organ     GERD (gastroesophageal reflux disease)     History of blood transfusion     Hx of blood clots     Right leg    Hyperlipidemia     Hypertension     Lumbar stenosis with neurogenic claudication 2017    MI, old     X 2    Other disorders of kidney and ureter in diseases classified elsewhere     Palliative care patient 2018    Right heart failure ([facility] [facility] 2018    Sleep apnea     DOESN'T USE MACHINE    TIA (transient ischemic attack)        Past Surgical History:   Procedure Laterality Date    APPENDECTOMY      BACK SURGERY  1980    x 3    CARDIAC SURGERY      Bypass x 3    CARPAL TUNNEL RELEASE Bilateral     wrist and elbows    CHOLECYSTECTOMY      COLONOSCOPY      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 2    DILATATION, ESOPHAGUS      ENDOSCOPY, COLON, DIAGNOSTIC      EYE SURGERY      JOINT REPLACEMENT Left     JOINT REPLACEMENT Right     LAMINECTOMY N/A 2017    LUMBAR LAMINECTOMY POSTERIOR  L23 L34 performed by Colton Pierre MD at [address] Left     ROTATOR CUFF REPAIR Right 2010    TUMOR REMOVAL Left 1960    foot    TURP  2000    VASCULAR SURGERY Left 7/15/2015 [facility] [address]    Aortoiliofemoral a'gram with bilateral lower extremity runoff; select views 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; a'plasty of left anterior tibial artery and dorsalis pedis artery with 2.5x300 vascutrak balloon and then with 3x220 nati balloon; completion a'gram       Family History   Problem Relation Age of Onset    Cancer Mother     Heart Disease Father     Heart Disease Brother     Heart Disease Brother     Heart Disease Brother     Heart Disease Brother     Heart Attack Brother     Heart Disease Brother     Heart Disease Brother        Social History     Socioeconomic History    Marital status:      Spouse name: Valentín Mann    Number of children: 2    Years of education: 15    Highest education level: Not on file   Occupational History    Not on file   Social Needs    Financial resource strain: Not on file    Food insecurity     Worry: Not on file     Inability: Not on file   [employer] needs     Medical: Not on file     Non-medical: Not on file   Tobacco Use    Smoking status: Never Smoker    Smokeless tobacco: Never Used   Substance and Sexual Activity    Alcohol use: No    Drug use: No    Sexual activity: Not Currently   Lifestyle    Physical activity     Days per week: Not on file     Minutes per session: Not on file    Stress: Not on file   Relationships    Social connections     Talks on phone: Not on file     Gets together: Not on file     Attends Latter day service: Not on file     Active member of club or organization: Not on file     Attends meetings of clubs or organizations: Not on file     Relationship status: Not on file    Intimate partner violence     Fear of current or ex partner: Not on file     Emotionally abused: Not on file     Physically abused: Not on file     Forced sexual activity: Not on file   Other Topics Concern    Not on file   Social History Narrative    Not on file       Current Facility-Administered Medications   Medication Dose Route Frequency Provider Last Rate Last Dose    sodium chloride flush 0.9 % injection 10 mL  10 mL Intravenous 2 times per day Carlton Nunez MD   10 mL at 10/18/20 0852    sodium chloride flush 0.9 % injection 10 mL  10 mL Intravenous PRN Carlton Nunez MD        acetaminophen (TYLENOL) tablet 650 mg  650 mg Oral Q6H PRN Carlton Nunez MD        Or    acetaminophen (TYLENOL) suppository 650 mg  650 mg Rectal Q6H PRN Carlton Nunez MD        enoxaparin (LOVENOX) injection 40 mg  40 mg Subcutaneous Daily Carlton Nunez MD   40 mg at 10/18/20 0853    ondansetron (ZOFRAN-ODT) disintegrating tablet 4 mg  4 mg Oral Q8H PRN Carlton Nunez MD        Or    ondansetron [facility]) injection 4 mg  4 mg Intravenous Q6H PRN Carlton Nunez MD        labetalol (NORMODYNE;TRANDATE) injection 10 mg  10 mg Intravenous Q10 Min PRN Carlton Nunez MD        labetalol (NORMODYNE;TRANDATE) injection 20 mg  20 mg Intravenous Q4H PRN Carlton Nunez MD        losartan (COZAAR) tablet 25 mg  25 mg Oral Daily Carlton Nunez MD   25 mg at 10/18/20 9191    hydrALAZINE (APRESOLINE) tablet 50 mg  50 mg Oral 3 times per day Carlton Nunez MD   50 mg at 10/18/20 0618    finasteride (PROSCAR) tablet 5 mg  5 mg Oral Daily Carlton Nunez MD   5 mg at 10/18/20 1461    ferrous sulfate-C-folic acid (FOLITAB) 560-449-2.8 MG per extended release tablet 1 tablet  1 tablet Oral Daily Carlton Nunez MD        docusate sodium (COLACE) capsule 100 mg  100 mg Oral BID Carlton Nunez MD   100 mg at 10/18/20 0851    diphenoxylate-atropine (LOMOTIL) 2.5-0.025 MG per tablet 1 tablet  1 tablet Oral 4x Daily PRN Carlton Nunez MD        colchicine (COLCRYS) tablet 0.6 mg  0.6 mg Oral PRN Carlton Nunez MD        baclofen (LIORESAL) tablet 10 mg  10 mg Oral Daily PRN Carlton Nunez MD        vitamin B and C (TOTAL B-C) 1 tablet  1 tablet Oral Daily Carlton Nunez MD   1 tablet at 10/18/20 4846    aspirin chewable tablet 81 mg  81 mg Oral Daily Yaritza Bennett MD   81 mg at 10/18/20 4443    vitamin C (ASCORBIC ACID) tablet 1,000 mg  1,000 mg Oral Daily Yaritza Bennett MD   1,000 mg at 10/18/20 0851    allopurinol (ZYLOPRIM) tablet 600 mg  600 mg Oral Daily Yaritza Bennett MD   600 mg at 10/18/20 1644    gabapentin (NEURONTIN) capsule 100 mg  100 mg Oral BID Yaritza Bennett MD   100 mg at 10/18/20 7274    isosorbide mononitrate (IMDUR) extended release tablet 120 mg  120 mg Oral Daily Yaritza Bennett MD        meclizine (ANTIVERT) tablet 25 mg  25 mg Oral 4x Daily PRN Yaritza Bennett MD        therapeutic multivitamin-minerals 1 tablet  1 tablet Oral Daily Yaritza Bennett MD   1 tablet at 10/18/20 2916    vitamin D (CHOLECALCIFEROL) tablet 1,000 Units  1,000 Units Oral Daily Yaritza Bennett MD   1,000 Units at 10/18/20 0851    tamsulosin (FLOMAX) capsule 0.8 mg  0.8 mg Oral Daily Yaritza Bennett MD   0.8 mg at 10/18/20 4011    sennosides-docusate sodium (SENOKOT-S) 8.6-50 MG tablet 2 tablet  2 tablet Oral Daily Yaritza Bennett MD   2 tablet at 10/18/20 1913    rOPINIRole (REQUIP) tablet 1 mg  1 mg Oral Nightly Yaritza Bennett MD        prednisoLONE acetate (PRED FORTE) 1 % ophthalmic suspension 1 drop  1 drop Left Eye BID Yaritza Bennett MD   1 drop at 10/18/20 0854    atorvastatin (LIPITOR) tablet 80 mg  80 mg Oral Nightly Yaritza Bennett MD        pantoprazole (PROTONIX) tablet 40 mg  40 mg Oral BID Yaritza Bennett MD   40 mg at 10/18/20 0852    nitroGLYCERIN (NITROSTAT) SL tablet 0.4 mg  0.4 mg Sublingual Q5 Min PRN Yaritza Bennett MD        mupirocin (BACTROBAN) 2 % ointment   Topical BID Yaritza Bennett MD        lidocaine 4 % external patch 1 patch  1 patch Topical Daily Yaritza Bennett MD   1 patch at 10/18/20 0853    glimepiride (AMARYL) tablet 4 mg  4 mg Oral BID Yaritza Bennett MD                 Objective:   BP (!) 150/54   Pulse 59   Temp 98.5 °F (36.9 °C)   Resp 21   Ht 5' 10\" (1.778 m)   Wt 202 lb 3.2 oz (91.7 kg) SpO2 96%   BMI 29.01 kg/m²     General: no acute distress  HEENT: normocephalic, atraumatic  Neck: supple, symmetrical, trachea midline   Lungs: clear to auscultation bilaterally   Cardiovascular: s1 and s2 normal  Abdomen: soft, positive bowel sounds  Extremities: no edema or cyanosis   Neuro: slightly confused, 5/5 power in all 4 extremities  Skin: normal color and texture     Recent Labs     10/17/20  1615   WBC 6.0   RBC 3.01*   HGB 9.8*   HCT 31.2*   .7*   MCH 32.6*   MCHC 31.4*        Recent Labs     10/17/20  1615      K 4.1   ANIONGAP 6*      CO2 24   BUN 21   CREATININE 0.9   GLUCOSE 94   CALCIUM 9.7     No results for input(s): MG, PHOS in the last 72 hours. Recent Labs     10/17/20  1615   AST 25   ALT 27   BILITOT 0.3   ALKPHOS 91     No results for input(s): PH, PO2, PCO2, HCO3, BE, O2SAT in the last 72 hours. Recent Labs     10/18/20  0118 10/18/20  0455   TROPONINI 0.08* 0.08*     No results for input(s): INR in the last 72 hours. No results for input(s): LACTA in the last 72 hours. Intake/Output Summary (Last 24 hours) at 10/18/2020 0909  Last data filed at 10/18/2020 0800  Gross per 24 hour   Intake 110 ml   Output 780 ml   Net -670 ml       Ct Head Wo Contrast    Result Date: 10/17/2020  CT HEAD WO CONTRAST 10/17/2020 4:34 PM History: Confusion Comparisons: 12/20/2019 head CT Technique: Radiation dose equals  mGy cm. AUTOMATED EXPOSURE CONTROL dose reduction technique was implemented CT evaluation of the head without intravenous contrast. 5 mm transaxial images were obtained. 2-D sagittal and coronal reconstruction images were generated. Findings: There is central and cortical atrophy. There is confluent periventricular and subcortical FLAIR signal hyperintensities compatible chronic ischemic changes. Encephalomalacia in the right frontal horn, inferiorly and laterally in the left suboccipital lobe again identified old areas of infarction suspected.  There is no intra or extra-axial hemorrhage. There is no mass effect or midline shift. There is no hydrocephalus. 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 Paranasal sinuses are grossly clear. There is no skull fracture acute calvarial abnormality. CT appearance the head is unchanged. Impression: 1. No CT evidence of an acute intracranial process. 2. Atrophy and chronic ischemic changes.                  Signed by Dr Ashley Renee on 10/17/2020 5:41 PM       Assessment and Plan:   Hypertensive emergency  Agents on board  Caution bradycardia  Ensure adequate cerebral perfusion pressure  CT head negative for acute process  MRI as warranted  Serial troponin 0.08 x2    DM2  Meds on board    DVT prophylaxis  Rosendo Zhu MD   10/18/2020 9:09 AM

## 2020-10-18 NOTE — PROGRESS NOTES
Spoke with Tung Ny Pts POA about pt code status. At this time the POA wants the pt to be a full Code until she speaks with the rest of the family. Will keep her updated on pts condition.  Electronically signed by Chery Vasquez RN on 10/18/2020 at 7:33 AM

## 2020-10-18 NOTE — ED PROVIDER NOTES
[facility] EMERGENCY DEPT  eMERGENCY dEPARTMENT eNCOUnter      Pt Name: Chip Rdz  MRN: [id number]  [place] 1934  Date of evaluation: 10/17/2020  Provider: Margoth Hardin MD    [address] [place] [address]       Chief Complaint   Patient presents with    Altered Mental Status     Stroke like symptoms greater than 24 hours (Monday 12th)         HISTORY OF PRESENT ILLNESS   (Location/Symptom, Timing/Onset,Context/Setting, Quality, Duration, Modifying Factors, Severity)  Note limiting factors. Chip Rdz is a 80 y.o. male who presents to the emergency department for evaluation of increasing confusion and difficulty with ambulation. Patient's family reports that he is normally alert and able to ambulate without difficulty. Patient's daughter states that over the past 2 to 3 days he has been experiencing increasing episodes of confusion and difficulty getting around at home. On arrival to the ED patient is alert and speaking. He is able to answer some basic questions although he tells me the year is 2002. His daughter states that his speech is normally very clear however is been very difficult to understand over the past few days. He has not had any recent medication changes. Denies any known history of fall or trauma. Denies recent illnesses such as vomiting, diarrhea or fever. The symptoms are described as moderate in severity and without relieving factors. HPI    NursingNotes were reviewed. REVIEW OF SYSTEMS    (2-9 systems for level 4, 10 or more for level 5)     Review of Systems   Constitutional: Negative for fever. HENT: Negative for congestion. Respiratory: Negative for shortness of breath. Cardiovascular: Negative for chest pain. Gastrointestinal: Negative for diarrhea and vomiting. Musculoskeletal: Positive for gait problem. Neurological: Positive for speech difficulty. Psychiatric/Behavioral: Positive for confusion. All other systems reviewed and are negative.            PAST balloon and then with 3x220 nati balloon; completion a'gram         CURRENT MEDICATIONS     Previous Medications    ALLOPURINOL (ZYLOPRIM) 300 MG TABLET    Take 600 mg by mouth daily     ASCORBIC ACID (VITAMIN C) 500 MG TABLET    Take 1,000 mg by mouth daily     ASPIRIN 81 MG CHEWABLE TABLET    Take 1 tablet by mouth daily    B COMPLEX VITAMINS CAPSULE    Take 1 capsule by mouth daily    BACLOFEN (LIORESAL) 10 MG TABLET    Take 10 mg by mouth daily as needed     COLCHICINE (COLCRYS) 0.6 MG TABLET    Take 0.6 mg by mouth as needed for Pain    DIPHENOXYLATE-ATROPINE (LOMOTIL) 2.5-0.025 MG PER TABLET    Take 1 tablet by mouth 4 times daily as needed for Diarrhea    DOCUSATE SODIUM (COLACE) 100 MG CAPSULE    Take 1 capsule by mouth 2 times daily    FERROUS SULFATE-C-FOLIC ACID (FOLITAB) 881-173-1.8 MG TBCR PER EXTENDED RELEASE TABLET    Take 1 tablet by mouth daily    FINASTERIDE (PROSCAR) 5 MG TABLET    Take 1 tablet by mouth daily    GABAPENTIN (NEURONTIN) 100 MG CAPSULE    Take 100 mg by mouth 2 times daily. Desire Franks GLIMEPIRIDE (AMARYL) 4 MG TABLET    Take 4 mg by mouth 2 times daily    ISOSORBIDE MONONITRATE (IMDUR) 60 MG EXTENDED RELEASE TABLET    Take 2 tablets by mouth daily    LIDOCAINE (LIDODERM) 5 %    Place 1 patch onto the skin daily 12 hours on, 12 hours off. LOSARTAN (COZAAR) 50 MG TABLET    Take 0.5 tablets by mouth daily    MECLIZINE (ANTIVERT) 25 MG TABLET    Take 25 mg by mouth 4 times daily as needed    MULTIPLE VITAMINS-MINERALS (EYE VITAMINS PO)    Take by mouth    MUPIROCIN (BACTROBAN) 2 % OINTMENT    Apply topically 2 times daily Apply to wound BID    NITROGLYCERIN (NITROSTAT) 0.4 MG SL TABLET    up to max of 3 total doses. If no relief after 1 dose, call 911.     ONDANSETRON (ZOFRAN-ODT) 4 MG DISINTEGRATING TABLET    Take 4 mg by mouth every 8 hours as needed for Nausea or Vomiting    PANTOPRAZOLE (PROTONIX) 40 MG TABLET    Take 1 tablet by mouth 2 times daily    PRAVASTATIN (PRAVACHOL) 40 MG TABLET    Take 40 mg by mouth daily    PREDNISOLONE ACETATE (PRED FORTE) 1 % OPHTHALMIC SUSPENSION    Place 1 drop into the left eye 2 times daily     PROMETHAZINE (PHENERGAN) 12.5 MG TABLET    Take 12.5 mg by mouth as needed for Nausea    ROPINIROLE (REQUIP) 1 MG TABLET    Take 1 mg by mouth nightly    SENNOSIDES-DOCUSATE SODIUM (SENOKOT-S) 8.6-50 MG TABLET    Take 2 tablets by mouth daily    TAMSULOSIN (FLOMAX) 0.4 MG CAPSULE    Take 2 capsules by mouth daily    VITAMIN D (CHOLECALCIFEROL) 1000 UNIT TABS TABLET    Take 1,000 Units by mouth daily       ALLERGIES     Pcn [penicillins] and Adhesive tape    FAMILY HISTORY       Family History   Problem Relation Age of Onset    Cancer Mother     Heart Disease Father     Heart Disease Brother     Heart Disease Brother     Heart Disease Brother     Heart Disease Brother     Heart Attack Brother     Heart Disease Brother     Heart Disease Brother           SOCIAL HISTORY       Social History     Socioeconomic History    Marital status:      Spouse name: Bernadette Gee    Number of children: 2    Years of education: 15    Highest education level: None   Occupational History    None   Social Needs    Financial resource strain: None    Food insecurity     Worry: None     Inability: None    Transportation needs     Medical: None     Non-medical: None   Tobacco Use    Smoking status: Never Smoker    Smokeless tobacco: Never Used   Substance and Sexual Activity    Alcohol use: No    Drug use: No    Sexual activity: Not Currently   Lifestyle    Physical activity     Days per week: None     Minutes per session: None    Stress: None   Relationships    Social connections     Talks on phone: None     Gets together: None     Attends Evangelical service: None     Active member of club or organization: None     Attends meetings of clubs or organizations: None     Relationship status: None    Intimate partner violence     Fear of current or ex partner: None Emotionally abused: None     Physically abused: None     Forced sexual activity: None   Other Topics Concern    None   Social History Narrative    None       SCREENINGS   NIH Stroke Scale  Interval: Baseline  Level of Consciousness (1a. ): Alert  LOC Questions (1b. ): Answers both correctly  LOC Commands (1c. ): Performs both tasks correctly  Motor Arm, Left (5a. ): No drift  Motor Arm, Right (5b. ): No drift  Motor Leg, Left (6a. ): No drift  Motor Leg, Right (6b. ): No drift  Best Language (9. ): No aphasia  Dysarthria (10. ): Normal         PHYSICAL EXAM    (up to 7 for level 4, 8 or more for level 5)     ED Triage Vitals   BP Temp Temp src Pulse Resp SpO2 Height Weight   10/17/20 1615 -- -- 10/17/20 2039 10/17/20 2039 10/17/20 1615 -- --   (!) 223/77   54 8 91 %         Physical Exam  Vitals signs and nursing note reviewed. HENT:      Head: Atraumatic. Mouth/Throat:      Mouth: Mucous membranes are not dry. Pharynx: No posterior oropharyngeal erythema. Eyes:      General: No scleral icterus. Pupils: Pupils are equal, round, and reactive to light. Neck:      Trachea: No tracheal deviation. Cardiovascular:      Rate and Rhythm: Normal rate and regular rhythm. Heart sounds: Normal heart sounds. No murmur. Pulmonary:      Effort: Pulmonary effort is normal. No respiratory distress. Breath sounds: Normal breath sounds. No stridor. Abdominal:      General: There is no distension. Palpations: Abdomen is soft. Tenderness: There is no abdominal tenderness. There is no guarding. Skin:     Coloration: Skin is not pale. Findings: No rash. Neurological:      Mental Status: He is alert. He is confused. Cranial Nerves: Dysarthria present. No facial asymmetry. Comments: Weakness seems generalized in upper and lower extremities. There is not seem to be a focal motor deficit identified. Psychiatric:         Behavior: Behavior is cooperative. DIAGNOSTIC RESULTS     EKG: All EKG's areinterpreted by the Emergency Department Physician who either signs or Co-signs this chart in the absence of a cardiologist.    1608: Normal sinus rhythm at 53. RADIOLOGY:  Non-plain film images such as CT, Ultrasound and MRI are read by the radiologist. Plain radiographic images are visualized and preliminarily interpreted bythe emergency physician with the below findings:      CT HEAD WO CONTRAST   Final Result   Impression:    1. No CT evidence of an acute intracranial process. 2. Atrophy and chronic ischemic changes. Signed by Dr Ragini Ordoñez on 10/17/2020 5:41 PM              LABS:  Labs Reviewed   COMPREHENSIVE METABOLIC PANEL - Abnormal; Notable for the following components:       Result Value    Anion Gap 6 (*)     Total Protein 6.0 (*)     All other components within normal limits   CBC WITH AUTO DIFFERENTIAL - Abnormal; Notable for the following components:    RBC 3.01 (*)     Hemoglobin 9.8 (*)     Hematocrit 31.2 (*)     .7 (*)     MCH 32.6 (*)     MCHC 31.4 (*)     RDW 14.7 (*)     Neutrophils % 65.5 (*)     Monocytes % 10.2 (*)     All other components within normal limits   URINALYSIS - Abnormal; Notable for the following components:    Color, UA DARK YELLOW (*)     All other components within normal limits   MICROSCOPIC URINALYSIS - Abnormal; Notable for the following components:    Bacteria, UA NEGATIVE (*)     Crystals, UA NEG (*)     All other components within normal limits       All other labs were within normal range or not returned as of this dictation.     EMERGENCY DEPARTMENT COURSE and DIFFERENTIAL DIAGNOSIS/MDM:   Vitals:    Vitals:    10/17/20 1851 10/17/20 2017 10/17/20 2033 10/17/20 2039   BP: (!) 200/86 (!) 197/65 (!) 178/67 (!) 172/75   Pulse:    54   Resp:    8   SpO2:  96% 93% 97%       MDM    Reassessment    Patient CT scan of the brain does not demonstrate any acute hemorrhage or obvious evidence of CVA.  Laboratory studies are unremarkable for evidence of infection. His electrolytes look okay. He is clearly had a significant change from his baseline. His blood pressure showed some mild improvement after p.o. clonidine however he does remain hypertensive. Most recently 172/75. We will plan for admission to the hospitalist service with plans for further evaluation. CONSULTS:    Case reviewed with Dr. Vikash Monterroso regarding inpatient admission to the hospitalist service. PROCEDURES:  Unless otherwise noted below, none     Procedures    FINAL IMPRESSION      1. Cerebrovascular accident (CVA), unspecified mechanism ([facility]    2. Essential hypertension    3.  Acute encephalopathy          DISPOSITION/PLAN   DISPOSITION Decision To Admit 10/17/2020 09:06:00 PM      (Please note that portions of this note were completed with a voice recognition program.  Efforts were made to edit thedictations but occasionally words are mis-transcribed.)    Aury Jesus MD (electronically signed)  Attending Emergency Physician         Aury Jesus MD  10/18/20 7646

## 2020-10-18 NOTE — H&P
Patient Information:  Patient: Adri Garcia  MRN: [id number]   Richardson: [de-identified]  YOB: 1934  Admit Date: 10/17/2020      Date of Service: 10/17/2020  Primary Care Physician: Darcie Moreno MD  Advance Directive: Full Code         SUBJECTIVE:    Chief Complaint   Patient presents with    Altered Mental Status     Stroke like symptoms greater than 24 hours (Monday 12th)     EP Sign Out:  SubAcute CVA?, HTN Encephalopathy also likely, CT Head negative, last 3-4 days has not been usual self, provides self care normally and works at [employer] but lives with daughter who had to provide all history to the EP    HPI: (HPI Information was gathered from patient and the EP & ED RN sign out and both ED EP note)  Mr. Adri Garcia is a pleasant 80year old gentleman from home. He has not been his usual self for the last 3-4 days. His speech pavan been quite different and slowed, he is hard to understand and speaks haltingly where he is usually clear and fluid. He has also been confused. He was suspected to have an ischemic CVA due to the persistence of his symptoms, but the possibility of hypertensive encephalopathy is also there. When he was taken to the neuro vang initially with the BP, having been transiently controlled with 0.1 clonidine to 852Q-[id number] mmHg systolic, his BP was found to be 210mmHg there. Following another dose of Clonidine 0.1 his BP was up to [id number] systolic but his HR was down to the mid 40s. He was then transferred to the ICU for more intensive BP management and nursing. When the HR comes up it is possible a Cardene drip may needed. Hydralazine Q8h standing order has been placed to help with the BP and HR. We will use IV Hydralazine if HR demands, else IV Labetalol for now. Review of Systems:   Review of Systems   Constitutional: Negative for chills, fever and unexpected weight change. Respiratory: Negative for cough and chest tightness.     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; a'plasty of left anterior tibial artery and dorsalis pedis artery with 2.5x300 vascutrak balloon and then with 3x220 nati balloon; completion a'gram     Social History     Tobacco History     Smoking Status  Never Smoker    Smokeless Tobacco Use  Never Used          Alcohol History     Alcohol Use Status  No          Drug Use     Drug Use Status  No           Family History   Problem Relation Age of Onset    Cancer Mother     Heart Disease Father     Heart Disease Brother     Heart Disease Brother     Heart Disease Brother     Heart Disease Brother     Heart Attack Brother     Heart Disease Brother     Heart Disease Brother      Allergies   Allergen Reactions    Pcn [Penicillins] Swelling    Adhesive Tape Rash     Current Facility-Administered Medications   Medication Dose Route Frequency Provider Last Rate Last Dose    sodium chloride flush 0.9 % injection 10 mL  10 mL Intravenous 2 times per day Stephanie Rader MD        sodium chloride flush 0.9 % injection 10 mL  10 mL Intravenous PRN Stephanie Rader MD        acetaminophen (TYLENOL) tablet 650 mg  650 mg Oral Q6H PRN Stephanie Rader MD        Or    acetaminophen (TYLENOL) suppository 650 mg  650 mg Rectal Q6H PRN Stephanie Rader MD        enoxaparin (LOVENOX) injection 40 mg  40 mg Subcutaneous Daily Stephanie Rader MD        ondansetron (ZOFRAN-ODT) disintegrating tablet 4 mg  4 mg Oral Q8H PRN Stephanie Rader MD        Or    ondansetron [facility]) injection 4 mg  4 mg Intravenous Q6H PRN Stephanie Rader MD        labetalol (NORMODYNE;TRANDATE) injection 10 mg  10 mg Intravenous Q10 Min PRN Stephanie Rader MD        labetalol (NORMODYNE;TRANDATE) injection 20 mg  20 mg Intravenous Q4H PRN Stephanie Rader MD        hydrALAZINE (APRESOLINE) injection 20 mg  20 mg Intravenous Q4H PRN Willie Elizondo MD   20 mg at 10/18/20 0052    hydrALAZINE (APRESOLINE) tablet 25 mg  25 mg Oral Q4H PRN Willie Elizondo MD        losartan (COZAAR) tablet 25 mg  25 mg Oral Daily Willie Elizondo MD        hydrALAZINE (APRESOLINE) tablet 25 mg  25 mg Oral 3 times per day Willie Elizondo MD        finasteride (PROSCAR) tablet 5 mg  5 mg Oral Daily Willie Elizondo MD        ferrous sulfate-C-folic acid (FOLITAB) 128-487-4.2 MG per extended release tablet 1 tablet  1 tablet Oral Daily Willie Elizondo MD        docusate sodium (COLACE) capsule 100 mg  100 mg Oral BID Willie Elizondo MD        diphenoxylate-atropine (LOMOTIL) 2.5-0.025 MG per tablet 1 tablet  1 tablet Oral 4x Daily PRN Willie Elizondo MD        colchicine (COLCRYS) tablet 0.6 mg  0.6 mg Oral PRN Willie Elizondo MD        baclofen (LIORESAL) tablet 10 mg  10 mg Oral Daily PRN Willie Elizondo MD        vitamin B and C (TOTAL B-C) 1 tablet  1 tablet Oral Daily Willie Elizondo MD        aspirin chewable tablet 81 mg  81 mg Oral Daily Willie Elizondo MD        vitamin C (ASCORBIC ACID) tablet 1,000 mg  1,000 mg Oral Daily Willie Elizondo MD        allopurinol (ZYLOPRIM) tablet 600 mg  600 mg Oral Daily Willie Elizondo MD        gabapentin (NEURONTIN) capsule 100 mg  100 mg Oral BID Willie Elizondo MD        isosorbide mononitrate (IMDUR) extended release tablet 120 mg  120 mg Oral Daily Willie Elizondo MD        meclizine (ANTIVERT) tablet 25 mg  25 mg Oral 4x Daily PRN Willie Elizondo MD        therapeutic multivitamin-minerals 1 tablet  1 tablet Oral Daily Willie Elizondo MD        vitamin D (CHOLECALCIFEROL) tablet 1,000 Units  1,000 Units Oral Daily Willie Elizondo MD        tamsulosin [facility]) capsule 0.8 mg  0.8 mg Oral Daily Willie Elizondo MD        sennosides-docusate sodium (SENOKOT-S) 8.6-50 MG tablet 2 tablet  2 tablet Oral Daily Willie Elizondo MD        rOPINIRole (REQUIP) tablet 1 mg  1 mg Oral Nightly Bessy Angeles MD        prednisoLONE acetate (PRED FORTE) 1 % ophthalmic suspension 1 drop  1 drop Left Eye BID Bessy Angeles MD        atorvastatin (LIPITOR) tablet 80 mg  80 mg Oral Nightly Bessy Angeles MD        pantoprazole (PROTONIX) tablet 40 mg  40 mg Oral BID Bessy Angeles MD        nitroGLYCERIN (NITROSTAT) SL tablet 0.4 mg  0.4 mg Sublingual Q5 Min PRN Bessy Angeles MD        mupirocin (BACTROBAN) 2 % ointment   Topical BID Bessy Angeles MD        lidocaine 4 % external patch 1 patch  1 patch Topical Daily Bessy Angeles MD        glimepiride (AMARYL) tablet 4 mg  4 mg Oral BID Bessy Angeles MD         Home Medications:  Prior to Admission medications    Medication Sig Start Date End Date Taking? Authorizing Provider   losartan (COZAAR) 50 MG tablet Take 0.5 tablets by mouth daily 9/10/20  Yes Peace Palmer MD   isosorbide mononitrate (IMDUR) 60 MG extended release tablet Take 2 tablets by mouth daily 10/12/19  Yes Too Maloney MD   mupirocin (BACTROBAN) 2 % ointment Apply topically 2 times daily Apply to wound BID 9/10/19  Yes Carlota Dubon MD   docusate sodium (COLACE) 100 MG capsule Take 1 capsule by mouth 2 times daily 6/7/19  Yes Gabriel Bowman MD   tamsulosin [facility]) 0.4 MG capsule Take 2 capsules by mouth daily 3/31/19  Yes Cody Smalls MD   finasteride (PROSCAR) 5 MG tablet Take 1 tablet by mouth daily 3/31/19  Yes Cody Smalls MD   Multiple Vitamins-Minerals (EYE VITAMINS PO) Take by mouth   Yes Historical Provider, MD   lidocaine (LIDODERM) 5 % Place 1 patch onto the skin daily 12 hours on, 12 hours off. 12/16/18  Yes Umu Weiner MD   vitamin D (CHOLECALCIFEROL) 1000 UNIT TABS tablet Take 1,000 Units by mouth daily   Yes Historical Provider, MD   rOPINIRole (REQUIP) 1 MG tablet Take 1 mg by mouth nightly   Yes Historical Provider, MD   gabapentin (NEURONTIN) 100 MG capsule Take 100 mg by mouth 2 times daily. Lavon Stephens Historical Provider, MD beasleypiride (AMARYL) 4 MG tablet Take 4 mg by mouth 2 times daily   Yes Historical Provider, MD   baclofen (LIORESAL) 10 MG tablet Take 10 mg by mouth daily as needed    Yes Historical Provider, MD   aspirin 81 MG chewable tablet Take 1 tablet by mouth daily 6/1/18  Yes Terra Fernandes MD   pantoprazole (PROTONIX) 40 MG tablet Take 1 tablet by mouth 2 times daily 6/1/18  Yes Terra Fernandes MD   prednisoLONE acetate (PRED FORTE) 1 % ophthalmic suspension Place 1 drop into the left eye 2 times daily    Yes Kiara Hillary   allopurinol (ZYLOPRIM) 300 MG tablet Take 600 mg by mouth daily    Yes Historical Provider, MD   sennosides-docusate sodium (SENOKOT-S) 8.6-50 MG tablet Take 2 tablets by mouth daily   Yes Historical Provider, MD   diphenoxylate-atropine (LOMOTIL) 2.5-0.025 MG per tablet Take 1 tablet by mouth 4 times daily as needed for Diarrhea   Yes Historical Provider, MD   pravastatin (PRAVACHOL) 40 MG tablet Take 40 mg by mouth daily   Yes Historical Provider, MD   promethazine (PHENERGAN) 12.5 MG tablet Take 12.5 mg by mouth as needed for Nausea   Yes Historical Provider, MD   Ascorbic Acid (VITAMIN C) 500 MG tablet Take 1,000 mg by mouth daily    Yes Historical Provider, MD   b complex vitamins capsule Take 1 capsule by mouth daily   Yes Historical Provider, MD   nitroGLYCERIN (NITROSTAT) 0.4 MG SL tablet up to max of 3 total doses.  If no relief after 1 dose, call 911. 12/22/18   Terra Fernandes MD   colchicine (COLCRYS) 0.6 MG tablet Take 0.6 mg by mouth as needed for Pain    Historical Provider, MD   ferrous sulfate-C-folic acid (FOLITAB) 208034-2.4 MG TBCR per extended release tablet Take 1 tablet by mouth daily 7/21/17   Marla Linares MD   meclizine (ANTIVERT) 25 MG tablet Take 25 mg by mouth 4 times daily as needed    Historical Provider, MD   ondansetron (ZOFRAN-ODT) 4 MG disintegrating tablet Take 4 mg by mouth every 8 hours as needed for Nausea or Vomiting    Historical Provider, MD OBJECTIVE:    Vitals:    10/17/20 2134   BP:    Pulse:    Resp:    Temp: 97.6 °F (36.4 °C)   SpO2:    breathing room air    BP (!) 160/69   Pulse 52   Temp 97.6 °F (36.4 °C) (Oral)   Resp 18   SpO2 95%     No intake or output data in the 24 hours ending 10/17/20 2138    Physical Exam  Vitals signs reviewed. Exam conducted with a chaperone present. Constitutional:       General: He is not in acute distress. Appearance: Normal appearance. He is normal weight. He is not ill-appearing or toxic-appearing. HENT:      Head: Normocephalic and atraumatic. Nose: No congestion or rhinorrhea. Eyes:      General:         Right eye: No discharge. Left eye: No discharge. Neck:      Musculoskeletal: Neck supple. Comments: Trachea appears mildline  Cardiovascular:      Rate and Rhythm: Regular rhythm. Bradycardia present. Heart sounds: No murmur. No friction rub. No gallop. Pulmonary:      Effort: Pulmonary effort is normal. No respiratory distress. Breath sounds: No stridor. No wheezing, rhonchi or rales. Chest:      Chest wall: No tenderness. Abdominal:      General: Bowel sounds are normal.      Palpations: Abdomen is soft. Tenderness: There is no abdominal tenderness. There is no guarding or rebound. Skin:     General: Skin is warm. Comments: nondiaphoretic   Neurological:      Mental Status: He is alert.       Comments: Alert and oriented to person and to year, not answering to place nor day of week, thinks this is the 12th month   Psychiatric:         Mood and Affect: Mood normal.         Behavior: Behavior normal.       LABORATORY DATA:    CBC:   Recent Labs     10/17/20  1615   WBC 6.0   HGB 9.8*   HCT 31.2*        BMP:   Recent Labs     10/17/20  1615      K 4.1      CO2 24   BUN 21   CREATININE 0.9   CALCIUM 9.7     Hepatic Profile:   Recent Labs     10/17/20  1615   AST 25   ALT 27   BILITOT 0.3   ALKPHOS 91     Coag Panel: No results for input(s): INR, PROTIME, APTT in the last 72 hours. Cardiac Enzymes: No results for input(s): Winford Harnett in the last 72 hours. Pro-BNP: No results for input(s): PROBNP in the last 72 hours. A1C: No results for input(s): LABA1C in the last 72 hours. TSH: Invalid input(s): LABTSH    Urinalysis:   Lab Results   Component Value Date    NITRU Negative 10/17/2020    WBCUA 3 10/17/2020    BACTERIA NEGATIVE 10/17/2020    RBCUA 1 10/17/2020    BLOODU Negative 10/17/2020    SPECGRAV 1.022 10/17/2020    GLUCOSEU Negative 10/17/2020       IMAGING:  Ct Head Wo Contrast  Result Date: 10/17/2020  Impression: 1. No CT evidence of an acute intracranial process. 2. Atrophy and chronic ischemic changes.                  Signed by Dr Cali De La Rosa on 10/17/2020 5:41 PM        ASESSMENTS & PLANS:    HTN Encephelopathy from HTN Emergency versus Sub-Acute Ischemic CVA:  Admit to Neuro [place] under hospitalist team -> ICU as BP back in to 200s and refractory to several PRN rounds of Tx  Neuro consult in AM  Tele  Frequent VS initially  HbA1c & TSH & Lipid Panel added on  BP down from 223mmHg to 160mmHg in ED  Will use Labetalol PRN pushes for SBP>160mmHg for now  PT/OT and Speech  Defer on TTE & Carotid US to Neuro  Bed Alarm  Fall Precautions  NPO for now until passes swallow study, then cardiac diabetic diet for now while screening labs for common risk factors are pending  Continue losatan 25mg PO QDay  Continue Imdur 120mg PO QDay  Starting hydralazine 50mg PO Q8h  Labetalol 20mg IVP PRN: SBP>160mmHg and HR>60bpm  Hydalazine 20mg IVP PRN: SBP>160mmHg and HR<100bpm  Hydalazine 25mg PO PRN: SBP>160mmHg and HR<100bpm    Supoportive and Prophylactic Txx:  DVT Prophylaxis: Lovenox SQ Q8h  GI (PUD) Ppx: not indictaed  PT consult for evalutation and Txx as indicated: indicated as per age and critical illness  Diet NPO Effective Now until silvestre lundberg diabetic-cardiac      Care time of >45 minutes  Pt seen/examined and admitted to inpatient status. Inpatient status is used for patients with an expected LOS extending past two midnights due to medical therapy and or critical care needs, otherwise patients are placed to OBServation status. Signed:  Electronically signed by Yaritza Bennett MD on 10/18/20 at 01:28 CDT.

## 2020-10-18 NOTE — PROGRESS NOTES
Chip Reason received from 5th floor to room # 147 . Mental Status: Patient is alert and and oriented to self; keeps eyes closed; delay in some responses. Vitals:    10/18/20 0215   BP: (!) 167/61   Pulse: 58   Resp: 23   Temp:    SpO2: 95%     Placed on cardiac monitor: Yes. Bedside monitor. Belongings: jeans with belt; underwear, socks, shoes with patient at bedside . Family at bedside No.  Oriented Patient to room. Call light within reach. Yes. Transfer was: Well tolerated by patient. .    Electronically signed by Albaro Tan RN on 10/18/2020 at 2:37 AM

## 2020-10-18 NOTE — PROGRESS NOTES
4 Eyes Skin Assessment    Jay Rosario is being assessed upon: Admission    I agree that Jasmeet Grissom, along with Dennis Coyle RN (either 2 RN's or 1 LPN and 1 RN) have performed a thorough Head to Toe Skin Assessment on the patient. ALL assessment sites listed below have been assessed. Areas assessed by both nurses:     [x]   Head, Face, and Ears   [x]   Shoulders, Back, and Chest  [x]   Arms, Elbows, and Hands   [x]   Coccyx, Sacrum, and Ischium  [x]   Legs, Feet, and Heels    Does the Patient have Skin Breakdown?  No    Delbert Prevention initiated: Yes  Wound Care Orders initiated: NA    [facility] nurse consulted for Pressure Injury (Stage 3,4, Unstageable, DTI, NWPT, and Complex wounds) and New or Established Ostomies: NA        Primary Nurse eSignature: Miguel Vargas RN on 10/18/2020 at 1:12 AM      Co-Signer eSignature: Electronically signed by Maria Luisa Mann RN on 10/18/20 at 2:40 AM CDT

## 2020-10-18 NOTE — PROGRESS NOTES
Pt in MRI. Pt given ativan due to moving too much during procedure. o2 applied at 2 L sats 95 hr 59. Will continue to monitor.  Electronically signed by Christina Orr RN on 10/18/2020 at 2:14 PM.

## 2020-10-18 NOTE — PLAN OF CARE
Problem: Falls - Risk of:  Goal: Will remain free from falls  Description: Will remain free from falls  Outcome: Ongoing     Problem: Urinary Elimination:  Goal: Signs and symptoms of infection will decrease  Description: Signs and symptoms of infection will decrease  Outcome: Ongoing     Problem: Falls - Risk of:  Goal: Absence of physical injury  Description: Absence of physical injury  Outcome: Ongoing

## 2020-10-18 NOTE — PROGRESS NOTES
Completed as much of admission as possible at this time. Pt unable to verify medical history, medications and current wishes regarding living will. He reports that his granddaughter Alyce Duvall knows. Discussed Living Will found in pt's chart with Dr. Montserrat Villeda. He advised to not contact family at this time and wait until later in the morning as he would like to speak with family member then.     Electronically signed by Rachelle Rios RN on 10/18/2020 at 3:06 AM

## 2020-10-18 NOTE — PROGRESS NOTES
Pt became very emotional after his Brother Prince Packer left. Pt stated he was sad and loved his family. I asked pt if he was scared and he stated no just sad. Sat with pt for a while. Pt is now resting and appears calm.  Electronically signed by Eavn Chen RN on 10/18/2020 at 10:32 AM

## 2020-10-19 LAB
ANION GAP SERPL CALCULATED.3IONS-SCNC: 15 MMOL/L (ref 7–19)
BASOPHILS ABSOLUTE: 0 K/UL (ref 0–0.2)
BASOPHILS RELATIVE PERCENT: 0.2 % (ref 0–1)
BUN BLDV-MCNC: 21 MG/DL (ref 8–23)
CALCIUM SERPL-MCNC: 9.7 MG/DL (ref 8.8–10.2)
CHLORIDE BLD-SCNC: 109 MMOL/L (ref 98–111)
CO2: 18 MMOL/L (ref 22–29)
CREAT SERPL-MCNC: 1.3 MG/DL (ref 0.5–1.2)
EOSINOPHILS ABSOLUTE: 0 K/UL (ref 0–0.6)
EOSINOPHILS RELATIVE PERCENT: 0.1 % (ref 0–5)
FOLATE: >20 NG/ML (ref 4.5–32.2)
GFR AFRICAN AMERICAN: >59
GFR NON-AFRICAN AMERICAN: 52
GLUCOSE BLD-MCNC: 105 MG/DL (ref 70–99)
GLUCOSE BLD-MCNC: 131 MG/DL (ref 70–99)
GLUCOSE BLD-MCNC: 133 MG/DL (ref 70–99)
GLUCOSE BLD-MCNC: 143 MG/DL (ref 74–109)
GLUCOSE BLD-MCNC: 155 MG/DL (ref 70–99)
HCT VFR BLD CALC: 30.3 % (ref 42–52)
HEMOGLOBIN: 9.7 G/DL (ref 14–18)
IMMATURE GRANULOCYTES #: 0.1 K/UL
LYMPHOCYTES ABSOLUTE: 1.4 K/UL (ref 1.1–4.5)
LYMPHOCYTES RELATIVE PERCENT: 15.5 % (ref 20–40)
Lab: NORMAL
MAGNESIUM: 2.2 MG/DL (ref 1.6–2.4)
MCH RBC QN AUTO: 33.1 PG (ref 27–31)
MCHC RBC AUTO-ENTMCNC: 32 G/DL (ref 33–37)
MCV RBC AUTO: 103.4 FL (ref 80–94)
MONOCYTES ABSOLUTE: 0.9 K/UL (ref 0–0.9)
MONOCYTES RELATIVE PERCENT: 9.9 % (ref 0–10)
NEUTROPHILS ABSOLUTE: 6.7 K/UL (ref 1.5–7.5)
NEUTROPHILS RELATIVE PERCENT: 73.7 % (ref 50–65)
PDW BLD-RTO: 14.8 % (ref 11.5–14.5)
PERFORMED ON: ABNORMAL
PLATELET # BLD: 207 K/UL (ref 130–400)
PMV BLD AUTO: 10.6 FL (ref 9.4–12.4)
POTASSIUM SERPL-SCNC: 4.1 MMOL/L (ref 3.5–5)
RBC # BLD: 2.93 M/UL (ref 4.7–6.1)
REPORT: NORMAL
SODIUM BLD-SCNC: 142 MMOL/L (ref 136–145)
THIS TEST SENT TO: NORMAL
WBC # BLD: 9.1 K/UL (ref 4.8–10.8)

## 2020-10-19 PROCEDURE — 82373 ASSAY C-D TRANSFER MEASURE: CPT

## 2020-10-19 PROCEDURE — 99233 SBSQ HOSP IP/OBS HIGH 50: CPT | Performed by: PSYCHIATRY & NEUROLOGY

## 2020-10-19 PROCEDURE — 6370000000 HC RX 637 (ALT 250 FOR IP): Performed by: INTERNAL MEDICINE

## 2020-10-19 PROCEDURE — 80048 BASIC METABOLIC PNL TOTAL CA: CPT

## 2020-10-19 PROCEDURE — 36415 COLL VENOUS BLD VENIPUNCTURE: CPT

## 2020-10-19 PROCEDURE — 85025 COMPLETE CBC W/AUTO DIFF WBC: CPT

## 2020-10-19 PROCEDURE — 2000000000 HC ICU R&B

## 2020-10-19 PROCEDURE — 82947 ASSAY GLUCOSE BLOOD QUANT: CPT

## 2020-10-19 PROCEDURE — 2580000003 HC RX 258: Performed by: HOSPITALIST

## 2020-10-19 PROCEDURE — 82746 ASSAY OF FOLIC ACID SERUM: CPT

## 2020-10-19 PROCEDURE — 6360000002 HC RX W HCPCS: Performed by: INTERNAL MEDICINE

## 2020-10-19 PROCEDURE — 2500000003 HC RX 250 WO HCPCS: Performed by: HOSPITALIST

## 2020-10-19 PROCEDURE — 2700000000 HC OXYGEN THERAPY PER DAY

## 2020-10-19 PROCEDURE — 83735 ASSAY OF MAGNESIUM: CPT

## 2020-10-19 PROCEDURE — 95816 EEG AWAKE AND DROWSY: CPT | Performed by: PSYCHIATRY & NEUROLOGY

## 2020-10-19 PROCEDURE — 6360000002 HC RX W HCPCS: Performed by: HOSPITALIST

## 2020-10-19 PROCEDURE — 6370000000 HC RX 637 (ALT 250 FOR IP): Performed by: HOSPITALIST

## 2020-10-19 RX ORDER — DEXMEDETOMIDINE HYDROCHLORIDE 4 UG/ML
0.2 INJECTION, SOLUTION INTRAVENOUS CONTINUOUS
Status: DISCONTINUED | OUTPATIENT
Start: 2020-10-19 | End: 2020-10-19

## 2020-10-19 RX ORDER — HYDRALAZINE HYDROCHLORIDE 20 MG/ML
20 INJECTION INTRAMUSCULAR; INTRAVENOUS EVERY 4 HOURS PRN
Status: DISCONTINUED | OUTPATIENT
Start: 2020-10-19 | End: 2020-10-26 | Stop reason: HOSPADM

## 2020-10-19 RX ORDER — LORAZEPAM 2 MG/ML
0.25 INJECTION INTRAMUSCULAR
Status: COMPLETED | OUTPATIENT
Start: 2020-10-19 | End: 2020-10-19

## 2020-10-19 RX ORDER — SODIUM CHLORIDE, SODIUM LACTATE, POTASSIUM CHLORIDE, CALCIUM CHLORIDE 600; 310; 30; 20 MG/100ML; MG/100ML; MG/100ML; MG/100ML
INJECTION, SOLUTION INTRAVENOUS CONTINUOUS
Status: DISCONTINUED | OUTPATIENT
Start: 2020-10-19 | End: 2020-10-21

## 2020-10-19 RX ORDER — SODIUM CHLORIDE, SODIUM LACTATE, POTASSIUM CHLORIDE, CALCIUM CHLORIDE 600; 310; 30; 20 MG/100ML; MG/100ML; MG/100ML; MG/100ML
250 INJECTION, SOLUTION INTRAVENOUS ONCE
Status: COMPLETED | OUTPATIENT
Start: 2020-10-19 | End: 2020-10-19

## 2020-10-19 RX ORDER — ZIPRASIDONE MESYLATE 20 MG/ML
10 INJECTION, POWDER, LYOPHILIZED, FOR SOLUTION INTRAMUSCULAR ONCE
Status: COMPLETED | OUTPATIENT
Start: 2020-10-19 | End: 2020-10-19

## 2020-10-19 RX ORDER — HYDRALAZINE HYDROCHLORIDE 20 MG/ML
10 INJECTION INTRAMUSCULAR; INTRAVENOUS EVERY 4 HOURS PRN
Status: DISCONTINUED | OUTPATIENT
Start: 2020-10-19 | End: 2020-10-19

## 2020-10-19 RX ORDER — OLANZAPINE 10 MG/1
5 INJECTION, POWDER, LYOPHILIZED, FOR SOLUTION INTRAMUSCULAR
Status: DISCONTINUED | OUTPATIENT
Start: 2020-10-19 | End: 2020-10-19 | Stop reason: ALTCHOICE

## 2020-10-19 RX ADMIN — HYDRALAZINE HYDROCHLORIDE 10 MG: 20 INJECTION INTRAMUSCULAR; INTRAVENOUS at 14:17

## 2020-10-19 RX ADMIN — SODIUM CHLORIDE, POTASSIUM CHLORIDE, SODIUM LACTATE AND CALCIUM CHLORIDE 250 ML: 600; 310; 30; 20 INJECTION, SOLUTION INTRAVENOUS at 00:00

## 2020-10-19 RX ADMIN — SODIUM CHLORIDE, POTASSIUM CHLORIDE, SODIUM LACTATE AND CALCIUM CHLORIDE: 600; 310; 30; 20 INJECTION, SOLUTION INTRAVENOUS at 04:40

## 2020-10-19 RX ADMIN — HYDRALAZINE HYDROCHLORIDE 100 MG: 50 TABLET, FILM COATED ORAL at 05:54

## 2020-10-19 RX ADMIN — LORAZEPAM 0.25 MG: 2 INJECTION INTRAMUSCULAR; INTRAVENOUS at 15:24

## 2020-10-19 RX ADMIN — MUPIROCIN: 20 OINTMENT TOPICAL at 21:00

## 2020-10-19 RX ADMIN — DEXMEDETOMIDINE HYDROCHLORIDE 0.2 MCG/KG/HR: 4 INJECTION, SOLUTION INTRAVENOUS at 04:39

## 2020-10-19 RX ADMIN — MUPIROCIN: 20 OINTMENT TOPICAL at 09:27

## 2020-10-19 RX ADMIN — SODIUM CHLORIDE, PRESERVATIVE FREE 10 ML: 5 INJECTION INTRAVENOUS at 09:27

## 2020-10-19 RX ADMIN — PREDNISOLONE ACETATE 1 DROP: 10 SUSPENSION/ DROPS OPHTHALMIC at 21:00

## 2020-10-19 RX ADMIN — ENOXAPARIN SODIUM 40 MG: 40 INJECTION SUBCUTANEOUS at 09:27

## 2020-10-19 RX ADMIN — ZIPRASIDONE MESYLATE 10 MG: 20 INJECTION, POWDER, LYOPHILIZED, FOR SOLUTION INTRAMUSCULAR at 21:13

## 2020-10-19 RX ADMIN — HYDRALAZINE HYDROCHLORIDE 10 MG: 20 INJECTION INTRAMUSCULAR; INTRAVENOUS at 12:05

## 2020-10-19 RX ADMIN — PREDNISOLONE ACETATE 1 DROP: 10 SUSPENSION/ DROPS OPHTHALMIC at 09:27

## 2020-10-19 ASSESSMENT — PAIN SCALES - GENERAL
PAINLEVEL_OUTOF10: 0

## 2020-10-19 ASSESSMENT — PAIN SCALES - WONG BAKER
WONGBAKER_NUMERICALRESPONSE: 0

## 2020-10-19 NOTE — PROGRESS NOTES
Hospitalist Progress Note  [facility]     Patient: Adri Eye  : 1934  MRN: [id number]  Code Status: Full Code    Hospital Day: 2   Date of Service: 10/19/2020    Subjective:   Pt seen and examined. Agitated earlier. Resting comfortably now.     Past Medical History:   Diagnosis Date    Blood circulation, collateral     CAD (coronary artery disease)     STENTS X 3    CAD (coronary artery disease)     CABG    Cerebral artery occlusion with cerebral infarction (HCC)     CHF (congestive heart failure) (HCC)     Chronic kidney disease     DDD (degenerative disc disease), lumbar 2017    Diabetes mellitus ([address]     Disease of blood and blood forming organ     GERD (gastroesophageal reflux disease)     History of blood transfusion     Hx of blood clots     Right leg    Hyperlipidemia     Hypertension     Lumbar stenosis with neurogenic claudication 2017    MI, old     X 2    Other disorders of kidney and ureter in diseases classified elsewhere     Palliative care patient 2018    Right heart failure ([facility] 2018    Sleep apnea     DOESN'T USE MACHINE    TIA (transient ischemic attack)        Past Surgical History:   Procedure Laterality Date    APPENDECTOMY      BACK SURGERY  1980    x 3    CARDIAC SURGERY      Bypass x 3    CARPAL TUNNEL RELEASE Bilateral     wrist and elbows    CHOLECYSTECTOMY      COLONOSCOPY      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 2    DILATATION, ESOPHAGUS      ENDOSCOPY, COLON, DIAGNOSTIC      EYE SURGERY      JOINT REPLACEMENT Left     JOINT REPLACEMENT Right     LAMINECTOMY N/A 2017    LUMBAR LAMINECTOMY POSTERIOR  L23 L34 performed by Nichelle Mccracken MD at [address] Left     ROTATOR CUFF REPAIR Right     TUMOR REMOVAL Left 1960    foot    TURP  2000    VASCULAR SURGERY Left 7/15/2015 [facility] & [address]    Aortoiliofemoral a'gram with bilateral lower extremity runoff; select views 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; a'plasty of left anterior tibial artery and dorsalis pedis artery with 2.5x300 vascutrak balloon and then with 3x220 nati balloon; completion a'gram       Family History   Problem Relation Age of Onset    Cancer Mother     Heart Disease Father     Heart Disease Brother     Heart Disease Brother     Heart Disease Brother     Heart Disease Brother     Heart Attack Brother     Heart Disease Brother     Heart Disease Brother        Social History     Socioeconomic History    Marital status:      Spouse name: Sridevi Davis    Number of children: 2    Years of education: 15    Highest education level: Not on file   Occupational History    Not on file   Social Needs    Financial resource strain: Not on file    Food insecurity     Worry: Not on file     Inability: Not on file   [employer] needs     Medical: Not on file     Non-medical: Not on file   Tobacco Use    Smoking status: Never Smoker    Smokeless tobacco: Never Used   Substance and Sexual Activity    Alcohol use: No    Drug use: No    Sexual activity: Not Currently   Lifestyle    Physical activity     Days per week: Not on file     Minutes per session: Not on file    Stress: Not on file   Relationships    Social connections     Talks on phone: Not on file     Gets together: Not on file     Attends [employer] service: Not on file     Active member of club or organization: Not on file     Attends meetings of clubs or organizations: Not on file     Relationship status: Not on file    Intimate partner violence     Fear of current or ex partner: Not on file     Emotionally abused: Not on file     Physically abused: Not on file     Forced sexual activity: Not on file   Other Topics Concern    Not on file   Social History Narrative    Not on file       Current Facility-Administered Medications   Medication Dose Route Frequency Provider Last Rate Last Dose    lactated ringers infusion   Intravenous Continuous Latoya Pardo MD 75 mL/hr at 10/19/20 0440      dexmedetomidine (PRECEDEX) 400 mcg in sodium chloride 0.9 % 100 mL infusion  0.2 mcg/kg/hr Intravenous Continuous Latoya Pardo MD 11.5 mL/hr at 10/19/20 0947 0.5 mcg/kg/hr at 10/19/20 0947    sodium chloride flush 0.9 % injection 10 mL  10 mL Intravenous 2 times per day Latoya Pardo MD   10 mL at 10/19/20 0927    sodium chloride flush 0.9 % injection 10 mL  10 mL Intravenous PRN Latoya Pardo MD        acetaminophen (TYLENOL) tablet 650 mg  650 mg Oral Q6H PRN Latoya Pardo MD        Or    acetaminophen (TYLENOL) suppository 650 mg  650 mg Rectal Q6H PRN Latoya Pardo MD        enoxaparin (LOVENOX) injection 40 mg  40 mg Subcutaneous Daily Latoya Pardo MD   40 mg at 10/19/20 0983    ondansetron (ZOFRAN-ODT) disintegrating tablet 4 mg  4 mg Oral Q8H PRN Latoya Pardo MD        Or    ondansetron [facility]) injection 4 mg  4 mg Intravenous Q6H PRN Latoya Pardo MD        losartan (COZAAR) tablet 25 mg  25 mg Oral Daily Latoya Pardo MD   25 mg at 10/18/20 0851    hydrALAZINE (APRESOLINE) tablet 100 mg  100 mg Oral 3 times per day Sole Alonzo MD   100 mg at 10/19/20 0554    insulin lispro (HUMALOG) injection vial 0-6 Units  0-6 Units Subcutaneous TID WC Sole Alonzo MD        insulin lispro (HUMALOG) injection vial 0-3 Units  0-3 Units Subcutaneous Nightly Sole Alonzo MD        glucose (GLUTOSE) 40 % oral gel 15 g  15 g Oral PRN Sole Alonzo MD        dextrose 50 % IV solution  12.5 g Intravenous PRN Sole Alonzo MD        glucagon (rDNA) injection 1 mg  1 mg Intramuscular PRN Sole Alonzo MD        dextrose 5 % solution  100 mL/hr Intravenous PRN Sole Alonzo MD        folic acid (FOLVITE) tablet 1 mg  1 mg Oral Daily Jacob Mcclelland, MD   1 mg at 10/18/20 1040    hydrALAZINE (APRESOLINE) tablet 25 mg tablet 80 mg  80 mg Oral Nightly uLcie Gibbons MD   80 mg at 10/18/20 2112    pantoprazole (PROTONIX) tablet 40 mg  40 mg Oral BID Lucie Gibbons MD   40 mg at 10/18/20 2112    nitroGLYCERIN (NITROSTAT) SL tablet 0.4 mg  0.4 mg Sublingual Q5 Min PRN Lucie Gibbons MD        [facility]) 2 % ointment   Topical BID Lucie Gibbons MD        lidocaine 4 % external patch 1 patch  1 patch Topical Daily Lucie Gibbons MD   1 patch at 10/19/20 8475    glimepiride (AMARYL) tablet 4 mg  4 mg Oral BID Lucie Gibbons MD   4 mg at 10/18/20 2112         lactated ringers 75 mL/hr at 10/19/20 0440    dexmedetomidine HCl in NaCl 0.5 mcg/kg/hr (10/19/20 0947)    dextrose          Objective:   BP (!) 165/64   Pulse 54   Temp 97.8 °F (36.6 °C) (Temporal)   Resp 15   Ht 5' 10\" (1.778 m)   Wt 206 lb (93.4 kg)   SpO2 98%   BMI 29.56 kg/m²     General: no acute distress  HEENT: normocephalic, atraumatic  Neck: supple, symmetrical, trachea midline   Lungs: clear to auscultation bilaterally   Cardiovascular: s1 and s2 normal  Abdomen: soft, positive bowel sounds  Extremities: no edema or cyanosis   Neuro: confused, 5/5 power in all 4 extremities  Skin: normal color and texture     Recent Labs     10/17/20  1615 10/19/20  0326   WBC 6.0 9.1   RBC 3.01* 2.93*   HGB 9.8* 9.7*   HCT 31.2* 30.3*   .7* 103.4*   MCH 32.6* 33.1*   MCHC 31.4* 32.0*    207     Recent Labs     10/17/20  1615 10/18/20  1016 10/19/20  0326     --  142   K 4.1 3.9 4.1   ANIONGAP 6*  --  15     --  109   CO2 24  --  18*   BUN 21  --  21   CREATININE 0.9  --  1.3*   GLUCOSE 94  --  143*   CALCIUM 9.7  --  9.7     Recent Labs     10/19/20  0326   MG 2.2     Recent Labs     10/17/20  1615   AST 25   ALT 27   BILITOT 0.3   ALKPHOS 91     No results for input(s): PH, PO2, PCO2, HCO3, BE, O2SAT in the last 72 hours.   Recent Labs     10/18/20  0118 10/18/20  0455   TROPONINI 0.08* 0.08*     No results for input(s): INR in the last 72 hours. No results for input(s): LACTA in the last 72 hours. Intake/Output Summary (Last 24 hours) at 10/19/2020 1147  Last data filed at 10/19/2020 1000  Gross per 24 hour   Intake 1978.5 ml   Output 449 ml   Net 1529.5 ml       Ct Head Wo Contrast    Result Date: 10/17/2020  CT HEAD WO CONTRAST 10/17/2020 4:34 PM History: Confusion Comparisons: 12/20/2019 head CT Technique: Radiation dose equals  mGy cm. AUTOMATED EXPOSURE CONTROL dose reduction technique was implemented CT evaluation of the head without intravenous contrast. 5 mm transaxial images were obtained. 2-D sagittal and coronal reconstruction images were generated. Findings: There is central and cortical atrophy. There is confluent periventricular and subcortical FLAIR signal hyperintensities compatible chronic ischemic changes. Encephalomalacia in the right frontal horn, inferiorly and laterally in the left suboccipital lobe again identified old areas of infarction suspected. There is no intra or extra-axial hemorrhage. There is no mass effect or midline shift. There is no hydrocephalus. 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 Paranasal sinuses are grossly clear. There is no skull fracture acute calvarial abnormality. CT appearance the head is unchanged. Impression: 1. No CT evidence of an acute intracranial process. 2. Atrophy and chronic ischemic changes.                  Signed by Dr Xiomy Dykes on 10/17/2020 5:41 PM       Assessment and Plan:   Hypertensive emergency/encephalopathy  Neurology following  Agents on board  Ensure adequate cerebral perfusion pressure  CT head negative for acute process  MRI brain to rule out PRES, unable to undergo secondary to agitation, since improved, reattempt  Serial troponin 0.08 x2  EEG: Diffuse disturbance in cerebral function, no clear epileptiform activity noted  Precedex gtt DC'd for bradycardia     DM2  Meds on board     DVT prophylaxis  Lovenox    Delden Peek (Palliative Care) discussed with patient's family who stated patient is DNR. They also requested hospice evaluation.     Total critical care time: 41 minutes    Hollie Boeck, MD   10/19/2020 11:47 AM

## 2020-10-19 NOTE — PROGRESS NOTES
Patient very agitated and constantly fidgeting in the bed. Continues to pull at lines and tubes. Dr Gladis Sapp aware.  See eMAR

## 2020-10-19 NOTE — PROGRESS NOTES
Patient:   Lucas Reyez  MR#:    [id number]   Room:    8681/184-62   YOB: 1934  Date of Progress Note: 10/19/2020  Time of Note                           8:14 AM  Consulting Physician:   Elizabeth Martinez M.D. Attending Physician:  Roseline Duke MD     Chief complaint AMS    S:This 80 y.o. male  is seen for altered mental status. The history is obtained from the chart as the patient is unable to provide any history. As per the admission/ER noted the patient was brought in to the ER with increased confusion with speech issues and difficulty with ambulation over 3 days. Apparently at baseline his is cognitively intact and has no significant issues with ambulation. There have been no medication changes or recent illnesses as per the ER note. His blood pressure has been recorded up to 223/80. He has been moved to the ICU and placed in restraints. Agitated and confused. Sedated on Precedex. Noe Mata      REVIEW OF SYSTEMS:  limited due to mental status    Past Medical History:      Diagnosis Date    Blood circulation, collateral     CAD (coronary artery disease)     STENTS X 3    CAD (coronary artery disease)     CABG    Cerebral artery occlusion with cerebral infarction (HCC)     CHF (congestive heart failure) (HCC)     Chronic kidney disease     DDD (degenerative disc disease), lumbar 7/11/2017    Diabetes mellitus ([address]     Disease of blood and blood forming organ     GERD (gastroesophageal reflux disease)     History of blood transfusion     Hx of blood clots     Right leg    Hyperlipidemia     Hypertension     Lumbar stenosis with neurogenic claudication 7/11/2017    MI, old     X 2    Other disorders of kidney and ureter in diseases classified elsewhere     Palliative care patient 03/06/2018    Right heart failure ([address] 6/29/2018    Sleep apnea     DOESN'T USE MACHINE    TIA (transient ischemic attack)        Past Surgical History:      Procedure Laterality Date    APPENDECTOMY      BACK SURGERY  1980    x 3    CARDIAC SURGERY  1990    Bypass x 3    CARPAL TUNNEL RELEASE Bilateral 1980    wrist and elbows    CHOLECYSTECTOMY  2000    COLONOSCOPY      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 2    DILATATION, ESOPHAGUS      ENDOSCOPY, COLON, DIAGNOSTIC      EYE SURGERY      JOINT REPLACEMENT Left 1990    JOINT REPLACEMENT Right 1995    LAMINECTOMY N/A 7/11/2017    LUMBAR LAMINECTOMY POSTERIOR  L23 L34 performed by Yasmany Danielle MD at [address] [place] [address] Left 1990    ROTATOR CUFF REPAIR Right 2010    TUMOR REMOVAL Left 1960    foot    TURP  2000    VASCULAR SURGERY Left 7/15/2015 [facility] & [address]    Aortoiliofemoral a'gram with bilateral lower extremity runoff; select views 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; a'plasty of left anterior tibial artery and dorsalis pedis artery with 2.5x300 vascutrak balloon and then with 3x220 nati balloon; completion a'gram       Medications in Hospital:      Current Facility-Administered Medications:     lactated ringers infusion, , Intravenous, Continuous, Tc Swenson MD, Last Rate: 75 mL/hr at 10/19/20 0440    dexmedetomidine (PRECEDEX) 400 mcg in sodium chloride 0.9 % 100 mL infusion, 0.2 mcg/kg/hr, Intravenous, Continuous, Tc Swenson MD, Last Rate: 9.2 mL/hr at 10/19/20 0500, 0.4 mcg/kg/hr at 10/19/20 0500    sodium chloride flush 0.9 % injection 10 mL, 10 mL, Intravenous, 2 times per day, Tc Swenson MD, 10 mL at 10/18/20 2113    sodium chloride flush 0.9 % injection 10 mL, 10 mL, Intravenous, PRN, Tc Swenson MD    acetaminophen (TYLENOL) tablet 650 mg, 650 mg, Oral, Q6H PRN **OR** acetaminophen (TYLENOL) suppository 650 mg, 650 mg, Rectal, Q6H PRN, Tc Swenson MD    enoxaparin (LOVENOX) injection 40 mg, 40 mg, Subcutaneous, Daily, Tc Swenson MD, 40 mg at 10/18/20 0853    ondansetron (ZOFRAN-ODT) disintegrating tablet 4 mg, 4 mg, Oral, Q8H PRN **OR** ondansetron (ZOFRAN) injection 4 mg, 4 mg, Intravenous, Q6H PRN, Marian Boyd MD    losartan (COZAAR) tablet 25 mg, 25 mg, Oral, Daily, Marian Boyd MD, 25 mg at 10/18/20 0851    hydrALAZINE (APRESOLINE) tablet 100 mg, 100 mg, Oral, 3 times per day, Shivam Penny MD, 100 mg at 10/19/20 0554    insulin lispro (HUMALOG) injection vial 0-6 Units, 0-6 Units, Subcutaneous, TID WC, Shivam Penny MD    insulin lispro (HUMALOG) injection vial 0-3 Units, 0-3 Units, Subcutaneous, Nightly, Shivam Penny MD    glucose (GLUTOSE) 40 % oral gel 15 g, 15 g, Oral, PRN, Shivam Penny MD    dextrose 50 % IV solution, 12.5 g, Intravenous, PRN, Shivam Penny MD    glucagon (rDNA) injection 1 mg, 1 mg, Intramuscular, PRN, Shivam Penny MD    dextrose 5 % solution, 100 mL/hr, Intravenous, PRN, Shivam Penny MD    folic acid (FOLVITE) tablet 1 mg, 1 mg, Oral, Daily, Arpita Ashley MD, 1 mg at 10/18/20 1040    hydrALAZINE (APRESOLINE) tablet 25 mg, 25 mg, Oral, Q4H PRN, Shivam Penny MD    hydrALAZINE (APRESOLINE) injection 20 mg, 20 mg, Intravenous, Q4H PRN, Shivam Penny MD    finasteride (PROSCAR) tablet 5 mg, 5 mg, Oral, Daily, Marian Boyd MD, 5 mg at 10/18/20 1978    ferrous sulfate-C-folic acid (FOLITAB) 128-826-2.1 MG per extended release tablet 1 tablet, 1 tablet, Oral, Daily, Marian Boyd MD    docusate sodium (COLACE) capsule 100 mg, 100 mg, Oral, BID, Marian Boyd MD, 100 mg at 10/18/20 2112    diphenoxylate-atropine (LOMOTIL) 2.5-0.025 MG per tablet 1 tablet, 1 tablet, Oral, 4x Daily PRN, Marian Boyd MD    colchicine (COLCRYS) tablet 0.6 mg, 0.6 mg, Oral, PRN, Marian Boyd MD    vitamin B and C (TOTAL B-C) 1 tablet, 1 tablet, Oral, Daily, Marian Boyd MD, 1 tablet at 10/18/20 3303    aspirin chewable tablet 81 mg, 81 mg, Oral, Daily, Marian Boyd MD, 81 mg at 10/18/20 4319    vitamin C (ASCORBIC ACID) tablet 1,000 mg, 1,000 mg, Oral, Daily, Elisa Magalie Bryan MD, 1,000 mg at 10/18/20 0851    allopurinol (ZYLOPRIM) tablet 600 mg, 600 mg, Oral, Daily, Tc Swenson MD, 600 mg at 10/18/20 8100    isosorbide mononitrate (IMDUR) extended release tablet 120 mg, 120 mg, Oral, Daily, Tc Swenson MD, 120 mg at 10/18/20 1125    meclizine (ANTIVERT) tablet 25 mg, 25 mg, Oral, 4x Daily PRN, Tc Swenson MD    therapeutic multivitamin-minerals 1 tablet, 1 tablet, Oral, Daily, Tc Swenson MD, 1 tablet at 10/18/20 0851    vitamin D (CHOLECALCIFEROL) tablet 1,000 Units, 1,000 Units, Oral, Daily, Tc Swenson MD, 1,000 Units at 10/18/20 0851    tamsulosin (FLOMAX) capsule 0.8 mg, 0.8 mg, Oral, Daily, Tc Swenson MD, 0.8 mg at 10/18/20 8433    sennosides-docusate sodium (SENOKOT-S) 8.6-50 MG tablet 2 tablet, 2 tablet, Oral, Daily, Tc Swenson MD, 2 tablet at 10/18/20 0621    prednisoLONE acetate (PRED FORTE) 1 % ophthalmic suspension 1 drop, 1 drop, Left Eye, BID, Tc Swenson MD, 1 drop at 10/18/20 2113    atorvastatin (LIPITOR) tablet 80 mg, 80 mg, Oral, Nightly, Tc Swenson MD, 80 mg at 10/18/20 2112    pantoprazole (PROTONIX) tablet 40 mg, 40 mg, Oral, BID, Tc Swenson MD, 40 mg at 10/18/20 2112    nitroGLYCERIN (NITROSTAT) SL tablet 0.4 mg, 0.4 mg, Sublingual, Q5 Min PRN, Tc Swenson MD    pirocin [facility]) 2 % ointment, , Topical, BID, Tc Swenson MD    lidocaine 4 % external patch 1 patch, 1 patch, Topical, Daily, Tc Swenson MD, 1 patch at 10/18/20 0853    glimepiride (AMARYL) tablet 4 mg, 4 mg, Oral, BID, Tc Swenson MD, 4 mg at 10/18/20 1142    Allergies:  Pcn [penicillins] and Adhesive tape    Social History:   TOBACCO:   reports that he has never smoked. He has never used smokeless tobacco.  ETOH:   reports no history of alcohol use.     Family History:       Problem Relation Age of Onset    Cancer Mother     Heart Disease Father     Heart Disease Brother     Heart Disease Brother     Heart Disease Brother     Heart Disease Brother     Heart Attack Brother     Heart Disease Brother     Heart Disease Brother          PHYSICAL EXAM:  BP (!) 125/43   Pulse 62   Temp 98.8 °F (37.1 °C)   Resp 24   Ht 5' 10\" (1.778 m)   Wt 206 lb (93.4 kg)   SpO2 92%   BMI 29.56 kg/m²       Constitutional - well developed, well nourished. Eyes - conjunctiva normal.   Ear, nose, throat - No scars, masses, or lesions over external nose or ears, no atrophy of tongue  Neck-symmetric, no masses noted, no jugular vein distension  Respiration- chest wall appears symmetric, good expansion,   normal effort without use of accessory muscles  Musculoskeletal - no significant wasting of muscles noted, no bony deformities  Extremities-no clubbing, cyanosis or edema  Skin - warm, dry, and intact. No rash, erythema, or pallor. Psychiatric - mood, affect, and behavior unable tot test     Neurological exam  Somnolent non verbal sedated on Precedex      Nursing/pcp notes, imaging,labs and vitals reviewed.      PT,OT and/or speech notes reviewed    Lab Results   Component Value Date    WBC 9.1 10/19/2020    HGB 9.7 (L) 10/19/2020    HCT 30.3 (L) 10/19/2020    .4 (H) 10/19/2020     10/19/2020     Lab Results   Component Value Date     10/19/2020    K 4.1 10/19/2020     10/19/2020    CO2 18 (L) 10/19/2020    BUN 21 10/19/2020    CREATININE 1.3 (H) 10/19/2020    GLUCOSE 143 (H) 10/19/2020    CALCIUM 9.7 10/19/2020    PROT 6.0 (L) 10/17/2020    LABALBU 3.5 10/17/2020    BILITOT 0.3 10/17/2020    ALKPHOS 91 10/17/2020    AST 25 10/17/2020    ALT 27 10/17/2020    LABGLOM 52 (A) 10/19/2020    GFRAA >59 10/19/2020     Lab Results   Component Value Date    INR 1.10 12/08/2019    INR 1.02 11/28/2019    INR 1.10 12/23/2018    PROTIME 13.6 12/08/2019    PROTIME 12.8 11/28/2019    PROTIME 13.6 12/23/2018       EEG [[id number]]      Resulted: 10/19/20 0719     Updated: 10/19/20 6534     Narrative:      Jean Pierre Garsia MD     10/19/2020  7:21 AM     Patient:   Ruma Canada   MR#:    [id number]   Room:    [facility]896-73   Date of Birth:   1934   Date of Progress Note: 10/19/2020   Time of Note                           7:19 AM   Consulting Physician:   Arpita Ashley M.D. Attending Physician: Shivam Penny MD         This is a multichannel digital EEG recording using the   international 10-20 placement system. Technical Summary:     Background EEG activity: There is no well defined occipital   dominant rhythm. There is much low voltage 5-7 Hz activity seen in   a generalized distribution intermixed with low voltage 18-22 Hz   activity. There is low to moderate voltage 1.5-3 Hz activity seen   intermittently. Photic Stimulation: No abnormal waveforms are noted with various   frequencies of flickering light. IMPRESSION:   This is an abnormal EEG due to the diffuse slowing of the   background. This finding is consistent with a diffuse disturbance   in cerebral function. No clear epileptiform activity was noted   during the recording period.        Dr Samantha Clark Certified in Neurology   Board Certified in [place] [address] Neurophysiology   Fellowship trained in [place] [address] Neurophysiology            RECORD REVIEW: Previous medical records, medications were reviewed at today's visit    IMPRESSION:   Altered mental status/encephalopathy/Hypertensive [id number] Precedex     Initially non focal exam-confused/disordiented and not able to follow commands  CT head no clear acute changes noted-chronic infarcts right frontal and left occipital region     Certainly acute ischemic stroke/ PRESS / hypertensive encephalopathy are in the differential      Check MRI brain  EEG diffusely slow post ativan for MRI  B12/TSH/T4/ammonia levels unremarkable  Abg with hypoxia-supplement oxygen     DC Neurontin/Requip in the event these are contributing to confusion  Banana bag x 1  On MVI/folic acid  Start Thiamine 500 mg IV daily     CAD-ASA/statin  Hypertensive urgency-improved  DM-monitor BS  BPH-on meds  Low back pain-Lidoderm patch/tylenol PRN     minimize sedation-May consider Precedex as needed     Continue care    Try to get MRI today on Precedex          CALL WITH ANY QUESTIONS  [phone] CELL  Dr Jacob Mcclelland

## 2020-10-19 NOTE — PROCEDURES
Patient:   Ishan Kaba  MR#:    [id number]   Room:    9560/974-33   YOB: 1934  Date of Progress Note: 10/19/2020  Time of Note                           7:19 AM  Consulting Physician:   Gaby Tucker M.D. Attending Physician:  Janeth Walls MD       This is a multichannel digital EEG recording using the international 10-20 placement system. Technical Summary:     Background EEG activity: There is no well defined occipital dominant rhythm. There is much low voltage 5-7 Hz activity seen in a generalized distribution intermixed with low voltage 18-22 Hz activity. There is low to moderate voltage 1.5-3 Hz activity seen intermittently. Photic Stimulation: No abnormal waveforms are noted with various frequencies of flickering light. IMPRESSION:   This is an abnormal EEG due to the diffuse slowing of the background. This finding is consistent with a diffuse disturbance in cerebral function. No clear epileptiform activity was noted during the recording period.        Dr Michael Medina Certified in Neurology  Board Certified in Clinical Neurophysiology  Fellowship trained in [address] [place] [address] Time 21 minutes

## 2020-10-19 NOTE — CONSULTS
Palliative Care:    Pt is well known to palliative care team.  [facility], Ms. Andrey Bang tells me pt started with confusion approx 1 week ago. He did attend [employer] on 10/11, worked his normal shift prior week. She noticed confusion, pt unable to amb as usual.  Brought him to ed for eval.      Past Medical History:        Past Medical History:   Diagnosis Date    Blood circulation, collateral     CAD (coronary artery disease)     STENTS X 3    CAD (coronary artery disease)     CABG    Cerebral artery occlusion with cerebral infarction (HCC)     CHF (congestive heart failure) (HCC)     Chronic kidney disease     DDD (degenerative disc disease), lumbar 7/11/2017    Diabetes mellitus ([address]     Disease of blood and blood forming organ     GERD (gastroesophageal reflux disease)     History of blood transfusion     Hx of blood clots     Right leg    Hyperlipidemia     Hypertension     Lumbar stenosis with neurogenic claudication 7/11/2017    MI, old     X 2    Other disorders of kidney and ureter in diseases classified elsewhere     Palliative care patient 03/06/2018    Right heart failure ([facility] [address] 6/29/2018    Sleep apnea     DOESN'T USE MACHINE    TIA (transient ischemic attack)        Advance Directives:  Family desires to make pt DNR-CC. See updated ACP note. Pain/Other Symptoms:   Unable to assess, pt resting/sedated. Activity:     assist        Psychological/Spiritual:    Pt has good family and spiritual support. He lives in the same house with his granddtr/[facility]. Plan:    Attempt to get MRI, pt unable earlier d/t agitation. DC precedex d/t bradycardia  Continue monitor, med management    Patient/family discussion r/t goals:  Pt granddtr reviews past week with this nurse. States pt was in his usual state of health. Active, cooking, working until a few days ago when she noticed diff with amb and confusion.   Took car keys from him so he could not get out and drive (d/t confusion on where he was), for his safety as well as others. She is tearful, pt brother here for additional support. Review of any needed services: Discussed possibility for hospice care should pt not improve. HCS open to discussion.           Electronically signed by Mallory Mathew RN on 10/19/2020 at 12:01 PM

## 2020-10-19 NOTE — ACP (ADVANCE CARE PLANNING)
Advance Care Planning     Advance Care Planning Activator (Inpatient)  Conversation Note      Date of ACP Conversation: 10/19/20    Conversation Conducted with: Noam Lopez and Xi Kat, pt's [facility]. ACP Activator: [address] JULIAN[address]ie Mcgill Decision Maker:     Current Designated Health Care Decision Maker:   Primary Decision Maker: Rosilyn Fabry - [phone]    Secondary Decision Maker: Joaquina Laird - Brother/Sister - [phone]                 Care Preferences  Ventilation: \"If you were in your present state of health and suddenly became very ill and were unable to breathe on your own, what would your preference be about the use of a ventilator (breathing machine) if it were available to you? \"      Would the patient desire the use of ventilator (breathing machine)?: No    Resuscitation  \"In the event your heart stopped as a result of an underlying serious health condition, would you want attempts to be made to restart your heart (answer \"yes\" for attempt to resuscitate) or would you prefer a natural death (answer \"no\" for do not attempt to resuscitate)? \" No      Conversation Outcomes:  [x] ACP discussion completed  [] Existing advance directive reviewed with patient; no changes to patient's previously recorded wishes  [] New Advance Directive completed  [] Portable Do Not Rescitate prepared for Provider review and signature  [] POLST/POST/MOLST/MOST prepared for Provider review and signature      Follow-up plan:    [] Schedule follow-up conversation to continue planning  [] Referred individual to Provider for additional questions/concerns   [] Advised patient/agent/surrogate to review completed ACP document and update if needed with changes in condition, patient preferences or care setting    [] This note routed to one or more involved healthcare providers

## 2020-10-20 ENCOUNTER — APPOINTMENT (OUTPATIENT)
Dept: CT IMAGING | Age: 85
DRG: 078 | End: 2020-10-20
Payer: COMMERCIAL

## 2020-10-20 LAB
ADENOVIRUS BY PCR: NOT DETECTED
ANION GAP SERPL CALCULATED.3IONS-SCNC: 12 MMOL/L (ref 7–19)
BASOPHILS ABSOLUTE: 0 K/UL (ref 0–0.2)
BASOPHILS RELATIVE PERCENT: 0.2 % (ref 0–1)
BORDETELLA PARAPERTUSSIS BY PCR: NOT DETECTED
BORDETELLA PERTUSSIS BY PCR: NOT DETECTED
BUN BLDV-MCNC: 24 MG/DL (ref 8–23)
CALCIUM SERPL-MCNC: 9.8 MG/DL (ref 8.8–10.2)
CHLAMYDOPHILIA PNEUMONIAE BY PCR: NOT DETECTED
CHLORIDE BLD-SCNC: 111 MMOL/L (ref 98–111)
CO2: 18 MMOL/L (ref 22–29)
CORONAVIRUS 229E BY PCR: NOT DETECTED
CORONAVIRUS HKU1 BY PCR: NOT DETECTED
CORONAVIRUS NL63 BY PCR: NOT DETECTED
CORONAVIRUS OC43 BY PCR: NOT DETECTED
CREAT SERPL-MCNC: 1.2 MG/DL (ref 0.5–1.2)
EOSINOPHILS ABSOLUTE: 0 K/UL (ref 0–0.6)
EOSINOPHILS RELATIVE PERCENT: 0.2 % (ref 0–5)
GFR AFRICAN AMERICAN: >59
GFR NON-AFRICAN AMERICAN: 57
GLUCOSE BLD-MCNC: 131 MG/DL (ref 74–109)
GLUCOSE BLD-MCNC: 133 MG/DL (ref 70–99)
GLUCOSE BLD-MCNC: 143 MG/DL (ref 70–99)
GLUCOSE BLD-MCNC: 150 MG/DL (ref 70–99)
HCT VFR BLD CALC: 34.3 % (ref 42–52)
HEMOGLOBIN: 10.8 G/DL (ref 14–18)
HUMAN METAPNEUMOVIRUS BY PCR: NOT DETECTED
HUMAN RHINOVIRUS/ENTEROVIRUS BY PCR: NOT DETECTED
IMMATURE GRANULOCYTES #: 0 K/UL
INFLUENZA A BY PCR: NOT DETECTED
INFLUENZA B BY PCR: NOT DETECTED
LYMPHOCYTES ABSOLUTE: 1.1 K/UL (ref 1.1–4.5)
LYMPHOCYTES RELATIVE PERCENT: 10.8 % (ref 20–40)
MAGNESIUM: 2.4 MG/DL (ref 1.6–2.4)
MCH RBC QN AUTO: 33.3 PG (ref 27–31)
MCHC RBC AUTO-ENTMCNC: 31.5 G/DL (ref 33–37)
MCV RBC AUTO: 105.9 FL (ref 80–94)
MONOCYTES ABSOLUTE: 0.8 K/UL (ref 0–0.9)
MONOCYTES RELATIVE PERCENT: 8.1 % (ref 0–10)
MYCOPLASMA PNEUMONIAE BY PCR: NOT DETECTED
NEUTROPHILS ABSOLUTE: 7.9 K/UL (ref 1.5–7.5)
NEUTROPHILS RELATIVE PERCENT: 80.3 % (ref 50–65)
PARAINFLUENZA VIRUS 1 BY PCR: NOT DETECTED
PARAINFLUENZA VIRUS 2 BY PCR: NOT DETECTED
PARAINFLUENZA VIRUS 3 BY PCR: NOT DETECTED
PARAINFLUENZA VIRUS 4 BY PCR: NOT DETECTED
PDW BLD-RTO: 14.9 % (ref 11.5–14.5)
PERFORMED ON: ABNORMAL
PLATELET # BLD: 222 K/UL (ref 130–400)
PMV BLD AUTO: 10.8 FL (ref 9.4–12.4)
POTASSIUM SERPL-SCNC: 4.2 MMOL/L (ref 3.5–5)
RBC # BLD: 3.24 M/UL (ref 4.7–6.1)
RESPIRATORY SYNCYTIAL VIRUS BY PCR: NOT DETECTED
SARS-COV-2, PCR: NOT DETECTED
SODIUM BLD-SCNC: 141 MMOL/L (ref 136–145)
WBC # BLD: 9.8 K/UL (ref 4.8–10.8)

## 2020-10-20 PROCEDURE — 6360000002 HC RX W HCPCS: Performed by: PSYCHIATRY & NEUROLOGY

## 2020-10-20 PROCEDURE — 2700000000 HC OXYGEN THERAPY PER DAY

## 2020-10-20 PROCEDURE — 6370000000 HC RX 637 (ALT 250 FOR IP): Performed by: INTERNAL MEDICINE

## 2020-10-20 PROCEDURE — 80048 BASIC METABOLIC PNL TOTAL CA: CPT

## 2020-10-20 PROCEDURE — 36415 COLL VENOUS BLD VENIPUNCTURE: CPT

## 2020-10-20 PROCEDURE — 6370000000 HC RX 637 (ALT 250 FOR IP): Performed by: HOSPITALIST

## 2020-10-20 PROCEDURE — 6360000002 HC RX W HCPCS: Performed by: INTERNAL MEDICINE

## 2020-10-20 PROCEDURE — 70450 CT HEAD/BRAIN W/O DYE: CPT

## 2020-10-20 PROCEDURE — 99233 SBSQ HOSP IP/OBS HIGH 50: CPT | Performed by: PSYCHIATRY & NEUROLOGY

## 2020-10-20 PROCEDURE — 2000000000 HC ICU R&B

## 2020-10-20 PROCEDURE — 85025 COMPLETE CBC W/AUTO DIFF WBC: CPT

## 2020-10-20 PROCEDURE — 2580000003 HC RX 258: Performed by: HOSPITALIST

## 2020-10-20 PROCEDURE — 2500000003 HC RX 250 WO HCPCS: Performed by: HOSPITALIST

## 2020-10-20 PROCEDURE — 51702 INSERT TEMP BLADDER CATH: CPT

## 2020-10-20 PROCEDURE — 82947 ASSAY GLUCOSE BLOOD QUANT: CPT

## 2020-10-20 PROCEDURE — 2580000003 HC RX 258: Performed by: INTERNAL MEDICINE

## 2020-10-20 PROCEDURE — 6360000002 HC RX W HCPCS: Performed by: HOSPITALIST

## 2020-10-20 PROCEDURE — 83735 ASSAY OF MAGNESIUM: CPT

## 2020-10-20 PROCEDURE — 0202U NFCT DS 22 TRGT SARS-COV-2: CPT

## 2020-10-20 RX ORDER — OLANZAPINE 5 MG/1
5 TABLET, ORALLY DISINTEGRATING ORAL EVERY 6 HOURS PRN
Status: DISCONTINUED | OUTPATIENT
Start: 2020-10-20 | End: 2020-10-26 | Stop reason: HOSPADM

## 2020-10-20 RX ORDER — DEXMEDETOMIDINE HYDROCHLORIDE 4 UG/ML
0.2 INJECTION, SOLUTION INTRAVENOUS CONTINUOUS
Status: DISCONTINUED | OUTPATIENT
Start: 2020-10-20 | End: 2020-10-20

## 2020-10-20 RX ORDER — ZIPRASIDONE MESYLATE 20 MG/ML
10 INJECTION, POWDER, LYOPHILIZED, FOR SOLUTION INTRAMUSCULAR EVERY 4 HOURS PRN
Status: DISCONTINUED | OUTPATIENT
Start: 2020-10-20 | End: 2020-10-26 | Stop reason: HOSPADM

## 2020-10-20 RX ORDER — SODIUM CHLORIDE, SODIUM LACTATE, POTASSIUM CHLORIDE, AND CALCIUM CHLORIDE .6; .31; .03; .02 G/100ML; G/100ML; G/100ML; G/100ML
500 INJECTION, SOLUTION INTRAVENOUS ONCE
Status: COMPLETED | OUTPATIENT
Start: 2020-10-20 | End: 2020-10-20

## 2020-10-20 RX ORDER — MIDAZOLAM HYDROCHLORIDE 1 MG/ML
1 INJECTION INTRAMUSCULAR; INTRAVENOUS ONCE
Status: COMPLETED | OUTPATIENT
Start: 2020-10-20 | End: 2020-10-20

## 2020-10-20 RX ADMIN — HYDRALAZINE HYDROCHLORIDE 20 MG: 20 INJECTION INTRAMUSCULAR; INTRAVENOUS at 12:15

## 2020-10-20 RX ADMIN — SODIUM CHLORIDE, POTASSIUM CHLORIDE, SODIUM LACTATE AND CALCIUM CHLORIDE: 600; 310; 30; 20 INJECTION, SOLUTION INTRAVENOUS at 15:24

## 2020-10-20 RX ADMIN — ZIPRASIDONE MESYLATE 10 MG: 20 INJECTION, POWDER, LYOPHILIZED, FOR SOLUTION INTRAMUSCULAR at 16:15

## 2020-10-20 RX ADMIN — ZIPRASIDONE MESYLATE 10 MG: 20 INJECTION, POWDER, LYOPHILIZED, FOR SOLUTION INTRAMUSCULAR at 12:09

## 2020-10-20 RX ADMIN — PREDNISOLONE ACETATE 1 DROP: 10 SUSPENSION/ DROPS OPHTHALMIC at 21:32

## 2020-10-20 RX ADMIN — ENOXAPARIN SODIUM 40 MG: 40 INJECTION SUBCUTANEOUS at 08:58

## 2020-10-20 RX ADMIN — OLANZAPINE 5 MG: 5 TABLET, ORALLY DISINTEGRATING ORAL at 10:39

## 2020-10-20 RX ADMIN — SODIUM CHLORIDE, PRESERVATIVE FREE 10 ML: 5 INJECTION INTRAVENOUS at 19:54

## 2020-10-20 RX ADMIN — DEXMEDETOMIDINE HYDROCHLORIDE 0.2 MCG/KG/HR: 4 INJECTION, SOLUTION INTRAVENOUS at 06:09

## 2020-10-20 RX ADMIN — SODIUM CHLORIDE, POTASSIUM CHLORIDE, SODIUM LACTATE AND CALCIUM CHLORIDE 500 ML: 600; 310; 30; 20 INJECTION, SOLUTION INTRAVENOUS at 22:11

## 2020-10-20 RX ADMIN — HYDRALAZINE HYDROCHLORIDE 20 MG: 20 INJECTION INTRAMUSCULAR; INTRAVENOUS at 16:14

## 2020-10-20 RX ADMIN — ZIPRASIDONE MESYLATE 10 MG: 20 INJECTION, POWDER, LYOPHILIZED, FOR SOLUTION INTRAMUSCULAR at 16:52

## 2020-10-20 RX ADMIN — MUPIROCIN: 20 OINTMENT TOPICAL at 21:32

## 2020-10-20 RX ADMIN — MUPIROCIN: 20 OINTMENT TOPICAL at 10:39

## 2020-10-20 RX ADMIN — ZIPRASIDONE MESYLATE 10 MG: 20 INJECTION, POWDER, LYOPHILIZED, FOR SOLUTION INTRAMUSCULAR at 07:52

## 2020-10-20 RX ADMIN — PREDNISOLONE ACETATE 1 DROP: 10 SUSPENSION/ DROPS OPHTHALMIC at 10:39

## 2020-10-20 RX ADMIN — MIDAZOLAM 1 MG: 1 INJECTION INTRAMUSCULAR; INTRAVENOUS at 14:18

## 2020-10-20 ASSESSMENT — PAIN SCALES - GENERAL
PAINLEVEL_OUTOF10: 0

## 2020-10-20 ASSESSMENT — PAIN SCALES - WONG BAKER
WONGBAKER_NUMERICALRESPONSE: 0

## 2020-10-20 NOTE — PROGRESS NOTES
Physician Progress Note      PATIENT:               Katey Horton  CSN #:                  [id number]  :                       1934  ADMIT DATE:       10/17/2020 4:02 PM  DISCH DATE:  RESPONDING  PROVIDER #:        Jad MIRANDA        QUERY TEXT:    Stage of Chronic Kidney Disease: Please provide further specificity, if known. Options provided:  -- Chronic kidney disease stage 1  -- Chronic kidney disease stage 2  -- Chronic kidney disease stage 3  -- Chronic kidney disease stage 4  -- Chronic kidney disease stage 5  -- Chronic kidney disease stage 5, requiring dialysis  -- End stage renal disease        PROVIDER RESPONSE TEXT:    The patient has chronic kidney disease stage 3.       Electronically signed by:  Yasmine Cantor 10/20/2020 5:27 PM

## 2020-10-20 NOTE — PLAN OF CARE
Problem: Falls - Risk of:  Goal: Will remain free from falls  Outcome: Ongoing  Goal: Absence of physical injury  Outcome: Ongoing     Problem: Urinary Elimination:  Goal: Signs and symptoms of infection will decrease  Outcome: Ongoing  Goal: Complications related to the disease process, condition or treatment will be avoided or minimized  Outcome: Ongoing     Problem: Restraint Use - Nonviolent/Non-Self-Destructive Behavior:  Goal: Absence of restraint indications  Outcome: Ongoing  Goal: Absence of restraint-related injury  Outcome: Ongoing     Problem: Skin Integrity:  Goal: Will show no infection signs and symptoms  Outcome: Ongoing  Goal: Absence of new skin breakdown  Outcome: Ongoing

## 2020-10-20 NOTE — PROGRESS NOTES
Patient:   Kevin Duggan  MR#:    [id number]   Room:    40/530-49   YOB: 1934  Date of Progress Note: 10/20/2020  Time of Note                           10:10 AM  Consulting Physician:   Saurav Middleton M.D. Attending Physician:  Shae Thomason MD     Chief complaint AMS    S:This 80 y.o. male  is seen for altered mental status. The history is obtained from the chart as the patient is unable to provide any history. As per the admission/ER noted the patient was brought in to the ER with increased confusion with speech issues and difficulty with ambulation over 3 days. Apparently at baseline his is cognitively intact and has no significant issues with ambulation. There have been no medication changes or recent illnesses as per the ER note. His blood pressure has been recorded up to 223/80. He has been moved to the ICU and placed in restraints. Agitated and confused. Still agitated and tried on Geodon. Still in restraints.      REVIEW OF SYSTEMS:  limited due to mental status    Past Medical History:      Diagnosis Date    Blood circulation, collateral     CAD (coronary artery disease)     STENTS X 3    CAD (coronary artery disease)     CABG    Cerebral artery occlusion with cerebral infarction (HCC)     CHF (congestive heart failure) (HCC)     Chronic kidney disease     DDD (degenerative disc disease), lumbar 7/11/2017    Diabetes mellitus ([address]     Disease of blood and blood forming organ     GERD (gastroesophageal reflux disease)     History of blood transfusion     Hx of blood clots     Right leg    Hyperlipidemia     Hypertension     Lumbar stenosis with neurogenic claudication 7/11/2017    MI, old     X 2    Other disorders of kidney and ureter in diseases classified elsewhere     Palliative care patient 03/06/2018    Right heart failure ([address] 6/29/2018    Sleep apnea     DOESN'T USE MACHINE    TIA (transient ischemic attack)        Past Surgical History:      Procedure Laterality Date    APPENDECTOMY      BACK SURGERY  1980    x 3    CARDIAC SURGERY  1990    Bypass x 3    CARPAL TUNNEL RELEASE Bilateral 1980    wrist and elbows    CHOLECYSTECTOMY  2000    COLONOSCOPY      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 2    DILATATION, ESOPHAGUS      ENDOSCOPY, COLON, DIAGNOSTIC      EYE SURGERY      JOINT REPLACEMENT Left 1990    JOINT REPLACEMENT Right 1995    LAMINECTOMY N/A 7/11/2017    LUMBAR LAMINECTOMY POSTERIOR  L23 L34 performed by [facility]a [facility] Left 1990    ROTATOR CUFF REPAIR Right 2010    TUMOR REMOVAL Left 1960    foot    TURP  2000    VASCULAR SURGERY Left 7/15/2015 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; a'plasty of left anterior tibial artery and dorsalis pedis artery with 2.5x300 vascutrak balloon and then with 3x220 nati balloon; completion a'gram       Medications in Hospital:      Current Facility-Administered Medications:     dexmedetomidine (PRECEDEX) 400 mcg in sodium chloride 0.9 % 100 mL infusion, 0.2 mcg/kg/hr, Intravenous, Continuous, Delphine Preston MD, Stopped at 10/20/20 0752    ziprasidone (GEODON) injection 10 mg, 10 mg, Intramuscular, Q4H PRN, Feng Pan MD, 10 mg at 10/20/20 4431    lactated ringers infusion, , Intravenous, Continuous, Feng Pan MD, Last Rate: 30 mL/hr at 10/19/20 1200    hydrALAZINE (APRESOLINE) injection 20 mg, 20 mg, Intravenous, Q4H PRN, Feng Pan MD, 10 mg at 10/19/20 1417    sodium chloride flush 0.9 % injection 10 mL, 10 mL, Intravenous, 2 times per day, Delphine Preston MD, 10 mL at 10/19/20 0927    sodium chloride flush 0.9 % injection 10 mL, 10 mL, Intravenous, PRN, Delphine Preston MD    acetaminophen (TYLENOL) tablet 650 mg, 650 mg, Oral, Q6H PRN **OR** acetaminophen (TYLENOL) Oral, Daily, Edmundo Porter MD, 81 mg at 10/18/20 5525    vitamin C (ASCORBIC ACID) tablet 1,000 mg, 1,000 mg, Oral, Daily, Edmundo Porter MD, 1,000 mg at 10/18/20 0851    allopurinol (ZYLOPRIM) tablet 600 mg, 600 mg, Oral, Daily, Edmundo Porter MD, 600 mg at 10/18/20 0585    isosorbide mononitrate (IMDUR) extended release tablet 120 mg, 120 mg, Oral, Daily, Edmundo Porter MD, 120 mg at 10/18/20 1125    meclizine (ANTIVERT) tablet 25 mg, 25 mg, Oral, 4x Daily PRN, Edmundo Porter MD    therapeutic multivitamin-minerals 1 tablet, 1 tablet, Oral, Daily, Edmundo Porter MD, 1 tablet at 10/18/20 0851    vitamin D (CHOLECALCIFEROL) tablet 1,000 Units, 1,000 Units, Oral, Daily, Edmundo Porter MD, 1,000 Units at 10/18/20 0851    tamsulosin (FLOMAX) capsule 0.8 mg, 0.8 mg, Oral, Daily, Edmundo Porter MD, 0.8 mg at 10/18/20 8518    sennosides-docusate sodium (SENOKOT-S) 8.6-50 MG tablet 2 tablet, 2 tablet, Oral, Daily, Edmundo Porter MD, 2 tablet at 10/18/20 9571    prednisoLONE acetate (PRED FORTE) 1 % ophthalmic suspension 1 drop, 1 drop, Left Eye, BID, Edmundo Porter MD, 1 drop at 10/19/20 2100    atorvastatin (LIPITOR) tablet 80 mg, 80 mg, Oral, Nightly, Edmundo Porter MD, 80 mg at 10/18/20 2112    pantoprazole (PROTONIX) tablet 40 mg, 40 mg, Oral, BID, Edmundo Porter MD, 40 mg at 10/18/20 2112    nitroGLYCERIN (NITROSTAT) SL tablet 0.4 mg, 0.4 mg, Sublingual, Q5 Min PRN, Edmundo Porter MD    kimberly [facility]) 2 % ointment, , Topical, BID, Edumndo Porter MD    lidocaine 4 % external patch 1 patch, 1 patch, Topical, Daily, Edmundo Porter MD, 1 patch at 10/20/20 0913    glimepiride (AMARYL) tablet 4 mg, 4 mg, Oral, BID, Edmundo Porter MD, 4 mg at 10/18/20 2112    Allergies:  Ativan [lorazepam]; Pcn [penicillins]; and Adhesive tape    Social History:   TOBACCO:   reports that he has never smoked. He has never used smokeless tobacco.  ETOH:   reports no history of alcohol use.     Family History: Problem Relation Age of Onset    Cancer Mother     Heart Disease Father     Heart Disease Brother     Heart Disease Brother     Heart Disease Brother     Heart Disease Brother     Heart Attack Brother     Heart Disease Brother     Heart Disease Brother          PHYSICAL EXAM:  BP 91/61   Pulse 67   Temp 97.8 °F (36.6 °C) (Temporal)   Resp 18   Ht 5' 10\" (1.778 m)   Wt 207 lb 6.4 oz (94.1 kg)   SpO2 94%   BMI 29.76 kg/m²       Constitutional - well developed, well nourished. Eyes - conjunctiva normal.   Ear, nose, throat - No scars, masses, or lesions over external nose or ears, no atrophy of tongue  Neck-symmetric, no masses noted, no jugular vein distension  Respiration- chest wall appears symmetric, good expansion,   normal effort without use of accessory muscles  Musculoskeletal - no significant wasting of muscles noted, no bony deformities  Extremities-no clubbing, cyanosis or edema  Skin - warm, dry, and intact. No rash, erythema, or pallor. Psychiatric - mood, affect, and behavior unable tot test     Neurological exam  Somnolent non verbal sedated on Precedex      Nursing/pcp notes, imaging,labs and vitals reviewed.      PT,OT and/or speech notes reviewed    Lab Results   Component Value Date    WBC 9.8 10/20/2020    HGB 10.8 (L) 10/20/2020    HCT 34.3 (L) 10/20/2020    .9 (H) 10/20/2020     10/20/2020     Lab Results   Component Value Date     10/20/2020    K 4.2 10/20/2020     10/20/2020    CO2 18 (L) 10/20/2020    BUN 24 (H) 10/20/2020    CREATININE 1.2 10/20/2020    GLUCOSE 131 (H) 10/20/2020    CALCIUM 9.8 10/20/2020    PROT 6.0 (L) 10/17/2020    LABALBU 3.5 10/17/2020    BILITOT 0.3 10/17/2020    ALKPHOS 91 10/17/2020    AST 25 10/17/2020    ALT 27 10/17/2020    LABGLOM 57 (A) 10/20/2020    GFRAA >59 10/20/2020     Lab Results   Component Value Date    INR 1.10 12/08/2019    INR 1.02 11/28/2019    INR 1.10 12/23/2018    PROTIME 13.6 12/08/2019    PROTIME 12.8 11/28/2019    PROTIME 13.6 12/23/2018       EEG [[id number]]      Resulted: 10/19/20 0719     Updated: 10/19/20 0721     Narrative:      Kimberlee Saez MD     10/19/2020  7:21 AM     Patient: Jen Moreira   MR#:    [id number]   Room:    1262/802-96   Date of Birth:   1934   Date of Progress Note: 10/19/2020   Time of Note                           7:19 AM   Consulting Physician:   Kimberlee Saez M.D. Attending Physician: Anjel Stark MD         This is a multichannel digital EEG recording using the   international 10-20 placement system. Technical Summary:     Background EEG activity: There is no well defined occipital   dominant rhythm. There is much low voltage 5-7 Hz activity seen in   a generalized distribution intermixed with low voltage 18-22 Hz   activity. There is low to moderate voltage 1.5-3 Hz activity seen   intermittently. Photic Stimulation: No abnormal waveforms are noted with various   frequencies of flickering light. IMPRESSION:   This is an abnormal EEG due to the diffuse slowing of the   background. This finding is consistent with a diffuse disturbance   in cerebral function. No clear epileptiform activity was noted   during the recording period.        Dr Vernetta Moritz Certified in Neurology   Board Certified in [place] [address] Neurophysiology   Fellowship trained in [place] [address] Neurophysiology            RECORD REVIEW: Previous medical records, medications were reviewed at today's visit    IMPRESSION:   Altered mental status/encephalopathy/Hypertensive aqbxazr-fwjqqref-su change     Initially non focal exam-confused/disordiented and not able to follow commands  CT head no clear acute changes noted-chronic infarcts right frontal and left occipital region     Certainly acute ischemic stroke/ PRESS / hypertensive encephalopathy are in the differential      Unable to get MRI brain at this time- will try to get Ct of the head   EEG diffusely slow post ativan for

## 2020-10-20 NOTE — PROGRESS NOTES
PC follow up note. Currently pt is resting/sesated. Dr. Randy Cm speaking with Dr. Ra Bermudez about pt. Meds have been adj and some suggestions given for pt comfort. Pt BP dropping on precedex causing med adj. Pt is HP consult as well. [address] if needed is a possibility should pt need further sx management. PC will follow as needed for support/goals.   Electronically signed by Lourdes Chinchilla RN on 10/20/2020 at 9:40 AM

## 2020-10-21 LAB
ANION GAP SERPL CALCULATED.3IONS-SCNC: 13 MMOL/L (ref 7–19)
BASOPHILS ABSOLUTE: 0 K/UL (ref 0–0.2)
BASOPHILS RELATIVE PERCENT: 0.1 % (ref 0–1)
BUN BLDV-MCNC: 27 MG/DL (ref 8–23)
CALCIUM SERPL-MCNC: 10 MG/DL (ref 8.8–10.2)
CHLORIDE BLD-SCNC: 112 MMOL/L (ref 98–111)
CO2: 19 MMOL/L (ref 22–29)
CREAT SERPL-MCNC: 1.2 MG/DL (ref 0.5–1.2)
EOSINOPHILS ABSOLUTE: 0 K/UL (ref 0–0.6)
EOSINOPHILS RELATIVE PERCENT: 0.1 % (ref 0–5)
GFR AFRICAN AMERICAN: >59
GFR NON-AFRICAN AMERICAN: 57
GLUCOSE BLD-MCNC: 102 MG/DL (ref 70–99)
GLUCOSE BLD-MCNC: 108 MG/DL (ref 70–99)
GLUCOSE BLD-MCNC: 112 MG/DL (ref 70–99)
GLUCOSE BLD-MCNC: 118 MG/DL (ref 74–109)
GLUCOSE BLD-MCNC: 94 MG/DL (ref 70–99)
HCT VFR BLD CALC: 34.3 % (ref 42–52)
HEMOGLOBIN: 11.1 G/DL (ref 14–18)
IMMATURE GRANULOCYTES #: 0 K/UL
LYMPHOCYTES ABSOLUTE: 0.6 K/UL (ref 1.1–4.5)
LYMPHOCYTES RELATIVE PERCENT: 6.6 % (ref 20–40)
MAGNESIUM: 2.4 MG/DL (ref 1.6–2.4)
MCH RBC QN AUTO: 32.9 PG (ref 27–31)
MCHC RBC AUTO-ENTMCNC: 32.4 G/DL (ref 33–37)
MCV RBC AUTO: 101.8 FL (ref 80–94)
MONOCYTES ABSOLUTE: 0.8 K/UL (ref 0–0.9)
MONOCYTES RELATIVE PERCENT: 8 % (ref 0–10)
NEUTROPHILS ABSOLUTE: 8.3 K/UL (ref 1.5–7.5)
NEUTROPHILS RELATIVE PERCENT: 84.8 % (ref 50–65)
PDW BLD-RTO: 14.9 % (ref 11.5–14.5)
PERFORMED ON: ABNORMAL
PERFORMED ON: NORMAL
PLATELET # BLD: 223 K/UL (ref 130–400)
PMV BLD AUTO: 10.7 FL (ref 9.4–12.4)
POTASSIUM SERPL-SCNC: 4.1 MMOL/L (ref 3.5–5)
RBC # BLD: 3.37 M/UL (ref 4.7–6.1)
SODIUM BLD-SCNC: 144 MMOL/L (ref 136–145)
WBC # BLD: 9.7 K/UL (ref 4.8–10.8)

## 2020-10-21 PROCEDURE — 6370000000 HC RX 637 (ALT 250 FOR IP): Performed by: HOSPITALIST

## 2020-10-21 PROCEDURE — 2580000003 HC RX 258: Performed by: INTERNAL MEDICINE

## 2020-10-21 PROCEDURE — 6360000002 HC RX W HCPCS: Performed by: HOSPITALIST

## 2020-10-21 PROCEDURE — 2500000003 HC RX 250 WO HCPCS: Performed by: INTERNAL MEDICINE

## 2020-10-21 PROCEDURE — 36415 COLL VENOUS BLD VENIPUNCTURE: CPT

## 2020-10-21 PROCEDURE — 80048 BASIC METABOLIC PNL TOTAL CA: CPT

## 2020-10-21 PROCEDURE — 85025 COMPLETE CBC W/AUTO DIFF WBC: CPT

## 2020-10-21 PROCEDURE — 83735 ASSAY OF MAGNESIUM: CPT

## 2020-10-21 PROCEDURE — 2580000003 HC RX 258: Performed by: HOSPITALIST

## 2020-10-21 PROCEDURE — 1210000000 HC MED SURG R&B

## 2020-10-21 PROCEDURE — 99233 SBSQ HOSP IP/OBS HIGH 50: CPT | Performed by: PSYCHIATRY & NEUROLOGY

## 2020-10-21 PROCEDURE — 82947 ASSAY GLUCOSE BLOOD QUANT: CPT

## 2020-10-21 PROCEDURE — 6360000002 HC RX W HCPCS: Performed by: INTERNAL MEDICINE

## 2020-10-21 RX ORDER — METOPROLOL TARTRATE 5 MG/5ML
5 INJECTION INTRAVENOUS EVERY 6 HOURS
Status: DISCONTINUED | OUTPATIENT
Start: 2020-10-21 | End: 2020-10-26

## 2020-10-21 RX ADMIN — ENOXAPARIN SODIUM 40 MG: 40 INJECTION SUBCUTANEOUS at 08:57

## 2020-10-21 RX ADMIN — METOPROLOL TARTRATE 5 MG: 5 INJECTION, SOLUTION INTRAVENOUS at 17:13

## 2020-10-21 RX ADMIN — SODIUM CHLORIDE, PRESERVATIVE FREE 10 ML: 5 INJECTION INTRAVENOUS at 20:26

## 2020-10-21 RX ADMIN — PREDNISOLONE ACETATE 1 DROP: 10 SUSPENSION/ DROPS OPHTHALMIC at 09:05

## 2020-10-21 RX ADMIN — PREDNISOLONE ACETATE 1 DROP: 10 SUSPENSION/ DROPS OPHTHALMIC at 20:26

## 2020-10-21 RX ADMIN — METOPROLOL TARTRATE 5 MG: 5 INJECTION, SOLUTION INTRAVENOUS at 11:34

## 2020-10-21 RX ADMIN — SODIUM BICARBONATE: 84 INJECTION, SOLUTION INTRAVENOUS at 12:24

## 2020-10-21 RX ADMIN — SODIUM BICARBONATE: 84 INJECTION, SOLUTION INTRAVENOUS at 20:55

## 2020-10-21 RX ADMIN — HYDRALAZINE HYDROCHLORIDE 20 MG: 20 INJECTION INTRAMUSCULAR; INTRAVENOUS at 04:56

## 2020-10-21 RX ADMIN — SODIUM CHLORIDE, POTASSIUM CHLORIDE, SODIUM LACTATE AND CALCIUM CHLORIDE 250 ML: 600; 310; 30; 20 INJECTION, SOLUTION INTRAVENOUS at 05:53

## 2020-10-21 RX ADMIN — ZIPRASIDONE MESYLATE 10 MG: 20 INJECTION, POWDER, LYOPHILIZED, FOR SOLUTION INTRAMUSCULAR at 14:11

## 2020-10-21 RX ADMIN — HYDRALAZINE HYDROCHLORIDE 20 MG: 20 INJECTION INTRAMUSCULAR; INTRAVENOUS at 13:25

## 2020-10-21 RX ADMIN — MUPIROCIN: 20 OINTMENT TOPICAL at 09:05

## 2020-10-21 RX ADMIN — HYDRALAZINE HYDROCHLORIDE 20 MG: 20 INJECTION INTRAMUSCULAR; INTRAVENOUS at 18:13

## 2020-10-21 RX ADMIN — SODIUM CHLORIDE, PRESERVATIVE FREE 10 ML: 5 INJECTION INTRAVENOUS at 08:58

## 2020-10-21 ASSESSMENT — PAIN SCALES - GENERAL
PAINLEVEL_OUTOF10: 0

## 2020-10-21 ASSESSMENT — PAIN SCALES - PAIN ASSESSMENT IN ADVANCED DEMENTIA (PAINAD)
FACIALEXPRESSION: 0
CONSOLABILITY: 0
NEGVOCALIZATION: 0
TOTALSCORE: 1
BODYLANGUAGE: 1
BREATHING: 0

## 2020-10-21 ASSESSMENT — PAIN SCALES - WONG BAKER
WONGBAKER_NUMERICALRESPONSE: 0

## 2020-10-21 NOTE — PLAN OF CARE
Problem: Restraint Use - Nonviolent/Non-Self-Destructive Behavior:  Goal: Absence of restraint-related injury  Description: Absence of restraint-related injury  10/21/2020 0052 by John Ospina RN  Outcome: Met This Shift     Problem: Falls - Risk of:  Goal: Will remain free from falls  Description: Will remain free from falls  10/21/2020 0052 by John Ospina RN  Outcome: Ongoing     Problem: Falls - Risk of:  Goal: Absence of physical injury  Description: Absence of physical injury  10/21/2020 0052 by John Ospina RN  Outcome: Ongoing     Problem: Urinary Elimination:  Goal: Signs and symptoms of infection will decrease  Description: Signs and symptoms of infection will decrease  10/21/2020 0052 by John Ospina RN  Outcome: Ongoing     Problem: Urinary Elimination:  Goal: Complications related to the disease process, condition or treatment will be avoided or minimized  Description: Complications related to the disease process, condition or treatment will be avoided or minimized  10/21/2020 0052 by John Ospina RN  Outcome: Ongoing     Problem: Skin Integrity:  Goal: Will show no infection signs and symptoms  Description: Will show no infection signs and symptoms  10/21/2020 0052 by John Ospina RN  Outcome: Ongoing     Problem: Skin Integrity:  Goal: Absence of new skin breakdown  Description: Absence of new skin breakdown  10/21/2020 0052 by John Ospina RN  Outcome: Ongoing     Problem: Restraint Use - Nonviolent/Non-Self-Destructive Behavior:  Goal: Absence of restraint indications  Description: Absence of restraint indications  10/21/2020 0052 by John Ospina RN  Outcome: Not Met This Shift

## 2020-10-21 NOTE — PROGRESS NOTES
Spoke with patient regarding his PFT appointment on Friday, 10/23/2020 @ 8 AM. Stated who I was and asked patient if he remembered who I was. He stated yes he remembered. Patient and I have spoken several time on  the phone and I wanted to let him know that I would reschedule his appointment not to worry about it. Patient thanked me and stated he appreciated me checking on him.

## 2020-10-21 NOTE — PROGRESS NOTES
Palliative in to talk with johnr/POA per her request.  She is ready to have pt considered for hospice care. Long discussion on her expectations for pt. She would like to see pt less agitated and out of restraints. Ultimate goal is to have pt return to their home for care. Spoke with HMD, would like to wait on transfer today since charts reflect \"pt improved\". Also would like to complete full eval. Speech language to see pt d/t unsure is pt can swallow at this time. Pt continues to [employer] and does not make any meaningful conversation. He continues to be in restraints x 3 days. Dr. Harry Galvez also aware family would like hospice care should pt continue to decline. Spoke with nurse for further update. He has administered Geodon IV and states pt seems \"a little more calm\".   Palliative will follow up next day on pt condition and review with HMD..  Electronically signed by Mike Mcdonnell RN on 10/21/2020 at 3:19 PM

## 2020-10-21 NOTE — PROGRESS NOTES
Palliative care in to talk with family. On Monday this nurse spoke with granddtr/POA and pt brother. POA decided pt should be DNR and records do reflect this. She also discussed possibility of pt transferring to hospice care. Jaison Dove tells me on this visit she is considering hospice care. Ultimate goal once pt sx managed would be to take him home and care for him. POA wants to discuss with family before making a final decision. Contact information for PC left with POA. PC will continue to follow.     Electronically signed by Durga Perez RN on 10/21/2020 at 1:24 PM

## 2020-10-21 NOTE — PROGRESS NOTES
Hospitalist Progress Note    Patient:  King Heaven  YOB: 1934  Date of Service: 10/21/2020  MRN: [id number]   Acct: [de-identified]   Primary Care Physician: Alannah Murillo MD  Advance Directive: [facility]  Admit Date: 10/17/2020       Hospital Day: 4  Referring Provider: Elly Lozano DO    Patient Seen, Chart, Consults, Notes, Labs, Radiology studies reviewed. Subjective:  King Heaven is a 80 y.o. male  whom we are following for mental status change. Chart reviewed. He was admitted on 10/17 because of strokelike symptoms. He has had persistent mental status change since being in the hospital.  Neurology is following. Unsure if this is related to ischemia versus hypertensive encephalopathy. He has been unable to get an MRI. He is also NPO. I will ask speech to evaluate.     Allergies:  Ativan [lorazepam]; Pcn [penicillins]; and Adhesive tape    Medicines:  Current Facility-Administered Medications   Medication Dose Route Frequency Provider Last Rate Last Dose    sodium bicarbonate 75 mEq in sodium chloride 0.45 % 1,000 mL infusion   Intravenous Continuous Elly Lozano DO        metoprolol (LOPRESSOR) injection 5 mg  5 mg Intravenous Q6H Elly Lozano DO        ziprasidone (GEODON) injection 10 mg  10 mg Intramuscular Q4H PRN Wisam Lozano MD   10 mg at 10/20/20 1652    OLANZapine zydis (ZYPREXA) disintegrating tablet 5 mg  5 mg Oral Q6H PRN Wisam Lozano MD   5 mg at 10/20/20 1039    hydrALAZINE (APRESOLINE) injection 20 mg  20 mg Intravenous Q4H PRN Wisam Lozano MD   20 mg at 10/21/20 0456    sodium chloride flush 0.9 % injection 10 mL  10 mL Intravenous 2 times per day Yung Quintanilla MD   10 mL at 10/21/20 0858    sodium chloride flush 0.9 % injection 10 mL  10 mL Intravenous PRN Yung Quintanilla MD        acetaminophen (TYLENOL) tablet 650 mg  650 mg Oral Q6H PRN Yung Quintanilla MD        Or   [facility] acetaminophen (TYLENOL) suppository 650 mg  650 mg Rectal Q6H PRN Willie Elizondo MD        enoxaparin (LOVENOX) injection 40 mg  40 mg Subcutaneous Daily Willie Elizondo MD   40 mg at 10/21/20 0857    ondansetron (ZOFRAN-ODT) disintegrating tablet 4 mg  4 mg Oral Q8H PRN Willie Elizondo MD        Or    [facility]ron [facility] injection 4 mg  4 mg Intravenous Q6H PRN Willie Elizondo MD        insulin lispro (HUMALOG) injection vial 0-6 Units  0-6 Units Subcutaneous TID WC Samara Galarza MD        insulin lispro (HUMALOG) injection vial 0-3 Units  0-3 Units Subcutaneous Nightly Samara Galarza MD        glucose (GLUTOSE) 40 % oral gel 15 g  15 g Oral PRN Samara Galarza MD        dextrose 50 % IV solution  12.5 g Intravenous PRN Samara Galarza MD        glucagon (rDNA) injection 1 mg  1 mg Intramuscular PRN Samara Galarza MD        dextrose 5 % solution  100 mL/hr Intravenous PRN Samara Galarza MD        hydrALAZINE (APRESOLINE) tablet 25 mg  25 mg Oral Q4H PRN Samara Galarza MD        diphenoxylate-atropine (LOMOTIL) 2.5-0.025 MG per tablet 1 tablet  1 tablet Oral 4x Daily PRN Willie Elizondo MD        colchicine (COLCRYS) tablet 0.6 mg  0.6 mg Oral PRN Willie Elizondo MD        meclizine (ANTIVERT) tablet 25 mg  25 mg Oral 4x Daily PRN Willie Elizondo MD        prednisoLONE acetate (PRED FORTE) 1 % ophthalmic suspension 1 drop  1 drop Left Eye BID Willie Elizondo MD   1 drop at 10/21/20 0905    nitroGLYCERIN (NITROSTAT) SL tablet 0.4 mg  0.4 mg Sublingual Q5 Min PRN Willie Elizondo MD        mupirocin (BACTROBAN) 2 % ointment   Topical BID Willie Elizondo MD        lidocaine 4 % external patch 1 patch  1 patch Topical Daily Willie Elizondo MD   1 patch at 10/21/20 0857       Past Medical History:  Past Medical History:   Diagnosis Date    Blood circulation, collateral     CAD (coronary artery disease)     STENTS X 3    CAD (coronary artery disease)     CABG    Cerebral artery occlusion with cerebral infarction ([facility] [address]     CHF (congestive heart failure) (HCC)     Chronic Brother     Heart Disease Brother     Heart Disease Brother        Social History  Social History     Socioeconomic History    Marital status:      Spouse name: Ayanna Mckenzie    Number of children: 2    Years of education: 15    Highest education level: Not on file   Occupational History    Not on file   Social Needs    Financial resource strain: Not on file    Food insecurity     Worry: Not on file     Inability: Not on file   [employer] needs     Medical: Not on file     Non-medical: Not on file   Tobacco Use    Smoking status: Never Smoker    Smokeless tobacco: Never Used   Substance and Sexual Activity    Alcohol use: No    Drug use: No    Sexual activity: Not Currently   Lifestyle    Physical activity     Days per week: Not on file     Minutes per session: Not on file    Stress: Not on file   Relationships    Social connections     Talks on phone: Not on file     Gets together: Not on file     Attends Jehovah's witness service: Not on file     Active member of club or organization: Not on file     Attends meetings of clubs or organizations: Not on file     Relationship status: Not on file    Intimate partner violence     Fear of current or ex partner: Not on file     Emotionally abused: Not on file     Physically abused: Not on file     Forced sexual activity: Not on file   Other Topics Concern    Not on file   Social History Narrative    Not on file         Review of Systems:    Review of Systems   Unable to perform ROS: Mental status change           Objective:  Blood pressure (!) 171/112, pulse 90, temperature 98.9 °F (37.2 °C), temperature source Temporal, resp. rate 20, height 5' 10\" (1.778 m), weight 208 lb 12.8 oz (94.7 kg), SpO2 97 %. Intake/Output Summary (Last 24 hours) at 10/21/2020 1034  Last data filed at 10/21/2020 1000  Gross per 24 hour   Intake 2906 ml   Output 440 ml   Net 2466 ml       Physical Exam  Vitals signs reviewed.    Constitutional:       General: He is in acute distress. Appearance: He is well-developed. HENT:      Head: Normocephalic and atraumatic. Right Ear: External ear normal.      Left Ear: External ear normal.      Mouth/Throat:      Mouth: Mucous membranes are dry. Eyes:      Conjunctiva/sclera: Conjunctivae normal.      Pupils: Pupils are equal, round, and reactive to light. Neck:      Musculoskeletal: No neck rigidity. Cardiovascular:      Rate and Rhythm: Normal rate and regular rhythm. Heart sounds: Normal heart sounds. Pulmonary:      Effort: Pulmonary effort is normal.      Breath sounds: Normal breath sounds. Abdominal:      General: Abdomen is flat. Palpations: Abdomen is soft. Musculoskeletal:         General: No swelling. Skin:     General: Skin is warm and dry. Neurological:      Mental Status: He is alert. He is disoriented. Psychiatric:         Behavior: Behavior is agitated. Cognition and Memory: Cognition is impaired. Labs:  BMP:   Recent Labs     10/19/20  0326 10/20/20  0254 10/21/20  0117    141 144   K 4.1 4.2 4.1    111 112*   CO2 18* 18* 19*   BUN 21 24* 27*   CREATININE 1.3* 1.2 1.2   CALCIUM 9.7 9.8 10.0     CBC:   Recent Labs     10/19/20  0326 10/20/20  0254 10/21/20  0117   WBC 9.1 9.8 9.7   HGB 9.7* 10.8* 11.1*   HCT 30.3* 34.3* 34.3*   .4* 105.9* 101.8*    222 223     LIVER PROFILE: No results for input(s): AST, ALT, LIPASE, BILIDIR, BILITOT, ALKPHOS in the last 72 hours. Invalid input(s): AMYLASE,  ALB  PT/INR: No results for input(s): PROTIME, INR in the last 72 hours. APTT: No results for input(s): APTT in the last 72 hours. BNP:  No results for input(s): BNP in the last 72 hours. Ionized Calcium:No results for input(s): IONCA in the last 72 hours. Magnesium:  Recent Labs     10/19/20  0326 10/20/20  0254 10/21/20  0117   MG 2.2 2.4 2.4     Phosphorus:No results for input(s): PHOS in the last 72 hours.   HgbA1C: No results for input(s): LABA1C in the last 72 hours. Hepatic: No results for input(s): ALKPHOS, ALT, AST, PROT, BILITOT, BILIDIR, LABALBU in the last 72 hours. Lactic Acid: No results for input(s): LACTA in the last 72 hours. Troponin: No results for input(s): CKTOTAL, CKMB, TROPONINT in the last 72 hours. ABGs: No results for input(s): PH, PCO2, PO2, HCO3, O2SAT in the last 72 hours. CRP:  No results for input(s): CRP in the last 72 hours. Sed Rate:  No results for input(s): SEDRATE in the last 72 hours. Cultures:   No results for input(s): CULTURE in the last 72 hours. No results for input(s): BC, Marc Lav in the last 72 hours. No results for input(s): CXSURG in the last 72 hours. Radiology reports as per the Radiologist  Radiology: Ct Head Wo Contrast    Result Date: 10/17/2020  CT HEAD WO CONTRAST 10/17/2020 4:34 PM History: Confusion Comparisons: 12/20/2019 head CT Technique: Radiation dose equals  mGy cm. AUTOMATED EXPOSURE CONTROL dose reduction technique was implemented CT evaluation of the head without intravenous contrast. 5 mm transaxial images were obtained. 2-D sagittal and coronal reconstruction images were generated. Findings: There is central and cortical atrophy. There is confluent periventricular and subcortical FLAIR signal hyperintensities compatible chronic ischemic changes. Encephalomalacia in the right frontal horn, inferiorly and laterally in the left suboccipital lobe again identified old areas of infarction suspected. There is no intra or extra-axial hemorrhage. There is no mass effect or midline shift. There is no hydrocephalus. 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 Paranasal sinuses are grossly clear. There is no skull fracture acute calvarial abnormality. CT appearance the head is unchanged. Impression: 1. No CT evidence of an acute intracranial process. 2. Atrophy and chronic ischemic changes.                  Signed by Dr Abigail Rubio on 10/17/2020 5:41 PM Assessment     Hypertensive urgency. Improved. We are continuing to hold oral medications at present. Hypertensive encephalopathy. Continue supportive care. His nurse taking care of him states that his neuro status is improving somewhat. Please document 40 minutes of critical care time for chart review, patient assessment, management and documentation.       Anna Oro, DO

## 2020-10-21 NOTE — PROGRESS NOTES
Patient:   Hermelinda Modi  MR#:    [id number]   Room:    7183/333-62   YOB: 1934  Date of Progress Note: 10/21/2020  Time of Note                           6:46 AM  Consulting Physician:   Kaylynn López M.D. Attending Physician:  Barbara Centeno DO     Chief complaint AMS    S:This 80 y.o. male  is seen for altered mental status. The history is obtained from the chart as the patient is unable to provide any history. As per the admission/ER noted the patient was brought in to the ER with increased confusion with speech issues and difficulty with ambulation over 3 days. Apparently at baseline his is cognitively intact and has no significant issues with ambulation. There have been no medication changes or recent illnesses as per the ER note. His blood pressure has been recorded up to 223/80. He has been moved to the ICU and placed in restraints. Agitated and confused. Still agitated and tried on Geodon. Still in restraints. Doing better with Geodon. More conversant but confused.     REVIEW OF SYSTEMS:  limited due to mental status    Past Medical History:      Diagnosis Date    Blood circulation, collateral     CAD (coronary artery disease)     STENTS X 3    CAD (coronary artery disease)     CABG    Cerebral artery occlusion with cerebral infarction (HCC)     CHF (congestive heart failure) (HCC)     Chronic kidney disease     DDD (degenerative disc disease), lumbar 7/11/2017    Diabetes mellitus ([address]     Disease of blood and blood forming organ     GERD (gastroesophageal reflux disease)     History of blood transfusion     Hx of blood clots     Right leg    Hyperlipidemia     Hypertension     Lumbar stenosis with neurogenic claudication 7/11/2017    MI, old     X 2    Other disorders of kidney and ureter in diseases classified elsewhere     Palliative care patient 03/06/2018    Right heart failure ([address] 6/29/2018    Sleep apnea     DOESN'T USE MACHINE    TIA (transient ischemic attack)        Past Surgical History:      Procedure Laterality Date    APPENDECTOMY      BACK SURGERY  1980    x 3    CARDIAC SURGERY  1990    Bypass x 3    CARPAL TUNNEL RELEASE Bilateral 1980    wrist and elbows    CHOLECYSTECTOMY  2000    COLONOSCOPY      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 2    DILATATION, ESOPHAGUS      ENDOSCOPY, COLON, DIAGNOSTIC      EYE SURGERY      JOINT REPLACEMENT Left 1990    JOINT REPLACEMENT Right 1995    LAMINECTOMY N/A 7/11/2017    LUMBAR LAMINECTOMY POSTERIOR  L23 L34 performed by Sherif Layton MD at [address] Left 1990    ROTATOR CUFF REPAIR Right 2010    TUMOR REMOVAL Left 1960    foot    TURP  2000    VASCULAR SURGERY Left 7/15/2015 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; a'plasty of left anterior tibial artery and dorsalis pedis artery with 2.5x300 vascutrak balloon and then with 3x220 nati balloon; completion a'gram       Medications in Hospital:      Current Facility-Administered Medications:     ziprasidone (GEODON) injection 10 mg, 10 mg, Intramuscular, Q4H PRN, Samara Galarza MD, 10 mg at 10/20/20 1652    OLANZapine zydis (ZYPREXA) disintegrating tablet 5 mg, 5 mg, Oral, Q6H PRN, Samara Galarza MD, 5 mg at 10/20/20 1039    lactated ringers infusion, , Intravenous, Continuous, Willie Elizondo MD, Last Rate: 125 mL/hr at 10/21/20 0600    hydrALAZINE (APRESOLINE) injection 20 mg, 20 mg, Intravenous, Q4H PRN, Samara Galarza MD, 20 mg at 10/21/20 0456    sodium chloride flush 0.9 % injection 10 mL, 10 mL, Intravenous, 2 times per day, Willie Elizondo MD, 10 mL at 10/20/20 1954    sodium chloride flush 0.9 % injection 10 mL, 10 mL, Intravenous, PRN, Willie Elizondo MD    acetaminophen (TYLENOL) tablet 650 mg, 650 mg, Oral, Q6H PRN **OR** acetaminophen (TYLENOL) suppository 650 mg, 650 mg, Rectal, Q6H PRN, Tc Swenson MD    enoxaparin (LOVENOX) injection 40 mg, 40 mg, Subcutaneous, Daily, Tc Swenson MD, 40 mg at 10/20/20 0858    ondansetron (ZOFRAN-ODT) disintegrating tablet 4 mg, 4 mg, Oral, Q8H PRN **OR** ondansetron (ZOFRAN) injection 4 mg, 4 mg, Intravenous, Q6H PRN, Tc Swenson MD    losartan (COZAAR) tablet 25 mg, 25 mg, Oral, Daily, Tc Swenson MD, 25 mg at 10/18/20 0851    hydrALAZINE (APRESOLINE) tablet 100 mg, 100 mg, Oral, 3 times per day, Samuel Hardy MD, 100 mg at 10/19/20 0554    insulin lispro (HUMALOG) injection vial 0-6 Units, 0-6 Units, Subcutaneous, TID WC, Samuel Hardy MD    insulin lispro (HUMALOG) injection vial 0-3 Units, 0-3 Units, Subcutaneous, Nightly, Samuel Hardy MD    glucose (GLUTOSE) 40 % oral gel 15 g, 15 g, Oral, PRN, Samuel Hardy MD    dextrose 50 % IV solution, 12.5 g, Intravenous, PRN, Samuel Hardy MD    glucagon (rDNA) injection 1 mg, 1 mg, Intramuscular, PRN, Samuel Hardy MD    dextrose 5 % solution, 100 mL/hr, Intravenous, PRN, Samuel Hardy MD    folic acid (FOLVITE) tablet 1 mg, 1 mg, Oral, Daily, Sylwia Archuleta MD, 1 mg at 10/18/20 1040    hydrALAZINE (APRESOLINE) tablet 25 mg, 25 mg, Oral, Q4H PRN, Samuel Hardy MD    finasteride (PROSCAR) tablet 5 mg, 5 mg, Oral, Daily, Tc Swenson MD, 5 mg at 10/18/20 3545    ferrous sulfate-C-folic acid (FOLITAB) 229564-1.8 MG per extended release tablet 1 tablet, 1 tablet, Oral, Daily, Tc Swenson MD    docusate sodium (COLACE) capsule 100 mg, 100 mg, Oral, BID, Tc Swenson MD, 100 mg at 10/18/20 2112    diphenoxylate-atropine (LOMOTIL) 2.5-0.025 MG per tablet 1 tablet, 1 tablet, Oral, 4x Daily PRN, Tc Swenson MD    colchicine (COLCRYS) tablet 0.6 mg, 0.6 mg, Oral, PRN, Tc Swenson MD    vitamin B and C (TOTAL B-C) 1 tablet, 1 tablet, Oral, Daily, Tc Swenson MD, 1 tablet at 10/18/20 3210    aspirin chewable tablet 81 mg, 81 mg, Oral, Daily, Ar Joiner MD, 81 mg at 10/18/20 5508    vitamin C (ASCORBIC ACID) tablet 1,000 mg, 1,000 mg, Oral, Daily, Ar Joiner MD, 1,000 mg at 10/18/20 0851    allopurinol (ZYLOPRIM) tablet 600 mg, 600 mg, Oral, Daily, Ar Joiner MD, 600 mg at 10/18/20 8095    isosorbide mononitrate (IMDUR) extended release tablet 120 mg, 120 mg, Oral, Daily, Ar Joiner MD, 120 mg at 10/18/20 1125    meclizine (ANTIVERT) tablet 25 mg, 25 mg, Oral, 4x Daily PRN, Ar Joiner MD    therapeutic multivitamin-minerals 1 tablet, 1 tablet, Oral, Daily, Ar Joiner MD, 1 tablet at 10/18/20 0851    vitamin D (CHOLECALCIFEROL) tablet 1,000 Units, 1,000 Units, Oral, Daily, Ar Joiner MD, 1,000 Units at 10/18/20 0851    tamsulosin (FLOMAX) capsule 0.8 mg, 0.8 mg, Oral, Daily, Ar Joiner MD, 0.8 mg at 10/18/20 2623    sennosides-docusate sodium (SENOKOT-S) 8.6-50 MG tablet 2 tablet, 2 tablet, Oral, Daily, Ar Joiner MD, 2 tablet at 10/18/20 0136    prednisoLONE acetate (PRED FORTE) 1 % ophthalmic suspension 1 drop, 1 drop, Left Eye, BID, Ar Joiner MD, 1 drop at 10/20/20 2132    atorvastatin (LIPITOR) tablet 80 mg, 80 mg, Oral, Nightly, Ar Joiner MD, 80 mg at 10/18/20 2112    pantoprazole (PROTONIX) tablet 40 mg, 40 mg, Oral, BID, Ar Joiner MD, 40 mg at 10/18/20 2112    nitroGLYCERIN (NITROSTAT) SL tablet 0.4 mg, 0.4 mg, Sublingual, Q5 Min PRN, Ar Joiner MD    mupirocin [facility]) 2 % ointment, , Topical, BID, Ar Joiner MD    lidocaine 4 % external patch 1 patch, 1 patch, Topical, Daily, Ar Joiner MD, 1 patch at 10/20/20 0913    glimepiride (AMARYL) tablet 4 mg, 4 mg, Oral, BID, Ar Joiner MD, 4 mg at 10/18/20 2112    Allergies:  Ativan [lorazepam]; Pcn [penicillins]; and Adhesive tape    Social History:   TOBACCO:   reports that he has never smoked. He has never used smokeless tobacco.  ETOH:   reports no history of alcohol use.     Family History: Problem Relation Age of Onset    Cancer Mother     Heart Disease Father     Heart Disease Brother     Heart Disease Brother     Heart Disease Brother     Heart Disease Brother     Heart Attack Brother     Heart Disease Brother     Heart Disease Brother          PHYSICAL EXAM:  BP (!) 160/39   Pulse 89   Temp 97.8 °F (36.6 °C) (Temporal)   Resp 21   Ht 5' 10\" (1.778 m)   Wt 208 lb 12.8 oz (94.7 kg)   SpO2 96%   BMI 29.96 kg/m²       Constitutional - well developed, well nourished. Eyes - conjunctiva normal.   Ear, nose, throat - No scars, masses, or lesions over external nose or ears, no atrophy of tongue  Neck-symmetric, no masses noted, no jugular vein distension  Respiration- chest wall appears symmetric, good expansion,   normal effort without use of accessory muscles  Musculoskeletal - no significant wasting of muscles noted, no bony deformities  Extremities-no clubbing, cyanosis or edema  Skin - warm, dry, and intact. No rash, erythema, or pallor. Psychiatric - mood, affect, and behavior unable tot test     Neurological exam  Somnolent taking able to stick tongue out to commands  In restraints moving all extremities      Nursing/pcp notes, imaging,labs and vitals reviewed.      PT,OT and/or speech notes reviewed    Lab Results   Component Value Date    WBC 9.7 10/21/2020    HGB 11.1 (L) 10/21/2020    HCT 34.3 (L) 10/21/2020    .8 (H) 10/21/2020     10/21/2020     Lab Results   Component Value Date     10/21/2020    K 4.1 10/21/2020     (H) 10/21/2020    CO2 19 (L) 10/21/2020    BUN 27 (H) 10/21/2020    CREATININE 1.2 10/21/2020    GLUCOSE 118 (H) 10/21/2020    CALCIUM 10.0 10/21/2020    PROT 6.0 (L) 10/17/2020    LABALBU 3.5 10/17/2020    BILITOT 0.3 10/17/2020    ALKPHOS 91 10/17/2020    AST 25 10/17/2020    ALT 27 10/17/2020    LABGLOM 57 (A) 10/21/2020    GFRAA >59 10/21/2020     Lab Results   Component Value Date    INR 1.10 12/08/2019    INR 1.02 11/28/2019 INR 1.10 12/23/2018    PROTIME 13.6 12/08/2019    PROTIME 12.8 11/28/2019    PROTIME 13.6 12/23/2018       EEG [[id number]]      Resulted: 10/19/20 0719     Updated: 10/19/20 0721     Narrative:      Amy Stephenson MD     10/19/2020  7:21 AM     Patient: Jyoti Perry   MR#:    [id number]   Room:    [address] [place] [address]   Date of Birth:   1934   Date of Progress Note: 10/19/2020   Time of Note                           7:19 AM   Consulting Physician:   Amy Stephenson M.D. Attending Physician: Anahi Mayen MD         This is a multichannel digital EEG recording using the   international 10-20 placement system. Technical Summary:     Background EEG activity: There is no well defined occipital   dominant rhythm. There is much low voltage 5-7 Hz activity seen in   a generalized distribution intermixed with low voltage 18-22 Hz   activity. There is low to moderate voltage 1.5-3 Hz activity seen   intermittently. Photic Stimulation: No abnormal waveforms are noted with various   frequencies of flickering light. IMPRESSION:   This is an abnormal EEG due to the diffuse slowing of the   background. This finding is consistent with a diffuse disturbance   in cerebral function. No clear epileptiform activity was noted   during the recording period. Dr Mary Jean Baptiste Certified in Neurology   Board Certified in [address] [place] [address] trained in [place] [id number] Neurophysiology        [facility] 10/20/20 1447     Updated: 10/20/20 1449     Narrative:      EXAMINATION: [address] 10/20/2020 2:45 PM   HISTORY: CT BRAIN without contrast 10/20/2020 1:29 PM   HISTORY: Confusion   COMPARISON: October 17, 2020   DLP: 1472 mGy cm   TECHNIQUE: Serial axial tomographic images of the brain were obtained   without the use of intravenous contrast.   FINDINGS:   There is some limitation this exam due to patient motion. The midline structures are nondisplaced. There is mild cerebral and   cerebellar volume loss, with an associated increase in the prominence   of the ventricles and sulci. The prominent ventricles are unchanged. This likely this is deep atrophy. However normal pressure   hydrocephalus cannot be excluded. The basilar cisterns are normal in   size and configuration. There is no evidence of intracranial   hemorrhage or mass-effect. There is low attenuation in the   periventricular white matter, consistent with chronic ischemic change. There are no abnormal extra-axial fluid collections. There is no   evidence of tonsillar herniation. The included orbits and their contents are unremarkable. The   visualized paranasal sinuses, mastoid air cells and middle ear   cavities are clear. The visualized osseous structures and overlying   soft tissues of the skull and face are intact. Impression:      Prominent ventricular system is noted. This may be either deep atrophy   or normal pressure hydrocephalus. 2. No acute intracranial abnormalities identified.    3. Chronic microvascular ischemic changes present in the   paraventricular white matter   Signed by Dr Landy Franks on 10/20/2020 2:47 PM          RECORD REVIEW: Previous medical records, medications were reviewed at today's visit    IMPRESSION:   Altered mental status/encephalopathy/Hypertensive [id number]     Initially non focal exam-confused/disordiented and not able to follow commands  CT head no clear acute changes noted-chronic infarcts right frontal and left occipital region     Certainly acute ischemic stroke/ PRESS / hypertensive encephalopathy are in the differential      Unable to get MRI brain at this time- Ct of the head no acute changes noted   EEG diffusely slow post ativan for MRI  B12/TSH/T4/ammonia levels unremarkable  Abg with hypoxia-supplement oxygen     DC Neurontin/Requip in the event these are contributing to confusion  Banana bag x 1  On MVI/folic acid  Start Thiamine 500 mg IV daily     CAD-ASA/statin  Hypertensive urgency-improved  DM-monitor BS  BPH-on meds  Low back pain-Lidoderm patch/tylenol PRN     minimize sedation as able     Continue current care    Remove restraints as able      [address]  CELL  Dr Griffin People

## 2020-10-22 PROBLEM — E44.1 MILD MALNUTRITION (HCC): Status: ACTIVE | Noted: 2020-10-22

## 2020-10-22 LAB
GLUCOSE BLD-MCNC: 122 MG/DL (ref 70–99)
GLUCOSE BLD-MCNC: 87 MG/DL (ref 70–99)
MISCELLANEOUS LAB TEST RESULT: NORMAL
PERFORMED ON: ABNORMAL
PERFORMED ON: NORMAL

## 2020-10-22 PROCEDURE — 2500000003 HC RX 250 WO HCPCS: Performed by: INTERNAL MEDICINE

## 2020-10-22 PROCEDURE — 92610 EVALUATE SWALLOWING FUNCTION: CPT

## 2020-10-22 PROCEDURE — 99233 SBSQ HOSP IP/OBS HIGH 50: CPT | Performed by: PSYCHIATRY & NEUROLOGY

## 2020-10-22 PROCEDURE — 82947 ASSAY GLUCOSE BLOOD QUANT: CPT

## 2020-10-22 PROCEDURE — 6360000002 HC RX W HCPCS: Performed by: INTERNAL MEDICINE

## 2020-10-22 PROCEDURE — 1210000000 HC MED SURG R&B

## 2020-10-22 PROCEDURE — 6370000000 HC RX 637 (ALT 250 FOR IP): Performed by: INTERNAL MEDICINE

## 2020-10-22 PROCEDURE — 6370000000 HC RX 637 (ALT 250 FOR IP): Performed by: HOSPITALIST

## 2020-10-22 PROCEDURE — 6360000002 HC RX W HCPCS: Performed by: HOSPITALIST

## 2020-10-22 PROCEDURE — 92522 EVALUATE SPEECH PRODUCTION: CPT

## 2020-10-22 PROCEDURE — 2580000003 HC RX 258: Performed by: INTERNAL MEDICINE

## 2020-10-22 RX ORDER — CLONIDINE 0.1 MG/24H
1 PATCH, EXTENDED RELEASE TRANSDERMAL WEEKLY
Status: DISCONTINUED | OUTPATIENT
Start: 2020-10-22 | End: 2020-10-25

## 2020-10-22 RX ADMIN — ZIPRASIDONE MESYLATE 10 MG: 20 INJECTION, POWDER, LYOPHILIZED, FOR SOLUTION INTRAMUSCULAR at 13:29

## 2020-10-22 RX ADMIN — PREDNISOLONE ACETATE 1 DROP: 10 SUSPENSION/ DROPS OPHTHALMIC at 10:03

## 2020-10-22 RX ADMIN — METOPROLOL TARTRATE 5 MG: 5 INJECTION, SOLUTION INTRAVENOUS at 18:24

## 2020-10-22 RX ADMIN — METOPROLOL TARTRATE 5 MG: 5 INJECTION, SOLUTION INTRAVENOUS at 06:28

## 2020-10-22 RX ADMIN — ENOXAPARIN SODIUM 40 MG: 40 INJECTION SUBCUTANEOUS at 08:07

## 2020-10-22 RX ADMIN — HYDRALAZINE HYDROCHLORIDE 20 MG: 20 INJECTION INTRAMUSCULAR; INTRAVENOUS at 19:24

## 2020-10-22 RX ADMIN — MUPIROCIN: 20 OINTMENT TOPICAL at 10:03

## 2020-10-22 RX ADMIN — SODIUM BICARBONATE: 84 INJECTION, SOLUTION INTRAVENOUS at 22:40

## 2020-10-22 RX ADMIN — METOPROLOL TARTRATE 5 MG: 5 INJECTION, SOLUTION INTRAVENOUS at 21:10

## 2020-10-22 RX ADMIN — METOPROLOL TARTRATE 5 MG: 5 INJECTION, SOLUTION INTRAVENOUS at 13:50

## 2020-10-22 RX ADMIN — METOPROLOL TARTRATE 5 MG: 5 INJECTION, SOLUTION INTRAVENOUS at 00:10

## 2020-10-22 NOTE — PLAN OF CARE
Problem: Nutrition  Goal: Optimal nutrition therapy  Outcome: Ongoing   Nutrition Problem #1: Inadequate oral intake  Intervention: Food and/or Nutrient Delivery: Continue NPO  Nutritional Goals: Meet nutrient needs through oral intake/AMONS if consistent with goals of care

## 2020-10-22 NOTE — PROGRESS NOTES
Comprehensive Nutrition Assessment    Type and Reason for Visit:  Initial, NPO/Clear Liquid    Nutrition Recommendations/Plan: Dx Mild Malnutrition, follow for goals of care. Nutrition Assessment:  Pt is declining from a nutritional standpoint AEB NPO status x5 days. Aware SLP to eval for ability to swallow and family is considering pursuing hospice care. Will follow for goals of care and need for AMONS. Malnutrition Assessment:  Malnutrition Status:  Mild malnutrition    Context:  Acute Illness     Findings of the 6 clinical characteristics of malnutrition:  Energy Intake:  7 - 50% or less of estimated energy requirements for 5 or more days  Weight Loss:  No significant weight loss     Body Fat Loss:  Unable to assess     Muscle Mass Loss:  Unable to assess    Fluid Accumulation:  1 - Mild Extremities, Generalized   Strength:  Not Performed    Estimated Daily Nutrient Needs:  Energy (kcal):  9570-7932; Weight Used for Energy Requirements:  Current     Protein (g):  ; Weight Used for Protein Requirements:  Ideal(1.3-2.0)        Fluid (ml/day):  0104-9331; Weight Used for Fluid Requirements:  Current      Wounds:  None       Current Nutrition Therapies:    Diet NPO Effective Now    Anthropometric Measures:  · Height: 5' 10\" (177.8 cm)  · Current Body Weight: 208 lb 12.8 oz (94.7 kg)   · Usual Body Weight: 197 lb (89.4 kg)(12/2019)     · Ideal Body Weight: 166 lbs; % Ideal Body Weight 125.8 %   · BMI: 30   · BMI Categories: Overweight (BMI 25.0-29. 9)       Nutrition Diagnosis:   · Inadequate oral intake related to acute injury/trauma as evidenced by NPO or clear liquid status due to medical condition    Nutrition Interventions:   Food and/or Nutrient Delivery:  Continue NPO  Nutrition Education/Counseling:  No recommendation at this time   Coordination of Nutrition Care:  Continued Inpatient Monitoring    Goals:  Meet nutrient needs through oral intake/AMONS if consistent with goals of care Nutrition Monitoring and Evaluation:   Food/Nutrient Intake Outcomes:  Diet Advancement/Tolerance  Physical Signs/Symptoms Outcomes:  Biochemical Data, Weight     Discharge Planning:     Too soon to determine     Electronically signed by Kay Hale RD, LD on 10/22/20 at 8:10 AM CDT    Contact: [phone]

## 2020-10-22 NOTE — PROGRESS NOTES
[facility]        Hospitalist Progress Note  10/22/2020 11:10 AM  Subjective:   Admit Date: 10/17/2020  PCP: Elizabeth Avila MD    Chief Complaint: AMS    Subjective: Patient seen and examined at bedside with brother present. Patient is AAO x1 (person) during my evaluation but otherwise states he feels well. Currently in soft limb restraints in bilateral upper extremities. Failed swallow evaluation this morning. Cumulative Hospital History: 80-year-old male with a past medical history of diabetes and hypertension presenting to the hospital after being brought in by family for altered mental status with questionable cause-possibly secondary to CVA versus hypertensive encephalopathy. Patient was originally requiring MICU level care for IV antihypertensive and Precedex drip for agitation. Neurology on board. Patient failed swallow evaluation 10/22/2020. Palliative care on board and patient is DNR/DNI with possible transition to hospice care if continued decline. ROS: 14 point review of systems is negative except as specifically addressed above.     Diet NPO Effective Now    Intake/Output Summary (Last 24 hours) at 10/22/2020 1110  Last data filed at 10/22/2020 0253  Gross per 24 hour   Intake 1200 ml   Output 290 ml   Net 910 ml     Medications:   IV infusion builder 125 mL/hr at 10/21/20 2055    dextrose       Current Facility-Administered Medications   Medication Dose Route Frequency Provider Last Rate Last Dose    cloNIDine (CATAPRES) 0.1 MG/24HR 1 patch  1 patch Transdermal Weekly Derinda Leyden, MD        sodium bicarbonate 75 mEq in sodium chloride 0.45 % 1,000 mL infusion   Intravenous Continuous Jim Vann  mL/hr at 10/21/20 2055      metoprolol (LOPRESSOR) injection 5 mg  5 mg Intravenous Q6H Jim Vann DO   5 mg at 10/22/20 9693    ziprasidone (GEODON) injection 10 mg  10 mg Intramuscular Q4H PRN Feng Pan MD   10 mg at 10/21/20 1411    OLANZapine zydis (ZYPREXA) disintegrating tablet 5 mg  5 mg Oral Q6H PRN Waldemar Perdomo MD   5 mg at 10/20/20 1039    hydrALAZINE (APRESOLINE) injection 20 mg  20 mg Intravenous Q4H PRN Waldemar Perdomo MD   20 mg at 10/21/20 1813    sodium chloride flush 0.9 % injection 10 mL  10 mL Intravenous 2 times per day Rebecca Wilkerson MD   10 mL at 10/21/20 2026    sodium chloride flush 0.9 % injection 10 mL  10 mL Intravenous PRN Rebecca Wilkerson MD        acetaminophen (TYLENOL) tablet 650 mg  650 mg Oral Q6H PRN Rebecca Wilkerson MD        Or    acetaminophen (TYLENOL) suppository 650 mg  650 mg Rectal Q6H PRN Rebecca Wilkerson MD        enoxaparin (LOVENOX) injection 40 mg  40 mg Subcutaneous Daily Rebecca Wilkerson MD   40 mg at 10/22/20 0807    ondansetron (ZOFRAN-ODT) disintegrating tablet 4 mg  4 mg Oral Q8H PRN Rebecca Wilkerson MD        Or    on[facility]ansetron [facility] injection 4 mg  4 mg Intravenous Q6H PRN Rebecca Wilkerson MD        insulin lispro (HUMALOG) injection vial 0-6 Units  0-6 Units Subcutaneous TID WC Waldemar Perdomo MD        insulin lispro (HUMALOG) injection vial 0-3 Units  0-3 Units Subcutaneous Nightly Waldemar Perdomo MD        glucose (GLUTOSE) 40 % oral gel 15 g  15 g Oral PRN Waldemar Perdomo MD        dextrose 50 % IV solution  12.5 g Intravenous PRN Waldemar Perdomo MD        glucagon (rDNA) injection 1 mg  1 mg Intramuscular PRN Waldemar Perdomo MD        dextrose 5 % solution  100 mL/hr Intravenous PRN Waldemar Perdomo MD        hydrALAZINE (APRESOLINE) tablet 25 mg  25 mg Oral Q4H PRN Waldemar Perdomo MD        diphenoxylate-atropine (LOMOTIL) 2.5-0.025 MG per tablet 1 tablet  1 tablet Oral 4x Daily PRN Rebecca Wilkerson MD        colchicine (COLCRYS) tablet 0.6 mg  0.6 mg Oral PRN Rebecca Wilkerson MD        meclizine (ANTIVERT) tablet 25 mg  25 mg Oral 4x Daily PRN Rebecca Wilkerson MD        prednisoLONE acetate (PRED FORTE) 1 % ophthalmic suspension 1 drop  1 drop Left Eye BID Rebecca Form, MD   1 drop at 10/22/20 1000  nitroGLYCERIN (NITROSTAT) SL tablet 0.4 mg  0.4 mg Sublingual Q5 Min PRN MD Honey Rosspirocin [facility]) 2 % ointment   Topical BID Manny Diaz MD        lidocaine 4 % external patch 1 patch  1 patch Topical Daily Manny Diaz MD   1 patch at 10/22/20 2021        Labs:     Recent Labs     10/20/20  0254 10/21/20  0117   WBC 9.8 9.7   RBC 3.24* 3.37*   HGB 10.8* 11.1*   HCT 34.3* 34.3*   .9* 101.8*   MCH 33.3* 32.9*   MCHC 31.5* 32.4*    223     Recent Labs     10/20/20  0254 10/21/20  0117    144   K 4.2 4.1   ANIONGAP 12 13    112*   CO2 18* 19*   BUN 24* 27*   CREATININE 1.2 1.2   GLUCOSE 131* 118*   CALCIUM 9.8 10.0     Recent Labs     10/20/20  0254 10/21/20  0117   MG 2.4 2.4     No results for input(s): AST, ALT, ALB, BILITOT, ALKPHOS, ALB in the last 72 hours. ABGs:No results for input(s): PH, PO2, PCO2, HCO3, BE, O2SAT in the last 72 hours. Troponin T: No results for input(s): TROPONINI in the last 72 hours. INR: No results for input(s): INR in the last 72 hours. Lactic Acid: No results for input(s): LACTA in the last 72 hours.     Objective:   Vitals: BP (!) 180/82   Pulse 70   Temp 97.8 °F (36.6 °C) (Temporal)   Resp 20   Ht 5' 10\" (1.778 m)   Wt 208 lb 12.8 oz (94.7 kg)   SpO2 94%   BMI 29.96 kg/m²   24HR INTAKE/OUTPUT:      Intake/Output Summary (Last 24 hours) at 10/22/2020 1110  Last data filed at 10/22/2020 0253  Gross per 24 hour   Intake 1200 ml   Output 290 ml   Net 910 ml     General appearance: Alert, restrained  HEENT: AT/NC  Lungs: no increased work of breathing, BLAE, no wheezing appreciated  Heart: RRR  Abdomen: BS+, soft, NT, ND  Extremities: no edema  Neurologic: AAOx1 (person), b/l UEs in soft restraints, gross motor function intact  Skin: warm    Assessment and Plan:   Principal Problem:    Hypertensive urgency  Active Problems:    Type II diabetes mellitus with peripheral circulatory disorder (HCC)    Diabetic ulcer of left foot associated with type 2 diabetes mellitus (HCC)    S/P laminectomy    Obstructive sleep apnea syndrome    Hypertensive encephalopathy    Encephalopathy    History of stroke    Mild malnutrition ([facility] [address]  Resolved Problems:    * No resolved hospital problems. *    AMS: ?CVA vs Hypertensive encephalopathy. Clonidine patch. Failed swallow screen. Neurology on board. DNR/DNI. Consideration for transition to hospice - palliative care following. HTN: Clonidine patch. Monitor BP and adjust medications PRN. DM: monitor BG and adjust medications PRN. Supportive management.     Advance Directive: DNR-CC    DVT prophylaxis: Lovenox    Discharge planning: TBD      Signed:  Jairo Spann MD 10/22/2020 11:10 AM  R[facility] Hospitalist

## 2020-10-22 NOTE — PLAN OF CARE
Problem: Falls - Risk of:  Goal: Will remain free from falls  Description: Will remain free from falls  Outcome: Ongoing  Goal: Absence of physical injury  Description: Absence of physical injury  Outcome: Ongoing     Problem: Urinary Elimination:  Goal: Signs and symptoms of infection will decrease  Description: Signs and symptoms of infection will decrease  Outcome: Ongoing  Goal: Complications related to the disease process, condition or treatment will be avoided or minimized  Description: Complications related to the disease process, condition or treatment will be avoided or minimized  Outcome: Ongoing     Problem: Restraint Use - Nonviolent/Non-Self-Destructive Behavior:  Goal: Absence of restraint indications  Description: Absence of restraint indications  Outcome: Ongoing  Goal: Absence of restraint-related injury  Description: Absence of restraint-related injury  Outcome: Ongoing     Problem: Skin Integrity:  Goal: Will show no infection signs and symptoms  Description: Will show no infection signs and symptoms  Outcome: Ongoing  Goal: Absence of new skin breakdown  Description: Absence of new skin breakdown  Outcome: Ongoing     Problem: Pain:  Goal: Pain level will decrease  Description: Pain level will decrease  Outcome: Ongoing  Goal: Control of acute pain  Description: Control of acute pain  Outcome: Ongoing  Goal: Control of chronic pain  Description: Control of chronic pain  Outcome: Ongoing

## 2020-10-22 NOTE — PROGRESS NOTES
Patient:   Ishan Kaba  MR#:    [id number]   Room:    [address] [place] [address]   YOB: 1934  Date of Progress Note: 10/22/2020  Time of Note                           7:56 AM  Consulting Physician:   Gaby Tucker M.D. Attending Physician:  Gladis Everett MD     Chief complaint AMS    S:This 80 y.o. male  is seen for altered mental status. The history is obtained from the chart as the patient is unable to provide any history. As per the admission/ER noted the patient was brought in to the ER with increased confusion with speech issues and difficulty with ambulation over 3 days. Apparently at baseline his is cognitively intact and has no significant issues with ambulation. There have been no medication changes or recent illnesses as per the ER note. His blood pressure has been recorded up to 223/80. He has been moved to the ICU and placed in restraints. Agitated and confused. Still agitated and tried on Geodon. Doing much better this am. Still in restrains as impulsive and tried to pull out devlin.       REVIEW OF SYSTEMS:  limited due to mental status    Past Medical History:      Diagnosis Date    Blood circulation, collateral     CAD (coronary artery disease)     STENTS X 3    CAD (coronary artery disease)     CABG    Cerebral artery occlusion with cerebral infarction (HCC)     CHF (congestive heart failure) (HCC)     Chronic kidney disease     DDD (degenerative disc disease), lumbar 7/11/2017    Diabetes mellitus ([address]     Disease of blood and blood forming organ     GERD (gastroesophageal reflux disease)     History of blood transfusion     Hx of blood clots     Right leg    Hyperlipidemia     Hypertension     Lumbar stenosis with neurogenic claudication 7/11/2017    MI, 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 2    Other disorders of kidney and ureter in diseases classified elsewhere     Palliative care patient 03/06/2018    Right heart failure ([address] 6/29/2018    Sleep apnea     DOESN'T USE MACHINE    TIA (transient ischemic attack)        Past Surgical History:      Procedure Laterality Date    APPENDECTOMY      BACK SURGERY  1980    x 3    CARDIAC SURGERY  1990    Bypass x 3    CARPAL TUNNEL RELEASE Bilateral 1980    wrist and elbows    CHOLECYSTECTOMY  2000    COLONOSCOPY      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 2    DILATATION, ESOPHAGUS      ENDOSCOPY, COLON, DIAGNOSTIC      EYE SURGERY      JOINT REPLACEMENT Left 1990    JOINT REPLACEMENT Right 1995    LAMINECTOMY N/A 7/11/2017    LUMBAR LAMINECTOMY POSTERIOR  L23 L34 performed by Nomi Pascual MD at [address] Left 1990    ROTATOR CUFF REPAIR Right 2010    TUMOR REMOVAL Left 1960    foot    TURP  2000    VASCULAR SURGERY Left 7/15/2015 [facility] [address]    Aortoiliofemoral a'gram with bilateral lower extremity runoff; select views 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; a'plasty of left anterior tibial artery and dorsalis pedis artery with 2.5x300 vascutrak balloon and then with 3x220 nati balloon; completion a'gram       Medications in Hospital:      Current Facility-Administered Medications:     sodium bicarbonate 75 mEq in sodium chloride 0.45 % 1,000 mL infusion, , Intravenous, Continuous, Jenifer Burgos DO, Last Rate: 125 mL/hr at 10/21/20 2055    metoprolol (LOPRESSOR) injection 5 mg, 5 mg, Intravenous, Q6H, Jenifer Burgos DO, 5 mg at 10/22/20 8562    ziprasidone (GEODON) injection 10 mg, 10 mg, Intramuscular, Q4H PRN, Rahel Bonilla MD, 10 mg at 10/21/20 1411    OLANZapine zydis (ZYPREXA) disintegrating tablet 5 mg, 5 mg, Oral, Q6H PRN, Rahel Bonilla MD, 5 mg at 10/20/20 1039    hydrALAZINE (APRESOLINE) injection 20 mg, 20 mg, Intravenous, Q4H PRN, Rahel Bonilla MD, 20 mg at 10/21/20 1813    sodium chloride flush 0.9 % injection 10 mL, 10 mL, Intravenous, 2 times per day, Joey Avila MD, 10 mL at 10/21/20 2026   sodium chloride flush 0.9 % injection 10 mL, 10 mL, Intravenous, PRN, Mariano Dye MD    acetaminophen (TYLENOL) tablet 650 mg, 650 mg, Oral, Q6H PRN **OR** acetaminophen (TYLENOL) suppository 650 mg, 650 mg, Rectal, Q6H PRN, Mariano Dye MD    enoxaparin (LOVENOX) injection 40 mg, 40 mg, Subcutaneous, Daily, Mariano Dye MD, 40 mg at 10/21/20 0857    ondansetron (ZOFRAN-ODT) disintegrating tablet 4 mg, 4 mg, Oral, Q8H PRN **OR** ondansetron (ZOFRAN) injection 4 mg, 4 mg, Intravenous, Q6H PRN, Mariano Dye MD    insulin lispro (HUMALOG) injection vial 0-6 Units, 0-6 Units, Subcutaneous, TID WC, Humera Álvarez MD    insulin lispro (HUMALOG) injection vial 0-3 Units, 0-3 Units, Subcutaneous, Nightly, Humera Álvarez MD    glucose (GLUTOSE) 40 % oral gel 15 g, 15 g, Oral, PRN, Humera Álvarez MD    dextrose 50 % IV solution, 12.5 g, Intravenous, PRN, Humera Álvarez MD    glucagon (rDNA) injection 1 mg, 1 mg, Intramuscular, PRN, Humera Álvarez MD    dextrose 5 % solution, 100 mL/hr, Intravenous, PRN, Humera Álvarez MD    hydrALAZINE (APRESOLINE) tablet 25 mg, 25 mg, Oral, Q4H PRN, Humera Álvarez MD    diphenoxylate-atropine (LOMOTIL) 2.5-0.025 MG per tablet 1 tablet, 1 tablet, Oral, 4x Daily PRN, Mariano Dye MD    colchicine (COLCRYS) tablet 0.6 mg, 0.6 mg, Oral, PRN, Mariano Dye MD    meclizine (ANTIVERT) tablet 25 mg, 25 mg, Oral, 4x Daily PRN, Mariano Dye MD    prednisoLONE acetate (PRED FORTE) 1 % ophthalmic suspension 1 drop, 1 drop, Left Eye, BID, Mariano Dye MD, 1 drop at 10/21/20 2026    nitroGLYCERIN (NITROSTAT) SL tablet 0.4 mg, 0.4 mg, Sublingual, Q5 Min PRN, Mariano Dye MD    mupirocin (BACTROBAN) 2 % ointment, , Topical, BID, Mariano Dye MD    lidocaine 4 % external patch 1 patch, 1 patch, Topical, Daily, Mariano Dye MD, 1 patch at 10/21/20 8719    Allergies:  Ativan [lorazepam]; Pcn [penicillins]; and Adhesive tape    Social History:   TOBACCO:   reports that he has never smoked. He has never used smokeless tobacco.  ETOH:   reports no history of alcohol use. Family History:       Problem Relation Age of Onset    Cancer Mother     Heart Disease Father     Heart Disease Brother     Heart Disease Brother     Heart Disease Brother     Heart Disease Brother     Heart Attack Brother     Heart Disease Brother     Heart Disease Brother          PHYSICAL EXAM:  BP (!) 180/82   Pulse 70   Temp 97.8 °F (36.6 °C) (Temporal)   Resp 20   Ht 5' 10\" (1.778 m)   Wt 208 lb 12.8 oz (94.7 kg)   SpO2 94%   BMI 29.96 kg/m²       Constitutional - well developed, well nourished. Eyes - conjunctiva normal.   Ear, nose, throat - No scars, masses, or lesions over external nose or ears, no atrophy of tongue  Neck-symmetric, no masses noted, no jugular vein distension  Respiration- chest wall appears symmetric, good expansion,   normal effort without use of accessory muscles  Musculoskeletal - no significant wasting of muscles noted, no bony deformities  Extremities-no clubbing, cyanosis or edema  Skin - warm, dry, and intact. No rash, erythema, or pallor. Psychiatric - mood, affect, and behavior appear slow     Neurological exam  Awake, slightly confused fluent oriented x 2 follows simple commands  Attention and concentration appear impaired  Recent and remote memory appears impaired  Speech with dysarthria  No clear issues with language of fund of knowledge     Cranial Nerve Exam     CN III, IV,VI-EOMI, No nystagmus, conjugate eye movements, no ptosis    CN VII-no facial assymetry       Motor Exam  antigravity throughout upper and lower extremities bilaterally      Tremors- no tremors in hands or head noted     Gait  Not tested      Nursing/pcp notes, imaging,labs and vitals reviewed.      PT,OT and/or speech notes reviewed    Lab Results   Component Value Date    WBC 9.7 10/21/2020    HGB 11.1 (L) 10/21/2020    HCT 34.3 (L) 10/21/2020    .8 (H) 10/21/2020     10/21/2020     Lab Results   Component Value Date     10/21/2020    K 4.1 10/21/2020     (H) 10/21/2020    CO2 19 (L) 10/21/2020    BUN 27 (H) 10/21/2020    CREATININE 1.2 10/21/2020    GLUCOSE 118 (H) 10/21/2020    CALCIUM 10.0 10/21/2020    PROT 6.0 (L) 10/17/2020    LABALBU 3.5 10/17/2020    BILITOT 0.3 10/17/2020    ALKPHOS 91 10/17/2020    AST 25 10/17/2020    ALT 27 10/17/2020    LABGLOM 57 (A) 10/21/2020    GFRAA >59 10/21/2020     Lab Results   Component Value Date    INR 1.10 12/08/2019    INR 1.02 11/28/2019    INR 1.10 12/23/2018    PROTIME 13.6 12/08/2019    PROTIME 12.8 11/28/2019    PROTIME 13.6 12/23/2018       EEG [[id number]]      Resulted: 10/19/20 0719     Updated: 10/19/20 0721     Narrative:      Agustín Edward MD     10/19/2020  7:21 AM     Patient: Jo Garrett   MR#:    [id number]   Room:    7891/917-10   Date of Birth:   1934   Date of Progress Note: 10/19/2020   Time of Note                           7:19 AM   Consulting Physician:   Agustín Edward M.D. Attending Physician: Mikey Hernandez MD         This is a multichannel digital EEG recording using the   international 10-20 placement system. Technical Summary:     Background EEG activity: There is no well defined occipital   dominant rhythm. There is much low voltage 5-7 Hz activity seen in   a generalized distribution intermixed with low voltage 18-22 Hz   activity. There is low to moderate voltage 1.5-3 Hz activity seen   intermittently. Photic Stimulation: No abnormal waveforms are noted with various   frequencies of flickering light. IMPRESSION:   This is an abnormal EEG due to the diffuse slowing of the   background. This finding is consistent with a diffuse disturbance   in cerebral function. No clear epileptiform activity was noted   during the recording period.        Dr Connor Mendoza Certified in Neurology   Board Certified in Clinical Neurophysiology   Fellowship trained in [place] [address] Neurophysiology        CT HEAD WO CONTRAST [[id number]]      Resulted: 10/20/20 1447     Updated: 10/20/20 1449     Narrative:      EXAMINATION: CT HEAD WO CONTRAST 10/20/2020 2:45 PM   HISTORY: CT BRAIN without contrast 10/20/2020 1:29 PM   HISTORY: Confusion   COMPARISON: October 17, 2020   DLP: 1472 mGy cm   TECHNIQUE: Serial axial tomographic images of the brain were obtained   without the use of intravenous contrast.   FINDINGS:   There is some limitation this exam due to patient motion. The midline structures are nondisplaced. There is mild cerebral and   cerebellar volume loss, with an associated increase in the prominence   of the ventricles and sulci. The prominent ventricles are unchanged. This likely this is deep atrophy. However normal pressure   hydrocephalus cannot be excluded. The basilar cisterns are normal in   size and configuration. There is no evidence of intracranial   hemorrhage or mass-effect. There is low attenuation in the   periventricular white matter, consistent with chronic ischemic change. There are no abnormal extra-axial fluid collections. There is no   evidence of tonsillar herniation. The included orbits and their contents are unremarkable. The   visualized paranasal sinuses, mastoid air cells and middle ear   cavities are clear. The visualized osseous structures and overlying   soft tissues of the skull and face are intact. Impression:      Prominent ventricular system is noted. This may be either deep atrophy   or normal pressure hydrocephalus. 2. No acute intracranial abnormalities identified.    3. Chronic microvascular ischemic changes present in the   paraventricular white matter   Signed by Dr Kallie Gilmore on 10/20/2020 2:47 PM          RECORD REVIEW: Previous medical records, medications were reviewed at today's visit    IMPRESSION:   Altered mental status/encephalopathy/Hypertensive urgency-much improved     Initially non focal exam-confused/disordiented and

## 2020-10-22 NOTE — PROGRESS NOTES
Pt is undressed, in restraints, restless, and mumbling. Oriented to self. Gown placed back on pt. Clean sheet and blanket placed on pt. Rubina Carrasco LPN assisted in repositioning pt. Pt remains in restraints. Still very restless at end of this visit. Will notify nurse and ask if pt can have meds. Remains NPO after S/L eval.  Spoke with pt nurse, will administer meds for pt restlessness/agitation. Also tells me pt not able to manage secretions. RN suctioned states \"thick\" secretions that \"plug up tube\".   Electronically signed by Suad Gupta RN on 10/22/2020 at 11:49 AM

## 2020-10-22 NOTE — PROGRESS NOTES
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 ([address] Lumbar stenosis with neurogenic claudication; DDD (degenerative disc disease), lumbar; S/P laminectomy; Obstructive sleep apnea syndrome; CHF (congestive heart failure), NYHA class I, acute on chronic, combined ([address] Coronary artery disease involving native coronary artery of native heart; Abnormal CT scan, bladder; Retention, urine; Bilateral renal cysts; Acute on chronic congestive heart failure ([address] Ischemic cardiomyopathy; Acute chest pain; Bradycardia; Right heart failure ([address] Essential hypertension; Angina of effort ([address] Hx of CABG; Chest pain; Palliative care patient; INOCENCIO (acute kidney injury) ([address] Ulcer of right midfoot with fat layer exposed ([address] Open wound of right great toe; Skin ulcer of great toe, right, with fat layer exposed ([address] Vertigo; Valvular heart disease; Syncope and collapse; NSTEMI (non-ST elevated myocardial infarction) ([address] CKD (chronic kidney disease), stage III; Hypertensive encephalopathy; Hypertensive urgency; Encephalopathy;  History of stroke; and Mild malnutrition ([address] on their problem list.    Date of Eval: 10/22/2020  Evaluating Therapist: Nargis Kwong    Current Diet level:  NPO     Reason for Referral  Renny Avila was referred for a bedside swallow evaluation to assess the efficiency of his swallow function, identify signs and symptoms of aspiration and make recommendations regarding safe dietary consistencies, effective compensatory strategies, and safe eating environment. Impression  Assessed patient's swallowing function. Patient exhibits slow oral transit and absent swallows with no laryngeal elevation noted and just laryngeal rocking observed. Patient exhibits decreased airway detection with no outward coughs/throat clears noted following probable penetration/aspiration of PO trials (small, controlled trials thin H2O squeezed in the cheek via toothette by SLP). At this time, recommend continuation NPO status. Recommend oral care every 3-4 hours as patient presented with copious amount of secretions prior to presentation of PO trials. If patient receives mouth care prior to intake, okay for swabs of thin H2O for comfort. Will continue to follow. Treatment Plan  Requires SLP Intervention: Yes     Recommended Diet and Intervention  Diet Solids Recommendation: NPO  Liquid Consistency Recommendation: NPO  Therapeutic Interventions: Patient/Family education;Oral Care; Therapeutic PO trials with SLP     Treatment/Goals  Timeframe for Short-term Goals: 1x/day for 3 days   Goal 1: Patient NPO. Goal 2: Patient staff will follow swallow safety recommendations. Goal 3: Patient will receive daily oral care, via staff, to decrease bacteria from the oral cavity. Goal 4: Re-assessment of swallow function for potential PO intake. Goal 4: Monitor speech production. General  Chart Reviewed: Yes  Behavior/Cognition: Alert  O2 Device: None (Room air)  Communication Observation: (Assessed patient's speech production. Patient exhibits decreased labial movements and very slow, very decreased lingual movements (primarily low resting tongue posture was noted). SLP ranked functional intelligibility of speech for unfamiliar listeners at 10-20% in utterances without background noise present.)  Follows Directions: Simple   Patient Positioning: Upright in bed  Consistencies Administered:  Thin - toothette     Oral Motor Examination   Labial ROM: (Decreased, bilaterally, during labial retraction trials and labial protrusion trials.)  Labial Strength: (Decreased during labial compression trials.)  Labial Coordination: (Slowed movements were noted.)  Lingual ROM: (Adequate during lingual extension trials with full point achieved; decreased during lingual elevation trials without use of accessory jaw movement; decreased movements noted bilaterally.)  Lingual Strength: (Decreased)  Lingual Coordination: (Slowed movements were noted.)     Assessed patient's swallowing function with the following observations noted:     Oral Phase  Suspected Premature Bolus Loss: Thin (Patient exhibited slow oral transit of small, controlled trials thin H2O squeezed in the cheek via toothette by SLP.)  Oral Phase - Comment: Observed oral cavity at start of assessment with copious amount of sticky mucus noted throughout the mouth. Completed oral care prior to presentation of PO trials. Pharyngeal Phase  Absent Swallows: Thin (Patient exhibited absent swallows with no laryngeal elevation noted and just laryngeal rocking observed.)  Pharyngeal Phase - Comment: As previously mentioned, patient exhibited absent swallows with no laryngeal elevation noted and just laryngeal rocking observed. Patient exhibited decreased airway detection with no outward coughs/throat clears noted following probable penetration/aspiration of PO trials (small, controlled trials thin H2O squeezed in the cheek via toothette by SLP). At this time, recommend continuation NPO status. Recommend oral care every 3-4 hours as patient presented with copious amount of secretions prior to presentation of PO trials. If patient receives mouth care prior to intake, okay for swabs of thin H2O for comfort. Will continue to follow.     Electronically signed by DENY Rollins on 10/22/2020 at 10:23 AM

## 2020-10-23 ENCOUNTER — APPOINTMENT (OUTPATIENT)
Dept: MRI IMAGING | Age: 85
DRG: 078 | End: 2020-10-23
Payer: COMMERCIAL

## 2020-10-23 ENCOUNTER — HOSPITAL ENCOUNTER (OUTPATIENT)
Dept: PULMONOLOGY | Age: 85
Discharge: HOME | End: 2020-10-23

## 2020-10-23 LAB
ALBUMIN SERPL-MCNC: 3.4 G/DL (ref 3.5–5.2)
ALP BLD-CCNC: 86 U/L (ref 40–130)
ALT SERPL-CCNC: 51 U/L (ref 5–41)
ANION GAP SERPL CALCULATED.3IONS-SCNC: 13 MMOL/L (ref 7–19)
AST SERPL-CCNC: 72 U/L (ref 5–40)
BASOPHILS ABSOLUTE: 0 K/UL (ref 0–0.2)
BASOPHILS RELATIVE PERCENT: 0.1 % (ref 0–1)
BILIRUB SERPL-MCNC: 0.4 MG/DL (ref 0.2–1.2)
BUN BLDV-MCNC: 38 MG/DL (ref 8–23)
CALCIUM SERPL-MCNC: 9.7 MG/DL (ref 8.8–10.2)
CHLORIDE BLD-SCNC: 111 MMOL/L (ref 98–111)
CO2: 22 MMOL/L (ref 22–29)
CREAT SERPL-MCNC: 1.3 MG/DL (ref 0.5–1.2)
EOSINOPHILS ABSOLUTE: 0 K/UL (ref 0–0.6)
EOSINOPHILS RELATIVE PERCENT: 0.3 % (ref 0–5)
GFR AFRICAN AMERICAN: >59
GFR NON-AFRICAN AMERICAN: 52
GLUCOSE BLD-MCNC: 102 MG/DL (ref 70–99)
GLUCOSE BLD-MCNC: 119 MG/DL (ref 70–99)
GLUCOSE BLD-MCNC: 137 MG/DL (ref 70–99)
GLUCOSE BLD-MCNC: 142 MG/DL (ref 70–99)
GLUCOSE BLD-MCNC: 96 MG/DL (ref 74–109)
HCT VFR BLD CALC: 33.1 % (ref 42–52)
HEMOGLOBIN: 10.3 G/DL (ref 14–18)
IMMATURE GRANULOCYTES #: 0.1 K/UL
LYMPHOCYTES ABSOLUTE: 0.9 K/UL (ref 1.1–4.5)
LYMPHOCYTES RELATIVE PERCENT: 9.9 % (ref 20–40)
MAGNESIUM: 2.7 MG/DL (ref 1.6–2.4)
MCH RBC QN AUTO: 32.4 PG (ref 27–31)
MCHC RBC AUTO-ENTMCNC: 31.1 G/DL (ref 33–37)
MCV RBC AUTO: 104.1 FL (ref 80–94)
MONOCYTES ABSOLUTE: 0.7 K/UL (ref 0–0.9)
MONOCYTES RELATIVE PERCENT: 8.4 % (ref 0–10)
NEUTROPHILS ABSOLUTE: 7.1 K/UL (ref 1.5–7.5)
NEUTROPHILS RELATIVE PERCENT: 80.4 % (ref 50–65)
PDW BLD-RTO: 15.5 % (ref 11.5–14.5)
PERFORMED ON: ABNORMAL
PLATELET # BLD: 232 K/UL (ref 130–400)
PMV BLD AUTO: 11.1 FL (ref 9.4–12.4)
POTASSIUM SERPL-SCNC: 3.9 MMOL/L (ref 3.5–5)
RBC # BLD: 3.18 M/UL (ref 4.7–6.1)
SODIUM BLD-SCNC: 146 MMOL/L (ref 136–145)
TOTAL PROTEIN: 5.4 G/DL (ref 6.6–8.7)
WBC # BLD: 8.8 K/UL (ref 4.8–10.8)

## 2020-10-23 PROCEDURE — 2500000003 HC RX 250 WO HCPCS: Performed by: INTERNAL MEDICINE

## 2020-10-23 PROCEDURE — 99233 SBSQ HOSP IP/OBS HIGH 50: CPT | Performed by: PSYCHIATRY & NEUROLOGY

## 2020-10-23 PROCEDURE — 6370000000 HC RX 637 (ALT 250 FOR IP): Performed by: INTERNAL MEDICINE

## 2020-10-23 PROCEDURE — 1210000000 HC MED SURG R&B

## 2020-10-23 PROCEDURE — 2580000003 HC RX 258: Performed by: INTERNAL MEDICINE

## 2020-10-23 PROCEDURE — 83735 ASSAY OF MAGNESIUM: CPT

## 2020-10-23 PROCEDURE — 2580000003 HC RX 258: Performed by: HOSPITALIST

## 2020-10-23 PROCEDURE — 85025 COMPLETE CBC W/AUTO DIFF WBC: CPT

## 2020-10-23 PROCEDURE — 80053 COMPREHEN METABOLIC PANEL: CPT

## 2020-10-23 PROCEDURE — 6360000002 HC RX W HCPCS: Performed by: INTERNAL MEDICINE

## 2020-10-23 PROCEDURE — 36415 COLL VENOUS BLD VENIPUNCTURE: CPT

## 2020-10-23 PROCEDURE — 70551 MRI BRAIN STEM W/O DYE: CPT

## 2020-10-23 PROCEDURE — 92526 ORAL FUNCTION THERAPY: CPT

## 2020-10-23 PROCEDURE — 82947 ASSAY GLUCOSE BLOOD QUANT: CPT

## 2020-10-23 RX ORDER — DEXTROSE MONOHYDRATE 50 MG/ML
INJECTION, SOLUTION INTRAVENOUS CONTINUOUS
Status: DISCONTINUED | OUTPATIENT
Start: 2020-10-23 | End: 2020-10-25

## 2020-10-23 RX ADMIN — SODIUM CHLORIDE, PRESERVATIVE FREE 10 ML: 5 INJECTION INTRAVENOUS at 04:33

## 2020-10-23 RX ADMIN — PREDNISOLONE ACETATE 1 DROP: 10 SUSPENSION/ DROPS OPHTHALMIC at 09:28

## 2020-10-23 RX ADMIN — ENOXAPARIN SODIUM 40 MG: 40 INJECTION SUBCUTANEOUS at 09:30

## 2020-10-23 RX ADMIN — METOPROLOL TARTRATE 5 MG: 5 INJECTION, SOLUTION INTRAVENOUS at 11:43

## 2020-10-23 RX ADMIN — INSULIN LISPRO 1 UNITS: 100 INJECTION, SOLUTION INTRAVENOUS; SUBCUTANEOUS at 21:58

## 2020-10-23 RX ADMIN — METOPROLOL TARTRATE 5 MG: 5 INJECTION, SOLUTION INTRAVENOUS at 16:41

## 2020-10-23 RX ADMIN — METOPROLOL TARTRATE 5 MG: 5 INJECTION, SOLUTION INTRAVENOUS at 04:34

## 2020-10-23 RX ADMIN — MUPIROCIN: 20 OINTMENT TOPICAL at 21:58

## 2020-10-23 RX ADMIN — HYDRALAZINE HYDROCHLORIDE 20 MG: 20 INJECTION INTRAMUSCULAR; INTRAVENOUS at 07:55

## 2020-10-23 RX ADMIN — DEXTROSE MONOHYDRATE: 50 INJECTION, SOLUTION INTRAVENOUS at 10:29

## 2020-10-23 RX ADMIN — PREDNISOLONE ACETATE 1 DROP: 10 SUSPENSION/ DROPS OPHTHALMIC at 21:57

## 2020-10-23 ASSESSMENT — PAIN SCALES - PAIN ASSESSMENT IN ADVANCED DEMENTIA (PAINAD)
NEGVOCALIZATION: 0
BODYLANGUAGE: 1
FACIALEXPRESSION: 1
CONSOLABILITY: 0
BREATHING: 0
TOTALSCORE: 2

## 2020-10-23 ASSESSMENT — PAIN SCALES - GENERAL
PAINLEVEL_OUTOF10: 0
PAINLEVEL_OUTOF10: 2

## 2020-10-23 ASSESSMENT — PAIN SCALES - WONG BAKER: WONGBAKER_NUMERICALRESPONSE: 2

## 2020-10-23 NOTE — PROGRESS NOTES
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 ([address] Lumbar stenosis with neurogenic claudication; DDD (degenerative disc disease), lumbar; S/P laminectomy; Obstructive sleep apnea syndrome; CHF (congestive heart failure), NYHA class I, acute on chronic, combined ([address] Coronary artery disease involving native coronary artery of native heart; Abnormal CT scan, bladder; Retention, urine; Bilateral renal cysts; Acute on chronic congestive heart failure ([address] Ischemic cardiomyopathy; Acute chest pain; Bradycardia; Right heart failure ([address] Essential hypertension; Angina of effort ([address] Hx of CABG; Chest pain; Palliative care patient; INOCENCIO (acute kidney injury) ([address] Ulcer of right midfoot with fat layer exposed ([address] Open wound of right great toe; Skin ulcer of great toe, right, with fat layer exposed ([address] Vertigo; Valvular heart disease; Syncope and collapse; NSTEMI (non-ST elevated myocardial infarction) ([address] CKD (chronic kidney disease), stage III; Hypertensive encephalopathy; Hypertensive urgency; Encephalopathy; History of stroke; and Mild malnutrition ([address] on their problem list.    Date of Treat: 10/23/2020  Evaluating Therapist: Marry Kendrick    Current Diet level:  NPO    Reason for Referral  De Randhawa was referred for a bedside swallow evaluation to assess the efficiency of his swallow function, identify signs and symptoms of aspiration and make recommendations regarding safe dietary consistencies, effective compensatory strategies, and safe eating environment. Impression  Re-assessed patient's swallowing function. Patient continues to exhibit absent swallows with no laryngeal elevation noted and just laryngeal rocking observed. Patient exhibits degree of airway detection with frequent delayed coughs noted following probable penetration/aspiration of PO trials (single ice chip trials presented following oral care).    At this time, recommend continuation NPO status; consider short-term alternative means of nutrition. Recommend oral care every 3-4 hours as question management of secretions. If patient receives mouth care prior to intake, okay for swabs of thin H2O for comfort. Will continue to follow.     Treatment Plan  Requires SLP Intervention: Yes     Recommended Diet and Intervention  Diet Solids Recommendation: NPO  Liquid Consistency Recommendation: NPO  Therapeutic Interventions: Patient/Family education;Oral Care; Therapeutic PO trials with SLP     Treatment/Goals  Timeframe for Short-term Goals: 1x/day for 3 days   Goal 1: Patient NPO. Goal 2: Patient staff will follow swallow safety recommendations. Goal 3: Patient will receive daily oral care, via staff, to decrease bacteria from the oral cavity. Goal 4: Re-assessment of swallow function for potential PO intake.   Goal 4: Monitor speech production.     General  Chart Reviewed: Yes  Behavior/Cognition: Alert  O2 Device: None (Room air)  Communication Observation: (Patient exhibits decreased labial movements and very slow, very decreased lingual movements (primarily low resting tongue posture was noted.)  Follows Directions: Simple   Patient Positioning: Upright in bed  Consistencies Administered: Ice chips    Re-assessed patient's swallowing function with the following observations noted:     Oral Phase  Mastication: Ice chips (Patient exhibited very slow, very decreased oral prep of single ice chip trials presented by SLP.)  Suspected Premature Bolus Loss: Ice chips (Patient exhibited fast oral transit of ice chip trials.)  Oral Phase - Comment: Observed oral cavity with min sticky mucus noted on the tongue and at the back of the throat. Completed oral care prior to presentation of PO trials.     Pharyngeal Phase  Absent Swallows: Ice chips (Patient exhibited absent swallows with no laryngeal elevation noted and just laryngeal rocking observed.)  Delayed Coughs: Ice chips   Pharyngeal Phase - Comment: As previously mentioned, patient exhibited absent swallows with no laryngeal elevation noted and just laryngeal rocking observed. Patient exhibited degree of airway detection with frequent delayed coughs noted following probable penetration/aspiration of PO trials (single ice chip trials presented following oral care).    At this time, recommend continuation NPO status; consider short-term alternative means of nutrition. Recommend oral care every 3-4 hours as question management of secretions. If patient receives mouth care prior to intake, okay for swabs of thin H2O for comfort. Will continue to follow.     Electronically signed by DENY Rollins on 10/23/2020 at 2:42 PM

## 2020-10-23 NOTE — PROGRESS NOTES
[facility] Hospital[facility]        Hospitalist Progress Note  10/23/2020 10:23 AM  Subjective:   Admit Date: 10/17/2020  PCP: Alannah Murillo MD    Chief Complaint: AMS    Subjective: Patient seen and examined at bedside. Patient is AAO x2 (person, place) during my evaluation but otherwise states he feels well. Currently in soft limb restraints in bilateral upper extremities. Cumulative Hospital History: 72-year-old male with a past medical history of diabetes and hypertension presenting to the hospital after being brought in by family for altered mental status with questionable cause-possibly secondary to CVA versus hypertensive encephalopathy. Patient was originally requiring MICU level care for IV antihypertensive and Precedex drip for agitation. Neurology on board. Patient failed swallow evaluation 10/22/2020. Palliative care on board and patient is DNR/DNI with possible transition to hospice care if continued decline. ROS: 14 point review of systems is negative except as specifically addressed above.     Diet NPO Effective Now    Intake/Output Summary (Last 24 hours) at 10/23/2020 1023  Last data filed at 10/23/2020 0840  Gross per 24 hour   Intake 0 ml   Output 600 ml   Net -600 ml     Medications:   dextrose      dextrose       Current Facility-Administered Medications   Medication Dose Route Frequency Provider Last Rate Last Dose    dextrose 5 % solution   Intravenous Continuous Moustapha Blanchard MD        cloNIDine (CATAPRES) 0.1 MG/24HR 1 patch  1 patch Transdermal Weekly Moustapha Blanchard MD   1 patch at 10/22/20 1350    metoprolol (LOPRESSOR) injection 5 mg  5 mg Intravenous Q6H Elly Doles, DO   5 mg at 10/23/20 0434    ziprasidone (GEODON) injection 10 mg  10 mg Intramuscular Q4H PRN Elyl Doles, DO   10 mg at 10/22/20 1329    OLANZapine zydis (ZYPREXA) disintegrating tablet 5 mg  5 mg Oral Q6H PRN Elly Doles, DO   5 mg at 10/20/20 1039    hydrALAZINE (APRESOLINE) injection 20 mg  20 mg Intravenous Q4H PRN [facility], DO   20 mg at 10/23/20 0315    sodium chloride flush 0.9 % injection 10 mL  10 mL Intravenous 2 times per day [facility], DO   10 mL at 10/21/20 2026    sodium chloride flush 0.9 % injection 10 mL  10 mL Intravenous PRN [facility], DO   10 mL at 10/23/20 9951    acetaminophen (TYLENOL) tablet 650 mg  650 mg Oral Q6H PRN [facility], DO        Or    acetaminophen (TYLENOL) suppository 650 mg  650 mg Rectal Q6H PRN [facility], DO        enoxaparin (LOVENOX) injection 40 mg  40 mg Subcutaneous Daily [facility], [facility]   40 mg at 10/23/20 0930    ondansetron (ZOFRAN-ODT) disintegrating tablet 4 mg  4 mg Oral Q8H PRN [facility], DO        Or    ondansetron [facility]) injection 4 mg  4 mg Intravenous Q6H PRN [facility], DO        insulin lispro (HUMALOG) injection vial 0-6 Units  0-6 Units Subcutaneous TID WC [facility], DO        insulin lispro (HUMALOG) injection vial 0-3 Units  0-3 Units Subcutaneous Nightly [facility], DO        glucose (GLUTOSE) 40 % oral gel 15 g  15 g Oral PRN [facility], DO        dextrose 50 % IV solution  12.5 g Intravenous PRN [facility], DO        glucagon (rDNA) injection 1 mg  1 mg Intramuscular PRN [facility], DO        dextrose 5 % solution  100 mL/hr Intravenous PRN [facility], DO        hydrALAZINE (APRESOLINE) tablet 25 mg  25 mg Oral Q4H PRN [facility], DO        diphenoxylate-atropine (LOMOTIL) 2.5-0.025 MG per tablet 1 tablet  1 tablet Oral 4x Daily PRN [facility], DO        colchicine (COLCRYS) tablet 0.6 mg  0.6 mg Oral PRN [facility], DO        meclizine (ANTIVERT) tablet 25 mg  25 mg Oral 4x Daily PRN [facility], DO        prednisoLONE acetate (PRED FORTE) 1 % ophthalmic suspension 1 drop  1 drop Left Eye BID [facility], DO   1 drop at 10/23/20 8074    nitroGLYCERIN (NITROSTAT) SL tablet 0.4 mg  0.4 mg Sublingual Q5 Min PRN Jocelyn Heman, DO        mupirocin (BACTROBAN) 2 % ointment   Topical BID Jocelyn Heman, DO        lidocaine 4 % external patch 1 patch  1 patch Topical Daily Jocelyn Heman, DO   1 patch at 10/23/20 9510        Labs:     Recent Labs     10/21/20  0117 10/23/20  0526   WBC 9.7 8.8   RBC 3.37* 3.18*   HGB 11.1* 10.3*   HCT 34.3* 33.1*   .8* 104.1*   MCH 32.9* 32.4*   MCHC 32.4* 31.1*    232     Recent Labs     10/21/20  0117 10/23/20  0526    146*   K 4.1 3.9   ANIONGAP 13 13   * 111   CO2 19* 22   BUN 27* 38*   CREATININE 1.2 1.3*   GLUCOSE 118* 96   CALCIUM 10.0 9.7     Recent Labs     10/21/20  0117 10/23/20  0526   MG 2.4 2.7*     Recent Labs     10/23/20  0526   AST 72*   ALT 51*   BILITOT 0.4   ALKPHOS 86     ABGs:No results for input(s): PH, PO2, PCO2, HCO3, BE, O2SAT in the last 72 hours. Troponin T: No results for input(s): TROPONINI in the last 72 hours. INR: No results for input(s): INR in the last 72 hours. Lactic Acid: No results for input(s): LACTA in the last 72 hours.     Objective:   Vitals: BP (!) 156/94 Comment: MANUAL   Pulse 63   Temp 98.8 °F (37.1 °C) (Temporal)   Resp 20   Ht 5' 10\" (1.778 m)   Wt 209 lb 4.8 oz (94.9 kg)   SpO2 92%   BMI 30.03 kg/m²   24HR INTAKE/OUTPUT:      Intake/Output Summary (Last 24 hours) at 10/23/2020 1023  Last data filed at 10/23/2020 0840  Gross per 24 hour   Intake 0 ml   Output 600 ml   Net -600 ml     General appearance: Alert, restrained  HEENT: AT/NC  Lungs: no increased work of breathing, BLAE, no wheezing appreciated  Heart: RRR  Abdomen: BS+, soft, NT, ND  Extremities: no edema  Neurologic: AAOx2 (person, place), b/l UEs in soft restraints, gross motor function intact  Skin: warm    Assessment and Plan:   Principal Problem:    Hypertensive urgency  Active Problems:    Type II diabetes mellitus with peripheral circulatory disorder ([facility]    Diabetic ulcer of left foot associated with type 2 diabetes mellitus ([facility]    S/P laminectomy    Obstructive sleep apnea syndrome    Hypertensive encephalopathy    Encephalopathy    History of stroke    Mild malnutrition ([facility]  Resolved Problems:    * No resolved hospital problems. *    AMS: ?CVA vs Hypertensive encephalopathy. Clonidine patch. Failed swallow screen, pending repeat SLP evaluation. Neurology on board. DNR/DNI. Consideration for transition to hospice - palliative care following. HTN: Clonidine patch. Monitor BP and adjust medications PRN. DM: monitor BG and adjust medications PRN. INOCENCIO: IVF. Monitor BMP. Supportive management.     Advance Directive: DNR-CC    DVT prophylaxis: Lovenox    Discharge planning: TBD      Signed:  Matthew Shaffer MD 10/23/2020 10:23 AM  Rounding Hospitalist

## 2020-10-23 NOTE — PROGRESS NOTES
Patient:   Bronwyn Yarbrough  MR#:    [id number]   Room:    [facility]431-55   YOB: 1934  Date of Progress Note: 10/23/2020  Time of Note                           8:05 AM  Consulting Physician:   Andria Baldwin M.D. Attending Physician:  Dipesh Ramirez MD     Chief complaint AMS    S:This 80 y.o. male  is seen for altered mental status. The history is obtained from the chart as the patient is unable to provide any history. As per the admission/ER noted the patient was brought in to the ER with increased confusion with speech issues and difficulty with ambulation over 3 days. Apparently at baseline his is cognitively intact and has no significant issues with ambulation. There have been no medication changes or recent illnesses as per the ER note. His blood pressure has been recorded up to 223/80. He has been moved to the ICU and placed in restraints. Agitated and confused. Still agitated and tried on Geodon. Improved but worse at night and still in restraints.      REVIEW OF SYSTEMS:  limited due to mental status    Past Medical History:      Diagnosis Date    Blood circulation, collateral     CAD (coronary artery disease)     STENTS X 3    CAD (coronary artery disease)     CABG    Cerebral artery occlusion with cerebral infarction (HCC)     CHF (congestive heart failure) (HCC)     Chronic kidney disease     DDD (degenerative disc disease), lumbar 7/11/2017    Diabetes mellitus ([address]     Disease of blood and blood forming organ     GERD (gastroesophageal reflux disease)     History of blood transfusion     Hx of blood clots     Right leg    Hyperlipidemia     Hypertension     Lumbar stenosis with neurogenic claudication 7/11/2017    MI, old     X 2    Other disorders of kidney and ureter in diseases classified elsewhere     Palliative care patient 03/06/2018    Right heart failure ([address] 6/29/2018    Sleep apnea     DOESN'T USE MACHINE    TIA (transient ischemic attack)        Past Surgical History:      Procedure Laterality Date    APPENDECTOMY      BACK SURGERY  1980    x 3    CARDIAC SURGERY  1990    Bypass x 3    CARPAL TUNNEL RELEASE Bilateral 1980    wrist and elbows    CHOLECYSTECTOMY  2000    COLONOSCOPY      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 2    DILATATION, ESOPHAGUS      ENDOSCOPY, COLON, DIAGNOSTIC      EYE SURGERY      JOINT REPLACEMENT Left 1990    JOINT REPLACEMENT Right 1995    LAMINECTOMY N/A 7/11/2017    LUMBAR LAMINECTOMY POSTERIOR  L23 L34 performed by Ambika Melendez MD at [facility] [id number] Left 1990   Moore Gammons Right 2010    TUMOR REMOVAL Left 1960    foot    TURP  2000    VASCULAR SURGERY Left 7/15/2015 [facility] [address]    Aortoiliofemoral a'gram with bilateral lower extremity runoff; select views 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; a'plasty of left anterior tibial artery and dorsalis pedis artery with 2.5x300 vascutrak balloon and then with 3x220 nati balloon; completion a'gram       Medications in Hospital:      Current Facility-Administered Medications:     cloNIDine (CATAPRES) 0.1 MG/24HR 1 patch, 1 patch, Transdermal, Weekly, Ebdebra Crowley MD, 1 patch at 10/22/20 1350    sodium bicarbonate 75 mEq in sodium chloride 0.45 % 1,000 mL infusion, , Intravenous, Continuous, Sin Duane, DO, Last Rate: 125 mL/hr at 10/22/20 2240    metoprolol (LOPRESSOR) injection 5 mg, 5 mg, Intravenous, Q6H, Sin Duane, DO, 5 mg at 10/23/20 0434    ziprasidone (GEODON) injection 10 mg, 10 mg, Intramuscular, Q4H PRN, Sin Duane, DO, 10 mg at 10/22/20 1329    OLANZapine zydis (ZYPREXA) disintegrating tablet 5 mg, 5 mg, Oral, Q6H PRN, Sin Duane, DO, 5 mg at 10/20/20 1039    hydrALAZINE (APRESOLINE) injection 20 mg, 20 mg, Intravenous, Q4H PRN, Sin Duane, DO, 20 mg at 10/23/20 0755    sodium chloride flush 0.9 % DO    lidocaine 4 % external patch 1 patch, 1 patch, Topical, Daily, Anna Oro, DO, 1 patch at 10/22/20 0807    Allergies:  Ativan [lorazepam]; Pcn [penicillins]; and Adhesive tape    Social History:   TOBACCO:   reports that he has never smoked. He has never used smokeless tobacco.  ETOH:   reports no history of alcohol use. Family History:       Problem Relation Age of Onset    Cancer Mother     Heart Disease Father     Heart Disease Brother     Heart Disease Brother     Heart Disease Brother     Heart Disease Brother     Heart Attack Brother     Heart Disease Brother     Heart Disease Brother          PHYSICAL EXAM:  BP (!) 220/108 Comment: reported to primary nurse  Pulse 64   Temp 98.8 °F (37.1 °C) (Temporal)   Resp 20   Ht 5' 10\" (1.778 m)   Wt 209 lb 4.8 oz (94.9 kg)   SpO2 93%   BMI 30.03 kg/m²       Constitutional - well developed, well nourished. Eyes - conjunctiva normal.   Ear, nose, throat - No scars, masses, or lesions over external nose or ears, no atrophy of tongue  Neck-symmetric, no masses noted, no jugular vein distension  Respiration- chest wall appears symmetric, good expansion,   normal effort without use of accessory muscles  Musculoskeletal - no significant wasting of muscles noted, no bony deformities  Extremities-no clubbing, cyanosis or edema  Skin - warm, dry, and intact. No rash, erythema, or pallor.   Psychiatric - mood, affect, and behavior appear slow     Neurological exam  Awake, slightly confused fluent oriented x 2 follows simple commands  Attention and concentration appear impaired  Recent and remote memory appears impaired  Speech with dysarthria  No clear issues with language of fund of knowledge     Cranial Nerve Exam     CN III, IV,VI-EOMI, No nystagmus, conjugate eye movements, no ptosis    CN VII-no facial assymetry       Motor Exam  antigravity throughout upper and lower extremities bilaterally      Tremors- no tremors in hands or head noted     Gait  Not tested      Nursing/pcp notes, imaging,labs and vitals reviewed. PT,OT and/or speech notes reviewed    Lab Results   Component Value Date    WBC 8.8 10/23/2020    HGB 10.3 (L) 10/23/2020    HCT 33.1 (L) 10/23/2020    .1 (H) 10/23/2020     10/23/2020     Lab Results   Component Value Date     (H) 10/23/2020    K 3.9 10/23/2020     10/23/2020    CO2 22 10/23/2020    BUN 38 (H) 10/23/2020    CREATININE 1.3 (H) 10/23/2020    GLUCOSE 96 10/23/2020    CALCIUM 9.7 10/23/2020    PROT 5.4 (L) 10/23/2020    LABALBU 3.4 (L) 10/23/2020    BILITOT 0.4 10/23/2020    ALKPHOS 86 10/23/2020    AST 72 (H) 10/23/2020    ALT 51 (H) 10/23/2020    LABGLOM 52 (A) 10/23/2020    GFRAA >59 10/23/2020     Lab Results   Component Value Date    INR 1.10 12/08/2019    INR 1.02 11/28/2019    INR 1.10 12/23/2018    PROTIME 13.6 12/08/2019    PROTIME 12.8 11/28/2019    PROTIME 13.6 12/23/2018       EEG [[id number]]      Resulted: 10/19/20 0719     Updated: 10/19/20 0721     Narrative:      Ashley Bishop MD     10/19/2020  7:21 AM     Patient: Daphnie Johnson   MR#:    [id number]   Room:    [address] [place] [address]   Date of Birth:   1934   Date of Progress Note: 10/19/2020   Time of Note                           7:19 AM   Consulting Physician:   Ashley Bishop M.D. Attending Physician: Clemencia Toussaint MD         This is a multichannel digital EEG recording using the   international 10-20 placement system. Technical Summary:     Background EEG activity: There is no well defined occipital   dominant rhythm. There is much low voltage 5-7 Hz activity seen in   a generalized distribution intermixed with low voltage 18-22 Hz   activity. There is low to moderate voltage 1.5-3 Hz activity seen   intermittently. Photic Stimulation: No abnormal waveforms are noted with various   frequencies of flickering light. IMPRESSION:   This is an abnormal EEG due to the diffuse slowing of the   background.  This finding is consistent with a diffuse disturbance   in cerebral function. No clear epileptiform activity was noted   during the recording period. Dr Edelmiro Lefort Certified in Neurology   Board Certified in [facility] Janeth Montano trained in [place] [id number] Neurophysiology        [facility] 10/20/20 1447     Updated: 10/20/20 1449     Narrative:      EXAMINATION: [address] [place] 10/20/2020 2:45 PM   HISTORY: CT BRAIN without contrast 10/20/2020 1:29 PM   HISTORY: Confusion   COMPARISON: October 17, 2020   DLP: 1472 mGy cm   TECHNIQUE: Serial axial tomographic images of the brain were obtained   without the use of intravenous contrast.   FINDINGS:   There is some limitation this exam due to patient motion. The midline structures are nondisplaced. There is mild cerebral and   cerebellar volume loss, with an associated increase in the prominence   of the ventricles and sulci. The prominent ventricles are unchanged. This likely this is deep atrophy. However normal pressure   hydrocephalus cannot be excluded. The basilar cisterns are normal in   size and configuration. There is no evidence of intracranial   hemorrhage or mass-effect. There is low attenuation in the   periventricular white matter, consistent with chronic ischemic change. There are no abnormal extra-axial fluid collections. There is no   evidence of tonsillar herniation. The included orbits and their contents are unremarkable. The   visualized paranasal sinuses, mastoid air cells and middle ear   cavities are clear. The visualized osseous structures and overlying   soft tissues of the skull and face are intact. Impression:      Prominent ventricular system is noted. This may be either deep atrophy   or normal pressure hydrocephalus. 2. No acute intracranial abnormalities identified.    3. Chronic microvascular ischemic changes present in the   paraventricular white matter   Signed by Dr Regis Gibbons on 10/20/2020 2:47 PM          RECORD REVIEW: Previous medical records, medications were reviewed at today's visit    IMPRESSION:   Altered mental status/encephalopathy/Hypertensive urgency- improved but worse at night     Initially non focal exam-confused/disordiented and not able to follow commands  CT head no clear acute changes noted-chronic infarcts right frontal and left occipital region     Certainly acute ischemic stroke/ PRESS / hypertensive encephalopathy are in the differential      Unable to get MRI brain at this time- Ct of the head no acute changes noted   EEG diffusely slow post ativan for MRI  B12/TSH/T4/ammonia levels unremarkable  Abg with hypoxia-supplement oxygen     DC Neurontin/Requip in the event these are contributing to confusion  Banana bag x 1  On MVI/folic acid  Start Thiamine 500 mg IV daily     CAD-ASA/statin  Hypertensive urgency-still quite variable  DM-monitor BS  BPH-on meds  Low back pain-Lidoderm patch/tylenol PRN     minimize sedation as able     Continue present care    Remove restraints as able      CALL WITH ANY QUESTIONS  [phone] CELL  Dr Diallo Padilla

## 2020-10-23 NOTE — LETTER
Pulmonary Fuction Testing Lab  [facility], [address] [place] [address]  [phone]  Fax [phone]   October 2, 2020    Dear Concha Roger,      This letter is to remind you of your upcoming appointment on 10/23/2020 at 8 AM. Due to the COVID-19 pandemic and states restrictions all patients are required to have a COVID-19 test done prior to Pulmonary Function Testing. You must do your COVID-19 test on 10/19/2020 between 8 AM and 12 PM. You need to self quarantine once your test is completed until your appointment. Your Covid test must be done 4 days prior to you pulmonary test. We are required to have results back prior to your appointment, otherwise you will need to be rescheduled. Below is the address of the testing location:    [facility]- Monday thru Sunday  Location: [address]  [facility] 7     On the day of your appointment you will come through the Main Entrance of the [facility] to register at the [address] [place] [address] registration. Upon entry you will be screened for any temperature, symptoms and history. After being screened you will be directed to the registration area. This helps us identify any potential COVID-19 exposure early on and take appropriate steps for the health and saftey of all our patients and health care professionals. Thank you for entrusting us at [facility]) with your care. We look forward to caring for you.     [facility]) Pulmonary Function Lab    Nicko Valdovinos  Registered Respiratory Therapist

## 2020-10-23 NOTE — PROGRESS NOTES
Biochemical Data, Weight     Discharge Planning:     Too soon to determine     Electronically signed by Carly Peralta RD, DAVIS on 10/23/20 at 11:42 AM CDT    Contact: [phone]

## 2020-10-23 NOTE — PLAN OF CARE
Problem: Nutrition  Goal: Optimal nutrition therapy  10/23/2020 1143 by Valorie Plasencia RD, LD  Outcome: Ongoing  10/23/2020 0300 by Loi Selby LPN  Outcome: Ongoing   Nutrition Problem #1: Inadequate oral intake  Intervention: Food and/or Nutrient Delivery: Continue NPO  Nutritional Goals: Meet nutrient needs through oral intake/AMONS if consistent with goals of care

## 2020-10-23 NOTE — PLAN OF CARE
Problem: Falls - Risk of:  Goal: Will remain free from falls  Description: Will remain free from falls  Outcome: Ongoing  Goal: Absence of physical injury  Description: Absence of physical injury  Outcome: Ongoing     Problem: Urinary Elimination:  Goal: Signs and symptoms of infection will decrease  Description: Signs and symptoms of infection will decrease  Outcome: Ongoing  Goal: Complications related to the disease process, condition or treatment will be avoided or minimized  Description: Complications related to the disease process, condition or treatment will be avoided or minimized  Outcome: Ongoing     Problem: Restraint Use - Nonviolent/Non-Self-Destructive Behavior:  Goal: Absence of restraint indications  Description: Absence of restraint indications  Outcome: Ongoing  Goal: Absence of restraint-related injury  Description: Absence of restraint-related injury  Outcome: Ongoing     Problem: Skin Integrity:  Goal: Will show no infection signs and symptoms  Description: Will show no infection signs and symptoms  Outcome: Ongoing  Goal: Absence of new skin breakdown  Description: Absence of new skin breakdown  Outcome: Ongoing     Problem: Pain:  Goal: Pain level will decrease  Description: Pain level will decrease  Outcome: Ongoing  Goal: Control of acute pain  Description: Control of acute pain  Outcome: Ongoing  Goal: Control of chronic pain  Description: Control of chronic pain  Outcome: Ongoing     Problem: Nutrition  Goal: Optimal nutrition therapy  Outcome: Ongoing

## 2020-10-24 ENCOUNTER — APPOINTMENT (OUTPATIENT)
Dept: GENERAL RADIOLOGY | Age: 85
DRG: 078 | End: 2020-10-24
Payer: COMMERCIAL

## 2020-10-24 LAB
ALBUMIN SERPL-MCNC: 3 G/DL (ref 3.5–5.2)
ALP BLD-CCNC: 79 U/L (ref 40–130)
ALT SERPL-CCNC: 44 U/L (ref 5–41)
ANION GAP SERPL CALCULATED.3IONS-SCNC: 9 MMOL/L (ref 7–19)
AST SERPL-CCNC: 51 U/L (ref 5–40)
BASOPHILS ABSOLUTE: 0 K/UL (ref 0–0.2)
BASOPHILS RELATIVE PERCENT: 0.1 % (ref 0–1)
BILIRUB SERPL-MCNC: 0.4 MG/DL (ref 0.2–1.2)
BUN BLDV-MCNC: 42 MG/DL (ref 8–23)
CALCIUM SERPL-MCNC: 9.4 MG/DL (ref 8.8–10.2)
CHLORIDE BLD-SCNC: 113 MMOL/L (ref 98–111)
CO2: 23 MMOL/L (ref 22–29)
CREAT SERPL-MCNC: 1.1 MG/DL (ref 0.5–1.2)
EOSINOPHILS ABSOLUTE: 0 K/UL (ref 0–0.6)
EOSINOPHILS RELATIVE PERCENT: 0 % (ref 0–5)
GFR AFRICAN AMERICAN: >59
GFR NON-AFRICAN AMERICAN: >60
GLUCOSE BLD-MCNC: 117 MG/DL (ref 70–99)
GLUCOSE BLD-MCNC: 130 MG/DL (ref 70–99)
GLUCOSE BLD-MCNC: 142 MG/DL (ref 70–99)
GLUCOSE BLD-MCNC: 158 MG/DL (ref 74–109)
HCT VFR BLD CALC: 32.6 % (ref 42–52)
HEMOGLOBIN: 10.1 G/DL (ref 14–18)
IMMATURE GRANULOCYTES #: 0.1 K/UL
LYMPHOCYTES ABSOLUTE: 0.4 K/UL (ref 1.1–4.5)
LYMPHOCYTES RELATIVE PERCENT: 5.1 % (ref 20–40)
MAGNESIUM: 3 MG/DL (ref 1.6–2.4)
MCH RBC QN AUTO: 32.7 PG (ref 27–31)
MCHC RBC AUTO-ENTMCNC: 31 G/DL (ref 33–37)
MCV RBC AUTO: 105.5 FL (ref 80–94)
MONOCYTES ABSOLUTE: 0.8 K/UL (ref 0–0.9)
MONOCYTES RELATIVE PERCENT: 9.8 % (ref 0–10)
NEUTROPHILS ABSOLUTE: 6.7 K/UL (ref 1.5–7.5)
NEUTROPHILS RELATIVE PERCENT: 84.2 % (ref 50–65)
PDW BLD-RTO: 15.6 % (ref 11.5–14.5)
PERFORMED ON: ABNORMAL
PLATELET # BLD: 211 K/UL (ref 130–400)
PMV BLD AUTO: 11.4 FL (ref 9.4–12.4)
POTASSIUM SERPL-SCNC: 4 MMOL/L (ref 3.5–5)
RBC # BLD: 3.09 M/UL (ref 4.7–6.1)
SODIUM BLD-SCNC: 145 MMOL/L (ref 136–145)
TOTAL PROTEIN: 5.5 G/DL (ref 6.6–8.7)
WBC # BLD: 8 K/UL (ref 4.8–10.8)

## 2020-10-24 PROCEDURE — 83735 ASSAY OF MAGNESIUM: CPT

## 2020-10-24 PROCEDURE — 92507 TX SP LANG VOICE COMM INDIV: CPT

## 2020-10-24 PROCEDURE — 71045 X-RAY EXAM CHEST 1 VIEW: CPT

## 2020-10-24 PROCEDURE — 82947 ASSAY GLUCOSE BLOOD QUANT: CPT

## 2020-10-24 PROCEDURE — 80053 COMPREHEN METABOLIC PANEL: CPT

## 2020-10-24 PROCEDURE — 6360000002 HC RX W HCPCS: Performed by: INTERNAL MEDICINE

## 2020-10-24 PROCEDURE — 1210000000 HC MED SURG R&B

## 2020-10-24 PROCEDURE — 85025 COMPLETE CBC W/AUTO DIFF WBC: CPT

## 2020-10-24 PROCEDURE — 2500000003 HC RX 250 WO HCPCS: Performed by: INTERNAL MEDICINE

## 2020-10-24 PROCEDURE — 2580000003 HC RX 258: Performed by: INTERNAL MEDICINE

## 2020-10-24 PROCEDURE — 36415 COLL VENOUS BLD VENIPUNCTURE: CPT

## 2020-10-24 PROCEDURE — 92526 ORAL FUNCTION THERAPY: CPT

## 2020-10-24 PROCEDURE — 6370000000 HC RX 637 (ALT 250 FOR IP): Performed by: INTERNAL MEDICINE

## 2020-10-24 RX ADMIN — PREDNISOLONE ACETATE 1 DROP: 10 SUSPENSION/ DROPS OPHTHALMIC at 08:00

## 2020-10-24 RX ADMIN — SODIUM CHLORIDE, PRESERVATIVE FREE 10 ML: 5 INJECTION INTRAVENOUS at 21:58

## 2020-10-24 RX ADMIN — METOPROLOL TARTRATE 5 MG: 5 INJECTION, SOLUTION INTRAVENOUS at 17:19

## 2020-10-24 RX ADMIN — MUPIROCIN: 20 OINTMENT TOPICAL at 21:58

## 2020-10-24 RX ADMIN — METOPROLOL TARTRATE 5 MG: 5 INJECTION, SOLUTION INTRAVENOUS at 22:39

## 2020-10-24 RX ADMIN — MUPIROCIN: 20 OINTMENT TOPICAL at 08:00

## 2020-10-24 RX ADMIN — HYDRALAZINE HYDROCHLORIDE 20 MG: 20 INJECTION INTRAMUSCULAR; INTRAVENOUS at 20:35

## 2020-10-24 RX ADMIN — METOPROLOL TARTRATE 5 MG: 5 INJECTION, SOLUTION INTRAVENOUS at 05:49

## 2020-10-24 RX ADMIN — ENOXAPARIN SODIUM 40 MG: 40 INJECTION SUBCUTANEOUS at 07:59

## 2020-10-24 RX ADMIN — PREDNISOLONE ACETATE 1 DROP: 10 SUSPENSION/ DROPS OPHTHALMIC at 21:58

## 2020-10-24 RX ADMIN — METOPROLOL TARTRATE 5 MG: 5 INJECTION, SOLUTION INTRAVENOUS at 01:24

## 2020-10-24 RX ADMIN — SODIUM CHLORIDE, PRESERVATIVE FREE 10 ML: 5 INJECTION INTRAVENOUS at 08:00

## 2020-10-24 ASSESSMENT — PAIN SCALES - GENERAL: PAINLEVEL_OUTOF10: 0

## 2020-10-24 NOTE — PROGRESS NOTES
Dobhoff placed per MD orders. [address] DTR @ bedside tolerated well CXR ordered per MD orders]. Will wait for radiologist verification.

## 2020-10-24 NOTE — PROGRESS NOTES
Speech Language Pathology  Facility/Department: [facility] 4 ONCOLOGY UNIT  SWALLOW THERAPY  SPEECH THERAPY     NAME: Ishan Kaba  : 1934  MRN: [id number]    ADMISSION DATE: 10/17/2020  ADMITTING DIAGNOSIS: has Type II diabetes mellitus with peripheral circulatory disorder ([address] Diabetic ulcer of left foot associated with type 2 diabetes mellitus ([address] 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 ([address] Lumbar stenosis with neurogenic claudication; DDD (degenerative disc disease), lumbar; S/P laminectomy; Obstructive sleep apnea syndrome; CHF (congestive heart failure), NYHA class I, acute on chronic, combined ([address] Coronary artery disease involving native coronary artery of native heart; Abnormal CT scan, bladder; Retention, urine; Bilateral renal cysts; Acute on chronic congestive heart failure ([address] Ischemic cardiomyopathy; Acute chest pain; Bradycardia; Right heart failure ([address] Essential hypertension; Angina of effort ([address] Hx of CABG; Chest pain; Palliative care patient; INOCENCIO (acute kidney injury) ([address] Ulcer of right midfoot with fat layer exposed ([address] Open wound of right great toe; Skin ulcer of great toe, right, with fat layer exposed ([address] Vertigo; Valvular heart disease; Syncope and collapse; NSTEMI (non-ST elevated myocardial infarction) ([address] CKD (chronic kidney disease), stage III; Hypertensive encephalopathy; Hypertensive urgency; Encephalopathy; History of stroke; and Mild malnutrition ([address] on their problem list.    Date of Treat 10/24/2020  Evaluating Therapist: Jamey Screen    Current Diet level:  NPO    Reason for Referral  Ishan Kaba was referred for a bedside swallow evaluation to assess the efficiency of his swallow function, identify signs and symptoms of aspiration and make recommendations regarding safe dietary consistencies, effective compensatory strategies, and safe eating environment.     Impression  Re-assessed patient's swallowing function. Patient continues to exhibit absent swallows with no laryngeal elevation noted and just laryngeal rocking observed. Patient exhibits decreased airway detection with no outward coughs/throat clears noted following probable penetration/aspiration of PO trials (single ice chip trials presented by SLP).    At this time, recommend continuation NPO status with short-term alternative means of nutrition. Recommend oral care, VIA STAFF, every 3-4 hours. If patient receives mouth care prior to intake, okay for swabs of thin H2O for comfort. Will continue to follow.     Treatment Plan  Requires SLP Intervention: Yes     Recommended Diet and Intervention  Diet Solids Recommendation: NPO  Liquid Consistency Recommendation: NPO  Therapeutic Interventions: Patient/Family education;Oral Care; Therapeutic PO trials with SLP     Treatment/Goals  Timeframe for Short-term Goals: 1x/day for 3 days   Goal 1: Patient NPO. Goal 2: Patient staff will follow swallow safety recommendations. Goal 3: Patient will receive daily oral care, via staff, to decrease bacteria from the oral cavity. Goal 4: Re-assessment of swallow function for potential PO intake. Goal 4: Monitor speech production.     General  Chart Reviewed: Yes  Behavior/Cognition: Lethargic  O2 Device: None (Room air)  Communication Observation: (Re-assessed patient's speech production. Patient exhibits very decreased labial movements and very slow, very decreased lingual movements (primarily low resting tongue posture was noted).  SLP ranked functional intelligibility of speech for unfamiliar listeners at 0-10% in utterances without background noise present.)  Follows Directions: Simple   Patient Positioning: Upright in bed  Consistencies Administered: Ice chips     Oral Motor Examination   Labial ROM: (Decreased, bilaterally, during labial retraction trials and labial protrusion trials.)  Labial Strength: (Decreased during labial compression trials.)  Labial Coordination: (Slowed movements were noted.)  Lingual ROM: (Decreased during lingual extension trials with no full point achieved; decreased during lingual elevation trials without use of accessory jaw movement; decreased movements noted bilaterally.)  Lingual Strength: (Decreased)  Lingual Coordination: (Slowed movements were noted.)     Re-assessed patient's swallowing function with the following observations noted:     Oral Phase  Mastication: Ice chips (Patient exhibited min vertical jaw movement at the front of the mouth during oral prep of single ice chip trials presented by SLP.)  Suspected Premature Bolus Loss: Ice Chips (Patient exhibited fast oral transit of single ice chip trials presented by LSP)     Pharyngeal Phase  Absent Swallows: Ice chips (Patient exhibited absent swallows with no laryngeal elevation noted and just laryngeal rocking observed.)  Pharyngeal Phase - Comment: As previously mentioned, patient exhibited absent swallows with no laryngeal elevation noted and just laryngeal rocking observed. Patient exhibited decreased airway detection with no outward coughs/throat clears noted following probable penetration/aspiration of PO trials (single ice chip trials presented by SLP).    At this time, recommend continuation NPO status with short-term alternative means of nutrition. Recommend oral care every 3-4 hours. If patient receives mouth care prior to intake, okay for swabs of thin H2O for comfort. Will continue to follow.     Electronically signed by DENY Rosales on 10/24/2020 at 1:39 PM

## 2020-10-24 NOTE — PROGRESS NOTES
[facility]        Hospitalist Progress Note  10/24/2020 10:01 AM  Subjective:   Admit Date: 10/17/2020  PCP: Luzma Morin MD    Chief Complaint: AMS    Subjective: Patient seen and examined at bedside. Appears worse. Alert but lethargic. Cumulative Hospital History: 17-year-old male with a past medical history of diabetes and hypertension presenting to the hospital after being brought in by family for altered mental status with questionable cause-possibly secondary to CVA versus hypertensive encephalopathy. Patient was originally requiring MICU level care for IV antihypertensive and Precedex drip for agitation. Neurology on board. Patient failed swallow evaluation 10/22/2020. Palliative care on board and patient is DNR/DNI with possible transition to hospice care if continued decline. MRI Brain showing no acute intracranial abnormalities. Continuing to fail swallow evaluations. ROS: 14 point review of systems is negative except as specifically addressed above.     DIET TUBE FEED CONTINUOUS/CYCLIC NPO; STANDARD WITH FIBER; Nasogastric; Continuous; 5; 50    Intake/Output Summary (Last 24 hours) at 10/24/2020 1001  Last data filed at 10/23/2020 1900  Gross per 24 hour   Intake 2264 ml   Output 200 ml   Net 2064 ml     Medications:   dextrose 100 mL/hr at 10/23/20 1029    dextrose       Current Facility-Administered Medications   Medication Dose Route Frequency Provider Last Rate Last Dose    dextrose 5 % solution   Intravenous Continuous Rob Bowers  mL/hr at 10/23/20 1029      cloNIDine (CATAPRES) 0.1 MG/24HR 1 patch  1 patch Transdermal Weekly Rob Bowers MD   1 patch at 10/22/20 1350    metoprolol (LOPRESSOR) injection 5 mg  5 mg Intravenous Q6H Melrose Curb, DO   5 mg at 10/24/20 0549    ziprasidone (GEODON) injection 10 mg  10 mg Intramuscular Q4H PRN Melrose Curb, DO   10 mg at 10/22/20 1329    OLANZapine zydis (ZYPREXA) disintegrating tablet 5 mg  5 mg II diabetes mellitus with peripheral circulatory disorder (HCC)    Diabetic ulcer of left foot associated with type 2 diabetes mellitus (HCC)    S/P laminectomy    Obstructive sleep apnea syndrome    Hypertensive encephalopathy    Encephalopathy    History of stroke    Mild malnutrition ([facility]  Resolved Problems:    * No resolved hospital problems. *    AMS: Seems to be worse. ? Hypertensive encephalopathy. Clonidine patch. Failed swallow screens. Neurology on board. DNR/DNI. Consideration for transition to hospice - palliative care following. Insert NGT and start tube feeding. HTN: Clonidine patch. Monitor BP and adjust medications PRN. Can resume other medications once NGT placed. DM: monitor BG and adjust medications PRN. INOCENCIO: Improving. IVF. Monitor BMP. Supportive management.     Advance Directive: DNR-CC    DVT prophylaxis: Lovenox    Discharge planning: TBD      Signed:  Vane Snow MD 10/24/2020 10:01 AM  Rounding Hospitalist

## 2020-10-25 ENCOUNTER — APPOINTMENT (OUTPATIENT)
Dept: GENERAL RADIOLOGY | Age: 85
DRG: 078 | End: 2020-10-25
Payer: COMMERCIAL

## 2020-10-25 LAB
ALBUMIN SERPL-MCNC: 2.8 G/DL (ref 3.5–5.2)
ALP BLD-CCNC: 70 U/L (ref 40–130)
ALT SERPL-CCNC: 36 U/L (ref 5–41)
ANION GAP SERPL CALCULATED.3IONS-SCNC: 8 MMOL/L (ref 7–19)
AST SERPL-CCNC: 32 U/L (ref 5–40)
BASOPHILS ABSOLUTE: 0 K/UL (ref 0–0.2)
BASOPHILS RELATIVE PERCENT: 0.2 % (ref 0–1)
BILIRUB SERPL-MCNC: 0.3 MG/DL (ref 0.2–1.2)
BUN BLDV-MCNC: 49 MG/DL (ref 8–23)
CALCIUM SERPL-MCNC: 9.2 MG/DL (ref 8.8–10.2)
CHLORIDE BLD-SCNC: 116 MMOL/L (ref 98–111)
CO2: 23 MMOL/L (ref 22–29)
CREAT SERPL-MCNC: 1.1 MG/DL (ref 0.5–1.2)
EOSINOPHILS ABSOLUTE: 0 K/UL (ref 0–0.6)
EOSINOPHILS RELATIVE PERCENT: 0.1 % (ref 0–5)
GFR AFRICAN AMERICAN: >59
GFR NON-AFRICAN AMERICAN: >60
GLUCOSE BLD-MCNC: 119 MG/DL (ref 70–99)
GLUCOSE BLD-MCNC: 134 MG/DL (ref 70–99)
GLUCOSE BLD-MCNC: 139 MG/DL (ref 74–109)
GLUCOSE BLD-MCNC: 141 MG/DL (ref 70–99)
GLUCOSE BLD-MCNC: 148 MG/DL (ref 70–99)
HCT VFR BLD CALC: 28.9 % (ref 42–52)
HEMOGLOBIN: 9.1 G/DL (ref 14–18)
IMMATURE GRANULOCYTES #: 0.1 K/UL
LYMPHOCYTES ABSOLUTE: 0.8 K/UL (ref 1.1–4.5)
LYMPHOCYTES RELATIVE PERCENT: 9.4 % (ref 20–40)
MAGNESIUM: 2.6 MG/DL (ref 1.6–2.4)
MCH RBC QN AUTO: 32.9 PG (ref 27–31)
MCHC RBC AUTO-ENTMCNC: 31.5 G/DL (ref 33–37)
MCV RBC AUTO: 104.3 FL (ref 80–94)
MONOCYTES ABSOLUTE: 0.9 K/UL (ref 0–0.9)
MONOCYTES RELATIVE PERCENT: 9.9 % (ref 0–10)
NEUTROPHILS ABSOLUTE: 7 K/UL (ref 1.5–7.5)
NEUTROPHILS RELATIVE PERCENT: 79.4 % (ref 50–65)
PDW BLD-RTO: 15.1 % (ref 11.5–14.5)
PERFORMED ON: ABNORMAL
PLATELET # BLD: 197 K/UL (ref 130–400)
PMV BLD AUTO: 11.7 FL (ref 9.4–12.4)
POTASSIUM SERPL-SCNC: 4.2 MMOL/L (ref 3.5–5)
RBC # BLD: 2.77 M/UL (ref 4.7–6.1)
SODIUM BLD-SCNC: 147 MMOL/L (ref 136–145)
TOTAL PROTEIN: 4.7 G/DL (ref 6.6–8.7)
WBC # BLD: 8.9 K/UL (ref 4.8–10.8)

## 2020-10-25 PROCEDURE — 36415 COLL VENOUS BLD VENIPUNCTURE: CPT

## 2020-10-25 PROCEDURE — 82947 ASSAY GLUCOSE BLOOD QUANT: CPT

## 2020-10-25 PROCEDURE — 6360000002 HC RX W HCPCS: Performed by: INTERNAL MEDICINE

## 2020-10-25 PROCEDURE — 80053 COMPREHEN METABOLIC PANEL: CPT

## 2020-10-25 PROCEDURE — 2580000003 HC RX 258: Performed by: INTERNAL MEDICINE

## 2020-10-25 PROCEDURE — 85025 COMPLETE CBC W/AUTO DIFF WBC: CPT

## 2020-10-25 PROCEDURE — 2500000003 HC RX 250 WO HCPCS: Performed by: INTERNAL MEDICINE

## 2020-10-25 PROCEDURE — 71045 X-RAY EXAM CHEST 1 VIEW: CPT

## 2020-10-25 PROCEDURE — 6370000000 HC RX 637 (ALT 250 FOR IP): Performed by: INTERNAL MEDICINE

## 2020-10-25 PROCEDURE — 83735 ASSAY OF MAGNESIUM: CPT

## 2020-10-25 PROCEDURE — 1210000000 HC MED SURG R&B

## 2020-10-25 RX ORDER — CLONIDINE 0.3 MG/24H
1 PATCH, EXTENDED RELEASE TRANSDERMAL WEEKLY
Status: DISCONTINUED | OUTPATIENT
Start: 2020-11-01 | End: 2020-10-26 | Stop reason: HOSPADM

## 2020-10-25 RX ORDER — CLONIDINE 0.3 MG/24H
1 PATCH, EXTENDED RELEASE TRANSDERMAL WEEKLY
Status: DISCONTINUED | OUTPATIENT
Start: 2020-10-25 | End: 2020-10-25

## 2020-10-25 RX ORDER — CLONIDINE 0.2 MG/24H
1 PATCH, EXTENDED RELEASE TRANSDERMAL ONCE
Status: DISCONTINUED | OUTPATIENT
Start: 2020-10-25 | End: 2020-10-26 | Stop reason: HOSPADM

## 2020-10-25 RX ORDER — LOSARTAN POTASSIUM 50 MG/1
25 TABLET ORAL DAILY
Status: DISCONTINUED | OUTPATIENT
Start: 2020-10-25 | End: 2020-10-26 | Stop reason: HOSPADM

## 2020-10-25 RX ORDER — CLONIDINE 0.1 MG/24H
1 PATCH, EXTENDED RELEASE TRANSDERMAL ONCE
Status: DISCONTINUED | OUTPATIENT
Start: 2020-10-25 | End: 2020-10-26 | Stop reason: HOSPADM

## 2020-10-25 RX ORDER — ISOSORBIDE MONONITRATE 30 MG/1
30 TABLET, EXTENDED RELEASE ORAL DAILY
Status: DISCONTINUED | OUTPATIENT
Start: 2020-10-25 | End: 2020-10-26 | Stop reason: HOSPADM

## 2020-10-25 RX ORDER — AMLODIPINE BESYLATE 10 MG/1
10 TABLET ORAL DAILY
Status: DISCONTINUED | OUTPATIENT
Start: 2020-10-25 | End: 2020-10-26 | Stop reason: HOSPADM

## 2020-10-25 RX ADMIN — MUPIROCIN: 20 OINTMENT TOPICAL at 21:23

## 2020-10-25 RX ADMIN — PREDNISOLONE ACETATE 1 DROP: 10 SUSPENSION/ DROPS OPHTHALMIC at 10:27

## 2020-10-25 RX ADMIN — METOPROLOL TARTRATE 5 MG: 5 INJECTION, SOLUTION INTRAVENOUS at 10:28

## 2020-10-25 RX ADMIN — PREDNISOLONE ACETATE 1 DROP: 10 SUSPENSION/ DROPS OPHTHALMIC at 21:23

## 2020-10-25 RX ADMIN — SODIUM CHLORIDE, PRESERVATIVE FREE 10 ML: 5 INJECTION INTRAVENOUS at 10:36

## 2020-10-25 RX ADMIN — SODIUM CHLORIDE, PRESERVATIVE FREE 10 ML: 5 INJECTION INTRAVENOUS at 21:23

## 2020-10-25 RX ADMIN — MUPIROCIN: 20 OINTMENT TOPICAL at 10:27

## 2020-10-25 RX ADMIN — ENOXAPARIN SODIUM 40 MG: 40 INJECTION SUBCUTANEOUS at 10:28

## 2020-10-25 RX ADMIN — METOPROLOL TARTRATE 5 MG: 5 INJECTION, SOLUTION INTRAVENOUS at 05:34

## 2020-10-25 RX ADMIN — HYDRALAZINE HYDROCHLORIDE 20 MG: 20 INJECTION INTRAMUSCULAR; INTRAVENOUS at 04:21

## 2020-10-25 RX ADMIN — METOPROLOL TARTRATE 5 MG: 5 INJECTION, SOLUTION INTRAVENOUS at 17:41

## 2020-10-25 RX ADMIN — METOPROLOL TARTRATE 5 MG: 5 INJECTION, SOLUTION INTRAVENOUS at 23:40

## 2020-10-25 ASSESSMENT — PAIN SCALES - GENERAL: PAINLEVEL_OUTOF10: 0

## 2020-10-25 NOTE — PROGRESS NOTES
The granddaughter called tonight to check in on her granddad was doing; She noticed on mychart that a chest xray was done today and it showed bilateral perihilar infiltrates; the chest xray mentioned that this may be either pneumonia or pulmonary edema. The granddaughter has concerns about it and wants to talk to the doctor about it; The granddaughter, Mary Kiran, phone number is [phone].

## 2020-10-25 NOTE — PLAN OF CARE
Problem: Falls - Risk of:  Goal: Will remain free from falls  Description: Will remain free from falls  10/25/2020 1427 by Zechariah New RN  Outcome: Ongoing  10/25/2020 0321 by Ashley Connelly LPN  Outcome: Ongoing  Goal: Absence of physical injury  Description: Absence of physical injury  10/25/2020 1427 by Zechariah New RN  Outcome: Ongoing  10/25/2020 0321 by Ashley Connelly LPN  Outcome: Ongoing

## 2020-10-25 NOTE — PROGRESS NOTES
[facility]        Hospitalist Progress Note  10/25/2020 10:27 AM  Subjective:   Admit Date: 10/17/2020  PCP: Jyoti Villegas MD    Chief Complaint: AMS    Subjective: Patient seen and examined at bedside with family present. Alert but lethargic. AAOx2 (person, place) still with dysarthria. States he's comfortable. [facility] History: 51-year-old male with a past medical history of diabetes and hypertension presenting to the hospital after being brought in by family for altered mental status with questionable cause-possibly secondary to CVA versus hypertensive encephalopathy. Patient was originally requiring MICU level care for IV antihypertensive and Precedex drip for agitation. Neurology on board. Patient failed swallow evaluation 10/22/2020. Palliative care on board and patient is DNR/DNI with possible transition to hospice care if continued decline. MRI Brain showing no acute intracranial abnormalities. Continuing to fail swallow evaluations. ROS: 14 point review of systems is negative except as specifically addressed above.     DIET TUBE FEED CONTINUOUS/CYCLIC NPO; STANDARD WITH FIBER; Nasogastric; Continuous; 5; 50    Intake/Output Summary (Last 24 hours) at 10/25/2020 1027  Last data filed at 10/25/2020 0931  Gross per 24 hour   Intake 0 ml   Output 200 ml   Net -200 ml     Medications:   dextrose       Current Facility-Administered Medications   Medication Dose Route Frequency Provider Last Rate Last Dose    amLODIPine (NORVASC) tablet 10 mg  10 mg Per NG tube Daily Fransisco Arreguin MD        isosorbide mononitrate (IMDUR) extended release tablet 30 mg  30 mg Oral Daily Fransisco Arreguin MD        losartan (COZAAR) tablet 25 mg  25 mg Per NG tube Daily Fransisco Arreguin MD        cloNIDine (CATAPRES) 0.3 MG/24HR 1 patch  1 patch Transdermal Weekly Fransisco Arreguin MD        metoprolol (LOPRESSOR) injection 5 mg  5 mg Intravenous Q6H Toro Tadeo DO   5 mg at 10/25/20 0759    ziprasidone (GEODON) injection 10 mg  10 mg Intramuscular Q4H PRN Amparo Poplar, DO   10 mg at 10/22/20 1329    OLANZapine zydis (ZYPREXA) disintegrating tablet 5 mg  5 mg Oral Q6H PRN Amparo Poplar, DO   5 mg at 10/20/20 1039    hydrALAZINE (APRESOLINE) injection 20 mg  20 mg Intravenous Q4H PRN Amparo Poplar, DO   20 mg at 10/25/20 0421    sodium chloride flush 0.9 % injection 10 mL  10 mL Intravenous 2 times per day Amparo Poplar, DO   10 mL at 10/24/20 2158    sodium chloride flush 0.9 % injection 10 mL  10 mL Intravenous PRN Amparo Poplar, DO   10 mL at 10/23/20 6444    acetaminophen (TYLENOL) tablet 650 mg  650 mg Oral Q6H PRN Amparo Poplar, DO        Or    acetaminophen (TYLENOL) suppository 650 mg  650 mg Rectal Q6H PRN Amparo Poplar, DO        enoxaparin (LOVENOX) injection 40 mg  40 mg Subcutaneous Daily Amparo Poplar, DO   40 mg at 10/24/20 4100    ondansetron (ZOFRAN-ODT) disintegrating tablet 4 mg  4 mg Oral Q8H PRN Amparo Poplar, DO        Or    ondansetron [facility]) injection 4 mg  4 mg Intravenous Q6H PRN Amparo Poplar, DO        insulin lispro (HUMALOG) injection vial 0-6 Units  0-6 Units Subcutaneous TID WC Amparo Fingal, DO        insulin lispro (HUMALOG) injection vial 0-3 Units  0-3 Units Subcutaneous Nightly Amparo Fingal, DO   1 Units at 10/23/20 2158    glucose (GLUTOSE) 40 % oral gel 15 g  15 g Oral PRN Amparo Poplar, DO        dextrose 50 % IV solution  12.5 g Intravenous PRN Amparo Poplar, DO        glucagon (rDNA) injection 1 mg  1 mg Intramuscular PRN Amparo Poplar, DO        dextrose 5 % solution  100 mL/hr Intravenous PRN Amparo Poplar, DO        hydrALAZINE (APRESOLINE) tablet 25 mg  25 mg Oral Q4H PRN Amparo Poplar, DO        diphenoxylate-atropine (LOMOTIL) 2.5-0.025 MG per tablet 1 tablet  1 tablet Oral 4x Daily PRN Amparo Lim DO        colchicine

## 2020-10-26 VITALS
HEIGHT: 70 IN | OXYGEN SATURATION: 94 % | DIASTOLIC BLOOD PRESSURE: 57 MMHG | BODY MASS INDEX: 30.14 KG/M2 | SYSTOLIC BLOOD PRESSURE: 150 MMHG | RESPIRATION RATE: 16 BRPM | TEMPERATURE: 97.9 F | HEART RATE: 65 BPM | WEIGHT: 210.56 LBS

## 2020-10-26 LAB
ALBUMIN SERPL-MCNC: 2.4 G/DL (ref 3.5–5.2)
ALP BLD-CCNC: 64 U/L (ref 40–130)
ALT SERPL-CCNC: 28 U/L (ref 5–41)
ANION GAP SERPL CALCULATED.3IONS-SCNC: 8 MMOL/L (ref 7–19)
AST SERPL-CCNC: 23 U/L (ref 5–40)
BASOPHILS ABSOLUTE: 0 K/UL (ref 0–0.2)
BASOPHILS RELATIVE PERCENT: 0.1 % (ref 0–1)
BILIRUB SERPL-MCNC: <0.2 MG/DL (ref 0.2–1.2)
BUN BLDV-MCNC: 60 MG/DL (ref 8–23)
CALCIUM SERPL-MCNC: 9.2 MG/DL (ref 8.8–10.2)
CHLORIDE BLD-SCNC: 116 MMOL/L (ref 98–111)
CO2: 24 MMOL/L (ref 22–29)
CREAT SERPL-MCNC: 1.1 MG/DL (ref 0.5–1.2)
EOSINOPHILS ABSOLUTE: 0 K/UL (ref 0–0.6)
EOSINOPHILS RELATIVE PERCENT: 0.1 % (ref 0–5)
GFR AFRICAN AMERICAN: >59
GFR NON-AFRICAN AMERICAN: >60
GLUCOSE BLD-MCNC: 150 MG/DL (ref 70–99)
GLUCOSE BLD-MCNC: 159 MG/DL (ref 74–109)
GLUCOSE BLD-MCNC: 187 MG/DL (ref 70–99)
GLUCOSE BLD-MCNC: 200 MG/DL (ref 70–99)
HCT VFR BLD CALC: 22.4 % (ref 42–52)
HEMOGLOBIN: 7.1 G/DL (ref 14–18)
IMMATURE GRANULOCYTES #: 0.1 K/UL
LYMPHOCYTES ABSOLUTE: 1.1 K/UL (ref 1.1–4.5)
LYMPHOCYTES RELATIVE PERCENT: 13.3 % (ref 20–40)
MAGNESIUM: 2.6 MG/DL (ref 1.6–2.4)
MCH RBC QN AUTO: 32.9 PG (ref 27–31)
MCHC RBC AUTO-ENTMCNC: 31.7 G/DL (ref 33–37)
MCV RBC AUTO: 103.7 FL (ref 80–94)
MONOCYTES ABSOLUTE: 0.8 K/UL (ref 0–0.9)
MONOCYTES RELATIVE PERCENT: 9.8 % (ref 0–10)
NEUTROPHILS ABSOLUTE: 6.2 K/UL (ref 1.5–7.5)
NEUTROPHILS RELATIVE PERCENT: 75.7 % (ref 50–65)
PDW BLD-RTO: 15.2 % (ref 11.5–14.5)
PERFORMED ON: ABNORMAL
PLATELET # BLD: 186 K/UL (ref 130–400)
PMV BLD AUTO: 11.5 FL (ref 9.4–12.4)
POTASSIUM SERPL-SCNC: 4.1 MMOL/L (ref 3.5–5)
RBC # BLD: 2.16 M/UL (ref 4.7–6.1)
SODIUM BLD-SCNC: 148 MMOL/L (ref 136–145)
TOTAL PROTEIN: 4.2 G/DL (ref 6.6–8.7)
WBC # BLD: 8.1 K/UL (ref 4.8–10.8)

## 2020-10-26 PROCEDURE — 2580000003 HC RX 258: Performed by: INTERNAL MEDICINE

## 2020-10-26 PROCEDURE — 83735 ASSAY OF MAGNESIUM: CPT

## 2020-10-26 PROCEDURE — 6360000002 HC RX W HCPCS: Performed by: INTERNAL MEDICINE

## 2020-10-26 PROCEDURE — 82947 ASSAY GLUCOSE BLOOD QUANT: CPT

## 2020-10-26 PROCEDURE — 85025 COMPLETE CBC W/AUTO DIFF WBC: CPT

## 2020-10-26 PROCEDURE — 99232 SBSQ HOSP IP/OBS MODERATE 35: CPT | Performed by: PSYCHIATRY & NEUROLOGY

## 2020-10-26 PROCEDURE — 92526 ORAL FUNCTION THERAPY: CPT

## 2020-10-26 PROCEDURE — 36415 COLL VENOUS BLD VENIPUNCTURE: CPT

## 2020-10-26 PROCEDURE — 6370000000 HC RX 637 (ALT 250 FOR IP): Performed by: INTERNAL MEDICINE

## 2020-10-26 PROCEDURE — 2500000003 HC RX 250 WO HCPCS: Performed by: INTERNAL MEDICINE

## 2020-10-26 PROCEDURE — 80053 COMPREHEN METABOLIC PANEL: CPT

## 2020-10-26 RX ORDER — PANTOPRAZOLE SODIUM 40 MG/10ML
40 INJECTION, POWDER, LYOPHILIZED, FOR SOLUTION INTRAVENOUS 2 TIMES DAILY
Status: DISCONTINUED | OUTPATIENT
Start: 2020-10-26 | End: 2020-10-26 | Stop reason: HOSPADM

## 2020-10-26 RX ORDER — SODIUM CHLORIDE 9 MG/ML
10 INJECTION INTRAVENOUS 2 TIMES DAILY
Status: DISCONTINUED | OUTPATIENT
Start: 2020-10-26 | End: 2020-10-26 | Stop reason: HOSPADM

## 2020-10-26 RX ADMIN — INSULIN LISPRO 2 UNITS: 100 INJECTION, SOLUTION INTRAVENOUS; SUBCUTANEOUS at 08:24

## 2020-10-26 RX ADMIN — LOSARTAN POTASSIUM 25 MG: 50 TABLET, FILM COATED ORAL at 09:17

## 2020-10-26 RX ADMIN — SODIUM CHLORIDE, PRESERVATIVE FREE 10 ML: 5 INJECTION INTRAVENOUS at 09:30

## 2020-10-26 RX ADMIN — HYDRALAZINE HYDROCHLORIDE 20 MG: 20 INJECTION INTRAMUSCULAR; INTRAVENOUS at 02:20

## 2020-10-26 RX ADMIN — METOPROLOL TARTRATE 5 MG: 5 INJECTION, SOLUTION INTRAVENOUS at 06:16

## 2020-10-26 RX ADMIN — ENOXAPARIN SODIUM 40 MG: 40 INJECTION SUBCUTANEOUS at 09:17

## 2020-10-26 RX ADMIN — ISOSORBIDE MONONITRATE 30 MG: 30 TABLET, EXTENDED RELEASE ORAL at 09:17

## 2020-10-26 RX ADMIN — AMLODIPINE BESYLATE 10 MG: 10 TABLET ORAL at 09:17

## 2020-10-26 RX ADMIN — PREDNISOLONE ACETATE 1 DROP: 10 SUSPENSION/ DROPS OPHTHALMIC at 09:19

## 2020-10-26 RX ADMIN — MUPIROCIN: 20 OINTMENT TOPICAL at 09:18

## 2020-10-26 NOTE — DISCHARGE SUMMARY
soft tissues demonstrate no acute abnormality. 1. Satisfactory placement of Dobbhoff feeding tube. 2. Bilateral perihilar infiltrates. This may be either pneumonia or pulmonary edema. Signed by Dr Marilyn Melchor on 10/24/2020 12:32 PM    Mri Brain Wo Contrast    Result Date: 10/23/2020  EXAMINATION: MRI BRAIN WO CONTRAST 10/23/2020 1:58 PM COMPARISON: October 3, 2019 HISTORY: 80years-old Male. Altered mental status TECHNIQUE: Routine pulse sequences of the brain were obtained without IV contrast. REPORT: The ventricles and cerebrospinal fluid spaces are normal in size and configuration for the patient's age. There is no evidence of mass-effect or midline shift. No reduced diffusivity is demonstrated. Extensive nonspecific bilateral white matter T2/FLAIR abnormal signal, with no diffusion restriction, but likely reflecting chronic microvascular changes. The brainstem, sella, pituitary, cerebellum are unremarkable. The basilar cisterns are preserved. The intraorbital structures are unremarkable. The flow voids are preserved. No acute osseous lesion. The visualized portions of the paranasal sinuses and mastoid air cells are unremarkable. 1. No acute intracranial abnormalities. 2. Advanced chronic microvascular changes Signed by Dr Marilyn Melchor on 10/23/2020 2:01 PM      Pertinent Labs:   CBC:   Recent Labs     10/24/20  0443 10/25/20  0343 10/26/20  0346   WBC 8.0 8.9 8.1   HGB 10.1* 9.1* 7.1*    197 186     BMP:    Recent Labs     10/24/20  0443 10/25/20  0343 10/26/20  0346    147* 148*   K 4.0 4.2 4.1   * 116* 116*   CO2 23 23 24   BUN 42* 49* 60*   CREATININE 1.1 1.1 1.1   GLUCOSE 158* 139* 159*     INR: No results for input(s): INR in the last 72 hours. ABGs:No results for input(s): PH, PO2, PCO2, HCO3, BE, O2SAT in the last 72 hours. Lactic Acid:No results for input(s): LACTA in the last 72 hours.     Procedures: None  Hospital Course: 80-year-old male with a past medical history of diabetes and hypertension presenting to the hospital after being brought in by family for altered mental status with questionable cause-possibly secondary to CVA versus hypertensive encephalopathy. Patient was originally requiring MICU level care for IV antihypertensive and Precedex drip for agitation. Neurology on board. Patient failed swallow evaluation 10/22/2020. Palliative care on board and patient is DNR/DNI with possible transition to hospice care if continued decline. MRI Brain showing no acute intracranial abnormalities. Continuing to fail swallow evaluations. Patient family originally agreeable to PEG tube placement but ultimately decided on transfer to outside facility for second opinion and continued work-up of encephalopathy. Patient is to be [facility] under the care of Dr. Tiny Sidhu. Physical Exam:  Vitals: BP (!) 177/70   Pulse 65   Temp 97.9 °F (36.6 °C) (Temporal)   Resp 16   Ht 5' 10\" (1.778 m)   Wt 210 lb 9 oz (95.5 kg)   SpO2 94%   BMI 30.21 kg/m²   24HR INTAKE/OUTPUT:      Intake/Output Summary (Last 24 hours) at 10/26/2020 1705  Last data filed at 10/26/2020 1444  Gross per 24 hour   Intake 0 ml   Output 1300 ml   Net -1300 ml     General appearance: Alert, lethargic  HEENT: AT/NC  Lungs: no increased work of breathing, BLAE, no wheezing appreciated  Heart: RRR  Abdomen: BS+, soft, NT, ND  Extremities: no edema  Neurologic: Alert, lethargic  Skin: warm    Discharge Medications:      As per outside facility. Discharge Instructions: Follow up with Cindy Canseco MD in 7 days. Take medications as directed. Resume activity as tolerated. Diet: DIET TUBE FEED CONTINUOUS/CYCLIC NPO; STANDARD WITH FIBER (Jevity 1.2); Nasogastric; Continuous; 50 (current rate); 55; 24  Dietary Nutrition Supplements: Protein Modular     Disposition: Patient is to be discharge to outside facility. Time spent on discharge 35 minutes.     Signed:  Carolina Melendez MD 10/26/2020 5:05 PM

## 2020-10-26 NOTE — CARE COORDINATION
Transfer Center called stating patient has been accepted to [facility], room 328. Accepting physician is Dr. Gaston Herrera. Nurse to call report to [phone]. Called patient's HCS/POA/grand-daughter, Clau Arce. HCS paperwork on file, but POA paperwork has not been submitted by Tung Ny. She reports that she is at work until 7:30 pm.  She plans to reach out to another family member for assistance in obtaining POA paperwork. Once paperwork has been obtained, she is to contact Wes Rodriguez, 4th . This CM informed Tung Ny that she will be responsible for cost of EMS transport, which is approximately $800 and that his insurance company most likely will not cover his expenses at [facility].  She plans to pay for EMS transport and is willing to accept the risk that insurance may not cover [employer]. Tung Ny also informed that she is required to sign EMS paperwork after POA paperwork has been received. Nadia Portillo plans to assist in obtaining signature via fax.   Electronically signed by Daysi Leigh RN on 10/26/2020 at 5:34 PM

## 2020-10-26 NOTE — PROGRESS NOTES
[facility]        [facility] Progress Note  10/26/2020 10:56 AM  Subjective:   Admit Date: 10/17/2020  PCP: Sunshine Dominguez MD    Chief Complaint: AMS    Subjective: Patient seen and examined at bedside with family present. Alert but lethargic. NGT in place. Failed another swallow screen this morning - family asking for PEG tube. Cumulative Hospital History: 42-year-old male with a past medical history of diabetes and hypertension presenting to the hospital after being brought in by family for altered mental status with questionable cause-possibly secondary to CVA versus hypertensive encephalopathy. Patient was originally requiring MICU level care for IV antihypertensive and Precedex drip for agitation. Neurology on board. Patient failed swallow evaluation 10/22/2020. Palliative care on board and patient is DNR/DNI with possible transition to hospice care if continued decline. MRI Brain showing no acute intracranial abnormalities. Continuing to fail swallow evaluations. ROS: 14 point review of systems is negative except as specifically addressed above. DIET TUBE FEED CONTINUOUS/CYCLIC NPO; STANDARD WITH FIBER (Jevity 1.2);  Nasogastric; Continuous; 50 (current rate); 55; 24  Dietary Nutrition Supplements: Protein Modular    Intake/Output Summary (Last 24 hours) at 10/26/2020 1056  Last data filed at 10/26/2020 1041  Gross per 24 hour   Intake 0 ml   Output 1250 ml   Net -1250 ml     Medications:   dextrose       Current Facility-Administered Medications   Medication Dose Route Frequency Provider Last Rate Last Dose    pantoprazole (PROTONIX) injection 40 mg  40 mg Intravenous BID Jairo Spann MD        And    sodium chloride (PF) 0.9 % injection 10 mL  10 mL Intravenous BID Jairo Spann MD        [facility] tablet 10 mg  10 mg Per NG tube Daily Jairo Spann MD   10 mg at 10/26/20 0917    isosorbide mononitrate (IMDUR) extended release tablet 30 mg  30 mg Oral Daily Dewitte Goodell Lynn Gardner MD   30 mg at 10/26/20 6973    losartan (COZAAR) tablet 25 mg  25 mg Per NG tube Daily Latricia Reynaga MD   25 mg at 10/26/20 0600    cloNIDine (CATAPRES) 0.1 MG/24HR 1 patch  1 patch Transdermal Once Latricia Reynaga MD   1 patch at 10/25/20 1258    And    cloNIDine (CATAPRES) 0.2 MG/24HR 1 patch  1 patch Transdermal Once Latricia Reynaga MD   1 patch at 10/25/20 1258    [START ON 11/1/2020] cloNIDine (CATAPRES) 0.3 MG/24HR 1 patch  1 patch Transdermal Weekly Latricia Reynaga MD        ziprasidone (GEODON) injection 10 mg  10 mg Intramuscular Q4H PRN Leonora [place], DO   10 mg at 10/22/20 1329    OLANZapine zydis (ZYPREXA) disintegrating tablet 5 mg  5 mg Oral Q6H PRN Leonora [place], DO   5 mg at 10/20/20 1039    hydrALAZINE (APRESOLINE) injection 20 mg  20 mg Intravenous Q4H PRN Leonora [place], DO   20 mg at 10/26/20 0220    sodium chloride flush 0.9 % injection 10 mL  10 mL Intravenous 2 times per day Leonora [place], DO   10 mL at 10/26/20 0930    sodium chloride flush 0.9 % injection 10 mL  10 mL Intravenous PRN Leonora [place], DO   10 mL at 10/23/20 1116    acetaminophen (TYLENOL) tablet 650 mg  650 mg Oral Q6H PRN Leonora [place], DO        Or    acetaminophen (TYLENOL) suppository 650 mg  650 mg Rectal Q6H PRN Leonora [place], DO        ondansetron (ZOFRAN-ODT) disintegrating tablet 4 mg  4 mg Oral Q8H PRN Leonora [place], DO        Or    ondansetron [facility]) injection 4 mg  4 mg Intravenous Q6H PRN Leonora [place], DO        insulin lispro (HUMALOG) injection vial 0-6 Units  0-6 Units Subcutaneous TID WC Leonora [place], DO        insulin lispro (HUMALOG) injection vial 0-3 Units  0-3 Units Subcutaneous Nightly Leonora [place], DO   1 Units at 10/23/20 2158    glucose (GLUTOSE) 40 % oral gel 15 g  15 g Oral PRN Leonora [place], DO        dextrose 50 % IV solution  12.5 g Intravenous PRN Leonora [place], DO        glucagon (rDNA) injection 1 mg  1 mg Intramuscular PRN Lorice Candace, DO        dextrose 5 % solution  100 mL/hr Intravenous PRN Lorice Candace, DO        hydrALAZINE (APRESOLINE) tablet 25 mg  25 mg Oral Q4H PRN Lorice Candace, DO        diphenoxylate-atropine (LOMOTIL) 2.5-0.025 MG per tablet 1 tablet  1 tablet Oral 4x Daily PRN Lorice Candace, DO        colchicine (COLCRYS) tablet 0.6 mg  0.6 mg Oral PRN Lorice Candace, DO        meclizine (ANTIVERT) tablet 25 mg  25 mg Oral 4x Daily PRN Lorice Candace, DO        prednisoLONE acetate (PRED FORTE) 1 % ophthalmic suspension 1 drop  1 drop Left Eye BID Lorice Candace, DO   1 drop at 10/26/20 0919    nitroGLYCERIN (NITROSTAT) SL tablet 0.4 mg  0.4 mg Sublingual Q5 Min PRN Patriciaice Candace, DO        mupirocin (BACTROBAN) 2 % ointment   Topical BID Will Candace, DO        lidocaine 4 % external patch 1 patch  1 patch Topical Daily Will Candace, DO   1 patch at 10/26/20 2752        Labs:     Recent Labs     10/24/20  0443 10/25/20  0343 10/26/20  0346   WBC 8.0 8.9 8.1   RBC 3.09* 2.77* 2.16*   HGB 10.1* 9.1* 7.1*   HCT 32.6* 28.9* 22.4*   .5* 104.3* 103.7*   MCH 32.7* 32.9* 32.9*   MCHC 31.0* 31.5* 31.7*    197 186     Recent Labs     10/24/20  0443 10/25/20  0343 10/26/20  0346    147* 148*   K 4.0 4.2 4.1   ANIONGAP 9 8 8   * 116* 116*   CO2 23 23 24   BUN 42* 49* 60*   CREATININE 1.1 1.1 1.1   GLUCOSE 158* 139* 159*   CALCIUM 9.4 9.2 9.2     Recent Labs     10/24/20  0443 10/25/20  0343 10/26/20  0346   MG 3.0* 2.6* 2.6*     Recent Labs     10/24/20  0443 10/25/20  0343 10/26/20  0346   AST 51* 32 23   ALT 44* 36 28   BILITOT 0.4 0.3 <0.2   ALKPHOS 79 70 64     ABGs:No results for input(s): PH, PO2, PCO2, HCO3, BE, O2SAT in the last 72 hours. Troponin T: No results for input(s): TROPONINI in the last 72 hours. INR: No results for input(s): INR in the last 72 hours.   Lactic Acid: No results for input(s): LACTA in the last 72 hours. Objective:   Vitals: /60   Pulse 55   Temp 97.5 °F (36.4 °C) (Temporal)   Resp 16   Ht 5' 10\" (1.778 m)   Wt 210 lb 9 oz (95.5 kg)   SpO2 90%   BMI 30.21 kg/m²   24HR INTAKE/OUTPUT:      Intake/Output Summary (Last 24 hours) at 10/26/2020 1056  Last data filed at 10/26/2020 1041  Gross per 24 hour   Intake 0 ml   Output 1250 ml   Net -1250 ml     General appearance: Alert, lethargic  HEENT: AT/NC  Lungs: no increased work of breathing, BLAE, no wheezing appreciated  Heart: RRR  Abdomen: BS+, soft, NT, ND  Extremities: no edema  Neurologic: Alert, lethargic  Skin: warm    Assessment and Plan:   Principal Problem:    Hypertensive urgency  Active Problems:    Type II diabetes mellitus with peripheral circulatory disorder (HCC)    Diabetic ulcer of left foot associated with type 2 diabetes mellitus (HCC)    S/P laminectomy    Obstructive sleep apnea syndrome    Hypertensive encephalopathy    Encephalopathy    History of stroke    Mild malnutrition (HCC)  Resolved Problems:    * No resolved hospital problems. *    AMS: ?Hypertensive encephalopathy. Clonidine patch. Failed swallow screens. Neurology on board. DNR/DNI. GI for PEG tube. Anemia: worsening. FOBT. PPI BID. HTN: Clonidine patch. Monitor BP and adjust medications PRN. DM: monitor BG and adjust medications PRN. INOCENCIO: Monitor BMP. Supportive management.     Advance Directive: DNR-CC    DVT prophylaxis: SCDs    Discharge planning: TBD      Signed:  Carmen Greene MD 10/26/2020 10:56 AM  Rounding Hospitalist

## 2020-10-26 NOTE — PROGRESS NOTES
Patient:   Aaron Ceja  MR#:    [id number]   Room:    [address] [place] [address]   YOB: 1934  Date of Progress Note: 10/26/2020  Time of Note                           12:12 PM  Consulting Physician:   Rosie Baez M.D. Attending Physician:  Tessa Chakraborty MD     Chief complaint AMS    S:This 80 y.o. male  is seen for altered mental status. The history is obtained from the chart as the patient is unable to provide any history. As per the admission/ER noted the patient was brought in to the ER with increased confusion with speech issues and difficulty with ambulation over 3 days. Apparently at baseline his is cognitively intact and has no significant issues with ambulation. There have been no medication changes or recent illnesses as per the ER note. His blood pressure has been recorded up to 223/80. He was moved to the ICU and placed in restraints.   Doing better but still confused      REVIEW OF SYSTEMS:  limited due to mental status    Past Medical History:      Diagnosis Date    Blood circulation, collateral     CAD (coronary artery disease)     STENTS X 3    CAD (coronary artery disease)     CABG    Cerebral artery occlusion with cerebral infarction ([address]     [facility]     Chronic kidney disease     DDD (degenerative disc disease), lumbar 7/11/2017    Diabetes mellitus ([address]     Disease of blood and blood forming organ     GERD (gastroesophageal reflux disease)     History of blood transfusion     Hx of blood clots     Right leg    Hyperlipidemia     Hypertension     Lumbar stenosis with neurogenic claudication 7/11/2017    MI, old     X 2    Other disorders of kidney and ureter in diseases classified elsewhere     Palliative care patient 03/06/2018    Right heart failure ([address] 6/29/2018    Sleep apnea     DOESN'T USE MACHINE    TIA (transient ischemic attack)        Past Surgical History:      Procedure Laterality Date   [address] x 3    CARDIAC SURGERY  1990    Bypass x 3    CARPAL TUNNEL RELEASE Bilateral 1980    wrist and elbows    CHOLECYSTECTOMY  2000    COLONOSCOPY      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 2    DILATATION, ESOPHAGUS      ENDOSCOPY, COLON, DIAGNOSTIC      EYE SURGERY      JOINT REPLACEMENT Left 1990    JOINT REPLACEMENT Right 1995    LAMINECTOMY N/A 7/11/2017    LUMBAR LAMINECTOMY POSTERIOR  L23 L34 performed by Shashank Segura MD at [id number] Left 1990    ROTATOR CUFF REPAIR Right 2010    TUMOR REMOVAL Left 1960    foot    TURP  2000    VASCULAR SURGERY Left 7/15/2015 [facility] & [address]    Aortoiliofemoral a'gram with bilateral lower extremity runoff; select views 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; a'plasty of left anterior tibial artery and dorsalis pedis artery with 2.5x300 vascutrak balloon and then with 3x220 nati balloon; completion a'gram       Medications in Hospital:      Current Facility-Administered Medications:     pantoprazole (PROTONIX) injection 40 mg, 40 mg, Intravenous, BID **AND** sodium chloride (PF) 0.9 % injection 10 mL, 10 mL, Intravenous, BID, Carmen Greene MD    amLODIPine (NORVASC) tablet 10 mg, 10 mg, Per NG tube, Daily, Carmen Greene MD, 10 mg at 10/26/20 2992    isosorbide mononitrate (IMDUR) extended release tablet 30 mg, 30 mg, Oral, Daily, Carmen Greene MD, 30 mg at 10/26/20 0917    losartan (COZAAR) tablet 25 mg, 25 mg, Per NG tube, Daily, Carmen Greene MD, 25 mg at 10/26/20 0917    cloNIDine (CATAPRES) 0.1 MG/24HR 1 patch, 1 patch, Transdermal, Once, 1 patch at 10/25/20 1258 **AND** cloNIDine (CATAPRES) 0.2 MG/24HR 1 patch, 1 patch, Transdermal, Once, Carmen Greene MD, 1 patch at 10/25/20 1258    [START ON 11/1/2020] cloNIDine (CATAPRES) 0.3 MG/24HR 1 patch, 1 patch, Transdermal, Weekly, Carmen Greene MD    ziprasidone (GEODON) injection 10 mg, 10 mg, HCT 22.4 (L) 10/26/2020    .7 (H) 10/26/2020     10/26/2020     Lab Results   Component Value Date     (H) 10/26/2020    K 4.1 10/26/2020     (H) 10/26/2020    CO2 24 10/26/2020    BUN 60 (H) 10/26/2020    CREATININE 1.1 10/26/2020    GLUCOSE 159 (H) 10/26/2020    CALCIUM 9.2 10/26/2020    PROT 4.2 (L) 10/26/2020    LABALBU 2.4 (L) 10/26/2020    BILITOT <0.2 10/26/2020    ALKPHOS 64 10/26/2020    AST 23 10/26/2020    ALT 28 10/26/2020    LABGLOM >60 10/26/2020    GFRAA >59 10/26/2020     Lab Results   Component Value Date    INR 1.10 12/08/2019    INR 1.02 11/28/2019    INR 1.10 12/23/2018    PROTIME 13.6 12/08/2019    PROTIME 12.8 11/28/2019    PROTIME 13.6 12/23/2018       EEG [[id number]]      Resulted: 10/19/20 0719     Updated: 10/19/20 0721     Narrative:      Jacob Mcclelland MD     10/19/2020  7:21 AM     Patient: Jeremie Rodrigues   MR#:    [id number]   Room:    8081/513-41   Date of Birth:   1934   Date of Progress Note: 10/19/2020   Time of Note                           7:19 AM   Consulting Physician:   Jacob Mcclelland M.D. Attending Physician: Sole Alonzo MD         This is a multichannel digital EEG recording using the   international 10-20 placement system. Technical Summary:     Background EEG activity: There is no well defined occipital   dominant rhythm. There is much low voltage 5-7 Hz activity seen in   a generalized distribution intermixed with low voltage 18-22 Hz   activity. There is low to moderate voltage 1.5-3 Hz activity seen   intermittently. Photic Stimulation: No abnormal waveforms are noted with various   frequencies of flickering light. IMPRESSION:   This is an abnormal EEG due to the diffuse slowing of the   background. This finding is consistent with a diffuse disturbance   in cerebral function. No clear epileptiform activity was noted   during the recording period.        Dr Kitty Hernandez Certified in Neurology   Board Certified in Clinical Neurophysiology   Fellowship trained in Clinical Neurophysiology        [address] [[id number]]      Resulted: 10/20/20 1447     Updated: 10/20/20 1449     Narrative:      EXAMINATION: CT HEAD WO CONTRAST 10/20/2020 2:45 PM   HISTORY: CT BRAIN without contrast 10/20/2020 1:29 PM   HISTORY: Confusion   COMPARISON: October 17, 2020   DLP: 1472 mGy cm   TECHNIQUE: Serial axial tomographic images of the brain were obtained   without the use of intravenous contrast.   FINDINGS:   There is some limitation this exam due to patient motion. The midline structures are nondisplaced. There is mild cerebral and   cerebellar volume loss, with an associated increase in the prominence   of the ventricles and sulci. The prominent ventricles are unchanged. This likely this is deep atrophy. However normal pressure   hydrocephalus cannot be excluded. The basilar cisterns are normal in   size and configuration. There is no evidence of intracranial   hemorrhage or mass-effect. There is low attenuation in the   periventricular white matter, consistent with chronic ischemic change. There are no abnormal extra-axial fluid collections. There is no   evidence of tonsillar herniation. The included orbits and their contents are unremarkable. The   visualized paranasal sinuses, mastoid air cells and middle ear   cavities are clear. The visualized osseous structures and overlying   soft tissues of the skull and face are intact. Impression:      Prominent ventricular system is noted. This may be either deep atrophy   or normal pressure hydrocephalus. 2. No acute intracranial abnormalities identified.    3. Chronic microvascular ischemic changes present in the   paraventricular white matter   Signed by Dr Kallie Gilmore on 10/20/2020 2:47 PM          RECORD REVIEW: Previous medical records, medications were reviewed at today's visit    IMPRESSION:   Altered mental status/encephalopathy/Hypertensive tanxvxt-inofxdez-gymuigza but still fluctuating     Initially non focal exam-confused/disordiented and not able to follow commands  CT head no clear acute changes noted-chronic infarcts right frontal and left occipital region     Certainly acute ischemic stroke/ PRESS / hypertensive encephalopathy are in the differential      Unable to get MRI brain at this time- Ct of the head no acute changes noted   EEG diffusely slow post ativan for MRI  B12/TSH/T4/ammonia levels unremarkable  Abg with hypoxia-supplement oxygen     DC Neurontin/Requip in the event these are contributing to confusion  Banana bag x 1  On MVI/folic acid  Start Thiamine 500 mg IV daily     CAD-ASA/statin  Hypertensive urgency-improved  DM-monitor BS  BPH-on meds  Low back pain-Lidoderm patch/tylenol PRN     minimize sedation as able     Continue present care          CALL WITH ANY QUESTIONS  [phone] CELL  Dr Arpita Ashley

## 2020-10-26 NOTE — PROGRESS NOTES
Comprehensive Nutrition Assessment    Type and Reason for Visit:  Reassess    Nutrition Recommendations/Plan: Modifying TF to goal rate of 55 mL/hr with Proteinex flush once daily. Add H2O flush of 30 mL hourly via pump. Nutrition Assessment:  Pt improving from a nutritional standpoint AEB initiation of EN. Recommend modifying TF to more closely meet nutritional needs and hydration needs. Na+=148 this AM with elevated BUN:Cr. No H2O flushes currently ordered. Malnutrition Assessment:  Malnutrition Status:  Mild malnutrition    Context:  Acute Illness     Findings of the 6 clinical characteristics of malnutrition:  Energy Intake:  7 - 50% or less of estimated energy requirements for 5 or more days  Weight Loss:  No significant weight loss     Body Fat Loss:  Unable to assess     Muscle Mass Loss:  Unable to assess    Fluid Accumulation:  1 - Mild Extremities, Generalized   Strength:  Not Performed    Estimated Daily Nutrient Needs:  Energy (kcal):  9473-3438; Weight Used for Energy Requirements:  Current     Protein (g):  ; Weight Used for Protein Requirements:  Ideal(1.2-2.0)        Fluid (ml/day):  9181-6618; Weight Used for Fluid Requirements:  Current      Wounds:  None       Current Nutrition Therapies:    DIET TUBE FEED CONTINUOUS/CYCLIC NPO; STANDARD WITH FIBER; Nasogastric; Continuous; 5; 50  Current Tube Feeding (TF) Orders:  · Feeding Route: Nasogastric  · Formula: Standard w/Fiber  · Schedule: Continuous   · Water Flushes: None ordered  · Current TF & Flush Orders Provides: Jevity 1.2 @ 50 mL/hr= 1440 kcal, 67 g PRO, 203 g CHO, 968 mL free water  · Goal TF & Flush Orders Provides: Jevity 1.2 @ 55 mL/hr with flush of Proteinex once daily= 1688 kcal, 99 g PRO, 224 g CHO, 1065 mL free water. Additional 720 mL from flushes.       Anthropometric Measures:  · Height: 5' 10\" (177.8 cm)  · Current Body Weight: 210 lb 9 oz (95.5 kg)   · Usual Body Weight: 197 lb (89.4 kg)(12/2019)     · Ideal Body Weight: 166 lbs; % Ideal Body Weight 125.8 %   · BMI: 30.2  · BMI Categories: Obese Class 1 (BMI 30.0-34. 9)       Nutrition Diagnosis:   · Inadequate oral intake related to acute injury/trauma as evidenced by NPO or clear liquid status due to medical condition    Nutrition Interventions:   Food and/or Nutrient Delivery:  Continue NPO, Modify Tube Feeding  Nutrition Education/Counseling:  No recommendation at this time   Coordination of Nutrition Care:  Continued Inpatient Monitoring    Goals:  Meet nutrient needs through oral intake/AMONS if consistent with goals of care       Nutrition Monitoring and Evaluation:   Food/Nutrient Intake Outcomes:  Diet Advancement/Tolerance  Physical Signs/Symptoms Outcomes:  Biochemical Data, Weight     Discharge Planning:     Too soon to determine     Electronically signed by Liset Galarza RD, DAVIS on 10/26/20 at 10:01 AM CDT    Contact: [phone]

## 2020-10-26 NOTE — PLAN OF CARE
Problem: Nutrition  Goal: Optimal nutrition therapy  Outcome: Ongoing   Nutrition Problem #1: Inadequate oral intake  Intervention: Food and/or Nutrient Delivery: Continue NPO, Modify Tube Feeding  Nutritional Goals: Meet nutrient needs through oral intake/AMONS if consistent with goals of care

## 2020-10-26 NOTE — PROGRESS NOTES
Speech Language Pathology  Facility/Department: [facility] 4 ONCOLOGY UNIT  SWALLOW THERAPY     NAME: Kim Guajardo  : 1934  MRN: [id number]    ADMISSION DATE: 10/17/2020  ADMITTING DIAGNOSIS: has Type II diabetes mellitus with peripheral circulatory disorder ([address] Diabetic ulcer of left foot associated with type 2 diabetes mellitus ([address] 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 ([address] Lumbar stenosis with neurogenic claudication; DDD (degenerative disc disease), lumbar; S/P laminectomy; Obstructive sleep apnea syndrome; CHF (congestive heart failure), NYHA class I, acute on chronic, combined ([address] Coronary artery disease involving native coronary artery of native heart; Abnormal CT scan, bladder; Retention, urine; Bilateral renal cysts; Acute on chronic congestive heart failure ([address] Ischemic cardiomyopathy; Acute chest pain; Bradycardia; Right heart failure ([address] Essential hypertension; Angina of effort ([address] Hx of CABG; Chest pain; Palliative care patient; INOCENCIO (acute kidney injury) ([address] Ulcer of right midfoot with fat layer exposed ([address] Open wound of right great toe; Skin ulcer of great toe, right, with fat layer exposed ([address] Vertigo; Valvular heart disease; Syncope and collapse; NSTEMI (non-ST elevated myocardial infarction) ([address] CKD (chronic kidney disease), stage III; Hypertensive encephalopathy; Hypertensive urgency; Encephalopathy; History of stroke; and Mild malnutrition ([address] on their problem list.    Date of Treat: 10/26/2020  Evaluating Therapist: Anne Marie Baker    Current Diet level:  NPO    Reason for Referral  Kim Guajardo was referred for a bedside swallow evaluation to assess the efficiency of his swallow function, identify signs and symptoms of aspiration and make recommendations regarding safe dietary consistencies, effective compensatory strategies, and safe eating environment. Impression  Re-assessed patient's swallowing function. Patient exhibits slow, decreased oral prep and slow oral transit. Patient also exhibits inconsistent absent swallows. At times, patient exhibited sluggish, mild-moderately decreased laryngeal elevation for swallow airway protection. Other times, no laryngeal elevation was noted and just laryngeal rocking was observed. Patient exhibits decreased airway detection with no outward coughs/throat clears noted following probable penetration/aspiration of PO trials (single ice chip trials and 1/2 teaspoon trials thin H2O presented by SLP).    At this time, recommend continuation NPO status. Family to make decisions regarding means of nutrition. Recommend oral care, VIA STAFF, every 3-4 hours. If patient receives mouth care prior to intake, okay for swabs of thin H2O for comfort.     Treatment Plan  Requires SLP Intervention: Yes     Recommended Diet and Intervention  Diet Solids Recommendation: NPO  Liquid Consistency Recommendation: NPO  Therapeutic Interventions: Patient/Family education;Oral Care; Therapeutic PO trials with SLP     Treatment/Goals  Timeframe for Short-term Goals: 1x/day for 3 days   Goal 1: Patient NPO. Goal 2: Patient staff will follow swallow safety recommendations.   Goal 3: Patient will receive daily oral care, via staff, to decrease bacteria from the oral cavity.   Goal 4: Re-assessment of swallow function for potential PO intake. Goal 4: Monitor speech production.     General  Chart Reviewed: Yes  Behavior/Cognition: Alert  O2 Device: None (Room air)  Communication Observation: (Patient exhibits decreased labial movements and slow, decreased lingual movements during verbalizations.)  Follows Directions: Simple   Patient Positioning: Upright in bed  Consistencies Administered: Ice chips; Thin - spoon      Re-assessed patient's swallowing function with the following observations noted:     Oral Phase  Mastication: Ice chips (Patient exhibited slow vertical jaw movement at the front of the mouth during oral prep of single ice chip trials presented by SLP.)  Suspected Premature Bolus Loss: Ice Chips; Thin - spoon (Patient exhibited slow oral transit of ice chip trials and 1/2 teaspoon trials thin H2O all presented by SLP. )     Pharyngeal Phase  Suspected Swallow Delay: Ice chips; Thin - spoon (Suspect, when the patient swallowed, secondary to oral transit times.)  Decreased Laryngeal Elevation: Ice chips; Thin - spoon (When the patient swallowed, patient exhibited sluggish, mild-moderately decreased laryngeal elevation for swallow airway protection.)  Absent Swallows: Ice chip; Thin - spoon (At times, patient exhibited absent swallows with no laryngeal elevation noted and just laryngeal rocking observed.)  Pharyngeal Phase - Comment: As previously mentioned, patient exhibited inconsistent absent swallows. At times, patient exhibited sluggish, mild-moderately decreased laryngeal elevation for swallow airway protection. Other times, no laryngeal elevation was noted and just laryngeal rocking was observed. Patient exhibited decreased airway detection with no outward coughs/throat clears noted following probable penetration/aspiration of PO trials (single ice chip trials and 1/2 teaspoon trials thin H2O presented by SLP).    At this time, recommend continuation NPO status. Family to make decisions regarding means of nutrition. Recommend oral care, VIA STAFF, every 3-4 hours. If patient receives mouth care prior to intake, okay for swabs of thin H2O for comfort.     Electronically signed by DENY Denise on 10/26/2020 at 10:39 AM

## 2020-10-26 NOTE — PROGRESS NOTES
Physical Therapy   Name: Loki Herrera  MRN:  [id number]  Date of service:  10/26/2020  Pt. in B wrist restraints and very confused per Pilo KING. Will f/u at a later time to see if pt is appropriate for PT.   Electronically signed by Franki Mcgrath PT on 10/26/2020 at 9:54 AM

## 2020-10-27 PROBLEM — N18.30 CKD (CHRONIC KIDNEY DISEASE), STAGE III (HCC): Status: ACTIVE | Noted: 2020-01-24

## 2020-10-27 PROBLEM — M54.9 CHRONIC BACK PAIN: Status: ACTIVE | Noted: 2019-05-02

## 2020-10-27 PROBLEM — R41.0 INTERMITTENT CONFUSION: Status: ACTIVE | Noted: 2020-01-01

## 2020-10-27 PROBLEM — G89.29 CHRONIC BACK PAIN: Status: ACTIVE | Noted: 2019-05-02

## 2020-10-27 PROBLEM — R41.82 ALTERED MENTAL STATUS: Status: ACTIVE | Noted: 2020-01-01

## 2020-10-27 PROBLEM — G93.40 ENCEPHALOPATHY: Status: ACTIVE | Noted: 2020-01-01

## 2020-10-27 PROBLEM — I16.0 HYPERTENSIVE URGENCY: Status: ACTIVE | Noted: 2020-01-01

## 2020-10-27 PROBLEM — I25.5 ISCHEMIC CARDIOMYOPATHY: Status: ACTIVE | Noted: 2020-01-01

## 2020-10-27 PROBLEM — K59.09 CHRONIC CONSTIPATION: Status: ACTIVE | Noted: 2020-01-01

## 2020-10-27 PROBLEM — D62 ACUTE BLOOD LOSS ANEMIA: Status: ACTIVE | Noted: 2020-01-01

## 2020-10-27 PROBLEM — I50.43 CHF (CONGESTIVE HEART FAILURE), NYHA CLASS I, ACUTE ON CHRONIC, COMBINED (HCC): Status: ACTIVE | Noted: 2018-03-05

## 2020-10-27 PROBLEM — Z86.73 HISTORY OF STROKE: Status: ACTIVE | Noted: 2020-01-01

## 2020-10-27 NOTE — PROGRESS NOTES
Report called to Jalyn at [facility] 24 in route.      Electronically signed by Ruthy Guajardo RN on 10/26/2020 at 8:06 PM

## 2020-10-28 PROBLEM — N17.9 ACUTE KIDNEY INJURY (HCC): Status: ACTIVE | Noted: 2020-01-01

## 2020-10-28 PROBLEM — R19.5 HEME POSITIVE STOOL: Status: ACTIVE | Noted: 2020-01-01

## 2020-10-28 PROBLEM — E87.0 HYPERNATREMIA: Status: ACTIVE | Noted: 2020-01-01

## 2020-10-28 PROBLEM — K92.2 GI BLEED: Status: ACTIVE | Noted: 2020-01-01

## 2020-10-28 NOTE — ANESTHESIA POSTPROCEDURE EVALUATION
Patient: Eron Escalante    Procedure Summary     Date: 10/28/20 Room / Location: [facility] ENDOSCOPY 4 / [facility] ENDOSCOPY    Anesthesia Start: 1139 Anesthesia Stop: 1201    Procedure: ESOPHAGOGASTRODUODENOSCOPY WITH ANESTHESIA (N/A ) Diagnosis:       Heme positive stool      (Heme positive stool [R19.5])    Surgeon: Forrest Bliss MD Provider: Elpidio Ruelas CRNA    Anesthesia Type: MAC ASA Status: 3          Anesthesia Type: MAC    Vitals  No vitals data found for the desired time range.          Post Anesthesia Care and Evaluation    Patient location during evaluation: PACU  Patient participation: complete - patient participated  Level of consciousness: awake and alert  Pain management: adequate  Airway patency: patent  Anesthetic complications: No anesthetic complications    Cardiovascular status: acceptable  Respiratory status: acceptable  Hydration status: acceptable    Comments: Blood pressure 127/62, pulse 57, temperature 97.9 °F (36.6 °C), temperature source Oral, resp. rate 18, weight 99.8 kg (220 lb), SpO2 93 %.    Pt discharged from PACU based on martina score >8

## 2020-10-28 NOTE — ANESTHESIA PREPROCEDURE EVALUATION
Anesthesia Evaluation     Patient summary reviewed and Nursing notes reviewed   no history of anesthetic complications:               Airway   Mallampati: I  TM distance: <3 FB  Neck ROM: full  no difficulty expected  Dental - normal exam         Pulmonary - normal exam   (+) COPD, sleep apnea on CPAP,   Cardiovascular - normal exam    Patient on routine beta blocker and Beta blocker not taken-may be given intraoperatively    (+) hypertension, valvular problems/murmurs (mild as) MS, past MI , CAD, CABG, cardiac stents more than 12 months ago dysrhythmias Atrial Fib, CHF ,     ROS comment: Echo 12/2016- ef 60-65%, mild as    Will give IV metoprolol prior to OR    Neuro/Psych  (+) CVA,     GI/Hepatic/Renal/Endo    (+)  GERD,  renal disease CRI, diabetes mellitus type 2,   (-) hepatitis, liver disease    Musculoskeletal     Abdominal    Substance History      OB/GYN          Other   arthritis,      ROS/Med Hx Other: Family noted bloody stool.  H&H noted drop.  Encephalopathic                    Anesthesia Plan    ASA 3     MAC   total IV anesthesia  intravenous induction     Anesthetic plan, all risks, benefits, and alternatives have been provided, discussed and informed consent has been obtained with: patient.

## 2020-10-29 PROBLEM — B96.20 E. COLI UTI (URINARY TRACT INFECTION): Status: ACTIVE | Noted: 2020-01-01

## 2020-10-29 PROBLEM — N39.0 E. COLI UTI (URINARY TRACT INFECTION): Status: ACTIVE | Noted: 2020-01-01

## 2020-11-01 NOTE — PROGRESS NOTES
[facility] Medicine Services  INPATIENT PROGRESS NOTE    Length of Stay: 6  Date of Admission: 10/26/2020  Primary Care Physician: Jeffrey Owen MD    Subjective     Chief Complaint:     Confusion    HPI     The patient is sleeping at the time of my arrival but awakened quickly and was conversational.  Confusion continues to sarah.  Renal function continues to slowly improve with creatinine down to 1.80 today.  There is evidence of mild fluid overload with some wheezing noted.  Dr. Victoria was contacted and IV fluids have been decreased to 50 cc/h.  Oxygen saturations remain in the high 90s on room air.  The family continues to consider surgical feeding tube placement however the patient is currently eating adequately enough to avoid PEG tube placement.      Review of Systems     All pertinent negatives and positives are as above. All other systems have been reviewed and are negative unless otherwise stated.     Objective    Temp:  [97.5 °F (36.4 °C)-98.6 °F (37 °C)] 97.5 °F (36.4 °C)  Heart Rate:  [53-58] 53  Resp:  [20-24] 22  BP: (120-154)/(49-62) 152/49    Lab Results (last 24 hours)     Procedure Component Value Units Date/Time    POC Glucose Once [[id number]]  (Abnormal) Collected: 11/01/20 1624    Specimen: Blood Updated: 11/01/20 1636     Glucose 151 mg/dL      Comment: : [id number] Georges MeganMeter ID: [id number]       POC Glucose Once [[id number]]  (Abnormal) Collected: 11/01/20 1142    Specimen: Blood Updated: 11/01/20 1153     Glucose 209 mg/dL      Comment: : [id number] Georges MeganMeter ID: [id number]       POC Glucose Once [[id number]]  (Abnormal) Collected: 11/01/20 0913    Specimen: Blood Updated: 11/01/20 0924     Glucose 206 mg/dL      Comment: : [id number] Georges MeganMeter ID: [id number]       Comprehensive Metabolic Panel [[id number]]  (Abnormal) Collected: 11/01/20 0624    Specimen: Blood Updated: 11/01/20 0741     Glucose 170 mg/dL      BUN 69  mg/dL      Creatinine 1.80 mg/dL      Sodium 150 mmol/L      Potassium 3.7 mmol/L      Chloride 119 mmol/L      CO2 24.0 mmol/L      Calcium 8.7 mg/dL      Total Protein 4.6 g/dL      Albumin 2.20 g/dL      ALT (SGPT) 31 U/L      AST (SGOT) 35 U/L      Alkaline Phosphatase 79 U/L      Total Bilirubin 0.3 mg/dL      eGFR Non African Amer 36 mL/min/1.73      Globulin 2.4 gm/dL      A/G Ratio 0.9 g/dL      BUN/Creatinine Ratio 38.3     Anion Gap 7.0 mmol/L     Narrative:      GFR Normal >60  Chronic Kidney Disease <60  Kidney Failure <15      CBC & Differential [[id number]]  (Abnormal) Collected: 11/01/20 0624    Specimen: Blood Updated: 11/01/20 0711    Narrative:      The following orders were created for panel order CBC & Differential.  Procedure                               Abnormality         Status                     ---------                               -----------         ------                     CBC Auto Differential[[id number]]        Abnormal            Final result                 Please view results for these tests on the individual orders.    CBC Auto Differential [[id number]]  (Abnormal) Collected: 11/01/20 0624    Specimen: Blood Updated: 11/01/20 0711     WBC 8.78 10*3/mm3      RBC 3.13 10*6/mm3      Hemoglobin 9.2 g/dL      Hematocrit 28.1 %      MCV 89.8 fL      MCH 29.4 pg      MCHC 32.7 g/dL      RDW 24.7 %      RDW-SD 76.4 fl      MPV 11.6 fL      Platelets 168 10*3/mm3      Neutrophil % 78.5 %      Lymphocyte % 9.6 %      Monocyte % 5.6 %      Eosinophil % 4.7 %      Basophil % 0.1 %      Immature Grans % 1.5 %      Neutrophils, Absolute 6.90 10*3/mm3      Lymphocytes, Absolute 0.84 10*3/mm3      Monocytes, Absolute 0.49 10*3/mm3      Eosinophils, Absolute 0.41 10*3/mm3      Basophils, Absolute 0.01 10*3/mm3      Immature Grans, Absolute 0.13 10*3/mm3      nRBC 0.0 /100 WBC     Narrative:      transfused          Imaging Results (Last 24 Hours)     ** No results found for the last 24 hours. **              Intake/Output Summary (Last 24 hours) at 11/1/2020 1651  Last data filed at 11/1/2020 1552  Gross per 24 hour   Intake 1970 ml   Output 600 ml   Net 1370 ml       Physical Exam  Constitutional:       Appearance: He is not diaphoretic.      Comments: Awake and alert; talkative   HENT:      Head: Normocephalic.      Mouth/Throat:      Pharynx: No oropharyngeal exudate (moist).   Eyes:      Pupils: Pupils are equal, round, and reactive to light.   Neck:      Musculoskeletal: Neck supple.   Cardiovascular:      Rate and Rhythm: Normal rate.   Pulmonary:      Effort: Pulmonary effort is normal.      Comments: Currently on room air  Abdominal:      Palpations: Abdomen is soft.   Musculoskeletal:         General: Swelling present.   Skin:     General: Skin is warm.   Neurological:      Comments: Awake; alert.    He is oriented to place and person.  He is conversational and oriented x3.    Results Review:  I have reviewed the labs, radiology results, and diagnostic studies since my last progress note and made treatment changes reflective of the results.   I have reviewed the current medications.    Assessment/Plan     Active Hospital Problems    Diagnosis   • **Encephalopathy   • E. coli UTI (urinary tract infection)   • GI bleed   • Hypernatremia   • Acute kidney injury (CMS/HCC)   • Acute blood loss anemia   • History of stroke   • Heme positive stool     Added automatically from request for surgery [id number]     • Intermittent confusion   • CKD (chronic kidney disease), stage III   • CHF (congestive heart failure) (CMS/[facility])   • Duodenal ulcer   • A-fib (CMS/Formerly [facility])   • HTN (hypertension)   • Type 2 diabetes mellitus (CMS/Formerly [facility])       PLAN:  Continue Rocephin  Agree with fluid decreased to 50 cc/h  Continue to encourage p.o. food and fluid intake  SNF referral pending    Electronically signed by Rocky Nixon DO, 11/01/20, 16:51 CST.

## 2020-11-01 NOTE — PROGRESS NOTES
Nephrology ([facility]) Progress Note      Patient:  Eron Escalante  YOB: 1934  Date of Service: 11/1/2020  MRN: [id number]   Acct: [id number]   Primary Care Physician: Jeffrey Owen MD  Advance Directive:   Code Status and Medical Interventions:   Ordered at: 10/27/20 0304     Limited Support to NOT Include:    Intubation     Level Of Support Discussed With:    Next of Kin (If No Surrogate)    Health Care Surrogate     Code Status:    No CPR     Medical Interventions (Level of Support Prior to Arrest):    Limited     Admit Date: 10/26/2020       Hospital Day: 6  Referring Provider: Alcides Tomlinson MD      Patient personally seen and examined.  Complete chart including Consults, Notes, Operative Reports, Labs, Cardiology, and Radiology studies reviewed as able.    Chief complaint: Abnormal labs.    Subjective:    Eron Escalante is a 85 y.o. male  whom we were consulted for acute kidney injury/chronic kidney disease.  Patient has stage III chronic kidney disease baseline and has seen me previously.  He was initially admitted to [facility] with encephalopathy, MRI of the brain did not show any evidence of intracranial pathology.  However he was transferred to [facility] as per family request.  Neurological work-up over here consistent with repeat MRI scan consistent with microvascular chronic changes.  Patient also has bilateral Doppler studies of carotid arteries consistent with mild to moderate stenosis but no intervention recommended.  However his serum creatinine continues to rise, patient also has hyponatremia as he has not been drinking due to severe encephalopathy.  Dr. Tomlinson just started hypotonic fluid for correction of hypernatremia as well as high BUN.  Family is at the bedside.      This afternoon he is much improved, more alert and awake and was able to eat his breakfast.    Allergies:  Lorazepam, Penicillins, and Adhesive  tape    Home Meds:  Medications Prior to Admission   Medication Sig Dispense Refill Last Dose   • allopurinol (ZYLOPRIM) 300 MG tablet Take 600 mg by mouth Daily.   Unknown at Unknown time   • amLODIPine (NORVASC) 5 MG tablet Take 5 mg by mouth Daily.   Unknown at Unknown time   • aspirin 81 MG chewable tablet Chew 81 mg Daily.   Unknown at Unknown time   • Cholecalciferol (VITAMIN D) 1000 units tablet Take 1,000 Units by mouth.   Unknown at Unknown time   • colchicine 0.6 MG tablet Take 0.6 mg by mouth Daily As Needed (gout flare-up).   Unknown at Unknown time   • finasteride (PROSCAR) 5 MG tablet Take 5 mg by mouth Daily.   Unknown at Unknown time   • folic acid (FOLVITE) 400 MCG tablet Take 400 mcg by mouth.   Unknown at Unknown time   • glimepiride (AMARYL) 4 MG tablet Take 4 mg by mouth 2 (Two) Times a Day With Meals. Needs pm dose   Unknown at Unknown time   • losartan (COZAAR) 50 MG tablet Take 25 mg by mouth Daily.   Unknown at Unknown time   • meclizine (ANTIVERT) 25 MG tablet Take 25 mg by mouth. 3-4 times daily   Unknown at Unknown time   • metoprolol succinate XL (TOPROL XL) 25 MG 24 hr tablet Take 25 mg by mouth every night at bedtime.   Unknown at Unknown time   • nitroglycerin (NITROSTAT) 0.4 MG SL tablet Place 0.4 mg under the tongue Every 5 (Five) Minutes As Needed.   Unknown at Unknown time   • pantoprazole (PROTONIX) 40 MG EC tablet Take 40 mg by mouth Every 12 (Twelve) Hours.   Unknown at Unknown time   • potassium chloride (K-DUR,KLOR-CON) 20 MEQ CR tablet Take 10 mEq by mouth Every 12 (Twelve) Hours.   Unknown at Unknown time   • Pyridoxine HCl (VITAMIN B6) 200 MG tablet Take 1 tablet by mouth Daily.   Unknown at Unknown time   • rOPINIRole (REQUIP) 1 MG tablet Take 1 mg by mouth Every Night. Take 1 hour before bedtime.   Unknown at Unknown time   • sennosides-docusate sodium (SENOKOT-S) 8.6-50 MG tablet Take 2 tablets by mouth 2 (Two) Times a Day As Needed for constipation. 45 tablet 2 Unknown  at Unknown time   • tamsulosin (FLOMAX) 0.4 MG capsule 24 hr capsule Take 1 capsule by mouth Daily.   Unknown at Unknown time   • thiamine (VITAMIN B-1) 100 MG tablet Take 100 mg by mouth.   Unknown at Unknown time   • vitamin C (ASCORBIC ACID) 500 MG tablet Take 500 mg by mouth Daily.   Unknown at Unknown time       Medicines:  Current Facility-Administered Medications   Medication Dose Route Frequency Provider Last Rate Last Dose   • acetaminophen (TYLENOL) tablet 650 mg  650 mg Oral Q4H PRN Forrest Bliss MD        Or   • acetaminophen (TYLENOL) suppository 650 mg  650 mg Rectal Q4H PRN Forrest Bliss MD       • allopurinol (ZYLOPRIM) tablet 300 mg  300 mg Oral Daily Forrest Bliss MD   300 mg at 11/01/20 0916   • cefTRIAXone (ROCEPHIN) 1 g/100 mL 0.9% NS (MBP)  1 g Intravenous Q24H Rocky Nixon DO   1 g at 10/31/20 1720   • cholecalciferol (VITAMIN D3) tablet 1,000 Units  1,000 Units Oral Daily Forrest Bliss MD   1,000 Units at 11/01/20 0916   • colchicine tablet 0.6 mg  0.6 mg Oral Daily PRN Forrest Bliss MD       • dextrose (D50W) 25 g/ 50mL Intravenous Solution 25 g  25 g Intravenous Q15 Min PRN Forrest Bliss MD       • dextrose (GLUTOSE) oral gel 15 g  15 g Oral Q15 Min PRN Forrest Bliss MD       • dextrose 5 % 995 mL with potassium chloride 10 mEq infusion   Intravenous Continuous Pablito Victoria  mL/hr at 11/01/20 0400     • folic acid (FOLVITE) tablet 400 mcg  400 mcg Oral Daily Forrest Bliss MD   400 mcg at 11/01/20 0916   • glucagon (human recombinant) (GLUCAGEN DIAGNOSTIC) injection 1 mg  1 mg Subcutaneous Q15 Min PRN Forrest Bliss MD       • hydrALAZINE (APRESOLINE) injection 10 mg  10 mg Intravenous Q4H PRN Forrest Bliss MD       • insulin lispro (humaLOG) injection 2-7 Units  2-7 Units Subcutaneous TID AC Forrest Bliss MD   3 Units at 11/01/20 1154   • iron sucrose (VENOFER) 200 mg in sodium chloride 0.9 % 100 mL IVPB  200 mg  Intravenous Q24H Pablito Victoria MD   200 mg at 10/31/20 1554   • isosorbide mononitrate (IMDUR) 24 hr tablet 30 mg  30 mg Oral Q24H Forrest Bliss MD   30 mg at 11/01/20 0916   • lansoprazole (FIRST) oral suspension 30 mg  30 mg Oral Q AM Iain Rosenberg MD   30 mg at 11/01/20 0921   • nitroglycerin (NITROSTAT) SL tablet 0.4 mg  0.4 mg Sublingual Q5 Min PRN Forrest Bliss MD   0.4 mg at 10/27/20 1600   • ondansetron (ZOFRAN) tablet 4 mg  4 mg Oral Q6H PRN Forrest Bliss MD        Or   • ondansetron (ZOFRAN) injection 4 mg  4 mg Intravenous Q6H PRN Forrest Bliss MD       • potassium chloride (MICRO-K) CR capsule 10 mEq  10 mEq Oral BID With Meals Forrest Bliss MD   10 mEq at 11/01/20 0916   • pravastatin (PRAVACHOL) tablet 40 mg  40 mg Oral Nightly Forrest Bliss MD   40 mg at 10/31/20 2139   • prednisoLONE acetate (PRED FORTE) 1 % ophthalmic suspension 1 drop  1 drop Left Eye Q2H While Awake Forrest Bliss MD   1 drop at 11/01/20 1314   • sennosides-docusate (PERICOLACE) 8.6-50 MG per tablet 2 tablet  2 tablet Oral BID PRN Forrest Bliss MD       • sodium chloride 0.9 % flush 10 mL  10 mL Intravenous Q12H Forrest Bliss MD   10 mL at 11/01/20 0916   • sodium chloride 0.9 % flush 10 mL  10 mL Intravenous PRN Forrest Bliss MD       • sodium chloride 0.9 % infusion 500 mL  500 mL Intravenous Continuous PRN Forrest Bliss MD 25 mL/hr at 10/28/20 1150     • terazosin (HYTRIN) capsule 1 mg  1 mg Oral Nightly Forrest Bliss MD   1 mg at 10/31/20 2139   • thiamine (VITAMIN B-1) tablet 100 mg  100 mg Oral Daily Forrest Bliss MD   100 mg at 11/01/20 0916   • vitamin B-6 (PYRIDOXINE) tablet 200 mg  200 mg Oral Daily Forrest Bliss MD   200 mg at 11/01/20 0917   • vitamin C (ASCORBIC ACID) tablet 500 mg  500 mg Oral Daily Forrest Bliss MD   500 mg at 11/01/20 0916       Past Medical History:  Past Medical History:   Diagnosis Date   • A-fib (CMS/HCC) 11/15/2016    • Anemia     gets shots every few months to build up blood. sees dr adam.   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   • Arthritis    • Bradycardia 11/15/2016   • CAD in native artery 11/15/2016   • Chest pain 11/15/2016   • CHF (congestive heart failure) (CMS/[facility])    • Chronic kidney disease    • COPD (chronic obstructive pulmonary disease) (CMS/[facility])    • DDD (degenerative disc disease), lumbar 6/27/2017   • Heart murmur    • HTN (hypertension) 11/15/2016   • Hyperlipidemia    • Lumbar stenosis with neurogenic claudication 6/27/2017   • Murmur 4/19/2019   • Myocardial infarction (CMS/[facility])    • Sleep apnea    • Stroke (CMS/[facility])    • Type 2 diabetes mellitus (CMS/[facility]) 11/15/2016   • Ulcer of abdomen wall (CMS/[facility])        Past Surgical History:  Past Surgical History:   Procedure Laterality Date   • APPENDECTOMY     • BACK SURGERY      x2   • CARDIAC CATHETERIZATION Left     1/2010   • CARPAL TUNNEL RELEASE Bilateral    • CHOLECYSTECTOMY     • COLONOSCOPY N/A 9/7/2017    Procedure: COLONOSCOPY WITH ANESTHESIA;  Surgeon: Joshua Rich DO;  Location: [facility] ENDOSCOPY;  Service:    • CORONARY ARTERY BYPASS GRAFT      2/2006 w/PTCA & KIMMY    • CORONARY STENT PLACEMENT     • ENDOSCOPY N/A 12/14/2016    Procedure: ESOPHAGOGASTRODUODENOSCOPY WITH ANESTHESIA;  Surgeon: Isabel Dexter MD;  Location: [facility] ENDOSCOPY;  Service:    • ENDOSCOPY N/A 12/16/2016    Procedure: ESOPHAGOGASTRODUODENOSCOPY WITH ANESTHESIA;  Surgeon: Joshua Rich DO;  Location: [facility] ENDOSCOPY;  Service:    • ENDOSCOPY N/A 4/10/2017    Procedure: ESOPHAGOGASTRODUODENOSCOPY WITH ANESTHESIA;  Surgeon: Joshua Rich DO;  Location: [facility] ENDOSCOPY;  Service:    • ENDOSCOPY N/A 10/28/2020    Procedure: ESOPHAGOGASTRODUODENOSCOPY WITH ANESTHESIA;  Surgeon: Forrest Bliss MD;  Location: [facility] ENDOSCOPY;  Service: Gastroenterology;  Laterality: N/A;  pre: GI bleed  post: duodenal ulcers, hiatal hernia   Jeffrey Owen MD   • EYE  SURGERY Left     x 2   • JOINT REPLACEMENT     • PERIPHERAL ARTERIAL STENT GRAFT     • REPLACEMENT TOTAL KNEE Left     2002   • SHOULDER ROTATOR CUFF REPAIR Bilateral        Family History  Family History   Problem Relation Age of Onset   • Coronary artery disease Father    • Heart disease Father    • Coronary artery disease Brother    • Heart attack Brother    • Cancer Mother    • No Known Problems Sister    • Heart disease Son    • Cancer Sister    • Cancer Sister        Social History  Social History     Socioeconomic History   • Marital status:      Spouse name: Not on file   • Number of children: Not on file   • Years of education: Not on file   • Highest education level: Not on file   Tobacco Use   • Smoking status: Never Smoker   • Smokeless tobacco: Never Used   Substance and Sexual Activity   • Alcohol use: No   • Drug use: No   • Sexual activity: Never     Partners: Female         Review of Systems:  History obtained from chart review and the patient  General ROS: No fever or chills  Respiratory ROS: No cough, shortness of breath, wheezing  Cardiovascular ROS: No chest pain or palpitations  Gastrointestinal ROS: No abdominal pain or melena  Genito-Urinary ROS: No dysuria or hematuria    Objective:  Patient Vitals for the past 24 hrs:   BP Temp Temp src Pulse Resp SpO2   11/01/20 1104 147/62 -- Oral 56 20 98 %   11/01/20 0835 153/51 98.2 °F (36.8 °C) Oral 58 22 96 %   11/01/20 0318 120/57 98 °F (36.7 °C) Oral 57 20 97 %   10/31/20 2316 137/50 98.2 °F (36.8 °C) Oral 58 20 96 %   10/31/20 1901 154/61 98.6 °F (37 °C) Axillary 57 20 95 %   10/31/20 1601 134/59 98.3 °F (36.8 °C) Axillary 56 22 95 %   10/31/20 1539 131/54 97.9 °F (36.6 °C) Axillary 63 20 95 %   10/31/20 1500 117/89 97.7 °F (36.5 °C) Axillary 58 20 95 %       Intake/Output Summary (Last 24 hours) at 11/1/2020 1345  Last data filed at 11/1/2020 0837  Gross per 24 hour   Intake 1870 ml   Output 250 ml   Net 1620 ml     General: awake/alert  X3.  HEENT: Normocephalic atraumatic  Neck: Supple with no JVD or carotid bruits.  Chest:  clear to auscultation bilaterally without respiratory distress  CVS: regular rate and rhythm  Abdominal: soft, nontender, positive bowel sounds  Extremities: no cyanosis or edema  Skin: warm and dry without rash      Labs:  Results from last 7 days   Lab Units 11/01/20  0624 10/31/20  0444 10/30/20  0418   WBC 10*3/mm3 8.78 12.28* 16.74*   HEMOGLOBIN g/dL 9.2* 6.8* 7.3*   HEMATOCRIT % 28.1* 20.8* 23.0*   PLATELETS 10*3/mm3 168 165 183         Results from last 7 days   Lab Units 11/01/20  0624 10/31/20  0444 10/30/20  0418   SODIUM mmol/L 150* 150* 153*   POTASSIUM mmol/L 3.7 3.7 3.9   CHLORIDE mmol/L 119* 119* 122*   CO2 mmol/L 24.0 23.0 24.0   BUN mg/dL 69* 80* 84*   CREATININE mg/dL 1.80* 2.17* 2.43*   CALCIUM mg/dL 8.7 8.8 8.7   BILIRUBIN mg/dL 0.3 0.4 0.3   ALK PHOS U/L 79 70 66   ALT (SGPT) U/L 31 31 29   AST (SGOT) U/L 35 40 38   GLUCOSE mg/dL 170* 153* 149*       Radiology:   Imaging Results (Last 72 Hours)     Procedure Component Value Units Date/Time    MRI Brain Without Contrast [[id number]] Collected: 10/30/20 1254     Updated: 10/30/20 1317    Narrative:      EXAMINATION:  MRI BRAIN WO CONTRAST-  10/30/2020 10:45 AM CDT     HISTORY: Mental status change, unknown cause. R13.12-Dysphagia,  oropharyngeal phase; Z74.09-Other reduced mobility. Study was changed to  a without contrast study due to a GFR of 25 mL/m.     TECHNIQUE: Multiplanar imaging was performed in a high field magnet.     COMPARISON: No comparison study.     FINDINGS: The study is degraded by motion artifact. There is no evidence  of acute infarct on the diffusion-weighted sequence. There is mild  atrophy. There is moderate to severe dilatation of the lateral and third  ventricles. There is moderate T2 high signal within the hemispheric  white matter. There is a focal area of T1 low signal and T2 high signal  along the inferior right frontal lobe that  is likely related to either  an old infarct or old trauma.       Impression:      1. Motion limited study.  2. No evidence of acute infarct.  3. Mild atrophy. Moderate to severe dilatation of the lateral and third  ventricles may be related to the atrophy. Hydrocephalus is not excluded.  4. Moderate T2 high signal within the hemispheric white matter is  nonspecific and likely due to chronic small vessel disease.  5. Focal area of T1 low signal and T2 high signal along the inferior  right frontal lobe is likely related to either an old infarct or old  trauma.        This report was finalized on 10/30/2020 13:14 by Dr. Chun Fountain MD.    US Renal Bilateral [[id number]] Collected: 10/29/20 1854     Updated: 10/29/20 1859    Narrative:      RENAL ULTRASOUND COMPLETE 10/29/2020 6:16 PM CDT     REASON FOR EXAM: ALLIE; R13.12-Dysphagia, oropharyngeal phase;  Z74.09-Other reduced mobility; R19.5-Other fecal abnormalities       COMPARISON: CT scan dated 10/27/2020       TECHNIQUE: Multiple longitudinal and transverse realtime sonographic  images of the kidneys and urinary bladder are obtained.      FINDINGS:      RIGHT KIDNEY: 9.7 x 5.2 x 6.3 cm. Diffuse cortical thinning. No solid or  cystic masses. The central echo complex is compact with no evidence for  hydronephrosis. No nephrolithiasis or abnormal perinephric fluid  collections . No hydroureter.      LEFT KIDNEY: 8.4 x 5 0.0, 4.6 cm. Severe cortical thinning. 2.8 cm  rounded hypoechoic cystic lesion with through transmission at the upper  pole. The central echo complex is compact with no evidence for  hydronephrosis. No nephrolithiasis or abnormal perinephric fluid  collections . No hydroureter.      PELVIS: The bladder is mildly distended with anechoic urine and  demonstrates no significant wall thickening or internal echogenicities.  There is no surrounding ascites.        Impression:      1. Bilateral diffuse renal cortical thinning, most likely due to  chronic  medical renal disease. No evidence of upper tract  obstruction/hydronephrosis.  2. 2.8 cm left upper pole cyst.  3. Urinary bladder is nondilated.     This report was finalized on 10/29/2020 18:56 by Dr Brent Raines, .          Culture:  Urine Culture   Date Value Ref Range Status   10/30/2020 No growth  Final   10/27/2020 >100,000 CFU/mL Escherichia coli (A)  Final         Assessment   1.  Acute kidney injury/stage II/improving..  2.  Prerenal azotemia.  3.  Stage III chronic kidney disease baseline.  4.  Hypernatremia.  5.  GI bleed.  6.  Acute metabolic encephalopathy.  7.  Type II diabetic nephropathy.  8.  Iron deficiency anemia  9.  Status post EGD/duodenal ulcer.    Plan:  1.  Continue D5W.  2.  Intravenous Venofer.  3.  Change to p.o PPI..      Pablito Victoria MD  11/1/2020  13:45 CST

## 2020-11-01 NOTE — PLAN OF CARE
Goal Outcome Evaluation:  Plan of Care Reviewed With: patient  Progress: no change  Outcome Summary: Pt. alert at times, disoriented to year but able to state president. Had 1 medium, loose dark and tarry BM. Vitals stable. No skin breakdown noted. Swelling/pain to RUE improved. Was able to void in the urinal a few times. Tongue is yellow/white and patchy.

## 2020-11-02 PROBLEM — G93.41 METABOLIC ENCEPHALOPATHY: Status: ACTIVE | Noted: 2020-01-01

## 2020-11-02 PROBLEM — K92.2 UGIB (UPPER GASTROINTESTINAL BLEED): Status: ACTIVE | Noted: 2020-01-01

## 2020-11-02 PROBLEM — I50.32 CHRONIC DIASTOLIC CONGESTIVE HEART FAILURE (HCC): Status: ACTIVE | Noted: 2019-08-06

## 2020-11-02 NOTE — PLAN OF CARE
Goal Outcome Evaluation:  Plan of Care Reviewed With: patient, grandchild(miles)  Progress: improving  Outcome Summary: Patient alert to self and place.  Speech garbled, baseline.  Patient more alert today, per granddaughter at bedside.  Taking PO well.  Generalized edema, IVF decreased to 50/hr, continue to monitor.

## 2020-11-02 NOTE — THERAPY TREATMENT NOTE
Acute Care - Physical Therapy Treatment Note  [facility]     Patient Name: Eron Escalante  : 1934  MRN: [id number]  Today's Date: 2020   Onset of Illness/Injury or Date of Surgery: 10/26/20       PT Assessment (last 12 hours)      PT Evaluation and Treatment     Row Name 20 1418 20 1142       Physical Therapy Time and Intention    Subjective Information  complains of;weakness  -  complains of;weakness;pain  -    Document Type  therapy note (daily note)  -  therapy note (daily note)  -    Mode of Treatment  physical therapy  -  physical therapy  -    Row Name 20 1418 20 1142       General Information    Existing Precautions/Restrictions  fall  -  fall  -    Barriers to Rehab  medically complex  -  medically complex  -[facility] Name 20 1418          Pain Scale: Numbers Pre/Post-Treatment    Pretreatment Pain Rating  0/10 - no pain  -     Posttreatment Pain Rating  0/10 - no pain  -[facility] Name 20 1142          Pain Scale: FACES Pre/Post-Treatment    Pain: FACES Scale, Pretreatment  4-->hurts little more  -     Posttreatment Pain Rating  4-->hurts little more  -     Pain Location - Side  Right  -     Pain Location - Orientation  upper  -     Pain Location  extremity  -     Row Name 20 1418 20 1142       Range of Motion Comprehensive    Comment, General Range of Motion  -AAROM BLE 10 x 2 reps constant cues to keep pt on task and awake  -  A-AAROM BLE, constant cues to keep on task  -    Row Name 20 1418 20 1142       Bed Mobility    Supine-Sit [place] (Bed Mobility)  -- chair  -  moderate assist (50% patient effort);maximum assist (25% patient effort);2 person assist;verbal cues  -    Sit-Supine [place] (Bed Mobility)  maximum assist (25% patient effort);2 person assist;verbal cues  -  -- chair  -    Row Name 20 1418 20 1142       Transfers    Transfers  sit-stand  transfer;stand-sit transfer;stand pivot/stand step transfer  -  sit-stand transfer;stand-sit transfer;stand pivot/stand step transfer  -    Sit-Stand [place] (Transfers)  2 person assist;verbal cues;maximum assist (25% patient effort)  -  minimum assist (75% patient effort);moderate assist (50% patient effort);2 person assist;verbal cues  -    Stand-Sit [place] (Transfers)  2 person assist;verbal cues;moderate assist (50% patient effort)  -  minimum assist (75% patient effort);2 person assist;verbal cues  -    Row Name 11/02/20 1418 11/02/20 1142       Sit-Stand Transfer    Assistive Device (Sit-Stand Transfers)  walker, front-wheeled  -  walker, front-wheeled  -    Row Name 11/02/20 1418 11/02/20 1142       Stand Pivot/Stand Step Transfer    Stand Pivot/Stand Step [place] (Transfers)  2 person assist;verbal cues;maximum assist (25% patient effort)  -  moderate assist (50% patient effort);2 person assist;verbal cues  -    Assistive Device (Stand Pivot Stand Step Transfer)  walker, front-wheeled  -  walker, front-wheeled  -    Row Name 11/02/20 1142          Plan of Care Review    Plan of Care Reviewed With  patient  -     Progress  improving  -     Outcome Summary  pt trans to EOB mod-max of 2, performed BLE A-AAROM x 10 reps cconstant cues to keep pt on task, sit-stand min-mod of 2, stand pivot to chair with rwx mod of 2, pt would benefit from [facility]     Row Name 11/02/20 1418 11/02/20 1142       Positioning and Restraints    Pre-Treatment Position  sitting in chair/recliner  -  in bed  -    Post Treatment Position  bed  -  chair  -    In Bed  fowlers;call light within reach;encouraged to call for assist;exit alarm on;notified nsg;RUE elevated;LUE elevated;heels elevated  -  --    In Chair  --  reclined;call light within reach;encouraged to call for assist;with family/caregiver;notified nsg;heels elevated  -      User Key  (r) = Recorded By, (t) = Taken By,  (c) = Cosigned By    Initials Name Provider Type     Maria C Fagan, PTA Physical Therapy Assistant        Physical Therapy Education                 Title: PT OT SLP Therapies (In Progress)     Topic: Physical Therapy (In Progress)     Point: Mobility training (Done)     Learning Progress Summary           Patient Acceptance, E,TB, VU by  at 11/2/2020 1213    Comment: TRANS    Acceptance, E, VU by  at 10/27/2020 1543    Comment: Family educated on POC.   Family Acceptance, E, VU by  at 10/31/2020 1334    Comment: benefits of PT and POC, call for A OOB                   Point: Home exercise program (Not Started)     Learner Progress:  Not documented in this visit.          Point: Body mechanics (Not Started)     Learner Progress:  Not documented in this visit.          Point: Precautions (Done)     Learning Progress Summary           Family Acceptance, E, VU by  at 10/31/2020 1334    Comment: benefits of PT and POC, call for A OOB                               User Key     Initials Effective Dates Name Provider Type Discipline     08/02/16 -  Maria C Fagan, PTA Physical Therapy Assistant PT     08/02/16 -  Sujit Ni, PT Physical Therapist PT     10/19/20 -  Yuli Chávez, PT Student PT Student PT              PT Recommendation and Plan     Plan of Care Reviewed With: patient  Progress: improving  Outcome Summary: pt trans to EOB mod-max of 2, performed BLE A-AAROM x 10 reps cconstant cues to keep pt on task, sit-stand min-mod of 2, stand pivot to chair with rwx mod of 2, pt would benefit from SNF       Time Calculation:   PT Charges     Row Name 11/02/20 1436 11/02/20 1213          Time Calculation    Start Time  1418  -  1142  -     Stop Time  1436  -  1207  -     Time Calculation (min)  18 min  -  25 min  -     PT Received On  --  11/02/20  -        Time Calculation- PT    Total Timed Code Minutes- PT  18 minute(s)  -  25 minute(s)  -        Timed Charges    96370 - PT  Therapeutic Exercise Minutes  18  -  --     12955 - PT Therapeutic Activity Minutes  --  25  -AH       User Key  (r) = Recorded By, (t) = Taken By, (c) = Cosigned By    Initials Name Provider Type     Maria C Fagan PTA Physical Therapy Assistant        Therapy Charges for Today     Code Description Service Date Service Provider Modifiers Qty    42988196854 HC PT THERAPEUTIC ACT EA 15 MIN 11/2/2020 Maria C Fagan PTA GP 2    12214414277 HC PT THER PROC EA 15 MIN 11/2/2020 Maria C Fagan PTA GP 1          PT G-Codes  Outcome Measure Options: AM-PAC 6 Clicks Basic Mobility (PT)  AM-PAC 6 Clicks Score (PT): 8  AM-PAC 6 Clicks Score (OT): 8    Maria C Fagan PTA  11/2/2020

## 2020-11-02 NOTE — THERAPY TREATMENT NOTE
Acute Care - Speech Language Pathology   Swallow Treatment Note [facility]     Patient Name: Eron Escalante  : 1934  MRN: [id number]  Today's Date: 2020  Onset of Illness/Injury or Date of Surgery: 10/26/20            Admit Date: 10/26/2020     SLP dysphagia tx completed. Pt was alert, cooperative. Presented with poor respiratory support for verbal communication, resulting in decreased vocal intensity and limited number of words per breath during verbalizations. Pt was presented honey thick and regular solid trials for possible diet consistency upgrade. Decreased labial seal on spoon during acceptance of honey thick trials, with frequent throat clearing following intake, which family stated has been pt baseline for at least one year. He presented with functional mandibular ROM with mastication of regular solid though he presented with only limited dentition, which is also baseline. Mildly prolonged oral manipulation, yet felt to be secondary to dentition. No oral residue post solid trials, with delayed cough x1. Cannot fully r/o aspiration at this time. Appeared to have quite poor endurance from a respiratory standpoint, given laborous breathing, which granddaughter stated appeared to be slightly increased than pt's typical at rest respirations. Family brought in Pepsi, which the pt had requested, and planned to thicken before providing for the pt, therefore, SLP thickened approximately seven ounces of Pepsi to a honey thick consistency for increased safety with consumption. Granddaughter was, however, educated that SLP feels pt may benefit from VFSS in Radiology to objectively determine functional level of safety with varying consistencies given frequent throat clearing and other concerns at bedside, however, she is aware that pt would warrant repeat COVID-19 swab (which will also be warranted prior to d/c from facility given plan to d/c to SNF). RECOMMENDATIONS: Upgrade to mechanical soft diet  consistency, continue with honey thick liquid consistency; meds whole and/or crushed (if safe) in pudding/applesauce; RN to monitor for increased congestion; ok for ice chips between meals, 30 min following any other PO intake; If concerns arise with toleration of mech soft, downgrade to pureed and notify ST. ST to follow up on 11/03/2020. Thanks!  Tina Dinh, CCC-SLP 11/2/2020 16:15 CST    Visit Dx:     ICD-10-CM ICD-9-CM   1. Oropharyngeal dysphagia  R13.12 787.22   2. Impaired mobility  Z74.09 799.89   3. Heme positive stool  R19.5 792.1   4. Decreased activities of daily living (ADL)  Z78.9 V49.89     Patient Active Problem List   Diagnosis   • Type 2 diabetes mellitus, without long-term current use of insulin (CMS/[facility])   • HTN (hypertension)   • Aortocoronary bypass status   • Bradycardia   • CAD in native artery   • A-fib (CMS/[facility])   • Symptomatic bradycardia   • Cerebral ventriculomegaly   • Dehydration   • Duodenal ulcer   • Acute renal failure superimposed on stage 3a chronic kidney disease (CMS/[facility])   • Hyperkalemia   • Weakness of extremity   • Lumbar stenosis with neurogenic claudication   • DDD (degenerative disc disease), lumbar   • Change in bowel habit   • RICARDO treated with BiPAP   • Murmur   • Chronic diastolic congestive heart failure (CMS/[facility])   • Periodic limb movement disorder (PLMD)   • CHF (congestive heart failure), NYHA class I, acute on chronic, combined (CMS/[facility])   • Chronic constipation   • Chronic back pain   • History of stroke   • Hypertensive urgency   • Ischemic cardiomyopathy   • Altered mental status   • Acute blood loss anemia   • Metabolic encephalopathy   • Hypernatremia   • E. coli UTI (urinary tract infection)   • UGIB (upper gastrointestinal bleed)     Past Medical History:   Diagnosis Date   • A-fib (CMS/[facility]) 11/15/2016   • Anemia     gets shots every few months to build up blood. sees dr adam.   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   • Arthritis    • Bradycardia  11/15/2016   • CAD in native artery 11/15/2016   • Chest pain 11/15/2016   • CHF (congestive heart failure) (CMS/[facility])    • Chronic kidney disease    • COPD (chronic obstructive pulmonary disease) (CMS/[facility])    • DDD (degenerative disc disease), lumbar 6/27/2017   • Heart murmur    • HTN (hypertension) 11/15/2016   • Hyperlipidemia    • Lumbar stenosis with neurogenic claudication 6/27/2017   • Murmur 4/19/2019   • Myocardial infarction (CMS/[facility])    • Sleep apnea    • Stroke (CMS/[facility])    • Type 2 diabetes mellitus (CMS/[facility]) 11/15/2016   • Ulcer of abdomen wall (CMS/[facility])      Past Surgical History:   Procedure Laterality Date   • APPENDECTOMY     • BACK SURGERY      x2   • CARDIAC CATHETERIZATION Left     1/2010   • CARPAL TUNNEL RELEASE Bilateral    • CHOLECYSTECTOMY     • COLONOSCOPY N/A 9/7/2017    Procedure: COLONOSCOPY WITH ANESTHESIA;  Surgeon: Joshua Rich DO;  Location: [facility] ENDOSCOPY;  Service:    • CORONARY ARTERY BYPASS GRAFT      2/2006 w/PTCA & KIMMY    • CORONARY STENT PLACEMENT     • ENDOSCOPY N/A 12/14/2016    Procedure: ESOPHAGOGASTRODUODENOSCOPY WITH ANESTHESIA;  Surgeon: Isabel Dexter MD;  Location: [facility] ENDOSCOPY;  Service:    • ENDOSCOPY N/A 12/16/2016    Procedure: ESOPHAGOGASTRODUODENOSCOPY WITH ANESTHESIA;  Surgeon: Joshua Rich DO;  Location: [facility] ENDOSCOPY;  Service:    • ENDOSCOPY N/A 4/10/2017    Procedure: ESOPHAGOGASTRODUODENOSCOPY WITH ANESTHESIA;  Surgeon: Joshua Rich DO;  Location: [facility] ENDOSCOPY;  Service:    • ENDOSCOPY N/A 10/28/2020    Procedure: ESOPHAGOGASTRODUODENOSCOPY WITH ANESTHESIA;  Surgeon: Forrest Bliss MD;  Location: [facility] ENDOSCOPY;  Service: Gastroenterology;  Laterality: N/A;  pre: GI bleed  post: duodenal ulcers, hiatal hernia   Jeffrey Owen MD   • EYE SURGERY Left     x 2   • JOINT REPLACEMENT     • PERIPHERAL ARTERIAL STENT GRAFT     • REPLACEMENT TOTAL KNEE Left     2002   • SHOULDER ROTATOR CUFF REPAIR Bilateral         SWALLOW  EVALUATION (last 72 hours)      SLP Adult Swallow Evaluation     Row Name 11/02/20 9922                   Rehab Evaluation    Document Type  therapy note (daily note)  -TM        Subjective Information  no complaints  -TM        Patient Observations  alert;cooperative  -TM        Patient/Family/Caregiver Comments/Observations  Granddaughter arrived shortly following initiation of tx.  -TM        Care Plan Review  care plan/treatment goals reviewed;risks/benefits reviewed;current/potential barriers reviewed;patient/other agree to care plan Reinforcements needed for pt, confused  -TM        Care Plan Review, Other Participant(s)  family;other (see comments) Granddaughter  -TM        Patient Effort  adequate  -TM           Pain    Additional Documentation  Pain Scale: Numbers Pre/Post-Treatment (Group)  -TM           Pain Scale: Numbers Pre/Post-Treatment    Pretreatment Pain Rating  0/10 - no pain  -TM        Pre/Posttreatment Pain Comment  Sleeping at conclusion of session, no observed distress at that time.  -TM        Pain Intervention(s)  Other (Comment) Granddaughter and SLP repositioned pt from back to R side  -TM           Clinical Impression    Daily Summary of Progress (SLP)  progress towards functional goals is fair  -TM        Barriers to Overall Progress (SLP)  Cognition  -TM        Plan for Continued Treatment (SLP)  Upgrade to mechanical soft. Continue with honey thick liquids.   -TM           Recommendations    SLP Diet Recommendation  soft textures;honey thick liquids;ice chips between meals after oral care, with supervision  -TM        Recommended Precautions and Strategies  upright posture during/after eating;small bites of food and sips of liquid  -TM        SLP Rec. for Method of Medication Administration  meds whole;meds crushed;with pudding or applesauce  -TM        Monitor for Signs of Aspiration  yes;cough;gurgly voice;throat clearing;pneumonia;right lower lobe infiltrates  -TM         Anticipated Discharge Disposition (SLP)  skilled nursing facility  -TM           Oral Nutrition/Hydration Goal 1 (SLP)    Oral Nutrition/Hydration Goal 1, SLP  LTG: Patient will tolerate LRD with no overt s/s of aspiration  -TM        Time Frame (Oral Nutrition/Hydration Goal 1, SLP)  short term goal (STG);by discharge  -TM        Barriers (Oral Nutrition/Hydration Goal 1, SLP)  n/a  -TM        Progress/Outcomes (Oral Nutrition/Hydration Goal 1, SLP)  continuing progress toward goal  -TM           Labial Strengthening Goal 1 (SLP)    Activity (Labial Strengthening Goal 1, SLP)  increase labial tone  -TM        Increase Labial Tone  labial resistance exercises  -TM        [place]/Accuracy (Labial Strengthening Goal 1, SLP)  independently (over 90% accuracy)  -TM        Time Frame (Labial Strengthening Goal 1, SLP)  short term goal (STG);by discharge  -TM        Barriers (Labial Strengthening Goal 1, SLP)  n/a  -TM        Progress/Outcomes (Labial Strengthening Goal 1, SLP)  goal ongoing  -TM           Lingual Strengthening Goal 1 (SLP)    Activity (Lingual Strengthening Goal 1, SLP)  increase lingual tone/sensation/control/coordination/movement  -TM        Increase Lingual Tone/Sensation/Control/Coordination/Movement  lingual movement exercises  -TM        [place]/Accuracy (Lingual Strengthening Goal 1, SLP)  independently (over 90% accuracy)  -TM        Time Frame (Lingual Strengthening Goal 1, SLP)  short term goal (STG);by discharge  -TM        Barriers (Lingual Strengthening Goal 1, SLP)  n/a  -TM        Progress/Outcomes (Lingual Strengthening Goal 1, SLP)  goal ongoing  -TM           Pharyngeal Strengthening Exercise Goal 1 (SLP)    Activity (Pharyngeal Strengthening Goal 1, SLP)  increase timing;increase epiglottic inversion and retroflexion;increase tongue base retraction  -TM        Increase Timing  gustatory stimulation (sour/cold)  -TM        Increase Epiglottic Inversion and Retroflexion   falsetto  -TM        Increase Tongue Base Retraction  hard effortful swallow;vita  -TM        [place]/Accuracy (Pharyngeal Strengthening Goal 1, SLP)  independently (over 90% accuracy)  -TM        Time Frame (Pharyngeal Strengthening Goal 1, SLP)  short term goal (STG);by discharge  -TM        Barriers (Pharyngeal Strengthening Goal 1, SLP)  n/a  -TM        Progress/Outcomes (Pharyngeal Strengthening Goal 1, SLP)  goal ongoing  -TM          User Key  (r) = Recorded By, (t) = Taken By, (c) = Cosigned By    Initials Name Effective Dates    TM Tina Dinh CCC-SLP 08/02/16 -           EDUCATION  The patient has been educated in the following areas:   Dysphagia (Swallowing Impairment).    SLP Recommendation and Plan     SLP Diet Recommendation: soft textures, honey thick liquids, ice chips between meals after oral care, with supervision  Recommended Precautions and Strategies: upright posture during/after eating, small bites of food and sips of liquid  SLP Rec. for Method of Medication Administration: meds whole, meds crushed, with pudding or applesauce     Monitor for Signs of Aspiration: yes, cough, gurgly voice, throat clearing, pneumonia, right lower lobe infiltrates        Anticipated Discharge Disposition (SLP): skilled nursing facility              Daily Summary of Progress (SLP): progress towards functional goals is fair    Plan for Continued Treatment (SLP): Upgrade to mechanical soft. Continue with honey thick liquids.               Plan of Care Reviewed With: patient, grandchild(miles)  Progress: improving  Outcome Summary: SLP dysphagia tx completed. Pt was alert, cooperative. Presented with poor respiratory support for verbal communication, resulting in decreased vocal intensity and limited number of words per breath during verbalizations. Pt was presented honey thick and regular solid trials for possible diet consistency upgrade. Decreased labial seal on spoon during acceptance of honey thick  trials, with frequent throat clearing following intake, which family stated has been pt baseline for at least one year. He presented with functional mandibular ROM with mastication of regular solid though he presented with only limited dentition, which is also baseline. Mildly prolonged oral manipulation, yet felt to be secondary to dentition. No oral residue post solid trials, with delayed cough x1. Cannot fully r/o aspiration at this time. Appeared to have quite poor endurance from a respiratory standpoint, given laborous breathing, which granddaughter stated appeared to be slightly increased than pt's typical at rest respirations. Family brought in Pepsi, which the pt had requested, and planned to thicken before providing for the pt, therefore, SLP thickened approximately seven ounces of Pepsi to a honey thick consistency for increased safety with consumption. Granddaughter was, however, educated that SLP feels pt may benefit from VFSS in Radiology to objectively determine functional level of safety with varying consistencies given frequent throat clearing and other concerns at bedside, however, she is aware that pt would warrant repeat COVID-19 swab (which will also be warranted prior to d/c from facility given plan to d/c to SNF)    SLP GOALS     Row Name 11/02/20 1510             Oral Nutrition/Hydration Goal 1 (SLP)    Oral Nutrition/Hydration Goal 1, SLP  LTG: Patient will tolerate LRD with no overt s/s of aspiration  -TM      Time Frame (Oral Nutrition/Hydration Goal 1, SLP)  short term goal (STG);by discharge  -TM      Barriers (Oral Nutrition/Hydration Goal 1, SLP)  n/a  -TM      Progress/Outcomes (Oral Nutrition/Hydration Goal 1, SLP)  continuing progress toward goal  -TM         Labial Strengthening Goal 1 (SLP)    Activity (Labial Strengthening Goal 1, SLP)  increase labial tone  -TM      Increase Labial Tone  labial resistance exercises  -TM      [place]/Accuracy (Labial Strengthening Goal 1,  SLP)  independently (over 90% accuracy)  -TM      Time Frame (Labial Strengthening Goal 1, SLP)  short term goal (STG);by discharge  -TM      Barriers (Labial Strengthening Goal 1, SLP)  n/a  -TM      Progress/Outcomes (Labial Strengthening Goal 1, SLP)  goal ongoing  -TM         Lingual Strengthening Goal 1 (SLP)    Activity (Lingual Strengthening Goal 1, SLP)  increase lingual tone/sensation/control/coordination/movement  -TM      Increase Lingual Tone/Sensation/Control/Coordination/Movement  lingual movement exercises  -TM      [place]/Accuracy (Lingual Strengthening Goal 1, SLP)  independently (over 90% accuracy)  -TM      Time Frame (Lingual Strengthening Goal 1, SLP)  short term goal (STG);by discharge  -TM      Barriers (Lingual Strengthening Goal 1, SLP)  n/a  -TM      Progress/Outcomes (Lingual Strengthening Goal 1, SLP)  goal ongoing  -TM         Pharyngeal Strengthening Exercise Goal 1 (SLP)    Activity (Pharyngeal Strengthening Goal 1, SLP)  increase timing;increase epiglottic inversion and retroflexion;increase tongue base retraction  -TM      Increase Timing  gustatory stimulation (sour/cold)  -TM      Increase Epiglottic Inversion and Retroflexion  falsetto  -TM      Increase Tongue Base Retraction  hard effortful swallow;vita  -TM      [place]/Accuracy (Pharyngeal Strengthening Goal 1, SLP)  independently (over 90% accuracy)  -TM      Time Frame (Pharyngeal Strengthening Goal 1, SLP)  short term goal (STG);by discharge  -TM      Barriers (Pharyngeal Strengthening Goal 1, SLP)  n/a  -TM      Progress/Outcomes (Pharyngeal Strengthening Goal 1, SLP)  goal ongoing  -TM        User Key  (r) = Recorded By, (t) = Taken By, (c) = Cosigned By    Initials Name Provider Type    TM Tina Dinh CCC-SLP Speech and Language Pathologist             Time Calculation:   Time Calculation- SLP     Row Name 11/02/20 1615             Time Calculation- SLP    SLP Start Time  1510  -TM      SLP Stop Time   1543  -      SLP Time Calculation (min)  33 min  -TM      SLP Received On  11/02/20  -        User Key  (r) = Recorded By, (t) = Taken By, (c) = Cosigned By    Initials Name Provider Type     Tina Dinh CCC-SLP Speech and Language Pathologist          Therapy Charges for Today     Code Description Service Date Service Provider Modifiers Qty    53678676693  ST TREATMENT SWALLOW 2 11/2/2020 Tina Dinh CCC-SLP GN 1               Tina A. Dinh, CCC-SLP  11/2/2020

## 2020-11-02 NOTE — THERAPY TREATMENT NOTE
Acute Care - Physical Therapy Treatment Note  [facility]     Patient Name: Eron Escalante  : 1934  MRN: [id number]  Today's Date: 2020   Onset of Illness/Injury or Date of Surgery: 10/26/20       PT Assessment (last 12 hours)      PT Evaluation and Treatment     [facility] Name 20 1142          Physical Therapy Time and Intention    Subjective Information  complains of;weakness;pain  -     Document Type  therapy note (daily note)  -     Mode of Treatment  physical therapy  -[facility] Name 20 1142          General Information    Existing Precautions/Restrictions  fall  -     Barriers to Rehab  medically complex  -[facility] Name 20 1142          Pain Scale: FACES Pre/Post-Treatment    Pain: FACES Scale, Pretreatment  4-->hurts little more  -     Posttreatment Pain Rating  4-->hurts little more  -     Pain Location - Side  Right  -     Pain Location - Orientation  upper  -     Pain Location  extremity  -[facility] Name 20 1142          Range of Motion Comprehensive    Comment, General Range of Motion  A-AAROM BLE, constant cues to keep on task  -[facility] Name 20 1142          Bed Mobility    Supine-Sit [place] (Bed Mobility)  moderate assist (50% patient effort);maximum assist (25% patient effort);2 person assist;verbal cues  [facility]     Sit-Supine [place] (Bed Mobility)  -- chair  -[facility] Name 20 1142          Transfers    Transfers  sit-stand transfer;stand-sit transfer;stand pivot/stand step transfer  -     Sit-Stand [place] (Transfers)  minimum assist (75% patient effort);moderate assist (50% patient effort);2 person assist;verbal cues  [facility]     Stand-Sit [place] (Transfers)  minimum assist (75% patient effort);2 person assist;verbal cues  -[facility] Name 20 1142          Sit-Stand Transfer    Assistive Device (Sit-Stand Transfers)  walker, front-wheeled  -[facility] Name 20 1142          Stand Pivot/Stand Step Transfer     Stand Pivot/Stand Step [place] (Transfers)  moderate assist (50% patient effort);2 person assist;verbal cues  -     Assistive Device (Stand Pivot Stand Step Transfer)  walker, front-wheeled  -     Row Name 11/02/20 1142          Plan of Care Review    Plan of Care Reviewed With  patient  -     Progress  improving  -     Outcome Summary  pt trans to EOB mod-max of 2, performed BLE A-AAROM x 10 reps cconstant cues to keep pt on task, sit-stand min-mod of 2, stand pivot to chair with rwx mod of 2, pt would benefit from SNF  -     Row Name 11/02/20 1142          Positioning and Restraints    Pre-Treatment Position  in bed  -     Post Treatment Position  chair  -     In Chair  reclined;call light within reach;encouraged to call for assist;with family/caregiver;notified nsg;heels elevated  -       User Key  (r) = Recorded By, (t) = Taken By, (c) = Cosigned By    Initials Name Provider Type     Maria C Fagan, PTA Physical Therapy Assistant        Physical Therapy Education                 Title: PT OT SLP Therapies (In Progress)     Topic: Physical Therapy (In Progress)     Point: Mobility training (Done)     Learning Progress Summary           Patient Acceptance, E,TB, VU by  at 11/2/2020 1213    Comment: TRANS    Acceptance, E, VU by  at 10/27/2020 1543    Comment: Family educated on POC.   Family Acceptance, E, VU by  at 10/31/2020 1334    Comment: benefits of PT and POC, call for A OOB                   Point: Home exercise program (Not Started)     Learner Progress:  Not documented in this visit.          Point: Body mechanics (Not Started)     Learner Progress:  Not documented in this visit.          Point: Precautions (Done)     Learning Progress Summary           Family Acceptance, E, VU by  at 10/31/2020 1334    Comment: benefits of PT and POC, call for A OOB                               User Key     Initials Effective Dates Name Provider Type [facility] 08/02/16 -   Maria C Fagan PTA Physical Therapy Assistant PT     08/02/16 -  Sujit Ni, PT Physical Therapist PT    AY 10/19/20 -  Yuli Chávez, PT Student PT Student PT              PT Recommendation and Plan     Plan of Care Reviewed With: patient  Progress: improving  Outcome Summary: pt trans to EOB mod-max of 2, performed BLE A-AAROM x 10 reps cconstant cues to keep pt on task, sit-stand min-mod of 2, stand pivot to chair with rwx mod of 2, pt would benefit from SNF       Time Calculation:   PT Charges     Row Name 11/02/20 1213             Time Calculation    Start Time  1142  -      Stop Time  1207  -      Time Calculation (min)  25 min  -      PT Received On  11/02/20  -         Time Calculation- PT    Total Timed Code Minutes- PT  25 minute(s)  -         Timed Charges    52292 - PT Therapeutic Activity Minutes  25  -AH        User Key  (r) = Recorded By, (t) = Taken By, (c) = Cosigned By    Initials Name Provider Type     Maria C Fagan PTA Physical Therapy Assistant        Therapy Charges for Today     Code Description Service Date Service Provider Modifiers Qty    51012426984  PT THERAPEUTIC ACT EA 15 MIN 11/2/2020 Maria C Fagan PTA GP 2          PT G-Codes  Outcome Measure Options: AM-PAC 6 Clicks Basic Mobility (PT)  AM-PAC 6 Clicks Score (PT): 8  AM-PAC 6 Clicks Score (OT): 8    Maria C Fagan PTA  11/2/2020

## 2020-11-02 NOTE — PROGRESS NOTES
Neurology Progress Note      Chief Complaint:  F/u encephalopathy    Subjective     Subjective:  Sitting up in chair. Grand daughter at bedside and states patient was able to feed himself lunch. He is awake and alert.  received blood transfusion on 10/31/2020 hgb 6.8 and today is 8.5          Medications:  Current Facility-Administered Medications   Medication Dose Route Frequency Provider Last Rate Last Dose   • acetaminophen (TYLENOL) tablet 650 mg  650 mg Oral Q4H PRN Forrest Bliss MD        Or   • acetaminophen (TYLENOL) suppository 650 mg  650 mg Rectal Q4H PRN Forrest Bliss MD       • allopurinol (ZYLOPRIM) tablet 300 mg  300 mg Oral Daily Forrest Bliss MD   300 mg at 11/02/20 0948   • cefTRIAXone (ROCEPHIN) 1 g/100 mL 0.9% NS (MBP)  1 g Intravenous Q24H Rocky Nixon DO   1 g at 11/01/20 1552   • cholecalciferol (VITAMIN D3) tablet 1,000 Units  1,000 Units Oral Daily Forrest Bliss MD   1,000 Units at 11/02/20 0948   • colchicine tablet 0.6 mg  0.6 mg Oral Daily PRN Forrest Bliss MD       • dextrose (D50W) 25 g/ 50mL Intravenous Solution 25 g  25 g Intravenous Q15 Min PRN Forrest Bliss MD       • dextrose (GLUTOSE) oral gel 15 g  15 g Oral Q15 Min PRN Forrest Bliss MD       • dextrose 5 % 995 mL with potassium chloride 10 mEq infusion   Intravenous Continuous Teddy Hammonds APRN 50 mL/hr at 11/01/20 1722     • folic acid (FOLVITE) tablet 400 mcg  400 mcg Oral Daily Forrest Bliss MD   400 mcg at 11/02/20 0948   • glucagon (human recombinant) (GLUCAGEN DIAGNOSTIC) injection 1 mg  1 mg Subcutaneous Q15 Min PRN Forrest Bliss MD       • hydrALAZINE (APRESOLINE) injection 10 mg  10 mg Intravenous Q4H PRN Forrest Bliss MD       • insulin lispro (humaLOG) injection 2-7 Units  2-7 Units Subcutaneous TID AC Forrest Bliss MD   2 Units at 11/02/20 1143   • iron sucrose (VENOFER) 200 mg in sodium chloride 0.9 % 100 mL IVPB  200 mg Intravenous Q24H Ali,  MD Pablito   200 mg at 11/01/20 1721   • isosorbide mononitrate (IMDUR) 24 hr tablet 30 mg  30 mg Oral Q24H Forrest Bliss MD   30 mg at 11/02/20 0948   • lansoprazole (FIRST) oral suspension 30 mg  30 mg Oral Q AM Iain Rosenberg MD   30 mg at 11/02/20 0633   • nitroglycerin (NITROSTAT) SL tablet 0.4 mg  0.4 mg Sublingual Q5 Min PRN Forrest Bliss MD   0.4 mg at 10/27/20 1600   • ondansetron (ZOFRAN) tablet 4 mg  4 mg Oral Q6H PRN Forrest Bliss MD        Or   • ondansetron (ZOFRAN) injection 4 mg  4 mg Intravenous Q6H PRN Forrest Bliss MD       • potassium chloride (MICRO-K) CR capsule 10 mEq  10 mEq Oral BID With Meals Forrest Bliss MD   10 mEq at 11/02/20 0949   • pravastatin (PRAVACHOL) tablet 40 mg  40 mg Oral Nightly Forrest Bliss MD   40 mg at 11/01/20 2051   • prednisoLONE acetate (PRED FORTE) 1 % ophthalmic suspension 1 drop  1 drop Left Eye Q2H While Awake Forrest Bliss MD   1 drop at 11/02/20 1136   • sennosides-docusate (PERICOLACE) 8.6-50 MG per tablet 2 tablet  2 tablet Oral BID PRN Forrest Bliss MD       • sodium chloride 0.9 % flush 10 mL  10 mL Intravenous Q12H Forrest Bliss MD   10 mL at 11/02/20 0949   • sodium chloride 0.9 % flush 10 mL  10 mL Intravenous PRN Forrest Bliss MD       • sodium chloride 0.9 % infusion 500 mL  500 mL Intravenous Continuous JENNIFERN Forrest Bliss MD 25 mL/hr at 10/28/20 1150     • terazosin (HYTRIN) capsule 1 mg  1 mg Oral Nightly Forrest Bliss MD   1 mg at 11/01/20 2050   • thiamine (VITAMIN B-1) tablet 100 mg  100 mg Oral Daily Forrest Bliss MD   100 mg at 11/02/20 0949   • vitamin B-6 (PYRIDOXINE) tablet 200 mg  200 mg Oral Daily Forrest Bliss MD   200 mg at 11/02/20 0948   • vitamin C (ASCORBIC ACID) tablet 500 mg  500 mg Oral Daily Forrest Bliss MD   500 mg at 11/02/20 0948       Review of Systems:   -A 14 point review of systems is completed and is negative except for weakness.      Objective       Vital Signs  Temp:  [97.5 °F (36.4 °C)-98 °F (36.7 °C)] 97.9 °F (36.6 °C)  Heart Rate:  [53-61] 58  Resp:  [18-22] 18  BP: (149-169)/(49-76) 169/56    Physical Exam:  HEENT:  Neck supple  CVS:  Regular rate and rhythm.  No murmurs  Carotid Examination:  No bruits  Lungs:  Clear to auscultation  Abdomen:  Non-tender, Non-distended  Extremities:  No signs of peripheral edema     Neurologic Exam:     -awake, alert. Stated he is in [place], [place] at [facility] but when I asked the name of other hospital stated [facility]. He correctly stated the month and year and that election is tomorrow. He was able to name current President.  -Has dysarthria but I wonder if this is from lack of teeth.  -Follows some simple commands     Cranial nerves II through XII intact.  Pupils react  EOMI  No facial weakness  Turns head side to side     Motor: (strength out of 5:  1= minimal movement, 2 = movement in plane of gravity, 3 = movement against gravity, 4 = movement against some resistance, 5 = full strength)     Moves all extremities against gravity.  Handgrip is very strong   has pain in right shoulder but generally has equal and symmetric strength in upper and lower extremities.    DTR:  2+ throughout in all four extremities     Sensory:  -Intact to light touch, pinprick--as best as can be determined      Coordination/Gait:  -Not following for finger to nose or heel to shin testing                    Results Review:    I reviewed the patient's new clinical results.    Results from last 7 days   Lab Units 11/02/20  0447 11/01/20  0624 10/31/20  0444   WBC 10*3/mm3 8.19 8.78 12.28*   HEMOGLOBIN g/dL 8.5* 9.2* 6.8*   HEMATOCRIT % 26.3* 28.1* 20.8*   PLATELETS 10*3/mm3 146 168 165        Results from last 7 days   Lab Units 11/02/20  0447 11/01/20  0624 10/31/20  0444   SODIUM mmol/L 146* 150* 150*   POTASSIUM mmol/L 4.1 3.7 3.7   CHLORIDE mmol/L 117* 119* 119*   CO2 mmol/L 23.0 24.0 23.0   BUN mg/dL 53* 69* 80*   CREATININE mg/dL 1.35* 1.80*  2.17*   CALCIUM mg/dL 8.6 8.7 8.8   BILIRUBIN mg/dL 0.3 0.3 0.4   ALK PHOS U/L 78 79 70   ALT (SGPT) U/L 28 31 31   AST (SGOT) U/L 31 35 40   GLUCOSE mg/dL 166* 170* 153*        Lab Results   Component Value Date    MG 2.7 (H) 10/27/2020    PROTIME 15.2 (H) 10/27/2020    INR 1.24 (H) 10/27/2020     No components found for: POCGLUC  No components found for: A1C  Lab Results   Component Value Date    HDL 34 (L) 10/27/2020    LDL 42 10/27/2020     No components found for: B12  Lab Results   Component Value Date    TSH 2.430 10/27/2020       Assessment/Plan     Hospital Problem List      Encephalopathy    Type 2 diabetes mellitus (CMS/[facility])    HTN (hypertension)    A-fib (CMS/[facility])    Duodenal ulcer    CHF (congestive heart failure) (CMS/[facility])    CKD (chronic kidney disease), stage III    History of stroke    Intermittent confusion    Acute blood loss anemia    Heme positive stool    GI bleed    Hypernatremia    Acute kidney injury (CMS/[facility])    E. coli UTI (urinary tract infection)    Impression:  1. Metabolic encephalopathy. Improving.  2. Anemia with hemoglobin of 6.8 and low iron but acceptable folate and iron  3. Uremia with BUN of 80  4. Poor oral intake with low albumin and family discussing feeding tube or not  5. UTI   Ecoli with resistance changing to Ceftriaxone  6, Atrial fibrillation but not a candidate for anticoagulation in view of recurrent drop in hemoglobin and heme + stool--duodenal ulcer  7. Hypernatremia  Improving and now at 150  8. CKD    Plan:  1. Discussed course of recovery from neurologic stand point that he could have wax and waning recovery if he feels well on one day and physically and cognitively taxes himself could be less physically or cognitively stable the next.   2. Anemia per attending.  3. Will need to closely monitor oral intake of food and liquids to be able to sustain himself.   4. Neurology will sign off. Please re consult if needed.           Deloris Hdez, APRN  11/02/20  13:00  CST

## 2020-11-02 NOTE — PROGRESS NOTES
Nephrology ([facility]) Progress Note      Patient:  Eron Escalante  YOB: 1934  Date of Service: 11/2/2020  MRN: [id number]   Acct: [id number]   Primary Care Physician: Jeffrey Owen MD  Advance Directive:   Code Status and Medical Interventions:   Ordered at: 10/27/20 0304     Limited Support to NOT Include:    Intubation     Level Of Support Discussed With:    Next of Kin (If No Surrogate)    Health Care Surrogate     Code Status:    No CPR     Medical Interventions (Level of Support Prior to Arrest):    Limited     Admit Date: 10/26/2020       Hospital Day: 7  Referring Provider: Alcides Tomlinson MD      Patient personally seen and examined.  Complete chart including Consults, Notes, Operative Reports, Labs, Cardiology, and Radiology studies reviewed as able.        Subjective:  Eron Escalante is a 85 y.o. male  whom we were consulted for acute kidney injury/chronic kidney disease.  Patient has stage III chronic kidney disease at baseline and has seen Dr. Victoria previously.  He was initially admitted to [facility] with encephalopathy, MRI of the brain did not show any evidence of intracranial pathology.  However he was transferred to [facility] as per family request.  Neurological work-up over here consistent with repeat MRI scan consistent with microvascular chronic changes.  Patient also has bilateral Doppler studies of carotid arteries consistent with mild to moderate stenosis but no intervention recommended.  However his serum creatinine continued to rise, patient also has hypernatremia as he has not been drinking due to severe encephalopathy.  Dr. Tomlinson had just started hypotonic fluid for correction of hypernatremia as well as high BUN.      Today, family present, assisting with meal.  No new events. Denied cp/soa/n/v.      Allergies:  Lorazepam, Penicillins, and Adhesive tape    Home Meds:  Medications Prior to Admission   Medication Sig  Dispense Refill Last Dose   • allopurinol (ZYLOPRIM) 300 MG tablet Take 600 mg by mouth Daily.   Unknown at Unknown time   • amLODIPine (NORVASC) 5 MG tablet Take 5 mg by mouth Daily.   Unknown at Unknown time   • aspirin 81 MG chewable tablet Chew 81 mg Daily.   Unknown at Unknown time   • Cholecalciferol (VITAMIN D) 1000 units tablet Take 1,000 Units by mouth.   Unknown at Unknown time   • colchicine 0.6 MG tablet Take 0.6 mg by mouth Daily As Needed (gout flare-up).   Unknown at Unknown time   • finasteride (PROSCAR) 5 MG tablet Take 5 mg by mouth Daily.   Unknown at Unknown time   • folic acid (FOLVITE) 400 MCG tablet Take 400 mcg by mouth.   Unknown at Unknown time   • glimepiride (AMARYL) 4 MG tablet Take 4 mg by mouth 2 (Two) Times a Day With Meals. Needs pm dose   Unknown at Unknown time   • losartan (COZAAR) 50 MG tablet Take 25 mg by mouth Daily.   Unknown at Unknown time   • meclizine (ANTIVERT) 25 MG tablet Take 25 mg by mouth. 3-4 times daily   Unknown at Unknown time   • metoprolol succinate XL (TOPROL XL) 25 MG 24 hr tablet Take 25 mg by mouth every night at bedtime.   Unknown at Unknown time   • nitroglycerin (NITROSTAT) 0.4 MG SL tablet Place 0.4 mg under the tongue Every 5 (Five) Minutes As Needed.   Unknown at Unknown time   • pantoprazole (PROTONIX) 40 MG EC tablet Take 40 mg by mouth Every 12 (Twelve) Hours.   Unknown at Unknown time   • potassium chloride (K-DUR,KLOR-CON) 20 MEQ CR tablet Take 10 mEq by mouth Every 12 (Twelve) Hours.   Unknown at Unknown time   • Pyridoxine HCl (VITAMIN B6) 200 MG tablet Take 1 tablet by mouth Daily.   Unknown at Unknown time   • rOPINIRole (REQUIP) 1 MG tablet Take 1 mg by mouth Every Night. Take 1 hour before bedtime.   Unknown at Unknown time   • sennosides-docusate sodium (SENOKOT-S) 8.6-50 MG tablet Take 2 tablets by mouth 2 (Two) Times a Day As Needed for constipation. 45 tablet 2 Unknown at Unknown time   • tamsulosin (FLOMAX) 0.4 MG capsule 24 hr  capsule Take 1 capsule by mouth Daily.   Unknown at Unknown time   • thiamine (VITAMIN B-1) 100 MG tablet Take 100 mg by mouth.   Unknown at Unknown time   • vitamin C (ASCORBIC ACID) 500 MG tablet Take 500 mg by mouth Daily.   Unknown at Unknown time       Medicines:  Current Facility-Administered Medications   Medication Dose Route Frequency Provider Last Rate Last Dose   • acetaminophen (TYLENOL) tablet 650 mg  650 mg Oral Q4H PRN Forrest Bliss MD        Or   • acetaminophen (TYLENOL) suppository 650 mg  650 mg Rectal Q4H PRN Forrest Bliss MD       • allopurinol (ZYLOPRIM) tablet 300 mg  300 mg Oral Daily Forrest Bliss MD   300 mg at 11/02/20 0948   • aspirin chewable tablet 81 mg  81 mg Oral Daily Elpidio Irby DO   81 mg at 11/02/20 1546   • cefTRIAXone (ROCEPHIN) 1 g/100 mL 0.9% NS (MBP)  1 g Intravenous Q24H Rocky Nixon DO   1 g at 11/02/20 1546   • cholecalciferol (VITAMIN D3) tablet 1,000 Units  1,000 Units Oral Daily Forrest Bliss MD   1,000 Units at 11/02/20 0948   • colchicine tablet 0.6 mg  0.6 mg Oral Daily PRN Forrest Bliss MD       • dextrose (D50W) 25 g/ 50mL Intravenous Solution 25 g  25 g Intravenous Q15 Min PRN Forrest Bliss MD       • dextrose (GLUTOSE) oral gel 15 g  15 g Oral Q15 Min PRN Forrest Bliss MD       • dextrose 5 % 995 mL with potassium chloride 10 mEq infusion   Intravenous Continuous Teddy Hammonds APRN 50 mL/hr at 11/02/20 1548     • folic acid (FOLVITE) tablet 400 mcg  400 mcg Oral Daily Forrest Bliss MD   400 mcg at 11/02/20 0948   • glucagon (human recombinant) (GLUCAGEN DIAGNOSTIC) injection 1 mg  1 mg Subcutaneous Q15 Min PRN Forrest Bliss MD       • hydrALAZINE (APRESOLINE) injection 10 mg  10 mg Intravenous Q4H PRN Forrest Bliss MD       • insulin lispro (humaLOG) injection 2-7 Units  2-7 Units Subcutaneous TID AC Forrest Bliss MD   2 Units at 11/02/20 1143   • iron sucrose (VENOFER) 200 mg in sodium  chloride 0.9 % 100 mL IVPB  200 mg Intravenous Q24H Pablito Victoria MD   200 mg at 11/01/20 1721   • isosorbide mononitrate (IMDUR) 24 hr tablet 30 mg  30 mg Oral Q24H Forrest Bliss MD   30 mg at 11/02/20 0948   • lansoprazole (FIRST) oral suspension 30 mg  30 mg Oral Q AM Iain Rosenberg MD   30 mg at 11/02/20 0633   • nitroglycerin (NITROSTAT) SL tablet 0.4 mg  0.4 mg Sublingual Q5 Min PRN Forrest Bliss MD   0.4 mg at 10/27/20 1600   • ondansetron (ZOFRAN) tablet 4 mg  4 mg Oral Q6H PRN Forrest Bliss MD        Or   • ondansetron (ZOFRAN) injection 4 mg  4 mg Intravenous Q6H PRN Forrest Bliss MD       • potassium chloride (MICRO-K) CR capsule 10 mEq  10 mEq Oral BID With Meals Forrest Bliss MD   10 mEq at 11/02/20 0949   • pravastatin (PRAVACHOL) tablet 40 mg  40 mg Oral Nightly Forrest Bliss MD   40 mg at 11/01/20 2051   • prednisoLONE acetate (PRED FORTE) 1 % ophthalmic suspension 1 drop  1 drop Left Eye Q2H While Awake Forrest Bliss MD   1 drop at 11/02/20 1551   • sennosides-docusate (PERICOLACE) 8.6-50 MG per tablet 2 tablet  2 tablet Oral BID PRN Forrest Bliss MD       • sodium chloride 0.9 % flush 10 mL  10 mL Intravenous Q12H Forrest Bliss MD   10 mL at 11/02/20 0949   • sodium chloride 0.9 % flush 10 mL  10 mL Intravenous PRN Forrest Bliss MD       • sodium chloride 0.9 % infusion 500 mL  500 mL Intravenous Continuous PRN Forrest Bliss MD 25 mL/hr at 10/28/20 1150     • terazosin (HYTRIN) capsule 1 mg  1 mg Oral Nightly Forrest Bliss MD   1 mg at 11/01/20 2050   • thiamine (VITAMIN B-1) tablet 100 mg  100 mg Oral Daily Forrest Bliss MD   100 mg at 11/02/20 0949   • vitamin B-6 (PYRIDOXINE) tablet 200 mg  200 mg Oral Daily Forrest Bliss MD   200 mg at 11/02/20 0948   • vitamin C (ASCORBIC ACID) tablet 500 mg  500 mg Oral Daily Forrest Bliss MD   500 mg at 11/02/20 0948       Past Medical History:  Past Medical History:   Diagnosis  Date   • A-fib (CMS/[facility]) 11/15/2016   • Anemia     gets shots every few months to build up blood. sees dr adam.   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   • Arthritis    • Bradycardia 11/15/2016   • CAD in native artery 11/15/2016   • Chest pain 11/15/2016   • CHF (congestive heart failure) (CMS/[facility])    • Chronic kidney disease    • COPD (chronic obstructive pulmonary disease) (CMS/[facility])    • DDD (degenerative disc disease), lumbar 6/27/2017   • Heart murmur    • HTN (hypertension) 11/15/2016   • Hyperlipidemia    • Lumbar stenosis with neurogenic claudication 6/27/2017   • Murmur 4/19/2019   • Myocardial infarction (CMS/[facility])    • Sleep apnea    • Stroke (CMS/[facility])    • Type 2 diabetes mellitus (CMS/[facility]) 11/15/2016   • Ulcer of abdomen wall (CMS/[facility])        Past Surgical History:  Past Surgical History:   Procedure Laterality Date   • APPENDECTOMY     • BACK SURGERY      x2   • CARDIAC CATHETERIZATION Left     1/2010   • CARPAL TUNNEL RELEASE Bilateral    • CHOLECYSTECTOMY     • COLONOSCOPY N/A 9/7/2017    Procedure: COLONOSCOPY WITH ANESTHESIA;  Surgeon: Joshua Rich DO;  Location: [facility] ENDOSCOPY;  Service:    • CORONARY ARTERY BYPASS GRAFT      2/2006 w/PTCA & KIMMY    • CORONARY STENT PLACEMENT     • ENDOSCOPY N/A 12/14/2016    Procedure: ESOPHAGOGASTRODUODENOSCOPY WITH ANESTHESIA;  Surgeon: Isabel Dexter MD;  Location: [facility] ENDOSCOPY;  Service:    • ENDOSCOPY N/A 12/16/2016    Procedure: ESOPHAGOGASTRODUODENOSCOPY WITH ANESTHESIA;  Surgeon: Joshua Rich DO;  Location: [facility] ENDOSCOPY;  Service:    • ENDOSCOPY N/A 4/10/2017    Procedure: ESOPHAGOGASTRODUODENOSCOPY WITH ANESTHESIA;  Surgeon: Joshua Rich DO;  Location: [facility] ENDOSCOPY;  Service:    • ENDOSCOPY N/A 10/28/2020    Procedure: ESOPHAGOGASTRODUODENOSCOPY WITH ANESTHESIA;  Surgeon: Forrest Bliss MD;  Location: [facility] ENDOSCOPY;  Service: Gastroenterology;  Laterality: N/A;  pre: GI bleed  post: duodenal ulcers, hiatal hernia    Jeffrey Owen MD   • EYE SURGERY Left     x 2   • JOINT REPLACEMENT     • PERIPHERAL ARTERIAL STENT GRAFT     • REPLACEMENT TOTAL KNEE Left     2002   • SHOULDER ROTATOR CUFF REPAIR Bilateral        Family History  Family History   Problem Relation Age of Onset   • Coronary artery disease Father    • Heart disease Father    • Coronary artery disease Brother    • Heart attack Brother    • Cancer Mother    • No Known Problems Sister    • Heart disease Son    • Cancer Sister    • Cancer Sister        Social History  Social History     Socioeconomic History   • Marital status:      Spouse name: Not on file   • Number of children: Not on file   • Years of education: Not on file   • Highest education level: Not on file   Tobacco Use   • Smoking status: Never Smoker   • Smokeless tobacco: Never Used   Substance and Sexual Activity   • Alcohol use: No   • Drug use: No   • Sexual activity: Never     Partners: Female         Review of Systems:  History obtained from chart review and the patient  General ROS: No fever or chills  Respiratory ROS: No cough, shortness of breath, wheezing  Cardiovascular ROS: No chest pain or palpitations  Gastrointestinal ROS: No abdominal pain or melena  Genito-Urinary ROS: No dysuria or hematuria    Objective:  Patient Vitals for the past 24 hrs:   BP Temp Temp src Pulse Resp SpO2   11/02/20 1316 154/50 97.6 °F (36.4 °C) Oral 58 18 97 %   11/02/20 0716 169/56 97.9 °F (36.6 °C) Oral 58 18 97 %   11/02/20 0330 149/50 97.7 °F (36.5 °C) Oral 57 20 95 %   11/01/20 2300 161/56 98 °F (36.7 °C) Oral 61 20 97 %   11/01/20 1909 158/76 98 °F (36.7 °C) Oral 53 20 96 %       Intake/Output Summary (Last 24 hours) at 11/2/2020 1631  Last data filed at 11/2/2020 1546  Gross per 24 hour   Intake 2489 ml   Output 1075 ml   Net 1414 ml     General: awake/alert   Chest:  clear to auscultation bilaterally without respiratory distress  CVS: regular rate and rhythm  Abdominal: soft, nontender, positive  bowel sounds  Extremities: no cyanosis or edema  Skin: warm and dry without rash      Labs:  Results from last 7 days   Lab Units 11/02/20 0447 11/01/20 0624 10/31/20  0444   WBC 10*3/mm3 8.19 8.78 12.28*   HEMOGLOBIN g/dL 8.5* 9.2* 6.8*   HEMATOCRIT % 26.3* 28.1* 20.8*   PLATELETS 10*3/mm3 146 168 165         Results from last 7 days   Lab Units 11/02/20 0447 11/01/20 0624 10/31/20  0444   SODIUM mmol/L 146* 150* 150*   POTASSIUM mmol/L 4.1 3.7 3.7   CHLORIDE mmol/L 117* 119* 119*   CO2 mmol/L 23.0 24.0 23.0   BUN mg/dL 53* 69* 80*   CREATININE mg/dL 1.35* 1.80* 2.17*   CALCIUM mg/dL 8.6 8.7 8.8   BILIRUBIN mg/dL 0.3 0.3 0.4   ALK PHOS U/L 78 79 70   ALT (SGPT) U/L 28 31 31   AST (SGOT) U/L 31 35 40   GLUCOSE mg/dL 166* 170* 153*       Radiology:   Imaging Results (Last 72 Hours)     ** No results found for the last 72 hours. **          Culture:  Urine Culture   Date Value Ref Range Status   10/30/2020 No growth  Final   10/27/2020 >100,000 CFU/mL Escherichia coli (A)  Final         Assessment   Acute kidney injury/Prerenal azotemia  Stage III chronic kidney disease  Hypernatremia  Acute GI bleed  Acute metabolic encephalopathy  Type II diabetic nephropathy  Iron deficiency anemia  Status post EGD/duodenal ulcer  E coli UTI    Plan:  Improved on D5W with K, continue for today  PPI  Monitor labs  D/w pt/nursing/family.      Priyank Brown MD  11/2/2020  16:31 CST

## 2020-11-02 NOTE — PROGRESS NOTES
Continued Stay Note   [place]     Patient Name: Eron Escalante  MRN: [id number]  Today's Date: 11/2/2020    Admit Date: 10/26/2020    Discharge Plan     Row Name 11/02/20 1539       Plan    Plan Comments  Per Valeria jean [facility] they are not in network with pt insurance. Pamela at [facility] is still reviewing referral and should have an answer shortly.        Discharge Codes    No documentation.             MYAH Ramírez

## 2020-11-02 NOTE — PLAN OF CARE
Problem: Adult Inpatient Plan of Care  Goal: Plan of Care Review  Recent Flowsheet Documentation  Taken 11/2/2020 1142 by Maria C Fagan, PTA  Progress: improving  Plan of Care Reviewed With: patient  Outcome Summary: pt trans to EOB mod-max of 2, performed BLE A-AAROM x 10 reps cconstant cues to keep pt on task, sit-stand min-mod of 2, stand pivot to chair with rwx mod of 2, pt would benefit from SNF

## 2020-11-02 NOTE — PLAN OF CARE
Problem: Adult Inpatient Plan of Care  Goal: Plan of Care Review  Outcome: Ongoing, Progressing  Flowsheets (Taken 11/2/2020 1501)  Outcome Summary: RD nutrition follow-up completed.  Pt's po intake average is 50% of the last 3 meals.  WIll continue to monitor po intake, and follow for further d/c needs

## 2020-11-02 NOTE — PROGRESS NOTES
[facility]  INPATIENT PROGRESS NOTE    Patient Name: Eron Escaalnte  Date of Admission: 10/26/2020  Today's Date: 11/02/20  Length of Stay: 7  Primary Care Physician: Jeffrey Owen MD    Subjective   Chief Complaint: Weakness.   HPI   His granddaughter feels that he is doing better today.  He ate more of his lunch.  His mental status seems to be improving.    No further signs of bleeding.  Hemoglobin remained stable.    Awaiting skilled nursing placement.    Review of Systems   All pertinent negatives and positives are as above. All other systems have been reviewed and are negative unless otherwise stated.     Objective    Temp:  [97.5 °F (36.4 °C)-98 °F (36.7 °C)] 97.6 °F (36.4 °C)  Heart Rate:  [53-61] 58  Resp:  [18-22] 18  BP: (149-169)/(49-76) 154/50  Physical Exam  Vitals signs and nursing note reviewed.   Constitutional:       Appearance: He is well-developed.      Comments: Up in the chair.  No distress.  His granddaughter is present.  Discussed with his nurse, Bronwyn.   HENT:      Head: Normocephalic and atraumatic.      Comments: Poor dentition.  Eyes:      Conjunctiva/sclera: Conjunctivae normal.      Pupils: Pupils are equal, round, and reactive to light.   Neck:      Musculoskeletal: Neck supple.      Vascular: No JVD.   Cardiovascular:      Rate and Rhythm: Normal rate. Rhythm irregular.      Heart sounds: Normal heart sounds. No murmur. No friction rub. No gallop.    Pulmonary:      Effort: Pulmonary effort is normal. No respiratory distress.      Breath sounds: Normal breath sounds. No wheezing or rales.   Chest:      Chest wall: No tenderness.   Abdominal:      General: Bowel sounds are normal. There is no distension.      Palpations: Abdomen is soft.      Tenderness: There is no abdominal tenderness. There is no guarding or rebound.   Musculoskeletal: Normal range of motion.         General: Swelling (trace) present. No tenderness or  deformity.   Skin:     General: Skin is warm and dry.      Findings: No rash.   Neurological:      General: No focal deficit present.      Mental Status: He is alert and oriented to person, place, and time.      Cranial Nerves: No cranial nerve deficit.      Motor: Weakness present. No abnormal muscle tone.      Deep Tendon Reflexes: Reflexes normal.      Comments: Follows all commands without difficulty.   Psychiatric:         Behavior: Behavior normal.         Thought Content: Thought content normal.         Judgment: Judgment normal.       Results Review:  I have reviewed the labs, radiology results, and diagnostic studies.    Laboratory Data:   Results from last 7 days   Lab Units 11/02/20  0447 11/01/20  0624 10/31/20  0444   WBC 10*3/mm3 8.19 8.78 12.28*   HEMOGLOBIN g/dL 8.5* 9.2* 6.8*   HEMATOCRIT % 26.3* 28.1* 20.8*   PLATELETS 10*3/mm3 146 168 165     Results from last 7 days   Lab Units 11/02/20  0447 11/01/20  0624 10/31/20  0444   SODIUM mmol/L 146* 150* 150*   POTASSIUM mmol/L 4.1 3.7 3.7   CHLORIDE mmol/L 117* 119* 119*   CO2 mmol/L 23.0 24.0 23.0   BUN mg/dL 53* 69* 80*   CREATININE mg/dL 1.35* 1.80* 2.17*   CALCIUM mg/dL 8.6 8.7 8.8   BILIRUBIN mg/dL 0.3 0.3 0.4   ALK PHOS U/L 78 79 70   ALT (SGPT) U/L 28 31 31   AST (SGOT) U/L 31 35 40   GLUCOSE mg/dL 166* 170* 153*     Culture Data:   Urine Culture   Date Value Ref Range Status   10/30/2020 No growth  Final   10/27/2020 >100,000 CFU/mL Escherichia coli (A)  Final     I have reviewed the patient's current medications.     Assessment/Plan     Active Hospital Problems    Diagnosis   • **Metabolic encephalopathy   • E. coli UTI (urinary tract infection)   • Hypernatremia   • Acute renal failure superimposed on stage 3a chronic kidney disease (CMS/HCC)   • Acute blood loss anemia   • Duodenal ulcer   • UGIB (upper gastrointestinal bleed)   • History of stroke   • Chronic diastolic congestive heart failure (CMS/HCC)   • A-fib (CMS/HCC)   • HTN  (hypertension)   • Type 2 diabetes mellitus, without long-term current use of insulin (CMS/[facility])     Plan:  The patient was admitted on 10/27 by Dr. Rosenberg.  He presented as a transfer from [facility] as his granddaughter works here.  He had been admitted there and was having much difficulty with confusion.  This has been proven to be multifactorial as evidenced below.    He has been evaluated by neurology.  He had an MRI of the brain without contrast on 10/30 that showed no evidence of acute infarct.    He has been followed by nephrology for acute renal failure superimposed on CKD, stage III.  His serum creatinine continues to improve.  His elevated BUN is secondary to recent gastrointestinal bleeding as well as his acute renal failure.  He developed some hyponatremia secondary to resuscitation with crystalloid fluids.  This is improving on recent dextrose fluids.    He had heme positive stool and melena.  He was taken for endoscopy on 10/28 by Dr. Bliss. He had multiple nonbleeding duodenal ulcers at that time.  He had a hiatal hernia.  Urease negative.  He is being treated with lansoprazole.  He has received a total of 4 units packed red blood cells.  He was last transfused on 10/31.  Anemia substrates are consistent with chronic disease.    He has an E. coli urinary tract infection that is resistant to ampicillin, levofloxacin, tetracycline, and Bactrim.  He is being treated with ceftriaxone.    He has a history of atrial fibrillation for which he will not be anticoagulated at the moment given his recent gastrointestinal bleed.  I would like to restart his aspirin today.    Oral hypoglycemics on hold.  Continue sliding scale insulin.  His most recent hemoglobin A1c is 6.1.    SCDs for DVT prophylaxis.    Discharge Planning: I expect the patient to be discharged to SNF in 1-2 days.    Electronically signed by Elpidio Irby DO, 11/02/20, 14:30 CST.

## 2020-11-02 NOTE — PLAN OF CARE
Problem: Adult Inpatient Plan of Care  Goal: Plan of Care Review  Outcome: Ongoing, Progressing  Flowsheets (Taken 11/2/2020 6907)  Progress: improving  Plan of Care Reviewed With:  • patient  • grandchild(miles)  Outcome Summary: SLP dysphagia tx completed. Pt was alert, cooperative. Presented with poor respiratory support for verbal communication, resulting in decreased vocal intensity and limited number of words per breath during verbalizations. Pt was presented honey thick and regular solid trials for possible diet consistency upgrade. Decreased labial seal on spoon during acceptance of honey thick trials, with frequent throat clearing following intake, which family stated has been pt baseline for at least one year. He presented with functional mandibular ROM with mastication of regular solid though he presented with only limited dentition, which is also baseline. Mildly prolonged oral manipulation, yet felt to be secondary to dentition. No oral residue post solid trials, with delayed cough x1. Cannot fully r/o aspiration at this time. Appeared to have quite poor endurance from a respiratory standpoint, given laborous breathing, which granddaughter stated appeared to be slightly increased than pt's typical at rest respirations. Family brought in Pepsi, which the pt had requested, and planned to thicken before providing for the pt, therefore, SLP thickened approximately seven ounces of Pepsi to a honey thick consistency for increased safety with consumption. Granddaughter was, however, educated that SLP feels pt may benefit from VFSS in Radiology to objectively determine functional level of safety with varying consistencies given frequent throat clearing and other concerns at bedside, however, she is aware that pt would warrant repeat COVID-19 swab (which will also be warranted prior to d/c from facility given plan to d/c to SNF). RECOMMENDATIONS: Upgrade to mechanical soft diet consistency, continue with honey  thick liquid consistency; meds whole and/or crushed (if safe) in pudding/applesauce; RN to monitor for increased congestion; ok for ice chips between meals, 30 min following any other PO intake; If concerns arise with toleration of mech soft, downgrade to pureed and notify [facility] to follow up on 11/03/2020. Thanks!

## 2020-11-02 NOTE — PLAN OF CARE
Goal Outcome Evaluation:  Plan of Care Reviewed With: patient, grandchild(miles)  Pt oriented to person and place only. No c/o pain this shift. Up x2, as pt unable to bear any weight. Speech garbled. Rested well today. Kidney function improving. RUE remains tender to touch. VSS. Sinus darshana on tele. Safety maintained.

## 2020-11-02 NOTE — THERAPY TREATMENT NOTE
Acute Care - Occupational Therapy Treatment Note  [facility]     Patient Name: Eron Escalante  : 1934  MRN: [id number]  Today's Date: 2020  Onset of Illness/Injury or Date of Surgery: 10/26/20          Admit Date: 10/26/2020       ICD-10-CM ICD-9-CM   1. Oropharyngeal dysphagia  R13.12 787.22   2. Impaired mobility  Z74.09 799.89   3. Heme positive stool  R19.5 792.1   4. Decreased activities of daily living (ADL)  Z78.9 V49.89     Patient Active Problem List   Diagnosis   • Type 2 diabetes mellitus (CMS/[facility])   • HTN (hypertension)   • Aortocoronary bypass status   • Bradycardia   • CAD in native artery   • A-fib (CMS/[facility])   • Symptomatic bradycardia   • Cerebral ventriculomegaly   • Dehydration   • Duodenal ulcer   • Acute kidney failure (CMS/[facility])   • Hyperkalemia   • Weakness of extremity   • Lumbar stenosis with neurogenic claudication   • DDD (degenerative disc disease), lumbar   • Change in bowel habit   • RICARDO treated with BiPAP   • Murmur   • CHF (congestive heart failure) (CMS/[facility])   • Periodic limb movement disorder (PLMD)   • CHF (congestive heart failure), NYHA class I, acute on chronic, combined (CMS/[facility])   • Chronic constipation   • Chronic back pain   • CKD (chronic kidney disease), stage III   • History of stroke   • Hypertensive urgency   • Intermittent confusion   • Ischemic cardiomyopathy   • Altered mental status   • Acute blood loss anemia   • Encephalopathy   • Heme positive stool   • GI bleed   • Hypernatremia   • Acute kidney injury (CMS/[facility])   • E. coli UTI (urinary tract infection)     Past Medical History:   Diagnosis Date   • A-fib (CMS/[facility]) 11/15/2016   • Anemia     gets shots every few months to build up blood. sees dr adam.   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   • Arthritis    • Bradycardia 11/15/2016   • CAD in native artery 11/15/2016   • Chest pain 11/15/2016   • CHF (congestive heart failure) (CMS/[facility])    • Chronic kidney disease    • COPD (chronic obstructive  pulmonary disease) (CMS/[facility])    • DDD (degenerative disc disease), lumbar 6/27/2017   • Heart murmur    • HTN (hypertension) 11/15/2016   • Hyperlipidemia    • Lumbar stenosis with neurogenic claudication 6/27/2017   • Murmur 4/19/2019   • Myocardial infarction (CMS/[facility])    • Sleep apnea    • Stroke (CMS/[facility])    • Type 2 diabetes mellitus (CMS/[facility]) 11/15/2016   • Ulcer of abdomen wall (CMS/[facility])      Past Surgical History:   Procedure Laterality Date   • APPENDECTOMY     • BACK SURGERY      x2   • CARDIAC CATHETERIZATION Left     1/2010   • CARPAL TUNNEL RELEASE Bilateral    • CHOLECYSTECTOMY     • COLONOSCOPY N/A 9/7/2017    Procedure: COLONOSCOPY WITH ANESTHESIA;  Surgeon: Joshua Rich DO;  Location: [facility] ENDOSCOPY;  Service:    • CORONARY ARTERY BYPASS GRAFT      2/2006 w/PTCA & KIMMY    • CORONARY STENT PLACEMENT     • ENDOSCOPY N/A 12/14/2016    Procedure: ESOPHAGOGASTRODUODENOSCOPY WITH ANESTHESIA;  Surgeon: Isabel Dexter MD;  Location: [facility] ENDOSCOPY;  Service:    • ENDOSCOPY N/A 12/16/2016    Procedure: ESOPHAGOGASTRODUODENOSCOPY WITH ANESTHESIA;  Surgeon: Joshua Rich DO;  Location: [facility] ENDOSCOPY;  Service:    • ENDOSCOPY N/A 4/10/2017    Procedure: ESOPHAGOGASTRODUODENOSCOPY WITH ANESTHESIA;  Surgeon: Joshua Rich DO;  Location: [facility] ENDOSCOPY;  Service:    • ENDOSCOPY N/A 10/28/2020    Procedure: ESOPHAGOGASTRODUODENOSCOPY WITH ANESTHESIA;  Surgeon: Forrest Bliss MD;  Location: [facility] ENDOSCOPY;  Service: Gastroenterology;  Laterality: N/A;  pre: GI bleed  post: duodenal ulcers, hiatal hernia   Jeffrey Owen MD   • EYE SURGERY Left     x 2   • JOINT REPLACEMENT     • PERIPHERAL ARTERIAL STENT GRAFT     • REPLACEMENT TOTAL KNEE Left     2002   • SHOULDER ROTATOR CUFF REPAIR Bilateral           OT ASSESSMENT FLOWSHEET (last 12 hours)      OT Evaluation and Treatment     Row Name 11/02/20 1029                   OT Time and Intention    Subjective Information  no complaints  -TS         Document Type  therapy note (daily note)  -TS        Mode of Treatment  occupational therapy  -TS        Patient Effort  good  -TS           Cognition    Personal Safety Interventions  fall prevention program maintained;gait belt;nonskid shoes/slippers when out of bed  -TS           Bed Mobility    Rolling Right [place] (Bed Mobility)  moderate assist (50% patient effort)  -TS        Scooting/Bridging [place] (Bed Mobility)  moderate assist (50% patient effort)  -TS        Supine-Sit [place] (Bed Mobility)  moderate assist (50% patient effort)  -TS        Sit-Supine [place] (Bed Mobility)  moderate assist (50% patient effort)  -TS        Assistive Device (Bed Mobility)  bed rails;draw sheet;head of bed elevated  -TS           Transfer Assessment/Treatment    Comment (Transfers)  attempted to stand x2, able to get bottom off bed x1 occurance  -TS           Transfers    Sit-Stand [place] (Transfers)  moderate assist (50% patient effort);2 person assist  -TS        Stand-Sit [place] (Transfers)  moderate assist (50% patient effort);2 person assist  -TS           Balance    Static Sitting Balance  mild impairment;unsupported;sitting, edge of bed  -TS        Comment, Balance  x10 minutes EOB  -TS           Positioning and Restraints    Pre-Treatment Position  in bed  -TS        Post Treatment Position  bed  -TS        In Bed  side lying right;call light within reach;encouraged to call for assist;with family/caregiver;side rails up x2;pillow between legs  -TS          User Key  (r) = Recorded By, (t) = Taken By, (c) = Cosigned By    Initials Name Effective Dates    TS Adriana Louise, RAMOS/L 08/02/16 -          Occupational Therapy Education                 Title: PT OT SLP Therapies (In Progress)     Topic: Occupational Therapy (In Progress)     Point: ADL training (Done)     Description:   Instruct learner(s) on proper safety adaptation and remediation techniques during self  care or transfers.   Instruct in proper use of assistive devices.              Learning Progress Summary           Patient Acceptance, E, VU,NR by  at 10/31/2020 1338    Acceptance, E, NR by  at 10/27/2020 1415    Comment: Role of OT, safety with ADL, importance of therapy                   Point: Home exercise program (Not Started)     Description:   Instruct learner(s) on appropriate technique for monitoring, assisting and/or progressing therapeutic exercises/activities.              Learner Progress:  Not documented in this visit.          Point: Precautions (Not Started)     Description:   Instruct learner(s) on prescribed precautions during self-care and functional transfers.              Learner Progress:  Not documented in this visit.          Point: Body mechanics (Done)     Description:   Instruct learner(s) on proper positioning and spine alignment during self-care, functional mobility activities and/or exercises.              Learning Progress Summary           Patient Acceptance, E, VU,NR by  at 10/31/2020 1338                               User Key     Initials Effective Dates Name Provider Type Discipline     07/05/20 -  Lynne Frias OTR/L Occupational Therapist OT     07/16/20 -  Ann Marie Veras OT Student OT Student OT                  OT Recommendation and Plan              Time Calculation:   Time Calculation- OT     Row Name 11/02/20 1412             Time Calculation- OT    OT Start Time  1029  -TS      OT Stop Time  1100  -TS      OT Time Calculation (min)  31 min  -TS      Total Timed Code Minutes- OT  31 minute(s)  -TS      OT Received On  11/02/20  -         Timed Charges    01811 - OT Self Care/Mgmt Minutes  31  -TS        User Key  (r) = Recorded By, (t) = Taken By, (c) = Cosigned By    Initials Name Provider Type    TS Adriana Louise, RAMOS/L Occupational Therapy Assistant        Therapy Charges for Today     Code Description Service Date Service Provider Modifiers Qty     25477490239 HC OT SELF CARE/MGMT/TRAIN EA 15 MIN 11/2/2020 Adriana Louise, RICHARD/L GO 2               Adriana DACOSTA. ARAM Louise  11/2/2020

## 2020-11-02 NOTE — PLAN OF CARE
Goal Outcome Evaluation:  Plan of Care Reviewed With: patient  Progress: improving  Outcome Summary: Pt. disoriented to time/situation. Speech still garbled but easier to understand. Rested well. RUE remains tender to touch, swelling improved. No changes to generalized edema. Audible wheezing w/ activity. Lung sounds clear. Vitals stable, sinus bradycardia on telemetry.

## 2020-11-03 PROBLEM — R07.9 CHEST PAIN: Status: RESOLVED | Noted: 2019-03-21 | Resolved: 2020-11-03

## 2020-11-03 NOTE — PROGRESS NOTES
Continued Stay Note   Jamie     Patient Name: Eron Escalante  MRN: [id number]  Today's Date: 11/3/2020    Admit Date: 10/26/2020    Discharge Plan     Row Name 11/03/20 1110       Plan    Plan  [facility] [place]- they are starting precert approval from [employer]    Patient/Family in Agreement with Plan  yes    Plan Comments  Pamela from [facility] [place] has offered pt a bed and they are starting ins. precert with insurance.  Informed Granddaughter- Isabel [phone].        Discharge Codes    No documentation.             ISIDRO Maurice

## 2020-11-03 NOTE — PLAN OF CARE
Problem: Adult Inpatient Plan of Care  Goal: Plan of Care Review  Recent Flowsheet Documentation  Taken 11/3/2020 1446 by Maria C Fagan, PTA  Progress: improving  Plan of Care Reviewed With: patient  Outcome Summary: pt trans to EOB min assist, performed BLE exercises x 20 reps, sit-stand min-mod of 2, pt took steps bed-chair with rwx min of 2, pt would benefit from SNF

## 2020-11-03 NOTE — PLAN OF CARE
Problem: Adult Inpatient Plan of Care  Goal: Plan of Care Review  Outcome: Ongoing, Progressing  Flowsheets (Taken 11/3/2020 8708)  Progress: improving  Plan of Care Reviewed With:   patient   family  Outcome Summary: Patient continues to improve.  No overt coughing with thin liquids today, but is wheezing and increased SOB with water.  Throat clearing also observed.  Unsure if chronic.  Family and patient agree to MBS tomorrow.  SLP will complete to see if diet can be upgraded.

## 2020-11-03 NOTE — THERAPY TREATMENT NOTE
Acute Care - Occupational Therapy Treatment Note  [facility]     Patient Name: Eron Escalante  : 1934  MRN: [id number]  Today's Date: 11/3/2020  Onset of Illness/Injury or Date of Surgery: 10/26/20          Admit Date: 10/26/2020       ICD-10-CM ICD-9-CM   1. Oropharyngeal dysphagia  R13.12 787.22   2. Impaired mobility  Z74.09 799.89   3. Heme positive stool  R19.5 792.1   4. Decreased activities of daily living (ADL)  Z78.9 V49.89     Patient Active Problem List   Diagnosis   • Type 2 diabetes mellitus, without long-term current use of insulin (CMS/[facility])   • HTN (hypertension)   • Aortocoronary bypass status   • Bradycardia   • CAD in native artery   • A-fib (CMS/[facility])   • Symptomatic bradycardia   • Cerebral ventriculomegaly   • Dehydration   • Duodenal ulcer   • Acute renal failure superimposed on stage 3a chronic kidney disease (CMS/[facility])   • Hyperkalemia   • Weakness of extremity   • Lumbar stenosis with neurogenic claudication   • DDD (degenerative disc disease), lumbar   • Change in bowel habit   • RICARDO treated with BiPAP   • Murmur   • Chronic diastolic congestive heart failure (CMS/[facility])   • Periodic limb movement disorder (PLMD)   • CHF (congestive heart failure), NYHA class I, acute on chronic, combined (CMS/[facility])   • Chronic constipation   • Chronic back pain   • History of stroke   • Hypertensive urgency   • Ischemic cardiomyopathy   • Altered mental status   • Acute blood loss anemia   • Metabolic encephalopathy   • Hypernatremia   • E. coli UTI (urinary tract infection)   • UGIB (upper gastrointestinal bleed)     Past Medical History:   Diagnosis Date   • A-fib (CMS/[facility]) 11/15/2016   • Anemia     gets shots every few months to build up blood. sees dr adam.   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   • Arthritis    • Bradycardia 11/15/2016   • CAD in native artery 11/15/2016   • Chest pain 11/15/2016   • CHF (congestive heart failure) (CMS/[facility])    • Chronic kidney disease    • COPD (chronic  obstructive pulmonary disease) (CMS/[facility])    • DDD (degenerative disc disease), lumbar 6/27/2017   • Heart murmur    • HTN (hypertension) 11/15/2016   • Hyperlipidemia    • Lumbar stenosis with neurogenic claudication 6/27/2017   • Murmur 4/19/2019   • Myocardial infarction (CMS/[facility])    • Sleep apnea    • Stroke (CMS/[facility])    • Type 2 diabetes mellitus (CMS/[facility]) 11/15/2016   • Ulcer of abdomen wall (CMS/[facility])      Past Surgical History:   Procedure Laterality Date   • APPENDECTOMY     • BACK SURGERY      x2   • CARDIAC CATHETERIZATION Left     1/2010   • CARPAL TUNNEL RELEASE Bilateral    • CHOLECYSTECTOMY     • COLONOSCOPY N/A 9/7/2017    Procedure: COLONOSCOPY WITH ANESTHESIA;  Surgeon: Joshua Rich DO;  Location: [facility] ENDOSCOPY;  Service:    • CORONARY ARTERY BYPASS GRAFT      2/2006 w/PTCA & KIMMY    • CORONARY STENT PLACEMENT     • ENDOSCOPY N/A 12/14/2016    Procedure: ESOPHAGOGASTRODUODENOSCOPY WITH ANESTHESIA;  Surgeon: Isabel Dexter MD;  Location: [facility] ENDOSCOPY;  Service:    • ENDOSCOPY N/A 12/16/2016    Procedure: ESOPHAGOGASTRODUODENOSCOPY WITH ANESTHESIA;  Surgeon: Joshua Rich DO;  Location: [facility] ENDOSCOPY;  Service:    • ENDOSCOPY N/A 4/10/2017    Procedure: ESOPHAGOGASTRODUODENOSCOPY WITH ANESTHESIA;  Surgeon: Joshua Rich DO;  Location: [facility] ENDOSCOPY;  Service:    • ENDOSCOPY N/A 10/28/2020    Procedure: ESOPHAGOGASTRODUODENOSCOPY WITH ANESTHESIA;  Surgeon: Forrest Bliss MD;  Location: [facility] ENDOSCOPY;  Service: Gastroenterology;  Laterality: N/A;  pre: GI bleed  post: duodenal ulcers, hiatal hernia   Jeffrey Owen MD   • EYE SURGERY Left     x 2   • JOINT REPLACEMENT     • PERIPHERAL ARTERIAL STENT GRAFT     • REPLACEMENT TOTAL KNEE Left     2002   • SHOULDER ROTATOR CUFF REPAIR Bilateral           OT ASSESSMENT FLOWSHEET (last 12 hours)      OT Evaluation and Treatment     Row Name 11/03/20 0758                   OT Time and Intention    Subjective Information   complains of;fatigue;weakness  -TS        Document Type  therapy note (daily note)  -TS        Mode of Treatment  occupational therapy  -TS        Patient Effort  adequate  -TS           General Information    Existing Precautions/Restrictions  fall  -TS           Cognition    Personal Safety Interventions  fall prevention program maintained;nonskid shoes/slippers when out of bed  -TS           Bed Mobility    Supine-Sit [place] (Bed Mobility)  minimum assist (75% patient effort);moderate assist (50% patient effort)  -TS        Assistive Device (Bed Mobility)  draw sheet;head of bed elevated  -TS           Transfer Assessment/Treatment    Transfers  sit-stand transfer;stand-sit transfer  -TS           Transfers    Sit-Stand [place] (Transfers)  minimum assist (75% patient effort);moderate assist (50% patient effort)  -TS        Stand-Sit [place] (Transfers)  minimum assist (75% patient effort);moderate assist (50% patient effort)  -TS           Activities of Daily Living    BADL Assessment/Intervention  lower body dressing  -TS           Lower Body Dressing Assessment/Training    [place] Level (Lower Body Dressing)  don;set up;moderate assist (50% patient effort)  -TS        Position (Lower Body Dressing)  edge of bed sitting;supported standing  -TS           Plan of Care Review    Plan of Care Reviewed With  patient  -TS        Progress  improving  -TS        Outcome Summary  Pt comes to EOB with min/mod A with HOB elevated and draw sheet. Pt sits EOB with SBA/CGA with assist of bed rail. Pt mod A for LB dressing and stood from elevated EOB with min/mod A. Pt would benefit from rehab at discharge. Continue OT POC   -TS           Positioning and Restraints    Pre-Treatment Position  in bed  -TS        Post Treatment Position  bed  -TS        In Bed  sitting EOB;call light within reach;encouraged to call for assist;with family/caregiver;side rails up x2  -TS          User Key  (r) = Recorded By,  (t) = Taken By, (c) = Cosigned By    Initials Name Effective Dates    TS AspenRyleypuneet DACOSTA, RAMOS/L 08/02/16 -          Occupational Therapy Education                 Title: PT OT SLP Therapies (In Progress)     Topic: Occupational Therapy (In Progress)     Point: ADL training (Done)     Description:   Instruct learner(s) on proper safety adaptation and remediation techniques during self care or transfers.   Instruct in proper use of assistive devices.              Learning Progress Summary           Patient Acceptance, E, VU,NR by  at 10/31/2020 1338    Acceptance, E, NR by  at 10/27/2020 1415    Comment: Role of OT, safety with ADL, importance of therapy                   Point: Home exercise program (Not Started)     Description:   Instruct learner(s) on appropriate technique for monitoring, assisting and/or progressing therapeutic exercises/activities.              Learner Progress:  Not documented in this visit.          Point: Precautions (Not Started)     Description:   Instruct learner(s) on prescribed precautions during self-care and functional transfers.              Learner Progress:  Not documented in this visit.          Point: Body mechanics (Done)     Description:   Instruct learner(s) on proper positioning and spine alignment during self-care, functional mobility activities and/or exercises.              Learning Progress Summary           Patient Acceptance, E, VU,NR by  at 10/31/2020 1338                               User Key     Initials Effective Dates Name Provider Type Discipline     07/05/20 -  Lynne Frias, OTR/L Occupational Therapist OT     07/16/20 -  Ann Marie Veras OT Student OT Student OT                  OT Recommendation and Plan     Plan of Care Review  Plan of Care Reviewed With: patient  Progress: improving  Outcome Summary: Pt comes to EOB with min/mod A with HOB elevated and draw sheet. Pt sits EOB with SBA/CGA with assist of bed rail. Pt mod A for LB dressing and  stood from elevated EOB with min/mod A. Pt would benefit from rehab at discharge. Continue OT POC   Plan of Care Reviewed With: patient  Outcome Summary: Pt comes to EOB with min/mod A with HOB elevated and draw sheet. Pt sits EOB with SBA/CGA with assist of bed rail. Pt mod A for LB dressing and stood from elevated EOB with min/mod A. Pt would benefit from rehab at discharge. Continue OT POC         Time Calculation:   Time Calculation- OT     Row Name 11/03/20 1256             Time Calculation- OT    OT Start Time  0758  -TS      OT Stop Time  0825  -TS      OT Time Calculation (min)  27 min  -TS      Total Timed Code Minutes- OT  27 minute(s)  -TS      OT Received On  11/03/20  -TS         Timed Charges    69364 - OT Self Care/Mgmt Minutes  27  -TS        User Key  (r) = Recorded By, (t) = Taken By, (c) = Cosigned By    Initials Name Provider Type    TS Adriana Louise RAMOS/L Occupational Therapy Assistant        Therapy Charges for Today     Code Description Service Date Service Provider Modifiers Qty    08080989485 HC OT SELF CARE/MGMT/TRAIN EA 15 MIN 11/2/2020 Adriana Louise RAMOS/L GO 2    36950620066 HC OT SELF CARE/MGMT/TRAIN EA 15 MIN 11/3/2020 Adriana Louise RAMOS/L GO 2               Adriana DACOSTA. RICHARD Louise/VINNIE  11/3/2020

## 2020-11-03 NOTE — PLAN OF CARE
Problem: Adult Inpatient Plan of Care  Goal: Plan of Care Review  Recent Flowsheet Documentation  Taken 11/3/2020 0758 by Adriana Louise, RAMOS/L  Progress: improving  Plan of Care Reviewed With: patient  Outcome Summary: Pt comes to EOB with min/mod A with HOB elevated and draw sheet. Pt sits EOB with SBA/CGA with assist of bed rail. Pt mod A for LB dressing and stood from elevated EOB with min/mod A. Pt would benefit from rehab at discharge. Continue OT POC

## 2020-11-03 NOTE — PROGRESS NOTES
[facility] Medicine Services  INPATIENT PROGRESS NOTE    Patient Name: Eron Escalante  Date of Admission: 10/26/2020  Today's Date: 11/03/20  Length of Stay: 8  Primary Care Physician: Jeffrey Owen MD    Subjective   Chief Complaint: Weakness.   HPI   He is becoming edematous.  Stop IV fluids.  IV Bumex 0.5 mg x 1.  And amlodipine for hypertension.    Continues to be weak, but slowly improving on all fronts.    His granddaughter would like me to fill out some [facility] paperwork.    Review of Systems   All pertinent negatives and positives are as above. All other systems have been reviewed and are negative unless otherwise stated.     Objective    Temp:  [97.4 °F (36.3 °C)-97.9 °F (36.6 °C)] 97.6 °F (36.4 °C)  Heart Rate:  [57-66] 58  Resp:  [17-26] 18  BP: (146-174)/(54-67) 160/67  Physical Exam  Vitals signs and nursing note reviewed.   Constitutional:       Appearance: He is well-developed.      Comments: Up in bed.  No distress.  His granddaughter is present.  Seen and discussed with his nurse, Bronwyn.   HENT:      Head: Normocephalic and atraumatic.      Comments: Poor dentition.  Eyes:      Conjunctiva/sclera: Conjunctivae normal.      Pupils: Pupils are equal, round, and reactive to light.   Neck:      Musculoskeletal: Neck supple.      Vascular: No JVD.   Cardiovascular:      Rate and Rhythm: Normal rate. Rhythm irregular.      Heart sounds: Normal heart sounds. No murmur. No friction rub. No gallop.    Pulmonary:      Effort: Pulmonary effort is normal. No respiratory distress.      Breath sounds: Normal breath sounds. No wheezing or rales.   Chest:      Chest wall: No tenderness.   Abdominal:      General: Bowel sounds are normal. There is no distension.      Palpations: Abdomen is soft.      Tenderness: There is no abdominal tenderness. There is no guarding or rebound.   Musculoskeletal: Normal range of motion.         General: Swelling (trace) present. No tenderness  or deformity.   Skin:     General: Skin is warm and dry.      Findings: No rash.   Neurological:      General: No focal deficit present.      Mental Status: He is alert and oriented to person, place, and time.      Cranial Nerves: No cranial nerve deficit.      Motor: Weakness present. No abnormal muscle tone.      Deep Tendon Reflexes: Reflexes normal.      Comments: Follows all commands without difficulty.   Psychiatric:         Behavior: Behavior normal.         Thought Content: Thought content normal.         Judgment: Judgment normal.       Results Review:  I have reviewed the labs, radiology results, and diagnostic studies.    Laboratory Data:   Results from last 7 days   Lab Units 11/03/20 0435 11/02/20 0447 11/01/20 0624   WBC 10*3/mm3 7.92 8.19 8.78   HEMOGLOBIN g/dL 8.7* 8.5* 9.2*   HEMATOCRIT % 27.7* 26.3* 28.1*   PLATELETS 10*3/mm3 180 146 168     Results from last 7 days   Lab Units 11/03/20  0435 11/02/20  0447 11/01/20  0624 10/31/20  0444   SODIUM mmol/L 144 146* 150* 150*   POTASSIUM mmol/L 4.1 4.1 3.7 3.7   CHLORIDE mmol/L 114* 117* 119* 119*   CO2 mmol/L 23.0 23.0 24.0 23.0   BUN mg/dL 43* 53* 69* 80*   CREATININE mg/dL 1.01 1.35* 1.80* 2.17*   CALCIUM mg/dL 8.7 8.6 8.7 8.8   BILIRUBIN mg/dL  --  0.3 0.3 0.4   ALK PHOS U/L  --  78 79 70   ALT (SGPT) U/L  --  28 31 31   AST (SGOT) U/L  --  31 35 40   GLUCOSE mg/dL 148* 166* 170* 153*     Culture Data:   Urine Culture   Date Value Ref Range Status   10/30/2020 No growth  Final   10/27/2020 >100,000 CFU/mL Escherichia coli (A)  Final     I have reviewed the patient's current medications.     Assessment/Plan     Active Hospital Problems    Diagnosis   • **Metabolic encephalopathy   • E. coli UTI (urinary tract infection)   • Hypernatremia   • Acute renal failure superimposed on stage 3a chronic kidney disease (CMS/HCC)   • Acute blood loss anemia   • Duodenal ulcer   • UGIB (upper gastrointestinal bleed)   • History of stroke   • Chronic diastolic  congestive heart failure (CMS/[facility])   • A-fib (CMS/[facility])   • HTN (hypertension)   • Type 2 diabetes mellitus, without long-term current use of insulin (CMS/[facility])     Plan:  The patient was admitted on 10/27 by Dr. Rosenberg.  He presented as a transfer from [facility] as his granddaughter works here.  He had been admitted there and was having much difficulty with confusion.  This has been proven to be multifactorial as evidenced below.    He has been evaluated by neurology.  He had an MRI of the brain without contrast on 10/30 that showed no evidence of acute infarct.    He has been followed by nephrology for acute renal failure superimposed on CKD, stage III.  His serum creatinine continues to improve.  His elevated BUN is secondary to recent gastrointestinal bleeding as well as his acute renal failure.  He developed some hyponatremia secondary to resuscitation with crystalloid fluids.  This resolved with recent dextrose fluids.  Stop IV fluids today.  Bumex 0.5 mg IV x1.  Resume amlodipine.    He had heme positive stool and melena.  He was taken for endoscopy on 10/28 by Dr. Bliss. He had multiple nonbleeding duodenal ulcers at that time.  He had a hiatal hernia.  Urease negative.  He is being treated with lansoprazole.  He has received a total of 4 units packed red blood cells.  He was last transfused on 10/31.  Hemoglobin remained stable.  Anemia substrates are consistent with chronic disease.    He has an E. coli urinary tract infection that is resistant to ampicillin, levofloxacin, tetracycline, and Bactrim.  He is being treated with ceftriaxone.    He has a history of atrial fibrillation for which he will not be anticoagulated at the moment given his recent gastrointestinal bleed.  I restarted his aspirin on 11/2.    Oral hypoglycemics on hold.  Continue sliding scale insulin.  His most recent hemoglobin A1c is 6.1.    SCDs for DVT prophylaxis.    Discharge Planning: I expect the patient to be discharged to  SNF in 1-2 days.    Electronically signed by Elpidio Irby DO, 11/03/20, 14:34 CST.

## 2020-11-03 NOTE — PLAN OF CARE
Goal Outcome Evaluation:  Plan of Care Reviewed With: patient  Progress: improving  Outcome Summary: Pt. alert through shift, had difficulty sleeping. Has rested well this AM. Voided in urinal once. VSS. Pt. wheezy/SOB w/ exertion. Tachypneic at times. No c/o pain. Repositioned q2hrs. No skin breakdown noted.

## 2020-11-03 NOTE — THERAPY TREATMENT NOTE
Acute Care - Speech Language Pathology   Swallow Treatment Note  [place]     Patient Name: Eron Escalante  : 1934  MRN: [id number]  Today's Date: 11/3/2020  Onset of Illness/Injury or Date of Surgery: 10/26/20            Admit Date: 10/26/2020    Patient continues to improve.  No overt coughing with thin liquids today, but is wheezing and increased SOB with water.  Throat clearing also observed.  Unsure if chronic.  Family and patient agree to MBS tomorrow.  SLP will complete to see if diet can be upgraded.  Aida Hunt, MS CCC-SLP 11/3/2020 15:47 CST    Visit Dx:     ICD-10-CM ICD-9-CM   1. Oropharyngeal dysphagia  R13.12 787.22   2. Impaired mobility  Z74.09 799.89   3. Heme positive stool  R19.5 792.1   4. Decreased activities of daily living (ADL)  Z78.9 V49.89     Patient Active Problem List   Diagnosis   • Type 2 diabetes mellitus, without long-term current use of insulin (CMS/[facility])   • HTN (hypertension)   • Aortocoronary bypass status   • Bradycardia   • CAD in native artery   • A-fib (CMS/[facility])   • Symptomatic bradycardia   • Cerebral ventriculomegaly   • Dehydration   • Duodenal ulcer   • Acute renal failure superimposed on stage 3a chronic kidney disease (CMS/[facility])   • Hyperkalemia   • Weakness of extremity   • Lumbar stenosis with neurogenic claudication   • DDD (degenerative disc disease), lumbar   • Change in bowel habit   • RICARDO treated with BiPAP   • Murmur   • Chronic diastolic congestive heart failure (CMS/[facility])   • Periodic limb movement disorder (PLMD)   • CHF (congestive heart failure), NYHA class I, acute on chronic, combined (CMS/[facility])   • Chronic constipation   • Chronic back pain   • History of stroke   • Hypertensive urgency   • Ischemic cardiomyopathy   • Altered mental status   • Acute blood loss anemia   • Metabolic encephalopathy   • Hypernatremia   • E. coli UTI (urinary tract infection)   • UGIB (upper gastrointestinal bleed)     Past Medical History:   Diagnosis Date   •  A-fib (CMS/[facility]) 11/15/2016   • Anemia     gets shots every few months to build up blood. sees dr adam.   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   • Arthritis    • Bradycardia 11/15/2016   • CAD in native artery 11/15/2016   • Chest pain 11/15/2016   • CHF (congestive heart failure) (CMS/[facility])    • Chronic kidney disease    • COPD (chronic obstructive pulmonary disease) (CMS/[facility])    • DDD (degenerative disc disease), lumbar 6/27/2017   • Heart murmur    • HTN (hypertension) 11/15/2016   • Hyperlipidemia    • Lumbar stenosis with neurogenic claudication 6/27/2017   • Murmur 4/19/2019   • Myocardial infarction (CMS/[facility])    • Sleep apnea    • Stroke (CMS/[facility])    • Type 2 diabetes mellitus (CMS/[facility]) 11/15/2016   • Ulcer of abdomen wall (CMS/HCC)      Past Surgical History:   Procedure Laterality Date   • APPENDECTOMY     • BACK SURGERY      x2   • CARDIAC CATHETERIZATION Left     1/2010   • CARPAL TUNNEL RELEASE Bilateral    • CHOLECYSTECTOMY     • COLONOSCOPY N/A 9/7/2017    Procedure: COLONOSCOPY WITH ANESTHESIA;  Surgeon: Joshua Rich DO;  Location: [facility] ENDOSCOPY;  Service:    • CORONARY ARTERY BYPASS GRAFT      2/2006 w/PTCA & KIMMY    • CORONARY STENT PLACEMENT     • ENDOSCOPY N/A 12/14/2016    Procedure: ESOPHAGOGASTRODUODENOSCOPY WITH ANESTHESIA;  Surgeon: Isabel Dexter MD;  Location: [facility] ENDOSCOPY;  Service:    • ENDOSCOPY N/A 12/16/2016    Procedure: ESOPHAGOGASTRODUODENOSCOPY WITH ANESTHESIA;  Surgeon: Joshua Rich DO;  Location: [facility] ENDOSCOPY;  Service:    • ENDOSCOPY N/A 4/10/2017    Procedure: ESOPHAGOGASTRODUODENOSCOPY WITH ANESTHESIA;  Surgeon: Joshua Rich DO;  Location: [facility] ENDOSCOPY;  Service:    • ENDOSCOPY N/A 10/28/2020    Procedure: ESOPHAGOGASTRODUODENOSCOPY WITH ANESTHESIA;  Surgeon: Forrest Bliss MD;  Location: [facility] ENDOSCOPY;  Service: Gastroenterology;  Laterality: N/A;  pre: GI bleed  post: duodenal ulcers, hiatal hernia   Jeffrey Owen MD   • EYE  SURGERY Left     x 2   • JOINT REPLACEMENT     • PERIPHERAL ARTERIAL STENT GRAFT     • REPLACEMENT TOTAL KNEE Left     2002   • SHOULDER ROTATOR CUFF REPAIR Bilateral         SWALLOW EVALUATION (last 72 hours)      SLP Adult Swallow Evaluation     Row Name 11/03/20 1345 11/02/20 1510                Rehab Evaluation    Document Type  therapy note (daily note)  -KW  therapy note (daily note)  -TM       Subjective Information  no complaints  -KW  no complaints  -TM       Patient Observations  alert;cooperative  -KW  alert;cooperative  -TM       Patient/Family/Caregiver Comments/Observations  granddaughter present  -KW  Granddaughter arrived shortly following initiation of tx.  -TM       Care Plan Review  care plan/treatment goals reviewed  -KW  care plan/treatment goals reviewed;risks/benefits reviewed;current/potential barriers reviewed;patient/other agree to care plan Reinforcements needed for pt, confused  -TM       Care Plan Review, Other Participant(s)  --  family;other (see comments) Granddaughter  -TM       Patient Effort  good  -KW  adequate  -TM          Pain    Additional Documentation  --  Pain Scale: Numbers Pre/Post-Treatment (Group)  -TM          Pain Scale: Numbers Pre/Post-Treatment    Pretreatment Pain Rating  --  0/10 - no pain  -TM       Pre/Posttreatment Pain Comment  --  Sleeping at conclusion of session, no observed distress at that time.  -TM       Pain Intervention(s)  --  Other (Comment) Granddaughter and SLP repositioned pt from back to R side  -TM          Pain Scale: FACES Pre/Post-Treatment    Pain: FACES Scale, Pretreatment  0-->no hurt  -KW  --       Posttreatment Pain Rating  0-->no hurt  -KW  --          Clinical Impression    Daily Summary of Progress (SLP)  progress toward functional goals is good  -KW  progress towards functional goals is fair  -TM       Barriers to Overall Progress (SLP)  Cognition and medical status   -KW  Cognition  -TM       Plan for Continued Treatment (SLP)   Continue to follow   -KW  Upgrade to mechanical soft. Continue with honey thick liquids.   -TM          Recommendations    SLP Diet Recommendation  --  soft textures;honey thick liquids;ice chips between meals after oral care, with supervision  -TM       Recommended Precautions and Strategies  --  upright posture during/after eating;small bites of food and sips of liquid  -TM       SLP Rec. for Method of Medication Administration  --  meds whole;meds crushed;with pudding or applesauce  -TM       Monitor for Signs of Aspiration  --  yes;cough;gurgly voice;throat clearing;pneumonia;right lower lobe infiltrates  -TM       Anticipated Discharge Disposition (SLP)  --  skilled nursing facility  -TM          Oral Nutrition/Hydration Goal 1 (SLP)    Oral Nutrition/Hydration Goal 1, SLP  LTG: Patient will tolerate LRD with no overt s/s of aspiration  -KW  LTG: Patient will tolerate LRD with no overt s/s of aspiration  -TM       Time Frame (Oral Nutrition/Hydration Goal 1, SLP)  short term goal (STG);by discharge  -KW  short term goal (STG);by discharge  -TM       Barriers (Oral Nutrition/Hydration Goal 1, SLP)  n/a  -KW  n/a  -TM       Progress/Outcomes (Oral Nutrition/Hydration Goal 1, SLP)  continuing progress toward goal  -KW  continuing progress toward goal  -TM          Labial Strengthening Goal 1 (SLP)    Activity (Labial Strengthening Goal 1, SLP)  increase labial tone  -KW  increase labial tone  -TM       Increase Labial Tone  labial resistance exercises  -KW  labial resistance exercises  -TM       [place]/Accuracy (Labial Strengthening Goal 1, SLP)  independently (over 90% accuracy)  -KW  independently (over 90% accuracy)  -TM       Time Frame (Labial Strengthening Goal 1, SLP)  short term goal (STG);by discharge  -KW  short term goal (STG);by discharge  -TM       Barriers (Labial Strengthening Goal 1, SLP)  n/a  -KW  n/a  -TM       Progress/Outcomes (Labial Strengthening Goal 1, SLP)  goal ongoing  -KW  goal  ongoing  -TM          Lingual Strengthening Goal 1 (SLP)    Activity (Lingual Strengthening Goal 1, SLP)  increase lingual tone/sensation/control/coordination/movement  -KW  increase lingual tone/sensation/control/coordination/movement  -TM       Increase Lingual Tone/Sensation/Control/Coordination/Movement  lingual movement exercises  -KW  lingual movement exercises  -TM       [place]/Accuracy (Lingual Strengthening Goal 1, SLP)  independently (over 90% accuracy)  -KW  independently (over 90% accuracy)  -TM       Time Frame (Lingual Strengthening Goal 1, SLP)  short term goal (STG);by discharge  -KW  short term goal (STG);by discharge  -TM       Barriers (Lingual Strengthening Goal 1, SLP)  n/a  -KW  n/a  -TM       Progress/Outcomes (Lingual Strengthening Goal 1, SLP)  goal ongoing  -KW  goal ongoing  -TM          Pharyngeal Strengthening Exercise Goal 1 (SLP)    Activity (Pharyngeal Strengthening Goal 1, SLP)  increase timing;increase epiglottic inversion and retroflexion;increase tongue base retraction  -KW  increase timing;increase epiglottic inversion and retroflexion;increase tongue base retraction  -TM       Increase Timing  gustatory stimulation (sour/cold)  -KW  gustatory stimulation (sour/cold)  -TM       Increase Epiglottic Inversion and Retroflexion  falsetto  -KW  falsetto  -TM       Increase Tongue Base Retraction  hard effortful swallow;vita  -KW  hard effortful swallow;vita  -TM       [place]/Accuracy (Pharyngeal Strengthening Goal 1, SLP)  independently (over 90% accuracy)  -KW  independently (over 90% accuracy)  -TM       Time Frame (Pharyngeal Strengthening Goal 1, SLP)  short term goal (STG);by discharge  -KW  short term goal (STG);by discharge  -TM       Barriers (Pharyngeal Strengthening Goal 1, SLP)  n/a  -KW  n/a  -TM       Progress/Outcomes (Pharyngeal Strengthening Goal 1, SLP)  goal ongoing  -KW  goal ongoing  -TM         User Key  (r) = Recorded By, (t) = Taken By, (c) =  Cosigned By    Initials Name Effective Dates    TM Tina Dinh, CCC-SLP 08/02/16 -     KW Aida Hunt, MS CCC-SLP 08/09/20 -           EDUCATION  The patient has been educated in the following areas:   Dysphagia (Swallowing Impairment).    SLP Recommendation and Plan                                         Daily Summary of Progress (SLP): progress toward functional goals is good    Plan for Continued Treatment (SLP): Continue to follow               Plan of Care Reviewed With: patient, family  Progress: improving  Outcome Summary: Patient continues to improve.  No overt coughing with thin liquids today, but is wheezing and increased SOB with water.  Throat clearing also observed.  Unsure if chronic.  Family and patient agree to MBS tomorrow.  SLP will complete to see if diet can be upgraded.    SLP GOALS     Row Name 11/03/20 1345 11/02/20 1510          Oral Nutrition/Hydration Goal 1 (SLP)    Oral Nutrition/Hydration Goal 1, SLP  LTG: Patient will tolerate LRD with no overt s/s of aspiration  -KW  LTG: Patient will tolerate LRD with no overt s/s of aspiration  -TM     Time Frame (Oral Nutrition/Hydration Goal 1, SLP)  short term goal (STG);by discharge  -KW  short term goal (STG);by discharge  -TM     Barriers (Oral Nutrition/Hydration Goal 1, SLP)  n/a  -KW  n/a  -TM     Progress/Outcomes (Oral Nutrition/Hydration Goal 1, SLP)  continuing progress toward goal  -KW  continuing progress toward goal  -TM        Labial Strengthening Goal 1 (SLP)    Activity (Labial Strengthening Goal 1, SLP)  increase labial tone  -KW  increase labial tone  -TM     Increase Labial Tone  labial resistance exercises  -KW  labial resistance exercises  -TM     [place]/Accuracy (Labial Strengthening Goal 1, SLP)  independently (over 90% accuracy)  -KW  independently (over 90% accuracy)  -TM     Time Frame (Labial Strengthening Goal 1, SLP)  short term goal (STG);by discharge  -KW  short term goal (STG);by discharge  -TM      Barriers (Labial Strengthening Goal 1, SLP)  n/a  -KW  n/a  -TM     Progress/Outcomes (Labial Strengthening Goal 1, SLP)  goal ongoing  -KW  goal ongoing  -TM        Lingual Strengthening Goal 1 (SLP)    Activity (Lingual Strengthening Goal 1, SLP)  increase lingual tone/sensation/control/coordination/movement  -KW  increase lingual tone/sensation/control/coordination/movement  -TM     Increase Lingual Tone/Sensation/Control/Coordination/Movement  lingual movement exercises  -KW  lingual movement exercises  -TM     [place]/Accuracy (Lingual Strengthening Goal 1, SLP)  independently (over 90% accuracy)  -KW  independently (over 90% accuracy)  -TM     Time Frame (Lingual Strengthening Goal 1, SLP)  short term goal (STG);by discharge  -KW  short term goal (STG);by discharge  -TM     Barriers (Lingual Strengthening Goal 1, SLP)  n/a  -KW  n/a  -TM     Progress/Outcomes (Lingual Strengthening Goal 1, SLP)  goal ongoing  -KW  goal ongoing  -TM        Pharyngeal Strengthening Exercise Goal 1 (SLP)    Activity (Pharyngeal Strengthening Goal 1, SLP)  increase timing;increase epiglottic inversion and retroflexion;increase tongue base retraction  -KW  increase timing;increase epiglottic inversion and retroflexion;increase tongue base retraction  -TM     Increase Timing  gustatory stimulation (sour/cold)  -KW  gustatory stimulation (sour/cold)  -TM     Increase Epiglottic Inversion and Retroflexion  falsetto  -KW  falsetto  -TM     Increase Tongue Base Retraction  hard effortful swallow;vita  -KW  hard effortful swallow;vita  -TM     [place]/Accuracy (Pharyngeal Strengthening Goal 1, SLP)  independently (over 90% accuracy)  -KW  independently (over 90% accuracy)  -TM     Time Frame (Pharyngeal Strengthening Goal 1, SLP)  short term goal (STG);by discharge  -KW  short term goal (STG);by discharge  -TM     Barriers (Pharyngeal Strengthening Goal 1, SLP)  n/a  -KW  n/a  -TM     Progress/Outcomes (Pharyngeal  Strengthening Goal 1, SLP)  goal ongoing  -KW  goal ongoing  -TM       User Key  (r) = Recorded By, (t) = Taken By, (c) = Cosigned By    Initials Name Provider Type    Tina Mann CCC-SLP Speech and Language Pathologist    Aida Burns MS CCC-SLP Speech and Language Pathologist             Time Calculation:   Time Calculation- SLP     Row Name 11/03/20 1547             Time Calculation- SLP    SLP Start Time  1345  -KW      SLP Stop Time  1400  -KW      SLP Time Calculation (min)  15 min  -KW      SLP Received On  11/03/20  -KW        User Key  (r) = Recorded By, (t) = Taken By, (c) = Cosigned By    Initials Name Provider Type    Aida Burns MS CCC-SLP Speech and Language Pathologist          Therapy Charges for Today     Code Description Service Date Service Provider Modifiers Qty    [id number]  ST TREATMENT SWALLOW 1 11/3/2020 Aida Hunt MS CCC-SLP GN 1               MS ISABEL Rodriguez  11/3/2020

## 2020-11-03 NOTE — PROGRESS NOTES
Nephrology ([facility]) Progress Note      Patient:  Eron Escalante  YOB: 1934  Date of Service: 11/3/2020  MRN: [id number]   Acct: [id number]   Primary Care Physician: Jeffrey Owen MD  Advance Directive:   Code Status and Medical Interventions:   Ordered at: 10/27/20 0304     Limited Support to NOT Include:    Intubation     Level Of Support Discussed With:    Next of Kin (If No Surrogate)    Health Care Surrogate     Code Status:    No CPR     Medical Interventions (Level of Support Prior to Arrest):    Limited     Admit Date: 10/26/2020       Hospital Day: 8  Referring Provider: Alcides Tomlinson MD      Patient personally seen and examined.  Complete chart including Consults, Notes, Operative Reports, Labs, Cardiology, and Radiology studies reviewed as able.        Subjective:  Eron Escalante is a 85 y.o. male  whom we were consulted for acute kidney injury/chronic kidney disease.  Patient has stage III chronic kidney disease at baseline and has seen Dr. Victoria previously.  He was initially admitted to [facility] with encephalopathy, MRI of the brain did not show any evidence of intracranial pathology.  However he was transferred to [facility] as per family request.  Neurological work-up over here consistent with repeat MRI scan consistent with microvascular chronic changes.  Patient also has bilateral Doppler studies of carotid arteries consistent with mild to moderate stenosis but no intervention recommended.  However his serum creatinine continued to rise, patient also has hypernatremia as he has not been drinking due to severe encephalopathy.  Dr. Tomlinson had started hypotonic fluid for correction of hypernatremia as well as high BUN.      Today, family present.  Pt up in chair.  No new events. Denied cp/soa/n/v.      Allergies:  Lorazepam, Penicillins, and Adhesive tape    Home Meds:  Medications Prior to Admission   Medication Sig Dispense  Refill Last Dose   • allopurinol (ZYLOPRIM) 300 MG tablet Take 600 mg by mouth Daily.   Unknown at Unknown time   • amLODIPine (NORVASC) 5 MG tablet Take 5 mg by mouth Daily.   Unknown at Unknown time   • aspirin 81 MG chewable tablet Chew 81 mg Daily.   Unknown at Unknown time   • Cholecalciferol (VITAMIN D) 1000 units tablet Take 1,000 Units by mouth.   Unknown at Unknown time   • colchicine 0.6 MG tablet Take 0.6 mg by mouth Daily As Needed (gout flare-up).   Unknown at Unknown time   • finasteride (PROSCAR) 5 MG tablet Take 5 mg by mouth Daily.   Unknown at Unknown time   • folic acid (FOLVITE) 400 MCG tablet Take 400 mcg by mouth.   Unknown at Unknown time   • glimepiride (AMARYL) 4 MG tablet Take 4 mg by mouth 2 (Two) Times a Day With Meals. Needs pm dose   Unknown at Unknown time   • losartan (COZAAR) 50 MG tablet Take 25 mg by mouth Daily.   Unknown at Unknown time   • meclizine (ANTIVERT) 25 MG tablet Take 25 mg by mouth. 3-4 times daily   Unknown at Unknown time   • metoprolol succinate XL (TOPROL XL) 25 MG 24 hr tablet Take 25 mg by mouth every night at bedtime.   Unknown at Unknown time   • nitroglycerin (NITROSTAT) 0.4 MG SL tablet Place 0.4 mg under the tongue Every 5 (Five) Minutes As Needed.   Unknown at Unknown time   • pantoprazole (PROTONIX) 40 MG EC tablet Take 40 mg by mouth Every 12 (Twelve) Hours.   Unknown at Unknown time   • potassium chloride (K-DUR,KLOR-CON) 20 MEQ CR tablet Take 10 mEq by mouth Every 12 (Twelve) Hours.   Unknown at Unknown time   • Pyridoxine HCl (VITAMIN B6) 200 MG tablet Take 1 tablet by mouth Daily.   Unknown at Unknown time   • rOPINIRole (REQUIP) 1 MG tablet Take 1 mg by mouth Every Night. Take 1 hour before bedtime.   Unknown at Unknown time   • sennosides-docusate sodium (SENOKOT-S) 8.6-50 MG tablet Take 2 tablets by mouth 2 (Two) Times a Day As Needed for constipation. 45 tablet 2 Unknown at Unknown time   • tamsulosin (FLOMAX) 0.4 MG capsule 24 hr capsule Take 1  capsule by mouth Daily.   Unknown at Unknown time   • thiamine (VITAMIN B-1) 100 MG tablet Take 100 mg by mouth.   Unknown at Unknown time   • vitamin C (ASCORBIC ACID) 500 MG tablet Take 500 mg by mouth Daily.   Unknown at Unknown time       Medicines:  Current Facility-Administered Medications   Medication Dose Route Frequency Provider Last Rate Last Dose   • acetaminophen (TYLENOL) tablet 650 mg  650 mg Oral Q4H PRN Forrest Bliss MD        Or   • acetaminophen (TYLENOL) suppository 650 mg  650 mg Rectal Q4H PRN Forrest Bliss MD       • allopurinol (ZYLOPRIM) tablet 300 mg  300 mg Oral Daily Forrest Bliss MD   300 mg at 11/03/20 0817   • amLODIPine (NORVASC) tablet 5 mg  5 mg Oral Q24H Elpidio Irby DO   5 mg at 11/03/20 1601   • aspirin chewable tablet 81 mg  81 mg Oral Daily Elpidio Irby DO   81 mg at 11/03/20 0816   • cefTRIAXone (ROCEPHIN) 1 g/100 mL 0.9% NS (MBP)  1 g Intravenous Q24H Rocky Nixon DO   1 g at 11/03/20 1601   • cholecalciferol (VITAMIN D3) tablet 1,000 Units  1,000 Units Oral Daily Forrest Bliss MD   1,000 Units at 11/03/20 0817   • colchicine tablet 0.6 mg  0.6 mg Oral Daily PRN Forrest Bliss MD       • dextrose (D50W) 25 g/ 50mL Intravenous Solution 25 g  25 g Intravenous Q15 Min PRN Forrest Bliss MD       • dextrose (GLUTOSE) oral gel 15 g  15 g Oral Q15 Min PRN Forrest Bliss MD       • folic acid (FOLVITE) tablet 400 mcg  400 mcg Oral Daily Forrest Bliss MD   400 mcg at 11/03/20 0816   • glucagon (human recombinant) (GLUCAGEN DIAGNOSTIC) injection 1 mg  1 mg Subcutaneous Q15 Min PRN Forrest Bliss MD       • hydrALAZINE (APRESOLINE) injection 10 mg  10 mg Intravenous Q4H PRN Forrest Bliss MD   10 mg at 11/03/20 1148   • insulin lispro (humaLOG) injection 2-7 Units  2-7 Units Subcutaneous TID AC Forrest Bliss MD   2 Units at 11/03/20 1148   • iron sucrose (VENOFER) 200 mg in sodium chloride 0.9 % 100 mL IVPB  200 mg  Intravenous Q24H Pablito Victoria MD   200 mg at 11/02/20 1712   • isosorbide mononitrate (IMDUR) 24 hr tablet 30 mg  30 mg Oral Q24H Forrest Bliss MD   30 mg at 11/03/20 0816   • lansoprazole (FIRST) oral suspension 30 mg  30 mg Oral Q AM Iain Rosenberg MD   30 mg at 11/03/20 0601   • nitroglycerin (NITROSTAT) SL tablet 0.4 mg  0.4 mg Sublingual Q5 Min PRN Forrest Bliss MD   0.4 mg at 10/27/20 1600   • ondansetron (ZOFRAN) tablet 4 mg  4 mg Oral Q6H PRN Forrest Bliss MD        Or   • ondansetron (ZOFRAN) injection 4 mg  4 mg Intravenous Q6H PRN Forrest Bliss MD       • potassium chloride (MICRO-K) CR capsule 10 mEq  10 mEq Oral BID With Meals Forrest Bliss MD   10 mEq at 11/03/20 0817   • pravastatin (PRAVACHOL) tablet 40 mg  40 mg Oral Nightly Forrest Bliss MD   40 mg at 11/02/20 2028   • prednisoLONE acetate (PRED FORTE) 1 % ophthalmic suspension 1 drop  1 drop Left Eye Q2H While Awake Forrest Bliss MD   1 drop at 11/03/20 1602   • sennosides-docusate (PERICOLACE) 8.6-50 MG per tablet 2 tablet  2 tablet Oral BID PRN Forrest Bliss MD       • sodium chloride 0.9 % flush 10 mL  10 mL Intravenous Q12H Forrest Bliss MD   10 mL at 11/03/20 0817   • sodium chloride 0.9 % flush 10 mL  10 mL Intravenous PRN Forrest Bliss MD       • sodium chloride 0.9 % infusion 500 mL  500 mL Intravenous Continuous PRN Forrest Bliss MD 25 mL/hr at 10/28/20 1150     • terazosin (HYTRIN) capsule 1 mg  1 mg Oral Nightly Forrest Bliss MD   1 mg at 11/02/20 2028   • thiamine (VITAMIN B-1) tablet 100 mg  100 mg Oral Daily Forrest Bliss MD   100 mg at 11/03/20 0817   • vitamin B-6 (PYRIDOXINE) tablet 200 mg  200 mg Oral Daily Forrest Bliss MD   200 mg at 11/03/20 0817   • vitamin C (ASCORBIC ACID) tablet 500 mg  500 mg Oral Daily Forrest Bliss MD   500 mg at 11/03/20 0817       Past Medical History:  Past Medical History:   Diagnosis Date   • A-fib (CMS/HCC) 11/15/2016    • Anemia     gets shots every few months to build up blood. sees dr adam.   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   • Arthritis    • Bradycardia 11/15/2016   • CAD in native artery 11/15/2016   • Chest pain 11/15/2016   • CHF (congestive heart failure) (CMS/Formerly [facility])    • Chronic kidney disease    • COPD (chronic obstructive pulmonary disease) (CMS/Formerly [facility])    • DDD (degenerative disc disease), lumbar 6/27/2017   • Heart murmur    • HTN (hypertension) 11/15/2016   • Hyperlipidemia    • Lumbar stenosis with neurogenic claudication 6/27/2017   • Murmur 4/19/2019   • Myocardial infarction (CMS/Formerly [facility])    • Sleep apnea    • Stroke (CMS/Formerly [facility])    • Type 2 diabetes mellitus (CMS/Formerly [facility]) 11/15/2016   • Ulcer of abdomen wall (CMS/Formerly [facility])        Past Surgical History:  Past Surgical History:   Procedure Laterality Date   • APPENDECTOMY     • BACK SURGERY      x2   • CARDIAC CATHETERIZATION Left     1/2010   • CARPAL TUNNEL RELEASE Bilateral    • CHOLECYSTECTOMY     • COLONOSCOPY N/A 9/7/2017    Procedure: COLONOSCOPY WITH ANESTHESIA;  Surgeon: Joshua Rich DO;  Location: [facility] ENDOSCOPY;  Service:    • CORONARY ARTERY BYPASS GRAFT      2/2006 w/PTCA & KIMMY    • CORONARY STENT PLACEMENT     • ENDOSCOPY N/A 12/14/2016    Procedure: ESOPHAGOGASTRODUODENOSCOPY WITH ANESTHESIA;  Surgeon: Isabel Dexter MD;  Location: [facility] ENDOSCOPY;  Service:    • ENDOSCOPY N/A 12/16/2016    Procedure: ESOPHAGOGASTRODUODENOSCOPY WITH ANESTHESIA;  Surgeon: Joshua Rich DO;  Location: [facility] ENDOSCOPY;  Service:    • ENDOSCOPY N/A 4/10/2017    Procedure: ESOPHAGOGASTRODUODENOSCOPY WITH ANESTHESIA;  Surgeon: Joshua Rich DO;  Location: [facility] ENDOSCOPY;  Service:    • ENDOSCOPY N/A 10/28/2020    Procedure: ESOPHAGOGASTRODUODENOSCOPY WITH ANESTHESIA;  Surgeon: Forrest Bliss MD;  Location: [facility] ENDOSCOPY;  Service: Gastroenterology;  Laterality: N/A;  pre: GI bleed  post: duodenal ulcers, hiatal hernia   Jeffrey Owen MD   • EYE  SURGERY Left     x 2   • JOINT REPLACEMENT     • PERIPHERAL ARTERIAL STENT GRAFT     • REPLACEMENT TOTAL KNEE Left     2002   • SHOULDER ROTATOR CUFF REPAIR Bilateral        Family History  Family History   Problem Relation Age of Onset   • Coronary artery disease Father    • Heart disease Father    • Coronary artery disease Brother    • Heart attack Brother    • Cancer Mother    • No Known Problems Sister    • Heart disease Son    • Cancer Sister    • Cancer Sister        Social History  Social History     Socioeconomic History   • Marital status:      Spouse name: Not on file   • Number of children: Not on file   • Years of education: Not on file   • Highest education level: Not on file   Tobacco Use   • Smoking status: Never Smoker   • Smokeless tobacco: Never Used   Substance and Sexual Activity   • Alcohol use: No   • Drug use: No   • Sexual activity: Never     Partners: Female         Review of Systems:  History obtained from chart review and the patient  General ROS: No fever or chills  Respiratory ROS: No cough, shortness of breath, wheezing  Cardiovascular ROS: No chest pain or palpitations  Gastrointestinal ROS: No abdominal pain or melena  Genito-Urinary ROS: No dysuria or hematuria    Objective:  Patient Vitals for the past 24 hrs:   BP Temp Temp src Pulse Resp SpO2   11/03/20 1537 150/55 98 °F (36.7 °C) Oral 82 20 96 %   11/03/20 1223 160/67 -- -- -- -- --   11/03/20 1117 174/66 97.6 °F (36.4 °C) Oral 58 18 97 %   11/03/20 0725 161/54 97.9 °F (36.6 °C) Oral 57 20 93 %   11/03/20 0423 169/58 97.7 °F (36.5 °C) Oral 61 26 95 %   11/02/20 2300 164/64 97.5 °F (36.4 °C) Oral 59 18 97 %   11/02/20 1959 146/55 97.6 °F (36.4 °C) Oral 57 17 97 %   11/02/20 1716 154/56 97.4 °F (36.3 °C) Oral 66 18 94 %       Intake/Output Summary (Last 24 hours) at 11/3/2020 1628  Last data filed at 11/3/2020 1313  Gross per 24 hour   Intake 1542 ml   Output 1050 ml   Net 492 ml     General: awake/alert   Chest:  clear to  auscultation bilaterally without respiratory distress  CVS: regular rate and rhythm  Abdominal: soft, nontender, positive bowel sounds  Extremities: no cyanosis or edema  Skin: warm and dry without rash      Labs:  Results from last 7 days   Lab Units 11/03/20 0435 11/02/20 0447 11/01/20 0624   WBC 10*3/mm3 7.92 8.19 8.78   HEMOGLOBIN g/dL 8.7* 8.5* 9.2*   HEMATOCRIT % 27.7* 26.3* 28.1*   PLATELETS 10*3/mm3 180 146 168         Results from last 7 days   Lab Units 11/03/20  0435 11/02/20  0447 11/01/20  0624 10/31/20  0444   SODIUM mmol/L 144 146* 150* 150*   POTASSIUM mmol/L 4.1 4.1 3.7 3.7   CHLORIDE mmol/L 114* 117* 119* 119*   CO2 mmol/L 23.0 23.0 24.0 23.0   BUN mg/dL 43* 53* 69* 80*   CREATININE mg/dL 1.01 1.35* 1.80* 2.17*   CALCIUM mg/dL 8.7 8.6 8.7 8.8   BILIRUBIN mg/dL  --  0.3 0.3 0.4   ALK PHOS U/L  --  78 79 70   ALT (SGPT) U/L  --  28 31 31   AST (SGOT) U/L  --  31 35 40   GLUCOSE mg/dL 148* 166* 170* 153*       Radiology:   Imaging Results (Last 72 Hours)     ** No results found for the last 72 hours. **          Culture:  Urine Culture   Date Value Ref Range Status   10/30/2020 No growth  Final   10/27/2020 >100,000 CFU/mL Escherichia coli (A)  Final         Assessment   Acute kidney injury/Prerenal azotemia  Stage III chronic kidney disease  Hypernatremia  Acute GI bleed  Acute metabolic encephalopathy  Type II diabetic nephropathy  Iron deficiency anemia  Status post EGD/duodenal ulcer  E coli UTI    Plan:  Improved on D5W with K, discontinue today  IV bumex x1  PPI  Monitor labs  D/w pt/nursing/family      Priyank Brown MD  11/3/2020  16:28 CST

## 2020-11-03 NOTE — PLAN OF CARE
Goal Outcome Evaluation:  Plan of Care Reviewed With: patient  Progress: improving   Disoriented to situation. Patient has appeared to be resting on and off today, will arouse to voice. No c/o pain. Turning. Voiding per urinal. BM today. Glucose monitoring. Generalized edema. Scheduled IV Bumex once per order. Hypertensive, PRN BP med given once with good relief. Norvasc started today. NSR on tele. Patient was able to feed himself some today and sit up in the chair. Video swallow study tomorrow. Voiding. Safety maintained. Will continue to monitor.

## 2020-11-03 NOTE — THERAPY TREATMENT NOTE
Acute Care - Physical Therapy Treatment Note  [facility]     Patient Name: Eron Escalante  : 1934  MRN: [id number]  Today's Date: 11/3/2020   Onset of Illness/Injury or Date of Surgery: 10/26/20       PT Assessment (last 12 hours)      PT Evaluation and Treatment     [facility] Name 20 1446 20 0831       Physical Therapy Time and Intention    Subjective Information  complains of;weakness  -  complains of  -    Document Type  therapy note (daily note)  -  --    Mode of Treatment  physical therapy  -  --    Comment, Session Not Performed  --  sitting EOB with grand daughter eating breakfast, check back later  -    Row Name 20 1446          General Information    Patient/Family/Caregiver Comments/Observations  grand daughter  -     Existing Precautions/Restrictions  fall  -     Barriers to Rehab  medically complex  -[facility] Name 20 1446          Pain Scale: Numbers Pre/Post-Treatment    Pretreatment Pain Rating  0/10 - no pain  -     Posttreatment Pain Rating  0/10 - no pain  -[facility] Name 20 1446          Range of Motion Comprehensive    Comment, General Range of Motion  [facility]  -[facility] Name 20 1446          Bed Mobility    Supine-Sit [place] (Bed Mobility)  moderate assist (50% patient effort);verbal cues;minimum assist (75% patient effort)  [facility]     Sit-Supine [place] (Bed Mobility)  -- chair  -[facility] Name 20 1446          Transfers    Transfers  sit-stand transfer;stand-sit transfer;stand pivot/stand step transfer  -     Sit-Stand [place] (Transfers)  minimum assist (75% patient effort);moderate assist (50% patient effort);2 person assist;verbal cues  -     Stand-Sit [place] (Transfers)  minimum assist (75% patient effort);2 person assist;verbal cues  -[facility] Name 20 1446          Sit-Stand Transfer    Assistive Device (Sit-Stand Transfers)  walker, front-wheeled  -[facility] Name 20 1446           Stand Pivot/Stand Step Transfer    Stand Pivot/Stand Step [place] (Transfers)  2 person assist;verbal cues;minimum assist (75% patient effort)  -     Assistive Device (Stand Pivot Stand Step Transfer)  walker, front-wheeled  -     Row Name 11/03/20 1446          Plan of Care Review    Plan of Care Reviewed With  patient  -     Progress  improving  -     Outcome Summary  pt trans to EOB min assist, performed BLE exercises x 20 reps, sit-stand min-mod of 2, pt took steps bed-chair with rwx min of 2, pt would benefit from SNF  -     Row Name 11/03/20 1446          Positioning and Restraints    Pre-Treatment Position  in bed  -     Post Treatment Position  chair  -     In Chair  reclined;call light within reach;encouraged to call for assist;with family/caregiver;notified nsg;on mechanical lift sling  -       User Key  (r) = Recorded By, (t) = Taken By, (c) = Cosigned By    Initials Name Provider Type     Maria C Fagan, PTA Physical Therapy Assistant        Physical Therapy Education                 Title: PT OT SLP Therapies (In Progress)     Topic: Physical Therapy (In Progress)     Point: Mobility training (Done)     Learning Progress Summary           Patient Acceptance, E,TB, VU by  at 11/2/2020 1213    Comment: TRANS    Acceptance, E, VU by  at 10/27/2020 1543    Comment: Family educated on POC.   Family Acceptance, E, VU by  at 10/31/2020 1334    Comment: benefits of PT and POC, call for A OOB                   Point: Home exercise program (Done)     Learning Progress Summary           Patient Acceptance, E,TB, VU by  at 11/3/2020 1525    Comment: BLE HEP                   Point: Body mechanics (Not Started)     Learner Progress:  Not documented in this visit.          Point: Precautions (Done)     Learning Progress Summary           Family Acceptance, E, VU by  at 10/31/2020 1334    Comment: benefits of PT and POC, call for A OOB                               User Key      Initials Effective Dates Name Provider Type [facility] 08/02/16 -  Maria C Fagan PTA Physical Therapy Assistant PT     08/02/16 -  Sujit Ni, PT Physical Therapist PT     10/19/20 -  Yuli Chávez, TACO Student PT Student PT              PT Recommendation and Plan     Plan of Care Reviewed With: patient  Progress: improving  Outcome Summary: pt trans to EOB min assist, performed BLE exercises x 20 reps, sit-stand min-mod of 2, pt took steps bed-chair with rwx min of 2, pt would benefit from SNF       Time Calculation:   PT Charges     Row Name 11/03/20 1525             Time Calculation    Start Time  1446  -      Stop Time  1515  -      Time Calculation (min)  29 min  -      PT Received On  11/03/20  -         Time Calculation- PT    Total Timed Code Minutes- PT  29 minute(s)  -         Timed Charges    30776 - PT Therapeutic Exercise Minutes  15  -AH      01749 - PT Therapeutic Activity Minutes  14  -        User Key  (r) = Recorded By, (t) = Taken By, (c) = Cosigned By    Initials Name Provider Type     Maria C Fagan PTA Physical Therapy Assistant        Therapy Charges for Today     Code Description Service Date Service Provider Modifiers Qty    86522014482 HC PT THERAPEUTIC ACT EA 15 MIN 11/2/2020 Maria C Fagan, PTA GP 2    35707151802 HC PT THER PROC EA 15 MIN 11/2/2020 Maria C Fagan, PTA GP 1    36991116109 HC PT THER PROC EA 15 MIN 11/3/2020 Maria C Fagan, PTA GP 1    26604220988 HC PT THERAPEUTIC ACT EA 15 MIN 11/3/2020 Maria C Fagan, PTA GP 1          PT G-Codes  Outcome Measure Options: AM-PAC 6 Clicks Basic Mobility (PT)  AM-PAC 6 Clicks Score (PT): 8  AM-PAC 6 Clicks Score (OT): 8    Maria C Fagan PTA  11/3/2020

## 2020-11-04 NOTE — DISCHARGE SUMMARY
[facility]  DISCHARGE SUMMARY       Date of Admission: 10/26/2020  Date of Discharge:  11/4/2020  Primary Care Physician: Jeffrey Owen MD    Presenting Problem/History of Present Illness:  Altered mental status.     Final Discharge Diagnoses:  Active Hospital Problems    Diagnosis   • **Metabolic encephalopathy   • E. coli UTI (urinary tract infection)   • Hypernatremia   • Acute renal failure superimposed on stage 3a chronic kidney disease (CMS/formerly [facility])   • Acute blood loss anemia   • Duodenal ulcer   • UGIB (upper gastrointestinal bleed)   • History of stroke   • Chronic diastolic congestive heart failure (CMS/formerly [facility])   • A-fib (CMS/formerly [facility])   • HTN (hypertension)   • Type 2 diabetes mellitus, without long-term current use of insulin (CMS/formerly [facility])     Consults:   1.  Dr. Bustillos with neurology.  2.  Dr. Victoria and Dr. Brown with nephrology.  3.  Dr. Bliss with gastroenterology.  4.  Dr. Jimenez with vascular surgery.    Procedures Performed: Endoscopy on 10/28 with Dr. Bliss showed nonbleeding duodenal ulcers.     Pertinent Test Results:   Imaging Results (All)     Procedure Component Value Units Date/Time    MRI Brain Without Contrast [[id number]] Collected: 10/30/20 1254     Updated: 10/30/20 1317    Narrative:      EXAMINATION:  MRI BRAIN WO CONTRAST-  10/30/2020 10:45 AM CDT     HISTORY: Mental status change, unknown cause. R13.12-Dysphagia,  oropharyngeal phase; Z74.09-Other reduced mobility. Study was changed to  a without contrast study due to a GFR of 25 mL/m.     TECHNIQUE: Multiplanar imaging was performed in a high field magnet.     COMPARISON: No comparison study.     FINDINGS: The study is degraded by motion artifact. There is no evidence  of acute infarct on the diffusion-weighted sequence. There is mild  atrophy. There is moderate to severe dilatation of the lateral and third  ventricles. There is moderate T2 high signal within the hemispheric  white  matter. There is a focal area of T1 low signal and T2 high signal  along the inferior right frontal lobe that is likely related to either  an old infarct or old trauma.       Impression:      1. Motion limited study.  2. No evidence of acute infarct.  3. Mild atrophy. Moderate to severe dilatation of the lateral and third  ventricles may be related to the atrophy. Hydrocephalus is not excluded.  4. Moderate T2 high signal within the hemispheric white matter is  nonspecific and likely due to chronic small vessel disease.  5. Focal area of T1 low signal and T2 high signal along the inferior  right frontal lobe is likely related to either an old infarct or old  trauma.        This report was finalized on 10/30/2020 13:14 by Dr. Chun Fountain MD.     Renal Bilateral [[id number]] Collected: 10/29/20 1854     Updated: 10/29/20 1859    Narrative:      RENAL ULTRASOUND COMPLETE 10/29/2020 6:16 PM CDT     REASON FOR EXAM: ALLIE; R13.12-Dysphagia, oropharyngeal phase;  Z74.09-Other reduced mobility; R19.5-Other fecal abnormalities       COMPARISON: CT scan dated 10/27/2020       TECHNIQUE: Multiple longitudinal and transverse realtime sonographic  images of the kidneys and urinary bladder are obtained.      FINDINGS:      RIGHT KIDNEY: 9.7 x 5.2 x 6.3 cm. Diffuse cortical thinning. No solid or  cystic masses. The central echo complex is compact with no evidence for  hydronephrosis. No nephrolithiasis or abnormal perinephric fluid  collections . No hydroureter.      LEFT KIDNEY: 8.4 x 5 0.0, 4.6 cm. Severe cortical thinning. 2.8 cm  rounded hypoechoic cystic lesion with through transmission at the upper  pole. The central echo complex is compact with no evidence for  hydronephrosis. No nephrolithiasis or abnormal perinephric fluid  collections . No hydroureter.      PELVIS: The bladder is mildly distended with anechoic urine and  demonstrates no significant wall thickening or internal echogenicities.  There is no surrounding  ascites.        Impression:      1. Bilateral diffuse renal cortical thinning, most likely due to chronic  medical renal disease. No evidence of upper tract  obstruction/hydronephrosis.  2. 2.8 cm left upper pole cyst.  3. Urinary bladder is nondilated.     This report was finalized on 10/29/2020 18:56 by Dr Brent Raines, .    US Carotid Bilateral [[id number]] Collected: 10/29/20 0741     Updated: 10/29/20 0744    Narrative:      History: Carotid occlusive disease       Impression:      Impression:  1. There is less than 50% stenosis of the right internal carotid artery.  2. There is 50-69% stenosis of the left internal carotid artery.  3. Antegrade flow is demonstrated in the left vertebral. The right  vertebral was not visualized.     Comments: Bilateral carotid vertebral arterial duplex scan was  performed.     Grayscale imaging shows intimal thickening and calcified elements at the  carotid bifurcation. The right internal carotid artery peak systolic  velocity is 82.1 cm/sec. The end-diastolic velocity is 18.8 cm/sec. The  right ICA/CCA ratio is approximately 0.8 . These findings correlate with  less than 50% stenosis of the right internal carotid artery.     Grayscale imaging shows intimal thickening and calcified elements at the  carotid bifurcation. The left internal carotid artery peak systolic  velocity is 138 cm/sec. The end-diastolic velocity is 21.6 cm/sec. The  left ICA/CCA ratio is approximately 1.3 . These findings correlate with  50-69% stenosis of the left internal carotid artery.     There is greater than 50% stenosis of the right external carotid artery.  This report was finalized on 10/29/2020 07:41 by Dr. Austen Robertson MD.    XR Chest 1 View [[id number]] Collected: 10/27/20 1628     Updated: 10/27/20 1632    Narrative:      EXAMINATION: XR CHEST 1 VW- 10/27/2020 4:28 PM CDT     HISTORY: Chest pain     COMPARISON: 5/13/2020, CT chest without contrast 10/27/2020     FINDINGS:  The heart is  magnified but felt to be mildly enlarged. The aorta is  tortuous with atherosclerotic calcifications. There has been prior  median sternotomy. There is consolidation in the left lung base with  obscuration of the left hemidiaphragm. Bilateral perihilar interstitial  and alveolar opacities are present. Additional interstitial pulmonary  opacities are also evident throughout the right lung. There is no  appreciable pneumothorax. There is trace pleural fluid bilaterally.       Impression:      Bilateral groundglass airspace opacities, particularly in  the perihilar regions, are concerning for an infectious or inflammatory  process. Alternatively, this may reflect asymmetric pulmonary edema.  There are small bilateral pleural effusions.  This report was finalized on 10/27/2020 16:29 by Dr. Jose Manuel Shields MD.    CT Chest With Contrast [[id number]] Collected: 10/27/20 1242     Updated: 10/27/20 1253    Narrative:      EXAMINATION:  CT CHEST W CONTRAST-  10/27/2020 10:57 AM CDT     HISTORY: Pneumonia, effusion or abscess suspected, xray done      COMPARISON: 5/13/2020 chest radiography and 5/13/2020 abdomen pelvis CT     TECHNIQUE: Radiation dose equals  mGy-cm.  Automated exposure  control dose reduction technique was implemented.     Thin section axial imaging was obtained with intravenous contrast. 2-D  sagittal and coronal reconstruction images were generated.     FINDINGS:      There are moderate size bilateral pleural effusions, layering  posteriorly. There is associated lower lobe airspace opacities, likely  atelectasis associated with the effusions.     There are patchy groundglass opacities identified in both upper lobes.     Acute infectious or inflammatory process considered, atypical appearance  of pulmonary edema also considered. Correlate with patient presentation.     Changes median sternotomy observed.     There are coronary artery calcifications. There are aortic valve  calcifications with left  atrial prominence. Aortic calcifications  identified.     There is no mediastinal or hilar lymph node enlargement is identified.     There is no axillary lymphadenopathy.     There is edema identified in the subcutaneous soft tissues of the body  wall, third spacing/volume overload/right heart failure considered.     Image quality degradation observed in the upper abdomen associated with  breathing motion artifact.     Small hiatal hernia suspected.     Probable cysts identified in the upper pole the left kidney.     Changes from right shoulder surgery noted.     Spondylosis changes noted diffusely in the thoracic spine with bridging  osteophyte formation.     No acute osseous abnormalities observed.             Impression:      1. Moderate-sized bilateral pleural effusions layering posteriorly with  associated lower lobe airspace opacities, likely atelectasis related to  the effusions. Patchy groundglass opacities in both upper lobes, acute  infectious/inflammatory process considered, atypical appearance of  pulmonary edema also a differential consideration, correlate with  patient presentation.  This report was finalized on 10/27/2020 12:50 by Dr. Ranjan Campbell MD.        Lab Results (all)     Procedure Component Value Units Date/Time    POC Glucose Once [[id number]]  (Normal) Collected: 11/04/20 0746    Specimen: Blood Updated: 11/04/20 0801     Glucose 120 mg/dL      Comment: : [id number] Kendrick Ceja ID: [id number]       Basic Metabolic Panel [[id number]]  (Abnormal) Collected: 11/04/20 0557    Specimen: Blood Updated: 11/04/20 0636     Glucose 125 mg/dL      BUN 35 mg/dL      Creatinine 0.89 mg/dL      Sodium 145 mmol/L      Potassium 4.1 mmol/L      Chloride 117 mmol/L      CO2 23.0 mmol/L      Calcium 8.6 mg/dL      eGFR Non African Amer 81 mL/min/1.73      BUN/Creatinine Ratio 39.3     Anion Gap 5.0 mmol/L     Narrative:      GFR Normal >60  Chronic Kidney Disease <60  Kidney Failure <15      CBC (No  Diff) [[id number]]  (Abnormal) Collected: 11/04/20 0557    Specimen: Blood Updated: 11/04/20 0615     WBC 6.97 10*3/mm3      RBC 2.85 10*6/mm3      Hemoglobin 8.3 g/dL      Hematocrit 25.6 %      MCV 89.8 fL      MCH 29.1 pg      MCHC 32.4 g/dL      RDW 22.0 %      RDW-SD 70.4 fl      MPV 10.7 fL      Platelets 192 10*3/mm3     POC Glucose Once [[id number]]  (Abnormal) Collected: 11/03/20 2041    Specimen: Blood Updated: 11/03/20 2052     Glucose 171 mg/dL      Comment: : [id number] Momin LoriMeter ID: [id number]       POC Glucose Once [[id number]]  (Abnormal) Collected: 11/03/20 1646    Specimen: Blood Updated: 11/03/20 1657     Glucose 142 mg/dL      Comment: : [id number] Fancy Hands DestinyMeter ID: [id number]       POC Glucose Once [[id number]]  (Abnormal) Collected: 11/03/20 1126    Specimen: Blood Updated: 11/03/20 1137     Glucose 188 mg/dL      Comment: : [id number] Fancy Hands DestinyMeter ID: [id number]       POC Glucose Once [[id number]]  (Abnormal) Collected: 11/03/20 0736    Specimen: Blood Updated: 11/03/20 0747     Glucose 131 mg/dL      Comment: : [id number] Fancy Hands DestinyMeter ID: [id number]       Basic Metabolic Panel [[id number]]  (Abnormal) Collected: 11/03/20 0435    Specimen: Blood Updated: 11/03/20 0508     Glucose 148 mg/dL      BUN 43 mg/dL      Creatinine 1.01 mg/dL      Sodium 144 mmol/L      Potassium 4.1 mmol/L      Chloride 114 mmol/L      CO2 23.0 mmol/L      Calcium 8.7 mg/dL      eGFR Non African Amer 70 mL/min/1.73      BUN/Creatinine Ratio 42.6     Anion Gap 7.0 mmol/L     Narrative:      GFR Normal >60  Chronic Kidney Disease <60  Kidney Failure <15      CBC (No Diff) [[id number]]  (Abnormal) Collected: 11/03/20 0435    Specimen: Blood Updated: 11/03/20 0448     WBC 7.92 10*3/mm3      RBC 3.01 10*6/mm3      Hemoglobin 8.7 g/dL      Hematocrit 27.7 %      MCV 92.0 fL      MCH 28.9 pg      MCHC 31.4 g/dL      RDW 22.7 %      RDW-SD 74.0 fl      MPV 11.0 fL       Platelets 180 10*3/mm3     POC Glucose Once [[id number]]  (Abnormal) Collected: 11/02/20 1634    Specimen: Blood Updated: 11/02/20 1644     Glucose 149 mg/dL      Comment: : [id number] Kingsley Ramírez DestinyMeter ID: [id number]       POC Glucose Once [[id number]]  (Abnormal) Collected: 11/02/20 1135    Specimen: Blood Updated: 11/02/20 1150     Glucose 181 mg/dL      Comment: : [id number] Iris Sanderser ID: [id number]       POC Glucose Once [[id number]]  (Abnormal) Collected: 11/02/20 0757    Specimen: Blood Updated: 11/02/20 0808     Glucose 165 mg/dL      Comment: : [id number] Kingsley Ramírez DestinyMeter ID: [id number]       CBC Auto Differential [[id number]]  (Abnormal) Collected: 11/02/20 0447    Specimen: Blood Updated: 11/02/20 0541     WBC 8.19 10*3/mm3      RBC 2.97 10*6/mm3      Hemoglobin 8.5 g/dL      Hematocrit 26.3 %      MCV 88.6 fL      MCH 28.6 pg      MCHC 32.3 g/dL      RDW 23.9 %      RDW-SD 74.3 fl      MPV 12.3 fL      Platelets 146 10*3/mm3     Manual Differential [[id number]]  (Abnormal) Collected: 11/02/20 0447    Specimen: Blood Updated: 11/02/20 0541     Neutrophil % 82.8 %      Lymphocyte % 6.1 %      Monocyte % 4.0 %      Eosinophil % 7.1 %      Neutrophils Absolute 6.78 10*3/mm3      Lymphocytes Absolute 0.50 10*3/mm3      Monocytes Absolute 0.33 10*3/mm3      Eosinophils Absolute 0.58 10*3/mm3      Anisocytosis Slight/1+     Macrocytes Slight/1+     Rouleaux Slight/1+     Toxic Granulation Slight/1+     Vacuolated Neutrophils Large/3+     Platelet Morphology Normal    Comprehensive Metabolic Panel [[id number]]  (Abnormal) Collected: 11/02/20 0447    Specimen: Blood Updated: 11/02/20 0536     Glucose 166 mg/dL      BUN 53 mg/dL      Creatinine 1.35 mg/dL      Sodium 146 mmol/L      Potassium 4.1 mmol/L      Comment: Slight hemolysis detected by analyzer. Results may be affected.        Chloride 117 mmol/L      CO2 23.0 mmol/L      Calcium 8.6 mg/dL      Total Protein 4.7 g/dL       Albumin 2.00 g/dL      ALT (SGPT) 28 U/L      AST (SGOT) 31 U/L      Comment: Slight hemolysis detected by analyzer. Results may be affected.        Alkaline Phosphatase 78 U/L      Total Bilirubin 0.3 mg/dL      eGFR Non African Amer 50 mL/min/1.73      Globulin 2.7 gm/dL      A/G Ratio 0.7 g/dL      BUN/Creatinine Ratio 39.3     Anion Gap 6.0 mmol/L     Narrative:      GFR Normal >60  Chronic Kidney Disease <60  Kidney Failure <15      POC Glucose Once [[id number]]  (Abnormal) Collected: 11/01/20 1624    Specimen: Blood Updated: 11/01/20 1636     Glucose 151 mg/dL      Comment: : [id number] Georges MeganMeter ID: [id number]       POC Glucose Once [[id number]]  (Abnormal) Collected: 11/01/20 1142    Specimen: Blood Updated: 11/01/20 1153     Glucose 209 mg/dL      Comment: : [id number] Georges MeganMeter ID: [id number]       POC Glucose Once [[id number]]  (Abnormal) Collected: 11/01/20 0913    Specimen: Blood Updated: 11/01/20 0924     Glucose 206 mg/dL      Comment: : [id number] Georges MeganMeter ID: [id number]       Comprehensive Metabolic Panel [[id number]]  (Abnormal) Collected: 11/01/20 0624    Specimen: Blood Updated: 11/01/20 0741     Glucose 170 mg/dL      BUN 69 mg/dL      Creatinine 1.80 mg/dL      Sodium 150 mmol/L      Potassium 3.7 mmol/L      Chloride 119 mmol/L      CO2 24.0 mmol/L      Calcium 8.7 mg/dL      Total Protein 4.6 g/dL      Albumin 2.20 g/dL      ALT (SGPT) 31 U/L      AST (SGOT) 35 U/L      Alkaline Phosphatase 79 U/L      Total Bilirubin 0.3 mg/dL      eGFR Non African Amer 36 mL/min/1.73      Globulin 2.4 gm/dL      A/G Ratio 0.9 g/dL      BUN/Creatinine Ratio 38.3     Anion Gap 7.0 mmol/L     Narrative:      GFR Normal >60  Chronic Kidney Disease <60  Kidney Failure <15      CBC Auto Differential [[id number]]  (Abnormal) Collected: 11/01/20 0624    Specimen: Blood Updated: 11/01/20 0711     WBC 8.78 10*3/mm3      RBC 3.13 10*6/mm3      Hemoglobin 9.2 g/dL      Hematocrit 28.1  %      MCV 89.8 fL      MCH 29.4 pg      MCHC 32.7 g/dL      RDW 24.7 %      RDW-SD 76.4 fl      MPV 11.6 fL      Platelets 168 10*3/mm3      Neutrophil % 78.5 %      Lymphocyte % 9.6 %      Monocyte % 5.6 %      Eosinophil % 4.7 %      Basophil % 0.1 %      Immature Grans % 1.5 %      Neutrophils, Absolute 6.90 10*3/mm3      Lymphocytes, Absolute 0.84 10*3/mm3      Monocytes, Absolute 0.49 10*3/mm3      Eosinophils, Absolute 0.41 10*3/mm3      Basophils, Absolute 0.01 10*3/mm3      Immature Grans, Absolute 0.13 10*3/mm3      nRBC 0.0 /100 WBC     Narrative:      transfused    POC Glucose Once [[id number]]  (Abnormal) Collected: 10/31/20 1647    Specimen: Blood Updated: 10/31/20 1658     Glucose 154 mg/dL      Comment: : [id number] Yung Perez ID: [id number]       Sodium, Urine, Random - Urine, Clean Catch [[id number]] Collected: 10/31/20 1431    Specimen: Urine, Clean Catch Updated: 10/31/20 1524     Sodium, Urine <20 mmol/L     Narrative:      Reference intervals for random urine have not been established.  Clinical usage is dependent upon physician's interpretation in combination with other laboratory tests.       POC Glucose Once [[id number]]  (Abnormal) Collected: 10/31/20 1120    Specimen: Blood Updated: 10/31/20 1130     Glucose 174 mg/dL      Comment: : [id number] Yung Perez ID: [id number]       Urine Culture - Urine, Urine, Clean Catch [[id number]]  (Normal) Collected: 10/30/20 0443    Specimen: Urine, Clean Catch Updated: 10/31/20 1044     Urine Culture No growth    POC Glucose Once [[id number]]  (Abnormal) Collected: 10/31/20 0805    Specimen: Blood Updated: 10/31/20 0816     Glucose 162 mg/dL      Comment: : [id number] Toro Kendall ID: [id number]       Comprehensive Metabolic Panel [[id number]]  (Abnormal) Collected: 10/31/20 0444    Specimen: Blood Updated: 10/31/20 0545     Glucose 153 mg/dL      BUN 80 mg/dL      Creatinine 2.17 mg/dL      Sodium 150 mmol/L       Potassium 3.7 mmol/L      Chloride 119 mmol/L      CO2 23.0 mmol/L      Calcium 8.8 mg/dL      Total Protein 4.4 g/dL      Albumin 2.20 g/dL      ALT (SGPT) 31 U/L      AST (SGOT) 40 U/L      Alkaline Phosphatase 70 U/L      Total Bilirubin 0.4 mg/dL      eGFR Non African Amer 29 mL/min/1.73      Globulin 2.2 gm/dL      A/G Ratio 1.0 g/dL      BUN/Creatinine Ratio 36.9     Anion Gap 8.0 mmol/L     Narrative:      GFR Normal >60  Chronic Kidney Disease <60  Kidney Failure <15      CBC Auto Differential [[id number]]  (Abnormal) Collected: 10/31/20 0444    Specimen: Blood Updated: 10/31/20 0534     WBC 12.28 10*3/mm3      RBC 2.10 10*6/mm3      Hemoglobin 6.8 g/dL      Hematocrit 20.8 %      MCV 99.0 fL      MCH 32.4 pg      MCHC 32.7 g/dL      RDW 16.0 %      RDW-SD 56.5 fl      MPV 11.9 fL      Platelets 165 10*3/mm3      Neutrophil % 86.3 %      Lymphocyte % 7.1 %      Monocyte % 3.8 %      Eosinophil % 1.8 %      Basophil % 0.1 %      Immature Grans % 0.9 %      Neutrophils, Absolute 10.60 10*3/mm3      Lymphocytes, Absolute 0.87 10*3/mm3      Monocytes, Absolute 0.47 10*3/mm3      Eosinophils, Absolute 0.22 10*3/mm3      Basophils, Absolute 0.01 10*3/mm3      Immature Grans, Absolute 0.11 10*3/mm3      nRBC 0.0 /100 WBC     POC Glucose Once [[id number]]  (Abnormal) Collected: 10/30/20 1658    Specimen: Blood Updated: 10/30/20 1710     Glucose 241 mg/dL      Comment: : [id number] Toro UgaldeMeter ID: [id number]       Uric Acid [[id number]]  (Abnormal) Collected: 10/30/20 0418    Specimen: Blood Updated: 10/30/20 1529     Uric Acid 7.5 mg/dL     Iron Profile [[id number]]  (Abnormal) Collected: 10/30/20 0418    Specimen: Blood Updated: 10/30/20 1529     Iron 12 mcg/dL      Iron Saturation 10 %      Transferrin 81 mg/dL      TIBC 121 mcg/dL     Creatinine, Urine, Random - Urine, Catheter [[id number]] Collected: 10/30/20 0441    Specimen: Urine, Catheter Updated: 10/30/20 1225     Creatinine, Urine 78.4 mg/dL      Narrative:      Reference intervals for random urine have not been established.  Clinical usage is dependent upon physician's interpretation in combination with other laboratory tests.       POC Glucose Once [[id number]]  (Abnormal) Collected: 10/30/20 1150    Specimen: Blood Updated: 10/30/20 1201     Glucose 222 mg/dL      Comment: : [id number] Toro UgaldeMeter ID: [id number]       POC Glucose Once [[id number]]  (Abnormal) Collected: 10/30/20 0822    Specimen: Blood Updated: 10/30/20 0833     Glucose 158 mg/dL      Comment: : [id number] Toro ChoudhuryileyMeter ID: [id number]       Urinalysis, Microscopic Only - Urine, Clean Catch [[id number]]  (Abnormal) Collected: 10/30/20 0443    Specimen: Urine, Clean Catch Updated: 10/30/20 0654     RBC, UA 3-5 /HPF      WBC, UA Too Numerous to Count /HPF      Bacteria, UA 1+ /HPF      Squamous Epithelial Cells, UA 0-2 /HPF      Transitional Epithelial Cells, UA 3-6 /HPF      Hyaline Casts, UA 0-2 /LPF      Methodology Manual Light Microscopy    Urinalysis With Culture If Indicated - Urine, Clean Catch [[id number]]  (Abnormal) Collected: 10/30/20 0443    Specimen: Urine, Clean Catch Updated: 10/30/20 0631     Color, UA Yellow     Appearance, UA Cloudy     pH, UA <=5.0     Specific Gravity, UA 1.022     Glucose, UA Negative     Ketones, UA Negative     Bilirubin, UA Negative     Blood, UA Small (1+)     Protein, UA Trace     Leuk Esterase, UA Moderate (2+)     Nitrite, UA Negative     Urobilinogen, UA 0.2 E.U./dL    Sodium, Urine, Random - Urine, Catheter [[id number]] Collected: 10/30/20 0441    Specimen: Urine, Catheter Updated: 10/30/20 0512     Sodium, Urine <20 mmol/L     Narrative:      Reference intervals for random urine have not been established.  Clinical usage is dependent upon physician's interpretation in combination with other laboratory tests.       Procalcitonin [[id number]]  (Abnormal) Collected: 10/30/20 0418    Specimen: Blood Updated: 10/30/20 050      "Procalcitonin 7.53 ng/mL     Narrative:      As a Marker for Sepsis (Non-Neonates):   1. <0.5 ng/mL represents a low risk of severe sepsis and/or septic shock.  1. >2 ng/mL represents a high risk of severe sepsis and/or septic shock.    As a Marker for Lower Respiratory Tract Infections that require antibiotic therapy:  PCT on Admission     Antibiotic Therapy             6-12 Hrs later  > 0.5                Strongly Recommended            >0.25 - <0.5         Recommended  0.1 - 0.25           Discouraged                   Remeasure/reassess PCT  <0.1                 Strongly Discouraged          Remeasure/reassess PCT      As 28 day mortality risk marker: \"Change in Procalcitonin Result\" (> 80 % or <=80 %) if Day 0 (or Day 1) and Day 4 values are available. Refer to http://www.[employer]pct-calculator.[employer]/   Change in PCT <=80 %   A decrease of PCT levels below or equal to 80 % defines a positive change in PCT test result representing a higher risk for 28-day all-cause mortality of patients diagnosed with severe sepsis or septic shock.  Change in PCT > 80 %   A decrease of PCT levels of more than 80 % defines a negative change in PCT result representing a lower risk for 28-day all-cause mortality of patients diagnosed with severe sepsis or septic shock.                Results may be falsely decreased if patient taking Biotin.     Comprehensive Metabolic Panel [[id number]]  (Abnormal) Collected: 10/30/20 0418    Specimen: Blood Updated: 10/30/20 0502     Glucose 149 mg/dL      BUN 84 mg/dL      Creatinine 2.43 mg/dL      Sodium 153 mmol/L      Potassium 3.9 mmol/L      Chloride 122 mmol/L      CO2 24.0 mmol/L      Calcium 8.7 mg/dL      Total Protein 4.4 g/dL      Albumin 2.10 g/dL      ALT (SGPT) 29 U/L      AST (SGOT) 38 U/L      Alkaline Phosphatase 66 U/L      Total Bilirubin 0.3 mg/dL      eGFR Non African Amer 25 mL/min/1.73      Globulin 2.3 gm/dL      A/G Ratio 0.9 g/dL      BUN/Creatinine Ratio 34.6     Anion " Gap 7.0 mmol/L     Narrative:      GFR Normal >60  Chronic Kidney Disease <60  Kidney Failure <15      CBC Auto Differential [[id number]]  (Abnormal) Collected: 10/30/20 0418    Specimen: Blood Updated: 10/30/20 0442     WBC 16.74 10*3/mm3      RBC 2.28 10*6/mm3      Hemoglobin 7.3 g/dL      Hematocrit 23.0 %      .9 fL      MCH 32.0 pg      MCHC 31.7 g/dL      RDW 16.9 %      RDW-SD 60.6 fl      MPV 11.6 fL      Platelets 183 10*3/mm3      Neutrophil % 90.5 %      Lymphocyte % 5.3 %      Monocyte % 2.9 %      Eosinophil % 0.3 %      Basophil % 0.1 %      Immature Grans % 0.9 %      Neutrophils, Absolute 15.15 10*3/mm3      Lymphocytes, Absolute 0.88 10*3/mm3      Monocytes, Absolute 0.49 10*3/mm3      Eosinophils, Absolute 0.05 10*3/mm3      Basophils, Absolute 0.02 10*3/mm3      Immature Grans, Absolute 0.15 10*3/mm3      nRBC 0.2 /100 WBC     Urine Culture - Urine, Urine, Catheter In/Out [[id number]]  (Abnormal)  (Susceptibility) Collected: 10/27/20 0520    Specimen: Urine, Catheter In/Out Updated: 10/30/20 0326     Urine Culture >100,000 CFU/mL Escherichia coli    Susceptibility      Escherichia coli     SRINIVAS     Ampicillin Resistant     Ampicillin + Sulbactam Intermediate     Cefazolin Susceptible     Cefepime Susceptible     Ceftazidime Susceptible     Ceftriaxone Susceptible     Gentamicin Susceptible     Levofloxacin Resistant     Nitrofurantoin Susceptible     Piperacillin + Tazobactam Susceptible     Tetracycline Resistant     Trimethoprim + Sulfamethoxazole Resistant                    POC Glucose Once [[id number]]  (Abnormal) Collected: 10/29/20 2224    Specimen: Blood Updated: 10/29/20 2245     Glucose 187 mg/dL      Comment: : [id number] Leon (Monty) AmandaMeter ID: [id number]       Basic Metabolic Panel [[id number]]  (Abnormal) Collected: 10/29/20 1616    Specimen: Blood Updated: 10/29/20 1639     Glucose 157 mg/dL      BUN 84 mg/dL      Creatinine 2.64 mg/dL      Sodium 152 mmol/L       Potassium 4.1 mmol/L      Chloride 120 mmol/L      CO2 22.0 mmol/L      Calcium 8.7 mg/dL      eGFR Non African Amer 23 mL/min/1.73      BUN/Creatinine Ratio 31.8     Anion Gap 10.0 mmol/L     Narrative:      GFR Normal >60  Chronic Kidney Disease <60  Kidney Failure <15      Urease For H Pylori - Tissue, Stomach [[id number]]  (Normal) Collected: 10/28/20 1200    Specimen: Tissue from Stomach Updated: 10/29/20 1522     Urease Negative    POC Glucose Once [[id number]]  (Abnormal) Collected: 10/29/20 1208    Specimen: Blood Updated: 10/29/20 1224     Glucose 154 mg/dL      Comment: : [id number] Lula GraceMeter ID: [id number]       Basic Metabolic Panel [[id number]]  (Abnormal) Collected: 10/29/20 0627    Specimen: Blood Updated: 10/29/20 0740     Glucose 142 mg/dL      BUN 82 mg/dL      Creatinine 2.74 mg/dL      Sodium 154 mmol/L      Potassium 4.2 mmol/L      Chloride 119 mmol/L      CO2 25.0 mmol/L      Calcium 8.9 mg/dL      eGFR Non African Amer 22 mL/min/1.73      BUN/Creatinine Ratio 29.9     Anion Gap 10.0 mmol/L     Narrative:      GFR Normal >60  Chronic Kidney Disease <60  Kidney Failure <15      CBC (No Diff) [[id number]]  (Abnormal) Collected: 10/29/20 0627    Specimen: Blood Updated: 10/29/20 0702     WBC 17.67 10*3/mm3      RBC 2.58 10*6/mm3      Hemoglobin 8.4 g/dL      Hematocrit 25.4 %      MCV 98.4 fL      MCH 32.6 pg      MCHC 33.1 g/dL      RDW 17.2 %      RDW-SD 59.8 fl      MPV 11.7 fL      Platelets 207 10*3/mm3     POC Glucose Once [[id number]]  (Normal) Collected: 10/28/20 1733    Specimen: Blood Updated: 10/28/20 1744     Glucose 122 mg/dL      Comment: : [id number] Lula GraceMeter ID: [id number]       Hemoglobin & Hematocrit, Blood [[id number]]  (Abnormal) Collected: 10/28/20 1559    Specimen: Blood Updated: 10/28/20 1606     Hemoglobin 8.3 g/dL      Hematocrit 25.5 %     POC Glucose Once [[id number]]  (Normal) Collected: 10/28/20 1103    Specimen: Blood Updated: 10/28/20 1131     Glucose  121 mg/dL      Comment: : [id number] Lula GraceMeter ID: [id number]       Basic Metabolic Panel [[id number]]  (Abnormal) Collected: 10/28/20 0503    Specimen: Blood Updated: 10/28/20 0604     Glucose 92 mg/dL      BUN 65 mg/dL      Creatinine 1.75 mg/dL      Sodium 155 mmol/L      Potassium 4.2 mmol/L      Chloride 121 mmol/L      CO2 27.0 mmol/L      Calcium 9.5 mg/dL      eGFR Non African Amer 37 mL/min/1.73      BUN/Creatinine Ratio 37.1     Anion Gap 7.0 mmol/L     Narrative:      GFR Normal >60  Chronic Kidney Disease <60  Kidney Failure <15      CBC Auto Differential [[id number]]  (Abnormal) Collected: 10/28/20 0503    Specimen: Blood Updated: 10/28/20 0601     WBC 14.95 10*3/mm3      RBC 2.63 10*6/mm3      Hemoglobin 8.3 g/dL      Hematocrit 26.3 %      .0 fL      MCH 31.6 pg      MCHC 31.6 g/dL      RDW 17.8 %      RDW-SD 62.9 fl      MPV 11.5 fL      Platelets 191 10*3/mm3     Manual Differential [[id number]]  (Abnormal) Collected: 10/28/20 0503    Specimen: Blood Updated: 10/28/20 0601     Neutrophil % 93.0 %      Lymphocyte % 3.0 %      Bands %  4.0 %      Neutrophils Absolute 14.50 10*3/mm3      Lymphocytes Absolute 0.45 10*3/mm3      nRBC 2.0 /100 WBC      Anisocytosis Slight/1+     Macrocytes Slight/1+     Poikilocytes Slight/1+     Polychromasia Slight/1+     WBC Morphology Normal     Platelet Morphology Normal    Hemoglobin & Hematocrit, Blood [[id number]]  (Abnormal) Collected: 10/27/20 2359    Specimen: Blood Updated: 10/28/20 0029     Hemoglobin 9.1 g/dL      Hematocrit 27.7 %     Hemoglobin & Hematocrit, Blood [[id number]]  (Abnormal) Collected: 10/27/20 2242    Specimen: Blood Updated: 10/27/20 2251     Hemoglobin 9.0 g/dL      Hematocrit 27.5 %     Occult Blood X 1, Stool - Stool, Per Rectum [[id number]]  (Abnormal) Collected: 10/27/20 1513    Specimen: Stool from Per Rectum Updated: 10/27/20 1702     Fecal Occult Blood Positive    Troponin [[id number]]  (Abnormal) Collected: 10/27/20 7728     Specimen: Blood Updated: 10/27/20 1643     Troponin T 0.162 ng/mL     Narrative:      Troponin T Reference Range:  <= 0.03 ng/mL-   Negative for AMI  >0.03 ng/mL-     Abnormal for myocardial necrosis.  Clinicians would have to utilize clinical acumen, EKG, Troponin and serial changes to determine if it is an Acute Myocardial Infarction or myocardial injury due to an underlying chronic condition.       Results may be falsely decreased if patient taking Biotin.      Basic Metabolic Panel [[id number]]  (Abnormal) Collected: 10/27/20 1558    Specimen: Blood Updated: 10/27/20 1639     Glucose 115 mg/dL      BUN 59 mg/dL      Creatinine 1.35 mg/dL      Sodium 157 mmol/L      Potassium 4.4 mmol/L      Chloride 123 mmol/L      CO2 30.0 mmol/L      Calcium 9.6 mg/dL      eGFR Non African Amer 50 mL/min/1.73      BUN/Creatinine Ratio 43.7     Anion Gap 4.0 mmol/L     Narrative:      GFR Normal >60  Chronic Kidney Disease <60  Kidney Failure <15      CBC (No Diff) [[id number]]  (Abnormal) Collected: 10/27/20 1558    Specimen: Blood Updated: 10/27/20 1629     WBC 8.42 10*3/mm3      RBC 1.97 10*6/mm3      Hemoglobin 6.5 g/dL      Hematocrit 19.8 %      .5 fL      MCH 33.0 pg      MCHC 32.8 g/dL      RDW 17.2 %      RDW-SD 61.9 fl      MPV 11.3 fL      Platelets 176 10*3/mm3     POC Glucose Once [[id number]]  (Normal) Collected: 10/27/20 1556    Specimen: Blood Updated: 10/27/20 1618     Glucose 122 mg/dL      Comment: : [id number] Lula GraceMeter ID: [id number]       Vitamin B12 [[id number]]  (Abnormal) Collected: 10/27/20 0328    Specimen: Blood Updated: 10/27/20 1257     Vitamin B-12 1,029 pg/mL     Narrative:      Results may be falsely increased if patient taking Biotin.      Folate [[id number]]  (Normal) Collected: 10/27/20 0328    Specimen: Blood Updated: 10/27/20 1257     Folate 19.50 ng/mL     Narrative:      Results may be falsely increased if patient taking Biotin.      POC Glucose Once [[id number]]  (Abnormal)  Collected: 10/27/20 1137    Specimen: Blood Updated: 10/27/20 1147     Glucose 151 mg/dL      Comment: : [id number] Mau Mckinney ID: [id number]       POC Glucose Once [[id number]]  (Abnormal) Collected: 10/27/20 0726    Specimen: Blood Updated: 10/27/20 0742     Glucose 155 mg/dL      Comment: : [id number] Joshua Baez ID: [id number]       Urinalysis, Microscopic Only - Urine, Catheter In/Out [[id number]]  (Abnormal) Collected: 10/27/20 0520    Specimen: Urine, Catheter In/Out Updated: 10/27/20 0615     RBC, UA 0-2 /HPF      WBC, UA Too Numerous to Count /HPF      Bacteria, UA 4+ /HPF      Squamous Epithelial Cells, UA 0-2 /HPF      Hyaline Casts, UA None Seen /LPF      Methodology Automated Microscopy    Urinalysis With Microscopic If Indicated (No Culture) - Urine, Catheter In/Out [[id number]]  (Abnormal) Collected: 10/27/20 0520    Specimen: Urine, Catheter In/Out Updated: 10/27/20 0610     Color, UA Yellow     Appearance, UA Cloudy     pH, UA 5.5     Specific Gravity, UA 1.015     Glucose, UA Negative     Ketones, UA Negative     Bilirubin, UA Negative     Blood, UA Negative     Protein, UA Trace     Leuk Esterase, UA Large (3+)     Nitrite, UA Positive     Urobilinogen, UA 0.2 E.U./dL    Hemoglobin A1c [[id number]]  (Abnormal) Collected: 10/27/20 0328    Specimen: Blood Updated: 10/27/20 0447     Hemoglobin A1C 6.10 %     Narrative:      Hemoglobin A1C Ranges:    Increased Risk for Diabetes  5.7% to 6.4%  Diabetes                     >= 6.5%  Diabetic Goal                < 7.0%    CBC Auto Differential [[id number]]  (Abnormal) Collected: 10/27/20 0407    Specimen: Blood Updated: 10/27/20 0434     WBC 9.85 10*3/mm3      RBC 1.86 10*6/mm3      Hemoglobin 6.1 g/dL      Hematocrit 19.2 %      .2 fL      MCH 32.8 pg      MCHC 31.8 g/dL      RDW 15.9 %      RDW-SD 58.6 fl      MPV 11.0 fL      Platelets 187 10*3/mm3      Neutrophil % 76.2 %      Lymphocyte % 11.2 %      Monocyte % 10.7 %       "Eosinophil % 0.7 %      Basophil % 0.1 %      Immature Grans % 1.1 %      Neutrophils, Absolute 7.51 10*3/mm3      Lymphocytes, Absolute 1.10 10*3/mm3      Monocytes, Absolute 1.05 10*3/mm3      Eosinophils, Absolute 0.07 10*3/mm3      Basophils, Absolute 0.01 10*3/mm3      Immature Grans, Absolute 0.11 10*3/mm3      nRBC 0.4 /100 WBC     Sedimentation Rate [[id number]]  (Normal) Collected: 10/27/20 0407    Specimen: Blood Updated: 10/27/20 0433     Sed Rate 9 mm/hr     Troponin [[id number]]  (Abnormal) Collected: 10/27/20 0328    Specimen: Blood Updated: 10/27/20 0422     Troponin T 0.155 ng/mL     Narrative:      Troponin T Reference Range:  <= 0.03 ng/mL-   Negative for AMI  >0.03 ng/mL-     Abnormal for myocardial necrosis.  Clinicians would have to utilize clinical acumen, EKG, Troponin and serial changes to determine if it is an Acute Myocardial Infarction or myocardial injury due to an underlying chronic condition.       Results may be falsely decreased if patient taking Biotin.      Procalcitonin [[id number]]  (Normal) Collected: 10/27/20 0328    Specimen: Blood Updated: 10/27/20 0417     Procalcitonin 0.15 ng/mL     Narrative:      As a Marker for Sepsis (Non-Neonates):   1. <0.5 ng/mL represents a low risk of severe sepsis and/or septic shock.  1. >2 ng/mL represents a high risk of severe sepsis and/or septic shock.    As a Marker for Lower Respiratory Tract Infections that require antibiotic therapy:  PCT on Admission     Antibiotic Therapy             6-12 Hrs later  > 0.5                Strongly Recommended            >0.25 - <0.5         Recommended  0.1 - 0.25           Discouraged                   Remeasure/reassess PCT  <0.1                 Strongly Discouraged          Remeasure/reassess PCT      As 28 day mortality risk marker: \"Change in Procalcitonin Result\" (> 80 % or <=80 %) if Day 0 (or Day 1) and Day 4 values are available. Refer to [email or web address]   Change in PCT <=80 % "   A decrease of PCT levels below or equal to 80 % defines a positive change in PCT test result representing a higher risk for 28-day all-cause mortality of patients diagnosed with severe sepsis or septic shock.  Change in PCT > 80 %   A decrease of PCT levels of more than 80 % defines a negative change in PCT result representing a lower risk for 28-day all-cause mortality of patients diagnosed with severe sepsis or septic shock.                Results may be falsely decreased if patient taking Biotin.     Lipid Panel [[id number]]  (Abnormal) Collected: 10/27/20 0328    Specimen: Blood Updated: 10/27/20 0417     Total Cholesterol 94 mg/dL      Triglycerides 89 mg/dL      HDL Cholesterol 34 mg/dL      LDL Cholesterol  42 mg/dL      VLDL Cholesterol 18 mg/dL      LDL/HDL Ratio 1.24    Narrative:      Cholesterol Reference Ranges  (U.S. Department of Health and Human Services ATP III Classifications)    Desirable          <200 mg/dL  Borderline High    200-239 mg/dL  High Risk          >240 mg/dL      Triglyceride Reference Ranges  (U.S. Department of Health and Human Services ATP III Classifications)    Normal           <150 mg/dL  Borderline High  150-199 mg/dL  High             200-499 mg/dL  Very High        >500 mg/dL    HDL Reference Ranges  (U.S. Department of Health and Human Services ATP III Classifcations)    Low     <40 mg/dl (major risk factor for CHD)  High    >60 mg/dl ('negative' risk factor for CHD)        LDL Reference Ranges  (U.S. Department of Health and Human Services ATP III Classifcations)    Optimal          <100 mg/dL  Near Optimal     100-129 mg/dL  Borderline High  130-159 mg/dL  High             160-189 mg/dL  Very High        >189 mg/dL    Comprehensive Metabolic Panel [[id number]]  (Abnormal) Collected: 10/27/20 0328    Specimen: Blood Updated: 10/27/20 0417     Glucose 143 mg/dL      BUN 60 mg/dL      Creatinine 1.22 mg/dL      Sodium 154 mmol/L      Potassium 4.0 mmol/L      Chloride 123  mmol/L      CO2 27.0 mmol/L      Calcium 9.7 mg/dL      Total Protein 4.4 g/dL      Albumin 2.40 g/dL      ALT (SGPT) 26 U/L      AST (SGOT) 23 U/L      Alkaline Phosphatase 60 U/L      Total Bilirubin 0.2 mg/dL      eGFR Non African Amer 56 mL/min/1.73      Globulin 2.0 gm/dL      A/G Ratio 1.2 g/dL      BUN/Creatinine Ratio 49.2     Anion Gap 4.0 mmol/L     Narrative:      GFR Normal >60  Chronic Kidney Disease <60  Kidney Failure <15      Iron Profile [[id number]]  (Abnormal) Collected: 10/27/20 0328    Specimen: Blood Updated: 10/27/20 0417     Iron 32 mcg/dL      Iron Saturation 19 %      Transferrin 111 mg/dL      TIBC 165 mcg/dL     BNP [[id number]]  (Abnormal) Collected: 10/27/20 0328    Specimen: Blood Updated: 10/27/20 0417     proBNP 8,060.0 pg/mL     Narrative:      Among patients with dyspnea, NT-proBNP is highly sensitive for the detection of acute congestive heart failure. In addition NT-proBNP of <300 pg/ml effectively rules out acute congestive heart failure with 99% negative predictive value.    Results may be falsely decreased if patient taking Biotin.      T4, Free [[id number]]  (Normal) Collected: 10/27/20 0328    Specimen: Blood Updated: 10/27/20 0417     Free T4 0.99 ng/dL     Narrative:      Results may be falsely increased if patient taking Biotin.      Magnesium [[id number]]  (Abnormal) Collected: 10/27/20 0328    Specimen: Blood Updated: 10/27/20 0417     Magnesium 2.7 mg/dL     CK [[id number]]  (Normal) Collected: 10/27/20 0328    Specimen: Blood Updated: 10/27/20 0417     Creatine Kinase 50 U/L     C-reactive Protein [[id number]]  (Abnormal) Collected: 10/27/20 0328    Specimen: Blood Updated: 10/27/20 0417     C-Reactive Protein 3.31 mg/dL     Ferritin [[id number]]  (Abnormal) Collected: 10/27/20 0328    Specimen: Blood Updated: 10/27/20 0417     Ferritin 492.70 ng/mL     Narrative:      Results may be falsely decreased if patient taking Biotin.      TSH [[id number]]  (Normal) Collected:  10/27/20 0328    Specimen: Blood Updated: 10/27/20 0413     TSH 2.430 uIU/mL     Uric Acid [[id number]]  (Normal) Collected: 10/27/20 0328    Specimen: Blood Updated: 10/27/20 0413     Uric Acid 6.5 mg/dL     Blood Gas, Arterial - [[id number]]  (Abnormal) Collected: 10/27/20 0357    Specimen: Arterial Blood Updated: 10/27/20 0403     Site Right Radial     Raymond's Test Positive     pH, Arterial 7.487 pH units      Comment: 83 Value above reference range        pCO2, Arterial 36.7 mm Hg      pO2, Arterial 66.1 mm Hg      Comment: 84 Value below reference range        HCO3, Arterial 27.8 mmol/L      Comment: 83 Value above reference range        Base Excess, Arterial 4.1 mmol/L      Comment: 83 Value above reference range        O2 Saturation, Arterial 95.4 %      Temperature 37.0 C      Barometric Pressure for Blood Gas 755 mmHg      Modality Room Air     FIO2 21 %      Ventilator Mode NA     Collected by [id number]     Comment: Meter: [id number]     :  [id number]        pCO2, Temperature Corrected 36.7 mm Hg      pH, Temp Corrected 7.487 pH Units      pO2, Temperature Corrected 66.1 mm Hg     Lactic Acid, Plasma [[id number]]  (Normal) Collected: 10/27/20 0328    Specimen: Blood Updated: 10/27/20 0401     Lactate 0.9 mmol/L     Protime-INR [[id number]]  (Abnormal) Collected: 10/27/20 0328    Specimen: Blood Updated: 10/27/20 0356     Protime 15.2 Seconds      INR 1.24    aPTT [[id number]]  (Abnormal) Collected: 10/27/20 0328    Specimen: Blood Updated: 10/27/20 0356     PTT 23.8 seconds     Reticulocytes [[id number]]  (Abnormal) Collected: 10/27/20 0328    Specimen: Blood Updated: 10/27/20 0346     Reticulocyte % 3.63 %      Reticulocyte Absolute 0.0628 10*6/mm3     COVID-19,[facility] IN-HOUSE,Nasal Swab No Transport Media 3-4 HR TAT - Swab, Nasal Cavity [[id number]]  (Normal) Collected: 10/26/20 2037    Specimen: Swab from Nasal Cavity Updated: 10/26/20 2128     COVID19 Not Detected    Narrative:      Fact sheet for  providers: https://www.fda.gov/media/736093/download     Fact sheet for patients: https://www.fda.gov/media/864157/download        Hospital Course:  The patient was admitted on 10/27 by Dr. Rosenberg.  He presented as a transfer from [facility] as his granddaughter works here.  He had been admitted there and was having much difficulty with confusion.  This has been proven to be multifactorial as evidenced below.     He has been evaluated by neurology.  He had an MRI of the brain without contrast on 10/30 that showed no evidence of acute infarct.     He has been followed by nephrology for acute renal failure superimposed on CKD, stage III.  His serum creatinine continues to improve.  His elevated BUN is secondary to recent gastrointestinal bleeding as well as his acute renal failure.  He developed some hyponatremia secondary to resuscitation with crystalloid fluids.  This resolved with recent dextrose fluids.  Stopped IV fluids on 11/3 and gave a dose of Bumex with good results. Resumed amlodipine on 11/3 and will resume his losartan/Toprol-XL at discharge as his blood pressure has been elevated recently.     He had heme positive stool and melena.  He was taken for endoscopy on 10/28 by Dr. Bliss. He had multiple nonbleeding duodenal ulcers at that time.  He had a hiatal hernia.  Urease negative.  He is being treated with lansoprazole.  He has received a total of 4 units packed red blood cells.  He was last transfused on 10/31.  Hemoglobin remains stable.  Anemia substrates are consistent with chronic disease.     He has an E. coli urinary tract infection that is resistant to ampicillin, levofloxacin, tetracycline, and Bactrim. He was treated with ceftriaxone here in the hospital and will finish a course of oral Omnicef.     He has a history of atrial fibrillation for which he will not be anticoagulated at the moment given his recent gastrointestinal bleed.  I restarted his aspirin on 11/2.     Oral hypoglycemics  "were held in the hospital given his renal failure and can be resumed at discharge.  We used sliding scale insulin.  His most recent hemoglobin A1c is 6.1.     SCDs were used for DVT prophylaxis.    He has been accepted to skilled nursing today.    Physical Exam on Discharge:  /45 (BP Location: Left arm, Patient Position: Lying)   Pulse 64   Temp 98.1 °F (36.7 °C) (Axillary)   Resp 20   Ht 177.8 cm (70\")   Wt 92.9 kg (204 lb 11.2 oz)   SpO2 95%   BMI 29.37 kg/m²   Physical Exam  Vitals signs and nursing note reviewed.   Constitutional:       Appearance: He is well-developed.      Comments: Up in bed.  No distress.  No family currently present.  Discussed with his nurse, Liliane.   HENT:      Head: Normocephalic and atraumatic.      Comments: Poor dentition.  Eyes:      Conjunctiva/sclera: Conjunctivae normal.      Pupils: Pupils are equal, round, and reactive to light.   Neck:      Musculoskeletal: Neck supple.      Vascular: No JVD.   Cardiovascular:      Rate and Rhythm: Normal rate. Rhythm irregular.      Heart sounds: Normal heart sounds. No murmur. No friction rub. No gallop.    Pulmonary:      Effort: Pulmonary effort is normal. No respiratory distress.      Breath sounds: Normal breath sounds. No wheezing or rales.   Chest:      Chest wall: No tenderness.   Abdominal:      General: Bowel sounds are normal. There is no distension.      Palpations: Abdomen is soft.      Tenderness: There is no abdominal tenderness. There is no guarding or rebound.   Musculoskeletal: Normal range of motion.         General: Swelling (trace) present. No tenderness or deformity.   Skin:     General: Skin is warm and dry.      Findings: No rash.   Neurological:      General: No focal deficit present.      Mental Status: He is alert and oriented to person, place, and time.      Cranial Nerves: No cranial nerve deficit.      Motor: Weakness present. No abnormal muscle tone.      Deep Tendon Reflexes: Reflexes normal.      " Comments: Follows all commands without difficulty.   Psychiatric:   Flat affect, but tries to make conversation.     Condition on Discharge: Good.     Discharge Disposition:  Skilled Nursing Facility (DC - External): [facility].     Discharge Medications:     Discharge Medications      New Medications      Instructions Start Date   acetaminophen 325 MG tablet  Commonly known as: TYLENOL   650 mg, Oral, Every 4 Hours PRN      cefdinir 300 MG capsule  Commonly known as: OMNICEF   300 mg, Oral, 2 Times Daily      ondansetron 4 MG tablet  Commonly known as: ZOFRAN   4 mg, Oral, Every 6 Hours PRN         Changes to Medications      Instructions Start Date   allopurinol 300 MG tablet  Commonly known as: ZYLOPRIM  What changed: how much to take   300 mg, Oral, Daily   Start Date: November 5, 2020     isosorbide mononitrate 30 MG 24 hr tablet  Commonly known as: IMDUR  What changed: when to take this   30 mg, Oral, Every 24 Hours Scheduled   Start Date: November 5, 2020     prednisoLONE acetate 1 % ophthalmic suspension  Commonly known as: PRED FORTE  What changed: when to take this   1 drop, Left Eye, 4 Times Daily         Continue These Medications      Instructions Start Date   amLODIPine 5 MG tablet  Commonly known as: NORVASC   5 mg, Oral, Daily      aspirin 81 MG chewable tablet   81 mg, Oral, Daily      colchicine 0.6 MG tablet   0.6 mg, Oral, Daily PRN      finasteride 5 MG tablet  Commonly known as: PROSCAR   5 mg, Oral, Daily      folic acid 400 MCG tablet  Commonly known as: FOLVITE   400 mcg, Oral      glimepiride 4 MG tablet  Commonly known as: AMARYL   4 mg, Oral, 2 Times Daily With Meals, Needs pm dose      losartan 50 MG tablet  Commonly known as: COZAAR   25 mg, Oral, Daily      meclizine 25 MG tablet  Commonly known as: ANTIVERT   25 mg, Oral, 3-4 times daily      nitroglycerin 0.4 MG SL tablet  Commonly known as: NITROSTAT   0.4 mg, Sublingual, Every 5 Minutes PRN      pantoprazole 40 MG EC  tablet  Commonly known as: PROTONIX   40 mg, Oral, Every 12 Hours      potassium chloride 20 MEQ CR tablet  Commonly known as: K-DUR,KLOR-CON   10 mEq, Oral, Every 12 Hours      pravastatin 40 MG tablet  Commonly known as: PRAVACHOL   40 mg, Oral, Nightly      rOPINIRole 1 MG tablet  Commonly known as: REQUIP   1 mg, Oral, Nightly, Take 1 hour before bedtime.       sennosides-docusate 8.6-50 MG per tablet  Commonly known as: PERICOLACE   2 tablets, Oral, 2 Times Daily PRN      tamsulosin 0.4 MG capsule 24 hr capsule  Commonly known as: FLOMAX   1 capsule, Oral, Daily      thiamine 100 MG tablet  Commonly known as: VITAMIN B-1   100 mg, Oral      Toprol XL 25 MG 24 hr tablet  Generic drug: metoprolol succinate XL   25 mg, Oral, Every Night at Bedtime      Vitamin B6 200 MG tablet   1 tablet, Oral, Daily      vitamin C 500 MG tablet  Commonly known as: ASCORBIC ACID   500 mg, Oral, Daily      Vitamin D 1000 units tablet   1,000 Units, Oral           Discharge Diet:   Diet Instructions     Diet: Soft Texture, Consistent Carbohydrate; Honey Thick Liquids; Ground      Discharge Diet:  Soft Texture  Consistent Carbohydrate       Fluid Consistency: Honey Thick Liquids    Soft Options: Ground        Activity at Discharge:   Activity Instructions     Activity as Tolerated          Follow-up Appointments:   1. PCP or SNF physician in 1 week.   2. Consultants at their discrection.     Test Results Pending at Discharge: None.     Electronically signed by Elpidio Irby DO, 11/04/20, 10:15 CST.    Time: 35 minutes.

## 2020-11-04 NOTE — THERAPY TREATMENT NOTE
Acute Care - Physical Therapy Treatment Note  [facility]     Patient Name: Eron Escalante  : 1934  MRN: [id number]  Today's Date: 2020   Onset of Illness/Injury or Date of Surgery: 10/26/20       PT Assessment (last 12 hours)      PT Evaluation and Treatment     Row Name 20 1520          Physical Therapy Time and Intention    Subjective Information  no complaints  -     Document Type  therapy note (daily note)  -     Mode of Treatment  physical therapy  -     Row Name 20 1520          General Information    Existing Precautions/Restrictions  fall  -     Row Name 20 1520          Pain Scale: Numbers Pre/Post-Treatment    Pretreatment Pain Rating  0/10 - no pain  -     Posttreatment Pain Rating  0/10 - no pain  -     Row Name 20 1520          Range of Motion Comprehensive    Comment, General Range of Motion  A-AAROM BLE x 20 reps  -[facility] Name 20 1520          Bed Mobility    Comment (Bed Mobility)  pt declined mobility, agreed to exercises, plans to get in chair later with lift team  -     Row Name 20 1520          Transfers    Transfers  sit-stand transfer;stand-sit transfer;stand pivot/stand step transfer  -     Row Name 20 1520          Positioning and Restraints    Pre-Treatment Position  in bed  -     Post Treatment Position  bed  -     In Bed  fowlers;call light within reach;encouraged to call for assist;exit alarm on;with family/caregiver;notified [facility]       User Key  (r) = Recorded By, (t) = Taken By, (c) = Cosigned By    Initials Name Provider Type     Maria C Fagan, PTA Physical Therapy Assistant        Physical Therapy Education                 Title: PT OT SLP Therapies (In Progress)     Topic: Physical Therapy (In Progress)     Point: Mobility training (Done)     Learning Progress Summary           Patient Acceptance, E,TB, VU by  at 2020 1213    Comment: TRANS    Acceptance, E, VU by BRENTON at 10/27/2020 1543     Comment: Family educated on POC.   Family Acceptance, E, VU by  at 10/31/2020 1334    Comment: benefits of PT and POC, call for A OOB                   Point: Home exercise program (Done)     Learning Progress Summary           Patient Acceptance, E,TB, VU by  at 11/3/2020 1525    Comment: BLE HEP                   Point: Body mechanics (Not Started)     Learner Progress:  Not documented in this visit.          Point: Precautions (Done)     Learning Progress Summary           Family Acceptance, E, VU by  at 10/31/2020 1334    Comment: benefits of PT and POC, call for A OOB                               User Key     Initials Effective Dates Name Provider Type [facility] 08/02/16 -  Maria C Fagan, PTA Physical Therapy Assistant PT     08/02/16 -  Sujit Ni, PT Physical Therapist PT     10/19/20 -  Yuli Chávez, PT Student PT Student PT              PT Recommendation and Plan     Plan of Care Reviewed With: patient  Progress: improving  Outcome Summary: pt trans to EOB min assist, performed BLE exercises x 20 reps, sit-stand min-mod of 2, pt took steps bed-chair with rwx min of 2, pt would benefit from SNF       Time Calculation:   PT Charges     Row Name 11/04/20 1541             Time Calculation    Start Time  1520  -      Stop Time  1540  -      Time Calculation (min)  20 min  -      PT Received On  11/04/20  -         Time Calculation- PT    Total Timed Code Minutes- PT  20 minute(s)  -         Timed Charges    62821 - PT Therapeutic Exercise Minutes  20  -        User Key  (r) = Recorded By, (t) = Taken By, (c) = Cosigned By    Initials Name Provider Type     Maria C Fagan PTA Physical Therapy Assistant        Therapy Charges for Today     Code Description Service Date Service Provider Modifiers Qty    37712627724 HC PT THER PROC EA 15 MIN 11/3/2020 Maria C Fagan, PTA GP 1    08674474828 HC PT THERAPEUTIC ACT EA 15 MIN 11/3/2020 Maria C Fagan, PTA GP 1    78953301182 HC PT  THER PROC EA 15 MIN 11/4/2020 Maria C Fagan, PTA GP 1          PT G-Codes  Outcome Measure Options: AM-PAC 6 Clicks Basic Mobility (PT)  AM-PAC 6 Clicks Score (PT): 8  AM-PAC 6 Clicks Score (OT): 8    Maria C Fagan PTA  11/4/2020

## 2020-11-04 NOTE — MBS/VFSS/FEES
Acute Care - Speech Language Pathology   Swallow Re-Evaluation [facility]     Patient Name: Eron Escalante  : 1934  MRN: [id number]  Today's Date: 2020  Onset of Illness/Injury or Date of Surgery: 10/26/20            Admit Date: 10/26/2020  Modified barium swallow study completed. Consistencies were presented in the following order: honey x2, nectar, thin, pudding, mechanical soft, honey x2.     The pt had decreased hyolaryngeal excursion/elevation and base of tongue retraction with honey, nectar, and thin consistencices. He displayed premature loss to the vallecula with nectar and mechanical soft solids secondary to decreased back of tongue control. He had premature loss to the vallecula 1x with honey thick secondary to a delay in swallow initiation. He exhibited mild laryngeal penetration during the swallow with nectar and deeper laryngeal penetration during the swallow with thin. He did cough following laryngeal penetration of thin. No definite aspiration was observed throughout the study.     Recommend:  1. Continuing mechanical soft solids and honey thick liquids.   2. Meds with applesauce.   3. Ok for ice chips or sips of water between meals.   4. SLP will continue to follow and treat.  Avery Gutierrez, CCC-SLP 2020 15:52 CST    Visit Dx:     ICD-10-CM ICD-9-CM   1. Oropharyngeal dysphagia  R13.12 787.22   2. Impaired mobility  Z74.09 799.89   3. Heme positive stool  R19.5 792.1   4. Decreased activities of daily living (ADL)  Z78.9 V49.89     Patient Active Problem List   Diagnosis   • Type 2 diabetes mellitus, without long-term current use of insulin (CMS/[facility])   • HTN (hypertension)   • Aortocoronary bypass status   • Bradycardia   • CAD in native artery   • A-fib (CMS/[facility])   • Symptomatic bradycardia   • Cerebral ventriculomegaly   • Dehydration   • Duodenal ulcer   • Acute renal failure superimposed on stage 3a chronic kidney disease (CMS/[facility])   • Hyperkalemia   • Weakness of  extremity   • Lumbar stenosis with neurogenic claudication   • DDD (degenerative disc disease), lumbar   • Change in bowel habit   • RICARDO treated with BiPAP   • Murmur   • Chronic diastolic congestive heart failure (CMS/[facility])   • Periodic limb movement disorder (PLMD)   • CHF (congestive heart failure), NYHA class I, acute on chronic, combined (CMS/[facility])   • Chronic constipation   • Chronic back pain   • History of stroke   • Hypertensive urgency   • Ischemic cardiomyopathy   • Altered mental status   • Acute blood loss anemia   • Metabolic encephalopathy   • Hypernatremia   • E. coli UTI (urinary tract infection)   • UGIB (upper gastrointestinal bleed)     Past Medical History:   Diagnosis Date   • A-fib (CMS/[facility]) 11/15/2016   • Anemia     gets shots every few months to build up blood. sees dr adam.   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   • Arthritis    • Bradycardia 11/15/2016   • CAD in native artery 11/15/2016   • Chest pain 11/15/2016   • CHF (congestive heart failure) (CMS/[facility])    • Chronic kidney disease    • COPD (chronic obstructive pulmonary disease) (CMS/[facility])    • DDD (degenerative disc disease), lumbar 6/27/2017   • Heart murmur    • HTN (hypertension) 11/15/2016   • Hyperlipidemia    • Lumbar stenosis with neurogenic claudication 6/27/2017   • Murmur 4/19/2019   • Myocardial infarction (CMS/HCC)    • Sleep apnea    • Stroke (CMS/HCC)    • Type 2 diabetes mellitus (CMS/HCC) 11/15/2016   • Ulcer of abdomen wall (CMS/HCC)      Past Surgical History:   Procedure Laterality Date   • APPENDECTOMY     • BACK SURGERY      x2   • CARDIAC CATHETERIZATION Left     1/2010   • CARPAL TUNNEL RELEASE Bilateral    • CHOLECYSTECTOMY     • COLONOSCOPY N/A 9/7/2017    Procedure: COLONOSCOPY WITH ANESTHESIA;  Surgeon: Joshua Rich DO;  Location: [facility] ENDOSCOPY;  Service:    • CORONARY ARTERY BYPASS GRAFT      2/2006 w/PTCA & KIMMY    • CORONARY STENT PLACEMENT     • ENDOSCOPY N/A 12/14/2016    Procedure:  ESOPHAGOGASTRODUODENOSCOPY WITH ANESTHESIA;  Surgeon: Isabel Dexter MD;  Location: [facility] ENDOSCOPY;  Service:    • ENDOSCOPY N/A 12/16/2016    Procedure: ESOPHAGOGASTRODUODENOSCOPY WITH ANESTHESIA;  Surgeon: Joshua Rich DO;  Location: [facility] ENDOSCOPY;  Service:    • ENDOSCOPY N/A 4/10/2017    Procedure: ESOPHAGOGASTRODUODENOSCOPY WITH ANESTHESIA;  Surgeon: Joshua Rich DO;  Location: [facility] ENDOSCOPY;  Service:    • ENDOSCOPY N/A 10/28/2020    Procedure: ESOPHAGOGASTRODUODENOSCOPY WITH ANESTHESIA;  Surgeon: Forrest Bliss MD;  Location: [facility] ENDOSCOPY;  Service: Gastroenterology;  Laterality: N/A;  pre: GI bleed  post: duodenal ulcers, hiatal hernia   Jeffrey Owen MD   • EYE SURGERY Left     x 2   • JOINT REPLACEMENT     • PERIPHERAL ARTERIAL STENT GRAFT     • REPLACEMENT TOTAL KNEE Left     2002   • SHOULDER ROTATOR CUFF REPAIR Bilateral         SWALLOW EVALUATION (last 72 hours)      [facility] Adult Swallow Evaluation     Row Name 11/04/20 1338 11/03/20 1345 11/02/20 1510             Rehab Evaluation    Document Type  re-evaluation  -MB  therapy note (daily note)  -KW  therapy note (daily note)  -TM      Subjective Information  no complaints  -MB  no complaints  -KW  no complaints  -TM      Patient Observations  alert;cooperative  -MB  alert;cooperative  -KW  alert;cooperative  -TM      Patient/Family/Caregiver Comments/Observations  No family present  -MB  granddaughter present  -KW  Granddaughter arrived shortly following initiation of tx.  -TM      Care Plan Review  --  care plan/treatment goals reviewed  -KW  care plan/treatment goals reviewed;risks/benefits reviewed;current/potential barriers reviewed;patient/other agree to care plan Reinforcements needed for pt, confused  -TM      Care Plan Review, Other Participant(s)  --  --  family;other (see comments) Granddaughter  -TM      Patient Effort  --  good  -KW  adequate  -TM         General Information    Patient Profile Reviewed  yes  -MB   --  --      Pertinent History Of Current Problem  Encephalopathy, CKD, anemia, HTN, DM.  -MB  --  --      Current Method of Nutrition  soft textures;ground;honey-thick liquids  -MB  --  --      Precautions/Limitations, Vision  WFL with corrective lenses  -MB  --  --      Precautions/Limitations, Hearing  WFL;for purposes of eval  -MB  --  --      Prior Level of Function-Communication  cognitive-linguistic impairment  -MB  --  --      Prior Level of Function-Swallowing  no diet consistency restrictions  -MB  --  --      Plans/Goals Discussed with  patient  -MB  --  --      Barriers to Rehab  none identified  -MB  --  --      Patient's Goals for Discharge  patient did not state  -MB  --  --         Pain    Additional Documentation  Pain Scale: FACES Pre/Post-Treatment (Group)  -MB  --  Pain Scale: Numbers Pre/Post-Treatment (Group)  -TM         Pain Scale: Numbers Pre/Post-Treatment    Pretreatment Pain Rating  --  --  0/10 - no pain  -TM      Pre/Posttreatment Pain Comment  --  --  Sleeping at conclusion of session, no observed distress at that time.  -TM      Pain Intervention(s)  --  --  Other (Comment) Granddaughter and SLP repositioned pt from back to R side  -TM         Pain Scale: FACES Pre/Post-Treatment    Pain: FACES Scale, Pretreatment  0-->no hurt  -MB  0-->no hurt  -KW  --      Posttreatment Pain Rating  --  0-->no hurt  -KW  --         Oral Motor Structure and Function    Dentition Assessment  missing teeth;other (see comments) Upper edentulous, pt reports no dentures  -MB  --  --      Secretion Management  WNL/WFL  -MB  --  --      Mucosal Quality  moist, healthy  -MB  --  --         General Eating/Swallowing Observations    Respiratory Support Currently in Use  room air  -MB  --  --         MBS/VFSS    Utensils Used  spoon;straw  -MB  --  --      Consistencies Trialed  soft textures;thin liquids;nectar/syrup-thick liquids;honey-thick liquids;pudding thick  -MB  --  --         MBS/VFSS Interpretation     Oral Prep Phase  WFL  -MB  --  --      Oral Transit Phase  impaired  -MB  --  --      Oral Residue  WFL  -MB  --  --      VFSS Summary  Consistencies were presented in the following order: honey x2, nectar, thin, pudding, mechanical soft, honey x2. The pt had decreased hyolaryngeal excursion/elevation and base of tongue retraction with honey, nectar, and thin consistencices. He displayed premature loss to the vallecula with nectar and mechanical soft solids secondary to decreased back of tongue control. He had premature loss to the vallecula 1x with honey thick secondary to a delay in swallow initiation. He exhibited mild laryngeal penetration during the swallow with nectar and deeper laryngeal penetration during the swallow with thin. He did cough following laryngeal penetration of thin. No definite aspiration was observed throughout the study.   -MB  --  --         Oral Transit Phase    Impaired Oral Transit Phase  tongue pumping;premature spillage of liquids into pharynx  -MB  --  --      Tongue Pumping  mechanical soft  -MB  --  --      Premature Spillage of Liquids into Pharynx  nectar-thick liquids;mechanical soft  -MB  --  --         Initiation of Pharyngeal Swallow    Initiation of Pharyngeal Swallow  bolus in valleculae  -MB  --  --      Pharyngeal Phase  impaired pharyngeal phase of swallowing  -MB  --  --      Penetration During the Swallow  thin liquids;nectar-thick liquids  -MB  --  --      Response to Penetration  inconsistent response;cough  -MB  --  --      Pharyngeal Residue  all consistencies tested  -MB  --  --         Clinical Impression    Daily Summary of Progress (SLP)  --  progress toward functional goals is good  -KW  progress towards functional goals is fair  -TM      Barriers to Overall Progress (SLP)  --  Cognition and medical status   -KW  Cognition  -TM      SLP Swallowing Diagnosis  functional oral phase;mild-moderate;pharyngeal dysphagia  -MB  --  --      Functional Impact  risk of  aspiration/pneumonia;risk of malnutrition;risk of dehydration  -MB  --  --      Rehab Potential/Prognosis, Swallowing  adequate, monitor progress closely  -MB  --  --      Swallow Criteria for Skilled Therapeutic Interventions Met  demonstrates skilled criteria  -MB  --  --      Plan for Continued Treatment (SLP)  --  Continue to follow   -KW  Upgrade to mechanical soft. Continue with honey thick liquids.   -TM         Recommendations    Therapy Frequency (Swallow)  at least;3 days per week  -MB  --  --      Predicted Duration Therapy Intervention (Days)  until discharge  -MB  --  --      SLP Diet Recommendation  soft textures;honey thick liquids;ice chips between meals after oral care, with supervision  -MB  --  soft textures;honey thick liquids;ice chips between meals after oral care, with supervision  -TM      Recommended Precautions and Strategies  upright posture during/after eating;small bites of food and sips of liquid  -MB  --  upright posture during/after eating;small bites of food and sips of liquid  -TM      Oral Care Recommendations  Oral Care before breakfast, after meals and PRN  -MB  --  --      SLP Rec. for Method of Medication Administration  meds whole;meds crushed;with pudding or applesauce  -MB  --  meds whole;meds crushed;with pudding or applesauce  -      Monitor for Signs of Aspiration  yes;cough;gurgly voice;throat clearing;pneumonia;right lower lobe infiltrates  -MB  --  yes;cough;gurgly voice;throat clearing;pneumonia;right lower lobe infiltrates  -TM      Anticipated Discharge Disposition (SLP)  skilled nursing facility  -MB  --  skilled nursing facility  -         Oral Nutrition/Hydration Goal 1 (SLP)    Oral Nutrition/Hydration Goal 1, SLP  LTG: Patient will tolerate LRD with no overt s/s of aspiration  -MB  LTG: Patient will tolerate LRD with no overt s/s of aspiration  -KW  LTG: Patient will tolerate LRD with no overt s/s of aspiration  -TM      Time Frame (Oral Nutrition/Hydration  Goal 1, SLP)  short term goal (STG);by discharge  -MB  short term goal (STG);by discharge  -KW  short term goal (STG);by discharge  -TM      Barriers (Oral Nutrition/Hydration Goal 1, SLP)  n/a  -MB  n/a  -KW  n/a  -TM      Progress/Outcomes (Oral Nutrition/Hydration Goal 1, SLP)  goal ongoing  -MB  continuing progress toward goal  -KW  continuing progress toward goal  -TM         Labial Strengthening Goal 1 (SLP)    Activity (Labial Strengthening Goal 1, SLP)  increase labial tone  -MB  increase labial tone  -KW  increase labial tone  -TM      Increase Labial Tone  labial resistance exercises  -MB  labial resistance exercises  -KW  labial resistance exercises  -TM      [place]/Accuracy (Labial Strengthening Goal 1, SLP)  independently (over 90% accuracy)  -MB  independently (over 90% accuracy)  -KW  independently (over 90% accuracy)  -TM      Time Frame (Labial Strengthening Goal 1, SLP)  short term goal (STG);by discharge  -MB  short term goal (STG);by discharge  -KW  short term goal (STG);by discharge  -TM      Barriers (Labial Strengthening Goal 1, SLP)  n/a  -MB  n/a  -KW  n/a  -TM      Progress/Outcomes (Labial Strengthening Goal 1, SLP)  goal ongoing  -MB  goal ongoing  -KW  goal ongoing  -TM         Lingual Strengthening Goal 1 (SLP)    Activity (Lingual Strengthening Goal 1, SLP)  increase lingual tone/sensation/control/coordination/movement;increase tongue back strength  -MB  increase lingual tone/sensation/control/coordination/movement  -KW  increase lingual tone/sensation/control/coordination/movement  -TM      Increase Lingual Tone/Sensation/Control/Coordination/Movement  lingual movement exercises;lingual resistance exercises  -MB  lingual movement exercises  -KW  lingual movement exercises  -TM      Increase Tongue Back Strength  lingual movement exercises  -MB  --  --      [place]/Accuracy (Lingual Strengthening Goal 1, SLP)  independently (over 90% accuracy)  -MB  independently (over 90%  accuracy)  -KW  independently (over 90% accuracy)  -TM      Time Frame (Lingual Strengthening Goal 1, SLP)  short term goal (STG);by discharge  -MB  short term goal (STG);by discharge  -KW  short term goal (STG);by discharge  -TM      Barriers (Lingual Strengthening Goal 1, SLP)  n/a  -MB  n/a  -KW  n/a  -TM      Progress/Outcomes (Lingual Strengthening Goal 1, SLP)  goal ongoing  -MB  goal ongoing  -KW  goal ongoing  -TM         Pharyngeal Strengthening Exercise Goal 1 (SLP)    Activity (Pharyngeal Strengthening Goal 1, SLP)  increase timing;increase epiglottic inversion and retroflexion;increase tongue base retraction;increase anterior movement of the hyolaryngeal complex  -MB  increase timing;increase epiglottic inversion and retroflexion;increase tongue base retraction  -KW  increase timing;increase epiglottic inversion and retroflexion;increase tongue base retraction  -TM      Increase Timing  gustatory stimulation (sour/cold)  -MB  gustatory stimulation (sour/cold)  -KW  gustatory stimulation (sour/cold)  -TM      Increase Anterior Movement of the Hyolaryngeal Complex  shaker  -MB  --  --      Increase Epiglottic Inversion and Retroflexion  falsetto  -MB  falsetto  -KW  falsetto  -TM      Increase Tongue Base Retraction  hard effortful swallow;vita  -MB  hard effortful swallow;vita  -KW  hard effortful swallow;vita  -TM      [place]/Accuracy (Pharyngeal Strengthening Goal 1, SLP)  independently (over 90% accuracy)  -MB  independently (over 90% accuracy)  -KW  independently (over 90% accuracy)  -TM      Time Frame (Pharyngeal Strengthening Goal 1, SLP)  short term goal (STG);by discharge  -MB  short term goal (STG);by discharge  -KW  short term goal (STG);by discharge  -TM      Barriers (Pharyngeal Strengthening Goal 1, SLP)  n/a  -MB  n/a  -KW  n/a  -TM      Progress/Outcomes (Pharyngeal Strengthening Goal 1, SLP)  goal ongoing  -MB  goal ongoing  -KW  goal ongoing  -TM        User Key  (r) =  Recorded By, (t) = Taken By, (c) = Cosigned By    Initials Name Effective Dates    Avery Reyes, CCC-SLP 08/02/16 -     Tina Mann, CCC-SLP 08/02/16 -     Aida Burns, MS CCC-SLP 08/09/20 -           EDUCATION  The patient has been educated in the following areas:   Dysphagia (Swallowing Impairment).    SLP Recommendation and Plan  SLP Swallowing Diagnosis: functional oral phase, mild-moderate, pharyngeal dysphagia  SLP Diet Recommendation: soft textures, honey thick liquids, ice chips between meals after oral care, with supervision  Recommended Precautions and Strategies: upright posture during/after eating, small bites of food and sips of liquid  SLP Rec. for Method of Medication Administration: meds whole, meds crushed, with pudding or applesauce     Monitor for Signs of Aspiration: yes, cough, gurgly voice, throat clearing, pneumonia, right lower lobe infiltrates     Swallow Criteria for Skilled Therapeutic Interventions Met: demonstrates skilled criteria  Anticipated Discharge Disposition (SLP): skilled nursing facility  Rehab Potential/Prognosis, Swallowing: adequate, monitor progress closely  Therapy Frequency (Swallow): at least, 3 days per week  Predicted Duration Therapy Intervention (Days): until discharge                         Plan of Care Reviewed With: patient  Outcome Summary: Modified barium swallow study completed.Consistencies were presented in the following order: honey x2, nectar, thin, pudding, mechanical soft, honey x2. The pt had decreased hyolaryngeal excursion/elevation and base of tongue retraction with honey, nectar, and thin consistencices. He displayed premature loss to the vallecula with nectar and mechanical soft solids secondary to decreased back of tongue control. He had premature loss to the vallecula 1x with honey thick secondary to a delay in swallow initiation. He exhibited mild laryngeal penetration during the swallow with nectar and deeper laryngeal  penetration during the swallow with thin. He did cough following laryngeal penetration of thin. No definite aspiration was observed throughout the study. Recommend continuing mechanical soft solids and honey thick liquids. Meds with applesauce. Ok for ice chips or sips of water between meals. SLP will continue to follow and treat.    SLP GOALS     Row Name 11/04/20 1338 11/03/20 1345 11/02/20 1510       Oral Nutrition/Hydration Goal 1 (SLP)    Oral Nutrition/Hydration Goal 1, SLP  LTG: Patient will tolerate LRD with no overt s/s of aspiration  -MB  LTG: Patient will tolerate LRD with no overt s/s of aspiration  -KW  LTG: Patient will tolerate LRD with no overt s/s of aspiration  -TM    Time Frame (Oral Nutrition/Hydration Goal 1, SLP)  short term goal (STG);by discharge  -MB  short term goal (STG);by discharge  -KW  short term goal (STG);by discharge  -TM    Barriers (Oral Nutrition/Hydration Goal 1, SLP)  n/a  -MB  n/a  -KW  n/a  -TM    Progress/Outcomes (Oral Nutrition/Hydration Goal 1, SLP)  goal ongoing  -MB  continuing progress toward goal  -KW  continuing progress toward goal  -TM       Labial Strengthening Goal 1 (SLP)    Activity (Labial Strengthening Goal 1, SLP)  increase labial tone  -MB  increase labial tone  -KW  increase labial tone  -TM    Increase Labial Tone  labial resistance exercises  -MB  labial resistance exercises  -KW  labial resistance exercises  -TM    [place]/Accuracy (Labial Strengthening Goal 1, SLP)  independently (over 90% accuracy)  -MB  independently (over 90% accuracy)  -KW  independently (over 90% accuracy)  -TM    Time Frame (Labial Strengthening Goal 1, SLP)  short term goal (STG);by discharge  -MB  short term goal (STG);by discharge  -KW  short term goal (STG);by discharge  -TM    Barriers (Labial Strengthening Goal 1, SLP)  n/a  -MB  n/a  -KW  n/a  -TM    Progress/Outcomes (Labial Strengthening Goal 1, SLP)  goal ongoing  -MB  goal ongoing  -KW  goal ongoing  -TM        Lingual Strengthening Goal 1 (SLP)    Activity (Lingual Strengthening Goal 1, SLP)  increase lingual tone/sensation/control/coordination/movement;increase tongue back strength  -MB  increase lingual tone/sensation/control/coordination/movement  -KW  increase lingual tone/sensation/control/coordination/movement  -TM    Increase Lingual Tone/Sensation/Control/Coordination/Movement  lingual movement exercises;lingual resistance exercises  -MB  lingual movement exercises  -KW  lingual movement exercises  -TM    Increase Tongue Back Strength  lingual movement exercises  -MB  --  --    [place]/Accuracy (Lingual Strengthening Goal 1, SLP)  independently (over 90% accuracy)  -MB  independently (over 90% accuracy)  -KW  independently (over 90% accuracy)  -TM    Time Frame (Lingual Strengthening Goal 1, SLP)  short term goal (STG);by discharge  -MB  short term goal (STG);by discharge  -KW  short term goal (STG);by discharge  -TM    Barriers (Lingual Strengthening Goal 1, SLP)  n/a  -MB  n/a  -KW  n/a  -TM    Progress/Outcomes (Lingual Strengthening Goal 1, SLP)  goal ongoing  -MB  goal ongoing  -KW  goal ongoing  -TM       Pharyngeal Strengthening Exercise Goal 1 (SLP)    Activity (Pharyngeal Strengthening Goal 1, SLP)  increase timing;increase epiglottic inversion and retroflexion;increase tongue base retraction;increase anterior movement of the hyolaryngeal complex  -MB  increase timing;increase epiglottic inversion and retroflexion;increase tongue base retraction  -KW  increase timing;increase epiglottic inversion and retroflexion;increase tongue base retraction  -TM    Increase Timing  gustatory stimulation (sour/cold)  -MB  gustatory stimulation (sour/cold)  -KW  gustatory stimulation (sour/cold)  -TM    Increase Anterior Movement of the Hyolaryngeal Complex  shaker  -MB  --  --    Increase Epiglottic Inversion and Retroflexion  falsetto  -MB  falsetto  -KW  falsetto  -TM    Increase Tongue Base Retraction  hard  effortful swallow;vita  -MB  hard effortful swallow;vita  -KW  hard effortful swallow;vita  -TM    [place]/Accuracy (Pharyngeal Strengthening Goal 1, SLP)  independently (over 90% accuracy)  -MB  independently (over 90% accuracy)  -KW  independently (over 90% accuracy)  -TM    Time Frame (Pharyngeal Strengthening Goal 1, SLP)  short term goal (STG);by discharge  -MB  short term goal (STG);by discharge  -KW  short term goal (STG);by discharge  -TM    Barriers (Pharyngeal Strengthening Goal 1, SLP)  n/a  -MB  n/a  -KW  n/a  -TM    Progress/Outcomes (Pharyngeal Strengthening Goal 1, SLP)  goal ongoing  -MB  goal ongoing  -KW  goal ongoing  -TM      User Key  (r) = Recorded By, (t) = Taken By, (c) = Cosigned By    Initials Name Provider Type    Avery Reyes CCC-SLP Speech and Language Pathologist    Tina Mann, CCC-SLP Speech and Language Pathologist    KW Aida Hunt, MS CCC-SLP Speech and Language Pathologist             Time Calculation:   Time Calculation- SLP     Row Name 11/04/20 1551             Time Calculation- SLP    SLP Start Time  1338  -MB      SLP Stop Time  1455  -MB      SLP Time Calculation (min)  77 min  -MB      SLP Received On  11/04/20  -MB      SLP Goal Re-Cert Due Date  11/14/20  -MB        User Key  (r) = Recorded By, (t) = Taken By, (c) = Cosigned By    Initials Name Provider Type    Avery Reyes CCC-SLP Speech and Language Pathologist          Therapy Charges for Today     Code Description Service Date Service Provider Modifiers Qty    [id number] HC ST MOTION FLUORO EVAL SWALLOW 5 11/4/2020 Avery Gutierrez CCC-SLP GN 1               ISABEL Blackwell  11/4/2020

## 2020-11-04 NOTE — PLAN OF CARE
Problem: Adult Inpatient Plan of Care  Goal: Plan of Care Review  Outcome: Ongoing, Progressing  Flowsheets (Taken 11/4/2020 0808)  Plan of Care Reviewed With: patient  Outcome Summary: Modified barium swallow study completed.Consistencies were presented in the following order: honey x2, nectar, thin, pudding, mechanical soft, honey x2. The pt had decreased hyolaryngeal excursion/elevation and base of tongue retraction with honey, nectar, and thin consistencices. He displayed premature loss to the vallecula with nectar and mechanical soft solids secondary to decreased back of tongue control. He had premature loss to the vallecula 1x with honey thick secondary to a delay in swallow initiation. He exhibited mild laryngeal penetration during the swallow with nectar and deeper laryngeal penetration during the swallow with thin. He did cough following laryngeal penetration of thin. No definite aspiration was observed throughout the study. Recommend continuing mechanical soft solids and honey thick liquids. Meds with applesauce. Ok for ice chips or sips of water between meals. SLP will continue to follow and treat.

## 2020-11-04 NOTE — PLAN OF CARE
Goal Outcome Evaluation:  Plan of Care Reviewed With: patient, family  Progress: improving     A&Ox4.  VSS.  Safety maintained.  Patient becoming more continent and asked for urinal and bedpan.  Patient more alert and oriented tonight.  Blood glucose monitoring.  Room air, .  Tele in place, NS, HR 78. No C/O pain.  No neuro changes. SCD applied as ordered. Will continue to monitor.

## 2020-11-04 NOTE — PROGRESS NOTES
Nephrology ([facility]) Progress Note      Patient:  Eron Escalante  YOB: 1934  Date of Service: 11/4/2020  MRN: [id number]   Acct: [id number]   Primary Care Physician: Jeffrey Owen MD  Advance Directive:   Code Status and Medical Interventions:   Ordered at: 10/27/20 0304     Limited Support to NOT Include:    Intubation     Level Of Support Discussed With:    Next of Kin (If No Surrogate)    Health Care Surrogate     Code Status:    No CPR     Medical Interventions (Level of Support Prior to Arrest):    Limited     Admit Date: 10/26/2020       Hospital Day: 9  Referring Provider: Alcides Tomlinson MD      Patient personally seen and examined.  Complete chart including Consults, Notes, Operative Reports, Labs, Cardiology, and Radiology studies reviewed as able.        Subjective:  Eron Escalante is a 85 y.o. male  whom we were consulted for acute kidney injury/chronic kidney disease.  Patient has stage III chronic kidney disease at baseline and has seen Dr. Victoria previously.  He was initially admitted to [facility] with encephalopathy, MRI of the brain did not show any evidence of intracranial pathology.  Transferred to [facility] as per family request.  Neurological work-up consistent with consistent with microvascular chronic changes.  Patient also has bilateral Doppler studies of carotid arteries consistent with mild to moderate stenosis but no intervention recommended.  However his serum creatinine continued to rise.  Patient also developed hypernatremia as he has not been drinking due to severe encephalopathy.  Started on hypotonic fluid for correction of hypernatremia with consequent improvement of labs.     Today is feeling better, alert and oriented. No new overnight issues.  Urine output nonoliguric.    Allergies:  Lorazepam, Penicillins, and Adhesive tape    Home Meds:  Medications Prior to Admission   Medication Sig Dispense Refill Last  Dose   • allopurinol (ZYLOPRIM) 300 MG tablet Take 600 mg by mouth Daily.   Unknown at Unknown time   • amLODIPine (NORVASC) 5 MG tablet Take 5 mg by mouth Daily.   Unknown at Unknown time   • aspirin 81 MG chewable tablet Chew 81 mg Daily.   Unknown at Unknown time   • Cholecalciferol (VITAMIN D) 1000 units tablet Take 1,000 Units by mouth.   Unknown at Unknown time   • colchicine 0.6 MG tablet Take 0.6 mg by mouth Daily As Needed (gout flare-up).   Unknown at Unknown time   • finasteride (PROSCAR) 5 MG tablet Take 5 mg by mouth Daily.   Unknown at Unknown time   • folic acid (FOLVITE) 400 MCG tablet Take 400 mcg by mouth.   Unknown at Unknown time   • glimepiride (AMARYL) 4 MG tablet Take 4 mg by mouth 2 (Two) Times a Day With Meals. Needs pm dose   Unknown at Unknown time   • losartan (COZAAR) 50 MG tablet Take 25 mg by mouth Daily.   Unknown at Unknown time   • meclizine (ANTIVERT) 25 MG tablet Take 25 mg by mouth. 3-4 times daily   Unknown at Unknown time   • metoprolol succinate XL (TOPROL XL) 25 MG 24 hr tablet Take 25 mg by mouth every night at bedtime.   Unknown at Unknown time   • nitroglycerin (NITROSTAT) 0.4 MG SL tablet Place 0.4 mg under the tongue Every 5 (Five) Minutes As Needed.   Unknown at Unknown time   • pantoprazole (PROTONIX) 40 MG EC tablet Take 40 mg by mouth Every 12 (Twelve) Hours.   Unknown at Unknown time   • potassium chloride (K-DUR,KLOR-CON) 20 MEQ CR tablet Take 10 mEq by mouth Every 12 (Twelve) Hours.   Unknown at Unknown time   • Pyridoxine HCl (VITAMIN B6) 200 MG tablet Take 1 tablet by mouth Daily.   Unknown at Unknown time   • rOPINIRole (REQUIP) 1 MG tablet Take 1 mg by mouth Every Night. Take 1 hour before bedtime.   Unknown at Unknown time   • sennosides-docusate sodium (SENOKOT-S) 8.6-50 MG tablet Take 2 tablets by mouth 2 (Two) Times a Day As Needed for constipation. 45 tablet 2 Unknown at Unknown time   • tamsulosin (FLOMAX) 0.4 MG capsule 24 hr capsule Take 1 capsule by  mouth Daily.   Unknown at Unknown time   • thiamine (VITAMIN B-1) 100 MG tablet Take 100 mg by mouth.   Unknown at Unknown time   • vitamin C (ASCORBIC ACID) 500 MG tablet Take 500 mg by mouth Daily.   Unknown at Unknown time   • [DISCONTINUED] pravastatin (PRAVACHOL) 40 MG tablet Take 40 mg by mouth Every Night.   Unknown at Unknown time       Medicines:  Current Facility-Administered Medications   Medication Dose Route Frequency Provider Last Rate Last Dose   • acetaminophen (TYLENOL) tablet 650 mg  650 mg Oral Q4H PRN Forrest Bliss MD        Or   • acetaminophen (TYLENOL) suppository 650 mg  650 mg Rectal Q4H PRN Forrest Bliss MD       • allopurinol (ZYLOPRIM) tablet 300 mg  300 mg Oral Daily Forrest Bliss MD   300 mg at 11/04/20 0814   • amLODIPine (NORVASC) tablet 5 mg  5 mg Oral Q24H Elpidio Irby DO   5 mg at 11/04/20 0815   • aspirin chewable tablet 81 mg  81 mg Oral Daily Elpidio Irby DO   81 mg at 11/04/20 0814   • cefTRIAXone (ROCEPHIN) 1 g/100 mL 0.9% NS (MBP)  1 g Intravenous Q24H Rocky Nixon DO   1 g at 11/03/20 1601   • cholecalciferol (VITAMIN D3) tablet 1,000 Units  1,000 Units Oral Daily Forrest Bliss MD   1,000 Units at 11/04/20 0814   • colchicine tablet 0.6 mg  0.6 mg Oral Daily PRN Forrest Bliss MD       • dextrose (D50W) 25 g/ 50mL Intravenous Solution 25 g  25 g Intravenous Q15 Min PRN Forrest Bliss MD       • dextrose (GLUTOSE) oral gel 15 g  15 g Oral Q15 Min PRN Forrest Bliss MD       • folic acid (FOLVITE) tablet 400 mcg  400 mcg Oral Daily Forrest Bliss MD   400 mcg at 11/04/20 0814   • glucagon (human recombinant) (GLUCAGEN DIAGNOSTIC) injection 1 mg  1 mg Subcutaneous Q15 Min PRN Forrest Bliss MD       • hydrALAZINE (APRESOLINE) injection 10 mg  10 mg Intravenous Q4H PRN Forrest Bliss MD   10 mg at 11/03/20 1148   • insulin lispro (humaLOG) injection 2-7 Units  2-7 Units Subcutaneous TID AC Forrest Bliss MD   2  Units at 11/03/20 1148   • isosorbide mononitrate (IMDUR) 24 hr tablet 30 mg  30 mg Oral Q24H Forrest Bliss MD   30 mg at 11/04/20 0814   • lansoprazole (FIRST) oral suspension 30 mg  30 mg Oral Q AM Iain Rosenberg MD   30 mg at 11/04/20 0540   • nitroglycerin (NITROSTAT) SL tablet 0.4 mg  0.4 mg Sublingual Q5 Min PRN Forrest Bliss MD   0.4 mg at 10/27/20 1600   • ondansetron (ZOFRAN) tablet 4 mg  4 mg Oral Q6H PRN Forrest Bliss MD        Or   • ondansetron (ZOFRAN) injection 4 mg  4 mg Intravenous Q6H PRN Forrest Bliss MD       • potassium chloride (MICRO-K) CR capsule 10 mEq  10 mEq Oral BID With Meals Forrest Bliss MD   10 mEq at 11/04/20 0815   • pravastatin (PRAVACHOL) tablet 40 mg  40 mg Oral Nightly Forrest Bliss MD   40 mg at 11/03/20 2043   • prednisoLONE acetate (PRED FORTE) 1 % ophthalmic suspension 1 drop  1 drop Left Eye Q2H While Awake Forrest Bliss MD   1 drop at 11/04/20 0815   • sennosides-docusate (PERICOLACE) 8.6-50 MG per tablet 2 tablet  2 tablet Oral BID PRN Forrest Bliss MD       • sodium chloride 0.9 % flush 10 mL  10 mL Intravenous Q12H Forrest Bliss MD   10 mL at 11/03/20 2044   • sodium chloride 0.9 % flush 10 mL  10 mL Intravenous PRN Forrest Bliss MD       • sodium chloride 0.9 % infusion 500 mL  500 mL Intravenous Continuous PRN Forrest Bliss MD 25 mL/hr at 10/28/20 1150     • terazosin (HYTRIN) capsule 1 mg  1 mg Oral Nightly Forrest Bliss MD   1 mg at 11/03/20 2043   • thiamine (VITAMIN B-1) tablet 100 mg  100 mg Oral Daily Forrest Bliss MD   100 mg at 11/04/20 0814   • vitamin B-6 (PYRIDOXINE) tablet 200 mg  200 mg Oral Daily Forrest Bliss MD   200 mg at 11/04/20 0814   • vitamin C (ASCORBIC ACID) tablet 500 mg  500 mg Oral Daily Forrest Bliss MD   500 mg at 11/04/20 0814       Past Medical History:  Past Medical History:   Diagnosis Date   • A-fib (CMS/HCC) 11/15/2016   • Anemia     gets shots every few  months to build up blood. sees dr adam.   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   • Arthritis    • Bradycardia 11/15/2016   • CAD in native artery 11/15/2016   • Chest pain 11/15/2016   • CHF (congestive heart failure) (CMS/[facility])    • Chronic kidney disease    • COPD (chronic obstructive pulmonary disease) (CMS/[facility])    • DDD (degenerative disc disease), lumbar 6/27/2017   • Heart murmur    • HTN (hypertension) 11/15/2016   • Hyperlipidemia    • Lumbar stenosis with neurogenic claudication 6/27/2017   • Murmur 4/19/2019   • Myocardial infarction (CMS/[facility])    • Sleep apnea    • Stroke (CMS/[facility])    • Type 2 diabetes mellitus (CMS/[facility]) 11/15/2016   • Ulcer of abdomen wall (CMS/[facility])        Past Surgical History:  Past Surgical History:   Procedure Laterality Date   • APPENDECTOMY     • BACK SURGERY      x2   • CARDIAC CATHETERIZATION Left     1/2010   • CARPAL TUNNEL RELEASE Bilateral    • CHOLECYSTECTOMY     • COLONOSCOPY N/A 9/7/2017    Procedure: COLONOSCOPY WITH ANESTHESIA;  Surgeon: Joshua Rich DO;  [facility];  Service:    • CORONARY ARTERY BYPASS GRAFT      2/2006 w/PTCA & KIMMY    • CORONARY STENT PLACEMENT     • ENDOSCOPY N/A 12/14/2016    Procedure: ESOPHAGOGASTRODUODENOSCOPY WITH ANESTHESIA;  Surgeon: Isabel Dexter MD;  Location: [facility] ENDOSCOPY;  Service:    • ENDOSCOPY N/A 12/16/2016    Procedure: ESOPHAGOGASTRODUODENOSCOPY WITH ANESTHESIA;  Surgeon: Joshua Rich DO;  Location: [facility] ENDOSCOPY;  Service:    • ENDOSCOPY N/A 4/10/2017    Procedure: ESOPHAGOGASTRODUODENOSCOPY WITH ANESTHESIA;  Surgeon: Joshua Rich DO;  Location: [facility] ENDOSCOPY;  Service:    • ENDOSCOPY N/A 10/28/2020    Procedure: ESOPHAGOGASTRODUODENOSCOPY WITH ANESTHESIA;  Surgeon: Forrest Bliss MD;  Location: [facility] ENDOSCOPY;  Service: Gastroenterology;  Laterality: N/A;  pre: GI bleed  post: duodenal ulcers, hiatal hernia   Jeffrey Owen MD   • EYE SURGERY Left     x 2   • JOINT REPLACEMENT      • PERIPHERAL ARTERIAL STENT GRAFT     • REPLACEMENT TOTAL KNEE Left     2002   • SHOULDER ROTATOR CUFF REPAIR Bilateral        Family History  Family History   Problem Relation Age of Onset   • Coronary artery disease Father    • Heart disease Father    • Coronary artery disease Brother    • Heart attack Brother    • Cancer Mother    • No Known Problems Sister    • Heart disease Son    • Cancer Sister    • Cancer Sister        Social History  Social History     Socioeconomic History   • Marital status:      Spouse name: Not on file   • Number of children: Not on file   • Years of education: Not on file   • Highest education level: Not on file   Tobacco Use   • Smoking status: Never Smoker   • Smokeless tobacco: Never Used   Substance and Sexual Activity   • Alcohol use: No   • Drug use: No   • Sexual activity: Never     Partners: Female         Review of Systems:  History obtained from chart review and the patient  General ROS: No fever or chills  Respiratory ROS: No cough, shortness of breath, wheezing  Cardiovascular ROS: No chest pain or palpitations  Gastrointestinal ROS: No abdominal pain or melena  Genito-Urinary ROS: No dysuria or hematuria  Neurological ROS: no headache or dizziness    Objective:  Patient Vitals for the past 24 hrs:   BP Temp Temp src Pulse Resp SpO2   11/04/20 0725 164/45 98.1 °F (36.7 °C) Axillary 64 20 95 %   11/04/20 0404 166/45 98.1 °F (36.7 °C) Axillary 68 22 95 %   11/03/20 2355 171/52 97.7 °F (36.5 °C) Oral 71 18 97 %   11/03/20 2004 167/62 97.6 °F (36.4 °C) Oral 66 18 98 %   11/03/20 1537 150/55 98 °F (36.7 °C) Oral 82 20 96 %   11/03/20 1223 160/67 -- -- -- -- --   11/03/20 1117 174/66 97.6 °F (36.4 °C) Oral 58 18 97 %       Intake/Output Summary (Last 24 hours) at 11/4/2020 1018  Last data filed at 11/4/2020 0205  Gross per 24 hour   Intake 1149 ml   Output 800 ml   Net 349 ml     General: awake/alert   Chest:  clear to auscultation bilaterally without respiratory  distress  CVS: regular rate and rhythm  Abdominal: soft, nontender, positive bowel sounds  Extremities: no cyanosis or edema  Skin: warm and dry without rash  Neuro: no focal motor deficits    Labs:  Results from last 7 days   Lab Units 11/04/20 0557 11/03/20 0435 11/02/20 0447   WBC 10*3/mm3 6.97 7.92 8.19   HEMOGLOBIN g/dL 8.3* 8.7* 8.5*   HEMATOCRIT % 25.6* 27.7* 26.3*   PLATELETS 10*3/mm3 192 180 146         Results from last 7 days   Lab Units 11/04/20  0557 11/03/20 0435 11/02/20 0447 11/01/20 0624 10/31/20  0444   SODIUM mmol/L 145 144 146* 150* 150*   POTASSIUM mmol/L 4.1 4.1 4.1 3.7 3.7   CHLORIDE mmol/L 117* 114* 117* 119* 119*   CO2 mmol/L 23.0 23.0 23.0 24.0 23.0   BUN mg/dL 35* 43* 53* 69* 80*   CREATININE mg/dL 0.89 1.01 1.35* 1.80* 2.17*   CALCIUM mg/dL 8.6 8.7 8.6 8.7 8.8   BILIRUBIN mg/dL  --   --  0.3 0.3 0.4   ALK PHOS U/L  --   --  78 79 70   ALT (SGPT) U/L  --   --  28 31 31   AST (SGOT) U/L  --   --  31 35 40   GLUCOSE mg/dL 125* 148* 166* 170* 153*       Radiology:   Imaging Results (Last 72 Hours)     ** No results found for the last 72 hours. **          Culture:  Urine Culture   Date Value Ref Range Status   10/30/2020 No growth  Final   10/27/2020 >100,000 CFU/mL Escherichia coli (A)  Final         Assessment   1.  Acute kidney injury, prerenal--resolved  2.  Baseline chronic kidney disease stage 3a  3.  Hypernatremia--resolved  4.  Acute GI bleed  5.  Acute metabolic encephalopathy--resolving  6.  Type 2 diabetes with nephropathy  7.  Iron deficiency anemia  8.  Status post EGD/duodenal ulcer  9.  E coli UTI    Plan:  1.  Encourage PO intake  2.  Monitor labs        Teddy Hammonds, ANJANA  11/4/2020  10:18 CST

## 2020-11-04 NOTE — PROGRESS NOTES
Continued Stay Note   [place]     Patient Name: Eron Escalante  MRN: [id number]  Today's Date: 11/4/2020    Admit Date: 10/26/2020    Discharge Plan     Row Name 11/04/20 1153       Plan    Plan Comments  Spoke to Pamela with [facility] ([phone]) and precert is still pending. Awaiting insurance decision.        Discharge Codes    No documentation.       Expected Discharge Date and Time     Expected Discharge Date Expected Discharge Time    Nov 4, 2020             MYAH Ramírez

## 2020-11-05 NOTE — THERAPY TREATMENT NOTE
Acute Care - Physical Therapy Treatment Note  [facility]     Patient Name: Eron Escalante  : 1934  MRN: [id number]  Today's Date: 2020   Onset of Illness/Injury or Date of Surgery: 10/26/20       PT Assessment (last 12 hours)      PT Evaluation and Treatment     [facility] Name 20          Physical Therapy Time and Intention    Subjective Information  complains of;weakness  -     Document Type  therapy note (daily note)  -     Mode of Treatment  physical therapy  -[facility] Name 20          General Information    Patient/Family/Caregiver Comments/Observations  grand daughter  -     Existing Precautions/Restrictions  fall  -[facility] Name 20          Pain Scale: Numbers Pre/Post-Treatment    Pretreatment Pain Rating  0/10 - no pain  -     Posttreatment Pain Rating  0/10 - no pain  -[facility] Name 20          Range of Motion Comprehensive    Comment, General Range of Motion  A-AAROM BLE sitting EOB   -[facility] Name 20          Bed Mobility    Supine-Sit [place] (Bed Mobility)  moderate assist (50% patient effort);verbal cues;minimum assist (75% patient effort)  [facility]     Sit-Supine [place] (Bed Mobility)  -- chair  -[facility] Name 20          Transfers    Transfers  sit-stand transfer;stand-sit transfer;stand pivot/stand step transfer  -     Sit-Stand [place] (Transfers)  moderate assist (50% patient effort);2 person assist;verbal cues  -     Stand-Sit [place] (Transfers)  minimum assist (75% patient effort);2 person assist;verbal cues  -[facility] Name 20          Sit-Stand Transfer    Assistive Device (Sit-Stand Transfers)  walker, front-wheeled  -[facility] Name 20          Stand Pivot/Stand Step Transfer    Stand Pivot/Stand Step [place] (Transfers)  2 person assist;verbal cues;minimum assist (75% patient effort);moderate assist (50% patient effort)  -     Assistive Device  (Stand Pivot Stand Step Transfer)  walker, front-wheeled  -     Row Name 11/05/20 0824          Plan of Care Review    Plan of Care Reviewed With  patient;grandchild(miles)  -     Progress  no change  -     Outcome Summary  pt trans to EOB min-mod assist, performed A-AAROM BLE, sit-stand mod of 2, stand pivot to chair min-mod of 2, pt would benefit from [facility]  -     Row Name 11/05/20 0824          Positioning and Restraints    Pre-Treatment Position  in bed  -     Post Treatment Position  chair  -     In Chair  sitting;call light within reach;encouraged to call for assist;with family/caregiver  -       User Key  (r) = Recorded By, (t) = Taken By, (c) = Cosigned By    Initials Name Provider Type     Maria C Fagan, DREA Physical Therapy Assistant        Physical Therapy Education                 Title: PT OT SLP Therapies (In Progress)     Topic: Physical Therapy (In Progress)     Point: Mobility training (Done)     Learning Progress Summary           Patient Acceptance, E,TB, VU by  at 11/2/2020 1213    Comment: TRANS    Acceptance, E, VU by  at 10/27/2020 1543    Comment: Family educated on POC.   Family Acceptance, E, VU by  at 10/31/2020 1334    Comment: benefits of PT and POC, call for A OOB                   Point: Home exercise program (Done)     Learning Progress Summary           Patient Acceptance, E,TB,D,H, VU by  at 11/5/2020 0845    Comment: BLE HEP    Acceptance, E,TB, VU by  at 11/3/2020 1525    Comment: BLE HEP                   Point: Body mechanics (Not Started)     Learner Progress:  Not documented in this visit.          Point: Precautions (Done)     Learning Progress Summary           Family Acceptance, E, VU by  at 10/31/2020 1334    Comment: benefits of PT and POC, call for A OOB                               User Key     Initials Effective Dates Name Provider Type [facility] 08/02/16 -  Maria C Fagan PTA Physical Therapy Assistant PT     08/02/16 -  Last  Sujit KAY, PT Physical Therapist PT    AY 10/19/20 -  Yuli Chávez, PT Student PT Student PT              PT Recommendation and Plan     Plan of Care Reviewed With: patient, grandchild(miles)  Progress: no change  Outcome Summary: pt trans to EOB min-mod assist, performed A-AAROM BLE, sit-stand mod of 2, stand pivot to chair min-mod of 2, pt would benefit from SNF       Time Calculation:   PT Charges     Row Name 11/05/20 0845             Time Calculation    Start Time  0824  -      Stop Time  0840  -      Time Calculation (min)  16 min  -      PT Received On  11/05/20  -         Time Calculation- PT    Total Timed Code Minutes- PT  16 minute(s)  -         Timed Charges    20942 - PT Therapeutic Activity Minutes  16  -        User Key  (r) = Recorded By, (t) = Taken By, (c) = Cosigned By    Initials Name Provider Type     Maria C Fagan PTA Physical Therapy Assistant        Therapy Charges for Today     Code Description Service Date Service Provider Modifiers Qty    91037376536 HC PT THER PROC EA 15 MIN 11/4/2020 Maria C Fagan PTA GP 1    88073697706 HC PT THERAPEUTIC ACT EA 15 MIN 11/5/2020 Maria C Fagan PTA GP 1          PT G-Codes  Outcome Measure Options: AM-PAC 6 Clicks Basic Mobility (PT)  AM-PAC 6 Clicks Score (PT): 8  AM-PAC 6 Clicks Score (OT): 8    Maria C Fagan PTA  11/5/2020

## 2020-11-05 NOTE — PLAN OF CARE
Goal Outcome Evaluation:  Plan of Care Reviewed With: patient, son  Progress: improving     Oriented to self, place, and time, not situation.  Speech garbled.  Patient confused at times.  Incontinent.  VSS.  Safety maintained.  Tele in place, NS, HR 68.  Room air.  Meds crushed in applesauce.  Patient sleeps between care.  Blood glucose monitoring.  No C/O pain. Turning.  Neuro check with no changes. Will continue to monitor. 0440,  /59 Hydralazine 10 mg PO given.

## 2020-11-05 NOTE — PROGRESS NOTES
[facility]  INPATIENT PROGRESS NOTE    Patient Name: Eron Escalante  Date of Admission: 10/26/2020  Today's Date: 11/05/20  Length of Stay: 10  Primary Care Physician: Jeffrey Owen MD    Subjective   Chief Complaint: Weakness.   HPI   Hopeful yesterday that we could discharge him home, but his precertification for his insurance is still not gone through.  Discussed with Soheila Easton from .    Continues to have problems with hypertension.  I have increased his amlodipine and losartan.    Hemoglobin and renal function stable.    Review of Systems   All pertinent negatives and positives are as above. All other systems have been reviewed and are negative unless otherwise stated.     Objective    Temp:  [96.7 °F (35.9 °C)-98 °F (36.7 °C)] 96.7 °F (35.9 °C)  Heart Rate:  [63-67] 67  Resp:  [18] 18  BP: (147-177)/(54-64) 147/57  Physical Exam  Vitals signs and nursing note reviewed.   Constitutional:       Appearance: He is well-developed.      Comments: Up the chair.  His granddaughter is not currently present.  Discussed with his nurse, Liliane.   HENT:      Head: Normocephalic and atraumatic.      Comments: Poor dentition.  Eyes:      Conjunctiva/sclera: Conjunctivae normal.      Pupils: Pupils are equal, round, and reactive to light.   Neck:      Musculoskeletal: Neck supple.      Vascular: No JVD.   Cardiovascular:      Rate and Rhythm: Normal rate. Rhythm irregular.      Heart sounds: Normal heart sounds. No murmur. No friction rub. No gallop.    Pulmonary:      Effort: Pulmonary effort is normal. No respiratory distress.      Breath sounds: Normal breath sounds. No wheezing or rales.   Chest:      Chest wall: No tenderness.   Abdominal:      General: Bowel sounds are normal. There is no distension.      Palpations: Abdomen is soft.      Tenderness: There is no abdominal tenderness. There is no guarding or rebound.   Musculoskeletal: Normal  range of motion.         General: Swelling (trace) present. No tenderness or deformity.   Skin:     General: Skin is warm and dry.      Findings: No rash.   Neurological:      General: No focal deficit present.      Mental Status: He is alert and oriented to person, place, and time.      Cranial Nerves: No cranial nerve deficit.      Motor: Weakness present. No abnormal muscle tone.      Deep Tendon Reflexes: Reflexes normal.      Comments: Follows all commands without difficulty.   Psychiatric:         Behavior: Behavior normal.         Thought Content: Thought content normal.         Judgment: Judgment normal.       Results Review:  I have reviewed the labs, radiology results, and diagnostic studies.    Laboratory Data:   Results from last 7 days   Lab Units 11/05/20 0536 11/04/20 0557 11/03/20  0435   WBC 10*3/mm3 6.95 6.97 7.92   HEMOGLOBIN g/dL 8.5* 8.3* 8.7*   HEMATOCRIT % 26.8* 25.6* 27.7*   PLATELETS 10*3/mm3 221 192 180     Results from last 7 days   Lab Units 11/05/20 0536 11/04/20 0557 11/03/20  0435 11/02/20  0447 11/01/20  0624 10/31/20  0444   SODIUM mmol/L 143 145 144 146* 150* 150*   POTASSIUM mmol/L 4.4 4.1 4.1 4.1 3.7 3.7   CHLORIDE mmol/L 115* 117* 114* 117* 119* 119*   CO2 mmol/L 25.0 23.0 23.0 23.0 24.0 23.0   BUN mg/dL 29* 35* 43* 53* 69* 80*   CREATININE mg/dL 0.88 0.89 1.01 1.35* 1.80* 2.17*   CALCIUM mg/dL 8.8 8.6 8.7 8.6 8.7 8.8   BILIRUBIN mg/dL  --   --   --  0.3 0.3 0.4   ALK PHOS U/L  --   --   --  78 79 70   ALT (SGPT) U/L  --   --   --  28 31 31   AST (SGOT) U/L  --   --   --  31 35 40   GLUCOSE mg/dL 130* 125* 148* 166* 170* 153*     I have reviewed the patient's current medications.     Assessment/Plan     Active Hospital Problems    Diagnosis   • **Metabolic encephalopathy   • E. coli UTI (urinary tract infection)   • Hypernatremia   • Acute renal failure superimposed on stage 3a chronic kidney disease (CMS/HCC)   • Acute blood loss anemia   • Duodenal ulcer   • UGIB (upper  gastrointestinal bleed)   • History of stroke   • Chronic diastolic congestive heart failure (CMS/[facility])   • A-fib (CMS/[facility])   • HTN (hypertension)   • Type 2 diabetes mellitus, without long-term current use of insulin (CMS/[facility])     Plan:  The patient was admitted on 10/27 by Dr. Rosenberg.  He presented as a transfer from [facility] as his granddaughter works here.  He had been admitted there and was having much difficulty with confusion.  This has been proven to be multifactorial as evidenced below.    He has been evaluated by neurology.  He had an MRI of the brain without contrast on 10/30 that showed no evidence of acute infarct.    He has been followed by nephrology for acute renal failure superimposed on CKD, stage III.  His serum creatinine continues to improve.  His elevated BUN is secondary to recent gastrointestinal bleeding as well as his acute renal failure.  He developed some hyponatremia secondary to resuscitation with crystalloid fluids.  This resolved with recent dextrose fluids.  Stopped IV fluids on 11/3 and gave Bumex 0.5 mg IV x1.  Resumed amlodipine recently and will increase.  Increase losartan.     He had heme positive stool and melena.  He was taken for endoscopy on 10/28 by Dr. Bliss. He had multiple nonbleeding duodenal ulcers at that time.  He had a hiatal hernia.  Urease negative.  He is being treated with lansoprazole.  He has received a total of 4 units packed red blood cells.  He was last transfused on 10/31.  Hemoglobin remained stable.  Anemia substrates are consistent with chronic disease.    He has an E. coli urinary tract infection that is resistant to ampicillin, levofloxacin, tetracycline, and Bactrim.  He has been treated with ceftriaxone and was converted to oral Omnicef on 11/4.    He has a history of atrial fibrillation for which he will not be anticoagulated at the moment given his recent gastrointestinal bleed.  I restarted his aspirin on 11/2.    Oral hypoglycemics on  hold.  Continue sliding scale insulin.  His most recent hemoglobin A1c is 6.1.    SCDs for DVT prophylaxis.    Discharge Planning: I expect the patient to be discharged to SNF later today if his pre-CERT goes through.    Electronically signed by Elpidio Irby DO, 11/05/20, 13:05 CST.

## 2020-11-05 NOTE — THERAPY TREATMENT NOTE
Acute Care - Speech Language Pathology   Swallow Treatment Note [facility]     Patient Name: Eron Escalante  : 1934  MRN: [id number]  Today's Date: 2020  Onset of Illness/Injury or Date of Surgery: 10/26/20            Admit Date: 10/26/2020  Pt was fatigued, but participated well with swallow therapy. He completed 3 sets of 3 reps of lingual lateralization around upper and lower gums with adequate ROM. He completed 5 reps of swallow following cold bolus stimulation with consistentn delay of 2 seconds. He was unable to achieve falsetto pitch or Mendelsohn maneuver to address laryngeal elevation. He completed effortful swallows on 9/10 attempts with adequate laryngeal elevation and the Sneha maneuver on 3/5 attempts. Pt also took trials of ice chips during therapy with occasional delayed wet coughs. Continue mechanical soft diet and honey thick liquids. SLP will continue to follow and treat.  Avery Gutierrez, CCC-SLP 2020 10:19 CST    Visit Dx:     ICD-10-CM ICD-9-CM   1. Oropharyngeal dysphagia  R13.12 787.22   2. Impaired mobility  Z74.09 799.89   3. Heme positive stool  R19.5 792.1   4. Decreased activities of daily living (ADL)  Z78.9 V49.89     Patient Active Problem List   Diagnosis   • Type 2 diabetes mellitus, without long-term current use of insulin (CMS/[facility])   • HTN (hypertension)   • Aortocoronary bypass status   • Bradycardia   • CAD in native artery   • A-fib (CMS/[facility])   • Symptomatic bradycardia   • Cerebral ventriculomegaly   • Dehydration   • Duodenal ulcer   • Acute renal failure superimposed on stage 3a chronic kidney disease (CMS/[facility])   • Hyperkalemia   • Weakness of extremity   • Lumbar stenosis with neurogenic claudication   • DDD (degenerative disc disease), lumbar   • Change in bowel habit   • RICARDO treated with BiPAP   • Murmur   • Chronic diastolic congestive heart failure (CMS/HCC)   • Periodic limb movement disorder (PLMD)   • CHF (congestive heart failure), NYHA class  I, acute on chronic, combined (CMS/[facility])   • Chronic constipation   • Chronic back pain   • History of stroke   • Hypertensive urgency   • Ischemic cardiomyopathy   • Altered mental status   • Acute blood loss anemia   • Metabolic encephalopathy   • Hypernatremia   • E. coli UTI (urinary tract infection)   • UGIB (upper gastrointestinal bleed)     Past Medical History:   Diagnosis Date   • A-fib (CMS/[facility]) 11/15/2016   • Anemia     gets shots every few months to build up blood. sees dr adam.   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   • Arthritis    • Bradycardia 11/15/2016   • CAD in native artery 11/15/2016   • Chest pain 11/15/2016   • CHF (congestive heart failure) (CMS/[facility])    • Chronic kidney disease    • COPD (chronic obstructive pulmonary disease) (CMS/HCC)    • DDD (degenerative disc disease), lumbar 6/27/2017   • Heart murmur    • HTN (hypertension) 11/15/2016   • Hyperlipidemia    • Lumbar stenosis with neurogenic claudication 6/27/2017   • Murmur 4/19/2019   • Myocardial infarction (CMS/HCC)    • Sleep apnea    • Stroke (CMS/HCC)    • Type 2 diabetes mellitus (CMS/HCC) 11/15/2016   • Ulcer of abdomen wall (CMS/HCC)      Past Surgical History:   Procedure Laterality Date   • APPENDECTOMY     • BACK SURGERY      x2   • CARDIAC CATHETERIZATION Left     1/2010   • CARPAL TUNNEL RELEASE Bilateral    • CHOLECYSTECTOMY     • COLONOSCOPY N/A 9/7/2017    Procedure: COLONOSCOPY WITH ANESTHESIA;  Surgeon: Joshua Rich DO;  Location: [facility] ENDOSCOPY;  Service:    • CORONARY ARTERY BYPASS GRAFT      2/2006 w/PTCA & KIMMY    • CORONARY STENT PLACEMENT     • ENDOSCOPY N/A 12/14/2016    Procedure: ESOPHAGOGASTRODUODENOSCOPY WITH ANESTHESIA;  Surgeon: Isabel Dexter MD;  Location: [facility] ENDOSCOPY;  Service:    • ENDOSCOPY N/A 12/16/2016    Procedure: ESOPHAGOGASTRODUODENOSCOPY WITH ANESTHESIA;  Surgeon: Joshua Rich DO;  Location: [facility] ENDOSCOPY;  Service:    • ENDOSCOPY N/A 4/10/2017    Procedure:  ESOPHAGOGASTRODUODENOSCOPY WITH ANESTHESIA;  Surgeon: Joshua Rich DO;  Location: [facility] ENDOSCOPY;  Service:    • ENDOSCOPY N/A 10/28/2020    Procedure: ESOPHAGOGASTRODUODENOSCOPY WITH ANESTHESIA;  Surgeon: Forrest Bliss MD;  Location: [facility] ENDOSCOPY;  Service: Gastroenterology;  Laterality: N/A;  pre: GI bleed  post: duodenal ulcers, hiatal hernia   Jeffrey Owen MD   • EYE SURGERY Left     x 2   • JOINT REPLACEMENT     • PERIPHERAL ARTERIAL STENT GRAFT     • REPLACEMENT TOTAL KNEE Left     2002   • SHOULDER ROTATOR CUFF REPAIR Bilateral         SWALLOW EVALUATION (last 72 hours)      SLP Adult Swallow Evaluation     Row Name 11/05/20 0945 11/04/20 1338 11/03/20 1345 11/02/20 1510          Rehab Evaluation    Document Type  therapy note (daily note)  -MB  re-evaluation  -MB  therapy note (daily note)  -KW  therapy note (daily note)  -TM     Subjective Information  no complaints  -MB  no complaints  -MB  no complaints  -KW  no complaints  -TM     Patient Observations  alert;cooperative  -MB  alert;cooperative  -MB  alert;cooperative  -KW  alert;cooperative  -TM     Patient/Family/Caregiver Comments/Observations  Grandaughter present  -MB  No family present  -MB  granddaughter present  -KW  Granddaughter arrived shortly following initiation of tx.  -TM     Care Plan Review  --  --  care plan/treatment goals reviewed  -  care plan/treatment goals reviewed;risks/benefits reviewed;current/potential barriers reviewed;patient/other agree to care plan Reinforcements needed for pt, confused  -TM     Care Plan Review, Other Participant(s)  --  --  --  family;other (see comments) Granddaughter  -     Patient Effort  --  --  good  -KW  adequate  -        General Information    Patient Profile Reviewed  --  yes  -MB  --  --     Pertinent History Of Current Problem  --  Encephalopathy, CKD, anemia, HTN, DM.  -MB  --  --     Current Method of Nutrition  --  soft textures;ground;honey-thick liquids  -MB  --   --     Precautions/Limitations, Vision  --  WFL with corrective lenses  -MB  --  --     Precautions/Limitations, Hearing  --  WFL;for purposes of eval  -MB  --  --     Prior Level of Function-Communication  --  cognitive-linguistic impairment  -MB  --  --     Prior Level of Function-Swallowing  --  no diet consistency restrictions  -MB  --  --     Plans/Goals Discussed with  --  patient  -MB  --  --     Barriers to Rehab  --  none identified  -MB  --  --     Patient's Goals for Discharge  --  patient did not state  -MB  --  --        Pain    Additional Documentation  --  Pain Scale: FACES Pre/Post-Treatment (Group)  -MB  --  Pain Scale: Numbers Pre/Post-Treatment (Group)  -TM        Pain Scale: Numbers Pre/Post-Treatment    Pretreatment Pain Rating  --  --  --  0/10 - no pain  -TM     Pre/Posttreatment Pain Comment  --  --  --  Sleeping at conclusion of session, no observed distress at that time.  -TM     Pain Intervention(s)  --  --  --  Other (Comment) Granddaughter and SLP repositioned pt from back to R side  -TM        Pain Scale: FACES Pre/Post-Treatment    Pain: FACES Scale, Pretreatment  0-->no hurt  -MB  0-->no hurt  -MB  0-->no hurt  -KW  --     Posttreatment Pain Rating  --  --  0-->no hurt  -KW  --        Oral Motor Structure and Function    Dentition Assessment  --  missing teeth;other (see comments) Upper edentulous, pt reports no dentures  -MB  --  --     Secretion Management  --  WNL/WFL  -MB  --  --     Mucosal Quality  --  moist, healthy  -MB  --  --        General Eating/Swallowing Observations    Respiratory Support Currently in Use  --  room air  -MB  --  --        MBS/VFSS    Utensils Used  --  spoon;straw  -MB  --  --     Consistencies Trialed  --  soft textures;thin liquids;nectar/syrup-thick liquids;honey-thick liquids;pudding thick  -MB  --  --        MBS/VFSS Interpretation    Oral Prep Phase  --  WFL  -MB  --  --     Oral Transit Phase  --  impaired  -MB  --  --     Oral Residue  --  WFL   -MB  --  --     VFSS Summary  --  Consistencies were presented in the following order: honey x2, nectar, thin, pudding, mechanical soft, honey x2. The pt had decreased hyolaryngeal excursion/elevation and base of tongue retraction with honey, nectar, and thin consistencices. He displayed premature loss to the vallecula with nectar and mechanical soft solids secondary to decreased back of tongue control. He had premature loss to the vallecula 1x with honey thick secondary to a delay in swallow initiation. He exhibited mild laryngeal penetration during the swallow with nectar and deeper laryngeal penetration during the swallow with thin. He did cough following laryngeal penetration of thin. No definite aspiration was observed throughout the study.   -MB  --  --        Oral Transit Phase    Impaired Oral Transit Phase  --  tongue pumping;premature spillage of liquids into pharynx  -MB  --  --     Tongue Pumping  --  mechanical soft  -MB  --  --     Premature Spillage of Liquids into Pharynx  --  nectar-thick liquids;mechanical soft  -MB  --  --        Initiation of Pharyngeal Swallow    Initiation of Pharyngeal Swallow  --  bolus in valleculae  -MB  --  --     Pharyngeal Phase  --  impaired pharyngeal phase of swallowing  -MB  --  --     Penetration During the Swallow  --  thin liquids;nectar-thick liquids  -MB  --  --     Response to Penetration  --  inconsistent response;cough  -MB  --  --     Pharyngeal Residue  --  all consistencies tested  -MB  --  --        Clinical Impression    Daily Summary of Progress (SLP)  progress towards functional goals is fair  -MB  --  progress toward functional goals is good  -KW  progress towards functional goals is fair  -TM     Barriers to Overall Progress (SLP)  none  -MB  --  Cognition and medical status   -  Cognition  -TM     SLP Swallowing Diagnosis  --  functional oral phase;mild-moderate;pharyngeal dysphagia  -MB  --  --     Functional Impact  --  risk of  aspiration/pneumonia;risk of malnutrition;risk of dehydration  -MB  --  --     Rehab Potential/Prognosis, Swallowing  --  adequate, monitor progress closely  -MB  --  --     Swallow Criteria for Skilled Therapeutic Interventions Met  --  demonstrates skilled criteria  -MB  --  --     Plan for Continued Treatment (SLP)  Continue to follow  -MB  --  Continue to follow   -KW  Upgrade to mechanical soft. Continue with honey thick liquids.   -        Recommendations    Therapy Frequency (Swallow)  --  at least;3 days per week  -MB  --  --     Predicted Duration Therapy Intervention (Days)  --  until discharge  -MB  --  --     SLP Diet Recommendation  --  soft textures;honey thick liquids;ice chips between meals after oral care, with supervision  -MB  --  soft textures;honey thick liquids;ice chips between meals after oral care, with supervision  -     Recommended Precautions and Strategies  --  upright posture during/after eating;small bites of food and sips of liquid  -MB  --  upright posture during/after eating;small bites of food and sips of liquid  -     Oral Care Recommendations  --  Oral Care before breakfast, after meals and PRN  -MB  --  --     SLP Rec. for Method of Medication Administration  --  meds whole;meds crushed;with pudding or applesauce  -MB  --  meds whole;meds crushed;with pudding or applesauce  -     Monitor for Signs of Aspiration  --  yes;cough;gurgly voice;throat clearing;pneumonia;right lower lobe infiltrates  -MB  --  yes;cough;gurgly voice;throat clearing;pneumonia;right lower lobe infiltrates  -     Anticipated Discharge Disposition (SLP)  --  skilled nursing facility  -MB  --  skilled nursing facility  -        Oral Nutrition/Hydration Goal 1 (SLP)    Oral Nutrition/Hydration Goal 1, SLP  LTG: Patient will tolerate LRD with no overt s/s of aspiration  -MB  LTG: Patient will tolerate LRD with no overt s/s of aspiration  -MB  LTG: Patient will tolerate LRD with no overt s/s of  aspiration  -KW  LTG: Patient will tolerate LRD with no overt s/s of aspiration  -TM     Time Frame (Oral Nutrition/Hydration Goal 1, SLP)  short term goal (STG);by discharge  -MB  short term goal (STG);by discharge  -MB  short term goal (STG);by discharge  -KW  short term goal (STG);by discharge  -TM     Barriers (Oral Nutrition/Hydration Goal 1, SLP)  none  -MB  n/a  -MB  n/a  -KW  n/a  -TM     Progress/Outcomes (Oral Nutrition/Hydration Goal 1, SLP)  continuing progress toward goal  -MB  goal ongoing  -MB  continuing progress toward goal  -KW  continuing progress toward goal  -TM        Labial Strengthening Goal 1 (SLP)    Activity (Labial Strengthening Goal 1, SLP)  increase labial tone  -MB  increase labial tone  -MB  increase labial tone  -KW  increase labial tone  -TM     Increase Labial Tone  labial resistance exercises  -MB  labial resistance exercises  -MB  labial resistance exercises  -KW  labial resistance exercises  -TM     [place]/Accuracy (Labial Strengthening Goal 1, SLP)  independently (over 90% accuracy)  -MB  independently (over 90% accuracy)  -MB  independently (over 90% accuracy)  -KW  independently (over 90% accuracy)  -TM     Time Frame (Labial Strengthening Goal 1, SLP)  short term goal (STG);by discharge  -MB  short term goal (STG);by discharge  -MB  short term goal (STG);by discharge  -KW  short term goal (STG);by discharge  -TM     Barriers (Labial Strengthening Goal 1, SLP)  n/a  -MB  n/a  -MB  n/a  -KW  n/a  -TM     Progress/Outcomes (Labial Strengthening Goal 1, SLP)  goal ongoing  -MB  goal ongoing  -MB  goal ongoing  -KW  goal ongoing  -TM        Lingual Strengthening Goal 1 (SLP)    Activity (Lingual Strengthening Goal 1, SLP)  increase lingual tone/sensation/control/coordination/movement;increase tongue back strength  -MB  increase lingual tone/sensation/control/coordination/movement;increase tongue back strength  -MB  increase lingual  tone/sensation/control/coordination/movement  -KW  increase lingual tone/sensation/control/coordination/movement  -TM     Increase Lingual Tone/Sensation/Control/Coordination/Movement  lingual movement exercises;lingual resistance exercises  -MB  lingual movement exercises;lingual resistance exercises  -MB  lingual movement exercises  -KW  lingual movement exercises  -TM     Increase Tongue Back Strength  lingual movement exercises  -MB  lingual movement exercises  -MB  --  --     [place]/Accuracy (Lingual Strengthening Goal 1, SLP)  independently (over 90% accuracy)  -MB  independently (over 90% accuracy)  -MB  independently (over 90% accuracy)  -KW  independently (over 90% accuracy)  -TM     Time Frame (Lingual Strengthening Goal 1, SLP)  short term goal (STG);by discharge  -MB  short term goal (STG);by discharge  -MB  short term goal (STG);by discharge  -KW  short term goal (STG);by discharge  -TM     Barriers (Lingual Strengthening Goal 1, SLP)  none  -MB  n/a  -MB  n/a  -KW  n/a  -TM     Progress/Outcomes (Lingual Strengthening Goal 1, SLP)  continuing progress toward goal  -MB  goal ongoing  -MB  goal ongoing  -KW  goal ongoing  -TM        Pharyngeal Strengthening Exercise Goal 1 (SLP)    Activity (Pharyngeal Strengthening Goal 1, SLP)  increase timing;increase epiglottic inversion and retroflexion;increase tongue base retraction;increase anterior movement of the hyolaryngeal complex  -MB  increase timing;increase epiglottic inversion and retroflexion;increase tongue base retraction;increase anterior movement of the hyolaryngeal complex  -MB  increase timing;increase epiglottic inversion and retroflexion;increase tongue base retraction  -KW  increase timing;increase epiglottic inversion and retroflexion;increase tongue base retraction  -TM     Increase Timing  gustatory stimulation (sour/cold)  -MB  gustatory stimulation (sour/cold)  -MB  gustatory stimulation (sour/cold)  -KW  gustatory stimulation  (sour/cold)  -TM     Increase Anterior Movement of the Hyolaryngeal Complex  shaker  -MB  shaker  -MB  --  --     Increase Epiglottic Inversion and Retroflexion  falsetto  -MB  falsetto  -MB  falsetto  -KW  falsetto  -TM     Increase Tongue Base Retraction  hard effortful swallow;vita  -MB  hard effortful swallow;vita  -MB  hard effortful swallow;vita  -KW  hard effortful swallow;vita  -TM     [place]/Accuracy (Pharyngeal Strengthening Goal 1, SLP)  independently (over 90% accuracy)  -MB  independently (over 90% accuracy)  -MB  independently (over 90% accuracy)  -KW  independently (over 90% accuracy)  -TM     Time Frame (Pharyngeal Strengthening Goal 1, SLP)  short term goal (STG);by discharge  -MB  short term goal (STG);by discharge  -MB  short term goal (STG);by discharge  -KW  short term goal (STG);by discharge  -TM     Barriers (Pharyngeal Strengthening Goal 1, SLP)  none  -MB  n/a  -MB  n/a  -KW  n/a  -TM     Progress/Outcomes (Pharyngeal Strengthening Goal 1, SLP)  continuing progress toward goal  -MB  goal ongoing  -MB  goal ongoing  -KW  goal ongoing  -TM       User Key  (r) = Recorded By, (t) = Taken By, (c) = Cosigned By    Initials Name Effective Dates    Avery Reyes, CCC-SLP 08/02/16 -     Tina Mann, CCC-SLP 08/02/16 -     KW Aida Hunt, MS CCC-SLP 08/09/20 -           EDUCATION  The patient has been educated in the following areas:   Dysphagia (Swallowing Impairment).    SLP Recommendation and Plan                                         Daily Summary of Progress (SLP): progress towards functional goals is fair    Plan for Continued Treatment (SLP): Continue to follow              Plan of Care Reviewed With: patient, grandchild(miles)  Progress: no change  Outcome Summary: Pt was fatigued, but participated well with swallow therapy. He completed 3 sets of 3 reps of lingual lateralization around upper and lower gums with adequate ROM. He completed 5 reps of swallow  following cold bolus stimulation with consistentn delay of 2 seconds. He was unable to achieve falsetto pitch or Mendelsohn maneuver to address laryngeal elevation. He completed effortful swallows on 9/10 attempts with adequate laryngeal elevation and the Sneha maneuver on 3/5 attempts. Pt also took trials of ice chips during therapy with occasional delayed wet coughs. Continue mechanical soft diet and honey thick liquids. SLP will continue to follow and treat.    SLP GOALS     Row Name 11/05/20 0945 11/04/20 1338 11/03/20 1345       Oral Nutrition/Hydration Goal 1 (SLP)    Oral Nutrition/Hydration Goal 1, SLP  LTG: Patient will tolerate LRD with no overt s/s of aspiration  -MB  LTG: Patient will tolerate LRD with no overt s/s of aspiration  -MB  LTG: Patient will tolerate LRD with no overt s/s of aspiration  -KW    Time Frame (Oral Nutrition/Hydration Goal 1, SLP)  short term goal (STG);by discharge  -MB  short term goal (STG);by discharge  -MB  short term goal (STG);by discharge  -KW    Barriers (Oral Nutrition/Hydration Goal 1, SLP)  none  -MB  n/a  -MB  n/a  -KW    Progress/Outcomes (Oral Nutrition/Hydration Goal 1, SLP)  continuing progress toward goal  -MB  goal ongoing  -MB  continuing progress toward goal  -KW       Labial Strengthening Goal 1 (SLP)    Activity (Labial Strengthening Goal 1, SLP)  increase labial tone  -MB  increase labial tone  -MB  increase labial tone  -KW    Increase Labial Tone  labial resistance exercises  -MB  labial resistance exercises  -MB  labial resistance exercises  -KW    [place]/Accuracy (Labial Strengthening Goal 1, SLP)  independently (over 90% accuracy)  -MB  independently (over 90% accuracy)  -MB  independently (over 90% accuracy)  -KW    Time Frame (Labial Strengthening Goal 1, SLP)  short term goal (STG);by discharge  -MB  short term goal (STG);by discharge  -MB  short term goal (STG);by discharge  -KW    Barriers (Labial Strengthening Goal 1, SLP)  n/a  -MB  n/a   -MB  n/a  -KW    Progress/Outcomes (Labial Strengthening Goal 1, SLP)  goal ongoing  -MB  goal ongoing  -MB  goal ongoing  -KW       Lingual Strengthening Goal 1 (SLP)    Activity (Lingual Strengthening Goal 1, SLP)  increase lingual tone/sensation/control/coordination/movement;increase tongue back strength  -MB  increase lingual tone/sensation/control/coordination/movement;increase tongue back strength  -MB  increase lingual tone/sensation/control/coordination/movement  -KW    Increase Lingual Tone/Sensation/Control/Coordination/Movement  lingual movement exercises;lingual resistance exercises  -MB  lingual movement exercises;lingual resistance exercises  -MB  lingual movement exercises  -KW    Increase Tongue Back Strength  lingual movement exercises  -MB  lingual movement exercises  -MB  --    [place]/Accuracy (Lingual Strengthening Goal 1, SLP)  independently (over 90% accuracy)  -MB  independently (over 90% accuracy)  -MB  independently (over 90% accuracy)  -KW    Time Frame (Lingual Strengthening Goal 1, SLP)  short term goal (STG);by discharge  -MB  short term goal (STG);by discharge  -MB  short term goal (STG);by discharge  -KW    Barriers (Lingual Strengthening Goal 1, SLP)  none  -MB  n/a  -MB  n/a  -KW    Progress/Outcomes (Lingual Strengthening Goal 1, SLP)  continuing progress toward goal  -MB  goal ongoing  -MB  goal ongoing  -KW       Pharyngeal Strengthening Exercise Goal 1 (SLP)    Activity (Pharyngeal Strengthening Goal 1, SLP)  increase timing;increase epiglottic inversion and retroflexion;increase tongue base retraction;increase anterior movement of the hyolaryngeal complex  -MB  increase timing;increase epiglottic inversion and retroflexion;increase tongue base retraction;increase anterior movement of the hyolaryngeal complex  -MB  increase timing;increase epiglottic inversion and retroflexion;increase tongue base retraction  -KW    Increase Timing  gustatory stimulation (sour/cold)  -MB   gustatory stimulation (sour/cold)  -MB  gustatory stimulation (sour/cold)  -KW    Increase Anterior Movement of the Hyolaryngeal Complex  shaker  -MB  shaker  -MB  --    Increase Epiglottic Inversion and Retroflexion  falsetto  -MB  falsetto  -MB  falsetto  -KW    Increase Tongue Base Retraction  hard effortful swallow;vita  -MB  hard effortful swallow;vita  -MB  hard effortful swallow;vita  -KW    [place]/Accuracy (Pharyngeal Strengthening Goal 1, SLP)  independently (over 90% accuracy)  -MB  independently (over 90% accuracy)  -MB  independently (over 90% accuracy)  -KW    Time Frame (Pharyngeal Strengthening Goal 1, SLP)  short term goal (STG);by discharge  -MB  short term goal (STG);by discharge  -MB  short term goal (STG);by discharge  -KW    Barriers (Pharyngeal Strengthening Goal 1, SLP)  none  -MB  n/a  -MB  n/a  -KW    Progress/Outcomes (Pharyngeal Strengthening Goal 1, SLP)  continuing progress toward goal  -MB  goal ongoing  -MB  goal ongoing  -KW    Row Name 11/02/20 1510             Oral Nutrition/Hydration Goal 1 (SLP)    Oral Nutrition/Hydration Goal 1, SLP  LTG: Patient will tolerate LRD with no overt s/s of aspiration  -TM      Time Frame (Oral Nutrition/Hydration Goal 1, SLP)  short term goal (STG);by discharge  -TM      Barriers (Oral Nutrition/Hydration Goal 1, SLP)  n/a  -TM      Progress/Outcomes (Oral Nutrition/Hydration Goal 1, SLP)  continuing progress toward goal  -TM         Labial Strengthening Goal 1 (SLP)    Activity (Labial Strengthening Goal 1, SLP)  increase labial tone  -TM      Increase Labial Tone  labial resistance exercises  -TM      [place]/Accuracy (Labial Strengthening Goal 1, SLP)  independently (over 90% accuracy)  -TM      Time Frame (Labial Strengthening Goal 1, SLP)  short term goal (STG);by discharge  -TM      Barriers (Labial Strengthening Goal 1, SLP)  n/a  -TM      Progress/Outcomes (Labial Strengthening Goal 1, SLP)  goal ongoing  -TM         Lingual  Strengthening Goal 1 (SLP)    Activity (Lingual Strengthening Goal 1, SLP)  increase lingual tone/sensation/control/coordination/movement  -TM      Increase Lingual Tone/Sensation/Control/Coordination/Movement  lingual movement exercises  -TM      [place]/Accuracy (Lingual Strengthening Goal 1, SLP)  independently (over 90% accuracy)  -TM      Time Frame (Lingual Strengthening Goal 1, SLP)  short term goal (STG);by discharge  -TM      Barriers (Lingual Strengthening Goal 1, SLP)  n/a  -TM      Progress/Outcomes (Lingual Strengthening Goal 1, SLP)  goal ongoing  -TM         Pharyngeal Strengthening Exercise Goal 1 (SLP)    Activity (Pharyngeal Strengthening Goal 1, SLP)  increase timing;increase epiglottic inversion and retroflexion;increase tongue base retraction  -TM      Increase Timing  gustatory stimulation (sour/cold)  -TM      Increase Epiglottic Inversion and Retroflexion  falsetto  -TM      Increase Tongue Base Retraction  hard effortful swallow;vita  -TM      Maury/Accuracy (Pharyngeal Strengthening Goal 1, SLP)  independently (over 90% accuracy)  -TM      Time Frame (Pharyngeal Strengthening Goal 1, SLP)  short term goal (STG);by discharge  -TM      Barriers (Pharyngeal Strengthening Goal 1, SLP)  n/a  -TM      Progress/Outcomes (Pharyngeal Strengthening Goal 1, SLP)  goal ongoing  -TM        User Key  (r) = Recorded By, (t) = Taken By, (c) = Cosigned By    Initials Name Provider Type    Avery Reyes, CCC-SLP Speech and Language Pathologist    Tina Mann, CCC-SLP Speech and Language Pathologist    KW Aida Hunt, MS CCC-SLP Speech and Language Pathologist             Time Calculation:   Time Calculation- SLP     Row Name 11/05/20 1018             Time Calculation- SLP    SLP Start Time  0945  -MB      SLP Stop Time  1010  -MB      SLP Time Calculation (min)  25 min  -MB      SLP Received On  11/05/20  -MB        User Key  (r) = Recorded By, (t) = Taken By, (c) = Cosigned  By    Initials Name Provider Type    Avery Reyes CCC-SLP Speech and Language Pathologist          Therapy Charges for Today     Code Description Service Date Service Provider Modifiers Qty    40986141566 HC ST MOTION FLUORO EVAL SWALLOW 5 11/4/2020 Avery Gutierrez CCC-SLP GN 1    96422353240 HC ST TREATMENT SWALLOW 2 11/5/2020 Avery Gutierrez CCC-SLP GN 1               ISABEL Blackwell  11/5/2020

## 2020-11-05 NOTE — PLAN OF CARE
Problem: Adult Inpatient Plan of Care  Goal: Plan of Care Review  Recent Flowsheet Documentation  Taken 11/5/2020 0824 by Maria C Fagan, PTA  Progress: no change  Plan of Care Reviewed With:   patient   grandchild(miles)  Outcome Summary: pt trans to EOB min-mod assist, performed A-AAROM BLE, sit-stand mod of 2, stand pivot to chair min-mod of 2, pt would benefit from SNF

## 2020-11-05 NOTE — PROGRESS NOTES
[facility] Medicine Services  INPATIENT PROGRESS NOTE    Patient Name: Eron Escalante  Date of Admission: 10/26/2020  Today's Date: 11/05/20  Length of Stay: 10  Primary Care Physician: Jeffrey Owen MD    Subjective   Chief Complaint: Weakness.   HPI   Not as edematous as he was the day before.  Hopeful for skilled nursing discharge today.  Covid swab pending.    I filled out his FMLA papers and gave them to nursing to pass along to his granddaughter.    Review of Systems   All pertinent negatives and positives are as above. All other systems have been reviewed and are negative unless otherwise stated.     Objective    Temp:  [96.7 °F (35.9 °C)-98 °F (36.7 °C)] 96.7 °F (35.9 °C)  Heart Rate:  [63-67] 67  Resp:  [18] 18  BP: (147-177)/(54-64) 147/57  Physical Exam  Vitals signs and nursing note reviewed.   Constitutional:       Appearance: He is well-developed.      Comments: Up the chair, sleeping, but easily aroused and no distress.  His granddaughter is not currently present.  Discussed with his nurse, Liliane.   HENT:      Head: Normocephalic and atraumatic.      Comments: Poor dentition.  Eyes:      Conjunctiva/sclera: Conjunctivae normal.      Pupils: Pupils are equal, round, and reactive to light.   Neck:      Musculoskeletal: Neck supple.      Vascular: No JVD.   Cardiovascular:      Rate and Rhythm: Normal rate. Rhythm irregular.      Heart sounds: Normal heart sounds. No murmur. No friction rub. No gallop.    Pulmonary:      Effort: Pulmonary effort is normal. No respiratory distress.      Breath sounds: Normal breath sounds. No wheezing or rales.   Chest:      Chest wall: No tenderness.   Abdominal:      General: Bowel sounds are normal. There is no distension.      Palpations: Abdomen is soft.      Tenderness: There is no abdominal tenderness. There is no guarding or rebound.   Musculoskeletal: Normal range of motion.         General: Swelling (trace) present. No  tenderness or deformity.   Skin:     General: Skin is warm and dry.      Findings: No rash.   Neurological:      General: No focal deficit present.      Mental Status: He is alert and oriented to person, place, and time.      Cranial Nerves: No cranial nerve deficit.      Motor: Weakness present. No abnormal muscle tone.      Deep Tendon Reflexes: Reflexes normal.      Comments: Follows all commands without difficulty.   Psychiatric:         Behavior: Behavior normal.         Thought Content: Thought content normal.         Judgment: Judgment normal.       Results Review:  I have reviewed the labs, radiology results, and diagnostic studies.    Laboratory Data:   Results from last 7 days   Lab Units 11/04/20  0557 11/03/20  0435   WBC 10*3/mm3 6.97 7.92   HEMOGLOBIN g/dL 8.3* 8.7*   HEMATOCRIT % 25.6* 27.7*   PLATELETS 10*3/mm3 192 180     Results from last 7 days   Lab Units 11/04/20  0557 11/03/20  0435 11/02/20  0447 11/01/20  0624 10/31/20  0444   SODIUM mmol/L 145 144 146* 150* 150*   POTASSIUM mmol/L 4.1 4.1 4.1 3.7 3.7   CHLORIDE mmol/L 117* 114* 117* 119* 119*   CO2 mmol/L 23.0 23.0 23.0 24.0 23.0   BUN mg/dL 35* 43* 53* 69* 80*   CREATININE mg/dL 0.89 1.01 1.35* 1.80* 2.17*   CALCIUM mg/dL 8.6 8.7 8.6 8.7 8.8   BILIRUBIN mg/dL  --   --  0.3 0.3 0.4   ALK PHOS U/L  --   --  78 79 70   ALT (SGPT) U/L  --   --  28 31 31   AST (SGOT) U/L  --   --  31 35 40   GLUCOSE mg/dL 125* 148* 166* 170* 153*     Culture Data:   Urine Culture   Date Value Ref Range Status   10/30/2020 No growth  Final   10/27/2020 >100,000 CFU/mL Escherichia coli (A)  Final     I have reviewed the patient's current medications.     Assessment/Plan     Active Hospital Problems    Diagnosis   • **Metabolic encephalopathy   • E. coli UTI (urinary tract infection)   • Hypernatremia   • Acute renal failure superimposed on stage 3a chronic kidney disease (CMS/HCC)   • Acute blood loss anemia   • Duodenal ulcer   • UGIB (upper gastrointestinal  bleed)   • History of stroke   • Chronic diastolic congestive heart failure (CMS/[facility])   • A-fib (CMS/[facility])   • HTN (hypertension)   • Type 2 diabetes mellitus, without long-term current use of insulin (CMS/[facility])     Plan:  The patient was admitted on 10/27 by Dr. Rosenberg.  He presented as a transfer from [facility] as his granddaughter works here.  He had been admitted there and was having much difficulty with confusion.  This has been proven to be multifactorial as evidenced below.    He has been evaluated by neurology.  He had an MRI of the brain without contrast on 10/30 that showed no evidence of acute infarct.    He has been followed by nephrology for acute renal failure superimposed on CKD, stage III.  His serum creatinine continues to improve.  His elevated BUN is secondary to recent gastrointestinal bleeding as well as his acute renal failure.  He developed some hyponatremia secondary to resuscitation with crystalloid fluids.  This resolved with recent dextrose fluids.  Stopped IV fluids on 11/3 and gave Bumex 0.5 mg IV x1.  Resumed amlodipine recently.     He had heme positive stool and melena.  He was taken for endoscopy on 10/28 by Dr. Bliss. He had multiple nonbleeding duodenal ulcers at that time.  He had a hiatal hernia.  Urease negative.  He is being treated with lansoprazole.  He has received a total of 4 units packed red blood cells.  He was last transfused on 10/31.  Hemoglobin remained stable.  Anemia substrates are consistent with chronic disease.    He has an E. coli urinary tract infection that is resistant to ampicillin, levofloxacin, tetracycline, and Bactrim.  He is being treated with ceftriaxone.    He has a history of atrial fibrillation for which he will not be anticoagulated at the moment given his recent gastrointestinal bleed.  I restarted his aspirin on 11/2.    Oral hypoglycemics on hold.  Continue sliding scale insulin.  His most recent hemoglobin A1c is 6.1.    SCDs for DVT  prophylaxis.    Discharge Planning: I expect the patient to be discharged to SNF later today if his pre-CERT goes through.    Electronically signed by Elpidio Irby DO, 11/05/20, 12:58 CST.

## 2020-11-05 NOTE — PLAN OF CARE
Problem: Adult Inpatient Plan of Care  Goal: Plan of Care Review  Outcome: Ongoing, Progressing  Flowsheets (Taken 11/5/2020 1016)  Progress: no change  Plan of Care Reviewed With:   patient   grandchild(miles)  Outcome Summary: Pt was fatigued, but participated well with swallow therapy. He completed 3 sets of 3 reps of lingual lateralization around upper and lower gums with adequate ROM. He completed 5 reps of swallow following cold bolus stimulation with consistentn delay of 2 seconds. He was unable to achieve falsetto pitch or Mendelsohn maneuver to address laryngeal elevation. He completed effortful swallows on 9/10 attempts with adequate laryngeal elevation and the Sneha maneuver on 3/5 attempts. Pt also took trials of ice chips during therapy with occasional delayed wet coughs. Continue mechanical soft diet and honey thick liquids. SLP will continue to follow and treat.

## 2020-11-05 NOTE — PROGRESS NOTES
Continued Stay Note   [place]     Patient Name: Eron Escalante  MRN: [id number]  Today's Date: 11/5/2020    Admit Date: 10/26/2020    Discharge Plan     Row Name 11/05/20 1351       Plan    Plan Comments  Precert is still pending for [place]. Await insurance decision        Discharge Codes    No documentation.       Expected Discharge Date and Time     Expected Discharge Date Expected Discharge Time    Nov 4, 2020             MYAH Ramírez

## 2020-11-05 NOTE — PAYOR COMM NOTE
"11/5/20 [facility] [phone]  [phone]    FAXING UPDATE CLINICAL FOR CONT STAY.                Gabriel Escalante (85 y.o. Male)     Date of Birth Social Security Number Address Home Phone MRN    1934  [address] [facility] [address] [phone] [id number]    [employer] Marital Status          [employer]ism        Admission Date Admission Type Admitting Provider Attending Provider Department, Room/Bed    10/26/20 Urgent Elpidio Irby DO Hancock, John C, DO [facility] 3A, 328/1    Discharge Date Discharge Disposition Discharge Destination         Skilled Nursing Facility (DC - External)              Attending Provider: Elpidio Irby DO    Allergies: Lorazepam, Penicillins, Adhesive Tape    Isolation: None   Infection: None   Code Status: No CPR    Ht: 177.8 cm (70\")   Wt: 92.9 kg (204 lb 11.2 oz)    Admission Cmt: None   Principal Problem: Metabolic encephalopathy [G93.41]                 Active Insurance as of 10/26/2020     Primary Coverage     Payor Plan Insurance Group Employer/Plan Group    [facility] [employer]UE [employer] EMPLOYEE [id number]     Payor Plan Address Payor Plan Phone Number Payor Plan Fax Number Effective Dates    [address] [phone]  1/1/2015 - None Entered    [facility] [address]       Subscriber Name Subscriber Birth Date Member ID       GABRIEL ESCALANTE 1934 [id number]           Secondary Coverage     Payor Plan Insurance Group Employer/Plan Group    MEDICARE MEDICARE A & B      Payor Plan Address Payor Plan Phone Number Payor Plan Fax Number Effective Dates    [address] [phone]  11/1/1999 - None Entered    [facility] [address]       Subscriber Name Subscriber Birth Date Member ID       GABRIEL ESCALANTE 1934 [id number]                 Emergency Contacts      (Rel.) Home Phone Work Phone Mobile Phone    Isabel Bradley (Grandchild) [phone] -- --    Rose Escalante (Spouse) " [phone] -- --        Active Hospital Problems     Diagnosis   • **Metabolic encephalopathy   • E. coli UTI (urinary tract infection)   • Hypernatremia   • Acute renal failure superimposed on stage 3a chronic kidney disease (CMS/[facility])   • Acute blood loss anemia   • Duodenal ulcer   • UGIB (upper gastrointestinal bleed)   • History of stroke   • Chronic diastolic congestive heart failure (CMS/[facility])   • A-fib (CMS/[facility]ly [facility])   • HTN (hypertension)   • Type 2 diabetes mellitus, without long-term current use of insulin (CMS/[facility])     Consults:   1.  Dr. Bustillos with neurology.  2.  Dr. Victoria and Dr. Brown with nephrology.  3.  Dr. Bliss with gastroenterology.  4.  Dr. Jimenez with vascular surgery.     Procedures Performed: Endoscopy on 10/28 with Dr. Bliss showed nonbleeding duodenal ulcers.        Component Value Units Date/Time      POC Glucose Once [[id number]]  (Normal) Collected: 11/04/20 0746     Specimen: Blood Updated: 11/04/20 0801       Glucose 120 mg/dL         Comment: : [id number] Kendrick Ceja ID: [id number]        Basic Metabolic Panel [[id number]]  (Abnormal) Collected: 11/04/20 0557     Specimen: Blood Updated: 11/04/20 0636       Glucose 125 mg/dL         BUN 35 mg/dL         Creatinine 0.89 mg/dL         Sodium 145 mmol/L         Potassium 4.1 mmol/L         Chloride 117 mmol/L         CO2 23.0 mmol/L         Calcium 8.6 mg/dL         eGFR Non African Amer 81 mL/min/1.73         BUN/Creatinine Ratio 39.3       Anion Gap 5.0 mmol/L       Narrative:             CBC (No Diff) [[id number]]  (Abnormal) Collected: 11/04/20 0557     Specimen: Blood Updated: 11/04/20 0615       WBC 6.97 10*3/mm3         RBC 2.85 10*6/mm3         Hemoglobin 8.3 g/dL         Hematocrit 25.6 %         MCV 89.8 fL         MCH 29.1 pg         MCHC 32.4 g/dL         RDW 22.0 %         RDW-SD 70.4 fl         MPV 10.7 fL         Platelets 192 10*3/mm3       POC Glucose Once [[id number]]  (Abnormal) Collected: 11/03/20 2041      Specimen: Blood Updated: 11/03/20 2052       Glucose 171 mg/dL         Comment        Hospital Course:  The patient was admitted on 10/27 by Dr. Rosenberg.  He presented as a transfer from [facility] as his granddaughter works here.  He had been admitted there and was having much difficulty with confusion.  This has been proven to be multifactorial as evidenced below.     He has been evaluated by neurology.  He had an MRI of the brain without contrast on 10/30 that showed no evidence of acute infarct.     He has been followed by nephrology for acute renal failure superimposed on CKD, stage III.  His serum creatinine continues to improve.  His elevated BUN is secondary to recent gastrointestinal bleeding as well as his acute renal failure.  He developed some hyponatremia secondary to resuscitation with crystalloid fluids.  This resolved with recent dextrose fluids.  Stopped IV fluids on 11/3 and gave a dose of Bumex with good results. Resumed amlodipine on 11/3 and will resume his losartan/Toprol-XL at discharge as his blood pressure has been elevated recently.     He had heme positive stool and melena.  He was taken for endoscopy on 10/28 by Dr. Bliss. He had multiple nonbleeding duodenal ulcers at that time.  He had a hiatal hernia.  Urease negative.  He is being treated with lansoprazole.  He has received a total of 4 units packed red blood cells.  He was last transfused on 10/31.  Hemoglobin remains stable.  Anemia substrates are consistent with chronic disease.     He has an E. coli urinary tract infection that is resistant to ampicillin, levofloxacin, tetracycline, and Bactrim. He was treated with ceftriaxone here in the hospital and will finish a course of oral Omnicef.     He has a history of atrial fibrillation for which he will not be anticoagulated at the moment given his recent gastrointestinal bleed.  I restarted his aspirin on 11/2.     Oral hypoglycemics were held in the hospital given his renal  "failure and can be resumed at discharge.  We used sliding scale insulin.  His most recent hemoglobin A1c is 6.1.     SCDs were used for DVT prophylaxis.     SCDs were used for DVT prophylaxis.     He has been accepted to skilled nursing today.       Physical Exam on Discharge:  /45 (BP Location: Left arm, Patient Position: Lying)   Pulse 64   Temp 98.1 °F (36.7 °C) (Axillary)   Resp 20   Ht 177.8 cm (70\")   Wt 92.9 kg (204 lb 11.2 oz)   SpO2 95%   BMI 29.37 kg/m²   Physical Exam  Vitals signs and nursing note reviewed.   Constitutional:       Appearance: He is well-developed.      Comments: Up in bed.  No distress.  No family currently present.  Discussed with his nurse, Liliane.   HENT:      Head: Normocephalic and atraumatic.      Comments: Poor dentition.  Eyes:      Conjunctiva/sclera: Conjunctivae normal.      Pupils: Pupils are equal, round, and reactive to light.   Neck:      Musculoskeletal: Neck supple.      Vascular: No JVD.   Cardiovascular:      Rate and Rhythm: Normal rate. Rhythm irregular.      Heart sounds: Normal heart sounds. No murmur. No friction rub. No gallop.    Pulmonary:      Effort: Pulmonary effort is normal. No respiratory distress.      Breath sounds: Normal breath sounds. No wheezing or rales.   Chest:      Chest wall: No tenderness.   Abdominal:      General: Bowel sounds are normal. There is no distension.      Palpations: Abdomen is soft.      Tenderness: There is no abdominal tenderness. There is no guarding or rebound.   Musculoskeletal: Normal range of motion.         General: Swelling (trace) present. No tenderness or deformity.   Skin:     General: Skin is warm and dry.      Findings: No rash.   Neurological:      General: No focal deficit present.      Mental Status: He is alert and oriented to person, place, and time.      Cranial Nerves: No cranial nerve deficit.      Motor: Weakness present. No abnormal muscle tone.      Deep Tendon Reflexes: Reflexes normal. "      Comments: Follows all commands without difficulty.   Psychiatric:   Flat affect, but tries to make conversation.    Electronically signed by Elpidio Irby DO, 11/04/20, 10:15 CST.     Time: 35 minutes.      26/2020  8:12 PM  Pad 3[facility] [id number]   Bere Momin, RN   Registered Nurse      Plan of Care   Addendum   Date of Service:  11/05/20 0350   Creation Time:  11/05/20 0350                 Show:Clear all  [x]Manual[x]Template[]Copied    Added by:  [x]Bere Momin, RN    []Jose E for details  Goal Outcome Evaluation:  Plan of Care Reviewed With: patient, son  Progress: improving     Oriented to self, place, and time, not situation.  Speech garbled.  Patient confused at times.  Incontinent.  VSS.  Safety maintained.  Tele in place, NS, HR 68.  Room air.  Meds crushed in applesauce.  Patient sleeps between care.  Blood glucose monitoring.  No C/O pain. Turning.  Neuro check with no changes. Will continue to monitor. 0440,  /59 Hydralazine 10 mg PO given.        Bere Momin, RN   Registered Nurse      Nursing Note   Signed   Date of Service:  11/05/20 0748   Creation Time:  11/05/20 0748            Signed             Show:Clear all  [x]Manual[]Template[]Copied    Added by:  [x]Bere Momin, RN    []Jose E for details  Neuro completed with ROZINA Momin, Patient oriented x4, improvement from last night.                      Contains abnormal data Basic Metabolic Panel  Order: [id number]  Status:  Final result   Visible to patient:  No (not released) Next appt:  None  Specimen Information: Blood        Component   Ref Range & Units 05:36  (11/5/20) 1d ago  (11/4/20) 1d ago  (11/4/20) 1d ago  (11/4/20) 1d ago  (11/4/20)   Glucose   65 - 99 mg/dL 130High   158High  R, CM  151High  R, CM  152High  R, CM  120 R, CM    BUN   8 - 23 mg/dL 29High         Creatinine   0.76 - 1.27 mg/dL 0.88        Sodium   136 - 145 mmol/L 143        Potassium   3.5 - 5.2 mmol/L 4.4        Chloride   98 - 107 mmol/L 115High          CO2   22.0 - 29.0 mmol/L 25.0        Calcium   8.6 - 10.5 mg/dL 8.8        eGFR Non African Amer   >60 mL/min/1.73 82        BUN/Creatinine Ratio   7.0 - 25.0 33.0High         Anion Gap   5.0 - 15.0 mmol/L 3.0Low                   Contains abnormal data CBC (No Diff)  Order: [id number]  Status:  Final result   Visible to patient:  No (not released) Next appt:  None  Specimen Information: Blood        Component   Ref Range & Units 05:36 1d ago 2d ago 3d ago 4d ago   WBC   3.40 - 10.80 10*3/mm3 6.95  6.97  7.92  8.19  8.78    RBC   4.14 - 5.80 10*6/mm3 2.94Low   2.85Low   3.01Low   2.97Low   3.13Low     Hemoglobin   13.0 - 17.7 g/dL 8.5Low   8.3Low   8.7Low   8.5Low   9.2Low     Hematocrit   37.5 - 51.0 % 26.8Low   25.6Low   27.7Low   26.3Low   28.1Low     MCV   79.0 - 97.0 fL 91.2  89.8  92.0  88.6  89.8    MCH   26.6 - 33.0 pg 28.9  29.1  28.9  28.6  29.4    MCHC   31.5 - 35.7 g/dL 31.7  32.4  31.4Low   32.3  32.7    RDW   12.3 - 15.4 % 21.8High   22.0High   22.7High   23.9High   24.7High     RDW-SD   37.0 - 54.0 fl 71.7High   70.4High   74.0High   74.3High   76.4High     MPV   6.0 - 12.0 fL 10.9  10.7  11.0  12.3High   11.6    Platelets                   Time  Temp  Pulse  Resp  BP  Device (Oxygen Therapy)  SpO2   11/05/20 0723  97.6 (36.4)  66  18  177/63   room air  97   BP: nurse was in room at 11/05/20 0723 11/05/20 0433  98 (36.7)  65  18  174/57   room air  96   BP: nurse notified at 11/05/20 0433 11/05/20 0215  98 (36.7)  66  18  --  room air  96   11/04/20 2033  --  --  --               WAITING ON PRE-CERT TO GO TO SNF FOR REHAB. POSS D/C TODAY.                     Current Facility-Administered Medications   Medication Dose Route Frequency Provider Last Rate Last Dose   • acetaminophen (TYLENOL) tablet 650 mg  650 mg Oral Q4H PRN Forrest Bliss MD        Or   • acetaminophen (TYLENOL) suppository 650 mg  650 mg Rectal Q4H PRN Forrest Bliss MD       • allopurinol (ZYLOPRIM) tablet 300 mg  300  mg Oral Daily Forrest Bliss MD   300 mg at 11/04/20 0814   • amLODIPine (NORVASC) tablet 5 mg  5 mg Oral Q24H Elpidio Irby DO   5 mg at 11/04/20 0815   • aspirin chewable tablet 81 mg  81 mg Oral Daily Elpidio Irby, DO   81 mg at 11/04/20 0814   • cefdinir (OMNICEF) capsule 300 mg  300 mg Oral Q12H Elpidio Irby DO   300 mg at 11/04/20 2022   • cholecalciferol (VITAMIN D3) tablet 1,000 Units  1,000 Units Oral Daily Forrest Bliss MD   1,000 Units at 11/04/20 0814   • colchicine tablet 0.6 mg  0.6 mg Oral Daily PRN Forrest Bliss MD       • dextrose (D50W) 25 g/ 50mL Intravenous Solution 25 g  25 g Intravenous Q15 Min PRN Forrest Bliss MD       • dextrose (GLUTOSE) oral gel 15 g  15 g Oral Q15 Min PRN Forrest Bliss MD       • folic acid (FOLVITE) tablet 400 mcg  400 mcg Oral Daily Forrest Bliss MD   400 mcg at 11/04/20 0814   • glucagon (human recombinant) (GLUCAGEN DIAGNOSTIC) injection 1 mg  1 mg Subcutaneous Q15 Min PRN Forrest Bliss MD       • hydrALAZINE (APRESOLINE) injection 10 mg  10 mg Intravenous Q4H PRN Forrest Bliss MD   10 mg at 11/03/20 1148   • insulin lispro (humaLOG) injection 2-7 Units  2-7 Units Subcutaneous TID AC Forrest Bliss MD   2 Units at 11/03/20 1148   • isosorbide mononitrate (IMDUR) 24 hr tablet 30 mg  30 mg Oral Q24H Forrest Bliss MD   30 mg at 11/04/20 0814   • lansoprazole (FIRST) oral suspension 30 mg  30 mg Oral Q AM Iain Rosenberg MD   30 mg at 11/05/20 0504   • losartan (COZAAR) tablet 25 mg  25 mg Oral Q24H Elpidio Irby DO   25 mg at 11/04/20 1821   • nitroglycerin (NITROSTAT) SL tablet 0.4 mg  0.4 mg Sublingual Q5 Min PRN Forrest Bliss MD   0.4 mg at 10/27/20 1600   • ondansetron (ZOFRAN) tablet 4 mg  4 mg Oral Q6H PRN Forrest Bliss MD        Or   • ondansetron (ZOFRAN) injection 4 mg  4 mg Intravenous Q6H PRN Forrest Bliss MD       • potassium chloride (MICRO-K) CR capsule 10 mEq  10 mEq Oral BID  With Meals Forrest Bliss MD   10 mEq at 11/04/20 1821   • pravastatin (PRAVACHOL) tablet 40 mg  40 mg Oral Nightly Forrest Bliss MD   40 mg at 11/04/20 2256   • prednisoLONE acetate (PRED FORTE) 1 % ophthalmic suspension 1 drop  1 drop Left Eye Q2H While Awake Forrest Bliss MD   1 drop at 11/05/20 0657   • sennosides-docusate (PERICOLACE) 8.6-50 MG per tablet 2 tablet  2 tablet Oral BID PRN Forrest Bliss MD       • sodium chloride 0.9 % flush 10 mL  10 mL Intravenous Q12H Forrest Bliss MD   10 mL at 11/03/20 2044   • sodium chloride 0.9 % flush 10 mL  10 mL Intravenous PRN Forrest Bliss MD       • sodium chloride 0.9 % infusion 500 mL  500 mL Intravenous Continuous PRN Forrest Bliss MD 25 mL/hr at 10/28/20 1150     • terazosin (HYTRIN) capsule 1 mg  1 mg Oral Nightly Forrest Bliss MD   1 mg at 11/04/20 2255   • thiamine (VITAMIN B-1) tablet 100 mg  100 mg Oral Daily Forrest Bliss MD   100 mg at 11/04/20 0814   • vitamin B-6 (PYRIDOXINE) tablet 200 mg  200 mg Oral Daily Forrest Bliss MD   200 mg at 11/04/20 0814   • vitamin C (ASCORBIC ACID) tablet 500 mg  500 mg Oral Daily Forrest Bliss MD   500 mg at 11/04/20 0814

## 2020-11-05 NOTE — PROGRESS NOTES
Nephrology ([facility]) Progress Note      Patient:  Eron Escalante  YOB: 1934  Date of Service: 11/5/2020  MRN: [id number]   Acct: [id number]   Primary Care Physician: Jeffrey Owen MD  Advance Directive:   Code Status and Medical Interventions:   Ordered at: 10/27/20 0304     Limited Support to NOT Include:    Intubation     Level Of Support Discussed With:    Next of Kin (If No Surrogate)    Health Care Surrogate     Code Status:    No CPR     Medical Interventions (Level of Support Prior to Arrest):    Limited     Admit Date: 10/26/2020       Hospital Day: 10  Referring Provider: Alcides Tomlinson MD      Patient personally seen and examined.  Complete chart including Consults, Notes, Operative Reports, Labs, Cardiology, and Radiology studies reviewed as able.        Subjective:  Eron Escalante is a 85 y.o. male  whom we were consulted for acute kidney injury/chronic kidney disease.  Patient has stage III chronic kidney disease at baseline and has seen Dr. Victoria previously.  He was initially admitted to [facility] with encephalopathy, MRI of the brain did not show any evidence of intracranial pathology.  Transferred to [facility] as per family request.  Neurological work-up consistent with consistent with microvascular chronic changes.  Patient also has bilateral Doppler studies of carotid arteries consistent with mild to moderate stenosis but no intervention recommended.  However his serum creatinine continued to rise.  Patient also developed hypernatremia as he has not been drinking due to severe encephalopathy.  Started on hypotonic fluid for correction of hypernatremia with consequent improvement of labs.     Today is awake and alert, no complaints. No new overnight issues.  Urine output nonoliguric.    Allergies:  Lorazepam, Penicillins, and Adhesive tape    Home Meds:  Medications Prior to Admission   Medication Sig Dispense Refill Last  Dose   • allopurinol (ZYLOPRIM) 300 MG tablet Take 600 mg by mouth Daily.   Unknown at Unknown time   • amLODIPine (NORVASC) 5 MG tablet Take 5 mg by mouth Daily.   Unknown at Unknown time   • aspirin 81 MG chewable tablet Chew 81 mg Daily.   Unknown at Unknown time   • Cholecalciferol (VITAMIN D) 1000 units tablet Take 1,000 Units by mouth.   Unknown at Unknown time   • colchicine 0.6 MG tablet Take 0.6 mg by mouth Daily As Needed (gout flare-up).   Unknown at Unknown time   • finasteride (PROSCAR) 5 MG tablet Take 5 mg by mouth Daily.   Unknown at Unknown time   • folic acid (FOLVITE) 400 MCG tablet Take 400 mcg by mouth.   Unknown at Unknown time   • glimepiride (AMARYL) 4 MG tablet Take 4 mg by mouth 2 (Two) Times a Day With Meals. Needs pm dose   Unknown at Unknown time   • losartan (COZAAR) 50 MG tablet Take 25 mg by mouth Daily.   Unknown at Unknown time   • meclizine (ANTIVERT) 25 MG tablet Take 25 mg by mouth. 3-4 times daily   Unknown at Unknown time   • metoprolol succinate XL (TOPROL XL) 25 MG 24 hr tablet Take 25 mg by mouth every night at bedtime.   Unknown at Unknown time   • nitroglycerin (NITROSTAT) 0.4 MG SL tablet Place 0.4 mg under the tongue Every 5 (Five) Minutes As Needed.   Unknown at Unknown time   • pantoprazole (PROTONIX) 40 MG EC tablet Take 40 mg by mouth Every 12 (Twelve) Hours.   Unknown at Unknown time   • potassium chloride (K-DUR,KLOR-CON) 20 MEQ CR tablet Take 10 mEq by mouth Every 12 (Twelve) Hours.   Unknown at Unknown time   • Pyridoxine HCl (VITAMIN B6) 200 MG tablet Take 1 tablet by mouth Daily.   Unknown at Unknown time   • rOPINIRole (REQUIP) 1 MG tablet Take 1 mg by mouth Every Night. Take 1 hour before bedtime.   Unknown at Unknown time   • sennosides-docusate sodium (SENOKOT-S) 8.6-50 MG tablet Take 2 tablets by mouth 2 (Two) Times a Day As Needed for constipation. 45 tablet 2 Unknown at Unknown time   • tamsulosin (FLOMAX) 0.4 MG capsule 24 hr capsule Take 1 capsule by  mouth Daily.   Unknown at Unknown time   • thiamine (VITAMIN B-1) 100 MG tablet Take 100 mg by mouth.   Unknown at Unknown time   • vitamin C (ASCORBIC ACID) 500 MG tablet Take 500 mg by mouth Daily.   Unknown at Unknown time   • [DISCONTINUED] pravastatin (PRAVACHOL) 40 MG tablet Take 40 mg by mouth Every Night.   Unknown at Unknown time       Medicines:  Current Facility-Administered Medications   Medication Dose Route Frequency Provider Last Rate Last Dose   • acetaminophen (TYLENOL) tablet 650 mg  650 mg Oral Q4H PRN Forrest Bliss MD        Or   • acetaminophen (TYLENOL) suppository 650 mg  650 mg Rectal Q4H PRN Forrest Bliss MD       • allopurinol (ZYLOPRIM) tablet 300 mg  300 mg Oral Daily Forrest Bliss MD   300 mg at 11/05/20 0933   • amLODIPine (NORVASC) tablet 5 mg  5 mg Oral Q24H Elpidio Irby, DO   5 mg at 11/05/20 0933   • aspirin chewable tablet 81 mg  81 mg Oral Daily Elpidio Irby, DO   81 mg at 11/05/20 0932   • cefdinir (OMNICEF) capsule 300 mg  300 mg Oral Q12H Elpidio Irby, DO   300 mg at 11/05/20 0933   • cholecalciferol (VITAMIN D3) tablet 1,000 Units  1,000 Units Oral Daily Forrest Bliss MD   1,000 Units at 11/05/20 0932   • colchicine tablet 0.6 mg  0.6 mg Oral Daily PRN Forrest Bliss MD       • dextrose (D50W) 25 g/ 50mL Intravenous Solution 25 g  25 g Intravenous Q15 Min PRN Forrest Bliss MD       • dextrose (GLUTOSE) oral gel 15 g  15 g Oral Q15 Min PRN Forrest Bliss MD       • folic acid (FOLVITE) tablet 400 mcg  400 mcg Oral Daily Forrest Bliss MD   400 mcg at 11/05/20 0932   • glucagon (human recombinant) (GLUCAGEN DIAGNOSTIC) injection 1 mg  1 mg Subcutaneous Q15 Min PRN Forrest Bliss MD       • hydrALAZINE (APRESOLINE) injection 10 mg  10 mg Intravenous Q4H PRN Forrest Bliss MD   10 mg at 11/03/20 1148   • insulin lispro (humaLOG) injection 2-7 Units  2-7 Units Subcutaneous TID AC Forrest Bliss MD   2 Units at 11/03/20 114    • isosorbide mononitrate (IMDUR) 24 hr tablet 30 mg  30 mg Oral Q24H Forrest Bliss MD   30 mg at 11/05/20 0933   • lansoprazole (FIRST) oral suspension 30 mg  30 mg Oral Q AM Iain Rosenberg MD   30 mg at 11/05/20 0504   • losartan (COZAAR) tablet 25 mg  25 mg Oral Q24H Elpidio Irby DO   25 mg at 11/05/20 0932   • nitroglycerin (NITROSTAT) SL tablet 0.4 mg  0.4 mg Sublingual Q5 Min PRN Forrest Bliss MD   0.4 mg at 10/27/20 1600   • ondansetron (ZOFRAN) tablet 4 mg  4 mg Oral Q6H PRN Forrest Bliss MD        Or   • ondansetron (ZOFRAN) injection 4 mg  4 mg Intravenous Q6H PRN Forrest Bliss MD       • potassium chloride (MICRO-K) CR capsule 10 mEq  10 mEq Oral BID With Meals Forrest Bliss MD   10 mEq at 11/05/20 0932   • pravastatin (PRAVACHOL) tablet 40 mg  40 mg Oral Nightly Forrest Bliss MD   40 mg at 11/04/20 2256   • prednisoLONE acetate (PRED FORTE) 1 % ophthalmic suspension 1 drop  1 drop Left Eye Q2H While Awake Forrest Bliss MD   1 drop at 11/05/20 0934   • sennosides-docusate (PERICOLACE) 8.6-50 MG per tablet 2 tablet  2 tablet Oral BID PRN Forrest Bliss MD       • sodium chloride 0.9 % flush 10 mL  10 mL Intravenous Q12H Forrest Bliss MD   10 mL at 11/03/20 2044   • sodium chloride 0.9 % flush 10 mL  10 mL Intravenous PRN Forrest Bliss MD       • sodium chloride 0.9 % infusion 500 mL  500 mL Intravenous Continuous PRN Forrest Bliss MD 25 mL/hr at 10/28/20 1150     • terazosin (HYTRIN) capsule 1 mg  1 mg Oral Nightly Forrest Bliss MD   1 mg at 11/04/20 2255   • thiamine (VITAMIN B-1) tablet 100 mg  100 mg Oral Daily Forrest Bliss MD   100 mg at 11/05/20 0933   • vitamin B-6 (PYRIDOXINE) tablet 200 mg  200 mg Oral Daily Forrest Bliss MD   200 mg at 11/05/20 0933   • vitamin C (ASCORBIC ACID) tablet 500 mg  500 mg Oral Daily Forrest Bliss MD   500 mg at 11/05/20 0934       Past Medical History:  Past Medical History:   Diagnosis  Date   • A-fib (CMS/[facility]) 11/15/2016   • Anemia     gets shots every few months to build up blood. sees dr adam.   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   • Arthritis    • Bradycardia 11/15/2016   • CAD in native artery 11/15/2016   • Chest pain 11/15/2016   • CHF (congestive heart failure) (CMS/[facility])    • Chronic kidney disease    • COPD (chronic obstructive pulmonary disease) (CMS/[facility])    • DDD (degenerative disc disease), lumbar 6/27/2017   • Heart murmur    • HTN (hypertension) 11/15/2016   • Hyperlipidemia    • Lumbar stenosis with neurogenic claudication 6/27/2017   • Murmur 4/19/2019   • Myocardial infarction (CMS/[facility])    • Sleep apnea    • Stroke (CMS/[facility])    • Type 2 diabetes mellitus (CMS/[facility]) 11/15/2016   • Ulcer of abdomen wall (CMS/[facility])        Past Surgical History:  Past Surgical History:   Procedure Laterality Date   • APPENDECTOMY     • BACK SURGERY      x2   • CARDIAC CATHETERIZATION Left     1/2010   • CARPAL TUNNEL RELEASE Bilateral    • CHOLECYSTECTOMY     • COLONOSCOPY N/A 9/7/2017    Procedure: COLONOSCOPY WITH ANESTHESIA;  Surgeon: Joshua Rich DO;  Location: [facility] ENDOSCOPY;  Service:    • CORONARY ARTERY BYPASS GRAFT      2/2006 w/PTCA & KIMMY    • CORONARY STENT PLACEMENT     • ENDOSCOPY N/A 12/14/2016    Procedure: ESOPHAGOGASTRODUODENOSCOPY WITH ANESTHESIA;  Surgeon: Isabel Dexter MD;  Location: [facility] ENDOSCOPY;  Service:    • ENDOSCOPY N/A 12/16/2016    Procedure: ESOPHAGOGASTRODUODENOSCOPY WITH ANESTHESIA;  Surgeon: Joshua Rich DO;  Location: [facility] ENDOSCOPY;  Service:    • ENDOSCOPY N/A 4/10/2017    Procedure: ESOPHAGOGASTRODUODENOSCOPY WITH ANESTHESIA;  Surgeon: Joshua Rich DO;  Location: [facility] ENDOSCOPY;  Service:    • ENDOSCOPY N/A 10/28/2020    Procedure: ESOPHAGOGASTRODUODENOSCOPY WITH ANESTHESIA;  Surgeon: Forrest Bliss MD;  Location: [facility] ENDOSCOPY;  Service: Gastroenterology;  Laterality: N/A;  pre: GI bleed  post: duodenal ulcers, hiatal hernia    Jeffrey Owen MD   • EYE SURGERY Left     x 2   • JOINT REPLACEMENT     • PERIPHERAL ARTERIAL STENT GRAFT     • REPLACEMENT TOTAL KNEE Left     2002   • SHOULDER ROTATOR CUFF REPAIR Bilateral        Family History  Family History   Problem Relation Age of Onset   • Coronary artery disease Father    • Heart disease Father    • Coronary artery disease Brother    • Heart attack Brother    • Cancer Mother    • No Known Problems Sister    • Heart disease Son    • Cancer Sister    • Cancer Sister        Social History  Social History     Socioeconomic History   • Marital status:      Spouse name: Not on file   • Number of children: Not on file   • Years of education: Not on file   • Highest education level: Not on file   Tobacco Use   • Smoking status: Never Smoker   • Smokeless tobacco: Never Used   Substance and Sexual Activity   • Alcohol use: No   • Drug use: No   • Sexual activity: Never     Partners: Female         Review of Systems:  History obtained from chart review and the patient  General ROS: No fever or chills  Respiratory ROS: No cough, shortness of breath, wheezing  Cardiovascular ROS: No chest pain or palpitations  Gastrointestinal ROS: No abdominal pain or melena  Genito-Urinary ROS: No dysuria or hematuria  Neurological ROS: no headache or dizziness    Objective:  Patient Vitals for the past 24 hrs:   BP Temp Temp src Pulse Resp SpO2   11/05/20 0723 177/63 97.6 °F (36.4 °C) Axillary 66 18 97 %   11/05/20 0433 174/57 98 °F (36.7 °C) Oral 65 18 96 %   11/05/20 0215 -- 98 °F (36.7 °C) Oral 66 18 96 %   11/04/20 1933 171/54 97.5 °F (36.4 °C) Oral 65 18 98 %   11/04/20 1600 172/64 97.3 °F (36.3 °C) Oral 63 18 98 %   11/04/20 1132 150/46 97.9 °F (36.6 °C) Axillary 66 18 96 %       Intake/Output Summary (Last 24 hours) at 11/5/2020 1026  Last data filed at 11/4/2020 2141  Gross per 24 hour   Intake 360 ml   Output 300 ml   Net 60 ml     General: awake/alert   Chest:  clear to auscultation  bilaterally without respiratory distress  CVS: regular rate and rhythm  Abdominal: soft, nontender, positive bowel sounds  Extremities: no cyanosis or edema  Skin: warm and dry without rash  Neuro: no focal motor deficits    Labs:  Results from last 7 days   Lab Units 11/05/20 0536 11/04/20 0557 11/03/20 0435   WBC 10*3/mm3 6.95 6.97 7.92   HEMOGLOBIN g/dL 8.5* 8.3* 8.7*   HEMATOCRIT % 26.8* 25.6* 27.7*   PLATELETS 10*3/mm3 221 192 180         Results from last 7 days   Lab Units 11/05/20 0536 11/04/20  0557 11/03/20  0435 11/02/20  0447 11/01/20  0624 10/31/20  0444   SODIUM mmol/L 143 145 144 146* 150* 150*   POTASSIUM mmol/L 4.4 4.1 4.1 4.1 3.7 3.7   CHLORIDE mmol/L 115* 117* 114* 117* 119* 119*   CO2 mmol/L 25.0 23.0 23.0 23.0 24.0 23.0   BUN mg/dL 29* 35* 43* 53* 69* 80*   CREATININE mg/dL 0.88 0.89 1.01 1.35* 1.80* 2.17*   CALCIUM mg/dL 8.8 8.6 8.7 8.6 8.7 8.8   BILIRUBIN mg/dL  --   --   --  0.3 0.3 0.4   ALK PHOS U/L  --   --   --  78 79 70   ALT (SGPT) U/L  --   --   --  28 31 31   AST (SGOT) U/L  --   --   --  31 35 40   GLUCOSE mg/dL 130* 125* 148* 166* 170* 153*       Radiology:   Imaging Results (Last 72 Hours)     Procedure Component Value Units Date/Time    FL Video Swallow With Speech Single Contrast [[id number]] Collected: 11/04/20 1401     Updated: 11/04/20 1405    Narrative:      EXAMINATION:   FL VIDEO SWALLOW W SPEECH SINGLE-CONTRAST-  11/4/2020  2:01 PM CST     HISTORY: MODIFIED BARIUM SWALLOW     REASON FOR EXAM: r/o aspiration; R13.12-Dysphagia, oropharyngeal phase;  Z74.09-Other reduced mobility; R19.5-Other fecal abnormalities;  Z78.9-Other specified health status     COMPARISON: NONE      FLUOROSCOPY TIME: 1.2 minutes     Radiation dose 5.71 MG Y     Number of images: 1     TECHNIQUE: In conjunction with speech pathology services, video  fluoroscopic swallow examination was performed with oral ingestion of  barium containing substances of various viscosities.      FINDINGS: Medium bolus  sizes are well controlled with no spillage into  the oropharynx. No pooling is demonstrated. There is soft palate  elevation and coverage of the posterior nasopharynx with swallowing. No  stasis is noted within the valleculae or piriform sinuses.     Penetration was observed with nectar thick and thin barium. There is no  obvious aspiration..        Impression:      1. Normal swallowing mechanism.   2. Penetration was observed with nectar thick and thin barium.      Please see speech pathologist's report for further details and  recommendations.         This report was finalized on 11/04/2020 14:02 by Dr. Izaiah Ryan MD.          Culture:  Urine Culture   Date Value Ref Range Status   10/30/2020 No growth  Final   10/27/2020 >100,000 CFU/mL Escherichia coli (A)  Final         Assessment   1.  Acute kidney injury, prerenal--resolved  2.  Baseline chronic kidney disease stage 3a  3.  Hypernatremia--resolved  4.  Acute GI bleed  5.  Acute metabolic encephalopathy--resolving  6.  Type 2 diabetes with nephropathy  7.  Iron deficiency anemia  8.  Status post EGD/duodenal ulcer  9.  E coli UTI    Plan:  1.  Encourage PO intake  2.  Monitor labs  3.  OK for discharge from renal standpoint. Follow up in office in 1-2 weeks        Teddy Hammonds, ANJANA  11/5/2020  10:26 CST

## 2020-11-05 NOTE — PLAN OF CARE
Goal Outcome Evaluation:  Plan of Care Reviewed With: patient, grandchild(miles)  Progress: no change  Outcome Summary: Patient has shown better participation today, he is sleepy but able to converse appropriately and feed self. Incont of stool and urine today. No c/o pain, no c/o NT, bed alarm on, awaiting placement for rehab

## 2020-11-05 NOTE — PLAN OF CARE
Goal Outcome Evaluation:  Plan of Care Reviewed With: patient, son  Progress: improving  Outcome Summary: Patient up to edge of bed and up to chair x1 with walker, tolerating crushed medications. A+Ox3- BLUE, several BMs today, fed himself without difficulties- family in room helping patient. Awaiting bed assignment for rehab, bed alarm on. elevated BP today, mD notified and new meds given

## 2020-11-06 NOTE — PLAN OF CARE
Problem: Adult Inpatient Plan of Care  Goal: Plan of Care Review  Outcome: Ongoing, Progressing  Flowsheets (Taken 11/6/2020 0991)  Progress: no change  Plan of Care Reviewed With: patient  Outcome Summary: Pt was sitting EOB upon SLP arrival. He was alert, but became more fatigued and began to lean to the right more as the session progressed. He completed 5 sets of 5 reps of back of tongue exercises and 5 reps of effortful swallows with decreased laryngeal elevation observed. He completed the Sneha maneuver on 1/5 attempts, but otherwise was unable to maintain lingual protrusion. He completed swallows following cold bolus stimulation 5x with delays of only 1 to 2 seconds. Pt was given ice chips during treatment and had occasional coughs, throat clear, and wet vocal quality with trials. Continue mechanical soft solid diet and honey thick liquids.

## 2020-11-06 NOTE — PLAN OF CARE
Problem: Adult Inpatient Plan of Care  Goal: Plan of Care Review  Outcome: Ongoing, Progressing  Flowsheets (Taken 11/6/2020 0907)  Progress: improving  Plan of Care Reviewed With: patient  Outcome Summary: Pt. fowlers in bed, required mod. assist with bed mobility to transfer oob/btb! Mod-max. assist with bathing/dressing!

## 2020-11-06 NOTE — PLAN OF CARE
Problem: Adult Inpatient Plan of Care  Goal: Plan of Care Review  Recent Flowsheet Documentation  Taken 11/6/2020 1310 by Karla Cotter, PTA  Progress: improving  Plan of Care Reviewed With: patient  Outcome Summary: PT treatment comlete. He was Mod assist for bedmobility, performed sitting exercises 10 reps 2 sets. He refused to stand. Left in left sidelying with call light and exit alarm.

## 2020-11-06 NOTE — THERAPY TREATMENT NOTE
Acute Care - Speech Language Pathology   Swallow Treatment Note [facility]     Patient Name: Eron Escalante  : 1934  MRN: [id number]  Today's Date: 2020  Onset of Illness/Injury or Date of Surgery: 10/26/20            Admit Date: 10/26/2020  Pt was sitting EOB upon SLP arrival. He was alert, but became more fatigued and began to lean to the right more as the session progressed. He completed 5 sets of 5 reps of back of tongue exercises and 5 reps of effortful swallows with decreased laryngeal elevation observed. He completed the Sneha maneuver on 1/5 attempts, but otherwise was unable to maintain lingual protrusion. He completed swallows following cold bolus stimulation 5x with delays of only 1 to 2 seconds. Pt was given ice chips during treatment and had occasional coughs, throat clear, and wet vocal quality with trials. Continue mechanical soft solid diet and honey thick liquids.  Avery Gutierrez, CCC-SLP 2020 09:18 CST    Visit Dx:     ICD-10-CM ICD-9-CM   1. Oropharyngeal dysphagia  R13.12 787.22   2. Impaired mobility  Z74.09 799.89   3. Heme positive stool  R19.5 792.1   4. Decreased activities of daily living (ADL)  Z78.9 V49.89     Patient Active Problem List   Diagnosis   • Type 2 diabetes mellitus, without long-term current use of insulin (CMS/[facility])   • HTN (hypertension)   • Aortocoronary bypass status   • Bradycardia   • CAD in native artery   • A-fib (CMS/[facility])   • Symptomatic bradycardia   • Cerebral ventriculomegaly   • Dehydration   • Duodenal ulcer   • Acute renal failure superimposed on stage 3a chronic kidney disease (CMS/[facility])   • Hyperkalemia   • Weakness of extremity   • Lumbar stenosis with neurogenic claudication   • DDD (degenerative disc disease), lumbar   • Change in bowel habit   • RICARDO treated with BiPAP   • Murmur   • Chronic diastolic congestive heart failure (CMS/HCC)   • Periodic limb movement disorder (PLMD)   • CHF (congestive heart failure), NYHA class I, acute  on chronic, combined (CMS/[facility])   • Chronic constipation   • Chronic back pain   • History of stroke   • Hypertensive urgency   • Ischemic cardiomyopathy   • Altered mental status   • Acute blood loss anemia   • Metabolic encephalopathy   • Hypernatremia   • E. coli UTI (urinary tract infection)   • UGIB (upper gastrointestinal bleed)     Past Medical History:   Diagnosis Date   • A-fib (CMS/[facility]) 11/15/2016   • Anemia     gets shots every few months to build up blood. sees dr adam.   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   • Arthritis    • Bradycardia 11/15/2016   • CAD in native artery 11/15/2016   • Chest pain 11/15/2016   • CHF (congestive heart failure) (CMS/[facility])    • Chronic kidney disease    • COPD (chronic obstructive pulmonary disease) (CMS/HCC)    • DDD (degenerative disc disease), lumbar 6/27/2017   • Heart murmur    • HTN (hypertension) 11/15/2016   • Hyperlipidemia    • Lumbar stenosis with neurogenic claudication 6/27/2017   • Murmur 4/19/2019   • Myocardial infarction (CMS/HCC)    • Sleep apnea    • Stroke (CMS/HCC)    • Type 2 diabetes mellitus (CMS/HCC) 11/15/2016   • Ulcer of abdomen wall (CMS/HCC)      Past Surgical History:   Procedure Laterality Date   • APPENDECTOMY     • BACK SURGERY      x2   • CARDIAC CATHETERIZATION Left     1/2010   • CARPAL TUNNEL RELEASE Bilateral    • CHOLECYSTECTOMY     • COLONOSCOPY N/A 9/7/2017    Procedure: COLONOSCOPY WITH ANESTHESIA;  Surgeon: Joshua Rich DO;  Location: [facility] ENDOSCOPY;  Service:    • CORONARY ARTERY BYPASS GRAFT      2/2006 w/PTCA & KIMMY    • CORONARY STENT PLACEMENT     • ENDOSCOPY N/A 12/14/2016    Procedure: ESOPHAGOGASTRODUODENOSCOPY WITH ANESTHESIA;  Surgeon: Isabel Dexter MD;  Location: [facility] ENDOSCOPY;  Service:    • ENDOSCOPY N/A 12/16/2016    Procedure: ESOPHAGOGASTRODUODENOSCOPY WITH ANESTHESIA;  Surgeon: Joshua Rich DO;  Location: [facility] ENDOSCOPY;  Service:    • ENDOSCOPY N/A 4/10/2017    Procedure:  ESOPHAGOGASTRODUODENOSCOPY WITH ANESTHESIA;  Surgeon: Joshua Rich DO;  Location: [facility] ENDOSCOPY;  Service:    • ENDOSCOPY N/A 10/28/2020    Procedure: ESOPHAGOGASTRODUODENOSCOPY WITH ANESTHESIA;  Surgeon: Forrest Bliss MD;  Location: [facility] ENDOSCOPY;  Service: Gastroenterology;  Laterality: N/A;  pre: GI bleed  post: duodenal ulcers, hiatal hernia   Jeffrey Owen MD   • EYE SURGERY Left     x 2   • JOINT REPLACEMENT     • PERIPHERAL ARTERIAL STENT GRAFT     • REPLACEMENT TOTAL KNEE Left     2002   • SHOULDER ROTATOR CUFF REPAIR Bilateral         SWALLOW EVALUATION (last 72 hours)      SLP Adult Swallow Evaluation     Row Name 11/06/20 0853 11/05/20 0945 11/04/20 1338 11/03/20 1345          Rehab Evaluation    Document Type  therapy note (daily note)  -MB  therapy note (daily note)  -MB  re-evaluation  -MB  therapy note (daily note)  -KW     Subjective Information  complains of;fatigue  -MB  no complaints  -MB  no complaints  -MB  no complaints  -KW     Patient Observations  alert;cooperative  -MB  alert;cooperative  -MB  alert;cooperative  -MB  alert;cooperative  -KW     Patient/Family/Caregiver Comments/Observations  Grandaughter present  -MB  Grandaughter present  -MB  No family present  -MB  granddaughter present  -KW     Care Plan Review  --  --  --  care plan/treatment goals reviewed  -     Patient Effort  good  -MB  --  --  good  -        General Information    Patient Profile Reviewed  --  --  yes  -MB  --     Pertinent History Of Current Problem  --  --  Encephalopathy, CKD, anemia, HTN, DM.  -MB  --     Current Method of Nutrition  --  --  soft textures;ground;honey-thick liquids  -MB  --     Precautions/Limitations, Vision  --  --  WFL with corrective lenses  -MB  --     Precautions/Limitations, Hearing  --  --  WFL;for purposes of eval  -MB  --     Prior Level of Function-Communication  --  --  cognitive-linguistic impairment  -MB  --     Prior Level of Function-Swallowing  --  --   no diet consistency restrictions  -MB  --     Plans/Goals Discussed with  --  --  patient  -MB  --     Barriers to Rehab  --  --  none identified  -MB  --     Patient's Goals for Discharge  --  --  patient did not state  -MB  --        Pain    Additional Documentation  --  --  Pain Scale: FACES Pre/Post-Treatment (Group)  -MB  --        Pain Scale: FACES Pre/Post-Treatment    Pain: FACES Scale, Pretreatment  0-->no hurt  -MB  0-->no hurt  -MB  0-->no hurt  -MB  0-->no hurt  -KW     Posttreatment Pain Rating  --  --  --  0-->no hurt  -KW        Oral Motor Structure and Function    Dentition Assessment  --  --  missing teeth;other (see comments) Upper edentulous, pt reports no dentures  -MB  --     Secretion Management  --  --  WNL/WFL  -MB  --     Mucosal Quality  --  --  moist, healthy  -MB  --        General Eating/Swallowing Observations    Respiratory Support Currently in Use  --  --  room air  -MB  --        MBS/VFSS    Utensils Used  --  --  spoon;straw  -MB  --     Consistencies Trialed  --  --  soft textures;thin liquids;nectar/syrup-thick liquids;honey-thick liquids;pudding thick  -MB  --        MBS/VFSS Interpretation    Oral Prep Phase  --  --  WFL  -MB  --     Oral Transit Phase  --  --  impaired  -MB  --     Oral Residue  --  --  WFL  -MB  --     VFSS Summary  --  --  Consistencies were presented in the following order: honey x2, nectar, thin, pudding, mechanical soft, honey x2. The pt had decreased hyolaryngeal excursion/elevation and base of tongue retraction with honey, nectar, and thin consistencices. He displayed premature loss to the vallecula with nectar and mechanical soft solids secondary to decreased back of tongue control. He had premature loss to the vallecula 1x with honey thick secondary to a delay in swallow initiation. He exhibited mild laryngeal penetration during the swallow with nectar and deeper laryngeal penetration during the swallow with thin. He did cough following laryngeal  penetration of thin. No definite aspiration was observed throughout the study.   -MB  --        Oral Transit Phase    Impaired Oral Transit Phase  --  --  tongue pumping;premature spillage of liquids into pharynx  -MB  --     Tongue Pumping  --  --  mechanical soft  -MB  --     Premature Spillage of Liquids into Pharynx  --  --  nectar-thick liquids;mechanical soft  -MB  --        Initiation of Pharyngeal Swallow    Initiation of Pharyngeal Swallow  --  --  bolus in valleculae  -MB  --     Pharyngeal Phase  --  --  impaired pharyngeal phase of swallowing  -MB  --     Penetration During the Swallow  --  --  thin liquids;nectar-thick liquids  -MB  --     Response to Penetration  --  --  inconsistent response;cough  -MB  --     Pharyngeal Residue  --  --  all consistencies tested  -MB  --        Clinical Impression    Daily Summary of Progress (SLP)  progress towards functional goals is fair  -MB  progress towards functional goals is fair  -MB  --  progress toward functional goals is good  -KW     Barriers to Overall Progress (SLP)  none  -MB  none  -MB  --  Cognition and medical status   -KW     SLP Swallowing Diagnosis  --  --  functional oral phase;mild-moderate;pharyngeal dysphagia  -MB  --     Functional Impact  --  --  risk of aspiration/pneumonia;risk of malnutrition;risk of dehydration  -MB  --     Rehab Potential/Prognosis, Swallowing  --  --  adequate, monitor progress closely  -MB  --     Swallow Criteria for Skilled Therapeutic Interventions Met  --  --  demonstrates skilled criteria  -MB  --     Plan for Continued Treatment (SLP)  Continue to follow  -MB  Continue to follow  -MB  --  Continue to follow   -KW        Recommendations    Therapy Frequency (Swallow)  --  --  at least;3 days per week  -MB  --     Predicted Duration Therapy Intervention (Days)  --  --  until discharge  -MB  --     SLP Diet Recommendation  --  --  soft textures;honey thick liquids;ice chips between meals after oral care, with  supervision  -MB  --     Recommended Precautions and Strategies  --  --  upright posture during/after eating;small bites of food and sips of liquid  -MB  --     Oral Care Recommendations  --  --  Oral Care before breakfast, after meals and PRN  -MB  --     SLP Rec. for Method of Medication Administration  --  --  meds whole;meds crushed;with pudding or applesauce  -MB  --     Monitor for Signs of Aspiration  --  --  yes;cough;gurgly voice;throat clearing;pneumonia;right lower lobe infiltrates  -MB  --     Anticipated Discharge Disposition (SLP)  --  --  skilled nursing facility  -MB  --        Oral Nutrition/Hydration Goal 1 (SLP)    Oral Nutrition/Hydration Goal 1, SLP  LTG: Patient will tolerate LRD with no overt s/s of aspiration  -MB  LTG: Patient will tolerate LRD with no overt s/s of aspiration  -MB  LTG: Patient will tolerate LRD with no overt s/s of aspiration  -MB  LTG: Patient will tolerate LRD with no overt s/s of aspiration  -KW     Time Frame (Oral Nutrition/Hydration Goal 1, SLP)  short term goal (STG);by discharge  -MB  short term goal (STG);by discharge  -MB  short term goal (STG);by discharge  -MB  short term goal (STG);by discharge  -KW     Barriers (Oral Nutrition/Hydration Goal 1, SLP)  none  -MB  none  -MB  n/a  -MB  n/a  -KW     Progress/Outcomes (Oral Nutrition/Hydration Goal 1, SLP)  continuing progress toward goal  -MB  continuing progress toward goal  -MB  goal ongoing  -MB  continuing progress toward goal  -KW        Labial Strengthening Goal 1 (SLP)    Activity (Labial Strengthening Goal 1, SLP)  increase labial tone  -MB  increase labial tone  -MB  increase labial tone  -MB  increase labial tone  -KW     Increase Labial Tone  labial resistance exercises  -MB  labial resistance exercises  -MB  labial resistance exercises  -MB  labial resistance exercises  -KW     [place]/Accuracy (Labial Strengthening Goal 1, SLP)  independently (over 90% accuracy)  -MB  independently (over 90%  accuracy)  -MB  independently (over 90% accuracy)  -MB  independently (over 90% accuracy)  -KW     Time Frame (Labial Strengthening Goal 1, SLP)  short term goal (STG);by discharge  -MB  short term goal (STG);by discharge  -MB  short term goal (STG);by discharge  -MB  short term goal (STG);by discharge  -KW     Barriers (Labial Strengthening Goal 1, SLP)  n/a  -MB  n/a  -MB  n/a  -MB  n/a  -KW     Progress/Outcomes (Labial Strengthening Goal 1, SLP)  goal ongoing  -MB  goal ongoing  -MB  goal ongoing  -MB  goal ongoing  -KW        Lingual Strengthening Goal 1 (SLP)    Activity (Lingual Strengthening Goal 1, SLP)  increase lingual tone/sensation/control/coordination/movement;increase tongue back strength  -MB  increase lingual tone/sensation/control/coordination/movement;increase tongue back strength  -MB  increase lingual tone/sensation/control/coordination/movement;increase tongue back strength  -MB  increase lingual tone/sensation/control/coordination/movement  -KW     Increase Lingual Tone/Sensation/Control/Coordination/Movement  lingual movement exercises;lingual resistance exercises  -MB  lingual movement exercises;lingual resistance exercises  -MB  lingual movement exercises;lingual resistance exercises  -MB  lingual movement exercises  -KW     Increase Tongue Back Strength  lingual movement exercises  -MB  lingual movement exercises  -MB  lingual movement exercises  -MB  --     [place]/Accuracy (Lingual Strengthening Goal 1, SLP)  independently (over 90% accuracy)  -MB  independently (over 90% accuracy)  -MB  independently (over 90% accuracy)  -MB  independently (over 90% accuracy)  -KW     Time Frame (Lingual Strengthening Goal 1, SLP)  short term goal (STG);by discharge  -MB  short term goal (STG);by discharge  -MB  short term goal (STG);by discharge  -MB  short term goal (STG);by discharge  -KW     Barriers (Lingual Strengthening Goal 1, SLP)  none  -MB  none  -MB  n/a  -MB  n/a  -KW      Progress/Outcomes (Lingual Strengthening Goal 1, SLP)  continuing progress toward goal  -MB  continuing progress toward goal  -MB  goal ongoing  -MB  goal ongoing  -KW        Pharyngeal Strengthening Exercise Goal 1 (SLP)    Activity (Pharyngeal Strengthening Goal 1, SLP)  increase timing;increase epiglottic inversion and retroflexion;increase tongue base retraction;increase anterior movement of the hyolaryngeal complex  -MB  increase timing;increase epiglottic inversion and retroflexion;increase tongue base retraction;increase anterior movement of the hyolaryngeal complex  -MB  increase timing;increase epiglottic inversion and retroflexion;increase tongue base retraction;increase anterior movement of the hyolaryngeal complex  -MB  increase timing;increase epiglottic inversion and retroflexion;increase tongue base retraction  -KW     Increase Timing  gustatory stimulation (sour/cold)  -MB  gustatory stimulation (sour/cold)  -MB  gustatory stimulation (sour/cold)  -MB  gustatory stimulation (sour/cold)  -KW     Increase Anterior Movement of the Hyolaryngeal Complex  shaker  -MB  shaker  -MB  shaker  -MB  --     Increase Epiglottic Inversion and Retroflexion  falsetto  -MB  falsetto  -MB  falsetto  -MB  falsetto  -KW     Increase Tongue Base Retraction  hard effortful swallow;vita  -MB  hard effortful swallow;vita  -MB  hard effortful swallow;vita  -MB  hard effortful swallow;vita  -KW     [place]/Accuracy (Pharyngeal Strengthening Goal 1, SLP)  independently (over 90% accuracy)  -MB  independently (over 90% accuracy)  -MB  independently (over 90% accuracy)  -MB  independently (over 90% accuracy)  -KW     Time Frame (Pharyngeal Strengthening Goal 1, SLP)  short term goal (STG);by discharge  -MB  short term goal (STG);by discharge  -MB  short term goal (STG);by discharge  -MB  short term goal (STG);by discharge  -KW     Barriers (Pharyngeal Strengthening Goal 1, SLP)  none  -MB  none  -MB  n/a  -MB  n/a   -KW     Progress/Outcomes (Pharyngeal Strengthening Goal 1, SLP)  continuing progress toward goal  -MB  continuing progress toward goal  -MB  goal ongoing  -MB  goal ongoing  -KW       User Key  (r) = Recorded By, (t) = Taken By, (c) = Cosigned By    Initials Name Effective Dates    Avery Reyes, CCC-SLP 08/02/16 -     KW Aida Hunt, MS CCC-SLP 08/09/20 -           EDUCATION  The patient has been educated in the following areas:   Dysphagia (Swallowing Impairment).    SLP Recommendation and Plan                                         Daily Summary of Progress (SLP): progress towards functional goals is fair    Plan for Continued Treatment (SLP): Continue to follow              Plan of Care Reviewed With: patient  Progress: no change  Outcome Summary: Pt was sitting EOB upon SLP arrival. He was alert, but became more fatigued and began to lean to the right more as the session progressed. He completed 5 sets of 5 reps of back of tongue exercises and 5 reps of effortful swallows with decreased laryngeal elevation observed. He completed the Sneha maneuver on 1/5 attempts, but otherwise was unable to maintain lingual protrusion. He completed swallows following cold bolus stimulation 5x with delays of only 1 to 2 seconds. Pt was given ice chips during treatment and had occasional coughs, throat clear, and wet vocal quality with trials. Continue mechanical soft solid diet and honey thick liquids.    SLP GOALS     Row Name 11/06/20 0853 11/05/20 0945 11/04/20 1338       Oral Nutrition/Hydration Goal 1 (SLP)    Oral Nutrition/Hydration Goal 1, SLP  LTG: Patient will tolerate LRD with no overt s/s of aspiration  -MB  LTG: Patient will tolerate LRD with no overt s/s of aspiration  -MB  LTG: Patient will tolerate LRD with no overt s/s of aspiration  -MB    Time Frame (Oral Nutrition/Hydration Goal 1, SLP)  short term goal (STG);by discharge  -MB  short term goal (STG);by discharge  -MB  short term goal (STG);by  discharge  -MB    Barriers (Oral Nutrition/Hydration Goal 1, SLP)  none  -MB  none  -MB  n/a  -MB    Progress/Outcomes (Oral Nutrition/Hydration Goal 1, SLP)  continuing progress toward goal  -MB  continuing progress toward goal  -MB  goal ongoing  -MB       Labial Strengthening Goal 1 (SLP)    Activity (Labial Strengthening Goal 1, SLP)  increase labial tone  -MB  increase labial tone  -MB  increase labial tone  -MB    Increase Labial Tone  labial resistance exercises  -MB  labial resistance exercises  -MB  labial resistance exercises  -MB    [place]/Accuracy (Labial Strengthening Goal 1, SLP)  independently (over 90% accuracy)  -MB  independently (over 90% accuracy)  -MB  independently (over 90% accuracy)  -MB    Time Frame (Labial Strengthening Goal 1, SLP)  short term goal (STG);by discharge  -MB  short term goal (STG);by discharge  -MB  short term goal (STG);by discharge  -MB    Barriers (Labial Strengthening Goal 1, SLP)  n/a  -MB  n/a  -MB  n/a  -MB    Progress/Outcomes (Labial Strengthening Goal 1, SLP)  goal ongoing  -MB  goal ongoing  -MB  goal ongoing  -MB       Lingual Strengthening Goal 1 (SLP)    Activity (Lingual Strengthening Goal 1, SLP)  increase lingual tone/sensation/control/coordination/movement;increase tongue back strength  -MB  increase lingual tone/sensation/control/coordination/movement;increase tongue back strength  -MB  increase lingual tone/sensation/control/coordination/movement;increase tongue back strength  -MB    Increase Lingual Tone/Sensation/Control/Coordination/Movement  lingual movement exercises;lingual resistance exercises  -MB  lingual movement exercises;lingual resistance exercises  -MB  lingual movement exercises;lingual resistance exercises  -MB    Increase Tongue Back Strength  lingual movement exercises  -MB  lingual movement exercises  -MB  lingual movement exercises  -MB    [place]/Accuracy (Lingual Strengthening Goal 1, SLP)  independently (over 90%  accuracy)  -MB  independently (over 90% accuracy)  -MB  independently (over 90% accuracy)  -MB    Time Frame (Lingual Strengthening Goal 1, SLP)  short term goal (STG);by discharge  -MB  short term goal (STG);by discharge  -MB  short term goal (STG);by discharge  -MB    Barriers (Lingual Strengthening Goal 1, SLP)  none  -MB  none  -MB  n/a  -MB    Progress/Outcomes (Lingual Strengthening Goal 1, SLP)  continuing progress toward goal  -MB  continuing progress toward goal  -MB  goal ongoing  -MB       Pharyngeal Strengthening Exercise Goal 1 (SLP)    Activity (Pharyngeal Strengthening Goal 1, SLP)  increase timing;increase epiglottic inversion and retroflexion;increase tongue base retraction;increase anterior movement of the hyolaryngeal complex  -MB  increase timing;increase epiglottic inversion and retroflexion;increase tongue base retraction;increase anterior movement of the hyolaryngeal complex  -MB  increase timing;increase epiglottic inversion and retroflexion;increase tongue base retraction;increase anterior movement of the hyolaryngeal complex  -MB    Increase Timing  gustatory stimulation (sour/cold)  -MB  gustatory stimulation (sour/cold)  -MB  gustatory stimulation (sour/cold)  -MB    Increase Anterior Movement of the Hyolaryngeal Complex  shaker  -MB  shaker  -MB  shaker  -MB    Increase Epiglottic Inversion and Retroflexion  falsetto  -MB  falsetto  -MB  falsetto  -MB    Increase Tongue Base Retraction  hard effortful swallow;vita  -MB  hard effortful swallow;vita  -MB  hard effortful swallow;vita  -MB    [place]/Accuracy (Pharyngeal Strengthening Goal 1, SLP)  independently (over 90% accuracy)  -MB  independently (over 90% accuracy)  -MB  independently (over 90% accuracy)  -MB    Time Frame (Pharyngeal Strengthening Goal 1, SLP)  short term goal (STG);by discharge  -MB  short term goal (STG);by discharge  -MB  short term goal (STG);by discharge  -MB    Barriers (Pharyngeal Strengthening Goal  1, SLP)  none  -MB  none  -MB  n/a  -MB    Progress/Outcomes (Pharyngeal Strengthening Goal 1, SLP)  continuing progress toward goal  -MB  continuing progress toward goal  -MB  goal ongoing  -MB    Row Name 11/03/20 1345             Oral Nutrition/Hydration Goal 1 (SLP)    Oral Nutrition/Hydration Goal 1, SLP  LTG: Patient will tolerate LRD with no overt s/s of aspiration  -KW      Time Frame (Oral Nutrition/Hydration Goal 1, SLP)  short term goal (STG);by discharge  -KW      Barriers (Oral Nutrition/Hydration Goal 1, SLP)  n/a  -KW      Progress/Outcomes (Oral Nutrition/Hydration Goal 1, SLP)  continuing progress toward goal  -KW         Labial Strengthening Goal 1 (SLP)    Activity (Labial Strengthening Goal 1, SLP)  increase labial tone  -KW      Increase Labial Tone  labial resistance exercises  -KW      [place]/Accuracy (Labial Strengthening Goal 1, SLP)  independently (over 90% accuracy)  -KW      Time Frame (Labial Strengthening Goal 1, SLP)  short term goal (STG);by discharge  -KW      Barriers (Labial Strengthening Goal 1, SLP)  n/a  -KW      Progress/Outcomes (Labial Strengthening Goal 1, SLP)  goal ongoing  -KW         Lingual Strengthening Goal 1 (SLP)    Activity (Lingual Strengthening Goal 1, SLP)  increase lingual tone/sensation/control/coordination/movement  -KW      Increase Lingual Tone/Sensation/Control/Coordination/Movement  lingual movement exercises  -KW      [place]/Accuracy (Lingual Strengthening Goal 1, SLP)  independently (over 90% accuracy)  -KW      Time Frame (Lingual Strengthening Goal 1, SLP)  short term goal (STG);by discharge  -KW      Barriers (Lingual Strengthening Goal 1, SLP)  n/a  -KW      Progress/Outcomes (Lingual Strengthening Goal 1, SLP)  goal ongoing  -KW         Pharyngeal Strengthening Exercise Goal 1 (SLP)    Activity (Pharyngeal Strengthening Goal 1, SLP)  increase timing;increase epiglottic inversion and retroflexion;increase tongue base retraction  -KW       Increase Timing  gustatory stimulation (sour/cold)  -KW      Increase Epiglottic Inversion and Retroflexion  falsetto  -KW      Increase Tongue Base Retraction  hard effortful swallow;vita  -KW      [place]/Accuracy (Pharyngeal Strengthening Goal 1, SLP)  independently (over 90% accuracy)  -KW      Time Frame (Pharyngeal Strengthening Goal 1, SLP)  short term goal (STG);by discharge  -KW      Barriers (Pharyngeal Strengthening Goal 1, SLP)  n/a  -KW      Progress/Outcomes (Pharyngeal Strengthening Goal 1, SLP)  goal ongoing  -KW        User Key  (r) = Recorded By, (t) = Taken By, (c) = Cosigned By    Initials Name Provider Type    Avery Reyes CCC-SLP Speech and Language Pathologist    Aida Burns MS CCC-SLP Speech and Language Pathologist             Time Calculation:   Time Calculation- SLP     Row Name 11/06/20 0917             Time Calculation- SLP    SLP Start Time  0853  -MB      SLP Stop Time  0911  -MB      SLP Time Calculation (min)  18 min  -MB      SLP Received On  11/06/20  -MB        User Key  (r) = Recorded By, (t) = Taken By, (c) = Cosigned By    Initials Name Provider Type    Avery Reyes CCC-SLP Speech and Language Pathologist          Therapy Charges for Today     Code Description Service Date Service Provider Modifiers Qty    40643676271 HC ST TREATMENT SWALLOW 2 11/5/2020 Avery Gutierrez CCC-SLP GN 1    83806177082 HC ST TREATMENT SWALLOW 1 11/6/2020 Avery Gutierrez CCC-SLP GN 1               ISABEL Blackwell  11/6/2020

## 2020-11-06 NOTE — PLAN OF CARE
Goal Outcome Evaluation:  Plan of Care Reviewed With: patient, grandchild(miles)  Progress: no change     Patient oriented to self, place, and situation, not time.  Confused.  Safety maintained. BP at 0045, Turning, incontinent with times of continence.  SCD on.  Tele in place, NS HR 68.  Room air.  Meds crushed in applesauce.  No C/O pain.  Will continue to monitor.

## 2020-11-06 NOTE — PROGRESS NOTES
Continued Stay Note   [place]     Patient Name: Eron Escalante  MRN: [id number]  Today's Date: 11/6/2020    Admit Date: 10/26/2020    Discharge Plan     Row Name 11/06/20 1028       Plan    Plan Comments  Per admissions at [facility] precert is still pending. Awaiting insurance decision.        Discharge Codes    No documentation.       Expected Discharge Date and Time     Expected Discharge Date Expected Discharge Time    Nov 4, 2020             MYAH Ramírez

## 2020-11-06 NOTE — THERAPY TREATMENT NOTE
Acute Care - Occupational Therapy Treatment Note  [facility]     Patient Name: Eron Escalante  : 1934  MRN: [id number]  Today's Date: 2020  Onset of Illness/Injury or Date of Surgery: 10/26/20          Admit Date: 10/26/2020       ICD-10-CM ICD-9-CM   1. Oropharyngeal dysphagia  R13.12 787.22   2. Impaired mobility  Z74.09 799.89   3. Heme positive stool  R19.5 792.1   4. Decreased activities of daily living (ADL)  Z78.9 V49.89     Patient Active Problem List   Diagnosis   • Type 2 diabetes mellitus, without long-term current use of insulin (CMS/[facility])   • HTN (hypertension)   • Aortocoronary bypass status   • Bradycardia   • CAD in native artery   • A-fib (CMS/[facility])   • Symptomatic bradycardia   • Cerebral ventriculomegaly   • Dehydration   • Duodenal ulcer   • Acute renal failure superimposed on stage 3a chronic kidney disease (CMS/[facility])   • Hyperkalemia   • Weakness of extremity   • Lumbar stenosis with neurogenic claudication   • DDD (degenerative disc disease), lumbar   • Change in bowel habit   • RICARDO treated with BiPAP   • Murmur   • Chronic diastolic congestive heart failure (CMS/[facility])   • Periodic limb movement disorder (PLMD)   • CHF (congestive heart failure), NYHA class I, acute on chronic, combined (CMS/[facility])   • Chronic constipation   • Chronic back pain   • History of stroke   • Hypertensive urgency   • Ischemic cardiomyopathy   • Altered mental status   • Acute blood loss anemia   • Metabolic encephalopathy   • Hypernatremia   • E. coli UTI (urinary tract infection)   • UGIB (upper gastrointestinal bleed)     Past Medical History:   Diagnosis Date   • A-fib (CMS/[facility]) 11/15/2016   • Anemia     gets shots every few months to build up blood. sees dr adam.   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   • Arthritis    • Bradycardia 11/15/2016   • CAD in native artery 11/15/2016   • Chest pain 11/15/2016   • CHF (congestive heart failure) (CMS/[facility])    • Chronic kidney disease    • COPD (chronic  obstructive pulmonary disease) (CMS/[facility])    • DDD (degenerative disc disease), lumbar 6/27/2017   • Heart murmur    • HTN (hypertension) 11/15/2016   • Hyperlipidemia    • Lumbar stenosis with neurogenic claudication 6/27/2017   • Murmur 4/19/2019   • Myocardial infarction (CMS/[facility])    • Sleep apnea    • Stroke (CMS/[facility])    • Type 2 diabetes mellitus (CMS/[facility]) 11/15/2016   • Ulcer of abdomen wall (CMS/[facility])      Past Surgical History:   Procedure Laterality Date   • APPENDECTOMY     • BACK SURGERY      x2   • CARDIAC CATHETERIZATION Left     1/2010   • CARPAL TUNNEL RELEASE Bilateral    • CHOLECYSTECTOMY     • COLONOSCOPY N/A 9/7/2017    Procedure: COLONOSCOPY WITH ANESTHESIA;  Surgeon: Joshua Rich DO;  Location: [facility] ENDOSCOPY;  Service:    • CORONARY ARTERY BYPASS GRAFT      2/2006 w/PTCA & KIMMY    • CORONARY STENT PLACEMENT     • ENDOSCOPY N/A 12/14/2016    Procedure: ESOPHAGOGASTRODUODENOSCOPY WITH ANESTHESIA;  Surgeon: Isabel Dexter MD;  Location: [facility] ENDOSCOPY;  Service:    • ENDOSCOPY N/A 12/16/2016    Procedure: ESOPHAGOGASTRODUODENOSCOPY WITH ANESTHESIA;  Surgeon: Joshua Rich DO;  Location: [facility] ENDOSCOPY;  Service:    • ENDOSCOPY N/A 4/10/2017    Procedure: ESOPHAGOGASTRODUODENOSCOPY WITH ANESTHESIA;  Surgeon: Joshua Rich DO;  Location: [facility] ENDOSCOPY;  Service:    • ENDOSCOPY N/A 10/28/2020    Procedure: ESOPHAGOGASTRODUODENOSCOPY WITH ANESTHESIA;  Surgeon: Forrest Bliss MD;  Location: [facility] ENDOSCOPY;  Service: Gastroenterology;  Laterality: N/A;  pre: GI bleed  post: duodenal ulcers, hiatal hernia   Jeffrey Owen MD   • EYE SURGERY Left     x 2   • JOINT REPLACEMENT     • PERIPHERAL ARTERIAL STENT GRAFT     • REPLACEMENT TOTAL KNEE Left     2002   • SHOULDER ROTATOR CUFF REPAIR Bilateral           OT ASSESSMENT FLOWSHEET (last 12 hours)      OT Evaluation and Treatment     Row Name 11/06/20 0907                   OT Time and Intention    Subjective Information   complains of;weakness;fatigue  -        Document Type  therapy note (daily note)  -        Mode of Treatment  occupational therapy  -        Patient Effort  adequate  -           General Information    Existing Precautions/Restrictions  fall  -           Pain Scale: Numbers Pre/Post-Treatment    Pretreatment Pain Rating  0/10 - no pain  -        Posttreatment Pain Rating  0/10 - no pain  -           Bed Mobility    Scooting/Bridging [place] (Bed Mobility)  set up;verbal cues;moderate assist (50% patient effort)  -        Supine-Sit [place] (Bed Mobility)  set up;verbal cues;moderate assist (50% patient effort)  -        Sit-Supine [place] (Bed Mobility)  set up;verbal cues;moderate assist (50% patient effort)  -           Functional Mobility    Functional Mobility- Ind. Level  set up required;verbal cues required;moderate assist (50% patient effort)  -        Functional Mobility- Device  rolling walker  -        Functional Mobility-Distance (Feet)  2  -           Transfers    Sit-Stand [place] (Transfers)  set up;verbal cues;moderate assist (50% patient effort)  -        Stand-Sit [place] (Transfers)  set up;verbal cues;minimum assist (75% patient effort)  -           Sit-Stand Transfer    Assistive Device (Sit-Stand Transfers)  walker, front-wheeled  -           Stand-Sit Transfer    Assistive Device (Stand-Sit Transfers)  walker, front-wheeled  -           Activities of Daily Living    63758 - OT Self Care/Mgmt Minutes  39  -        BADL Assessment/Intervention  bathing;upper body dressing;lower body dressing  -           Bathing Assessment/Intervention    [place] Level (Bathing)  bathing skills;set up;verbal cues;moderate assist (50% patient effort)  -        Assistive Devices (Bathing)  bath mitt;grab bar, tub/shower;hand-held shower spray hose;shower commode chair, rolling  -        Position (Bathing)  supported sitting;supported standing   -CJ           Upper Body Dressing Assessment/Training    [place] Level (Upper Body Dressing)  doff;don;front opening garment;set up;verbal cues;minimum assist (75% patient effort)  -CJ        Position (Upper Body Dressing)  supported sitting;supported standing  -CJ           Lower Body Dressing Assessment/Training    [place] Level (Lower Body Dressing)  doff;don;pants/bottoms;socks;set up;verbal cues;maximum assist (25% patient effort)  -        Position (Lower Body Dressing)  supported sitting;supported standing  -CJ           Positioning and Restraints    Pre-Treatment Position  in bed  -CJ        Post Treatment Position  bed  -CJ        In Bed  fowlers;call light within reach;encouraged to call for assist;with family/caregiver;side rails up x2  -           Progress Summary (OT)    Progress Toward Functional Goals (OT)  progress toward functional goals is fair  -        Barriers to Overall Progress (OT)  Fall!  -        Impairments Still Limiting Function (OT)  Plan d/c!  -          User Key  (r) = Recorded By, (t) = Taken By, (c) = Cosigned By    Initials Name Effective Dates     Chris Campbell COTA/L 08/02/16 -          Occupational Therapy Education                 Title: PT OT SLP Therapies (In Progress)     Topic: Occupational Therapy (In Progress)     Point: ADL training (Done)     Description:   Instruct learner(s) on proper safety adaptation and remediation techniques during self care or transfers.   Instruct in proper use of assistive devices.              Learning Progress Summary           Patient Acceptance, E,TB, VU,DU,NR by  at 11/6/2020 0907    Comment: Pt. participated in adl showering/dressing!    Acceptance, E, VU,NR by  at 10/31/2020 1338    Acceptance, E, NR by SN at 10/27/2020 1415    Comment: Role of OT, safety with ADL, importance of therapy   Family Acceptance, E,TB, VU,DU,NR by  at 11/6/2020 0907    Comment: Pt. participated in adl showering/dressing!                    Point: Home exercise program (Not Started)     Description:   Instruct learner(s) on appropriate technique for monitoring, assisting and/or progressing therapeutic exercises/activities.              Learner Progress:  Not documented in this visit.          Point: Precautions (Done)     Description:   Instruct learner(s) on prescribed precautions during self-care and functional transfers.              Learning Progress Summary           Patient Acceptance, E,TB, VU,DU,NR by  at 11/6/2020 0907    Comment: Pt. participated in adl showering/dressing!   Family Acceptance, E,TB, VU,DU,NR by  at 11/6/2020 0907    Comment: Pt. participated in adl showering/dressing!                   Point: Body mechanics (Done)     Description:   Instruct learner(s) on proper positioning and spine alignment during self-care, functional mobility activities and/or exercises.              Learning Progress Summary           Patient Acceptance, E,TB, VU,DU,NR by  at 11/6/2020 0907    Comment: Pt. participated in adl showering/dressing!    Acceptance, E, VU,NR by  at 10/31/2020 1338   Family Acceptance, E,TB, VU,DU,NR by  at 11/6/2020 0907    Comment: Pt. participated in adl showering/dressing!                               User Key     Initials Effective Dates Name Provider Type Discipline     07/05/20 -  Lynne Frias, OTR/L Occupational Therapist OT     08/02/16 -  Chris Campbell COTA/L Occupational Therapy Assistant OT     07/16/20 -  Ann Marie Veras OT Student OT Student OT                  OT Recommendation and Plan     Progress Toward Functional Goals (OT): progress toward functional goals is fair  Plan of Care Review  Plan of Care Reviewed With: patient  Progress: improving  Outcome Summary: Pt. fowlers in bed, required mod. assist with bed mobility to transfer oob/btb! Mod-max. assist with bathing/dressing!  Plan of Care Reviewed With: patient  Outcome Summary: Pt. fowlers in bed, required mod. assist with  bed mobility to transfer oob/btb! Mod-max. assist with bathing/dressing!    Outcome Measures     Row Name 11/06/20 0907             How much help from another is currently needed...    Putting on and taking off regular lower body clothing?  1  -CJ      Bathing (including washing, rinsing, and drying)  1  -CJ      Toileting (which includes using toilet bed pan or urinal)  1  -CJ      Putting on and taking off regular upper body clothing  1  -CJ      Taking care of personal grooming (such as brushing teeth)  2  -CJ      Eating meals  2  -CJ      AM-PAC 6 Clicks Score (OT)  8  -CJ         Functional Assessment    Outcome Measure Options  AM-PAC 6 Clicks Daily Activity (OT)  -CJ        User Key  (r) = Recorded By, (t) = Taken By, (c) = Cosigned By    Initials Name Provider Type    Chris Vargas COTA/L Occupational Therapy Assistant          Time Calculation:   Time Calculation- OT     Row Name 11/06/20 0907             Time Calculation- OT    OT Start Time  0907  -      OT Stop Time  0946  -      OT Time Calculation (min)  39 min  -      Total Timed Code Minutes- OT  39 minute(s)  -      TCU Minutes- OT  39 min  -      OT Received On  11/06/20  -         Timed Charges    99272 - OT Self Care/Mgmt Minutes  39  -        User Key  (r) = Recorded By, (t) = Taken By, (c) = Cosigned By    Initials Name Provider Type    Chris Vargas COTA/L Occupational Therapy Assistant        Therapy Charges for Today     Code Description Service Date Service Provider Modifiers Qty    33775871164 HC OT SELF CARE/MGMT/TRAIN EA 15 MIN 11/6/2020 Chris Campbell COTA/L GO 3               ARAM Villa  11/6/2020

## 2020-11-06 NOTE — NURSING NOTE
Neuro completed with ROZINA Gonzalez, oriented to self, place, not situation or time.   and push pull, no changes.

## 2020-11-06 NOTE — PROGRESS NOTES
Nephrology ([facility]) Progress Note      Patient:  Eron Escalante  YOB: 1934  Date of Service: 11/6/2020  MRN: [id number]   Acct: [id number]   Primary Care Physician: Jeffrey Owen MD  Advance Directive:   Code Status and Medical Interventions:   Ordered at: 10/27/20 0304     Limited Support to NOT Include:    Intubation     Level Of Support Discussed With:    Next of Kin (If No Surrogate)    Health Care Surrogate     Code Status:    No CPR     Medical Interventions (Level of Support Prior to Arrest):    Limited     Admit Date: 10/26/2020       Hospital Day: 11  Referring Provider: Alcides Tomlinson MD      Patient personally seen and examined.  Complete chart including Consults, Notes, Operative Reports, Labs, Cardiology, and Radiology studies reviewed as able.        Subjective:  Eron Escalante is a 85 y.o. male  whom we were consulted for acute kidney injury/chronic kidney disease.  Patient has stage III chronic kidney disease at baseline and has seen Dr. Victoria previously.  He was initially admitted to [facility] with encephalopathy, MRI of the brain did not show any evidence of intracranial pathology.  Transferred to [facility] as per family request.  Neurological work-up consistent with consistent with microvascular chronic changes.  Patient also has bilateral Doppler studies of carotid arteries consistent with mild to moderate stenosis but no intervention recommended.  However his serum creatinine continued to rise.  Patient also developed hypernatremia as he has not been drinking due to severe encephalopathy.  Started on hypotonic fluid for correction of hypernatremia with consequent improvement of labs.     Today he has no complaints. Awake/alert. No new overnight issues.  Urine output nonoliguric.    Allergies:  Lorazepam, Penicillins, and Adhesive tape    Home Meds:  Medications Prior to Admission   Medication Sig Dispense Refill Last  Dose   • allopurinol (ZYLOPRIM) 300 MG tablet Take 600 mg by mouth Daily.   Unknown at Unknown time   • amLODIPine (NORVASC) 5 MG tablet Take 5 mg by mouth Daily.   Unknown at Unknown time   • aspirin 81 MG chewable tablet Chew 81 mg Daily.   Unknown at Unknown time   • Cholecalciferol (VITAMIN D) 1000 units tablet Take 1,000 Units by mouth.   Unknown at Unknown time   • colchicine 0.6 MG tablet Take 0.6 mg by mouth Daily As Needed (gout flare-up).   Unknown at Unknown time   • finasteride (PROSCAR) 5 MG tablet Take 5 mg by mouth Daily.   Unknown at Unknown time   • folic acid (FOLVITE) 400 MCG tablet Take 400 mcg by mouth.   Unknown at Unknown time   • glimepiride (AMARYL) 4 MG tablet Take 4 mg by mouth 2 (Two) Times a Day With Meals. Needs pm dose   Unknown at Unknown time   • losartan (COZAAR) 50 MG tablet Take 25 mg by mouth Daily.   Unknown at Unknown time   • meclizine (ANTIVERT) 25 MG tablet Take 25 mg by mouth. 3-4 times daily   Unknown at Unknown time   • metoprolol succinate XL (TOPROL XL) 25 MG 24 hr tablet Take 25 mg by mouth every night at bedtime.   Unknown at Unknown time   • nitroglycerin (NITROSTAT) 0.4 MG SL tablet Place 0.4 mg under the tongue Every 5 (Five) Minutes As Needed.   Unknown at Unknown time   • pantoprazole (PROTONIX) 40 MG EC tablet Take 40 mg by mouth Every 12 (Twelve) Hours.   Unknown at Unknown time   • potassium chloride (K-DUR,KLOR-CON) 20 MEQ CR tablet Take 10 mEq by mouth Every 12 (Twelve) Hours.   Unknown at Unknown time   • Pyridoxine HCl (VITAMIN B6) 200 MG tablet Take 1 tablet by mouth Daily.   Unknown at Unknown time   • rOPINIRole (REQUIP) 1 MG tablet Take 1 mg by mouth Every Night. Take 1 hour before bedtime.   Unknown at Unknown time   • sennosides-docusate sodium (SENOKOT-S) 8.6-50 MG tablet Take 2 tablets by mouth 2 (Two) Times a Day As Needed for constipation. 45 tablet 2 Unknown at Unknown time   • tamsulosin (FLOMAX) 0.4 MG capsule 24 hr capsule Take 1 capsule by  mouth Daily.   Unknown at Unknown time   • thiamine (VITAMIN B-1) 100 MG tablet Take 100 mg by mouth.   Unknown at Unknown time   • vitamin C (ASCORBIC ACID) 500 MG tablet Take 500 mg by mouth Daily.   Unknown at Unknown time   • [DISCONTINUED] pravastatin (PRAVACHOL) 40 MG tablet Take 40 mg by mouth Every Night.   Unknown at Unknown time       Medicines:  Current Facility-Administered Medications   Medication Dose Route Frequency Provider Last Rate Last Dose   • acetaminophen (TYLENOL) tablet 650 mg  650 mg Oral Q4H PRN Forrest Bliss MD        Or   • acetaminophen (TYLENOL) suppository 650 mg  650 mg Rectal Q4H PRN Forrest Bliss MD       • allopurinol (ZYLOPRIM) tablet 300 mg  300 mg Oral Daily Forrest Bliss MD   300 mg at 11/06/20 1030   • amLODIPine (NORVASC) tablet 10 mg  10 mg Oral Q24H Elpidio Irby, DO   10 mg at 11/06/20 1030   • aspirin chewable tablet 81 mg  81 mg Oral Daily Elpidio Irby, DO   81 mg at 11/06/20 1030   • cefdinir (OMNICEF) capsule 300 mg  300 mg Oral Q12H Elpidio Irby, DO   300 mg at 11/06/20 1030   • cholecalciferol (VITAMIN D3) tablet 1,000 Units  1,000 Units Oral Daily Forrest Bliss MD   1,000 Units at 11/06/20 1030   • colchicine tablet 0.6 mg  0.6 mg Oral Daily PRN Forrest Bliss MD       • dextrose (D50W) 25 g/ 50mL Intravenous Solution 25 g  25 g Intravenous Q15 Min PRN Forrest Bliss MD       • dextrose (GLUTOSE) oral gel 15 g  15 g Oral Q15 Min PRN Forrest Bliss MD       • folic acid (FOLVITE) tablet 400 mcg  400 mcg Oral Daily Forrest Bliss MD   400 mcg at 11/06/20 1029   • glucagon (human recombinant) (GLUCAGEN DIAGNOSTIC) injection 1 mg  1 mg Subcutaneous Q15 Min PRN Forrest Bliss MD       • hydrALAZINE (APRESOLINE) injection 10 mg  10 mg Intravenous Q4H PRN Forrest Bliss MD   10 mg at 11/03/20 1148   • hydrALAZINE (APRESOLINE) tablet 10 mg  10 mg Oral Q6H PRN Iain Rosenberg MD       • insulin lispro (humaLOG) injection  2-7 Units  2-7 Units Subcutaneous TID AC Forrest Bliss MD   2 Units at 11/06/20 1030   • isosorbide mononitrate (IMDUR) 24 hr tablet 30 mg  30 mg Oral Q24H Forrest Bliss MD   30 mg at 11/06/20 1029   • lansoprazole (FIRST) oral suspension 30 mg  30 mg Oral Q AM Iain Rosenberg MD   30 mg at 11/06/20 0541   • losartan (COZAAR) tablet 50 mg  50 mg Oral Q24H Elpidio Irby DO   50 mg at 11/06/20 1030   • nitroglycerin (NITROSTAT) SL tablet 0.4 mg  0.4 mg Sublingual Q5 Min PRN Forrest Bliss MD   0.4 mg at 10/27/20 1600   • ondansetron (ZOFRAN) tablet 4 mg  4 mg Oral Q6H PRN Forrest Bliss MD        Or   • ondansetron (ZOFRAN) injection 4 mg  4 mg Intravenous Q6H PRN Forrest Bliss MD       • potassium chloride (MICRO-K) CR capsule 10 mEq  10 mEq Oral BID With Meals Forrest Bliss MD   10 mEq at 11/06/20 1030   • pravastatin (PRAVACHOL) tablet 40 mg  40 mg Oral Nightly Forrest Bliss MD   40 mg at 11/05/20 2245   • prednisoLONE acetate (PRED FORTE) 1 % ophthalmic suspension 1 drop  1 drop Left Eye Q2H While Awake Forrest Bliss MD   1 drop at 11/06/20 1031   • sennosides-docusate (PERICOLACE) 8.6-50 MG per tablet 2 tablet  2 tablet Oral BID PRN Forrest Bliss MD       • sodium chloride 0.9 % flush 10 mL  10 mL Intravenous Q12H Forrest Bliss MD   10 mL at 11/05/20 2245   • sodium chloride 0.9 % flush 10 mL  10 mL Intravenous PRN Forrest Bliss MD       • sodium chloride 0.9 % infusion 500 mL  500 mL Intravenous Continuous PRN Forrest Bliss MD 25 mL/hr at 10/28/20 1150     • terazosin (HYTRIN) capsule 1 mg  1 mg Oral Nightly Forrest Bliss MD   1 mg at 11/05/20 2244   • thiamine (VITAMIN B-1) tablet 100 mg  100 mg Oral Daily Forrest Bliss MD   100 mg at 11/06/20 1030   • vitamin B-6 (PYRIDOXINE) tablet 200 mg  200 mg Oral Daily Forrest Bliss MD   200 mg at 11/06/20 1030   • vitamin C (ASCORBIC ACID) tablet 500 mg  500 mg Oral Daily Forrest Bliss  MD   500 mg at 11/06/20 1030       Past Medical History:  Past Medical History:   Diagnosis Date   • A-fib (CMS/[facility]) 11/15/2016   • Anemia     gets shots every few months to build up blood. sees dr adam.   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   • Arthritis    • Bradycardia 11/15/2016   • CAD in native artery 11/15/2016   • Chest pain 11/15/2016   • CHF (congestive heart failure) (CMS/[facility])    • Chronic kidney disease    • COPD (chronic obstructive pulmonary disease) (CMS/[facility])    • DDD (degenerative disc disease), lumbar 6/27/2017   • Heart murmur    • HTN (hypertension) 11/15/2016   • Hyperlipidemia    • Lumbar stenosis with neurogenic claudication 6/27/2017   • Murmur 4/19/2019   • Myocardial infarction (CMS/[facility])    • Sleep apnea    • Stroke (CMS/[facility])    • Type 2 diabetes mellitus (CMS/[facility]) 11/15/2016   • Ulcer of abdomen wall (CMS/[facility])        Past Surgical History:  Past Surgical History:   Procedure Laterality Date   • APPENDECTOMY     • BACK SURGERY      x2   • CARDIAC CATHETERIZATION Left     1/2010   • CARPAL TUNNEL RELEASE Bilateral    • CHOLECYSTECTOMY     • COLONOSCOPY N/A 9/7/2017    Procedure: COLONOSCOPY WITH ANESTHESIA;  Surgeon: Joshua Rich DO;  Location: [facility] ENDOSCOPY;  Service:    • CORONARY ARTERY BYPASS GRAFT      2/2006 w/PTCA & KIMMY    • CORONARY STENT PLACEMENT     • ENDOSCOPY N/A 12/14/2016    Procedure: ESOPHAGOGASTRODUODENOSCOPY WITH ANESTHESIA;  Surgeon: Isabel Dexter MD;  Location: [facility] ENDOSCOPY;  Service:    • ENDOSCOPY N/A 12/16/2016    Procedure: ESOPHAGOGASTRODUODENOSCOPY WITH ANESTHESIA;  Surgeon: Joshua Rich DO;  Location: [facility] ENDOSCOPY;  Service:    • ENDOSCOPY N/A 4/10/2017    Procedure: ESOPHAGOGASTRODUODENOSCOPY WITH ANESTHESIA;  Surgeon: Joshua Rich DO;  Location: [facility] ENDOSCOPY;  Service:    • ENDOSCOPY N/A 10/28/2020    Procedure: ESOPHAGOGASTRODUODENOSCOPY WITH ANESTHESIA;  Surgeon: Forrest Bliss MD;  Location: [facility] ENDOSCOPY;   Service: Gastroenterology;  Laterality: N/A;  pre: GI bleed  post: duodenal ulcers, hiatal hernia   Jeffrey Owen MD   • EYE SURGERY Left     x 2   • JOINT REPLACEMENT     • PERIPHERAL ARTERIAL STENT GRAFT     • REPLACEMENT TOTAL KNEE Left     2002   • SHOULDER ROTATOR CUFF REPAIR Bilateral        Family History  Family History   Problem Relation Age of Onset   • Coronary artery disease Father    • Heart disease Father    • Coronary artery disease Brother    • Heart attack Brother    • Cancer Mother    • No Known Problems Sister    • Heart disease Son    • Cancer Sister    • Cancer Sister        Social History  Social History     Socioeconomic History   • Marital status:      Spouse name: Not on file   • Number of children: Not on file   • Years of education: Not on file   • Highest education level: Not on file   Tobacco Use   • Smoking status: Never Smoker   • Smokeless tobacco: Never Used   Substance and Sexual Activity   • Alcohol use: No   • Drug use: No   • Sexual activity: Never     Partners: Female         Review of Systems:  History obtained from chart review and the patient  General ROS: No fever or chills  Respiratory ROS: No cough, shortness of breath, wheezing  Cardiovascular ROS: No chest pain or palpitations  Gastrointestinal ROS: No abdominal pain or melena  Genito-Urinary ROS: No dysuria or hematuria  Neurological ROS: no headache or dizziness    Objective:  Patient Vitals for the past 24 hrs:   BP Temp Temp src Pulse Resp SpO2   11/06/20 0753 164/57 97.9 °F (36.6 °C) Oral 77 20 94 %   11/06/20 0352 165/49 97.6 °F (36.4 °C) Oral 70 18 95 %   11/06/20 0029 172/51 97.9 °F (36.6 °C) Oral 65 18 96 %   11/05/20 1933 159/55 97.6 °F (36.4 °C) Oral 63 20 97 %   11/05/20 1558 161/54 97.7 °F (36.5 °C) Oral 58 18 98 %       Intake/Output Summary (Last 24 hours) at 11/6/2020 1103  Last data filed at 11/6/2020 0010  Gross per 24 hour   Intake --   Output 400 ml   Net -400 ml     General: awake/alert    Chest:  clear to auscultation bilaterally without respiratory distress  CVS: regular rate and rhythm  Abdominal: soft, nontender, positive bowel sounds  Extremities: no cyanosis or edema  Skin: warm and dry without rash  Neuro: no focal motor deficits    Labs:  Results from last 7 days   Lab Units 11/05/20 0536 11/04/20 0557 11/03/20 0435   WBC 10*3/mm3 6.95 6.97 7.92   HEMOGLOBIN g/dL 8.5* 8.3* 8.7*   HEMATOCRIT % 26.8* 25.6* 27.7*   PLATELETS 10*3/mm3 221 192 180         Results from last 7 days   Lab Units 11/05/20 0536 11/04/20  0557 11/03/20  0435 11/02/20  0447 11/01/20  0624 10/31/20  0444   SODIUM mmol/L 143 145 144 146* 150* 150*   POTASSIUM mmol/L 4.4 4.1 4.1 4.1 3.7 3.7   CHLORIDE mmol/L 115* 117* 114* 117* 119* 119*   CO2 mmol/L 25.0 23.0 23.0 23.0 24.0 23.0   BUN mg/dL 29* 35* 43* 53* 69* 80*   CREATININE mg/dL 0.88 0.89 1.01 1.35* 1.80* 2.17*   CALCIUM mg/dL 8.8 8.6 8.7 8.6 8.7 8.8   BILIRUBIN mg/dL  --   --   --  0.3 0.3 0.4   ALK PHOS U/L  --   --   --  78 79 70   ALT (SGPT) U/L  --   --   --  28 31 31   AST (SGOT) U/L  --   --   --  31 35 40   GLUCOSE mg/dL 130* 125* 148* 166* 170* 153*       Radiology:   Imaging Results (Last 72 Hours)     Procedure Component Value Units Date/Time    FL Video Swallow With Speech Single Contrast [[id number]] Collected: 11/04/20 1401     Updated: 11/04/20 1405    Narrative:      EXAMINATION:   FL VIDEO SWALLOW W SPEECH SINGLE-CONTRAST-  11/4/2020  2:01 PM CST     HISTORY: MODIFIED BARIUM SWALLOW     REASON FOR EXAM: r/o aspiration; R13.12-Dysphagia, oropharyngeal phase;  Z74.09-Other reduced mobility; R19.5-Other fecal abnormalities;  Z78.9-Other specified health status     COMPARISON: NONE      FLUOROSCOPY TIME: 1.2 minutes     Radiation dose 5.71 MG Y     Number of images: 1     TECHNIQUE: In conjunction with speech pathology services, video  fluoroscopic swallow examination was performed with oral ingestion of  barium containing substances of various  viscosities.      FINDINGS: Medium bolus sizes are well controlled with no spillage into  the oropharynx. No pooling is demonstrated. There is soft palate  elevation and coverage of the posterior nasopharynx with swallowing. No  stasis is noted within the valleculae or piriform sinuses.     Penetration was observed with nectar thick and thin barium. There is no  obvious aspiration..        Impression:      1. Normal swallowing mechanism.   2. Penetration was observed with nectar thick and thin barium.      Please see speech pathologist's report for further details and  recommendations.         This report was finalized on 11/04/2020 14:02 by Dr. Izaiah Ryan MD.          Culture:  Urine Culture   Date Value Ref Range Status   10/30/2020 No growth  Final   10/27/2020 >100,000 CFU/mL Escherichia coli (A)  Final         Assessment   1.  Acute kidney injury, prerenal--resolved  2.  Baseline chronic kidney disease stage 3a  3.  Hypernatremia--resolved  4.  Acute GI bleed  5.  Acute metabolic encephalopathy--resolving  6.  Type 2 diabetes with nephropathy  7.  Iron deficiency anemia  8.  Status post EGD/duodenal ulcer  9.  E coli UTI    Plan:  1.  Encourage PO intake  2.  Monitor labs  3.  Waiting on insurance approval for placement.  OK for discharge from renal standpoint. Follow up in office in 1-2 weeks        Teddy Hammonds, ANJANA  11/6/2020  11:03 CST

## 2020-11-06 NOTE — THERAPY TREATMENT NOTE
Acute Care - Physical Therapy Treatment Note  [facility]     Patient Name: Eron Escalante  : 1934  MRN: [id number]  Today's Date: 2020   Onset of Illness/Injury or Date of Surgery: 10/26/20       PT Assessment (last 12 hours)      PT Evaluation and Treatment     Row Name 20 1310 20 1005       Physical Therapy Time and Intention    Subjective Information  complains of;weakness;fatigue  -AB  --    Document Type  therapy note (daily note)  -AB  --    Mode of Treatment  physical therapy  -AB  --    Session Not Performed  --  patient/family declined treatment  -AB    Comment, Session Not Performed  --  family states to check back  -AB    Row Name 20 1310          General Information    Existing Precautions/Restrictions  fall  -AB     Row Name 20 1310          Bed Mobility    Supine-Sit [place] (Bed Mobility)  verbal cues;moderate assist (50% patient effort)  -AB     Sit-Supine [place] (Bed Mobility)  verbal cues;moderate assist (50% patient effort)  -AB     Bed Mobility, Safety Issues  cognitive deficits limit understanding  -AB     Assistive Device (Bed Mobility)  draw sheet  -AB     Row Name 20 131          Balance    Balance Assessment  sitting static balance;sitting dynamic balance  -AB     Static Sitting Balance  unsupported;sitting, edge of bed  -AB     Dynamic Sitting Balance  unsupported;sitting, edge of bed  -AB     Comment, Balance  sat EOB for about 18 minutes  -AB     Row Name 20 1310          Motor Skills    Therapeutic Exercise  other (see comments) sitting exercises 10 reps 2 sets  -AB     Row Name 20 1310          Plan of Care Review    Plan of Care Reviewed With  patient  -AB     Progress  improving  -AB     Outcome Summary  PT treatment comlete. He was Mod assist for bedmobility, performed sitting exercises 10 reps 2 sets. He refused to stand. Left in left sidelying with call light and exit alarm.  -AB       User Key  (r) = Recorded By,  (t) = Taken By, (c) = Cosigned By    Initials Name Provider Type    AB Karla Cotter, PTA Physical Therapy Assistant        Physical Therapy Education                 Title: PT OT SLP Therapies (In Progress)     Topic: Physical Therapy (In Progress)     Point: Mobility training (Done)     Learning Progress Summary           Patient Acceptance, E,TB, VU by  at 11/2/2020 1213    Comment: TRANS    Acceptance, E, VU by  at 10/27/2020 1543    Comment: Family educated on POC.   Family Acceptance, E, VU by  at 10/31/2020 1334    Comment: benefits of PT and POC, call for A OOB                   Point: Home exercise program (Done)     Learning Progress Summary           Patient Acceptance, E,TB,D,H, VU by  at 11/5/2020 0845    Comment: BLE HEP    Acceptance, E,TB, VU by  at 11/3/2020 1525    Comment: BLE HEP                   Point: Body mechanics (Not Started)     Learner Progress:  Not documented in this visit.          Point: Precautions (Done)     Learning Progress Summary           Family Acceptance, E, VU by  at 10/31/2020 1334    Comment: benefits of PT and POC, call for A OOB                               User Key     Initials Effective Dates Name Provider Type Discipline     08/02/16 -  Maria C Fagan, PTA Physical Therapy Assistant PT     08/02/16 -  Sujit Ni, PT Physical Therapist PT     10/19/20 -  Yuli Chávez, PT Student PT Student PT              PT Recommendation and Plan     Plan of Care Reviewed With: patient  Progress: improving  Outcome Summary: PT treatment comlete. He was Mod assist for bedmobility, performed sitting exercises 10 reps 2 sets. He refused to stand. Left in left sidelying with call light and exit alarm.  Outcome Measures     Row Name 11/06/20 0907             How much help from another is currently needed...    Putting on and taking off regular lower body clothing?  1  -CJ      Bathing (including washing, rinsing, and drying)  1  -CJ      Toileting (which includes  using toilet bed pan or urinal)  1  -CJ      Putting on and taking off regular upper body clothing  1  -CJ      Taking care of personal grooming (such as brushing teeth)  2  -CJ      Eating meals  2  -CJ      AM-PAC 6 Clicks Score (OT)  8  -         Functional Assessment    Outcome Measure Options  AM-PAC 6 Clicks Daily Activity (OT)  -        User Key  (r) = Recorded By, (t) = Taken By, (c) = Cosigned By    Initials Name Provider Type     Chris Campbell COTA/L Occupational Therapy Assistant           Time Calculation:   PT Charges     Row Name 11/06/20 1310             Time Calculation    Start Time  1310  -AB      Stop Time  1334  -AB      Time Calculation (min)  24 min  -AB      PT Received On  11/06/20  -AB         Time Calculation- PT    Total Timed Code Minutes- PT  24 minute(s)  -AB        User Key  (r) = Recorded By, (t) = Taken By, (c) = Cosigned By    Initials Name Provider Type    AB Karla Cotter PTA Physical Therapy Assistant        Therapy Charges for Today     Code Description Service Date Service Provider Modifiers Qty    29187033899 HC PT THERAPEUTIC ACT EA 15 MIN 11/6/2020 Karla Cotter, PTA GP 1    81753559122 HC PT THER PROC EA 15 MIN 11/6/2020 Karla Cotter, PTA GP 1          PT G-Codes  Outcome Measure Options: AM-PAC 6 Clicks Daily Activity (OT)  AM-PAC 6 Clicks Score (PT): 8  AM-PAC 6 Clicks Score (OT): 8    Karla Cotter PTA  11/6/2020

## 2020-11-06 NOTE — PROGRESS NOTES
[facility] Medicine Services  INPATIENT PROGRESS NOTE    Patient Name: Eron Escalante  Date of Admission: 10/26/2020  Today's Date: 11/06/20  Length of Stay: 11  Primary Care Physician: Jeffrey Owen MD    Subjective   Chief Complaint: Weakness.   HPI   Still awaiting precertification from his insurance to go to skilled nursing.  This seems to be taking longer than usual.  I have discussed this with .  They have checked with insurance and skilled nursing facility daily.    Granddaughter understanding of the delay in placement.  She states that he is very tired today.  He has done more with therapy over the last few days.    Blood pressure still elevated despite recent medication changes.    Hemoglobin and renal function recently stable.  No new labs today.    Blood glucoses fairly well controlled.    Review of Systems   All pertinent negatives and positives are as above. All other systems have been reviewed and are negative unless otherwise stated.     Objective    Temp:  [97.6 °F (36.4 °C)-97.9 °F (36.6 °C)] 97.9 °F (36.6 °C)  Heart Rate:  [58-77] 77  Resp:  [18-20] 20  BP: (159-172)/(49-57) 164/57  Physical Exam  Vitals signs and nursing note reviewed.   Constitutional:       Appearance: He is well-developed.      Comments: In bed, sleeping, but arouses easily.  His granddaughter is present.  Discussed with his nurse, Liliane.   HENT:      Head: Normocephalic and atraumatic.      Comments: Poor dentition.  Eyes:      Conjunctiva/sclera: Conjunctivae normal.      Pupils: Pupils are equal, round, and reactive to light.   Neck:      Musculoskeletal: Neck supple.      Vascular: No JVD.   Cardiovascular:      Rate and Rhythm: Normal rate. Rhythm irregular.      Heart sounds: Normal heart sounds. No murmur. No friction rub. No gallop.    Pulmonary:      Effort: Pulmonary effort is normal. No respiratory distress.   Abdominal:      General: Bowel sounds are normal.  There is no distension.      Palpations: Abdomen is soft.   Musculoskeletal: Normal range of motion.         General: Swelling (trace) present. No tenderness or deformity.   Skin:     General: Skin is warm and dry.      Findings: No rash.   Neurological:      General: No focal deficit present.      Mental Status: He is alert and oriented to person, place, and time.      Cranial Nerves: No cranial nerve deficit.      Motor: Weakness present. No abnormal muscle tone.      Deep Tendon Reflexes: Reflexes normal.      Comments: Follows all commands without difficulty.   Psychiatric:         Behavior: Behavior normal.         Thought Content: Thought content normal.         Judgment: Judgment normal.       Results Review:  I have reviewed the labs, radiology results, and diagnostic studies.    Laboratory Data:   No new labs today.  We will reassess again tomorrow if he does not discharge.    I have reviewed the patient's current medications.     Assessment/Plan     Active Hospital Problems    Diagnosis   • **Metabolic encephalopathy   • E. coli UTI (urinary tract infection)   • Hypernatremia   • Acute renal failure superimposed on stage 3a chronic kidney disease (CMS/[facility])   • Acute blood loss anemia   • Duodenal ulcer   • UGIB (upper gastrointestinal bleed)   • History of stroke   • Chronic diastolic congestive heart failure (CMS/[facility])   • A-fib (CMS/[facility])   • HTN (hypertension)   • Type 2 diabetes mellitus, without long-term current use of insulin (CMS/[facility])     Plan:  The patient was admitted on 10/27 by Dr. Rosenberg.  He presented as a transfer from [facility] as his granddaughter works here.  He had been admitted there and was having much difficulty with confusion.  This has been proven to be multifactorial as evidenced below.    He has been evaluated by neurology.  He had an MRI of the brain without contrast on 10/30 that showed no evidence of acute infarct.    He has been followed by nephrology for acute renal  failure superimposed on CKD, stage III.  His serum creatinine continues to improve.  His elevated BUN is secondary to recent gastrointestinal bleeding as well as his acute renal failure.  He developed some hyponatremia secondary to resuscitation with crystalloid fluids.  This resolved with recent dextrose fluids.  Stopped IV fluids on 11/3 and gave Bumex 0.5 mg IV x1.  Resumed amlodipine and losartan recently and have titrated them.  Lasix 20 mg orally daily.    He had heme positive stool and melena.  He was taken for endoscopy on 10/28 by Dr. Bliss. He had multiple nonbleeding duodenal ulcers at that time.  He had a hiatal hernia.  Urease negative.  He is being treated with lansoprazole.  He has received a total of 4 units packed red blood cells.  He was last transfused on 10/31.  Hemoglobin remained stable.  Anemia substrates are consistent with chronic disease.    He has an E. coli urinary tract infection that is resistant to ampicillin, levofloxacin, tetracycline, and Bactrim.  He has been treated with ceftriaxone and was converted to oral Omnicef on 11/4.    He has a history of atrial fibrillation for which he will not be anticoagulated at the moment given his recent gastrointestinal bleed.  I restarted his aspirin on 11/2.    Oral hypoglycemics on hold.  Continue sliding scale insulin.  His most recent hemoglobin A1c is 6.1.    SCDs for DVT prophylaxis.    Discharge Planning: I expect the patient to be discharged to SNF later today if his pre-CERT goes through.    Electronically signed by Elpidio Irby DO, 11/06/20, 12:20 CST.

## 2020-11-07 NOTE — PLAN OF CARE
Goal Outcome Evaluation:  Plan of Care Reviewed With: patient, family  Progress: no change   Disoriented to situation and time. No neuro changes. C/o ear pain, Dr. Irby notified. Turning. Glucose monitoring. NSR on tele. Ambulated from bed to chair earlier. Room air. Safety maintained. PRN BP med not needed at this time. Will continue to monitor.

## 2020-11-07 NOTE — PROGRESS NOTES
[facility] Medicine Services  INPATIENT PROGRESS NOTE    Patient Name: Eron Escalante  Date of Admission: 10/26/2020  Today's Date: 11/07/20  Length of Stay: 12  Primary Care Physician: Jeffrey Owen MD    Subjective   Chief Complaint: Weakness.   HPI   Insurance never came through yesterday.  Will likely be Monday until we hear something.    Blood pressure still a problem at times.  I have adjusted his medications recently.    Hemoglobin stable.    No problems with his kidney function still.    Blood glucoses have been relatively well controlled.    Review of Systems   All pertinent negatives and positives are as above. All other systems have been reviewed and are negative unless otherwise stated.     Objective    Temp:  [97.6 °F (36.4 °C)-98.1 °F (36.7 °C)] 97.8 °F (36.6 °C)  Heart Rate:  [64-70] 68  Resp:  [16-18] 16  BP: (145-184)/(48-62) 151/55  Physical Exam  Vitals signs and nursing note reviewed.   Constitutional:       Appearance: He is well-developed.      Comments: In bed, sleeping, but arouses easily.  His granddaughter is not currently present.  Discussed with his nurse, Bronwyn.   HENT:      Head: Normocephalic and atraumatic.      Comments: Poor dentition.  Eyes:      Conjunctiva/sclera: Conjunctivae normal.      Pupils: Pupils are equal, round, and reactive to light.   Neck:      Musculoskeletal: Neck supple.      Vascular: No JVD.   Cardiovascular:      Rate and Rhythm: Normal rate. Rhythm irregular.      Heart sounds: Normal heart sounds. No murmur. No friction rub. No gallop.    Pulmonary:      Effort: Pulmonary effort is normal. No respiratory distress.   Abdominal:      General: Bowel sounds are normal. There is no distension.      Palpations: Abdomen is soft.   Musculoskeletal: Normal range of motion.         General: Swelling (trace) present. No tenderness or deformity.   Skin:     General: Skin is warm and dry.      Findings: No rash.   Neurological:       General: No focal deficit present.      Mental Status: He is alert and oriented to person, place, and time.      Cranial Nerves: No cranial nerve deficit.      Motor: Weakness present. No abnormal muscle tone.      Deep Tendon Reflexes: Reflexes normal.      Comments: Follows all commands without difficulty.   Psychiatric:         Behavior: Behavior normal.         Thought Content: Thought content normal.         Judgment: Judgment normal.       Results Review:  I have reviewed the labs, radiology results, and diagnostic studies.    Laboratory Data:   Results from last 7 days   Lab Units 11/07/20 0704 11/05/20 0536 11/04/20  0557   WBC 10*3/mm3 7.29 6.95 6.97   HEMOGLOBIN g/dL 8.4* 8.5* 8.3*   HEMATOCRIT % 26.0* 26.8* 25.6*   PLATELETS 10*3/mm3 178 221 192     Results from last 7 days   Lab Units 11/07/20 0704 11/05/20 0536 11/04/20  0557  11/02/20  0447 11/01/20  0624   SODIUM mmol/L 142 143 145   < > 146* 150*   POTASSIUM mmol/L 4.5 4.4 4.1   < > 4.1 3.7   CHLORIDE mmol/L 113* 115* 117*   < > 117* 119*   CO2 mmol/L 24.0 25.0 23.0   < > 23.0 24.0   BUN mg/dL 21 29* 35*   < > 53* 69*   CREATININE mg/dL 0.77 0.88 0.89   < > 1.35* 1.80*   CALCIUM mg/dL 9.0 8.8 8.6   < > 8.6 8.7   BILIRUBIN mg/dL  --   --   --   --  0.3 0.3   ALK PHOS U/L  --   --   --   --  78 79   ALT (SGPT) U/L  --   --   --   --  28 31   AST (SGOT) U/L  --   --   --   --  31 35   GLUCOSE mg/dL 115* 130* 125*   < > 166* 170*    < > = values in this interval not displayed.     I have reviewed the patient's current medications.     Assessment/Plan     Active Hospital Problems    Diagnosis   • **Metabolic encephalopathy   • E. coli UTI (urinary tract infection)   • Hypernatremia   • Acute renal failure superimposed on stage 3a chronic kidney disease (CMS/HCC)   • Acute blood loss anemia   • Duodenal ulcer   • UGIB (upper gastrointestinal bleed)   • History of stroke   • Chronic diastolic congestive heart failure (CMS/HCC)   • A-fib (CMS/HCC)    • HTN (hypertension)   • Type 2 diabetes mellitus, without long-term current use of insulin (CMS/[facility])     Plan:  The patient was admitted on 10/27 by Dr. Rosenberg.  He presented as a transfer from [facility] as his granddaughter works here.  He had been admitted there and was having much difficulty with confusion.  This has been proven to be multifactorial as evidenced below.    He has been evaluated by neurology.  He had an MRI of the brain without contrast on 10/30 that showed no evidence of acute infarct.    He has been followed by nephrology for acute renal failure superimposed on CKD, stage III.  His serum creatinine continues to improve.  His elevated BUN is secondary to recent gastrointestinal bleeding as well as his acute renal failure.  He developed some hyponatremia secondary to resuscitation with crystalloid fluids.  This resolved with recent dextrose fluids.  Stopped IV fluids on 11/3 and gave Bumex 0.5 mg IV x1.  Resumed amlodipine and losartan recently and have titrated them.  Lasix 20 mg orally daily.    He had heme positive stool and melena.  He was taken for endoscopy on 10/28 by Dr. Bliss. He had multiple nonbleeding duodenal ulcers at that time.  He had a hiatal hernia.  Urease negative.  He is being treated with lansoprazole.  He has received a total of 4 units packed red blood cells.  He was last transfused on 10/31.  Hemoglobin remained stable.  Anemia substrates are consistent with chronic disease.    He has an E. coli urinary tract infection that is resistant to ampicillin, levofloxacin, tetracycline, and Bactrim.  He has been treated with ceftriaxone and was converted to oral Omnicef on 11/4.    He has a history of atrial fibrillation for which he will not be anticoagulated at the moment given his recent gastrointestinal bleed.  I restarted his aspirin on 11/2.    Oral hypoglycemics on hold.  Continue sliding scale insulin.  His most recent hemoglobin A1c is 6.1.    SCDs for DVT  prophylaxis.    Discharge Planning: Still waiting on insurance.  He has been medically ready to discharge for several days now.  It looks like it will be Monday at the earliest before he goes.    Electronically signed by Elpidio Irby DO, 11/07/20, 14:21 CST.

## 2020-11-07 NOTE — PROGRESS NOTES
Nephrology ([facility]) Progress Note      Patient:  Eron Escalante  YOB: 1934  Date of Service: 11/7/2020  MRN: [id number]   Acct: [id number]   Primary Care Physician: Jeffrey Owen MD  Advance Directive:   Code Status and Medical Interventions:   Ordered at: 10/27/20 0304     Limited Support to NOT Include:    Intubation     Level Of Support Discussed With:    Next of Kin (If No Surrogate)    Health Care Surrogate     Code Status:    No CPR     Medical Interventions (Level of Support Prior to Arrest):    Limited     Admit Date: 10/26/2020       Hospital Day: 12  Referring Provider: Alcides Tomlinson MD      Patient personally seen and examined.  Complete chart including Consults, Notes, Operative Reports, Labs, Cardiology, and Radiology studies reviewed as able.        Subjective:  Eron Escalante is a 85 y.o. male  whom we were consulted for acute kidney injury/chronic kidney disease.  Patient has stage III chronic kidney disease at baseline and has seen Dr. Victoria previously.  He was initially admitted to [facility] with encephalopathy, MRI of the brain did not show any evidence of intracranial pathology.  However he was transferred to [facility] as per family request.  Neurological work-up over here consistent with repeat MRI scan consistent with microvascular chronic changes.  Patient also has bilateral Doppler studies of carotid arteries consistent with mild to moderate stenosis but no intervention recommended.  However his serum creatinine continued to rise, patient also has hypernatremia as he has not been drinking due to severe encephalopathy.  Dr. Tomlinson had started hypotonic fluid for correction of hypernatremia as well as high BUN.      Today, family not present.  Pt was previously taking good intake per family. No new events. Denied cp/soa/n/v.      Allergies:  Lorazepam, Penicillins, and Adhesive tape    Home Meds:  Medications Prior  to Admission   Medication Sig Dispense Refill Last Dose   • allopurinol (ZYLOPRIM) 300 MG tablet Take 600 mg by mouth Daily.   Unknown at Unknown time   • amLODIPine (NORVASC) 5 MG tablet Take 5 mg by mouth Daily.   Unknown at Unknown time   • aspirin 81 MG chewable tablet Chew 81 mg Daily.   Unknown at Unknown time   • Cholecalciferol (VITAMIN D) 1000 units tablet Take 1,000 Units by mouth.   Unknown at Unknown time   • colchicine 0.6 MG tablet Take 0.6 mg by mouth Daily As Needed (gout flare-up).   Unknown at Unknown time   • finasteride (PROSCAR) 5 MG tablet Take 5 mg by mouth Daily.   Unknown at Unknown time   • folic acid (FOLVITE) 400 MCG tablet Take 400 mcg by mouth.   Unknown at Unknown time   • glimepiride (AMARYL) 4 MG tablet Take 4 mg by mouth 2 (Two) Times a Day With Meals. Needs pm dose   Unknown at Unknown time   • losartan (COZAAR) 50 MG tablet Take 25 mg by mouth Daily.   Unknown at Unknown time   • meclizine (ANTIVERT) 25 MG tablet Take 25 mg by mouth. 3-4 times daily   Unknown at Unknown time   • metoprolol succinate XL (TOPROL XL) 25 MG 24 hr tablet Take 25 mg by mouth every night at bedtime.   Unknown at Unknown time   • nitroglycerin (NITROSTAT) 0.4 MG SL tablet Place 0.4 mg under the tongue Every 5 (Five) Minutes As Needed.   Unknown at Unknown time   • pantoprazole (PROTONIX) 40 MG EC tablet Take 40 mg by mouth Every 12 (Twelve) Hours.   Unknown at Unknown time   • potassium chloride (K-DUR,KLOR-CON) 20 MEQ CR tablet Take 10 mEq by mouth Every 12 (Twelve) Hours.   Unknown at Unknown time   • Pyridoxine HCl (VITAMIN B6) 200 MG tablet Take 1 tablet by mouth Daily.   Unknown at Unknown time   • rOPINIRole (REQUIP) 1 MG tablet Take 1 mg by mouth Every Night. Take 1 hour before bedtime.   Unknown at Unknown time   • sennosides-docusate sodium (SENOKOT-S) 8.6-50 MG tablet Take 2 tablets by mouth 2 (Two) Times a Day As Needed for constipation. 45 tablet 2 Unknown at Unknown time   • tamsulosin  (FLOMAX) 0.4 MG capsule 24 hr capsule Take 1 capsule by mouth Daily.   Unknown at Unknown time   • thiamine (VITAMIN B-1) 100 MG tablet Take 100 mg by mouth.   Unknown at Unknown time   • vitamin C (ASCORBIC ACID) 500 MG tablet Take 500 mg by mouth Daily.   Unknown at Unknown time   • [DISCONTINUED] pravastatin (PRAVACHOL) 40 MG tablet Take 40 mg by mouth Every Night.   Unknown at Unknown time       Medicines:  Current Facility-Administered Medications   Medication Dose Route Frequency Provider Last Rate Last Dose   • acetaminophen (TYLENOL) tablet 650 mg  650 mg Oral Q4H PRN Forrest Bliss MD        Or   • acetaminophen (TYLENOL) suppository 650 mg  650 mg Rectal Q4H PRN Forrest Bliss MD       • allopurinol (ZYLOPRIM) tablet 300 mg  300 mg Oral Daily Forrest Bliss MD   300 mg at 11/07/20 0828   • amLODIPine (NORVASC) tablet 10 mg  10 mg Oral Q24H Elpidio Irby,    10 mg at 11/07/20 0828   • aspirin chewable tablet 81 mg  81 mg Oral Daily Elpidio Irby DO   81 mg at 11/07/20 0828   • cefdinir (OMNICEF) capsule 300 mg  300 mg Oral Q12H Elpidio Irby DO   300 mg at 11/07/20 0828   • cholecalciferol (VITAMIN D3) tablet 1,000 Units  1,000 Units Oral Daily Forrest Bliss MD   1,000 Units at 11/07/20 0828   • colchicine tablet 0.6 mg  0.6 mg Oral Daily PRN Forrest Bliss MD       • dextrose (D50W) 25 g/ 50mL Intravenous Solution 25 g  25 g Intravenous Q15 Min PRN Forrest Bliss MD       • dextrose (GLUTOSE) oral gel 15 g  15 g Oral Q15 Min PRN Forrest Bliss MD       • folic acid (FOLVITE) tablet 400 mcg  400 mcg Oral Daily Forrest Bliss MD   400 mcg at 11/07/20 0828   • furosemide (LASIX) tablet 20 mg  20 mg Oral Daily Elpidio Irby DO   20 mg at 11/07/20 0828   • glucagon (human recombinant) (GLUCAGEN DIAGNOSTIC) injection 1 mg  1 mg Subcutaneous Q15 Min PRN Forrest Bliss MD       • hydrALAZINE (APRESOLINE) injection 10 mg  10 mg Intravenous Q4H PRN Forrest Bliss,  MD   10 mg at 11/03/20 1148   • hydrALAZINE (APRESOLINE) tablet 10 mg  10 mg Oral Q6H PRN Iain Rosenberg MD   10 mg at 11/07/20 0148   • insulin lispro (humaLOG) injection 2-7 Units  2-7 Units Subcutaneous TID AC Forrest Bliss MD   2 Units at 11/06/20 1754   • isosorbide mononitrate (IMDUR) 24 hr tablet 30 mg  30 mg Oral Q24H Forrest Bliss MD   30 mg at 11/07/20 0828   • lansoprazole (FIRST) oral suspension 30 mg  30 mg Oral Q AM Iain Rosenberg MD   30 mg at 11/07/20 0650   • losartan (COZAAR) tablet 100 mg  100 mg Oral Q24H Elpidio Irby DO   100 mg at 11/07/20 0828   • nitroglycerin (NITROSTAT) SL tablet 0.4 mg  0.4 mg Sublingual Q5 Min PRN Forrest Blsis MD   0.4 mg at 10/27/20 1600   • ondansetron (ZOFRAN) tablet 4 mg  4 mg Oral Q6H PRN Forrest Bliss MD        Or   • ondansetron (ZOFRAN) injection 4 mg  4 mg Intravenous Q6H PRN Forrest Bliss MD       • potassium chloride (MICRO-K) CR capsule 10 mEq  10 mEq Oral BID With Meals Forrest Bliss MD   10 mEq at 11/07/20 0828   • pravastatin (PRAVACHOL) tablet 40 mg  40 mg Oral Nightly Forrest Bliss MD   40 mg at 11/06/20 2216   • prednisoLONE acetate (PRED FORTE) 1 % ophthalmic suspension 1 drop  1 drop Left Eye Q2H While Awake Forrest Bliss MD   1 drop at 11/07/20 1633   • sennosides-docusate (PERICOLACE) 8.6-50 MG per tablet 2 tablet  2 tablet Oral BID PRN Forrest Bliss MD       • sodium chloride 0.9 % flush 10 mL  10 mL Intravenous Q12H Forrest Bliss MD   10 mL at 11/05/20 2245   • sodium chloride 0.9 % flush 10 mL  10 mL Intravenous PRN Forrest Bliss MD       • sodium chloride 0.9 % infusion 500 mL  500 mL Intravenous Continuous PRN Forrest Bliss MD 25 mL/hr at 10/28/20 1150     • terazosin (HYTRIN) capsule 1 mg  1 mg Oral Nightly Forrest Bliss MD   1 mg at 11/06/20 2217   • thiamine (VITAMIN B-1) tablet 100 mg  100 mg Oral Daily Forrest Bliss MD   100 mg at 11/07/20 0829   • vitamin  B-6 (PYRIDOXINE) tablet 200 mg  200 mg Oral Daily Forrest Bliss MD   200 mg at 11/07/20 0829   • vitamin C (ASCORBIC ACID) tablet 500 mg  500 mg Oral Daily Forrest Bliss MD   500 mg at 11/07/20 0828       Past Medical History:  Past Medical History:   Diagnosis Date   • A-fib (CMS/[facility]) 11/15/2016   • Anemia     gets shots every few months to build up blood. sees dr adam.   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   • Arthritis    • Bradycardia 11/15/2016   • CAD in native artery 11/15/2016   • Chest pain 11/15/2016   • CHF (congestive heart failure) (CMS/[facility])    • Chronic kidney disease    • COPD (chronic obstructive pulmonary disease) (CMS/[facility])    • DDD (degenerative disc disease), lumbar 6/27/2017   • Heart murmur    • HTN (hypertension) 11/15/2016   • Hyperlipidemia    • Lumbar stenosis with neurogenic claudication 6/27/2017   • Murmur 4/19/2019   • Myocardial infarction (CMS/[facility])    • Sleep apnea    • Stroke (CMS/[facility])    • Type 2 diabetes mellitus (CMS/[facility]) 11/15/2016   • Ulcer of abdomen wall (CMS/[facility])        Past Surgical History:  Past Surgical History:   Procedure Laterality Date   • APPENDECTOMY     • BACK SURGERY      x2   • CARDIAC CATHETERIZATION Left     1/2010   • CARPAL TUNNEL RELEASE Bilateral    • CHOLECYSTECTOMY     • COLONOSCOPY N/A 9/7/2017    Procedure: COLONOSCOPY WITH ANESTHESIA;  Surgeon: Joshua Rich DO;  Location: [facility] ENDOSCOPY;  Service:    • CORONARY ARTERY BYPASS GRAFT      2/2006 w/PTCA & KIMMY    • CORONARY STENT PLACEMENT     • ENDOSCOPY N/A 12/14/2016    Procedure: ESOPHAGOGASTRODUODENOSCOPY WITH ANESTHESIA;  Surgeon: Isabel Dexter MD;  Location: [facility] ENDOSCOPY;  Service:    • ENDOSCOPY N/A 12/16/2016    Procedure: ESOPHAGOGASTRODUODENOSCOPY WITH ANESTHESIA;  Surgeon: Joshua Rich DO;  Location: [facility] ENDOSCOPY;  Service:    • ENDOSCOPY N/A 4/10/2017    Procedure: ESOPHAGOGASTRODUODENOSCOPY WITH ANESTHESIA;  Surgeon: Joshua Rich DO;  Location: [facility]  ENDOSCOPY;  Service:    • ENDOSCOPY N/A 10/28/2020    Procedure: ESOPHAGOGASTRODUODENOSCOPY WITH ANESTHESIA;  Surgeon: Forrest Bliss MD;  Location: [facility] ENDOSCOPY;  Service: Gastroenterology;  Laterality: N/A;  pre: GI bleed  post: duodenal ulcers, hiatal hernia   Jeffrey Owen MD   • EYE SURGERY Left     x 2   • JOINT REPLACEMENT     • PERIPHERAL ARTERIAL STENT GRAFT     • REPLACEMENT TOTAL KNEE Left     2002   • SHOULDER ROTATOR CUFF REPAIR Bilateral        Family History  Family History   Problem Relation Age of Onset   • Coronary artery disease Father    • Heart disease Father    • Coronary artery disease Brother    • Heart attack Brother    • Cancer Mother    • No Known Problems Sister    • Heart disease Son    • Cancer Sister    • Cancer Sister        Social History  Social History     Socioeconomic History   • Marital status:      Spouse name: Not on file   • Number of children: Not on file   • Years of education: Not on file   • Highest education level: Not on file   Tobacco Use   • Smoking status: Never Smoker   • Smokeless tobacco: Never Used   Substance and Sexual Activity   • Alcohol use: No   • Drug use: No   • Sexual activity: Never     Partners: Female         Review of Systems:  History obtained from chart review and the patient  General ROS: No fever or chills  Respiratory ROS: No cough, shortness of breath, wheezing  Cardiovascular ROS: No chest pain or palpitations  Gastrointestinal ROS: No abdominal pain or melena  Genito-Urinary ROS: No dysuria or hematuria    Objective:  Patient Vitals for the past 24 hrs:   BP Temp Temp src Pulse Resp SpO2   11/07/20 1527 147/46 98 °F (36.7 °C) Oral 63 16 96 %   11/07/20 1108 151/55 97.8 °F (36.6 °C) Oral 68 16 96 %   11/07/20 0837 145/54 97.7 °F (36.5 °C) Oral 67 16 96 %   11/07/20 0336 (!) 184/62 97.6 °F (36.4 °C) Oral 70 18 96 %   11/06/20 2327 (!) 182/62 97.6 °F (36.4 °C) Oral 65 18 96 %   11/06/20 2035 171/50 97.6 °F (36.4 °C) Oral 64 18  96 %       Intake/Output Summary (Last 24 hours) at 11/7/2020 1738  Last data filed at 11/7/2020 1711  Gross per 24 hour   Intake 770 ml   Output 300 ml   Net 470 ml     General: awake/alert   Chest:  clear to auscultation bilaterally without respiratory distress  CVS: IRRR  Abdominal: soft, nontender, positive bowel sounds  Extremities: no cyanosis or edema  Skin: warm and dry without rash      Labs:  Results from last 7 days   Lab Units 11/07/20  0704 11/05/20  0536 11/04/20  0557   WBC 10*3/mm3 7.29 6.95 6.97   HEMOGLOBIN g/dL 8.4* 8.5* 8.3*   HEMATOCRIT % 26.0* 26.8* 25.6*   PLATELETS 10*3/mm3 178 221 192         Results from last 7 days   Lab Units 11/07/20  0704 11/05/20  0536 11/04/20  0557  11/02/20  0447 11/01/20  0624   SODIUM mmol/L 142 143 145   < > 146* 150*   POTASSIUM mmol/L 4.5 4.4 4.1   < > 4.1 3.7   CHLORIDE mmol/L 113* 115* 117*   < > 117* 119*   CO2 mmol/L 24.0 25.0 23.0   < > 23.0 24.0   BUN mg/dL 21 29* 35*   < > 53* 69*   CREATININE mg/dL 0.77 0.88 0.89   < > 1.35* 1.80*   CALCIUM mg/dL 9.0 8.8 8.6   < > 8.6 8.7   BILIRUBIN mg/dL  --   --   --   --  0.3 0.3   ALK PHOS U/L  --   --   --   --  78 79   ALT (SGPT) U/L  --   --   --   --  28 31   AST (SGOT) U/L  --   --   --   --  31 35   GLUCOSE mg/dL 115* 130* 125*   < > 166* 170*    < > = values in this interval not displayed.       Radiology:   Imaging Results (Last 72 Hours)     ** No results found for the last 72 hours. **          Culture:  Urine Culture   Date Value Ref Range Status   10/30/2020 No growth  Final   10/27/2020 >100,000 CFU/mL Escherichia coli (A)  Final         Assessment   Acute kidney injury/Prerenal azotemia  Stage III chronic kidney disease  Hypernatremia  Acute GI bleed  Acute metabolic encephalopathy  Type II diabetic nephropathy  Iron deficiency anemia  Status post EGD/duodenal ulcer  E coli UTI     Plan:  Improved on D5W with K, continue oral fluids today  IV bumex again as needed, doing well with oral  lasix  PPI  Monitor labs  D/w pt/nursing  Ok for d/c when insurance arranged  blood pressure trends improving, continue to monitor    Priyank Brown MD  11/7/2020  17:38 CST

## 2020-11-07 NOTE — NURSING NOTE
Bedside neuros completed with ROZINA Mario. No neuro changes noted, pt continues to be disoriented to time and situation.

## 2020-11-07 NOTE — PLAN OF CARE
Goal Outcome Evaluation:  Plan of Care Reviewed With: patient  Progress: improving  Outcome Summary: Pt AAO to person and place this shift. Drowsy throughout the day. Up x1 to BR, voiding but incontinent at times. Meds crushed in applesauce. Continues PO omnicef for UTI. Plan to dc to [place] when precert is complete. Safety maintained. Will continue to monitor.

## 2020-11-07 NOTE — PLAN OF CARE
Goal Outcome Evaluation:  Plan of Care Reviewed With: patient  Progress: improving  Outcome Summary: Patient disoriented to time and situation. Disoriented to year, but knows month. About midnight he tried to get out of bed, had to reorient him, otherwise rest of night he has been sleeping. Follows commands appropriately. Denies any pain, other than he still has some R arm tenderness. Fidget, takes off gown, sheet, tele leads. His bp has been high, 171/50, 182/62, this morning systolic 184. Medicated once with prn hydralazine. Reposition every 2 hours. Incontinent. SCD's on. Tele: nsr. Meds given in applesauce. Keep hob elevated. Aspiration precautions.  Safety maintained. Plan for dc to [place] when precert is complete.

## 2020-11-07 NOTE — PLAN OF CARE
Problem: Adult Inpatient Plan of Care  Goal: Plan of Care Review  Flowsheets (Taken 11/7/2020 1128)  Progress: improving  Plan of Care Reviewed With: patient  Outcome Summary: Bed mobility min/mod x1. sit-stand w/ bed elevated min x2. Pt able to amb today min 2 rwx 20'x2 follow w/ chair help w/ AD cues for safety. Pt would benfit from cont PT upon d/c

## 2020-11-08 NOTE — PLAN OF CARE
Goal Outcome Evaluation:  Plan of Care Reviewed With: patient, family  Progress: no change  Outcome Summary: Pt has been o/t self only, occasionally place. BM today. VSS. Pt still very weak and poor muscle strength. BP stable enough not to receive PRN med. Family decided to take pt home at discharge instead of SNF. Safety maintained. Will cont to monitor.

## 2020-11-08 NOTE — PROGRESS NOTES
Continued Stay Note   [place]     Patient Name: Eron Escalante  MRN: [id number]  Today's Date: 11/8/2020    Admit Date: 10/26/2020    Discharge Plan     Row Name 11/08/20 1223       Plan    Plan Comments  Family have decided to take PT home rather than go to SNF. They are going to need HH and DME. Family have selected [facility] from provider list and hve no preference on DME provider. They are requesting a hospital bed, Bedside commode, wheel chair and a walker. Family is aware Medicare may not approve all DME and they prefer for Medicare to be billed for the wheelchair and they would pay out of pocket for the walker if both aren't covered. SW also advised that the hospital bed may not be covered, that ask that we try to see if it will be. Will await DME and HH orders and will make referrals once orders are entered.        Discharge Codes    No documentation.       Expected Discharge Date and Time     Expected Discharge Date Expected Discharge Time    Nov 4, 2020             HENNY Luu

## 2020-11-08 NOTE — PLAN OF CARE
Include Location In Plan?: No Nutrition: follow up completed. Pt sleeping but family at bedside stated appetite is improving. PO average intake is 66.6% of 3 meals. RDN will continue to follow pt and encourage intake.    Detail Level: Zone

## 2020-11-08 NOTE — THERAPY TREATMENT NOTE
Acute Care - Physical Therapy Treatment Note  [facility]     Patient Name: Eron Escalante  : 1934  MRN: [id number]  Today's Date: 2020   Onset of Illness/Injury or Date of Surgery: 10/26/20       PT Assessment (last 12 hours)      PT Evaluation and Treatment     Row Name 20 1111          Physical Therapy Time and Intention    Subjective Information  no complaints  -KJ     Document Type  therapy note (daily note)  -KJ     Mode of Treatment  physical therapy  -KJ     Patient Effort  adequate  -KJ     Row Name 20 1111          General Information    Existing Precautions/Restrictions  fall  -KJ     Row Name 20 1111          Pain Scale: Numbers Pre/Post-Treatment    Pretreatment Pain Rating  0/10 - no pain  -KJ     Posttreatment Pain Rating  0/10 - no pain  -KJ     Row Name 20 1111          Bed Mobility    Supine-Sit Kay (Bed Mobility)  verbal cues;moderate assist (50% patient effort)  -KJ     Row Name 20 1111          Transfers    Sit-Stand Kay (Transfers)  verbal cues;minimum assist (75% patient effort) elevated bed; pt has lift chair at home  -KJ     Stand-Sit Kay (Transfers)  verbal cues;minimum assist (75% patient effort)  -KJ     Row Name 20 1111          Sit-Stand Transfer    Assistive Device (Sit-Stand Transfers)  walker, front-wheeled  -KJ     Row Name 20 1111          Stand-Sit Transfer    Assistive Device (Stand-Sit Transfers)  walker, front-wheeled  -KJ     Row Name 20 1111          Gait/Stairs (Locomotion)    Kay Level (Gait)  verbal cues;minimum assist (75% patient effort)  -KJ     Assistive Device (Gait)  walker, front-wheeled  -KJ     Distance in Feet (Gait)  20' x 2; 15'  -KJ     Pattern (Gait)  step-through  -KJ     Deviations/Abnormal Patterns (Gait)  stride length decreased;gait speed decreased  -KJ     Bilateral Gait Deviations  forward flexed posture  -KJ     Row Name 20 1111          Positioning  and Restraints    Pre-Treatment Position  in bed  -KJ     Post Treatment Position  chair  -KJ     In Bed  call light within reach  -KJ       User Key  (r) = Recorded By, (t) = Taken By, (c) = Cosigned By    Initials Name Provider Type    Renetta Petersen, PTA Physical Therapy Assistant        Physical Therapy Education                 Title: PT OT SLP Therapies (In Progress)     Topic: Physical Therapy (In Progress)     Point: Mobility training (Done)     Learning Progress Summary           Patient Acceptance, E,TB, VU by  at 11/2/2020 1213    Comment: TRANS    Acceptance, E, VU by  at 10/27/2020 1543    Comment: Family educated on POC.   Family Acceptance, E, VU by  at 10/31/2020 1334    Comment: benefits of PT and POC, call for A OOB                   Point: Home exercise program (Done)     Learning Progress Summary           Patient Acceptance, E,TB,D,H, VU by  at 11/5/2020 0845    Comment: BLE HEP    Acceptance, E,TB, VU by  at 11/3/2020 1525    Comment: BLE HEP                   Point: Body mechanics (Not Started)     Learner Progress:  Not documented in this visit.          Point: Precautions (Done)     Learning Progress Summary           Family Acceptance, E, VU by  at 10/31/2020 1334    Comment: benefits of PT and POC, call for A OOB                               User Key     Initials Effective Dates Name Provider Type [facility] 08/02/16 -  Maria C Fagan, PTA Physical Therapy Assistant PT     08/02/16 -  Sujit Ni, PT Physical Therapist PT     10/19/20 -  Yuli Chávez, TACO Student PT Student PT              PT Recommendation and Plan     Plan of Care Reviewed With: patient  Progress: improving  Outcome Summary: PT tx completed. Pt fowlers position in bed. ModA sup>sit, elevated bed sit<>stand Gabbi. Amb 30' x 2 with r wx Gabbi. Pt has moderate forward flexion, decreased step length, and decreased safety awareness of gait mechanics. Benefit from rehab for cont'd PT/Ot.  Outcome  Measures     Row Name 11/06/20 0907             How much help from another is currently needed...    Putting on and taking off regular lower body clothing?  1  -CJ      Bathing (including washing, rinsing, and drying)  1  -CJ      Toileting (which includes using toilet bed pan or urinal)  1  -CJ      Putting on and taking off regular upper body clothing  1  -CJ      Taking care of personal grooming (such as brushing teeth)  2  -CJ      Eating meals  2  -CJ      AM-PAC 6 Clicks Score (OT)  8  -         Functional Assessment    Outcome Measure Options  AM-PAC 6 Clicks Daily Activity (OT)  -        User Key  (r) = Recorded By, (t) = Taken By, (c) = Cosigned By    Initials Name Provider Type     Chris Campbell COTA/L Occupational Therapy Assistant           Time Calculation:   PT Charges     Row Name 11/08/20 1138             Time Calculation    Start Time  1111  -KJ      Stop Time  1140  -KJ      Time Calculation (min)  29 min  -KJ      PT Received On  11/08/20  -KJ      PT Goal Re-Cert Due Date  11/10/20  -KJ         Time Calculation- PT    Total Timed Code Minutes- PT  29 minute(s)  -KJ        User Key  (r) = Recorded By, (t) = Taken By, (c) = Cosigned By    Initials Name Provider Type    Renetta Petersen PTA Physical Therapy Assistant        Therapy Charges for Today     Code Description Service Date Service Provider Modifiers Qty    [id number] HC GAIT TRAINING EA 15 MIN 11/8/2020 Renetta Engel PTA GP 2          PT G-Codes  Outcome Measure Options: AM-PAC 6 Clicks Daily Activity (OT)  AM-PAC 6 Clicks Score (PT): 8  AM-PAC 6 Clicks Score (OT): 8    Renetta Engel PTA  11/8/2020

## 2020-11-08 NOTE — PLAN OF CARE
Goal Outcome Evaluation:  Plan of Care Reviewed With: patient  Progress: no change  Outcome Summary: Patient still disoriented to place, time and situation. Cooperative. VSS. Tele:nsr. Reposition every 2 hours. Takes meds whole in applesauce. Diet soft, ground. Honey thickened liquids. HOB elevated. Scd's on. Plan for dc to [place] when precert is complete.

## 2020-11-08 NOTE — PROGRESS NOTES
[facility]  INPATIENT PROGRESS NOTE    Patient Name: Eron Escalante  Date of Admission: 10/26/2020  Today's Date: 11/08/20  Length of Stay: 13  Primary Care Physician: Jeffrey Owen MD    Subjective   Chief Complaint: Weakness.   HPI   Discussion with his son at the bedside today.  He is starting to think that they would just want to take him home.  They are tired of waiting on his insurance to come through.  They feel that they can handle him.  They would like home health.  Family will help sit with him.  They are worried that he will go to one of the skilled nursing facilities and get COVID-19.  However, he does not want to do this until Tuesday as they need time to make arrangements to have him at home.   to regroup with them.    No events overnight per nursing.    The patient has no new complaints.    Review of Systems   All pertinent negatives and positives are as above. All other systems have been reviewed and are negative unless otherwise stated.     Objective    Temp:  [97.8 °F (36.6 °C)-98.3 °F (36.8 °C)] 98.3 °F (36.8 °C)  Heart Rate:  [63-75] 70  Resp:  [16] 16  BP: (141-175)/(46-59) 148/55  Physical Exam  Vitals signs and nursing note reviewed.   Constitutional:       Appearance: He is well-developed.      Comments: In bed, sleeping, but again arouses easily.  His son is present.  Discussed with his nurse, Tram.   HENT:      Head: Normocephalic and atraumatic.      Comments: Poor dentition.  Eyes:      Conjunctiva/sclera: Conjunctivae normal.      Pupils: Pupils are equal, round, and reactive to light.   Neck:      Musculoskeletal: Neck supple.      Vascular: No JVD.   Cardiovascular:      Rate and Rhythm: Normal rate. Rhythm irregular.      Heart sounds: Normal heart sounds. No murmur. No friction rub. No gallop.    Pulmonary:      Effort: Pulmonary effort is normal. No respiratory distress.   Abdominal:      General: Bowel  sounds are normal. There is no distension.      Palpations: Abdomen is soft.   Musculoskeletal: Normal range of motion.         General: Swelling (trace) present. No tenderness or deformity.   Skin:     General: Skin is warm and dry.      Findings: No rash.   Neurological:      General: No focal deficit present.      Mental Status: He is alert and oriented to person, place, and time.      Cranial Nerves: No cranial nerve deficit.      Motor: Weakness present. No abnormal muscle tone.      Deep Tendon Reflexes: Reflexes normal.      Comments: Follows all commands without difficulty.   Psychiatric:         Behavior: Behavior normal.         Thought Content: Thought content normal.         Judgment: Judgment normal.       Results Review:  I have reviewed the labs, radiology results, and diagnostic studies.    Laboratory Data:   No new labs today as they have been recently stable.    I have reviewed the patient's current medications.     Assessment/Plan     Active Hospital Problems    Diagnosis   • **Metabolic encephalopathy   • E. coli UTI (urinary tract infection)   • Hypernatremia   • Acute renal failure superimposed on stage 3a chronic kidney disease (CMS/[facility])   • Acute blood loss anemia   • Duodenal ulcer   • UGIB (upper gastrointestinal bleed)   • History of stroke   • Chronic diastolic congestive heart failure (CMS/[facility])   • A-fib (CMS/[facility])   • HTN (hypertension)   • Type 2 diabetes mellitus, without long-term current use of insulin (CMS/[facility])     Plan:  The patient was admitted on 10/27 by Dr. Rosenberg.  He presented as a transfer from [facility] as his granddaughter works here.  He had been admitted there and was having much difficulty with confusion.  This has been proven to be multifactorial as evidenced below.    He has been evaluated by neurology.  He had an MRI of the brain without contrast on 10/30 that showed no evidence of acute infarct.    He has been followed by nephrology for acute renal failure  superimposed on CKD, stage III.  His serum creatinine continues to improve.  His elevated BUN is secondary to recent gastrointestinal bleeding as well as his acute renal failure.  He developed some hyponatremia secondary to resuscitation with crystalloid fluids.  This resolved with recent dextrose fluids.  Stopped IV fluids on 11/3 and gave Bumex 0.5 mg IV x1.  Resumed amlodipine and losartan recently and have titrated them.  Lasix 20 mg orally daily.    He had heme positive stool and melena.  He was taken for endoscopy on 10/28 by Dr. Bliss. He had multiple nonbleeding duodenal ulcers at that time.  He had a hiatal hernia.  Urease negative.  He is being treated with lansoprazole.  He has received a total of 4 units packed red blood cells.  He was last transfused on 10/31.  Hemoglobin remained stable.  Anemia substrates are consistent with chronic disease.    He has an E. coli urinary tract infection that is resistant to ampicillin, levofloxacin, tetracycline, and Bactrim.  He has been treated with ceftriaxone and was converted to oral Omnicef on 11/4.    He has a history of atrial fibrillation for which he will not be anticoagulated at the moment given his recent gastrointestinal bleed.  I restarted his aspirin on 11/2.    Oral hypoglycemics on hold.  Continue sliding scale insulin.  His most recent hemoglobin A1c is 6.1.    SCDs for DVT prophylaxis.    Discharge Planning: Still waiting on insurance.  He has been medically ready to discharge for several days now.  His family is now starting to think that they does want to take him home instead.  They anticipate Tuesday even though they understand that he is medically ready for discharge at any point.    Electronically signed by Elpidio Irby DO, 11/08/20, 10:15 CST.

## 2020-11-08 NOTE — PLAN OF CARE
Problem: Adult Inpatient Plan of Care  Goal: Plan of Care Review  Outcome: Ongoing, Progressing  Flowsheets (Taken 11/8/2020 1141)  Progress: improving  Plan of Care Reviewed With: patient  Outcome Summary: PT tx completed. Pt fowlers position in bed. ModA sup>sit, elevated bed sit<>stand Gabbi. Amb 30' x 2 with r wx Gabbi. Pt has moderate forward flexion, decreased step length, and decreased safety awareness of gait mechanics. Benefit from rehab for cont'd PT/Ot.

## 2020-11-08 NOTE — PAYOR COMM NOTE
"[id number]  PATIENT STILL WAITING INS TO APPROVE FOR SNF  11/7 CLINICAL UPDATE  UR  [id number]    Gabriel Escalante (85 y.o. Male)     Date of Birth Social Security Number Address Home Phone MRN    [id number]  [id number] [facility] [address] [phone] [id number]    [employer]odaism Marital Status          Yarsanism        Admission Date Admission Type Admitting Provider Attending Provider Department, Room/Bed    10/26/20 Urgent Elpidio Irby DO Hancock, John C, DO [facility] 3A, 328/1    Discharge Date Discharge Disposition Discharge Destination                       Attending Provider: Elpidio Irby DO    Allergies: Lorazepam, Penicillins, Adhesive Tape    Isolation: None   Infection: None   Code Status: No CPR    Ht: 177.8 cm (70\")   Wt: 92.9 kg (204 lb 12.9 oz)    Admission Cmt: None   Principal Problem: Metabolic encephalopathy [G93.41]                 Active Insurance as of 10/26/2020     Primary Coverage     Payor Plan Insurance Group Employer/Plan Group    [employer] EMPLOYEE [id number]     Payor Plan Address Payor Plan Phone Number Payor Plan Fax Number Effective Dates    [address] [phone]  1/1/2015 - None Entered    [facility] [id number]       Subscriber Name Subscriber Birth Date Member ID       GABRIEL ESCALANTE 1934 [id number]           Secondary Coverage     Payor Plan Insurance Group Employer/Plan Group    MEDICARE MEDICARE A & B      Payor Plan Address Payor Plan Phone Number Payor Plan Fax Number Effective Dates    [address] [phone]  11/1/1999 - None Entered    [facility] [id number]       Subscriber Name Subscriber Birth Date Member ID       GABRIEL ESCALANTE 1934 [id number]                 Emergency Contacts      (Rel.) Home Phone Work Phone Mobile Phone    KirkIsabel (Grandchild) [phone] -- --    Rose Escalante (Spouse) [phone] -- --               Physician Progress Notes (last 48 hours) " (Notes from 11/06/20 1436 through 11/08/20 1436)      Elpidio Irby, DO at 11/08/20 1015              [facility] Medicine Services  INPATIENT PROGRESS NOTE    Patient Name: Eron Escalante  Date of Admission: 10/26/2020  Today's Date: 11/08/20  Length of Stay: 13  Primary Care Physician: Jeffrey Owen MD    Subjective   Chief Complaint: Weakness.   HPI   Discussion with his son at the bedside today.  He is starting to think that they would just want to take him home.  They are tired of waiting on his insurance to come through.  They feel that they can handle him.  They would like home health.  Family will help sit with him.  They are worried that he will go to one of the skilled nursing facilities and get COVID-19.  However, he does not want to do this until Tuesday as they need time to make arrangements to have him at home.   to regroup with them.    No events overnight per nursing.    The patient has no new complaints.    Review of Systems   All pertinent negatives and positives are as above. All other systems have been reviewed and are negative unless otherwise stated.     Objective    Temp:  [97.8 °F (36.6 °C)-98.3 °F (36.8 °C)] 98.3 °F (36.8 °C)  Heart Rate:  [63-75] 70  Resp:  [16] 16  BP: (141-175)/(46-59) 148/55  Physical Exam  Vitals signs and nursing note reviewed.   Constitutional:       Appearance: He is well-developed.      Comments: In bed, sleeping, but again arouses easily.  His son is present.  Discussed with his nurse, Tram.   HENT:      Head: Normocephalic and atraumatic.      Comments: Poor dentition.  Eyes:      Conjunctiva/sclera: Conjunctivae normal.      Pupils: Pupils are equal, round, and reactive to light.   Neck:      Musculoskeletal: Neck supple.      Vascular: No JVD.   Cardiovascular:      Rate and Rhythm: Normal rate. Rhythm irregular.      Heart sounds: Normal heart sounds. No murmur. No friction rub. No gallop.    Pulmonary:       Effort: Pulmonary effort is normal. No respiratory distress.   Abdominal:      General: Bowel sounds are normal. There is no distension.      Palpations: Abdomen is soft.   Musculoskeletal: Normal range of motion.         General: Swelling (trace) present. No tenderness or deformity.   Skin:     General: Skin is warm and dry.      Findings: No rash.   Neurological:      General: No focal deficit present.      Mental Status: He is alert and oriented to person, place, and time.      Cranial Nerves: No cranial nerve deficit.      Motor: Weakness present. No abnormal muscle tone.      Deep Tendon Reflexes: Reflexes normal.      Comments: Follows all commands without difficulty.   Psychiatric:         Behavior: Behavior normal.         Thought Content: Thought content normal.         Judgment: Judgment normal.       Results Review:  I have reviewed the labs, radiology results, and diagnostic studies.    Laboratory Data:   No new labs today as they have been recently stable.    I have reviewed the patient's current medications.     Assessment/Plan     Active Hospital Problems    Diagnosis   • **Metabolic encephalopathy   • E. coli UTI (urinary tract infection)   • Hypernatremia   • Acute renal failure superimposed on stage 3a chronic kidney disease (CMS/[facility])   • Acute blood loss anemia   • Duodenal ulcer   • UGIB (upper gastrointestinal bleed)   • History of stroke   • Chronic diastolic congestive heart failure (CMS/[facility])   • A-fib (CMS/[facility])   • HTN (hypertension)   • Type 2 diabetes mellitus, without long-term current use of insulin (CMS/[facility])     Plan:  The patient was admitted on 10/27 by Dr. Rosenberg.  He presented as a transfer from [facility] as his granddaughter works here.  He had been admitted there and was having much difficulty with confusion.  This has been proven to be multifactorial as evidenced below.    He has been evaluated by neurology.  He had an MRI of the brain without contrast on 10/30 that  showed no evidence of acute infarct.    He has been followed by nephrology for acute renal failure superimposed on CKD, stage III.  His serum creatinine continues to improve.  His elevated BUN is secondary to recent gastrointestinal bleeding as well as his acute renal failure.  He developed some hyponatremia secondary to resuscitation with crystalloid fluids.  This resolved with recent dextrose fluids.  Stopped IV fluids on 11/3 and gave Bumex 0.5 mg IV x1.  Resumed amlodipine and losartan recently and have titrated them.  Lasix 20 mg orally daily.    He had heme positive stool and melena.  He was taken for endoscopy on 10/28 by Dr. Bliss. He had multiple nonbleeding duodenal ulcers at that time.  He had a hiatal hernia.  Urease negative.  He is being treated with lansoprazole.  He has received a total of 4 units packed red blood cells.  He was last transfused on 10/31.  Hemoglobin remained stable.  Anemia substrates are consistent with chronic disease.    He has an E. coli urinary tract infection that is resistant to ampicillin, levofloxacin, tetracycline, and Bactrim.  He has been treated with ceftriaxone and was converted to oral Omnicef on 11/4.    He has a history of atrial fibrillation for which he will not be anticoagulated at the moment given his recent gastrointestinal bleed.  I restarted his aspirin on 11/2.    Oral hypoglycemics on hold.  Continue sliding scale insulin.  His most recent hemoglobin A1c is 6.1.    SCDs for DVT prophylaxis.    Discharge Planning: Still waiting on insurance.  He has been medically ready to discharge for several days now.  His family is now starting to think that they does want to take him home instead.  They anticipate Tuesday even though they understand that he is medically ready for discharge at any point.    Electronically signed by Elpidio Irby DO, 11/08/20, 10:15 CST.          Electronically signed by Elpidio Irby DO at 11/08/20 1020     Priyank Brown  MD Robbie at 11/07/20 1738          Nephrology ([facility]) Progress Note      Patient:  Eron Escalante  YOB: 1934  Date of Service: 11/7/2020  MRN: [id number]   Acct: [id number]   Primary Care Physician: Jeffrey Owen MD  Advance Directive:   Code Status and Medical Interventions:   Ordered at: 10/27/20 0304     Limited Support to NOT Include:    Intubation     Level Of Support Discussed With:    Next of Kin (If No Surrogate)    Health Care Surrogate     Code Status:    No CPR     Medical Interventions (Level of Support Prior to Arrest):    Limited     Admit Date: 10/26/2020       Hospital Day: 12  Referring Provider: Alcides Tomlinson MD      Patient personally seen and examined.  Complete chart including Consults, Notes, Operative Reports, Labs, Cardiology, and Radiology studies reviewed as able.        Subjective:  Eron Escalante is a 85 y.o. male  whom we were consulted for acute kidney injury/chronic kidney disease.  Patient has stage III chronic kidney disease at baseline and has seen Dr. Victoria previously.  He was initially admitted to [facility] with encephalopathy, MRI of the brain did not show any evidence of intracranial pathology.  However he was transferred to [facility] as per family request.  Neurological work-up over here consistent with repeat MRI scan consistent with microvascular chronic changes.  Patient also has bilateral Doppler studies of carotid arteries consistent with mild to moderate stenosis but no intervention recommended.  However his serum creatinine continued to rise, patient also has hypernatremia as he has not been drinking due to severe encephalopathy.  Dr. Tomlinson had started hypotonic fluid for correction of hypernatremia as well as high BUN.      Today, family not present.  Pt was previously taking good intake per family. No new events. Denied cp/soa/n/v.      Allergies:  Lorazepam, Penicillins, and Adhesive  tape    Home Meds:  Medications Prior to Admission   Medication Sig Dispense Refill Last Dose   • allopurinol (ZYLOPRIM) 300 MG tablet Take 600 mg by mouth Daily.   Unknown at Unknown time   • amLODIPine (NORVASC) 5 MG tablet Take 5 mg by mouth Daily.   Unknown at Unknown time   • aspirin 81 MG chewable tablet Chew 81 mg Daily.   Unknown at Unknown time   • Cholecalciferol (VITAMIN D) 1000 units tablet Take 1,000 Units by mouth.   Unknown at Unknown time   • colchicine 0.6 MG tablet Take 0.6 mg by mouth Daily As Needed (gout flare-up).   Unknown at Unknown time   • finasteride (PROSCAR) 5 MG tablet Take 5 mg by mouth Daily.   Unknown at Unknown time   • folic acid (FOLVITE) 400 MCG tablet Take 400 mcg by mouth.   Unknown at Unknown time   • glimepiride (AMARYL) 4 MG tablet Take 4 mg by mouth 2 (Two) Times a Day With Meals. Needs pm dose   Unknown at Unknown time   • losartan (COZAAR) 50 MG tablet Take 25 mg by mouth Daily.   Unknown at Unknown time   • meclizine (ANTIVERT) 25 MG tablet Take 25 mg by mouth. 3-4 times daily   Unknown at Unknown time   • metoprolol succinate XL (TOPROL XL) 25 MG 24 hr tablet Take 25 mg by mouth every night at bedtime.   Unknown at Unknown time   • nitroglycerin (NITROSTAT) 0.4 MG SL tablet Place 0.4 mg under the tongue Every 5 (Five) Minutes As Needed.   Unknown at Unknown time   • pantoprazole (PROTONIX) 40 MG EC tablet Take 40 mg by mouth Every 12 (Twelve) Hours.   Unknown at Unknown time   • potassium chloride (K-DUR,KLOR-CON) 20 MEQ CR tablet Take 10 mEq by mouth Every 12 (Twelve) Hours.   Unknown at Unknown time   • Pyridoxine HCl (VITAMIN B6) 200 MG tablet Take 1 tablet by mouth Daily.   Unknown at Unknown time   • rOPINIRole (REQUIP) 1 MG tablet Take 1 mg by mouth Every Night. Take 1 hour before bedtime.   Unknown at Unknown time   • sennosides-docusate sodium (SENOKOT-S) 8.6-50 MG tablet Take 2 tablets by mouth 2 (Two) Times a Day As Needed for constipation. 45 tablet 2 Unknown  at Unknown time   • tamsulosin (FLOMAX) 0.4 MG capsule 24 hr capsule Take 1 capsule by mouth Daily.   Unknown at Unknown time   • thiamine (VITAMIN B-1) 100 MG tablet Take 100 mg by mouth.   Unknown at Unknown time   • vitamin C (ASCORBIC ACID) 500 MG tablet Take 500 mg by mouth Daily.   Unknown at Unknown time   • [DISCONTINUED] pravastatin (PRAVACHOL) 40 MG tablet Take 40 mg by mouth Every Night.   Unknown at Unknown time       Medicines:  Current Facility-Administered Medications   Medication Dose Route Frequency Provider Last Rate Last Dose   • acetaminophen (TYLENOL) tablet 650 mg  650 mg Oral Q4H PRN Forrest Bliss MD        Or   • acetaminophen (TYLENOL) suppository 650 mg  650 mg Rectal Q4H PRN Forrest Bliss MD       • allopurinol (ZYLOPRIM) tablet 300 mg  300 mg Oral Daily Forrest Bliss MD   300 mg at 11/07/20 0828   • amLODIPine (NORVASC) tablet 10 mg  10 mg Oral Q24H Elpidio Irby DO   10 mg at 11/07/20 0828   • aspirin chewable tablet 81 mg  81 mg Oral Daily Elpidio Irby DO   81 mg at 11/07/20 0828   • cefdinir (OMNICEF) capsule 300 mg  300 mg Oral Q12H Elpidio Irby DO   300 mg at 11/07/20 0828   • cholecalciferol (VITAMIN D3) tablet 1,000 Units  1,000 Units Oral Daily Forrest Bliss MD   1,000 Units at 11/07/20 0828   • colchicine tablet 0.6 mg  0.6 mg Oral Daily PRN Forrest Bliss MD       • dextrose (D50W) 25 g/ 50mL Intravenous Solution 25 g  25 g Intravenous Q15 Min PRN Forrest Bliss MD       • dextrose (GLUTOSE) oral gel 15 g  15 g Oral Q15 Min PRN Forrest Bliss MD       • folic acid (FOLVITE) tablet 400 mcg  400 mcg Oral Daily Forrest Bliss MD   400 mcg at 11/07/20 0828   • furosemide (LASIX) tablet 20 mg  20 mg Oral Daily Elpidio Irby DO   20 mg at 11/07/20 0828   • glucagon (human recombinant) (GLUCAGEN DIAGNOSTIC) injection 1 mg  1 mg Subcutaneous Q15 Min PRN Forrest Bliss MD       • hydrALAZINE (APRESOLINE) injection 10 mg  10 mg  Intravenous Q4H PRN Forrest Bliss MD   10 mg at 11/03/20 1148   • hydrALAZINE (APRESOLINE) tablet 10 mg  10 mg Oral Q6H PRN Iain Rosenberg MD   10 mg at 11/07/20 0148   • insulin lispro (humaLOG) injection 2-7 Units  2-7 Units Subcutaneous TID AC Forrest Bliss MD   2 Units at 11/06/20 1754   • isosorbide mononitrate (IMDUR) 24 hr tablet 30 mg  30 mg Oral Q24H Forrest Bliss MD   30 mg at 11/07/20 0828   • lansoprazole (FIRST) oral suspension 30 mg  30 mg Oral Q AM Iain Rosenberg MD   30 mg at 11/07/20 0650   • losartan (COZAAR) tablet 100 mg  100 mg Oral Q24H Elpidio Irby DO   100 mg at 11/07/20 0828   • nitroglycerin (NITROSTAT) SL tablet 0.4 mg  0.4 mg Sublingual Q5 Min PRN Forrest Bliss MD   0.4 mg at 10/27/20 1600   • ondansetron (ZOFRAN) tablet 4 mg  4 mg Oral Q6H PRN Forrest Bliss MD        Or   • ondansetron (ZOFRAN) injection 4 mg  4 mg Intravenous Q6H PRN Forrest Bliss MD       • potassium chloride (MICRO-K) CR capsule 10 mEq  10 mEq Oral BID With Meals Forrest Bliss MD   10 mEq at 11/07/20 0828   • pravastatin (PRAVACHOL) tablet 40 mg  40 mg Oral Nightly Forrest Bliss MD   40 mg at 11/06/20 2216   • prednisoLONE acetate (PRED FORTE) 1 % ophthalmic suspension 1 drop  1 drop Left Eye Q2H While Awake Forrest Bliss MD   1 drop at 11/07/20 1633   • sennosides-docusate (PERICOLACE) 8.6-50 MG per tablet 2 tablet  2 tablet Oral BID PRN Forrest Bliss MD       • sodium chloride 0.9 % flush 10 mL  10 mL Intravenous Q12H Forrest Bliss MD   10 mL at 11/05/20 2245   • sodium chloride 0.9 % flush 10 mL  10 mL Intravenous PRN Forrest Bliss MD       • sodium chloride 0.9 % infusion 500 mL  500 mL Intravenous Continuous PRN Forrest Bliss MD 25 mL/hr at 10/28/20 1150     • terazosin (HYTRIN) capsule 1 mg  1 mg Oral Nightly Forrest Bliss MD   1 mg at 11/06/20 2217   • thiamine (VITAMIN B-1) tablet 100 mg  100 mg Oral Daily Forrest Bliss MD    100 mg at 11/07/20 0829   • vitamin B-6 (PYRIDOXINE) tablet 200 mg  200 mg Oral Daily Forrest Bliss MD   200 mg at 11/07/20 0829   • vitamin C (ASCORBIC ACID) tablet 500 mg  500 mg Oral Daily Forrest Bliss MD   500 mg at 11/07/20 0828       Past Medical History:  Past Medical History:   Diagnosis Date   • A-fib (CMS/[facility]) 11/15/2016   • Anemia     gets shots every few months to build up blood. sees dr adam.   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   • Arthritis    • Bradycardia 11/15/2016   • CAD in native artery 11/15/2016   • Chest pain 11/15/2016   • CHF (congestive heart failure) (CMS/[facility])    • Chronic kidney disease    • COPD (chronic obstructive pulmonary disease) (CMS/[facility])    • DDD (degenerative disc disease), lumbar 6/27/2017   • Heart murmur    • HTN (hypertension) 11/15/2016   • Hyperlipidemia    • Lumbar stenosis with neurogenic claudication 6/27/2017   • Murmur 4/19/2019   • Myocardial infarction (CMS/[facility])    • Sleep apnea    • Stroke (CMS/[facility])    • Type 2 diabetes mellitus (CMS/[facility]) 11/15/2016   • Ulcer of abdomen wall (CMS/[facility])        Past Surgical History:  Past Surgical History:   Procedure Laterality Date   • APPENDECTOMY     • BACK SURGERY      x2   • CARDIAC CATHETERIZATION Left     1/2010   • CARPAL TUNNEL RELEASE Bilateral    • CHOLECYSTECTOMY     • COLONOSCOPY N/A 9/7/2017    Procedure: COLONOSCOPY WITH ANESTHESIA;  Surgeon: Joshua Rich DO;  Location: [facility] ENDOSCOPY;  Service:    • CORONARY ARTERY BYPASS GRAFT      2/2006 w/PTCA & KIMMY    • CORONARY STENT PLACEMENT     • ENDOSCOPY N/A 12/14/2016    Procedure: ESOPHAGOGASTRODUODENOSCOPY WITH ANESTHESIA;  Surgeon: Isabel Dexter MD;  Location: [facility] ENDOSCOPY;  Service:    • ENDOSCOPY N/A 12/16/2016    Procedure: ESOPHAGOGASTRODUODENOSCOPY WITH ANESTHESIA;  Surgeon: Joshua Rich DO;  Location: [facility] ENDOSCOPY;  Service:    • ENDOSCOPY N/A 4/10/2017    Procedure: ESOPHAGOGASTRODUODENOSCOPY WITH ANESTHESIA;  Surgeon:  Joshua Rich, ;  Location: [facility] ENDOSCOPY;  Service:    • ENDOSCOPY N/A 10/28/2020    Procedure: ESOPHAGOGASTRODUODENOSCOPY WITH ANESTHESIA;  Surgeon: Forrest Bliss MD;  Location: [facility] ENDOSCOPY;  Service: Gastroenterology;  Laterality: N/A;  pre: GI bleed  post: duodenal ulcers, hiatal hernia   Jeffrey Owen MD   • EYE SURGERY Left     x 2   • JOINT REPLACEMENT     • PERIPHERAL ARTERIAL STENT GRAFT     • REPLACEMENT TOTAL KNEE Left     2002   • SHOULDER ROTATOR CUFF REPAIR Bilateral        Family History  Family History   Problem Relation Age of Onset   • Coronary artery disease Father    • Heart disease Father    • Coronary artery disease Brother    • Heart attack Brother    • Cancer Mother    • No Known Problems Sister    • Heart disease Son    • Cancer Sister    • Cancer Sister        Social History  Social History     Socioeconomic History   • Marital status:      Spouse name: Not on file   • Number of children: Not on file   • Years of education: Not on file   • Highest education level: Not on file   Tobacco Use   • Smoking status: Never Smoker   • Smokeless tobacco: Never Used   Substance and Sexual Activity   • Alcohol use: No   • Drug use: No   • Sexual activity: Never     Partners: Female         Review of Systems:  History obtained from chart review and the patient  General ROS: No fever or chills  Respiratory ROS: No cough, shortness of breath, wheezing  Cardiovascular ROS: No chest pain or palpitations  Gastrointestinal ROS: No abdominal pain or melena  Genito-Urinary ROS: No dysuria or hematuria    Objective:  Patient Vitals for the past 24 hrs:   BP Temp Temp src Pulse Resp SpO2   11/07/20 1527 147/46 98 °F (36.7 °C) Oral 63 16 96 %   11/07/20 1108 151/55 97.8 °F (36.6 °C) Oral 68 16 96 %   11/07/20 0837 145/54 97.7 °F (36.5 °C) Oral 67 16 96 %   11/07/20 0336 (!) 184/62 97.6 °F (36.4 °C) Oral 70 18 96 %   11/06/20 2327 (!) 182/62 97.6 °F (36.4 °C) Oral 65 18 96 %   11/06/20  2035 171/50 97.6 °F (36.4 °C) Oral 64 18 96 %       Intake/Output Summary (Last 24 hours) at 11/7/2020 1738  Last data filed at 11/7/2020 1711  Gross per 24 hour   Intake 770 ml   Output 300 ml   Net 470 ml     General: awake/alert   Chest:  clear to auscultation bilaterally without respiratory distress  CVS: IRRR  Abdominal: soft, nontender, positive bowel sounds  Extremities: no cyanosis or edema  Skin: warm and dry without rash      Labs:  Results from last 7 days   Lab Units 11/07/20  0704 11/05/20  0536 11/04/20  0557   WBC 10*3/mm3 7.29 6.95 6.97   HEMOGLOBIN g/dL 8.4* 8.5* 8.3*   HEMATOCRIT % 26.0* 26.8* 25.6*   PLATELETS 10*3/mm3 178 221 192         Results from last 7 days   Lab Units 11/07/20  0704 11/05/20  0536 11/04/20  0557  11/02/20  0447 11/01/20  0624   SODIUM mmol/L 142 143 145   < > 146* 150*   POTASSIUM mmol/L 4.5 4.4 4.1   < > 4.1 3.7   CHLORIDE mmol/L 113* 115* 117*   < > 117* 119*   CO2 mmol/L 24.0 25.0 23.0   < > 23.0 24.0   BUN mg/dL 21 29* 35*   < > 53* 69*   CREATININE mg/dL 0.77 0.88 0.89   < > 1.35* 1.80*   CALCIUM mg/dL 9.0 8.8 8.6   < > 8.6 8.7   BILIRUBIN mg/dL  --   --   --   --  0.3 0.3   ALK PHOS U/L  --   --   --   --  78 79   ALT (SGPT) U/L  --   --   --   --  28 31   AST (SGOT) U/L  --   --   --   --  31 35   GLUCOSE mg/dL 115* 130* 125*   < > 166* 170*    < > = values in this interval not displayed.       Radiology:   Imaging Results (Last 72 Hours)     ** No results found for the last 72 hours. **          Culture:  Urine Culture   Date Value Ref Range Status   10/30/2020 No growth  Final   10/27/2020 >100,000 CFU/mL Escherichia coli (A)  Final         Assessment   Acute kidney injury/Prerenal azotemia  Stage III chronic kidney disease  Hypernatremia  Acute GI bleed  Acute metabolic encephalopathy  Type II diabetic nephropathy  Iron deficiency anemia  Status post EGD/duodenal ulcer  E coli UTI     Plan:  Improved on D5W with K, continue oral fluids today  IV bumex again as  needed, doing well with oral lasix  PPI  Monitor labs  D/w pt/nursing  Ok for d/c when insurance arranged  blood pressure trends improving, continue to monitor    Priyank Brown MD  11/7/2020  17:38 CST    Electronically signed by Priyank Brown MD at 11/07/20 1746     Elpidio IrbyDO at 11/07/20 1421              [facility] Medicine Services  INPATIENT PROGRESS NOTE    Patient Name: Eron Escalante  Date of Admission: 10/26/2020  Today's Date: 11/07/20  Length of Stay: 12  Primary Care Physician: Jeffrey Owen MD    Subjective   Chief Complaint: Weakness.   HPI   Insurance never came through yesterday.  Will likely be Monday until we hear something.    Blood pressure still a problem at times.  I have adjusted his medications recently.    Hemoglobin stable.    No problems with his kidney function still.    Blood glucoses have been relatively well controlled.    Review of Systems   All pertinent negatives and positives are as above. All other systems have been reviewed and are negative unless otherwise stated.     Objective    Temp:  [97.6 °F (36.4 °C)-98.1 °F (36.7 °C)] 97.8 °F (36.6 °C)  Heart Rate:  [64-70] 68  Resp:  [16-18] 16  BP: (145-184)/(48-62) 151/55  Physical Exam  Vitals signs and nursing note reviewed.   Constitutional:       Appearance: He is well-developed.      Comments: In bed, sleeping, but arouses easily.  His granddaughter is not currently present.  Discussed with his nurse, Bronwyn.   HENT:      Head: Normocephalic and atraumatic.      Comments: Poor dentition.  Eyes:      Conjunctiva/sclera: Conjunctivae normal.      Pupils: Pupils are equal, round, and reactive to light.   Neck:      Musculoskeletal: Neck supple.      Vascular: No JVD.   Cardiovascular:      Rate and Rhythm: Normal rate. Rhythm irregular.      Heart sounds: Normal heart sounds. No murmur. No friction rub. No gallop.    Pulmonary:      Effort: Pulmonary effort is  normal. No respiratory distress.   Abdominal:      General: Bowel sounds are normal. There is no distension.      Palpations: Abdomen is soft.   Musculoskeletal: Normal range of motion.         General: Swelling (trace) present. No tenderness or deformity.   Skin:     General: Skin is warm and dry.      Findings: No rash.   Neurological:      General: No focal deficit present.      Mental Status: He is alert and oriented to person, place, and time.      Cranial Nerves: No cranial nerve deficit.      Motor: Weakness present. No abnormal muscle tone.      Deep Tendon Reflexes: Reflexes normal.      Comments: Follows all commands without difficulty.   Psychiatric:         Behavior: Behavior normal.         Thought Content: Thought content normal.         Judgment: Judgment normal.       Results Review:  I have reviewed the labs, radiology results, and diagnostic studies.    Laboratory Data:   Results from last 7 days   Lab Units 11/07/20  0704 11/05/20  0536 11/04/20  0557   WBC 10*3/mm3 7.29 6.95 6.97   HEMOGLOBIN g/dL 8.4* 8.5* 8.3*   HEMATOCRIT % 26.0* 26.8* 25.6*   PLATELETS 10*3/mm3 178 221 192     Results from last 7 days   Lab Units 11/07/20  0704 11/05/20  0536 11/04/20  0557  11/02/20  0447 11/01/20  0624   SODIUM mmol/L 142 143 145   < > 146* 150*   POTASSIUM mmol/L 4.5 4.4 4.1   < > 4.1 3.7   CHLORIDE mmol/L 113* 115* 117*   < > 117* 119*   CO2 mmol/L 24.0 25.0 23.0   < > 23.0 24.0   BUN mg/dL 21 29* 35*   < > 53* 69*   CREATININE mg/dL 0.77 0.88 0.89   < > 1.35* 1.80*   CALCIUM mg/dL 9.0 8.8 8.6   < > 8.6 8.7   BILIRUBIN mg/dL  --   --   --   --  0.3 0.3   ALK PHOS U/L  --   --   --   --  78 79   ALT (SGPT) U/L  --   --   --   --  28 31   AST (SGOT) U/L  --   --   --   --  31 35   GLUCOSE mg/dL 115* 130* 125*   < > 166* 170*    < > = values in this interval not displayed.     I have reviewed the patient's current medications.     Assessment/Plan     Active Hospital Problems    Diagnosis   • **Metabolic  encephalopathy   • E. coli UTI (urinary tract infection)   • Hypernatremia   • Acute renal failure superimposed on stage 3a chronic kidney disease (CMS/[facility])   • Acute blood loss anemia   • Duodenal ulcer   • UGIB (upper gastrointestinal bleed)   • History of stroke   • Chronic diastolic congestive heart failure (CMS/[facility])   • A-fib (CMS/[facility])   • HTN (hypertension)   • Type 2 diabetes mellitus, without long-term current use of insulin (CMS/[facility])     Plan:  The patient was admitted on 10/27 by Dr. Rosenberg.  He presented as a transfer from [facility] as his granddaughter works here.  He had been admitted there and was having much difficulty with confusion.  This has been proven to be multifactorial as evidenced below.    He has been evaluated by neurology.  He had an MRI of the brain without contrast on 10/30 that showed no evidence of acute infarct.    He has been followed by nephrology for acute renal failure superimposed on CKD, stage III.  His serum creatinine continues to improve.  His elevated BUN is secondary to recent gastrointestinal bleeding as well as his acute renal failure.  He developed some hyponatremia secondary to resuscitation with crystalloid fluids.  This resolved with recent dextrose fluids.  Stopped IV fluids on 11/3 and gave Bumex 0.5 mg IV x1.  Resumed amlodipine and losartan recently and have titrated them.  Lasix 20 mg orally daily.    He had heme positive stool and melena.  He was taken for endoscopy on 10/28 by Dr. Bliss. He had multiple nonbleeding duodenal ulcers at that time.  He had a hiatal hernia.  Urease negative.  He is being treated with lansoprazole.  He has received a total of 4 units packed red blood cells.  He was last transfused on 10/31.  Hemoglobin remained stable.  Anemia substrates are consistent with chronic disease.    He has an E. coli urinary tract infection that is resistant to ampicillin, levofloxacin, tetracycline, and Bactrim.  He has been treated with  ceftriaxone and was converted to oral Omnicef on 11/4.    He has a history of atrial fibrillation for which he will not be anticoagulated at the moment given his recent gastrointestinal bleed.  I restarted his aspirin on 11/2.    Oral hypoglycemics on hold.  Continue sliding scale insulin.  His most recent hemoglobin A1c is 6.1.    SCDs for DVT prophylaxis.    Discharge Planning: Still waiting on insurance.  He has been medically ready to discharge for several days now.  It looks like it will be Monday at the earliest before he goes.    Electronically signed by Elpidio Irby DO, 11/07/20, 14:21 CST.      Electronically signed by Elpidio Irby DO at 11/07/20 1551       Consult Notes (last 48 hours) (Notes from 11/06/20 1436 through 11/08/20 1436)    No notes of this type exist for this encounter.

## 2020-11-08 NOTE — THERAPY TREATMENT NOTE
Acute Care - Occupational Therapy Treatment Note  [facility]     Patient Name: Eron Escalante  : 1934  MRN: [id number]  Today's Date: 2020  Onset of Illness/Injury or Date of Surgery: 10/26/20          Admit Date: 10/26/2020       ICD-10-CM ICD-9-CM   1. Oropharyngeal dysphagia  R13.12 787.22   2. Impaired mobility  Z74.09 799.89   3. Heme positive stool  R19.5 792.1   4. Decreased activities of daily living (ADL)  Z78.9 V49.89     Patient Active Problem List   Diagnosis   • Type 2 diabetes mellitus, without long-term current use of insulin (CMS/[facility])   • HTN (hypertension)   • Aortocoronary bypass status   • Bradycardia   • CAD in native artery   • A-fib (CMS/[facility])   • Symptomatic bradycardia   • Cerebral ventriculomegaly   • Dehydration   • Duodenal ulcer   • Acute renal failure superimposed on stage 3a chronic kidney disease (CMS/[facility])   • Hyperkalemia   • Weakness of extremity   • Lumbar stenosis with neurogenic claudication   • DDD (degenerative disc disease), lumbar   • Change in bowel habit   • RICARDO treated with BiPAP   • Murmur   • Chronic diastolic congestive heart failure (CMS/[facility])   • Periodic limb movement disorder (PLMD)   • CHF (congestive heart failure), NYHA class I, acute on chronic, combined (CMS/[facility])   • Chronic constipation   • Chronic back pain   • History of stroke   • Hypertensive urgency   • Ischemic cardiomyopathy   • Altered mental status   • Acute blood loss anemia   • Metabolic encephalopathy   • Hypernatremia   • E. coli UTI (urinary tract infection)   • UGIB (upper gastrointestinal bleed)     Past Medical History:   Diagnosis Date   • A-fib (CMS/[facility]) 11/15/2016   • Anemia     gets shots every few months to build up blood. sees dr adam.   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   • Arthritis    • Bradycardia 11/15/2016   • CAD in native artery 11/15/2016   • Chest pain 11/15/2016   • CHF (congestive heart failure) (CMS/[facility])    • Chronic kidney disease    • COPD (chronic  obstructive pulmonary disease) (CMS/[facility])    • DDD (degenerative disc disease), lumbar 6/27/2017   • Heart murmur    • HTN (hypertension) 11/15/2016   • Hyperlipidemia    • Lumbar stenosis with neurogenic claudication 6/27/2017   • Murmur 4/19/2019   • Myocardial infarction (CMS/[facility])    • Sleep apnea    • Stroke (CMS/[facility])    • Type 2 diabetes mellitus (CMS/[facility]) 11/15/2016   • Ulcer of abdomen wall (CMS/[facility])      Past Surgical History:   Procedure Laterality Date   • APPENDECTOMY     • BACK SURGERY      x2   • CARDIAC CATHETERIZATION Left     1/2010   • CARPAL TUNNEL RELEASE Bilateral    • CHOLECYSTECTOMY     • COLONOSCOPY N/A 9/7/2017    Procedure: COLONOSCOPY WITH ANESTHESIA;  Surgeon: Joshua Rich DO;  Location: [facility] ENDOSCOPY;  Service:    • CORONARY ARTERY BYPASS GRAFT      2/2006 w/PTCA & KIMMY    • CORONARY STENT PLACEMENT     • ENDOSCOPY N/A 12/14/2016    Procedure: ESOPHAGOGASTRODUODENOSCOPY WITH ANESTHESIA;  Surgeon: Isabel Dexter MD;  Location: [facility] ENDOSCOPY;  Service:    • ENDOSCOPY N/A 12/16/2016    Procedure: ESOPHAGOGASTRODUODENOSCOPY WITH ANESTHESIA;  Surgeon: Joshua Rich DO;  Location: [facility] ENDOSCOPY;  Service:    • ENDOSCOPY N/A 4/10/2017    Procedure: ESOPHAGOGASTRODUODENOSCOPY WITH ANESTHESIA;  Surgeon: Joshua Rich DO;  Location: [facility] ENDOSCOPY;  Service:    • ENDOSCOPY N/A 10/28/2020    Procedure: ESOPHAGOGASTRODUODENOSCOPY WITH ANESTHESIA;  Surgeon: Forrest Bliss MD;  Location: [facility] ENDOSCOPY;  Service: Gastroenterology;  Laterality: N/A;  pre: GI bleed  post: duodenal ulcers, hiatal hernia   Jeffrey Owen MD   • EYE SURGERY Left     x 2   • JOINT REPLACEMENT     • PERIPHERAL ARTERIAL STENT GRAFT     • REPLACEMENT TOTAL KNEE Left     2002   • SHOULDER ROTATOR CUFF REPAIR Bilateral           OT ASSESSMENT FLOWSHEET (last 12 hours)      OT Evaluation and Treatment     Row Name 11/08/20 1334 11/08/20 1300                OT Time and Intention    Subjective  Information  complains of;weakness;fatigue  -TS  --       Document Type  therapy note (daily note)  -TS  therapy note (daily note)  -TS       Mode of Treatment  occupational therapy  -TS  occupational therapy  -TS       Patient Effort  good  -TS  --          General Information    Existing Precautions/Restrictions  fall  -TS  --          Bed Mobility    Sit-Supine Ida (Bed Mobility)  minimum assist (75% patient effort)  -TS  --          Functional Mobility    Functional Mobility- Ind. Level  contact guard assist  -TS  --       Functional Mobility- Device  rolling walker  -TS  --       Functional Mobility- Comment  in room, in BR  -TS  --          Transfer Assessment/Treatment    Transfers  sit-stand transfer;stand-sit transfer;shower transfer  -TS  --          Transfers    Sit-Stand Ida (Transfers)  minimum assist (75% patient effort)  -TS  --       Stand-Sit Ida (Transfers)  contact guard  -TS  --       Ida Level (Shower Transfer)  contact guard  -TS  --       Assistive Device (Shower Transfer)  grab bar, tub/shower;shower chair  -TS  --          Sit-Stand Transfer    Assistive Device (Sit-Stand Transfers)  walker, front-wheeled  -TS  --          Stand-Sit Transfer    Assistive Device (Stand-Sit Transfers)  walker, front-wheeled  -TS  --          Shower Transfer    Type (Shower Transfer)  sit-stand;stand-sit  -TS  --          Activities of Daily Living    BADL Assessment/Intervention  bathing;upper body dressing;lower body dressing;toileting  -TS  --          Bathing Assessment/Intervention    Ida Level (Bathing)  upper body;lower body;perineal area;set up;standby assist;minimum assist (75% patient effort)  -TS  --       Assistive Devices (Bathing)  grab bar, tub/shower;hand-held shower spray hose;shower chair  -TS  --       Position (Bathing)  supported sitting;supported standing  -TS  --          Upper Body Dressing Assessment/Training    Ida Level (Upper Body  Dressing)  don;pull-over garment;set up;contact guard assist  -TS  --       Position (Upper Body Dressing)  supported sitting  -TS  --          Lower Body Dressing Assessment/Training    Bowie Level (Lower Body Dressing)  don;socks;pants/bottoms;set up;moderate assist (50% patient effort)  -TS  --       Position (Lower Body Dressing)  supported sitting;supported standing  -TS  --          Toileting Assessment/Training    Bowie Level (Toileting)  adjust/manage clothing;toileting skills  -TS  --       Assistive Devices (Toileting)  urinal  -TS  --       Position (Toileting)  supported standing  -TS  --          OT Goals    Transfer Goal Selection (OT)  transfer, OT goal 1  -TS  --          Transfer Goal 1 (OT)    Activity/Assistive Device (Transfer Goal 1, OT)  sit-to-stand/stand-to-sit;bed-to-chair/chair-to-bed;walker, rolling  -TS  --       Bowie Level/Cues Needed (Transfer Goal 1, OT)  maximum assist (25-49% patient effort)  -TS  --       Time Frame (Transfer Goal 1, OT)  long term goal (LTG);by discharge  -TS  --       Progress/Outcome (Transfer Goal 1, OT)  goal met  -TS  --          Positioning and Restraints    Pre-Treatment Position  sitting in chair/recliner  -TS  --       Post Treatment Position  bed  -TS  --       In Bed  fowlers;call light within reach;encouraged to call for assist;exit alarm on;side rails up x2  -TS  --         User Key  (r) = Recorded By, (t) = Taken By, (c) = Cosigned By    Initials Name Effective Dates    TS Adriana Louise COTA/L 08/02/16 -          Occupational Therapy Education                 Title: PT OT SLP Therapies (In Progress)     Topic: Occupational Therapy (In Progress)     Point: ADL training (Done)     Description:   Instruct learner(s) on proper safety adaptation and remediation techniques during self care or transfers.   Instruct in proper use of assistive devices.              Learning Progress Summary           Patient Acceptance, E,TB,  VU,DU,NR by  at 11/6/2020 0907    Comment: Pt. participated in adl showering/dressing!    Acceptance, E, VU,NR by  at 10/31/2020 1338    Acceptance, E, NR by  at 10/27/2020 1415    Comment: Role of OT, safety with ADL, importance of therapy   Family Acceptance, E,TB, VU,DU,NR by  at 11/6/2020 0907    Comment: Pt. participated in adl showering/dressing!                   Point: Home exercise program (Not Started)     Description:   Instruct learner(s) on appropriate technique for monitoring, assisting and/or progressing therapeutic exercises/activities.              Learner Progress:  Not documented in this visit.          Point: Precautions (Done)     Description:   Instruct learner(s) on prescribed precautions during self-care and functional transfers.              Learning Progress Summary           Patient Acceptance, E,TB, VU,DU,NR by  at 11/6/2020 0907    Comment: Pt. participated in adl showering/dressing!   Family Acceptance, E,TB, VU,DU,NR by  at 11/6/2020 0907    Comment: Pt. participated in adl showering/dressing!                   Point: Body mechanics (Done)     Description:   Instruct learner(s) on proper positioning and spine alignment during self-care, functional mobility activities and/or exercises.              Learning Progress Summary           Patient Acceptance, E,TB, VU,DU,NR by  at 11/6/2020 0907    Comment: Pt. participated in adl showering/dressing!    Acceptance, E, VU,NR by  at 10/31/2020 1338   Family Acceptance, E,TB, VU,DU,NR by  at 11/6/2020 0907    Comment: Pt. participated in adl showering/dressing!                               User Key     Initials Effective Dates Name Provider Type Discipline     07/05/20 -  Lynne Frias OTR/L Occupational Therapist OT     08/02/16 -  Chris Campbell COTA/L Occupational Therapy Assistant OT     07/16/20 -  Ann Marie Veras OT Student OT Student OT                  OT Recommendation and Plan     Plan of Care Review  Plan  of Care Reviewed With: patient  Progress: improving  Outcome Summary: Pt comes to EOB with min/mod A with HOB elevated and draw sheet. Pt sits EOB with SBA/CGA with assist of bed rail. Pt mod A for LB dressing and stood from elevated EOB with min/mod A. Pt would benefit from rehab at discharge. Continue OT POC   Plan of Care Reviewed With: patient  Outcome Summary: Pt comes to EOB with min/mod A with HOB elevated and draw sheet. Pt sits EOB with SBA/CGA with assist of bed rail. Pt mod A for LB dressing and stood from elevated EOB with min/mod A. Pt would benefit from rehab at discharge. Continue OT POC     Outcome Measures     Row Name 11/06/20 0907             How much help from another is currently needed...    Putting on and taking off regular lower body clothing?  1  -CJ      Bathing (including washing, rinsing, and drying)  1  -CJ      Toileting (which includes using toilet bed pan or urinal)  1  -CJ      Putting on and taking off regular upper body clothing  1  -CJ      Taking care of personal grooming (such as brushing teeth)  2  -CJ      Eating meals  2  -CJ      AM-PAC 6 Clicks Score (OT)  8  -CJ         Functional Assessment    Outcome Measure Options  AM-PAC 6 Clicks Daily Activity (OT)  -CJ        User Key  (r) = Recorded By, (t) = Taken By, (c) = Cosigned By    Initials Name Provider Type     Chris Campbell COTA/L Occupational Therapy Assistant          Time Calculation:   Time Calculation- OT     Row Name 11/08/20 1505             Time Calculation- OT    OT Start Time  1334  -TS      OT Stop Time  1430  -TS      OT Time Calculation (min)  56 min  -TS      Total Timed Code Minutes- OT  56 minute(s)  -TS      OT Received On  11/08/20  -TS         Timed Charges    91021 - OT Self Care/Mgmt Minutes  56  -TS        User Key  (r) = Recorded By, (t) = Taken By, (c) = Cosigned By    Initials Name Provider Type     Adriana Louise COTA/L Occupational Therapy Assistant        Therapy Charges for  Today     Code Description Service Date Service Provider Modifiers Qty    [id number] HC OT SELF CARE/MGMT/TRAIN EA 15 MIN 11/8/2020 Adriana Louise, RICHARD/L  4               Adriana DACOSTA. RICHARD Louise/VINNIE  11/8/2020

## 2020-11-09 NOTE — PLAN OF CARE
Goal Outcome Evaluation:  Plan of Care Reviewed With: patient  Progress: no change  Outcome Summary: Pt oriented to self and place. c/o last night of R ear tenderness. Again around midnight, he is trying to get out of bed, thinking it is time to get up. Speech is garbled. Edema to lower extremities. Noted pressure injury at coccyx. Will take photo. He has been repositioned every 2 hours, using wedges and pillows. Diet soft, ground, with honey thickened liquid. bp 181/61. medicated with prn hydralazine. Other vitals stable. Tele:nsr. Plan for patient to go home today with home health.Safety maintained.

## 2020-11-09 NOTE — THERAPY TREATMENT NOTE
Acute Care - Occupational Therapy Treatment Note  [facility]     Patient Name: Eron Escalante  : 1934  MRN: [id number]  Today's Date: 2020  Onset of Illness/Injury or Date of Surgery: 10/26/20          Admit Date: 10/26/2020       ICD-10-CM ICD-9-CM   1. Oropharyngeal dysphagia  R13.12 787.22   2. Impaired mobility  Z74.09 799.89   3. Heme positive stool  R19.5 792.1   4. Decreased activities of daily living (ADL)  Z78.9 V49.89     Patient Active Problem List   Diagnosis   • Type 2 diabetes mellitus, without long-term current use of insulin (CMS/[facility])   • HTN (hypertension)   • Aortocoronary bypass status   • Bradycardia   • CAD in native artery   • A-fib (CMS/[facility])   • Symptomatic bradycardia   • Cerebral ventriculomegaly   • Dehydration   • Duodenal ulcer   • Acute renal failure superimposed on stage 3a chronic kidney disease (CMS/[facility])   • Hyperkalemia   • Weakness of extremity   • Lumbar stenosis with neurogenic claudication   • DDD (degenerative disc disease), lumbar   • Change in bowel habit   • RICARDO treated with BiPAP   • Murmur   • Chronic diastolic congestive heart failure (CMS/[facility])   • Periodic limb movement disorder (PLMD)   • CHF (congestive heart failure), NYHA class I, acute on chronic, combined (CMS/[facility])   • Chronic constipation   • Chronic back pain   • History of stroke   • Hypertensive urgency   • Ischemic cardiomyopathy   • Altered mental status   • Acute blood loss anemia   • Metabolic encephalopathy   • Hypernatremia   • E. coli UTI (urinary tract infection)   • UGIB (upper gastrointestinal bleed)     Past Medical History:   Diagnosis Date   • A-fib (CMS/[facility]) 11/15/2016   • Anemia     gets shots every few months to build up blood. sees dr adam.   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   • Arthritis    • Bradycardia 11/15/2016   • CAD in native artery 11/15/2016   • Chest pain 11/15/2016   • CHF (congestive heart failure) (CMS/[facility])    • Chronic kidney disease    • COPD (chronic  obstructive pulmonary disease) (CMS/[facility])    • DDD (degenerative disc disease), lumbar 6/27/2017   • Heart murmur    • HTN (hypertension) 11/15/2016   • Hyperlipidemia    • Lumbar stenosis with neurogenic claudication 6/27/2017   • Murmur 4/19/2019   • Myocardial infarction (CMS/[facility])    • Sleep apnea    • Stroke (CMS/[facility])    • Type 2 diabetes mellitus (CMS/[facility]) 11/15/2016   • Ulcer of abdomen wall (CMS/[facility])      Past Surgical History:   Procedure Laterality Date   • APPENDECTOMY     • BACK SURGERY      x2   • CARDIAC CATHETERIZATION Left     1/2010   • CARPAL TUNNEL RELEASE Bilateral    • CHOLECYSTECTOMY     • COLONOSCOPY N/A 9/7/2017    Procedure: COLONOSCOPY WITH ANESTHESIA;  Surgeon: Joshua Rich DO;  Location: [facility] ENDOSCOPY;  Service:    • CORONARY ARTERY BYPASS GRAFT      2/2006 w/PTCA & KIMMY    • CORONARY STENT PLACEMENT     • ENDOSCOPY N/A 12/14/2016    Procedure: ESOPHAGOGASTRODUODENOSCOPY WITH ANESTHESIA;  Surgeon: Isabel Dexter MD;  Location: [facility] ENDOSCOPY;  Service:    • ENDOSCOPY N/A 12/16/2016    Procedure: ESOPHAGOGASTRODUODENOSCOPY WITH ANESTHESIA;  Surgeon: Joshua Rich DO;  Location: [facility] ENDOSCOPY;  Service:    • ENDOSCOPY N/A 4/10/2017    Procedure: ESOPHAGOGASTRODUODENOSCOPY WITH ANESTHESIA;  Surgeon: Joshua Rich DO;  Location: [facility] ENDOSCOPY;  Service:    • ENDOSCOPY N/A 10/28/2020    Procedure: ESOPHAGOGASTRODUODENOSCOPY WITH ANESTHESIA;  Surgeon: Forrest Bliss MD;  Location: [facility] ENDOSCOPY;  Service: Gastroenterology;  Laterality: N/A;  pre: GI bleed  post: duodenal ulcers, hiatal hernia   Jeffrey Owen MD   • EYE SURGERY Left     x 2   • JOINT REPLACEMENT     • PERIPHERAL ARTERIAL STENT GRAFT     • REPLACEMENT TOTAL KNEE Left     2002   • SHOULDER ROTATOR CUFF REPAIR Bilateral           OT ASSESSMENT FLOWSHEET (last 12 hours)      OT Evaluation and Treatment     Row Name 11/09/20 1054                   OT Time and Intention    Subjective Information  no  complaints  -MT (r) MR (t) MT (c)        Document Type  therapy note (daily note)  -MT (r) MR (t) MT (c)        Mode of Treatment  occupational therapy  -MT (r) MR (t) MT (c)        Patient Effort  adequate  -MT (r) MR (t) MT (c)        Comment  Pt. resting in chair with eyes closed at start of tx though easily aroused  -MT (r) MR (t) MT (c)           General Information    Existing Precautions/Restrictions  fall  -MT (r) MR (t) MT (c)           Cognition    Orientation Status (Cognition)  oriented to;person;disoriented to;place;situation;time  -MT (r) MR (t) MT (c)        Personal Safety Interventions  fall prevention program maintained  -MT (r) MR (t) MT (c)           Pain Assessment    Additional Documentation  Pain Scale: FACES Pre/Post-Treatment (Group)  -MT (r) MR (t) MT (c)           Pain Scale: FACES Pre/Post-Treatment    Pain: FACES Scale, Pretreatment  0-->no hurt  -MT (r) MR (t) MT (c)        Posttreatment Pain Rating  0-->no hurt  -MT (r) MR (t) MT (c)           Motor Skills    Therapeutic Exercise  elbow/forearm;shoulder;wrist;hand  -MT (r) MR (t) MT (c)           Shoulder (Therapeutic Exercise)    Shoulder (Therapeutic Exercise)  AAROM (active assistive range of motion);AROM (active range of motion)  -MT (r) MR (t) MT (c)        Shoulder AROM (Therapeutic Exercise)  horizontal aBduction/aDduction;3 repetitions  -MT (r) MR (t) MT (c)        Shoulder AAROM (Therapeutic Exercise)  flexion;3 repetitions  -MT (r) MR (t) MT (c)           Elbow/Forearm (Therapeutic Exercise)    Elbow/Forearm (Therapeutic Exercise)  AROM (active range of motion)  -MT (r) MR (t) MT (c)        Elbow/Forearm AROM (Therapeutic Exercise)  flexion;extension;3 repetitions  -MT (r) MR (t) MT (c)           Wrist (Therapeutic Exercise)    Wrist (Therapeutic Exercise)  AROM (active range of motion)  -MT (r) MR (t) MT (c)        Wrist AROM (Therapeutic Exercise)  flexion;extension;3 second hold  -MT (r) MR (t) MT (c)           Hand  (Therapeutic Exercise)    Hand (Therapeutic Exercise)  AROM (active range of motion)  -MT (r) MR (t) MT (c)        Hand AROM/AAROM (Therapeutic Exercise)  finger flexion;finger extension;finger aBduction;finger aDduction;3 repetitions  -MT (r) MR (t) MT (c)           Activities of Daily Living    BADL Assessment/Intervention  grooming  -MT (r) MR (t) MT (c)           Grooming Assessment/Training    Person Level (Grooming)  oral care regimen;verbal cues;set up  -MT (r) MR (t) MT (c)        Position (Grooming)  supported sitting  -MT (r) MR (t) MT (c)        Comment (Grooming)  Swabbed mouth with mouthwash  -MT (r) MR (t) MT (c)           [REMOVED] Wound 11/09/20 0400 coccyx Pressure Injury    Wound - Properties Group Placement Date: 11/09/20  -TC Placement Time: 0400  -TC Location: coccyx  -TC Primary Wound Type: Pressure inj  -TC Stage, Pressure Injury : Stage 2  -TC Removal Date: 11/09/20  -HS Removal Time: 1046  -HS Wound Outcome: Other  -HS, Clarify charting     Retired Wound - Properties Group Date first assessed: 11/09/20  -TC Time first assessed: 0400  -TC Location: coccyx  -TC Primary Wound Type: Pressure inj  -TC Resolution Date: 11/09/20  -HS Resolution Time: 1046  -HS Wound Outcome: Other  -HS, Clarify charting        Wound 11/09/20 0400 sacral spine Traumatic    Wound - Properties Group Placement Date: 11/09/20  -HS Placement Time: 0400  -HS Present on Hospital Admission: N  -HS Location: sacral spine  -HS Primary Wound Type: Traumatic  -HS, Related to friction     Retired Wound - Properties Group Date first assessed: 11/09/20  -HS Time first assessed: 0400  -HS Present on Hospital Admission: N  -HS Location: sacral spine  -HS Primary Wound Type: Traumatic  -HS, Related to friction        Plan of Care Review    Plan of Care Reviewed With  patient  -MT (r) MR (t) MT (c)        Progress  no change  -MT (r) MR (t) MT (c)        Outcome Summary  Pt. resting in chair though easily aroused. Performed  AROM/AAROM of UE including shoulder, elbow, wrist, and hand. Required AAROM of shoulder d/t weakness. Pt. required assist for setup though able to brush tongue and teeth with oral swab. Pt. noted to have edema in RUE, OTAS placed pillow for support to keep elevated. Pt. would benefit from continued rehab following d/c to increase strength, balance, and endurance for safety with ADLs.  Continue OT POC.  -MT (r) MR (t) MT (c)           Positioning and Restraints    Pre-Treatment Position  sitting in chair/recliner  -MT (r) MR (t) MT (c)        Post Treatment Position  chair  -MT (r) MR (t) MT (c)        In Bed  sitting;call light within reach;encouraged to call for assist  -MT (r) MR (t) MT (c)          User Key  (r) = Recorded By, (t) = Taken By, (c) = Cosigned By    Initials Name Effective Dates    TC Castleman, Tamara K, RN 08/02/16 -     Ashley Reagan RN 12/27/16 -     Stephanie Mayers COTA/VINNIE 11/29/19 -     Russel Mejia 10/20/20 -          Occupational Therapy Education                 Title: PT OT SLP Therapies (In Progress)     Topic: Occupational Therapy (In Progress)     Point: ADL training (Done)     Description:   Instruct learner(s) on proper safety adaptation and remediation techniques during self care or transfers.   Instruct in proper use of assistive devices.              Learning Progress Summary           Patient Acceptance, E,TB, VU,DU,NR by  at 11/6/2020 0907    Comment: Pt. participated in adl showering/dressing!    Acceptance, E, VU,NR by  at 10/31/2020 1338    Acceptance, E, NR by SN at 10/27/2020 1415    Comment: Role of OT, safety with ADL, importance of therapy   Family Acceptance, E,TB, VU,DU,NR by  at 11/6/2020 0907    Comment: Pt. participated in adl showering/dressing!                   Point: Home exercise program (Not Started)     Description:   Instruct learner(s) on appropriate technique for monitoring, assisting and/or progressing therapeutic  exercises/activities.              Learner Progress:  Not documented in this visit.          Point: Precautions (Done)     Description:   Instruct learner(s) on prescribed precautions during self-care and functional transfers.              Learning Progress Summary           Patient Acceptance, E,TB, VU,DU,NR by  at 11/6/2020 0907    Comment: Pt. participated in adl showering/dressing!   Family Acceptance, E,TB, VU,DU,NR by  at 11/6/2020 0907    Comment: Pt. participated in adl showering/dressing!                   Point: Body mechanics (Done)     Description:   Instruct learner(s) on proper positioning and spine alignment during self-care, functional mobility activities and/or exercises.              Learning Progress Summary           Patient Acceptance, E,TB, VU,DU,NR by  at 11/6/2020 0907    Comment: Pt. participated in adl showering/dressing!    Acceptance, E, VU,NR by  at 10/31/2020 1338   Family Acceptance, E,TB, VU,DU,NR by  at 11/6/2020 0907    Comment: Pt. participated in adl showering/dressing!                               User Key     Initials Effective Dates Name Provider Type Discipline     07/05/20 -  Lynne Frias, OTR/L Occupational Therapist OT     08/02/16 -  Chris Campbell COTA/L Occupational Therapy Assistant OT     07/16/20 -  Ann Marie Veras, OT Student OT Student OT                  OT Recommendation and Plan     Plan of Care Review  Plan of Care Reviewed With: patient  Progress: no change  Outcome Summary: Pt. resting in chair though easily aroused. Performed AROM/AAROM of UE including shoulder, elbow, wrist, and hand. Required AAROM of shoulder d/t weakness. Pt. required assist for setup though able to brush tongue and teeth with oral swab. Pt. noted to have edema in RUE, OTAS placed pillow for support to keep elevated. Pt. would benefit from continued rehab following d/c to increase strength, balance, and endurance for safety with ADLs.  Continue OT POC.  Plan of Care  Reviewed With: patient  Outcome Summary: Pt. resting in chair though easily aroused. Performed AROM/AAROM of UE including shoulder, elbow, wrist, and hand. Required AAROM of shoulder d/t weakness. Pt. required assist for setup though able to brush tongue and teeth with oral swab. Pt. noted to have edema in RUE, OTAS placed pillow for support to keep elevated. Pt. would benefit from continued rehab following d/c to increase strength, balance, and endurance for safety with ADLs.  Continue OT POC.        Time Calculation:   Time Calculation- OT     Row Name 11/09/20 1126             Time Calculation- OT    OT Start Time  1054  -MT (r) MR (t) MT (c)      OT Stop Time  1110  -MT (r) MR (t) MT (c)      OT Time Calculation (min)  16 min  -MT (r) MR (t)      Total Timed Code Minutes- OT  16 minute(s)  -MT (r) MR (t) MT (c)         Timed Charges    67489 - OT Self Care/Mgmt Minutes  16  -MT (r) MR (t) MT (c)        User Key  (r) = Recorded By, (t) = Taken By, (c) = Cosigned By    Initials Name Provider Type    MT Stephanie Espitia COTA/L Occupational Therapy Assistant    Russel Mejia OT Student                 KASSIE Marie Student  11/9/2020

## 2020-11-09 NOTE — PROGRESS NOTES
Continued Stay Note   [place]     Patient Name: Eron Escalante  MRN: [id number]  Today's Date: 11/9/2020    Admit Date: 10/26/2020    Discharge Plan     Row Name 11/09/20 1043       Plan    Plan Comments  Precert has been approved for [facility] but family has decided to take pt home with sitters and HH tomorrow (11-10). Isabel ([facility]) is requesting [facility] as well as BSC, WC and hospital bed from [facility]. Will need orders to arrange HH/DME. Notified [facility] that pt will be going home at dc instead of SNF        Discharge Codes    No documentation.       Expected Discharge Date and Time     Expected Discharge Date Expected Discharge Time    Nov 4, 2020             MYAH Ramírez

## 2020-11-09 NOTE — PLAN OF CARE
Problem: Adult Inpatient Plan of Care  Goal: Plan of Care Review  Flowsheets (Taken 11/9/2020 7631)  Progress: no change  Plan of Care Reviewed With: patient  Outcome Summary:   ST tx completed. Pt seen 2x this date. Pt observed at breakfast and lunch this date with nsg student feeding. Poor positioning with PO each time and repositioned in bed and in bedside chair for lunch. Nsg student reported increased coughing with mech soft meat at breakfast. Strong cough noted at lunch with mech soft ground meat as well. Consistent throat clears noted with honey thick drinks and magic cup   however, family reports this is habitual per previous speech notes. ST to downgrade meat to puree at this time. Continue otherwise mech soft and honey thick liquids.

## 2020-11-09 NOTE — PLAN OF CARE
Problem: Adult Inpatient Plan of Care  Goal: Plan of Care Review  Flowsheets (Taken 11/9/2020 0912)  Progress: improving  Plan of Care Reviewed With: patient  Outcome Summary: Rolling in Bed Min x1. supine-sit min/mod x1 sit-stand w/ bed elvated min/mod x1. Pt took few steps to bsc then amb 3' to chair min x1 help w/ AD. TOlerated ROM in chair. Pt would benfit from  if going home w/ family

## 2020-11-09 NOTE — PLAN OF CARE
Goal Outcome Evaluation:  Plan of Care Reviewed With: patient  Progress: improving  Outcome Summary: Pt oriented to self and place. Reorientation provided. Up x2 to chair and bedside commode. No c/o pain this shift. Turned q2. BP high this shift, medicated with PRN and scheduled meds. BG WNL. Safety maintained, will continue to monitor.

## 2020-11-09 NOTE — PLAN OF CARE
Problem: Adult Inpatient Plan of Care  Goal: Plan of Care Review  Flowsheets (Taken 11/9/2020 1050)  Progress: no change (Pended)  Plan of Care Reviewed With: patient (Pended)  Outcome Summary: Pt. resting in chair though easily aroused. Performed AROM/AAROM of UE including shoulder, elbow, wrist, and hand. Required AAROM of shoulder d/t weakness. Pt. required assist for setup though able to brush tongue and teeth with oral swab. Pt. noted to have edema in RUE, OTAS placed pillow for support to keep elevated. Pt. would benefit from continued rehab following d/c to increase strength, balance, and endurance for safety with ADLs.  Continue OT POC. (Pended)

## 2020-11-09 NOTE — PROGRESS NOTES
Nephrology ([facility]) Progress Note      Patient:  Eron Escalante  YOB: 1934  Date of Service: 11/8/2020  MRN: [id number]   Acct: [id number]   Primary Care Physician: Jeffrey Owen MD  Advance Directive:   Code Status and Medical Interventions:   Ordered at: 10/27/20 0304     Limited Support to NOT Include:    Intubation     Level Of Support Discussed With:    Next of Kin (If No Surrogate)    Health Care Surrogate     Code Status:    No CPR     Medical Interventions (Level of Support Prior to Arrest):    Limited     Admit Date: 10/26/2020       Hospital Day: 13  Referring Provider: Alcides Tomlinson MD      Patient personally seen and examined.  Complete chart including Consults, Notes, Operative Reports, Labs, Cardiology, and Radiology studies reviewed as able.        Subjective:  Eron Escalante is a 85 y.o. male  whom we were consulted for acute kidney injury/chronic kidney disease.  Patient has stage III chronic kidney disease at baseline and has seen Dr. Victoria previously.  He was initially admitted to [facility] with encephalopathy, MRI of the brain did not show any evidence of intracranial pathology.  However he was transferred to [facility] as per family request.  Neurological work-up over here consistent with repeat MRI scan consistent with microvascular chronic changes.  Patient also has bilateral Doppler studies of carotid arteries consistent with mild to moderate stenosis but no intervention recommended.  However his serum creatinine continued to rise, patient also has hypernatremia as he has not been drinking due to severe encephalopathy.  Dr. Tomlinson had started hypotonic fluid for correction of hypernatremia as well as high BUN.      Today, family not present.  Pt was previously taking good intake per family. No new events. Denied cp/soa/n/v.  Family now considering home at d/c.    Allergies:  Lorazepam, Penicillins, and Adhesive  tape    Home Meds:  Medications Prior to Admission   Medication Sig Dispense Refill Last Dose   • allopurinol (ZYLOPRIM) 300 MG tablet Take 600 mg by mouth Daily.   Unknown at Unknown time   • amLODIPine (NORVASC) 5 MG tablet Take 5 mg by mouth Daily.   Unknown at Unknown time   • aspirin 81 MG chewable tablet Chew 81 mg Daily.   Unknown at Unknown time   • Cholecalciferol (VITAMIN D) 1000 units tablet Take 1,000 Units by mouth.   Unknown at Unknown time   • colchicine 0.6 MG tablet Take 0.6 mg by mouth Daily As Needed (gout flare-up).   Unknown at Unknown time   • finasteride (PROSCAR) 5 MG tablet Take 5 mg by mouth Daily.   Unknown at Unknown time   • folic acid (FOLVITE) 400 MCG tablet Take 400 mcg by mouth.   Unknown at Unknown time   • glimepiride (AMARYL) 4 MG tablet Take 4 mg by mouth 2 (Two) Times a Day With Meals. Needs pm dose   Unknown at Unknown time   • losartan (COZAAR) 50 MG tablet Take 25 mg by mouth Daily.   Unknown at Unknown time   • meclizine (ANTIVERT) 25 MG tablet Take 25 mg by mouth. 3-4 times daily   Unknown at Unknown time   • metoprolol succinate XL (TOPROL XL) 25 MG 24 hr tablet Take 25 mg by mouth every night at bedtime.   Unknown at Unknown time   • nitroglycerin (NITROSTAT) 0.4 MG SL tablet Place 0.4 mg under the tongue Every 5 (Five) Minutes As Needed.   Unknown at Unknown time   • pantoprazole (PROTONIX) 40 MG EC tablet Take 40 mg by mouth Every 12 (Twelve) Hours.   Unknown at Unknown time   • potassium chloride (K-DUR,KLOR-CON) 20 MEQ CR tablet Take 10 mEq by mouth Every 12 (Twelve) Hours.   Unknown at Unknown time   • Pyridoxine HCl (VITAMIN B6) 200 MG tablet Take 1 tablet by mouth Daily.   Unknown at Unknown time   • rOPINIRole (REQUIP) 1 MG tablet Take 1 mg by mouth Every Night. Take 1 hour before bedtime.   Unknown at Unknown time   • sennosides-docusate sodium (SENOKOT-S) 8.6-50 MG tablet Take 2 tablets by mouth 2 (Two) Times a Day As Needed for constipation. 45 tablet 2 Unknown  at Unknown time   • tamsulosin (FLOMAX) 0.4 MG capsule 24 hr capsule Take 1 capsule by mouth Daily.   Unknown at Unknown time   • thiamine (VITAMIN B-1) 100 MG tablet Take 100 mg by mouth.   Unknown at Unknown time   • vitamin C (ASCORBIC ACID) 500 MG tablet Take 500 mg by mouth Daily.   Unknown at Unknown time   • [DISCONTINUED] pravastatin (PRAVACHOL) 40 MG tablet Take 40 mg by mouth Every Night.   Unknown at Unknown time       Medicines:  Current Facility-Administered Medications   Medication Dose Route Frequency Provider Last Rate Last Dose   • acetaminophen (TYLENOL) tablet 650 mg  650 mg Oral Q4H PRN Forrest Bliss MD        Or   • acetaminophen (TYLENOL) suppository 650 mg  650 mg Rectal Q4H PRN Forrest Bliss MD       • allopurinol (ZYLOPRIM) tablet 300 mg  300 mg Oral Daily Forrest Bliss MD   300 mg at 11/08/20 0843   • amLODIPine (NORVASC) tablet 10 mg  10 mg Oral Q24H Elpidio Irby DO   10 mg at 11/08/20 0842   • aspirin chewable tablet 81 mg  81 mg Oral Daily Elpidio Irby DO   81 mg at 11/08/20 0843   • cholecalciferol (VITAMIN D3) tablet 1,000 Units  1,000 Units Oral Daily Forrest Bliss MD   1,000 Units at 11/08/20 0843   • colchicine tablet 0.6 mg  0.6 mg Oral Daily PRN Forrest Bliss MD       • dextrose (D50W) 25 g/ 50mL Intravenous Solution 25 g  25 g Intravenous Q15 Min PRN Forrest Bliss MD       • dextrose (GLUTOSE) oral gel 15 g  15 g Oral Q15 Min PRN Forrest Bliss MD       • folic acid (FOLVITE) tablet 400 mcg  400 mcg Oral Daily Forrest Bliss MD   400 mcg at 11/08/20 0843   • furosemide (LASIX) tablet 20 mg  20 mg Oral Daily Elpidio Irby DO   20 mg at 11/08/20 0843   • glucagon (human recombinant) (GLUCAGEN DIAGNOSTIC) injection 1 mg  1 mg Subcutaneous Q15 Min PRN Forrest Bliss MD       • hydrALAZINE (APRESOLINE) injection 10 mg  10 mg Intravenous Q4H PRN Forrest Blsis MD   10 mg at 11/03/20 1148   • hydrALAZINE (APRESOLINE) tablet 10 mg   10 mg Oral Q6H PRN Iain Rosenberg MD   10 mg at 11/07/20 0148   • insulin lispro (humaLOG) injection 2-7 Units  2-7 Units Subcutaneous TID AC Forrest Bliss MD   2 Units at 11/08/20 1710   • isosorbide mononitrate (IMDUR) 24 hr tablet 30 mg  30 mg Oral Q24H Forrest Bliss MD   30 mg at 11/08/20 0843   • lansoprazole (FIRST) oral suspension 30 mg  30 mg Oral Q AM Iain Rosenberg MD   30 mg at 11/08/20 0605   • losartan (COZAAR) tablet 100 mg  100 mg Oral Q24H Elpidio Irby DO   100 mg at 11/08/20 0843   • nitroglycerin (NITROSTAT) SL tablet 0.4 mg  0.4 mg Sublingual Q5 Min PRN Forrest Bliss MD   0.4 mg at 10/27/20 1600   • ondansetron (ZOFRAN) tablet 4 mg  4 mg Oral Q6H PRN Forrest Bliss MD        Or   • ondansetron (ZOFRAN) injection 4 mg  4 mg Intravenous Q6H PRN Forrest Bliss MD       • potassium chloride (MICRO-K) CR capsule 10 mEq  10 mEq Oral BID With Meals Forrest Bliss MD   10 mEq at 11/08/20 1710   • pravastatin (PRAVACHOL) tablet 40 mg  40 mg Oral Nightly Forrest Bliss MD   40 mg at 11/07/20 2107   • prednisoLONE acetate (PRED FORTE) 1 % ophthalmic suspension 1 drop  1 drop Left Eye Q2H While Awake Forrest Bliss MD   1 drop at 11/08/20 1710   • sennosides-docusate (PERICOLACE) 8.6-50 MG per tablet 2 tablet  2 tablet Oral BID PRN Forrest Bliss MD       • sodium chloride 0.9 % flush 10 mL  10 mL Intravenous Q12H Forrest Bliss MD   10 mL at 11/05/20 2245   • sodium chloride 0.9 % flush 10 mL  10 mL Intravenous PRN Forrest Bliss MD       • sodium chloride 0.9 % infusion 500 mL  500 mL Intravenous Continuous PRN Forrest Bliss MD 25 mL/hr at 10/28/20 1150     • terazosin (HYTRIN) capsule 1 mg  1 mg Oral Nightly Forrest Bliss MD   1 mg at 11/07/20 2107   • thiamine (VITAMIN B-1) tablet 100 mg  100 mg Oral Daily Forrest Bliss MD   100 mg at 11/08/20 0843   • vitamin B-6 (PYRIDOXINE) tablet 200 mg  200 mg Oral Daily Forrest Bliss MD    200 mg at 11/08/20 0843   • vitamin C (ASCORBIC ACID) tablet 500 mg  500 mg Oral Daily Forrest Bliss MD   500 mg at 11/08/20 0843       Past Medical History:  Past Medical History:   Diagnosis Date   • A-fib (CMS/[facility]) 11/15/2016   • Anemia     gets shots every few months to build up blood. sees dr adam.   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   • Arthritis    • Bradycardia 11/15/2016   • CAD in native artery 11/15/2016   • Chest pain 11/15/2016   • CHF (congestive heart failure) (CMS/[facility])    • Chronic kidney disease    • COPD (chronic obstructive pulmonary disease) (CMS/[facility])    • DDD (degenerative disc disease), lumbar 6/27/2017   • Heart murmur    • HTN (hypertension) 11/15/2016   • Hyperlipidemia    • Lumbar stenosis with neurogenic claudication 6/27/2017   • Murmur 4/19/2019   • Myocardial infarction (CMS/[facility])    • Sleep apnea    • Stroke (CMS/[facility])    • Type 2 diabetes mellitus (CMS/[facility]) 11/15/2016   • Ulcer of abdomen wall (CMS/[facility])        Past Surgical History:  Past Surgical History:   Procedure Laterality Date   • APPENDECTOMY     • BACK SURGERY      x2   • CARDIAC CATHETERIZATION Left     1/2010   • CARPAL TUNNEL RELEASE Bilateral    • CHOLECYSTECTOMY     • COLONOSCOPY N/A 9/7/2017    Procedure: COLONOSCOPY WITH ANESTHESIA;  Surgeon: Joshua Rich DO;  Location: [facility] ENDOSCOPY;  Service:    • CORONARY ARTERY BYPASS GRAFT      2/2006 w/PTCA & KIMMY    • CORONARY STENT PLACEMENT     • ENDOSCOPY N/A 12/14/2016    Procedure: ESOPHAGOGASTRODUODENOSCOPY WITH ANESTHESIA;  Surgeon: Isabel Dexter MD;  Location: [facility] ENDOSCOPY;  Service:    • ENDOSCOPY N/A 12/16/2016    Procedure: ESOPHAGOGASTRODUODENOSCOPY WITH ANESTHESIA;  Surgeon: Joshua Rich DO;  Location: [facility] ENDOSCOPY;  Service:    • ENDOSCOPY N/A 4/10/2017    Procedure: ESOPHAGOGASTRODUODENOSCOPY WITH ANESTHESIA;  Surgeon: Joshua Rich DO;  Location: [facility] ENDOSCOPY;  Service:    • ENDOSCOPY N/A 10/28/2020    Procedure:  "ESOPHAGOGASTRODUODENOSCOPY WITH ANESTHESIA;  Surgeon: Forrest Bliss MD;  Location: [facility] ENDOSCOPY;  Service: Gastroenterology;  Laterality: N/A;  pre: GI bleed  post: duodenal ulcers, hiatal hernia   Jeffrey Owen MD   • EYE SURGERY Left     x 2   • JOINT REPLACEMENT     • PERIPHERAL ARTERIAL STENT GRAFT     • REPLACEMENT TOTAL KNEE Left     2002   • SHOULDER ROTATOR CUFF REPAIR Bilateral        Family History  Family History   Problem Relation Age of Onset   • Coronary artery disease Father    • Heart disease Father    • Coronary artery disease Brother    • Heart attack Brother    • Cancer Mother    • No Known Problems Sister    • Heart disease Son    • Cancer Sister    • Cancer Sister        Social History  Social History     Socioeconomic History   • Marital status:      Spouse name: Not on file   • Number of children: Not on file   • Years of education: Not on file   • Highest education level: Not on file   Tobacco Use   • Smoking status: Never Smoker   • Smokeless tobacco: Never Used   Substance and Sexual Activity   • Alcohol use: No   • Drug use: No   • Sexual activity: Never     Partners: Female         Review of Systems:  History obtained from chart review and the patient  General ROS: No fever or chills  Respiratory ROS: No cough, shortness of breath, wheezing  Cardiovascular ROS: No chest pain or palpitations  Gastrointestinal ROS: No abdominal pain or melena  Genito-Urinary ROS: No dysuria or hematuria    Objective:  Patient Vitals for the past 24 hrs:   BP Temp Temp src Pulse Resp SpO2 Height Weight   11/08/20 1529 154/69 97.9 °F (36.6 °C) Axillary 65 18 98 % -- --   11/08/20 1134 162/60 97.7 °F (36.5 °C) Oral 68 20 97 % -- --   11/08/20 0842 148/55 -- -- 70 -- -- -- --   11/08/20 0822 -- -- -- -- -- -- 177.8 cm (70\") 92.9 kg (204 lb 12.9 oz)   11/08/20 0745 169/59 98.3 °F (36.8 °C) Oral 75 16 94 % -- --   11/08/20 0325 164/57 98.1 °F (36.7 °C) Oral 71 16 96 % -- --   11/07/20 2309 " 141/53 98.1 °F (36.7 °C) Oral 70 16 95 % -- --       Intake/Output Summary (Last 24 hours) at 11/8/2020 1946  Last data filed at 11/8/2020 1730  Gross per 24 hour   Intake 170 ml   Output --   Net 170 ml     General: awake/alert   Chest:  clear to auscultation bilaterally without respiratory distress  CVS: IRRR  Abdominal: soft, nontender, positive bowel sounds  Extremities: no cyanosis or edema  Skin: warm and dry without rash      Labs:  Results from last 7 days   Lab Units 11/07/20  0704 11/05/20  0536 11/04/20  0557   WBC 10*3/mm3 7.29 6.95 6.97   HEMOGLOBIN g/dL 8.4* 8.5* 8.3*   HEMATOCRIT % 26.0* 26.8* 25.6*   PLATELETS 10*3/mm3 178 221 192         Results from last 7 days   Lab Units 11/07/20  0704 11/05/20  0536 11/04/20  0557  11/02/20  0447   SODIUM mmol/L 142 143 145   < > 146*   POTASSIUM mmol/L 4.5 4.4 4.1   < > 4.1   CHLORIDE mmol/L 113* 115* 117*   < > 117*   CO2 mmol/L 24.0 25.0 23.0   < > 23.0   BUN mg/dL 21 29* 35*   < > 53*   CREATININE mg/dL 0.77 0.88 0.89   < > 1.35*   CALCIUM mg/dL 9.0 8.8 8.6   < > 8.6   BILIRUBIN mg/dL  --   --   --   --  0.3   ALK PHOS U/L  --   --   --   --  78   ALT (SGPT) U/L  --   --   --   --  28   AST (SGOT) U/L  --   --   --   --  31   GLUCOSE mg/dL 115* 130* 125*   < > 166*    < > = values in this interval not displayed.       Radiology:   Imaging Results (Last 72 Hours)     ** No results found for the last 72 hours. **          Culture:  Urine Culture   Date Value Ref Range Status   10/30/2020 No growth  Final   10/27/2020 >100,000 CFU/mL Escherichia coli (A)  Final         Assessment   Acute kidney injury/Prerenal azotemia  Stage III chronic kidney disease  Hypernatremia  Acute GI bleed  Acute metabolic encephalopathy  Type II diabetic nephropathy  Iron deficiency anemia  Status post EGD/duodenal ulcer  E coli UTI     Plan:  Improved on D5W with K, continue oral fluids today  IV bumex again as needed, doing well with oral lasix  PPI  Monitor labs  D/w  pt/nursing  Ok for d/c when insurance arranged  blood pressure trends improving, continue to monitor as limited by lower dbps    Priyank Brown MD  11/8/2020  19:46 CST

## 2020-11-09 NOTE — PROGRESS NOTES
Nephrology ([facility]) Progress Note      Patient:  Eron Escalante  YOB: 1934  Date of Service: 11/9/2020  MRN: [id number]   Acct: [id number]   Primary Care Physician: Jeffrey Owen MD  Advance Directive:   Code Status and Medical Interventions:   Ordered at: 10/27/20 0304     Limited Support to NOT Include:    Intubation     Level Of Support Discussed With:    Next of Kin (If No Surrogate)    Health Care Surrogate     Code Status:    No CPR     Medical Interventions (Level of Support Prior to Arrest):    Limited     Admit Date: 10/26/2020       Hospital Day: 14  Referring Provider: Alcides Tomlinson MD      Patient personally seen and examined.  Complete chart including Consults, Notes, Operative Reports, Labs, Cardiology, and Radiology studies reviewed as able.        Subjective:  Eron Escalante is a 85 y.o. male  whom we were consulted for acute kidney injury/chronic kidney disease.  Patient has stage III chronic kidney disease at baseline and has seen Dr. Victoria previously.  He was initially admitted to [facility] with encephalopathy.  Later was transferred to [facility] as per family request.  Neurological work-up consistent with consistent with microvascular chronic changes.  Patient also has bilateral Doppler studies of carotid arteries consistent with mild to moderate stenosis but no intervention recommended.  However his serum creatinine continued to rise.  Patient also developed hypernatremia as he has not been drinking due to severe encephalopathy.  Started on hypotonic fluid for correction of hypernatremia with consequent improvement of labs.     Today he has no complaints. Awake, oriented X 2. Some increased confusion overnight.  Urine output nonoliguric.    Allergies:  Lorazepam, Penicillins, and Adhesive tape    Home Meds:  Medications Prior to Admission   Medication Sig Dispense Refill Last Dose   • allopurinol (ZYLOPRIM) 300 MG  tablet Take 600 mg by mouth Daily.   Unknown at Unknown time   • amLODIPine (NORVASC) 5 MG tablet Take 5 mg by mouth Daily.   Unknown at Unknown time   • aspirin 81 MG chewable tablet Chew 81 mg Daily.   Unknown at Unknown time   • Cholecalciferol (VITAMIN D) 1000 units tablet Take 1,000 Units by mouth.   Unknown at Unknown time   • colchicine 0.6 MG tablet Take 0.6 mg by mouth Daily As Needed (gout flare-up).   Unknown at Unknown time   • finasteride (PROSCAR) 5 MG tablet Take 5 mg by mouth Daily.   Unknown at Unknown time   • folic acid (FOLVITE) 400 MCG tablet Take 400 mcg by mouth.   Unknown at Unknown time   • glimepiride (AMARYL) 4 MG tablet Take 4 mg by mouth 2 (Two) Times a Day With Meals. Needs pm dose   Unknown at Unknown time   • losartan (COZAAR) 50 MG tablet Take 25 mg by mouth Daily.   Unknown at Unknown time   • meclizine (ANTIVERT) 25 MG tablet Take 25 mg by mouth. 3-4 times daily   Unknown at Unknown time   • metoprolol succinate XL (TOPROL XL) 25 MG 24 hr tablet Take 25 mg by mouth every night at bedtime.   Unknown at Unknown time   • nitroglycerin (NITROSTAT) 0.4 MG SL tablet Place 0.4 mg under the tongue Every 5 (Five) Minutes As Needed.   Unknown at Unknown time   • pantoprazole (PROTONIX) 40 MG EC tablet Take 40 mg by mouth Every 12 (Twelve) Hours.   Unknown at Unknown time   • potassium chloride (K-DUR,KLOR-CON) 20 MEQ CR tablet Take 10 mEq by mouth Every 12 (Twelve) Hours.   Unknown at Unknown time   • Pyridoxine HCl (VITAMIN B6) 200 MG tablet Take 1 tablet by mouth Daily.   Unknown at Unknown time   • rOPINIRole (REQUIP) 1 MG tablet Take 1 mg by mouth Every Night. Take 1 hour before bedtime.   Unknown at Unknown time   • sennosides-docusate sodium (SENOKOT-S) 8.6-50 MG tablet Take 2 tablets by mouth 2 (Two) Times a Day As Needed for constipation. 45 tablet 2 Unknown at Unknown time   • tamsulosin (FLOMAX) 0.4 MG capsule 24 hr capsule Take 1 capsule by mouth Daily.   Unknown at Unknown time    • thiamine (VITAMIN B-1) 100 MG tablet Take 100 mg by mouth.   Unknown at Unknown time   • vitamin C (ASCORBIC ACID) 500 MG tablet Take 500 mg by mouth Daily.   Unknown at Unknown time   • [DISCONTINUED] pravastatin (PRAVACHOL) 40 MG tablet Take 40 mg by mouth Every Night.   Unknown at Unknown time       Medicines:  Current Facility-Administered Medications   Medication Dose Route Frequency Provider Last Rate Last Dose   • acetaminophen (TYLENOL) tablet 650 mg  650 mg Oral Q4H PRN Forrest Bliss MD        Or   • acetaminophen (TYLENOL) suppository 650 mg  650 mg Rectal Q4H PRN Forrest Bliss MD       • allopurinol (ZYLOPRIM) tablet 300 mg  300 mg Oral Daily Forrest Bliss MD   300 mg at 11/09/20 0844   • amLODIPine (NORVASC) tablet 10 mg  10 mg Oral Q24H Elpidio Irby,    10 mg at 11/09/20 0843   • aspirin chewable tablet 81 mg  81 mg Oral Daily Elpidio Irby DO   81 mg at 11/09/20 0843   • cholecalciferol (VITAMIN D3) tablet 1,000 Units  1,000 Units Oral Daily Forrest Bliss MD   1,000 Units at 11/09/20 0844   • colchicine tablet 0.6 mg  0.6 mg Oral Daily PRN Forrest Bliss MD       • dextrose (D50W) 25 g/ 50mL Intravenous Solution 25 g  25 g Intravenous Q15 Min PRN Forrest Bliss MD       • dextrose (GLUTOSE) oral gel 15 g  15 g Oral Q15 Min PRN Forrest Bliss MD       • folic acid (FOLVITE) tablet 400 mcg  400 mcg Oral Daily Forrest Bliss MD   400 mcg at 11/09/20 0844   • furosemide (LASIX) tablet 20 mg  20 mg Oral Daily Elpidio Irby DO   20 mg at 11/09/20 0843   • glucagon (human recombinant) (GLUCAGEN DIAGNOSTIC) injection 1 mg  1 mg Subcutaneous Q15 Min PRN Forrest Bliss MD       • hydrALAZINE (APRESOLINE) injection 10 mg  10 mg Intravenous Q4H PRN Forrest Bliss MD   10 mg at 11/03/20 1148   • hydrALAZINE (APRESOLINE) tablet 10 mg  10 mg Oral Q6H PRN Iain Rosenberg MD   10 mg at 11/09/20 0458   • insulin lispro (humaLOG) injection 2-7 Units  2-7 Units  Subcutaneous TID AC Forrest Bliss MD   2 Units at 11/08/20 1710   • isosorbide mononitrate (IMDUR) 24 hr tablet 30 mg  30 mg Oral Q24H Forrest Bliss MD   30 mg at 11/09/20 0844   • lansoprazole (FIRST) oral suspension 30 mg  30 mg Oral Q AM Iain Rosenberg MD   30 mg at 11/09/20 0558   • losartan (COZAAR) tablet 100 mg  100 mg Oral Q24H Elpidio Irby DO   100 mg at 11/09/20 0843   • nitroglycerin (NITROSTAT) SL tablet 0.4 mg  0.4 mg Sublingual Q5 Min PRN Forrest Bliss MD   0.4 mg at 10/27/20 1600   • ondansetron (ZOFRAN) tablet 4 mg  4 mg Oral Q6H PRN Forrest Bliss MD        Or   • ondansetron (ZOFRAN) injection 4 mg  4 mg Intravenous Q6H PRN Forrest Bliss MD       • potassium chloride (MICRO-K) CR capsule 10 mEq  10 mEq Oral BID With Meals Forrest Bliss MD   10 mEq at 11/09/20 0844   • pravastatin (PRAVACHOL) tablet 40 mg  40 mg Oral Nightly Forrest Bliss MD   40 mg at 11/08/20 2158   • prednisoLONE acetate (PRED FORTE) 1 % ophthalmic suspension 1 drop  1 drop Left Eye Q2H While Awake Forrest Bliss MD   1 drop at 11/09/20 0844   • sennosides-docusate (PERICOLACE) 8.6-50 MG per tablet 2 tablet  2 tablet Oral BID PRN Forrest Bliss MD       • sodium chloride 0.9 % flush 10 mL  10 mL Intravenous Q12H Forrest Bliss MD   10 mL at 11/05/20 2245   • sodium chloride 0.9 % flush 10 mL  10 mL Intravenous PRN Forrest Bliss MD       • sodium chloride 0.9 % infusion 500 mL  500 mL Intravenous Continuous PRN Forrest Bliss MD 25 mL/hr at 10/28/20 1150     • terazosin (HYTRIN) capsule 1 mg  1 mg Oral Nightly Forrest Bliss MD   1 mg at 11/08/20 2200   • thiamine (VITAMIN B-1) tablet 100 mg  100 mg Oral Daily Forrest Bliss MD   100 mg at 11/09/20 0843   • vitamin B-6 (PYRIDOXINE) tablet 200 mg  200 mg Oral Daily Forrest Bliss MD   200 mg at 11/09/20 0843   • vitamin C (ASCORBIC ACID) tablet 500 mg  500 mg Oral Daily Forrest Bliss MD   500 mg at  11/09/20 0843       Past Medical History:  Past Medical History:   Diagnosis Date   • A-fib (CMS/[facility]) 11/15/2016   • Anemia     gets shots every few months to build up blood. sees dr adam.   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   • Arthritis    • Bradycardia 11/15/2016   • CAD in native artery 11/15/2016   • Chest pain 11/15/2016   • CHF (congestive heart failure) (CMS/[facility])    • Chronic kidney disease    • COPD (chronic obstructive pulmonary disease) (CMS/[facility])    • DDD (degenerative disc disease), lumbar 6/27/2017   • Heart murmur    • HTN (hypertension) 11/15/2016   • Hyperlipidemia    • Lumbar stenosis with neurogenic claudication 6/27/2017   • Murmur 4/19/2019   • Myocardial infarction (CMS/[facility])    • Sleep apnea    • Stroke (CMS/[facility])    • Type 2 diabetes mellitus (CMS/[facility]) 11/15/2016   • Ulcer of abdomen wall (CMS/[facility])        Past Surgical History:  Past Surgical History:   Procedure Laterality Date   • APPENDECTOMY     • BACK SURGERY      x2   • CARDIAC CATHETERIZATION Left     1/2010   • CARPAL TUNNEL RELEASE Bilateral    • CHOLECYSTECTOMY     • COLONOSCOPY N/A 9/7/2017    Procedure: COLONOSCOPY WITH ANESTHESIA;  Surgeon: Joshua Rich DO;  Location: [facility] ENDOSCOPY;  Service:    • CORONARY ARTERY BYPASS GRAFT      2/2006 w/PTCA & KIMMY    • CORONARY STENT PLACEMENT     • ENDOSCOPY N/A 12/14/2016    Procedure: ESOPHAGOGASTRODUODENOSCOPY WITH ANESTHESIA;  Surgeon: Isabel Dexter MD;  Location: [facility] ENDOSCOPY;  Service:    • ENDOSCOPY N/A 12/16/2016    Procedure: ESOPHAGOGASTRODUODENOSCOPY WITH ANESTHESIA;  Surgeon: Joshua Rich DO;  Location: [facility] ENDOSCOPY;  Service:    • ENDOSCOPY N/A 4/10/2017    Procedure: ESOPHAGOGASTRODUODENOSCOPY WITH ANESTHESIA;  Surgeon: Joshua Rich DO;  Location: [facility] ENDOSCOPY;  Service:    • ENDOSCOPY N/A 10/28/2020    Procedure: ESOPHAGOGASTRODUODENOSCOPY WITH ANESTHESIA;  Surgeon: Forrest Bliss MD;  Location: [facility] ENDOSCOPY;  Service:  Gastroenterology;  Laterality: N/A;  pre: GI bleed  post: duodenal ulcers, hiatal hernia   Jeffrey Owen MD   • EYE SURGERY Left     x 2   • JOINT REPLACEMENT     • PERIPHERAL ARTERIAL STENT GRAFT     • REPLACEMENT TOTAL KNEE Left     2002   • SHOULDER ROTATOR CUFF REPAIR Bilateral        Family History  Family History   Problem Relation Age of Onset   • Coronary artery disease Father    • Heart disease Father    • Coronary artery disease Brother    • Heart attack Brother    • Cancer Mother    • No Known Problems Sister    • Heart disease Son    • Cancer Sister    • Cancer Sister        Social History  Social History     Socioeconomic History   • Marital status:      Spouse name: Not on file   • Number of children: Not on file   • Years of education: Not on file   • Highest education level: Not on file   Tobacco Use   • Smoking status: Never Smoker   • Smokeless tobacco: Never Used   Substance and Sexual Activity   • Alcohol use: No   • Drug use: No   • Sexual activity: Never     Partners: Female         Review of Systems:  History obtained from chart review and the patient  General ROS: No fever or chills  Respiratory ROS: No cough, shortness of breath, wheezing  Cardiovascular ROS: No chest pain or palpitations  Gastrointestinal ROS: No abdominal pain or melena  Genito-Urinary ROS: No dysuria or hematuria  Neurological ROS: no headache or dizziness    Objective:  Patient Vitals for the past 24 hrs:   BP Temp Temp src Pulse Resp SpO2   11/09/20 0736 172/68 97.7 °F (36.5 °C) Oral 69 20 96 %   11/09/20 0414 (!) 181/61 98.2 °F (36.8 °C) Axillary 73 22 95 %   11/09/20 0100 174/53 97.8 °F (36.6 °C) Oral 67 18 96 %   11/08/20 1939 171/65 97.8 °F (36.6 °C) Oral 65 18 98 %   11/08/20 1529 154/69 97.9 °F (36.6 °C) Axillary 65 18 98 %   11/08/20 1134 162/60 97.7 °F (36.5 °C) Oral 68 20 97 %       Intake/Output Summary (Last 24 hours) at 11/9/2020 0915  Last data filed at 11/9/2020 0834  Gross per 24 hour   Intake  360 ml   Output --   Net 360 ml     General: awake/alert   Chest:  clear to auscultation bilaterally without respiratory distress  CVS: regular rate and rhythm  Abdominal: soft, nontender, positive bowel sounds  Extremities: no cyanosis or edema  Skin: warm and dry without rash  Neuro: no focal motor deficits    Labs:  Results from last 7 days   Lab Units 11/07/20  0704 11/05/20  0536 11/04/20  0557   WBC 10*3/mm3 7.29 6.95 6.97   HEMOGLOBIN g/dL 8.4* 8.5* 8.3*   HEMATOCRIT % 26.0* 26.8* 25.6*   PLATELETS 10*3/mm3 178 221 192         Results from last 7 days   Lab Units 11/07/20  0704 11/05/20  0536 11/04/20  0557   SODIUM mmol/L 142 143 145   POTASSIUM mmol/L 4.5 4.4 4.1   CHLORIDE mmol/L 113* 115* 117*   CO2 mmol/L 24.0 25.0 23.0   BUN mg/dL 21 29* 35*   CREATININE mg/dL 0.77 0.88 0.89   CALCIUM mg/dL 9.0 8.8 8.6   GLUCOSE mg/dL 115* 130* 125*       Radiology:   Imaging Results (Last 72 Hours)     ** No results found for the last 72 hours. **          Culture:  Urine Culture   Date Value Ref Range Status   10/30/2020 No growth  Final   10/27/2020 >100,000 CFU/mL Escherichia coli (A)  Final         Assessment   1.  Acute kidney injury, prerenal--resolved  2.  Baseline chronic kidney disease stage 3a  3.  Hypernatremia--resolved  4.  Acute GI bleed  5.  Acute metabolic encephalopathy--resolving  6.  Type 2 diabetes with nephropathy  7.  Iron deficiency anemia  8.  Status post EGD/duodenal ulcer  9.  E coli UTI    Plan:  1.  Encourage PO intake  2.  No labs last two days. Patient clinically stable.  3.  Waiting on outpatient placement vs. home with family.  OK for discharge from renal standpoint. Follow up in office in 1-2 weeks        Teddy Hammonds, ANJANA  11/9/2020  09:15 CST

## 2020-11-09 NOTE — PAYOR COMM NOTE
"REF: [id number]    RECONSIDERATION EXTENSION 11/6/2020 FORWARD      Gabriel Escalante (85 y.o. Male)     Date of Birth Social Security Number Address Home Phone MRN    [id number]  [id number] [phone] [id number]    [place]ondoism Marital Status          Yazidism        Admission Date Admission Type Admitting Provider Attending Provider Department, Room/Bed    10/26/20 Urgent Rocky Nixon DO Robinson, Maurice S, DO [facility] 3A, 328/1    Discharge Date Discharge Disposition Discharge Destination                       Attending Provider: Rocky Nixon DO    Allergies: Lorazepam, Penicillins, Adhesive Tape    Isolation: None   Infection: None   Code Status: No CPR    Ht: 177.8 cm (70\")   Wt: 92.9 kg (204 lb 12.9 oz)    Admission Cmt: None   Principal Problem: Metabolic encephalopathy [G93.41]                 Active Insurance as of 10/26/2020     Primary Coverage     Payor Plan Insurance Group Employer/Plan Group    [facility] [employer] EMPLOYEE [id number]     Payor Plan Address Payor Plan Phone Number Payor Plan Fax Number Effective Dates    [address] [phone]  1/1/2015 - None Entered    [facility] [id number]       Subscriber Name Subscriber Birth Date Member ID       GABRIEL ESCALANTE 1934 [id number]           Secondary Coverage     Payor Plan Insurance Group Employer/Plan Group    MEDICARE MEDICARE A & B      Payor Plan Address Payor Plan Phone Number Payor Plan Fax Number Effective Dates    [address] [phone]  11/1/1999 - None Entered    [facility] [id number]       Subscriber Name Subscriber Birth Date Member ID       GABRIEL ESCALANTE 1934 [id number]                 Emergency Contacts      (Rel.) Home Phone Work Phone Mobile Phone    KirkIsabel (Grandchild) [phone] -- --    Rose Escalante (Spouse) [phone] -- --        Discharge Plan      Row Name 11/09/20 1043           Plan     Plan " Comments  Junito has been approved for [facility] but family has decided to take pt home with sitters and HH tomorrow (11-10). Isabel (grandtr) is requesting [facility] as well as BSC,  and hospital bed from [facility]. Will need orders to arrange HH/DME. Notified [facility] that pt will be going home at dc instead of SNF          Discharge Codes    No documentation.              Expected Discharge Date and Time      Expected Discharge Date Expected Discharge Time     Nov 4, 2020 11/6/202  NEPHROLOGY NOTE:      Plan:  Improved on D5W with K, continue oral fluids today  IV bumex again as needed, doing well with oral lasix  PPI  Monitor labs  D/w pt/nursing  Ok for d/c when insurance arranged        Priyank Brown MD  11/6/2020  19:52 CST  ATTENDING NOTES:        Plan:  The patient was admitted on 10/27 by Dr. Rosenberg.  He presented as a transfer from [facility] as his granddaughter works here.  He had been admitted there and was having much difficulty with confusion.  This has been proven to be multifactorial as evidenced below.     He has been evaluated by neurology.  He had an MRI of the brain without contrast on 10/30 that showed no evidence of acute infarct.     He has been followed by nephrology for acute renal failure superimposed on CKD, stage III.  His serum creatinine continues to improve.  His elevated BUN is secondary to recent gastrointestinal bleeding as well as his acute renal failure.  He developed some hyponatremia secondary to resuscitation with crystalloid fluids.  This resolved with recent dextrose fluids.  Stopped IV fluids on 11/3 and gave Bumex 0.5 mg IV x1.  Resumed amlodipine and losartan recently and have titrated them.  Lasix 20 mg orally daily.     He had heme positive stool and melena.  He was taken for endoscopy on 10/28 by Dr. Bliss. He had multiple nonbleeding duodenal ulcers at that time.  He had a hiatal hernia.  Urease negative.  He is being treated with  lansoprazole.  He has received a total of 4 units packed red blood cells.  He was last transfused on 10/31.  Hemoglobin remained stable.  Anemia substrates are consistent with chronic disease.     He has an E. coli urinary tract infection that is resistant to ampicillin, levofloxacin, tetracycline, and Bactrim.  He has been treated with ceftriaxone and was converted to oral Omnicef on 11/4.     He has a history of atrial fibrillation for which he will not be anticoagulated at the moment given his recent gastrointestinal bleed.  I restarted his aspirin on 11/2.     Oral hypoglycemics on hold.  Continue sliding scale insulin.  His most recent hemoglobin A1c is 6.1.     SCDs for DVT prophylaxis.     Discharge Planning: I expect the patient to be discharged to SNF later today if his pre-CERT goes through.     Electronically signed by Elpidio Irby DO, 11/06/20, 12:20 CST.                 Lisa Lewis RN   Registered Nurse      Plan of Care   Signed   Date of Service:  11/06/20 1942   Creation Time:  11/06/20 1942            Signed               Added by:  [x]Lisa Lewis RN      Goal Outcome Evaluation:  Plan of Care Reviewed With: patient  Progress: improving  Outcome Summary: Pt AAO to person and place this shift. Drowsy throughout the day. Up x1 to BR, voiding but incontinent at times. Meds crushed in applesauce. Continues PO omnicef for UTI. Plan to dc to [facility] when precert is complete. Safety maintained. Will continue to monitor.              Castleman, Tamara K, RN   Registered Nurse      Plan of Care   Signed   Date of Service:  11/07/20 0608   Creation Time:  11/07/20 0608            Signed               Added by:  [x]Castleman, Tamara K, RN      Goal Outcome Evaluation:  Plan of Care Reviewed With: patient  Progress: improving  Outcome Summary: Patient disoriented to time and situation. Disoriented to year, but knows month. About midnight he tried to get out of bed, had to reorient him,  otherwise rest of night he has been sleeping. Follows commands appropriately. Denies any pain, other than he still has some R arm tenderness. Fidget, takes off gown, sheet, tele leads. His bp has been high, 171/50, 182/62, this morning systolic 184. Medicated once with prn hydralazine. Reposition every 2 hours. Incontinent. SCD's on. Tele: nsr. Meds given in applesauce. Keep hob elevated. Aspiration precautions.  Safety maintained. Plan for dc to [facility] when precert is complete.              Bhavana Marcos PTA   Physical Therapy Assistant   Physical Therapy   Plan of Care   Signed   Date of Service:  11/07/20 1129   Creation Time:  11/07/20 1129            Signed               Added by:  [x]Bhavana Marcos PTA         Problem: Adult Inpatient Plan of Care  Goal: Plan of Care Review  Flowsheets (Taken 11/7/2020 1128)  Progress: improving  Plan of Care Reviewed With: patient  Outcome Summary: Bed mobility min/mod x1. sit-stand w/ bed elevated min x2. Pt able to amb today min 2 rwx 20'x2 follow w/ chair help w/ AD cues for safety. Pt would benfit from cont PT upon d/c                 11/7/2020  ATTENDING NOTES ;  Plan:  The patient was admitted on 10/27 by Dr. Rosenberg.  He presented as a transfer from [facility] as his granddaughter works here.  He had been admitted there and was having much difficulty with confusion.  This has been proven to be multifactorial as evidenced below.     He has been evaluated by neurology.  He had an MRI of the brain without contrast on 10/30 that showed no evidence of acute infarct.     He has been followed by nephrology for acute renal failure superimposed on CKD, stage III.  His serum creatinine continues to improve.  His elevated BUN is secondary to recent gastrointestinal bleeding as well as his acute renal failure.  He developed some hyponatremia secondary to resuscitation with crystalloid fluids.  This resolved with recent dextrose fluids.  Stopped IV fluids on  11/3 and gave Bumex 0.5 mg IV x1.  Resumed amlodipine and losartan recently and have titrated them.  Lasix 20 mg orally daily.     He had heme positive stool and melena.  He was taken for endoscopy on 10/28 by Dr. Bliss. He had multiple nonbleeding duodenal ulcers at that time.  He had a hiatal hernia.  Urease negative.  He is being treated with lansoprazole.  He has received a total of 4 units packed red blood cells.  He was last transfused on 10/31.  Hemoglobin remained stable.  Anemia substrates are consistent with chronic disease.     He has an E. coli urinary tract infection that is resistant to ampicillin, levofloxacin, tetracycline, and Bactrim.  He has been treated with ceftriaxone and was converted to oral Omnicef on 11/4.     He has a history of atrial fibrillation for which he will not be anticoagulated at the moment given his recent gastrointestinal bleed.  I restarted his aspirin on 11/2.     Oral hypoglycemics on hold.  Continue sliding scale insulin.  His most recent hemoglobin A1c is 6.1.     SCDs for DVT prophylaxis.     Discharge Planning: Still waiting on insurance.  He has been medically ready to discharge for several days now.  It looks like it will be Monday at the earliest before he goes.       11/8/2020  ATTENDING NOTES        Discharge Planning: Still waiting on insurance.  He has been medically ready to discharge for several days now.  His family is now starting to think that they does want to take him home instead.  They anticipate Tuesday even though they understand that he is medically ready for discharge at any point.     Electronically signed by Elpidio Irby DO, 11/08/20, 10:15 CST.        Physician Progress Notes (last 7 days) (Notes from 11/02/20 1410 through 11/09/20 1410)      Teddy Hammonds, APRN at 11/09/20 0915          Nephrology ([facility]) Progress Note      Patient:  Eron Escalante  YOB: 1934  Date of Service:  11/9/2020  MRN: [id number]   Acct: [id number]   Primary Care Physician: Jeffrey Owen MD  Advance Directive:   Code Status and Medical Interventions:   Ordered at: 10/27/20 0304     Limited Support to NOT Include:    Intubation     Level Of Support Discussed With:    Next of Kin (If No Surrogate)    Health Care Surrogate     Code Status:    No CPR     Medical Interventions (Level of Support Prior to Arrest):    Limited     Admit Date: 10/26/2020       Hospital Day: 14  Referring Provider: Alcides Tomlinson MD      Patient personally seen and examined.  Complete chart including Consults, Notes, Operative Reports, Labs, Cardiology, and Radiology studies reviewed as able.        Subjective:  Eron Escalante is a 85 y.o. male  whom we were consulted for acute kidney injury/chronic kidney disease.  Patient has stage III chronic kidney disease at baseline and has seen Dr. Victoria previously.  He was initially admitted to [facility] with encephalopathy.  Later was transferred to [facility] as per family request.  Neurological work-up consistent with consistent with microvascular chronic changes.  Patient also has bilateral Doppler studies of carotid arteries consistent with mild to moderate stenosis but no intervention recommended.  However his serum creatinine continued to rise.  Patient also developed hypernatremia as he has not been drinking due to severe encephalopathy.  Started on hypotonic fluid for correction of hypernatremia with consequent improvement of labs.     Today he has no complaints. Awake, oriented X 2. Some increased confusion overnight.  Urine output nonoliguric.    Allergies:  Lorazepam, Penicillins, and Adhesive tape    Home Meds:  Medications Prior to Admission   Medication Sig Dispense Refill Last Dose   • allopurinol (ZYLOPRIM) 300 MG tablet Take 600 mg by mouth Daily.   Unknown at Unknown time   • amLODIPine (NORVASC) 5 MG tablet Take 5 mg by mouth Daily.   Unknown at Unknown  time   • aspirin 81 MG chewable tablet Chew 81 mg Daily.   Unknown at Unknown time   • Cholecalciferol (VITAMIN D) 1000 units tablet Take 1,000 Units by mouth.   Unknown at Unknown time   • colchicine 0.6 MG tablet Take 0.6 mg by mouth Daily As Needed (gout flare-up).   Unknown at Unknown time   • finasteride (PROSCAR) 5 MG tablet Take 5 mg by mouth Daily.   Unknown at Unknown time   • folic acid (FOLVITE) 400 MCG tablet Take 400 mcg by mouth.   Unknown at Unknown time   • glimepiride (AMARYL) 4 MG tablet Take 4 mg by mouth 2 (Two) Times a Day With Meals. Needs pm dose   Unknown at Unknown time   • losartan (COZAAR) 50 MG tablet Take 25 mg by mouth Daily.   Unknown at Unknown time   • meclizine (ANTIVERT) 25 MG tablet Take 25 mg by mouth. 3-4 times daily   Unknown at Unknown time   • metoprolol succinate XL (TOPROL XL) 25 MG 24 hr tablet Take 25 mg by mouth every night at bedtime.   Unknown at Unknown time   • nitroglycerin (NITROSTAT) 0.4 MG SL tablet Place 0.4 mg under the tongue Every 5 (Five) Minutes As Needed.   Unknown at Unknown time   • pantoprazole (PROTONIX) 40 MG EC tablet Take 40 mg by mouth Every 12 (Twelve) Hours.   Unknown at Unknown time   • potassium chloride (K-DUR,KLOR-CON) 20 MEQ CR tablet Take 10 mEq by mouth Every 12 (Twelve) Hours.   Unknown at Unknown time   • Pyridoxine HCl (VITAMIN B6) 200 MG tablet Take 1 tablet by mouth Daily.   Unknown at Unknown time   • rOPINIRole (REQUIP) 1 MG tablet Take 1 mg by mouth Every Night. Take 1 hour before bedtime.   Unknown at Unknown time   • sennosides-docusate sodium (SENOKOT-S) 8.6-50 MG tablet Take 2 tablets by mouth 2 (Two) Times a Day As Needed for constipation. 45 tablet 2 Unknown at Unknown time   • tamsulosin (FLOMAX) 0.4 MG capsule 24 hr capsule Take 1 capsule by mouth Daily.   Unknown at Unknown time   • thiamine (VITAMIN B-1) 100 MG tablet Take 100 mg by mouth.   Unknown at Unknown time   • vitamin C (ASCORBIC ACID) 500 MG tablet Take 500 mg  by mouth Daily.   Unknown at Unknown time   • [DISCONTINUED] pravastatin (PRAVACHOL) 40 MG tablet Take 40 mg by mouth Every Night.   Unknown at Unknown time       Medicines:  Current Facility-Administered Medications   Medication Dose Route Frequency Provider Last Rate Last Dose   • acetaminophen (TYLENOL) tablet 650 mg  650 mg Oral Q4H PRN Forrest Bliss MD        Or   • acetaminophen (TYLENOL) suppository 650 mg  650 mg Rectal Q4H PRN Forrest Bliss MD       • allopurinol (ZYLOPRIM) tablet 300 mg  300 mg Oral Daily Forrest Bliss MD   300 mg at 11/09/20 0844   • amLODIPine (NORVASC) tablet 10 mg  10 mg Oral Q24H Elpidio Irby DO   10 mg at 11/09/20 0843   • aspirin chewable tablet 81 mg  81 mg Oral Daily Elpidio Irby,    81 mg at 11/09/20 0843   • cholecalciferol (VITAMIN D3) tablet 1,000 Units  1,000 Units Oral Daily Forrest Bliss MD   1,000 Units at 11/09/20 0844   • colchicine tablet 0.6 mg  0.6 mg Oral Daily PRN Forrest Bliss MD       • dextrose (D50W) 25 g/ 50mL Intravenous Solution 25 g  25 g Intravenous Q15 Min PRN Forrest Bliss MD       • dextrose (GLUTOSE) oral gel 15 g  15 g Oral Q15 Min PRN Forrest Bliss MD       • folic acid (FOLVITE) tablet 400 mcg  400 mcg Oral Daily Forrest Bliss MD   400 mcg at 11/09/20 0844   • furosemide (LASIX) tablet 20 mg  20 mg Oral Daily Elpidio Irby DO   20 mg at 11/09/20 0843   • glucagon (human recombinant) (GLUCAGEN DIAGNOSTIC) injection 1 mg  1 mg Subcutaneous Q15 Min PRN Forrest Bliss MD       • hydrALAZINE (APRESOLINE) injection 10 mg  10 mg Intravenous Q4H PRN Forrest Bliss MD   10 mg at 11/03/20 1148   • hydrALAZINE (APRESOLINE) tablet 10 mg  10 mg Oral Q6H PRN Iain Rosenberg MD   10 mg at 11/09/20 0458   • insulin lispro (humaLOG) injection 2-7 Units  2-7 Units Subcutaneous TID AC Forrest Bliss MD   2 Units at 11/08/20 1710   • isosorbide mononitrate (IMDUR) 24 hr tablet 30 mg  30 mg Oral Q24H  Forrest Bliss MD   30 mg at 11/09/20 0844   • lansoprazole (FIRST) oral suspension 30 mg  30 mg Oral Q AM Iain Rosenberg MD   30 mg at 11/09/20 0558   • losartan (COZAAR) tablet 100 mg  100 mg Oral Q24H Elpidio Irby DO   100 mg at 11/09/20 0843   • nitroglycerin (NITROSTAT) SL tablet 0.4 mg  0.4 mg Sublingual Q5 Min PRN Forrest Bliss MD   0.4 mg at 10/27/20 1600   • ondansetron (ZOFRAN) tablet 4 mg  4 mg Oral Q6H PRN Forrest Bliss MD        Or   • ondansetron (ZOFRAN) injection 4 mg  4 mg Intravenous Q6H PRN Forrest Bliss MD       • potassium chloride (MICRO-K) CR capsule 10 mEq  10 mEq Oral BID With Meals Forrest Bliss MD   10 mEq at 11/09/20 0844   • pravastatin (PRAVACHOL) tablet 40 mg  40 mg Oral Nightly Forrest Bliss MD   40 mg at 11/08/20 2158   • prednisoLONE acetate (PRED FORTE) 1 % ophthalmic suspension 1 drop  1 drop Left Eye Q2H While Awake Forrest Bliss MD   1 drop at 11/09/20 0844   • sennosides-docusate (PERICOLACE) 8.6-50 MG per tablet 2 tablet  2 tablet Oral BID PRN Forrest Bliss MD       • sodium chloride 0.9 % flush 10 mL  10 mL Intravenous Q12H Forrest Bliss MD   10 mL at 11/05/20 2245   • sodium chloride 0.9 % flush 10 mL  10 mL Intravenous PRN Forrest Bliss MD       • sodium chloride 0.9 % infusion 500 mL  500 mL Intravenous Continuous PRN Forrest Bliss MD 25 mL/hr at 10/28/20 1150     • terazosin (HYTRIN) capsule 1 mg  1 mg Oral Nightly Forrest Bliss MD   1 mg at 11/08/20 2200   • thiamine (VITAMIN B-1) tablet 100 mg  100 mg Oral Daily Forrest Bliss MD   100 mg at 11/09/20 0843   • vitamin B-6 (PYRIDOXINE) tablet 200 mg  200 mg Oral Daily Forrest Bliss MD   200 mg at 11/09/20 0843   • vitamin C (ASCORBIC ACID) tablet 500 mg  500 mg Oral Daily Forrest Bliss MD   500 mg at 11/09/20 0843       Past Medical History:  Past Medical History:   Diagnosis Date   • A-fib (CMS/HCC) 11/15/2016   • Anemia     gets shots every  few months to build up blood. sees dr adam.   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   • Arthritis    • Bradycardia 11/15/2016   • CAD in native artery 11/15/2016   • Chest pain 11/15/2016   • CHF (congestive heart failure) (CMS/[facility])    • Chronic kidney disease    • COPD (chronic obstructive pulmonary disease) (CMS/[facility])    • DDD (degenerative disc disease), lumbar 6/27/2017   • Heart murmur    • HTN (hypertension) 11/15/2016   • Hyperlipidemia    • Lumbar stenosis with neurogenic claudication 6/27/2017   • Murmur 4/19/2019   • Myocardial infarction (CMS/[facility])    • Sleep apnea    • Stroke (CMS/[facility])    • Type 2 diabetes mellitus (CMS/[facility]) 11/15/2016   • Ulcer of abdomen wall (CMS/[facility])        Past Surgical History:  Past Surgical History:   Procedure Laterality Date   • APPENDECTOMY     • BACK SURGERY      x2   • CARDIAC CATHETERIZATION Left     1/2010   • CARPAL TUNNEL RELEASE Bilateral    • CHOLECYSTECTOMY     • COLONOSCOPY N/A 9/7/2017    Procedure: COLONOSCOPY WITH ANESTHESIA;  Surgeon: Joshua Rich DO;  Location: [facility] ENDOSCOPY;  Service:    • CORONARY ARTERY BYPASS GRAFT      2/2006 w/PTCA & KIMMY    • CORONARY STENT PLACEMENT     • ENDOSCOPY N/A 12/14/2016    Procedure: ESOPHAGOGASTRODUODENOSCOPY WITH ANESTHESIA;  Surgeon: Isabel Dexter MD;  Location: [facility] ENDOSCOPY;  Service:    • ENDOSCOPY N/A 12/16/2016    Procedure: ESOPHAGOGASTRODUODENOSCOPY WITH ANESTHESIA;  Surgeon: Joshua Rich DO;  Location: [facility] ENDOSCOPY;  Service:    • ENDOSCOPY N/A 4/10/2017    Procedure: ESOPHAGOGASTRODUODENOSCOPY WITH ANESTHESIA;  Surgeon: Joshua Rich DO;  Location: [facility] ENDOSCOPY;  Service:    • ENDOSCOPY N/A 10/28/2020    Procedure: ESOPHAGOGASTRODUODENOSCOPY WITH ANESTHESIA;  Surgeon: Forrest Bliss MD;  Location: [facility] ENDOSCOPY;  Service: Gastroenterology;  Laterality: N/A;  pre: GI bleed  post: duodenal ulcers, hiatal hernia   Jeffrey Owen MD   • EYE SURGERY Left     x 2   • JOINT  REPLACEMENT     • PERIPHERAL ARTERIAL STENT GRAFT     • REPLACEMENT TOTAL KNEE Left     2002   • SHOULDER ROTATOR CUFF REPAIR Bilateral        Family History  Family History   Problem Relation Age of Onset   • Coronary artery disease Father    • Heart disease Father    • Coronary artery disease Brother    • Heart attack Brother    • Cancer Mother    • No Known Problems Sister    • Heart disease Son    • Cancer Sister    • Cancer Sister        Social History  Social History     Socioeconomic History   • Marital status:      Spouse name: Not on file   • Number of children: Not on file   • Years of education: Not on file   • Highest education level: Not on file   Tobacco Use   • Smoking status: Never Smoker   • Smokeless tobacco: Never Used   Substance and Sexual Activity   • Alcohol use: No   • Drug use: No   • Sexual activity: Never     Partners: Female         Review of Systems:  History obtained from chart review and the patient  General ROS: No fever or chills  Respiratory ROS: No cough, shortness of breath, wheezing  Cardiovascular ROS: No chest pain or palpitations  Gastrointestinal ROS: No abdominal pain or melena  Genito-Urinary ROS: No dysuria or hematuria  Neurological ROS: no headache or dizziness    Objective:  Patient Vitals for the past 24 hrs:   BP Temp Temp src Pulse Resp SpO2   11/09/20 0736 172/68 97.7 °F (36.5 °C) Oral 69 20 96 %   11/09/20 0414 (!) 181/61 98.2 °F (36.8 °C) Axillary 73 22 95 %   11/09/20 0100 174/53 97.8 °F (36.6 °C) Oral 67 18 96 %   11/08/20 1939 171/65 97.8 °F (36.6 °C) Oral 65 18 98 %   11/08/20 1529 154/69 97.9 °F (36.6 °C) Axillary 65 18 98 %   11/08/20 1134 162/60 97.7 °F (36.5 °C) Oral 68 20 97 %       Intake/Output Summary (Last 24 hours) at 11/9/2020 0915  Last data filed at 11/9/2020 0834  Gross per 24 hour   Intake 360 ml   Output --   Net 360 ml     General: awake/alert   Chest:  clear to auscultation bilaterally without respiratory distress  CVS: regular rate  and rhythm  Abdominal: soft, nontender, positive bowel sounds  Extremities: no cyanosis or edema  Skin: warm and dry without rash  Neuro: no focal motor deficits    Labs:  Results from last 7 days   Lab Units 11/07/20  0704 11/05/20  0536 11/04/20  0557   WBC 10*3/mm3 7.29 6.95 6.97   HEMOGLOBIN g/dL 8.4* 8.5* 8.3*   HEMATOCRIT % 26.0* 26.8* 25.6*   PLATELETS 10*3/mm3 178 221 192         Results from last 7 days   Lab Units 11/07/20  0704 11/05/20  0536 11/04/20  0557   SODIUM mmol/L 142 143 145   POTASSIUM mmol/L 4.5 4.4 4.1   CHLORIDE mmol/L 113* 115* 117*   CO2 mmol/L 24.0 25.0 23.0   BUN mg/dL 21 29* 35*   CREATININE mg/dL 0.77 0.88 0.89   CALCIUM mg/dL 9.0 8.8 8.6   GLUCOSE mg/dL 115* 130* 125*       Radiology:   Imaging Results (Last 72 Hours)     ** No results found for the last 72 hours. **          Culture:  Urine Culture   Date Value Ref Range Status   10/30/2020 No growth  Final   10/27/2020 >100,000 CFU/mL Escherichia coli (A)  Final         Assessment   1.  Acute kidney injury, prerenal--resolved  2.  Baseline chronic kidney disease stage 3a  3.  Hypernatremia--resolved  4.  Acute GI bleed  5.  Acute metabolic encephalopathy--resolving  6.  Type 2 diabetes with nephropathy  7.  Iron deficiency anemia  8.  Status post EGD/duodenal ulcer  9.  E coli UTI    Plan:  1.  Encourage PO intake  2.  No labs last two days. Patient clinically stable.  3.  Waiting on outpatient placement vs. home with family.  OK for discharge from renal standpoint. Follow up in office in 1-2 weeks        ANJANA Clayton  11/9/2020  09:15 CST    Electronically signed by Teddy Hammonds APRN at 11/09/20 0918     Priyank Brown MD at 11/08/20 1640          Nephrology ([facility]) Progress Note      Patient:  Eron Escalante  YOB: 1934  Date of Service: 11/8/2020  MRN: [id number]   Acct: [id number]   Primary Care Physician: Jeffrey Owen MD  Advance Directive:    Code Status and Medical Interventions:   Ordered at: 10/27/20 0304     Limited Support to NOT Include:    Intubation     Level Of Support Discussed With:    Next of Kin (If No Surrogate)    Health Care Surrogate     Code Status:    No CPR     Medical Interventions (Level of Support Prior to Arrest):    Limited     Admit Date: 10/26/2020       Hospital Day: 13  Referring Provider: Alcides Tomlinson MD      Patient personally seen and examined.  Complete chart including Consults, Notes, Operative Reports, Labs, Cardiology, and Radiology studies reviewed as able.        Subjective:  Eron Escalante is a 85 y.o. male  whom we were consulted for acute kidney injury/chronic kidney disease.  Patient has stage III chronic kidney disease at baseline and has seen Dr. Victoria previously.  He was initially admitted to [facility] with encephalopathy, MRI of the brain did not show any evidence of intracranial pathology.  However [facility] request.  Neurological work-up over here consistent with repeat MRI scan consistent with microvascular chronic changes.  Patient also has bilateral Doppler studies of carotid arteries consistent with mild to moderate stenosis but no intervention recommended.  However his serum creatinine continued to rise, patient also has hypernatremia as he has not been drinking due to severe encephalopathy.  Dr. Tomlinson had started hypotonic fluid for correction of hypernatremia as well as high BUN.      Today, family not present.  Pt was previously taking good intake per family. No new events. Denied cp/soa/n/v.  Family now considering home at d/c.    Allergies:  Lorazepam, Penicillins, and Adhesive tape    Home Meds:  Medications Prior to Admission   Medication Sig Dispense Refill Last Dose   • allopurinol (ZYLOPRIM) 300 MG tablet Take 600 mg by mouth Daily.   Unknown at Unknown time   • amLODIPine (NORVASC) 5 MG tablet Take 5 mg by mouth Daily.    Unknown at Unknown time   • aspirin 81 MG chewable tablet Chew 81 mg Daily.   Unknown at Unknown time   • Cholecalciferol (VITAMIN D) 1000 units tablet Take 1,000 Units by mouth.   Unknown at Unknown time   • colchicine 0.6 MG tablet Take 0.6 mg by mouth Daily As Needed (gout flare-up).   Unknown at Unknown time   • finasteride (PROSCAR) 5 MG tablet Take 5 mg by mouth Daily.   Unknown at Unknown time   • folic acid (FOLVITE) 400 MCG tablet Take 400 mcg by mouth.   Unknown at Unknown time   • glimepiride (AMARYL) 4 MG tablet Take 4 mg by mouth 2 (Two) Times a Day With Meals. Needs pm dose   Unknown at Unknown time   • losartan (COZAAR) 50 MG tablet Take 25 mg by mouth Daily.   Unknown at Unknown time   • meclizine (ANTIVERT) 25 MG tablet Take 25 mg by mouth. 3-4 times daily   Unknown at Unknown time   • metoprolol succinate XL (TOPROL XL) 25 MG 24 hr tablet Take 25 mg by mouth every night at bedtime.   Unknown at Unknown time   • nitroglycerin (NITROSTAT) 0.4 MG SL tablet Place 0.4 mg under the tongue Every 5 (Five) Minutes As Needed.   Unknown at Unknown time   • pantoprazole (PROTONIX) 40 MG EC tablet Take 40 mg by mouth Every 12 (Twelve) Hours.   Unknown at Unknown time   • potassium chloride (K-DUR,KLOR-CON) 20 MEQ CR tablet Take 10 mEq by mouth Every 12 (Twelve) Hours.   Unknown at Unknown time   • Pyridoxine HCl (VITAMIN B6) 200 MG tablet Take 1 tablet by mouth Daily.   Unknown at Unknown time   • rOPINIRole (REQUIP) 1 MG tablet Take 1 mg by mouth Every Night. Take 1 hour before bedtime.   Unknown at Unknown time   • sennosides-docusate sodium (SENOKOT-S) 8.6-50 MG tablet Take 2 tablets by mouth 2 (Two) Times a Day As Needed for constipation. 45 tablet 2 Unknown at Unknown time   • tamsulosin (FLOMAX) 0.4 MG capsule 24 hr capsule Take 1 capsule by mouth Daily.   Unknown at Unknown time   • thiamine (VITAMIN B-1) 100 MG tablet Take 100 mg by mouth.   Unknown at Unknown time   • vitamin C (ASCORBIC ACID) 500 MG  tablet Take 500 mg by mouth Daily.   Unknown at Unknown time   • [DISCONTINUED] pravastatin (PRAVACHOL) 40 MG tablet Take 40 mg by mouth Every Night.   Unknown at Unknown time       Medicines:  Current Facility-Administered Medications   Medication Dose Route Frequency Provider Last Rate Last Dose   • acetaminophen (TYLENOL) tablet 650 mg  650 mg Oral Q4H PRN Forrest Bliss MD        Or   • acetaminophen (TYLENOL) suppository 650 mg  650 mg Rectal Q4H PRN Forrest Bliss MD       • allopurinol (ZYLOPRIM) tablet 300 mg  300 mg Oral Daily Forrest Bliss MD   300 mg at 11/08/20 0843   • amLODIPine (NORVASC) tablet 10 mg  10 mg Oral Q24H Elpidio Irby DO   10 mg at 11/08/20 0842   • aspirin chewable tablet 81 mg  81 mg Oral Daily Elpidio Irby DO   81 mg at 11/08/20 0843   • cholecalciferol (VITAMIN D3) tablet 1,000 Units  1,000 Units Oral Daily Forrest Bliss MD   1,000 Units at 11/08/20 0843   • colchicine tablet 0.6 mg  0.6 mg Oral Daily PRN Forrest Bliss MD       • dextrose (D50W) 25 g/ 50mL Intravenous Solution 25 g  25 g Intravenous Q15 Min PRN Forrest Bliss MD       • dextrose (GLUTOSE) oral gel 15 g  15 g Oral Q15 Min PRN Forrest Bliss MD       • folic acid (FOLVITE) tablet 400 mcg  400 mcg Oral Daily Forrest Bliss MD   400 mcg at 11/08/20 0843   • furosemide (LASIX) tablet 20 mg  20 mg Oral Daily Elpidio Irby DO   20 mg at 11/08/20 0843   • glucagon (human recombinant) (GLUCAGEN DIAGNOSTIC) injection 1 mg  1 mg Subcutaneous Q15 Min PRN Forrest Bliss MD       • hydrALAZINE (APRESOLINE) injection 10 mg  10 mg Intravenous Q4H PRN Forrest Bliss MD   10 mg at 11/03/20 1148   • hydrALAZINE (APRESOLINE) tablet 10 mg  10 mg Oral Q6H PRN Iain Rosenberg MD   10 mg at 11/07/20 0148   • insulin lispro (humaLOG) injection 2-7 Units  2-7 Units Subcutaneous TID AC Forrest Bliss MD   2 Units at 11/08/20 1710   • isosorbide mononitrate (IMDUR) 24 hr tablet 30 mg   30 mg Oral Q24H Forrest Bliss MD   30 mg at 11/08/20 0843   • lansoprazole (FIRST) oral suspension 30 mg  30 mg Oral Q AM Iain Rosenberg MD   30 mg at 11/08/20 0605   • losartan (COZAAR) tablet 100 mg  100 mg Oral Q24H Elpidio Irby DO   100 mg at 11/08/20 0843   • nitroglycerin (NITROSTAT) SL tablet 0.4 mg  0.4 mg Sublingual Q5 Min PRN Forrest Bliss MD   0.4 mg at 10/27/20 1600   • ondansetron (ZOFRAN) tablet 4 mg  4 mg Oral Q6H PRN Forrest Bliss MD        Or   • ondansetron (ZOFRAN) injection 4 mg  4 mg Intravenous Q6H PRN Forrest Bliss MD       • potassium chloride (MICRO-K) CR capsule 10 mEq  10 mEq Oral BID With Meals Forrest Bliss MD   10 mEq at 11/08/20 1710   • pravastatin (PRAVACHOL) tablet 40 mg  40 mg Oral Nightly Forrest Bliss MD   40 mg at 11/07/20 2107   • prednisoLONE acetate (PRED FORTE) 1 % ophthalmic suspension 1 drop  1 drop Left Eye Q2H While Awake Forrest Bliss MD   1 drop at 11/08/20 1710   • sennosides-docusate (PERICOLACE) 8.6-50 MG per tablet 2 tablet  2 tablet Oral BID PRN Forrest Bliss MD       • sodium chloride 0.9 % flush 10 mL  10 mL Intravenous Q12H Forrest Bliss MD   10 mL at 11/05/20 2245   • sodium chloride 0.9 % flush 10 mL  10 mL Intravenous PRN Forrest Bliss MD       • sodium chloride 0.9 % infusion 500 mL  500 mL Intravenous Continuous JENNIFERN Forrest Bliss MD 25 mL/hr at 10/28/20 1150     • terazosin (HYTRIN) capsule 1 mg  1 mg Oral Nightly Forrest Bliss MD   1 mg at 11/07/20 2107   • thiamine (VITAMIN B-1) tablet 100 mg  100 mg Oral Daily Forrest Bliss MD   100 mg at 11/08/20 0843   • vitamin B-6 (PYRIDOXINE) tablet 200 mg  200 mg Oral Daily Forrest Bliss MD   200 mg at 11/08/20 0843   • vitamin C (ASCORBIC ACID) tablet 500 mg  500 mg Oral Daily Forrest Bliss MD   500 mg at 11/08/20 0843       Past Medical History:  Past Medical History:   Diagnosis Date   • A-fib (CMS/HCC) 11/15/2016   • Anemia      gets shots every few months to build up blood. sees dr adam.   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   • Arthritis    • Bradycardia 11/15/2016   • CAD in native artery 11/15/2016   • Chest pain 11/15/2016   • CHF (congestive heart failure) (CMS/[facility])    • Chronic kidney disease    • COPD (chronic obstructive pulmonary disease) (CMS/[facility])    • DDD (degenerative disc disease), lumbar 6/27/2017   • Heart murmur    • HTN (hypertension) 11/15/2016   • Hyperlipidemia    • Lumbar stenosis with neurogenic claudication 6/27/2017   • Murmur 4/19/2019   • Myocardial infarction (CMS/[facility])    • Sleep apnea    • Stroke (CMS/[facility])    • Type 2 diabetes mellitus (CMS/[facility]) 11/15/2016   • Ulcer of abdomen wall (CMS/[facility])        Past Surgical History:  Past Surgical History:   Procedure Laterality Date   • APPENDECTOMY     • BACK SURGERY      x2   • CARDIAC CATHETERIZATION Left     1/2010   • CARPAL TUNNEL RELEASE Bilateral    • CHOLECYSTECTOMY     • COLONOSCOPY N/A 9/7/2017    Procedure: COLONOSCOPY WITH ANESTHESIA;  Surgeon: Joshua Rich DO;  Location: [facility] ENDOSCOPY;  Service:    • CORONARY ARTERY BYPASS GRAFT      2/2006 w/PTCA & KIMMY    • CORONARY STENT PLACEMENT     • ENDOSCOPY N/A 12/14/2016    Procedure: ESOPHAGOGASTRODUODENOSCOPY WITH ANESTHESIA;  Surgeon: Isabel Dexter MD;  Location: [facility] ENDOSCOPY;  Service:    • ENDOSCOPY N/A 12/16/2016    Procedure: ESOPHAGOGASTRODUODENOSCOPY WITH ANESTHESIA;  Surgeon: Joshua Rich DO;  Location: [facility] ENDOSCOPY;  Service:    • ENDOSCOPY N/A 4/10/2017    Procedure: ESOPHAGOGASTRODUODENOSCOPY WITH ANESTHESIA;  Surgeon: Joshua Rich DO;  Location: [facility] ENDOSCOPY;  Service:    • ENDOSCOPY N/A 10/28/2020    Procedure: ESOPHAGOGASTRODUODENOSCOPY WITH ANESTHESIA;  Surgeon: Forrest Bliss MD;  Location: [facility] ENDOSCOPY;  Service: Gastroenterology;  Laterality: N/A;  pre: GI bleed  post: duodenal ulcers, hiatal hernia   Jeffrey Owen MD   • EYE SURGERY Left     x 2  "  • JOINT REPLACEMENT     • PERIPHERAL ARTERIAL STENT GRAFT     • REPLACEMENT TOTAL KNEE Left     2002   • SHOULDER ROTATOR CUFF REPAIR Bilateral        Family History  Family History   Problem Relation Age of Onset   • Coronary artery disease Father    • Heart disease Father    • Coronary artery disease Brother    • Heart attack Brother    • Cancer Mother    • No Known Problems Sister    • Heart disease Son    • Cancer Sister    • Cancer Sister        Social History  Social History     Socioeconomic History   • Marital status:      Spouse name: Not on file   • Number of children: Not on file   • Years of education: Not on file   • Highest education level: Not on file   Tobacco Use   • Smoking status: Never Smoker   • Smokeless tobacco: Never Used   Substance and Sexual Activity   • Alcohol use: No   • Drug use: No   • Sexual activity: Never     Partners: Female         Review of Systems:  History obtained from chart review and the patient  General ROS: No fever or chills  Respiratory ROS: No cough, shortness of breath, wheezing  Cardiovascular ROS: No chest pain or palpitations  Gastrointestinal ROS: No abdominal pain or melena  Genito-Urinary ROS: No dysuria or hematuria    Objective:  Patient Vitals for the past 24 hrs:   BP Temp Temp src Pulse Resp SpO2 Height Weight   11/08/20 1529 154/69 97.9 °F (36.6 °C) Axillary 65 18 98 % -- --   11/08/20 1134 162/60 97.7 °F (36.5 °C) Oral 68 20 97 % -- --   11/08/20 0842 148/55 -- -- 70 -- -- -- --   11/08/20 0822 -- -- -- -- -- -- 177.8 cm (70\") 92.9 kg (204 lb 12.9 oz)   11/08/20 0745 169/59 98.3 °F (36.8 °C) Oral 75 16 94 % -- --   11/08/20 0325 164/57 98.1 °F (36.7 °C) Oral 71 16 96 % -- --   11/07/20 2309 141/53 98.1 °F (36.7 °C) Oral 70 16 95 % -- --       Intake/Output Summary (Last 24 hours) at 11/8/2020 1946  Last data filed at 11/8/2020 1730  Gross per 24 hour   Intake 170 ml   Output --   Net 170 ml     General: awake/alert   Chest:  clear to auscultation " bilaterally without respiratory distress  CVS: IRRR  Abdominal: soft, nontender, positive bowel sounds  Extremities: no cyanosis or edema  Skin: warm and dry without rash      Labs:  Results from last 7 days   Lab Units 11/07/20  0704 11/05/20  0536 11/04/20  0557   WBC 10*3/mm3 7.29 6.95 6.97   HEMOGLOBIN g/dL 8.4* 8.5* 8.3*   HEMATOCRIT % 26.0* 26.8* 25.6*   PLATELETS 10*3/mm3 178 221 192         Results from last 7 days   Lab Units 11/07/20  0704 11/05/20  0536 11/04/20  0557  11/02/20  0447   SODIUM mmol/L 142 143 145   < > 146*   POTASSIUM mmol/L 4.5 4.4 4.1   < > 4.1   CHLORIDE mmol/L 113* 115* 117*   < > 117*   CO2 mmol/L 24.0 25.0 23.0   < > 23.0   BUN mg/dL 21 29* 35*   < > 53*   CREATININE mg/dL 0.77 0.88 0.89   < > 1.35*   CALCIUM mg/dL 9.0 8.8 8.6   < > 8.6   BILIRUBIN mg/dL  --   --   --   --  0.3   ALK PHOS U/L  --   --   --   --  78   ALT (SGPT) U/L  --   --   --   --  28   AST (SGOT) U/L  --   --   --   --  31   GLUCOSE mg/dL 115* 130* 125*   < > 166*    < > = values in this interval not displayed.       Radiology:   Imaging Results (Last 72 Hours)     ** No results found for the last 72 hours. **          Culture:  Urine Culture   Date Value Ref Range Status   10/30/2020 No growth  Final   10/27/2020 >100,000 CFU/mL Escherichia coli (A)  Final         Assessment   Acute kidney injury/Prerenal azotemia  Stage III chronic kidney disease  Hypernatremia  Acute GI bleed  Acute metabolic encephalopathy  Type II diabetic nephropathy  Iron deficiency anemia  Status post EGD/duodenal ulcer  E coli UTI     Plan:  Improved on D5W with K, continue oral fluids today  IV bumex again as needed, doing well with oral lasix  PPI  Monitor labs  D/w pt/nursing  Ok for d/c when insurance arranged  blood pressure trends improving, continue to monitor as limited by lower dbps    Priyank Brown MD  11/8/2020  19:46 CST    Electronically signed by Priyank Brown MD at 11/08/20 1948     Elpidio Irby  C, DO at 11/08/20 1015              [facility] Medicine Services  INPATIENT PROGRESS NOTE    Patient Name: Eron Escalante  Date of Admission: 10/26/2020  Today's Date: 11/08/20  Length of Stay: 13  Primary Care Physician: Jeffrey Owen MD    Subjective   Chief Complaint: Weakness.   HPI   Discussion with his son at the bedside today.  He is starting to think that they would just want to take him home.  They are tired of waiting on his insurance to come through.  They feel that they can handle him.  They would like home health.  Family will help sit with him.  They are worried that he will go to one of the skilled nursing facilities and get COVID-19.  However, he does not want to do this until Tuesday as they need time to make arrangements to have him at home.   to regroup with them.    No events overnight per nursing.    The patient has no new complaints.    Review of Systems   All pertinent negatives and positives are as above. All other systems have been reviewed and are negative unless otherwise stated.     Objective    Temp:  [97.8 °F (36.6 °C)-98.3 °F (36.8 °C)] 98.3 °F (36.8 °C)  Heart Rate:  [63-75] 70  Resp:  [16] 16  BP: (141-175)/(46-59) 148/55  Physical Exam  Vitals signs and nursing note reviewed.   Constitutional:       Appearance: He is well-developed.      Comments: In bed, sleeping, but again arouses easily.  His son is present.  Discussed with his nurse, Tram.   HENT:      Head: Normocephalic and atraumatic.      Comments: Poor dentition.  Eyes:      Conjunctiva/sclera: Conjunctivae normal.      Pupils: Pupils are equal, round, and reactive to light.   Neck:      Musculoskeletal: Neck supple.      Vascular: No JVD.   Cardiovascular:      Rate and Rhythm: Normal rate. Rhythm irregular.      Heart sounds: Normal heart sounds. No murmur. No friction rub. No gallop.    Pulmonary:      Effort: Pulmonary effort is normal. No respiratory distress.    Abdominal:      General: Bowel sounds are normal. There is no distension.      Palpations: Abdomen is soft.   Musculoskeletal: Normal range of motion.         General: Swelling (trace) present. No tenderness or deformity.   Skin:     General: Skin is warm and dry.      Findings: No rash.   Neurological:      General: No focal deficit present.      Mental Status: He is alert and oriented to person, place, and time.      Cranial Nerves: No cranial nerve deficit.      Motor: Weakness present. No abnormal muscle tone.      Deep Tendon Reflexes: Reflexes normal.      Comments: Follows all commands without difficulty.   Psychiatric:         Behavior: Behavior normal.         Thought Content: Thought content normal.         Judgment: Judgment normal.       Results Review:  I have reviewed the labs, radiology results, and diagnostic studies.    Laboratory Data:   No new labs today as they have been recently stable.    I have reviewed the patient's current medications.     Assessment/Plan     Active Hospital Problems    Diagnosis   • **Metabolic encephalopathy   • E. coli UTI (urinary tract infection)   • Hypernatremia   • Acute renal failure superimposed on stage 3a chronic kidney disease (CMS/[facility])   • Acute blood loss anemia   • Duodenal ulcer   • UGIB (upper gastrointestinal bleed)   • History of stroke   • Chronic diastolic congestive heart failure (CMS/[facility])   • A-fib (CMS/[facility])   • HTN (hypertension)   • Type 2 diabetes mellitus, without long-term current use of insulin (CMS/[facility])     Plan:  The patient was admitted on 10/27 by Dr. Rosenberg.  He presented as a transfer from [facility] as his granddaughter works here.  He had been admitted there and was having much difficulty with confusion.  This has been proven to be multifactorial as evidenced below.    He has been evaluated by neurology.  He had an MRI of the brain without contrast on 10/30 that showed no evidence of acute infarct.    He has been followed by  nephrology for acute renal failure superimposed on CKD, stage III.  His serum creatinine continues to improve.  His elevated BUN is secondary to recent gastrointestinal bleeding as well as his acute renal failure.  He developed some hyponatremia secondary to resuscitation with crystalloid fluids.  This resolved with recent dextrose fluids.  Stopped IV fluids on 11/3 and gave Bumex 0.5 mg IV x1.  Resumed amlodipine and losartan recently and have titrated them.  Lasix 20 mg orally daily.    He had heme positive stool and melena.  He was taken for endoscopy on 10/28 by Dr. Bliss. He had multiple nonbleeding duodenal ulcers at that time.  He had a hiatal hernia.  Urease negative.  He is being treated with lansoprazole.  He has received a total of 4 units packed red blood cells.  He was last transfused on 10/31.  Hemoglobin remained stable.  Anemia substrates are consistent with chronic disease.    He has an E. coli urinary tract infection that is resistant to ampicillin, levofloxacin, tetracycline, and Bactrim.  He has been treated with ceftriaxone and was converted to oral Omnicef on 11/4.    He has a history of atrial fibrillation for which he will not be anticoagulated at the moment given his recent gastrointestinal bleed.  I restarted his aspirin on 11/2.    Oral hypoglycemics on hold.  Continue sliding scale insulin.  His most recent hemoglobin A1c is 6.1.    SCDs for DVT prophylaxis.    Discharge Planning: Still waiting on insurance.  He has been medically ready to discharge for several days now.  His family is now starting to think that they does want to take him home instead.  They anticipate Tuesday even though they understand that he is medically ready for discharge at any point.    Electronically signed by Elpidio Irby DO, 11/08/20, 10:15 CST.          Electronically signed by Elpidio Irby DO at 11/08/20 1020     Priyank Brown MD at 11/07/20 8114          Nephrology [facility]) Progress Note      Patient:  Eron Escalante  YOB: 1934  Date of Service: 11/7/2020  MRN: [id number]   Acct: [id number]   Primary Care Physician: Jeffrey Owen MD  Advance Directive:   Code Status and Medical Interventions:   Ordered at: 10/27/20 0304     Limited Support to NOT Include:    Intubation     Level Of Support Discussed With:    Next of Kin (If No Surrogate)    Health Care Surrogate     Code Status:    No CPR     Medical Interventions (Level of Support Prior to Arrest):    Limited     Admit Date: 10/26/2020       Hospital Day: 12  Referring Provider: Alcides Tomlinson MD      Patient personally seen and examined.  Complete chart including Consults, Notes, Operative Reports, Labs, Cardiology, and Radiology studies reviewed as able.        Subjective:  Eron Escalante is a 85 y.o. male  whom we were consulted for acute kidney injury/chronic kidney disease.  Patient has stage III chronic kidney disease at baseline and has seen Dr. Victoria previously.  He was initially admitted to [facility] with encephalopathy, MRI of the brain did not show any evidence of intracranial pathology.  However he was transferred to [facility] as per family request.  Neurological work-up over here consistent with repeat MRI scan consistent with microvascular chronic changes.  Patient also has bilateral Doppler studies of carotid arteries consistent with mild to moderate stenosis but no intervention recommended.  However his serum creatinine continued to rise, patient also has hypernatremia as he has not been drinking due to severe encephalopathy.  Dr. Tomlinson had started hypotonic fluid for correction of hypernatremia as well as high BUN.      Today, family not present.  Pt was previously taking good intake per family. No new events. Denied cp/soa/n/v.      Allergies:  Lorazepam, Penicillins, and Adhesive tape    Home Meds:  Medications Prior to Admission   Medication Sig  Dispense Refill Last Dose   • allopurinol (ZYLOPRIM) 300 MG tablet Take 600 mg by mouth Daily.   Unknown at Unknown time   • amLODIPine (NORVASC) 5 MG tablet Take 5 mg by mouth Daily.   Unknown at Unknown time   • aspirin 81 MG chewable tablet Chew 81 mg Daily.   Unknown at Unknown time   • Cholecalciferol (VITAMIN D) 1000 units tablet Take 1,000 Units by mouth.   Unknown at Unknown time   • colchicine 0.6 MG tablet Take 0.6 mg by mouth Daily As Needed (gout flare-up).   Unknown at Unknown time   • finasteride (PROSCAR) 5 MG tablet Take 5 mg by mouth Daily.   Unknown at Unknown time   • folic acid (FOLVITE) 400 MCG tablet Take 400 mcg by mouth.   Unknown at Unknown time   • glimepiride (AMARYL) 4 MG tablet Take 4 mg by mouth 2 (Two) Times a Day With Meals. Needs pm dose   Unknown at Unknown time   • losartan (COZAAR) 50 MG tablet Take 25 mg by mouth Daily.   Unknown at Unknown time   • meclizine (ANTIVERT) 25 MG tablet Take 25 mg by mouth. 3-4 times daily   Unknown at Unknown time   • metoprolol succinate XL (TOPROL XL) 25 MG 24 hr tablet Take 25 mg by mouth every night at bedtime.   Unknown at Unknown time   • nitroglycerin (NITROSTAT) 0.4 MG SL tablet Place 0.4 mg under the tongue Every 5 (Five) Minutes As Needed.   Unknown at Unknown time   • pantoprazole (PROTONIX) 40 MG EC tablet Take 40 mg by mouth Every 12 (Twelve) Hours.   Unknown at Unknown time   • potassium chloride (K-DUR,KLOR-CON) 20 MEQ CR tablet Take 10 mEq by mouth Every 12 (Twelve) Hours.   Unknown at Unknown time   • Pyridoxine HCl (VITAMIN B6) 200 MG tablet Take 1 tablet by mouth Daily.   Unknown at Unknown time   • rOPINIRole (REQUIP) 1 MG tablet Take 1 mg by mouth Every Night. Take 1 hour before bedtime.   Unknown at Unknown time   • sennosides-docusate sodium (SENOKOT-S) 8.6-50 MG tablet Take 2 tablets by mouth 2 (Two) Times a Day As Needed for constipation. 45 tablet 2 Unknown at Unknown time   • tamsulosin (FLOMAX) 0.4 MG capsule 24 hr  capsule Take 1 capsule by mouth Daily.   Unknown at Unknown time   • thiamine (VITAMIN B-1) 100 MG tablet Take 100 mg by mouth.   Unknown at Unknown time   • vitamin C (ASCORBIC ACID) 500 MG tablet Take 500 mg by mouth Daily.   Unknown at Unknown time   • [DISCONTINUED] pravastatin (PRAVACHOL) 40 MG tablet Take 40 mg by mouth Every Night.   Unknown at Unknown time       Medicines:  Current Facility-Administered Medications   Medication Dose Route Frequency Provider Last Rate Last Dose   • acetaminophen (TYLENOL) tablet 650 mg  650 mg Oral Q4H PRN Forrest Bliss MD        Or   • acetaminophen (TYLENOL) suppository 650 mg  650 mg Rectal Q4H PRN Forrest Bliss MD       • allopurinol (ZYLOPRIM) tablet 300 mg  300 mg Oral Daily Forrest Bliss MD   300 mg at 11/07/20 0828   • amLODIPine (NORVASC) tablet 10 mg  10 mg Oral Q24H Elpidio Irby,    10 mg at 11/07/20 0828   • aspirin chewable tablet 81 mg  81 mg Oral Daily Elpidio Irby, DO   81 mg at 11/07/20 0828   • cefdinir (OMNICEF) capsule 300 mg  300 mg Oral Q12H Elpidio Irby, DO   300 mg at 11/07/20 0828   • cholecalciferol (VITAMIN D3) tablet 1,000 Units  1,000 Units Oral Daily Forrest Bliss MD   1,000 Units at 11/07/20 0828   • colchicine tablet 0.6 mg  0.6 mg Oral Daily PRN Forrest Bliss MD       • dextrose (D50W) 25 g/ 50mL Intravenous Solution 25 g  25 g Intravenous Q15 Min PRN Forrest Bliss MD       • dextrose (GLUTOSE) oral gel 15 g  15 g Oral Q15 Min PRN Forrest Bliss MD       • folic acid (FOLVITE) tablet 400 mcg  400 mcg Oral Daily Forrest Bliss MD   400 mcg at 11/07/20 0828   • furosemide (LASIX) tablet 20 mg  20 mg Oral Daily Elpidio Irby, DO   20 mg at 11/07/20 0828   • glucagon (human recombinant) (GLUCAGEN DIAGNOSTIC) injection 1 mg  1 mg Subcutaneous Q15 Min PRN Forrest Bliss MD       • hydrALAZINE (APRESOLINE) injection 10 mg  10 mg Intravenous Q4H PRN Forrest Bliss MD   10 mg at 11/03/20 1149    • hydrALAZINE (APRESOLINE) tablet 10 mg  10 mg Oral Q6H PRN Iain Rosenberg MD   10 mg at 11/07/20 0148   • insulin lispro (humaLOG) injection 2-7 Units  2-7 Units Subcutaneous TID AC Forrest Bliss MD   2 Units at 11/06/20 1754   • isosorbide mononitrate (IMDUR) 24 hr tablet 30 mg  30 mg Oral Q24H Forrest Bliss MD   30 mg at 11/07/20 0828   • lansoprazole (FIRST) oral suspension 30 mg  30 mg Oral Q AM Iain Rosenberg MD   30 mg at 11/07/20 0650   • losartan (COZAAR) tablet 100 mg  100 mg Oral Q24H Elpidio Irby DO   100 mg at 11/07/20 0828   • nitroglycerin (NITROSTAT) SL tablet 0.4 mg  0.4 mg Sublingual Q5 Min PRN Forrest Bliss MD   0.4 mg at 10/27/20 1600   • ondansetron (ZOFRAN) tablet 4 mg  4 mg Oral Q6H PRN Forrest Bliss MD        Or   • ondansetron (ZOFRAN) injection 4 mg  4 mg Intravenous Q6H PRN Forrest Bliss MD       • potassium chloride (MICRO-K) CR capsule 10 mEq  10 mEq Oral BID With Meals Forrest Bliss MD   10 mEq at 11/07/20 0828   • pravastatin (PRAVACHOL) tablet 40 mg  40 mg Oral Nightly Forrest Bliss MD   40 mg at 11/06/20 2216   • prednisoLONE acetate (PRED FORTE) 1 % ophthalmic suspension 1 drop  1 drop Left Eye Q2H While Awake Forrest Bliss MD   1 drop at 11/07/20 1633   • sennosides-docusate (PERICOLACE) 8.6-50 MG per tablet 2 tablet  2 tablet Oral BID PRN Forrest Bliss MD       • sodium chloride 0.9 % flush 10 mL  10 mL Intravenous Q12H Forrest Bliss MD   10 mL at 11/05/20 2245   • sodium chloride 0.9 % flush 10 mL  10 mL Intravenous PRN Forrest Bliss MD       • sodium chloride 0.9 % infusion 500 mL  500 mL Intravenous Continuous PRN Forrest Bliss MD 25 mL/hr at 10/28/20 1150     • terazosin (HYTRIN) capsule 1 mg  1 mg Oral Nightly Forrest Bliss MD   1 mg at 11/06/20 2217   • thiamine (VITAMIN B-1) tablet 100 mg  100 mg Oral Daily Forrest Bliss MD   100 mg at 11/07/20 0829   • vitamin B-6 (PYRIDOXINE) tablet 200 mg   200 mg Oral Daily Forrest Bliss MD   200 mg at 11/07/20 0829   • vitamin C (ASCORBIC ACID) tablet 500 mg  500 mg Oral Daily Forrest Bliss MD   500 mg at 11/07/20 0828       Past Medical History:  Past Medical History:   Diagnosis Date   • A-fib (CMS/[facility]) 11/15/2016   • Anemia     gets shots every few months to build up blood. sees dr adam.   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   • Arthritis    • Bradycardia 11/15/2016   • CAD in native artery 11/15/2016   • Chest pain 11/15/2016   • CHF (congestive heart failure) (CMS/[facility])    • Chronic kidney disease    • COPD (chronic obstructive pulmonary disease) (CMS/[facility])    • DDD (degenerative disc disease), lumbar 6/27/2017   • Heart murmur    • HTN (hypertension) 11/15/2016   • Hyperlipidemia    • Lumbar stenosis with neurogenic claudication 6/27/2017   • Murmur 4/19/2019   • Myocardial infarction (CMS/[facility])    • Sleep apnea    • Stroke (CMS/[facility])    • Type 2 diabetes mellitus (CMS/[facility]) 11/15/2016   • Ulcer of abdomen wall (CMS/[facility])        Past Surgical History:  Past Surgical History:   Procedure Laterality Date   • APPENDECTOMY     • BACK SURGERY      x2   • CARDIAC CATHETERIZATION Left     1/2010   • CARPAL TUNNEL RELEASE Bilateral    • CHOLECYSTECTOMY     • COLONOSCOPY N/A 9/7/2017    Procedure: COLONOSCOPY WITH ANESTHESIA;  Surgeon: Joshua Rich DO;  Location: [facility] ENDOSCOPY;  Service:    • CORONARY ARTERY BYPASS GRAFT      2/2006 w/PTCA & KIMMY    • CORONARY STENT PLACEMENT     • ENDOSCOPY N/A 12/14/2016    Procedure: ESOPHAGOGASTRODUODENOSCOPY WITH ANESTHESIA;  Surgeon: Isabel Dexter MD;  Location: [facility] ENDOSCOPY;  Service:    • ENDOSCOPY N/A 12/16/2016    Procedure: ESOPHAGOGASTRODUODENOSCOPY WITH ANESTHESIA;  Surgeon: Joshua Rich DO;  Location: [facility] ENDOSCOPY;  Service:    • ENDOSCOPY N/A 4/10/2017    Procedure: ESOPHAGOGASTRODUODENOSCOPY WITH ANESTHESIA;  Surgeon: Joshua Rich DO;  Location: [facility] ENDOSCOPY;  Service:    •  ENDOSCOPY N/A 10/28/2020    Procedure: ESOPHAGOGASTRODUODENOSCOPY WITH ANESTHESIA;  Surgeon: Forrest Bliss MD;  Location: [facility] ENDOSCOPY;  Service: Gastroenterology;  Laterality: N/A;  pre: GI bleed  post: duodenal ulcers, hiatal hernia   Jeffrey Owen MD   • EYE SURGERY Left     x 2   • JOINT REPLACEMENT     • PERIPHERAL ARTERIAL STENT GRAFT     • REPLACEMENT TOTAL KNEE Left     2002   • SHOULDER ROTATOR CUFF REPAIR Bilateral        Family History  Family History   Problem Relation Age of Onset   • Coronary artery disease Father    • Heart disease Father    • Coronary artery disease Brother    • Heart attack Brother    • Cancer Mother    • No Known Problems Sister    • Heart disease Son    • Cancer Sister    • Cancer Sister        Social History  Social History     Socioeconomic History   • Marital status:      Spouse name: Not on file   • Number of children: Not on file   • Years of education: Not on file   • Highest education level: Not on file   Tobacco Use   • Smoking status: Never Smoker   • Smokeless tobacco: Never Used   Substance and Sexual Activity   • Alcohol use: No   • Drug use: No   • Sexual activity: Never     Partners: Female         Review of Systems:  History obtained from chart review and the patient  General ROS: No fever or chills  Respiratory ROS: No cough, shortness of breath, wheezing  Cardiovascular ROS: No chest pain or palpitations  Gastrointestinal ROS: No abdominal pain or melena  Genito-Urinary ROS: No dysuria or hematuria    Objective:  Patient Vitals for the past 24 hrs:   BP Temp Temp src Pulse Resp SpO2   11/07/20 1527 147/46 98 °F (36.7 °C) Oral 63 16 96 %   11/07/20 1108 151/55 97.8 °F (36.6 °C) Oral 68 16 96 %   11/07/20 0837 145/54 97.7 °F (36.5 °C) Oral 67 16 96 %   11/07/20 0336 (!) 184/62 97.6 °F (36.4 °C) Oral 70 18 96 %   11/06/20 2327 (!) 182/62 97.6 °F (36.4 °C) Oral 65 18 96 %   11/06/20 2035 171/50 97.6 °F (36.4 °C) Oral 64 18 96 %       Intake/Output  Summary (Last 24 hours) at 11/7/2020 1738  Last data filed at 11/7/2020 1711  Gross per 24 hour   Intake 770 ml   Output 300 ml   Net 470 ml     General: awake/alert   Chest:  clear to auscultation bilaterally without respiratory distress  CVS: IRRR  Abdominal: soft, nontender, positive bowel sounds  Extremities: no cyanosis or edema  Skin: warm and dry without rash      Labs:  Results from last 7 days   Lab Units 11/07/20  0704 11/05/20  0536 11/04/20  0557   WBC 10*3/mm3 7.29 6.95 6.97   HEMOGLOBIN g/dL 8.4* 8.5* 8.3*   HEMATOCRIT % 26.0* 26.8* 25.6*   PLATELETS 10*3/mm3 178 221 192         Results from last 7 days   Lab Units 11/07/20  0704 11/05/20  0536 11/04/20  0557  11/02/20  0447 11/01/20  0624   SODIUM mmol/L 142 143 145   < > 146* 150*   POTASSIUM mmol/L 4.5 4.4 4.1   < > 4.1 3.7   CHLORIDE mmol/L 113* 115* 117*   < > 117* 119*   CO2 mmol/L 24.0 25.0 23.0   < > 23.0 24.0   BUN mg/dL 21 29* 35*   < > 53* 69*   CREATININE mg/dL 0.77 0.88 0.89   < > 1.35* 1.80*   CALCIUM mg/dL 9.0 8.8 8.6   < > 8.6 8.7   BILIRUBIN mg/dL  --   --   --   --  0.3 0.3   ALK PHOS U/L  --   --   --   --  78 79   ALT (SGPT) U/L  --   --   --   --  28 31   AST (SGOT) U/L  --   --   --   --  31 35   GLUCOSE mg/dL 115* 130* 125*   < > 166* 170*    < > = values in this interval not displayed.       Radiology:   Imaging Results (Last 72 Hours)     ** No results found for the last 72 hours. **          Culture:  Urine Culture   Date Value Ref Range Status   10/30/2020 No growth  Final   10/27/2020 >100,000 CFU/mL Escherichia coli (A)  Final         Assessment   Acute kidney injury/Prerenal azotemia  Stage III chronic kidney disease  Hypernatremia  Acute GI bleed  Acute metabolic encephalopathy  Type II diabetic nephropathy  Iron deficiency anemia  Status post EGD/duodenal ulcer  E coli UTI     Plan:  Improved on D5W with K, continue oral fluids today  IV bumex again as needed, doing well with oral lasix  PPI  Monitor labs  D/w  pt/nursing  Ok for d/c when insurance arranged  blood pressure trends improving, continue to monitor    Priyank Brown MD  11/7/2020  17:38 CST    Electronically signed by Priyank Brown MD at 11/07/20 1746     IrbyElpidio,  at 11/07/20 1421              [facility] Medicine Services  INPATIENT PROGRESS NOTE    Patient Name: Eron Escalante  Date of Admission: 10/26/2020  Today's Date: 11/07/20  Length of Stay: 12  Primary Care Physician: Jeffrey Owen MD    Subjective   Chief Complaint: Weakness.   HPI   Insurance never came through yesterday.  Will likely be Monday until we hear something.    Blood pressure still a problem at times.  I have adjusted his medications recently.    Hemoglobin stable.    No problems with his kidney function still.    Blood glucoses have been relatively well controlled.    Review of Systems   All pertinent negatives and positives are as above. All other systems have been reviewed and are negative unless otherwise stated.     Objective    Temp:  [97.6 °F (36.4 °C)-98.1 °F (36.7 °C)] 97.8 °F (36.6 °C)  Heart Rate:  [64-70] 68  Resp:  [16-18] 16  BP: (145-184)/(48-62) 151/55  Physical Exam  Vitals signs and nursing note reviewed.   Constitutional:       Appearance: He is well-developed.      Comments: In bed, sleeping, but arouses easily.  His granddaughter is not currently present.  Discussed with his nurse, Bronwyn.   HENT:      Head: Normocephalic and atraumatic.      Comments: Poor dentition.  Eyes:      Conjunctiva/sclera: Conjunctivae normal.      Pupils: Pupils are equal, round, and reactive to light.   Neck:      Musculoskeletal: Neck supple.      Vascular: No JVD.   Cardiovascular:      Rate and Rhythm: Normal rate. Rhythm irregular.      Heart sounds: Normal heart sounds. No murmur. No friction rub. No gallop.    Pulmonary:      Effort: Pulmonary effort is normal. No respiratory distress.   Abdominal:      General:  Bowel sounds are normal. There is no distension.      Palpations: Abdomen is soft.   Musculoskeletal: Normal range of motion.         General: Swelling (trace) present. No tenderness or deformity.   Skin:     General: Skin is warm and dry.      Findings: No rash.   Neurological:      General: No focal deficit present.      Mental Status: He is alert and oriented to person, place, and time.      Cranial Nerves: No cranial nerve deficit.      Motor: Weakness present. No abnormal muscle tone.      Deep Tendon Reflexes: Reflexes normal.      Comments: Follows all commands without difficulty.   Psychiatric:         Behavior: Behavior normal.         Thought Content: Thought content normal.         Judgment: Judgment normal.       Results Review:  I have reviewed the labs, radiology results, and diagnostic studies.    Laboratory Data:   Results from last 7 days   Lab Units 11/07/20  0704 11/05/20 0536 11/04/20  0557   WBC 10*3/mm3 7.29 6.95 6.97   HEMOGLOBIN g/dL 8.4* 8.5* 8.3*   HEMATOCRIT % 26.0* 26.8* 25.6*   PLATELETS 10*3/mm3 178 221 192     Results from last 7 days   Lab Units 11/07/20  0704 11/05/20  0536 11/04/20  0557  11/02/20  0447 11/01/20  0624   SODIUM mmol/L 142 143 145   < > 146* 150*   POTASSIUM mmol/L 4.5 4.4 4.1   < > 4.1 3.7   CHLORIDE mmol/L 113* 115* 117*   < > 117* 119*   CO2 mmol/L 24.0 25.0 23.0   < > 23.0 24.0   BUN mg/dL 21 29* 35*   < > 53* 69*   CREATININE mg/dL 0.77 0.88 0.89   < > 1.35* 1.80*   CALCIUM mg/dL 9.0 8.8 8.6   < > 8.6 8.7   BILIRUBIN mg/dL  --   --   --   --  0.3 0.3   ALK PHOS U/L  --   --   --   --  78 79   ALT (SGPT) U/L  --   --   --   --  28 31   AST (SGOT) U/L  --   --   --   --  31 35   GLUCOSE mg/dL 115* 130* 125*   < > 166* 170*    < > = values in this interval not displayed.     I have reviewed the patient's current medications.     Assessment/Plan     Active Hospital Problems    Diagnosis   • **Metabolic encephalopathy   • E. coli UTI (urinary tract infection)   •  Hypernatremia   • Acute renal failure superimposed on stage 3a chronic kidney disease (CMS/[facility])   • Acute blood loss anemia   • Duodenal ulcer   • UGIB (upper gastrointestinal bleed)   • History of stroke   • Chronic diastolic congestive heart failure (CMS/[facility])   • A-fib (CMS/[facility])   • HTN (hypertension)   • Type 2 diabetes mellitus, without long-term current use of insulin (CMS/[facility])     Plan:  The patient was admitted on 10/27 by Dr. Rosenberg.  He presented as a transfer from [facility] as his granddaughter works here.  He had been admitted there and was having much difficulty with confusion.  This has been proven to be multifactorial as evidenced below.    He has been evaluated by neurology.  He had an MRI of the brain without contrast on 10/30 that showed no evidence of acute infarct.    He has been followed by nephrology for acute renal failure superimposed on CKD, stage III.  His serum creatinine continues to improve.  His elevated BUN is secondary to recent gastrointestinal bleeding as well as his acute renal failure.  He developed some hyponatremia secondary to resuscitation with crystalloid fluids.  This resolved with recent dextrose fluids.  Stopped IV fluids on 11/3 and gave Bumex 0.5 mg IV x1.  Resumed amlodipine and losartan recently and have titrated them.  Lasix 20 mg orally daily.    He had heme positive stool and melena.  He was taken for endoscopy on 10/28 by Dr. Bliss. He had multiple nonbleeding duodenal ulcers at that time.  He had a hiatal hernia.  Urease negative.  He is being treated with lansoprazole.  He has received a total of 4 units packed red blood cells.  He was last transfused on 10/31.  Hemoglobin remained stable.  Anemia substrates are consistent with chronic disease.    He has an E. coli urinary tract infection that is resistant to ampicillin, levofloxacin, tetracycline, and Bactrim.  He has been treated with ceftriaxone and was converted to oral Omnicef on 11/4.    He has a  history of atrial fibrillation for which he will not be anticoagulated at the moment given his recent gastrointestinal bleed.  I restarted his aspirin on 11/2.    Oral hypoglycemics on hold.  Continue sliding scale insulin.  His most recent hemoglobin A1c is 6.1.    SCDs for DVT prophylaxis.    Discharge Planning: Still waiting on insurance.  He has been medically ready to discharge for several days now.  It looks like it will be Monday at the earliest before he goes.    Electronically signed by Elpidio Irby DO, 11/07/20, 14:21 CST.      Electronically signed by Elpidio Irby DO at 11/07/20 1551     Elpidio Irby DO at 11/06/20 1220              [facility] Medicine Services  INPATIENT PROGRESS NOTE    Patient Name: Eron Escalante  Date of Admission: 10/26/2020  Today's Date: 11/06/20  Length of Stay: 11  Primary Care Physician: Jeffrey Owen MD    Subjective   Chief Complaint: Weakness.   HPI   Still awaiting precertification from his insurance to go to skilled nursing.  This seems to be taking longer than usual.  I have discussed this with .  They have checked with insurance and skilled nursing facility daily.    Granddaughter understanding of the delay in placement.  She states that he is very tired today.  He has done more with therapy over the last few days.    Blood pressure still elevated despite recent medication changes.    Hemoglobin and renal function recently stable.  No new labs today.    Blood glucoses fairly well controlled.    Review of Systems   All pertinent negatives and positives are as above. All other systems have been reviewed and are negative unless otherwise stated.     Objective    Temp:  [97.6 °F (36.4 °C)-97.9 °F (36.6 °C)] 97.9 °F (36.6 °C)  Heart Rate:  [58-77] 77  Resp:  [18-20] 20  BP: (159-172)/(49-57) 164/57  Physical Exam  Vitals signs and nursing note reviewed.   Constitutional:       Appearance: He is well-developed.       Comments: In bed, sleeping, but arouses easily.  His granddaughter is present.  Discussed with his nurse, Liliane.   HENT:      Head: Normocephalic and atraumatic.      Comments: Poor dentition.  Eyes:      Conjunctiva/sclera: Conjunctivae normal.      Pupils: Pupils are equal, round, and reactive to light.   Neck:      Musculoskeletal: Neck supple.      Vascular: No JVD.   Cardiovascular:      Rate and Rhythm: Normal rate. Rhythm irregular.      Heart sounds: Normal heart sounds. No murmur. No friction rub. No gallop.    Pulmonary:      Effort: Pulmonary effort is normal. No respiratory distress.   Abdominal:      General: Bowel sounds are normal. There is no distension.      Palpations: Abdomen is soft.   Musculoskeletal: Normal range of motion.         General: Swelling (trace) present. No tenderness or deformity.   Skin:     General: Skin is warm and dry.      Findings: No rash.   Neurological:      General: No focal deficit present.      Mental Status: He is alert and oriented to person, place, and time.      Cranial Nerves: No cranial nerve deficit.      Motor: Weakness present. No abnormal muscle tone.      Deep Tendon Reflexes: Reflexes normal.      Comments: Follows all commands without difficulty.   Psychiatric:         Behavior: Behavior normal.         Thought Content: Thought content normal.         Judgment: Judgment normal.       Results Review:  I have reviewed the labs, radiology results, and diagnostic studies.    Laboratory Data:   No new labs today.  We will reassess again tomorrow if he does not discharge.    I have reviewed the patient's current medications.     Assessment/Plan     Active Hospital Problems    Diagnosis   • **Metabolic encephalopathy   • E. coli UTI (urinary tract infection)   • Hypernatremia   • Acute renal failure superimposed on stage 3a chronic kidney disease (CMS/[facility])   • Acute blood loss anemia   • Duodenal ulcer   • UGIB (upper gastrointestinal bleed)   • History of  stroke   • Chronic diastolic congestive heart failure (CMS/[facility])   • A-fib (CMS/[facility])   • HTN (hypertension)   • Type 2 diabetes mellitus, without long-term current use of insulin (CMS/[facility])     Plan:  The patient was admitted on 10/27 by Dr. Rosenberg.  He presented as a transfer from [facility] as his granddaughter works here.  He had been admitted there and was having much difficulty with confusion.  This has been proven to be multifactorial as evidenced below.    He has been evaluated by neurology.  He had an MRI of the brain without contrast on 10/30 that showed no evidence of acute infarct.    He has been followed by nephrology for acute renal failure superimposed on CKD, stage III.  His serum creatinine continues to improve.  His elevated BUN is secondary to recent gastrointestinal bleeding as well as his acute renal failure.  He developed some hyponatremia secondary to resuscitation with crystalloid fluids.  This resolved with recent dextrose fluids.  Stopped IV fluids on 11/3 and gave Bumex 0.5 mg IV x1.  Resumed amlodipine and losartan recently and have titrated them.  Lasix 20 mg orally daily.    He had heme positive stool and melena.  He was taken for endoscopy on 10/28 by Dr. Bliss. He had multiple nonbleeding duodenal ulcers at that time.  He had a hiatal hernia.  Urease negative.  He is being treated with lansoprazole.  He has received a total of 4 units packed red blood cells.  He was last transfused on 10/31.  Hemoglobin remained stable.  Anemia substrates are consistent with chronic disease.    He has an E. coli urinary tract infection that is resistant to ampicillin, levofloxacin, tetracycline, and Bactrim.  He has been treated with ceftriaxone and was converted to oral Omnicef on 11/4.    He has a history of atrial fibrillation for which he will not be anticoagulated at the moment given his recent gastrointestinal bleed.  I restarted his aspirin on 11/2.    Oral hypoglycemics on hold.  Continue  sliding scale insulin.  His most recent hemoglobin A1c is 6.1.    SCDs for DVT prophylaxis.    Discharge Planning: I expect the patient to be discharged to SNF later today if his pre-CERT goes through.    Electronically signed by Elpidio Irby DO, 11/06/20, 12:20 CST.      Electronically signed by Elpidio Irby DO at 11/06/20 1228     Teddy Hammonds, APRN at 11/06/20 1103     Attestation signed by Priyank Brown MD at 11/06/20 1953    I have independently interviewed and examined the patient and reviewed the laboratory, imaging, notes and all other records as available.  I have discussed key elements of the care plan with the APRN and agree with the findings and care plan documented above except as noted.      Subjective:  Initially consulted for acute kidney injury/chronic kidney disease.  Patient has stage III chronic kidney disease at baseline and has seen Dr. Victoria previously.  He was initially admitted to [facility] with encephalopathy, MRI of the brain did not show any evidence of intracranial pathology.  However he was transferred to [facility] as per family request.  Neurological work-up over here consistent with repeat MRI scan consistent with microvascular chronic changes.  Patient also has bilateral Doppler studies of carotid arteries consistent with mild to moderate stenosis but no intervention recommended.  However his serum creatinine continued to rise, patient also has hypernatremia as he has not been drinking due to severe encephalopathy.  Dr. Tomlinson had started hypotonic fluid for correction of hypernatremia as well as high BUN.      Today, family not present.  Pt was previously taking good intake per family. No new events. Denied cp/soa/n/v.    Objective:  Vitals/labs/studies/notes all reviewed  Exam independently verified with additional comments or findings as noted:  Ext: no edema    Assessment:  Acute kidney injury/Prerenal azotemia  Stage III chronic  kidney disease  Hypernatremia  Acute GI bleed  Acute metabolic encephalopathy  Type II diabetic nephropathy  Iron deficiency anemia  Status post EGD/duodenal ulcer  E coli UTI     Plan:  Improved on D5W with K, continue oral fluids today  IV bumex again as needed, doing well with oral lasix  PPI  Monitor labs  D/w pt/nursing  Ok for d/c when insurance arranged      Priyank Brown MD  11/6/2020  19:52 CST                    Nephrology ([facility]) Progress Note      Patient:  Eron Escalante  YOB: 1934  Date of Service: 11/6/2020  MRN: [id number]   Acct: [id number]   Primary Care Physician: Jeffrey Owen MD  Advance Directive:   Code Status and Medical Interventions:   Ordered at: 10/27/20 0304     Limited Support to NOT Include:    Intubation     Level Of Support Discussed With:    Next of Kin (If No Surrogate)    Health Care Surrogate     Code Status:    No CPR     Medical Interventions (Level of Support Prior to Arrest):    Limited     Admit Date: 10/26/2020       Hospital Day: 11  Referring Provider: Alcides Tomlinson MD      Patient personally seen and examined.  Complete chart including Consults, Notes, Operative Reports, Labs, Cardiology, and Radiology studies reviewed as able.        Subjective:  Eron Escalante is a 85 y.o. male  whom we were consulted for acute kidney injury/chronic kidney disease.  Patient has stage III chronic kidney disease at baseline and has seen Dr. Victoria previously.  He was initially admitted to [facility] with encephalopathy, MRI of the brain did not show any evidence of intracranial pathology.  Transferred to [facility] as per family request.  Neurological work-up consistent with consistent with microvascular chronic changes.  Patient also has bilateral Doppler studies of carotid arteries consistent with mild to moderate stenosis but no intervention recommended.  However his serum creatinine continued to  rise.  Patient also developed hypernatremia as he has not been drinking due to severe encephalopathy.  Started on hypotonic fluid for correction of hypernatremia with consequent improvement of labs.     Today he has no complaints. Awake/alert. No new overnight issues.  Urine output nonoliguric.    Allergies:  Lorazepam, Penicillins, and Adhesive tape    Home Meds:  Medications Prior to Admission   Medication Sig Dispense Refill Last Dose   • allopurinol (ZYLOPRIM) 300 MG tablet Take 600 mg by mouth Daily.   Unknown at Unknown time   • amLODIPine (NORVASC) 5 MG tablet Take 5 mg by mouth Daily.   Unknown at Unknown time   • aspirin 81 MG chewable tablet Chew 81 mg Daily.   Unknown at Unknown time   • Cholecalciferol (VITAMIN D) 1000 units tablet Take 1,000 Units by mouth.   Unknown at Unknown time   • colchicine 0.6 MG tablet Take 0.6 mg by mouth Daily As Needed (gout flare-up).   Unknown at Unknown time   • finasteride (PROSCAR) 5 MG tablet Take 5 mg by mouth Daily.   Unknown at Unknown time   • folic acid (FOLVITE) 400 MCG tablet Take 400 mcg by mouth.   Unknown at Unknown time   • glimepiride (AMARYL) 4 MG tablet Take 4 mg by mouth 2 (Two) Times a Day With Meals. Needs pm dose   Unknown at Unknown time   • losartan (COZAAR) 50 MG tablet Take 25 mg by mouth Daily.   Unknown at Unknown time   • meclizine (ANTIVERT) 25 MG tablet Take 25 mg by mouth. 3-4 times daily   Unknown at Unknown time   • metoprolol succinate XL (TOPROL XL) 25 MG 24 hr tablet Take 25 mg by mouth every night at bedtime.   Unknown at Unknown time   • nitroglycerin (NITROSTAT) 0.4 MG SL tablet Place 0.4 mg under the tongue Every 5 (Five) Minutes As Needed.   Unknown at Unknown time   • pantoprazole (PROTONIX) 40 MG EC tablet Take 40 mg by mouth Every 12 (Twelve) Hours.   Unknown at Unknown time   • potassium chloride (K-DUR,KLOR-CON) 20 MEQ CR tablet Take 10 mEq by mouth Every 12 (Twelve) Hours.   Unknown at Unknown time   • Pyridoxine HCl (VITAMIN  B6) 200 MG tablet Take 1 tablet by mouth Daily.   Unknown at Unknown time   • rOPINIRole (REQUIP) 1 MG tablet Take 1 mg by mouth Every Night. Take 1 hour before bedtime.   Unknown at Unknown time   • sennosides-docusate sodium (SENOKOT-S) 8.6-50 MG tablet Take 2 tablets by mouth 2 (Two) Times a Day As Needed for constipation. 45 tablet 2 Unknown at Unknown time   • tamsulosin (FLOMAX) 0.4 MG capsule 24 hr capsule Take 1 capsule by mouth Daily.   Unknown at Unknown time   • thiamine (VITAMIN B-1) 100 MG tablet Take 100 mg by mouth.   Unknown at Unknown time   • vitamin C (ASCORBIC ACID) 500 MG tablet Take 500 mg by mouth Daily.   Unknown at Unknown time   • [DISCONTINUED] pravastatin (PRAVACHOL) 40 MG tablet Take 40 mg by mouth Every Night.   Unknown at Unknown time       Medicines:  Current Facility-Administered Medications   Medication Dose Route Frequency Provider Last Rate Last Dose   • acetaminophen (TYLENOL) tablet 650 mg  650 mg Oral Q4H PRN Forrest Bliss MD        Or   • acetaminophen (TYLENOL) suppository 650 mg  650 mg Rectal Q4H PRN Forrest Bliss MD       • allopurinol (ZYLOPRIM) tablet 300 mg  300 mg Oral Daily Forrest Bliss MD   300 mg at 11/06/20 1030   • amLODIPine (NORVASC) tablet 10 mg  10 mg Oral Q24H Elpidio Irby, DO   10 mg at 11/06/20 1030   • aspirin chewable tablet 81 mg  81 mg Oral Daily Elpidio Irby DO   81 mg at 11/06/20 1030   • cefdinir (OMNICEF) capsule 300 mg  300 mg Oral Q12H Elpidio Irby DO   300 mg at 11/06/20 1030   • cholecalciferol (VITAMIN D3) tablet 1,000 Units  1,000 Units Oral Daily Forrest Bliss MD   1,000 Units at 11/06/20 1030   • colchicine tablet 0.6 mg  0.6 mg Oral Daily PRN Forrest Bliss MD       • dextrose (D50W) 25 g/ 50mL Intravenous Solution 25 g  25 g Intravenous Q15 Min PRN Forrest Bliss MD       • dextrose (GLUTOSE) oral gel 15 g  15 g Oral Q15 Min PRN Shiben, Forrest E, MD       • folic acid (FOLVITE) tablet 400 mcg  400  mcg Oral Daily Forrest Bliss MD   400 mcg at 11/06/20 1029   • glucagon (human recombinant) (GLUCAGEN DIAGNOSTIC) injection 1 mg  1 mg Subcutaneous Q15 Min PRN Forrest Bliss MD       • hydrALAZINE (APRESOLINE) injection 10 mg  10 mg Intravenous Q4H PRN Forrest Bliss MD   10 mg at 11/03/20 1148   • hydrALAZINE (APRESOLINE) tablet 10 mg  10 mg Oral Q6H PRN Iain Rosenberg MD       • insulin lispro (humaLOG) injection 2-7 Units  2-7 Units Subcutaneous TID AC Forrest Bliss MD   2 Units at 11/06/20 1030   • isosorbide mononitrate (IMDUR) 24 hr tablet 30 mg  30 mg Oral Q24H Forrest Bliss MD   30 mg at 11/06/20 1029   • lansoprazole (FIRST) oral suspension 30 mg  30 mg Oral Q AM Iian Rosenberg MD   30 mg at 11/06/20 0541   • losartan (COZAAR) tablet 50 mg  50 mg Oral Q24H Elpidio Irby DO   50 mg at 11/06/20 1030   • nitroglycerin (NITROSTAT) SL tablet 0.4 mg  0.4 mg Sublingual Q5 Min PRN Forrest Bliss MD   0.4 mg at 10/27/20 1600   • ondansetron (ZOFRAN) tablet 4 mg  4 mg Oral Q6H PRN Forrest Bliss MD        Or   • ondansetron (ZOFRAN) injection 4 mg  4 mg Intravenous Q6H PRN Forrest Bliss MD       • potassium chloride (MICRO-K) CR capsule 10 mEq  10 mEq Oral BID With Meals Forrest Bliss MD   10 mEq at 11/06/20 1030   • pravastatin (PRAVACHOL) tablet 40 mg  40 mg Oral Nightly Forrest Bliss MD   40 mg at 11/05/20 2245   • prednisoLONE acetate (PRED FORTE) 1 % ophthalmic suspension 1 drop  1 drop Left Eye Q2H While Awake Forrest Bliss MD   1 drop at 11/06/20 1031   • sennosides-docusate (PERICOLACE) 8.6-50 MG per tablet 2 tablet  2 tablet Oral BID PRN Forrest Bliss MD       • sodium chloride 0.9 % flush 10 mL  10 mL Intravenous Q12H Forrest Bliss MD   10 mL at 11/05/20 3835   • sodium chloride 0.9 % flush 10 mL  10 mL Intravenous PRN Forrest Bliss MD       • sodium chloride 0.9 % infusion 500 mL  500 mL Intravenous Continuous PRN Forrest Bliss  MD ROCHELLE 25 mL/hr at 10/28/20 1150     • terazosin (HYTRIN) capsule 1 mg  1 mg Oral Nightly Forrest Bliss MD   1 mg at 11/05/20 2244   • thiamine (VITAMIN B-1) tablet 100 mg  100 mg Oral Daily Forrets Bliss MD   100 mg at 11/06/20 1030   • vitamin B-6 (PYRIDOXINE) tablet 200 mg  200 mg Oral Daily Forrest Bliss MD   200 mg at 11/06/20 1030   • vitamin C (ASCORBIC ACID) tablet 500 mg  500 mg Oral Daily Forrest Bliss MD   500 mg at 11/06/20 1030       Past Medical History:  Past Medical History:   Diagnosis Date   • A-fib (CMS/formerly [facility]) 11/15/2016   • Anemia     gets shots every few months to build up blood. sees dr adam.   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   • Arthritis    • Bradycardia 11/15/2016   • CAD in native artery 11/15/2016   • Chest pain 11/15/2016   • CHF (congestive heart failure) (CMS/formerly [facility])    • Chronic kidney disease    • COPD (chronic obstructive pulmonary disease) (CMS/formerly [facility])    • DDD (degenerative disc disease), lumbar 6/27/2017   • Heart murmur    • HTN (hypertension) 11/15/2016   • Hyperlipidemia    • Lumbar stenosis with neurogenic claudication 6/27/2017   • Murmur 4/19/2019   • Myocardial infarction (CMS/formerly [facility])    • Sleep apnea    • Stroke (CMS/formerly [facility])    • Type 2 diabetes mellitus (CMS/formerly [facility]) 11/15/2016   • Ulcer of abdomen wall (CMS/formerly [facility])        Past Surgical History:  Past Surgical History:   Procedure Laterality Date   • APPENDECTOMY     • BACK SURGERY      x2   • CARDIAC CATHETERIZATION Left     1/2010   • CARPAL TUNNEL RELEASE Bilateral    • CHOLECYSTECTOMY     • COLONOSCOPY N/A 9/7/2017    Procedure: COLONOSCOPY WITH ANESTHESIA;  Surgeon: Joshua Rich DO;  Location: [facility] ENDOSCOPY;  Service:    • CORONARY ARTERY BYPASS GRAFT      2/2006 w/PTCA & KIMMY    • CORONARY STENT PLACEMENT     • ENDOSCOPY N/A 12/14/2016    Procedure: ESOPHAGOGASTRODUODENOSCOPY WITH ANESTHESIA;  Surgeon: Isabel Dexter MD;  Location: [facility] ENDOSCOPY;  Service:    • ENDOSCOPY N/A 12/16/2016     Procedure: ESOPHAGOGASTRODUODENOSCOPY WITH ANESTHESIA;  Surgeon: Joshua Rich DO;  Location: [facility] ENDOSCOPY;  Service:    • ENDOSCOPY N/A 4/10/2017    Procedure: ESOPHAGOGASTRODUODENOSCOPY WITH ANESTHESIA;  Surgeon: Joshua Rich DO;  Location: [facility] ENDOSCOPY;  Service:    • ENDOSCOPY N/A 10/28/2020    Procedure: ESOPHAGOGASTRODUODENOSCOPY WITH ANESTHESIA;  Surgeon: Forrest Bliss MD;  Location: [facility] ENDOSCOPY;  Service: Gastroenterology;  Laterality: N/A;  pre: GI bleed  post: duodenal ulcers, hiatal hernia   Jeffrey Owen MD   • EYE SURGERY Left     x 2   • JOINT REPLACEMENT     • PERIPHERAL ARTERIAL STENT GRAFT     • REPLACEMENT TOTAL KNEE Left     2002   • SHOULDER ROTATOR CUFF REPAIR Bilateral        Family History  Family History   Problem Relation Age of Onset   • Coronary artery disease Father    • Heart disease Father    • Coronary artery disease Brother    • Heart attack Brother    • Cancer Mother    • No Known Problems Sister    • Heart disease Son    • Cancer Sister    • Cancer Sister        Social History  Social History     Socioeconomic History   • Marital status:      Spouse name: Not on file   • Number of children: Not on file   • Years of education: Not on file   • Highest education level: Not on file   Tobacco Use   • Smoking status: Never Smoker   • Smokeless tobacco: Never Used   Substance and Sexual Activity   • Alcohol use: No   • Drug use: No   • Sexual activity: Never     Partners: Female         Review of Systems:  History obtained from chart review and the patient  General ROS: No fever or chills  Respiratory ROS: No cough, shortness of breath, wheezing  Cardiovascular ROS: No chest pain or palpitations  Gastrointestinal ROS: No abdominal pain or melena  Genito-Urinary ROS: No dysuria or hematuria  Neurological ROS: no headache or dizziness    Objective:  Patient Vitals for the past 24 hrs:   BP Temp Temp src Pulse Resp SpO2   11/06/20 0753 164/57 97.9 °F  (36.6 °C) Oral 77 20 94 %   11/06/20 0352 165/49 97.6 °F (36.4 °C) Oral 70 18 95 %   11/06/20 0029 172/51 97.9 °F (36.6 °C) Oral 65 18 96 %   11/05/20 1933 159/55 97.6 °F (36.4 °C) Oral 63 20 97 %   11/05/20 1558 161/54 97.7 °F (36.5 °C) Oral 58 18 98 %       Intake/Output Summary (Last 24 hours) at 11/6/2020 1103  Last data filed at 11/6/2020 0010  Gross per 24 hour   Intake --   Output 400 ml   Net -400 ml     General: awake/alert   Chest:  clear to auscultation bilaterally without respiratory distress  CVS: regular rate and rhythm  Abdominal: soft, nontender, positive bowel sounds  Extremities: no cyanosis or edema  Skin: warm and dry without rash  Neuro: no focal motor deficits    Labs:  Results from last 7 days   Lab Units 11/05/20  0536 11/04/20  0557 11/03/20  0435   WBC 10*3/mm3 6.95 6.97 7.92   HEMOGLOBIN g/dL 8.5* 8.3* 8.7*   HEMATOCRIT % 26.8* 25.6* 27.7*   PLATELETS 10*3/mm3 221 192 180         Results from last 7 days   Lab Units 11/05/20  0536 11/04/20  0557 11/03/20  0435 11/02/20  0447 11/01/20  0624 10/31/20  0444   SODIUM mmol/L 143 145 144 146* 150* 150*   POTASSIUM mmol/L 4.4 4.1 4.1 4.1 3.7 3.7   CHLORIDE mmol/L 115* 117* 114* 117* 119* 119*   CO2 mmol/L 25.0 23.0 23.0 23.0 24.0 23.0   BUN mg/dL 29* 35* 43* 53* 69* 80*   CREATININE mg/dL 0.88 0.89 1.01 1.35* 1.80* 2.17*   CALCIUM mg/dL 8.8 8.6 8.7 8.6 8.7 8.8   BILIRUBIN mg/dL  --   --   --  0.3 0.3 0.4   ALK PHOS U/L  --   --   --  78 79 70   ALT (SGPT) U/L  --   --   --  28 31 31   AST (SGOT) U/L  --   --   --  31 35 40   GLUCOSE mg/dL 130* 125* 148* 166* 170* 153*       Radiology:   Imaging Results (Last 72 Hours)     Procedure Component Value Units Date/Time    FL Video Swallow With Speech Single Contrast [[id number]] Collected: 11/04/20 1401     Updated: 11/04/20 1405    Narrative:      EXAMINATION:   FL VIDEO SWALLOW W SPEECH SINGLE-CONTRAST-  11/4/2020  2:01 PM CST     HISTORY: MODIFIED BARIUM SWALLOW     REASON FOR EXAM: r/o aspiration;  R13.12-Dysphagia, oropharyngeal phase;  Z74.09-Other reduced mobility; R19.5-Other fecal abnormalities;  Z78.9-Other specified health status     COMPARISON: NONE      FLUOROSCOPY TIME: 1.2 minutes     Radiation dose 5.71 MG Y     Number of images: 1     TECHNIQUE: In conjunction with speech pathology services, video  fluoroscopic swallow examination was performed with oral ingestion of  barium containing substances of various viscosities.      FINDINGS: Medium bolus sizes are well controlled with no spillage into  the oropharynx. No pooling is demonstrated. There is soft palate  elevation and coverage of the posterior nasopharynx with swallowing. No  stasis is noted within the valleculae or piriform sinuses.     Penetration was observed with nectar thick and thin barium. There is no  obvious aspiration..        Impression:      1. Normal swallowing mechanism.   2. Penetration was observed with nectar thick and thin barium.      Please see speech pathologist's report for further details and  recommendations.         This report was finalized on 11/04/2020 14:02 by Dr. Izaaih Ryan MD.          Culture:  Urine Culture   Date Value Ref Range Status   10/30/2020 No growth  Final   10/27/2020 >100,000 CFU/mL Escherichia coli (A)  Final         Assessment   1.  Acute kidney injury, prerenal--resolved  2.  Baseline chronic kidney disease stage 3a  3.  Hypernatremia--resolved  4.  Acute GI bleed  5.  Acute metabolic encephalopathy--resolving  6.  Type 2 diabetes with nephropathy  7.  Iron deficiency anemia  8.  Status post EGD/duodenal ulcer  9.  E coli UTI    Plan:  1.  Encourage PO intake  2.  Monitor labs  3.  Waiting on insurance approval for placement.  OK for discharge from renal standpoint. Follow up in office in 1-2 weeks        ANJANA Clayton  11/6/2020  11:03 CST    Electronically signed by Priyank Brown MD at 11/06/20 1953     H

## 2020-11-09 NOTE — PROGRESS NOTES
[facility]  INPATIENT PROGRESS NOTE    Length of Stay: 14  Date of Admission: 10/26/2020  Primary Care Physician: Jeffrey Owen MD    Subjective     Chief Complaint:     Generalized weakness    HPI     The patient's family has decided they want to take him home tomorrow rather than transferring him to a skilled nursing facility.  They are concerned that the patient may contract COVID-19 there.  Arrangements have been made for discharge home tomorrow with home nursing assistance and physical therapy as well as appropriate DME.  Renal function has returned to baseline and nephrology has signed off the case.      Review of Systems     All pertinent negatives and positives are as above. All other systems have been reviewed and are negative unless otherwise stated.     Objective    Temp:  [97.7 °F (36.5 °C)-98.2 °F (36.8 °C)] 97.7 °F (36.5 °C)  Heart Rate:  [65-73] 67  Resp:  [16-22] 16  BP: (161-181)/(53-84) 161/55    Lab Results (last 24 hours)     Procedure Component Value Units Date/Time    POC Glucose Once [[id number]]  (Abnormal) Collected: 11/09/20 1115    Specimen: Blood Updated: 11/09/20 1126     Glucose 147 mg/dL      Comment: : [id number] Mau RuelasaMeter ID: [id number]       POC Glucose Once [[id number]]  (Abnormal) Collected: 11/09/20 0750    Specimen: Blood Updated: 11/09/20 0801     Glucose 136 mg/dL      Comment: : [id number] Mau RuelasaMeserafin ID: [id number]       POC Glucose Once [[id number]]  (Normal) Collected: 11/08/20 2147    Specimen: Blood Updated: 11/08/20 2159     Glucose 101 mg/dL      Comment: : [id number] Castleman TamaraMeter ID: [id number]       POC Glucose Once [[id number]]  (Abnormal) Collected: 11/08/20 1647    Specimen: Blood Updated: 11/08/20 1700     Glucose 173 mg/dL      Comment: : [id number] Jacky PardoleyMeter ID: [id number]             Imaging Results (Last 24 Hours)     ** No results found for the  last 24 hours. **             Intake/Output Summary (Last 24 hours) at 11/9/2020 1617  Last data filed at 11/9/2020 0834  Gross per 24 hour   Intake 360 ml   Output --   Net 360 ml       Physical Exam  Constitutional:       Appearance: He is well-developed.      Comments:  Discussed with his nurse.   HENT:      Head: Normocephalic and atraumatic.      Comments: Poor dentition.  Eyes:      Conjunctiva/sclera: Conjunctivae normal.      Pupils: Pupils are equal, round, and reactive to light.   Neck:      Musculoskeletal: Neck supple.      Vascular: No JVD.   Cardiovascular:      Rate and Rhythm: Normal rate. Rhythm irregular.      Heart sounds: Normal heart sounds. No murmur. No friction rub. No gallop.    Pulmonary:      Effort: Pulmonary effort is normal. No respiratory distress.   Abdominal:      General: Bowel sounds are normal. There is no distension.      Palpations: Abdomen is soft.   Musculoskeletal: Normal range of motion.         General: Swelling (trace) present. No tenderness or deformity.   Skin:     General: Skin is warm and dry.      Findings: No rash.   Neurological:      General: No focal deficit present.      Mental Status: He is alert and oriented to person, place, and time.      Cranial Nerves: No cranial nerve deficit.      Motor: Weakness present. No abnormal muscle tone.      Deep Tendon Reflexes: Reflexes normal.      Comments: Follows all commands without difficulty.   Psychiatric:         Behavior: Behavior normal.         Thought Content: Thought content normal.         Judgment: Judgment normal.     Results Review:  I have reviewed the labs, radiology results, and diagnostic studies since my last progress note and made treatment changes reflective of the results.   I have reviewed the current medications.    Assessment/Plan     Active Hospital Problems    Diagnosis   • **Metabolic encephalopathy   • UGIB (upper gastrointestinal bleed)   • E. coli UTI (urinary tract infection)   •  Hypernatremia   • Acute blood loss anemia   • History of stroke   • Chronic diastolic congestive heart failure (CMS/[facility])   • Acute renal failure superimposed on stage 3a chronic kidney disease (CMS/[facility])   • Duodenal ulcer   • A-fib (CMS/[facility])   • HTN (hypertension)   • Type 2 diabetes mellitus, without long-term current use of insulin (CMS/[facility])       PLAN:  Discharge home tomorrow with home health and appropriate DME    Electronically signed by Rocky Nixon DO, 11/09/20, 16:17 CST.

## 2020-11-09 NOTE — NURSING NOTE
WOCN Note      Patient: Eron Escalante  MRN: [id number] : 1934         Problem List:   Patient Active Problem List    Diagnosis   • *Metabolic encephalopathy [G93.41]   • UGIB (upper gastrointestinal bleed) [K92.2]   • E. coli UTI (urinary tract infection) [N39.0, B96.20]   • Hypernatremia [E87.0]   • History of stroke [Z86.73]   • Ischemic cardiomyopathy [I25.5]   • Altered mental status [R41.82]   • Acute blood loss anemia [D62]   • Hypertensive urgency [I16.0]   • Chronic constipation [K59.09]   • Chronic diastolic congestive heart failure (CMS/HCC) [I50.32]   • Periodic limb movement disorder (PLMD) [G47.61]   • Chronic back pain [M54.9, G89.29]   • Murmur [R01.1]   • RICARDO treated with BiPAP [G47.33]   • CHF (congestive heart failure), NYHA class I, acute on chronic, combined (CMS/HCC) [I50.43]   • Change in bowel habit [R19.8]   • Lumbar stenosis with neurogenic claudication [M48.062]   • DDD (degenerative disc disease), lumbar [M51.36]   • Weakness of extremity [R29.898]   • Acute renal failure superimposed on stage 3a chronic kidney disease (CMS/HCC) [N17.9, N18.31]   • Hyperkalemia [E87.5]   • Duodenal ulcer [K26.9]   • Dehydration [E86.0]   • Cerebral ventriculomegaly [G93.89]   • Symptomatic bradycardia [R00.1]   • Type 2 diabetes mellitus, without long-term current use of insulin (CMS/[facility]) [E11.9]   • HTN (hypertension) [I10]   • Aortocoronary bypass status [Z95.1]   • Bradycardia [R00.1]   • CAD in native artery [I25.10]   • A-fib (CMS/[facility]) [I48.91]         Reason for Visit: Patient is an 85 y.o. male, being seen by WOCN for wound on sacrum.     Patient presents sitting in his chair at the bedside.  He appears to be confused.  When asked where he was, he states he is where he lives.  Very pleasant.  His last charted Rene risk assessment was 15 so he is at risk. For the assessment he stood to his walker x2 assist.  He is wearing a brief that is currently dry.      Patient has developed a new  wound on his sacrum that appears to be related to friction.  The edges are irregular with the skin pushed up as if he scooted in the chair.  The wound bed and periwound area is red but it is blanching.  The area measures 1.5cm x 1cm.       Sensory Perception: 3-->slightly limited (11/08/20 2017 : Castleman, Tamara K, RN)  Moisture: 3-->occasionally moist (11/08/20 2017 : Castleman, Tamara K, RN)  Activity: 3-->walks occasionally (11/08/20 2017 : Castleman, Tamara K, RN)  Mobility: 2-->very limited (11/08/20 2017 : Castleman, Tamara K, RN)  Nutrition: 2-->probably inadequate (11/08/20 2017 : Castleman, Tamara K, RN)  Friction and Shear: 2-->potential problem (11/08/20 2017 : Castleman, Tamara K, RN)  Rene Score: 15 (11/08/20 2017 : Castleman, Tamara K, ROZINA)         Recommendations:    - Elevate Heels Off of Bed  - Turn Patient Q2H  - Use Repositioning Sheet and Wedges to Position Patient  - Use Seat Cushion When Up In Chair  - Apply Moisture Barrier After Any Incontinence   -MEAGHANguard      )Ashley Thompson RN 11/9/2020

## 2020-11-09 NOTE — THERAPY TREATMENT NOTE
Acute Care - Speech Language Pathology   Swallow Treatment Note [facility]     Patient Name: Eron Escalante  : 1934  MRN: [id number]  Today's Date: 2020  Onset of Illness/Injury or Date of Surgery: 10/26/20            Admit Date: 10/26/2020  ST tx completed. Pt seen 2x this date. Pt observed at breakfast and lunch this date with nsg student feeding. Poor positioning with PO each time and repositioned in bed and in bedside chair for lunch. Nsg student reported increased coughing with mech soft meat at breakfast. Strong cough noted at lunch with mech soft ground meat as well. Consistent throat clears noted with honey thick drinks and magic cup; however, family reports this is habitual per previous speech notes. ST to downgrade meat to puree at this time. Continue otherwise mech soft and honey thick liquids.  Hanny Flores, CCC-SLP 2020 15:46 CST      Visit Dx:     ICD-10-CM ICD-9-CM   1. Oropharyngeal dysphagia  R13.12 787.22   2. Impaired mobility  Z74.09 799.89   3. Heme positive stool  R19.5 792.1   4. Decreased activities of daily living (ADL)  Z78.9 V49.89     Patient Active Problem List   Diagnosis   • Type 2 diabetes mellitus, without long-term current use of insulin (CMS/[facility])   • HTN (hypertension)   • Aortocoronary bypass status   • Bradycardia   • CAD in native artery   • A-fib (CMS/[facility])   • Symptomatic bradycardia   • Cerebral ventriculomegaly   • Dehydration   • Duodenal ulcer   • Acute renal failure superimposed on stage 3a chronic kidney disease (CMS/[facility])   • Hyperkalemia   • Weakness of extremity   • Lumbar stenosis with neurogenic claudication   • DDD (degenerative disc disease), lumbar   • Change in bowel habit   • RICARDO treated with BiPAP   • Murmur   • Chronic diastolic congestive heart failure (CMS/[facility])   • Periodic limb movement disorder (PLMD)   • CHF (congestive heart failure), NYHA class I, acute on chronic, combined (CMS/[facility])   • Chronic constipation   • Chronic back  pain   • History of stroke   • Hypertensive urgency   • Ischemic cardiomyopathy   • Altered mental status   • Acute blood loss anemia   • Metabolic encephalopathy   • Hypernatremia   • E. coli UTI (urinary tract infection)   • UGIB (upper gastrointestinal bleed)     Past Medical History:   Diagnosis Date   • A-fib (CMS/[facility]) 11/15/2016   • Anemia     gets shots every few months to build up blood. sees dr adam.   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   • Arthritis    • Bradycardia 11/15/2016   • CAD in native artery 11/15/2016   • Chest pain 11/15/2016   • CHF (congestive heart failure) (CMS/[facility])    • Chronic kidney disease    • COPD (chronic obstructive pulmonary disease) (CMS/[facility])    • DDD (degenerative disc disease), lumbar 6/27/2017   • Heart murmur    • HTN (hypertension) 11/15/2016   • Hyperlipidemia    • Lumbar stenosis with neurogenic claudication 6/27/2017   • Murmur 4/19/2019   • Myocardial infarction (CMS/[facility])    • Sleep apnea    • Stroke (CMS/[facility])    • Type 2 diabetes mellitus (CMS/[facility]) 11/15/2016   • Ulcer of abdomen wall (CMS/[facility])      Past Surgical History:   Procedure Laterality Date   • APPENDECTOMY     • BACK SURGERY      x2   • CARDIAC CATHETERIZATION Left     1/2010   • CARPAL TUNNEL RELEASE Bilateral    • CHOLECYSTECTOMY     • COLONOSCOPY N/A 9/7/2017    Procedure: COLONOSCOPY WITH ANESTHESIA;  Surgeon: Joshua Rich DO;  Location: [facility] ENDOSCOPY;  Service:    • CORONARY ARTERY BYPASS GRAFT      2/2006 w/PTCA & KIMMY    • CORONARY STENT PLACEMENT     • ENDOSCOPY N/A 12/14/2016    Procedure: ESOPHAGOGASTRODUODENOSCOPY WITH ANESTHESIA;  Surgeon: Isabel Dexter MD;  Location: [facility] ENDOSCOPY;  Service:    • ENDOSCOPY N/A 12/16/2016    Procedure: ESOPHAGOGASTRODUODENOSCOPY WITH ANESTHESIA;  Surgeon: Joshua Rich DO;  Location: [facility] ENDOSCOPY;  Service:    • ENDOSCOPY N/A 4/10/2017    Procedure: ESOPHAGOGASTRODUODENOSCOPY WITH ANESTHESIA;  Surgeon: Joshua Rich DO;  Location:   [facility] ENDOSCOPY;  Service:    • ENDOSCOPY N/A 10/28/2020    Procedure: ESOPHAGOGASTRODUODENOSCOPY WITH ANESTHESIA;  Surgeon: Forrest Bliss MD;  Location: [facility] ENDOSCOPY;  Service: Gastroenterology;  Laterality: N/A;  pre: GI bleed  post: duodenal ulcers, hiatal hernia   Jeffrey Owen MD   • EYE SURGERY Left     x 2   • JOINT REPLACEMENT     • PERIPHERAL ARTERIAL STENT GRAFT     • REPLACEMENT TOTAL KNEE Left     2002   • SHOULDER ROTATOR CUFF REPAIR Bilateral         SWALLOW EVALUATION (last 72 hours)      SLP Adult Swallow Evaluation     Row Name 11/09/20 1208                   Rehab Evaluation    Document Type  therapy note (daily note)  -BN        Subjective Information  no complaints  -BN        Patient Observations  alert;cooperative  -BN        Patient/Family/Caregiver Comments/Observations  nsg student  -BN        Care Plan Review  care plan/treatment goals reviewed  -BN        Care Plan Review, Other Participant(s)  caregiver  -BN        Patient Effort  good  -BN           Pain Scale: FACES Pre/Post-Treatment    Pain: FACES Scale, Pretreatment  0-->no hurt  -BN        Posttreatment Pain Rating  0-->no hurt  -BN           Clinical Impression    Daily Summary of Progress (SLP)  progress towards functional goals is fair  -BN        Plan for Continued Treatment (SLP)  downgrade to puree meat  -BN           Swallow Goals (SLP)    Oral Nutrition/Hydration Goal Selection (SLP)  oral nutrition/hydration, SLP goal 1  -BN        Labial Strengthening Goal Selection (SLP)  labial strengthening, SLP goal 1  -BN        Lingual Strengthening Goal Selection (SLP)  lingual strengthening, SLP goal 1  -BN        Pharyngeal Strengthening Exercise Goal Selection (SLP)  pharyngeal strengthening exercise, SLP goal 1  -BN        Additional Documentation  labial strengthening goal selection (SLP);lingual strengthening goal selection (SLP);pharyngeal strengthening exercise goal selection (SLP)  -BN           Oral  Nutrition/Hydration Goal 1 (SLP)    Oral Nutrition/Hydration Goal 1, SLP  LTG: Patient will tolerate LRD with no overt s/s of aspiration  -BN        Time Frame (Oral Nutrition/Hydration Goal 1, SLP)  short term goal (STG);by discharge  -BN        Barriers (Oral Nutrition/Hydration Goal 1, SLP)  none  -BN        Progress/Outcomes (Oral Nutrition/Hydration Goal 1, SLP)  continuing progress toward goal  -BN           Labial Strengthening Goal 1 (SLP)    Activity (Labial Strengthening Goal 1, SLP)  increase labial tone  -BN        Increase Labial Tone  labial resistance exercises  -BN        [place]/Accuracy (Labial Strengthening Goal 1, SLP)  independently (over 90% accuracy)  -BN        Time Frame (Labial Strengthening Goal 1, SLP)  short term goal (STG);by discharge  -BN        Barriers (Labial Strengthening Goal 1, SLP)  n/a  -BN        Progress/Outcomes (Labial Strengthening Goal 1, SLP)  goal ongoing  -BN           Lingual Strengthening Goal 1 (SLP)    Activity (Lingual Strengthening Goal 1, SLP)  increase lingual tone/sensation/control/coordination/movement;increase tongue back strength  -BN        Increase Lingual Tone/Sensation/Control/Coordination/Movement  lingual movement exercises;lingual resistance exercises  -BN        Increase Tongue Back Strength  lingual movement exercises  -BN        [place]/Accuracy (Lingual Strengthening Goal 1, SLP)  independently (over 90% accuracy)  -BN        Time Frame (Lingual Strengthening Goal 1, SLP)  short term goal (STG);by discharge  -BN        Barriers (Lingual Strengthening Goal 1, SLP)  none  -BN        Progress/Outcomes (Lingual Strengthening Goal 1, SLP)  goal ongoing  -BN           Pharyngeal Strengthening Exercise Goal 1 (SLP)    Activity (Pharyngeal Strengthening Goal 1, SLP)  increase timing;increase epiglottic inversion and retroflexion;increase tongue base retraction;increase anterior movement of the hyolaryngeal complex  -BN        Increase Timing   gustatory stimulation (sour/cold)  -BN        Increase Anterior Movement of the Hyolaryngeal Complex  shaker  -BN        Increase Epiglottic Inversion and Retroflexion  falsetto  -BN        Increase Tongue Base Retraction  hard effortful swallow;vita  -BN        [place]/Accuracy (Pharyngeal Strengthening Goal 1, SLP)  independently (over 90% accuracy)  -BN        Time Frame (Pharyngeal Strengthening Goal 1, SLP)  short term goal (STG);by discharge  -BN        Barriers (Pharyngeal Strengthening Goal 1, SLP)  none  -BN        Progress/Outcomes (Pharyngeal Strengthening Goal 1, SLP)  goal ongoing  -BN          User Key  (r) = Recorded By, (t) = Taken By, (c) = Cosigned By    Initials Name Effective Dates    Hanny Low, CCC-SLP 02/11/20 -           EDUCATION  The patient has been educated in the following areas:   Dysphagia (Swallowing Impairment) Modified Diet Instruction.    SLP Recommendation and Plan                                         Daily Summary of Progress (SLP): progress towards functional goals is fair    Plan for Continued Treatment (SLP): downgrade to puree meat              Plan of Care Reviewed With: patient  Progress: no change  Outcome Summary: ST tx completed. Pt seen 2x this date. Pt observed at breakfast and lunch this date with nsg student feeding. Poor positioning with PO each time and repositioned in bed and in bedside chair for lunch. Nsg student reported increased coughing with mech soft meat at breakfast. Strong cough noted at lunch with mech soft ground meat as well. Consistent throat clears noted with honey thick drinks and magic cup; however, family reports this is habitual per previous speech notes. ST to downgrade meat to puree at this time. Continue otherwise mech soft and honey thick liquids.    SLP GOALS     Row Name 11/09/20 1208             Oral Nutrition/Hydration Goal 1 (SLP)    Oral Nutrition/Hydration Goal 1, SLP  LTG: Patient will tolerate LRD with no  overt s/s of aspiration  -BN      Time Frame (Oral Nutrition/Hydration Goal 1, SLP)  short term goal (STG);by discharge  -BN      Barriers (Oral Nutrition/Hydration Goal 1, SLP)  none  -BN      Progress/Outcomes (Oral Nutrition/Hydration Goal 1, SLP)  continuing progress toward goal  -BN         Labial Strengthening Goal 1 (SLP)    Activity (Labial Strengthening Goal 1, SLP)  increase labial tone  -BN      Increase Labial Tone  labial resistance exercises  -BN      [place]/Accuracy (Labial Strengthening Goal 1, SLP)  independently (over 90% accuracy)  -BN      Time Frame (Labial Strengthening Goal 1, SLP)  short term goal (STG);by discharge  -BN      Barriers (Labial Strengthening Goal 1, SLP)  n/a  -BN      Progress/Outcomes (Labial Strengthening Goal 1, SLP)  goal ongoing  -BN         Lingual Strengthening Goal 1 (SLP)    Activity (Lingual Strengthening Goal 1, SLP)  increase lingual tone/sensation/control/coordination/movement;increase tongue back strength  -BN      Increase Lingual Tone/Sensation/Control/Coordination/Movement  lingual movement exercises;lingual resistance exercises  -BN      Increase Tongue Back Strength  lingual movement exercises  -BN      [place]/Accuracy (Lingual Strengthening Goal 1, SLP)  independently (over 90% accuracy)  -BN      Time Frame (Lingual Strengthening Goal 1, SLP)  short term goal (STG);by discharge  -BN      Barriers (Lingual Strengthening Goal 1, SLP)  none  -BN      Progress/Outcomes (Lingual Strengthening Goal 1, SLP)  goal ongoing  -BN         Pharyngeal Strengthening Exercise Goal 1 (SLP)    Activity (Pharyngeal Strengthening Goal 1, SLP)  increase timing;increase epiglottic inversion and retroflexion;increase tongue base retraction;increase anterior movement of the hyolaryngeal complex  -BN      Increase Timing  gustatory stimulation (sour/cold)  -BN      Increase Anterior Movement of the Hyolaryngeal Complex  shaker  -BN      Increase Epiglottic Inversion  and Retroflexion  falsetto  -BN      Increase Tongue Base Retraction  hard effortful swallow;vita  -BN      [place]/Accuracy (Pharyngeal Strengthening Goal 1, SLP)  independently (over 90% accuracy)  -BN      Time Frame (Pharyngeal Strengthening Goal 1, SLP)  short term goal (STG);by discharge  -BN      Barriers (Pharyngeal Strengthening Goal 1, SLP)  none  -BN      Progress/Outcomes (Pharyngeal Strengthening Goal 1, SLP)  goal ongoing  -BN        User Key  (r) = Recorded By, (t) = Taken By, (c) = Cosigned By    Initials Name Provider Type    Hanny Low CCC-SLP Speech and Language Pathologist             Time Calculation:   Time Calculation- SLP     Row Name 11/09/20 1545             Time Calculation- SLP    SLP Start Time  1208  -BN      SLP Stop Time  1224  -BN      SLP Time Calculation (min)  16 min  -BN      SLP Received On  11/09/20  -BN        User Key  (r) = Recorded By, (t) = Taken By, (c) = Cosigned By    Initials Name Provider Type    Hanny Low CCC-SLP Speech and Language Pathologist          Therapy Charges for Today     Code Description Service Date Service Provider Modifiers Qty    [id number]  ST TREATMENT SWALLOW 1 11/9/2020 Hanny Flores CCC-SLP GN 1               ISABEL Camarena  11/9/2020

## 2020-11-10 NOTE — DISCHARGE SUMMARY
[facility] Emergency Room Medicine Services  DISCHARGE SUMMARY       Date of Admission: 10/26/2020  Date of Discharge:  11/10/2020  Primary Care Physician: Jeffrey Owen MD    Discharge Diagnoses:  Active Hospital Problems    Diagnosis   • **Metabolic encephalopathy   • UGIB (upper gastrointestinal bleed)   • E. coli UTI (urinary tract infection)   • Hypernatremia   • Acute blood loss anemia   • History of stroke   • Chronic diastolic congestive heart failure (CMS/[facility])   • Acute renal failure superimposed on stage 3a chronic kidney disease (CMS/[facility])   • Duodenal ulcer   • A-fib (CMS/[facility])   • HTN (hypertension)   • Type 2 diabetes mellitus, without long-term current use of insulin (CMS/[facility])         Presenting Problem/History of Present Illness:  Encephalopathy [G93.40]     Chief Complaint on Day of Discharge:   Generalized weakness    History of Present Illness on Day of Discharge:   Because of delays in SNF placement, the patient's family has decided to take him home.  They feel like they can handle him there.  They would like home health at the time of discharge.  The patient is stable for discharge home in an improved status.    Hospital Course  This 85-year-old white male was admitted on 10/27 transferring from [facility].  He had previously been admitted and was having difficulty with confusion.  Confusion appeared to be multifactorial.  Neurology was consulted.  An MRI scan of the brain without contrast on 10/30 showed no evidence of acute intracranial abnormality.  Nephrology will follow the patient for acute renal failure superimposed on CKD 3.  Serum creatinine improved throughout his hospital stay.  Elevated BUN was felt secondary to recent GI bleeding as well as acute renal failure.  Mild hyponatremia developed as a result of fluid resuscitation and that resolved with D5W.  IV fluids were discontinued on 11/3 secondary to overhydration and 1/2 mg of Bumex was given x1 dose.   Amlodipine and losartan which were held at the time of admission were restarted and Lasix 20 mg daily was also restarted.  The patient was noted to have heme positive stools with melena and GI was consulted.  Endoscopic evaluation was performed on 10/28 noting multiple nonbleeding duodenal ulcers.  Urease was negative.  Patient was treated with lansoprazole.  He received a total of 4 units PRBCs during his stay with the last transfusion on 10/31.  Hemoglobin stabilized.  Anemia substrates were consistent with chronic disease.  An E. coli infection resistant to ampicillin, levofloxacin, tetracycline and Bactrim as noted.  It was sensitive to ceftriaxone and the patient was converted to oral Omnicef on 8/4 and a full course of treatment was completed.  The patient service on chronic atrial fibrillation and will not be anticoagulated secondary to recent gastrointestinal bleed.  Aspirin was restarted on 11/2.  The patient continued on sliding scale insulin throughout his hospital course.  The patient was medically stable for discharge for several days and after over 1 week of waiting on insurance approval, the family has decided to take him home with home health assistance.      Consults:   Gastroenterology:  Assessment/Plan           Encephalopathy    Type 2 diabetes mellitus (CMS/[facility])    HTN (hypertension)    A-fib (CMS/[facility])    CHF (congestive heart failure) (CMS/[facility])    CKD (chronic kidney disease), stage III    History of stroke    Intermittent confusion    Acute blood loss anemia    Heme positive stool    GI bleed    Hypernatremia    Acute kidney injury (CMS/[facility])        1. Heme positive stool  2. Acute on chronic anemia  3. Ulysses stool (yesterday, reported by his granddaughter. But nursing staff reports brown stool since)  4.  Encephalopathy     Discussed differential diagnosis with his granddaughter.  Question if the source of bleeding is in the upper GI tract.  I recommend EGD for further evaluation and she is  agreeable.  Continue n.p.o. status.  Continue Protonix drip.  Continue to monitor H&H's and transfuse as needed.     Granddaughter did report a mulberry stool yesterday. Question if  the source of bleeding could be  from the lower GI tract, however I do not feel that the patient would tolerate drinking a prep for a colonoscopy.      He is currently a DNR/DNI.        I discussed the patients findings and my recommendations with patient, family and nursing staff        EMR Dragon/transcription disclaimer:  Much of this encounter note is electronic transcription/translation of spoken language to printed text.  The electronic translation of spoken language may be erroneous, or at times, nonsensical words or phrases may be inadvertently transcribed.  Although I have reviewed the note for such errors, some may still exist.     Jennifer Aranda APRN 09:35 CDT     · I have seen the patient personally with evaluation and plan of care reviewed and developed with APRN and nursing staff. Where indicated, I have addended and/or modified the above history of present illness, physical examination, and assessment and plan to reflect my findings and impressions. Essential elements of the care plan were discussed with APRN above.  Agree with findings and assessment/plan as documented above.           Forrest Bliss MD  Vascular surgery:  Impression:    Encephalopathy    Type 2 diabetes mellitus (CMS/[facility])    HTN (hypertension)    A-fib (CMS/[facility])    CHF (congestive heart failure) (CMS/[facility])    CKD (chronic kidney disease), stage III    History of stroke    Intermittent confusion    Acute blood loss anemia    Heme positive stool    GI bleed    Hypernatremia    Acute kidney injury (CMS/[facility])    E. coli UTI (urinary tract infection)        Plan: After thoroughly evaluating Eron Escalante, I believe the best course of action is to remain conservative from a vascular standpoint. His presentation is not consistent with classic TIA or stroke.  He has no focal neurologic deficits, but remains with AMS and encephalopathic presentation. While carotid duplex did show 50-69% stenosis of the L ICA, recent brain MRI at [facility] showed no acute ischemia/stroke. At this point, I would recommend restarting ASA when able, and continue statin. Plan from neurology is for repeat brain MRI when able. CTA neck cannot be done due to decline in renal function, and patient would not be able to currently tolerate TOF MRA of the neck without sedation. Regardless, he would not be a good candidate for carotid surgery given his current condition. Continue further care per medical team, and vascular will follow peripherally should any further needs arise.  Thank you for allowing me to see Eron Escalante in consult. Please feel free to reach out with any questions or concerns.    Nephrology:  Assessment   1.  Acute kidney injury/stage II  2.  Stage III chronic kidney disease baseline.  3.  Prerenal azotemia.  4.  HYPERNATREMIA.  5.  GI bleed.  6.  Acute encephalopathy.  7.  Type II diabetic nephropathy.        Plan:  1.  Continue hypotonic fluid.  2.  Renal ultrasound consistent with cortical thinning explained CKD.  3.  Urinary protein to creatinine ratio.  4.  Urinary electrolytes.  5.  Plan was discussed with the family.        Thank you for the consult, we appreciate the opportunity to provide care to your patients.  Feel free to contact me if I can be of any further assistance.       Pablito Victoria MD    Neurology:  Impression     · Encephalopathy -unknown etiology.  No focal findings on neurologic exam.  MRI brain unremarkable.  No signs of epileptic etiologies based on EEG at outside hospital.  No signs of CNS infections based on history, lack of neck stiffness, and no significant leukocytosis.  There is a possibility that the initial encephalopathy was secondary to hypertensive encephalopathy and now it has transition to a metabolic encephalopathy secondary to anemia and a  urinary tract infection.  · Acute blood loss anemia  · Urinary tract infection     Discussion: I have spoken at length with the patient's granddaughter and to some extent with the patient's brother who is in the room.  They were concerned that the patient was having TIAs but I do not believe that this is consistent with the patient's examination nor current status.  The patient does have advanced age and some degree of global atrophy based on previous CNS imaging and this may represent an overlying metabolic encephalopathy on an underlying cognitive impairment.  I believe the work-up at [facility] was more than adequate with negative CNS imaging, negative EEGs, and positive for hypertensive emergency.  It may be that now that the patient has an overlying metabolic encephalopathy from the urinary tract infection and acute blood loss anemia concerning for a GI bleed.  I think repeating the MRI brain with and without contrast would be reasonable as the MRI from the outside hospital was done without contrast.  I also offered a lumbar puncture to rule out any encephalitis but given advanced age the patient's family does not wish to proceed with this at this time.  I think moving forward the patient's mental status will not allow for adequate nutrition and therefore it is imperative that we attempt to establish goals of care as the patient will require more prolonged feeding solutions such as a PEG tube.  This may be somewhat difficult with the concern of an active GI bleed currently.  If the patient would not want to proceed with a PEG tube we may need to consider palliative care options.  Further discussion and treatment plans will be based on the repeat MRI of the brain.     Plan     · Treatment of UTI by primary team  · MRI Brain with and without contrast  · Cancel carotid ultrasound as carotid stenosis would not explain non-focal neuorpathy  · GI Bleed workup per primary team  · Recommend palliative care to  assist with goals of care including feeding solution     I discussed the patients findings and my recommendations with patient and family      Over 55 minutes was spent on this consultation with over half being face-to-face time with the patient and family with discussion of goals of care and overall comprehensive work-up.  This also included record review from outside hospital.     Avery Bustillos MD    Pertinent Test Results:    Basic Metabolic Panel [[id number]]  (Abnormal) Collected: 11/07/20 0704    Specimen: Blood Updated: 11/07/20 0802     Glucose 115 mg/dL      BUN 21 mg/dL      Creatinine 0.77 mg/dL      Sodium 142 mmol/L      Potassium 4.5 mmol/L      Comment: Slight hemolysis detected by analyzer. Results may be affected.        Chloride 113 mmol/L      CO2 24.0 mmol/L      Calcium 9.0 mg/dL      eGFR Non African Amer 96 mL/min/1.73      BUN/Creatinine Ratio 27.3     Anion Gap 5.0 mmol/L     Basic Metabolic Panel [[id number]]  (Abnormal) Collected: 11/05/20 0536    Specimen: Blood Updated: 11/05/20 0633     Glucose 130 mg/dL      BUN 29 mg/dL      Creatinine 0.88 mg/dL      Sodium 143 mmol/L      Potassium 4.4 mmol/L      Chloride 115 mmol/L      CO2 25.0 mmol/L      Calcium 8.8 mg/dL      eGFR Non African Amer 82 mL/min/1.73      BUN/Creatinine Ratio 33.0     Anion Gap 3.0 mmol/L     Narrative:      GFR Normal >60  Chronic Kidney Disease <60  Kidney Failure <15      CBC (No Diff) [[id number]]  (Abnormal) Collected: 11/05/20 0536    Specimen: Blood Updated: 11/05/20 0615     WBC 6.95 10*3/mm3      RBC 2.94 10*6/mm3      Hemoglobin 8.5 g/dL      Hematocrit 26.8 %      MCV 91.2 fL      MCH 28.9 pg      MCHC 31.7 g/dL      RDW 21.8 %      RDW-SD 71.7 fl      MPV 10.9 fL      Platelets 221 10*3/mm3     COVID-19,CEPHEID,COR/SHIRA/PAD IN-HOUSE(OR EMERGENT/ADD-ON),NP SWAB IN TRANSPORT MEDIA 3-4 HR TAT - Swab, Nasopharynx [[id number]]  (Normal) Collected: 11/04/20 1034    Specimen: Swab from Nasopharynx Updated:  11/04/20 1132     COVID19 Not Detected    Basic Metabolic Panel [[id number]]  (Abnormal) Collected: 11/04/20 0557    Specimen: Blood Updated: 11/04/20 0636     Glucose 125 mg/dL      BUN 35 mg/dL      Creatinine 0.89 mg/dL      Sodium 145 mmol/L      Potassium 4.1 mmol/L      Chloride 117 mmol/L      CO2 23.0 mmol/L      Calcium 8.6 mg/dL      eGFR Non African Amer 81 mL/min/1.73      BUN/Creatinine Ratio 39.3     Anion Gap 5.0 mmol/L     Narrative:      GFR Normal >60  Chronic Kidney Disease <60  Kidney Failure <15      CBC (No Diff) [[id number]]  (Abnormal) Collected: 11/04/20 0557    Specimen: Blood Updated: 11/04/20 0615     WBC 6.97 10*3/mm3      RBC 2.85 10*6/mm3      Hemoglobin 8.3 g/dL      Hematocrit 25.6 %      MCV 89.8 fL      MCH 29.1 pg      MCHC 32.4 g/dL      RDW 22.0 %      RDW-SD 70.4 fl      MPV 10.7 fL      Platelets 192 10*3/mm3     Basic Metabolic Panel [[id number]]  (Abnormal) Collected: 11/03/20 0435    Specimen: Blood Updated: 11/03/20 0508     Glucose 148 mg/dL      BUN 43 mg/dL      Creatinine 1.01 mg/dL      Sodium 144 mmol/L      Potassium 4.1 mmol/L      Chloride 114 mmol/L      CO2 23.0 mmol/L      Calcium 8.7 mg/dL      eGFR Non African Amer 70 mL/min/1.73      BUN/Creatinine Ratio 42.6     Anion Gap 7.0 mmol/L     Narrative:      GFR Normal >60  Chronic Kidney Disease <60  Kidney Failure <15      CBC (No Diff) [[id number]]  (Abnormal) Collected: 11/03/20 0435    Specimen: Blood Updated: 11/03/20 0448     WBC 7.92 10*3/mm3      RBC 3.01 10*6/mm3      Hemoglobin 8.7 g/dL      Hematocrit 27.7 %      MCV 92.0 fL      MCH 28.9 pg      MCHC 31.4 g/dL      RDW 22.7 %      RDW-SD 74.0 fl      MPV 11.0 fL      Platelets 180 10*3/mm3     CBC Auto Differential [[id number]]  (Abnormal) Collected: 11/02/20 0447    Specimen: Blood Updated: 11/02/20 0541     WBC 8.19 10*3/mm3      RBC 2.97 10*6/mm3      Hemoglobin 8.5 g/dL      Hematocrit 26.3 %      MCV 88.6 fL      MCH 28.6 pg      MCHC 32.3 g/dL       RDW 23.9 %      RDW-SD 74.3 fl      MPV 12.3 fL      Platelets 146 10*3/mm3     Manual Differential [[id number]]  (Abnormal) Collected: 11/02/20 0447    Specimen: Blood Updated: 11/02/20 0541     Neutrophil % 82.8 %      Lymphocyte % 6.1 %      Monocyte % 4.0 %      Eosinophil % 7.1 %      Neutrophils Absolute 6.78 10*3/mm3      Lymphocytes Absolute 0.50 10*3/mm3      Monocytes Absolute 0.33 10*3/mm3      Eosinophils Absolute 0.58 10*3/mm3      Anisocytosis Slight/1+     Macrocytes Slight/1+     Rouleaux Slight/1+     Toxic Granulation Slight/1+     Vacuolated Neutrophils Large/3+     Platelet Morphology Normal    Comprehensive Metabolic Panel [[id number]]  (Abnormal) Collected: 11/02/20 0447    Specimen: Blood Updated: 11/02/20 0536     Glucose 166 mg/dL      BUN 53 mg/dL      Creatinine 1.35 mg/dL      Sodium 146 mmol/L      Potassium 4.1 mmol/L      Comment: Slight hemolysis detected by analyzer. Results may be affected.        Chloride 117 mmol/L      CO2 23.0 mmol/L      Calcium 8.6 mg/dL      Total Protein 4.7 g/dL      Albumin 2.00 g/dL      ALT (SGPT) 28 U/L      AST (SGOT) 31 U/L      Comment: Slight hemolysis detected by analyzer. Results may be affected.        Alkaline Phosphatase 78 U/L      Total Bilirubin 0.3 mg/dL      eGFR Non African Amer 50 mL/min/1.73      Globulin 2.7 gm/dL      A/G Ratio 0.7 g/dL      BUN/Creatinine Ratio 39.3     Anion Gap 6.0 mmol/L     Narrative:      GFR Normal >60  Chronic Kidney Disease <60  Kidney Failure <15      POC Glucose Once [[id number]]  (Abnormal) Collected: 11/01/20 1624    Specimen: Blood Updated: 11/01/20 1636     Glucose 151 mg/dL      Comment: : [id number] Georges MeganMeter ID: [id number]       POC Glucose Once [[id number]]  (Abnormal) Collected: 11/01/20 1142    Specimen: Blood Updated: 11/01/20 1153     Glucose 209 mg/dL      Comment: : [id number] Georges MeganMeter ID: [id number]       POC Glucose Once [[id number]]  (Abnormal) Collected: 11/01/20 0913     Specimen: Blood Updated: 11/01/20 0924     Glucose 206 mg/dL      Comment: : [id number] Georges Dhillon ID: [id number]       Comprehensive Metabolic Panel [[id number]]  (Abnormal) Collected: 11/01/20 0624    Specimen: Blood Updated: 11/01/20 0741     Glucose 170 mg/dL      BUN 69 mg/dL      Creatinine 1.80 mg/dL      Sodium 150 mmol/L      Potassium 3.7 mmol/L      Chloride 119 mmol/L      CO2 24.0 mmol/L      Calcium 8.7 mg/dL      Total Protein 4.6 g/dL      Albumin 2.20 g/dL      ALT (SGPT) 31 U/L      AST (SGOT) 35 U/L      Alkaline Phosphatase 79 U/L      Total Bilirubin 0.3 mg/dL      eGFR Non African Amer 36 mL/min/1.73      Globulin 2.4 gm/dL      A/G Ratio 0.9 g/dL      BUN/Creatinine Ratio 38.3     Anion Gap 7.0 mmol/L     Narrative:      GFR Normal >60  Chronic Kidney Disease <60  Kidney Failure <15      CBC & Differential [[id number]]  (Abnormal) Collected: 11/01/20 0624    Specimen: Blood Updated: 11/01/20 0711    Narrative:      The following orders were created for panel order CBC & Differential.  Procedure                               Abnormality         Status                     ---------                               -----------         ------                     CBC Auto Differential[[id number]]        Abnormal            Final result                 Please view results for these tests on the individual orders.    CBC Auto Differential [[id number]]  (Abnormal) Collected: 11/01/20 0624    Specimen: Blood Updated: 11/01/20 0711     WBC 8.78 10*3/mm3      RBC 3.13 10*6/mm3      Hemoglobin 9.2 g/dL      Hematocrit 28.1 %      MCV 89.8 fL      MCH 29.4 pg      MCHC 32.7 g/dL      RDW 24.7 %      RDW-SD 76.4 fl      MPV 11.6 fL      Platelets 168 10*3/mm3      Neutrophil % 78.5 %      Lymphocyte % 9.6 %      Monocyte % 5.6 %      Eosinophil % 4.7 %      Basophil % 0.1 %      Immature Grans % 1.5 %      Neutrophils, Absolute 6.90 10*3/mm3      Lymphocytes, Absolute 0.84 10*3/mm3      Monocytes,  Absolute 0.49 10*3/mm3      Eosinophils, Absolute 0.41 10*3/mm3      Basophils, Absolute 0.01 10*3/mm3      Immature Grans, Absolute 0.13 10*3/mm3      nRBC 0.0 /100 WBC     Narrative:      transfused    POC Glucose Once [[id number]]  (Abnormal) Collected: 10/31/20 1647    Specimen: Blood Updated: 10/31/20 1658     Glucose 154 mg/dL      Comment: : [id number] APRyenneMeter ID: [id number]       Sodium, Urine, Random - Urine, Clean Catch [[id number]] Collected: 10/31/20 1431    Specimen: Urine, Clean Catch Updated: 10/31/20 1524     Sodium, Urine <20 mmol/L     Narrative:      Reference intervals for random urine have not been established.  Clinical usage is dependent upon physician's interpretation in combination with other laboratory tests.       POC Glucose Once [[id number]]  (Abnormal) Collected: 10/31/20 1120    Specimen: Blood Updated: 10/31/20 1130     Glucose 174 mg/dL      Comment: : [id number] LensVectoreMeter ID: [id number]       Urine Culture - Urine, Urine, Clean Catch [[id number]]  (Normal) Collected: 10/30/20 0443    Specimen: Urine, Clean Catch Updated: 10/31/20 1044     Urine Culture No growth    POC Glucose Once [[id number]]  (Abnormal) Collected: 10/31/20 0805    Specimen: Blood Updated: 10/31/20 0816     Glucose 162 mg/dL      Comment: : [id number] Toro Kendall ID: [id number]       Extra Tubes [[id number]] Collected: 10/31/20 0612    Specimen: Blood, Venous Line Updated: 10/31/20 0715    Narrative:      The following orders were created for panel order Extra Tubes.  Procedure                               Abnormality         Status                     ---------                               -----------         ------                     Lavender Top[[id number]]                                     Final result               Green Top (Gel)[[id number]]                                  Final result                 Please view results for these tests on the individual orders.     Lavender Top [[id number]] Collected: 10/31/20 0612    Specimen: Blood Updated: 10/31/20 0715     Extra Tube hold for add-on     Comment: Auto resulted       Green Top (Gel) [[id number]] Collected: 10/31/20 0612    Specimen: Blood Updated: 10/31/20 0715     Extra Tube Hold for add-ons.     Comment: Auto resulted.       Comprehensive Metabolic Panel [[id number]]  (Abnormal) Collected: 10/31/20 0444    Specimen: Blood Updated: 10/31/20 0545     Glucose 153 mg/dL      BUN 80 mg/dL      Creatinine 2.17 mg/dL      Sodium 150 mmol/L      Potassium 3.7 mmol/L      Chloride 119 mmol/L      CO2 23.0 mmol/L      Calcium 8.8 mg/dL      Total Protein 4.4 g/dL      Albumin 2.20 g/dL      ALT (SGPT) 31 U/L      AST (SGOT) 40 U/L      Alkaline Phosphatase 70 U/L      Total Bilirubin 0.4 mg/dL      eGFR Non African Amer 29 mL/min/1.73      Globulin 2.2 gm/dL      A/G Ratio 1.0 g/dL      BUN/Creatinine Ratio 36.9     Anion Gap 8.0 mmol/L     Narrative:      GFR Normal >60  Chronic Kidney Disease <60  Kidney Failure <15      CBC & Differential [[id number]]  (Abnormal) Collected: 10/31/20 0444    Specimen: Blood Updated: 10/31/20 0534    Narrative:      The following orders were created for panel order CBC & Differential.  Procedure                               Abnormality         Status                     ---------                               -----------         ------                     CBC Auto Differential[[id number]]        Abnormal            Final result                 Please view results for these tests on the individual orders.    CBC Auto Differential [[id number]]  (Abnormal) Collected: 10/31/20 0444    Specimen: Blood Updated: 10/31/20 0534     WBC 12.28 10*3/mm3      RBC 2.10 10*6/mm3      Hemoglobin 6.8 g/dL      Hematocrit 20.8 %      MCV 99.0 fL      MCH 32.4 pg      MCHC 32.7 g/dL      RDW 16.0 %      RDW-SD 56.5 fl      MPV 11.9 fL      Platelets 165 10*3/mm3      Neutrophil % 86.3 %      Lymphocyte % 7.1 %       Monocyte % 3.8 %      Eosinophil % 1.8 %      Basophil % 0.1 %      Immature Grans % 0.9 %      Neutrophils, Absolute 10.60 10*3/mm3      Lymphocytes, Absolute 0.87 10*3/mm3      Monocytes, Absolute 0.47 10*3/mm3      Eosinophils, Absolute 0.22 10*3/mm3      Basophils, Absolute 0.01 10*3/mm3      Immature Grans, Absolute 0.11 10*3/mm3      nRBC 0.0 /100 WBC     POC Glucose Once [[id number]]  (Abnormal) Collected: 10/30/20 1658    Specimen: Blood Updated: 10/30/20 1710     Glucose 241 mg/dL      Comment: : [id number] Toro UgaldeMeter ID: [id number]       Uric Acid [[id number]]  (Abnormal) Collected: 10/30/20 0418    Specimen: Blood Updated: 10/30/20 1529     Uric Acid 7.5 mg/dL     Iron Profile [[id number]]  (Abnormal) Collected: 10/30/20 0418    Specimen: Blood Updated: 10/30/20 1529     Iron 12 mcg/dL      Iron Saturation 10 %      Transferrin 81 mg/dL      TIBC 121 mcg/dL     Creatinine, Urine, Random - Urine, Catheter [[id number]] Collected: 10/30/20 0441    Specimen: Urine, Catheter Updated: 10/30/20 1225     Creatinine, Urine 78.4 mg/dL     Narrative:      Reference intervals for random urine have not been established.  Clinical usage is dependent upon physician's interpretation in combination with other laboratory tests.       POC Glucose Once [[id number]]  (Abnormal) Collected: 10/30/20 1150    Specimen: Blood Updated: 10/30/20 1201     Glucose 222 mg/dL      Comment: : [id number] Toro UgaldeMeter ID: [id number]       POC Glucose Once [[id number]]  (Abnormal) Collected: 10/30/20 0822    Specimen: Blood Updated: 10/30/20 0833     Glucose 158 mg/dL      Comment: : [id number] Toro UgaldeMeter ID: [id number]       Urinalysis, Microscopic Only - Urine, Clean Catch [[id number]]  (Abnormal) Collected: 10/30/20 0443    Specimen: Urine, Clean Catch Updated: 10/30/20 0654     RBC, UA 3-5 /HPF      WBC, UA Too Numerous to Count /HPF      Bacteria, UA 1+ /HPF      Squamous Epithelial Cells, UA 0-2 /HPF       "Transitional Epithelial Cells, UA 3-6 /HPF      Hyaline Casts, UA 0-2 /LPF      Methodology Manual Light Microscopy    Urinalysis With Culture If Indicated - Urine, Clean Catch [[id number]]  (Abnormal) Collected: 10/30/20 0443    Specimen: Urine, Clean Catch Updated: 10/30/20 0631     Color, UA Yellow     Appearance, UA Cloudy     pH, UA <=5.0     Specific Gravity, UA 1.022     Glucose, UA Negative     Ketones, UA Negative     Bilirubin, UA Negative     Blood, UA Small (1+)     Protein, UA Trace     Leuk Esterase, UA Moderate (2+)     Nitrite, UA Negative     Urobilinogen, UA 0.2 E.U./dL    Sodium, Urine, Random - Urine, Catheter [[id number]] Collected: 10/30/20 0441    Specimen: Urine, Catheter Updated: 10/30/20 0512     Sodium, Urine <20 mmol/L     Narrative:      Reference intervals for random urine have not been established.  Clinical usage is dependent upon physician's interpretation in combination with other laboratory tests.       Procalcitonin [[id number]]  (Abnormal) Collected: 10/30/20 0418    Specimen: Blood Updated: 10/30/20 0508     Procalcitonin 7.53 ng/mL     Narrative:      As a Marker for Sepsis (Non-Neonates):   1. <0.5 ng/mL represents a low risk of severe sepsis and/or septic shock.  1. >2 ng/mL represents a high risk of severe sepsis and/or septic shock.    As a Marker for Lower Respiratory Tract Infections that require antibiotic therapy:  PCT on Admission     Antibiotic Therapy             6-12 Hrs later  > 0.5                Strongly Recommended            >0.25 - <0.5         Recommended  0.1 - 0.25           Discouraged                   Remeasure/reassess PCT  <0.1                 Strongly Discouraged          Remeasure/reassess PCT      As 28 day mortality risk marker: \"Change in Procalcitonin Result\" (> 80 % or <=80 %) if Day 0 (or Day 1) and Day 4 values are available. Refer to [email or web address]   Change in PCT <=80 %   A decrease of PCT levels below or equal to 80 % " defines a positive change in PCT test result representing a higher risk for 28-day all-cause mortality of patients diagnosed with severe sepsis or septic shock.  Change in PCT > 80 %   A decrease of PCT levels of more than 80 % defines a negative change in PCT result representing a lower risk for 28-day all-cause mortality of patients diagnosed with severe sepsis or septic shock.                Results may be falsely decreased if patient taking Biotin.     Comprehensive Metabolic Panel [[id number]]  (Abnormal) Collected: 10/30/20 0418    Specimen: Blood Updated: 10/30/20 0502     Glucose 149 mg/dL      BUN 84 mg/dL      Creatinine 2.43 mg/dL      Sodium 153 mmol/L      Potassium 3.9 mmol/L      Chloride 122 mmol/L      CO2 24.0 mmol/L      Calcium 8.7 mg/dL      Total Protein 4.4 g/dL      Albumin 2.10 g/dL      ALT (SGPT) 29 U/L      AST (SGOT) 38 U/L      Alkaline Phosphatase 66 U/L      Total Bilirubin 0.3 mg/dL      eGFR Non African Amer 25 mL/min/1.73      Globulin 2.3 gm/dL      A/G Ratio 0.9 g/dL      BUN/Creatinine Ratio 34.6     Anion Gap 7.0 mmol/L     Narrative:      GFR Normal >60  Chronic Kidney Disease <60  Kidney Failure <15      CBC & Differential [[id number]]  (Abnormal) Collected: 10/30/20 0418    Specimen: Blood Updated: 10/30/20 0442    Narrative:      The following orders were created for panel order CBC & Differential.  Procedure                               Abnormality         Status                     ---------                               -----------         ------                     CBC Auto Differential[[id number]]        Abnormal            Final result                 Please view results for these tests on the individual orders.    CBC Auto Differential [[id number]]  (Abnormal) Collected: 10/30/20 0418    Specimen: Blood Updated: 10/30/20 0442     WBC 16.74 10*3/mm3      RBC 2.28 10*6/mm3      Hemoglobin 7.3 g/dL      Hematocrit 23.0 %      .9 fL      MCH 32.0 pg      MCHC 31.7  g/dL      RDW 16.9 %      RDW-SD 60.6 fl      MPV 11.6 fL      Platelets 183 10*3/mm3      Neutrophil % 90.5 %      Lymphocyte % 5.3 %      Monocyte % 2.9 %      Eosinophil % 0.3 %      Basophil % 0.1 %      Immature Grans % 0.9 %      Neutrophils, Absolute 15.15 10*3/mm3      Lymphocytes, Absolute 0.88 10*3/mm3      Monocytes, Absolute 0.49 10*3/mm3      Eosinophils, Absolute 0.05 10*3/mm3      Basophils, Absolute 0.02 10*3/mm3      Immature Grans, Absolute 0.15 10*3/mm3      nRBC 0.2 /100 WBC     Urine Culture - Urine, Urine, Catheter In/Out [[id number]]  (Abnormal)  (Susceptibility) Collected: 10/27/20 0520    Specimen: Urine, Catheter In/Out Updated: 10/30/20 0326     Urine Culture >100,000 CFU/mL Escherichia coli    Susceptibility      Escherichia coli     SRINIVAS     Ampicillin Resistant     Ampicillin + Sulbactam Intermediate     Cefazolin Susceptible     Cefepime Susceptible     Ceftazidime Susceptible     Ceftriaxone Susceptible     Gentamicin Susceptible     Levofloxacin Resistant     Nitrofurantoin Susceptible     Piperacillin + Tazobactam Susceptible     Tetracycline Resistant     Trimethoprim + Sulfamethoxazole Resistant                    POC Glucose Once [[id number]]  (Abnormal) Collected: 10/29/20 2224    Specimen: Blood Updated: 10/29/20 2245     Glucose 187 mg/dL      Comment: : [id number] Leon ([place]) AmandaMeter ID: [id number]       Basic Metabolic Panel [[id number]]  (Abnormal) Collected: 10/29/20 1616    Specimen: Blood Updated: 10/29/20 1639     Glucose 157 mg/dL      BUN 84 mg/dL      Creatinine 2.64 mg/dL      Sodium 152 mmol/L      Potassium 4.1 mmol/L      Chloride 120 mmol/L      CO2 22.0 mmol/L      Calcium 8.7 mg/dL      eGFR Non African Amer 23 mL/min/1.73      BUN/Creatinine Ratio 31.8     Anion Gap 10.0 mmol/L     Narrative:      GFR Normal >60  Chronic Kidney Disease <60  Kidney Failure <15      Urease For H Pylori - Tissue, Stomach [[id number]]  (Normal) Collected: 10/28/20 1200     Specimen: Tissue from Stomach Updated: 10/29/20 1522     Urease Negative    POC Glucose Once [[id number]]  (Abnormal) Collected: 10/29/20 1208    Specimen: Blood Updated: 10/29/20 1224     Glucose 154 mg/dL      Comment: : [id number] Lula GraceMeter ID: [id number]       Basic Metabolic Panel [[id number]]  (Abnormal) Collected: 10/29/20 0627    Specimen: Blood Updated: 10/29/20 0740     Glucose 142 mg/dL      BUN 82 mg/dL      Creatinine 2.74 mg/dL      Sodium 154 mmol/L      Potassium 4.2 mmol/L      Chloride 119 mmol/L      CO2 25.0 mmol/L      Calcium 8.9 mg/dL      eGFR Non African Amer 22 mL/min/1.73      BUN/Creatinine Ratio 29.9     Anion Gap 10.0 mmol/L     Narrative:      GFR Normal >60  Chronic Kidney Disease <60  Kidney Failure <15      Extra Tubes [[id number]] Collected: 10/29/20 0630    Specimen: Blood, Venous Line Updated: 10/29/20 0730    Narrative:      The following orders were created for panel order Extra Tubes.  Procedure                               Abnormality         Status                     ---------                               -----------         ------                     Lavender Top[[id number]]                                     Final result               Gold Top - SST[[id number]]                                   Final result                 Please view results for these tests on the individual orders.    Lavender Top [[id number]] Collected: 10/29/20 0630    Specimen: Blood Updated: 10/29/20 0730     Extra Tube hold for add-on     Comment: Auto resulted       Gold Top - SST [[id number]] Collected: 10/29/20 0630    Specimen: Blood Updated: 10/29/20 0730     Extra Tube Hold for add-ons.     Comment: Auto resulted.       CBC (No Diff) [[id number]]  (Abnormal) Collected: 10/29/20 0627    Specimen: Blood Updated: 10/29/20 0702     WBC 17.67 10*3/mm3      RBC 2.58 10*6/mm3      Hemoglobin 8.4 g/dL      Hematocrit 25.4 %      MCV 98.4 fL      MCH 32.6 pg      MCHC 33.1 g/dL      RDW 17.2 %       RDW-SD 59.8 fl      MPV 11.7 fL      Platelets 207 10*3/mm3     POC Glucose Once [[id number]]  (Normal) Collected: 10/28/20 1733    Specimen: Blood Updated: 10/28/20 1744     Glucose 122 mg/dL      Comment: : [id number] Lula GraceMeter ID: [id number]       Hemoglobin & Hematocrit, Blood [[id number]]  (Abnormal) Collected: 10/28/20 1559    Specimen: Blood Updated: 10/28/20 1606     Hemoglobin 8.3 g/dL      Hematocrit 25.5 %     POC Glucose Once [[id number]]  (Normal) Collected: 10/28/20 1103    Specimen: Blood Updated: 10/28/20 1131     Glucose 121 mg/dL      Comment: : [id number] Lula GraceMeter ID: [id number]       Basic Metabolic Panel [[id number]]  (Abnormal) Collected: 10/28/20 0503    Specimen: Blood Updated: 10/28/20 0604     Glucose 92 mg/dL      BUN 65 mg/dL      Creatinine 1.75 mg/dL      Sodium 155 mmol/L      Potassium 4.2 mmol/L      Chloride 121 mmol/L      CO2 27.0 mmol/L      Calcium 9.5 mg/dL      eGFR Non African Amer 37 mL/min/1.73      BUN/Creatinine Ratio 37.1     Anion Gap 7.0 mmol/L     Narrative:      GFR Normal >60  Chronic Kidney Disease <60  Kidney Failure <15      CBC & Differential [[id number]]  (Abnormal) Collected: 10/28/20 0503    Specimen: Blood Updated: 10/28/20 0601    Narrative:      The following orders were created for panel order CBC & Differential.  Procedure                               Abnormality         Status                     ---------                               -----------         ------                     CBC Auto Differential[[id number]]        Abnormal            Final result                 Please view results for these tests on the individual orders.    CBC Auto Differential [[id number]]  (Abnormal) Collected: 10/28/20 0503    Specimen: Blood Updated: 10/28/20 0601     WBC 14.95 10*3/mm3      RBC 2.63 10*6/mm3      Hemoglobin 8.3 g/dL      Hematocrit 26.3 %      .0 fL      MCH 31.6 pg      MCHC 31.6 g/dL      RDW 17.8 %      RDW-SD 62.9 fl       MPV 11.5 fL      Platelets 191 10*3/mm3     Manual Differential [[id number]]  (Abnormal) Collected: 10/28/20 0503    Specimen: Blood Updated: 10/28/20 0601     Neutrophil % 93.0 %      Lymphocyte % 3.0 %      Bands %  4.0 %      Neutrophils Absolute 14.50 10*3/mm3      Lymphocytes Absolute 0.45 10*3/mm3      nRBC 2.0 /100 WBC      Anisocytosis Slight/1+     Macrocytes Slight/1+     Poikilocytes Slight/1+     Polychromasia Slight/1+     WBC Morphology Normal     Platelet Morphology Normal    Hemoglobin & Hematocrit, Blood [[id number]]  (Abnormal) Collected: 10/27/20 2359    Specimen: Blood Updated: 10/28/20 0029     Hemoglobin 9.1 g/dL      Hematocrit 27.7 %     Hemoglobin & Hematocrit, Blood [[id number]]  (Abnormal) Collected: 10/27/20 2242    Specimen: Blood Updated: 10/27/20 2251     Hemoglobin 9.0 g/dL      Hematocrit 27.5 %     Occult Blood X 1, Stool - Stool, Per Rectum [[id number]]  (Abnormal) Collected: 10/27/20 1513    Specimen: Stool from Per Rectum Updated: 10/27/20 1702     Fecal Occult Blood Positive    Troponin [[id number]]  (Abnormal) Collected: 10/27/20 1558    Specimen: Blood Updated: 10/27/20 1643     Troponin T 0.162 ng/mL     Narrative:      Troponin T Reference Range:  <= 0.03 ng/mL-   Negative for AMI  >0.03 ng/mL-     Abnormal for myocardial necrosis.  Clinicians would have to utilize clinical acumen, EKG, Troponin and serial changes to determine if it is an Acute Myocardial Infarction or myocardial injury due to an underlying chronic condition.       Results may be falsely decreased if patient taking Biotin.      Basic Metabolic Panel [[id number]]  (Abnormal) Collected: 10/27/20 1558    Specimen: Blood Updated: 10/27/20 1639     Glucose 115 mg/dL      BUN 59 mg/dL      Creatinine 1.35 mg/dL      Sodium 157 mmol/L      Potassium 4.4 mmol/L      Chloride 123 mmol/L      CO2 30.0 mmol/L      Calcium 9.6 mg/dL      eGFR Non African Amer 50 mL/min/1.73      BUN/Creatinine Ratio 43.7     Anion Gap 4.0  mmol/L     Narrative:      GFR Normal >60  Chronic Kidney Disease <60  Kidney Failure <15      CBC (No Diff) [[id number]]  (Abnormal) Collected: 10/27/20 1558    Specimen: Blood Updated: 10/27/20 1629     WBC 8.42 10*3/mm3      RBC 1.97 10*6/mm3      Hemoglobin 6.5 g/dL      Hematocrit 19.8 %      .5 fL      MCH 33.0 pg      MCHC 32.8 g/dL      RDW 17.2 %      RDW-SD 61.9 fl      MPV 11.3 fL      Platelets 176 10*3/mm3     POC Glucose Once [[id number]]  (Normal) Collected: 10/27/20 1556    Specimen: Blood Updated: 10/27/20 1618     Glucose 122 mg/dL      Comment: : [id number] Lula GraceMeter ID: [id number]       Vitamin B12 [[id number]]  (Abnormal) Collected: 10/27/20 0328    Specimen: Blood Updated: 10/27/20 1257     Vitamin B-12 1,029 pg/mL     Narrative:      Results may be falsely increased if patient taking Biotin.      Folate [[id number]]  (Normal) Collected: 10/27/20 0328    Specimen: Blood Updated: 10/27/20 1257     Folate 19.50 ng/mL     Narrative:      Results may be falsely increased if patient taking Biotin.      POC Glucose Once [[id number]]  (Abnormal) Collected: 10/27/20 1137    Specimen: Blood Updated: 10/27/20 1147     Glucose 151 mg/dL      Comment: : [id number] Mau Mckinney ID: [id number]       POC Glucose Once [[id number]]  (Abnormal) Collected: 10/27/20 0726    Specimen: Blood Updated: 10/27/20 0742     Glucose 155 mg/dL      Comment: : [id number] Joshua Baze ID: [id number]       Urinalysis, Microscopic Only - Urine, Catheter In/Out [[id number]]  (Abnormal) Collected: 10/27/20 0520    Specimen: Urine, Catheter In/Out Updated: 10/27/20 0615     RBC, UA 0-2 /HPF      WBC, UA Too Numerous to Count /HPF      Bacteria, UA 4+ /HPF      Squamous Epithelial Cells, UA 0-2 /HPF      Hyaline Casts, UA None Seen /LPF      Methodology Automated Microscopy    Urinalysis With Microscopic If Indicated (No Culture) - Urine, Catheter In/Out [[id number]]  (Abnormal) Collected: 10/27/20  0520    Specimen: Urine, Catheter In/Out Updated: 10/27/20 0610     Color, UA Yellow     Appearance, UA Cloudy     pH, UA 5.5     Specific Gravity, UA 1.015     Glucose, UA Negative     Ketones, UA Negative     Bilirubin, UA Negative     Blood, UA Negative     Protein, UA Trace     Leuk Esterase, UA Large (3+)     Nitrite, UA Positive     Urobilinogen, UA 0.2 E.U./dL    Hemoglobin A1c [[id number]]  (Abnormal) Collected: 10/27/20 0328    Specimen: Blood Updated: 10/27/20 0447     Hemoglobin A1C 6.10 %     Narrative:      Hemoglobin A1C Ranges:    Increased Risk for Diabetes  5.7% to 6.4%  Diabetes                     >= 6.5%  Diabetic Goal                < 7.0%    CBC Auto Differential [[id number]]  (Abnormal) Collected: 10/27/20 0407    Specimen: Blood Updated: 10/27/20 0434     WBC 9.85 10*3/mm3      RBC 1.86 10*6/mm3      Hemoglobin 6.1 g/dL      Hematocrit 19.2 %      .2 fL      MCH 32.8 pg      MCHC 31.8 g/dL      RDW 15.9 %      RDW-SD 58.6 fl      MPV 11.0 fL      Platelets 187 10*3/mm3      Neutrophil % 76.2 %      Lymphocyte % 11.2 %      Monocyte % 10.7 %      Eosinophil % 0.7 %      Basophil % 0.1 %      Immature Grans % 1.1 %      Neutrophils, Absolute 7.51 10*3/mm3      Lymphocytes, Absolute 1.10 10*3/mm3      Monocytes, Absolute 1.05 10*3/mm3      Eosinophils, Absolute 0.07 10*3/mm3      Basophils, Absolute 0.01 10*3/mm3      Immature Grans, Absolute 0.11 10*3/mm3      nRBC 0.4 /100 WBC     Sedimentation Rate [[id number]]  (Normal) Collected: 10/27/20 0407    Specimen: Blood Updated: 10/27/20 0433     Sed Rate 9 mm/hr     Troponin [[id number]]  (Abnormal) Collected: 10/27/20 0328    Specimen: Blood Updated: 10/27/20 0422     Troponin T 0.155 ng/mL     Narrative:      Troponin T Reference Range:  <= 0.03 ng/mL-   Negative for AMI  >0.03 ng/mL-     Abnormal for myocardial necrosis.  Clinicians would have to utilize clinical acumen, EKG, Troponin and serial changes to determine if it is an Acute  "Myocardial Infarction or myocardial injury due to an underlying chronic condition.       Results may be falsely decreased if patient taking Biotin.      Procalcitonin [[id number]]  (Normal) Collected: 10/27/20 0328    Specimen: Blood Updated: 10/27/20 0417     Procalcitonin 0.15 ng/mL     Narrative:      As a Marker for Sepsis (Non-Neonates):   1. <0.5 ng/mL represents a low risk of severe sepsis and/or septic shock.  1. >2 ng/mL represents a high risk of severe sepsis and/or septic shock.    As a Marker for Lower Respiratory Tract Infections that require antibiotic therapy:  PCT on Admission     Antibiotic Therapy             6-12 Hrs later  > 0.5                Strongly Recommended            >0.25 - <0.5         Recommended  0.1 - 0.25           Discouraged                   Remeasure/reassess PCT  <0.1                 Strongly Discouraged          Remeasure/reassess PCT      As 28 day mortality risk marker: \"Change in Procalcitonin Result\" (> 80 % or <=80 %) if Day 0 (or Day 1) and Day 4 values are available. Refer to [email or web address]   Change in PCT <=80 %   A decrease of PCT levels below or equal to 80 % defines a positive change in PCT test result representing a higher risk for 28-day all-cause mortality of patients diagnosed with severe sepsis or septic shock.  Change in PCT > 80 %   A decrease of PCT levels of more than 80 % defines a negative change in PCT result representing a lower risk for 28-day all-cause mortality of patients diagnosed with severe sepsis or septic shock.                Results may be falsely decreased if patient taking Biotin.     Lipid Panel [[id number]]  (Abnormal) Collected: 10/27/20 0328    Specimen: Blood Updated: 10/27/20 0417     Total Cholesterol 94 mg/dL      Triglycerides 89 mg/dL      HDL Cholesterol 34 mg/dL      LDL Cholesterol  42 mg/dL      VLDL Cholesterol 18 mg/dL      LDL/HDL Ratio 1.24    Narrative:      Cholesterol Reference Ranges  (U.[place] " Department of Health and Human Services ATP III Classifications)    Desirable          <200 mg/dL  Borderline High    200-239 mg/dL  High Risk          >240 mg/dL      Triglyceride Reference Ranges  (U.S. Department of Health and Human Services ATP III Classifications)    Normal           <150 mg/dL  Borderline High  150-199 mg/dL  High             200-499 mg/dL  Very High        >500 mg/dL    HDL Reference Ranges  (U.S. Department of Health and Human Services ATP III Classifcations)    Low     <40 mg/dl (major risk factor for CHD)  High    >60 mg/dl ('negative' risk factor for CHD)        LDL Reference Ranges  (U.S. Department of Health and Human Services ATP III Classifcations)    Optimal          <100 mg/dL  Near Optimal     100-129 mg/dL  Borderline High  130-159 mg/dL  High             160-189 mg/dL  Very High        >189 mg/dL    Comprehensive Metabolic Panel [[id number]]  (Abnormal) Collected: 10/27/20 0328    Specimen: Blood Updated: 10/27/20 0417     Glucose 143 mg/dL      BUN 60 mg/dL      Creatinine 1.22 mg/dL      Sodium 154 mmol/L      Potassium 4.0 mmol/L      Chloride 123 mmol/L      CO2 27.0 mmol/L      Calcium 9.7 mg/dL      Total Protein 4.4 g/dL      Albumin 2.40 g/dL      ALT (SGPT) 26 U/L      AST (SGOT) 23 U/L      Alkaline Phosphatase 60 U/L      Total Bilirubin 0.2 mg/dL      eGFR Non African Amer 56 mL/min/1.73      Globulin 2.0 gm/dL      A/G Ratio 1.2 g/dL      BUN/Creatinine Ratio 49.2     Anion Gap 4.0 mmol/L     Narrative:      GFR Normal >60  Chronic Kidney Disease <60  Kidney Failure <15      Iron Profile [[id number]]  (Abnormal) Collected: 10/27/20 0328    Specimen: Blood Updated: 10/27/20 0417     Iron 32 mcg/dL      Iron Saturation 19 %      Transferrin 111 mg/dL      TIBC 165 mcg/dL     BNP [[id number]]  (Abnormal) Collected: 10/27/20 0328    Specimen: Blood Updated: 10/27/20 0417     proBNP 8,060.0 pg/mL     Narrative:      Among patients with dyspnea, NT-proBNP is highly sensitive  for the detection of acute congestive heart failure. In addition NT-proBNP of <300 pg/ml effectively rules out acute congestive heart failure with 99% negative predictive value.    Results may be falsely decreased if patient taking Biotin.      T4, Free [[id number]]  (Normal) Collected: 10/27/20 0328    Specimen: Blood Updated: 10/27/20 0417     Free T4 0.99 ng/dL     Narrative:      Results may be falsely increased if patient taking Biotin.      Magnesium [[id number]]  (Abnormal) Collected: 10/27/20 0328    Specimen: Blood Updated: 10/27/20 0417     Magnesium 2.7 mg/dL     CK [[id number]]  (Normal) Collected: 10/27/20 0328    Specimen: Blood Updated: 10/27/20 0417     Creatine Kinase 50 U/L     C-reactive Protein [[id number]]  (Abnormal) Collected: 10/27/20 0328    Specimen: Blood Updated: 10/27/20 0417     C-Reactive Protein 3.31 mg/dL     Ferritin [[id number]]  (Abnormal) Collected: 10/27/20 0328    Specimen: Blood Updated: 10/27/20 0417     Ferritin 492.70 ng/mL     Narrative:      Results may be falsely decreased if patient taking Biotin.      TSH [[id number]]  (Normal) Collected: 10/27/20 0328    Specimen: Blood Updated: 10/27/20 0413     TSH 2.430 uIU/mL     Uric Acid [[id number]]  (Normal) Collected: 10/27/20 0328    Specimen: Blood Updated: 10/27/20 0413     Uric Acid 6.5 mg/dL     Blood Gas, Arterial - [[id number]]  (Abnormal) Collected: 10/27/20 0357    Specimen: Arterial Blood Updated: 10/27/20 0403     Site Right Radial     Raymond's Test Positive     pH, Arterial 7.487 pH units      Comment: 83 Value above reference range        pCO2, Arterial 36.7 mm Hg      pO2, Arterial 66.1 mm Hg      Comment: 84 Value below reference range        HCO3, Arterial 27.8 mmol/L      Comment: 83 Value above reference range        Base Excess, Arterial 4.1 mmol/L      Comment: 83 Value above reference range        O2 Saturation, Arterial 95.4 %      Temperature 37.0 C      Barometric Pressure for Blood Gas 755 mmHg      Modality  Room Air     FIO2 21 %      Ventilator Mode NA     Collected by [id number]     Comment: Meter: [id number]     :  [id number]        pCO2, Temperature Corrected 36.7 mm Hg      pH, Temp Corrected 7.487 pH Units      pO2, Temperature Corrected 66.1 mm Hg     Lactic Acid, Plasma [[id number]]  (Normal) Collected: 10/27/20 0328    Specimen: Blood Updated: 10/27/20 0401     Lactate 0.9 mmol/L     Protime-INR [[id number]]  (Abnormal) Collected: 10/27/20 0328    Specimen: Blood Updated: 10/27/20 0356     Protime 15.2 Seconds      INR 1.24    aPTT [[id number]]  (Abnormal) Collected: 10/27/20 0328    Specimen: Blood Updated: 10/27/20 0356     PTT 23.8 seconds     Reticulocytes [[id number]]  (Abnormal) Collected: 10/27/20 0328    Specimen: Blood Updated: 10/27/20 0346     Reticulocyte % 3.63 %      Reticulocyte Absolute 0.0628 10*6/mm3     COVID-19,[facility] IN-HOUSE,Nasal Swab No Transport Media 3-4 HR TAT - Swab, Nasal Cavity [[id number]]  (Normal) Collected: 10/26/20 2037    Specimen: Swab from Nasal Cavity Updated: 10/26/20 2128     COVID19 Not Detected    Narrative:      Fact sheet for providers: https://www.fda.gov/media/703281/download     Fact sheet for patients: https://www.fda.gov/media/817466/download        Imaging Results (All)     Procedure Component Value Units Date/Time    FL Video Swallow With Speech Single Contrast [[id number]] Collected: 11/04/20 1401     Updated: 11/04/20 1405    Narrative:      EXAMINATION:   FL VIDEO SWALLOW W SPEECH SINGLE-CONTRAST-  11/4/2020  2:01 PM CST     HISTORY: MODIFIED BARIUM SWALLOW     REASON FOR EXAM: r/o aspiration; R13.12-Dysphagia, oropharyngeal phase;  Z74.09-Other reduced mobility; R19.5-Other fecal abnormalities;  Z78.9-Other specified health status     COMPARISON: NONE      FLUOROSCOPY TIME: 1.2 minutes     Radiation dose 5.71 MG Y     Number of images: 1     TECHNIQUE: In conjunction with speech pathology services, video  fluoroscopic swallow examination was performed  with oral ingestion of  barium containing substances of various viscosities.      FINDINGS: Medium bolus sizes are well controlled with no spillage into  the oropharynx. No pooling is demonstrated. There is soft palate  elevation and coverage of the posterior nasopharynx with swallowing. No  stasis is noted within the valleculae or piriform sinuses.     Penetration was observed with nectar thick and thin barium. There is no  obvious aspiration..        Impression:      1. Normal swallowing mechanism.   2. Penetration was observed with nectar thick and thin barium.      Please see speech pathologist's report for further details and  recommendations.         This report was finalized on 11/04/2020 14:02 by Dr. Izaiah Ryan MD.    MRI Brain Without Contrast [[id number]] Collected: 10/30/20 1254     Updated: 10/30/20 1317    Narrative:      EXAMINATION:  MRI BRAIN WO CONTRAST-  10/30/2020 10:45 AM CDT     HISTORY: Mental status change, unknown cause. R13.12-Dysphagia,  oropharyngeal phase; Z74.09-Other reduced mobility. Study was changed to  a without contrast study due to a GFR of 25 mL/m.     TECHNIQUE: Multiplanar imaging was performed in a high field magnet.     COMPARISON: No comparison study.     FINDINGS: The study is degraded by motion artifact. There is no evidence  of acute infarct on the diffusion-weighted sequence. There is mild  atrophy. There is moderate to severe dilatation of the lateral and third  ventricles. There is moderate T2 high signal within the hemispheric  white matter. There is a focal area of T1 low signal and T2 high signal  along the inferior right frontal lobe that is likely related to either  an old infarct or old trauma.       Impression:      1. Motion limited study.  2. No evidence of acute infarct.  3. Mild atrophy. Moderate to severe dilatation of the lateral and third  ventricles may be related to the atrophy. Hydrocephalus is not excluded.  4. Moderate T2 high signal within the  hemispheric white matter is  nonspecific and likely due to chronic small vessel disease.  5. Focal area of T1 low signal and T2 high signal along the inferior  right frontal lobe is likely related to either an old infarct or old  trauma.        This report was finalized on 10/30/2020 13:14 by Dr. Chun Fountain MD.    US Renal Bilateral [[id number]] Collected: 10/29/20 1854     Updated: 10/29/20 1859    Narrative:      RENAL ULTRASOUND COMPLETE 10/29/2020 6:16 PM CDT     REASON FOR EXAM: ALLIE; R13.12-Dysphagia, oropharyngeal phase;  Z74.09-Other reduced mobility; R19.5-Other fecal abnormalities       COMPARISON: CT scan dated 10/27/2020       TECHNIQUE: Multiple longitudinal and transverse realtime sonographic  images of the kidneys and urinary bladder are obtained.      FINDINGS:      RIGHT KIDNEY: 9.7 x 5.2 x 6.3 cm. Diffuse cortical thinning. No solid or  cystic masses. The central echo complex is compact with no evidence for  hydronephrosis. No nephrolithiasis or abnormal perinephric fluid  collections . No hydroureter.      LEFT KIDNEY: 8.4 x 5 0.0, 4.6 cm. Severe cortical thinning. 2.8 cm  rounded hypoechoic cystic lesion with through transmission at the upper  pole. The central echo complex is compact with no evidence for  hydronephrosis. No nephrolithiasis or abnormal perinephric fluid  collections . No hydroureter.      PELVIS: The bladder is mildly distended with anechoic urine and  demonstrates no significant wall thickening or internal echogenicities.  There is no surrounding ascites.        Impression:      1. Bilateral diffuse renal cortical thinning, most likely due to chronic  medical renal disease. No evidence of upper tract  obstruction/hydronephrosis.  2. 2.8 cm left upper pole cyst.  3. Urinary bladder is nondilated.     This report was finalized on 10/29/2020 18:56 by Dr Brent Raines, .    US Carotid Bilateral [[id number]] Collected: 10/29/20 0741     Updated: 10/29/20 0744    Narrative:       History: Carotid occlusive disease       Impression:      Impression:  1. There is less than 50% stenosis of the right internal carotid artery.  2. There is 50-69% stenosis of the left internal carotid artery.  3. Antegrade flow is demonstrated in the left vertebral. The right  vertebral was not visualized.     Comments: Bilateral carotid vertebral arterial duplex scan was  performed.     Grayscale imaging shows intimal thickening and calcified elements at the  carotid bifurcation. The right internal carotid artery peak systolic  velocity is 82.1 cm/sec. The end-diastolic velocity is 18.8 cm/sec. The  right ICA/CCA ratio is approximately 0.8 . These findings correlate with  less than 50% stenosis of the right internal carotid artery.     Grayscale imaging shows intimal thickening and calcified elements at the  carotid bifurcation. The left internal carotid artery peak systolic  velocity is 138 cm/sec. The end-diastolic velocity is 21.6 cm/sec. The  left ICA/CCA ratio is approximately 1.3 . These findings correlate with  50-69% stenosis of the left internal carotid artery.     There is greater than 50% stenosis of the right external carotid artery.  This report was finalized on 10/29/2020 07:41 by Dr. Austen Robertson MD.    XR Chest 1 View [[id number]] Collected: 10/27/20 1628     Updated: 10/27/20 1632    Narrative:      EXAMINATION: XR CHEST 1 VW- 10/27/2020 4:28 PM CDT     HISTORY: Chest pain     COMPARISON: 5/13/2020, CT chest without contrast 10/27/2020     FINDINGS:  The heart is magnified but felt to be mildly enlarged. The aorta is  tortuous with atherosclerotic calcifications. There has been prior  median sternotomy. There is consolidation in the left lung base with  obscuration of the left hemidiaphragm. Bilateral perihilar interstitial  and alveolar opacities are present. Additional interstitial pulmonary  opacities are also evident throughout the right lung. There is no  appreciable pneumothorax. There  is trace pleural fluid bilaterally.       Impression:      Bilateral groundglass airspace opacities, particularly in  the perihilar regions, are concerning for an infectious or inflammatory  process. Alternatively, this may reflect asymmetric pulmonary edema.  There are small bilateral pleural effusions.  This report was finalized on 10/27/2020 16:29 by Dr. Jose Manuel Shields MD.    CT Chest With Contrast [[id number]] Collected: 10/27/20 1242     Updated: 10/27/20 1253    Narrative:      EXAMINATION:  CT CHEST W CONTRAST-  10/27/2020 10:57 AM CDT     HISTORY: Pneumonia, effusion or abscess suspected, xray done      COMPARISON: 5/13/2020 chest radiography and 5/13/2020 abdomen pelvis CT     TECHNIQUE: Radiation dose equals  mGy-cm.  Automated exposure  control dose reduction technique was implemented.     Thin section axial imaging was obtained with intravenous contrast. 2-D  sagittal and coronal reconstruction images were generated.     FINDINGS:      There are moderate size bilateral pleural effusions, layering  posteriorly. There is associated lower lobe airspace opacities, likely  atelectasis associated with the effusions.     There are patchy groundglass opacities identified in both upper lobes.     Acute infectious or inflammatory process considered, atypical appearance  of pulmonary edema also considered. Correlate with patient presentation.     Changes median sternotomy observed.     There are coronary artery calcifications. There are aortic valve  calcifications with left atrial prominence. Aortic calcifications  identified.     There is no mediastinal or hilar lymph node enlargement is identified.     There is no axillary lymphadenopathy.     There is edema identified in the subcutaneous soft tissues of the body  wall, third spacing/volume overload/right heart failure considered.     Image quality degradation observed in the upper abdomen associated with  breathing motion artifact.     Small hiatal hernia  "suspected.     Probable cysts identified in the upper pole the left kidney.     Changes from right shoulder surgery noted.     Spondylosis changes noted diffusely in the thoracic spine with bridging  osteophyte formation.     No acute osseous abnormalities observed.             Impression:      1. Moderate-sized bilateral pleural effusions layering posteriorly with  associated lower lobe airspace opacities, likely atelectasis related to  the effusions. Patchy groundglass opacities in both upper lobes, acute  infectious/inflammatory process considered, atypical appearance of  pulmonary edema also a differential consideration, correlate with  patient presentation.  This report was finalized on 10/27/2020 12:50 by Dr. Ranjan Campbell MD.          Condition on Discharge:    Stable and improved    Physical Exam on Discharge:  /52   Pulse 66   Temp 97.4 °F (36.3 °C) (Oral)   Resp 22   Ht 177.8 cm (70\")   Wt 92.9 kg (204 lb 12.9 oz)   SpO2 95%   BMI 29.39 kg/m²   Physical Exam     Constitutional:       Appearance: He is well-developed.      Comments:  Discussed with his nurse.   HENT:      Head: Normocephalic and atraumatic.      Comments: Poor dentition.  Eyes:      Conjunctiva/sclera: Conjunctivae normal.      Pupils: Pupils are equal, round, and reactive to light.   Neck:      Musculoskeletal: Neck supple.      Vascular: No JVD.   Cardiovascular:      Rate and Rhythm: Normal rate. Rhythm irregular.      Heart sounds: Normal heart sounds. No murmur. No friction rub. No gallop.    Pulmonary:      Effort: Pulmonary effort is normal. No respiratory distress.   Abdominal:      General: Bowel sounds are normal. There is no distension.      Palpations: Abdomen is soft.   Musculoskeletal: Normal range of motion.         General: Swelling (trace) present. No tenderness or deformity.   Skin:     General: Skin is warm and dry.      Findings: No rash.   Neurological:      General: No focal deficit present.      Mental " Status: He is alert and oriented to person, place, and time.      Cranial Nerves: No cranial nerve deficit.      Motor: Weakness present. No abnormal muscle tone.      Deep Tendon Reflexes: Reflexes normal.      Comments: Follows all commands without difficulty.   Psychiatric:         Behavior: Behavior normal.         Thought Content: Thought content normal.         Judgment: Judgment normal.    Discharge Disposition:  Home or Self Care    Discharge Medications:     Discharge Medications      New Medications      Instructions Start Date   acetaminophen 325 MG tablet  Commonly known as: TYLENOL   650 mg, Oral, Every 4 Hours PRN      furosemide 20 MG tablet  Commonly known as: LASIX   20 mg, Oral, Daily   Start Date: November 11, 2020     ondansetron 4 MG tablet  Commonly known as: ZOFRAN   4 mg, Oral, Every 6 Hours PRN         Changes to Medications      Instructions Start Date   allopurinol 300 MG tablet  Commonly known as: ZYLOPRIM  What changed: how much to take   300 mg, Oral, Daily      isosorbide mononitrate 30 MG 24 hr tablet  Commonly known as: IMDUR  What changed: when to take this   30 mg, Oral, Every 24 Hours Scheduled      losartan 50 MG tablet  Commonly known as: COZAAR  What changed: Another medication with the same name was added. Make sure you understand how and when to take each.   25 mg, Oral, Daily      losartan 100 MG tablet  Commonly known as: COZAAR  What changed: You were already taking a medication with the same name, and this prescription was added. Make sure you understand how and when to take each.   100 mg, Oral, Every 24 Hours Scheduled   Start Date: November 11, 2020     prednisoLONE acetate 1 % ophthalmic suspension  Commonly known as: PRED FORTE  What changed: when to take this   1 drop, Left Eye, 4 Times Daily         Continue These Medications      Instructions Start Date   amLODIPine 5 MG tablet  Commonly known as: NORVASC   5 mg, Oral, Daily      aspirin 81 MG chewable tablet   81  mg, Oral, Daily      colchicine 0.6 MG tablet   0.6 mg, Oral, Daily PRN      finasteride 5 MG tablet  Commonly known as: PROSCAR   5 mg, Oral, Daily      folic acid 400 MCG tablet  Commonly known as: FOLVITE   400 mcg, Oral      glimepiride 4 MG tablet  Commonly known as: AMARYL   4 mg, Oral, 2 Times Daily With Meals, Needs pm dose      meclizine 25 MG tablet  Commonly known as: ANTIVERT   25 mg, Oral, 3-4 times daily      nitroglycerin 0.4 MG SL tablet  Commonly known as: NITROSTAT   0.4 mg, Sublingual, Every 5 Minutes PRN      pantoprazole 40 MG EC tablet  Commonly known as: PROTONIX   40 mg, Oral, Every 12 Hours      potassium chloride 20 MEQ CR tablet  Commonly known as: K-DUR,KLOR-CON   10 mEq, Oral, Every 12 Hours      pravastatin 40 MG tablet  Commonly known as: PRAVACHOL   40 mg, Oral, Nightly      rOPINIRole 1 MG tablet  Commonly known as: REQUIP   1 mg, Oral, Nightly, Take 1 hour before bedtime.       sennosides-docusate 8.6-50 MG per tablet  Commonly known as: PERICOLACE   2 tablets, Oral, 2 Times Daily PRN      tamsulosin 0.4 MG capsule 24 hr capsule  Commonly known as: FLOMAX   1 capsule, Oral, Daily      thiamine 100 MG tablet  Commonly known as: VITAMIN B-1   100 mg, Oral      Toprol XL 25 MG 24 hr tablet  Generic drug: metoprolol succinate XL   25 mg, Oral, Every Night at Bedtime      Vitamin B6 200 MG tablet   1 tablet, Oral, Daily      vitamin C 500 MG tablet  Commonly known as: ASCORBIC ACID   500 mg, Oral, Daily      Vitamin D 1000 units tablet   1,000 Units, Oral         ASK your doctor about these medications      Instructions Start Date   cefdinir 300 MG capsule  Commonly known as: OMNICEF  Ask about: Should I take this medication?   300 mg, Oral, 2 Times Daily             Discharge Diet:   Diet Instructions     Diet: Soft Texture, Consistent Carbohydrate; Honey Thick Liquids; Ground      Discharge Diet:  Soft Texture  Consistent Carbohydrate       Fluid Consistency: Honey Thick Liquids     Soft Options: Ground    Diet: Soft Texture, Consistent Carbohydrate; Thin Liquids, No Restrictions; Ground      Discharge Diet:  Soft Texture  Consistent Carbohydrate       Fluid Consistency: Thin Liquids, No Restrictions    Soft Options: Ground          Discharge Care Plan / Instructions:   Discharge home with family    Activity at Discharge:   Activity Instructions     Activity as Tolerated      Activity as Tolerated            Follow-up Appointments:  Follow-up with PCP next week       Electronically signed by Rocky Nixon DO, 11/10/20, 12:29 CST.    Time: Discharge Over 30 min    Part of this note may be an electronic transcription/translation of spoken language to printed text using the Dragon Dictation system.

## 2020-11-10 NOTE — PLAN OF CARE
Goal Outcome Evaluation:  Plan of Care Reviewed With: patient  Progress: improving  Outcome Summary: Pt disoriented to place, time and situation. Reorientation provided. Up x2 to BSC or chair. Incontinent of bowel and bladder. Turned q2. Resting throughout shift. No c/o pain. Safety maintained,will continue to monitor. DC plans in progress.

## 2020-11-10 NOTE — PLAN OF CARE
Problem: Adult Inpatient Plan of Care  Goal: Plan of Care Review  Outcome: Ongoing, Progressing  Flowsheets (Taken 11/10/2020 0348)  Progress: improving (Pended)  Plan of Care Reviewed With: patient (Pended)  Outcome Summary: Pt disoriented to place, time, and situation. Can reorient to place for a short time. Cough not noted this shift. 2 wet briefs and one measured instance of 450mL overnight. LBM 11/9. Pt sleeping throughout night, turns self side-side occasionally. Generalized edema, +3 on lower ext., refused SCDs. Will continue to monitor (Pended)

## 2020-11-10 NOTE — PROGRESS NOTES
Continued Stay Note   [facility]     Patient Name: Eron Escalante  MRN: [id number]  Today's Date: 11/10/2020    Admit Date: 10/26/2020    Discharge Plan     Row Name 11/10/20 1155       Plan    Final Discharge Disposition Code  06 - home with home health care    Final Note  Notified Radha with [facility] of new HH Orders and dc home later today [phone]. Ordered DME (hosp bed, WC and BSC) thru [facility]. [facility] will contact pt [facility] today for delivery arrangements. No other dc needs        Discharge Codes    No documentation.       Expected Discharge Date and Time     Expected Discharge Date Expected Discharge Time    Nov 4, 2020             MYAH Ramírez

## 2020-11-11 NOTE — THERAPY DISCHARGE NOTE
Acute Care - Speech Language Pathology Discharge Summary  [facility]       Patient Name: Eron Escalante  : 1934  MRN: [id number]    Today's Date: 2020  Onset of Illness/Injury or Date of Surgery: 10/26/20                Admit Date: 10/26/2020      SLP Recommendation and Plan  Mechanical soft solids with pureed meats and honey thick liquids    Visit Dx:    ICD-10-CM ICD-9-CM   1. Oropharyngeal dysphagia  R13.12 787.22   2. Impaired mobility  Z74.09 799.89   3. Heme positive stool  R19.5 792.1   4. Decreased activities of daily living (ADL)  Z78.9 V49.89   5. Type 2 diabetes mellitus with diabetic nephropathy, without long-term current use of insulin (CMS/HCC)  E11.21 250.40     583.81   6. Essential hypertension  I10 401.9   7. Chronic atrial fibrillation (CMS/HCC)  I48.20 427.31   8. Metabolic encephalopathy  G93.41 348.31   9. E. coli UTI (urinary tract infection)  N39.0 599.0    B96.20 041.49   10. Weakness of extremity  R29.898 729.89   11. CHF (congestive heart failure), NYHA class I, acute on chronic, combined (CMS/HCC)  I50.43 428.43     428.0               SLP GOALS     Row Name 20 1400 20 1208          Oral Nutrition/Hydration Goal 1 (SLP)    Oral Nutrition/Hydration Goal 1, SLP  LTG: Patient will tolerate LRD with no overt s/s of aspiration  -MB  LTG: Patient will tolerate LRD with no overt s/s of aspiration  -BN     Time Frame (Oral Nutrition/Hydration Goal 1, SLP)  short term goal (STG);by discharge  -MB  short term goal (STG);by discharge  -BN     Barriers (Oral Nutrition/Hydration Goal 1, SLP)  none  -MB  none  -BN     Progress/Outcomes (Oral Nutrition/Hydration Goal 1, SLP)  goal partially met  -MB  continuing progress toward goal  -BN        Labial Strengthening Goal 1 (SLP)    Activity (Labial Strengthening Goal 1, SLP)  increase labial tone  -MB  increase labial tone  -BN     Increase Labial Tone  labial resistance exercises  -MB  labial resistance exercises  -BN      [place]/Accuracy (Labial Strengthening Goal 1, SLP)  independently (over 90% accuracy)  -MB  independently (over 90% accuracy)  -BN     Time Frame (Labial Strengthening Goal 1, SLP)  short term goal (STG);by discharge  -MB  short term goal (STG);by discharge  -BN     Barriers (Labial Strengthening Goal 1, SLP)  n/a  -MB  n/a  -BN     Progress/Outcomes (Labial Strengthening Goal 1, SLP)  goal not met  -MB  goal ongoing  -BN        Lingual Strengthening Goal 1 (SLP)    Activity (Lingual Strengthening Goal 1, SLP)  increase lingual tone/sensation/control/coordination/movement;increase tongue back strength  -MB  increase lingual tone/sensation/control/coordination/movement;increase tongue back strength  -BN     Increase Lingual Tone/Sensation/Control/Coordination/Movement  lingual movement exercises;lingual resistance exercises  -MB  lingual movement exercises;lingual resistance exercises  -BN     Increase Tongue Back Strength  lingual movement exercises  -MB  lingual movement exercises  -BN     [place]/Accuracy (Lingual Strengthening Goal 1, SLP)  independently (over 90% accuracy)  -MB  independently (over 90% accuracy)  -BN     Time Frame (Lingual Strengthening Goal 1, SLP)  short term goal (STG);by discharge  -MB  short term goal (STG);by discharge  -BN     Barriers (Lingual Strengthening Goal 1, SLP)  none  -MB  none  -BN     Progress/Outcomes (Lingual Strengthening Goal 1, SLP)  goal not met  -MB  goal ongoing  -BN        Pharyngeal Strengthening Exercise Goal 1 (SLP)    Activity (Pharyngeal Strengthening Goal 1, SLP)  increase timing;increase epiglottic inversion and retroflexion;increase tongue base retraction;increase anterior movement of the hyolaryngeal complex  -MB  increase timing;increase epiglottic inversion and retroflexion;increase tongue base retraction;increase anterior movement of the hyolaryngeal complex  -BN     Increase Timing  gustatory stimulation (sour/cold)  -MB  gustatory stimulation  (sour/cold)  -BN     Increase Anterior Movement of the Hyolaryngeal Complex  shaker  -MB  shaker  -BN     Increase Epiglottic Inversion and Retroflexion  falsetto  -MB  falsetto  -BN     Increase Tongue Base Retraction  hard effortful swallow;vita  -MB  hard effortful swallow;vita  -BN     [place]/Accuracy (Pharyngeal Strengthening Goal 1, SLP)  independently (over 90% accuracy)  -MB  independently (over 90% accuracy)  -BN     Time Frame (Pharyngeal Strengthening Goal 1, SLP)  short term goal (STG);by discharge  -MB  short term goal (STG);by discharge  -BN     Barriers (Pharyngeal Strengthening Goal 1, SLP)  none  -MB  none  -BN     Progress/Outcomes (Pharyngeal Strengthening Goal 1, SLP)  goal not met  -MB  goal ongoing  -BN       User Key  (r) = Recorded By, (t) = Taken By, (c) = Cosigned By    Initials Name Provider Type    Avery Reyes CCC-SLP Speech and Language Pathologist    Hanny Low CCC-SLP Speech and Language Pathologist                  SLP Discharge Summary  Anticipated Discharge Disposition (SLP): skilled nursing facility  Reason for Discharge: discharge from this facility  Progress Toward Achieving Short/long Term Goals: goals partially met within established timelines  Discharge Destination: home      Avery Gutierrez CCC-SLP  11/11/2020

## 2020-11-11 NOTE — PAYOR COMM NOTE
"NM HOME 11-10-20  [id number]   [id number]    Gabriel Muse (85 y.o. Male)     Date of Birth Social Security Number Address Home Phone MRN    1934  [address] [facility] [address] [phone] [id number]    Orthodoxy Marital Status          Anabaptism        Admission Date Admission Type Admitting Provider Attending Provider Department, Room/Bed    10/26/20 Urgent Rocky Nixon DO  [facility] 3A, 328/1    Discharge Date Discharge Disposition Discharge Destination        11/10/2020 Home or Self Care              Attending Provider: (none)   Allergies: Lorazepam, Penicillins, Adhesive Tape    Isolation: None   Infection: None   Code Status: Prior    Ht: 177.8 cm (70\")   Wt: 92.9 kg (204 lb 12.9 oz)    Admission Cmt: None   Principal Problem: Metabolic encephalopathy [G93.41]                 Active Insurance as of 10/26/2020     Primary Coverage     Payor Plan Insurance Group Employer/Plan Group    [facility] EMPLOYEE [id number]     Payor Plan Address Payor Plan Phone Number Payor Plan Fax Number Effective Dates    [address] [phone]  1/1/2015 - None Entered    [facility] [id number]       Subscriber Name Subscriber Birth Date Member ID       GABRIEL MUSE 1934 [id number]           Secondary Coverage     Payor Plan Insurance Group Employer/Plan Group    MEDICARE MEDICARE A & B      Payor Plan Address Payor Plan Phone Number Payor Plan Fax Number Effective Dates    [address] [phone]  11/1/1999 - None Entered    [facility] [id number]       Subscriber Name Subscriber Birth Date Member ID       GABRIEL MUSE 1934 [id number]                 Emergency Contacts      (Rel.) Home Phone Work Phone Mobile Phone    Isabel Bradley (Grandchild) [phone] -- --    Rose Muse (Spouse) [phone] -- --               Discharge Summary      Elpidio Irby DO at 11/04/20 [facility]5              [facility] " [facility]  DISCHARGE SUMMARY       Date of Admission: 10/26/2020  Date of Discharge:  11/4/2020  Primary Care Physician: Jeffrey Owen MD    Presenting Problem/History of Present Illness:  Altered mental status.     Final Discharge Diagnoses:  Active Hospital Problems    Diagnosis   • **Metabolic encephalopathy   • E. coli UTI (urinary tract infection)   • Hypernatremia   • Acute renal failure superimposed on stage 3a chronic kidney disease (CMS/[facility])   • Acute blood loss anemia   • Duodenal ulcer   • UGIB (upper gastrointestinal bleed)   • History of stroke   • Chronic diastolic congestive heart failure (CMS/[facility])   • A-fib (CMS/[facility])   • HTN (hypertension)   • Type 2 diabetes mellitus, without long-term current use of insulin (CMS/[facility])     Consults:   1.  Dr. Bustillos with neurology.  2.  Dr. Victoria and Dr. Brown with nephrology.  3.  Dr. Bliss with gastroenterology.  4.  Dr. Jimenez with vascular surgery.    Procedures Performed: Endoscopy on 10/28 with Dr. Bliss showed nonbleeding duodenal ulcers.   Hospital Course:  The patient was admitted on 10/27 by Dr. Rosenberg.  He presented as a transfer from [facility] as his granddaughter works here.  He had been admitted there and was having much difficulty with confusion.  This has been proven to be multifactorial as evidenced below.     He has been evaluated by neurology.  He had an MRI of the brain without contrast on 10/30 that showed no evidence of acute infarct.     He has been followed by nephrology for acute renal failure superimposed on CKD, stage III.  His serum creatinine continues to improve.  His elevated BUN is secondary to recent gastrointestinal bleeding as well as his acute renal failure.  He developed some hyponatremia secondary to resuscitation with crystalloid fluids.  This resolved with recent dextrose fluids.  Stopped IV fluids on 11/3 and gave a dose of Bumex with good results. Resumed amlodipine on 11/3 and  "will resume his losartan/Toprol-XL at discharge as his blood pressure has been elevated recently.     He had heme positive stool and melena.  He was taken for endoscopy on 10/28 by Dr. Bliss. He had multiple nonbleeding duodenal ulcers at that time.  He had a hiatal hernia.  Urease negative.  He is being treated with lansoprazole.  He has received a total of 4 units packed red blood cells.  He was last transfused on 10/31.  Hemoglobin remains stable.  Anemia substrates are consistent with chronic disease.     He has an E. coli urinary tract infection that is resistant to ampicillin, levofloxacin, tetracycline, and Bactrim. He was treated with ceftriaxone here in the hospital and will finish a course of oral Omnicef.     He has a history of atrial fibrillation for which he will not be anticoagulated at the moment given his recent gastrointestinal bleed.  I restarted his aspirin on 11/2.     Oral hypoglycemics were held in the hospital given his renal failure and can be resumed at discharge.  We used sliding scale insulin.  His most recent hemoglobin A1c is 6.1.     SCDs were used for DVT prophylaxis.    He has been accepted to skilled nursing today.    Physical Exam on Discharge:  /45 (BP Location: Left arm, Patient Position: Lying)   Pulse 64   Temp 98.1 °F (36.7 °C) (Axillary)   Resp 20   Ht 177.8 cm (70\")   Wt 92.9 kg (204 lb 11.2 oz)   SpO2 95%   BMI 29.37 kg/m²   Physical Exam  Vitals signs and nursing note reviewed.   Constitutional:       Appearance: He is well-developed.      Comments: Up in bed.  No distress.  No family currently present.  Discussed with his nurse, Liliane.   HENT:      Head: Normocephalic and atraumatic.      Comments: Poor dentition.  Eyes:      Conjunctiva/sclera: Conjunctivae normal.      Pupils: Pupils are equal, round, and reactive to light.   Neck:      Musculoskeletal: Neck supple.      Vascular: No JVD.   Cardiovascular:      Rate and Rhythm: Normal rate. Rhythm " irregular.      Heart sounds: Normal heart sounds. No murmur. No friction rub. No gallop.    Pulmonary:      Effort: Pulmonary effort is normal. No respiratory distress.      Breath sounds: Normal breath sounds. No wheezing or rales.   Chest:      Chest wall: No tenderness.   Abdominal:      General: Bowel sounds are normal. There is no distension.      Palpations: Abdomen is soft.      Tenderness: There is no abdominal tenderness. There is no guarding or rebound.   Musculoskeletal: Normal range of motion.         General: Swelling (trace) present. No tenderness or deformity.   Skin:     General: Skin is warm and dry.      Findings: No rash.   Neurological:      General: No focal deficit present.      Mental Status: He is alert and oriented to person, place, and time.      Cranial Nerves: No cranial nerve deficit.      Motor: Weakness present. No abnormal muscle tone.      Deep Tendon Reflexes: Reflexes normal.      Comments: Follows all commands without difficulty.   Psychiatric:   Flat affect, but tries to make conversation.     Condition on Discharge: Good.     Discharge Disposition:  Skilled Nursing Facility (NE - External): [place].     Discharge Medications:     Discharge Medications      New Medications      Instructions Start Date   acetaminophen 325 MG tablet  Commonly known as: TYLENOL   650 mg, Oral, Every 4 Hours PRN      cefdinir 300 MG capsule  Commonly known as: OMNICEF   300 mg, Oral, 2 Times Daily      ondansetron 4 MG tablet  Commonly known as: ZOFRAN   4 mg, Oral, Every 6 Hours PRN         Changes to Medications      Instructions Start Date   allopurinol 300 MG tablet  Commonly known as: ZYLOPRIM  What changed: how much to take   300 mg, Oral, Daily   Start Date: November 5, 2020     isosorbide mononitrate 30 MG 24 hr tablet  Commonly known as: IMDUR  What changed: when to take this   30 mg, Oral, Every 24 Hours Scheduled   Start Date: November 5, 2020     prednisoLONE acetate 1 %  ophthalmic suspension  Commonly known as: PRED FORTE  What changed: when to take this   1 drop, Left Eye, 4 Times Daily         Continue These Medications      Instructions Start Date   amLODIPine 5 MG tablet  Commonly known as: NORVASC   5 mg, Oral, Daily      aspirin 81 MG chewable tablet   81 mg, Oral, Daily      colchicine 0.6 MG tablet   0.6 mg, Oral, Daily PRN      finasteride 5 MG tablet  Commonly known as: PROSCAR   5 mg, Oral, Daily      folic acid 400 MCG tablet  Commonly known as: FOLVITE   400 mcg, Oral      glimepiride 4 MG tablet  Commonly known as: AMARYL   4 mg, Oral, 2 Times Daily With Meals, Needs pm dose      losartan 50 MG tablet  Commonly known as: COZAAR   25 mg, Oral, Daily      meclizine 25 MG tablet  Commonly known as: ANTIVERT   25 mg, Oral, 3-4 times daily      nitroglycerin 0.4 MG SL tablet  Commonly known as: NITROSTAT   0.4 mg, Sublingual, Every 5 Minutes PRN      pantoprazole 40 MG EC tablet  Commonly known as: PROTONIX   40 mg, Oral, Every 12 Hours      potassium chloride 20 MEQ CR tablet  Commonly known as: K-DUR,KLOR-CON   10 mEq, Oral, Every 12 Hours      pravastatin 40 MG tablet  Commonly known as: PRAVACHOL   40 mg, Oral, Nightly      rOPINIRole 1 MG tablet  Commonly known as: REQUIP   1 mg, Oral, Nightly, Take 1 hour before bedtime.       sennosides-docusate 8.6-50 MG per tablet  Commonly known as: PERICOLACE   2 tablets, Oral, 2 Times Daily PRN      tamsulosin 0.4 MG capsule 24 hr capsule  Commonly known as: FLOMAX   1 capsule, Oral, Daily      thiamine 100 MG tablet  Commonly known as: VITAMIN B-1   100 mg, Oral      Toprol XL 25 MG 24 hr tablet  Generic drug: metoprolol succinate XL   25 mg, Oral, Every Night at Bedtime      Vitamin B6 200 MG tablet   1 tablet, Oral, Daily      vitamin C 500 MG tablet  Commonly known as: ASCORBIC ACID   500 mg, Oral, Daily      Vitamin D 1000 units tablet   1,000 Units, Oral           Discharge Diet:   Diet Instructions     Diet: Soft  Texture, Consistent Carbohydrate; Honey Thick Liquids; Ground      Discharge Diet:  Soft Texture  Consistent Carbohydrate       Fluid Consistency: Honey Thick Liquids    Soft Options: Ground        Activity at Discharge:   Activity Instructions     Activity as Tolerated          Follow-up Appointments:   1. PCP or SNF physician in 1 week.   2. Consultants at their discrection.     Test Results Pending at Discharge: None.     Electronically signed by Elpidio Irby DO, 11/04/20, 10:15 CST.    Time: 35 minutes.           Electronically signed by Elpidio Irby DO at 11/04/20 1025     Rocky Nixon DO at 11/10/20 1229              [facility]  DISCHARGE SUMMARY       Date of Admission: 10/26/2020  Date of Discharge:  11/10/2020  Primary Care Physician: Jeffrey Owen MD    Discharge Diagnoses:  Active Hospital Problems    Diagnosis   • **Metabolic encephalopathy   • UGIB (upper gastrointestinal bleed)   • E. coli UTI (urinary tract infection)   • Hypernatremia   • Acute blood loss anemia   • History of stroke   • Chronic diastolic congestive heart failure (CMS/[facility])   • Acute renal failure superimposed on stage 3a chronic kidney disease (CMS/[facility])   • Duodenal ulcer   • A-fib (CMS/[facility])   • HTN (hypertension)   • Type 2 diabetes mellitus, without long-term current use of insulin (CMS/[facility])         Presenting Problem/History of Present Illness:  Encephalopathy [G93.40]     Chief Complaint on Day of Discharge:   Generalized weakness    History of Present Illness on Day of Discharge:   Because of delays in SNF placement, the patient's family has decided to take him home.  They feel like they can handle him there.  They would like home health at the time of discharge.  The patient is stable for discharge home in an improved status.    Hospital Course  This 85-year-old white male was admitted on 10/27 transferring from [facility].  He had previously been  admitted and was having difficulty with confusion.  Confusion appeared to be multifactorial.  Neurology was consulted.  An MRI scan of the brain without contrast on 10/30 showed no evidence of acute intracranial abnormality.  Nephrology will follow the patient for acute renal failure superimposed on CKD 3.  Serum creatinine improved throughout his hospital stay.  Elevated BUN was felt secondary to recent GI bleeding as well as acute renal failure.  Mild hyponatremia developed as a result of fluid resuscitation and that resolved with D5W.  IV fluids were discontinued on 11/3 secondary to overhydration and 1/2 mg of Bumex was given x1 dose.  Amlodipine and losartan which were held at the time of admission were restarted and Lasix 20 mg daily was also restarted.  The patient was noted to have heme positive stools with melena and GI was consulted.  Endoscopic evaluation was performed on 10/28 noting multiple nonbleeding duodenal ulcers.  Urease was negative.  Patient was treated with lansoprazole.  He received a total of 4 units PRBCs during his stay with the last transfusion on 10/31.  Hemoglobin stabilized.  Anemia substrates were consistent with chronic disease.  An E. coli infection resistant to ampicillin, levofloxacin, tetracycline and Bactrim as noted.  It was sensitive to ceftriaxone and the patient was converted to oral Omnicef on 8/4 and a full course of treatment was completed.  The patient service on chronic atrial fibrillation and will not be anticoagulated secondary to recent gastrointestinal bleed.  Aspirin was restarted on 11/2.  The patient continued on sliding scale insulin throughout his hospital course.  The patient was medically stable for discharge for several days and after over 1 week of waiting on insurance approval, the family has decided to take him home with home health assistance.      Consults:   Gastroenterology:  Assessment/Plan           Encephalopathy    Type 2 diabetes mellitus  (CMS/[facility])    HTN (hypertension)    A-fib (CMS/[facility])    CHF (congestive heart failure) (CMS/[facility])    CKD (chronic kidney disease), stage III    History of stroke    Intermittent confusion    Acute blood loss anemia    Heme positive stool    GI bleed    Hypernatremia    Acute kidney injury (CMS/[facility])        1. Heme positive stool  2. Acute on chronic anemia  3. Lingle stool (yesterday, reported by his granddaughter. But nursing staff reports brown stool since)  4.  Encephalopathy     Discussed differential diagnosis with his granddaughter.  Question if the source of bleeding is in the upper GI tract.  I recommend EGD for further evaluation and she is agreeable.  Continue n.p.o. status.  Continue Protonix drip.  Continue to monitor H&H's and transfuse as needed.     Granddaughter did report a mulberry stool yesterday. Question if  the source of bleeding could be  from the lower GI tract, however I do not feel that the patient would tolerate drinking a prep for a colonoscopy.      He is currently a DNR/DNI.        I discussed the patients findings and my recommendations with patient, family and nursing staff        EMR Dragon/transcription disclaimer:  Much of this encounter note is electronic transcription/translation of spoken language to printed text.  The electronic translation of spoken language may be erroneous, or at times, nonsensical words or phrases may be inadvertently transcribed.  Although I have reviewed the note for such errors, some may still exist.     Jennifer YEUNG 09:35 CDT     · I have seen the patient personally with evaluation and plan of care reviewed and developed with APRN and nursing staff. Where indicated, I have addended and/or modified the above history of present illness, physical examination, and assessment and plan to reflect my findings and impressions. Essential elements of the care plan were discussed with APRN above.  Agree with findings and assessment/plan as documented  above.           Forrest Bliss MD  Vascular surgery:  Impression:    Encephalopathy    Type 2 diabetes mellitus ([facility])    HTN (hypertension)    A-fib (CMS/[facility])    CHF (congestive heart failure) (CMS/[facility])    CKD (chronic kidney disease), stage III    History of stroke    Intermittent confusion    Acute blood loss anemia    Heme positive stool    GI bleed    Hypernatremia    Acute kidney injury (CMS/[facility])    E. coli UTI (urinary tract infection)        Plan: After thoroughly evaluating Eron Escalante, I believe the best course of action is to remain conservative from a vascular standpoint. His presentation is not consistent with classic TIA or stroke. He has no focal neurologic deficits, but remains with AMS and encephalopathic presentation. 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 At this point, I would recommend restarting ASA when able, and continue statin. Plan from neurology is for repeat brain MRI when able. CTA neck cannot be done due to decline in renal function, and patient would not be able to currently tolerate TOF MRA of the neck without sedation. Regardless, he would not be a good candidate for carotid surgery given his current condition. Continue further care per medical team, and vascular will follow peripherally should any further needs arise.  Thank you for allowing me to see Eron Escalante in consult. Please feel free to reach out with any questions or concerns.    Nephrology:  Assessment   1.  Acute kidney injury/stage II  2.  Stage III chronic kidney disease baseline.  3.  Prerenal azotemia.  4.  HYPERNATREMIA.  5.  GI bleed.  6.  Acute encephalopathy.  7.  Type II diabetic nephropathy.        Plan:  1.  Continue hypotonic fluid.  2.  Renal ultrasound consistent with cortical thinning explained CKD.  3.  Urinary protein to creatinine ratio.  4.  Urinary electrolytes.  5.  Plan was discussed with the  family.        Thank you for the consult, we appreciate the opportunity to provide care to your patients.  Feel free to contact me if I can be of any further assistance.       Pablito Victoria MD    Neurology:  Impression     · Encephalopathy -unknown etiology.  No focal findings on neurologic exam.  MRI brain unremarkable.  No signs of epileptic etiologies based on EEG at outside hospital.  No signs of CNS infections based on history, lack of neck stiffness, and no significant leukocytosis.  There is a possibility that the initial encephalopathy was secondary to hypertensive encephalopathy and now it has transition to a metabolic encephalopathy secondary to anemia and a urinary tract infection.  · Acute blood loss anemia  · Urinary tract infection     Discussion: I have spoken at length with the patient's granddaughter and to some extent with the patient's brother who is in the room.  They were concerned that the patient was having TIAs but I do not believe that this is consistent with the patient's examination nor current status.  The patient does have advanced age and some degree of global atrophy based on previous CNS imaging and this may represent an overlying metabolic encephalopathy on an underlying cognitive impairment.  I believe the work-up at [facility] was more than adequate with negative CNS imaging, negative EEGs, and positive for hypertensive emergency.  It may be that now that the patient has an overlying metabolic encephalopathy from the urinary tract infection and acute blood loss anemia concerning for a GI bleed.  I think repeating the MRI brain with and without contrast would be reasonable as the MRI from the outside hospital was done without contrast.  I also offered a lumbar puncture to rule out any encephalitis but given advanced age the patient's family does not wish to proceed with this at this time.  I think moving forward the patient's mental status will not allow for adequate nutrition  and therefore it is imperative that we attempt to establish goals of care as the patient will require more prolonged feeding solutions such as a PEG tube.  This may be somewhat difficult with the concern of an active GI bleed currently.  If the patient would not want to proceed with a PEG tube we may need to consider palliative care options.  Further discussion and treatment plans will be based on the repeat MRI of the brain.     Plan     · Treatment of UTI by primary team  · MRI Brain with and without contrast  · Cancel carotid ultrasound as carotid stenosis would not explain non-focal neuorpathy  · GI Bleed workup per primary team  · Recommend palliative care to assist with goals of care including feeding solution     I discussed the patients findings and my recommendations with patient and family      Over 55 minutes was spent on this consultation with over half being face-to-face time with the patient and family with discussion of goals of care and overall comprehensive work-up.  This also included record review from outside hospital.     Avery Bustillos MD    Pertinent Test Results:    Basic Metabolic Panel [[id number]]  (Abnormal) Collected: 11/07/20 0704    Specimen: Blood Updated: 11/07/20 0802     Glucose 115 mg/dL      BUN 21 mg/dL      Creatinine 0.77 mg/dL      Sodium 142 mmol/L      Potassium 4.5 mmol/L      Comment: Slight hemolysis detected by analyzer. Results may be affected.        Chloride 113 mmol/L      CO2 24.0 mmol/L      Calcium 9.0 mg/dL      eGFR Non African Amer 96 mL/min/1.73      BUN/Creatinine Ratio 27.3     Anion Gap 5.0 mmol/L     Basic Metabolic Panel [[id number]]  (Abnormal) Collected: 11/05/20 0536    Specimen: Blood Updated: 11/05/20 0633     Glucose 130 mg/dL      BUN 29 mg/dL      Creatinine 0.88 mg/dL      Sodium 143 mmol/L      Potassium 4.4 mmol/L      Chloride 115 mmol/L      CO2 25.0 mmol/L      Calcium 8.8 mg/dL      eGFR Non African Amer 82 mL/min/1.73       BUN/Creatinine Ratio 33.0     Anion Gap 3.0 mmol/L     Narrative:      GFR Normal >60  Chronic Kidney Disease <60  Kidney Failure <15      CBC (No Diff) [[id number]]  (Abnormal) Collected: 11/05/20 0536    Specimen: Blood Updated: 11/05/20 0615     WBC 6.95 10*3/mm3      RBC 2.94 10*6/mm3      Hemoglobin 8.5 g/dL      Hematocrit 26.8 %      MCV 91.2 fL      MCH 28.9 pg      MCHC 31.7 g/dL      RDW 21.8 %      RDW-SD 71.7 fl      MPV 10.9 fL      Platelets 221 10*3/mm3     COVID-19,CEPHEID,COR/SHIRA/PAD IN-HOUSE(OR EMERGENT/ADD-ON),NP SWAB IN TRANSPORT MEDIA 3-4 HR TAT - Swab, Nasopharynx [[id number]]  (Normal) Collected: 11/04/20 1034    Specimen: Swab from Nasopharynx Updated: 11/04/20 1132     COVID19 Not Detected    Basic Metabolic Panel [[id number]]  (Abnormal) Collected: 11/04/20 0557    Specimen: Blood Updated: 11/04/20 0636     Glucose 125 mg/dL      BUN 35 mg/dL      Creatinine 0.89 mg/dL      Sodium 145 mmol/L      Potassium 4.1 mmol/L      Chloride 117 mmol/L      CO2 23.0 mmol/L      Calcium 8.6 mg/dL      eGFR Non African Amer 81 mL/min/1.73      BUN/Creatinine Ratio 39.3     Anion Gap 5.0 mmol/L     Narrative:      GFR Normal >60  Chronic Kidney Disease <60  Kidney Failure <15      CBC (No Diff) [[id number]]  (Abnormal) Collected: 11/04/20 0557    Specimen: Blood Updated: 11/04/20 0615     WBC 6.97 10*3/mm3      RBC 2.85 10*6/mm3      Hemoglobin 8.3 g/dL      Hematocrit 25.6 %      MCV 89.8 fL      MCH 29.1 pg      MCHC 32.4 g/dL      RDW 22.0 %      RDW-SD 70.4 fl      MPV 10.7 fL      Platelets 192 10*3/mm3     Basic Metabolic Panel [[id number]]  (Abnormal) Collected: 11/03/20 0435    Specimen: Blood Updated: 11/03/20 0508     Glucose 148 mg/dL      BUN 43 mg/dL      Creatinine 1.01 mg/dL      Sodium 144 mmol/L      Potassium 4.1 mmol/L      Chloride 114 mmol/L      CO2 23.0 mmol/L      Calcium 8.7 mg/dL      eGFR Non African Amer 70 mL/min/1.73      BUN/Creatinine Ratio 42.6     Anion Gap 7.0 mmol/L      Narrative:      GFR Normal >60  Chronic Kidney Disease <60  Kidney Failure <15      CBC (No Diff) [[id number]]  (Abnormal) Collected: 11/03/20 0435    Specimen: Blood Updated: 11/03/20 0448     WBC 7.92 10*3/mm3      RBC 3.01 10*6/mm3      Hemoglobin 8.7 g/dL      Hematocrit 27.7 %      MCV 92.0 fL      MCH 28.9 pg      MCHC 31.4 g/dL      RDW 22.7 %      RDW-SD 74.0 fl      MPV 11.0 fL      Platelets 180 10*3/mm3     CBC Auto Differential [[id number]]  (Abnormal) Collected: 11/02/20 0447    Specimen: Blood Updated: 11/02/20 0541     WBC 8.19 10*3/mm3      RBC 2.97 10*6/mm3      Hemoglobin 8.5 g/dL      Hematocrit 26.3 %      MCV 88.6 fL      MCH 28.6 pg      MCHC 32.3 g/dL      RDW 23.9 %      RDW-SD 74.3 fl      MPV 12.3 fL      Platelets 146 10*3/mm3     Manual Differential [[id number]]  (Abnormal) Collected: 11/02/20 0447    Specimen: Blood Updated: 11/02/20 0541     Neutrophil % 82.8 %      Lymphocyte % 6.1 %      Monocyte % 4.0 %      Eosinophil % 7.1 %      Neutrophils Absolute 6.78 10*3/mm3      Lymphocytes Absolute 0.50 10*3/mm3      Monocytes Absolute 0.33 10*3/mm3      Eosinophils Absolute 0.58 10*3/mm3      Anisocytosis Slight/1+     Macrocytes Slight/1+     Rouleaux Slight/1+     Toxic Granulation Slight/1+     Vacuolated Neutrophils Large/3+     Platelet Morphology Normal    Comprehensive Metabolic Panel [[id number]]  (Abnormal) Collected: 11/02/20 0447    Specimen: Blood Updated: 11/02/20 0536     Glucose 166 mg/dL      BUN 53 mg/dL      Creatinine 1.35 mg/dL      Sodium 146 mmol/L      Potassium 4.1 mmol/L      Comment: Slight hemolysis detected by analyzer. Results may be affected.        Chloride 117 mmol/L      CO2 23.0 mmol/L      Calcium 8.6 mg/dL      Total Protein 4.7 g/dL      Albumin 2.00 g/dL      ALT (SGPT) 28 U/L      AST (SGOT) 31 U/L      Comment: Slight hemolysis detected by analyzer. Results may be affected.        Alkaline Phosphatase 78 U/L      Total Bilirubin 0.3 mg/dL      eGFR Non   Amer 50 mL/min/1.73      Globulin 2.7 gm/dL      A/G Ratio 0.7 g/dL      BUN/Creatinine Ratio 39.3     Anion Gap 6.0 mmol/L     Narrative:      GFR Normal >60  Chronic Kidney Disease <60  Kidney Failure <15      POC Glucose Once [[id number]]  (Abnormal) Collected: 11/01/20 1624    Specimen: Blood Updated: 11/01/20 1636     Glucose 151 mg/dL      Comment: : [id number] Georges MeganMeter ID: [id number]       POC Glucose Once [[id number]]  (Abnormal) Collected: 11/01/20 1142    Specimen: Blood Updated: 11/01/20 1153     Glucose 209 mg/dL      Comment: : [id number] Georges MeganMeter ID: [id number]       POC Glucose Once [[id number]]  (Abnormal) Collected: 11/01/20 0913    Specimen: Blood Updated: 11/01/20 0924     Glucose 206 mg/dL      Comment: : [id number] Georges MeganMeter ID: [id number]       Comprehensive Metabolic Panel [[id number]]  (Abnormal) Collected: 11/01/20 0624    Specimen: Blood Updated: 11/01/20 0741     Glucose 170 mg/dL      BUN 69 mg/dL      Creatinine 1.80 mg/dL      Sodium 150 mmol/L      Potassium 3.7 mmol/L      Chloride 119 mmol/L      CO2 24.0 mmol/L      Calcium 8.7 mg/dL      Total Protein 4.6 g/dL      Albumin 2.20 g/dL      ALT (SGPT) 31 U/L      AST (SGOT) 35 U/L      Alkaline Phosphatase 79 U/L      Total Bilirubin 0.3 mg/dL      eGFR Non African Amer 36 mL/min/1.73      Globulin 2.4 gm/dL      A/G Ratio 0.9 g/dL      BUN/Creatinine Ratio 38.3     Anion Gap 7.0 mmol/L     Narrative:      GFR Normal >60  Chronic Kidney Disease <60  Kidney Failure <15      CBC & Differential [[id number]]  (Abnormal) Collected: 11/01/20 0624    Specimen: Blood Updated: 11/01/20 0711    Narrative:      The following orders were created for panel order CBC & Differential.  Procedure                               Abnormality         Status                     ---------                               -----------         ------                     CBC Auto Differential[[id number]]        Abnormal             Final result                 Please view results for these tests on the individual orders.    CBC Auto Differential [[id number]]  (Abnormal) Collected: 11/01/20 0624    Specimen: Blood Updated: 11/01/20 0711     WBC 8.78 10*3/mm3      RBC 3.13 10*6/mm3      Hemoglobin 9.2 g/dL      Hematocrit 28.1 %      MCV 89.8 fL      MCH 29.4 pg      MCHC 32.7 g/dL      RDW 24.7 %      RDW-SD 76.4 fl      MPV 11.6 fL      Platelets 168 10*3/mm3      Neutrophil % 78.5 %      Lymphocyte % 9.6 %      Monocyte % 5.6 %      Eosinophil % 4.7 %      Basophil % 0.1 %      Immature Grans % 1.5 %      Neutrophils, Absolute 6.90 10*3/mm3      Lymphocytes, Absolute 0.84 10*3/mm3      Monocytes, Absolute 0.49 10*3/mm3      Eosinophils, Absolute 0.41 10*3/mm3      Basophils, Absolute 0.01 10*3/mm3      Immature Grans, Absolute 0.13 10*3/mm3      nRBC 0.0 /100 WBC     Narrative:      transfused    POC Glucose Once [[id number]]  (Abnormal) Collected: 10/31/20 1647    Specimen: Blood Updated: 10/31/20 1658     Glucose 154 mg/dL      Comment: : [id number] Cuppleter ID: [id number]       Sodium, Urine, Random - Urine, Clean Catch [[id number]] Collected: 10/31/20 1431    Specimen: Urine, Clean Catch Updated: 10/31/20 1524     Sodium, Urine <20 mmol/L     Narrative:      Reference intervals for random urine have not been established.  Clinical usage is dependent upon physician's interpretation in combination with other laboratory tests.       POC Glucose Once [[id number]]  (Abnormal) Collected: 10/31/20 1120    Specimen: Blood Updated: 10/31/20 1130     Glucose 174 mg/dL      Comment: : [id number] Cuppleter ID: [id number]       Urine Culture - Urine, Urine, Clean Catch [[id number]]  (Normal) Collected: 10/30/20 0443    Specimen: Urine, Clean Catch Updated: 10/31/20 1044     Urine Culture No growth    POC Glucose Once [[id number]]  (Abnormal) Collected: 10/31/20 0805    Specimen: Blood Updated: 10/31/20 0816      Glucose 162 mg/dL      Comment: : [id number] Toro Kendall ID: [id number]       Extra Tubes [[id number]] Collected: 10/31/20 0612    Specimen: Blood, Venous Line Updated: 10/31/20 0715    Narrative:      The following orders were created for panel order Extra Tubes.  Procedure                               Abnormality         Status                     ---------                               -----------         ------                     Lavender Top[[id number]]                                     Final result               Green Top (Gel)[[id number]]                                  Final result                 Please view results for these tests on the individual orders.    Lavender Top [[id number]] Collected: 10/31/20 0612    Specimen: Blood Updated: 10/31/20 0715     Extra Tube hold for add-on     Comment: Auto resulted       Green Top (Gel) [[id number]] Collected: 10/31/20 0612    Specimen: Blood Updated: 10/31/20 0715     Extra Tube Hold for add-ons.     Comment: Auto resulted.       Comprehensive Metabolic Panel [[id number]]  (Abnormal) Collected: 10/31/20 0444    Specimen: Blood Updated: 10/31/20 0545     Glucose 153 mg/dL      BUN 80 mg/dL      Creatinine 2.17 mg/dL      Sodium 150 mmol/L      Potassium 3.7 mmol/L      Chloride 119 mmol/L      CO2 23.0 mmol/L      Calcium 8.8 mg/dL      Total Protein 4.4 g/dL      Albumin 2.20 g/dL      ALT (SGPT) 31 U/L      AST (SGOT) 40 U/L      Alkaline Phosphatase 70 U/L      Total Bilirubin 0.4 mg/dL      eGFR Non African Amer 29 mL/min/1.73      Globulin 2.2 gm/dL      A/G Ratio 1.0 g/dL      BUN/Creatinine Ratio 36.9     Anion Gap 8.0 mmol/L     Narrative:      GFR Normal >60  Chronic Kidney Disease <60  Kidney Failure <15      CBC & Differential [[id number]]  (Abnormal) Collected: 10/31/20 0444    Specimen: Blood Updated: 10/31/20 0534    Narrative:      The following orders were created for panel order CBC & Differential.  Procedure                                Abnormality         Status                     ---------                               -----------         ------                     CBC Auto Differential[[id number]]        Abnormal            Final result                 Please view results for these tests on the individual orders.    CBC Auto Differential [[id number]]  (Abnormal) Collected: 10/31/20 0444    Specimen: Blood Updated: 10/31/20 0534     WBC 12.28 10*3/mm3      RBC 2.10 10*6/mm3      Hemoglobin 6.8 g/dL      Hematocrit 20.8 %      MCV 99.0 fL      MCH 32.4 pg      MCHC 32.7 g/dL      RDW 16.0 %      RDW-SD 56.5 fl      MPV 11.9 fL      Platelets 165 10*3/mm3      Neutrophil % 86.3 %      Lymphocyte % 7.1 %      Monocyte % 3.8 %      Eosinophil % 1.8 %      Basophil % 0.1 %      Immature Grans % 0.9 %      Neutrophils, Absolute 10.60 10*3/mm3      Lymphocytes, Absolute 0.87 10*3/mm3      Monocytes, Absolute 0.47 10*3/mm3      Eosinophils, Absolute 0.22 10*3/mm3      Basophils, Absolute 0.01 10*3/mm3      Immature Grans, Absolute 0.11 10*3/mm3      nRBC 0.0 /100 WBC     POC Glucose Once [[id number]]  (Abnormal) Collected: 10/30/20 1658    Specimen: Blood Updated: 10/30/20 1710     Glucose 241 mg/dL      Comment: : [id number] Toro UgaldeMeter ID: [id number]       Uric Acid [[id number]]  (Abnormal) Collected: 10/30/20 0418    Specimen: Blood Updated: 10/30/20 1529     Uric Acid 7.5 mg/dL     Iron Profile [[id number]]  (Abnormal) Collected: 10/30/20 0418    Specimen: Blood Updated: 10/30/20 1529     Iron 12 mcg/dL      Iron Saturation 10 %      Transferrin 81 mg/dL      TIBC 121 mcg/dL     Creatinine, Urine, Random - Urine, Catheter [[id number]] Collected: 10/30/20 0441    Specimen: Urine, Catheter Updated: 10/30/20 1225     Creatinine, Urine 78.4 mg/dL     Narrative:      Reference intervals for random urine have not been established.  Clinical usage is dependent upon physician's interpretation in combination with other laboratory tests.       POC  Glucose Once [[id number]]  (Abnormal) Collected: 10/30/20 1150    Specimen: Blood Updated: 10/30/20 1201     Glucose 222 mg/dL      Comment: : [id number] Toro UgaldeMeter ID: [id number]       POC Glucose Once [[id number]]  (Abnormal) Collected: 10/30/20 0822    Specimen: Blood Updated: 10/30/20 0833     Glucose 158 mg/dL      Comment: : [id number] Toro UgaldeMeter ID: [id number]       Urinalysis, Microscopic Only - Urine, Clean Catch [[id number]]  (Abnormal) Collected: 10/30/20 0443    Specimen: Urine, Clean Catch Updated: 10/30/20 0654     RBC, UA 3-5 /HPF      WBC, UA Too Numerous to Count /HPF      Bacteria, UA 1+ /HPF      Squamous Epithelial Cells, UA 0-2 /HPF      Transitional Epithelial Cells, UA 3-6 /HPF      Hyaline Casts, UA 0-2 /LPF      Methodology Manual Light Microscopy    Urinalysis With Culture If Indicated - Urine, Clean Catch [[id number]]  (Abnormal) Collected: 10/30/20 0443    Specimen: Urine, Clean Catch Updated: 10/30/20 0631     Color, UA Yellow     Appearance, UA Cloudy     pH, UA <=5.0     Specific Gravity, UA 1.022     Glucose, UA Negative     Ketones, UA Negative     Bilirubin, UA Negative     Blood, UA Small (1+)     Protein, UA Trace     Leuk Esterase, UA Moderate (2+)     Nitrite, UA Negative     Urobilinogen, UA 0.2 E.U./dL    Sodium, Urine, Random - Urine, Catheter [[id number]] Collected: 10/30/20 0441    Specimen: Urine, Catheter Updated: 10/30/20 0512     Sodium, Urine <20 mmol/L     Narrative:      Reference intervals for random urine have not been established.  Clinical usage is dependent upon physician's interpretation in combination with other laboratory tests.       Procalcitonin [[id number]]  (Abnormal) Collected: 10/30/20 0418    Specimen: Blood Updated: 10/30/20 0508     Procalcitonin 7.53 ng/mL     Narrative:      As a Marker for Sepsis (Non-Neonates):   1. <0.5 ng/mL represents a low risk of severe sepsis and/or septic shock.  1. >2 ng/mL represents a high risk of  "severe sepsis and/or septic shock.    As a Marker for Lower Respiratory Tract Infections that require antibiotic therapy:  PCT on Admission     Antibiotic Therapy             6-12 Hrs later  > 0.5                Strongly Recommended            >0.25 - <0.5         Recommended  0.1 - 0.25           Discouraged                   Remeasure/reassess PCT  <0.1                 Strongly Discouraged          Remeasure/reassess PCT      As 28 day mortality risk marker: \"Change in Procalcitonin Result\" (> 80 % or <=80 %) if Day 0 (or Day 1) and Day 4 values are available. Refer to [email or web address]   Change in PCT <=80 %   A decrease of PCT levels below or equal to 80 % defines a positive change in PCT test result representing a higher risk for 28-day all-cause mortality of patients diagnosed with severe sepsis or septic shock.  Change in PCT > 80 %   A decrease of PCT levels of more than 80 % defines a negative change in PCT result representing a lower risk for 28-day all-cause mortality of patients diagnosed with severe sepsis or septic shock.                Results may be falsely decreased if patient taking Biotin.     Comprehensive Metabolic Panel [[id number]]  (Abnormal) Collected: 10/30/20 0418    Specimen: Blood Updated: 10/30/20 0502     Glucose 149 mg/dL      BUN 84 mg/dL      Creatinine 2.43 mg/dL      Sodium 153 mmol/L      Potassium 3.9 mmol/L      Chloride 122 mmol/L      CO2 24.0 mmol/L      Calcium 8.7 mg/dL      Total Protein 4.4 g/dL      Albumin 2.10 g/dL      ALT (SGPT) 29 U/L      AST (SGOT) 38 U/L      Alkaline Phosphatase 66 U/L      Total Bilirubin 0.3 mg/dL      eGFR Non African Amer 25 mL/min/1.73      Globulin 2.3 gm/dL      A/G Ratio 0.9 g/dL      BUN/Creatinine Ratio 34.6     Anion Gap 7.0 mmol/L     Narrative:      GFR Normal >60  Chronic Kidney Disease <60  Kidney Failure <15      CBC & Differential [[id number]]  (Abnormal) Collected: 10/30/20 0418    Specimen: Blood Updated: " 10/30/20 0442    Narrative:      The following orders were created for panel order CBC & Differential.  Procedure                               Abnormality         Status                     ---------                               -----------         ------                     CBC Auto Differential[[id number]]        Abnormal            Final result                 Please view results for these tests on the individual orders.    CBC Auto Differential [[id number]]  (Abnormal) Collected: 10/30/20 0418    Specimen: Blood Updated: 10/30/20 0442     WBC 16.74 10*3/mm3      RBC 2.28 10*6/mm3      Hemoglobin 7.3 g/dL      Hematocrit 23.0 %      .9 fL      MCH 32.0 pg      MCHC 31.7 g/dL      RDW 16.9 %      RDW-SD 60.6 fl      MPV 11.6 fL      Platelets 183 10*3/mm3      Neutrophil % 90.5 %      Lymphocyte % 5.3 %      Monocyte % 2.9 %      Eosinophil % 0.3 %      Basophil % 0.1 %      Immature Grans % 0.9 %      Neutrophils, Absolute 15.15 10*3/mm3      Lymphocytes, Absolute 0.88 10*3/mm3      Monocytes, Absolute 0.49 10*3/mm3      Eosinophils, Absolute 0.05 10*3/mm3      Basophils, Absolute 0.02 10*3/mm3      Immature Grans, Absolute 0.15 10*3/mm3      nRBC 0.2 /100 WBC     Urine Culture - Urine, Urine, Catheter In/Out [[id number]]  (Abnormal)  (Susceptibility) Collected: 10/27/20 0520    Specimen: Urine, Catheter In/Out Updated: 10/30/20 0326     Urine Culture >100,000 CFU/mL Escherichia coli    Susceptibility      Escherichia coli     SRINIVAS     Ampicillin Resistant     Ampicillin + Sulbactam Intermediate     Cefazolin Susceptible     Cefepime Susceptible     Ceftazidime Susceptible     Ceftriaxone Susceptible     Gentamicin Susceptible     Levofloxacin Resistant     Nitrofurantoin Susceptible     Piperacillin + Tazobactam Susceptible     Tetracycline Resistant     Trimethoprim + Sulfamethoxazole Resistant                    POC Glucose Once [[id number]]  (Abnormal) Collected: 10/29/20 2224    Specimen: Blood Updated:  10/29/20 2245     Glucose 187 mg/dL      Comment: : [id number] Leon (Monty) AmandaMeter ID: [id number]       Basic Metabolic Panel [[id number]]  (Abnormal) Collected: 10/29/20 1616    Specimen: Blood Updated: 10/29/20 1639     Glucose 157 mg/dL      BUN 84 mg/dL      Creatinine 2.64 mg/dL      Sodium 152 mmol/L      Potassium 4.1 mmol/L      Chloride 120 mmol/L      CO2 22.0 mmol/L      Calcium 8.7 mg/dL      eGFR Non African Amer 23 mL/min/1.73      BUN/Creatinine Ratio 31.8     Anion Gap 10.0 mmol/L     Narrative:      GFR Normal >60  Chronic Kidney Disease <60  Kidney Failure <15      Urease For H Pylori - Tissue, Stomach [[id number]]  (Normal) Collected: 10/28/20 1200    Specimen: Tissue from Stomach Updated: 10/29/20 1522     Urease Negative    POC Glucose Once [[id number]]  (Abnormal) Collected: 10/29/20 1208    Specimen: Blood Updated: 10/29/20 1224     Glucose 154 mg/dL      Comment: : [id number] Lula GraceMeter ID: [id number]       Basic Metabolic Panel [[id number]]  (Abnormal) Collected: 10/29/20 0627    Specimen: Blood Updated: 10/29/20 0740     Glucose 142 mg/dL      BUN 82 mg/dL      Creatinine 2.74 mg/dL      Sodium 154 mmol/L      Potassium 4.2 mmol/L      Chloride 119 mmol/L      CO2 25.0 mmol/L      Calcium 8.9 mg/dL      eGFR Non African Amer 22 mL/min/1.73      BUN/Creatinine Ratio 29.9     Anion Gap 10.0 mmol/L     Narrative:      GFR Normal >60  Chronic Kidney Disease <60  Kidney Failure <15      Extra Tubes [[id number]] Collected: 10/29/20 0630    Specimen: Blood, Venous Line Updated: 10/29/20 0730    Narrative:      The following orders were created for panel order Extra Tubes.  Procedure                               Abnormality         Status                     ---------                               -----------         ------                     Lavender Top[[id number]]                                     Final result               Gold Top - SST[[id number]]                                    Final result                 Please view results for these tests on the individual orders.    Lavender Top [[id number]] Collected: 10/29/20 0630    Specimen: Blood Updated: 10/29/20 0730     Extra Tube hold for add-on     Comment: Auto resulted       Gold Top - SST [[id number]] Collected: 10/29/20 0630    Specimen: Blood Updated: 10/29/20 0730     Extra Tube Hold for add-ons.     Comment: Auto resulted.       CBC (No Diff) [[id number]]  (Abnormal) Collected: 10/29/20 0627    Specimen: Blood Updated: 10/29/20 0702     WBC 17.67 10*3/mm3      RBC 2.58 10*6/mm3      Hemoglobin 8.4 g/dL      Hematocrit 25.4 %      MCV 98.4 fL      MCH 32.6 pg      MCHC 33.1 g/dL      RDW 17.2 %      RDW-SD 59.8 fl      MPV 11.7 fL      Platelets 207 10*3/mm3     POC Glucose Once [[id number]]  (Normal) Collected: 10/28/20 1733    Specimen: Blood Updated: 10/28/20 1744     Glucose 122 mg/dL      Comment: : [id number] Lula GraceMeter ID: [id number]       Hemoglobin & Hematocrit, Blood [[id number]]  (Abnormal) Collected: 10/28/20 1559    Specimen: Blood Updated: 10/28/20 1606     Hemoglobin 8.3 g/dL      Hematocrit 25.5 %     POC Glucose Once [[id number]]  (Normal) Collected: 10/28/20 1103    Specimen: Blood Updated: 10/28/20 1131     Glucose 121 mg/dL      Comment: : [id number] Lula GraceMeter ID: [id number]       Basic Metabolic Panel [[id number]]  (Abnormal) Collected: 10/28/20 0503    Specimen: Blood Updated: 10/28/20 0604     Glucose 92 mg/dL      BUN 65 mg/dL      Creatinine 1.75 mg/dL      Sodium 155 mmol/L      Potassium 4.2 mmol/L      Chloride 121 mmol/L      CO2 27.0 mmol/L      Calcium 9.5 mg/dL      eGFR Non African Amer 37 mL/min/1.73      BUN/Creatinine Ratio 37.1     Anion Gap 7.0 mmol/L     Narrative:      GFR Normal >60  Chronic Kidney Disease <60  Kidney Failure <15      CBC & Differential [[id number]]  (Abnormal) Collected: 10/28/20 0503    Specimen: Blood Updated: 10/28/20 0601    Narrative:      The following orders  were created for panel order CBC & Differential.  Procedure                               Abnormality         Status                     ---------                               -----------         ------                     CBC Auto Differential[[id number]]        Abnormal            Final result                 Please view results for these tests on the individual orders.    CBC Auto Differential [[id number]]  (Abnormal) Collected: 10/28/20 0503    Specimen: Blood Updated: 10/28/20 0601     WBC 14.95 10*3/mm3      RBC 2.63 10*6/mm3      Hemoglobin 8.3 g/dL      Hematocrit 26.3 %      .0 fL      MCH 31.6 pg      MCHC 31.6 g/dL      RDW 17.8 %      RDW-SD 62.9 fl      MPV 11.5 fL      Platelets 191 10*3/mm3     Manual Differential [[id number]]  (Abnormal) Collected: 10/28/20 0503    Specimen: Blood Updated: 10/28/20 0601     Neutrophil % 93.0 %      Lymphocyte % 3.0 %      Bands %  4.0 %      Neutrophils Absolute 14.50 10*3/mm3      Lymphocytes Absolute 0.45 10*3/mm3      nRBC 2.0 /100 WBC      Anisocytosis Slight/1+     Macrocytes Slight/1+     Poikilocytes Slight/1+     Polychromasia Slight/1+     WBC Morphology Normal     Platelet Morphology Normal    Hemoglobin & Hematocrit, Blood [[id number]]  (Abnormal) Collected: 10/27/20 2359    Specimen: Blood Updated: 10/28/20 0029     Hemoglobin 9.1 g/dL      Hematocrit 27.7 %     Hemoglobin & Hematocrit, Blood [[id number]]  (Abnormal) Collected: 10/27/20 2242    Specimen: Blood Updated: 10/27/20 2251     Hemoglobin 9.0 g/dL      Hematocrit 27.5 %     Occult Blood X 1, Stool - Stool, Per Rectum [[id number]]  (Abnormal) Collected: 10/27/20 1513    Specimen: Stool from Per Rectum Updated: 10/27/20 1702     Fecal Occult Blood Positive    Troponin [[id number]]  (Abnormal) Collected: 10/27/20 1558    Specimen: Blood Updated: 10/27/20 1643     Troponin T 0.162 ng/mL     Narrative:      Troponin T Reference Range:  <= 0.03 ng/mL-   Negative for AMI  >0.03 ng/mL-     Abnormal for  myocardial necrosis.  Clinicians would have to utilize clinical acumen, EKG, Troponin and serial changes to determine if it is an Acute Myocardial Infarction or myocardial injury due to an underlying chronic condition.       Results may be falsely decreased if patient taking Biotin.      Basic Metabolic Panel [[id number]]  (Abnormal) Collected: 10/27/20 1558    Specimen: Blood Updated: 10/27/20 1639     Glucose 115 mg/dL      BUN 59 mg/dL      Creatinine 1.35 mg/dL      Sodium 157 mmol/L      Potassium 4.4 mmol/L      Chloride 123 mmol/L      CO2 30.0 mmol/L      Calcium 9.6 mg/dL      eGFR Non African Amer 50 mL/min/1.73      BUN/Creatinine Ratio 43.7     Anion Gap 4.0 mmol/L     Narrative:      GFR Normal >60  Chronic Kidney Disease <60  Kidney Failure <15      CBC (No Diff) [[id number]]  (Abnormal) Collected: 10/27/20 1558    Specimen: Blood Updated: 10/27/20 1629     WBC 8.42 10*3/mm3      RBC 1.97 10*6/mm3      Hemoglobin 6.5 g/dL      Hematocrit 19.8 %      .5 fL      MCH 33.0 pg      MCHC 32.8 g/dL      RDW 17.2 %      RDW-SD 61.9 fl      MPV 11.3 fL      Platelets 176 10*3/mm3     POC Glucose Once [[id number]]  (Normal) Collected: 10/27/20 1556    Specimen: Blood Updated: 10/27/20 1618     Glucose 122 mg/dL      Comment: : [id number] Lula GraceMeter ID: [id number]       Vitamin B12 [[id number]]  (Abnormal) Collected: 10/27/20 0328    Specimen: Blood Updated: 10/27/20 1257     Vitamin B-12 1,029 pg/mL     Narrative:      Results may be falsely increased if patient taking Biotin.      Folate [[id number]]  (Normal) Collected: 10/27/20 0328    Specimen: Blood Updated: 10/27/20 1257     Folate 19.50 ng/mL     Narrative:      Results may be falsely increased if patient taking Biotin.      POC Glucose Once [[id number]]  (Abnormal) Collected: 10/27/20 1137    Specimen: Blood Updated: 10/27/20 1147     Glucose 151 mg/dL      Comment: : [id number] Mau RuelasaMeserafin ID: [id number]       POC Glucose Once  [[id number]]  (Abnormal) Collected: 10/27/20 0726    Specimen: Blood Updated: 10/27/20 0742     Glucose 155 mg/dL      Comment: : [id number] Joshua Baez ID: [id number]       Urinalysis, Microscopic Only - Urine, Catheter In/Out [[id number]]  (Abnormal) Collected: 10/27/20 0520    Specimen: Urine, Catheter In/Out Updated: 10/27/20 0615     RBC, UA 0-2 /HPF      WBC, UA Too Numerous to Count /HPF      Bacteria, UA 4+ /HPF      Squamous Epithelial Cells, UA 0-2 /HPF      Hyaline Casts, UA None Seen /LPF      Methodology Automated Microscopy    Urinalysis With Microscopic If Indicated (No Culture) - Urine, Catheter In/Out [[id number]]  (Abnormal) Collected: 10/27/20 0520    Specimen: Urine, Catheter In/Out Updated: 10/27/20 0610     Color, UA Yellow     Appearance, UA Cloudy     pH, UA 5.5     Specific Gravity, UA 1.015     Glucose, UA Negative     Ketones, UA Negative     Bilirubin, UA Negative     Blood, UA Negative     Protein, UA Trace     Leuk Esterase, UA Large (3+)     Nitrite, UA Positive     Urobilinogen, UA 0.2 E.U./dL    Hemoglobin A1c [[id number]]  (Abnormal) Collected: 10/27/20 0328    Specimen: Blood Updated: 10/27/20 0447     Hemoglobin A1C 6.10 %     Narrative:      Hemoglobin A1C Ranges:    Increased Risk for Diabetes  5.7% to 6.4%  Diabetes                     >= 6.5%  Diabetic Goal                < 7.0%    CBC Auto Differential [[id number]]  (Abnormal) Collected: 10/27/20 0407    Specimen: Blood Updated: 10/27/20 0434     WBC 9.85 10*3/mm3      RBC 1.86 10*6/mm3      Hemoglobin 6.1 g/dL      Hematocrit 19.2 %      .2 fL      MCH 32.8 pg      MCHC 31.8 g/dL      RDW 15.9 %      RDW-SD 58.6 fl      MPV 11.0 fL      Platelets 187 10*3/mm3      Neutrophil % 76.2 %      Lymphocyte % 11.2 %      Monocyte % 10.7 %      Eosinophil % 0.7 %      Basophil % 0.1 %      Immature Grans % 1.1 %      Neutrophils, Absolute 7.51 10*3/mm3      Lymphocytes, Absolute 1.10 10*3/mm3      Monocytes, Absolute 1.05  "10*3/mm3      Eosinophils, Absolute 0.07 10*3/mm3      Basophils, Absolute 0.01 10*3/mm3      Immature Grans, Absolute 0.11 10*3/mm3      nRBC 0.4 /100 WBC     Sedimentation Rate [[id number]]  (Normal) Collected: 10/27/20 0407    Specimen: Blood Updated: 10/27/20 0433     Sed Rate 9 mm/hr     Troponin [[id number]]  (Abnormal) Collected: 10/27/20 0328    Specimen: Blood Updated: 10/27/20 0422     Troponin T 0.155 ng/mL     Narrative:      Troponin T Reference Range:  <= 0.03 ng/mL-   Negative for AMI  >0.03 ng/mL-     Abnormal for myocardial necrosis.  Clinicians would have to utilize clinical acumen, EKG, Troponin and serial changes to determine if it is an Acute Myocardial Infarction or myocardial injury due to an underlying chronic condition.       Results may be falsely decreased if patient taking Biotin.      Procalcitonin [[id number]]  (Normal) Collected: 10/27/20 0328    Specimen: Blood Updated: 10/27/20 0417     Procalcitonin 0.15 ng/mL     Narrative:      As a Marker for Sepsis (Non-Neonates):   1. <0.5 ng/mL represents a low risk of severe sepsis and/or septic shock.  1. >2 ng/mL represents a high risk of severe sepsis and/or septic shock.    As a Marker for Lower Respiratory Tract Infections that require antibiotic therapy:  PCT on Admission     Antibiotic Therapy             6-12 Hrs later  > 0.5                Strongly Recommended            >0.25 - <0.5         Recommended  0.1 - 0.25           Discouraged                   Remeasure/reassess PCT  <0.1                 Strongly Discouraged          Remeasure/reassess PCT      As 28 day mortality risk marker: \"Change in Procalcitonin Result\" (> 80 % or <=80 %) if Day 0 (or Day 1) and Day 4 values are available. Refer to http://www.[employer].coms-pct-calculator.com/   Change in PCT <=80 %   A decrease of PCT levels below or equal to 80 % defines a positive change in PCT test result representing a higher risk for 28-day all-cause mortality of patients diagnosed with " severe sepsis or septic shock.  Change in PCT > 80 %   A decrease of PCT levels of more than 80 % defines a negative change in PCT result representing a lower risk for 28-day all-cause mortality of patients diagnosed with severe sepsis or septic shock.                Results may be falsely decreased if patient taking Biotin.     Lipid Panel [[id number]]  (Abnormal) Collected: 10/27/20 0328    Specimen: Blood Updated: 10/27/20 0417     Total Cholesterol 94 mg/dL      Triglycerides 89 mg/dL      HDL Cholesterol 34 mg/dL      LDL Cholesterol  42 mg/dL      VLDL Cholesterol 18 mg/dL      LDL/HDL Ratio 1.24    Narrative:      Cholesterol Reference Ranges  (U.S. Department of Health and Human Services ATP III Classifications)    Desirable          <200 mg/dL  Borderline High    200-239 mg/dL  High Risk          >240 mg/dL      Triglyceride Reference Ranges  (U.S. Department of Health and Human Services ATP III Classifications)    Normal           <150 mg/dL  Borderline High  150-199 mg/dL  High             200-499 mg/dL  Very High        >500 mg/dL    HDL Reference Ranges  (U.S. Department of Health and Human Services ATP III Classifcations)    Low     <40 mg/dl (major risk factor for CHD)  High    >60 mg/dl ('negative' risk factor for CHD)        LDL Reference Ranges  (U.S. Department of Health and Human Services ATP III Classifcations)    Optimal          <100 mg/dL  Near Optimal     100-129 mg/dL  Borderline High  130-159 mg/dL  High             160-189 mg/dL  Very High        >189 mg/dL    Comprehensive Metabolic Panel [[id number]]  (Abnormal) Collected: 10/27/20 0328    Specimen: Blood Updated: 10/27/20 0417     Glucose 143 mg/dL      BUN 60 mg/dL      Creatinine 1.22 mg/dL      Sodium 154 mmol/L      Potassium 4.0 mmol/L      Chloride 123 mmol/L      CO2 27.0 mmol/L      Calcium 9.7 mg/dL      Total Protein 4.4 g/dL      Albumin 2.40 g/dL      ALT (SGPT) 26 U/L      AST (SGOT) 23 U/L      Alkaline Phosphatase 60 U/L       Total Bilirubin 0.2 mg/dL      eGFR Non African Amer 56 mL/min/1.73      Globulin 2.0 gm/dL      A/G Ratio 1.2 g/dL      BUN/Creatinine Ratio 49.2     Anion Gap 4.0 mmol/L     Narrative:      GFR Normal >60  Chronic Kidney Disease <60  Kidney Failure <15      Iron Profile [[id number]]  (Abnormal) Collected: 10/27/20 0328    Specimen: Blood Updated: 10/27/20 0417     Iron 32 mcg/dL      Iron Saturation 19 %      Transferrin 111 mg/dL      TIBC 165 mcg/dL     BNP [[id number]]  (Abnormal) Collected: 10/27/20 0328    Specimen: Blood Updated: 10/27/20 0417     proBNP 8,060.0 pg/mL     Narrative:      Among patients with dyspnea, NT-proBNP is highly sensitive for the detection of acute congestive heart failure. In addition NT-proBNP of <300 pg/ml effectively rules out acute congestive heart failure with 99% negative predictive value.    Results may be falsely decreased if patient taking Biotin.      T4, Free [[id number]]  (Normal) Collected: 10/27/20 0328    Specimen: Blood Updated: 10/27/20 0417     Free T4 0.99 ng/dL     Narrative:      Results may be falsely increased if patient taking Biotin.      Magnesium [[id number]]  (Abnormal) Collected: 10/27/20 0328    Specimen: Blood Updated: 10/27/20 0417     Magnesium 2.7 mg/dL     CK [[id number]]  (Normal) Collected: 10/27/20 0328    Specimen: Blood Updated: 10/27/20 0417     Creatine Kinase 50 U/L     C-reactive Protein [[id number]]  (Abnormal) Collected: 10/27/20 0328    Specimen: Blood Updated: 10/27/20 0417     C-Reactive Protein 3.31 mg/dL     Ferritin [[id number]]  (Abnormal) Collected: 10/27/20 0328    Specimen: Blood Updated: 10/27/20 0417     Ferritin 492.70 ng/mL     Narrative:      Results may be falsely decreased if patient taking Biotin.      TSH [[id number]]  (Normal) Collected: 10/27/20 0328    Specimen: Blood Updated: 10/27/20 0413     TSH 2.430 uIU/mL     Uric Acid [[id number]]  (Normal) Collected: 10/27/20 0328    Specimen: Blood Updated: 10/27/20 0413     Uric  Acid 6.5 mg/dL     Blood Gas, Arterial - [[id number]]  (Abnormal) Collected: 10/27/20 0357    Specimen: Arterial Blood Updated: 10/27/20 0403     Site Right Radial     Raymond's Test Positive     pH, Arterial 7.487 pH units      Comment: 83 Value above reference range        pCO2, Arterial 36.7 mm Hg      pO2, Arterial 66.1 mm Hg      Comment: 84 Value below reference range        HCO3, Arterial 27.8 mmol/L      Comment: 83 Value above reference range        Base Excess, Arterial 4.1 mmol/L      Comment: 83 Value above reference range        O2 Saturation, Arterial 95.4 %      Temperature 37.0 C      Barometric Pressure for Blood Gas 755 mmHg      Modality Room Air     FIO2 21 %      Ventilator Mode NA     Collected by [id number]     Comment: Meter: [id number]     :  [id number]        pCO2, Temperature Corrected 36.7 mm Hg      pH, Temp Corrected 7.487 pH Units      pO2, Temperature Corrected 66.1 mm Hg     Lactic Acid, Plasma [[id number]]  (Normal) Collected: 10/27/20 0328    Specimen: Blood Updated: 10/27/20 0401     Lactate 0.9 mmol/L     Protime-INR [[id number]]  (Abnormal) Collected: 10/27/20 0328    Specimen: Blood Updated: 10/27/20 0356     Protime 15.2 Seconds      INR 1.24    aPTT [[id number]]  (Abnormal) Collected: 10/27/20 0328    Specimen: Blood Updated: 10/27/20 0356     PTT 23.8 seconds     Reticulocytes [[id number]]  (Abnormal) Collected: 10/27/20 0328    Specimen: Blood Updated: 10/27/20 0346     Reticulocyte % 3.63 %      Reticulocyte Absolute 0.0628 10*6/mm3     COVID-19,[facility] IN-HOUSE,Nasal Swab No Transport Media 3-4 HR TAT - Swab, Nasal Cavity [[id number]]  (Normal) Collected: 10/26/20 2037    Specimen: Swab from Nasal Cavity Updated: 10/26/20 2128     COVID19 Not Detected    Narrative:      Fact sheet for providers: https://www.fda.gov/media/007089/download     Fact sheet for patients: https://www.fda.gov/media/338806/download        Imaging Results (All)     Procedure Component Value Units  Date/Time    FL Video Swallow With Speech Single Contrast [[id number]] Collected: 11/04/20 1401     Updated: 11/04/20 1405    Narrative:      EXAMINATION:   FL VIDEO SWALLOW W SPEECH SINGLE-CONTRAST-  11/4/2020  2:01 PM CST     HISTORY: MODIFIED BARIUM SWALLOW     REASON FOR EXAM: r/o aspiration; R13.12-Dysphagia, oropharyngeal phase;  Z74.09-Other reduced mobility; R19.5-Other fecal abnormalities;  Z78.9-Other specified health status     COMPARISON: NONE      FLUOROSCOPY TIME: 1.2 minutes     Radiation dose 5.71 MG Y     Number of images: 1     TECHNIQUE: In conjunction with speech pathology services, video  fluoroscopic swallow examination was performed with oral ingestion of  barium containing substances of various viscosities.      FINDINGS: Medium bolus sizes are well controlled with no spillage into  the oropharynx. No pooling is demonstrated. There is soft palate  elevation and coverage of the posterior nasopharynx with swallowing. No  stasis is noted within the valleculae or piriform sinuses.     Penetration was observed with nectar thick and thin barium. There is no  obvious aspiration..        Impression:      1. Normal swallowing mechanism.   2. Penetration was observed with nectar thick and thin barium.      Please see speech pathologist's report for further details and  recommendations.         This report was finalized on 11/04/2020 14:02 by Dr. Izaiah Ryan MD.    MRI Brain Without Contrast [[id number]] Collected: 10/30/20 1254     Updated: 10/30/20 1317    Narrative:      EXAMINATION:  MRI BRAIN WO CONTRAST-  10/30/2020 10:45 AM CDT     HISTORY: Mental status change, unknown cause. R13.12-Dysphagia,  oropharyngeal phase; Z74.09-Other reduced mobility. Study was changed to  a without contrast study due to a GFR of 25 mL/m.     TECHNIQUE: Multiplanar imaging was performed in a high field magnet.     COMPARISON: No comparison study.     FINDINGS: The study is degraded by motion artifact. There is no  evidence  of acute infarct on the diffusion-weighted sequence. There is mild  atrophy. There is moderate to severe dilatation of the lateral and third  ventricles. There is moderate T2 high signal within the hemispheric  white matter. There is a focal area of T1 low signal and T2 high signal  along the inferior right frontal lobe that is likely related to either  an old infarct or old trauma.       Impression:      1. Motion limited study.  2. No evidence of acute infarct.  3. Mild atrophy. Moderate to severe dilatation of the lateral and third  ventricles may be related to the atrophy. Hydrocephalus is not excluded.  4. Moderate T2 high signal within the hemispheric white matter is  nonspecific and likely due to chronic small vessel disease.  5. Focal area of T1 low signal and T2 high signal along the inferior  right frontal lobe is likely related to either an old infarct or old  trauma.        This report was finalized on 10/30/2020 13:14 by Dr. Chun Fountain MD.     Renal Bilateral [[id number]] Collected: 10/29/20 1854     Updated: 10/29/20 1859    Narrative:      RENAL ULTRASOUND COMPLETE 10/29/2020 6:16 PM CDT     REASON FOR EXAM: ALLIE; R13.12-Dysphagia, oropharyngeal phase;  Z74.09-Other reduced mobility; R19.5-Other fecal abnormalities       COMPARISON: CT scan dated 10/27/2020       TECHNIQUE: Multiple longitudinal and transverse realtime sonographic  images of the kidneys and urinary bladder are obtained.      FINDINGS:      RIGHT KIDNEY: 9.7 x 5.2 x 6.3 cm. Diffuse cortical thinning. No solid or  cystic masses. The central echo complex is compact with no evidence for  hydronephrosis. No nephrolithiasis or abnormal perinephric fluid  collections . No hydroureter.      LEFT KIDNEY: 8.4 x 5 0.0, 4.6 cm. Severe cortical thinning. 2.8 cm  rounded hypoechoic cystic lesion with through transmission at the upper  pole. The central echo complex is compact with no evidence for  hydronephrosis. No nephrolithiasis or  abnormal perinephric fluid  collections . No hydroureter.      PELVIS: The bladder is mildly distended with anechoic urine and  demonstrates no significant wall thickening or internal echogenicities.  There is no surrounding ascites.        Impression:      1. Bilateral diffuse renal cortical thinning, most likely due to chronic  medical renal disease. No evidence of upper tract  obstruction/hydronephrosis.  2. 2.8 cm left upper pole cyst.  3. Urinary bladder is nondilated.     This report was finalized on 10/29/2020 18:56 by Dr Brent Raines, .    US Carotid Bilateral [[id number]] Collected: 10/29/20 0741     Updated: 10/29/20 0744    Narrative:      History: Carotid occlusive disease       Impression:      Impression:  1. There is less than 50% stenosis of the right internal carotid artery.  2. There is 50-69% stenosis of the left internal carotid artery.  3. Antegrade flow is demonstrated in the left vertebral. The right  vertebral was not visualized.     Comments: Bilateral carotid vertebral arterial duplex scan was  performed.     Grayscale imaging shows intimal thickening and calcified elements at the  carotid bifurcation. The right internal carotid artery peak systolic  velocity is 82.1 cm/sec. The end-diastolic velocity is 18.8 cm/sec. The  right ICA/CCA ratio is approximately 0.8 . These findings correlate with  less than 50% stenosis of the right internal carotid artery.     Grayscale imaging shows intimal thickening and calcified elements at the  carotid bifurcation. The left internal carotid artery peak systolic  velocity is 138 cm/sec. The end-diastolic velocity is 21.6 cm/sec. The  left ICA/CCA ratio is approximately 1.3 . These findings correlate with  50-69% stenosis of the left internal carotid artery.     There is greater than 50% stenosis of the right external carotid artery.  This report was finalized on 10/29/2020 07:41 by Dr. Austen Robertson MD.    XR Chest 1 View [[id number]] Collected:  10/27/20 1628     Updated: 10/27/20 1632    Narrative:      EXAMINATION: XR CHEST 1 VW- 10/27/2020 4:28 PM CDT     HISTORY: Chest pain     COMPARISON: 5/13/2020, CT chest without contrast 10/27/2020     FINDINGS:  The heart is magnified but felt to be mildly enlarged. The aorta is  tortuous with atherosclerotic calcifications. There has been prior  median sternotomy. There is consolidation in the left lung base with  obscuration of the left hemidiaphragm. Bilateral perihilar interstitial  and alveolar opacities are present. Additional interstitial pulmonary  opacities are also evident throughout the right lung. There is no  appreciable pneumothorax. There is trace pleural fluid bilaterally.       Impression:      Bilateral groundglass airspace opacities, particularly in  the perihilar regions, are concerning for an infectious or inflammatory  process. Alternatively, this may reflect asymmetric pulmonary edema.  There are small bilateral pleural effusions.  This report was finalized on 10/27/2020 16:29 by Dr. Jose Manuel Shields MD.    CT Chest With Contrast [[id number]] Collected: 10/27/20 1242     Updated: 10/27/20 1253    Narrative:      EXAMINATION:  CT CHEST W CONTRAST-  10/27/2020 10:57 AM CDT     HISTORY: Pneumonia, effusion or abscess suspected, xray done      COMPARISON: 5/13/2020 chest radiography and 5/13/2020 abdomen pelvis CT     TECHNIQUE: Radiation dose equals  mGy-cm.  Automated exposure  control dose reduction technique was implemented.     Thin section axial imaging was obtained with intravenous contrast. 2-D  sagittal and coronal reconstruction images were generated.     FINDINGS:      There are moderate size bilateral pleural effusions, layering  posteriorly. There is associated lower lobe airspace opacities, likely  atelectasis associated with the effusions.     There are patchy groundglass opacities identified in both upper lobes.     Acute infectious or inflammatory process considered,  "atypical appearance  of pulmonary edema also considered. Correlate with patient presentation.     Changes median sternotomy observed.     There are coronary artery calcifications. There are aortic valve  calcifications with left atrial prominence. Aortic calcifications  identified.     There is no mediastinal or hilar lymph node enlargement is identified.     There is no axillary lymphadenopathy.     There is edema identified in the subcutaneous soft tissues of the body  wall, third spacing/volume overload/right heart failure considered.     Image quality degradation observed in the upper abdomen associated with  breathing motion artifact.     Small hiatal hernia suspected.     Probable cysts identified in the upper pole the left kidney.     Changes from right shoulder surgery noted.     Spondylosis changes noted diffusely in the thoracic spine with bridging  osteophyte formation.     No acute osseous abnormalities observed.             Impression:      1. Moderate-sized bilateral pleural effusions layering posteriorly with  associated lower lobe airspace opacities, likely atelectasis related to  the effusions. Patchy groundglass opacities in both upper lobes, acute  infectious/inflammatory process considered, atypical appearance of  pulmonary edema also a differential consideration, correlate with  patient presentation.  This report was finalized on 10/27/2020 12:50 by Dr. Ranjan Campbell MD.          Condition on Discharge:    Stable and improved    Physical Exam on Discharge:  /52   Pulse 66   Temp 97.4 °F (36.3 °C) (Oral)   Resp 22   Ht 177.8 cm (70\")   Wt 92.9 kg (204 lb 12.9 oz)   SpO2 95%   BMI 29.39 kg/m²   Physical Exam     Constitutional:       Appearance: He is well-developed.      Comments:  Discussed with his nurse.   HENT:      Head: Normocephalic and atraumatic.      Comments: Poor dentition.  Eyes:      Conjunctiva/sclera: Conjunctivae normal.      Pupils: Pupils are equal, round, and " reactive to light.   Neck:      Musculoskeletal: Neck supple.      Vascular: No JVD.   Cardiovascular:      Rate and Rhythm: Normal rate. Rhythm irregular.      Heart sounds: Normal heart sounds. No murmur. No friction rub. No gallop.    Pulmonary:      Effort: Pulmonary effort is normal. No respiratory distress.   Abdominal:      General: Bowel sounds are normal. There is no distension.      Palpations: Abdomen is soft.   Musculoskeletal: Normal range of motion.         General: Swelling (trace) present. No tenderness or deformity.   Skin:     General: Skin is warm and dry.      Findings: No rash.   Neurological:      General: No focal deficit present.      Mental Status: He is alert and oriented to person, place, and time.      Cranial Nerves: No cranial nerve deficit.      Motor: Weakness present. No abnormal muscle tone.      Deep Tendon Reflexes: Reflexes normal.      Comments: Follows all commands without difficulty.   Psychiatric:         Behavior: Behavior normal.         Thought Content: Thought content normal.         Judgment: Judgment normal.    Discharge Disposition:  Home or Self Care    Discharge Medications:     Discharge Medications      New Medications      Instructions Start Date   acetaminophen 325 MG tablet  Commonly known as: TYLENOL   650 mg, Oral, Every 4 Hours PRN      furosemide 20 MG tablet  Commonly known as: LASIX   20 mg, Oral, Daily   Start Date: November 11, 2020     ondansetron 4 MG tablet  Commonly known as: ZOFRAN   4 mg, Oral, Every 6 Hours PRN         Changes to Medications      Instructions Start Date   allopurinol 300 MG tablet  Commonly known as: ZYLOPRIM  What changed: how much to take   300 mg, Oral, Daily      isosorbide mononitrate 30 MG 24 hr tablet  Commonly known as: IMDUR  What changed: when to take this   30 mg, Oral, Every 24 Hours Scheduled      losartan 50 MG tablet  Commonly known as: COZAAR  What changed: Another medication with the same name was added. Make sure  you understand how and when to take each.   25 mg, Oral, Daily      losartan 100 MG tablet  Commonly known as: COZAAR  What changed: You were already taking a medication with the same name, and this prescription was added. Make sure you understand how and when to take each.   100 mg, Oral, Every 24 Hours Scheduled   Start Date: November 11, 2020     prednisoLONE acetate 1 % ophthalmic suspension  Commonly known as: PRED FORTE  What changed: when to take this   1 drop, Left Eye, 4 Times Daily         Continue These Medications      Instructions Start Date   amLODIPine 5 MG tablet  Commonly known as: NORVASC   5 mg, Oral, Daily      aspirin 81 MG chewable tablet   81 mg, Oral, Daily      colchicine 0.6 MG tablet   0.6 mg, Oral, Daily PRN      finasteride 5 MG tablet  Commonly known as: PROSCAR   5 mg, Oral, Daily      folic acid 400 MCG tablet  Commonly known as: FOLVITE   400 mcg, Oral      glimepiride 4 MG tablet  Commonly known as: AMARYL   4 mg, Oral, 2 Times Daily With Meals, Needs pm dose      meclizine 25 MG tablet  Commonly known as: ANTIVERT   25 mg, Oral, 3-4 times daily      nitroglycerin 0.4 MG SL tablet  Commonly known as: NITROSTAT   0.4 mg, Sublingual, Every 5 Minutes PRN      pantoprazole 40 MG EC tablet  Commonly known as: PROTONIX   40 mg, Oral, Every 12 Hours      potassium chloride 20 MEQ CR tablet  Commonly known as: K-DUR,KLOR-CON   10 mEq, Oral, Every 12 Hours      pravastatin 40 MG tablet  Commonly known as: PRAVACHOL   40 mg, Oral, Nightly      rOPINIRole 1 MG tablet  Commonly known as: REQUIP   1 mg, Oral, Nightly, Take 1 hour before bedtime.       sennosides-docusate 8.6-50 MG per tablet  Commonly known as: PERICOLACE   2 tablets, Oral, 2 Times Daily PRN      tamsulosin 0.4 MG capsule 24 hr capsule  Commonly known as: FLOMAX   1 capsule, Oral, Daily      thiamine 100 MG tablet  Commonly known as: VITAMIN B-1   100 mg, Oral      Toprol XL 25 MG 24 hr tablet  Generic drug: metoprolol succinate  XL   25 mg, Oral, Every Night at Bedtime      Vitamin B6 200 MG tablet   1 tablet, Oral, Daily      vitamin C 500 MG tablet  Commonly known as: ASCORBIC ACID   500 mg, Oral, Daily      Vitamin D 1000 units tablet   1,000 Units, Oral         ASK your doctor about these medications      Instructions Start Date   cefdinir 300 MG capsule  Commonly known as: OMNICEF  Ask about: Should I take this medication?   300 mg, Oral, 2 Times Daily             Discharge Diet:   Diet Instructions     Diet: Soft Texture, Consistent Carbohydrate; Honey Thick Liquids; Ground      Discharge Diet:  Soft Texture  Consistent Carbohydrate       Fluid Consistency: Honey Thick Liquids    Soft Options: Ground    Diet: Soft Texture, Consistent Carbohydrate; Thin Liquids, No Restrictions; Ground      Discharge Diet:  Soft Texture  Consistent Carbohydrate       Fluid Consistency: Thin Liquids, No Restrictions    Soft Options: Ground          Discharge Care Plan / Instructions:   Discharge home with family    Activity at Discharge:   Activity Instructions     Activity as Tolerated      Activity as Tolerated            Follow-up Appointments:  Follow-up with PCP next week       Electronically signed by Rocky Nixon DO, 11/10/20, 12:29 CST.    Time: Discharge Over 30 min    Part of this note may be an electronic transcription/translation of spoken language to printed text using the Dragon Dictation system.        Electronically signed by Rocky Nixon DO at 11/10/20 [id number]

## 2020-11-11 NOTE — OUTREACH NOTE
Prep Survey      Responses   [facility] facility patient discharged from?  [facility]   Is LACE score < 7 ?  No   Eligibility  Readm Mgmt   Discharge diagnosis  Metabolic encephalopathy, UGI bleed, E. Coli UTI, ALLIE, CKD [s/p EGD revealing non bleeding duodenal ulcers]   Does the patient have one of the following disease processes/diagnoses(primary or secondary)?  Other   Does the patient have Home health ordered?  Yes   What is the Home health agency?   [facility]    Is there a DME ordered?  Yes   What DME was ordered?  BSC, WC and hospital bed from [facility]   Comments regarding appointments  Scheduled per AVS   Prep survey completed?  Yes          Laura Estrada RN

## 2020-11-11 NOTE — THERAPY DISCHARGE NOTE
Acute Care - Physical Therapy Discharge Summary  [facility]       Patient Name: Eron Escalante  : 1934  MRN: [id number]    Today's Date: 2020  Onset of Illness/Injury or Date of Surgery: 10/26/20              Admit Date: 10/26/2020      PT Recommendation and Plan    Visit Dx:    ICD-10-CM ICD-9-CM   1. Oropharyngeal dysphagia  R13.12 787.22   2. Impaired mobility  Z74.09 799.89   3. Heme positive stool  R19.5 792.1   4. Decreased activities of daily living (ADL)  Z78.9 V49.89   5. Type 2 diabetes mellitus with diabetic nephropathy, without long-term current use of insulin (CMS/HCC)  E11.21 250.40     583.81   6. Essential hypertension  I10 401.9   7. Chronic atrial fibrillation (CMS/HCC)  I48.20 427.31   8. Metabolic encephalopathy  G93.41 348.31   9. E. coli UTI (urinary tract infection)  N39.0 599.0    B96.20 041.49   10. Weakness of extremity  R29.898 729.89   11. CHF (congestive heart failure), NYHA class I, acute on chronic, combined (CMS/HCC)  I50.43 428.43     428.0       Outcome Measures     Row Name 20 1500             How much help from another is currently needed...    Putting on and taking off regular lower body clothing?  1  -TM (r) MR (t) TM (c)      Bathing (including washing, rinsing, and drying)  1  -TM (r) MR (t) TM (c)      Toileting (which includes using toilet bed pan or urinal)  2  -TM (r) MR (t) TM (c)      Putting on and taking off regular upper body clothing  1  -TM (r) MR (t) TM (c)      Taking care of personal grooming (such as brushing teeth)  2  -TM (r) MR (t) TM (c)      Eating meals  2  -TM (r) MR (t) TM (c)      AM-PAC 6 Clicks Score (OT)  9  -TM (r) MR (t)        User Key  (r) = Recorded By, (t) = Taken By, (c) = Cosigned By    Initials Name Provider Type    TM Sean Woodson, RN Registered Nurse    Russel Mejia OT Student              Rehab Goal Summary     Row Name 20 0706             Bed Mobility Goal 1 (PT)    [place] Level/Cues Needed (Bed  Mobility Goal 1, PT)  minimum assist (75% or more patient effort)  -AH      Time Frame (Bed Mobility Goal 1, PT)  by discharge  -      Progress/Outcomes (Bed Mobility Goal 1, PT)  goal met  -AH         Transfer Goal 1 (PT)    Activity/Assistive Device (Transfer Goal 1, PT)  sit-to-stand/stand-to-sit  -      [place] Level/Cues Needed (Transfer Goal 1, PT)  contact guard assist  -AH      Time Frame (Transfer Goal 1, PT)  by discharge  -      Progress/Outcome (Transfer Goal 1, PT)  goal not met  -AH         Gait Training Goal 1 (PT)    Activity/Assistive Device (Gait Training Goal 1, PT)  gait (walking locomotion);assistive device use  -      [place] Level (Gait Training Goal 1, PT)  minimum assist (75% or more patient effort)  -      Distance (Gait Training Goal 1, PT)  20ft  -AH      Time Frame (Gait Training Goal 1, PT)  by discharge  -      Progress/Outcome (Gait Training Goal 1, PT)  goal met  -        User Key  (r) = Recorded By, (t) = Taken By, (c) = Cosigned By    Initials Name Provider Type Discipline    Maria C Saleh PTA Physical Therapy Assistant PT              PT Discharge Summary  Reason for Discharge: Discharge from facility  Outcomes Achieved: Refer to plan of care for updates on goals achieved  Discharge Destination: Home with assist, Home with home health      Maria C Fagan PTA   11/11/2020

## 2020-11-15 PROBLEM — I10 ESSENTIAL HYPERTENSION, MALIGNANT: Chronic | Status: ACTIVE | Noted: 2020-01-01

## 2020-11-15 PROBLEM — R41.82 AMS (ALTERED MENTAL STATUS): Status: ACTIVE | Noted: 2020-01-01

## 2020-11-15 PROBLEM — E11.9 TYPE 2 DIABETES MELLITUS, WITHOUT LONG-TERM CURRENT USE OF INSULIN (HCC): Chronic | Status: ACTIVE | Noted: 2020-01-01

## 2020-11-15 PROBLEM — E16.2 HYPOGLYCEMIA: Status: ACTIVE | Noted: 2020-01-01

## 2020-11-15 NOTE — PROGRESS NOTES
Discussed with his nurse, Tracy.    I am familiar with the patient as I took care of in the last week that I was on service.  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  The initial plan was for him to go to skilled nursing, but his family decided to take him home.    He returned to the emergency department yesterday with hypoglycemia.  I had the patient off of his sulfonylurea medication while he was in the hospital.  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  He has been taking glimepiride 4 mg twice daily in the home setting.  His most recent hemoglobin A1c was 6.1.  Discontinue the sulfonylurea.  Follow on Accu-Cheks and sliding scale insulin.  His blood sugars have been very well on his last day and he required very little correction insulin at that time.  It may be reasonable to have him on Metformin monotherapy given his age and hemoglobin A1c.    Hopefully, this is only a minor setback and he can be discharged home again tomorrow.    Electronically signed by Elpidio Irby DO, 11/15/20, 11:52 AM AMADEO.

## 2020-11-15 NOTE — THERAPY EVALUATION
Acute Care - Speech Language Pathology   Swallow Initial Evaluation [facility]     Patient Name: Eron Escalante  : 1934  MRN: [id number]  Today's Date: 11/15/2020               Admit Date: 2020     • Bedside Swallow Evaluation completed.  Patient is well known to SLP due to recent hospital stay.  He is not as alert as when discharged and in fact is back to state he was in when first admitted previous visit.  Speech mummbled, not opening eyes but attempts to respond or repeat SLP.  Did not try solids due to poor alertness, but he did take applesauce, honey thick, nectar thick, and thin liquids.  Throat clearing with thin but no other overt s/s of aspiration.  Rec  • Pureed diet with nectar thick liquids  • water between meals  • PO only of alert  • SLP to follow and upgrade as warranted.  Aida Hunt MS CCC-SLP 11/15/2020 15:03 CST    Visit Dx:     ICD-10-CM ICD-9-CM   1. Hypoglycemia  E16.2 251.2   2. Oropharyngeal dysphagia  R13.12 787.22     Patient Active Problem List   Diagnosis   • Hypoglycemia   • Type 2 diabetes mellitus, without long-term current use of insulin (CMS/[facility])   • Essential hypertension, malignant   • AMS (altered mental status)     Past Medical History:   Diagnosis Date   • CAD (coronary artery disease)    • CHF (congestive heart failure) (CMS/HCC)    • Diabetes (CMS/HCC)    • Hypertension    • PVD (peripheral vascular disease) (CMS/[facility])      Past Surgical History:   Procedure Laterality Date   • LEFT HEART CATH          SWALLOW EVALUATION (last 72 hours)      SLP Adult Swallow Evaluation     Row Name 11/15/20 1200                   Rehab Evaluation    Document Type  evaluation  -KW        Subjective Information  no complaints  -KW        Patient Observations  lethargic  -KW        Patient/Family/Caregiver Comments/Observations  no family present  -KW        Care Plan Review  care plan/treatment goals reviewed  -KW        Care Plan Review, Other Participant(s)  caregiver  -KW         Patient Effort  fair  -KW           General Information    Patient Profile Reviewed  yes  -KW        Pertinent History Of Current Problem  recent hospital stay, SLP following for dysphagia.  Modified diet.  -KW        Current Method of Nutrition  NPO  -KW        Precautions/Limitations, Vision  WFL  -KW        Precautions/Limitations, Hearing  WFL;for purposes of eval  -KW        Prior Level of Function-Communication  cognitive-linguistic impairment;motor speech impairment  -KW        Prior Level of Function-Swallowing  nectar thick liquids;mechanical soft textures  -KW        Plans/Goals Discussed with  patient  -KW        Barriers to Rehab  previous functional deficit;cognitive status  -KW        Patient's Goals for Discharge  patient could not state  -KW           Pain    Additional Documentation  Pain Scale: FACES Pre/Post-Treatment (Group)  -KW           Pain Scale: FACES Pre/Post-Treatment    Pain: FACES Scale, Pretreatment  0-->no hurt  -KW        Posttreatment Pain Rating  0-->no hurt  -KW           Oral Motor Structure and Function    Dentition Assessment  edentulous  -KW        Secretion Management  dried secretions in oral cavity  -KW        Mucosal Quality  dry  -KW        Volitional Swallow  unable to elicit  -KW        Volitional Cough  unable to elicit  -KW           Oral Musculature and Cranial Nerve Assessment    Oral Motor General Assessment  generalized oral motor weakness  -KW        Lingual Impairment, Detail. Cranial Nerves IX, XII (Glossopharyngeal and Hypoglossal)  reduced lingual ROM;reduced strength  -KW           General Eating/Swallowing Observations    Eating/Swallowing Skills  fed by SLP  -KW        Positioning During Eating  upright in bed  -KW        Utensils Used  spoon;straw  -KW        Consistencies Trialed  thin liquids;nectar/syrup-thick liquids;honey-thick liquids;pudding thick  -KW           Clinical Swallow Eval    Oral Prep Phase  WFL  -KW        Oral Transit  WFL  -KW         Oral Residue  WFL  -KW        Pharyngeal Phase  suspected pharyngeal impairment  -KW        Esophageal Phase  unremarkable  -KW        Clinical Swallow Evaluation Summary  Bedside Swallow Evaluation completed.  Patient is well known to SLP due to recent hospital stay.  He is not as alert as when discharged and in fact is back to state he was in when first admitted previous visit.  Speech mummbled, not opening eyes but attempts to respond or repeat SLP.  Did not try solids due to poor alertness, but he did take applesauce, honey thick, nectar thick, and thin liquids.  Throat clearing with thin but no other overt s/s of aspiration.  Rec; Pureed diet with nectar thick liquids; water between meals; PO only of alert; SLP to follow and upgrade as warranted.   -KW           Pharyngeal Phase Concerns    Pharyngeal Phase Concerns  throat clear  -KW        Throat Clear  thin  -KW           Clinical Impression    SLP Swallowing Diagnosis  moderate;oral dysphagia;pharyngeal dysphagia  -KW        Functional Impact  risk of aspiration/pneumonia;risk of malnutrition;risk of dehydration  -KW        Rehab Potential/Prognosis, Swallowing  good, to achieve stated therapy goals  -KW        Swallow Criteria for Skilled Therapeutic Interventions Met  demonstrates skilled criteria  -KW           Recommendations    Therapy Frequency (Swallow)  at least;3 days per week  -KW        Predicted Duration Therapy Intervention (Days)  until discharge  -KW        SLP Diet Recommendation  puree;nectar thick liquids;water between meals after oral care, with supervision  -KW        Recommended Precautions and Strategies  upright posture during/after eating;small bites of food and sips of liquid  -KW        Oral Care Recommendations  Oral Care BID/PRN  -KW        SLP Rec. for Method of Medication Administration  meds crushed;with pudding or applesauce  -KW        Monitor for Signs of Aspiration  notify SLP if any concerns  -KW        Anticipated  Discharge Disposition (SLP)  skilled nursing facility  -KW           Swallow Goals (SLP)    Oral Nutrition/Hydration Goal Selection (SLP)  oral nutrition/hydration, SLP goal 1  -KW        Lingual Strengthening Goal Selection (SLP)  lingual strengthening, SLP goal 1  -KW        Pharyngeal Strengthening Exercise Goal Selection (SLP)  pharyngeal strengthening exercise, SLP goal 1  -KW        Additional Documentation  lingual strengthening goal selection (SLP);pharyngeal strengthening exercise goal selection (SLP)  -KW           Oral Nutrition/Hydration Goal 1 (SLP)    Oral Nutrition/Hydration Goal 1, SLP  LTG: Patient will tolereate LRD with no overt s/s of aspiration  -KW        Time Frame (Oral Nutrition/Hydration Goal 1, SLP)  short term goal (STG);by discharge  -KW        Barriers (Oral Nutrition/Hydration Goal 1, SLP)  cognition  -KW        Progress/Outcomes (Oral Nutrition/Hydration Goal 1, SLP)  goal ongoing  -KW           Lingual Strengthening Goal 1 (SLP)    Activity (Lingual Strengthening Goal 1, SLP)  increase lingual tone/sensation/control/coordination/movement  -KW        Increase Lingual Tone/Sensation/Control/Coordination/Movement  lingual movement exercises  -KW        [place]/Accuracy (Lingual Strengthening Goal 1, SLP)  independently (over 90% accuracy)  -KW        Time Frame (Lingual Strengthening Goal 1, SLP)  short term goal (STG);by discharge  -KW        Barriers (Lingual Strengthening Goal 1, SLP)  cognition  -KW        Progress/Outcomes (Lingual Strengthening Goal 1, SLP)  goal ongoing  -KW           Pharyngeal Strengthening Exercise Goal 1 (SLP)    Activity (Pharyngeal Strengthening Goal 1, SLP)  increase epiglottic inversion and retroflexion;increase tongue base retraction  -KW        Increase Epiglottic Inversion and Retroflexion  Mendelsohn;falsetto  -KW        Increase Tongue Base Retraction  hard effortful swallow;vita  -KW        [place]/Accuracy (Pharyngeal Strengthening Goal  1, SLP)  independently (over 90% accuracy)  -KW        Time Frame (Pharyngeal Strengthening Goal 1, SLP)  short term goal (STG);by discharge  -KW        Barriers (Pharyngeal Strengthening Goal 1, SLP)  cognition  -KW        Progress/Outcomes (Pharyngeal Strengthening Goal 1, SLP)  goal ongoing  -KW          User Key  (r) = Recorded By, (t) = Taken By, (c) = Cosigned By    Initials Name Effective Dates    FELY Marilee Aida A, MS CCC-SLP 08/09/20 -           EDUCATION  The patient has been educated in the following areas:   Dysphagia (Swallowing Impairment).    SLP Recommendation and Plan  SLP Swallowing Diagnosis: moderate, oral dysphagia, pharyngeal dysphagia  SLP Diet Recommendation: puree, nectar thick liquids, water between meals after oral care, with supervision  Recommended Precautions and Strategies: upright posture during/after eating, small bites of food and sips of liquid  SLP Rec. for Method of Medication Administration: meds crushed, with pudding or applesauce     Monitor for Signs of Aspiration: notify SLP if any concerns     Swallow Criteria for Skilled Therapeutic Interventions Met: demonstrates skilled criteria  Anticipated Discharge Disposition (SLP): skilled nursing facility  Rehab Potential/Prognosis, Swallowing: good, to achieve stated therapy goals  Therapy Frequency (Swallow): at least, 3 days per week  Predicted Duration Therapy Intervention (Days): until discharge                         Plan of Care Reviewed With: patient  Progress: improving  Outcome Summary: Bedside Swallow Evaluation completed.  Patient is well known to SLP due to recent hospital stay.  He is not as alert as when discharged and in fact is back to state he was in when first admitted previous visit.  Speech mummbled, not opening eyes but attempts to respond or repeat SLP.  Did not try solids due to poor alertness, but he did take applesauce, honey thick, nectar thick, and thin liquids.  Throat clearing with thin but no other  overt s/s of aspiration.  Rec; Pureed diet with nectar thick liquids; water between meals; PO only of alert; SLP to follow and upgrade as warranted.    SLP GOALS     Row Name 11/15/20 1200             Oral Nutrition/Hydration Goal 1 (SLP)    Oral Nutrition/Hydration Goal 1, SLP  LTG: Patient will tolereate LRD with no overt s/s of aspiration  -KW      Time Frame (Oral Nutrition/Hydration Goal 1, SLP)  short term goal (STG);by discharge  -KW      Barriers (Oral Nutrition/Hydration Goal 1, SLP)  cognition  -KW      Progress/Outcomes (Oral Nutrition/Hydration Goal 1, SLP)  goal ongoing  -KW         Lingual Strengthening Goal 1 (SLP)    Activity (Lingual Strengthening Goal 1, SLP)  increase lingual tone/sensation/control/coordination/movement  -KW      Increase Lingual Tone/Sensation/Control/Coordination/Movement  lingual movement exercises  -KW      [place]/Accuracy (Lingual Strengthening Goal 1, SLP)  independently (over 90% accuracy)  -KW      Time Frame (Lingual Strengthening Goal 1, SLP)  short term goal (STG);by discharge  -KW      Barriers (Lingual Strengthening Goal 1, SLP)  cognition  -KW      Progress/Outcomes (Lingual Strengthening Goal 1, SLP)  goal ongoing  -KW         Pharyngeal Strengthening Exercise Goal 1 (SLP)    Activity (Pharyngeal Strengthening Goal 1, SLP)  increase epiglottic inversion and retroflexion;increase tongue base retraction  -KW      Increase Epiglottic Inversion and Retroflexion  Mendelsohn;falsetto  -KW      Increase Tongue Base Retraction  hard effortful swallow;vita  -KW      [place]/Accuracy (Pharyngeal Strengthening Goal 1, SLP)  independently (over 90% accuracy)  -KW      Time Frame (Pharyngeal Strengthening Goal 1, SLP)  short term goal (STG);by discharge  -KW      Barriers (Pharyngeal Strengthening Goal 1, SLP)  cognition  -KW      Progress/Outcomes (Pharyngeal Strengthening Goal 1, SLP)  goal ongoing  -KW        User Key  (r) = Recorded By, (t) = Taken By, (c) =  Cosigned By    Initials Name Provider Type    Aida Burns MS CCC-SLP Speech and Language Pathologist             Time Calculation:   Time Calculation- SLP     Row Name 11/15/20 1502             Time Calculation- SLP    SLP Start Time  1200  -KW      SLP Stop Time  1230  -KW      SLP Time Calculation (min)  30 min  -KW      SLP Received On  11/15/20  -KW      SLP Goal Re-Cert Due Date  11/25/20  -KW        User Key  (r) = Recorded By, (t) = Taken By, (c) = Cosigned By    Initials Name Provider Type    Aida Burns MS CCC-SLP Speech and Language Pathologist          Therapy Charges for Today     Code Description Service Date Service Provider Modifiers Qty    [id number] HC ST EVAL ORAL PHARYNG SWALLOW 2 11/15/2020 Aida Hunt MS CCC-SLP GN 1               MS ISABEL Rodriguez  11/15/2020

## 2020-11-15 NOTE — ED PROVIDER NOTES
"Subjective   This patient is an 85-year-old male who was recently discharged from this hospital but is brought back tonight because of decreased level of consciousness.  According to the granddaughter this patient was entirely functional a few weeks ago but ended up being admitted to [facility] for decreased level of consciousness.  She states that they did \"nothing \"over there so had the patient transferred here.  When he arrived in the ED via EMS he was unresponsive and gurgling.  There were reports from EMS of blood sugars in the 40s.  When he arrived here they were similarly low.  The patient was unable to contribute more to the history.          Review of Systems   Unable to perform ROS: Mental status change       No past medical history on file.    Not on File    No past surgical history on file.    No family history on file.    Social History     Socioeconomic History   • Marital status:      Spouse name: Not on file   • Number of children: Not on file   • Years of education: Not on file   • Highest education level: Not on file           Objective   Physical Exam  Vitals signs and nursing note reviewed.   Constitutional:       Appearance: He is well-developed.   HENT:      Head: Normocephalic and atraumatic.      Right Ear: External ear normal.      Left Ear: External ear normal.      Nose: Nose normal.   Eyes:      Conjunctiva/sclera: Conjunctivae normal.   Neck:      Musculoskeletal: Normal range of motion and neck supple.   Cardiovascular:      Rate and Rhythm: Normal rate and regular rhythm.      Heart sounds: Normal heart sounds.   Pulmonary:      Effort: Pulmonary effort is normal.      Breath sounds: Normal breath sounds.   Abdominal:      General: Bowel sounds are normal.      Palpations: Abdomen is soft.   Musculoskeletal: Normal range of motion.   Skin:     General: Skin is warm and dry.      Capillary Refill: Capillary refill takes less than 2 seconds.   Neurological:      Mental " Status: He is lethargic, disoriented and confused.      Comments: Unable to participate with the neuro exam   Psychiatric:         Behavior: Behavior normal.         Thought Content: Thought content normal.         Judgment: Judgment normal.         Procedures           ED Course                                           MDM  Number of Diagnoses or Management Options     Amount and/or Complexity of Data Reviewed  Clinical lab tests: ordered and reviewed  Tests in the radiology section of CPT®: ordered and reviewed    Patient Progress  Patient progress: stable      Final diagnoses:   Hypoglycemia     The patient remained hypoglycemic in the ED and needs admission for close observation of this.  I discussed the case with Dr. Rosenberg who knows the patient well and he will admit the patient under his care.  The patient's blood sugar is currently 171       Ben Osuna MD  11/15/20 0119

## 2020-11-15 NOTE — PLAN OF CARE
Problem: Adult Inpatient Plan of Care  Goal: Plan of Care Review  Outcome: Ongoing, Progressing  Flowsheets (Taken 11/15/2020 1501)  Progress: improving  Plan of Care Reviewed With: patient  Outcome Summary:   Bedside Swallow Evaluation completed.  Patient is well known to SLP due to recent hospital stay.  He is not as alert as when discharged and in fact is back to state he was in when first admitted previous visit.  Speech mummbled, not opening eyes but attempts to respond or repeat SLP.  Did not try solids due to poor alertness, but he did take applesauce, honey thick, nectar thick, and thin liquids.  Throat clearing with thin but no other overt s/s of aspiration.  Rec   Pureed diet with nectar thick liquids   water between meals   PO only of alert   SLP to follow and upgrade as warranted.

## 2020-11-15 NOTE — ED NOTES
Per Dr. Irby pt can be downgraded to med/surg floor. Will notify bedboard     Aileen Cox RN  11/15/20 7841

## 2020-11-15 NOTE — H&P
"      [facility]  HISTORY AND PHYSICAL    Date of Admission: 11/14/2020  Primary Care Physician: Jeffrey Owen MD    Subjective     Chief Complaint: Upper glycemia    History of Present Illness  Patient is an 85-year-old white male with medical problems including coronary artery disease hypertension type 2 diabetes.  He is recently been hospitalized here for altered mental status and extensive work-up found to have a UTI.  He presents to the ER for decreased level of consciousness.  He was found to have a blood sugar by EMS arrival in the 40s.  He continued to have low blood sugars when he arrived here he is on oral hypoglycemics and insulin I believe.  Due to this fact is unresponsiveness we been asked to admit him.  The history and information is obtained from the granddaughter who is the POA and is present.  His work-up is otherwise unremarkable except for persistent hypoglycemia.        Review of Systems   Unable to obtain patient is obtunded  Otherwise complete ROS reviewed and negative except as mentioned in the HPI.    Past Medical History:   Past Medical History:   Diagnosis Date   • CAD (coronary artery disease)    • CHF (congestive heart failure) (CMS/HCC)    • Diabetes (CMS/HCC)    • Hypertension    • PVD (peripheral vascular disease) (CMS/[facility])      Past Surgical History:  Past Surgical History:   Procedure Laterality Date   • LEFT HEART CATH       Social History:  reports that he has quit smoking. He does not have any smokeless tobacco history on file.    Family History: family history is not on file.   Unable to obtain patient is obtunded    Allergies:  Allergies   Allergen Reactions   • Lorazepam Anxiety   • Adhesive Tape Rash   • Penicillins Swelling     Medications:  Prior to Admission medications    Not on File     Objective     Vital Signs: /79   Pulse 78   Temp 96.9 °F (36.1 °C) (Rectal)   Resp 18   Ht 182.9 cm (72\")   Wt 98 kg (216 " lb 1.6 oz)   SpO2 99%   BMI 29.31 kg/m²   Physical Exam   Gen.:  Well-nourished well-developed white male in no acute distress  HEENT: Atraumatic, normocephalic.  Pupils equal, round, and reactive to light. Extraocular movements intact.  Sclerae anicteric.  External ears negative.  Mucous membranes moist.  Neck is supple without lymphadenopathy.  No JVD is noted.  No carotid bruits are auscultated.  Oropharynx is without erythema or exudate.   Chest: Clear to auscultation and percussion.  CV: Regular rate and rhythm.  Normal S1-S2.  No gallops, murmurs. or rubs.  Abdomen: Soft, nontender, nondistended.  Active bowel sounds,  No hepatosplenomegaly.  No masses.  No hernias.  Extremities: No clubbing, edema, or cyanosis.  Capillary refill is normal.  Pulses are 2+ and symmetric at radial and dorsalis pedis.  Posterior tibial pulses are intact and 2+ palpable.  Neuro: Patient is  obtunded cranial nerves I patient will not cooperate with rest of exam  Skin: Warm, dry, and intact.  No evidence of breakdown.  Sensorium: Tended    Nursing notes and vital signs reviewed        Results Reviewed:  Lab Results (last 24 hours)     Procedure Component Value Units Date/Time    POC Glucose Once [[id number]]  (Normal) Collected: 11/15/20 0521    Specimen: Blood Updated: 11/15/20 0537     Glucose 88 mg/dL      Comment: : [id number] [employer] ID: [id number]       POC Glucose Once [[id number]]  (Normal) Collected: 11/15/20 0423    Specimen: Blood Updated: 11/15/20 0434     Glucose 96 mg/dL      Comment: : [id number] [employer] ID: [id number]       POC Occult Blood Stool [[id number]]  (Normal) Collected: 11/15/20 0220    Specimen: Stool Updated: 11/15/20 0402     Fecal Occult Blood Negative     Lot Number [id number]     Expiration Date 7/31/2021     DEVELOPER LOT NUMBER 185     DEVELOPER EXPIRATION DATE 7/31/2021     Positive Control Positive     Negative Control Negative    Urinalysis With Culture If Indicated - Urine,  Catheter [[id number]]  (Abnormal) Collected: 11/15/20 0210    Specimen: Urine, Catheter Updated: 11/15/20 0224     Color, UA Dark Yellow     Appearance, UA Clear     pH, UA 5.5     Specific Gravity, UA 1.019     Glucose, UA Negative     Ketones, UA Negative     Bilirubin, UA Negative     Blood, UA Negative     Protein, UA 30 mg/dL (1+)     Leuk Esterase, UA Negative     Nitrite, UA Negative     Urobilinogen, UA 0.2 E.U./dL    Urinalysis, Microscopic Only - Urine, Catheter [[id number]]  (Abnormal) Collected: 11/15/20 0210    Specimen: Urine, Catheter Updated: 11/15/20 0224     RBC, UA 0-2 /HPF      WBC, UA 0-2 /HPF      Bacteria, UA None Seen /HPF      Squamous Epithelial Cells, UA 3-6 /HPF      Hyaline Casts, UA 3-6 /LPF      Methodology Automated Microscopy    POC Glucose Once [[id number]]  (Abnormal) Collected: 11/15/20 0117    Specimen: Blood Updated: 11/15/20 0128     Glucose 171 mg/dL      Comment: : [id number] [employer] ID: [id number]       POC Glucose Once [[id number]]  (Abnormal) Collected: 11/15/20 0011    Specimen: Blood Updated: 11/15/20 0021     Glucose 43 mg/dL      Comment: : [id number] [employer] ID: [id number]       POC Glucose Once [[id number]]  (Abnormal) Collected: 11/15/20 0009    Specimen: Blood Updated: 11/15/20 0021     Glucose 42 mg/dL      Comment: : [id number] [employer] ID: [id number]       POC Glucose Once [[id number]]  (Abnormal) Collected: 11/14/20 2248    Specimen: Blood Updated: 11/14/20 2312     Glucose 58 mg/dL      Comment: : [id number] [employer] ID: [id number]       Blood Culture - Blood, Arm, Right [[id number]] Collected: 11/14/20 2300    Specimen: Blood from Arm, Right Updated: 11/14/20 2304    COVID PRE-OP / PRE-PROCEDURE SCREENING ORDER (NO ISOLATION) - Swab, Nasal Cavity [[id number]]  (Normal) Collected: 11/14/20 2045    Specimen: Swab from Nasal Cavity Updated: 11/14/20 2128    Narrative:      The following orders were created for panel  order COVID PRE-OP / PRE-PROCEDURE SCREENING ORDER (NO ISOLATION) - Swab, Nasal Cavity.  Procedure                               Abnormality         Status                     ---------                               -----------         ------                     COVID-19,[employer] IN-[facility]SE[facility]...[[id number]]  Normal              Final result                 Please view results for these tests on the individual orders.    COVID-19,[employer] IN-HOUSE,Nasal Swab No Transport Media 3-4 HR TAT - Swab, Nasal Cavity [[id number]]  (Normal) Collected: 11/14/20 2045    Specimen: Swab from Nasal Cavity Updated: 11/14/20 2128     COVID19 Not Detected    Narrative:      Fact sheet for providers: https://www.fda.gov/media/692664/download     Fact sheet for patients: https://www.fda.gov/media/851190/download    POC Glucose Once [[id number]]  (Normal) Collected: 11/14/20 2052    Specimen: Blood Updated: 11/14/20 2103     Glucose 89 mg/dL      Comment: : [id number] [employer] ID: [id number]       Lactic Acid, Plasma [[id number]]  (Normal) Collected: 11/14/20 2017    Specimen: Blood Updated: 11/14/20 2057     Lactate 0.8 mmol/L     Comprehensive Metabolic Panel [[id number]]  (Abnormal) Collected: 11/14/20 2017    Specimen: Blood Updated: 11/14/20 2049     Glucose 227 mg/dL      BUN 16 mg/dL      Creatinine 0.95 mg/dL      Sodium 141 mmol/L      Potassium 5.0 mmol/L      Comment: Slight hemolysis detected by analyzer. Results may be affected.        Chloride 108 mmol/L      CO2 26.0 mmol/L      Calcium 10.0 mg/dL      Total Protein 6.2 g/dL      Albumin 2.80 g/dL      ALT (SGPT) 31 U/L      AST (SGOT) 45 U/L      Comment: Slight hemolysis detected by analyzer. Results may be affected.        Alkaline Phosphatase 105 U/L      Total Bilirubin 0.2 mg/dL      eGFR Non African Amer 76 mL/min/1.73      eGFR  African Amer 92 mL/min/1.73      Globulin 3.4 gm/dL      A/G Ratio 0.8 g/dL      BUN/Creatinine Ratio 16.8     Anion Gap 7.0 mmol/L      Narrative:      GFR Normal >60  Chronic Kidney Disease <60  Kidney Failure <15      Blood Culture - Blood, Arm, Left [[id number]] Collected: 11/14/20 2017    Specimen: Blood from Arm, Left Updated: 11/14/20 2048    Lipase [[id number]]  (Abnormal) Collected: 11/14/20 2017    Specimen: Blood Updated: 11/14/20 2043     Lipase 12 U/L     Magnesium [[id number]]  (Normal) Collected: 11/14/20 2017    Specimen: Blood Updated: 11/14/20 2043     Magnesium 2.0 mg/dL     Protime-INR [[id number]]  (Normal) Collected: 11/14/20 2017    Specimen: Blood Updated: 11/14/20 2040     Protime 13.4 Seconds      INR 1.06    aPTT [[id number]]  (Normal) Collected: 11/14/20 2017    Specimen: Blood Updated: 11/14/20 2040     PTT 30.1 seconds     CBC & Differential [[id number]]  (Abnormal) Collected: 11/14/20 2017    Specimen: Blood Updated: 11/14/20 2032    Narrative:      The following orders were created for panel order CBC & Differential.  Procedure                               Abnormality         Status                     ---------                               -----------         ------                     CBC Auto Differential[[id number]]        Abnormal            Final result                 Please view results for these tests on the individual orders.    CBC Auto Differential [[id number]]  (Abnormal) Collected: 11/14/20 2017    Specimen: Blood Updated: 11/14/20 2032     WBC 7.13 10*3/mm3      RBC 3.38 10*6/mm3      Hemoglobin 10.0 g/dL      Hematocrit 31.4 %      MCV 92.9 fL      MCH 29.6 pg      MCHC 31.8 g/dL      RDW 20.0 %      RDW-SD 67.8 fl      MPV 10.1 fL      Platelets 363 10*3/mm3      Neutrophil % 89.5 %      Lymphocyte % 5.6 %      Monocyte % 4.3 %      Eosinophil % 0.1 %      Basophil % 0.1 %      Immature Grans % 0.4 %      Neutrophils, Absolute 6.37 10*3/mm3      Lymphocytes, Absolute 0.40 10*3/mm3      Monocytes, Absolute 0.31 10*3/mm3      Eosinophils, Absolute 0.01 10*3/mm3      Basophils, Absolute 0.01 10*3/mm3       Immature Grans, Absolute 0.03 10*3/mm3      nRBC 0.0 /100 WBC     POC Glucose Once [[id number]]  (Abnormal) Collected: 11/14/20 1937    Specimen: Blood Updated: 11/14/20 1949     Glucose 47 mg/dL      Comment: : [id number] [employer] ID: [id number]           Imaging Results (Last 24 Hours)     Procedure Component Value Units Date/Time    CT Head Without Contrast [[id number]] Resulted: 11/14/20 2217     Updated: 11/14/20 2223    XR Chest 1 View [[id number]] Collected: 11/14/20 2111     Updated: 11/14/20 2115    Narrative:      EXAMINATION: Chest 1 view 11/14/2020     HISTORY: Altered mental status     FINDINGS: Upright frontal projection of chest is expiratory causing  accentuation of the bronchovascular markings and cardiac silhouette.  There is cardiomegaly with mild pulmonary vascular congestion. Small  effusions are suspected with blunting of the costophrenic angles.       Impression:      1. Cardiomegaly. Expiratory chest.  2. Pulmonary vascular congestion. Small effusions are suspected.  This report was finalized on 11/14/2020 21:12 by Dr. Jamal Ojeda MD.        I have personally reviewed and interpreted the radiology studies and ECG obtained at time of admission.     Assessment / Plan     Assessment:   Active Hospital Problems    Diagnosis   • Hypoglycemia   • Type 2 diabetes mellitus, without long-term current use of insulin (CMS/[facility])   • Essential hypertension, malignant   • AMS (altered mental status)   1.  Hypoglycemia related to oral hypoglycemics.  Hold medications every hour Accu-Cheks.  Continue dextrose containing fluids.  Once he is stabilized can go to every 4 hour and then etc.  2.  Hypertension find out if medications are resumed him.  3.  Altered mental status probably related to his hypoglycemia hopefully will resolve when he is normoglycemic.  If not needs further work-up.      Patient seen after midnight         Code Status: DNR/DNI     I discussed the patient's findings and  my recommendations with the patient's granddaughter who is his surrogate healthcare decision maker.    Estimated length of stay 1-2 nights.    Patient seen and examined by me on November 15, 2020 at 1 AM.    Electronically signed by Iain Rosenberg MD, 11/15/20, 06:46 CST.

## 2020-11-16 PROBLEM — I10 ESSENTIAL HYPERTENSION: Status: ACTIVE | Noted: 2020-01-01

## 2020-11-16 PROBLEM — I48.91 ATRIAL FIBRILLATION (HCC): Status: ACTIVE | Noted: 2020-01-01

## 2020-11-16 PROBLEM — I50.32 CHRONIC DIASTOLIC HEART FAILURE (HCC): Status: ACTIVE | Noted: 2020-01-01

## 2020-11-16 PROBLEM — G93.41 METABOLIC ENCEPHALOPATHY: Status: ACTIVE | Noted: 2020-01-01

## 2020-11-16 PROBLEM — E11.649 TYPE 2 DIABETES MELLITUS WITH HYPOGLYCEMIA, WITHOUT LONG-TERM CURRENT USE OF INSULIN (HCC): Status: ACTIVE | Noted: 2020-01-01

## 2020-11-16 PROBLEM — D64.9 NORMOCYTIC ANEMIA: Status: ACTIVE | Noted: 2020-01-01

## 2020-11-16 PROBLEM — Z86.73 HISTORY OF STROKE: Status: ACTIVE | Noted: 2020-01-01

## 2020-11-16 NOTE — PROGRESS NOTES
Discharge Planning Assessment  [facility]     Patient Name: Eron Escalante  MRN: [id number]  Today's Date: 11/16/2020    Admit Date: 11/14/2020    Discharge Needs Assessment     Row Name 11/16/20 1038       Living Environment    Lives With  child(miles), adult    Name(s) of Who Lives With Patient  Bisi Bradley    Current Living Arrangements  home/apartment/condo    Primary Care Provided by  child(miles)    Provides Primary Care For  no one, unable/limited ability to care for self    Family Caregiver if Needed  child(miles), adult    Quality of Family Relationships  helpful;involved;supportive    Able to Return to Prior Arrangements  yes       Resource/Environmental Concerns    Resource/Environmental Concerns  none       Transition Planning    Patient/Family Anticipates Transition to  home with family;home with help/services    Patient/Family Anticipated Services at Transition  home health care    Transportation Anticipated  family or friend will provide       Discharge Needs Assessment    Current Outpatient/Agency/Support Group  homecare agency [facility]    Concerns to be Addressed  adjustment to diagnosis/illness;care coordination/care conferences;discharge planning    Outpatient/Agency/Support Group Needs  homecare agency    Discharge Facility/Level of Care Needs  home with home health    Discharge Coordination/Progress  Pt lives at his daughter's home. Spoke with his daughter, Bisi [phone]. Plan is to return home at d/c. She says they do not have a way to take pt home today, however. Pt was supposed to start services with [facility]  but ended up in the hospital before they could start services. They will need orders for home health with physical therapy. Will follow.        Discharge Plan    No documentation.       Continued Care and Services - Admitted Since 11/14/2020    Coordination has not been started for this encounter.         Demographic Summary    No documentation.       Functional Status    No documentation.        Psychosocial    No documentation.       Abuse/Neglect    No documentation.       Legal    No documentation.       Substance Abuse    No documentation.       Patient Forms    No documentation.           ISIDRO Ambrosio

## 2020-11-16 NOTE — PLAN OF CARE
Problem: Adult Inpatient Plan of Care  Goal: Plan of Care Review  Outcome: Ongoing, Progressing  Flowsheets (Taken 11/16/2020 1403)  Progress: no change  Plan of Care Reviewed With: patient  Outcome Summary: SLP treatment complete. Pt lethargic throughout session and is unable to follow commands at this time. Speech is dyarthric and mostly unintelligable. Pt did initiate a delayed swallow following gustatory stimulation. 3x ice chip atrial compelte with 2x immediate cough and questionable wet vocal quality. No other PO trials presented due to poor level of alertness. Ok to conitnue a pureed diet with nectar thick liquids when alert. SLP will continue to follow and treat.

## 2020-11-16 NOTE — PLAN OF CARE
Goal Outcome Evaluation:  Plan of Care Reviewed With: patient  Progress: no change  Outcome Summary: Patient refused meds overnight and became combative when taking blood sugar and labs. VSS. Incontinent of urine. Turned with assistance x 2. Safety maintained. Will continue to monitor.

## 2020-11-16 NOTE — OUTREACH NOTE
Medical Week 1 Survey      Responses   [facility] patient discharged from?  [place]   Does the patient have one of the following disease processes/diagnoses(primary or secondary)?  Other   Week 1 attempt successful?  Yes   Call start time  1454   Revoke  Readmitted   Discharge diagnosis  Metabolic encephalopathy, UGI bleed, E. Coli UTI, ALLIE, CKD   Person spoke with today (if not patient) and relationship  Isabel-grandchild           Sharon Agudelo RN

## 2020-11-16 NOTE — PROGRESS NOTES
[facility] Medicine Services  INPATIENT PROGRESS NOTE    Patient Name: Eron Escalante  Date of Admission: 11/14/2020  Today's Date: 11/16/20  Length of Stay: 1  Primary Care Physician: Jeffrey Owen MD    Subjective   Chief Complaint: Hypoglycemia.   HPI   Doing much better today.  Off dextrose fluids.  His most recent blood glucose was 135.    He could go home today, but his family cannot make arrangements for him to be back there until tomorrow.    Review of Systems   All pertinent negatives and positives are as above. All other systems have been reviewed and are negative unless otherwise stated.     Objective    Temp:  [98.1 °F (36.7 °C)-98.7 °F (37.1 °C)] 98.1 °F (36.7 °C)  Heart Rate:  [64-75] 64  Resp:  [16-18] 18  BP: (136-202)/() 136/104  Physical Exam  Constitutional:       Appearance: He is well-developed.      Comments: Up in bed.  No distress.  Nontoxic.  No family present.  Discussed with his nurse, Nadia.   HENT:      Head: Normocephalic and atraumatic.      Comments: Edentulous.  Eyes:      Conjunctiva/sclera: Conjunctivae normal.      Pupils: Pupils are equal, round, and reactive to light.   Neck:      Musculoskeletal: Neck supple.      Vascular: No JVD.   Cardiovascular:      Rate and Rhythm: Normal rate and regular rhythm.      Heart sounds: Normal heart sounds. No murmur. No friction rub. No gallop.    Pulmonary:      Effort: Pulmonary effort is normal. No respiratory distress.      Breath sounds: Normal breath sounds. No wheezing or rales.   Chest:      Chest wall: No tenderness.   Abdominal:      General: Bowel sounds are normal. There is no distension.      Palpations: Abdomen is soft.      Tenderness: There is no abdominal tenderness. There is no guarding or rebound.   Musculoskeletal: Normal range of motion.         General: No tenderness or deformity.   Skin:     General: Skin is warm and dry.      Findings: No rash.   Neurological:      General:  No focal deficit present.      Mental Status: He is alert and oriented to person, place, and time.      Cranial Nerves: No cranial nerve deficit.      Motor: Weakness present. No abnormal muscle tone.      Deep Tendon Reflexes: Reflexes normal.   Psychiatric:      Comments: Flat, but smiles and laughs.        Results Review:  I have reviewed the labs, radiology results, and diagnostic studies.    Laboratory Data:   Results from last 7 days   Lab Units 11/16/20 0522 11/14/20 2017   WBC 10*3/mm3 5.72 7.13   HEMOGLOBIN g/dL 8.9* 10.0*   HEMATOCRIT % 28.4* 31.4*   PLATELETS 10*3/mm3 311 363     Results from last 7 days   Lab Units 11/16/20 0522 11/14/20 2017   SODIUM mmol/L 136 141   POTASSIUM mmol/L 4.7 5.0   CHLORIDE mmol/L 106 108*   CO2 mmol/L 26.0 26.0   BUN mg/dL 13 16   CREATININE mg/dL 0.87 0.95   CALCIUM mg/dL 9.5 10.0   BILIRUBIN mg/dL  --  0.2   ALK PHOS U/L  --  105   ALT (SGPT) U/L  --  31   AST (SGOT) U/L  --  45*   GLUCOSE mg/dL 135* 227*     I have reviewed the patient's current medications.     Assessment/Plan     Active Hospital Problems    Diagnosis   • **Type 2 diabetes mellitus with hypoglycemia, without long-term current use of insulin (CMS/[facility])   • Metabolic encephalopathy   • Chronic diastolic heart failure (CMS/[facility])   • History of stroke   • Atrial fibrillation (CMS/[facility])   • Normocytic anemia   • Essential hypertension     Plan:  I am familiar with the patient as I took care of in the last week that I was on service.  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  The initial plan was for him to go to skilled nursing, but his family decided to take him home.  For full details regarding that stay, please go to his other chart in full of my most recent progress note.     He returned to the emergency department on 11/14 with hypoglycemia.  I had the patient off of his sulfonylurea medication while he was in the hospital.  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  He has been taking glimepiride 4  mg twice daily in the home setting.  His most recent hemoglobin A1c was 6.1.  Discontinue the sulfonylurea.  Follow on Accu-Cheks and sliding scale insulin.  His blood sugars had been very well controlled on his last stay and he required very little correction insulin at that time.  It may be reasonable to have him on metformin monotherapy given his age and hemoglobin A1c.     Hopefully, this is only a minor setback and he can be discharged home again tomorrow as when that is when the family can take him.  They plan to do this and have home health come.    Discharge Planning: I expect the patient to be discharged to home with home health tomorrow.     Electronically signed by Elpidio Irby DO, 11/16/20, 15:03 CST.

## 2020-11-16 NOTE — PAYOR COMM NOTE
"Gabriel Muse (85 y.o. Male) [id number]   Admit   11/14   [facility]   nyla phone  fax         Date of Birth Social Security Number Address Home Phone MRN    [id number]  [id number] [facility] [address] [phone] [id number]    Mormonism Marital Status          Religion        Admission Date Admission Type Admitting Provider Attending Provider Department, Room/Bed    11/14/20 Emergency Elpidio Irby DO Hancock, John C, DO [facility] 3C, 386/1    Discharge Date Discharge Disposition Discharge Destination                       Attending Provider: Elpidio Irby DO    Allergies: Lorazepam, Adhesive Tape, Penicillins    Isolation: None   Infection: None   Code Status: No CPR    Ht: 182.9 cm (72\")   Wt: 98 kg (216 lb 1.6 oz)    Admission Cmt: None   Principal Problem: None                Active Insurance as of 11/14/2020     Primary Coverage     Payor Plan Insurance Group Employer/Plan Group    ANTH BLUE CROSS ANTH BLUE CROSS BLUE SHIELD PPO [id number]     Payor Plan Address Payor Plan Phone Number Payor Plan Fax Number Effective Dates    [address] [phone]  1/1/2015 - None Entered    [facility] [id number]       Subscriber Name Subscriber Birth Date Member ID       GABRIEL MUSE 1934 [id number]           Secondary Coverage     Payor Plan Insurance Group Employer/Plan Group    MEDICARE MEDICARE A & B      Payor Plan Address Payor Plan Phone Number Payor Plan Fax Number Effective Dates    [address] [phone]  11/1/1999 - None Entered    [facility] [id number]       Subscriber Name Subscriber Birth Date Member ID       GABRIEL MUSE 1934 [id number]                 Emergency Contacts      (Rel.) Home Phone Work Phone Mobile Phone    HOUSTON DUNLAP(Poa) (Daughter) [phone] -- --               History & Physical      Iain Rosenberg MD at 11/15/20 0109                [facility] Team " "[facility] Medicine Services  HISTORY AND PHYSICAL    Date of Admission: 11/14/2020  Primary Care Physician: Jeffrey Owen MD    Subjective     Chief Complaint: Upper glycemia    History of Present Illness  Patient is an 85-year-old white male with medical problems including coronary artery disease hypertension type 2 diabetes.  He is recently been hospitalized here for altered mental status and extensive work-up found to have a UTI.  He presents to the ER for decreased level of consciousness.  He was found to have a blood sugar by EMS arrival in the 40s.  He continued to have low blood sugars when he arrived here he is on oral hypoglycemics and insulin I believe.  Due to this fact is unresponsiveness we been asked to admit him.  The history and information is obtained from the granddaughter who is the POA and is present.  His work-up is otherwise unremarkable except for persistent hypoglycemia.        Review of Systems   Unable to obtain patient is obtunded  Otherwise complete ROS reviewed and negative except as mentioned in the HPI.    Past Medical History:   Past Medical History:   Diagnosis Date   • CAD (coronary artery disease)    • CHF (congestive heart failure) (CMS/[facility])    • Diabetes (CMS/[facility])    • Hypertension    • PVD (peripheral vascular disease) (CMS/[facility])      Past Surgical History:  Past Surgical History:   Procedure Laterality Date   • LEFT HEART CATH       Social History:  reports that he has quit smoking. He does not have any smokeless tobacco history on file.    Family History: family history is not on file.   Unable to obtain patient is obtunded    Allergies:  Allergies   Allergen Reactions   • Lorazepam Anxiety   • Adhesive Tape Rash   • Penicillins Swelling     Medications:  Prior to Admission medications    Not on File     Objective     Vital Signs: /79   Pulse 78   Temp 96.9 °F (36.1 °C) (Rectal)   Resp 18   Ht 182.9 cm (72\")   Wt 98 kg (216 lb 1.6 oz)   SpO2 99%   BMI " 29.31 kg/m²   Physical Exam   Gen.:  Well-nourished well-developed white male in no acute distress  HEENT: Atraumatic, normocephalic.  Pupils equal, round, and reactive to light. Extraocular movements intact.  Sclerae anicteric.  External ears negative.  Mucous membranes moist.  Neck is supple without lymphadenopathy.  No JVD is noted.  No carotid bruits are auscultated.  Oropharynx is without erythema or exudate.   Chest: Clear to auscultation and percussion.  CV: Regular rate and rhythm.  Normal S1-S2.  No gallops, murmurs. or rubs.  Abdomen: Soft, nontender, nondistended.  Active bowel sounds,  No hepatosplenomegaly.  No masses.  No hernias.  Extremities: No clubbing, edema, or cyanosis.  Capillary refill is normal.  Pulses are 2+ and symmetric at radial and dorsalis pedis.  Posterior tibial pulses are intact and 2+ palpable.  Neuro: Patient is  obtunded cranial nerves I patient will not cooperate with rest of exam  Skin: Warm, dry, and intact.  No evidence of breakdown.  Sensorium: Tended    Nursing notes and vital signs reviewed        Results Reviewed:  Lab Results (last 24 hours)     Procedure Component Value Units Date/Time    POC Glucose Once [[id number]]  (Normal) Collected: 11/15/20 0521    Specimen: Blood Updated: 11/15/20 0537     Glucose 88 mg/dL      Comment: : [id number] [employer] ID: [id number]       POC Glucose Once [[id number]]  (Normal) Collected: 11/15/20 0423    Specimen: Blood Updated: 11/15/20 0434     Glucose 96 mg/dL      Comment: : [id number] [employer] ID: [id number]       POC Occult Blood Stool [[id number]]  (Normal) Collected: 11/15/20 0220    Specimen: Stool Updated: 11/15/20 0402     Fecal Occult Blood Negative     Lot Number [id number]     Expiration Date 7/31/2021     DEVELOPER LOT NUMBER 185     DEVELOPER EXPIRATION DATE 7/31/2021     Positive Control Positive     Negative Control Negative    Urinalysis With Culture If Indicated - Urine, Catheter [[id number]]  (Abnormal)  Collected: 11/15/20 0210    Specimen: Urine, Catheter Updated: 11/15/20 0224     Color, UA Dark Yellow     Appearance, UA Clear     pH, UA 5.5     Specific Gravity, UA 1.019     Glucose, UA Negative     Ketones, UA Negative     Bilirubin, UA Negative     Blood, UA Negative     Protein, UA 30 mg/dL (1+)     Leuk Esterase, UA Negative     Nitrite, UA Negative     Urobilinogen, UA 0.2 E.U./dL    Urinalysis, Microscopic Only - Urine, Catheter [[id number]]  (Abnormal) Collected: 11/15/20 0210    Specimen: Urine, Catheter Updated: 11/15/20 0224     RBC, UA 0-2 /HPF      WBC, UA 0-2 /HPF      Bacteria, UA None Seen /HPF      Squamous Epithelial Cells, UA 3-6 /HPF      Hyaline Casts, UA 3-6 /LPF      Methodology Automated Microscopy    POC Glucose Once [[id number]]  (Abnormal) Collected: 11/15/20 0117    Specimen: Blood Updated: 11/15/20 0128     Glucose 171 mg/dL      Comment: : [id number] [employer] ID: [id number]       POC Glucose Once [[id number]]  (Abnormal) Collected: 11/15/20 0011    Specimen: Blood Updated: 11/15/20 0021     Glucose 43 mg/dL      Comment: : [id number] [employer] ID: [id number]       POC Glucose Once [[id number]]  (Abnormal) Collected: 11/15/20 0009    Specimen: Blood Updated: 11/15/20 0021     Glucose 42 mg/dL      Comment: : [id number] [employer] ID: [id number]       POC Glucose Once [[id number]]  (Abnormal) Collected: 11/14/20 2248    Specimen: Blood Updated: 11/14/20 2312     Glucose 58 mg/dL      Comment: : [id number] [employer] ID: [id number]       Blood Culture - Blood, Arm, Right [[id number]] Collected: 11/14/20 2300    Specimen: Blood from Arm, Right Updated: 11/14/20 2304    COVID PRE-OP / PRE-PROCEDURE SCREENING ORDER (NO ISOLATION) - Swab, Nasal Cavity [[id number]]  (Normal) Collected: 11/14/20 2045    Specimen: Swab from Nasal Cavity Updated: 11/14/20 2128    Narrative:      The following orders were created for panel order COVID PRE-OP /  PRE-PROCEDURE SCREENING ORDER (NO ISOLATION) - Swab, Nasal Cavity.  Procedure                               Abnormality         Status                     ---------                               -----------         ------                     COVID-19,[employer] IN-JOSEPHINE...[[id number]]  Normal              Final result                 Please view results for these tests on the individual orders.    COVID-19,[employer] IN-HOUSE,Nasal Swab No Transport Media 3-4 HR TAT - Swab, Nasal Cavity [[id number]]  (Normal) Collected: 11/14/20 2045    Specimen: Swab from Nasal Cavity Updated: 11/14/20 2128     COVID19 Not Detected    Narrative:      Fact sheet for providers: https://www.fda.gov/media/139759/download     Fact sheet for patients: https://www.fda.gov/media/879793/download    POC Glucose Once [[id number]]  (Normal) Collected: 11/14/20 2052    Specimen: Blood Updated: 11/14/20 2103     Glucose 89 mg/dL      Comment: : [id number] A[employer]a [employer] ID: [id number]       Lactic Acid, Plasma [[id number]]  (Normal) Collected: 11/14/20 2017    Specimen: Blood Updated: 11/14/20 2057     Lactate 0.8 mmol/L     Comprehensive Metabolic Panel [[id number]]  (Abnormal) Collected: 11/14/20 2017    Specimen: Blood Updated: 11/14/20 2049     Glucose 227 mg/dL      BUN 16 mg/dL      Creatinine 0.95 mg/dL      Sodium 141 mmol/L      Potassium 5.0 mmol/L      Comment: Slight hemolysis detected by analyzer. Results may be affected.        Chloride 108 mmol/L      CO2 26.0 mmol/L      Calcium 10.0 mg/dL      Total Protein 6.2 g/dL      Albumin 2.80 g/dL      ALT (SGPT) 31 U/L      AST (SGOT) 45 U/L      Comment: Slight hemolysis detected by analyzer. Results may be affected.        Alkaline Phosphatase 105 U/L      Total Bilirubin 0.2 mg/dL      eGFR Non African Amer 76 mL/min/1.73      eGFR  African Amer 92 mL/min/1.73      Globulin 3.4 gm/dL      A/G Ratio 0.8 g/dL      BUN/Creatinine Ratio 16.8     Anion Gap 7.0 mmol/L     Narrative:      GFR  Normal >60  Chronic Kidney Disease <60  Kidney Failure <15      Blood Culture - Blood, Arm, Left [[id number]] Collected: 11/14/20 2017    Specimen: Blood from Arm, Left Updated: 11/14/20 2048    Lipase [[id number]]  (Abnormal) Collected: 11/14/20 2017    Specimen: Blood Updated: 11/14/20 2043     Lipase 12 U/L     Magnesium [[id number]]  (Normal) Collected: 11/14/20 2017    Specimen: Blood Updated: 11/14/20 2043     Magnesium 2.0 mg/dL     Protime-INR [[id number]]  (Normal) Collected: 11/14/20 2017    Specimen: Blood Updated: 11/14/20 2040     Protime 13.4 Seconds      INR 1.06    aPTT [[id number]]  (Normal) Collected: 11/14/20 2017    Specimen: Blood Updated: 11/14/20 2040     PTT 30.1 seconds     CBC & Differential [[id number]]  (Abnormal) Collected: 11/14/20 2017    Specimen: Blood Updated: 11/14/20 2032    Narrative:      The following orders were created for panel order CBC & Differential.  Procedure                               Abnormality         Status                     ---------                               -----------         ------                     CBC Auto Differential[[id number]]        Abnormal            Final result                 Please view results for these tests on the individual orders.    CBC Auto Differential [[id number]]  (Abnormal) Collected: 11/14/20 2017    Specimen: Blood Updated: 11/14/20 2032     WBC 7.13 10*3/mm3      RBC 3.38 10*6/mm3      Hemoglobin 10.0 g/dL      Hematocrit 31.4 %      MCV 92.9 fL      MCH 29.6 pg      MCHC 31.8 g/dL      RDW 20.0 %      RDW-SD 67.8 fl      MPV 10.1 fL      Platelets 363 10*3/mm3      Neutrophil % 89.5 %      Lymphocyte % 5.6 %      Monocyte % 4.3 %      Eosinophil % 0.1 %      Basophil % 0.1 %      Immature Grans % 0.4 %      Neutrophils, Absolute 6.37 10*3/mm3      Lymphocytes, Absolute 0.40 10*3/mm3      Monocytes, Absolute 0.31 10*3/mm3      Eosinophils, Absolute 0.01 10*3/mm3      Basophils, Absolute 0.01 10*3/mm3      Immature Grans, Absolute  0.03 10*3/mm3      nRBC 0.0 /100 WBC     POC Glucose Once [[id number]]  (Abnormal) Collected: 11/14/20 1937    Specimen: Blood Updated: 11/14/20 1949     Glucose 47 mg/dL      Comment: : [id number] Shazia IveyMeter ID: [id number]           Imaging Results (Last 24 Hours)     Procedure Component Value Units Date/Time    CT Head Without Contrast [[id number]] Resulted: 11/14/20 2217     Updated: 11/14/20 2223    XR Chest 1 View [[id number]] Collected: 11/14/20 2111     Updated: 11/14/20 2115    Narrative:      EXAMINATION: Chest 1 view 11/14/2020     HISTORY: Altered mental status     FINDINGS: Upright frontal projection of chest is expiratory causing  accentuation of the bronchovascular markings and cardiac silhouette.  There is cardiomegaly with mild pulmonary vascular congestion. Small  effusions are suspected with blunting of the costophrenic angles.       Impression:      1. Cardiomegaly. Expiratory chest.  2. Pulmonary vascular congestion. Small effusions are suspected.  This report was finalized on 11/14/2020 21:12 by Dr. Jamal Ojeda MD.        I have personally reviewed and interpreted the radiology studies and ECG obtained at time of admission.     Assessment / Plan     Assessment:   Active Hospital Problems    Diagnosis   • Hypoglycemia   • Type 2 diabetes mellitus, without long-term current use of insulin (CMS/[facility])   • Essential hypertension, malignant   • AMS (altered mental status)   1.  Hypoglycemia related to oral hypoglycemics.  Hold medications every hour Accu-Cheks.  Continue dextrose containing fluids.  Once he is stabilized can go to every 4 hour and then etc.  2.  Hypertension find out if medications are resumed him.  3.  Altered mental status probably related to his hypoglycemia hopefully will resolve when he is normoglycemic.  If not needs further work-up.      Patient seen after midnight         Code Status: DNR/DNI     I discussed the patient's findings and my recommendations with  "the patient's granddaughter who is his surrogate healthcare decision maker.    Estimated length of stay 1-2 nights.    Patient seen and examined by me on November 15, 2020 at 1 AM.    Electronically signed by Iain Rosenberg MD, 11/15/20, 06:46 CST.                Electronically signed by Iain Rosenberg MD at 11/15/20 0646          Emergency Department Notes      Ben Osuna MD at 11/15/20 0110          Subjective   This patient is an 85-year-old male who was recently discharged from this hospital but is brought back tonight because of decreased level of consciousness.  According to the granddaughter this patient was entirely functional a few weeks ago but ended up being admitted to [facility] for decreased level of consciousness.  She states that they did \"nothing \"over there so had the patient transferred here.  When he arrived in the ED via EMS he was unresponsive and gurgling.  There were reports from EMS of blood sugars in the 40s.  When he arrived here they were similarly low.  The patient was unable to contribute more to the history.          Review of Systems   Unable to perform ROS: Mental status change       No past medical history on file.    Not on File    No past surgical history on file.    No family history on file.    Social History     Socioeconomic History   • Marital status:      Spouse name: Not on file   • Number of children: Not on file   • Years of education: Not on file   • Highest education level: Not on file           Objective   Physical Exam  Vitals signs and nursing note reviewed.   Constitutional:       Appearance: He is well-developed.   HENT:      Head: Normocephalic and atraumatic.      Right Ear: External ear normal.      Left Ear: External ear normal.      Nose: Nose normal.   Eyes:      Conjunctiva/sclera: Conjunctivae normal.   Neck:      Musculoskeletal: Normal range of motion and neck supple.   Cardiovascular:      Rate and Rhythm: Normal rate and " regular rhythm.      Heart sounds: Normal heart sounds.   Pulmonary:      Effort: Pulmonary effort is normal.      Breath sounds: Normal breath sounds.   Abdominal:      General: Bowel sounds are normal.      Palpations: Abdomen is soft.   Musculoskeletal: Normal range of motion.   Skin:     General: Skin is warm and dry.      Capillary Refill: Capillary refill takes less than 2 seconds.   Neurological:      Mental Status: He is lethargic, disoriented and confused.      Comments: Unable to participate with the neuro exam   Psychiatric:         Behavior: Behavior normal.         Thought Content: Thought content normal.         Judgment: Judgment normal.         Procedures          ED Course                                           MDM  Number of Diagnoses or Management Options     Amount and/or Complexity of Data Reviewed  Clinical lab tests: ordered and reviewed  Tests in the radiology section of CPT®: ordered and reviewed    Patient Progress  Patient progress: stable      Final diagnoses:   Hypoglycemia     The patient remained hypoglycemic in the ED and needs admission for close observation of this.  I discussed the case with Dr. Rosenberg who knows the patient well and he will admit the patient under his care.  The patient's blood sugar is currently 171       Ben Osuna MD  11/15/20 0119      Electronically signed by Ben Osuna MD at 11/15/20 0119     Aileen Cox RN at 11/15/20 0751        Per Dr. Irby pt can be downgraded to med/surg floor. Will notify bedboard     Aileen Cox RN  11/15/20 0751      Electronically signed by Aileen Cox RN at 11/15/20 0751         Current Facility-Administered Medications   Medication Dose Route Frequency Provider Last Rate Last Dose   • acetaminophen (TYLENOL) tablet 650 mg  650 mg Oral Q4H PRN Elpidio Irby DO        Or   • acetaminophen (TYLENOL) 160 MG/5ML solution 650 mg  650 mg Oral Q4H PRN Elpidio Irby, DO        Or   • acetaminophen  (TYLENOL) suppository 650 mg  650 mg Rectal Q4H PRN Elpidio Irby DO       • allopurinol (ZYLOPRIM) tablet 300 mg  300 mg Oral Daily Elpidio Iryb DO       • amLODIPine (NORVASC) tablet 5 mg  5 mg Oral Daily Elpidio Irby DO   5 mg at 11/16/20 0841   • aspirin chewable tablet 81 mg  81 mg Oral Daily Elpidio Irby DO   81 mg at 11/16/20 0841   • cholecalciferol (VITAMIN D3) tablet 400 Units  400 Units Oral Daily Elpidio Irby DO       • colchicine tablet 0.6 mg  0.6 mg Oral Daily Elpidio Irby DO       • dextrose (D50W) 25 g/ 50mL Intravenous Solution 25 g  25 g Intravenous Q15 Min PRN Elpidio Irby DO       • dextrose (GLUTOSE) oral gel 15 g  15 g Oral Q15 Min PRN Elpidio Irby DO       • enoxaparin (LOVENOX) syringe 40 mg  40 mg Subcutaneous Q24H Elpidio Irby DO   40 mg at 11/15/20 1307   • finasteride (PROSCAR) tablet 5 mg  5 mg Oral Daily Elpidio Irby DO       • folic acid (FOLVITE) tablet 400 mcg  400 mcg Oral Daily Elpidio Irby DO       • furosemide (LASIX) tablet 20 mg  20 mg Oral BID Elpidio Irby DO   20 mg at 11/16/20 0841   • glucagon (human recombinant) (GLUCAGEN DIAGNOSTIC) injection 1 mg  1 mg Subcutaneous Q15 Min PRN Elpidio Irby DO       • insulin lispro (humaLOG) injection 2-7 Units  2-7 Units Subcutaneous TID AC Elpidio Irby DO       • isosorbide mononitrate (IMDUR) 24 hr tablet 30 mg  30 mg Oral Daily Elpidio Irby DO   30 mg at 11/16/20 0840   • losartan (COZAAR) tablet 50 mg  50 mg Oral Daily Elpidio Irby DO   50 mg at 11/16/20 0841   • meclizine (ANTIVERT) tablet 25 mg  25 mg Oral TID PRN Elpidio Irby DO       • metoprolol succinate XL (TOPROL-XL) 24 hr tablet 25 mg  25 mg Oral Daily Elpidio Irby,    25 mg at 11/16/20 0840   • nitroglycerin (NITROSTAT) SL tablet 0.4 mg  0.4 mg Sublingual Q5 Min PRN Elpidio Irby DO       • ondansetron (ZOFRAN) injection 4 mg  4 mg Intravenous Q6H PRN Elpidio Irby,        • pantoprazole  (PROTONIX) EC tablet 40 mg  40 mg Oral Daily Elpidio Irby DO       • pravastatin (PRAVACHOL) tablet 40 mg  40 mg Oral Daily Elpidio Irby DO       • prednisoLONE acetate (PRED FORTE) 1 % ophthalmic suspension 1 drop  1 drop Both Eyes TID Elpidio Irby DO       • rOPINIRole (REQUIP) tablet 1 mg  1 mg Oral Nightly Elpidio Irby DO       • sennosides-docusate (PERICOLACE) 8.6-50 MG per tablet 1 tablet  1 tablet Oral Daily Elpidio Irby DO       • sodium chloride 0.9 % bolus 500 mL  500 mL Intravenous Once Iain Rosenberg MD       • sodium chloride 0.9 % flush 10 mL  10 mL Intravenous Q12H Elpidio Irby DO       • sodium chloride 0.9 % flush 10 mL  10 mL Intravenous PRN Elpidio Irby DO       • tamsulosin (FLOMAX) 24 hr capsule 0.4 mg  0.4 mg Oral Daily Elpidio Irby DO   0.4 mg at 11/16/20 0841   • thiamine (VITAMIN B-1) tablet 100 mg  100 mg Oral Daily Elpidio Irby DO       • vitamin B-6 (PYRIDOXINE) tablet 50 mg  50 mg Oral Daily Elpidio Irby DO       • vitamin C (ASCORBIC ACID) tablet 500 mg  500 mg Oral Daily Elpidio Irby DO            Physician Progress Notes (last 72 hours) (Notes from 11/13/20 1227 through 11/16/20 1227)      Elpidio Irby DO at 11/15/20 1152        Discussed with his nurse, Tracy.    I am familiar with the patient as I took care of in the last week that I was on service.  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  The initial plan was for him to go to skilled nursing, but his family decided to take him home.    He returned to the emergency department yesterday with hypoglycemia.  I had the patient off of his sulfonylurea medication while he was in the hospital.  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  He has been taking glimepiride 4 mg twice daily in the home setting.  His most recent hemoglobin A1c was 6.1.  Discontinue the sulfonylurea.  Follow on Accu-Cheks and sliding scale insulin.  His blood sugars have been very well on his last day  and he required very little correction insulin at that time.  It may be reasonable to have him on Metformin monotherapy given his age and hemoglobin A1c.    Hopefully, this is only a minor setback and he can be discharged home again tomorrow.    Electronically signed by Elpidio Irby DO, 11/15/20, 11:52 AM CST.          Electronically signed by Elpidio Irby DO at 11/15/20 1156         11/15 nurse note :  Attempted to give po meds in applesauce. Pt spit out and refused to take  Glucose 42 ,43 ,154     11/14 b/p  184/ 82   185/k72    Glucose 47,  58    11/16  B/p 202/63   136/104    Glucose 135   H/h 8.9/28.4

## 2020-11-16 NOTE — PLAN OF CARE
Goal Outcome Evaluation:  Plan of Care Reviewed With: patient  Progress: no change  Outcome Summary: IID. very sleepy. arouses to touch- combative. incont bowel and bladder. turning q2h, refused x1 this shift so far. when he speaks only able to understand curse words. blood sugar prior to supper 144. cont to monitor.

## 2020-11-16 NOTE — THERAPY TREATMENT NOTE
Acute Care - Speech Language Pathology   Swallow Treatment Note [facility]     Patient Name: Eron Escalante  : 1934  MRN: [id number]  Today's Date: 2020               Admit Date: 2020  SLP treatment complete. Pt lethargic throughout session and is unable to follow commands at this time. Speech is dyarthric and mostly unintelligable. Pt did initiate a delayed swallow following gustatory stimulation. 3x ice chip atrial compelte with 2x immediate cough and questionable wet vocal quality. No other PO trials presented due to poor level of alertness. Ok to conitnue a pureed diet with nectar thick liquids when alert. SLP will continue to follow and treat.  Sonu Velasquez MS CCC-SLP 2020 14:07 CST    Visit Dx:     ICD-10-CM ICD-9-CM   1. Hypoglycemia  E16.2 251.2   2. Oropharyngeal dysphagia  R13.12 787.22     Patient Active Problem List   Diagnosis   • Hypoglycemia   • Type 2 diabetes mellitus, without long-term current use of insulin (CMS/[facility])   • Essential hypertension, malignant   • AMS (altered mental status)     Past Medical History:   Diagnosis Date   • CAD (coronary artery disease)    • CHF (congestive heart failure) (CMS/[facility])    • Diabetes (CMS/[facility])    • Hypertension    • PVD (peripheral vascular disease) (CMS/[facility])      Past Surgical History:   Procedure Laterality Date   • LEFT HEART CATH          SWALLOW EVALUATION (last 72 hours)      SLP Adult Swallow Evaluation     Row Name 20 1130 11/15/20 1200                Rehab Evaluation    Document Type  therapy note (daily note)  -CS  evaluation  -KW       Subjective Information  no complaints  -CS  no complaints  -KW       Patient Observations  lethargic  -CS  lethargic  -KW       Patient/Family/Caregiver Comments/Observations  No family present  -CS  no family present  -KW       Care Plan Review  care plan/treatment goals reviewed  -CS  care plan/treatment goals reviewed  -KW       Care Plan Review, Other Participant(s)  --  caregiver   -KW       Patient Effort  --  fair  -KW          General Information    Patient Profile Reviewed  --  yes  -KW       Pertinent History Of Current Problem  --  recent hospital stay, SLP following for dysphagia.  Modified diet.  -KW       Current Method of Nutrition  --  NPO  -KW       Precautions/Limitations, Vision  --  WFL  -KW       Precautions/Limitations, Hearing  --  WFL;for purposes of eval  -KW       Prior Level of Function-Communication  --  cognitive-linguistic impairment;motor speech impairment  -KW       Prior Level of Function-Swallowing  --  nectar thick liquids;mechanical soft textures  -KW       Plans/Goals Discussed with  --  patient  -KW       Barriers to Rehab  --  previous functional deficit;cognitive status  -KW       Patient's Goals for Discharge  --  patient could not state  -KW          Pain    Additional Documentation  Pain Scale: FACES Pre/Post-Treatment (Group)  -CS  Pain Scale: FACES Pre/Post-Treatment (Group)  -KW          Pain Scale: FACES Pre/Post-Treatment    Pain: FACES Scale, Pretreatment  0-->no hurt  -CS  0-->no hurt  -KW       Posttreatment Pain Rating  0-->no hurt  -CS  0-->no hurt  -KW          Oral Motor Structure and Function    Dentition Assessment  --  edentulous  -KW       Secretion Management  --  dried secretions in oral cavity  -KW       Mucosal Quality  --  dry  -KW       Volitional Swallow  --  unable to elicit  -KW       Volitional Cough  --  unable to elicit  -KW          Oral Musculature and Cranial Nerve Assessment    Oral Motor General Assessment  --  generalized oral motor weakness  -KW       Lingual Impairment, Detail. Cranial Nerves IX, XII (Glossopharyngeal and Hypoglossal)  --  reduced lingual ROM;reduced strength  -KW          General Eating/Swallowing Observations    Eating/Swallowing Skills  --  fed by SLP  -KW       Positioning During Eating  --  upright in bed  -KW       Utensils Used  --  spoon;straw  -KW       Consistencies Trialed  --  thin  liquids;nectar/syrup-thick liquids;honey-thick liquids;pudding thick  -          Clinical Swallow Eval    Oral Prep Phase  --  WFL  -KW       Oral Transit  --  WFL  -KW       Oral Residue  --  WFL  -KW       Pharyngeal Phase  --  suspected pharyngeal impairment  -KW       Esophageal Phase  --  unremarkable  -       Clinical Swallow Evaluation Summary  --  Bedside Swallow Evaluation completed.  Patient is well known to SLP due to recent hospital stay.  He is not as alert as when discharged and in fact is back to state he was in when first admitted previous visit.  Speech mummbled, not opening eyes but attempts to respond or repeat SLP.  Did not try solids due to poor alertness, but he did take applesauce, honey thick, nectar thick, and thin liquids.  Throat clearing with thin but no other overt s/s of aspiration.  Rec; Pureed diet with nectar thick liquids; water between meals; PO only of alert; SLP to follow and upgrade as warranted.   -          Pharyngeal Phase Concerns    Pharyngeal Phase Concerns  --  throat clear  -       Throat Clear  --  thin  -          Clinical Impression    SLP Swallowing Diagnosis  --  moderate;oral dysphagia;pharyngeal dysphagia  -       Functional Impact  --  risk of aspiration/pneumonia;risk of malnutrition;risk of dehydration  -       Rehab Potential/Prognosis, Swallowing  --  good, to achieve stated therapy goals  -       Swallow Criteria for Skilled Therapeutic Interventions Met  --  demonstrates skilled criteria  -          Recommendations    Therapy Frequency (Swallow)  --  at least;3 days per week  -       Predicted Duration Therapy Intervention (Days)  --  until discharge  -       SLP Diet Recommendation  --  puree;nectar thick liquids;water between meals after oral care, with supervision  -       Recommended Precautions and Strategies  --  upright posture during/after eating;small bites of food and sips of liquid  -       Oral Care Recommendations  --   Oral Care BID/PRN  -KW       SLP Rec. for Method of Medication Administration  --  meds crushed;with pudding or applesauce  -KW       Monitor for Signs of Aspiration  --  notify SLP if any concerns  -KW       Anticipated Discharge Disposition (SLP)  --  skilled nursing facility  -KW          Swallow Goals (SLP)    Oral Nutrition/Hydration Goal Selection (SLP)  oral nutrition/hydration, SLP goal 1  -CS  oral nutrition/hydration, SLP goal 1  -KW       Lingual Strengthening Goal Selection (SLP)  lingual strengthening, SLP goal 1  -CS  lingual strengthening, SLP goal 1  -KW       Pharyngeal Strengthening Exercise Goal Selection (SLP)  pharyngeal strengthening exercise, SLP goal 1  -CS  pharyngeal strengthening exercise, SLP goal 1  -KW       Additional Documentation  lingual strengthening goal selection (SLP);pharyngeal strengthening exercise goal selection (SLP)  -CS  lingual strengthening goal selection (SLP);pharyngeal strengthening exercise goal selection (SLP)  -KW          Oral Nutrition/Hydration Goal 1 (SLP)    Oral Nutrition/Hydration Goal 1, SLP  LTG: Patient will tolereate LRD with no overt s/s of aspiration  -CS  LTG: Patient will tolereate LRD with no overt s/s of aspiration  -KW       Time Frame (Oral Nutrition/Hydration Goal 1, SLP)  short term goal (STG);by discharge  -CS  short term goal (STG);by discharge  -KW       Barriers (Oral Nutrition/Hydration Goal 1, SLP)  cognition  -CS  cognition  -KW       Progress/Outcomes (Oral Nutrition/Hydration Goal 1, SLP)  goal ongoing  -CS  goal ongoing  -KW          Lingual Strengthening Goal 1 (SLP)    Activity (Lingual Strengthening Goal 1, SLP)  increase lingual tone/sensation/control/coordination/movement  -CS  increase lingual tone/sensation/control/coordination/movement  -KW       Increase Lingual Tone/Sensation/Control/Coordination/Movement  lingual movement exercises  -CS  lingual movement exercises  -KW       [place]/Accuracy (Lingual Strengthening  Goal 1, SLP)  independently (over 90% accuracy)  -CS  independently (over 90% accuracy)  -KW       Time Frame (Lingual Strengthening Goal 1, SLP)  short term goal (STG);by discharge  -CS  short term goal (STG);by discharge  -KW       Barriers (Lingual Strengthening Goal 1, SLP)  cognition  -CS  cognition  -KW       Progress/Outcomes (Lingual Strengthening Goal 1, SLP)  goal ongoing  -CS  goal ongoing  -KW          Pharyngeal Strengthening Exercise Goal 1 (SLP)    Activity (Pharyngeal Strengthening Goal 1, SLP)  increase epiglottic inversion and retroflexion;increase tongue base retraction  -CS  increase epiglottic inversion and retroflexion;increase tongue base retraction  -KW       Increase Epiglottic Inversion and Retroflexion  Mendelsohn;falsetto  -CS  Mendelsohn;falsetto  -KW       Increase Tongue Base Retraction  hard effortful swallow;vita  -CS  hard effortful swallow;vita  -KW       [place]/Accuracy (Pharyngeal Strengthening Goal 1, SLP)  independently (over 90% accuracy)  -CS  independently (over 90% accuracy)  -KW       Time Frame (Pharyngeal Strengthening Goal 1, SLP)  short term goal (STG);by discharge  -CS  short term goal (STG);by discharge  -KW       Barriers (Pharyngeal Strengthening Goal 1, SLP)  cognition  -CS  cognition  -KW       Progress/Outcomes (Pharyngeal Strengthening Goal 1, SLP)  goal ongoing  -CS  goal ongoing  -KW         User Key  (r) = Recorded By, (t) = Taken By, (c) = Cosigned By    Initials Name Effective Dates    Aida Burns, MS CCC-SLP 08/09/20 -     CS Sonu Velasquez, MS CCC-SLP 04/03/18 -           EDUCATION  The patient has been educated in the following areas:   Dysphagia (Swallowing Impairment).    SLP Recommendation and Plan                                                             Plan of Care Reviewed With: patient  Progress: no change  Outcome Summary: SLP treatment complete. Pt lethargic throughout session and is unable to follow commands at this time.  Speech is dyarthric and mostly unintelligable. Pt did initiate a delayed swallow following gustatory stimulation. 3x ice chip atrial compelte with 2x immediate cough and questionable wet vocal quality. No other PO trials presented due to poor level of alertness. Ok to conitnue a pureed diet with nectar thick liquids when alert. SLP will continue to follow and treat.    SLP GOALS     Row Name 11/16/20 1130 11/15/20 1200          Oral Nutrition/Hydration Goal 1 (SLP)    Oral Nutrition/Hydration Goal 1, SLP  LTG: Patient will tolereate LRD with no overt s/s of aspiration  -CS  LTG: Patient will tolereate LRD with no overt s/s of aspiration  -KW     Time Frame (Oral Nutrition/Hydration Goal 1, SLP)  short term goal (STG);by discharge  -CS  short term goal (STG);by discharge  -KW     Barriers (Oral Nutrition/Hydration Goal 1, SLP)  cognition  -CS  cognition  -KW     Progress/Outcomes (Oral Nutrition/Hydration Goal 1, SLP)  goal ongoing  -CS  goal ongoing  -KW        Lingual Strengthening Goal 1 (SLP)    Activity (Lingual Strengthening Goal 1, SLP)  increase lingual tone/sensation/control/coordination/movement  -CS  increase lingual tone/sensation/control/coordination/movement  -KW     Increase Lingual Tone/Sensation/Control/Coordination/Movement  lingual movement exercises  -CS  lingual movement exercises  -KW     [place]/Accuracy (Lingual Strengthening Goal 1, SLP)  independently (over 90% accuracy)  -CS  independently (over 90% accuracy)  -KW     Time Frame (Lingual Strengthening Goal 1, SLP)  short term goal (STG);by discharge  -CS  short term goal (STG);by discharge  -KW     Barriers (Lingual Strengthening Goal 1, SLP)  cognition  -CS  cognition  -KW     Progress/Outcomes (Lingual Strengthening Goal 1, SLP)  goal ongoing  -CS  goal ongoing  -KW        Pharyngeal Strengthening Exercise Goal 1 (SLP)    Activity (Pharyngeal Strengthening Goal 1, SLP)  increase epiglottic inversion and retroflexion;increase tongue  base retraction  -CS  increase epiglottic inversion and retroflexion;increase tongue base retraction  -KW     Increase Epiglottic Inversion and Retroflexion  Mendelsohn;falsetto  -CS  Mendelsohn;falsetto  -KW     Increase Tongue Base Retraction  hard effortful swallow;vita  -CS  hard effortful swallow;vita  -KW     [place]/Accuracy (Pharyngeal Strengthening Goal 1, SLP)  independently (over 90% accuracy)  -CS  independently (over 90% accuracy)  -KW     Time Frame (Pharyngeal Strengthening Goal 1, SLP)  short term goal (STG);by discharge  -CS  short term goal (STG);by discharge  -KW     Barriers (Pharyngeal Strengthening Goal 1, SLP)  cognition  -CS  cognition  -KW     Progress/Outcomes (Pharyngeal Strengthening Goal 1, SLP)  goal ongoing  -CS  goal ongoing  -KW       User Key  (r) = Recorded By, (t) = Taken By, (c) = Cosigned By    Initials Name Provider Type    Aida Burns MS CCC-SLP Speech and Language Pathologist    Sonu Randle MS CCC-SLP Speech and Language Pathologist             Time Calculation:   Time Calculation- SLP     Row Name 11/16/20 1406             Time Calculation- SLP    SLP Start Time  1120  -CS      SLP Stop Time  1131  -CS      SLP Time Calculation (min)  11 min  -CS      SLP Received On  11/16/20  -        User Key  (r) = Recorded By, (t) = Taken By, (c) = Cosigned By    Initials Name Provider Type    Sonu Randle MS CCC-SLP Speech and Language Pathologist          Therapy Charges for Today     Code Description Service Date Service Provider Modifiers Qty    [id number] HC ST TREATMENT SWALLOW 1 11/16/2020 Sonu Velasquez MS CCC-SLP GN 1               Sonu Velasquez MS CCC-TOSHIA  11/16/2020

## 2020-11-17 NOTE — DISCHARGE SUMMARY
[facility] Medicine Services  DISCHARGE SUMMARY       Date of Admission: 11/14/2020  Date of Discharge:  11/17/2020  Primary Care Physician: Jeffrey Owen MD    Presenting Problem/History of Present Illness:  Altered mental status, low blood sugar.     Final Discharge Diagnoses:  Active Hospital Problems    Diagnosis   • **Type 2 diabetes mellitus with hypoglycemia, without long-term current use of insulin (CMS/HCC)   • Metabolic encephalopathy   • Chronic diastolic heart failure (CMS/HCC)   • History of stroke   • Atrial fibrillation (CMS/HCC)   • Normocytic anemia   • Essential hypertension     Consults: None.     Procedures Performed: None.     Pertinent Test Results:   Imaging Results (Last 7 Days)     Procedure Component Value Units Date/Time    CT Head Without Contrast [[id number]] Collected: 11/15/20 1020     Updated: 11/15/20 1026    Narrative:      CT HEAD WO CONTRAST- 11/14/2020 10:17 PM CST     HISTORY: Altered mental status       DOSE LENGTH PRODUCT: 742 mGy cm. Automated exposure control was also  utilized to decrease patient radiation dose.     Technique:   Axial CT of the brain without IV contrast. Sagittal and coronal  reformations are also provided for review. Soft tissue and bone kernels  are available for interpretation.     Comparison: None.     Findings:      There is no evidence of acute large vascular territory infarct.  Encephalomalacia along the base of the right frontal lobe which may be  posttraumatic or postischemic. No intra-axial or extra-axial hemorrhage.  Underlying advanced chronic small vessel ischemic change with global  cortical atrophy. Notable symmetric volume loss in the hippocampal  formations with rounding of the temporal horns of the lateral  ventricles. No visualized mass lesion or mass effect. Posterior fossa  structures are unremarkable. The scalp and calvarium are intact.  Visualized paranasal sinuses and mastoids are clear.         Impression:      Impression:    1. No acute intracranial process.  2. Underlying chronic small vessel ischemic change. Encephalomalacia  along the base of the right frontal lobe which may be posttraumatic or  postischemic.  3. Prominent symmetric volume loss in the hippocampal formations with  ballooning of the bilateral temporal horns. Correlate for evidence of  underlying Alzheimer's type dementia.  This report was finalized on 11/15/2020 10:23 by Dr Brent Raines, .    XR Chest 1 View [[id number]] Collected: 11/14/20 2111     Updated: 11/14/20 2115    Narrative:      EXAMINATION: Chest 1 view 11/14/2020     HISTORY: Altered mental status     FINDINGS: Upright frontal projection of chest is expiratory causing  accentuation of the bronchovascular markings and cardiac silhouette.  There is cardiomegaly with mild pulmonary vascular congestion. Small  effusions are suspected with blunting of the costophrenic angles.       Impression:      1. Cardiomegaly. Expiratory chest.  2. Pulmonary vascular congestion. Small effusions are suspected.  This report was finalized on 11/14/2020 21:12 by Dr. Jamal Ojeda MD.        Lab Results (last 7 days)     Procedure Component Value Units Date/Time    Basic Metabolic Panel [[id number]]  (Abnormal) Collected: 11/17/20 0434    Specimen: Blood Updated: 11/17/20 0623     Glucose 118 mg/dL      BUN 16 mg/dL      Creatinine 0.84 mg/dL      Sodium 136 mmol/L      Potassium 4.3 mmol/L      Chloride 104 mmol/L      CO2 25.0 mmol/L      Calcium 9.5 mg/dL      eGFR Non African Amer 87 mL/min/1.73      BUN/Creatinine Ratio 19.0     Anion Gap 7.0 mmol/L     Narrative:      GFR Normal >60  Chronic Kidney Disease <60  Kidney Failure <15      CBC (No Diff) [[id number]]  (Abnormal) Collected: 11/17/20 0434    Specimen: Blood Updated: 11/17/20 0511     WBC 5.69 10*3/mm3      RBC 2.94 10*6/mm3      Hemoglobin 8.6 g/dL      Hematocrit 27.0 %      MCV 91.8 fL      MCH 29.3 pg      MCHC 31.9 g/dL       RDW 19.1 %      RDW-SD 64.6 fl      MPV 10.2 fL      Platelets 288 10*3/mm3     Blood Culture - Blood, Arm, Right [[id number]] Collected: 11/14/20 2300    Specimen: Blood from Arm, Right Updated: 11/16/20 2315     Blood Culture No growth at 2 days    POC Glucose Once [[id number]]  (Abnormal) Collected: 11/16/20 2129    Specimen: Blood Updated: 11/16/20 2140     Glucose 201 mg/dL      Comment: : [id number] Joaquín Otero ID: [id number]       Blood Culture - Blood, Arm, Left [[id number]] Collected: 11/14/20 2017    Specimen: Blood from Arm, Left Updated: 11/16/20 2100     Blood Culture No growth at 2 days    POC Glucose Once [[id number]]  (Abnormal) Collected: 11/16/20 1641    Specimen: Blood Updated: 11/16/20 1652     Glucose 144 mg/dL      Comment: : [id number] Sj AlvarezIrish ID: [id number]       Basic Metabolic Panel [[id number]]  (Abnormal) Collected: 11/16/20 0522    Specimen: Blood Updated: 11/16/20 0610     Glucose 135 mg/dL      BUN 13 mg/dL      Creatinine 0.87 mg/dL      Sodium 136 mmol/L      Potassium 4.7 mmol/L      Comment: Slight hemolysis detected by analyzer. Results may be affected.        Chloride 106 mmol/L      CO2 26.0 mmol/L      Calcium 9.5 mg/dL      eGFR Non African Amer 83 mL/min/1.73      BUN/Creatinine Ratio 14.9     Anion Gap 4.0 mmol/L     Narrative:      GFR Normal >60  Chronic Kidney Disease <60  Kidney Failure <15      CBC (No Diff) [[id number]]  (Abnormal) Collected: 11/16/20 0522    Specimen: Blood Updated: 11/16/20 0545     WBC 5.72 10*3/mm3      RBC 3.04 10*6/mm3      Hemoglobin 8.9 g/dL      Hematocrit 28.4 %      MCV 93.4 fL      MCH 29.3 pg      MCHC 31.3 g/dL      RDW 19.8 %      RDW-SD 67.7 fl      MPV 10.1 fL      Platelets 311 10*3/mm3     POC Glucose Once [[id number]]  (Abnormal) Collected: 11/15/20 2204    Specimen: Blood Updated: 11/15/20 2215     Glucose 154 mg/dL      Comment: : [id number] Ivana Martínez ID: [id number]       POC Glucose  Once [[id number]]  (Normal) Collected: 11/15/20 1657    Specimen: Blood Updated: 11/15/20 1711     Glucose 97 mg/dL      Comment: : [id number] Willis MolinaMeter ID: [id number]       POC Glucose Once [[id number]]  (Normal) Collected: 11/15/20 0833    Specimen: Blood Updated: 11/15/20 0844     Glucose 85 mg/dL      Comment: : [id number] Kenny Martínez ID: [id number]       POC Glucose Once [[id number]]  (Normal) Collected: 11/15/20 0721    Specimen: Blood Updated: 11/15/20 0733     Glucose 82 mg/dL      Comment: : [id number] Ketty MagallanesMeter ID: [id number]       POC Glucose Once [[id number]]  (Normal) Collected: 11/15/20 0638    Specimen: Blood Updated: 11/15/20 0650     Glucose 90 mg/dL      Comment: : [id number] Ketty MagallanesMeter ID: [id number]       POC Glucose Once [[id number]]  (Normal) Collected: 11/15/20 0521    Specimen: Blood Updated: 11/15/20 0537     Glucose 88 mg/dL      Comment: : [id number] Ketty MagallanesMeter ID: [id number]       POC Glucose Once [[id number]]  (Normal) Collected: 11/15/20 0423    Specimen: Blood Updated: 11/15/20 0434     Glucose 96 mg/dL      Comment: : [id number] Ketty MagallanesMeter ID: [id number]       POC Occult Blood Stool [[id number]]  (Normal) Collected: 11/15/20 0220    Specimen: Stool Updated: 11/15/20 0402     Fecal Occult Blood Negative     Lot Number [id number]     Expiration Date 7/31/2021     DEVELOPER LOT NUMBER 185     DEVELOPER EXPIRATION DATE 7/31/2021     Positive Control Positive     Negative Control Negative    Urinalysis With Culture If Indicated - Urine, Catheter [[id number]]  (Abnormal) Collected: 11/15/20 0210    Specimen: Urine, Catheter Updated: 11/15/20 0224     Color, UA Dark Yellow     Appearance, UA Clear     pH, UA 5.5     Specific Gravity, UA 1.019     Glucose, UA Negative     Ketones, UA Negative     Bilirubin, UA Negative     Blood, UA Negative     Protein, UA 30 mg/dL (1+)     Leuk Esterase, UA Negative     Nitrite, UA Negative     Urobilinogen, UA  0.2 E.U./dL    Urinalysis, Microscopic Only - Urine, Catheter [[id number]]  (Abnormal) Collected: 11/15/20 0210    Specimen: Urine, Catheter Updated: 11/15/20 0224     RBC, UA 0-2 /HPF      WBC, UA 0-2 /HPF      Bacteria, UA None Seen /HPF      Squamous Epithelial Cells, UA 3-6 /HPF      Hyaline Casts, UA 3-6 /LPF      Methodology Automated Microscopy    POC Glucose Once [[id number]]  (Abnormal) Collected: 11/15/20 0117    Specimen: Blood Updated: 11/15/20 0128     Glucose 171 mg/dL      Comment: : [id number] Sage Telecomer ID: [id number]       POC Glucose Once [[id number]]  (Abnormal) Collected: 11/15/20 0011    Specimen: Blood Updated: 11/15/20 0021     Glucose 43 mg/dL      Comment: : [id number] Bunkr ID: [id number]       POC Glucose Once [[id number]]  (Abnormal) Collected: 11/15/20 0009    Specimen: Blood Updated: 11/15/20 0021     Glucose 42 mg/dL      Comment: : [id number] Bunkr ID: [id number]       POC Glucose Once [[id number]]  (Abnormal) Collected: 11/14/20 2248    Specimen: Blood Updated: 11/14/20 2312     Glucose 58 mg/dL      Comment: : [id number] [employer] ID: [id number]       COVID PRE-OP / PRE-PROCEDURE SCREENING ORDER (NO ISOLATION) - Swab, Nasal Cavity [[id number]]  (Normal) Collected: 11/14/20 2045    Specimen: Swab from Nasal Cavity Updated: 11/14/20 2128    Narrative:      The following orders were created for panel order COVID PRE-OP / PRE-PROCEDURE SCREENING ORDER (NO ISOLATION) - Swab, Nasal Cavity.  Procedure                               Abnormality         Status                     ---------                               -----------         ------                     COVID-19,[facility]...[[id number]]  Normal              Final result                 Please view results for these tests on the individual orders.    COVID-19,[facility] IN-HOUSE,Nasal Swab No Transport Media 3-4 HR TAT - Swab, Nasal Cavity [[id number]]  (Normal) Collected:  11/14/20 2045    Specimen: Swab from Nasal Cavity Updated: 11/14/20 2128     COVID19 Not Detected    Narrative:      Fact sheet for providers: https://www.fda.gov/media/008344/download     Fact sheet for patients: https://www.fda.gov/media/185358/download    POC Glucose Once [[id number]]  (Normal) Collected: 11/14/20 2052    Specimen: Blood Updated: 11/14/20 2103     Glucose 89 mg/dL      Comment: : [id number] Aicha JamiMeter ID: [id number]       Lactic Acid, Plasma [[id number]]  (Normal) Collected: 11/14/20 2017    Specimen: Blood Updated: 11/14/20 2057     Lactate 0.8 mmol/L     Comprehensive Metabolic Panel [[id number]]  (Abnormal) Collected: 11/14/20 2017    Specimen: Blood Updated: 11/14/20 2049     Glucose 227 mg/dL      BUN 16 mg/dL      Creatinine 0.95 mg/dL      Sodium 141 mmol/L      Potassium 5.0 mmol/L      Comment: Slight hemolysis detected by analyzer. Results may be affected.        Chloride 108 mmol/L      CO2 26.0 mmol/L      Calcium 10.0 mg/dL      Total Protein 6.2 g/dL      Albumin 2.80 g/dL      ALT (SGPT) 31 U/L      AST (SGOT) 45 U/L      Comment: Slight hemolysis detected by analyzer. Results may be affected.        Alkaline Phosphatase 105 U/L      Total Bilirubin 0.2 mg/dL      eGFR Non African Amer 76 mL/min/1.73      eGFR  African Amer 92 mL/min/1.73      Globulin 3.4 gm/dL      A/G Ratio 0.8 g/dL      BUN/Creatinine Ratio 16.8     Anion Gap 7.0 mmol/L     Narrative:      GFR Normal >60  Chronic Kidney Disease <60  Kidney Failure <15      Lipase [[id number]]  (Abnormal) Collected: 11/14/20 2017    Specimen: Blood Updated: 11/14/20 2043     Lipase 12 U/L     Magnesium [[id number]]  (Normal) Collected: 11/14/20 2017    Specimen: Blood Updated: 11/14/20 2043     Magnesium 2.0 mg/dL     Protime-INR [[id number]]  (Normal) Collected: 11/14/20 2017    Specimen: Blood Updated: 11/14/20 2040     Protime 13.4 Seconds      INR 1.06    aPTT [[id number]]  (Normal) Collected: 11/14/20 2017     Specimen: Blood Updated: 11/14/20 2040     PTT 30.1 seconds     CBC Auto Differential [[id number]]  (Abnormal) Collected: 11/14/20 2017    Specimen: Blood Updated: 11/14/20 2032     WBC 7.13 10*3/mm3      RBC 3.38 10*6/mm3      Hemoglobin 10.0 g/dL      Hematocrit 31.4 %      MCV 92.9 fL      MCH 29.6 pg      MCHC 31.8 g/dL      RDW 20.0 %      RDW-SD 67.8 fl      MPV 10.1 fL      Platelets 363 10*3/mm3      Neutrophil % 89.5 %      Lymphocyte % 5.6 %      Monocyte % 4.3 %      Eosinophil % 0.1 %      Basophil % 0.1 %      Immature Grans % 0.4 %      Neutrophils, Absolute 6.37 10*3/mm3      Lymphocytes, Absolute 0.40 10*3/mm3      Monocytes, Absolute 0.31 10*3/mm3      Eosinophils, Absolute 0.01 10*3/mm3      Basophils, Absolute 0.01 10*3/mm3      Immature Grans, Absolute 0.03 10*3/mm3      nRBC 0.0 /100 WBC     POC Glucose Once [[id number]]  (Abnormal) Collected: 11/14/20 1937    Specimen: Blood Updated: 11/14/20 1949     Glucose 47 mg/dL      Comment: : [id number] Shazia Iveyer ID: [id number]           Hospital Course:  I am familiar with the patient as I took care of in the last week that I was on service.  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  The initial plan was for him to go to skilled nursing, but his family decided to take him home.  For full details regarding that stay, please go to his other chart in pull my most recent progress note.     He returned to the emergency department on 11/14 with hypoglycemia.  I had the patient off of his sulfonylurea medication while he was in the hospital.  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  He has been taking glimepiride 4 mg twice daily in the home setting.  His most recent hemoglobin A1c was 6.1.  Discontinued the sulfonylurea.  Followed on Accu-Cheks and sliding scale insulin. His blood sugars had been very well controlled on his last stay and he required very little correction insulin at that time.  It may be reasonable to have him on  "metformin monotherapy given his age and hemoglobin A1c. 500 mg once daily for now.      No other new issues have arisen.     Home with 24 hour caregivers and home health today.     Physical Exam on Discharge:  /72 (BP Location: Right leg, Patient Position: Lying)   Pulse 64   Temp 98.8 °F (37.1 °C) (Axillary)   Resp 18   Ht 182.9 cm (72\")   Wt 97 kg (213 lb 12.8 oz)   SpO2 96%   BMI 29.00 kg/m²   Physical Exam  Constitutional:       Appearance: He is well-developed.      Comments: Up in bed.  No distress.  Nontoxic.  No family present.  Discussed with his nurse, Tracy.   HENT:      Head: Normocephalic and atraumatic.      Comments: Edentulous.  Eyes:      Conjunctiva/sclera: Conjunctivae normal.      Pupils: Pupils are equal, round, and reactive to light.   Neck:      Musculoskeletal: Neck supple.      Vascular: No JVD.   Cardiovascular:      Rate and Rhythm: Normal rate and regular rhythm.      Heart sounds: Normal heart sounds. No murmur. No friction rub. No gallop.    Pulmonary:      Effort: Pulmonary effort is normal. No respiratory distress.      Breath sounds: Normal breath sounds. No wheezing or rales.   Chest:      Chest wall: No tenderness.   Abdominal:      General: Bowel sounds are normal. There is no distension.      Palpations: Abdomen is soft.      Tenderness: There is no abdominal tenderness. There is no guarding or rebound.   Musculoskeletal: Normal range of motion.         General: No tenderness or deformity.   Skin:     General: Skin is warm and dry.      Findings: No rash.   Neurological:      General: No focal deficit present.      Mental Status: He is alert and oriented to person, place, and time.      Cranial Nerves: No cranial nerve deficit.      Motor: Weakness present. No abnormal muscle tone.      Deep Tendon Reflexes: Reflexes normal.   Psychiatric:      Comments: Flat.      Condition on Discharge: Stable for home with 24 hour caregivers and home health.     Discharge " Disposition:  Home or Self Care    Discharge Medications:     Discharge Medications      New Medications      Instructions Start Date   metFORMIN 500 MG tablet  Commonly known as: Glucophage   500 mg, Oral, Daily With Breakfast         Changes to Medications      Instructions Start Date   cholecalciferol 10 MCG (400 UNIT) tablet  Commonly known as: VITAMIN D3  What changed: Another medication with the same name was removed. Continue taking this medication, and follow the directions you see here.   400 Units, Oral, Daily         Continue These Medications      Instructions Start Date   acetaminophen 325 MG tablet  Commonly known as: TYLENOL   325 mg, Oral, Every 6 Hours PRN      allopurinol 300 MG tablet  Commonly known as: ZYLOPRIM   300 mg, Oral, Daily      amLODIPine 5 MG tablet  Commonly known as: NORVASC   5 mg, Oral, Daily      ascorbic acid 1000 MG tablet  Commonly known as: VITAMIN C   500 mg, Oral, Daily      aspirin 81 MG chewable tablet   81 mg, Oral, Daily      colchicine 0.6 MG tablet   0.6 mg, Oral, Daily      finasteride 5 MG tablet  Commonly known as: PROSCAR   5 mg, Oral, Daily      folic acid 400 MCG tablet  Commonly known as: FOLVITE   400 mcg, Oral, Daily      furosemide 20 MG tablet  Commonly known as: LASIX   20 mg, Oral, 2 Times Daily      isosorbide mononitrate 30 MG 24 hr tablet  Commonly known as: IMDUR   30 mg, Oral, Daily      losartan 50 MG tablet  Commonly known as: COZAAR   50 mg, Oral, Daily      meclizine 25 MG tablet  Commonly known as: ANTIVERT   25 mg, Oral, 3 Times Daily PRN      metoprolol succinate XL 25 MG 24 hr tablet  Commonly known as: TOPROL-XL   25 mg, Oral, Daily      neomycin-polymyxin-hydrocortisone 3.5-91361-9 otic suspension  Commonly known as: CORTISPORIN   4 drops, Right Ear, 3 Times Daily      nitroglycerin 0.4 MG SL tablet  Commonly known as: NITROSTAT   0.4 mg, Sublingual, Every 5 Minutes PRN, Take no more than 3 doses in 15 minutes.       ondansetron 4 MG  tablet  Commonly known as: ZOFRAN   4 mg, Oral, Every 8 Hours PRN      pantoprazole 40 MG EC tablet  Commonly known as: PROTONIX   40 mg, Oral, Daily      potassium chloride 20 MEQ CR tablet  Commonly known as: K-DUR,KLOR-CON   20 mEq, Oral, 2 Times Daily      pravastatin 40 MG tablet  Commonly known as: PRAVACHOL   40 mg, Oral, Daily      prednisoLONE acetate 1 % ophthalmic suspension  Commonly known as: PRED FORTE   1 drop, Both Eyes, 3 Times Daily      rOPINIRole 1 MG tablet  Commonly known as: REQUIP   1 mg, Oral, Nightly, Take 1 hour before bedtime.       sennosides-docusate 8.6-50 MG per tablet  Commonly known as: PERICOLACE   1 tablet, Oral, Daily      tamsulosin 0.4 MG capsule 24 hr capsule  Commonly known as: FLOMAX   1 capsule, Oral, Daily      thiamine 100 MG tablet  Commonly known as: VITAMIN B-1   100 mg, Oral, Daily      Vitamin B6 200 MG tablet   Oral         Stop These Medications    glimepiride 4 MG tablet  Commonly known as: AMARYL          Discharge Diet:   Diet Instructions     Diet: Consistent Carbohydrate, Cardiac, Dysphagia; Nectar / Syrup Thick Liquids; Pureed      Discharge Diet:  Consistent Carbohydrate  Cardiac  Dysphagia       Fluid Consistency: Nectar / Syrup Thick Liquids    Pureed Options: Pureed     Activity at Discharge:   Activity Instructions     Activity as Tolerated      Up WIth Assist          Follow-up Appointments:   Dr. Owen in 1 week.    Test Results Pending at Discharge: None.     Electronically signed by Elpidio Irby DO, 11/17/20, 09:44 CST.    Time: 35 minutes.

## 2020-11-17 NOTE — PROGRESS NOTES
Continued Stay Note   Jamie     Patient Name: Eron Escalante  MRN: [id number]  Today's Date: 11/17/2020    Admit Date: 11/14/2020    Discharge Plan     Row Name 11/17/20 0953       Plan    Plan  Home with [facility]     Patient/Family in Agreement with Plan  yes    Final Discharge Disposition Code  06 - home with home health care    Final Note  Pt is being d/c'ed home today. He has orders for home health and was already supposed to begin services with [facility] . Referral called to [facility] [phone].        Discharge Codes    No documentation.       Expected Discharge Date and Time     Expected Discharge Date Expected Discharge Time    Nov 17, 2020             ISIDRO Ambrosio

## 2020-11-17 NOTE — PLAN OF CARE
Goal Outcome Evaluation:  Plan of Care Reviewed With: patient  Progress: no change  Outcome Summary: Pt uncooperative with care, can be combative at times.   Incont of bowel, bladder.  Turn q2 when pt allows.  HS blood sugar 201.  IID.   Will curse at the nurses.  Probable NH today.  Cont to monitor.

## 2020-11-18 PROBLEM — E16.2 HYPOGLYCEMIA: Status: ACTIVE | Noted: 2020-01-01

## 2020-11-18 NOTE — PAYOR COMM NOTE
"DC HOME 11-17-20  [id number]   [phone]    Gabriel Escalante (85 y.o. Male)     Date of Birth Social Security Number Address Home Phone MRN    [id number]  [id number] [facility] [address] [phone] [id number]    [employer] Marital Status          Confucianism        Admission Date Admission Type Admitting Provider Attending Provider Department, Room/Bed    11/14/20 Emergency Elpidio Irby DO  [facility] 3C, 386/1    Discharge Date Discharge Disposition Discharge Destination        11/17/2020 Home-Health Care [facility] [place] [facility]              Attending Provider: (none)   Allergies: Lorazepam, Lorazepam, Penicillins, Adhesive Tape, Penicillins    Isolation: None   Infection: None   Code Status: Prior    Ht: 182.9 cm (72\")   Wt: 97 kg (213 lb 12.8 oz)    Admission Cmt: None   Principal Problem: Type 2 diabetes mellitus with hypoglycemia, without long-term current use of insulin (CMS/[facility]) [E11.649]                 Active Insurance as of 11/14/2020     Primary Coverage     Payor Plan Insurance Group Employer/Plan Group    ANTHEM BLUE CROSS ANTHEM BLUE CROSS BLUE SHIELD PPO [id number]     Payor Plan Address Payor Plan Phone Number Payor Plan Fax Number Effective Dates    [address] [phone]  1/1/2015 - None Entered    [facility] [id number]       Subscriber Name Subscriber Birth Date Member ID       GABRIEL ESCALANTE 1934 [id number]           Secondary Coverage     Payor Plan Insurance Group Employer/Plan Group    MEDICARE MEDICARE A & B      Payor Plan Address Payor Plan Phone Number Payor Plan Fax Number Effective Dates    [address] [phone]  11/1/1999 - None Entered    [facility] [id number]       Subscriber Name Subscriber Birth Date Member ID       GABRIEL ESCALANTE 1934 [id number]                 Emergency Contacts      (Rel.) Home Phone Work Phone Mobile Phone    Isabel Bradley (Grandchild) [phone] -- --    Rose Escalante (Spouse) -- -- --               Discharge " Summary      Elpidio IrbyDO at 11/17/20 0944                [facility] Medicine Services  DISCHARGE SUMMARY       Date of Admission: 11/14/2020  Date of Discharge:  11/17/2020  Primary Care Physician: Jeffrey Owen MD    Presenting Problem/History of Present Illness:  Altered mental status, low blood sugar.     Final Discharge Diagnoses:  Active Hospital Problems    Diagnosis   • **Type 2 diabetes mellitus with hypoglycemia, without long-term current use of insulin (CMS/HCC)   • Metabolic encephalopathy   • Chronic diastolic heart failure (CMS/HCC)   • History of stroke   • Atrial fibrillation (CMS/HCC)   • Normocytic anemia   • Essential hypertension     Consults: None.     Procedures Performed: None.     Pertinent Test Results:   Imaging Results (Last 7 Days)     Procedure Component Value Units Date/Time    CT Head Without Contrast [[id number]] Collected: 11/15/20 1020     Updated: 11/15/20 1026    Narrative:      CT HEAD WO CONTRAST- 11/14/2020 10:17 PM CST     HISTORY: Altered mental status       DOSE LENGTH PRODUCT: 742 mGy cm. Automated exposure control was also  utilized to decrease patient radiation dose.     Technique:   Axial CT of the brain without IV contrast. Sagittal and coronal  reformations are also provided for review. Soft tissue and bone kernels  are available for interpretation.     Comparison: None.     Findings:      There is no evidence of acute large vascular territory infarct.  Encephalomalacia along the base of the right frontal lobe which may be  posttraumatic or postischemic. No intra-axial or extra-axial hemorrhage.  Underlying advanced chronic small vessel ischemic change with global  cortical atrophy. Notable symmetric volume loss in the hippocampal  formations with rounding of the temporal horns of the lateral  ventricles. No visualized mass lesion or mass effect. Posterior fossa  structures are unremarkable. The scalp and calvarium are  intact.  Visualized paranasal sinuses and mastoids are clear.        Impression:      Impression:    1. No acute intracranial process.  2. Underlying chronic small vessel ischemic change. Encephalomalacia  along the base of the right frontal lobe which may be posttraumatic or  postischemic.  3. Prominent symmetric volume loss in the hippocampal formations with  ballooning of the bilateral temporal horns. Correlate for evidence of  underlying Alzheimer's type dementia.  This report was finalized on 11/15/2020 10:23 by Dr Brent Raines, .    XR Chest 1 View [[id number]] Collected: 11/14/20 2111     Updated: 11/14/20 2115    Narrative:      EXAMINATION: Chest 1 view 11/14/2020     HISTORY: Altered mental status     FINDINGS: Upright frontal projection of chest is expiratory causing  accentuation of the bronchovascular markings and cardiac silhouette.  There is cardiomegaly with mild pulmonary vascular congestion. Small  effusions are suspected with blunting of the costophrenic angles.       Impression:      1. Cardiomegaly. Expiratory chest.  2. Pulmonary vascular congestion. Small effusions are suspected.  This report was finalized on 11/14/2020 21:12 by Dr. Jamal Ojeda MD.        Lab Results (last 7 days)     Procedure Component Value Units Date/Time    Basic Metabolic Panel [[id number]]  (Abnormal) Collected: 11/17/20 0434    Specimen: Blood Updated: 11/17/20 0623     Glucose 118 mg/dL      BUN 16 mg/dL      Creatinine 0.84 mg/dL      Sodium 136 mmol/L      Potassium 4.3 mmol/L      Chloride 104 mmol/L      CO2 25.0 mmol/L      Calcium 9.5 mg/dL      eGFR Non African Amer 87 mL/min/1.73      BUN/Creatinine Ratio 19.0     Anion Gap 7.0 mmol/L     Narrative:      GFR Normal >60  Chronic Kidney Disease <60  Kidney Failure <15      CBC (No Diff) [[id number]]  (Abnormal) Collected: 11/17/20 0434    Specimen: Blood Updated: 11/17/20 0511     WBC 5.69 10*3/mm3      RBC 2.94 10*6/mm3      Hemoglobin 8.6 g/dL       Hematocrit 27.0 %      MCV 91.8 fL      MCH 29.3 pg      MCHC 31.9 g/dL      RDW 19.1 %      RDW-SD 64.6 fl      MPV 10.2 fL      Platelets 288 10*3/mm3     Blood Culture - Blood, Arm, Right [[id number]] Collected: 11/14/20 2300    Specimen: Blood from Arm, Right Updated: 11/16/20 2315     Blood Culture No growth at 2 days    POC Glucose Once [[id number]]  (Abnormal) Collected: 11/16/20 2129    Specimen: Blood Updated: 11/16/20 2140     Glucose 201 mg/dL      Comment: : [id number] Joaquín Otero ID: [id number]       Blood Culture - Blood, Arm, Left [[id number]] Collected: 11/14/20 2017    Specimen: Blood from Arm, Left Updated: 11/16/20 2100     Blood Culture No growth at 2 days    POC Glucose Once [[id number]]  (Abnormal) Collected: 11/16/20 1641    Specimen: Blood Updated: 11/16/20 1652     Glucose 144 mg/dL      Comment: : [id number] Sj AlvarezIrish ID: [id number]       Basic Metabolic Panel [[id number]]  (Abnormal) Collected: 11/16/20 0522    Specimen: Blood Updated: 11/16/20 0610     Glucose 135 mg/dL      BUN 13 mg/dL      Creatinine 0.87 mg/dL      Sodium 136 mmol/L      Potassium 4.7 mmol/L      Comment: Slight hemolysis detected by analyzer. Results may be affected.        Chloride 106 mmol/L      CO2 26.0 mmol/L      Calcium 9.5 mg/dL      eGFR Non African Amer 83 mL/min/1.73      BUN/Creatinine Ratio 14.9     Anion Gap 4.0 mmol/L     Narrative:      GFR Normal >60  Chronic Kidney Disease <60  Kidney Failure <15      CBC (No Diff) [[id number]]  (Abnormal) Collected: 11/16/20 0522    Specimen: Blood Updated: 11/16/20 0545     WBC 5.72 10*3/mm3      RBC 3.04 10*6/mm3      Hemoglobin 8.9 g/dL      Hematocrit 28.4 %      MCV 93.4 fL      MCH 29.3 pg      MCHC 31.3 g/dL      RDW 19.8 %      RDW-SD 67.7 fl      MPV 10.1 fL      Platelets 311 10*3/mm3     POC Glucose Once [[id number]]  (Abnormal) Collected: 11/15/20 2204    Specimen: Blood Updated: 11/15/20 2215     Glucose 154 mg/dL       Comment: : [id number] Ivana GroverMeter ID: [id number]       POC Glucose Once [[id number]]  (Normal) Collected: 11/15/20 1657    Specimen: Blood Updated: 11/15/20 1711     Glucose 97 mg/dL      Comment: : [id number] Willis MolinaMeter ID: [id number]       POC Glucose Once [[id number]]  (Normal) Collected: 11/15/20 0833    Specimen: Blood Updated: 11/15/20 0844     Glucose 85 mg/dL      Comment: : [id number] Kenny Martínez ID: [id number]       POC Glucose Once [[id number]]  (Normal) Collected: 11/15/20 0721    Specimen: Blood Updated: 11/15/20 0733     Glucose 82 mg/dL      Comment: : [id number] Ketty MagallanesMetmayda ID: [id number]       POC Glucose Once [[id number]]  (Normal) Collected: 11/15/20 0638    Specimen: Blood Updated: 11/15/20 0650     Glucose 90 mg/dL      Comment: : [id number] Omaniluis MagallanesMeter ID: [id number]       POC Glucose Once [[id number]]  (Normal) Collected: 11/15/20 0521    Specimen: Blood Updated: 11/15/20 0537     Glucose 88 mg/dL      Comment: : [id number] Ketty MagallanesMetmayda ID: [id number]       POC Glucose Once [[id number]]  (Normal) Collected: 11/15/20 0423    Specimen: Blood Updated: 11/15/20 0434     Glucose 96 mg/dL      Comment: : [id number] Omaniluis MagallanesMetmayda ID: [id number]       POC Occult Blood Stool [[id number]]  (Normal) Collected: 11/15/20 0220    Specimen: Stool Updated: 11/15/20 0402     Fecal Occult Blood Negative     Lot Number [id number]     Expiration Date 7/31/2021     DEVELOPER LOT NUMBER 185     DEVELOPER EXPIRATION DATE 7/31/2021     Positive Control Positive     Negative Control Negative    Urinalysis With Culture If Indicated - Urine, Catheter [[id number]]  (Abnormal) Collected: 11/15/20 0210    Specimen: Urine, Catheter Updated: 11/15/20 0224     Color, UA Dark Yellow     Appearance, UA Clear     pH, UA 5.5     Specific Gravity, UA 1.019     Glucose, UA Negative     Ketones, UA Negative     Bilirubin, UA Negative     Blood, UA Negative     Protein, UA 30 mg/dL  (1+)     Leuk Esterase, UA Negative     Nitrite, UA Negative     Urobilinogen, UA 0.2 E.U./dL    Urinalysis, Microscopic Only - Urine, Catheter [[id number]]  (Abnormal) Collected: 11/15/20 0210    Specimen: Urine, Catheter Updated: 11/15/20 0224     RBC, UA 0-2 /HPF      WBC, UA 0-2 /HPF      Bacteria, UA None Seen /HPF      Squamous Epithelial Cells, UA 3-6 /HPF      Hyaline Casts, UA 3-6 /LPF      Methodology Automated Microscopy    POC Glucose Once [[id number]]  (Abnormal) Collected: 11/15/20 0117    Specimen: Blood Updated: 11/15/20 0128     Glucose 171 mg/dL      Comment: : [id number] Capiotaer ID: [id number]       POC Glucose Once [[id number]]  (Abnormal) Collected: 11/15/20 0011    Specimen: Blood Updated: 11/15/20 0021     Glucose 43 mg/dL      Comment: : [id number] [employer] ID: [id number]       POC Glucose Once [[id number]]  (Abnormal) Collected: 11/15/20 0009    Specimen: Blood Updated: 11/15/20 0021     Glucose 42 mg/dL      Comment: : [id number] [employer] ID: [id number]       POC Glucose Once [[id number]]  (Abnormal) Collected: 11/14/20 2248    Specimen: Blood Updated: 11/14/20 2312     Glucose 58 mg/dL      Comment: : [id number] Capiotaer ID: [id number]       COVID PRE-OP / PRE-PROCEDURE SCREENING ORDER (NO ISOLATION) - Swab, Nasal Cavity [[id number]]  (Normal) Collected: 11/14/20 2045    Specimen: Swab from Nasal Cavity Updated: 11/14/20 2128    Narrative:      The following orders were created for panel order COVID PRE-OP / PRE-PROCEDURE SCREENING ORDER (NO ISOLATION) - Swab, Nasal Cavity.  Procedure                               Abnormality         Status                     ---------                               -----------         ------                     COVID-19,[facility] IN-JOSEPHINE...[[id number]]  Normal              Final result                 Please view results for these tests on the individual orders.    COVID-19,[facility] IN-HOUSE,Nasal Swab No  [employer] 3-4 HR TAT - Swab, Nasal Cavity [[id number]]  (Normal) Collected: 11/14/20 2045    Specimen: Swab from Nasal Cavity Updated: 11/14/20 2128     COVID19 Not Detected    Narrative:      Fact sheet for providers: https://www.fda.gov/media/663271/download     Fact sheet for patients: https://www.fda.gov/media/440501/download    POC Glucose Once [[id number]]  (Normal) Collected: 11/14/20 2052    Specimen: Blood Updated: 11/14/20 2103     Glucose 89 mg/dL      Comment: : [id number] Aicha JamiMeter ID: [id number]       Lactic Acid, Plasma [[id number]]  (Normal) Collected: 11/14/20 2017    Specimen: Blood Updated: 11/14/20 2057     Lactate 0.8 mmol/L     Comprehensive Metabolic Panel [[id number]]  (Abnormal) Collected: 11/14/20 2017    Specimen: Blood Updated: 11/14/20 2049     Glucose 227 mg/dL      BUN 16 mg/dL      Creatinine 0.95 mg/dL      Sodium 141 mmol/L      Potassium 5.0 mmol/L      Comment: Slight hemolysis detected by analyzer. Results may be affected.        Chloride 108 mmol/L      CO2 26.0 mmol/L      Calcium 10.0 mg/dL      Total Protein 6.2 g/dL      Albumin 2.80 g/dL      ALT (SGPT) 31 U/L      AST (SGOT) 45 U/L      Comment: Slight hemolysis detected by analyzer. Results may be affected.        Alkaline Phosphatase 105 U/L      Total Bilirubin 0.2 mg/dL      eGFR Non African Amer 76 mL/min/1.73      eGFR  African Amer 92 mL/min/1.73      Globulin 3.4 gm/dL      A/G Ratio 0.8 g/dL      BUN/Creatinine Ratio 16.8     Anion Gap 7.0 mmol/L     Narrative:      GFR Normal >60  Chronic Kidney Disease <60  Kidney Failure <15      Lipase [[id number]]  (Abnormal) Collected: 11/14/20 2017    Specimen: Blood Updated: 11/14/20 2043     Lipase 12 U/L     Magnesium [[id number]]  (Normal) Collected: 11/14/20 2017    Specimen: Blood Updated: 11/14/20 2043     Magnesium 2.0 mg/dL     Protime-INR [[id number]]  (Normal) Collected: 11/14/20 2017    Specimen: Blood Updated: 11/14/20 2040     Protime 13.4 Seconds       INR 1.06    aPTT [[id number]]  (Normal) Collected: 11/14/20 2017    Specimen: Blood Updated: 11/14/20 2040     PTT 30.1 seconds     CBC Auto Differential [[id number]]  (Abnormal) Collected: 11/14/20 2017    Specimen: Blood Updated: 11/14/20 2032     WBC 7.13 10*3/mm3      RBC 3.38 10*6/mm3      Hemoglobin 10.0 g/dL      Hematocrit 31.4 %      MCV 92.9 fL      MCH 29.6 pg      MCHC 31.8 g/dL      RDW 20.0 %      RDW-SD 67.8 fl      MPV 10.1 fL      Platelets 363 10*3/mm3      Neutrophil % 89.5 %      Lymphocyte % 5.6 %      Monocyte % 4.3 %      Eosinophil % 0.1 %      Basophil % 0.1 %      Immature Grans % 0.4 %      Neutrophils, Absolute 6.37 10*3/mm3      Lymphocytes, Absolute 0.40 10*3/mm3      Monocytes, Absolute 0.31 10*3/mm3      Eosinophils, Absolute 0.01 10*3/mm3      Basophils, Absolute 0.01 10*3/mm3      Immature Grans, Absolute 0.03 10*3/mm3      nRBC 0.0 /100 WBC     POC Glucose Once [[id number]]  (Abnormal) Collected: 11/14/20 1937    Specimen: Blood Updated: 11/14/20 1949     Glucose 47 mg/dL      Comment: : [id number] Shazai Ziegler ID: [id number]           Hospital Course:  I am familiar with the patient as I took care of in the last week that I was on service.  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  The initial plan was for him to go to skilled nursing, but his family decided to take him home.  For full details regarding that stay, please go to his other chart in pull my most recent progress note.     He returned to the emergency department on 11/14 with hypoglycemia.  I had the patient off of his sulfonylurea medication while he was in the hospital.  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  He has been taking glimepiride 4 mg twice daily in the home setting.  His most recent hemoglobin A1c was 6.1.  Discontinued the sulfonylurea.  Followed on Accu-Cheks and sliding scale insulin. His blood sugars had been very well controlled on his last stay and he required very little  "correction insulin at that time.  It may be reasonable to have him on metformin monotherapy given his age and hemoglobin A1c. 500 mg once daily for now.      No other new issues have arisen.     Home with 24 hour caregivers and home health today.     Physical Exam on Discharge:  /72 (BP Location: Right leg, Patient Position: Lying)   Pulse 64   Temp 98.8 °F (37.1 °C) (Axillary)   Resp 18   Ht 182.9 cm (72\")   Wt 97 kg (213 lb 12.8 oz)   SpO2 96%   BMI 29.00 kg/m²   Physical Exam  Constitutional:       Appearance: He is well-developed.      Comments: Up in bed.  No distress.  Nontoxic.  No family present.  Discussed with his nurse, Tracy.   HENT:      Head: Normocephalic and atraumatic.      Comments: Edentulous.  Eyes:      Conjunctiva/sclera: Conjunctivae normal.      Pupils: Pupils are equal, round, and reactive to light.   Neck:      Musculoskeletal: Neck supple.      Vascular: No JVD.   Cardiovascular:      Rate and Rhythm: Normal rate and regular rhythm.      Heart sounds: Normal heart sounds. No murmur. No friction rub. No gallop.    Pulmonary:      Effort: Pulmonary effort is normal. No respiratory distress.      Breath sounds: Normal breath sounds. No wheezing or rales.   Chest:      Chest wall: No tenderness.   Abdominal:      General: Bowel sounds are normal. There is no distension.      Palpations: Abdomen is soft.      Tenderness: There is no abdominal tenderness. There is no guarding or rebound.   Musculoskeletal: Normal range of motion.         General: No tenderness or deformity.   Skin:     General: Skin is warm and dry.      Findings: No rash.   Neurological:      General: No focal deficit present.      Mental Status: He is alert and oriented to person, place, and time.      Cranial Nerves: No cranial nerve deficit.      Motor: Weakness present. No abnormal muscle tone.      Deep Tendon Reflexes: Reflexes normal.   Psychiatric:      Comments: Flat.      Condition on Discharge: " Stable for home with 24 hour caregivers and home health.     Discharge Disposition:  Home or Self Care    Discharge Medications:     Discharge Medications      New Medications      Instructions Start Date   metFORMIN 500 MG tablet  Commonly known as: Glucophage   500 mg, Oral, Daily With Breakfast         Changes to Medications      Instructions Start Date   cholecalciferol 10 MCG (400 UNIT) tablet  Commonly known as: VITAMIN D3  What changed: Another medication with the same name was removed. Continue taking this medication, and follow the directions you see here.   400 Units, Oral, Daily         Continue These Medications      Instructions Start Date   acetaminophen 325 MG tablet  Commonly known as: TYLENOL   325 mg, Oral, Every 6 Hours PRN      allopurinol 300 MG tablet  Commonly known as: ZYLOPRIM   300 mg, Oral, Daily      amLODIPine 5 MG tablet  Commonly known as: NORVASC   5 mg, Oral, Daily      ascorbic acid 1000 MG tablet  Commonly known as: VITAMIN C   500 mg, Oral, Daily      aspirin 81 MG chewable tablet   81 mg, Oral, Daily      colchicine 0.6 MG tablet   0.6 mg, Oral, Daily      finasteride 5 MG tablet  Commonly known as: PROSCAR   5 mg, Oral, Daily      folic acid 400 MCG tablet  Commonly known as: FOLVITE   400 mcg, Oral, Daily      furosemide 20 MG tablet  Commonly known as: LASIX   20 mg, Oral, 2 Times Daily      isosorbide mononitrate 30 MG 24 hr tablet  Commonly known as: IMDUR   30 mg, Oral, Daily      losartan 50 MG tablet  Commonly known as: COZAAR   50 mg, Oral, Daily      meclizine 25 MG tablet  Commonly known as: ANTIVERT   25 mg, Oral, 3 Times Daily PRN      metoprolol succinate XL 25 MG 24 hr tablet  Commonly known as: TOPROL-XL   25 mg, Oral, Daily      neomycin-polymyxin-hydrocortisone 3.5-99340-2 otic suspension  Commonly known as: CORTISPORIN   4 drops, Right Ear, 3 Times Daily      nitroglycerin 0.4 MG SL tablet  Commonly known as: NITROSTAT   0.4 mg, Sublingual, Every 5 Minutes PRN,  Take no more than 3 doses in 15 minutes.       ondansetron 4 MG tablet  Commonly known as: ZOFRAN   4 mg, Oral, Every 8 Hours PRN      pantoprazole 40 MG EC tablet  Commonly known as: PROTONIX   40 mg, Oral, Daily      potassium chloride 20 MEQ CR tablet  Commonly known as: K-DUR,KLOR-CON   20 mEq, Oral, 2 Times Daily      pravastatin 40 MG tablet  Commonly known as: PRAVACHOL   40 mg, Oral, Daily      prednisoLONE acetate 1 % ophthalmic suspension  Commonly known as: PRED FORTE   1 drop, Both Eyes, 3 Times Daily      rOPINIRole 1 MG tablet  Commonly known as: REQUIP   1 mg, Oral, Nightly, Take 1 hour before bedtime.       sennosides-docusate 8.6-50 MG per tablet  Commonly known as: PERICOLACE   1 tablet, Oral, Daily      tamsulosin 0.4 MG capsule 24 hr capsule  Commonly known as: FLOMAX   1 capsule, Oral, Daily      thiamine 100 MG tablet  Commonly known as: VITAMIN B-1   100 mg, Oral, Daily      Vitamin B6 200 MG tablet   Oral         Stop These Medications    glimepiride 4 MG tablet  Commonly known as: AMARYL          Discharge Diet:   Diet Instructions     Diet: Consistent Carbohydrate, Cardiac, Dysphagia; Nectar / Syrup Thick Liquids; Pureed      Discharge Diet:  Consistent Carbohydrate  Cardiac  Dysphagia       Fluid Consistency: Nectar / Syrup Thick Liquids    Pureed Options: Pureed     Activity at Discharge:   Activity Instructions     Activity as Tolerated      Up WIth Assist          Follow-up Appointments:   Dr. Owen in 1 week.    Test Results Pending at Discharge: None.     Electronically signed by Elpidoi Irby DO, 11/17/20, 09:44 CST.    Time: 35 minutes.           Electronically signed by Elpidio Irby DO at 11/17/20 0955

## 2020-11-18 NOTE — THERAPY DISCHARGE NOTE
Acute Care - Speech Language Pathology Discharge Summary  [facility]       Patient Name: Eron Escalante  : 1934  MRN: [id number]    Today's Date: 2020                   Admit Date: 2020      SLP Recommendation and Plan  Pureed diet and nectar thick liquids    Visit Dx:    ICD-10-CM ICD-9-CM   1. Hypoglycemia  E16.2 251.2   2. Oropharyngeal dysphagia  R13.12 787.22   3. Weakness  R53.1 780.79               SLP GOALS     Row Name 20 1400 20 1130          Oral Nutrition/Hydration Goal 1 (SLP)    Oral Nutrition/Hydration Goal 1, SLP  LTG: Patient will tolereate LRD with no overt s/s of aspiration  -MB  LTG: Patient will tolereate LRD with no overt s/s of aspiration  -CS     Time Frame (Oral Nutrition/Hydration Goal 1, SLP)  short term goal (STG);by discharge  -MB  short term goal (STG);by discharge  -CS     Barriers (Oral Nutrition/Hydration Goal 1, SLP)  cognition  -MB  cognition  -CS     Progress/Outcomes (Oral Nutrition/Hydration Goal 1, SLP)  goal not met  -MB  goal ongoing  -CS        Lingual Strengthening Goal 1 (SLP)    Activity (Lingual Strengthening Goal 1, SLP)  increase lingual tone/sensation/control/coordination/movement  -MB  increase lingual tone/sensation/control/coordination/movement  -CS     Increase Lingual Tone/Sensation/Control/Coordination/Movement  lingual movement exercises  -MB  lingual movement exercises  -CS     [place]/Accuracy (Lingual Strengthening Goal 1, SLP)  independently (over 90% accuracy)  -MB  independently (over 90% accuracy)  -CS     Time Frame (Lingual Strengthening Goal 1, SLP)  short term goal (STG);by discharge  -MB  short term goal (STG);by discharge  -CS     Barriers (Lingual Strengthening Goal 1, SLP)  cognition  -MB  cognition  -CS     Progress/Outcomes (Lingual Strengthening Goal 1, SLP)  goal not met  -MB  goal ongoing  -CS        Pharyngeal Strengthening Exercise Goal 1 (SLP)    Activity (Pharyngeal Strengthening Goal 1, SLP)   increase epiglottic inversion and retroflexion;increase tongue base retraction  -MB  increase epiglottic inversion and retroflexion;increase tongue base retraction  -CS     Increase Epiglottic Inversion and Retroflexion  Mendelsohn;falsetto  -MB  Mendelsohn;falsetto  -CS     Increase Tongue Base Retraction  hard effortful swallow;vita  -MB  hard effortful swallow;vita  -CS     [place]/Accuracy (Pharyngeal Strengthening Goal 1, SLP)  independently (over 90% accuracy)  -MB  independently (over 90% accuracy)  -CS     Time Frame (Pharyngeal Strengthening Goal 1, SLP)  short term goal (STG);by discharge  -MB  short term goal (STG);by discharge  -CS     Barriers (Pharyngeal Strengthening Goal 1, SLP)  cognition  -MB  cognition  -CS     Progress/Outcomes (Pharyngeal Strengthening Goal 1, SLP)  goal not met  -MB  goal ongoing  -CS       User Key  (r) = Recorded By, (t) = Taken By, (c) = Cosigned By    Initials Name Provider Type    Avery Reyes CCC-SLP Speech and Language Pathologist    Sonu Randle, MS CCC-SLP Speech and Language Pathologist                  SLP Discharge Summary  Anticipated Discharge Disposition (SLP): skilled nursing facility  Reason for Discharge: discharge from this facility  Progress Toward Achieving Short/long Term Goals: goals not met within established timelines  Discharge Destination: home w/ home health      Avery Gutierrez CCC-SLP  11/18/2020

## 2020-11-18 NOTE — OUTREACH NOTE
Prep Survey      Responses   [facility] facility patient discharged from?  [facility]   Is LACE score < 7 ?  No   Eligibility  Readm Mgmt   Discharge diagnosis  Hypoglycemia   Does the patient have one of the following disease processes/diagnoses(primary or secondary)?  Other   Does the patient have Home health ordered?  Yes   What is the Home health agency?   [facility]   Is there a DME ordered?  No   Comments regarding appointments  Per AVS   Prep survey completed?  Yes          Laura Estrada RN

## 2020-11-20 NOTE — OUTREACH NOTE
Medical Week 1 Survey      Responses   [facility] patient discharged from?  [place]   Does the patient have one of the following disease processes/diagnoses(primary or secondary)?  Other   Week 1 attempt successful?  No   Unsuccessful attempts  Attempt 2          Kandy Olivas RN

## 2020-11-23 NOTE — OUTREACH NOTE
Medical Week 1 Survey      Responses   [facility] patient discharged from?  [place]   Does the patient have one of the following disease processes/diagnoses(primary or secondary)?  Other   Week 1 attempt successful?  No   Unsuccessful attempts  Attempt 3   Call end time  1338   Meds reviewed with patient/caregiver?  Yes   Is the patient having any side effects they believe may be caused by any medication additions or changes?  No   Does the patient have all medications ordered at discharge?  Yes   Is the patient taking all medications as directed (includes completed medication regime)?  Yes   Does the patient have a primary care provider?   Yes   Does the patient have an appointment with their PCP within 7 days of discharge?  Yes   Has the patient kept scheduled appointments due by today?  N/A   Comments  Pcp tomorrow.   What is the Home health agency?   [facility]   Has home health visited the patient within 72 hours of discharge?  Yes   What DME was ordered?  BSC, WC and hospital bed from [facility]   Has all DME been delivered?  Yes   Psychosocial issues?  No   Did the patient receive a copy of their discharge instructions?  Yes   Nursing interventions  Reviewed instructions with patient   What is the patient's perception of their health status since discharge?  Improving   Is the patient/caregiver able to teach back signs and symptoms related to disease process for when to call PCP?  Yes   Is the patient/caregiver able to teach back signs and symptoms related to disease process for when to call 911?  Yes   Is the patient/caregiver able to teach back the hierarchy of who to call/visit for symptoms/problems? PCP, Specialist, Home health nurse, Urgent Care, ED, 911  Yes   If the patient is a current smoker, are they able to teach back resources for cessation?  Not a smoker   Additional teach back comments  Says beginning to clear mentally with additional K+ added. HH has been in.   Week 1  call completed?  Yes   Wrap up additional comments   and above at this time.          Kandy Olivas RN   Principal Discharge DX:	Dysphagia

## 2020-12-01 NOTE — OUTREACH NOTE
Medical Week 2 Survey      Responses   [facility] patient discharged from?  [place]   Does the patient have one of the following disease processes/diagnoses(primary or secondary)?  Other   Week 2 attempt successful?  No   Unsuccessful attempts  Attempt 1          Kandy Olivas RN

## 2020-12-03 ENCOUNTER — TELEPHONE (OUTPATIENT)
Dept: OTOLARYNGOLOGY | Age: 85
End: 2020-12-03

## 2020-12-03 NOTE — OUTREACH NOTE
Medical Week 2 Survey      Responses   [facility] patient discharged from?  [place]   Does the patient have one of the following disease processes/diagnoses(primary or secondary)?  Other   Week 2 attempt successful?  Yes   Call start time  1432   Discharge diagnosis  Hypoglycemia   Call end time  1434   Person spoke with today (if not patient) and relationship  Isabel-grandchild    Meds reviewed with patient/caregiver?  Yes   Is the patient taking all medications as directed (includes completed medication regime)?  Yes   Medication comments  Lorsartan 100 mg daily     Metroprolol 25 mg at HS    Lasix daily   Has the patient kept scheduled appointments due by today?  Yes   What is the Home health agency?   [facility]   What is the patient's perception of their health status since discharge?  Improving   Additional teach back comments  -120   Week 2 Call Completed?  Yes          Sabrina Al RN

## 2020-12-03 NOTE — TELEPHONE ENCOUNTER
Called to schedule an appointment from a referral. Patient did not answer. Vocemail was not set up so I could not leave a voicemail.

## 2020-12-04 ENCOUNTER — TELEPHONE (OUTPATIENT)
Dept: RESPIRATORY THERAPY | Age: 85
End: 2020-12-04

## 2020-12-04 NOTE — TELEPHONE ENCOUNTER
Called to schedule an appointment from a referral. Patient did not answer and I was not able to leave a message.

## 2020-12-08 ENCOUNTER — TELEPHONE (OUTPATIENT)
Dept: OTOLARYNGOLOGY | Age: 85
End: 2020-12-08

## 2020-12-08 NOTE — TELEPHONE ENCOUNTER
Called to schedule an appointment from a referral. Patient did not answer and I was unable to leave a voicemail.

## 2020-12-09 PROBLEM — I26.99 PULMONARY EMBOLUS (HCC): Status: ACTIVE | Noted: 2020-01-01

## 2020-12-10 NOTE — OUTREACH NOTE
Medical Week 3 Survey      Responses   [facility] patient discharged from?  [place]   Does the patient have one of the following disease processes/diagnoses(primary or secondary)?  Other   Week 3 attempt successful?  No   Revoke  Readmitted          Khalida White RN

## 2020-12-10 NOTE — PLAN OF CARE
Goal Outcome Evaluation:  Plan of Care Reviewed With: patient, other (see comments)(ROZINA Malave)  Progress: no change(Initial Evaluation)  Outcome Summary: Clinical bedside swallow evaluation completed. The patient is alert and cooperative. Primary problem: Justice pulmonary embolis, hypotension, pleural effusion, hyperkalemia. Medical hx: COPD, CVA, sleep apnea, VFSS 11/4/20 with recommendations for mech soft and honey thick liquids. Oral motor assessment with generalized weakness. The patient completed a full range of consistencies except for mech soft. Throat clearing is observed with thin liquids and nectar thick liquids. Increased prep time with regular solid with residue requiring honey thick liquid wash. SLP recommends: 1) Puree diet 2) Honey thick liquids 3) Meds whole with apple sauce 4) Oral care and standard swallow precautions 5) OK for small sips of thin water with supervision only. SLP will follow up to ensure diet toleration and to progress diet as appropriate.

## 2020-12-10 NOTE — ED PROVIDER NOTES
"Subjective   87 y/o male arrives for evaluation of hypotension and increased confusion. Per daughter he was eating when he made a \"high pitched sound, his legs went straight out and he asked to come to the hospital\" she states she checked his BP and found it to be in the 90s. She denies any fevers, vomiting or diarrhea. She tells me he has been confused since October (she is unsure of what caused this) but tells me he normally walks on his own and she ricci any falls, trauma or other issues. He arrives in [facility], he is alert but oriented only x1.           Review of Systems   All other systems reviewed and are negative.      Past Medical History:   Diagnosis Date   • A-fib (CMS/[facility]) 11/15/2016   • Anemia     gets shots every few months to build up blood. sees dr adam.   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   • Arthritis    • Bradycardia 11/15/2016   • CAD (coronary artery disease)    • CAD in native artery 11/15/2016   • Chest pain 11/15/2016   • CHF (congestive heart failure) (CMS/[facility])    • Chronic kidney disease    • COPD (chronic obstructive pulmonary disease) (CMS/[facility])    • DDD (degenerative disc disease), lumbar 6/27/2017   • Diabetes (CMS/[facility])    • Heart murmur    • HTN (hypertension) 11/15/2016   • Hyperlipidemia    • Hypertension    • Lumbar stenosis with neurogenic claudication 6/27/2017   • Murmur 4/19/2019   • Myocardial infarction (CMS/[facility])    • PVD (peripheral vascular disease) (CMS/[facility])    • Sleep apnea    • Stroke (CMS/[facility])    • Type 2 diabetes mellitus (CMS/[facility]) 11/15/2016   • Ulcer of abdomen wall (CMS/[facility])        Allergies   Allergen Reactions   • Lorazepam Anxiety   • Lorazepam Anxiety   • Penicillins Swelling   • Adhesive Tape Rash   • Penicillins Swelling       Past Surgical History:   Procedure Laterality Date   • APPENDECTOMY     • BACK SURGERY      x2   • CARDIAC CATHETERIZATION Left     1/2010   • CARPAL TUNNEL RELEASE Bilateral    • CHOLECYSTECTOMY     • COLONOSCOPY N/A 9/7/2017    " Procedure: COLONOSCOPY WITH ANESTHESIA;  Surgeon: Joshua Rich DO;  Location: [facility] ENDOSCOPY;  Service:    • CORONARY ARTERY BYPASS GRAFT      2/2006 w/PTCA & KIMMY    • CORONARY STENT PLACEMENT     • ENDOSCOPY N/A 12/14/2016    Procedure: ESOPHAGOGASTRODUODENOSCOPY WITH ANESTHESIA;  Surgeon: Isabel Dexter MD;  Location: [facility] ENDOSCOPY;  Service:    • ENDOSCOPY N/A 12/16/2016    Procedure: ESOPHAGOGASTRODUODENOSCOPY WITH ANESTHESIA;  Surgeon: Joshua Rich DO;  Location: [facility] ENDOSCOPY;  Service:    • ENDOSCOPY N/A 4/10/2017    Procedure: ESOPHAGOGASTRODUODENOSCOPY WITH ANESTHESIA;  Surgeon: Joshua Rich DO;  Location: [facility] ENDOSCOPY;  Service:    • ENDOSCOPY N/A 10/28/2020    Procedure: ESOPHAGOGASTRODUODENOSCOPY WITH ANESTHESIA;  Surgeon: Forrest Bliss MD;  Location: [facility] ENDOSCOPY;  Service: Gastroenterology;  Laterality: N/A;  pre: GI bleed  post: duodenal ulcers, hiatal hernia   Jeffrey Owen MD   • EYE SURGERY Left     x 2   • JOINT REPLACEMENT     • LEFT HEART CATH     • PERIPHERAL ARTERIAL STENT GRAFT     • REPLACEMENT TOTAL KNEE Left     2002   • SHOULDER ROTATOR CUFF REPAIR Bilateral        Family History   Problem Relation Age of Onset   • Coronary artery disease Father    • Heart disease Father    • Coronary artery disease Brother    • Heart attack Brother    • Cancer Mother    • No Known Problems Sister    • Heart disease Son    • Cancer Sister    • Cancer Sister        Social History     Socioeconomic History   • Marital status: Legally      Spouse name: Not on file   • Number of children: Not on file   • Years of education: Not on file   • Highest education level: Not on file   Tobacco Use   • Smoking status: Former Smoker   • Smokeless tobacco: Never Used   Substance and Sexual Activity   • Alcohol use: No   • Drug use: No   • Sexual activity: Never     Partners: Female   Social History Narrative    ** Merged History Encounter **                Objective    Physical Exam  Vitals signs and nursing note reviewed.   Constitutional:       Appearance: Normal appearance.   HENT:      Head: Normocephalic.      Nose: Nose normal.      Mouth/Throat:      Mouth: Mucous membranes are moist.      Pharynx: Oropharynx is clear.   Eyes:      Conjunctiva/sclera: Conjunctivae normal.      Pupils: Pupils are equal, round, and reactive to light.   Neck:      Musculoskeletal: Normal range of motion and neck supple.   Cardiovascular:      Rate and Rhythm: Normal rate and regular rhythm.      Pulses: Normal pulses.      Heart sounds: Murmur present.   Pulmonary:      Effort: Pulmonary effort is normal. No respiratory distress.      Breath sounds: Normal breath sounds. No stridor. No wheezing or rhonchi.   Abdominal:      General: Abdomen is flat. Bowel sounds are normal.   Musculoskeletal: Normal range of motion.         General: No swelling, tenderness, deformity or signs of injury.   Skin:     General: Skin is warm.      Capillary Refill: Capillary refill takes less than 2 seconds.   Neurological:      General: No focal deficit present.      Mental Status: He is alert. He is disoriented.      Cranial Nerves: No cranial nerve deficit.      Sensory: No sensory deficit.      Motor: No weakness.      Coordination: Coordination normal.      Comments: Follows commands, moves all extremities.          ECG 12 Lead      Date/Time: 12/9/2020 10:43 PM  Performed by: Arpit Connolly MD  Authorized by: Arpit Connolly MD   Interpreted by physician  Rhythm: sinus bradycardia  Rate: bradycardic  BPM: 54  QRS axis: normal                   ED Course  ED Course as of Dec 09 2246   Wed Dec 09, 2020   2019 Chrnoic troponin      []   2019 Baseline hgb    [JH]   2217 Ct head was without acute findings.     [JH]   2232 CTA shows bilateral Pes with mild right heart strain. Will touch base with cardiology.     [JH]   2235 Dr. Brandt states no need to admit to cardiology likely not a candidate  for EKOS.     Of note the DA reports a history of GI bleeding but given his heart strain and Pes I am going to provide anticoagulation as I feel not doing so would likely result in increased morbidity and mortality from the clots.     []   2236 BP did respond well to fluids he has been actually high since that time.     []   2238 Dr. Juarez accepts     []      ED Course User Index  [] Arpit Connolly MD           · Left ventricular function is normal. Estimated ejection fraction is 60-65%  · Left ventricular wall thickness is consistent with mild concentric hypertrophy.  · Left ventricular diastolic dysfunction (grade I) consistent with impaired relaxation.  · Left atrial cavity size is mild-to-moderately dilated.  · Mild tricuspid valve regurgitation is present.  · Mild pulmonic valve regurgitation is present.  · Mild aortic valve stenosis is present.           XR Chest 1 View   Final Result   1. No acute appearing infiltrate or effusion.   2. Elevated right hemidiaphragm, stable.   3. Heart size borderline. Prior heart bypass surgery.           This report was finalized on 12/09/2020 19:54 by Dr. Chun Fountain MD.      CT Head Without Contrast    (Results Pending)   CT Angiogram Chest    (Results Pending)     Labs Reviewed   COMPREHENSIVE METABOLIC PANEL - Abnormal; Notable for the following components:       Result Value    Glucose 208 (*)     BUN 31 (*)     Creatinine 1.37 (*)     Sodium 129 (*)     Potassium 6.0 (*)     Albumin 3.10 (*)     eGFR Non  Amer 49 (*)     All other components within normal limits    Narrative:     GFR Normal >60  Chronic Kidney Disease <60  Kidney Failure <15     D-DIMER, QUANTITATIVE - Abnormal; Notable for the following components:    D-Dimer, Quantitative 2.48 (*)     All other components within normal limits    Narrative:     Reference Range is 0-0.50 mg/L FEU. However, results <0.50 mg/L FEU tends to rule out DVT or PE. Results >0.50 mg/L FEU are not  useful in predicting absence or presence of DVT or PE.     TROPONIN (IN-HOUSE) - Abnormal; Notable for the following components:    Troponin T 0.106 (*)     All other components within normal limits    Narrative:     Troponin T Reference Range:  <= 0.03 ng/mL-   Negative for AMI  >0.03 ng/mL-     Abnormal for myocardial necrosis.  Clinicians would have to utilize clinical acumen, EKG, Troponin and serial changes to determine if it is an Acute Myocardial Infarction or myocardial injury due to an underlying chronic condition.       Results may be falsely decreased if patient taking Biotin.     CBC WITH AUTO DIFFERENTIAL - Abnormal; Notable for the following components:    RBC 2.80 (*)     Hemoglobin 8.6 (*)     Hematocrit 26.1 (*)     RDW 19.0 (*)     RDW-SD 65.4 (*)     All other components within normal limits   BLOOD GAS, ARTERIAL - Abnormal; Notable for the following components:    Base Excess, Arterial -2.0 (*)     All other components within normal limits   COVID-19,[facility] IN-HOUSE,NASAL SWAB NO TRANSPORT MEDIA 2 HR TAT - Normal    Narrative:     Fact sheet for providers: https://www.fda.gov/media/685193/download     Fact sheet for patients: https://www.fda.gov/media/823701/download    Test performed by PCR.   PROTIME-INR - Normal   APTT - Normal   BNP (IN-HOUSE) - Normal    Narrative:     Among patients with dyspnea, NT-proBNP is highly sensitive for the detection of acute congestive heart failure. In addition NT-proBNP of <300 pg/ml effectively rules out acute congestive heart failure with 99% negative predictive value.    Results may be falsely decreased if patient taking Biotin.     LIPASE - Normal   CK - Normal   PROCALCITONIN - Normal    Narrative:     As a Marker for Sepsis (Non-Neonates):   1. <0.5 ng/mL represents a low risk of severe sepsis and/or septic shock.  1. >2 ng/mL represents a high risk of severe sepsis and/or septic shock.    As a Marker for Lower Respiratory Tract Infections that require  "antibiotic therapy:  PCT on Admission     Antibiotic Therapy             6-12 Hrs later  > 0.5                Strongly Recommended            >0.25 - <0.5         Recommended  0.1 - 0.25           Discouraged                   Remeasure/reassess PCT  <0.1                 Strongly Discouraged          Remeasure/reassess PCT      As 28 day mortality risk marker: \"Change in Procalcitonin Result\" (> 80 % or <=80 %) if Day 0 (or Day 1) and Day 4 values are available. Refer to http://www.[facility] CityND Acquisitionspct-calculator.com/   Change in PCT <=80 %   A decrease of PCT levels below or equal to 80 % defines a positive change in PCT test result representing a higher risk for 28-day all-cause mortality of patients diagnosed with severe sepsis or septic shock.  Change in PCT > 80 %   A decrease of PCT levels of more than 80 % defines a negative change in PCT result representing a lower risk for 28-day all-cause mortality of patients diagnosed with severe sepsis or septic shock.                Results may be falsely decreased if patient taking Biotin.    LACTIC ACID, PLASMA - Normal   COVID PRE-OP / PRE-PROCEDURE SCREENING ORDER (NO ISOLATION)    Narrative:     The following orders were created for panel order COVID PRE-OP / PRE-PROCEDURE SCREENING ORDER (NO ISOLATION) - Swab, Nasal Cavity.  Procedure                               Abnormality         Status                     ---------                               -----------         ------                     COVID-19,[facility] IN-JOSEPHINE...[[id number]]  Normal              Final result                 Please view results for these tests on the individual orders.   BLOOD CULTURE   BLOOD CULTURE   BLOOD GAS, ARTERIAL   URINALYSIS W/ CULTURE IF INDICATED   BASIC METABOLIC PANEL   TROPONIN (IN-HOUSE)   CBC AND DIFFERENTIAL    Narrative:     The following orders were created for panel order CBC & Differential.  Procedure                               Abnormality         Status                   "   ---------                               -----------         ------                     CBC Auto Differential[[id number]]        Abnormal            Final result                 Please view results for these tests on the individual orders.                                  MDM    Final diagnoses:   Other acute pulmonary embolism with acute cor pulmonale (CMS/HCC)   Hypotension, unspecified hypotension type   Pleural effusion   Hyperkalemia            Arpit Connolly MD  12/09/20 2242       Arpit Connolly MD  12/09/20 2243       Arpit Connolly MD  12/09/20 224     normal...

## 2020-12-10 NOTE — PLAN OF CARE
"Pt is alert to voice, will state name/birthday but not much else, extremely hard of hearing and needs time for response, BLUE to command sometimes needs repeated to follow, has been confused saying we're at [employer] and claims there is a boy in his room, when reoriented to situation and place he states \"oh yeah that's right\"; HR range has been 56-65 -136; room air, oxygen saturation have been 98-99% lungs are diminished no c/o SOA; incontinent voided x2 no BM and is unable to state last BM; buttocks is red/pink/blanchable, Q2H turns and barrier cream applied, and shifted off body prominences; no PO pills were administered r/t patient being unable to stay awake and alert enough to attempt to swallow, was noted pt is usually on nectar thick to soft diet at home; WBC 7.6 H&H 8.1 24.6, proBNP decreased since yesterday it is now 1142 and potassium is now 5.0 from 5.7; afebrile; pleasant, and appeared to rest well this shift; BG WDL  "

## 2020-12-10 NOTE — PAYOR COMM NOTE
"FROM: AARON FERNANDEZ  PHONE: [phone]  FAX: [phone]    PENDING: [id number]    Gabriel Muse (86 y.o. Male)     Date of Birth Social Security Number Address Home Phone MRN    [id number]  [id number] [facility] [address] [phone] [id number]    Oriental orthodox Marital Status          [facility]        Admission Date Admission Type Admitting Provider Attending Provider Department, Room/Bed    12/9/20 Emergency Cesar Fox MD Parish, Kyle Douglas, MD [facility] CARDIAC CARE, C007/1    Discharge Date Discharge Disposition Discharge Destination                       Attending Provider: Cesar Fox MD    Allergies: Lorazepam, Lorazepam, Penicillins, Adhesive Tape, Penicillins    Isolation: None   Infection: None   Code Status: No CPR    Ht: 157.5 cm (62\")   Wt: 78.3 kg (172 lb 9.9 oz)    Admission Cmt: None   Principal Problem: Pulmonary embolus (CMS/HCC) [I26.99]                 Active Insurance as of 12/9/2020     Primary Coverage     Payor Plan Insurance Group Employer/Plan Group    Formerly [facility] BLUE [facility] EMPLOYEE [id number]     Payor Plan Address Payor Plan Phone Number Payor Plan Fax Number Effective Dates    [address] [phone]  1/1/2015 - None Entered    [facility] [id number]       Subscriber Name Subscriber Birth Date Member ID       GABRIEL MUSE 1934 [id number]           Secondary Coverage     Payor Plan Insurance Group Employer/Plan Group    MEDICARE MEDICARE A & B      Payor Plan Address Payor Plan Phone Number Payor Plan Fax Number Effective Dates    [address] [phone]  11/1/1999 - None Entered    [facility] [place] [address]       Subscriber Name Subscriber Birth Date Member ID       GABRIEL MUSE 1934 [id number]                 Emergency Contacts      (Rel.) Home Phone Work Phone Mobile Phone    Isabel Bradley (Grandchild) [phone] -- --               History & Physical      Cesar Fox " MD [place] at 12/10/20 8815              Patient Care Team:  Jeffrey Owen MD as PCP - General (Family Medicine)  Jeffrey Owen MD as PCP - Family Medicine  Frantz Zamora MD as Consulting Physician (Cardiology)  Jeffrey Pizano MD    Chief complaint near syncope    Subjective     Patient is a 86 y.o. male presents with a near syncopal episode while eating dinner. Onset of symptoms was abrupt starting 1 day ago.  Symptoms are associated with nothing that the patient can tell but he is a poor historian.  He denies any significant chest pain or cough.  He states he has been feeling poorly for about a month.  Symptoms are aggravated by nothing that he can endorse.   Symptoms improve with unknown. Severity moderate.  Context unknown to him.  Quality unable for him to describe.  Review of electronic health record does reveal that he has had a distant history of blood clot in the leg.  Unfortunately he has a more recent history of GI bleeding.    Review of Systems   Pertinent items are noted in HPI  Any remaining review of systems is difficult to obtain due to patient's poor historical ability    History  Past Medical History:   Diagnosis Date   • A-fib (CMS/[facility]) 11/15/2016   • Anemia     gets shots every few months to build up blood. sees dr adam.   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   • Arthritis    • Bradycardia 11/15/2016   • CAD (coronary artery disease)    • CAD in native artery 11/15/2016   • Chest pain 11/15/2016   • CHF (congestive heart failure) (CMS/[facility])    • Chronic kidney disease    • COPD (chronic obstructive pulmonary disease) (CMS/[facility])    • DDD (degenerative disc disease), lumbar 6/27/2017   • Diabetes (CMS/[facility])    • Heart murmur    • HTN (hypertension) 11/15/2016   • Hyperlipidemia    • Hypertension    • Lumbar stenosis with neurogenic claudication 6/27/2017   • Murmur 4/19/2019   • Myocardial infarction (CMS/[facility])    • PVD (peripheral vascular disease) (CMS/[facility])    •  Sleep apnea    • Stroke (CMS/HCC)    • Type 2 diabetes mellitus (CMS/HCC) 11/15/2016   • Ulcer of abdomen wall (CMS/HCC)      Past Surgical History:   Procedure Laterality Date   • APPENDECTOMY     • BACK SURGERY      x2   • CARDIAC CATHETERIZATION Left     1/2010   • CARPAL TUNNEL RELEASE Bilateral    • CHOLECYSTECTOMY     • COLONOSCOPY N/A 9/7/2017    Procedure: COLONOSCOPY WITH ANESTHESIA;  Surgeon: Joshua Rich DO;  Location: [facility] ENDOSCOPY;  Service:    • CORONARY ARTERY BYPASS GRAFT      2/2006 w/PTCA & KIMMY    • CORONARY STENT PLACEMENT     • ENDOSCOPY N/A 12/14/2016    Procedure: ESOPHAGOGASTRODUODENOSCOPY WITH ANESTHESIA;  Surgeon: Isabel Dexter MD;  Location: [facility] ENDOSCOPY;  Service:    • ENDOSCOPY N/A 12/16/2016    Procedure: ESOPHAGOGASTRODUODENOSCOPY WITH ANESTHESIA;  Surgeon: Joshua Rich DO;  Location: [facility] ENDOSCOPY;  Service:    • ENDOSCOPY N/A 4/10/2017    Procedure: ESOPHAGOGASTRODUODENOSCOPY WITH ANESTHESIA;  Surgeon: Joshua Rich DO;  Location: [facility] ENDOSCOPY;  Service:    • ENDOSCOPY N/A 10/28/2020    Procedure: ESOPHAGOGASTRODUODENOSCOPY WITH ANESTHESIA;  Surgeon: Forrest Bliss MD;  Location: [facility] ENDOSCOPY;  Service: Gastroenterology;  Laterality: N/A;  pre: GI bleed  post: duodenal ulcers, hiatal hernia   Jeffrey Owen MD   • EYE SURGERY Left     x 2   • JOINT REPLACEMENT     • LEFT HEART CATH     • PERIPHERAL ARTERIAL STENT GRAFT     • REPLACEMENT TOTAL KNEE Left     2002   • SHOULDER ROTATOR CUFF REPAIR Bilateral      Family History   Problem Relation Age of Onset   • Coronary artery disease Father    • Heart disease Father    • Coronary artery disease Brother    • Heart attack Brother    • Cancer Mother    • No Known Problems Sister    • Heart disease Son    • Cancer Sister    • Cancer Sister      Social History     Tobacco Use   • Smoking status: Former Smoker   • Smokeless tobacco: Never Used   Substance Use Topics   • Alcohol use: No   • Drug use: No      E-cigarette/Vaping     E-cigarette/Vaping Substances     Medications Prior to Admission   Medication Sig Dispense Refill Last Dose   • acetaminophen (TYLENOL) 325 MG tablet Take 2 tablets by mouth Every 4 (Four) Hours As Needed for Mild Pain  or Fever (Temperature greater than or equal to 37 C).      • acetaminophen (TYLENOL) 325 MG tablet Take 325 mg by mouth Every 6 (Six) Hours As Needed for Mild Pain .      • allopurinol (ZYLOPRIM) 300 MG tablet Take 1 tablet by mouth Daily. 30 tablet 2    • allopurinol (ZYLOPRIM) 300 MG tablet Take 300 mg by mouth Daily.      • amLODIPine (NORVASC) 5 MG tablet Take 5 mg by mouth Daily.      • amLODIPine (NORVASC) 5 MG tablet Take 5 mg by mouth Daily.      • ascorbic acid (VITAMIN C) 1000 MG tablet Take 500 mg by mouth Daily.      • aspirin 81 MG chewable tablet Chew 81 mg Daily.      • aspirin 81 MG chewable tablet Chew 81 mg Daily.      • Cholecalciferol (VITAMIN D) 1000 units tablet Take 1,000 Units by mouth.      • cholecalciferol (VITAMIN D3) 10 MCG (400 UNIT) tablet Take 400 Units by mouth Daily.      • colchicine 0.6 MG tablet Take 0.6 mg by mouth Daily As Needed (gout flare-up).      • colchicine 0.6 MG tablet Take 0.6 mg by mouth Daily.      • finasteride (PROSCAR) 5 MG tablet Take 5 mg by mouth Daily.      • finasteride (PROSCAR) 5 MG tablet Take 5 mg by mouth Daily.      • folic acid (FOLVITE) 400 MCG tablet Take 400 mcg by mouth.      • folic acid (FOLVITE) 400 MCG tablet Take 400 mcg by mouth Daily.      • furosemide (LASIX) 20 MG tablet Take 1 tablet by mouth Daily. 30 tablet 2    • furosemide (LASIX) 20 MG tablet Take 20 mg by mouth 2 (Two) Times a Day.      • glimepiride (AMARYL) 4 MG tablet Take 4 mg by mouth 2 (Two) Times a Day With Meals. Needs pm dose      • isosorbide mononitrate (IMDUR) 30 MG 24 hr tablet Take 1 tablet by mouth Daily. 30 tablet 2    • isosorbide mononitrate (IMDUR) 30 MG 24 hr tablet Take 30 mg by mouth Daily.      • losartan (COZAAR)  100 MG tablet Take 1 tablet by mouth Daily. 30 tablet 2    • losartan (COZAAR) 50 MG tablet Take 25 mg by mouth Daily.      • losartan (COZAAR) 50 MG tablet Take 50 mg by mouth Daily.      • meclizine (ANTIVERT) 25 MG tablet Take 25 mg by mouth. 3-4 times daily      • meclizine (ANTIVERT) 25 MG tablet Take 25 mg by mouth 3 (Three) Times a Day As Needed for Dizziness.      • metFORMIN (Glucophage) 500 MG tablet Take 1 tablet by mouth Daily With Breakfast. 30 tablet 1    • metoprolol succinate XL (TOPROL XL) 25 MG 24 hr tablet Take 25 mg by mouth every night at bedtime.      • metoprolol succinate XL (TOPROL-XL) 25 MG 24 hr tablet Take 25 mg by mouth Daily.      • neomycin-polymyxin-hydrocortisone (CORTISPORIN) 3.5-30760-9 otic suspension Administer 4 drops to the right ear 3 (Three) Times a Day.      • nitroglycerin (NITROSTAT) 0.4 MG SL tablet Place 0.4 mg under the tongue Every 5 (Five) Minutes As Needed.      • nitroglycerin (NITROSTAT) 0.4 MG SL tablet Place 0.4 mg under the tongue Every 5 (Five) Minutes As Needed for Chest Pain. Take no more than 3 doses in 15 minutes.      • ondansetron (ZOFRAN) 4 MG tablet Take 1 tablet by mouth Every 6 (Six) Hours As Needed for Nausea or Vomiting. 20 tablet 0    • ondansetron (ZOFRAN) 4 MG tablet Take 4 mg by mouth Every 8 (Eight) Hours As Needed for Nausea or Vomiting.      • pantoprazole (PROTONIX) 40 MG EC tablet Take 40 mg by mouth Every 12 (Twelve) Hours.      • pantoprazole (PROTONIX) 40 MG EC tablet Take 40 mg by mouth Daily.      • potassium chloride (K-DUR,KLOR-CON) 20 MEQ CR tablet Take 10 mEq by mouth Every 12 (Twelve) Hours.      • potassium chloride (K-DUR,KLOR-CON) 20 MEQ CR tablet Take 20 mEq by mouth 2 (Two) Times a Day.      • pravastatin (PRAVACHOL) 40 MG tablet Take 1 tablet by mouth Every Night.      • pravastatin (PRAVACHOL) 40 MG tablet Take 40 mg by mouth Daily.      • prednisoLONE acetate (PRED FORTE) 1 % ophthalmic suspension Administer 1 drop into  the left eye 4 (Four) Times a Day.      • prednisoLONE acetate (PRED FORTE) 1 % ophthalmic suspension Administer 1 drop to both eyes 3 (Three) Times a Day.      • Pyridoxine HCl (VITAMIN B6) 200 MG tablet Take 1 tablet by mouth Daily.      • Pyridoxine HCl (Vitamin B6) 200 MG tablet Take  by mouth.      • rOPINIRole (REQUIP) 1 MG tablet Take 1 mg by mouth Every Night. Take 1 hour before bedtime.      • rOPINIRole (REQUIP) 1 MG tablet Take 1 mg by mouth Every Night. Take 1 hour before bedtime.      • sennosides-docusate (senna-docusate sodium) 8.6-50 MG per tablet Take 1 tablet by mouth Daily.      • sennosides-docusate sodium (SENOKOT-S) 8.6-50 MG tablet Take 2 tablets by mouth 2 (Two) Times a Day As Needed for constipation. 45 tablet 2    • tamsulosin (FLOMAX) 0.4 MG capsule 24 hr capsule Take 1 capsule by mouth Daily.      • tamsulosin (FLOMAX) 0.4 MG capsule 24 hr capsule Take 1 capsule by mouth Daily.      • thiamine (VITAMIN B-1) 100 MG tablet Take 100 mg by mouth.      • thiamine (VITAMIN B-1) 100 MG tablet Take 100 mg by mouth Daily.      • vitamin C (ASCORBIC ACID) 500 MG tablet Take 500 mg by mouth Daily.        Allergies:  Lorazepam, Lorazepam, Penicillins, Adhesive tape, and Penicillins    Objective     Vital Signs  Temp:  [97.3 °F (36.3 °C)-97.4 °F (36.3 °C)] 97.4 °F (36.3 °C)  Heart Rate:  [53-65] 61  Resp:  [13-16] 15  BP: ()/(41-77) 132/60    Physical Exam:    General Appearance alert, appears stated age and cooperative  Head normocephalic, without obvious abnormality and atraumatic  Eyes lids and lashes normal, conjunctivae and sclerae normal and no icterus  Neck no adenopathy, supple, trachea midline, no thyromegaly, no carotid bruit and no JVD  Lungs clear to auscultation, respirations regular, respirations even and respirations unlabored  Heart regular rhythm & normal rate and normal S1, S2, systolic murmur  2/6 throughout the precordium  Abdomen normal bowel sounds, no masses, no  hepatomegaly, no splenomegaly, soft non-tender, no guarding and no rebound tenderness  Extremities moves extremities well, no edema, no cyanosis and no redness  Pulses Pulses palpable and equal bilaterally  Skin no bleeding, bruising or rash  Neurologic Mental Status alertness alert and awake, orientation person and place and mood/affect flat, Cranial Nerves cranial nerves 2 - 12 grossly intact as examined, Motor strength is equal in upper and lower extremities    Results Review:    I reviewed the patient's new clinical results.    Assessment/Plan       Pulmonary embolus (CMS/HCC)    Type 2 diabetes mellitus, without long-term current use of insulin (CMS/HCC)    Hyperkalemia    Murmur    Chronic diastolic congestive heart failure (CMS/HCC)    CHF (congestive heart failure), NYHA class I, acute on chronic, combined (CMS/HCC)    Ischemic cardiomyopathy    Type 2 diabetes mellitus with hypoglycemia, without long-term current use of insulin (CMS/HCC)    Essential hypertension    History of stroke    Atrial fibrillation (CMS/HCC)    Normocytic anemia    Chronic diastolic heart failure (CMS/HCC)      Unclear of exact reason for his pulmonary embolism.  I will need to review records and see the history of the GI bleed, but for now he does need the anticoagulation.  There were some issues with his mentation last night and awaiting a speech eval to make sure he is okay for swallowing.  He is DNR.      I discussed the patients findings and my recommendations with patient.     Cesar Fox MD  12/10/20  07:47 CST    Time: Critical care 55 min      Electronically signed by Cesar Fox MD at 12/10/20 0757          Emergency Department Notes      Arpit Connolly MD at 12/09/20 1917      Procedure Orders    1. ECG 12 Lead [[id number]] ordered by Arpit Connolly MD               Subjective   87 y/o male arrives for evaluation of hypotension and increased confusion. Per daughter he was eating when he  "made a \"high pitched sound, his legs went straight out and he asked to come to the hospital\" she states she checked his BP and found it to be in the 90s. She denies any fevers, vomiting or diarrhea. She tells me he has been confused since October (she is unsure of what caused this) but tells me he normally walks on his own and she ricci any falls, trauma or other issues. He arrives in [facility], he is alert but oriented only x1.           Review of Systems   All other systems reviewed and are negative.      Past Medical History:   Diagnosis Date   • A-fib (CMS/[facility]) 11/15/2016   • Anemia     gets shots every few months to build up blood. sees dr adam.   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   • Arthritis    • Bradycardia 11/15/2016   • CAD (coronary artery disease)    • CAD in native artery 11/15/2016   • Chest pain 11/15/2016   • CHF (congestive heart failure) (CMS/[facility])    • Chronic kidney disease    • COPD (chronic obstructive pulmonary disease) (CMS/[facility])    • DDD (degenerative disc disease), lumbar 6/27/2017   • Diabetes (CMS/[facility])    • Heart murmur    • HTN (hypertension) 11/15/2016   • Hyperlipidemia    • Hypertension    • Lumbar stenosis with neurogenic claudication 6/27/2017   • Murmur 4/19/2019   • Myocardial infarction (CMS/[facility])    • PVD (peripheral vascular disease) (CMS/[facility])    • Sleep apnea    • Stroke (CMS/[facility])    • Type 2 diabetes mellitus (CMS/[facility]) 11/15/2016   • Ulcer of abdomen wall (CMS/[facility])        Allergies   Allergen Reactions   • Lorazepam Anxiety   • Lorazepam Anxiety   • Penicillins Swelling   • Adhesive Tape Rash   • Penicillins Swelling       Past Surgical History:   Procedure Laterality Date   • APPENDECTOMY     • BACK SURGERY      x2   • CARDIAC CATHETERIZATION Left     1/2010   • CARPAL TUNNEL RELEASE Bilateral    • CHOLECYSTECTOMY     • COLONOSCOPY N/A 9/7/2017    Procedure: COLONOSCOPY WITH ANESTHESIA;  Surgeon: Joshua Rich DO;  Location: [facility] ENDOSCOPY;  Service:    • CORONARY " ARTERY BYPASS GRAFT      2/2006 w/PTCA & KIMMY    • CORONARY STENT PLACEMENT     • ENDOSCOPY N/A 12/14/2016    Procedure: ESOPHAGOGASTRODUODENOSCOPY WITH ANESTHESIA;  Surgeon: Isabel Dexter MD;  Location: [facility] ENDOSCOPY;  Service:    • ENDOSCOPY N/A 12/16/2016    Procedure: ESOPHAGOGASTRODUODENOSCOPY WITH ANESTHESIA;  Surgeon: Joshua Rich DO;  Location: [facility] ENDOSCOPY;  Service:    • ENDOSCOPY N/A 4/10/2017    Procedure: ESOPHAGOGASTRODUODENOSCOPY WITH ANESTHESIA;  Surgeon: Joshua Rich DO;  Location: [facility] ENDOSCOPY;  Service:    • ENDOSCOPY N/A 10/28/2020    Procedure: ESOPHAGOGASTRODUODENOSCOPY WITH ANESTHESIA;  Surgeon: Forrest Bliss MD;  Location: [facility] ENDOSCOPY;  Service: Gastroenterology;  Laterality: N/A;  pre: GI bleed  post: duodenal ulcers, hiatal hernia   Jeffrey Owen MD   • EYE SURGERY Left     x 2   • JOINT REPLACEMENT     • LEFT HEART CATH     • PERIPHERAL ARTERIAL STENT GRAFT     • REPLACEMENT TOTAL KNEE Left     2002   • SHOULDER ROTATOR CUFF REPAIR Bilateral        Family History   Problem Relation Age of Onset   • Coronary artery disease Father    • Heart disease Father    • Coronary artery disease Brother    • Heart attack Brother    • Cancer Mother    • No Known Problems Sister    • Heart disease Son    • Cancer Sister    • Cancer Sister        Social History     Socioeconomic History   • Marital status: Legally      Spouse name: Not on file   • Number of children: Not on file   • Years of education: Not on file   • Highest education level: Not on file   Tobacco Use   • Smoking status: Former Smoker   • Smokeless tobacco: Never Used   Substance and Sexual Activity   • Alcohol use: No   • Drug use: No   • Sexual activity: Never     Partners: Female   Social History Narrative    ** Merged History Encounter **                Objective   Physical Exam  Vitals signs and nursing note reviewed.   Constitutional:       Appearance: Normal appearance.   HENT:      Head:  Normocephalic.      Nose: Nose normal.      Mouth/Throat:      Mouth: Mucous membranes are moist.      Pharynx: Oropharynx is clear.   Eyes:      Conjunctiva/sclera: Conjunctivae normal.      Pupils: Pupils are equal, round, and reactive to light.   Neck:      Musculoskeletal: Normal range of motion and neck supple.   Cardiovascular:      Rate and Rhythm: Normal rate and regular rhythm.      Pulses: Normal pulses.      Heart sounds: Murmur present.   Pulmonary:      Effort: Pulmonary effort is normal. No respiratory distress.      Breath sounds: Normal breath sounds. No stridor. No wheezing or rhonchi.   Abdominal:      General: Abdomen is flat. Bowel sounds are normal.   Musculoskeletal: Normal range of motion.         General: No swelling, tenderness, deformity or signs of injury.   Skin:     General: Skin is warm.      Capillary Refill: Capillary refill takes less than 2 seconds.   Neurological:      General: No focal deficit present.      Mental Status: He is alert. He is disoriented.      Cranial Nerves: No cranial nerve deficit.      Sensory: No sensory deficit.      Motor: No weakness.      Coordination: Coordination normal.      Comments: Follows commands, moves all extremities.          ECG 12 Lead      Date/Time: 12/9/2020 10:43 PM  Performed by: Arpit Connolly MD  Authorized by: Arpit Connolly MD   Interpreted by physician  Rhythm: sinus bradycardia  Rate: bradycardic  BPM: 54  QRS axis: normal                  ED Course  ED Course as of Dec 09 2246   Wed Dec 09, 2020   2019 Chrnoic troponin      []   2019 Baseline hgb    []   2217 Ct head was without acute findings.     []   2232 CTA shows bilateral Pes with mild right heart strain. Will touch base with cardiology.     []   2235 Dr. Brandt states no need to admit to cardiology likely not a candidate for EKOS.     Of note the DA reports a history of GI bleeding but given his heart strain and Pes I am going to provide anticoagulation  as I feel not doing so would likely result in increased morbidity and mortality from the clots.     []   2236 BP did respond well to fluids he has been actually high since that time.     []   2238 Dr. Juarez accepts     []      ED Course User Index  [] Arpit Connolly MD           · Left ventricular function is normal. Estimated ejection fraction is 60-65%  · Left ventricular wall thickness is consistent with mild concentric hypertrophy.  · Left ventricular diastolic dysfunction (grade I) consistent with impaired relaxation.  · Left atrial cavity size is mild-to-moderately dilated.  · Mild tricuspid valve regurgitation is present.  · Mild pulmonic valve regurgitation is present.  · Mild aortic valve stenosis is present.           XR Chest 1 View   Final Result   1. No acute appearing infiltrate or effusion.   2. Elevated right hemidiaphragm, stable.   3. Heart size borderline. Prior heart bypass surgery.           This report was finalized on 12/09/2020 19:54 by Dr. Chun Fountain MD.      CT Head Without Contrast    (Results Pending)   CT Angiogram Chest    (Results Pending)     Labs Reviewed   COMPREHENSIVE METABOLIC PANEL - Abnormal; Notable for the following components:       Result Value    Glucose 208 (*)     BUN 31 (*)     Creatinine 1.37 (*)     Sodium 129 (*)     Potassium 6.0 (*)     Albumin 3.10 (*)     eGFR Non  Amer 49 (*)     All other components within normal limits    Narrative:     GFR Normal >60  Chronic Kidney Disease <60  Kidney Failure <15     D-DIMER, QUANTITATIVE - Abnormal; Notable for the following components:    D-Dimer, Quantitative 2.48 (*)     All other components within normal limits    Narrative:     Reference Range is 0-0.50 mg/L FEU. However, results <0.50 mg/L FEU tends to rule out DVT or PE. Results >0.50 mg/L FEU are not useful in predicting absence or presence of DVT or PE.     TROPONIN (IN-HOUSE) - Abnormal; Notable for the following components:    Troponin  T 0.106 (*)     All other components within normal limits    Narrative:     Troponin T Reference Range:  <= 0.03 ng/mL-   Negative for AMI  >0.03 ng/mL-     Abnormal for myocardial necrosis.  Clinicians would have to utilize clinical acumen, EKG, Troponin and serial changes to determine if it is an Acute Myocardial Infarction or myocardial injury due to an underlying chronic condition.       Results may be falsely decreased if patient taking Biotin.     CBC WITH AUTO DIFFERENTIAL - Abnormal; Notable for the following components:    RBC 2.80 (*)     Hemoglobin 8.6 (*)     Hematocrit 26.1 (*)     RDW 19.0 (*)     RDW-SD 65.4 (*)     All other components within normal limits   BLOOD GAS, ARTERIAL - Abnormal; Notable for the following components:    Base Excess, Arterial -2.0 (*)     All other components within normal limits   COVID-19,[facility] IN-HOUSE,[facility] SWAB NO TRANSPORT MEDIA 2 HR TAT - Normal    Narrative:     Fact sheet for providers: https://www.fda.gov/media/341832/download     Fact sheet for patients: https://www.fda.gov/media/979315/download    Test performed by PCR.   PROTIME-INR - Normal   APTT - Normal   BNP (IN-HOUSE) - Normal    Narrative:     Among patients with dyspnea, NT-proBNP is highly sensitive for the detection of acute congestive heart failure. In addition NT-proBNP of <300 pg/ml effectively rules out acute congestive heart failure with 99% negative predictive value.    Results may be falsely decreased if patient taking Biotin.     LIPASE - Normal   CK - Normal   PROCALCITONIN - Normal    Narrative:     As a Marker for Sepsis (Non-Neonates):   1. <0.5 ng/mL represents a low risk of severe sepsis and/or septic shock.  1. >2 ng/mL represents a high risk of severe sepsis and/or septic shock.    As a Marker for Lower Respiratory Tract Infections that require antibiotic therapy:  PCT on Admission     Antibiotic Therapy             6-12 Hrs later  > 0.5                Strongly Recommended           "  >0.25 - <0.5         Recommended  0.1 - 0.25           Discouraged                   Remeasure/reassess PCT  <0.1                 Strongly Discouraged          Remeasure/reassess PCT      As 28 day mortality risk marker: \"Change in Procalcitonin Result\" (> 80 % or <=80 %) if Day 0 (or Day 1) and Day 4 values are available. Refer to http://www.[facility] [employer] Servicespct-calculator.com/   Change in PCT <=80 %   A decrease of PCT levels below or equal to 80 % defines a positive change in PCT test result representing a higher risk for 28-day all-cause mortality of patients diagnosed with severe sepsis or septic shock.  Change in PCT > 80 %   A decrease of PCT levels of more than 80 % defines a negative change in PCT result representing a lower risk for 28-day all-cause mortality of patients diagnosed with severe sepsis or septic shock.                Results may be falsely decreased if patient taking Biotin.    LACTIC ACID, PLASMA - Normal   COVID PRE-OP / PRE-PROCEDURE SCREENING ORDER (NO ISOLATION)    Narrative:     The following orders were created for panel order COVID PRE-OP / PRE-PROCEDURE SCREENING ORDER (NO ISOLATION) - Swab, Nasal Cavity.  Procedure                               Abnormality         Status                     ---------                               -----------         ------                     COVID-19,[facility] IN-JOSEPHINE...[[id number]]  Normal              Final result                 Please view results for these tests on the individual orders.   BLOOD CULTURE   BLOOD CULTURE   BLOOD GAS, ARTERIAL   URINALYSIS W/ CULTURE IF INDICATED   BASIC METABOLIC PANEL   TROPONIN (IN-HOUSE)   CBC AND DIFFERENTIAL    Narrative:     The following orders were created for panel order CBC & Differential.  Procedure                               Abnormality         Status                     ---------                               -----------         ------                     CBC Auto Differential[[id number]]        Abnormal     "        Final result                 Please view results for these tests on the individual orders.                                  MDM    Final diagnoses:   Other acute pulmonary embolism with acute cor pulmonale (CMS/HCC)   Hypotension, unspecified hypotension type   Pleural effusion   Hyperkalemia            Arpit Connolly MD  12/09/20 2242       Arpit Connolly MD  12/09/20 2243       Arpit Connolly MD  12/09/20 2246      Electronically signed by Arpit Connolly MD at 12/09/20 2246       Vital Signs (last day)     Date/Time   Temp   Temp src   Pulse   Resp   BP   Patient Position   SpO2    12/10/20 0630   --   --   61   --   132/60   --   97    12/10/20 0600   --   --   63   15   153/64   --   99    12/10/20 0530   --   --   58   --   145/68   --   100    12/10/20 0500   --   --   58   15   131/66   --   99    12/10/20 0430   --   --   55   --   141/60   --   100    12/10/20 0400   97.4 (36.3)   Axillary   59   --   127/74   --   100    12/10/20 0330   --   --   56   --   111/45   --   100    12/10/20 0300   --   --   61   13   146/60   --   100    12/10/20 0200   --   --   58   16   128/51   --   100    12/10/20 0130   --   --   60   --   131/56   --   100    12/10/20 0115   --   --   58   --   131/55   --   100    12/10/20 0100   --   --   65   16   133/57   --   98    12/10/20 0045   --   --   58   --   138/61   --   98    12/10/20 0022   97.3 (36.3)   Axillary   61   15   163/77   Lying   98    12/10/20 0007   --   --   58   --   --   --   99    12/09/20 2130   --   --   56   --   151/65   --   99    12/09/20 2100   --   --   54   --   142/60   --   100    12/09/20 2030   --   --   53   --   140/63   --   100    12/09/20 2018   --   --   55   --   148/70   --   100    12/09/20 1907   97.3 (36.3)   --   --   --   --   --   --    12/09/20 1906   --   --   60   14   (!) 76/41   Sitting   98                Facility-Administered Medications as of 12/10/2020   Medication Dose Route  Frequency Provider Last Rate Last Admin   • acetaminophen (TYLENOL) tablet 325 mg  325 mg Oral Q6H PRN Cesar Fox MD       • allopurinol (ZYLOPRIM) tablet 300 mg  300 mg Oral Daily Cesar Fox MD       • amLODIPine (NORVASC) tablet 5 mg  5 mg Oral Daily Cesar Fox MD       • aspirin chewable tablet 81 mg  81 mg Oral Daily Elpidio Irby, DO       • [COMPLETED] calcium gluconate 1 g in sodium chloride 0.9 % 100 mL IVPB  1 g Intravenous Once HourArpit de la vega MD   Stopped at 12/09/20 2159   • cholecalciferol (VITAMIN D3) tablet 1,000 Units  1,000 Units Oral Daily Cesar Fox MD       • colchicine tablet 0.6 mg  0.6 mg Oral Daily PRN Elpidio Irby DO       • [COMPLETED] dextrose (D50W) 25 g/ 50mL Intravenous Solution 50 mL  50 mL Intravenous Once HourArpit de la vega MD   50 mL at 12/09/20 2129   • [COMPLETED] enoxaparin (LOVENOX) syringe 100 mg  1 mg/kg Subcutaneous Once HourArpit de la vega MD   100 mg at 12/09/20 2246   • enoxaparin (LOVENOX) syringe 80 mg  1 mg/kg Subcutaneous Q12H Cesar Fox MD       • enoxaparin (LOVENOX) syringe 80 mg  1 mg/kg Subcutaneous Once Cesar Fox MD       • finasteride (PROSCAR) tablet 5 mg  5 mg Oral Daily Cesar Fox MD       • folic acid (FOLVITE) tablet 400 mcg  400 mcg Oral Daily Cesar Fox MD       • furosemide (LASIX) tablet 20 mg  20 mg Oral Daily Cesar Fox MD       • [COMPLETED] insulin regular (humuLIN R,novoLIN R) injection 10 Units  10 Units Intravenous Once HourArpit de la vega MD   10 Units at 12/09/20 2129   • [COMPLETED] iopamidol (ISOVUE-370) 76 % injection 100 mL  100 mL Intravenous Once in imaging HourArpit de la vega MD   100 mL at 12/09/20 2203   • isosorbide mononitrate (IMDUR) 24 hr tablet 30 mg  30 mg Oral Q24H Cesar Fox MD       • losartan (COZAAR) tablet 100 mg  100 mg Oral Q24H Cesar Fox MD       • meclizine (ANTIVERT) tablet 25  mg  25 mg Oral TID PRN Elpidio Irby DO       • metFORMIN (GLUCOPHAGE) tablet 500 mg  500 mg Oral Daily With Breakfast Cesar Fox MD       • metoprolol succinate XL (TOPROL-XL) 24 hr tablet 25 mg  25 mg Oral Q24H Cesar Fox MD       • Morphine sulfate (PF) injection 1 mg  1 mg Intravenous Q4H PRN Cesar Fox MD        And   • naloxone (NARCAN) injection 0.4 mg  0.4 mg Intravenous Q5 Min PRN Cesar Fox MD       • neomycin-polymyxin-hydrocortisone (CORTISPORIN) otic suspension 4 drop  4 drop Right Ear TID Cesar Fox MD       • nitroglycerin (NITROSTAT) SL tablet 0.4 mg  0.4 mg Sublingual Q5 Min PRN Cesar Fox MD       • pantoprazole (PROTONIX) EC tablet 40 mg  40 mg Oral BID AC Cesar Fox MD       • pravastatin (PRAVACHOL) tablet 40 mg  40 mg Oral Nightly Cesar Fox MD       • prednisoLONE acetate (PRED FORTE) 1 % ophthalmic suspension 1 drop  1 drop Left Eye 4x Daily Cesar Fox MD       • rOPINIRole (REQUIP) tablet 1 mg  1 mg Oral Nightly Cesar Fox MD       • sennosides-docusate (PERICOLACE) 8.6-50 MG per tablet 1 tablet  1 tablet Oral Daily Cesar Fox MD       • sodium chloride 0.9 % flush 10 mL  10 mL Intravenous Q12H Cesar Fox MD   10 mL at 12/10/20 0132   • sodium chloride 0.9 % flush 10 mL  10 mL Intravenous PRN Cesar Fox MD       • [COMPLETED] sodium chloride 0.9% - IBW for BMI > 30 bolus 1,983 mL  30 mL/kg (Ideal) Intravenous Once Arpit Connolly MD 1,983 mL/hr at 12/09/20 2134 1,983 mL at 12/09/20 2134   • tamsulosin (FLOMAX) 24 hr capsule 0.4 mg  0.4 mg Oral Daily Cesar Fox MD       • thiamine (VITAMIN B-1) tablet 100 mg  100 mg Oral Daily Cesar Fox MD       • vitamin B-6 (PYRIDOXINE) tablet 200 mg  200 mg Oral Daily Elpidio Irby DO       • vitamin C (ASCORBIC ACID) tablet 500 mg  500 mg Oral Daily Cesar Fox MD            Lab Results (last 24 hours)     Procedure Component Value Units Date/Time    Potassium [[id number]]  (Normal) Collected: 12/10/20 0230    Specimen: Blood Updated: 12/10/20 0442     Potassium 5.0 mmol/L     POC Glucose Once [[id number]]  (Normal) Collected: 12/10/20 0412    Specimen: Blood Updated: 12/10/20 0423     Glucose 113 mg/dL      Comment: : [id number] Yuni PaigeMeter ID: [id number]       BNP [[id number]]  (Normal) Collected: 12/10/20 0230    Specimen: Blood Updated: 12/10/20 0339     proBNP 1,142.0 pg/mL     Narrative:      Among patients with dyspnea, NT-proBNP is highly sensitive for the detection of acute congestive heart failure. In addition NT-proBNP of <300 pg/ml effectively rules out acute congestive heart failure with 99% negative predictive value.    Results may be falsely decreased if patient taking Biotin.      CBC (No Diff) [[id number]]  (Abnormal) Collected: 12/10/20 0230    Specimen: Blood Updated: 12/10/20 0319     WBC 7.61 10*3/mm3      RBC 2.65 10*6/mm3      Hemoglobin 8.1 g/dL      Hematocrit 24.6 %      MCV 92.8 fL      MCH 30.6 pg      MCHC 32.9 g/dL      RDW 19.2 %      RDW-SD 65.9 fl      MPV 10.7 fL      Platelets 229 10*3/mm3     Urinalysis, Microscopic Only - Urine, Catheter [[id number]]  (Abnormal) Collected: 12/09/20 2257    Specimen: Urine, Catheter Updated: 12/09/20 2324     RBC, UA 0-2 /HPF      WBC, UA Too Numerous to Count /HPF      Bacteria, UA None Seen /HPF      Squamous Epithelial Cells, UA 3-6 /HPF      Hyaline Casts, UA 0-2 /LPF      Methodology Automated Microscopy    Urinalysis With Culture If Indicated - Urine, Catheter [[id number]]  (Abnormal) Collected: 12/09/20 2257    Specimen: Urine, Catheter Updated: 12/09/20 2324     Color, UA Dark Yellow     Appearance, UA Cloudy     pH, UA <=5.0     Specific Gravity, UA 1.019     Glucose, UA Negative     Ketones, UA Negative     Bilirubin, UA Negative     Blood, UA Negative     Protein, UA Negative     Leuk Esterase, UA  Large (3+)     Nitrite, UA Negative     Urobilinogen, UA 0.2 E.U./dL    Urine Culture - Urine, Urine, Catheter [[id number]] Collected: 12/09/20 2257    Specimen: Urine, Catheter Updated: 12/09/20 2324    Basic Metabolic Panel [[id number]]  (Abnormal) Collected: 12/09/20 2227    Specimen: Blood Updated: 12/09/20 2251     Glucose 152 mg/dL      BUN 30 mg/dL      Creatinine 1.37 mg/dL      Sodium 128 mmol/L      Potassium 5.7 mmol/L      Comment: Slight hemolysis detected by analyzer. Results may be affected.        Chloride 100 mmol/L      CO2 22.0 mmol/L      Calcium 10.0 mg/dL      eGFR Non African Amer 49 mL/min/1.73      BUN/Creatinine Ratio 21.9     Anion Gap 6.0 mmol/L     Narrative:      GFR Normal >60  Chronic Kidney Disease <60  Kidney Failure <15      Troponin [[id number]]  (Abnormal) Collected: 12/09/20 2227    Specimen: Blood Updated: 12/09/20 2251     Troponin T 0.090 ng/mL     Narrative:      Troponin T Reference Range:  <= 0.03 ng/mL-   Negative for AMI  >0.03 ng/mL-     Abnormal for myocardial necrosis.  Clinicians would have to utilize clinical acumen, EKG, Troponin and serial changes to determine if it is an Acute Myocardial Infarction or myocardial injury due to an underlying chronic condition.       Results may be falsely decreased if patient taking Biotin.      COVID PRE-OP / PRE-PROCEDURE SCREENING ORDER (NO ISOLATION) - Swab, Nasal Cavity [[id number]]  (Normal) Collected: 12/09/20 2008    Specimen: Swab from Nasal Cavity Updated: 12/09/20 2105    Narrative:      The following orders were created for panel order COVID PRE-OP / PRE-PROCEDURE SCREENING ORDER (NO ISOLATION) - Swab, Nasal Cavity.  Procedure                               Abnormality         Status                     ---------                               -----------         ------                     COVID-19,[facility] IN-JOSEPHINE...[[id number]]  Normal              Final result                 Please view results for these tests on the  "individual orders.    COVID-19,[facility] IN-HOUSE,Nasal Swab No Transport Media 3-4 HR TAT - Swab, Nasal Cavity [[id number]]  (Normal) Collected: 12/09/20 2008    Specimen: Swab from Nasal Cavity Updated: 12/09/20 2105     COVID19 Not Detected    Narrative:      Fact sheet for providers: https://www.fda.gov/media/590771/download     Fact sheet for patients: https://www.fda.gov/media/643401/download    Test performed by PCR.    Procalcitonin [[id number]]  (Normal) Collected: 12/09/20 1946    Specimen: Blood Updated: 12/09/20 2021     Procalcitonin 0.14 ng/mL     Narrative:      As a Marker for Sepsis (Non-Neonates):   1. <0.5 ng/mL represents a low risk of severe sepsis and/or septic shock.  1. >2 ng/mL represents a high risk of severe sepsis and/or septic shock.    As a Marker for Lower Respiratory Tract Infections that require antibiotic therapy:  PCT on Admission     Antibiotic Therapy             6-12 Hrs later  > 0.5                Strongly Recommended            >0.25 - <0.5         Recommended  0.1 - 0.25           Discouraged                   Remeasure/reassess PCT  <0.1                 Strongly Discouraged          Remeasure/reassess PCT      As 28 day mortality risk marker: \"Change in Procalcitonin Result\" (> 80 % or <=80 %) if Day 0 (or Day 1) and Day 4 values are available. Refer to http://www.[facility]-pct-calculator.com/   Change in PCT <=80 %   A decrease of PCT levels below or equal to 80 % defines a positive change in PCT test result representing a higher risk for 28-day all-cause mortality of patients diagnosed with severe sepsis or septic shock.  Change in PCT > 80 %   A decrease of PCT levels of more than 80 % defines a negative change in PCT result representing a lower risk for 28-day all-cause mortality of patients diagnosed with severe sepsis or septic shock.                Results may be falsely decreased if patient taking Biotin.     Troponin [[id number]]  (Abnormal) Collected: 12/09/20 1946    " Specimen: Blood Updated: 12/09/20 2018     Troponin T 0.106 ng/mL     Narrative:      Troponin T Reference Range:  <= 0.03 ng/mL-   Negative for AMI  >0.03 ng/mL-     Abnormal for myocardial necrosis.  Clinicians would have to utilize clinical acumen, EKG, Troponin and serial changes to determine if it is an Acute Myocardial Infarction or myocardial injury due to an underlying chronic condition.       Results may be falsely decreased if patient taking Biotin.      Blood Culture - Blood, Arm, Right [[id number]] Collected: 12/09/20 2009    Specimen: Blood from Arm, Right Updated: 12/09/20 2017    Comprehensive Metabolic Panel [[id number]]  (Abnormal) Collected: 12/09/20 1946    Specimen: Blood Updated: 12/09/20 2017     Glucose 208 mg/dL      BUN 31 mg/dL      Creatinine 1.37 mg/dL      Sodium 129 mmol/L      Potassium 6.0 mmol/L      Chloride 100 mmol/L      CO2 24.0 mmol/L      Calcium 10.1 mg/dL      Total Protein 6.2 g/dL      Albumin 3.10 g/dL      ALT (SGPT) 26 U/L      AST (SGOT) 20 U/L      Alkaline Phosphatase 113 U/L      Total Bilirubin 0.2 mg/dL      eGFR Non African Amer 49 mL/min/1.73      Globulin 3.1 gm/dL      A/G Ratio 1.0 g/dL      BUN/Creatinine Ratio 22.6     Anion Gap 5.0 mmol/L     Narrative:      GFR Normal >60  Chronic Kidney Disease <60  Kidney Failure <15      CK [[id number]]  (Normal) Collected: 12/09/20 1946    Specimen: Blood Updated: 12/09/20 2016     Creatine Kinase 54 U/L     BNP [[id number]]  (Normal) Collected: 12/09/20 1946    Specimen: Blood Updated: 12/09/20 2014     proBNP 1,599.0 pg/mL     Narrative:      Among patients with dyspnea, NT-proBNP is highly sensitive for the detection of acute congestive heart failure. In addition NT-proBNP of <300 pg/ml effectively rules out acute congestive heart failure with 99% negative predictive value.    Results may be falsely decreased if patient taking Biotin.      Lactic Acid, Plasma [[id number]]  (Normal) Collected: 12/09/20 1946    Specimen:  Blood Updated: 12/09/20 2013     Lactate 1.4 mmol/L     Lipase [[id number]]  (Normal) Collected: 12/09/20 1946    Specimen: Blood Updated: 12/09/20 2012     Lipase 30 U/L     Protime-INR [[id number]]  (Normal) Collected: 12/09/20 1946    Specimen: Blood Updated: 12/09/20 2005     Protime 13.7 Seconds      INR 1.09    aPTT [[id number]]  (Normal) Collected: 12/09/20 1946    Specimen: Blood Updated: 12/09/20 2005     PTT 28.1 seconds     D-dimer, Quantitative [[id number]]  (Abnormal) Collected: 12/09/20 1946    Specimen: Blood Updated: 12/09/20 2005     D-Dimer, Quantitative 2.48 mg/L (FEU)     Narrative:      Reference Range is 0-0.50 mg/L FEU. However, results <0.50 mg/L FEU tends to rule out DVT or PE. Results >0.50 mg/L FEU are not useful in predicting absence or presence of DVT or PE.      CBC & Differential [[id number]]  (Abnormal) Collected: 12/09/20 1946    Specimen: Blood Updated: 12/09/20 1955    Narrative:      The following orders were created for panel order CBC & Differential.  Procedure                               Abnormality         Status                     ---------                               -----------         ------                     CBC Auto Differential[[id number]]        Abnormal            Final result                 Please view results for these tests on the individual orders.    CBC Auto Differential [[id number]]  (Abnormal) Collected: 12/09/20 1946    Specimen: Blood Updated: 12/09/20 1955     WBC 7.49 10*3/mm3      RBC 2.80 10*6/mm3      Hemoglobin 8.6 g/dL      Hematocrit 26.1 %      MCV 93.2 fL      MCH 30.7 pg      MCHC 33.0 g/dL      RDW 19.0 %      RDW-SD 65.4 fl      MPV 9.9 fL      Platelets 225 10*3/mm3      Neutrophil % 66.0 %      Lymphocyte % 21.2 %      Monocyte % 9.5 %      Eosinophil % 2.5 %      Basophil % 0.3 %      Immature Grans % 0.5 %      Neutrophils, Absolute 4.94 10*3/mm3      Lymphocytes, Absolute 1.59 10*3/mm3      Monocytes, Absolute 0.71 10*3/mm3       Eosinophils, Absolute 0.19 10*3/mm3      Basophils, Absolute 0.02 10*3/mm3      Immature Grans, Absolute 0.04 10*3/mm3      nRBC 0.0 /100 WBC     Blood Culture - Blood, Blood, Venous Line [[id number]] Collected: 12/09/20 1946    Specimen: Blood, Venous Line Updated: 12/09/20 1952    Blood Gas, Arterial - [[id number]]  (Abnormal) Collected: 12/09/20 1931    Specimen: Arterial Blood Updated: 12/09/20 1938     Site Right Radial     Raymond's Test Positive     pH, Arterial 7.384 pH units      pCO2, Arterial 38.2 mm Hg      pO2, Arterial 95.2 mm Hg      HCO3, Arterial 22.8 mmol/L      Base Excess, Arterial -2.0 mmol/L      Comment: 84 Value below reference range        O2 Saturation, Arterial 98.4 %      Temperature 37.0 C      Barometric Pressure for Blood Gas 750 mmHg      Modality Room Air     Ventilator Mode NA     Collected by [id number]     Comment: Meter: [id number]     :  [id number]        pCO2, Temperature Corrected 38.2 mm Hg      pH, Temp Corrected 7.384 pH Units      pO2, Temperature Corrected 95.2 mm Hg         Imaging Results (Last 24 Hours)     Procedure Component Value Units Date/Time    CT Angiogram Chest [[id number]] Collected: 12/10/20 0708     Updated: 12/10/20 0716    Narrative:      CT ANGIOGRAM CHEST- 12/9/2020 9:43 PM CST     HISTORY: elevated dimer, syncope      COMPARISON: 10/27/2020     DOSE LENGTH PRODUCT: 314 mGy cm. Automated exposure control was also  utilized to decrease patient radiation dose.     TECHNIQUE: Axial images the chest are obtained following IV contrast.  2-D and maximal intensity projection images reconstructed and reviewed.     FINDINGS:  There are bilateral pulmonary emboli involving the segmental  left upper and lower lobe as well as right middle lobe pulmonary  arteries. Subsegmental pulmonary emboli present within the bilateral  lower lobes. The RV to LV ratio measuring 1.1.     Prior mediastinal surgery. Scattered vascular calcification with no  thoracic aortic  aneurysm or dissection. Borderline cardiomegaly. Small  bilateral pleural effusions. Moderate size hiatal hernia. No pathologic  intrathoracic or axillary lymphadenopathy.     Images the upper abdomen demonstrate large volume of stool within the  visible colon. Left renal cyst. Similar prominence of the adrenal glands  which do maintain their adreniform shape. Under distended stomach.     Bibasilar atelectasis. Left lower lobe calcified granuloma. Stable 3 mm  right upper lobe nodule on series 8 image 48. No pneumothorax. No  discrete endobronchial lesions.     Degenerative change of the regional skeleton with postoperative changes  of the right shoulder. Right convexity to the curvature of the thoracic  spine.       Impression:      1. Bilateral segmental and subsegmental pulmonary emboli with RV to LV  ratio measuring 1.1.   2. Small bilateral pleural effusions with bibasilar atelectasis.  Interval improvement in the previous scattered groundglass opacities on  the 10/27/2020 study.  3. Prior mediastinal surgery. Scattered vascular calcifications.     Comments: A preliminary report is issued to the ER by the [facility]  radiology service. I agree with this preliminary impression.  This report was finalized on 12/10/2020 07:13 by Dr. Lorena Murray MD.    CT Head Without Contrast [[id number]] Collected: 12/10/20 0706     Updated: 12/10/20 0711    Narrative:      CT HEAD WO CONTRAST- 12/9/2020 9:43 PM CST     HISTORY: Syncope, simple, normal neuro exam      COMPARISON: 11/14/2020     DOSE LENGTH PRODUCT: 919 mGy cm. Automated exposure control was also  utilized to decrease patient radiation dose.     TECHNIQUE: Axial images of the brain Obtained without IV contrast     FINDINGS:  Similar moderate cerebral volume loss with prominent chronic  small vessel ischemia. Old ischemic changes considered within the left  parietal and right frontal lobes. No intracranial hemorrhage or mass  effect. No convincing acute signs of  "ischemia. No extra-axial hematoma  or subarachnoid hemorrhage.     No air-fluid levels within the visible paranasal sinuses. Hypoplastic  left mastoid air cells. Bony calvarium appears intact.       Impression:      1. Similar cerebral volume loss with stable ventriculomegaly. Chronic  small vessel ischemic changes as well as underlying old ischemia. No  acute intracranial process.     Comments: A preliminary report is issued to the ER by the [facility]  radiology service. I agree with this preliminary impression.  This report was finalized on 12/10/2020 07:08 by Dr. Lorena Murray MD.    XR Chest 1 View [[id number]] Collected: 12/09/20 1953     Updated: 12/09/20 1957    Narrative:      EXAMINATION:  XR CHEST 1 VW-  12/9/2020 7:41 PM CST     HISTORY: Syncope. Hypotension. Prior heart surgery.     COMPARISON: 11/14/2020.     FINDINGS:  There is mild hypoventilation. There is mild elevation of the  right hemidiaphragm, stable. No dense infiltrate is seen. Heart size is  upper limits of normal. There has been prior heart bypass surgery. There  are likely chronic rib fractures on the right side.       Impression:      1. No acute appearing infiltrate or effusion.  2. Elevated right hemidiaphragm, stable.  3. Heart size borderline. Prior heart bypass surgery.        This report was finalized on 12/09/2020 19:54 by Dr. Chun Fountain MD.        ECG/EMG Results (last 24 hours)     Procedure Component Value Units Date/Time    ECG 12 Lead [[id number]] Collected: 12/09/20 2028     Updated: 12/09/20 2030    ECG 12 Lead [[id number]] Resulted: 12/09/20 2246     Updated: 12/09/20 2246    Narrative:      Subjective   87 y/o male arrives for evaluation of hypotension and increased confusion.   Per daughter he was eating when he made a \"high pitched sound, his legs   went straight out and he asked to come to the hospital\" she states she   checked his BP and found it to be in the 90s. She denies any fevers,   vomiting or diarrhea. She " tells me he has been confused since October (she   is unsure of what caused this) but tells me he normally walks on his own   and she ricci any falls, trauma or other issues. He arrives in [facility], he is   alert but oriented only x1.           Review of Systems   All other systems reviewed and are negative.      Past Medical History:   Diagnosis Date   • A-fib (CMS/[facility]) 11/15/2016   • Anemia     gets shots every few months to build up blood. sees dr adam.   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   • Arthritis    • Bradycardia 11/15/2016   • CAD (coronary artery disease)    • CAD in native artery 11/15/2016   • Chest pain 11/15/2016   • CHF (congestive heart failure) (CMS/[facility])    • Chronic kidney disease    • COPD (chronic obstructive pulmonary disease) (CMS/[facility])    • DDD (degenerative disc disease), lumbar 6/27/2017   • Diabetes (CMS/Formerly [facility])    • Heart murmur    • HTN (hypertension) 11/15/2016   • Hyperlipidemia    • Hypertension    • Lumbar stenosis with neurogenic claudication 6/27/2017   • Murmur 4/19/2019   • Myocardial infarction (CMS/Formerly [facility])    • PVD (peripheral vascular disease) (CMS/Formerly [facility])    • Sleep apnea    • Stroke (CMS/Formerly [facility])    • Type 2 diabetes mellitus (CMS/Formerly [facility]) 11/15/2016   • Ulcer of abdomen wall (CMS/Formerly [facility])        Allergies   Allergen Reactions   • Lorazepam Anxiety   • Lorazepam Anxiety   • Penicillins Swelling   • Adhesive Tape Rash   • Penicillins Swelling       Past Surgical History:   Procedure Laterality Date   • APPENDECTOMY     • BACK SURGERY      x2   • CARDIAC CATHETERIZATION Left     1/2010   • CARPAL TUNNEL RELEASE Bilateral    • CHOLECYSTECTOMY     • COLONOSCOPY N/A 9/7/2017    Procedure: COLONOSCOPY WITH ANESTHESIA;  Surgeon: Joshua Rich DO;    Location: [facility] ENDOSCOPY;  Service:    • CORONARY ARTERY BYPASS GRAFT      2/2006 w/PTCA & KIMMY    • CORONARY STENT PLACEMENT     • ENDOSCOPY N/A 12/14/2016    Procedure: ESOPHAGOGASTRODUODENOSCOPY WITH ANESTHESIA;  Surgeon: Isabel Dexter MD;   Location: [facility] ENDOSCOPY;  Service:    • ENDOSCOPY N/A 12/16/2016    Procedure: ESOPHAGOGASTRODUODENOSCOPY WITH ANESTHESIA;  Surgeon: Joshua Rich DO;  Location: [facility] ENDOSCOPY;  Service:    • ENDOSCOPY N/A 4/10/2017    Procedure: ESOPHAGOGASTRODUODENOSCOPY WITH ANESTHESIA;  Surgeon: Joshua Rich DO;  Location: [facility] ENDOSCOPY;  Service:    • ENDOSCOPY N/A 10/28/2020    Procedure: ESOPHAGOGASTRODUODENOSCOPY WITH ANESTHESIA;  Surgeon: Forrest Bliss MD;  Location: [facility] ENDOSCOPY;  Service: Gastroenterology;    Laterality: N/A;  pre: GI bleed  post: duodenal ulcers, hiatal hernia   Jeffrey Owen MD   • EYE SURGERY Left     x 2   • JOINT REPLACEMENT     • LEFT HEART CATH     • PERIPHERAL ARTERIAL STENT GRAFT     • REPLACEMENT TOTAL KNEE Left     2002   • SHOULDER ROTATOR CUFF REPAIR Bilateral        Family History   Problem Relation Age of Onset   • Coronary artery disease Father    • Heart disease Father    • Coronary artery disease Brother    • Heart attack Brother    • Cancer Mother    • No Known Problems Sister    • Heart disease Son    • Cancer Sister    • Cancer Sister        Social History     Socioeconomic History   • Marital status: Legally      Spouse name: Not on file   • Number of children: Not on file   • Years of education: Not on file   • Highest education level: Not on file   Tobacco Use   • Smoking status: Former Smoker   • Smokeless tobacco: Never Used   Substance and Sexual Activity   • Alcohol use: No   • Drug use: No   • Sexual activity: Never     Partners: Female   Social History Narrative    ** Merged History Encounter **                Objective   Physical Exam  Vitals signs and nursing note reviewed.   Constitutional:       Appearance: Normal appearance.   HENT:      Head: Normocephalic.      Nose: Nose normal.      Mouth/Throat:      Mouth: Mucous membranes are moist.      Pharynx: Oropharynx is clear.   Eyes:      Conjunctiva/sclera: Conjunctivae  normal.      Pupils: Pupils are equal, round, and reactive to light.   Neck:      Musculoskeletal: Normal range of motion and neck supple.   Cardiovascular:      Rate and Rhythm: Normal rate and regular rhythm.      Pulses: Normal pulses.      Heart sounds: Murmur present.   Pulmonary:      Effort: Pulmonary effort is normal. No respiratory distress.      Breath sounds: Normal breath sounds. No stridor. No wheezing or   rhonchi.   Abdominal:      General: Abdomen is flat. Bowel sounds are normal.   Musculoskeletal: Normal range of motion.         General: No swelling, tenderness, deformity or signs of injury.   Skin:     General: Skin is warm.      Capillary Refill: Capillary refill takes less than 2 seconds.   Neurological:      General: No focal deficit present.      Mental Status: He is alert. He is disoriented.      Cranial Nerves: No cranial nerve deficit.      Sensory: No sensory deficit.      Motor: No weakness.      Coordination: Coordination normal.      Comments: Follows commands, moves all extremities.          ECG 12 Lead      Date/Time: 12/9/2020 10:43 PM  Performed by: Arpit Connolly MD  Authorized by: Aript Connolly MD   Interpreted by physician  Rhythm: sinus bradycardia  Rate: bradycardic  BPM: 54  QRS axis: normal                   ED Course  ED Course as of Dec 09 2246   Wed Dec 09, 2020   2019 Chrnoic troponin      []   2019 Baseline hgb    []   2217 Ct head was without acute findings.     [JH]   2232 CTA shows bilateral Pes with mild right heart strain. Will touch base   with cardiology.     []   2235 Dr. Brandt states no need to admit to cardiology likely not a   candidate for EKOS.     Of note the DA reports a history of GI bleeding but given his heart strain   and Pes I am going to provide anticoagulation as I feel not doing so would   likely result in increased morbidity and mortality from the clots.     []   2236 BP did respond well to fluids he has been actually high  since that   time.     []   2235 Dr. Juarez accepts     []      ED Course User Index  [] Arpit Connolly MD           · Left ventricular function is normal. Estimated ejection fraction is   60-65%  · Left ventricular wall thickness is consistent with mild concentric   hypertrophy.  · Left ventricular diastolic dysfunction (grade I) consistent with   impaired relaxation.  · Left atrial cavity size is mild-to-moderately dilated.  · Mild tricuspid valve regurgitation is present.  · Mild pulmonic valve regurgitation is present.  · Mild aortic valve stenosis is present.           XR Chest 1 View   Final Result   1. No acute appearing infiltrate or effusion.   2. Elevated right hemidiaphragm, stable.   3. Heart size borderline. Prior heart bypass surgery.           This report was finalized on 12/09/2020 19:54 by Dr. Chun Fountain MD.      CT Head Without Contrast    (Results Pending)   CT Angiogram Chest    (Results Pending)     Labs Reviewed   COMPREHENSIVE METABOLIC PANEL - Abnormal; Notable for the following   components:       Result Value    Glucose 208 (*)     BUN 31 (*)     Creatinine 1.37 (*)     Sodium 129 (*)     Potassium 6.0 (*)     Albumin 3.10 (*)     eGFR Non  Amer 49 (*)     All other components within normal limits    Narrative:     GFR Normal >60  Chronic Kidney Disease <60  Kidney Failure <15     D-DIMER, QUANTITATIVE - Abnormal; Notable for the following components:    D-Dimer, Quantitative 2.48 (*)     All other components within normal limits    Narrative:     Reference Range is 0-0.50 mg/L FEU. However, results <0.50 mg/L FEU tends   to rule out DVT or PE. Results >0.50 mg/L FEU are not useful in predicting   absence or presence of DVT or PE.     TROPONIN (IN-HOUSE) - Abnormal; Notable for the following components:    Troponin T 0.106 (*)     All other components within normal limits    Narrative:     Troponin T Reference Range:  <= 0.03 ng/mL-   Negative for AMI  >0.03 ng/mL-      Abnormal for myocardial necrosis.  Clinicians would have   to utilize clinical acumen, EKG, Troponin and serial changes to determine   if it is an Acute Myocardial Infarction or myocardial injury due to an   underlying chronic condition.       Results may be falsely decreased if patient taking Biotin.     CBC WITH AUTO DIFFERENTIAL - Abnormal; Notable for the following   components:    RBC 2.80 (*)     Hemoglobin 8.6 (*)     Hematocrit 26.1 (*)     RDW 19.0 (*)     RDW-SD 65.4 (*)     All other components within normal limits   BLOOD GAS, ARTERIAL - Abnormal; Notable for the following components:    Base Excess, Arterial -2.0 (*)     All other components within normal limits   COVID-19,[facility] IN-HOUSE,NASAL SWAB NO TRANSPORT MEDIA 2 HR TAT - Normal      Narrative:     Fact sheet for providers: https://www.fda.gov/media/676051/download     Fact sheet for patients: https://www.fda.gov/media/968740/download    Test performed by PCR.   PROTIME-INR - Normal   APTT - Normal   BNP (IN-HOUSE) - Normal    Narrative:     Among patients with dyspnea, NT-proBNP is highly sensitive for the   detection of acute congestive heart failure. In addition NT-proBNP of <300   pg/ml effectively rules out acute congestive heart failure with 99%   negative predictive value.    Results may be falsely decreased if patient taking Biotin.     LIPASE - Normal   CK - Normal   PROCALCITONIN - Normal    Narrative:     As a Marker for Sepsis (Non-Neonates):   1. <0.5 ng/mL represents a low risk of severe sepsis and/or septic shock.  1. >2 ng/mL represents a high risk of severe sepsis and/or septic shock.    As a Marker for Lower Respiratory Tract Infections that require antibiotic   therapy:  PCT on Admission     Antibiotic Therapy             6-12 Hrs later  > 0.5                Strongly Recommended            >0.25 - <0.5         Recommended  0.1 - 0.25           Discouraged                   Remeasure/reassess PCT  <0.1                  "Strongly Discouraged          Remeasure/reassess PCT        As 28 day mortality risk marker: \"Change in Procalcitonin Result\" (> 80 %   or <=80 %) if Day 0 (or Day 1) and Day 4 values are available. Refer to   http://www.[facility] [facility]ct-calculator.[email or web address]/   Change in PCT <=80 %   A decrease of PCT levels below or equal to 80 % defines a positive change   in PCT test result representing a higher risk for 28-day all-cause   mortality of patients diagnosed with severe sepsis or septic shock.  Change in PCT > 80 %   A decrease of PCT levels of more than 80 % defines a negative change in   PCT result representing a lower risk for 28-day all-cause mortality of   patients diagnosed with severe sepsis or septic shock.                Results may be falsely decreased if patient taking Biotin.    LACTIC ACID, PLASMA - Normal   COVID PRE-OP / PRE-PROCEDURE SCREENING ORDER (NO ISOLATION)    Narrative:     The following orders were created for panel order COVID PRE-OP /   PRE-PROCEDURE SCREENING ORDER (NO ISOLATION) - Swab, Nasal Cavity.  Procedure                               Abnormality         Status                       ---------                               -----------         ------                       COVID-19,[facility] IN-JOSEPHINE...[[id number]]  Normal              Final result                   Please view results for these tests on the individual orders.   BLOOD CULTURE   BLOOD CULTURE   BLOOD GAS, ARTERIAL   URINALYSIS W/ CULTURE IF INDICATED   BASIC METABOLIC PANEL   TROPONIN (IN-HOUSE)   CBC AND DIFFERENTIAL    Narrative:     The following orders were created for panel order CBC & Differential.  Procedure                               Abnormality         Status                       ---------                               -----------         ------                       CBC Auto Differential[[id number]]        Abnormal            Final result                   Please view results for these tests on the individual orders. "                                  MDM    Final diagnoses:   Other acute pulmonary embolism with acute cor pulmonale (CMS/HCC)   Hypotension, unspecified hypotension type   Pleural effusion   Hyperkalemia            Arpit Connolly MD  12/09/20 2242       Arpit Connolly MD  12/09/20 2243       Arpit Connolly MD  12/09/20 2246      Impression:          Adult Transthoracic Echo Complete W/ Cont if Necessary Per Protocol [[id number]] Resulted: 12/10/20 0807     Updated: 12/10/20 0807          Orders (last 24 hrs)      Start     Ordered    12/11/20 0600  CBC (No Diff)  Morning Draw      12/10/20 0741    12/11/20 0600  Comprehensive Metabolic Panel  Morning Draw      12/10/20 0741    12/10/20 1800  enoxaparin (LOVENOX) syringe 80 mg  Every 12 Hours      12/10/20 0036    12/10/20 0900  allopurinol (ZYLOPRIM) tablet 300 mg  Daily      12/10/20 0036    12/10/20 0900  amLODIPine (NORVASC) tablet 5 mg  Daily      12/10/20 0036    12/10/20 0900  cholecalciferol (VITAMIN D3) tablet 1,000 Units  Daily      12/10/20 0036    12/10/20 0900  finasteride (PROSCAR) tablet 5 mg  Daily      12/10/20 0036    12/10/20 0900  folic acid (FOLVITE) tablet 400 mcg  Daily      12/10/20 0036    12/10/20 0900  furosemide (LASIX) tablet 20 mg  Daily      12/10/20 0036    12/10/20 0900  isosorbide mononitrate (IMDUR) 24 hr tablet 30 mg  Every 24 Hours Scheduled      12/10/20 0036    12/10/20 0900  losartan (COZAAR) tablet 100 mg  Every 24 Hours Scheduled      12/10/20 0036    12/10/20 0900  metoprolol succinate XL (TOPROL-XL) 24 hr tablet 25 mg  Every 24 Hours Scheduled      12/10/20 0036    12/10/20 0900  neomycin-polymyxin-hydrocortisone (CORTISPORIN) otic suspension 4 drop  3 Times Daily      12/10/20 0036    12/10/20 0900  sennosides-docusate (PERICOLACE) 8.6-50 MG per tablet 1 tablet  Daily      12/10/20 0036    12/10/20 0900  tamsulosin (FLOMAX) 24 hr capsule 0.4 mg  Daily      12/10/20 0036    12/10/20 0900  vitamin C  (ASCORBIC ACID) tablet 500 mg  Daily      12/10/20 0036    12/10/20 0900  thiamine (VITAMIN B-1) tablet 100 mg  Daily      12/10/20 0036    12/10/20 0900  enoxaparin (LOVENOX) syringe 80 mg  Once      12/10/20 0110    12/10/20 0800  metFORMIN (GLUCOPHAGE) tablet 500 mg  Daily With Breakfast      12/10/20 0036    12/10/20 0800  prednisoLONE acetate (PRED FORTE) 1 % ophthalmic suspension 1 drop  4 Times Daily      12/10/20 0036    12/10/20 0755  Adult Transthoracic Echo Complete W/ Cont if Necessary Per Protocol  Once      12/10/20 0754    12/10/20 0741  US Venous Doppler Lower Extremity Bilateral (duplex)  1 Time Imaging      12/10/20 0741    12/10/20 0730  pantoprazole (PROTONIX) EC tablet 40 mg  2 Times Daily Before Meals      12/10/20 0036    12/10/20 0607  SLP Consult: Eval & Treat Swallow Disorder; Cardiac  Once     Comments: Pt usually on nectar thick/soft diet at home, no PO meds received this shift r/t lethargic appearance and unable to stay alert enough to swallow.    12/10/20 0607    12/10/20 0600  BNP  Morning Draw      12/10/20 0036    12/10/20 0600  CBC (No Diff)  Morning Draw      12/10/20 0036    12/10/20 0432  Potassium  Once      12/10/20 0432    12/10/20 0424  POC Glucose Once  Once      12/10/20 0412    12/10/20 0400  Vital Signs  Every 4 Hours     Comments: Per per hospital policy    12/10/20 0036    12/10/20 0130  pravastatin (PRAVACHOL) tablet 40 mg  Nightly      12/10/20 0036    12/10/20 0130  rOPINIRole (REQUIP) tablet 1 mg  Nightly      12/10/20 0036    12/10/20 0130  sodium chloride 0.9 % flush 10 mL  Every 12 Hours Scheduled      12/10/20 0036    12/10/20 0037  Weigh patient  Once      12/10/20 0036    12/10/20 0037  Oxygen Therapy- Nasal Cannula; Titrate for SPO2: 90% - 95%  Continuous      12/10/20 0036    12/10/20 0037  Insert Peripheral IV  Once      12/10/20 0036    12/10/20 0037  Saline Lock & Maintain IV Access  Continuous,   Status:  Canceled      12/10/20 0036    12/10/20 0037  VTE  Prophylaxis Not Indicated: Other: Patient Currently Anticoagulated / Receiving Prophylaxis  Once      12/10/20 0036    12/10/20 0037  Telemetry - Maintain IV Access  Continuous      12/10/20 0036    12/10/20 0037  Continuous Cardiac Monitoring  Continuous      12/10/20 0036    12/10/20 0037  May Be Off Telemetry for Tests  Continuous      12/10/20 0036    12/10/20 0037  ACLS Protocol For Life Threatening Dysrhythmias (Unless Code Status Indicates Otherwise)  Continuous      12/10/20 0036    12/10/20 0037  Notify Provider if ACLS Protocol Activated  Until Discontinued      12/10/20 0036    12/10/20 0037  Activity - Ad Jacqueline  Until Discontinued      12/10/20 0036    12/10/20 0037  Fall Precautions  Continuous      12/10/20 0036    12/10/20 0037  Diet Regular; Cardiac  Diet Effective Now      12/10/20 0036    12/10/20 0036  acetaminophen (TYLENOL) tablet 325 mg  Every 6 Hours PRN      12/10/20 0036    12/10/20 0036  nitroglycerin (NITROSTAT) SL tablet 0.4 mg  Every 5 Minutes PRN,   Status:  Discontinued      12/10/20 0036    12/10/20 0036  Intake & Output  Every Shift      12/10/20 0036    12/10/20 0036  sodium chloride 0.9 % flush 10 mL  As Needed      12/10/20 0036    12/10/20 0036  nitroglycerin (NITROSTAT) SL tablet 0.4 mg  Every 5 Minutes PRN      12/10/20 0036    12/10/20 0036  Morphine sulfate (PF) injection 1 mg  Every 4 Hours PRN      12/10/20 0036    12/10/20 0036  naloxone (NARCAN) injection 0.4 mg  Every 5 Minutes PRN      12/10/20 0036    12/09/20 2335  Transfer Patient  Once      12/09/20 2335    12/09/20 2325  Urine Culture - Urine, Urine, Catheter  Once      12/09/20 2324    12/09/20 2315  Urinalysis, Microscopic Only - Urine, Catheter  Once      12/09/20 2315    12/09/20 2257  Inpatient Admission  Once      12/09/20 2257 12/09/20 2255  Code Status and Medical Interventions:  Continuous      12/09/20 2257    12/09/20 2240  Inpatient Admission  Once      12/09/20 2240    12/09/20 2236  enoxaparin  (LOVENOX) syringe 100 mg  Once      12/09/20 2234 12/09/20 2220  Basic Metabolic Panel  Once in 2 hours     Comments: Collect 2 hours after hyperkalemia meds administered.      12/09/20 2019 12/09/20 2207  ECG 12 Lead  Once      12/09/20 2206 12/09/20 2207  Troponin  Once      12/09/20 2206 12/09/20 2203  iopamidol (ISOVUE-370) 76 % injection 100 mL  Once in Imaging      12/09/20 2201 12/09/20 2030  calcium gluconate 1 g in sodium chloride 0.9 % 100 mL IVPB  Once      12/09/20 2019 12/09/20 2021  dextrose (D50W) 25 g/ 50mL Intravenous Solution 50 mL  Once      12/09/20 2019 12/09/20 2021  insulin regular (humuLIN R,novoLIN R) injection 10 Units  Once      12/09/20 2019 12/09/20 2020  CT Angiogram Chest  1 Time Imaging      12/09/20 2020 12/09/20 1939  Blood Gas, Arterial -  Once      12/09/20 1931 12/09/20 1919  sodium chloride 0.9% - IBW for BMI > 30 bolus 1,983 mL  Once      12/09/20 1917 12/09/20 1916  Blood Gas, Arterial -  STAT      12/09/20 1915 12/09/20 1916  XR Chest 1 View  1 Time Imaging      12/09/20 1915 12/09/20 1916  Blood Culture - Blood, Arm, Right  Once      12/09/20 1915 12/09/20 1916  Blood Culture - Blood, Blood, Venous Line  Once      12/09/20 1915 12/09/20 1916  ECG 12 Lead  Once      12/09/20 1915 12/09/20 1916  CT Head Without Contrast  1 Time Imaging      12/09/20 1915 12/09/20 1915  CBC & Differential  Once      12/09/20 1915 12/09/20 1915  Comprehensive Metabolic Panel  Once      12/09/20 1915 12/09/20 1915  Protime-INR  Once      12/09/20 1915 12/09/20 1915  aPTT  Once      12/09/20 1915 12/09/20 1915  BNP  Once      12/09/20 1915 12/09/20 1915  D-dimer, Quantitative  Once      12/09/20 1915 12/09/20 1915  COVID PRE-OP / PRE-PROCEDURE SCREENING ORDER (NO ISOLATION) - Swab, Nasal Cavity  Once      12/09/20 1915    12/09/20 1915  Lipase  Once      12/09/20 1915 12/09/20 1915  Urinalysis With Culture If Indicated -  Urine, Catheter  Once      12/09/20 1915 12/09/20 1915  Troponin  Once      12/09/20 1915 12/09/20 1915  CK  Once      12/09/20 1915 12/09/20 1915  Procalcitonin  Once      12/09/20 1915 12/09/20 1915  Lactic Acid, Plasma  Once      12/09/20 1915 12/09/20 1915  CBC Auto Differential  PROCEDURE ONCE      12/09/20 1915 12/09/20 1915  COVID-19,[facility] IN-HOUSE,Nasal Swab No Transport Media 3-4 HR TAT - Swab, Nasal Cavity  PROCEDURE ONCE      12/09/20 1915    Unscheduled  Telemetry - Pulse Oximetry  Continuous PRN     Comments: If Patient Develops Unresponsiveness, Acute Dyspnea, Cyanosis or Suspected Hypoxemia Start Continuous Pulse Ox Monitoring, Apply Oxygen & Notify Provider    12/10/20 0036    Unscheduled  Oxygen Therapy- Nasal Cannula; Titrate for SPO2: 90% - 95%  Continuous PRN     Comments: If Patient Develops Unresponsiveness, Acute Dyspnea, Cyanosis or Suspected Hypoxemia Start Continuous Pulse Ox Monitoring, Apply Oxygen & Notify Provider    12/10/20 0036    Unscheduled  ECG 12 Lead  As Needed     Comments: Nurse to Release if Patient Expericences Acute Chest Pain or Dysrhythmias    12/10/20 0036    Unscheduled  Potassium  As Needed     Comments: For Ventricular Arrhythmias      12/10/20 0036    Unscheduled  Magnesium  As Needed     Comments: For Ventricular Arrhythmias      12/10/20 0036    Unscheduled  Troponin  As Needed     Comments: For Chest Pain      12/10/20 0036    Unscheduled  Digoxin Level  As Needed     Comments: For Atrial Arrhythmias      12/10/20 0036    Unscheduled  Blood Gas, Arterial -  As Needed     Comments: Per O2 PolicyNotify Physician      12/10/20 0036                Physician Progress Notes (last 24 hours) (Notes from 12/09/20 0818 through 12/10/20 0818)    No notes of this type exist for this encounter.         Consult Notes (last 24 hours) (Notes from 12/09/20 0818 through 12/10/20 0818)    No notes of this type exist for this encounter.

## 2020-12-10 NOTE — PROGRESS NOTES
Continued Stay Note  [facility]     Patient Name: Eron Escalante  MRN: [id number]  Today's Date: 12/10/2020    Admit Date: 12/9/2020    Discharge Plan    No documentation.       Discharge Codes    No documentation.             CARMINA Corea

## 2020-12-10 NOTE — PROGRESS NOTES
Discharge Planning Assessment  [facility]     Patient Name: Eron Escalante  MRN: [id number]  Today's Date: 12/10/2020    Admit Date: 12/9/2020    Discharge Needs Assessment     Row Name 12/10/20 1006       Living Environment    Lives With  child(miles), adult    Name(s) of Who Lives With Patient  Bisi Bradley -    Current Living Arrangements  home/apartment/condo    Primary Care Provided by  child(miles)    Provides Primary Care For  no one, unable/limited ability to care for self    Family Caregiver if Needed  child(miles), adult    Family Caregiver Names  Bisi Bradley -    Quality of Family Relationships  supportive;helpful;involved    Able to Return to Prior Arrangements  yes       Resource/Environmental Concerns    Resource/Environmental Concerns  none    Transportation Concerns  car, none       Transition Planning    Patient/Family Anticipates Transition to  home with family;home with help/services    Patient/Family Anticipated Services at Transition  home health care    Transportation Anticipated  family or friend will provide       Discharge Needs Assessment    Readmission Within the Last 30 Days  other (see comments) patient is a readmit    Current Outpatient/Agency/Support Group  homecare agency    Equipment Currently Used at Home  hospital bed;commode;wheelchair    Concerns to be Addressed  discharge planning;care coordination/care conferences    Outpatient/Agency/Support Group Needs  homecare agency    Discharge Facility/Level of Care Needs  home with home health    Current Discharge Risk  chronically ill;cognitively impaired    Discharge Coordination/Progress  Patient currently admitted to CCU.  Medical record indicates patient is lethargic and disoriented.  Attempted to call patient's daughter, Bisi Bradley, to discuss discharge plan/needs.  Did not receive an answer.  Will attempt to reach at another time.  Chart reviewed, patient was recently admitted to this facility from 11/14/20 - 11/17/20 and returned  home upon discharge with [facility] and DME arranged as listed above.  Patient continues to receive HH services from [facility].  Patient has a PCP and RX coverage.  SNF placement was considered and established during last admit but family ultimately decided on taking patient home.    Received return call from patient's granddaughter, Bisi Bradley, confirming above info.  Granddaughter advises they do have scales in the home for daily weights and is agreeable to a referral to [facility].  Will proceed with [facility] referral for CHF diagnosis.        Discharge Plan    No documentation.       Continued Care and Services - Admitted Since 12/9/2020    Coordination has not been started for this encounter.     Selected Continued Care - Prior Encounters Includes selections from prior encounters from 9/10/2020 to 12/10/2020    Discharged on 11/17/2020 Admission date: 11/14/2020 - [place] [facility]    [place] Medical Care     Service Provider Selected Services Address Phone Fax Patient Preferred    [facility] [address] [facility] [id number] [phone] --                Discharged on 11/10/2020 Admission date: 10/26/2020 - Discharge disposition: [place] [facility]    [place]ination     Service Provider Selected Services Address Phone Fax Patient Preferred    [facility] [address], [facility] [address] [phone] --          Durable Medical Equipment     Service Provider Selected Services Address Phone Fax Patient Preferred    [facility]  Durable Medical Equipment [address] [facility] [address] [phone] --          Home Medical Care     Service Provider Selected Services Address Phone Fax Patient Preferred    [facility] [address] [facility] [id number] [phone] --                       Demographic Summary    No documentation.       Functional Status    No documentation.       Psychosocial    No documentation.       Abuse/Neglect    No documentation.       Legal    No documentation.       Substance Abuse    No documentation.       Patient Forms    No documentation.           CARMINA Corea

## 2020-12-10 NOTE — H&P
Patient Care Team:  Jeffrey Owen MD as PCP - General (Family Medicine)  Jeffrey Owen MD as PCP - Family Medicine  Frantz Zamora MD as Consulting Physician (Cardiology)  LEGJeffrey Ardon MD    Chief complaint near syncope    Subjective     Patient is a 86 y.o. male presents with a near syncopal episode while eating dinner. Onset of symptoms was abrupt starting 1 day ago.  Symptoms are associated with nothing that the patient can tell but he is a poor historian.  He denies any significant chest pain or cough.  He states he has been feeling poorly for about a month.  Symptoms are aggravated by nothing that he can endorse.   Symptoms improve with unknown. Severity moderate.  Context unknown to him.  Quality unable for him to describe.  Review of electronic health record does reveal that he has had a distant history of blood clot in the leg.  Unfortunately he has a more recent history of GI bleeding.    Review of Systems   Pertinent items are noted in HPI  Any remaining review of systems is difficult to obtain due to patient's poor historical ability    History  Past Medical History:   Diagnosis Date   • A-fib (CMS/[facility]) 11/15/2016   • Anemia     gets shots every few months to build up blood. sees dr adam.   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   • Arthritis    • Bradycardia 11/15/2016   • CAD (coronary artery disease)    • CAD in native artery 11/15/2016   • Chest pain 11/15/2016   • CHF (congestive heart failure) (CMS/[facility])    • Chronic kidney disease    • COPD (chronic obstructive pulmonary disease) (CMS/[facility])    • DDD (degenerative disc disease), lumbar 6/27/2017   • Diabetes (CMS/[facility])    • Heart murmur    • HTN (hypertension) 11/15/2016   • Hyperlipidemia    • Hypertension    • Lumbar stenosis with neurogenic claudication 6/27/2017   • Murmur 4/19/2019   • Myocardial infarction (CMS/[facility])    • PVD (peripheral vascular disease) (CMS/[facility])    • Sleep apnea    • Stroke (CMS/[facility])    •  Type 2 diabetes mellitus (CMS/[facility]) 11/15/2016   • Ulcer of abdomen wall (CMS/[facility])      Past Surgical History:   Procedure Laterality Date   • APPENDECTOMY     • BACK SURGERY      x2   • CARDIAC CATHETERIZATION Left     1/2010   • CARPAL TUNNEL RELEASE Bilateral    • CHOLECYSTECTOMY     • COLONOSCOPY N/A 9/7/2017    Procedure: COLONOSCOPY WITH ANESTHESIA;  Surgeon: Joshua Rich DO;  Location: [facility] ENDOSCOPY;  Service:    • CORONARY ARTERY BYPASS GRAFT      2/2006 w/PTCA & KIMMY    • CORONARY STENT PLACEMENT     • ENDOSCOPY N/A 12/14/2016    Procedure: ESOPHAGOGASTRODUODENOSCOPY WITH ANESTHESIA;  Surgeon: Isabel Dexter MD;  Location: [facility] ENDOSCOPY;  Service:    • ENDOSCOPY N/A 12/16/2016    Procedure: ESOPHAGOGASTRODUODENOSCOPY WITH ANESTHESIA;  Surgeon: Joshua Rich DO;  Location: [facility] ENDOSCOPY;  Service:    • ENDOSCOPY N/A 4/10/2017    Procedure: ESOPHAGOGASTRODUODENOSCOPY WITH ANESTHESIA;  Surgeon: Joshua Rich DO;  Location: [facility] ENDOSCOPY;  Service:    • ENDOSCOPY N/A 10/28/2020    Procedure: ESOPHAGOGASTRODUODENOSCOPY WITH ANESTHESIA;  Surgeon: Forrest Bliss MD;  Location: [facility] ENDOSCOPY;  Service: Gastroenterology;  Laterality: N/A;  pre: GI bleed  post: duodenal ulcers, hiatal hernia   Jeffrey Owen MD   • EYE SURGERY Left     x 2   • JOINT REPLACEMENT     • LEFT HEART CATH     • PERIPHERAL ARTERIAL STENT GRAFT     • REPLACEMENT TOTAL KNEE Left     2002   • SHOULDER ROTATOR CUFF REPAIR Bilateral      Family History   Problem Relation Age of Onset   • Coronary artery disease Father    • Heart disease Father    • Coronary artery disease Brother    • Heart attack Brother    • Cancer Mother    • No Known Problems Sister    • Heart disease Son    • Cancer Sister    • Cancer Sister      Social History     Tobacco Use   • Smoking status: Former Smoker   • Smokeless tobacco: Never Used   Substance Use Topics   • Alcohol use: No   • Drug use: No     E-cigarette/Vaping      E-cigarette/Vaping Substances     Medications Prior to Admission   Medication Sig Dispense Refill Last Dose   • acetaminophen (TYLENOL) 325 MG tablet Take 2 tablets by mouth Every 4 (Four) Hours As Needed for Mild Pain  or Fever (Temperature greater than or equal to 37 C).      • acetaminophen (TYLENOL) 325 MG tablet Take 325 mg by mouth Every 6 (Six) Hours As Needed for Mild Pain .      • allopurinol (ZYLOPRIM) 300 MG tablet Take 1 tablet by mouth Daily. 30 tablet 2    • allopurinol (ZYLOPRIM) 300 MG tablet Take 300 mg by mouth Daily.      • amLODIPine (NORVASC) 5 MG tablet Take 5 mg by mouth Daily.      • amLODIPine (NORVASC) 5 MG tablet Take 5 mg by mouth Daily.      • ascorbic acid (VITAMIN C) 1000 MG tablet Take 500 mg by mouth Daily.      • aspirin 81 MG chewable tablet Chew 81 mg Daily.      • aspirin 81 MG chewable tablet Chew 81 mg Daily.      • Cholecalciferol (VITAMIN D) 1000 units tablet Take 1,000 Units by mouth.      • cholecalciferol (VITAMIN D3) 10 MCG (400 UNIT) tablet Take 400 Units by mouth Daily.      • colchicine 0.6 MG tablet Take 0.6 mg by mouth Daily As Needed (gout flare-up).      • colchicine 0.6 MG tablet Take 0.6 mg by mouth Daily.      • finasteride (PROSCAR) 5 MG tablet Take 5 mg by mouth Daily.      • finasteride (PROSCAR) 5 MG tablet Take 5 mg by mouth Daily.      • folic acid (FOLVITE) 400 MCG tablet Take 400 mcg by mouth.      • folic acid (FOLVITE) 400 MCG tablet Take 400 mcg by mouth Daily.      • furosemide (LASIX) 20 MG tablet Take 1 tablet by mouth Daily. 30 tablet 2    • furosemide (LASIX) 20 MG tablet Take 20 mg by mouth 2 (Two) Times a Day.      • glimepiride (AMARYL) 4 MG tablet Take 4 mg by mouth 2 (Two) Times a Day With Meals. Needs pm dose      • isosorbide mononitrate (IMDUR) 30 MG 24 hr tablet Take 1 tablet by mouth Daily. 30 tablet 2    • isosorbide mononitrate (IMDUR) 30 MG 24 hr tablet Take 30 mg by mouth Daily.      • losartan (COZAAR) 100 MG tablet Take 1  tablet by mouth Daily. 30 tablet 2    • losartan (COZAAR) 50 MG tablet Take 25 mg by mouth Daily.      • losartan (COZAAR) 50 MG tablet Take 50 mg by mouth Daily.      • meclizine (ANTIVERT) 25 MG tablet Take 25 mg by mouth. 3-4 times daily      • meclizine (ANTIVERT) 25 MG tablet Take 25 mg by mouth 3 (Three) Times a Day As Needed for Dizziness.      • metFORMIN (Glucophage) 500 MG tablet Take 1 tablet by mouth Daily With Breakfast. 30 tablet 1    • metoprolol succinate XL (TOPROL XL) 25 MG 24 hr tablet Take 25 mg by mouth every night at bedtime.      • metoprolol succinate XL (TOPROL-XL) 25 MG 24 hr tablet Take 25 mg by mouth Daily.      • neomycin-polymyxin-hydrocortisone (CORTISPORIN) 3.5-72388-7 otic suspension Administer 4 drops to the right ear 3 (Three) Times a Day.      • nitroglycerin (NITROSTAT) 0.4 MG SL tablet Place 0.4 mg under the tongue Every 5 (Five) Minutes As Needed.      • nitroglycerin (NITROSTAT) 0.4 MG SL tablet Place 0.4 mg under the tongue Every 5 (Five) Minutes As Needed for Chest Pain. Take no more than 3 doses in 15 minutes.      • ondansetron (ZOFRAN) 4 MG tablet Take 1 tablet by mouth Every 6 (Six) Hours As Needed for Nausea or Vomiting. 20 tablet 0    • ondansetron (ZOFRAN) 4 MG tablet Take 4 mg by mouth Every 8 (Eight) Hours As Needed for Nausea or Vomiting.      • pantoprazole (PROTONIX) 40 MG EC tablet Take 40 mg by mouth Every 12 (Twelve) Hours.      • pantoprazole (PROTONIX) 40 MG EC tablet Take 40 mg by mouth Daily.      • potassium chloride (K-DUR,KLOR-CON) 20 MEQ CR tablet Take 10 mEq by mouth Every 12 (Twelve) Hours.      • potassium chloride (K-DUR,KLOR-CON) 20 MEQ CR tablet Take 20 mEq by mouth 2 (Two) Times a Day.      • pravastatin (PRAVACHOL) 40 MG tablet Take 1 tablet by mouth Every Night.      • pravastatin (PRAVACHOL) 40 MG tablet Take 40 mg by mouth Daily.      • prednisoLONE acetate (PRED FORTE) 1 % ophthalmic suspension Administer 1 drop into the left eye 4 (Four)  Times a Day.      • prednisoLONE acetate (PRED FORTE) 1 % ophthalmic suspension Administer 1 drop to both eyes 3 (Three) Times a Day.      • Pyridoxine HCl (VITAMIN B6) 200 MG tablet Take 1 tablet by mouth Daily.      • Pyridoxine HCl (Vitamin B6) 200 MG tablet Take  by mouth.      • rOPINIRole (REQUIP) 1 MG tablet Take 1 mg by mouth Every Night. Take 1 hour before bedtime.      • rOPINIRole (REQUIP) 1 MG tablet Take 1 mg by mouth Every Night. Take 1 hour before bedtime.      • sennosides-docusate (senna-docusate sodium) 8.6-50 MG per tablet Take 1 tablet by mouth Daily.      • sennosides-docusate sodium (SENOKOT-S) 8.6-50 MG tablet Take 2 tablets by mouth 2 (Two) Times a Day As Needed for constipation. 45 tablet 2    • tamsulosin (FLOMAX) 0.4 MG capsule 24 hr capsule Take 1 capsule by mouth Daily.      • tamsulosin (FLOMAX) 0.4 MG capsule 24 hr capsule Take 1 capsule by mouth Daily.      • thiamine (VITAMIN B-1) 100 MG tablet Take 100 mg by mouth.      • thiamine (VITAMIN B-1) 100 MG tablet Take 100 mg by mouth Daily.      • vitamin C (ASCORBIC ACID) 500 MG tablet Take 500 mg by mouth Daily.        Allergies:  Lorazepam, Lorazepam, Penicillins, Adhesive tape, and Penicillins    Objective     Vital Signs  Temp:  [97.3 °F (36.3 °C)-97.4 °F (36.3 °C)] 97.4 °F (36.3 °C)  Heart Rate:  [53-65] 61  Resp:  [13-16] 15  BP: ()/(41-77) 132/60    Physical Exam:    General Appearance alert, appears stated age and cooperative  Head normocephalic, without obvious abnormality and atraumatic  Eyes lids and lashes normal, conjunctivae and sclerae normal and no icterus  Neck no adenopathy, supple, trachea midline, no thyromegaly, no carotid bruit and no JVD  Lungs clear to auscultation, respirations regular, respirations even and respirations unlabored  Heart regular rhythm & normal rate and normal S1, S2, systolic murmur  2/6 throughout the precordium  Abdomen normal bowel sounds, no masses, no hepatomegaly, no splenomegaly,  soft non-tender, no guarding and no rebound tenderness  Extremities moves extremities well, no edema, no cyanosis and no redness  Pulses Pulses palpable and equal bilaterally  Skin no bleeding, bruising or rash  Neurologic Mental Status alertness alert and awake, orientation person and place and mood/affect flat, Cranial Nerves cranial nerves 2 - 12 grossly intact as examined, Motor strength is equal in upper and lower extremities    Results Review:    I reviewed the patient's new clinical results.    Assessment/Plan       Pulmonary embolus (CMS/[facility])    Type 2 diabetes mellitus, without long-term current use of insulin (CMS/[facility])    Hyperkalemia    Murmur    Chronic diastolic congestive heart failure (CMS/[facility])    CHF (congestive heart failure), NYHA class I, acute on chronic, combined (CMS/[facility])    Ischemic cardiomyopathy    Type 2 diabetes mellitus with hypoglycemia, without long-term current use of insulin (CMS/[facility])    Essential hypertension    History of stroke    Atrial fibrillation (CMS/[facility])    Normocytic anemia    Chronic diastolic heart failure (CMS/[facility])      Unclear of exact reason for his pulmonary embolism.  I will need to review records and see the history of the GI bleed, but for now he does need the anticoagulation.  There were some issues with his mentation last night and awaiting a speech eval to make sure he is okay for swallowing.  He is DNR.      I discussed the patients findings and my recommendations with patient.     Cesar Fox MD  12/10/20  07:47 CST    Time: Critical care 55 min

## 2020-12-10 NOTE — PLAN OF CARE
Goal Outcome Evaluation:  Plan of Care Reviewed With: patient  Progress: improving   Oriented to person. Confused. Reorientation provided. Patient transferred from CCU today. , room air. No c/o pain. External catheter. Turning with repositioning wedges. Pureed diet with honey thick liquids. Glucose monitoring. NSR on tele. Safety maintained. Will continue to monitor.

## 2020-12-10 NOTE — THERAPY EVALUATION
Acute Care - Speech Language Pathology   Swallow Initial Evaluation and Treatment Note [facility]     Patient Name: Eron Escalante  : 1934  MRN: [id number]  Today's Date: 12/10/2020               Admit Date: 2020  Clinical bedside swallow evaluation completed. The patient is alert and cooperative.     Primary problem: Justice pulmonary embolis, hypotension, pleural effusion, hyperkalemia. Medical hx: COPD, CVA, sleep apnea, VFSS 20 with recommendations for mech soft and honey thick liquids.     Oral motor assessment with generalized weakness. The patient completed a full range of consistencies except for mech soft. Throat clearing is observed with thin liquids and nectar thick liquids. Increased prep time with regular solid with residue requiring honey thick liquid wash.     SLP recommends:   1) Puree diet   2) Honey thick liquids   3) Meds whole with apple sauce   4) Oral care and standard swallow precautions   5) OK for small sips of thin water with supervision only.     SLP will follow up to ensure diet toleration and to progress diet as appropriate.   Maria Luz Schultz MS CCC-SLP 12/10/2020 12:24 CST      Visit Dx:     ICD-10-CM ICD-9-CM   1. Other acute pulmonary embolism with acute cor pulmonale (CMS/HCC)  I26.09 415.19     415.0   2. Hypotension, unspecified hypotension type  I95.9 458.9   3. Pleural effusion  J90 511.9   4. Hyperkalemia  E87.5 276.7   5. Oropharyngeal dysphagia  R13.12 787.22     Patient Active Problem List   Diagnosis   • Type 2 diabetes mellitus, without long-term current use of insulin (CMS/[facility])   • HTN (hypertension)   • Aortocoronary bypass status   • Bradycardia   • CAD in native artery   • A-fib (CMS/HCC)   • Symptomatic bradycardia   • Cerebral ventriculomegaly   • Dehydration   • Duodenal ulcer   • Acute renal failure superimposed on stage 3a chronic kidney disease (CMS/HCC)   • Hyperkalemia   • Weakness of extremity   • Lumbar stenosis with neurogenic claudication    • DDD (degenerative disc disease), lumbar   • Change in bowel habit   • RICARDO treated with BiPAP   • Murmur   • Chronic diastolic congestive heart failure (CMS/[facility])   • Periodic limb movement disorder (PLMD)   • CHF (congestive heart failure), NYHA class I, acute on chronic, combined (CMS/[facility])   • Chronic constipation   • Chronic back pain   • History of stroke   • Hypertensive urgency   • Ischemic cardiomyopathy   • Altered mental status   • Acute blood loss anemia   • Metabolic encephalopathy   • Hypernatremia   • E. coli UTI (urinary tract infection)   • UGIB (upper gastrointestinal bleed)   • Type 2 diabetes mellitus with hypoglycemia, without long-term current use of insulin (CMS/[facility])   • Essential hypertension   • Metabolic encephalopathy   • History of stroke   • Atrial fibrillation (CMS/[facility])   • Normocytic anemia   • Chronic diastolic heart failure (CMS/[facility])   • Hypoglycemia   • Pulmonary embolus (CMS/[facility])     Past Medical History:   Diagnosis Date   • A-fib (CMS/[facility]) 11/15/2016   • Anemia     gets shots every few months to build up blood. sees dr adam.   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   • Arthritis    • Bradycardia 11/15/2016   • CAD (coronary artery disease)    • CAD in native artery 11/15/2016   • Chest pain 11/15/2016   • CHF (congestive heart failure) (CMS/[facility])    • Chronic kidney disease    • COPD (chronic obstructive pulmonary disease) (CMS/[facility])    • DDD (degenerative disc disease), lumbar 6/27/2017   • Diabetes (CMS/[facility])    • Heart murmur    • HTN (hypertension) 11/15/2016   • Hyperlipidemia    • Hypertension    • Lumbar stenosis with neurogenic claudication 6/27/2017   • Murmur 4/19/2019   • Myocardial infarction (CMS/[facility])    • PVD (peripheral vascular disease) (CMS/[facility])    • Sleep apnea    • Stroke (CMS/[facility])    • Type 2 diabetes mellitus (CMS/[facility]) 11/15/2016   • Ulcer of abdomen wall (CMS/[facility])      Past Surgical History:   Procedure Laterality Date   • APPENDECTOMY     • BACK SURGERY       x2   • CARDIAC CATHETERIZATION Left     1/2010   • CARPAL TUNNEL RELEASE Bilateral    • CHOLECYSTECTOMY     • COLONOSCOPY N/A 9/7/2017    Procedure: COLONOSCOPY WITH ANESTHESIA;  Surgeon: Joshua Rich DO;  Location: [facility] ENDOSCOPY;  Service:    • CORONARY ARTERY BYPASS GRAFT      2/2006 w/PTCA & KIMMY    • CORONARY STENT PLACEMENT     • ENDOSCOPY N/A 12/14/2016    Procedure: ESOPHAGOGASTRODUODENOSCOPY WITH ANESTHESIA;  Surgeon: Isabel Dexter MD;  Location: [facility] ENDOSCOPY;  Service:    • ENDOSCOPY N/A 12/16/2016    Procedure: ESOPHAGOGASTRODUODENOSCOPY WITH ANESTHESIA;  Surgeon: Joshua Rich DO;  Location: [facility] ENDOSCOPY;  Service:    • ENDOSCOPY N/A 4/10/2017    Procedure: ESOPHAGOGASTRODUODENOSCOPY WITH ANESTHESIA;  Surgeon: Joshua Rich DO;  Location: [facility] ENDOSCOPY;  Service:    • ENDOSCOPY N/A 10/28/2020    Procedure: ESOPHAGOGASTRODUODENOSCOPY WITH ANESTHESIA;  Surgeon: Forrest Bliss MD;  Location: [facility] ENDOSCOPY;  Service: Gastroenterology;  Laterality: N/A;  pre: GI bleed  post: duodenal ulcers, hiatal hernia   Jeffrey Owen MD   • EYE SURGERY Left     x 2   • JOINT REPLACEMENT     • LEFT HEART CATH     • PERIPHERAL ARTERIAL STENT GRAFT     • REPLACEMENT TOTAL KNEE Left     2002   • SHOULDER ROTATOR CUFF REPAIR Bilateral         SWALLOW EVALUATION (last 72 hours)      SLP Adult Swallow Evaluation     Row Name 12/10/20 0845                   Rehab Evaluation    Document Type  evaluation  -MM        Subjective Information  no complaints  -MM        Patient Observations  alert;cooperative;agree to therapy  -MM        Patient/Family/Caregiver Comments/Observations  No family present.   -MM        Care Plan Review  evaluation/treatment results reviewed  -MM        Care Plan Review, Other Participant(s)  other (see comments) RN Frieda   -MM        Patient Effort  good  -MM        Symptoms Noted During/After Treatment  none  -MM           General Information    Patient Profile  Reviewed  yes  -MM        Pertinent History Of Current Problem  Primary problem: Justice pulmonary embolis, hypotension, pleural effusion, hyperkalemia. Medical hx: COPD, CVA, sleep apnea, VFSS 11/4/20 with recommendations for mech soft and honey thick liquids.   -MM        Current Method of Nutrition  regular textures  -MM        Precautions/Limitations, Vision  WFL with corrective lenses;for purposes of eval  -MM        Precautions/Limitations, Hearing  hearing impairment, bilaterally  -MM        Prior Level of Function-Communication  unknown  -MM        Prior Level of Function-Swallowing  mechanical soft textures;honey thick liquids  -MM        Plans/Goals Discussed with  patient;other (see comments);agreed upon RN Frieda   -MM        Barriers to Rehab  none identified  -MM        Patient's Goals for Discharge  patient did not state  -MM           Pain    Additional Documentation  Pain Scale: FACES Pre/Post-Treatment (Group)  -MM           Pain Scale: FACES Pre/Post-Treatment    Pain: FACES Scale, Pretreatment  0-->no hurt  -MM        Posttreatment Pain Rating  0-->no hurt  -MM           Oral Motor Structure and Function    Dentition Assessment  missing teeth upper, lower teeth present.   -MM        Secretion Management  WNL/WFL  -MM        Mucosal Quality  moist, healthy  -MM        Volitional Swallow  delayed  -MM        Volitional Cough  weak  -MM           Oral Musculature and Cranial Nerve Assessment    Oral Motor General Assessment  generalized oral motor weakness  -MM           General Eating/Swallowing Observations    Respiratory Support Currently in Use  room air  -MM        Eating/Swallowing Skills  fed by SLP  -MM        Positioning During Eating  upright in bed  -MM        Utensils Used  spoon;straw  -MM        Consistencies Trialed  regular textures;honey-thick liquids;nectar/syrup-thick liquids;thin liquids;pureed  -MM           Clinical Swallow Eval    Oral Prep Phase  impaired  -MM        Oral Transit   WFL  -MM        Oral Residue  impaired  -MM        Pharyngeal Phase  suspected pharyngeal impairment  -MM        Esophageal Phase  unremarkable  -MM        Clinical Swallow Evaluation Summary  Clinical bedside swallow evaluation completed. The patient is alert and cooperative. Primary problem: Justice pulmonary embolis, hypotension, pleural effusion, hyperkalemia. Medical hx: COPD, CVA, sleep apnea, VFSS 11/4/20 with recommendations for mech soft and honey thick liquids. Oral motor assessment with generalized weakness. The patient completed a full range of consistencies except for mech soft. Throat clearing is observed with thin liquids and nectar thick liquids. Increased prep time with regular solid with residue requiring honey thick liquid wash. SLP recommends: 1) Puree diet 2) Honey thick liquids 3) Meds whole with apple sauce 4) Oral care and standard swallow precautions 5) OK for small sips of thin water with supervision only. SLP will follow up to ensure diet toleration and to progress diet as appropriate.   -MM           Oral Prep Concerns    Oral Prep Concerns  prolonged mastication  -MM        Prolonged Mastication  regular consistencies  -MM           Oral Residue Concerns    Oral Residue Concerns  diffuse residue throughout oral cavity  -MM        Diffuse Residue Throughout Oral Cavity  regular consistencies  -MM           Pharyngeal Phase Concerns    Pharyngeal Phase Concerns  throat clear  -MM        Throat Clear  nectar;thin  -MM           Clinical Impression    SLP Swallowing Diagnosis  mild-moderate;oral dysphagia;pharyngeal dysphagia  -MM        Functional Impact  risk of aspiration/pneumonia;risk of malnutrition;risk of dehydration  -MM        Rehab Potential/Prognosis, Swallowing  good, to achieve stated therapy goals  -MM        Swallow Criteria for Skilled Therapeutic Interventions Met  demonstrates skilled criteria  -MM           Recommendations    Therapy Frequency (Swallow)  at least;3 days per  week  -MM        Predicted Duration Therapy Intervention (Days)  until discharge  -MM        SLP Diet Recommendation  puree;honey thick liquids;ice chips between meals after oral care, with supervision;water between meals after oral care, with supervision  -MM        Recommended Precautions and Strategies  upright posture during/after eating;small bites of food and sips of liquid;general aspiration precautions;fatigue precautions;assist with feeding  -MM        Oral Care Recommendations  Oral Care BID/PRN  -MM        SLP Rec. for Method of Medication Administration  meds whole;with pudding or applesauce;as tolerated  -MM        Monitor for Signs of Aspiration  yes;notify SLP if any concerns  -MM        Anticipated Discharge Disposition (SLP)  unknown  -MM           Swallow Goals (SLP)    Oral Nutrition/Hydration Goal Selection (SLP)  oral nutrition/hydration, SLP goal 1  -MM        Pharyngeal Strengthening Exercise Goal Selection (SLP)  pharyngeal strengthening exercise, SLP goal 1  -MM        Additional Documentation  pharyngeal strengthening exercise goal selection (SLP)  -MM           Oral Nutrition/Hydration Goal 1 (SLP)    Oral Nutrition/Hydration Goal 1, SLP  Patient will tolerate LRD without s/s of aspiration.  -MM        Time Frame (Oral Nutrition/Hydration Goal 1, SLP)  by discharge  -MM        Barriers (Oral Nutrition/Hydration Goal 1, SLP)  none  -MM        Progress/Outcomes (Oral Nutrition/Hydration Goal 1, SLP)  goal ongoing  -MM           Pharyngeal Strengthening Exercise Goal 1 (SLP)    Activity (Pharyngeal Strengthening Goal 1, SLP)  increase timing;increase squeeze/positive pressure generation  -MM        Increase Timing  gustatory stimulation (sour/cold)  -MM        Increase Squeeze/Positive Pressure Generation  hard effortful swallow  -MM        [place]/Accuracy (Pharyngeal Strengthening Goal 1, SLP)  independently (over 90% accuracy)  -MM        Time Frame (Pharyngeal Strengthening Goal 1,  SLP)  by discharge  -MM        Barriers (Pharyngeal Strengthening Goal 1, SLP)  none  -MM        Progress/Outcomes (Pharyngeal Strengthening Goal 1, SLP)  goal ongoing  -MM          User Key  (r) = Recorded By, (t) = Taken By, (c) = Cosigned By    Initials Name Effective Dates    Maria Luz Cannon, MS CCC-SLP 07/12/20 -           EDUCATION  The patient has been educated in the following areas:   Dysphagia (Swallowing Impairment) Oral Care/Hydration Modified Diet Instruction.    SLP Recommendation and Plan  SLP Swallowing Diagnosis: mild-moderate, oral dysphagia, pharyngeal dysphagia  SLP Diet Recommendation: puree, honey thick liquids, ice chips between meals after oral care, with supervision, water between meals after oral care, with supervision  Recommended Precautions and Strategies: upright posture during/after eating, small bites of food and sips of liquid, general aspiration precautions, fatigue precautions, assist with feeding  SLP Rec. for Method of Medication Administration: meds whole, with pudding or applesauce, as tolerated     Monitor for Signs of Aspiration: yes, notify SLP if any concerns     Swallow Criteria for Skilled Therapeutic Interventions Met: demonstrates skilled criteria  Anticipated Discharge Disposition (SLP): unknown  Rehab Potential/Prognosis, Swallowing: good, to achieve stated therapy goals  Therapy Frequency (Swallow): at least, 3 days per week  Predicted Duration Therapy Intervention (Days): until discharge                         Plan of Care Reviewed With: patient, other (see comments)(RN Frieda)  Progress: no change(Initial Evaluation)  Outcome Summary: Clinical bedside swallow evaluation completed. The patient is alert and cooperative. Primary problem: Justice pulmonary embolis, hypotension, pleural effusion, hyperkalemia. Medical hx: COPD, CVA, sleep apnea, VFSS 11/4/20 with recommendations for mech soft and honey thick liquids. Oral motor assessment with generalized weakness.  The patient completed a full range of consistencies except for mech soft. Throat clearing is observed with thin liquids and nectar thick liquids. Increased prep time with regular solid with residue requiring honey thick liquid wash. SLP recommends: 1) Puree diet 2) Honey thick liquids 3) Meds whole with apple sauce 4) Oral care and standard swallow precautions 5) OK for small sips of thin water with supervision only. SLP will follow up to ensure diet toleration and to progress diet as appropriate.    SLP GOALS     Row Name 12/10/20 0845             Oral Nutrition/Hydration Goal 1 (SLP)    Oral Nutrition/Hydration Goal 1, SLP  Patient will tolerate LRD without s/s of aspiration.  -MM      Time Frame (Oral Nutrition/Hydration Goal 1, SLP)  by discharge  -MM      Barriers (Oral Nutrition/Hydration Goal 1, SLP)  none  -MM      Progress/Outcomes (Oral Nutrition/Hydration Goal 1, SLP)  goal ongoing  -MM         Pharyngeal Strengthening Exercise Goal 1 (SLP)    Activity (Pharyngeal Strengthening Goal 1, SLP)  increase timing;increase squeeze/positive pressure generation  -MM      Increase Timing  gustatory stimulation (sour/cold)  -MM      Increase Squeeze/Positive Pressure Generation  hard effortful swallow  -MM      [place]/Accuracy (Pharyngeal Strengthening Goal 1, SLP)  independently (over 90% accuracy)  -MM      Time Frame (Pharyngeal Strengthening Goal 1, SLP)  by discharge  -MM      Barriers (Pharyngeal Strengthening Goal 1, SLP)  none  -MM      Progress/Outcomes (Pharyngeal Strengthening Goal 1, SLP)  goal ongoing  -MM        User Key  (r) = Recorded By, (t) = Taken By, (c) = Cosigned By    Initials Name Provider Type    MM Maria Luz Schultz MS CCC-SLP Speech and Language Pathologist             Time Calculation:   Time Calculation- SLP     Row Name 12/10/20 1221             Time Calculation- SLP    SLP Start Time  0845  -MM      SLP Stop Time  0955  -MM      SLP Time Calculation (min)  70 min  -MM      SLP  Received On  12/10/20  -MM      SLP Goal Re-Cert Due Date  12/20/20  -MM        User Key  (r) = Recorded By, (t) = Taken By, (c) = Cosigned By    Initials Name Provider Type    Maria Luz Cannon, MS CCC-SLP Speech and Language Pathologist          Therapy Charges for Today     Code Description Service Date Service Provider Modifiers Qty    [id number] HC ST EVAL ORAL PHARYNG SWALLOW 5 12/10/2020 Maria Luz Schultz MS LEON-SLP GN 1               Maria Luz Schultz MS CCC-SLP  12/10/2020

## 2020-12-11 PROBLEM — N39.0 ACUTE UTI (URINARY TRACT INFECTION): Status: ACTIVE | Noted: 2020-01-01

## 2020-12-11 NOTE — THERAPY TREATMENT NOTE
Acute Care - Speech Language Pathology   Swallow Treatment Note [facility]     Patient Name: Eron Escalante  : 1934  MRN: [id number]  Today's Date: 2020               Admit Date: 2020  SLP treatment complete. Pt alert and cooperative throughout session. Swallow delay noted to be between 2-3. He was presented with trials of regular, honey thick, and thin consistencies. Pt with 2x questionable wet vocal quality with thin liquids. Increase oral preparation time with regular solids, but adequate bolus clearance. SLP recommends diet upgrade to mechanical soft solids. Honey thick liquids to be continued. SLP will continue to follow and treat  Sonu CALEB Velasquez, MS CCC-SLP 2020 14:08 CST    Visit Dx:     ICD-10-CM ICD-9-CM   1. Other acute pulmonary embolism with acute cor pulmonale (CMS/HCC)  I26.09 415.19     415.0   2. Hypotension, unspecified hypotension type  I95.9 458.9   3. Pleural effusion  J90 511.9   4. Hyperkalemia  E87.5 276.7   5. Oropharyngeal dysphagia  R13.12 787.22     Patient Active Problem List   Diagnosis   • Type 2 diabetes mellitus, without long-term current use of insulin (CMS/[facility])   • HTN (hypertension)   • Aortocoronary bypass status   • Bradycardia   • CAD in native artery   • A-fib (CMS/HCC)   • Symptomatic bradycardia   • Cerebral ventriculomegaly   • Dehydration   • Duodenal ulcer   • Acute renal failure superimposed on stage 3a chronic kidney disease (CMS/HCC)   • Hyperkalemia   • Weakness of extremity   • Lumbar stenosis with neurogenic claudication   • DDD (degenerative disc disease), lumbar   • Change in bowel habit   • RICARDO treated with BiPAP   • Murmur   • Chronic diastolic congestive heart failure (CMS/HCC)   • Periodic limb movement disorder (PLMD)   • CHF (congestive heart failure), NYHA class I, acute on chronic, combined (CMS/HCC)   • Chronic constipation   • Chronic back pain   • History of stroke   • Hypertensive urgency   • Ischemic cardiomyopathy   • Altered  mental status   • Acute blood loss anemia   • Metabolic encephalopathy   • Hypernatremia   • E. coli UTI (urinary tract infection)   • UGIB (upper gastrointestinal bleed)   • Type 2 diabetes mellitus with hypoglycemia, without long-term current use of insulin (CMS/[facility])   • Essential hypertension   • Metabolic encephalopathy   • History of stroke   • Atrial fibrillation (CMS/[facility])   • Normocytic anemia   • Chronic diastolic heart failure (CMS/[facility])   • Hypoglycemia   • Pulmonary embolus (CMS/[facility])   • Acute UTI (urinary tract infection)     Past Medical History:   Diagnosis Date   • A-fib (CMS/[facility]) 11/15/2016   • Anemia     gets shots every few months to build up blood. sees dr adam.   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   • Arthritis    • Bradycardia 11/15/2016   • CAD (coronary artery disease)    • CAD in native artery 11/15/2016   • Chest pain 11/15/2016   • CHF (congestive heart failure) (CMS/[facility])    • Chronic kidney disease    • COPD (chronic obstructive pulmonary disease) (CMS/[facility])    • DDD (degenerative disc disease), lumbar 6/27/2017   • Diabetes (CMS/[facility])    • Heart murmur    • HTN (hypertension) 11/15/2016   • Hyperlipidemia    • Hypertension    • Lumbar stenosis with neurogenic claudication 6/27/2017   • Murmur 4/19/2019   • Myocardial infarction (CMS/[facility])    • PVD (peripheral vascular disease) (CMS/HCC)    • Sleep apnea    • Stroke (CMS/[facility])    • Type 2 diabetes mellitus (CMS/[facility]) 11/15/2016   • Ulcer of abdomen wall (CMS/[facility])      Past Surgical History:   Procedure Laterality Date   • APPENDECTOMY     • BACK SURGERY      x2   • CARDIAC CATHETERIZATION Left     1/2010   • CARPAL TUNNEL RELEASE Bilateral    • CHOLECYSTECTOMY     • COLONOSCOPY N/A 9/7/2017    Procedure: COLONOSCOPY WITH ANESTHESIA;  Surgeon: Joshua Rich DO;  Location: [facility] ENDOSCOPY;  Service:    • CORONARY ARTERY BYPASS GRAFT      2/2006 w/PTCA & KIMMY    • CORONARY STENT PLACEMENT     • ENDOSCOPY N/A 12/14/2016    Procedure:  ESOPHAGOGASTRODUODENOSCOPY WITH ANESTHESIA;  Surgeon: Isabel Dexter MD;  Location: [facility] ENDOSCOPY;  Service:    • ENDOSCOPY N/A 12/16/2016    Procedure: ESOPHAGOGASTRODUODENOSCOPY WITH ANESTHESIA;  Surgeon: Joshua Rich DO;  Location: [facility] ENDOSCOPY;  Service:    • ENDOSCOPY N/A 4/10/2017    Procedure: ESOPHAGOGASTRODUODENOSCOPY WITH ANESTHESIA;  Surgeon: Joshua Rich DO;  Location: [facility] ENDOSCOPY;  Service:    • ENDOSCOPY N/A 10/28/2020    Procedure: ESOPHAGOGASTRODUODENOSCOPY WITH ANESTHESIA;  Surgeon: Forrest Bliss MD;  Location: [facility] ENDOSCOPY;  Service: Gastroenterology;  Laterality: N/A;  pre: GI bleed  post: duodenal ulcers, hiatal hernia   Jeffrey Owen MD   • EYE SURGERY Left     x 2   • JOINT REPLACEMENT     • LEFT HEART CATH     • PERIPHERAL ARTERIAL STENT GRAFT     • REPLACEMENT TOTAL KNEE Left     2002   • SHOULDER ROTATOR CUFF REPAIR Bilateral         SWALLOW EVALUATION (last 72 hours)      SLP Adult Swallow Evaluation     Row Name 12/11/20 1005 12/10/20 0845                Rehab Evaluation    Document Type  therapy note (daily note)  -CS  evaluation  -MM       Subjective Information  --  no complaints  -MM       Patient Observations  alert;cooperative;agree to therapy  -CS  alert;cooperative;agree to therapy  -MM       Patient/Family/Caregiver Comments/Observations  No family present  -CS  No family present.   -MM       Care Plan Review  care plan/treatment goals reviewed  -CS  evaluation/treatment results reviewed  -MM       Care Plan Review, Other Participant(s)  --  other (see comments) ROZINA Malave   -MM       Patient Effort  --  good  -MM       Symptoms Noted During/After Treatment  --  none  -MM          General Information    Patient Profile Reviewed  --  yes  -MM       Pertinent History Of Current Problem  --  Primary problem: Justice pulmonary embolis, hypotension, pleural effusion, hyperkalemia. Medical hx: COPD, CVA, sleep apnea, VFSS 11/4/20 with recommendations for  [facility] soft and honey thick liquids.   -MM       Current Method of Nutrition  --  regular textures  -MM       Precautions/Limitations, Vision  --  WFL with corrective lenses;for purposes of eval  -MM       Precautions/Limitations, Hearing  --  hearing impairment, bilaterally  -MM       Prior Level of Function-Communication  --  unknown  -MM       Prior Level of Function-Swallowing  --  mechanical soft textures;honey thick liquids  -MM       Plans/Goals Discussed with  --  patient;other (see comments);agreed upon RN Frieda   -MM       Barriers to Rehab  --  none identified  -MM       Patient's Goals for Discharge  --  patient did not state  -MM          Pain    Additional Documentation  Pain Scale: FACES Pre/Post-Treatment (Group)  -CS  Pain Scale: FACES Pre/Post-Treatment (Group)  -MM          Pain Scale: FACES Pre/Post-Treatment    Pain: FACES Scale, Pretreatment  0-->no hurt  -CS  0-->no hurt  -MM       Posttreatment Pain Rating  0-->no hurt  -CS  0-->no hurt  -MM          Oral Motor Structure and Function    Dentition Assessment  --  missing teeth upper, lower teeth present.   -MM       Secretion Management  --  WNL/WFL  -MM       Mucosal Quality  --  moist, healthy  -MM       Volitional Swallow  --  delayed  -MM       Volitional Cough  --  weak  -MM          Oral Musculature and Cranial Nerve Assessment    Oral Motor General Assessment  --  generalized oral motor weakness  -MM          General Eating/Swallowing Observations    Respiratory Support Currently in Use  --  room air  -MM       Eating/Swallowing Skills  --  fed by SLP  -MM       Positioning During Eating  --  upright in bed  -MM       Utensils Used  --  spoon;straw  -MM       Consistencies Trialed  --  regular textures;honey-thick liquids;nectar/syrup-thick liquids;thin liquids;pureed  -MM          Clinical Swallow Eval    Oral Prep Phase  --  impaired  -MM       Oral Transit  --  WFL  -MM       Oral Residue  --  impaired  -MM       Pharyngeal Phase  --   suspected pharyngeal impairment  -MM       Esophageal Phase  --  unremarkable  -MM       Clinical Swallow Evaluation Summary  --  Clinical bedside swallow evaluation completed. The patient is alert and cooperative. Primary problem: Justice pulmonary embolis, hypotension, pleural effusion, hyperkalemia. Medical hx: COPD, CVA, sleep apnea, VFSS 11/4/20 with recommendations for mech soft and honey thick liquids. Oral motor assessment with generalized weakness. The patient completed a full range of consistencies except for mech soft. Throat clearing is observed with thin liquids and nectar thick liquids. Increased prep time with regular solid with residue requiring honey thick liquid wash. SLP recommends: 1) Puree diet 2) Honey thick liquids 3) Meds whole with apple sauce 4) Oral care and standard swallow precautions 5) OK for small sips of thin water with supervision only. SLP will follow up to ensure diet toleration and to progress diet as appropriate.   -MM          Oral Prep Concerns    Oral Prep Concerns  --  prolonged mastication  -MM       Prolonged Mastication  --  regular consistencies  -MM          Oral Residue Concerns    Oral Residue Concerns  --  diffuse residue throughout oral cavity  -MM       Diffuse Residue Throughout Oral Cavity  --  regular consistencies  -MM          Pharyngeal Phase Concerns    Pharyngeal Phase Concerns  --  throat clear  -MM       Throat Clear  --  nectar;thin  -MM          Clinical Impression    SLP Swallowing Diagnosis  --  mild-moderate;oral dysphagia;pharyngeal dysphagia  -MM       Functional Impact  --  risk of aspiration/pneumonia;risk of malnutrition;risk of dehydration  -MM       Rehab Potential/Prognosis, Swallowing  --  good, to achieve stated therapy goals  -MM       Swallow Criteria for Skilled Therapeutic Interventions Met  --  demonstrates skilled criteria  -MM          Recommendations    Therapy Frequency (Swallow)  --  at least;3 days per week  -MM       Predicted  Duration Therapy Intervention (Days)  --  until discharge  -MM       SLP Diet Recommendation  --  puree;honey thick liquids;ice chips between meals after oral care, with supervision;water between meals after oral care, with supervision  -MM       Recommended Precautions and Strategies  --  upright posture during/after eating;small bites of food and sips of liquid;general aspiration precautions;fatigue precautions;assist with feeding  -MM       Oral Care Recommendations  --  Oral Care BID/PRN  -MM       SLP Rec. for Method of Medication Administration  --  meds whole;with pudding or applesauce;as tolerated  -MM       Monitor for Signs of Aspiration  --  yes;notify SLP if any concerns  -MM       Anticipated Discharge Disposition (SLP)  --  unknown  -MM          Swallow Goals (SLP)    Oral Nutrition/Hydration Goal Selection (SLP)  oral nutrition/hydration, SLP goal 1  -CS  oral nutrition/hydration, SLP goal 1  -MM       Pharyngeal Strengthening Exercise Goal Selection (SLP)  pharyngeal strengthening exercise, SLP goal 1  -CS  pharyngeal strengthening exercise, SLP goal 1  -MM       Additional Documentation  pharyngeal strengthening exercise goal selection (SLP)  -CS  pharyngeal strengthening exercise goal selection (SLP)  -MM          Oral Nutrition/Hydration Goal 1 (SLP)    Oral Nutrition/Hydration Goal 1, SLP  Patient will tolerate LRD without s/s of aspiration.  -CS  Patient will tolerate LRD without s/s of aspiration.  -MM       Time Frame (Oral Nutrition/Hydration Goal 1, SLP)  by discharge  -CS  by discharge  -MM       Barriers (Oral Nutrition/Hydration Goal 1, SLP)  none  -CS  none  -MM       Progress/Outcomes (Oral Nutrition/Hydration Goal 1, SLP)  continuing progress toward goal  -CS  goal ongoing  -MM          Pharyngeal Strengthening Exercise Goal 1 (SLP)    Activity (Pharyngeal Strengthening Goal 1, SLP)  increase timing;increase squeeze/positive pressure generation  -CS  increase timing;increase  squeeze/positive pressure generation  -MM       Increase Timing  gustatory stimulation (sour/cold)  -CS  gustatory stimulation (sour/cold)  -MM       Increase Squeeze/Positive Pressure Generation  hard effortful swallow  -CS  hard effortful swallow  -MM       [place]/Accuracy (Pharyngeal Strengthening Goal 1, SLP)  independently (over 90% accuracy)  -CS  independently (over 90% accuracy)  -MM       Time Frame (Pharyngeal Strengthening Goal 1, SLP)  by discharge  -CS  by discharge  -MM       Barriers (Pharyngeal Strengthening Goal 1, SLP)  none  -CS  none  -MM       Progress/Outcomes (Pharyngeal Strengthening Goal 1, SLP)  goal ongoing  -CS  goal ongoing  -MM         User Key  (r) = Recorded By, (t) = Taken By, (c) = Cosigned By    Initials Name Effective Dates    CS Sonu Velasquez, MS CCC-SLP 04/03/18 -     MM Maria Luz Schultz, MS CCC-SLP 07/12/20 -           EDUCATION  The patient has been educated in the following areas:   Dysphagia (Swallowing Impairment).    SLP Recommendation and Plan                                                             Plan of Care Reviewed With: patient  Progress: improving  Outcome Summary: SLP treatment complete. Pt alert and cooperative throughout session. Swallow delay noted to be between 2-3. He was presented with trials of regular, honey thick, and thin consistencies. Pt with 2x questionable wet vocal quality with thin liquids. Increase oral preparation time with regular solids, but adequate bolus clearance. SLP recommends diet upgrade to mechanical soft solids. Honey thick liquids to be continued. SLP will continue to follow and treat.    SLP GOALS     Row Name 12/11/20 1005 12/10/20 0845          Oral Nutrition/Hydration Goal 1 (SLP)    Oral Nutrition/Hydration Goal 1, SLP  Patient will tolerate LRD without s/s of aspiration.  -CS  Patient will tolerate LRD without s/s of aspiration.  -MM     Time Frame (Oral Nutrition/Hydration Goal 1, SLP)  by discharge  -CS  by  discharge  -MM     Barriers (Oral Nutrition/Hydration Goal 1, SLP)  none  -CS  none  -MM     Progress/Outcomes (Oral Nutrition/Hydration Goal 1, SLP)  continuing progress toward goal  -CS  goal ongoing  -MM        Pharyngeal Strengthening Exercise Goal 1 (SLP)    Activity (Pharyngeal Strengthening Goal 1, SLP)  increase timing;increase squeeze/positive pressure generation  -CS  increase timing;increase squeeze/positive pressure generation  -MM     Increase Timing  gustatory stimulation (sour/cold)  -CS  gustatory stimulation (sour/cold)  -MM     Increase Squeeze/Positive Pressure Generation  hard effortful swallow  -CS  hard effortful swallow  -MM     [place]/Accuracy (Pharyngeal Strengthening Goal 1, SLP)  independently (over 90% accuracy)  -CS  independently (over 90% accuracy)  -MM     Time Frame (Pharyngeal Strengthening Goal 1, SLP)  by discharge  -CS  by discharge  -MM     Barriers (Pharyngeal Strengthening Goal 1, SLP)  none  -CS  none  -MM     Progress/Outcomes (Pharyngeal Strengthening Goal 1, SLP)  goal ongoing  -CS  goal ongoing  -MM       User Key  (r) = Recorded By, (t) = Taken By, (c) = Cosigned By    Initials Name Provider Type    Sonu Randle MS CCC-SLP Speech and Language Pathologist     Maria Luz Schultz MS CCC-SLP Speech and Language Pathologist             Time Calculation:   Time Calculation- SLP     Row Name 12/11/20 1408             Time Calculation- SLP    SLP Start Time  1005  -      SLP Stop Time  1028  -      SLP Time Calculation (min)  23 min  -      SLP Received On  12/11/20  -        User Key  (r) = Recorded By, (t) = Taken By, (c) = Cosigned By    Initials Name Provider Type     Sonu Velasquez MS CCC-SLP Speech and Language Pathologist          Therapy Charges for Today     Code Description Service Date Service Provider Modifiers Qty    [id number]  ST TREATMENT SWALLOW 2 12/11/2020 Sonu Velasquez MS CCC-SLP GN 1               Sonu Velasquez MS  CCC-SLP  12/11/2020

## 2020-12-11 NOTE — PLAN OF CARE
Goal Outcome Evaluation:  Plan of Care Reviewed With: patient  Progress: no change  Outcome Summary: agitated last night, calm and pleasant this am, lovenox shot given, condom cath is place with good output, bed alarm set, safety maintained

## 2020-12-11 NOTE — PROGRESS NOTES
Family Medicine Progress Note    Patient:  Eron Escalante  YOB: 1934    MRN: [id number]     Acct: [id number]     Admit date: 12/9/2020    Patient Seen, Chart, Consults notes, Labs, Radiology studies reviewed.    Subjective: Day 1 of stay with pulmonary emboli and most recent (in last 24 hours) has had only mild chest pain.  No shortness of breath.  Also denies any pain or swelling in his legs.  Overall doing just okay.    Past, Family, Social History unchanged from admission.    Diet: Diet Pureed; Honey Thick; Cardiac    Medications:  Scheduled Meds:allopurinol, 300 mg, Oral, Daily  amLODIPine, 5 mg, Oral, Daily  apixaban, 5 mg, Oral, Q12H  cholecalciferol, 1,000 Units, Oral, Daily  finasteride, 5 mg, Oral, Daily  folic acid, 400 mcg, Oral, Daily  furosemide, 20 mg, Oral, Daily  isosorbide mononitrate, 30 mg, Oral, Q24H  losartan, 100 mg, Oral, Q24H  metFORMIN, 500 mg, Oral, Daily With Breakfast  metoprolol succinate XL, 25 mg, Oral, Q24H  neomycin-polymyxin-hydrocortisone, 4 drop, Right Ear, TID  pantoprazole, 40 mg, Oral, BID AC  pravastatin, 40 mg, Oral, Nightly  prednisoLONE acetate, 1 drop, Left Eye, 4x Daily  rOPINIRole, 1 mg, Oral, Nightly  sennosides-docusate, 1 tablet, Oral, Daily  sodium chloride, 10 mL, Intravenous, Q12H  sulfamethoxazole-trimethoprim, 1 tablet, Oral, Q12H  tamsulosin, 0.4 mg, Oral, Daily  thiamine, 100 mg, Oral, Daily  vitamin C, 500 mg, Oral, Daily      Continuous Infusions:   PRN Meds:•  acetaminophen  •  Morphine **AND** naloxone  •  nitroglycerin  •  sodium chloride    Objective:    Lab Results (last 24 hours)     Procedure Component Value Units Date/Time    POC Glucose Once [[id number]]  (Normal) Collected: 12/11/20 0756    Specimen: Blood Updated: 12/11/20 0807     Glucose 107 mg/dL      Comment: : [id number] Kingsley Darrell DestinyMeter ID: [id number]       Comprehensive Metabolic Panel [[id number]]  (Abnormal) Collected: 12/11/20 0321    Specimen: Blood Updated:  12/11/20 0441     Glucose 91 mg/dL      BUN 24 mg/dL      Creatinine 1.00 mg/dL      Sodium 136 mmol/L      Potassium 4.5 mmol/L      Chloride 106 mmol/L      CO2 23.0 mmol/L      Calcium 10.1 mg/dL      Total Protein 5.6 g/dL      Albumin 2.80 g/dL      ALT (SGPT) 21 U/L      AST (SGOT) 22 U/L      Alkaline Phosphatase 95 U/L      Total Bilirubin 0.2 mg/dL      eGFR Non African Amer 71 mL/min/1.73      Globulin 2.8 gm/dL      A/G Ratio 1.0 g/dL      BUN/Creatinine Ratio 24.0     Anion Gap 7.0 mmol/L     Narrative:      GFR Normal >60  Chronic Kidney Disease <60  Kidney Failure <15      CBC (No Diff) [[id number]]  (Abnormal) Collected: 12/11/20 0321    Specimen: Blood Updated: 12/11/20 0421     WBC 6.30 10*3/mm3      RBC 2.75 10*6/mm3      Hemoglobin 8.3 g/dL      Hematocrit 25.7 %      MCV 93.5 fL      MCH 30.2 pg      MCHC 32.3 g/dL      RDW 19.1 %      RDW-SD 66.6 fl      MPV 10.8 fL      Platelets 221 10*3/mm3     POC Glucose Once [[id number]]  (Abnormal) Collected: 12/10/20 2237    Specimen: Blood Updated: 12/10/20 2250     Glucose 161 mg/dL      Comment: : [id number] Naelpauline KilgoreleMeter ID: [id number]       Blood Culture - Blood, Arm, Right [[id number]] Collected: 12/09/20 2009    Specimen: Blood from Arm, Right Updated: 12/10/20 2030     Blood Culture No growth at 24 hours    Urine Culture - Urine, Urine, Catheter [[id number]]  (Abnormal) Collected: 12/09/20 2257    Specimen: Urine, Catheter Updated: 12/10/20 2003     Urine Culture 25,000 CFU/mL Gram Negative Bacilli    Blood Culture - Blood, Blood, Venous Line [[id number]] Collected: 12/09/20 1946    Specimen: Blood, Venous Line Updated: 12/10/20 2000     Blood Culture No growth at 24 hours    POC Glucose Once [[id number]]  (Normal) Collected: 12/10/20 1639    Specimen: Blood Updated: 12/10/20 1650     Glucose 122 mg/dL      Comment: : [id number] Kingsley Ramírez DestinyMeter ID: [id number]              Imaging Results (Last 72 Hours)     Procedure Component  Value Units Date/Time    US Venous Doppler Lower Extremity Bilateral (duplex) [[id number]] Resulted: 12/10/20 1500     Updated: 12/10/20 1530    CT Angiogram Chest [[id number]] Collected: 12/10/20 0708     Updated: 12/10/20 0716    Narrative:      CT ANGIOGRAM CHEST- 12/9/2020 9:43 PM CST     HISTORY: elevated dimer, syncope      COMPARISON: 10/27/2020     DOSE LENGTH PRODUCT: 314 mGy cm. Automated exposure control was also  utilized to decrease patient radiation dose.     TECHNIQUE: Axial images the chest are obtained following IV contrast.  2-D and maximal intensity projection images reconstructed and reviewed.     FINDINGS:  There are bilateral pulmonary emboli involving the segmental  left upper and lower lobe as well as right middle lobe pulmonary  arteries. Subsegmental pulmonary emboli present within the bilateral  lower lobes. The RV to LV ratio measuring 1.1.     Prior mediastinal surgery. Scattered vascular calcification with no  thoracic aortic aneurysm or dissection. Borderline cardiomegaly. Small  bilateral pleural effusions. Moderate size hiatal hernia. No pathologic  intrathoracic or axillary lymphadenopathy.     Images the upper abdomen demonstrate large volume of stool within the  visible colon. Left renal cyst. Similar prominence of the adrenal glands  which do maintain their adreniform shape. Under distended stomach.     Bibasilar atelectasis. Left lower lobe calcified granuloma. Stable 3 mm  right upper lobe nodule on series 8 image 48. No pneumothorax. No  discrete endobronchial lesions.     Degenerative change of the regional skeleton with postoperative changes  of the right shoulder. Right convexity to the curvature of the thoracic  spine.       Impression:      1. Bilateral segmental and subsegmental pulmonary emboli with RV to LV  ratio measuring 1.1.   2. Small bilateral pleural effusions with bibasilar atelectasis.  Interval improvement in the previous scattered groundglass opacities on  the  10/27/2020 study.  3. Prior mediastinal surgery. Scattered vascular calcifications.     Comments: A preliminary report is issued to the ER by the [facility]  radiology service. I agree with this preliminary impression.  This report was finalized on 12/10/2020 07:13 by Dr. Lorena Murray MD.    CT Head Without Contrast [[id number]] Collected: 12/10/20 0706     Updated: 12/10/20 0711    Narrative:      CT HEAD WO CONTRAST- 12/9/2020 9:43 PM CST     HISTORY: Syncope, simple, normal neuro exam      COMPARISON: 11/14/2020     DOSE LENGTH PRODUCT: 919 mGy cm. Automated exposure control was also  utilized to decrease patient radiation dose.     TECHNIQUE: Axial images of the brain Obtained without IV contrast     FINDINGS:  Similar moderate cerebral volume loss with prominent chronic  small vessel ischemia. Old ischemic changes considered within the left  parietal and right frontal lobes. No intracranial hemorrhage or mass  effect. No convincing acute signs of ischemia. No extra-axial hematoma  or subarachnoid hemorrhage.     No air-fluid levels within the visible paranasal sinuses. Hypoplastic  left mastoid air cells. Bony calvarium appears intact.       Impression:      1. Similar cerebral volume loss with stable ventriculomegaly. Chronic  small vessel ischemic changes as well as underlying old ischemia. No  acute intracranial process.     Comments: A preliminary report is issued to the ER by the [facility]  radiology service. I agree with this preliminary impression.  This report was finalized on 12/10/2020 07:08 by Dr. Lorena Murray MD.    XR Chest 1 View [[id number]] Collected: 12/09/20 1953     Updated: 12/09/20 1957    Narrative:      EXAMINATION:  XR CHEST 1 VW-  12/9/2020 7:41 PM CST     HISTORY: Syncope. Hypotension. Prior heart surgery.     COMPARISON: 11/14/2020.     FINDINGS:  There is mild hypoventilation. There is mild elevation of the  right hemidiaphragm, stable. No dense infiltrate is seen. Heart size  "is  upper limits of normal. There has been prior heart bypass surgery. There  are likely chronic rib fractures on the right side.       Impression:      1. No acute appearing infiltrate or effusion.  2. Elevated right hemidiaphragm, stable.  3. Heart size borderline. Prior heart bypass surgery.        This report was finalized on 12/09/2020 19:54 by Dr. Chun Fountain MD.           Physical Exam:    Vitals: /72 (BP Location: Left arm, Patient Position: Lying)   Pulse 60   Temp 98.5 °F (36.9 °C) (Oral)   Resp 18   Ht 157.5 cm (62\")   Wt 78.3 kg (172 lb 9.9 oz)   SpO2 97%   BMI 31.57 kg/m²   24 hour intake/output:    Intake/Output Summary (Last 24 hours) at 12/11/2020 0811  Last data filed at 12/11/2020 0620  Gross per 24 hour   Intake 360 ml   Output 1325 ml   Net -965 ml     Last 3 weights:  Wt Readings from Last 3 Encounters:   12/10/20 78.3 kg (172 lb 9.9 oz)   11/17/20 97 kg (213 lb 12.8 oz)   11/08/20 92.9 kg (204 lb 12.9 oz)       General Appearance alert, appears stated age and cooperative  Head normocephalic, without obvious abnormality and atraumatic  Eyes lids and lashes normal, conjunctivae and sclerae normal and no icterus  Neck no adenopathy, supple and trachea midline  Lungs clear to auscultation, respirations regular, respirations even and respirations unlabored  Heart regular rhythm & normal rate and normal S1, S2, systolic murmur  2/6 throughout the precordium  Abdomen normal bowel sounds, soft non-tender and no guarding  Extremities no edema, no cyanosis and no redness  Skin turgor normal and color normal  Neurologic Mental Status orientated to person, place, time and situation        Assessment:      Pulmonary embolus (CMS/HCC)    Type 2 diabetes mellitus, without long-term current use of insulin (CMS/HCC)    Hyperkalemia    Murmur    Chronic diastolic congestive heart failure (CMS/HCC)    CHF (congestive heart failure), NYHA class I, acute on chronic, combined (CMS/HCC)    Ischemic " cardiomyopathy    Type 2 diabetes mellitus with hypoglycemia, without long-term current use of insulin (CMS/[facility])    Essential hypertension    History of stroke    Atrial fibrillation (CMS/HCC)    Normocytic anemia    Chronic diastolic heart failure (CMS/HCC)    Acute UTI (urinary tract infection)          Plan:  We will transition to oral anticoagulant.  He does have a history of GI bleed several weeks ago, however with bilateral pulmonary emboli we have to treat for at least a few months.  Echo looks okay.  Awaiting Doppler study on lower extremities.  Increase activity now that anticoagulated.  Watch closely for any signs of bleeding.  Empirically placed on antibiotics while awaiting final urine culture.  Currently showing gram-negative rods.    Greater than 25 minutes spent in coordinating patient's care including seeing the patient and reviewing chart.    Electronically signed by Cesar Fox MD on 12/11/2020 at 08:11 CST

## 2020-12-11 NOTE — PROGRESS NOTES
Continued Stay Note  [facility]     Patient Name: Eron Escalante  MRN: [id number]  Today's Date: 12/11/2020    Admit Date: 12/9/2020    Discharge Plan     Row Name 12/11/20 1316       Plan    Plan  [facility]        Discharge Codes    No documentation.             CARMINA Corea

## 2020-12-11 NOTE — PLAN OF CARE
Goal Outcome Evaluation:  Plan of Care Reviewed With: patient  Progress: improving   Oriented to person and place. No neuro changes. Turning with repositioning wedges. External catheter. Sinus darshana on tele. Upgraded to [facility] soft diet, honey thick liquids continued. Glucose monitoring. Lovenox dc'd, Eliquis started. Safety maintained. Will continue to monitor.

## 2020-12-11 NOTE — PLAN OF CARE
Goal Outcome Evaluation:  Plan of Care Reviewed With: patient  Progress: improving  Outcome Summary: SLP treatment complete. Pt alert and cooperative throughout session. Swallow delay noted to be between 2-3. He was presented with trials of regular, honey thick, and thin consistencies. Pt with 2x questionable wet vocal quality with thin liquids. Increase oral preparation time with regular solids, but adequate bolus clearance. SLP recommends diet upgrade to mechanical soft solids. Honey thick liquids to be continued. SLP will continue to follow and treat.

## 2020-12-12 NOTE — NURSING NOTE
Neuro at bedside with ROZINA Bueno. Oriented to person. States he is in [place], [place] on the [employer] bus. Reorientation provided. No other changes.

## 2020-12-12 NOTE — PROGRESS NOTES
Family Medicine Progress Note    Patient:  Eron Escalante  YOB: 1934    MRN: [id number]     Acct: [id number]     Admit date: 12/9/2020    Patient Seen, Chart, Consults notes, Labs, Radiology studies reviewed.    Subjective: Day 2 of stay with bilateral pulmonary emboli and most recent (in last 24 hours) has had a better night per patient.  States he is feeling pretty well.  Lying in bed eating breakfast this morning.  Confused about some things but is aware of where he is.    Past, Family, Social History unchanged from admission.    Diet: Diet Soft Texture; Ground; Honey Thick; Cardiac    Medications:  Scheduled Meds:allopurinol, 300 mg, Oral, Daily  amLODIPine, 5 mg, Oral, Daily  apixaban, 5 mg, Oral, Q12H  cholecalciferol, 1,000 Units, Oral, Daily  finasteride, 5 mg, Oral, Daily  folic acid, 400 mcg, Oral, Daily  furosemide, 20 mg, Oral, Daily  isosorbide mononitrate, 30 mg, Oral, Q24H  losartan, 100 mg, Oral, Q24H  metFORMIN, 500 mg, Oral, Daily With Breakfast  metoprolol succinate XL, 25 mg, Oral, Q24H  neomycin-polymyxin-hydrocortisone, 4 drop, Right Ear, TID  pantoprazole, 40 mg, Oral, BID AC  pravastatin, 40 mg, Oral, Nightly  prednisoLONE acetate, 1 drop, Left Eye, 4x Daily  rOPINIRole, 1 mg, Oral, Nightly  sennosides-docusate, 1 tablet, Oral, Daily  sodium chloride, 10 mL, Intravenous, Q12H  sulfamethoxazole-trimethoprim, 1 tablet, Oral, Q12H  tamsulosin, 0.4 mg, Oral, Daily  thiamine, 100 mg, Oral, Daily  vitamin C, 500 mg, Oral, Daily      Continuous Infusions:   PRN Meds:•  acetaminophen  •  Morphine **AND** naloxone  •  nitroglycerin  •  sodium chloride    Objective:    Lab Results (last 24 hours)     Procedure Component Value Units Date/Time    POC Glucose Once [[id number]]  (Normal) Collected: 12/12/20 0738    Specimen: Blood Updated: 12/12/20 0750     Glucose 126 mg/dL      Comment: : [id number] [place] AmberMeter ID: [id number]       Basic Metabolic Panel [[id number]]   (Abnormal) Collected: 12/12/20 0449    Specimen: Blood Updated: 12/12/20 0550     Glucose 127 mg/dL      BUN 25 mg/dL      Creatinine 1.17 mg/dL      Sodium 137 mmol/L      Potassium 4.4 mmol/L      Chloride 106 mmol/L      CO2 24.0 mmol/L      Calcium 9.7 mg/dL      eGFR Non African Amer 59 mL/min/1.73      BUN/Creatinine Ratio 21.4     Anion Gap 7.0 mmol/L     Narrative:      GFR Normal >60  Chronic Kidney Disease <60  Kidney Failure <15      CBC (No Diff) [[id number]]  (Abnormal) Collected: 12/12/20 0449    Specimen: Blood Updated: 12/12/20 0530     WBC 7.62 10*3/mm3      RBC 2.72 10*6/mm3      Hemoglobin 8.4 g/dL      Hematocrit 25.0 %      MCV 91.9 fL      MCH 30.9 pg      MCHC 33.6 g/dL      RDW 19.3 %      RDW-SD 65.3 fl      MPV 10.9 fL      Platelets 215 10*3/mm3     POC Glucose Once [[id number]]  (Abnormal) Collected: 12/11/20 2019    Specimen: Blood Updated: 12/11/20 2041     Glucose 159 mg/dL      Comment: : [id number] Leon ([place]) AmandaMeter ID: [id number]       Blood Culture - Blood, Arm, Right [[id number]] Collected: 12/09/20 2009    Specimen: Blood from Arm, Right Updated: 12/11/20 2030     Blood Culture No growth at 2 days    Blood Culture - Blood, Blood, Venous Line [[id number]] Collected: 12/09/20 1946    Specimen: Blood, Venous Line Updated: 12/11/20 2000     Blood Culture No growth at 2 days    POC Glucose Once [[id number]]  (Abnormal) Collected: 12/11/20 1633    Specimen: Blood Updated: 12/11/20 1702     Glucose 163 mg/dL      Comment: : [id number] Burgess BeltreMeter ID: [id number]       POC Glucose Once [[id number]]  (Abnormal) Collected: 12/11/20 1125    Specimen: Blood Updated: 12/11/20 1140     Glucose 155 mg/dL      Comment: : [id number] Kingsley Ramírez DestinyMeter ID: [id number]       Urine Culture - Urine, Urine, Catheter [[id number]]  (Abnormal)  (Susceptibility) Collected: 12/09/20 2257    Specimen: Urine, Catheter Updated: 12/11/20 1024     Urine Culture 50,000 CFU/mL  Pseudomonas aeruginosa    Susceptibility      Pseudomonas aeruginosa     SRINIVAS     Cefepime Susceptible     Ceftazidime Susceptible     Ciprofloxacin Susceptible     Gentamicin Susceptible     Levofloxacin Susceptible     Piperacillin + Tazobactam Susceptible                           Imaging Results (Last 72 Hours)     Procedure Component Value Units Date/Time    US Venous Doppler Lower Extremity Bilateral (duplex) [[id number]] Collected: 12/11/20 1605     Updated: 12/11/20 1608    Narrative:      History: PE       Impression:      Impression: There is no evidence of deep venous thrombosis or  superficial thrombophlebitis of right or left lower extremities.     Comments: Bilateral lower extremity venous duplex exam was performed  using color Doppler flow, Doppler waveform analysis, and grayscale  imaging, with and without compression. There is no evidence of deep  venous thrombosis in the common femoral, superficial femoral, popliteal,  peroneal, anterior tibial, and posterior tibial veins bilaterally. No  thrombus is identified in the saphenofemoral junctions and greater  saphenous veins bilaterally.         This report was finalized on 12/11/2020 16:05 by Dr. Austen Robertson MD.    CT Angiogram Chest [[id number]] Collected: 12/10/20 0708     Updated: 12/10/20 0716    Narrative:      CT ANGIOGRAM CHEST- 12/9/2020 9:43 PM CST     HISTORY: elevated dimer, syncope      COMPARISON: 10/27/2020     DOSE LENGTH PRODUCT: 314 mGy cm. Automated exposure control was also  utilized to decrease patient radiation dose.     TECHNIQUE: Axial images the chest are obtained following IV contrast.  2-D and maximal intensity projection images reconstructed and reviewed.     FINDINGS:  There are bilateral pulmonary emboli involving the segmental  left upper and lower lobe as well as right middle lobe pulmonary  arteries. Subsegmental pulmonary emboli present within the bilateral  lower lobes. The RV to LV ratio measuring 1.1.     Prior  mediastinal surgery. Scattered vascular calcification with no  thoracic aortic aneurysm or dissection. Borderline cardiomegaly. Small  bilateral pleural effusions. Moderate size hiatal hernia. No pathologic  intrathoracic or axillary lymphadenopathy.     Images the upper abdomen demonstrate large volume of stool within the  visible colon. Left renal cyst. Similar prominence of the adrenal glands  which do maintain their adreniform shape. Under distended stomach.     Bibasilar atelectasis. Left lower lobe calcified granuloma. Stable 3 mm  right upper lobe nodule on series 8 image 48. No pneumothorax. No  discrete endobronchial lesions.     Degenerative change of the regional skeleton with postoperative changes  of the right shoulder. Right convexity to the curvature of the thoracic  spine.       Impression:      1. Bilateral segmental and subsegmental pulmonary emboli with RV to LV  ratio measuring 1.1.   2. Small bilateral pleural effusions with bibasilar atelectasis.  Interval improvement in the previous scattered groundglass opacities on  the 10/27/2020 study.  3. Prior mediastinal surgery. Scattered vascular calcifications.     Comments: A preliminary report is issued to the ER by the [facility]  radiology service. I agree with this preliminary impression.  This report was finalized on 12/10/2020 07:13 by Dr. Lorena Murray MD.    CT Head Without Contrast [[id number]] Collected: 12/10/20 0706     Updated: 12/10/20 0711    Narrative:      CT HEAD WO CONTRAST- 12/9/2020 9:43 PM CST     HISTORY: Syncope, simple, normal neuro exam      COMPARISON: 11/14/2020     DOSE LENGTH PRODUCT: 919 mGy cm. Automated exposure control was also  utilized to decrease patient radiation dose.     TECHNIQUE: Axial images of the brain Obtained without IV contrast     FINDINGS:  Similar moderate cerebral volume loss with prominent chronic  small vessel ischemia. Old ischemic changes considered within the left  parietal and right frontal  "lobes. No intracranial hemorrhage or mass  effect. No convincing acute signs of ischemia. No extra-axial hematoma  or subarachnoid hemorrhage.     No air-fluid levels within the visible paranasal sinuses. Hypoplastic  left mastoid air cells. Bony calvarium appears intact.       Impression:      1. Similar cerebral volume loss with stable ventriculomegaly. Chronic  small vessel ischemic changes as well as underlying old ischemia. No  acute intracranial process.     Comments: A preliminary report is issued to the ER by the [facility]  radiology service. I agree with this preliminary impression.  This report was finalized on 12/10/2020 07:08 by Dr. Lorena Murray MD.    XR Chest 1 View [[id number]] Collected: 12/09/20 1953     Updated: 12/09/20 1957    Narrative:      EXAMINATION:  XR CHEST 1 VW-  12/9/2020 7:41 PM CST     HISTORY: Syncope. Hypotension. Prior heart surgery.     COMPARISON: 11/14/2020.     FINDINGS:  There is mild hypoventilation. There is mild elevation of the  right hemidiaphragm, stable. No dense infiltrate is seen. Heart size is  upper limits of normal. There has been prior heart bypass surgery. There  are likely chronic rib fractures on the right side.       Impression:      1. No acute appearing infiltrate or effusion.  2. Elevated right hemidiaphragm, stable.  3. Heart size borderline. Prior heart bypass surgery.        This report was finalized on 12/09/2020 19:54 by Dr. Chun Fountain MD.           Physical Exam:    Vitals: /60 (BP Location: Left arm, Patient Position: Lying)   Pulse 64   Temp 97.8 °F (36.6 °C) (Axillary)   Resp 18   Ht 157.5 cm (62\")   Wt 78.3 kg (172 lb 9.9 oz)   SpO2 97%   BMI 31.57 kg/m²   24 hour intake/output:    Intake/Output Summary (Last 24 hours) at 12/12/2020 0918  Last data filed at 12/12/2020 0415  Gross per 24 hour   Intake 240 ml   Output 925 ml   Net -685 ml     Last 3 weights:  Wt Readings from Last 3 Encounters:   12/10/20 78.3 kg (172 lb 9.9 oz) "   11/17/20 97 kg (213 lb 12.8 oz)   11/08/20 92.9 kg (204 lb 12.9 oz)       General Appearance alert, appears stated age, cooperative and Pleasantly confused  Head normocephalic, without obvious abnormality and atraumatic  Eyes conjunctivae and sclerae normal and no icterus  Neck no adenopathy and supple  Lungs clear to auscultation, respirations regular, respirations even and respirations unlabored  Heart regular rhythm & normal rate, normal S1, S2 and no murmur, no gallop, no rub  Abdomen normal bowel sounds, soft non-tender and no guarding  Extremities moves extremities well, no edema, no cyanosis and no redness  Skin no bleeding, bruising or rash  Neurologic Mental Status alertness alert and awake and orientation person and place        Assessment:      Pulmonary embolus (CMS/[facility])    Type 2 diabetes mellitus, without long-term current use of insulin (CMS/[facility])    Hyperkalemia    Murmur    Chronic diastolic congestive heart failure (CMS/[facility])    CHF (congestive heart failure), NYHA class I, acute on chronic, combined (CMS/[facility])    Ischemic cardiomyopathy    Type 2 diabetes mellitus with hypoglycemia, without long-term current use of insulin (CMS/[facility])    Essential hypertension    History of stroke    Atrial fibrillation (CMS/HCC)    Normocytic anemia    Chronic diastolic heart failure (CMS/[facility])    Acute UTI (urinary tract infection)          Plan:  Tolerating Eliquis.  Hemoglobin remained stable.  We will need to sure up his disposition plan.  Try to review old records today in terms of previous diagnosis of gastrointestinal bleed.  It looks like maybe he had ulcers back 2 months ago.  Certainly risk and what ever form of anticoagulation we give so we will need to be monitored very closely.  Working on disposition plan and review of social service note show referral to I [facility] for CHF program.  Wondering if skilled facility would be most appropriate for closer monitoring.  Need to  evaluate patient's ambulatory abilities.\    Greater than 25 minutes spent both with patient and with his chart.      Electronically signed by Cesar Fox MD on 12/12/2020 at 09:18 CST

## 2020-12-12 NOTE — PLAN OF CARE
Goal Outcome Evaluation:  Plan of Care Reviewed With: patient  Progress: improving  Outcome Summary: Alert, oriented to person and place. No c/o pain. VSS. Con't Eliquis, Glucose monitoring. Turn and reposition every 2hrs. Resting well.

## 2020-12-12 NOTE — PLAN OF CARE
Goal Outcome Evaluation:  Plan of Care Reviewed With: patient  Progress: improving  Outcome Summary: Pt A & O x 4 this morning.  Pt only alert to self throughout rest of shift.  No c/o when asked. Agitated at times, easily redirected.  BLUE.  VSS. Safety maintained. Family would like pt d/c to home. Will cont to monitor.

## 2020-12-13 PROBLEM — B96.5 PSEUDOMONAS URINARY TRACT INFECTION: Status: ACTIVE | Noted: 2020-01-01

## 2020-12-13 PROBLEM — N39.0 PSEUDOMONAS URINARY TRACT INFECTION: Status: ACTIVE | Noted: 2020-01-01

## 2020-12-13 NOTE — PROGRESS NOTES
Family Medicine Progress Note    Patient:  Eron Escalante  YOB: 1934    MRN: [id number]     Acct: [id number]     Admit date: 12/9/2020    Patient Seen, Chart, Consults notes, Labs, Radiology studies reviewed.    Subjective: Day 3 of stay with bilateral pulmonary emboli and urinary tract infection found to be Pseudomonas and most recent (in last 24 hours) has had some increasing confusion intermittently.  He is confused today and not able to give me much in terms of subjective history.  He recognizes me as a doctor however thinks we are in a train yard and stating he needs to get back to his room.    Past, Family, Social History unchanged from admission.    Diet: Diet Soft Texture; Ground; Honey Thick; Cardiac    Medications:  Scheduled Meds:allopurinol, 300 mg, Oral, Daily  amLODIPine, 5 mg, Oral, Daily  apixaban, 5 mg, Oral, Q12H  cholecalciferol, 1,000 Units, Oral, Daily  finasteride, 5 mg, Oral, Daily  folic acid, 400 mcg, Oral, Daily  furosemide, 20 mg, Oral, Daily  isosorbide mononitrate, 30 mg, Oral, Q24H  levoFLOXacin, 750 mg, Oral, Q24H  losartan, 100 mg, Oral, Q24H  metFORMIN, 500 mg, Oral, Daily With Breakfast  metoprolol succinate XL, 25 mg, Oral, Q24H  neomycin-polymyxin-hydrocortisone, 4 drop, Right Ear, TID  pantoprazole, 40 mg, Oral, BID AC  pravastatin, 40 mg, Oral, Nightly  prednisoLONE acetate, 1 drop, Left Eye, 4x Daily  rOPINIRole, 1 mg, Oral, Nightly  sennosides-docusate, 1 tablet, Oral, Daily  sodium chloride, 10 mL, Intravenous, Q12H  tamsulosin, 0.4 mg, Oral, Daily  thiamine, 100 mg, Oral, Daily  vitamin C, 500 mg, Oral, Daily      Continuous Infusions:   PRN Meds:•  acetaminophen  •  Morphine **AND** naloxone  •  nitroglycerin  •  sodium chloride    Objective:    Lab Results (last 24 hours)     Procedure Component Value Units Date/Time    POC Glucose Once [[id number]]  (Abnormal) Collected: 12/13/20 0835    Specimen: Blood Updated: 12/13/20 0846     Glucose 132 mg/dL       Comment: : [id number] Juan C OlivaresMeter ID: [id number]       CBC (No Diff) [[id number]]  (Abnormal) Collected: 12/13/20 0534    Specimen: Blood Updated: 12/13/20 0644     WBC 8.31 10*3/mm3      RBC 2.81 10*6/mm3      Hemoglobin 8.7 g/dL      Hematocrit 25.8 %      MCV 91.8 fL      MCH 31.0 pg      MCHC 33.7 g/dL      RDW 18.9 %      RDW-SD 65.1 fl      MPV 10.8 fL      Platelets 208 10*3/mm3     POC Glucose Once [[id number]]  (Abnormal) Collected: 12/12/20 2058    Specimen: Blood Updated: 12/12/20 2109     Glucose 131 mg/dL      Comment: : [id number] Trung SeymourgailMeter ID: [id number]       Blood Culture - Blood, Arm, Right [[id number]] Collected: 12/09/20 2009    Specimen: Blood from Arm, Right Updated: 12/12/20 2030     Blood Culture No growth at 3 days    Blood Culture - Blood, Blood, Venous Line [[id number]] Collected: 12/09/20 1946    Specimen: Blood, Venous Line Updated: 12/12/20 2000     Blood Culture No growth at 3 days    POC Glucose Once [[id number]]  (Abnormal) Collected: 12/12/20 1617    Specimen: Blood Updated: 12/12/20 1629     Glucose 149 mg/dL      Comment: : [id number] Mary AmberMeter ID: [id number]       POC Glucose Once [[id number]]  (Abnormal) Collected: 12/12/20 1119    Specimen: Blood Updated: 12/12/20 1131     Glucose 162 mg/dL      Comment: : [id number] [place] AmberMeter ID: [id number]              Imaging Results (Last 72 Hours)     Procedure Component Value Units Date/Time    US Venous Doppler Lower Extremity Bilateral (duplex) [[id number]] Collected: 12/11/20 1605     Updated: 12/11/20 1608    Narrative:      History: PE       Impression:      Impression: There is no evidence of deep venous thrombosis or  superficial thrombophlebitis of right or left lower extremities.     Comments: Bilateral lower extremity venous duplex exam was performed  using color Doppler flow, Doppler waveform analysis, and grayscale  imaging, with and without compression. There is no  "evidence of deep  venous thrombosis in the common femoral, superficial femoral, popliteal,  peroneal, anterior tibial, and posterior tibial veins bilaterally. No  thrombus is identified in the saphenofemoral junctions and greater  saphenous veins bilaterally.         This report was finalized on 12/11/2020 16:05 by Dr. Austen Robertson MD.           Physical Exam:    Vitals: /64 (BP Location: Right arm, Patient Position: Lying)   Pulse 60   Temp 98.4 °F (36.9 °C) (Oral)   Resp 18   Ht 157.5 cm (62\")   Wt 78.3 kg (172 lb 9.9 oz)   SpO2 97%   BMI 31.57 kg/m²   24 hour intake/output:    Intake/Output Summary (Last 24 hours) at 12/13/2020 0928  Last data filed at 12/13/2020 0700  Gross per 24 hour   Intake 540 ml   Output 900 ml   Net -360 ml     Last 3 weights:  Wt Readings from Last 3 Encounters:   12/10/20 78.3 kg (172 lb 9.9 oz)   11/17/20 97 kg (213 lb 12.8 oz)   11/08/20 92.9 kg (204 lb 12.9 oz)       General Appearance alert, appears stated age, cooperative and confused  Head normocephalic, without obvious abnormality and atraumatic  Eyes lids and lashes normal, conjunctivae and sclerae normal and no icterus  Neck supple and trachea midline  Lungs clear to auscultation, respirations regular, respirations even and respirations unlabored  Heart regular rhythm & normal rate and normal S1, S2  Abdomen normal bowel sounds, soft non-tender, no guarding and no rebound tenderness  Extremities moves extremities well, no edema, no cyanosis and no redness  Skin turgor normal and color normal  Neurologic Mental Status alertness alert and awake and orientation person, Cranial Nerves cranial nerves 2 - 12 grossly intact as examined        Assessment:      Pulmonary embolus (CMS/HCC)    Type 2 diabetes mellitus, without long-term current use of insulin (CMS/HCC)    Hyperkalemia    Murmur    Chronic diastolic congestive heart failure (CMS/HCC)    CHF (congestive heart failure), NYHA class I, acute on chronic, " combined (CMS/HCC)    Ischemic cardiomyopathy    Type 2 diabetes mellitus with hypoglycemia, without long-term current use of insulin (CMS/HCC)    Essential hypertension    Metabolic encephalopathy    History of stroke    Atrial fibrillation (CMS/HCC)    Normocytic anemia    Chronic diastolic heart failure (CMS/HCC)    Pseudomonas urinary tract infection          Plan:  Antibiotics adjusted yesterday to account for Pseudomonas positive.  More confused today from unclear etiology.  I think it is mostly metabolic given other measures the most part are okay.  He is not currently hypoxic.  Repeat labs in the morning.  Unsure of disposition plan.  Fortunately despite previous GI bleed his H&H has remained stable with no evidence of of bleeding despite anticoagulant.    Greater than 25-minute total spent in direct patient care and in review of the chart.      Electronically signed by Cesar Fox MD on 12/13/2020 at 09:28 CST

## 2020-12-13 NOTE — PROGRESS NOTES
"Pharmacy Dosing Service  Automatic Renal Adjustment  Levaquin    Assessment/Action/Plan:  Based on prescribing information provided by the drug , Levaquin 750 mg PO every 24 hours, has been changed to Levaquin 500 mg PO every 24 hours. Pharmacy will continue to monitor daily and make further adjustment(s) accordingly.     Subjective:  Eron Escalante is a 86 y.o. male     Additional Factors Considered:  Patient disposition per documentation  Disease state or condition being treated    Objective:  Ht: 157.5 cm (62\"); Wt: 78.3 kg (172 lb 9.9 oz)  Estimated Creatinine Clearance: 41.1 mL/min (by C-G formula based on SCr of 1.17 mg/dL).   Creatinine   Date Value Ref Range Status   12/12/2020 1.17 0.76 - 1.27 mg/dL Final   12/11/2020 1.00 0.76 - 1.27 mg/dL Final   12/09/2020 1.37 (H) 0.76 - 1.27 mg/dL Final     ROXI Easley, PharmD  12/13/20 11:39 CST    "

## 2020-12-13 NOTE — PLAN OF CARE
Goal Outcome Evaluation:  Plan of Care Reviewed With: patient  Progress: no change  Outcome Summary: Pt has remained confused throughout night. At times is able to state that he is at [employer], otherwise can only state name. Pt has remained calm and cooperative this shift. Bed alarm on. Safety maintained, will continue to monitor.

## 2020-12-14 NOTE — PLAN OF CARE
Goal Outcome Evaluation:  Plan of Care Reviewed With: patient  Progress: no change  Outcome Summary: Patient oriented to self only, no neuro changes.  Patient was restless after lunch, but rested well late afternoon.  Safety maintained, bed alarm set.  Encourage PO intake, decreased appetite and fluid intake.

## 2020-12-14 NOTE — PLAN OF CARE
Goal Outcome Evaluation:  Plan of Care Reviewed With: patient  Progress: no change  Outcome Summary: PT evaluation completed. The patient is oriented to self and place. He is more cooperative this afternoon and pleasantly confused. He was able to perform bed mobility, sit to stand, and gait with minimal assist. He requires assist for guidance and use of walker. His L side seems weaker than the L, but I could not formally test and he is a poor historian. He will benefit from skilled PT services to work on increasing his activity and mobiltiy safety. He will require 24/7 care at home at this current level of function due to his need for assist for all mobility. Recommend discharge home with 24/7 care if available or to SNF.

## 2020-12-14 NOTE — PLAN OF CARE
Goal Outcome Evaluation:  Plan of Care Reviewed With: patient  Progress: no change   The patient is not willing to work with speech or physical therapy this morning. I tried multiple times to help him to move and to encourage him to move, but he repeatedly refused. PT will check back at a later time.

## 2020-12-14 NOTE — THERAPY EVALUATION
Patient Name: Eron Escalante  : 1934    MRN: [id number]                              Today's Date: 2020       Admit Date: 2020    Visit Dx:     ICD-10-CM ICD-9-CM   1. Other acute pulmonary embolism with acute cor pulmonale (CMS/[facility])  I26.09 415.19     415.0   2. Hypotension, unspecified hypotension type  I95.9 458.9   3. Pleural effusion  J90 511.9   4. Hyperkalemia  E87.5 276.7   5. Oropharyngeal dysphagia  R13.12 787.22   6. Impaired mobility  Z74.09 799.89     Patient Active Problem List   Diagnosis   • Type 2 diabetes mellitus, without long-term current use of insulin (CMS/[facility])   • HTN (hypertension)   • Aortocoronary bypass status   • Bradycardia   • CAD in native artery   • A-fib (CMS/[facility])   • Symptomatic bradycardia   • Cerebral ventriculomegaly   • Dehydration   • Duodenal ulcer   • Acute renal failure superimposed on stage 3a chronic kidney disease (CMS/[facility])   • Hyperkalemia   • Weakness of extremity   • Lumbar stenosis with neurogenic claudication   • DDD (degenerative disc disease), lumbar   • Change in bowel habit   • RICARDO treated with BiPAP   • Murmur   • Chronic diastolic congestive heart failure (CMS/[facility])   • Periodic limb movement disorder (PLMD)   • CHF (congestive heart failure), NYHA class I, acute on chronic, combined (CMS/[facility])   • Chronic constipation   • Chronic back pain   • History of stroke   • Hypertensive urgency   • Ischemic cardiomyopathy   • Altered mental status   • Acute blood loss anemia   • Metabolic encephalopathy   • Hypernatremia   • E. coli UTI (urinary tract infection)   • UGIB (upper gastrointestinal bleed)   • Type 2 diabetes mellitus with hypoglycemia, without long-term current use of insulin (CMS/[facility])   • Essential hypertension   • Metabolic encephalopathy   • History of stroke   • Atrial fibrillation (CMS/[facility])   • Normocytic anemia   • Chronic diastolic heart failure (CMS/[facility])   • Hypoglycemia   • Pulmonary embolus (CMS/[facility])   • Pseudomonas urinary  tract infection     Past Medical History:   Diagnosis Date   • A-fib (CMS/[facility]) 11/15/2016   • Anemia     gets shots every few months to build up blood. sees dr adam.   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   • Arthritis    • Bradycardia 11/15/2016   • CAD (coronary artery disease)    • CAD in native artery 11/15/2016   • Chest pain 11/15/2016   • CHF (congestive heart failure) (CMS/[facility])    • Chronic kidney disease    • COPD (chronic obstructive pulmonary disease) (CMS/[facility])    • DDD (degenerative disc disease), lumbar 6/27/2017   • Diabetes (CMS/[facility])    • Heart murmur    • HTN (hypertension) 11/15/2016   • Hyperlipidemia    • Hypertension    • Lumbar stenosis with neurogenic claudication 6/27/2017   • Murmur 4/19/2019   • Myocardial infarction (CMS/[facility])    • PVD (peripheral vascular disease) (CMS/[facility])    • Sleep apnea    • Stroke (CMS/[facility])    • Type 2 diabetes mellitus (CMS/[facility]) 11/15/2016   • Ulcer of abdomen wall (CMS/[facility])      Past Surgical History:   Procedure Laterality Date   • APPENDECTOMY     • BACK SURGERY      x2   • CARDIAC CATHETERIZATION Left     1/2010   • CARPAL TUNNEL RELEASE Bilateral    • CHOLECYSTECTOMY     • COLONOSCOPY N/A 9/7/2017    Procedure: COLONOSCOPY WITH ANESTHESIA;  Surgeon: Joshua Rich DO;  Location: [facility] ENDOSCOPY;  Service:    • CORONARY ARTERY BYPASS GRAFT      2/2006 w/PTCA & KIMMY    • CORONARY STENT PLACEMENT     • ENDOSCOPY N/A 12/14/2016    Procedure: ESOPHAGOGASTRODUODENOSCOPY WITH ANESTHESIA;  Surgeon: Isabel Dexter MD;  Location: [facility] ENDOSCOPY;  Service:    • ENDOSCOPY N/A 12/16/2016    Procedure: ESOPHAGOGASTRODUODENOSCOPY WITH ANESTHESIA;  Surgeon: Joshua Rich DO;  Location: [facility] ENDOSCOPY;  Service:    • ENDOSCOPY N/A 4/10/2017    Procedure: ESOPHAGOGASTRODUODENOSCOPY WITH ANESTHESIA;  Surgeon: Joshua Rich DO;  Location: [facility] ENDOSCOPY;  Service:    • ENDOSCOPY N/A 10/28/2020    Procedure: ESOPHAGOGASTRODUODENOSCOPY WITH ANESTHESIA;   Surgeon: Forrest Bliss MD;  Location: [facility] ENDOSCOPY;  Service: Gastroenterology;  Laterality: N/A;  pre: GI bleed  post: duodenal ulcers, hiatal hernia   Jeffrey Owen MD   • EYE SURGERY Left     x 2   • JOINT REPLACEMENT     • LEFT HEART CATH     • PERIPHERAL ARTERIAL STENT GRAFT     • REPLACEMENT TOTAL KNEE Left     2002   • SHOULDER ROTATOR CUFF REPAIR Bilateral      General Information     Row Name 12/14/20 1360          Physical Therapy Time and Intention    Document Type  evaluation near syncope with eating dinner, B PE and UTI with increased intermittent confusion, recent GI bleed, metabolic encephalopathy  -MS     Mode of Treatment  physical therapy;individual therapy  -MS     Row Name 12/14/20 7960          General Information    Patient Profile Reviewed  yes  -MS     Prior Level of Function  independent:;all household mobility  -MS     Existing Precautions/Restrictions  fall confusion  -MS     Barriers to Rehab  medically complex;previous functional deficit;cognitive status  -MS     Row Name 12/14/20 1330          Living Environment    Lives With  grandchild(miles)  -MS     Row Name 12/14/20 1330          Cognition    Orientation Status (Cognition)  oriented to;person;place knows it is December  -MS     Row Name 12/14/20 1330          Safety Issues, Functional Mobility    Safety Issues Affecting Function (Mobility)  ability to follow commands;insight into deficits/self-awareness  -MS     Impairments Affecting Function (Mobility)  balance;cognition;endurance/activity tolerance;postural/trunk control;strength  -MS       User Key  (r) = Recorded By, (t) = Taken By, (c) = Cosigned By    Initials Name Provider Type    MS Maria Luz Frias, PT, DPT, NCS Physical Therapist        Mobility     Row Name 12/14/20 7661          Bed Mobility    Bed Mobility  supine-sit;sit-supine;scooting/bridging  -MS     Scooting/Bridging [place] (Bed Mobility)  minimum assist (75% patient effort);verbal cues;nonverbal  cues (demo/gesture)  -MS     Supine-Sit [place] (Bed Mobility)  supervision;verbal cues;nonverbal cues (demo/gesture)  -MS     Sit-Supine [place] (Bed Mobility)  supervision;verbal cues;nonverbal cues (demo/gesture)  -MS     Assistive Device (Bed Mobility)  bed rails;head of bed elevated  -MS     Row Name 12/14/20 1330          Sit-Stand Transfer    Sit-Stand [place] (Transfers)  minimum assist (75% patient effort);verbal cues;nonverbal cues (demo/gesture)  -MS     Row Name 12/14/20 1330          Gait/Stairs (Locomotion)    [place] Level (Gait)  minimum assist (75% patient effort);verbal cues;nonverbal cues (demo/gesture)  -MS     Assistive Device (Gait)  walker, front-wheeled  -MS     Distance in Feet (Gait)  50ft with forward flexed posture, assist to direct walker, pt tends to push the walker to the R.  -MS     Left Sided Gait Deviations  Trendelenburg sign  -MS       User Key  (r) = Recorded By, (t) = Taken By, (c) = Cosigned By    Initials Name Provider Type    Maria Luz De Jesus, PT, DPT, NCS Physical Therapist        Obj/Interventions     Row Name 12/14/20 1330          Range of Motion Comprehensive    General Range of Motion  bilateral upper extremity ROM WFL;bilateral lower extremity ROM WFL  -MS     Row Name 12/14/20 1330          Strength Comprehensive (MMT)    Comment, General Manual Muscle Testing (MMT) Assessment  L side seems weaker compaired to the R during mobility tasks. pt unable to follow MMT directions  -MS     Row Name 12/14/20 1330          Balance    Balance Assessment  sitting static balance;standing static balance;standing dynamic balance  -MS     Static Sitting Balance  WFL  -MS     Static Standing Balance  mild impairment forward flexed posture  -MS     Dynamic Standing Balance  mild impairment  -MS       User Key  (r) = Recorded By, (t) = Taken By, (c) = Cosigned By    Initials Name Provider Type    Maria Luz De Jesus, PT, DPT, NCS Physical Therapist         Goals/Plan     Row Name 12/14/20 1330          Bed Mobility Goal 1 (PT)    Activity/Assistive Device (Bed Mobility Goal 1, PT)  bed mobility activities, all  -MS     [place] Level/Cues Needed (Bed Mobility Goal 1, PT)  supervision required  -MS     Time Frame (Bed Mobility Goal 1, PT)  long term goal (LTG);by discharge  -MS     Progress/Outcomes (Bed Mobility Goal 1, PT)  goal ongoing  -MS     Row Name 12/14/20 1330          Transfer Goal 1 (PT)    Activity/Assistive Device (Transfer Goal 1, PT)  sit-to-stand/stand-to-sit;bed-to-chair/chair-to-bed;walker, rolling  -MS     [place] Level/Cues Needed (Transfer Goal 1, PT)  supervision required  -MS     Time Frame (Transfer Goal 1, PT)  long term goal (LTG);by discharge  -MS     Progress/Outcome (Transfer Goal 1, PT)  goal ongoing  -MS     Row Name 12/14/20 1330          Gait Training Goal 1 (PT)    Activity/Assistive Device (Gait Training Goal 1, PT)  gait (walking locomotion);assistive device use;decrease fall risk;increase endurance/gait distance;walker, rolling  -MS     [place] Level (Gait Training Goal 1, PT)  supervision required  -MS     Distance (Gait Training Goal 1, PT)  75ft  -MS     Time Frame (Gait Training Goal 1, PT)  long term goal (LTG);by discharge  -MS     Progress/Outcome (Gait Training Goal 1, PT)  goal ongoing  -MS       User Key  (r) = Recorded By, (t) = Taken By, (c) = Cosigned By    Initials Name Provider Type    Maria Luz De Jesus, PT, DPT, NCS Physical Therapist        Clinical Impression     Row Name 12/14/20 1330          Pain    Additional Documentation  Pain Scale: FACES Pre/Post-Treatment (Group)  -MS     Row Name 12/14/20 1330          Pain Scale: FACES Pre/Post-Treatment    Pain: FACES Scale, Pretreatment  0-->no hurt  -MS     Posttreatment Pain Rating  0-->no hurt  -MS     Row Name 12/14/20 1330          Plan of Care Review    Plan of Care Reviewed With  patient  -MS     Progress  no change  -MS     Outcome Summary   PT evaluation completed. The patient is oriented to self and place. He is more cooperative this afternoon and pleasantly confused. He was able to perform bed mobility, sit to stand, and gait with minimal assist. He requires assist for guidance and use of walker. His L side seems weaker than the L, but I could not formally test and he is a poor historian. He will benefit from skilled PT services to work on increasing his activity and mobiltiy safety. He will require 24/7 care at home at this current level of function due to his need for assist for all mobility. Recommend discharge home with 24/7 care if available or to SNF.  -MS     Row Name 12/14/20 4488          Therapy Assessment/Plan (PT)    Patient/Family Therapy Goals Statement (PT)  pt unable to state  -MS     Rehab Potential (PT)  good, to achieve stated therapy goals  -MS     Criteria for Skilled Interventions Met (PT)  yes;skilled treatment is necessary  -MS     Predicted Duration of Therapy Intervention (PT)  until discharge  -MS     Row Name 12/14/20 6506          Positioning and Restraints    Post Treatment Position  bed  -MS     In Bed  side lying left;side rails up x2;call light within reach;encouraged to call for assist;exit alarm on  -MS       User Key  (r) = Recorded By, (t) = Taken By, (c) = Cosigned By    Initials Name Provider Type    MS Alfonzo Friasanda R, PT, DPT, NCS Physical Therapist        Outcome Measures     Row Name 12/14/20 8211          How much help from another person do you currently need...    Turning from your back to your side while in flat bed without using bedrails?  3  -MS     Moving from lying on back to sitting on the side of a flat bed without bedrails?  3  -MS     Moving to and from a bed to a chair (including a wheelchair)?  3  -MS     Standing up from a chair using your arms (e.g., wheelchair, bedside chair)?  3  -MS     Climbing 3-5 steps with a railing?  1  -MS     To walk in hospital room?  3  -MS     AM-PAC 6 Clicks  Score (PT)  16  -MS     Row Name 12/14/20 3645          Functional Assessment    Outcome Measure Options  AM-PAC 6 Clicks Basic Mobility (PT)  -MS       User Key  (r) = Recorded By, (t) = Taken By, (c) = Cosigned By    Initials Name Provider Type    Maria Luz De Jesus, PT, DPT, NCS Physical Therapist        Physical Therapy Education                 Title: PT OT SLP Therapies (In Progress)     Topic: Physical Therapy (In Progress)     Point: Mobility training (In Progress)     Learning Progress Summary           Patient Acceptance, E, NL by MS at 12/14/2020 1540    Comment: role of PT in his care                   Point: Home exercise program (Not Started)     Learner Progress:  Not documented in this visit.          Point: Body mechanics (Not Started)     Learner Progress:  Not documented in this visit.          Point: Precautions (Not Started)     Learner Progress:  Not documented in this visit.                      User Key     Initials Effective Dates Name Provider Type Discipline    MS 06/19/18 -  Maria Luz Frias LILLIAN, PT, DPT, NCS Physical Therapist PT              PT Recommendation and Plan  Planned Therapy Interventions (PT): balance training, bed mobility training, gait training, patient/family education, strengthening, transfer training  Plan of Care Reviewed With: patient  Progress: no change  Outcome Summary: PT evaluation completed. The patient is oriented to self and place. He is more cooperative this afternoon and pleasantly confused. He was able to perform bed mobility, sit to stand, and gait with minimal assist. He requires assist for guidance and use of walker. His L side seems weaker than the L, but I could not formally test and he is a poor historian. He will benefit from skilled PT services to work on increasing his activity and mobiltiy safety. He will require 24/7 care at home at this current level of function due to his need for assist for all mobility. Recommend discharge home with 24/7 care if  available or to SNF.     Time Calculation:   PT Charges     Row Name 12/14/20 1330             Time Calculation    Start Time  1330  -MS      Stop Time  1425  -MS      Time Calculation (min)  55 min  -MS      PT Received On  12/14/20  -MS      PT Goal Re-Cert Due Date  12/24/20  -MS        User Key  (r) = Recorded By, (t) = Taken By, (c) = Cosigned By    Initials Name Provider Type    MS Maria Luz Frias, PT, DPT, NCS Physical Therapist        Therapy Charges for Today     Code Description Service Date Service Provider Modifiers Qty    [id number] HC PT EVAL MOD COMPLEXITY 4 12/14/2020 Maria Luz Frias PT, DPT, NCS GP 1          PT G-Codes  Outcome Measure Options: AM-PAC 6 Clicks Basic Mobility (PT)  AM-PAC 6 Clicks Score (PT): 16    Maria Luz Frias PT, DPT, NCS  12/14/2020

## 2020-12-14 NOTE — PLAN OF CARE
Goal Outcome Evaluation:  Plan of Care Reviewed With: patient  Progress: no change  Outcome Summary: Alert and oriented to self, pleasantly confused. No changes noted. Sinus darshana on tele monitor. Pt appears to be resting well this shift. Q2 turning when pt agrees to, education provided on skin care. Voiding per condom cath. VSS. Bed check on.  Encouraged pt to drink fluids. Safety maintained.

## 2020-12-14 NOTE — PROGRESS NOTES
Continued Stay Note   Jamie     Patient Name: Eron Escalante  MRN: [id number]  Today's Date: 12/14/2020    Admit Date: 12/9/2020    Discharge Plan     Row Name 12/14/20 1102       Plan    Plan Comments  Events noted. Pt is not wanting to work with therapy. Pt lives at home with grandtr (Bisi) and is followed by [facility]. Referral has been sent to [facility] per family request. Plan is still for home at [place].        Discharge Codes    No documentation.             MYAH Ramírez

## 2020-12-14 NOTE — PROGRESS NOTES
"Pharmacy Dosing Service  Automatic Renal Adjustment  Levaquin    Assessment/Action/Plan:  Based on prescribing information provided by the drug , Levaquin 500 mg PO every 24 hours, has been changed to 750 mg PO every 48 hours. Pharmacy will continue to monitor daily and make further adjustment(s) accordingly.     Subjective:  Eron Escalante is a 86 y.o. male     Additional Factors Considered:  Patient disposition per documentation  Disease state or condition being treated    Objective:  Ht: 157.5 cm (62\"); Wt: 78.3 kg (172 lb 9.9 oz)  Estimated Creatinine Clearance: 35.3 mL/min (A) (by C-G formula based on SCr of 1.36 mg/dL (H)).   Creatinine   Date Value Ref Range Status   12/14/2020 1.36 (H) 0.76 - 1.27 mg/dL Final   12/12/2020 1.17 0.76 - 1.27 mg/dL Final   12/11/2020 1.00 0.76 - 1.27 mg/dL Final   10/26/2020 1.1 0.5 - 1.2 mg/dL Final   10/25/2020 1.1 0.5 - 1.2 mg/dL Final   10/24/2020 1.1 0.5 - 1.2 mg/dL Final       L Niko Sommer, [facility]  12/14/20 13:56 CST    "

## 2020-12-14 NOTE — PLAN OF CARE
Goal Outcome Evaluation:  Plan of Care Reviewed With: patient  Progress: improving  Outcome Summary: Pt Alert, disoriented to time and situation. VSS safety maintained. Upx1 with RW to chair and bathroom. Condom cath in place. No complaints of pain, tolerating mech soft food and honey thick liquids. Will continue to monitor

## 2020-12-14 NOTE — PROGRESS NOTES
Family Medicine Progress Note    Patient:  Eron Escalante  YOB: 1934    MRN: [id number]     Acct: [id number]     Admit date: 12/9/2020    Patient Seen, Chart, Consults notes, Labs, Radiology studies reviewed.    Subjective: Day 4 of stay with bilateral pulmonary emboli and urinary tract infection and most recent (in last 24 hours) has had some improvement in his confusion.  Much more oriented this morning.  Has not been out of bed.  Did not see physical therapy eval's over the weekend.    Past, Family, Social History unchanged from admission.    Diet: Diet Soft Texture; Ground; Honey Thick; Cardiac    Medications:  Scheduled Meds:allopurinol, 300 mg, Oral, Daily  amLODIPine, 5 mg, Oral, Daily  apixaban, 5 mg, Oral, Q12H  cholecalciferol, 1,000 Units, Oral, Daily  finasteride, 5 mg, Oral, Daily  folic acid, 400 mcg, Oral, Daily  furosemide, 20 mg, Oral, Daily  isosorbide mononitrate, 30 mg, Oral, Q24H  levoFLOXacin, 500 mg, Oral, Q24H  losartan, 100 mg, Oral, Q24H  metFORMIN, 500 mg, Oral, Daily With Breakfast  metoprolol succinate XL, 25 mg, Oral, Q24H  neomycin-polymyxin-hydrocortisone, 4 drop, Right Ear, TID  pantoprazole, 40 mg, Oral, BID AC  pravastatin, 40 mg, Oral, Nightly  prednisoLONE acetate, 1 drop, Left Eye, 4x Daily  rOPINIRole, 1 mg, Oral, Nightly  sennosides-docusate, 1 tablet, Oral, Daily  sodium chloride, 10 mL, Intravenous, Q12H  tamsulosin, 0.4 mg, Oral, Daily  thiamine, 100 mg, Oral, Daily  vitamin C, 500 mg, Oral, Daily      Continuous Infusions:   PRN Meds:•  acetaminophen  •  Morphine **AND** naloxone  •  nitroglycerin  •  sodium chloride    Objective:    Lab Results (last 24 hours)     Procedure Component Value Units Date/Time    Comprehensive Metabolic Panel [[id number]]  (Abnormal) Collected: 12/14/20 0333    Specimen: Blood Updated: 12/14/20 0446     Glucose 118 mg/dL      BUN 24 mg/dL      Creatinine 1.36 mg/dL      Sodium 137 mmol/L      Potassium 4.4 mmol/L       Chloride 106 mmol/L      CO2 24.0 mmol/L      Calcium 10.0 mg/dL      Total Protein 5.6 g/dL      Albumin 2.80 g/dL      ALT (SGPT) 20 U/L      AST (SGOT) 18 U/L      Alkaline Phosphatase 93 U/L      Total Bilirubin 0.3 mg/dL      eGFR Non African Amer 50 mL/min/1.73      Globulin 2.8 gm/dL      A/G Ratio 1.0 g/dL      BUN/Creatinine Ratio 17.6     Anion Gap 7.0 mmol/L     Narrative:      GFR Normal >60  Chronic Kidney Disease <60  Kidney Failure <15      Magnesium [[id number]]  (Normal) Collected: 12/14/20 0333    Specimen: Blood Updated: 12/14/20 0446     Magnesium 1.8 mg/dL     CBC & Differential [[id number]]  (Abnormal) Collected: 12/14/20 0333    Specimen: Blood Updated: 12/14/20 0424    Narrative:      The following orders were created for panel order CBC & Differential.  Procedure                               Abnormality         Status                     ---------                               -----------         ------                     CBC Auto Differential[[id number]]        Abnormal            Final result                 Please view results for these tests on the individual orders.    CBC Auto Differential [[id number]]  (Abnormal) Collected: 12/14/20 0333    Specimen: Blood Updated: 12/14/20 0424     WBC 9.01 10*3/mm3      RBC 2.88 10*6/mm3      Hemoglobin 8.7 g/dL      Hematocrit 27.0 %      MCV 93.8 fL      MCH 30.2 pg      MCHC 32.2 g/dL      RDW 19.0 %      RDW-SD 65.6 fl      MPV 10.8 fL      Platelets 202 10*3/mm3      Neutrophil % 67.9 %      Lymphocyte % 18.5 %      Monocyte % 10.2 %      Eosinophil % 2.6 %      Basophil % 0.2 %      Immature Grans % 0.6 %      Neutrophils, Absolute 6.12 10*3/mm3      Lymphocytes, Absolute 1.67 10*3/mm3      Monocytes, Absolute 0.92 10*3/mm3      Eosinophils, Absolute 0.23 10*3/mm3      Basophils, Absolute 0.02 10*3/mm3      Immature Grans, Absolute 0.05 10*3/mm3      nRBC 0.0 /100 WBC     Blood Culture - Blood, Arm, Right [[id number]] Collected: 12/09/20 2009     Specimen: Blood from Arm, Right Updated: 12/13/20 2031     Blood Culture No growth at 4 days    POC Glucose Once [[id number]]  (Normal) Collected: 12/13/20 2018    Specimen: Blood Updated: 12/13/20 2029     Glucose 128 mg/dL      Comment: : [id number] Zaina TeriMeter ID: [id number]       Blood Culture - Blood, Blood, Venous Line [[id number]] Collected: 12/09/20 1946    Specimen: Blood, Venous Line Updated: 12/13/20 2000     Blood Culture No growth at 4 days    POC Glucose Once [[id number]]  (Normal) Collected: 12/13/20 1734    Specimen: Blood Updated: 12/13/20 1747     Glucose 118 mg/dL      Comment: : [id number] Georges MeganMeter ID: [id number]       POC Glucose Once [[id number]]  (Abnormal) Collected: 12/13/20 1209    Specimen: Blood Updated: 12/13/20 1220     Glucose 144 mg/dL      Comment: : [id number] Georges MeganMeter ID: [id number]       POC Glucose Once [[id number]]  (Abnormal) Collected: 12/13/20 0835    Specimen: Blood Updated: 12/13/20 0846     Glucose 132 mg/dL      Comment: : [id number] Irizarryana maría OlivaresMeter ID: [id number]              Imaging Results (Last 72 Hours)     Procedure Component Value Units Date/Time    US Venous Doppler Lower Extremity Bilateral (duplex) [[id number]] Collected: 12/11/20 1605     Updated: 12/11/20 1608    Narrative:      History: PE       Impression:      Impression: There is no evidence of deep venous thrombosis or  superficial thrombophlebitis of right or left lower extremities.     Comments: Bilateral lower extremity venous duplex exam was performed  using color Doppler flow, Doppler waveform analysis, and grayscale  imaging, with and without compression. There is no evidence of deep  venous thrombosis in the common femoral, superficial femoral, popliteal,  peroneal, anterior tibial, and posterior tibial veins bilaterally. No  thrombus is identified in the saphenofemoral junctions and greater  saphenous veins bilaterally.         This report was finalized on  "12/11/2020 16:05 by Dr. Austen Robertson MD.           Physical Exam:    Vitals: /56 (BP Location: Left arm, Patient Position: Lying)   Pulse (!) 47   Temp 98.5 °F (36.9 °C) (Axillary)   Resp 18   Ht 157.5 cm (62\")   Wt 78.3 kg (172 lb 9.9 oz)   SpO2 97%   BMI 31.57 kg/m²   24 hour intake/output:    Intake/Output Summary (Last 24 hours) at 12/14/2020 0720  Last data filed at 12/14/2020 0351  Gross per 24 hour   Intake 200 ml   Output 1200 ml   Net -1000 ml     Last 3 weights:  Wt Readings from Last 3 Encounters:   12/10/20 78.3 kg (172 lb 9.9 oz)   11/17/20 97 kg (213 lb 12.8 oz)   11/08/20 92.9 kg (204 lb 12.9 oz)       General Appearance alert, appears stated age and cooperative  Head normocephalic, without obvious abnormality and atraumatic  Eyes lids and lashes normal, conjunctivae and sclerae normal and no icterus  Neck supple and trachea midline  Lungs clear to auscultation, respirations regular, respirations even and respirations unlabored  Heart regular rhythm & normal rate and normal S1, S2, systolic murmur  2/6 throughout the precordium  Abdomen normal bowel sounds, soft non-tender and no guarding  Extremities no edema, no cyanosis and no redness  Skin turgor normal and color normal  Neurologic Mental Status alertness alert and awake and orientation person, place and city        Assessment:      Pulmonary embolus (CMS/HCC)    Type 2 diabetes mellitus, without long-term current use of insulin (CMS/HCC)    Hyperkalemia    Murmur    Chronic diastolic congestive heart failure (CMS/HCC)    CHF (congestive heart failure), NYHA class I, acute on chronic, combined (CMS/HCC)    Ischemic cardiomyopathy    Type 2 diabetes mellitus with hypoglycemia, without long-term current use of insulin (CMS/HCC)    Essential hypertension    Metabolic encephalopathy    History of stroke    Atrial fibrillation (CMS/HCC)    Normocytic anemia    Chronic diastolic heart failure (CMS/HCC)    Pseudomonas urinary tract " infection          Plan:  Continue antibiotics with adjustment per pharmacy given slight increase in creatinine.  We will monitor that.  Physical therapy evaluation for stability.  Patient tells me he lives at home with support from his granddaughter.  Per social service notes plan for enrollment and CHF program and home health.  Do have some concerns on his ability to live independently in his current state, however his mental status is gradually improving.  Hopefully for discharge in the next day or 2.      Electronically signed by Cesar Fox MD on 12/14/2020 at 07:20 CST

## 2020-12-15 NOTE — THERAPY TREATMENT NOTE
Acute Care - Physical Therapy Treatment Note  [facility]     Patient Name: Eron Escalante  : 1934  MRN: [id number]  Today's Date: 12/15/2020           PT Assessment (last 12 hours)      PT Evaluation and Treatment     Row Name 12/15/20 [facility] 12/15/20 09       Physical Therapy Time and Intention    Subjective Information  no complaints  -KJ  no complaints  -KJ    Document Type  therapy note (daily note)  -KJ  therapy note (daily note)  -KJ    Mode of Treatment  physical therapy  -KJ  physical therapy  -KJ    Patient Effort  adequate  -KJ  good  -KJ    Row Name 12/15/20 141 12/15/20 09       General Information    Existing Precautions/Restrictions  fall some confusion  -KJ  fall some confusion  -KJ    Row Name 12/15/20 141 12/15/20 09       Pain Scale: FACES Pre/Post-Treatment    Pain: FACES Scale, Pretreatment  0-->no hurt  -KJ  0-->no hurt  -KJ    Posttreatment Pain Rating  0-->no hurt  -KJ  0-->no hurt  -KJ    Row Name 12/15/20 141 12/15/20 09       Bed Mobility    Supine-Sit [place] (Bed Mobility)  --  verbal cues;contact guard;minimum assist (75% patient effort)  -KJ    Sit-Supine [place] (Bed Mobility)  verbal cues;minimum assist (75% patient effort)  -KJ  --    Row Name 12/15/20 141 12/15/20 0900       Transfers    Sit-Stand [place] (Transfers)  verbal cues;moderate assist (50% patient effort);maximum assist (25% patient effort)  -KJ  verbal cues;moderate assist (50% patient effort)  -KJ    Stand-Sit [place] (Transfers)  verbal cues;minimum assist (75% patient effort);contact guard  -KJ  verbal cues;minimum assist (75% patient effort);contact guard  -KJ    Row Name 12/15/20 141 12/15/20 09       Sit-Stand Transfer    Assistive Device (Sit-Stand Transfers)  walker, front-wheeled  -KJ  walker, front-wheeled  -KJ    Row Name 12/15/20 141 12/15/20 09       Stand-Sit Transfer    Assistive Device (Stand-Sit Transfers)  walker, front-wheeled  -KJ  walker, front-wheeled   -KJ    Row Name 12/15/20 1415 12/15/20 0900       Gait/Stairs (Locomotion)    [place] Level (Gait)  verbal cues;minimum assist (75% patient effort)  -KJ  verbal cues;minimum assist (75% patient effort)  -KJ    Assistive Device (Gait)  walker, front-wheeled  -KJ  walker, front-wheeled  -KJ    Distance in Feet (Gait)  pivot to bed from chair  -KJ  40' x 2  -KJ    Pattern (Gait)  step-to  -KJ  step-to  -KJ    Deviations/Abnormal Patterns (Gait)  weight shifting decreased keeps walker too far out in front of him  -KJ  weight shifting decreased keeps walker too far out in front of him  -KJ    Left Sided Gait Deviations  Trendelenburg sign  -KJ  Trendelenburg sign  -KJ    Row Name 12/15/20 0900          Motor Skills    Therapeutic Exercise  hip;knee;ankle  -KJ     Row Name 12/15/20 0900          Hip (Therapeutic Exercise)    Hip (Therapeutic Exercise)  AROM (active range of motion)  -KJ     Hip AROM (Therapeutic Exercise)  bilateral  -KJ     Row Name 12/15/20 0900          Knee (Therapeutic Exercise)    Knee (Therapeutic Exercise)  AROM (active range of motion)  -KJ     Knee AROM (Therapeutic Exercise)  bilateral  -KJ     Row Name 12/15/20 0900          Ankle (Therapeutic Exercise)    Ankle (Therapeutic Exercise)  AROM (active range of motion)  -KJ     Ankle AROM (Therapeutic Exercise)  bilateral  -KJ     Row Name 12/15/20 1415 12/15/20 0900       Positioning and Restraints    Pre-Treatment Position  sitting in chair/recliner  -KJ  in bed  -KJ    Post Treatment Position  bed  -KJ  chair  -KJ    In Bed  call light within reach  -KJ  encouraged to call for assist;call light within reach  -KJ      User Key  (r) = Recorded By, (t) = Taken By, (c) = Cosigned By    Initials Name Provider Type    Renetta Petersen PTA Physical Therapy Assistant        Physical Therapy Education                 Title: PT OT SLP Therapies (In Progress)     Topic: Physical Therapy (In Progress)     Point: Mobility training (In Progress)      Learning Progress Summary           Patient Acceptance, E, NL by MS at 12/14/2020 1540    Comment: role of PT in his care                   Point: Home exercise program (Not Started)     Learner Progress:  Not documented in this visit.          Point: Body mechanics (Not Started)     Learner Progress:  Not documented in this visit.          Point: Precautions (Not Started)     Learner Progress:  Not documented in this visit.                      User Key     Initials Effective Dates Name Provider Type Discipline    MS 06/19/18 -  Maria Luz Frias, PT, DPT, NCS Physical Therapist PT              PT Recommendation and Plan             Time Calculation:   PT Charges     Row Name 12/15/20 1438 12/15/20 1007          Time Calculation    Start Time  1415  -KJ  0900  -KJ     Stop Time  1438  -KJ  0925  -KJ     Time Calculation (min)  23 min  -KJ  25 min  -KJ     PT Received On  --  12/15/20  -KJ     PT Goal Re-Cert Due Date  --  12/24/20  -KJ        Time Calculation- PT    Total Timed Code Minutes- PT  23 minute(s)  -KJ  --       User Key  (r) = Recorded By, (t) = Taken By, (c) = Cosigned By    Initials Name Provider Type    Renetta Petersen PTA Physical Therapy Assistant        Therapy Charges for Today     Code Description Service Date Service Provider Modifiers Qty    50077472354 HC GAIT TRAINING EA 15 MIN 12/15/2020 Renetta Engel, PTA GP 1    31531034202 HC PT THER PROC EA 15 MIN 12/15/2020 Renetta Engel, PTA GP 1    30035660742 HC PT THER PROC EA 15 MIN 12/15/2020 Renetta Engel, PTA GP 1    51000935237 HC PT THERAPEUTIC ACT EA 15 MIN 12/15/2020 Renetta Engel, PTA GP 1          PT G-Codes  Outcome Measure Options: AM-PAC 6 Clicks Basic Mobility (PT)  AM-PAC 6 Clicks Score (PT): 16    Renetta Engel PTA  12/15/2020

## 2020-12-15 NOTE — DISCHARGE SUMMARY
Date of Discharge:  12/15/2020    Discharge Diagnosis: Pulmonary emboli  Pseudomonas urinary tract infection  Type 2 diabetes mellitus  Chronic diastolic congestive heart failure  History of stroke  Confusion with concern for dementia  Atrial fibrillation  History of GI bleed    Presenting Problem/History of Present Illness  Active Hospital Problems    Diagnosis  POA   • **Pulmonary embolus (CMS/[facility]) [I26.99]  Yes   • Pseudomonas urinary tract infection [N39.0, B96.5]  Yes   • Atrial fibrillation (CMS/[facility]) [I48.91]  Yes   • Chronic diastolic heart failure (CMS/[facility]) [I50.32]  Yes   • History of stroke [Z86.73]  Not Applicable   • Normocytic anemia [D64.9]  Yes   • Essential hypertension [I10]  Yes   • Type 2 diabetes mellitus with hypoglycemia, without long-term current use of insulin (CMS/[facility]) [E11.649]  Yes   • Metabolic encephalopathy [G93.41]  Yes   • Ischemic cardiomyopathy [I25.5]  Yes   • Chronic diastolic congestive heart failure (CMS/[facility]) [I50.32]  Yes   • Murmur [R01.1]  Yes   • CHF (congestive heart failure), NYHA class I, acute on chronic, combined (CMS/[facility]) [I50.43]  Yes   • Hyperkalemia [E87.5]  Yes   • Type 2 diabetes mellitus, without long-term current use of insulin (CMS/[facility]) [E11.9]  Yes      Resolved Hospital Problems   No resolved problems to display.          Hospital Course  Patient is a 86 y.o. male presented with shortness of breath and confusion.  Work-up in the emergency room revealed bilateral pulmonary emboli.  Is admitted initially to the ICU for closer observation.  He is quickly moved to the floor given his hemodynamic stability.  He did have significant confusion and was not able to give a whole lot of history but his granddaughter who is his primary provider care did report recent hospitalization with GI bleed.  It was mild and he was transfused and had no further source of bleeding.  Unfortunately no option other than to place him on anticoagulation.  He was placed on Eliquis and  has done fine with stable hemoglobin generally in the mid to high 8 range.  Other work-up revealed a pseudomonal urinary tract infection.  He was treated initially with ceftriaxone which was changed to a more appropriate Levaquin.  He tolerated this without issue and actually his mentation improved.  On the day of discharge she was more oriented.  I discussed with his granddaughter Bisi and he does receive 24/7 care at home.  He already has home health instituted from a previous admission.  On the day of discharge is felt he is maximized his acute inpatient stay and will plan to send him home with family and home health.      Procedures Performed         Consults:   Consults     Date and Time Order Name Status Description    10/29/2020 0821 Inpatient Vascular Surgery Consult Completed     10/27/2020 1632 Inpatient Gastroenterology Consult Completed     10/27/2020 0304 Inpatient Neurology Consult Stroke Completed           Pertinent Test Results: microbiology: urine culture: positive for Pseudomonas and radiology: CT scan: Bilateral segmental and subsegmental pulmonary emboli    Condition on Discharge: Stable    Vital Signs  Temp:  [97.5 °F (36.4 °C)-97.8 °F (36.6 °C)] 97.8 °F (36.6 °C)  Heart Rate:  [50-84] 52  Resp:  [16-18] 18  BP: ()/(44-77) 132/55    Physical Exam:   General Appearance alert, appears stated age and cooperative  Head normocephalic, without obvious abnormality  Eyes conjunctivae and sclerae normal and no icterus  Neck supple and trachea midline  Lungs clear to auscultation, respirations regular, respirations even and respirations unlabored  Heart regular rhythm & normal rate and normal S1, S2, systolic murmur  2/6 at 2nd right intercostal space  Abdomen normal bowel sounds and soft non-tender  Extremities no edema, no cyanosis and no redness  Pulses Pulses palpable and equal bilaterally    Discharge Disposition  Home-Health Care Svc    Discharge Medications     Discharge Medications      New  Medications      Instructions Start Date   apixaban 5 MG tablet tablet  Commonly known as: ELIQUIS   5 mg, Oral, Every 12 Hours Scheduled      levoFLOXacin 500 MG tablet  Commonly known as: Levaquin   500 mg, Oral, Daily         Continue These Medications      Instructions Start Date   acetaminophen 325 MG tablet  Commonly known as: TYLENOL   650 mg, Oral, Every 4 Hours PRN      allopurinol 300 MG tablet  Commonly known as: ZYLOPRIM   300 mg, Oral, Daily      amLODIPine 5 MG tablet  Commonly known as: NORVASC   5 mg, Oral, Daily      ascorbic acid 1000 MG tablet  Commonly known as: VITAMIN C   500 mg, Oral, Daily      aspirin 81 MG chewable tablet   81 mg, Oral, Daily      colchicine 0.6 MG tablet   0.6 mg, Oral, Daily PRN      finasteride 5 MG tablet  Commonly known as: PROSCAR   5 mg, Oral, Daily      folic acid 400 MCG tablet  Commonly known as: FOLVITE   400 mcg, Oral      furosemide 20 MG tablet  Commonly known as: LASIX   20 mg, Oral, Daily      isosorbide mononitrate 30 MG 24 hr tablet  Commonly known as: IMDUR   30 mg, Oral, Every 24 Hours Scheduled      losartan 100 MG tablet  Commonly known as: COZAAR   100 mg, Oral, Every 24 Hours Scheduled      meclizine 25 MG chewable tablet chewable tablet   25 mg, Oral, Take 1 tablet 3 - 4 times daily as needed       metFORMIN 500 MG tablet  Commonly known as: Glucophage   500 mg, Oral, Daily With Breakfast      neomycin-polymyxin-hydrocortisone 3.5-27327-3 otic suspension  Commonly known as: CORTISPORIN   4 drops, Right Ear, 3 Times Daily      nitroglycerin 0.4 MG SL tablet  Commonly known as: NITROSTAT   0.4 mg, Sublingual, Every 5 Minutes PRN      ondansetron 4 MG tablet  Commonly known as: ZOFRAN   4 mg, Oral, Every 6 Hours PRN      pantoprazole 40 MG EC tablet  Commonly known as: PROTONIX   40 mg, Oral, Every 12 Hours      potassium chloride 20 MEQ CR tablet  Commonly known as: K-DUR,KLOR-CON   20 mEq, Oral, 2 Times Daily      pravastatin 40 MG tablet  Commonly  known as: PRAVACHOL   40 mg, Oral, Nightly      prednisoLONE acetate 1 % ophthalmic suspension  Commonly known as: PRED FORTE   1 drop, Left Eye, 4 Times Daily      rOPINIRole 1 MG tablet  Commonly known as: REQUIP   1 mg, Oral, Nightly, Take 1 hour before bedtime.       sennosides-docusate 8.6-50 MG per tablet  Commonly known as: PERICOLACE   1 tablet, Oral, Daily      tamsulosin 0.4 MG capsule 24 hr capsule  Commonly known as: FLOMAX   1 capsule, Oral, Daily      thiamine 100 MG tablet  Commonly known as: VITAMIN B-1   100 mg, Oral      Toprol XL 25 MG 24 hr tablet  Generic drug: metoprolol succinate XL   25 mg, Oral, Every Night at Bedtime      vitamin b complex capsule capsule   1 capsule, Oral, Daily      vitamin D3 125 MCG (5000 UT) capsule capsule   5,000 Units, Oral, Daily             Discharge Diet: consistent carbohydrate    Activity at Discharge: ad geetha with precautions    Follow-up Appointments  Future Appointments   Date Time Provider Department Center   5/10/2021  1:45 PM Jordi Jimenez MD [facility]     Additional Instructions for the Follow-ups that You Need to Schedule     Ambulatory Referral to Home Health   As directed      Face to Face Visit Date: 12/15/2020    Follow-up provider for Plan of Care?: I treated the patient in an acute care facility and will not continue treatment after discharge.    Follow-up provider: ALANNAH GEORGE [[id number]]    Reason/Clinical Findings: deconditioned due to medical condition    Describe mobility limitations that make leaving home difficult: PE, dementia, dysphagia,    Nursing/Therapeutic Services Requested: Skilled Nursing Physical Therapy Occupational Therapy Speech Therapy    Skilled nursing orders: Medication education    PT orders: Therapeutic exercise Strengthening    Occupational orders: Activities of daily living    SLP orders: Dysphagia therapy    Frequency: 1 Week 1             Arrangements will be made to follow-up with his primary care  physician Dr. Owen in 1 to 2 weeks for transition of care visit.      No plan for follow-up with me as I was assisting in hospital care due to the large census of the hospitalist service during the current pandemic.  I will certainly be available for any emergency issues over the next 30 days.    Test Results Pending at Discharge       Cesar Fox MD  12/15/20  07:43 CST    Time: Discharge 40 min

## 2020-12-15 NOTE — PLAN OF CARE
Goal Outcome Evaluation:  Plan of Care Reviewed With: patient  Progress: improving  Outcome Summary: Pt alert, disoriented to time and situation, VSS safety maintained. Upx1 with RW. Brief in place. Plan to d/c home today

## 2020-12-15 NOTE — PAYOR COMM NOTE
"AUTH: [id number]    Gabriel Escalante (86 y.o. Male)     Date of Birth Social Security Number Address Home Phone MRN    1934  [id number] [facility] [address] [phone] [id number]    Worship Marital Status          [employer]        Admission Date Admission Type Admitting Provider Attending Provider Department, Room/Bed    12/9/20 Emergency Cesar Fox MD Parish, Kyle Douglas, MD [facility] 3A, 350/1    Discharge Date Discharge Disposition Discharge Destination         Home-[facility]              Attending Provider: Cesar Fox MD    Allergies: Lorazepam, Lorazepam, Penicillins, Adhesive Tape, Penicillins    Isolation: None   Infection: None   Code Status: No CPR    Ht: 157.5 cm (62\")   Wt: 78.3 kg (172 lb 9.9 oz)    Admission Cmt: None   Principal Problem: Pulmonary embolus (CMS/HCC) [I26.99]                 Active Insurance as of 12/9/2020     Primary Coverage     Payor Plan Insurance Group Employer/Plan Group    [facility] BLUE [facility] EMPLOYEE [id number]     Payor Plan Address Payor Plan Phone Number Payor Plan Fax Number Effective Dates    [address] [phone]  1/1/2015 - None Entered    [facility] [id number]       Subscriber Name Subscriber Birth Date Member ID       GABRIEL ESCALANTE 1934 [id number]           Secondary Coverage     Payor Plan Insurance Group Employer/Plan Group    MEDICARE MEDICARE A & B      Payor Plan Address Payor Plan Phone Number Payor Plan Fax Number Effective Dates    [address] [phone]  11/1/1999 - None Entered    [facility] [id number]       Subscriber Name Subscriber Birth Date Member ID       GABRIEL ESCALANTE 1934 [id number]                 Emergency Contacts      (Rel.) Home Phone Work Phone Mobile Phone    Isabel Bradley (Grandchild) [phone] -- --               Discharge Summary      Cesar Fox MD at 12/15/20 0742            Date of Discharge:  " 12/15/2020    Discharge Diagnosis: Pulmonary emboli  Pseudomonas urinary tract infection  Type 2 diabetes mellitus  Chronic diastolic congestive heart failure  History of stroke  Confusion with concern for dementia  Atrial fibrillation  History of GI bleed    Presenting Problem/History of Present Illness  Active Hospital Problems    Diagnosis  POA   • **Pulmonary embolus (CMS/[facility]) [I26.99]  Yes   • Pseudomonas urinary tract infection [N39.0, B96.5]  Yes   • Atrial fibrillation (CMS/[facility]) [I48.91]  Yes   • Chronic diastolic heart failure (CMS/[facility]) [I50.32]  Yes   • History of stroke [Z86.73]  Not Applicable   • Normocytic anemia [D64.9]  Yes   • Essential hypertension [I10]  Yes   • Type 2 diabetes mellitus with hypoglycemia, without long-term current use of insulin (CMS/[facility]) [E11.649]  Yes   • Metabolic encephalopathy [G93.41]  Yes   • Ischemic cardiomyopathy [I25.5]  Yes   • Chronic diastolic congestive heart failure (CMS/[facility]) [I50.32]  Yes   • Murmur [R01.1]  Yes   • CHF (congestive heart failure), NYHA class I, acute on chronic, combined (CMS/[facility]) [I50.43]  Yes   • Hyperkalemia [E87.5]  Yes   • Type 2 diabetes mellitus, without long-term current use of insulin (CMS/[facility]) [E11.9]  Yes      Resolved Hospital Problems   No resolved problems to display.          Hospital Course  Patient is a 86 y.o. male presented with shortness of breath and confusion.  Work-up in the emergency room revealed bilateral pulmonary emboli.  Is admitted initially to the ICU for closer observation.  He is quickly moved to the floor given his hemodynamic stability.  He did have significant confusion and was not able to give a whole lot of history but his granddaughter who is his primary provider care did report recent hospitalization with GI bleed.  It was mild and he was transfused and had no further source of bleeding.  Unfortunately no option other than to place him on anticoagulation.  He was placed on Eliquis and has done fine with  stable hemoglobin generally in the mid to high 8 range.  Other work-up revealed a pseudomonal urinary tract infection.  He was treated initially with ceftriaxone which was changed to a more appropriate Levaquin.  He tolerated this without issue and actually his mentation improved.  On the day of discharge she was more oriented.  I discussed with his granddaughter Bisi and he does receive 24/7 care at home.  He already has home health instituted from a previous admission.  On the day of discharge is felt he is maximized his acute inpatient stay and will plan to send him home with family and home health.      Procedures Performed         Consults:   Consults     Date and Time Order Name Status Description    10/29/2020 0821 Inpatient Vascular Surgery Consult Completed     10/27/2020 1632 Inpatient Gastroenterology Consult Completed     10/27/2020 0304 Inpatient Neurology Consult Stroke Completed           Pertinent Test Results: microbiology: urine culture: positive for Pseudomonas and radiology: CT scan: Bilateral segmental and subsegmental pulmonary emboli    Condition on Discharge: Stable    Vital Signs  Temp:  [97.5 °F (36.4 °C)-97.8 °F (36.6 °C)] 97.8 °F (36.6 °C)  Heart Rate:  [50-84] 52  Resp:  [16-18] 18  BP: ()/(44-77) 132/55    Physical Exam:   General Appearance alert, appears stated age and cooperative  Head normocephalic, without obvious abnormality  Eyes conjunctivae and sclerae normal and no icterus  Neck supple and trachea midline  Lungs clear to auscultation, respirations regular, respirations even and respirations unlabored  Heart regular rhythm & normal rate and normal S1, S2, systolic murmur  2/6 at 2nd right intercostal space  Abdomen normal bowel sounds and soft non-tender  Extremities no edema, no cyanosis and no redness  Pulses Pulses palpable and equal bilaterally    Discharge Disposition  Home-Health Care Svc    Discharge Medications     Discharge Medications      New Medications       Instructions Start Date   apixaban 5 MG tablet tablet  Commonly known as: ELIQUIS   5 mg, Oral, Every 12 Hours Scheduled      levoFLOXacin 500 MG tablet  Commonly known as: Levaquin   500 mg, Oral, Daily         Continue These Medications      Instructions Start Date   acetaminophen 325 MG tablet  Commonly known as: TYLENOL   650 mg, Oral, Every 4 Hours PRN      allopurinol 300 MG tablet  Commonly known as: ZYLOPRIM   300 mg, Oral, Daily      amLODIPine 5 MG tablet  Commonly known as: NORVASC   5 mg, Oral, Daily      ascorbic acid 1000 MG tablet  Commonly known as: VITAMIN C   500 mg, Oral, Daily      aspirin 81 MG chewable tablet   81 mg, Oral, Daily      colchicine 0.6 MG tablet   0.6 mg, Oral, Daily PRN      finasteride 5 MG tablet  Commonly known as: PROSCAR   5 mg, Oral, Daily      folic acid 400 MCG tablet  Commonly known as: FOLVITE   400 mcg, Oral      furosemide 20 MG tablet  Commonly known as: LASIX   20 mg, Oral, Daily      isosorbide mononitrate 30 MG 24 hr tablet  Commonly known as: IMDUR   30 mg, Oral, Every 24 Hours Scheduled      losartan 100 MG tablet  Commonly known as: COZAAR   100 mg, Oral, Every 24 Hours Scheduled      meclizine 25 MG chewable tablet chewable tablet   25 mg, Oral, Take 1 tablet 3 - 4 times daily as needed       metFORMIN 500 MG tablet  Commonly known as: Glucophage   500 mg, Oral, Daily With Breakfast      neomycin-polymyxin-hydrocortisone 3.5-34551-1 otic suspension  Commonly known as: CORTISPORIN   4 drops, Right Ear, 3 Times Daily      nitroglycerin 0.4 MG SL tablet  Commonly known as: NITROSTAT   0.4 mg, Sublingual, Every 5 Minutes PRN      ondansetron 4 MG tablet  Commonly known as: ZOFRAN   4 mg, Oral, Every 6 Hours PRN      pantoprazole 40 MG EC tablet  Commonly known as: PROTONIX   40 mg, Oral, Every 12 Hours      potassium chloride 20 MEQ CR tablet  Commonly known as: K-DUR,KLOR-CON   20 mEq, Oral, 2 Times Daily      pravastatin 40 MG tablet  Commonly known as:  PRAVACHOL   40 mg, Oral, Nightly      prednisoLONE acetate 1 % ophthalmic suspension  Commonly known as: PRED FORTE   1 drop, Left Eye, 4 Times Daily      rOPINIRole 1 MG tablet  Commonly known as: REQUIP   1 mg, Oral, Nightly, Take 1 hour before bedtime.       sennosides-docusate 8.6-50 MG per tablet  Commonly known as: PERICOLACE   1 tablet, Oral, Daily      tamsulosin 0.4 MG capsule 24 hr capsule  Commonly known as: FLOMAX   1 capsule, Oral, Daily      thiamine 100 MG tablet  Commonly known as: VITAMIN B-1   100 mg, Oral      Toprol XL 25 MG 24 hr tablet  Generic drug: metoprolol succinate XL   25 mg, Oral, Every Night at Bedtime      vitamin b complex capsule capsule   1 capsule, Oral, Daily      vitamin D3 125 MCG (5000 UT) capsule capsule   5,000 Units, Oral, Daily             Discharge Diet: consistent carbohydrate    Activity at Discharge: ad geetha with precautions    Follow-up Appointments  Future Appointments   Date Time Provider Department Center   5/10/2021  1:45 PM Jordi Jimenez MD [facility]     Additional Instructions for the Follow-ups that You Need to Schedule     Ambulatory Referral to Home Health   As directed      Face to Face Visit Date: 12/15/2020    Follow-up provider for Plan of Care?: I treated the patient in an acute care facility and will not continue treatment after discharge.    Follow-up provider: ALANNAH GEORGE [[id number]]    Reason/Clinical Findings: deconditioned due to medical condition    Describe mobility limitations that make leaving home difficult: PE, dementia, dysphagia,    Nursing/Therapeutic Services Requested: Skilled Nursing Physical Therapy Occupational Therapy Speech Therapy    Skilled nursing orders: Medication education    PT orders: Therapeutic exercise Strengthening    Occupational orders: Activities of daily living    SLP orders: Dysphagia therapy    Frequency: 1 Week 1             Arrangements will be made to follow-up with his primary care physician   Lexie in 1 to 2 weeks for transition of care visit.      No plan for follow-up with me as I was assisting in hospital care due to the large census of the hospitalist service during the current pandemic.  I will certainly be available for any emergency issues over the next 30 days.    Test Results Pending at Discharge       Cesar Fox MD  12/15/20  07:43 CST    Time: Discharge 40 min          Electronically signed by Cesar Fox MD at 12/15/20 9000

## 2020-12-15 NOTE — THERAPY TREATMENT NOTE
Acute Care - Physical Therapy Treatment Note  [facility]     Patient Name: Eron Escalante  : 1934  MRN: [id number]  Today's Date: 12/15/2020           PT Assessment (last 12 hours)      PT Evaluation and Treatment     Row Name 12/15/20 [facility]          Physical Therapy Time and Intention    Subjective Information  no complaints  -KJ     Document Type  therapy note (daily note)  -KJ     Mode of Treatment  physical therapy  -KJ     Patient Effort  good  -KJ     Row Name 12/15/20 0900          General Information    Existing Precautions/Restrictions  fall some confusion  -KJ     Row Name 12/15/20 0900          Pain Scale: FACES Pre/Post-Treatment    Pain: FACES Scale, Pretreatment  0-->no hurt  -KJ     Posttreatment Pain Rating  0-->no hurt  -KJ     Row Name 12/15/20 0900          Bed Mobility    Supine-Sit [place]s (Bed Mobility)  verbal cues;contact guard;minimum assist (75% patient effort)  -KJ     Row Name 12/15/20 0900          Transfers    Sit-Stand [place] (Transfers)  verbal cues;moderate assist (50% patient effort)  -KJ     Stand-Sit [place] (Transfers)  verbal cues;minimum assist (75% patient effort);contact guard  -KJ     Row Name 12/15/20 0900          Sit-Stand Transfer    Assistive Device (Sit-Stand Transfers)  walker, front-wheeled  -KJ     Row Name 12/15/20 0900          Stand-Sit Transfer    Assistive Device (Stand-Sit Transfers)  walker, front-wheeled  -KJ     Row Name 12/15/20 0900          Gait/Stairs (Locomotion)    [place]s Level (Gait)  verbal cues;minimum assist (75% patient effort)  -KJ     Assistive Device (Gait)  walker, front-wheeled  -KJ     Distance in Feet (Gait)  40' x 2  -KJ     Pattern (Gait)  step-to  -KJ     Deviations/Abnormal Patterns (Gait)  weight shifting decreased keeps walker too far out in front of him  -KJ     Left Sided Gait Deviations  Trendelenburg sign  -KJ     Row Name 12/15/20 0900          Motor Skills    Therapeutic Exercise  hip;knee;ankle   -KJ     Row Name 12/15/20 0900          Hip (Therapeutic Exercise)    Hip (Therapeutic Exercise)  AROM (active range of motion)  -KJ     Hip AROM (Therapeutic Exercise)  bilateral  -KJ     Row Name 12/15/20 0900          Knee (Therapeutic Exercise)    Knee (Therapeutic Exercise)  AROM (active range of motion)  -KJ     Knee AROM (Therapeutic Exercise)  bilateral  -KJ     Row Name 12/15/20 0900          Ankle (Therapeutic Exercise)    Ankle (Therapeutic Exercise)  AROM (active range of motion)  -KJ     Ankle AROM (Therapeutic Exercise)  bilateral  -KJ     Row Name 12/15/20 0900          Positioning and Restraints    Pre-Treatment Position  in bed  -KJ     Post Treatment Position  chair  -KJ     In Bed  encouraged to call for assist;call light within reach  -KJ       User Key  (r) = Recorded By, (t) = Taken By, (c) = Cosigned By    Initials Name Provider Type    KJ Renetta Engel, PTA Physical Therapy Assistant        Physical Therapy Education                 Title: PT OT SLP Therapies (In Progress)     Topic: Physical Therapy (In Progress)     Point: Mobility training (In Progress)     Learning Progress Summary           Patient Acceptance, E, NL by MS at 12/14/2020 9280    Comment: role of PT in his care                   Point: Home exercise program (Not Started)     Learner Progress:  Not documented in this visit.          Point: Body mechanics (Not Started)     Learner Progress:  Not documented in this visit.          Point: Precautions (Not Started)     Learner Progress:  Not documented in this visit.                      User Key     Initials Effective Dates Name Provider Type Discipline    MS 06/19/18 -  Maria Luz Frias, PT, DPT, NCS Physical Therapist PT              PT Recommendation and Plan             Time Calculation:   PT Charges     Row Name 12/15/20 1007             Time Calculation    Start Time  0900  -KJ      Stop Time  0925  -KJ      Time Calculation (min)  25 min  -KJ      PT Received On   12/15/20  -ALVA      PT Goal Re-Cert Due Date  12/24/20  -KJ        User Key  (r) = Recorded By, (t) = Taken By, (c) = Cosigned By    Initials Name Provider Type    Renetta Petersen PTA Physical Therapy Assistant        Therapy Charges for Today     Code Description Service Date Service Provider Modifiers Qty    04858844683 HC GAIT TRAINING EA 15 MIN 12/15/2020 Renetta Engel, DREA GP 1    26162986701 HC PT THER PROC EA 15 MIN 12/15/2020 Renetta Engel, PTA GP 1          PT G-Codes  Outcome Measure Options: AM-PAC 6 Clicks Basic Mobility (PT)  AM-PAC 6 Clicks Score (PT): 16    Renetta Engel PTA  12/15/2020

## 2020-12-15 NOTE — PLAN OF CARE
Goal Outcome Evaluation:  Plan of Care Reviewed With: patient  Progress: improving     A&Ox4.  Forgetful.  Safety maintained.  VSS.  External cath pulled.  Voiding per urinal.  Blood glucose monitoring.  Room air.  No C/O pain. Patient is becoming agitated and disoriented x4 around 0400.  Will continue to monitor.

## 2020-12-15 NOTE — PROGRESS NOTES
Continued Stay Note   [facility]     Patient Name: Eron Escalante  MRN: [id number]  Today's Date: 12/15/2020    Admit Date: 12/9/2020    Discharge Plan     Row Name 12/15/20 1058       Plan    [facility]    Patient/Family in Agreement with Plan  yes    Final Discharge Disposition Code  06 - home with home health care    Final Note  Pt is being d/c'ed home today. Notified Radha with [facility] [phone] and Ruby [phone] with [facility].        Discharge Codes    No documentation.       Expected Discharge Date and Time     Expected Discharge Date Expected Discharge Time    Dec 15, 2020             ISIDRO Ambrosio

## 2020-12-15 NOTE — NURSING NOTE
Neuro completed with ROZINA Guadarrama, Patient not oriented to place or time.  Oriented to self and situation

## 2020-12-16 NOTE — THERAPY DISCHARGE NOTE
Acute Care - Physical Therapy Discharge Summary  [facility]       Patient Name: Eron Escalante  : 1934  MRN: [id number]    Today's Date: 2020                 Admit Date: 2020      PT Recommendation and Plan    Visit Dx:    ICD-10-CM ICD-9-CM   1. Other acute pulmonary embolism with acute cor pulmonale (CMS/[facility])  I26.09 415.19     415.0   2. Hypotension, unspecified hypotension type  I95.9 458.9   3. Pleural effusion  J90 511.9   4. Hyperkalemia  E87.5 276.7   5. Oropharyngeal dysphagia  R13.12 787.22   6. Impaired mobility  Z74.09 799.89   7. Chronic diastolic congestive heart failure (CMS/[facility])  I50.32 428.32     428.0   8. Multiple subsegmental pulmonary emboli without acute cor pulmonale (CMS/[facility])  I26.94 415.19   9. Paroxysmal atrial fibrillation (CMS/HCC)  I48.0 427.31               Rehab Goal Summary     Row Name 20 0802             Bed Mobility Goal 1 (PT)    Activity/Assistive Device (Bed Mobility Goal 1, PT)  bed mobility activities, all  -AB      Isabela Level/Cues Needed (Bed Mobility Goal 1, PT)  supervision required  -AB      Time Frame (Bed Mobility Goal 1, PT)  long term goal (LTG);by discharge  -AB      Progress/Outcomes (Bed Mobility Goal 1, PT)  goal not met  -AB         Transfer Goal 1 (PT)    Activity/Assistive Device (Transfer Goal 1, PT)  sit-to-stand/stand-to-sit;bed-to-chair/chair-to-bed;walker, rolling  -AB      Isabela Level/Cues Needed (Transfer Goal 1, PT)  supervision required  -AB      Time Frame (Transfer Goal 1, PT)  long term goal (LTG);by discharge  -AB      Progress/Outcome (Transfer Goal 1, PT)  goal not met  -AB         Gait Training Goal 1 (PT)    Activity/Assistive Device (Gait Training Goal 1, PT)  gait (walking locomotion);assistive device use;decrease fall risk;increase endurance/gait distance;walker, rolling  -AB      Isabela Level (Gait Training Goal 1, PT)  supervision required  -AB      Distance (Gait Training Goal 1, PT)  75ft   -AB      Time Frame (Gait Training Goal 1, PT)  long term goal (LTG);by discharge  -AB      Progress/Outcome (Gait Training Goal 1, PT)  goal not met  -AB        User Key  (r) = Recorded By, (t) = Taken By, (c) = Cosigned By    Initials Name Provider Type Discipline    Karla Vergara, PTA Physical Therapy Assistant PT              PT Discharge Summary  Anticipated Discharge Disposition (PT): home with 24/7 care, skilled nursing facility  Reason for Discharge: Discharge from facility  Outcomes Achieved: Refer to plan of care for updates on goals achieved  Discharge Destination: Home with home health, Home with assist      Karla Cotter, PTA   12/16/2020

## 2020-12-16 NOTE — OUTREACH NOTE
Prep Survey      Responses   [facility] facility patient discharged from?  [place]   Is LACE score < 7 ?  No   Eligibility  Readm Mgmt   Discharge diagnosis  Pulmonary embolus, pseudomonas UTI, atrial fibrillation, chronic CHF   Does the patient have one of the following disease processes/diagnoses(primary or secondary)?  Other   Does the patient have Home health ordered?  Yes   What is the Home health agency?   [facility]   Is there a DME ordered?  No   Comments regarding appointments  Scheduled per AVS   Medication alerts for this patient  start eliquis, continue aspirin   Prep survey completed?  Yes          Laura Estrada RN

## 2020-12-17 NOTE — THERAPY DISCHARGE NOTE
Acute Care - Speech Language Pathology Discharge Summary  [facility]       Patient Name: Eron Escalante  : 1934  MRN: [id number]    Today's Date: 2020                   Admit Date: 2020      SLP Recommendation and Plan  Mechanical soft diet with honey thick liquids.  Sonu Velasquez MS CCC-SLP 2020 15:41 CST    Visit Dx:    ICD-10-CM ICD-9-CM   1. Other acute pulmonary embolism with acute cor pulmonale (CMS/[facility])  I26.09 415.19     415.0   2. Hypotension, unspecified hypotension type  I95.9 458.9   3. Pleural effusion  J90 511.9   4. Hyperkalemia  E87.5 276.7   5. Oropharyngeal dysphagia  R13.12 787.22   6. Impaired mobility  Z74.09 799.89   7. Chronic diastolic congestive heart failure (CMS/[facility])  I50.32 428.32     428.0   8. Multiple subsegmental pulmonary emboli without acute cor pulmonale (CMS/[facility])  I26.94 415.19   9. Paroxysmal atrial fibrillation (CMS/[facility])  I48.0 427.31               SLP GOALS     Row Name 20 1539             Oral Nutrition/Hydration Goal 1 (SLP)    Progress/Outcomes (Oral Nutrition/Hydration Goal 1, SLP)  goal partially met;discharged from facility  -         Pharyngeal Strengthening Exercise Goal 1 (SLP)    Progress/Outcomes (Pharyngeal Strengthening Goal 1, SLP)  goal not met;discharged from facility  -        User Key  (r) = Recorded By, (t) = Taken By, (c) = Cosigned By    Initials Name Provider Type     Sonu Velasquez MS CCC-SLP Speech and Language Pathologist                  SLP Discharge Summary  Anticipated Discharge Disposition (SLP): home with home health  Reason for Discharge: discharge from this facility  Progress Toward Achieving Short/long Term Goals: goals partially met within established timelines  Discharge Destination: home w/ home health      Sonu Velasquez MS CCC-SLP  2020

## 2020-12-21 PROBLEM — S72.002A CLOSED FRACTURE OF LEFT HIP (HCC): Status: ACTIVE | Noted: 2020-01-01

## 2020-12-21 NOTE — OUTREACH NOTE
Medical Week 1 Survey      Responses   [facility] patient discharged from?  [place]   Does the patient have one of the following disease processes/diagnoses(primary or secondary)?  Other   Week 1 attempt successful?  No   Unsuccessful attempts  Attempt 1   Rescheduled  Rescheduled-pt requested [Pt. in ED at time of call.]          Obdulia Cuellar RN
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
## 2020-12-22 PROBLEM — D50.9 IRON DEFICIENCY ANEMIA: Status: ACTIVE | Noted: 2020-01-01

## 2020-12-22 NOTE — ANESTHESIA PROCEDURE NOTES
Airway  Urgency: elective    Date/Time: 12/22/2020 4:15 PM  Airway not difficult    General Information and Staff    Patient location during procedure: OR  CRNA: Elpidio Charles CRNA    Indications and Patient Condition  Indications for airway management: airway protection    Preoxygenated: yes  Mask difficulty assessment: 1 - vent by mask    Final Airway Details  Final airway type: endotracheal airway      Successful airway: ETT    Successful intubation technique: direct laryngoscopy  Endotracheal tube insertion site: oral  Blade: Marie  Blade size: 4 (3.5)  ETT size (mm): 7.5  Cormack-Lehane Classification: grade I - full view of glottis  Placement verified by: chest auscultation and capnometry   Measured from: lips  ETT/EBT  to lips (cm): 22  Number of attempts at approach: 1  Assessment: lips, teeth, and gum same as pre-op and atraumatic intubation

## 2020-12-22 NOTE — ANESTHESIA PREPROCEDURE EVALUATION
Anesthesia Evaluation     Patient summary reviewed and Nursing notes reviewed   history of anesthetic complications (delirium):  NPO Solid Status: > 8 hours  NPO Liquid Status: > 8 hours           Airway   Mallampati: II  TM distance: >3 FB  Neck ROM: full  No difficulty expected  Dental    (+) edentulous    Pulmonary    (+) pulmonary embolism (12/9/20, placed on eliqus), COPD, sleep apnea,   Cardiovascular   Exercise tolerance: poor (<4 METS)    ECG reviewed    (+) hypertension, valvular problems/murmurs (mild AS) murmur and AS, past MI , CAD, CABG >6 Months, cardiac stents more than 12 months ago dysrhythmias Atrial Fib, CHF , hyperlipidemia,     ROS comment: Echo 12/10/2020  · Left ventricular ejection fraction appears to be 56 - 60%. Left ventricular systolic function is normal.  · Left ventricular wall thickness is consistent with mild concentric hypertrophy.  · Left ventricular diastolic function is consistent with (grade I) impaired relaxation.  · Moderate aortic valve stenosis is present.  · Borderline dilation of right ventricle but no sign of RV strain (ie no Sloan's sign), and there is normal RV systolic function.  · Estimated right ventricular systolic pressure from tricuspid regurgitation is mildly elevated (35-45 mmHg).    Neuro/Psych  (+) CVA (right sided weakness) residual symptoms,     GI/Hepatic/Renal/Endo    (+)  PUD, GI bleeding (10/2020) , renal disease ARF and CRI, diabetes mellitus,     Musculoskeletal     Abdominal    Substance History      OB/GYN          Other   arthritis,                      Anesthesia Plan    ASA 4 - emergent     general with block   (Discussed NO CPR order with granddaughter at bedside who is primary caregiver and POA. She agrees and would like for patient to have CPR if an adverse event was to occur. I have ordered this for OR and PACU.   Pt took eliqus yesterday morning. Discussed fascia iliaca block, discussed risk of hematoma. I believe benefits outweight risks  and tessie agrees, will perform after induction of general anesthesia. )  intravenous induction     Anesthetic plan, all risks, benefits, and alternatives have been provided, discussed and informed consent has been obtained with: patient and healthcare power of .

## 2020-12-23 PROBLEM — D62 POSTOPERATIVE ANEMIA DUE TO ACUTE BLOOD LOSS: Status: ACTIVE | Noted: 2020-01-01

## 2020-12-23 NOTE — ADDENDUM NOTE
Addendum  created 12/23/20 0637 by Marika Puente MD    Child order released for a procedure order, Clinical Note Signed, Intraprocedure Blocks edited, Intraprocedure Meds edited

## 2020-12-23 NOTE — ANESTHESIA POSTPROCEDURE EVALUATION
Patient: Eron Escalante    Procedure Summary     Date: 12/22/20 Room / Location: [facility] OR [address] [place] [address] PAD OR    Anesthesia Start: 1611 Anesthesia Stop: 1754    Procedure: HIP TROCHANTERIC NAILING SHORT WITH INTRAMEDULLARY HIP SCREW (Left Hip) Diagnosis:       Closed fracture of left hip, initial encounter (CMS/[facility])      (Closed fracture of left hip, initial encounter (CMS/[facility]) [S72.002A])    Surgeon: Leon Leung MD Provider: Elpidio Charles CRNA    Anesthesia Type: general with block ASA Status: 4 - Emergent          Anesthesia Type: general with block    Vitals  Vitals Value Taken Time   /65 12/22/20 1830   Temp 98 °F (36.7 °C) 12/22/20 1815   Pulse 66 12/22/20 1830   Resp 14 12/22/20 1830   SpO2 98 % 12/22/20 1830           Post Anesthesia Care and Evaluation    Patient location during evaluation: PACU  Patient participation: complete - patient participated  Level of consciousness: awake and awake and alert  Pain score: 0  Pain management: adequate  Airway patency: patent  Anesthetic complications: No anesthetic complications    Cardiovascular status: acceptable and stable  Respiratory status: acceptable and unassisted  Hydration status: acceptable

## 2020-12-23 NOTE — ANESTHESIA PROCEDURE NOTES
Peripheral Block    Pre-sedation assessment completed: 12/22/2020 4:22 PM    Patient reassessed immediately prior to procedure    Patient location during procedure: holding area  Start time: 12/22/2020 4:23 PM  Stop time: 12/22/2020 4:28 PM  Reason for block: procedure for pain, at surgeon's request, post-op pain management and Requested by Dr Leung  Performed by  Anesthesiologist: Marika Puente MD  Preanesthetic Checklist  Completed: patient identified, site marked, surgical consent, pre-op evaluation, timeout performed, IV checked, risks and benefits discussed and monitors and equipment checked  Prep:  Pt Position: supine  Prep: ChloraPrep  Patient monitoring: blood pressure monitoring, continuous pulse oximetry and EKG  Procedure  Sedation:yes    Guidance:ultrasound guided and fascial plane identified and local anesthetic infiltrated in iliaca compartment  ULTRASOUND INTERPRETATION. Using ultrasound guidance a 20 G gauge needle was placed in close proximity to the nerve, at which point, under ultrasound guidance anesthetic was injected in the area of the nerve and spread of the anesthesia was seen on ultrasound in close proximity thereto.  There were no abnormalities seen on ultrasound; a digital image was taken; and the patient tolerated the procedure with no complications. Images:still images obtained (picture printed and placed in patients chart)    Laterality:left  Block Type:fascia iliaca compartment  Injection Technique:single-shot  Needle Type:echogenic  Needle Gauge:20 G        Med admintered at 12/22/2020 4:26 PM      Medications  Comment:Ropivacaine 0.5 % 20 mL and 0.2% 20 mL administered at time of nerve block    Post Assessment  Injection Assessment: negative aspiration for heme, no paresthesia on injection and incremental injection  Patient Tolerance:comfortable throughout block  Complications:no

## 2020-12-28 NOTE — OUTREACH NOTE
Prep Survey      Responses   [facility] patient discharged from?  [place]   Is LACE score < 7 ?  No   Emergency Room discharge w/ pulse ox?  No   Eligibility  Readm Mgmt   Discharge diagnosis  **Closed fracture of left hip    Does the patient have one of the following disease processes/diagnoses(primary or secondary)?  General Surgery   Does the patient have Home health ordered?  Yes   What is the Home health agency?   [facility]    Is there a DME ordered?  No   General alerts for this patient  Hx: CHF    Is this a private patient?  Yes   Prep survey completed?  Yes          Lisseth Del Valle RN

## 2020-12-30 NOTE — OUTREACH NOTE
General Surgery Week 1 Survey      Responses   [facility] patient discharged from?  [place]   Does the patient have one of the following disease processes/diagnoses(primary or secondary)?  General Surgery   Week 1 attempt successful?  Yes   Call start time  1111   Call end time  1115   General alerts for this patient  Hx: CHF    Discharge diagnosis  **Closed fracture of left hip    Is patient permission given to speak with other caregiver?  Yes   Person spoke with today (if not patient) and relationship  Isabel-grandchild    Medication alerts for this patient  start eliquis, continue aspirin   Meds reviewed with patient/caregiver?  Yes   Is the patient having any side effects they believe may be caused by any medication additions or changes?  No   Does the patient have all medications related to this admission filled (includes all antibiotics, pain medications, etc.)  Yes   Is the patient taking all medications as directed (includes completed medication regime)?  Yes   Medication comments  Lorsartan 100 mg daily     Metroprolol 25 mg at HS    Lasix daily   Does the patient have a follow up appointment scheduled with their surgeon?  Yes [01/06/2021]   Has the patient kept scheduled appointments due by today?  Yes [He saw his PCP on 12/30/2020. ]   What is the Home health agency?   [facility]    Has home health visited the patient within 72 hours of discharge?  Yes   Psychosocial issues?  No   Comments  Lives with granddaughter.    Did the patient receive a copy of their discharge instructions?  Yes   Nursing interventions  Reviewed instructions with patient   What is the patient's perception of their health status since discharge?  Improving   Nursing interventions  Nurse provided patient education   Is the patient /caregiver able to teach back basic post-op care?  Continue use of incentive spirometry at least 1 week post discharge, Practice 'cough and deep breath', Drive as instructed by MD in discharge instructions,  Take showers only when approved by MD-sponge bathe until then, No tub bath, swimming, or hot tub until instructed by MD, Keep incision areas clean,dry and protected, Do not remove steri-strips, Lifting as instructed by MD in discharge instructions   Is the patient/caregiver able to teach back signs and symptoms of incisional infection?  Increased redness, swelling or pain at the incisonal site, Increased drainage or bleeding, Incisional warmth, Pus or odor from incision, Fever   Is the patient/caregiver able to teach back steps to recovery at home?  Set small, achievable goals for return to baseline health, Rest and rebuild strength, gradually increase activity, Weigh daily, Practice good oral hygiene, Eat a well-balance diet, Make a list of questions for surgeon's appointment   If the patient is a current smoker, are they able to teach back resources for cessation?  Not a smoker   Is the patient/caregiver able to teach back the hierarchy of who to call/visit for symptoms/problems? PCP, Specialist, Home health nurse, Urgent Care, ED, 911  Yes   Additional teach back comments  He lives with his grandaughter and she is taking good care of him.   is coming as well.    Week 1 call completed?  Yes          Licha Craig RN

## 2021-01-01 ENCOUNTER — PATIENT OUTREACH (OUTPATIENT)
Dept: CASE MANAGEMENT | Facility: OTHER | Age: 86
End: 2021-01-01

## 2021-01-01 ENCOUNTER — HOSPITAL ENCOUNTER (EMERGENCY)
Facility: HOSPITAL | Age: 86
Discharge: HOME | End: 2021-02-17
Attending: EMERGENCY MEDICINE | Admitting: EMERGENCY MEDICINE

## 2021-01-01 ENCOUNTER — APPOINTMENT (OUTPATIENT)
Dept: GENERAL RADIOLOGY | Facility: HOSPITAL | Age: 86
End: 2021-01-01

## 2021-01-01 ENCOUNTER — READMISSION MANAGEMENT (OUTPATIENT)
Dept: CALL CENTER | Facility: HOSPITAL | Age: 86
End: 2021-01-01

## 2021-01-01 ENCOUNTER — LAB REQUISITION (OUTPATIENT)
Dept: LAB | Facility: HOSPITAL | Age: 86
End: 2021-01-01

## 2021-01-01 ENCOUNTER — TRANSCRIBE ORDERS (OUTPATIENT)
Dept: ADMINISTRATIVE | Facility: HOSPITAL | Age: 86
End: 2021-01-01

## 2021-01-01 ENCOUNTER — EPISODE CHANGES (OUTPATIENT)
Dept: CASE MANAGEMENT | Facility: OTHER | Age: 86
End: 2021-01-01

## 2021-01-01 VITALS
HEIGHT: 70 IN | SYSTOLIC BLOOD PRESSURE: 127 MMHG | TEMPERATURE: 98.5 F | RESPIRATION RATE: 18 BRPM | DIASTOLIC BLOOD PRESSURE: 98 MMHG | WEIGHT: 146.1 LBS | OXYGEN SATURATION: 100 % | BODY MASS INDEX: 20.92 KG/M2 | HEART RATE: 66 BPM

## 2021-01-01 DIAGNOSIS — R07.9 CHEST PAIN, UNSPECIFIED TYPE: Primary | ICD-10-CM

## 2021-01-01 DIAGNOSIS — F03.91 DEMENTIA WITH BEHAVIORAL DISTURBANCE, UNSPECIFIED DEMENTIA TYPE: ICD-10-CM

## 2021-01-01 DIAGNOSIS — E11.29 TYPE 2 DIABETES MELLITUS WITH OTHER DIABETIC KIDNEY COMPLICATION (HCC): Primary | ICD-10-CM

## 2021-01-01 DIAGNOSIS — Z00.00 ENCOUNTER FOR GENERAL ADULT MEDICAL EXAMINATION WITHOUT ABNORMAL FINDINGS: ICD-10-CM

## 2021-01-01 DIAGNOSIS — D50.9 IRON DEFICIENCY ANEMIA, UNSPECIFIED IRON DEFICIENCY ANEMIA TYPE: ICD-10-CM

## 2021-01-01 LAB
ALBUMIN SERPL-MCNC: 2.3 G/DL (ref 3.5–5.2)
ALBUMIN SERPL-MCNC: 2.6 G/DL (ref 3.5–5.2)
ALBUMIN SERPL-MCNC: 2.7 G/DL (ref 3.5–5.2)
ALBUMIN/GLOB SERPL: 0.7 G/DL
ALBUMIN/GLOB SERPL: 0.9 G/DL
ALBUMIN/GLOB SERPL: 1 G/DL
ALP SERPL-CCNC: 143 U/L (ref 39–117)
ALP SERPL-CCNC: 154 U/L (ref 39–117)
ALP SERPL-CCNC: 170 U/L (ref 39–117)
ALT SERPL W P-5'-P-CCNC: 17 U/L (ref 1–41)
ALT SERPL W P-5'-P-CCNC: 17 U/L (ref 1–41)
ALT SERPL W P-5'-P-CCNC: 19 U/L (ref 1–41)
ANION GAP SERPL CALCULATED.3IONS-SCNC: 5 MMOL/L (ref 5–15)
ANION GAP SERPL CALCULATED.3IONS-SCNC: 8 MMOL/L (ref 5–15)
ANION GAP SERPL CALCULATED.3IONS-SCNC: 8 MMOL/L (ref 5–15)
AST SERPL-CCNC: 21 U/L (ref 1–40)
AST SERPL-CCNC: 22 U/L (ref 1–40)
AST SERPL-CCNC: 28 U/L (ref 1–40)
BACTERIA SPEC AEROBE CULT: ABNORMAL
BACTERIA UR QL AUTO: ABNORMAL /HPF
BASOPHILS # BLD AUTO: 0.02 10*3/MM3 (ref 0–0.2)
BASOPHILS # BLD AUTO: 0.02 10*3/MM3 (ref 0–0.2)
BASOPHILS # BLD AUTO: 0.04 10*3/MM3 (ref 0–0.2)
BASOPHILS NFR BLD AUTO: 0.3 % (ref 0–1.5)
BASOPHILS NFR BLD AUTO: 0.4 % (ref 0–1.5)
BASOPHILS NFR BLD AUTO: 0.4 % (ref 0–1.5)
BILIRUB SERPL-MCNC: 0.2 MG/DL (ref 0–1.2)
BILIRUB UR QL STRIP: NEGATIVE
BUN SERPL-MCNC: 16 MG/DL (ref 8–23)
BUN SERPL-MCNC: 21 MG/DL (ref 8–23)
BUN SERPL-MCNC: 37 MG/DL (ref 8–23)
BUN/CREAT SERPL: 18.4 (ref 7–25)
BUN/CREAT SERPL: 21.9 (ref 7–25)
BUN/CREAT SERPL: 31.1 (ref 7–25)
CALCIUM SPEC-SCNC: 10.4 MG/DL (ref 8.6–10.5)
CALCIUM SPEC-SCNC: 9.9 MG/DL (ref 8.6–10.5)
CALCIUM SPEC-SCNC: 9.9 MG/DL (ref 8.6–10.5)
CHLORIDE SERPL-SCNC: 100 MMOL/L (ref 98–107)
CHLORIDE SERPL-SCNC: 102 MMOL/L (ref 98–107)
CHLORIDE SERPL-SCNC: 98 MMOL/L (ref 98–107)
CLARITY UR: ABNORMAL
CO2 SERPL-SCNC: 24 MMOL/L (ref 22–29)
CO2 SERPL-SCNC: 28 MMOL/L (ref 22–29)
CO2 SERPL-SCNC: 29 MMOL/L (ref 22–29)
COLOR UR: ABNORMAL
CREAT SERPL-MCNC: 0.87 MG/DL (ref 0.76–1.27)
CREAT SERPL-MCNC: 0.96 MG/DL (ref 0.76–1.27)
CREAT SERPL-MCNC: 1.19 MG/DL (ref 0.76–1.27)
D DIMER PPP FEU-MCNC: 1.12 MG/L (FEU) (ref 0–0.5)
DEPRECATED RDW RBC AUTO: 56.1 FL (ref 37–54)
DEPRECATED RDW RBC AUTO: 59.1 FL (ref 37–54)
DEPRECATED RDW RBC AUTO: 61.8 FL (ref 37–54)
EOSINOPHIL # BLD AUTO: 0.26 10*3/MM3 (ref 0–0.4)
EOSINOPHIL # BLD AUTO: 0.35 10*3/MM3 (ref 0–0.4)
EOSINOPHIL # BLD AUTO: 0.76 10*3/MM3 (ref 0–0.4)
EOSINOPHIL NFR BLD AUTO: 4.5 % (ref 0.3–6.2)
EOSINOPHIL NFR BLD AUTO: 6.9 % (ref 0.3–6.2)
EOSINOPHIL NFR BLD AUTO: 8.4 % (ref 0.3–6.2)
ERYTHROCYTE [DISTWIDTH] IN BLOOD BY AUTOMATED COUNT: 16.2 % (ref 12.3–15.4)
ERYTHROCYTE [DISTWIDTH] IN BLOOD BY AUTOMATED COUNT: 17 % (ref 12.3–15.4)
ERYTHROCYTE [DISTWIDTH] IN BLOOD BY AUTOMATED COUNT: 17.9 % (ref 12.3–15.4)
FERRITIN SERPL-MCNC: 1484 NG/ML (ref 30–400)
GFR SERPL CREATININE-BSD FRML MDRD: 58 ML/MIN/1.73
GFR SERPL CREATININE-BSD FRML MDRD: 74 ML/MIN/1.73
GFR SERPL CREATININE-BSD FRML MDRD: 83 ML/MIN/1.73
GLOBULIN UR ELPH-MCNC: 2.8 GM/DL
GLOBULIN UR ELPH-MCNC: 2.9 GM/DL
GLOBULIN UR ELPH-MCNC: 3.3 GM/DL
GLUCOSE SERPL-MCNC: 131 MG/DL (ref 65–99)
GLUCOSE SERPL-MCNC: 199 MG/DL (ref 65–99)
GLUCOSE SERPL-MCNC: 242 MG/DL (ref 65–99)
GLUCOSE UR STRIP-MCNC: NEGATIVE MG/DL
HCT VFR BLD AUTO: 23.9 % (ref 37.5–51)
HCT VFR BLD AUTO: 25 % (ref 37.5–51)
HCT VFR BLD AUTO: 26.4 % (ref 37.5–51)
HGB BLD-MCNC: 8 G/DL (ref 13–17.7)
HGB BLD-MCNC: 8.7 G/DL (ref 13–17.7)
HGB BLD-MCNC: 9.1 G/DL (ref 13–17.7)
HGB UR QL STRIP.AUTO: ABNORMAL
HOLD SPECIMEN: NORMAL
HOLD SPECIMEN: NORMAL
HYALINE CASTS UR QL AUTO: ABNORMAL
IMM GRANULOCYTES # BLD AUTO: 0.02 10*3/MM3 (ref 0–0.05)
IMM GRANULOCYTES # BLD AUTO: 0.02 10*3/MM3 (ref 0–0.05)
IMM GRANULOCYTES # BLD AUTO: 0.03 10*3/MM3 (ref 0–0.05)
IMM GRANULOCYTES NFR BLD AUTO: 0.3 % (ref 0–0.5)
IMM GRANULOCYTES NFR BLD AUTO: 0.3 % (ref 0–0.5)
IMM GRANULOCYTES NFR BLD AUTO: 0.4 % (ref 0–0.5)
IRON 24H UR-MRATE: 68 MCG/DL (ref 59–158)
IRON SATN MFR SERPL: 37 % (ref 20–50)
KETONES UR QL STRIP: NEGATIVE
LEUKOCYTE ESTERASE UR QL STRIP.AUTO: ABNORMAL
LYMPHOCYTES # BLD AUTO: 1.25 10*3/MM3 (ref 0.7–3.1)
LYMPHOCYTES # BLD AUTO: 1.45 10*3/MM3 (ref 0.7–3.1)
LYMPHOCYTES # BLD AUTO: 1.68 10*3/MM3 (ref 0.7–3.1)
LYMPHOCYTES NFR BLD AUTO: 18.7 % (ref 19.6–45.3)
LYMPHOCYTES NFR BLD AUTO: 24.7 % (ref 19.6–45.3)
LYMPHOCYTES NFR BLD AUTO: 25.3 % (ref 19.6–45.3)
MAGNESIUM SERPL-MCNC: 2 MG/DL (ref 1.6–2.4)
MCH RBC QN AUTO: 32 PG (ref 26.6–33)
MCH RBC QN AUTO: 32.4 PG (ref 26.6–33)
MCH RBC QN AUTO: 33.3 PG (ref 26.6–33)
MCHC RBC AUTO-ENTMCNC: 33.5 G/DL (ref 31.5–35.7)
MCHC RBC AUTO-ENTMCNC: 34.5 G/DL (ref 31.5–35.7)
MCHC RBC AUTO-ENTMCNC: 34.8 G/DL (ref 31.5–35.7)
MCV RBC AUTO: 94 FL (ref 79–97)
MCV RBC AUTO: 95.6 FL (ref 79–97)
MCV RBC AUTO: 95.8 FL (ref 79–97)
MONOCYTES # BLD AUTO: 0.39 10*3/MM3 (ref 0.1–0.9)
MONOCYTES # BLD AUTO: 0.42 10*3/MM3 (ref 0.1–0.9)
MONOCYTES # BLD AUTO: 0.48 10*3/MM3 (ref 0.1–0.9)
MONOCYTES NFR BLD AUTO: 4.7 % (ref 5–12)
MONOCYTES NFR BLD AUTO: 7.7 % (ref 5–12)
MONOCYTES NFR BLD AUTO: 8.4 % (ref 5–12)
NEUTROPHILS NFR BLD AUTO: 3.04 10*3/MM3 (ref 1.7–7)
NEUTROPHILS NFR BLD AUTO: 3.49 10*3/MM3 (ref 1.7–7)
NEUTROPHILS NFR BLD AUTO: 59.9 % (ref 42.7–76)
NEUTROPHILS NFR BLD AUTO: 6.07 10*3/MM3 (ref 1.7–7)
NEUTROPHILS NFR BLD AUTO: 61.2 % (ref 42.7–76)
NEUTROPHILS NFR BLD AUTO: 67.5 % (ref 42.7–76)
NITRITE UR QL STRIP: NEGATIVE
NRBC BLD AUTO-RTO: 0 /100 WBC (ref 0–0.2)
NT-PROBNP SERPL-MCNC: 2836 PG/ML (ref 0–1800)
NT-PROBNP SERPL-MCNC: 4908 PG/ML (ref 0–1800)
PH UR STRIP.AUTO: 7.5 [PH] (ref 5–8)
PLATELET # BLD AUTO: 262 10*3/MM3 (ref 140–450)
PLATELET # BLD AUTO: 262 10*3/MM3 (ref 140–450)
PLATELET # BLD AUTO: 305 10*3/MM3 (ref 140–450)
PMV BLD AUTO: 10 FL (ref 6–12)
PMV BLD AUTO: 10.5 FL (ref 6–12)
PMV BLD AUTO: 10.7 FL (ref 6–12)
POTASSIUM SERPL-SCNC: 4.2 MMOL/L (ref 3.5–5.2)
POTASSIUM SERPL-SCNC: 4.4 MMOL/L (ref 3.5–5.2)
POTASSIUM SERPL-SCNC: 4.9 MMOL/L (ref 3.5–5.2)
PROT SERPL-MCNC: 5.5 G/DL (ref 6–8.5)
PROT SERPL-MCNC: 5.5 G/DL (ref 6–8.5)
PROT SERPL-MCNC: 5.6 G/DL (ref 6–8.5)
PROT UR QL STRIP: ABNORMAL
QT INTERVAL: 422 MS
QT INTERVAL: 426 MS
QT INTERVAL: 436 MS
QTC INTERVAL: 418 MS
QTC INTERVAL: 420 MS
QTC INTERVAL: 448 MS
RBC # BLD AUTO: 2.5 10*6/MM3 (ref 4.14–5.8)
RBC # BLD AUTO: 2.61 10*6/MM3 (ref 4.14–5.8)
RBC # BLD AUTO: 2.81 10*6/MM3 (ref 4.14–5.8)
RBC # UR: ABNORMAL /HPF
REF LAB TEST METHOD: ABNORMAL
SODIUM SERPL-SCNC: 130 MMOL/L (ref 136–145)
SODIUM SERPL-SCNC: 136 MMOL/L (ref 136–145)
SODIUM SERPL-SCNC: 136 MMOL/L (ref 136–145)
SP GR UR STRIP: 1.01 (ref 1–1.03)
SQUAMOUS #/AREA URNS HPF: ABNORMAL /HPF
TIBC SERPL-MCNC: 186 MCG/DL (ref 298–536)
TRANSFERRIN SERPL-MCNC: 125 MG/DL (ref 200–360)
TROPONIN T SERPL-MCNC: 0.14 NG/ML (ref 0–0.03)
TROPONIN T SERPL-MCNC: 0.14 NG/ML (ref 0–0.03)
URATE SERPL-MCNC: 3.7 MG/DL (ref 3.4–7)
UROBILINOGEN UR QL STRIP: ABNORMAL
WBC # BLD AUTO: 5.07 10*3/MM3 (ref 3.4–10.8)
WBC # BLD AUTO: 5.72 10*3/MM3 (ref 3.4–10.8)
WBC # BLD AUTO: 9 10*3/MM3 (ref 3.4–10.8)
WBC UR QL AUTO: ABNORMAL /HPF
WHOLE BLOOD HOLD SPECIMEN: NORMAL
WHOLE BLOOD HOLD SPECIMEN: NORMAL

## 2021-01-01 PROCEDURE — 93005 ELECTROCARDIOGRAM TRACING: CPT

## 2021-01-01 PROCEDURE — 87077 CULTURE AEROBIC IDENTIFY: CPT | Performed by: FAMILY MEDICINE

## 2021-01-01 PROCEDURE — 82728 ASSAY OF FERRITIN: CPT | Performed by: FAMILY MEDICINE

## 2021-01-01 PROCEDURE — 80053 COMPREHEN METABOLIC PANEL: CPT | Performed by: FAMILY MEDICINE

## 2021-01-01 PROCEDURE — 85379 FIBRIN DEGRADATION QUANT: CPT | Performed by: EMERGENCY MEDICINE

## 2021-01-01 PROCEDURE — 84550 ASSAY OF BLOOD/URIC ACID: CPT | Performed by: FAMILY MEDICINE

## 2021-01-01 PROCEDURE — 84484 ASSAY OF TROPONIN QUANT: CPT | Performed by: EMERGENCY MEDICINE

## 2021-01-01 PROCEDURE — 85025 COMPLETE CBC W/AUTO DIFF WBC: CPT | Performed by: EMERGENCY MEDICINE

## 2021-01-01 PROCEDURE — 80053 COMPREHEN METABOLIC PANEL: CPT | Performed by: EMERGENCY MEDICINE

## 2021-01-01 PROCEDURE — 87086 URINE CULTURE/COLONY COUNT: CPT | Performed by: FAMILY MEDICINE

## 2021-01-01 PROCEDURE — 36415 COLL VENOUS BLD VENIPUNCTURE: CPT

## 2021-01-01 PROCEDURE — 93010 ELECTROCARDIOGRAM REPORT: CPT | Performed by: EMERGENCY MEDICINE

## 2021-01-01 PROCEDURE — 87186 SC STD MICRODIL/AGAR DIL: CPT | Performed by: FAMILY MEDICINE

## 2021-01-01 PROCEDURE — 93005 ELECTROCARDIOGRAM TRACING: CPT | Performed by: EMERGENCY MEDICINE

## 2021-01-01 PROCEDURE — 83880 ASSAY OF NATRIURETIC PEPTIDE: CPT | Performed by: EMERGENCY MEDICINE

## 2021-01-01 PROCEDURE — 71045 X-RAY EXAM CHEST 1 VIEW: CPT

## 2021-01-01 PROCEDURE — 84466 ASSAY OF TRANSFERRIN: CPT | Performed by: FAMILY MEDICINE

## 2021-01-01 PROCEDURE — 83540 ASSAY OF IRON: CPT | Performed by: FAMILY MEDICINE

## 2021-01-01 PROCEDURE — 81001 URINALYSIS AUTO W/SCOPE: CPT | Performed by: FAMILY MEDICINE

## 2021-01-01 PROCEDURE — 99284 EMERGENCY DEPT VISIT MOD MDM: CPT

## 2021-01-01 PROCEDURE — 83880 ASSAY OF NATRIURETIC PEPTIDE: CPT | Performed by: FAMILY MEDICINE

## 2021-01-01 PROCEDURE — 85025 COMPLETE CBC W/AUTO DIFF WBC: CPT | Performed by: FAMILY MEDICINE

## 2021-01-01 PROCEDURE — 83735 ASSAY OF MAGNESIUM: CPT | Performed by: EMERGENCY MEDICINE

## 2021-01-01 RX ORDER — DIPHENOXYLATE HYDROCHLORIDE AND ATROPINE SULFATE 2.5; .025 MG/1; MG/1
1 TABLET ORAL 4 TIMES DAILY PRN
COMMUNITY

## 2021-01-01 RX ORDER — FEXOFENADINE HCL 180 MG/1
180 TABLET ORAL DAILY
COMMUNITY

## 2021-01-01 RX ORDER — SODIUM CHLORIDE 0.9 % (FLUSH) 0.9 %
10 SYRINGE (ML) INJECTION AS NEEDED
Status: DISCONTINUED | OUTPATIENT
Start: 2021-01-01 | End: 2021-01-01 | Stop reason: HOSPADM

## 2021-01-01 RX ORDER — CLONIDINE HYDROCHLORIDE 0.1 MG/1
0.1 TABLET ORAL EVERY 6 HOURS PRN
COMMUNITY

## 2021-01-01 RX ORDER — LIDOCAINE 50 MG/G
1 PATCH TOPICAL EVERY 24 HOURS
COMMUNITY

## 2021-01-01 RX ORDER — DONEPEZIL HYDROCHLORIDE 5 MG/1
5 TABLET, FILM COATED ORAL NIGHTLY
COMMUNITY

## 2021-01-01 RX ORDER — ROPINIROLE 1 MG/1
1 TABLET, FILM COATED ORAL NIGHTLY
Status: DISCONTINUED | OUTPATIENT
Start: 2021-01-01 | End: 2021-01-01 | Stop reason: HOSPADM

## 2021-01-01 RX ORDER — AMLODIPINE BESYLATE 5 MG/1
5 TABLET ORAL DAILY
COMMUNITY

## 2021-01-01 RX ORDER — QUETIAPINE FUMARATE 25 MG/1
25 TABLET, FILM COATED ORAL NIGHTLY
Status: DISCONTINUED | OUTPATIENT
Start: 2021-01-01 | End: 2021-01-01 | Stop reason: HOSPADM

## 2021-01-01 RX ORDER — QUETIAPINE FUMARATE 25 MG/1
25 TABLET, FILM COATED ORAL ONCE
Status: COMPLETED | OUTPATIENT
Start: 2021-01-01 | End: 2021-01-01

## 2021-01-01 RX ORDER — FLUOROMETHOLONE 0.1 %
1 SUSPENSION, DROPS(FINAL DOSAGE FORM)(ML) OPHTHALMIC (EYE) EVERY 4 HOURS
COMMUNITY

## 2021-01-01 RX ORDER — IPRATROPIUM BROMIDE 21 UG/1
2 SPRAY, METERED NASAL EVERY 12 HOURS PRN
COMMUNITY

## 2021-01-01 RX ADMIN — QUETIAPINE FUMARATE 25 MG: 25 TABLET, FILM COATED ORAL at 21:18

## 2021-01-01 RX ADMIN — ROPINIROLE HYDROCHLORIDE 1 MG: 1 TABLET, FILM COATED ORAL at 21:18

## 2021-01-01 RX ADMIN — QUETIAPINE FUMARATE 25 MG: 25 TABLET, FILM COATED ORAL at 23:43

## 2021-01-07 NOTE — OUTREACH NOTE
General Surgery Week 2 Survey      Responses   [facility] patient discharged from?  [place]   Does the patient have one of the following disease processes/diagnoses(primary or secondary)?  General Surgery   Week 2 attempt successful?  Yes   Call start time  1028   Call end time  1030   General alerts for this patient  Hx: CHF    Discharge diagnosis  **Closed fracture of left hip    Is patient permission given to speak with other caregiver?  Yes   List who call center can speak with  Isabel   Person spoke with today (if not patient) and relationship  Isabel-grandchild    Meds reviewed with patient/caregiver?  Yes   Is the patient taking all medications as directed (includes completed medication regime)?  Yes   Has the patient kept scheduled appointments due by today?  Yes   Comments  Saw surgeon yesterday   Psychosocial issues?  No   What is the patient's perception of their health status since discharge?  Improving   Nursing interventions  Nurse provided patient education   Is the patient/caregiver able to teach back signs and symptoms of incisional infection?  Increased redness, swelling or pain at the incisonal site, Increased drainage or bleeding, Incisional warmth, Pus or odor from incision   Is the patient/caregiver able to teach back steps to recovery at home?  Rest and rebuild strength, gradually increase activity   Is the patient/caregiver able to teach back the hierarchy of who to call/visit for symptoms/problems? PCP, Specialist, Home health nurse, Urgent Care, ED, 911  Yes   Additional teach back comments  HH coming in and will have some PT. pt still nonweight bearing on the hip.    Week 2 call completed?  Yes          Yohana Shaffer RN

## 2021-01-14 NOTE — OUTREACH NOTE
General Surgery Week 3 Survey      Responses   [facility] patient discharged from?  [place]   Does the patient have one of the following disease processes/diagnoses(primary or secondary)?  General Surgery   Week 3 attempt successful?  Yes   Call start time  1557   Revoke  Readmitted   Call end time  1559   General alerts for this patient  Hx: CHF    Discharge diagnosis  **Closed fracture of left hip    Is patient permission given to speak with other caregiver?  Yes   List who call center can speak with  Isabel   Person spoke with today (if not patient) and relationship  Isabel-grandchild    Meds reviewed with patient/caregiver?  Yes   Is the patient having any side effects they believe may be caused by any medication additions or changes?  No   Does the patient have all medications related to this admission filled (includes all antibiotics, pain medications, etc.)  Yes   Is the patient taking all medications as directed (includes completed medication regime)?  Yes   Medication comments  Lorsartan 100 mg daily     Metroprolol 25 mg at HS    Lasix daily   Does the patient have a follow up appointment scheduled with their surgeon?  Yes   Has the patient kept scheduled appointments due by today?  Yes   Comments  01/18/2021   What is the Home health agency?   [facility]    Has home health visited the patient within 72 hours of discharge?  Yes   Has all DME been delivered?  Yes   Psychosocial issues?  No   Comments  using a wheelchair non weight bearing per MD.    Did the patient receive a copy of their discharge instructions?  Yes   Nursing interventions  Reviewed instructions with patient   What is the patient's perception of their health status since discharge?  Improving   Nursing interventions  Nurse provided patient education   Is the patient /caregiver able to teach back basic post-op care?  Continue use of incentive spirometry at least 1 week post discharge, Practice 'cough and deep breath', Drive as instructed by  MD in discharge instructions, Take showers only when approved by MD-sponge bathe until then, No tub bath, swimming, or hot tub until instructed by MD, Keep incision areas clean,dry and protected, Do not remove steri-strips, Lifting as instructed by MD in discharge instructions   Is the patient/caregiver able to teach back signs and symptoms of incisional infection?  Increased redness, swelling or pain at the incisonal site, Increased drainage or bleeding, Incisional warmth, Pus or odor from incision   Is the patient/caregiver able to teach back steps to recovery at home?  Rest and rebuild strength, gradually increase activity   If the patient is a current smoker, are they able to teach back resources for cessation?  Not a smoker   Is the patient/caregiver able to teach back the hierarchy of who to call/visit for symptoms/problems? PCP, Specialist, Home health nurse, Urgent Care, ED, 911  Yes   Additional teach back comments  Remains non weight bearing per Md.    Week 3 call completed?  Yes          Licha Craig RN

## 2021-01-23 NOTE — OUTREACH NOTE
General Surgery Week 4 Survey      Responses   [facility] patient discharged from?  [place]   Does the patient have one of the following disease processes/diagnoses(primary or secondary)?  General Surgery   Week 4 attempt successful?  Yes   Call start time  1825   Call end time  1827   Discharge diagnosis  **Closed fracture of left hip    Person spoke with today (if not patient) and relationship  Isabel-grandchild    Is the patient taking all medications as directed (includes completed medication regime)?  Yes   Has the patient kept scheduled appointments due by today?  Yes   Is the patient still receiving Home Health Services?  N/A   Comments  Still nonweight bearing, wheelchair   What is the patient's perception of their health status since discharge?  Improving   Week 4 call completed?  Yes   Would the patient like one additional call?  No   Graduated  Yes   Is the patient interested in additional calls from an ambulatory ?  NOTE:  applies to high risk patients requiring additional follow-up.  Yes   Did the patient feel the follow up calls were helpful during their recovery period?  Yes   Wrap up additional comments  Still nonweight bearing. Glucose 113 today. Referred to ambulatory .           Jaja Barnett RN

## 2021-02-03 NOTE — OUTREACH NOTE
Patient Outreach Note    Patient discharged from [facility] 12- for a left hip fracture requiring a cephalomedullary nailing of the left closed displaced intertrochanteric hip fracture. He received successful call center outreach. ACM received an additional follow up request from the call center. ACM spoke with patients granddaughter, Bisi. Bisi reports the patient lives with her. She spoke with his PCP this morning and he will see the orthopedist next week. He is currently receiving home health services for SN and PT. Bisi stated they have all necessary DME in the home. Bisi denied food or medication insecurities. Bisi did not feel further outreach calls were necessary.     Shelbi De Oliveira RN  Ambulatory     2/3/2021, 14:08 CST

## 2021-02-18 NOTE — ED NOTES
Unsuccessful attempt x 3 to collect labs to right arm and left arm per Ben Barboza RN, RN  02/17/21 1938

## 2021-02-18 NOTE — PROGRESS NOTES
"[place] pt yelling out earlier.  Spend some time with him trying to calm him down.  At that time, he was able to tell me he was at the hospital but was talking out of his head.  \"I don't have enough rope\" and reached for his mask.  I removed his mask because it seemed to be agitating him.  \"They did not give me enough rope\".  Calling for his daddy.  Responded to my questions but would not settle down.  Covered him up better/ he had his gown all twisted.  Nothing seemed to calm him. Charge nurse Addie notified. .21:18 CST    "

## 2021-02-18 NOTE — ED PROVIDER NOTES
"Subjective   Patient is brought to the emergency room by ambulance from home with a report from EMS that family called them because the patient was reportedly having chest pain.  Patient does have dementia so it is hard to get decent history from him.  He seems to wax and wane with his degree of dementia.  Apparently when he first presented he told the nurses that he was having \"a heart attack\" but could not tell them as to how long it been going on how much his pain was.  When I try to talk to him he speaks in a rambling speech that I cannot understand it well.  He does seem to try to answer but he cannot understand his words.  There is no family on presentation to help us with a history at this time.      History provided by:  Patient  History limited by:  Dementia   used: No    Chest Pain  Pain location:  Substernal area  Pain quality: dull    Pain radiates to:  Does not radiate  Pain severity:  Unable to specify  Onset quality:  Unable to specify  Duration:  1 day  Timing:  Unable to specify  Progression:  Unable to specify  Chronicity:  New  Context: not breathing, not drug use, not eating, not intercourse, not lifting, not movement, not raising an arm, not at rest, not stress and not trauma    Relieved by:  Nothing  Worsened by:  Nothing  Ineffective treatments:  None tried  Associated symptoms: no abdominal pain, no AICD problem, no altered mental status, no anorexia, no cough, no fever, no headache, no heartburn, no orthopnea, no shortness of breath and no syncope    Risk factors: coronary artery disease, diabetes mellitus and male sex        Review of Systems   Constitutional: Negative.  Negative for fever.   HENT: Negative.    Respiratory: Negative.  Negative for cough and shortness of breath.    Cardiovascular: Positive for chest pain. Negative for orthopnea and syncope.   Gastrointestinal: Negative.  Negative for abdominal pain, anorexia and heartburn.   Genitourinary: Negative.  "   Musculoskeletal: Negative.    Skin: Negative.    Neurological: Negative.  Negative for headaches.   Psychiatric/Behavioral: Negative.    All other systems reviewed and are negative.      Past Medical History:   Diagnosis Date   • A-fib (CMS/[facility]) 11/15/2016   • Anemia     gets shots every few months to build up blood. sees dr adam.   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   • Arthritis    • Bradycardia 11/15/2016   • CAD (coronary artery disease)    • CAD in native artery 11/15/2016   • Chest pain 11/15/2016   • CHF (congestive heart failure) (CMS/[facility])    • Chronic kidney disease    • COPD (chronic obstructive pulmonary disease) (CMS/[facility])    • DDD (degenerative disc disease), lumbar 6/27/2017   • Diabetes (CMS/[facility])    • Heart murmur    • HTN (hypertension) 11/15/2016   • Hyperlipidemia    • Hypertension    • Lumbar stenosis with neurogenic claudication 6/27/2017   • Murmur 4/19/2019   • Myocardial infarction (CMS/[facility])    • PVD (peripheral vascular disease) (CMS/[facility])    • Sleep apnea    • Stroke (CMS/[facility])    • Type 2 diabetes mellitus (CMS/[facility]) 11/15/2016   • Ulcer of abdomen wall (CMS/[facility])        Allergies   Allergen Reactions   • Lorazepam Anxiety   • Lorazepam Anxiety   • Penicillins Swelling   • Adhesive Tape Rash   • Penicillins Swelling       Past Surgical History:   Procedure Laterality Date   • APPENDECTOMY     • BACK SURGERY      x2   • CARDIAC CATHETERIZATION Left     1/2010   • CARPAL TUNNEL RELEASE Bilateral    • CHOLECYSTECTOMY     • COLONOSCOPY N/A 9/7/2017    Procedure: COLONOSCOPY WITH ANESTHESIA;  Surgeon: Joshua Rich DO;  Location: [facility] ENDOSCOPY;  Service:    • CORONARY ARTERY BYPASS GRAFT      2/2006 w/PTCA & KIMMY    • CORONARY STENT PLACEMENT     • ENDOSCOPY N/A 12/14/2016    Procedure: ESOPHAGOGASTRODUODENOSCOPY WITH ANESTHESIA;  Surgeon: Isabel Dexter MD;  Location: [facility] ENDOSCOPY;  Service:    • ENDOSCOPY N/A 12/16/2016    Procedure: ESOPHAGOGASTRODUODENOSCOPY WITH ANESTHESIA;   Surgeon: Joshua Rich DO;  Location: [facility] ENDOSCOPY;  Service:    • ENDOSCOPY N/A 4/10/2017    Procedure: ESOPHAGOGASTRODUODENOSCOPY WITH ANESTHESIA;  Surgeon: Joshua Rich DO;  Location: [facility] ENDOSCOPY;  Service:    • ENDOSCOPY N/A 10/28/2020    Procedure: ESOPHAGOGASTRODUODENOSCOPY WITH ANESTHESIA;  Surgeon: Forrest Bliss MD;  Location: [facility] ENDOSCOPY;  Service: Gastroenterology;  Laterality: N/A;  pre: GI bleed  post: duodenal ulcers, hiatal hernia   Jeffrey Owen MD   • EYE SURGERY Left     x 2   • HIP TROCHANTERIC NAILING WITH INTRAMEDULLARY HIP SCREW Left 12/22/2020    Procedure: HIP TROCHANTERIC NAILING SHORT WITH INTRAMEDULLARY HIP SCREW;  Surgeon: Leon Leung MD;  Location: [facility] OR;  Service: Orthopedics;  Laterality: Left;   • JOINT REPLACEMENT     • LEFT HEART CATH     • PERIPHERAL ARTERIAL STENT GRAFT     • REPLACEMENT TOTAL KNEE Left     2002   • SHOULDER ROTATOR CUFF REPAIR Bilateral        Family History   Problem Relation Age of Onset   • Coronary artery disease Father    • Heart disease Father    • Coronary artery disease Brother    • Heart attack Brother    • Cancer Mother    • No Known Problems Sister    • Heart disease Son    • Cancer Sister    • Cancer Sister        Social History     Socioeconomic History   • Marital status: Legally      Spouse name: Not on file   • Number of children: Not on file   • Years of education: Not on file   • Highest education level: Not on file   Tobacco Use   • Smoking status: Former Smoker   • Smokeless tobacco: Never Used   Substance and Sexual Activity   • Alcohol use: No   • Drug use: No   • Sexual activity: Never     Partners: Female   Social History Narrative    ** Merged History Encounter **            Prior to Admission medications    Medication Sig Start Date End Date Taking? Authorizing Provider   acetaminophen (TYLENOL) 325 MG tablet Take 2 tablets by mouth Every 4 (Four) Hours As Needed for Mild Pain  or Fever  (Temperature greater than or equal to 37 C). 11/4/20   Elpidio Irby DO   allopurinol (ZYLOPRIM) 300 MG tablet Take 1 tablet by mouth Daily. 11/10/20   Rocky Nixon DO   ascorbic acid (VITAMIN C) 1000 MG tablet Take 500 mg by mouth Daily.    Mima Zhang MD   aspirin 81 MG chewable tablet Chew 81 mg Daily.    Mima Zhang MD   B Complex Vitamins (vitamin b complex) capsule capsule Take 1 capsule by mouth Daily.    Mima Zhang MD   Cholecalciferol (vitamin D3) 125 MCG (5000 UT) capsule capsule Take 5,000 Units by mouth Daily.    Mima Zhang MD   colchicine 0.6 MG tablet Take 0.6 mg by mouth Daily As Needed (gout flare-up).    Mima Zhang MD   docusate sodium (Colace) 100 MG capsule Take 1 capsule by mouth 2 (Two) Times a Day. 12/22/20   Leon Leung MD   finasteride (PROSCAR) 5 MG tablet Take 5 mg by mouth Daily.    Mima Zhang MD   folic acid (FOLVITE) 400 MCG tablet Take 400 mcg by mouth.    Mima Zhang MD   furosemide (LASIX) 20 MG tablet Take 1 tablet by mouth Daily As Needed (Shortness of breath, weight gain, edema). 12/27/20   Mesha Wooetn APRN   isosorbide mononitrate (IMDUR) 30 MG 24 hr tablet Take 1 tablet by mouth Daily. 11/10/20   Rocky Nixon DO   meclizine 25 MG chewable tablet chewable tablet Chew 25 mg. Take 1 tablet 3 - 4 times daily as needed    Mima Zhang MD   metFORMIN (Glucophage) 500 MG tablet Take 1 tablet by mouth Daily With Breakfast. 11/17/20   Elpidio Irby DO   metoprolol succinate XL (TOPROL XL) 25 MG 24 hr tablet Take 25 mg by mouth every night at bedtime. 12/23/18   Mima Zhang MD   nitroglycerin (NITROSTAT) 0.4 MG SL tablet Place 0.4 mg under the tongue Every 5 (Five) Minutes As Needed. 12/22/18   Mima Zhang MD   ondansetron (ZOFRAN) 4 MG tablet Take 1 tablet by mouth Every 6 (Six) Hours As Needed for Nausea or Vomiting. 12/22/20   Leon Leung MD    oxyCODONE-acetaminophen (PERCOCET) 5-325 MG per tablet Take 1 tablet by mouth Every 4 (Four) Hours As Needed (pain). 12/22/20   Leon Leung MD   pantoprazole (PROTONIX) 40 MG EC tablet Take 40 mg by mouth Every 12 (Twelve) Hours.    ProviderMima MD   pravastatin (PRAVACHOL) 40 MG tablet Take 1 tablet by mouth Every Night. 11/4/20   Elpidio Irby DO   rOPINIRole (REQUIP) 1 MG tablet Take 1 mg by mouth Every Night. Take 1 hour before bedtime.    ProviderMima MD   sennosides-docusate (senna-docusate sodium) 8.6-50 MG per tablet Take 1 tablet by mouth 2 (two) times a day. Morning & evening    Mima Zhang MD   tamsulosin (FLOMAX) 0.4 MG capsule 24 hr capsule Take 2 capsules by mouth Daily. 12/27/20   Mesha Wooten APRN   thiamine (VITAMIN B-1) 100 MG tablet Take 100 mg by mouth.    ProviderMima MD       Medications   QUEtiapine (SEROquel) tablet 25 mg (25 mg Oral Given 2/17/21 2343)       Vitals:    02/17/21 2345   BP: 127/98   Pulse: 66   Resp: 18   Temp: 98.5 °F (36.9 °C)   SpO2: 100%         Objective   Physical Exam  Vitals signs and nursing note reviewed.   Constitutional:       Appearance: He is well-developed.   HENT:      Head: Normocephalic and atraumatic.      Comments: Patient is edentulous.  Neck:      Musculoskeletal: Normal range of motion and neck supple.   Cardiovascular:      Rate and Rhythm: Normal rate and regular rhythm.      Comments: Patient does have some midline surgical chest scar consistent with bypass surgery.  Pulmonary:      Effort: Pulmonary effort is normal.      Breath sounds: Normal breath sounds.   Chest:      Chest wall: No mass, deformity, tenderness, crepitus or edema. There is no dullness to percussion.   Abdominal:      General: Bowel sounds are normal.      Palpations: Abdomen is soft.   Musculoskeletal: Normal range of motion.   Skin:     General: Skin is warm and dry.   Neurological:      General: No focal deficit present.       Mental Status: He is alert. He is disoriented.   Psychiatric:         Behavior: Behavior normal.         Procedures         Lab Results (last 24 hours)     Procedure Component Value Units Date/Time    Troponin [[id number]]  (Abnormal) Collected: 02/17/21 1950    Specimen: Blood Updated: 02/17/21 2028     Troponin T 0.138 ng/mL     Narrative:      Troponin T Reference Range:  <= 0.03 ng/mL-   Negative for AMI  >0.03 ng/mL-     Abnormal for myocardial necrosis.  Clinicians would have to utilize clinical acumen, EKG, Troponin and serial changes to determine if it is an Acute Myocardial Infarction or myocardial injury due to an underlying chronic condition.       Results may be falsely decreased if patient taking Biotin.      CBC & Differential [[id number]]  (Abnormal) Collected: 02/17/21 1950    Specimen: Blood Updated: 02/17/21 2007    Narrative:      The following orders were created for panel order CBC & Differential.  Procedure                               Abnormality         Status                     ---------                               -----------         ------                     CBC Auto Differential[[id number]]        Abnormal            Final result                 Please view results for these tests on the individual orders.    Comprehensive Metabolic Panel [[id number]]  (Abnormal) Collected: 02/17/21 1950    Specimen: Blood Updated: 02/17/21 2026     Glucose 131 mg/dL      BUN 37 mg/dL      Creatinine 1.19 mg/dL      Sodium 136 mmol/L      Potassium 4.4 mmol/L      Chloride 100 mmol/L      CO2 28.0 mmol/L      Calcium 10.4 mg/dL      Total Protein 5.5 g/dL      Albumin 2.60 g/dL      ALT (SGPT) 19 U/L      AST (SGOT) 21 U/L      Alkaline Phosphatase 143 U/L      Total Bilirubin 0.2 mg/dL      eGFR Non African Amer 58 mL/min/1.73      Globulin 2.9 gm/dL      A/G Ratio 0.9 g/dL      BUN/Creatinine Ratio 31.1     Anion Gap 8.0 mmol/L     Narrative:      GFR Normal >60  Chronic Kidney Disease <60  Kidney  Failure <15      D-dimer, Quantitative [[id number]]  (Abnormal) Collected: 02/17/21 1950    Specimen: Blood Updated: 02/17/21 2016     D-Dimer, Quantitative 1.12 mg/L (FEU)     Narrative:      Reference Range is 0-0.50 mg/L FEU. However, results <0.50 mg/L FEU tends to rule out DVT or PE. Results >0.50 mg/L FEU are not useful in predicting absence or presence of DVT or PE.      BNP [[id number]]  (Abnormal) Collected: 02/17/21 1950    Specimen: Blood Updated: 02/17/21 2023     proBNP 2,836.0 pg/mL     Narrative:      Among patients with dyspnea, NT-proBNP is highly sensitive for the detection of acute congestive heart failure. In addition NT-proBNP of <300 pg/ml effectively rules out acute congestive heart failure with 99% negative predictive value.    Results may be falsely decreased if patient taking Biotin.      Magnesium [[id number]]  (Normal) Collected: 02/17/21 1950    Specimen: Blood Updated: 02/17/21 2020     Magnesium 2.0 mg/dL     CBC Auto Differential [[id number]]  (Abnormal) Collected: 02/17/21 1950    Specimen: Blood Updated: 02/17/21 2007     WBC 9.00 10*3/mm3      RBC 2.81 10*6/mm3      Hemoglobin 9.1 g/dL      Hematocrit 26.4 %      MCV 94.0 fL      MCH 32.4 pg      MCHC 34.5 g/dL      RDW 16.2 %      RDW-SD 56.1 fl      MPV 10.0 fL      Platelets 262 10*3/mm3      Neutrophil % 67.5 %      Lymphocyte % 18.7 %      Monocyte % 4.7 %      Eosinophil % 8.4 %      Basophil % 0.4 %      Immature Grans % 0.3 %      Neutrophils, Absolute 6.07 10*3/mm3      Lymphocytes, Absolute 1.68 10*3/mm3      Monocytes, Absolute 0.42 10*3/mm3      Eosinophils, Absolute 0.76 10*3/mm3      Basophils, Absolute 0.04 10*3/mm3      Immature Grans, Absolute 0.03 10*3/mm3      nRBC 0.0 /100 WBC     Troponin [[id number]]  (Abnormal) Collected: 02/17/21 2237    Specimen: Blood Updated: 02/17/21 2312     Troponin T 0.143 ng/mL     Narrative:      Troponin T Reference Range:  <= 0.03 ng/mL-   Negative for AMI  >0.03 ng/mL-     Abnormal  for myocardial necrosis.  Clinicians would have to utilize clinical acumen, EKG, Troponin and serial changes to determine if it is an Acute Myocardial Infarction or myocardial injury due to an underlying chronic condition.       Results may be falsely decreased if patient taking Biotin.            XR Chest 1 View   Final Result   1. No acute disease.           This report was finalized on 02/17/2021 19:49 by Dr. Jonathan Darnell MD.          ED Course  ED Course as of Feb 18 0317   Wed Feb 17, 2021   2241 Patient's initial troponin is mildly elevated but it has been elevated in the past.  We will recheck the repeats.  His dimer is up little bit but is actually improved.  He has known PE so there is no further treatment needed there.    [TR]   2314 Patient's second troponin was minimally elevated but not clinically significant.  Certainly he has no signs of heart attack.  Clinically he is stable.  He was agitated earlier with his dementia but we gave him his regular night medication so he is calm down now.  His other lab work is all stable.  He is discharged in stable condition.    [TR]      ED Course User Index  [TR] Oneil Lewis Jr., MD         HEART Score (for prediction of 6-week risk of major adverse cardiac event) reviewed and/or performed as part of the patient evaluation and treatment planning process.  The result associated with this review/performance is: 6      MDM  Number of Diagnoses or Management Options  Chest pain, unspecified type: new and requires workup  Dementia with behavioral disturbance, unspecified dementia type (CMS/HCC): established and worsening     Amount and/or Complexity of Data Reviewed  Clinical lab tests: ordered and reviewed  Tests in the radiology section of CPT®: ordered and reviewed  Tests in the medicine section of CPT®: ordered and reviewed  Decide to obtain previous medical records or to obtain history from someone other than the patient: yes    Risk of Complications,  Morbidity, and/or Mortality  Presenting problems: moderate  Diagnostic procedures: moderate  Management options: moderate    Patient Progress  Patient progress: stable      Final diagnoses:   Chest pain, unspecified type   Dementia with behavioral disturbance, unspecified dementia type (CMS/[facility])          Oneil Lewis Jr., MD  02/18/21 3677

## 2022-10-11 NOTE — PLAN OF CARE
"Wt Readings from Last 6 Encounters:   10/11/22 (!) 169.7 kg (374 lb 1.9 oz)   07/27/22 (!) 171.6 kg (378 lb 5 oz)   04/07/22 (!) 171.9 kg (379 lb)   02/23/22 (!) 172.1 kg (379 lb 6.6 oz)   12/02/21 (!) 173.2 kg (381 lb 13.4 oz)   08/13/21 (!) 175.1 kg (386 lb)     Estimated body mass index is 48.03 kg/m² as calculated from the following:    Height as of 7/27/22: 6' 2" (1.88 m).    Weight as of this encounter: 169.7 kg (374 lb 1.9 oz).    Therapeutic lifestyle changes encouraged. Recommended referral to [facility].   " Problem: Skin Integrity:  Goal: Will show no infection signs and symptoms  Description: Will show no infection signs and symptoms  9/7/2020 0841 by Ana Madsen RN  Outcome: Ongoing  9/6/2020 2351 by Birgit Sheridan RN  Outcome: Ongoing  Goal: Absence of new skin breakdown  Description: Absence of new skin breakdown  9/7/2020 0841 by Ana Madsen RN  Outcome: Ongoing  9/6/2020 2351 by Birgit Sheridan RN  Outcome: Ongoing     Problem: Falls - Risk of:  Goal: Will remain free from falls  Description: Will remain free from falls  9/7/2020 0841 by Ana Madsen RN  Outcome: Ongoing  9/6/2020 2351 by Birgit Sheridan RN  Outcome: Ongoing  Goal: Absence of physical injury  Description: Absence of physical injury  9/7/2020 0841 by Ana Madsen RN  Outcome: Ongoing  9/6/2020 2351 by Birgit Sheridan RN  Outcome: Ongoing     Problem: Pain:  Goal: Pain level will decrease  Description: Pain level will decrease  9/7/2020 0841 by Ana Madsen RN  Outcome: Ongoing  9/6/2020 2351 by Birgit Sheridan RN  Outcome: Ongoing  Goal: Control of acute pain  Description: Control of acute pain  9/7/2020 0841 by Ana Madsen RN  Outcome: Ongoing  9/6/2020 2351 by Birgit Sheridan RN  Outcome: Ongoing  Goal: Control of chronic pain  Description: Control of chronic pain  9/7/2020 0841 by Ana Madsen RN  Outcome: Ongoing  9/6/2020 2351 by Birgit Sheridan RN  Outcome: Ongoing

## 2023-01-11 NOTE — NURSING NOTE
Bedside neuro completed with Tram HANDY. No changes.    Unna Boot Text: An Unna boot was placed to help immobilize the limb and facilitate more rapid healing.

## (undated) DEVICE — CONMED SCOPE SAVER BITE BLOCK, 20X27 MM: Brand: SCOPE SAVER

## (undated) DEVICE — SPK10183 ORTHOPEDIC FRACTURE AND TRAUMA KIT: Brand: SPK10183 ORTHOPEDIC FRACTURE AND TRAUMA KIT

## (undated) DEVICE — CUFF,BP,DISP,1 TUBE,ADULT,HP: Brand: MEDLINE

## (undated) DEVICE — GLOVE SURG SZ 75 L12IN FNGR THK94MIL TRNSLUC YEL LTX

## (undated) DEVICE — THE CHANNEL CLEANING BRUSH IS A NYLON FLEXI BRUSH ATTACHED TO A FLEXIBLE PLASTIC SHEATH DESIGNED TO SAFELY REMOVE DEBRIS FROM FLEXIBLE ENDOSCOPES.

## (undated) DEVICE — SUTURE VCRL SZ 2-0 L18IN ABSRB UD CT-1 L36MM 1/2 CIR J839D

## (undated) DEVICE — Device: Brand: DEFENDO AIR/WATER/SUCTION AND BIOPSY VALVE

## (undated) DEVICE — 3M™ STERI-STRIP™ REINFORCED ADHESIVE SKIN CLOSURES, R1548, 1 IN X 5 IN (25 MM X 125 MM), 4 STRIPS/ENVELOPE: Brand: 3M™ STERI-STRIP™

## (undated) DEVICE — GLOVE SURG SZ 75 L12IN FNGR THK87MIL DK GRN LTX FREE ISOLEX

## (undated) DEVICE — SUTURE MCRYL SZ 4-0 L18IN ABSRB UD L19MM PS-2 3/8 CIR PRIM Y496G

## (undated) DEVICE — SENSR O2 OXIMAX FNGR A/ 18IN NONSTR

## (undated) DEVICE — 4-PORT MANIFOLD: Brand: NEPTUNE 2

## (undated) DEVICE — Device

## (undated) DEVICE — NEURO CDS

## (undated) DEVICE — TIBURON DRAPE TOWELS: Brand: CONVERTORS

## (undated) DEVICE — C-ARMOR C-ARM EQUIPMENT COVERS CLEAR STERILE UNIVERSAL FIT 12 PER CASE: Brand: C-ARMOR

## (undated) DEVICE — TBG SMPL FLTR LINE NASL 02/C02 A/ BX/100

## (undated) DEVICE — SOLUTION IV IRRIG POUR BRL 0.9% SODIUM CHL 2F7124

## (undated) DEVICE — MICRO KOVER: Brand: UNBRANDED

## (undated) DEVICE — GLOVE SURG SZ 8 L12IN FNGR THK94MIL TRNSLUC YEL LTX HYDRGEL

## (undated) DEVICE — SET TBNG L10FT BPLR CRD MALIS IRR

## (undated) DEVICE — YANKAUER SUCTION INSTRUMENT WITHOUT CONTROL VENT, OPEN TIP, CLEAR: Brand: YANKAUER

## (undated) DEVICE — GLV SURG DERMASSURE GRN LF PF 8.5

## (undated) DEVICE — HYPODERMIC SAFETY NEEDLE: Brand: MAGELLAN

## (undated) DEVICE — FRCP BX RADJAW4 NDL 2.8 240 STD OG

## (undated) DEVICE — ENDOGATOR AUXILIARY WATER JET CONNECTOR: Brand: ENDOGATOR

## (undated) DEVICE — MSK O2 MD CONCENTR A/ LF 7FT 1P/U

## (undated) DEVICE — 3M™ IOBAN™ 2 ANTIMICROBIAL INCISE DRAPE 6651EZ: Brand: IOBAN™ 2

## (undated) DEVICE — PK HIP ORIF FX TABL 30

## (undated) DEVICE — BIT DRL 3FLUT QC CALIB 4.2X330X100MM STRL

## (undated) DEVICE — YANKAUER,BULB TIP WITH VENT: Brand: ARGYLE

## (undated) DEVICE — PK TURNOVER RM ADV

## (undated) DEVICE — GLV SURG PREMIERPRO ORTHO LTX PF SZ8.5 BRN

## (undated) DEVICE — SHORT LENS-STERILE

## (undated) DEVICE — ANTIBACTERIAL UNDYED BRAIDED (POLYGLACTIN 910), SYNTHETIC ABSORBABLE SUTURE: Brand: COATED VICRYL

## (undated) DEVICE — OCCLUSIVE GAUZE STRIP,3% BISMUTH TRIBROMOPHENATE IN PETROLATUM BLEND: Brand: XEROFORM

## (undated) DEVICE — 4.0MM PRECISION ROUND

## (undated) DEVICE — SYS SKIN CLS DERMABOND PRINEO W/22CM MESH TP

## (undated) DEVICE — ARGYLEPOLYURETHANE UMBILICAL VESSEL CATHETERSINGLE LUMEN5 FR/CH(1.7 MM) X 9-8/10" (25 CM)0.41 ML PRIME VOLUME": Brand: ARGYLE

## (undated) DEVICE — GW FOR TROCH NAIL 3.2X400MM

## (undated) DEVICE — BLD FEM FIX HELI TFN ADV PERF 105MM STRL
Type: IMPLANTABLE DEVICE | Site: HIP | Status: NON-FUNCTIONAL
Removed: 2020-12-22

## (undated) DEVICE — GAUZE,SPONGE,4"X4",8PLY,STRL,LF,10/TRAY: Brand: MEDLINE

## (undated) DEVICE — DRSNG BRDR MEPILEXLITE SLFADHR SIL 2X5

## (undated) DEVICE — SUTURE VCRL SZ 1 L18IN ABSRB UD L36MM CT-1 1/2 CIR J841D

## (undated) DEVICE — UNDERGLOVE SURG SZ 8 FNGR THK0.21MIL GRN LTX BEAD CUF

## (undated) DEVICE — SET IV PMP 1 PRT CK VLV SPL SEPT PRTS 2 PC M LL 20 GTT LEN